# Patient Record
Sex: FEMALE | Race: BLACK OR AFRICAN AMERICAN | NOT HISPANIC OR LATINO | Employment: FULL TIME | URBAN - METROPOLITAN AREA
[De-identification: names, ages, dates, MRNs, and addresses within clinical notes are randomized per-mention and may not be internally consistent; named-entity substitution may affect disease eponyms.]

---

## 2017-01-05 ENCOUNTER — OFFICE VISIT (OUTPATIENT)
Dept: INTERNAL MEDICINE | Facility: CLINIC | Age: 62
End: 2017-01-05
Payer: COMMERCIAL

## 2017-01-05 VITALS
SYSTOLIC BLOOD PRESSURE: 130 MMHG | DIASTOLIC BLOOD PRESSURE: 72 MMHG | HEIGHT: 64 IN | WEIGHT: 190.5 LBS | OXYGEN SATURATION: 90 % | BODY MASS INDEX: 32.52 KG/M2 | HEART RATE: 134 BPM | TEMPERATURE: 99 F

## 2017-01-05 DIAGNOSIS — Z22.322 MRSA (METHICILLIN RESISTANT STAPH AUREUS) CULTURE POSITIVE: Primary | ICD-10-CM

## 2017-01-05 DIAGNOSIS — R23.8 BULLAE: ICD-10-CM

## 2017-01-05 PROCEDURE — 3078F DIAST BP <80 MM HG: CPT | Mod: S$GLB,,, | Performed by: NURSE PRACTITIONER

## 2017-01-05 PROCEDURE — 99213 OFFICE O/P EST LOW 20 MIN: CPT | Mod: 25,S$GLB,, | Performed by: NURSE PRACTITIONER

## 2017-01-05 PROCEDURE — 96372 THER/PROPH/DIAG INJ SC/IM: CPT | Mod: S$GLB,,, | Performed by: NURSE PRACTITIONER

## 2017-01-05 PROCEDURE — 1159F MED LIST DOCD IN RCRD: CPT | Mod: S$GLB,,, | Performed by: NURSE PRACTITIONER

## 2017-01-05 PROCEDURE — 3075F SYST BP GE 130 - 139MM HG: CPT | Mod: S$GLB,,, | Performed by: NURSE PRACTITIONER

## 2017-01-05 PROCEDURE — 99999 PR PBB SHADOW E&M-EST. PATIENT-LVL IV: CPT | Mod: PBBFAC,,, | Performed by: NURSE PRACTITIONER

## 2017-01-05 RX ORDER — HYDROCODONE BITARTRATE AND ACETAMINOPHEN 5; 325 MG/1; MG/1
1-2 TABLET ORAL
Qty: 31 TABLET | Refills: 0 | Status: SHIPPED | OUTPATIENT
Start: 2017-01-05 | End: 2017-06-22

## 2017-01-05 RX ORDER — SULFAMETHOXAZOLE AND TRIMETHOPRIM 800; 160 MG/1; MG/1
1 TABLET ORAL 2 TIMES DAILY
Qty: 14 TABLET | Refills: 0 | Status: SHIPPED | OUTPATIENT
Start: 2017-01-05 | End: 2017-06-22

## 2017-01-05 RX ORDER — CEFTRIAXONE 1 G/1
1 INJECTION, POWDER, FOR SOLUTION INTRAMUSCULAR; INTRAVENOUS
Status: COMPLETED | OUTPATIENT
Start: 2017-01-05 | End: 2017-01-05

## 2017-01-05 RX ORDER — MUPIROCIN 20 MG/G
OINTMENT TOPICAL
Qty: 30 G | Refills: 0 | Status: SHIPPED | OUTPATIENT
Start: 2017-01-05 | End: 2017-06-22

## 2017-01-05 RX ADMIN — CEFTRIAXONE 1 G: 1 INJECTION, POWDER, FOR SOLUTION INTRAMUSCULAR; INTRAVENOUS at 07:01

## 2017-01-05 NOTE — MR AVS SNAPSHOT
[facility]  [address] [facility] [address]  Phone: [phone]  Fax: [phone]                  Sunitha Pillai   2017 7:30 PM   Office Visit    Description:  Female : 1955   Provider:  Jasiel Jacobo DNP   Department:  [facility]           Reason for Visit     Blister           Diagnoses this Visit        Comments    MRSA (methicillin resistant staph aureus) culture positive    -  Primary     Bullae                To Do List           Future Appointments        Provider Department Dept Phone    2017 8:00 AM Patty Louis MD [facility] [address] [phone]      Goals (5 Years of Data)     None       These Medications        Disp Refills Start End    sulfamethoxazole-trimethoprim 800-160mg (BACTRIM DS) 800-160 mg Tab 14 tablet 0 2017     Take 1 tablet by mouth 2 (two) times daily. - Oral    Pharmacy: [address] Ph #: [phone]       hydrocodone-acetaminophen 5-325mg (NORCO) 5-325 mg per tablet 31 tablet 0 2017     Take 1-2 tablets by mouth every 4 to 6 hours as needed for Pain. - Oral    Pharmacy: [place] [id number] [facility] Ph #: [phone]       mupirocin (BACTROBAN) 2 % ointment 30 g 0 2017     Apply tid to any forming abscesses, also bid to bilateral nares x5d    Pharmacy: [address] Ph #: [phone]         [facility]     [facility] On [facility] Nurse Care Line -  Assistance  Registered nurses in the [facility] provide clinical advisement, health education, appointment booking, and other advisory services.  Call for this free service at [phone].             Medications           Message regarding Medications     Verify the changes and/or additions to your medication regime listed below are the same as discussed  "with your clinician today.  If any of these changes or additions are incorrect, please notify your healthcare provider.        START taking these NEW medications        Refills    sulfamethoxazole-trimethoprim 800-160mg (BACTRIM DS) 800-160 mg Tab 0    Sig: Take 1 tablet by mouth 2 (two) times daily.    Class: Normal    Route: Oral    hydrocodone-acetaminophen 5-325mg (NORCO) 5-325 mg per tablet 0    Sig: Take 1-2 tablets by mouth every 4 to 6 hours as needed for Pain.    Class: Normal    Route: Oral    mupirocin (BACTROBAN) 2 % ointment 0    Sig: Apply tid to any forming abscesses, also bid to bilateral nares x5d    Class: Normal      These medications were administered today        Dose Freq    cefTRIAXone injection 1 g 1 g Clinic/HOD 1 time    Sig: Inject 1 g into the muscle one time.    Class: Normal    Route: Intramuscular           Verify that the below list of medications is an accurate representation of the medications you are currently taking.  If none reported, the list may be blank. If incorrect, please contact your healthcare provider. Carry this list with you in case of emergency.           Current Medications     aspirin 81 mg Tab Take 1 tablet by mouth Daily.    blood sugar diagnostic Strp For TRUEresult- strips and lancets - pt is insulin dependent and checks glucose four times daily    blood sugar diagnostic, drum (ACCU-CHEK COMPACT TEST) Strp USE ONE STRIP TO CHECK GLUCOSE 4 TIMES DAILY BEFORE EACH MEAL AND AT BEDTIME    insulin aspart (NOVOLOG FLEXPEN) 100 unit/mL InPn pen 20 Units with each meal plus sliding scale    insulin aspart (NOVOLOG) 100 unit/mL injection Inject 20 Units before each meal plus sliding scale    insulin glargine (LANTUS SOLOSTAR) 100 unit/mL (3 mL) InPn pen 45 units at bedtime    insulin syringe-needle U-100 1 mL 31 x 5/16" Syrg Use three times daily with insulin    lancets (LANCETS,THIN) Misc Use with accucheck benji    lisinopril (PRINIVIL,ZESTRIL) 20 MG tablet Take 1 " "tablet (20 mg total) by mouth 2 (two) times daily.    meloxicam (MOBIC) 15 MG tablet Take 1 tablet (15 mg total) by mouth once daily.    metformin (GLUCOPHAGE) 1000 MG tablet One tablet twice daily    pen needle, diabetic (BD INSULIN PEN NEEDLE UF SHORT) 31 gauge x 5/16" Ndle USE WITH INSULIN 4 TIMES DAILY    gabapentin (NEURONTIN) 300 MG capsule Take one cap PO QHS for 7 days, then one cap PO BID for the next 7 days, and then take one cap PO TID and continue    hydrocodone-acetaminophen 5-325mg (NORCO) 5-325 mg per tablet Take 1-2 tablets by mouth every 4 to 6 hours as needed for Pain.    imiquimod (ALDARA) 5 % cream Apply to affected area three times weekly    mupirocin (BACTROBAN) 2 % ointment Apply tid to any forming abscesses, also bid to bilateral nares x5d    sulfamethoxazole-trimethoprim 800-160mg (BACTRIM DS) 800-160 mg Tab Take 1 tablet by mouth 2 (two) times daily.    sulfamethoxazole-trimethoprim 800-160mg (BACTRIM DS) 800-160 mg Tab Take 1 tablet by mouth 2 (two) times daily.           Clinical Reference Information           Vital Signs - Last Recorded  Most recent update: 1/5/2017  7:29 PM by Vandana Crawford LPN    BP Pulse Temp Ht Wt SpO2    130/72 (BP Location: Right arm, Patient Position: Sitting, BP Method: Manual) (!) 134 98.9 °F (37.2 °C) (Oral) 5' 4" (1.626 m) 86.4 kg (190 lb 7.6 oz) (!) 90%    BMI                32.7 kg/m2          Blood Pressure          Most Recent Value    BP  130/72      Allergies as of 1/5/2017     No Known Allergies      Immunizations Administered on Date of Encounter - 1/5/2017     None      [facility] Sign-Up     Activating your [facility] account is as easy as 1-2-3!     1) Visit [email or web address], select Sign Up Now, enter this activation code and your date of birth, then select Next.  [email or web address]  Expires: 2/19/2017  7:45 PM      2) Create a username and password to use when you visit [facility] in the future and select a security question in case you lose your " "password and select Next.    3) Enter your e-mail address and click Sign Up!    Additional Information  If you have questions, please e-mail [email or web address] or call [phone] to talk to our [facility] staff. Remember, [facility] is NOT to be used for urgent needs. For medical emergencies, dial 911.         Instructions      Staph Infection (MRSA)  "Staph" is the short name for the common bacteria called "staphylococcus aureus". Staph bacteria are often present on the skin without causing an infection. If it gets under the skin an infection occurs. This causes redness, tenderness, swelling and sometimes fluid drainage.  MRSA stands for "Methicillin-Resistant Staph Aureus". Unlike a common staph infection, MRSA bacteria are resistant to the usual antibiotics and harder to treat. Also, MRSA is more toxic than common staph bacteria. It can spread quickly throughout the body and cause a life-threatening illness.  MRSA is spread to others by direct physical contact with the bacteria. MRSA can also be transmitted from items contaminated by a person who has the bacteria, such as bandages, towels, bed sheets, or sports equipment. It is generally not spread through the air.  But you can acquire it if you come in direct contact with the fluid from someone's cough or sneeze.  Once you have a MRSA skin infection, you are at risk of having it again in the future.  If MRSA infection is suspected, the healthcare provider may take a wound culture to confirm the diagnosis. If an abscess is present, it may be drained. One or more antibiotics that work against MRSA will likely be prescribed.  Home care  · Take any antibiotics prescribed exactly as directed. Do not stop taking them until they are gone or your healthcare provider tells you to stop, even if you feel better.  · If antibiotic ointment was prescribed, use it as directed.  · Wash your whole body (scalp to toes) daily for 5 days with prescription soap. Scrub fingernails " with a brush for 1 minute twice a day with prescription soap.  · Keep wounds covered with clean, dry bandages. Change dressings as they become soiled. Wash your hands well each time you change the bandage or touch the wound.  · Remove any artificial nails and nail polish.  Treating household members  If you have been diagnosed with possible MRSA infection, those living with you are at higher risk of carrying the bacteria on their skin or in their nose, even if there is no sign of infection. Bacteria must be removed from the skin of all household members at the same time so it is not passed back and forth. Advise them to remove the bacteria as follows:  · Household member should wash with prescription soap as outlined above.  · If anyone in the household has a skin infection, it must be treated by a healthcare provider. Washing alone will not treat a MRSA infection.  · Clean counter tops and children's toys.  · Do not share personal items such as toothbrush and razors. It is okay to share glasses, plates, and utensils.  Preventing spread of infection  · Wash your hands frequently with plain soap and warm water. Be certain to clean under the fingernails, between the fingers, and the wrists. Dry hands with a single use towel (for example a paper towel). If soap and water are not available, you can use an alcohol-based hand . Rub the  over the entire surface of the hands, fingers, and wrists until dry.  · Avoid sharing personal items such as towels, washcloths, razors, clothing, or uniforms. Wash soiled sheets, towels or clothes in hot water with laundry detergent. Use an automatic clothes dryer set on high to kill any remaining bacteria.  · If you use a gym, wipe down equipment with an alcohol-based  before and after each use.  Wipe the handgrips as well.  · If you participate in sports, shower with plain soap after every activity. Use a clean towel for each shower.  Follow-up care  Follow-up  with your healthcare provider or as advised by our staff. If a wound culture was taken, call as directed for the results. You will be told about any changes to your treatment.  If you are diagnosed with MRSA, tell medical personnel in the future that you have been treated for this type of infection.  When to seek medical advice  Call your healthcare provider if any of the following occur:  · Increasing redness, swelling or pain  · Red streaks in the skin around the wound  · Weakness or dizziness  · New appearance of pus or drainage from the wound  · New fever over 100.4º F (38.0º C), or as directed by the healthcare provider  © 3574-1288 [employer]. [address] [place] [address], [place] [address]. All rights reserved. This information is not intended as a substitute for professional medical care. Always follow your healthcare professional's instructions.    Shower with hibiclens each day x7d then once a week thereafter to suppress staph infections.      You have been prescribed a medication that will make you drowsy or sleepy.  Take caution.  Do not drive or operate heavy machinery while using this medication.  Your risk for falls is greatly increased while using this medication.

## 2017-01-06 NOTE — PROGRESS NOTES
Subjective:       Patient ID: Sunitha Pillai is a 61 y.o. female.    Chief Complaint: Blister (on left knee)    Abscess   Chronicity:  NewProgression Since Onset: rapidly worsening  Location:  Leg  Associated Symptoms: no fever, no chills, no sweats  Characteristics: painful and blistering    Pain Scale:  10/10    Review of Systems   Constitutional: Negative for activity change, appetite change, chills, diaphoresis and fever.   HENT: Negative for congestion, ear discharge, ear pain, nosebleeds, postnasal drip, rhinorrhea, sinus pressure, sneezing, sore throat and trouble swallowing.    Eyes: Negative for photophobia, discharge, redness, itching and visual disturbance.   Respiratory: Negative for chest tightness and shortness of breath.    Cardiovascular: Negative for chest pain and leg swelling.   Gastrointestinal: Negative for abdominal distention, abdominal pain, blood in stool, constipation, diarrhea, nausea and vomiting.   Genitourinary: Negative for dysuria, frequency, hematuria, urgency and vaginal discharge.   Musculoskeletal: Positive for arthralgias. Negative for back pain and myalgias.   Skin: Negative for rash and wound.        See hpi   Neurological: Negative for dizziness, syncope and headaches.   Hematological: Negative for adenopathy.   Psychiatric/Behavioral: Negative for suicidal ideas.   All other systems reviewed and are negative.      Objective:      Physical Exam   Constitutional: She is oriented to person, place, and time. Vital signs are normal. She appears well-developed and well-nourished. She is cooperative.  Non-toxic appearance. She does not have a sickly appearance. She does not appear ill. No distress.   HENT:   Head: Normocephalic and atraumatic.   Right Ear: Hearing and external ear normal.   Left Ear: Hearing and external ear normal.   Nose: Nose normal.   Eyes: Conjunctivae and EOM are normal. Pupils are equal, round, and reactive to light. Right eye exhibits no discharge.  Left eye exhibits no discharge.   Neck: Normal range of motion. Neck supple.   Cardiovascular: Normal rate, regular rhythm and intact distal pulses.    Pulmonary/Chest: Effort normal. No respiratory distress. She exhibits no tenderness.   Abdominal: Normal appearance.   Musculoskeletal: Normal range of motion. She exhibits no edema or tenderness.   Neurological: She is alert and oriented to person, place, and time. She has normal reflexes.   Skin: Skin is warm and dry.   1cm round bullae filled with pus to the left lateral left knee with approx 0.5cm cellulitis surrounding   Psychiatric: She has a normal mood and affect. Her behavior is normal. Judgment and thought content normal.       Assessment:       1. MRSA (methicillin resistant staph aureus) culture positive    2. Bullae        Plan:       Sunitha was seen today for blister.    Diagnoses and all orders for this visit:    MRSA (methicillin resistant staph aureus) culture positive  -     sulfamethoxazole-trimethoprim 800-160mg (BACTRIM DS) 800-160 mg Tab; Take 1 tablet by mouth 2 (two) times daily.  -     hydrocodone-acetaminophen 5-325mg (NORCO) 5-325 mg per tablet; Take 1-2 tablets by mouth every 4 to 6 hours as needed for Pain.  -     mupirocin (BACTROBAN) 2 % ointment; Apply tid to any forming abscesses, also bid to bilateral nares x5d    Bullae  -     sulfamethoxazole-trimethoprim 800-160mg (BACTRIM DS) 800-160 mg Tab; Take 1 tablet by mouth 2 (two) times daily.  -     hydrocodone-acetaminophen 5-325mg (NORCO) 5-325 mg per tablet; Take 1-2 tablets by mouth every 4 to 6 hours as needed for Pain.  -     mupirocin (BACTROBAN) 2 % ointment; Apply tid to any forming abscesses, also bid to bilateral nares x5d  -     cefTRIAXone injection 1 g; Inject 1 g into the muscle one time.    Bullae opened with needle at bedside and drained, further debrided with scissors and covered with bandaid    Pt has been given instructions populated from [employer] database and has  "verbalized understanding of the after visit summary and information contained wherein.    Follow up with a primary care provider. May go to ER for acute shortness of breath, lightheadedness, fever, or any other emergent complaints or changes in condition.    "This note will be shared with the patient"    The [employer] medical Dictation software was used during the dictation of portions or the entirety of this medical record.  Phonetic or grammatic errors may exist due to the use of this software. For clarification, refer to the author of the document.             "

## 2017-01-06 NOTE — PATIENT INSTRUCTIONS
"  Staph Infection (MRSA)  "Staph" is the short name for the common bacteria called "staphylococcus aureus". Staph bacteria are often present on the skin without causing an infection. If it gets under the skin an infection occurs. This causes redness, tenderness, swelling and sometimes fluid drainage.  MRSA stands for "Methicillin-Resistant Staph Aureus". Unlike a common staph infection, MRSA bacteria are resistant to the usual antibiotics and harder to treat. Also, MRSA is more toxic than common staph bacteria. It can spread quickly throughout the body and cause a life-threatening illness.  MRSA is spread to others by direct physical contact with the bacteria. MRSA can also be transmitted from items contaminated by a person who has the bacteria, such as bandages, towels, bed sheets, or sports equipment. It is generally not spread through the air.  But you can acquire it if you come in direct contact with the fluid from someone's cough or sneeze.  Once you have a MRSA skin infection, you are at risk of having it again in the future.  If MRSA infection is suspected, the healthcare provider may take a wound culture to confirm the diagnosis. If an abscess is present, it may be drained. One or more antibiotics that work against MRSA will likely be prescribed.  Home care  · Take any antibiotics prescribed exactly as directed. Do not stop taking them until they are gone or your healthcare provider tells you to stop, even if you feel better.  · If antibiotic ointment was prescribed, use it as directed.  · Wash your whole body (scalp to toes) daily for 5 days with prescription soap. Scrub fingernails with a brush for 1 minute twice a day with prescription soap.  · Keep wounds covered with clean, dry bandages. Change dressings as they become soiled. Wash your hands well each time you change the bandage or touch the wound.  · Remove any artificial nails and nail polish.  Treating household members  If you have been diagnosed " with possible MRSA infection, those living with you are at higher risk of carrying the bacteria on their skin or in their nose, even if there is no sign of infection. Bacteria must be removed from the skin of all household members at the same time so it is not passed back and forth. Advise them to remove the bacteria as follows:  · Household member should wash with prescription soap as outlined above.  · If anyone in the household has a skin infection, it must be treated by a healthcare provider. Washing alone will not treat a MRSA infection.  · Clean counter tops and children's toys.  · Do not share personal items such as toothbrush and razors. It is okay to share glasses, plates, and utensils.  Preventing spread of infection  · Wash your hands frequently with plain soap and warm water. Be certain to clean under the fingernails, between the fingers, and the wrists. Dry hands with a single use towel (for example a paper towel). If soap and water are not available, you can use an alcohol-based hand . Rub the  over the entire surface of the hands, fingers, and wrists until dry.  · Avoid sharing personal items such as towels, washcloths, razors, clothing, or uniforms. Wash soiled sheets, towels or clothes in hot water with laundry detergent. Use an automatic clothes dryer set on high to kill any remaining bacteria.  · If you use a gym, wipe down equipment with an alcohol-based  before and after each use.  Wipe the handgrips as well.  · If you participate in sports, shower with plain soap after every activity. Use a clean towel for each shower.  Follow-up care  Follow-up with your healthcare provider or as advised by our staff. If a wound culture was taken, call as directed for the results. You will be told about any changes to your treatment.  If you are diagnosed with MRSA, tell medical personnel in the future that you have been treated for this type of infection.  When to seek medical  advice  Call your healthcare provider if any of the following occur:  · Increasing redness, swelling or pain  · Red streaks in the skin around the wound  · Weakness or dizziness  · New appearance of pus or drainage from the wound  · New fever over 100.4º F (38.0º C), or as directed by the healthcare provider  © 6215-3293 [employer]. [address] [place] [address]. All rights reserved. This information is not intended as a substitute for professional medical care. Always follow your healthcare professional's instructions.    Shower with hibiclens each day x7d then once a week thereafter to suppress staph infections.      You have been prescribed a medication that will make you drowsy or sleepy.  Take caution.  Do not drive or operate heavy machinery while using this medication.  Your risk for falls is greatly increased while using this medication.

## 2017-01-12 ENCOUNTER — HOSPITAL ENCOUNTER (EMERGENCY)
Facility: HOSPITAL | Age: 62
Discharge: HOME | End: 2017-01-12
Attending: EMERGENCY MEDICINE
Payer: COMMERCIAL

## 2017-01-12 VITALS
BODY MASS INDEX: 32.44 KG/M2 | TEMPERATURE: 99 F | OXYGEN SATURATION: 98 % | RESPIRATION RATE: 16 BRPM | DIASTOLIC BLOOD PRESSURE: 61 MMHG | HEIGHT: 64 IN | HEART RATE: 94 BPM | WEIGHT: 190 LBS | SYSTOLIC BLOOD PRESSURE: 125 MMHG

## 2017-01-12 DIAGNOSIS — R07.9 CHEST PAIN, UNSPECIFIED TYPE: Primary | ICD-10-CM

## 2017-01-12 DIAGNOSIS — K21.9 GASTROESOPHAGEAL REFLUX DISEASE, ESOPHAGITIS PRESENCE NOT SPECIFIED: ICD-10-CM

## 2017-01-12 LAB
ALBUMIN SERPL BCP-MCNC: 3.6 G/DL
ALP SERPL-CCNC: 115 U/L
ALT SERPL W/O P-5'-P-CCNC: 15 U/L
ANION GAP SERPL CALC-SCNC: 8 MMOL/L
AST SERPL-CCNC: 20 U/L
BASOPHILS # BLD AUTO: 0.01 K/UL
BASOPHILS NFR BLD: 0.1 %
BILIRUB SERPL-MCNC: 0.4 MG/DL
BNP SERPL-MCNC: <10 PG/ML
BUN SERPL-MCNC: 17 MG/DL
CALCIUM SERPL-MCNC: 9.3 MG/DL
CHLORIDE SERPL-SCNC: 100 MMOL/L
CO2 SERPL-SCNC: 25 MMOL/L
CREAT SERPL-MCNC: 1.2 MG/DL
DIFFERENTIAL METHOD: ABNORMAL
EOSINOPHIL # BLD AUTO: 0.1 K/UL
EOSINOPHIL NFR BLD: 1 %
ERYTHROCYTE [DISTWIDTH] IN BLOOD BY AUTOMATED COUNT: 12.7 %
EST. GFR  (AFRICAN AMERICAN): 56 ML/MIN/1.73 M^2
EST. GFR  (NON AFRICAN AMERICAN): 48.6 ML/MIN/1.73 M^2
GLUCOSE SERPL-MCNC: 306 MG/DL
HCT VFR BLD AUTO: 36.3 %
HGB BLD-MCNC: 13.2 G/DL
INR PPP: 1
LYMPHOCYTES # BLD AUTO: 4.3 K/UL
LYMPHOCYTES NFR BLD: 55.6 %
MCH RBC QN AUTO: 31.2 PG
MCHC RBC AUTO-ENTMCNC: 36.4 %
MCV RBC AUTO: 86 FL
MONOCYTES # BLD AUTO: 0.6 K/UL
MONOCYTES NFR BLD: 8.2 %
NEUTROPHILS # BLD AUTO: 2.7 K/UL
NEUTROPHILS NFR BLD: 34.7 %
PLATELET # BLD AUTO: 190 K/UL
PMV BLD AUTO: 10.5 FL
POTASSIUM SERPL-SCNC: 4.9 MMOL/L
PROT SERPL-MCNC: 8 G/DL
PROTHROMBIN TIME: 10.6 SEC
RBC # BLD AUTO: 4.23 M/UL
SODIUM SERPL-SCNC: 133 MMOL/L
TROPONIN I SERPL DL<=0.01 NG/ML-MCNC: <0.006 NG/ML
WBC # BLD AUTO: 7.77 K/UL

## 2017-01-12 PROCEDURE — 93010 ELECTROCARDIOGRAM REPORT: CPT | Mod: ,,, | Performed by: INTERNAL MEDICINE

## 2017-01-12 PROCEDURE — 85025 COMPLETE CBC W/AUTO DIFF WBC: CPT

## 2017-01-12 PROCEDURE — 99284 EMERGENCY DEPT VISIT MOD MDM: CPT | Mod: 25

## 2017-01-12 PROCEDURE — 99284 EMERGENCY DEPT VISIT MOD MDM: CPT | Mod: ,,, | Performed by: PHYSICIAN ASSISTANT

## 2017-01-12 PROCEDURE — 84484 ASSAY OF TROPONIN QUANT: CPT

## 2017-01-12 PROCEDURE — 93005 ELECTROCARDIOGRAM TRACING: CPT

## 2017-01-12 PROCEDURE — 25000003 PHARM REV CODE 250: Performed by: PHYSICIAN ASSISTANT

## 2017-01-12 PROCEDURE — 83880 ASSAY OF NATRIURETIC PEPTIDE: CPT

## 2017-01-12 PROCEDURE — 85610 PROTHROMBIN TIME: CPT

## 2017-01-12 PROCEDURE — 80053 COMPREHEN METABOLIC PANEL: CPT

## 2017-01-12 RX ORDER — OMEPRAZOLE 20 MG/1
20 CAPSULE, DELAYED RELEASE ORAL DAILY
Qty: 30 CAPSULE | Refills: 0 | Status: SHIPPED | OUTPATIENT
Start: 2017-01-12 | End: 2017-06-22

## 2017-01-12 RX ADMIN — ALUMINUM HYDROXIDE, MAGNESIUM HYDROXIDE, AND SIMETHICONE 50 ML: 200; 200; 20 SUSPENSION ORAL at 08:01

## 2017-01-12 NOTE — PROVIDER PROGRESS NOTES - EMERGENCY DEPT.
Encounter Date: 1/12/2017    ED Physician Progress Notes         EKG - STEMI Decision  Initial Reading: No STEMI present.

## 2017-01-12 NOTE — ED AVS SNAPSHOT
[facility]  [address] [facility] [address]               Sunitha Pillai   2017  7:56 PM   ED    Description:  Female : 1955   Department:  [facility]           Your Care was Coordinated By:     Provider Role From To    Arjun Rodriguez MD Attending Provider 17 --    GERTRUDIS StockC Physician Assistant 17 --      Reason for Visit     Chest Pain           Diagnoses this Visit        Comments    Chest pain, unspecified type    -  Primary     Gastroesophageal reflux disease, esophagitis presence not specified           ED Disposition     None           To Do List           Follow-up Information     Follow up with Patty Louis MD In 3 days.    Specialty:  Internal Medicine    Why:  As needed    Contact information:    [address]  [place] [address]  [phone]          Follow up with [facility].    Specialty:  Emergency Medicine    Why:  If symptoms worsen    Contact information:    [address] [facility] [id number] [phone]       These Medications        Disp Refills Start End    omeprazole (PRILOSEC) 20 MG capsule 30 capsule 0 2017    Take 1 capsule (20 mg total) by mouth once daily. - Oral    Pharmacy: [facility] [address] [place] [address] #: [phone]         [facility] On Call     [facility] On [facility] Nurse Care Line -  Assistance  Registered nurses in the [facility] On [facility] Center provide clinical advisement, health education, appointment booking, and other advisory services.  Call for this free service at [phone].             Medications           Message regarding Medications     Verify the changes and/or additions to your medication regime listed below are the same as discussed with your clinician today.  If any of these changes or additions are incorrect, please notify your  "healthcare provider.        START taking these NEW medications        Refills    omeprazole (PRILOSEC) 20 MG capsule 0    Sig: Take 1 capsule (20 mg total) by mouth once daily.    Class: Print    Route: Oral      These medications were administered today        Dose Freq    GI cocktail (mylanta 30 mL, lidocaine 2 % viscous 10 mL, dicyclomine 10 mL) 50 mL  ED 1 Time    Sig: Take by mouth ED 1 Time.    Class: Normal    Route: Oral    Cosign for Ordering: Accepted by Arjun Rodriguez MD on 1/12/2017  8:36 PM           Verify that the below list of medications is an accurate representation of the medications you are currently taking.  If none reported, the list may be blank. If incorrect, please contact your healthcare provider. Carry this list with you in case of emergency.           Current Medications     aspirin 81 mg Tab Take 1 tablet by mouth Daily.    blood sugar diagnostic Strp For TRUEresult- strips and lancets - pt is insulin dependent and checks glucose four times daily    blood sugar diagnostic, drum (ACCU-CHEK COMPACT TEST) Strp USE ONE STRIP TO CHECK GLUCOSE 4 TIMES DAILY BEFORE EACH MEAL AND AT BEDTIME    gabapentin (NEURONTIN) 300 MG capsule Take one cap PO QHS for 7 days, then one cap PO BID for the next 7 days, and then take one cap PO TID and continue    hydrocodone-acetaminophen 5-325mg (NORCO) 5-325 mg per tablet Take 1-2 tablets by mouth every 4 to 6 hours as needed for Pain.    imiquimod (ALDARA) 5 % cream Apply to affected area three times weekly    insulin aspart (NOVOLOG FLEXPEN) 100 unit/mL InPn pen 20 Units with each meal plus sliding scale    insulin aspart (NOVOLOG) 100 unit/mL injection Inject 20 Units before each meal plus sliding scale    insulin glargine (LANTUS SOLOSTAR) 100 unit/mL (3 mL) InPn pen 45 units at bedtime    insulin syringe-needle U-100 1 mL 31 x 5/16" Syrg Use three times daily with insulin    lancets (LANCETS,THIN) Misc Use with accucheck benji    lisinopril " "(PRINIVIL,ZESTRIL) 20 MG tablet Take 1 tablet (20 mg total) by mouth 2 (two) times daily.    metformin (GLUCOPHAGE) 1000 MG tablet One tablet twice daily    mupirocin (BACTROBAN) 2 % ointment Apply tid to any forming abscesses, also bid to bilateral nares x5d    pen needle, diabetic (BD INSULIN PEN NEEDLE UF SHORT) 31 gauge x 5/16" Ndle USE WITH INSULIN 4 TIMES DAILY    sulfamethoxazole-trimethoprim 800-160mg (BACTRIM DS) 800-160 mg Tab Take 1 tablet by mouth 2 (two) times daily.    sulfamethoxazole-trimethoprim 800-160mg (BACTRIM DS) 800-160 mg Tab Take 1 tablet by mouth 2 (two) times daily.    meloxicam (MOBIC) 15 MG tablet Take 1 tablet (15 mg total) by mouth once daily.    omeprazole (PRILOSEC) 20 MG capsule Take 1 capsule (20 mg total) by mouth once daily.           Clinical Reference Information           Your Vitals Were     BP Pulse Temp Resp Height Weight    125/61 (BP Location: Right arm, Patient Position: Sitting) 94 98.5 °F (36.9 °C) (Oral) 16 5' 4" (1.626 m) 86.2 kg (190 lb)    SpO2 BMI             98% 32.61 kg/m2         Allergies as of 1/12/2017     No Known Allergies      Immunizations Administered on Date of Encounter - 1/12/2017     None      ED Micro, Lab, POCT     Start Ordered       Status Ordering Provider    01/12/17 2027 01/12/17 2026  Troponin I  STAT      Final result     01/12/17 2027 01/12/17 2026  Brain natriuretic peptide  STAT      Final result     01/12/17 2026 01/12/17 2026  CBC auto differential  STAT      Final result     01/12/17 2026 01/12/17 2026  Comprehensive metabolic panel  STAT      Final result     01/12/17 2026 01/12/17 2026  Protime-INR  STAT      Final result       ED Imaging Orders     Start Ordered       Status Ordering Provider    01/12/17 2026 01/12/17 2026  X-Ray Chest PA And Lateral  1 time imaging      Final result       Discharge References/Attachments     GERD (ADULT) (ENGLISH)      Your Scheduled Appointments     Feb 07, 2017  8:00 AM CST   Established Patient " Visit with Patty Louis MD   [facility] [address] [place]    [address]  [address]  [facility] [id number] [phone]              [facility] Sign-Up     Activating your [facility] account is as easy as 1-2-3!     1) Visit [email or web address], select Sign Up Now, enter this activation code and your date of birth, then select Next.  [email or web address]  Expires: 2/19/2017  7:45 PM      2) Create a username and password to use when you visit [facility] in the future and select a security question in case you lose your password and select Next.    3) Enter your e-mail address and click Sign Up!    Additional Information  If you have questions, please e-mail [email or web address] or call [phone] to talk to our [facility] staff. Remember, [facility] is NOT to be used for urgent needs. For medical emergencies, dial 911.          [facility] complies with applicable Federal civil rights laws and does not discriminate on the basis of race, color, national origin, age, disability, or sex.        Language Assistance Services     ATTENTION: Language assistance services are available, free of charge. Please call [phone].      ATENCIÓN: Si habla español, tiene a fregoso disposición servicios gratuitos de asistencia lingüística. Llame al [phone].     CHÚ Ý: N?u b?n nói Ti?ng Vi?t, có các d?ch v? h? tr? ngôn ng? mi?n phí dành cho b?n. G?i s? [phone].

## 2017-01-13 NOTE — ED PROVIDER NOTES
Encounter Date: 2017    SCRIBE #1 NOTE: I, Shyanne Castrejon, am scribing for, and in the presence of,  Arjun Rodriguez MD. I have scribed the following portions of the note - the EKG reading and the APC attestation.       History     Chief Complaint   Patient presents with    Chest Pain     Review of patient's allergies indicates:  No Known Allergies  HPI Comments: This is a 61-year-old -American female with significant past medical history of hypertension, diabetes mellitus on insulin, GERD, vertigo, and arthritis the presents the ED with complaint of 4 days of intermittent midsternal and left-sided chest pain .  She states that the pain is intermittent in nature but has worsened today which is the reason for her visit.  She also admits to 6 episodes of nonbloody nonbilious emesis over the last 24 hours.  He rates her chest pain currently 4 or 5 out of 10 and describes it as sharp.  The pain radiates into her left breast, left shoulder, and down her left arm to the level of the elbow.  She is taking no medication for her symptoms.  Nothing is helped with her pain.  There are no exacerbating factors.  She denies any fever, shortness of breath, jaw pain, cough, calf pain/swelling/redness, abdominal pain, or diarrhea.    The history is provided by the patient.     Past Medical History   Diagnosis Date    Arthritis     Diabetes mellitus     GERD (gastroesophageal reflux disease)     Hypertension     Vertigo      Past Medical History Pertinent Negatives   Diagnosis Date Noted    Abnormal Pap smear of vagina 2015     Past Surgical History   Procedure Laterality Date     section       Family History   Problem Relation Age of Onset    Hypertension Mother     Hyperlipidemia Mother     Diabetes Mother     Heart disease Mother     Aneurysm Father     Diabetes Sister     Hypertension Sister     Heart disease Brother     Diabetes Brother     Hypertension Brother     Breast cancer Neg  Hx     Colon cancer Neg Hx     Ovarian cancer Neg Hx      Social History   Substance Use Topics    Smoking status: Never Smoker    Smokeless tobacco: Never Used    Alcohol use No     Review of Systems   Constitutional: Negative for chills, diaphoresis and fever.   HENT: Negative for congestion, ear pain, rhinorrhea, sinus pressure and sore throat.    Eyes: Negative for redness and itching.   Respiratory: Negative for cough and shortness of breath.    Cardiovascular: Positive for chest pain. Negative for palpitations.   Gastrointestinal: Positive for nausea and vomiting. Negative for abdominal distention, abdominal pain and diarrhea.   Musculoskeletal: Negative for arthralgias and myalgias.   Skin: Negative for color change and pallor.   Neurological: Negative for dizziness, light-headedness and headaches.   Psychiatric/Behavioral: Negative for agitation. The patient is not nervous/anxious.        Physical Exam   Initial Vitals   BP Pulse Resp Temp SpO2   01/12/17 1722 01/12/17 1722 01/12/17 1722 01/12/17 1722 01/12/17 1722   125/61 94 16 98.5 °F (36.9 °C) 98 %     Physical Exam    Constitutional: She appears well-developed and well-nourished.   HENT:   Head: Normocephalic and atraumatic.   Eyes: EOM are normal. Pupils are equal, round, and reactive to light.   Neck: Normal range of motion. Neck supple.   Cardiovascular: Normal rate, regular rhythm and normal heart sounds.   Pulmonary/Chest: Breath sounds normal. She has no wheezes.   Abdominal: Soft. Bowel sounds are normal. She exhibits no distension. There is no tenderness.   Musculoskeletal: Normal range of motion.   Neurological: She is alert and oriented to person, place, and time.   Skin: Skin is warm and dry.   Psychiatric: She has a normal mood and affect. Thought content normal.         ED Course   Procedures  Labs Reviewed   CBC W/ AUTO DIFFERENTIAL - Abnormal; Notable for the following:        Result Value    Hematocrit 36.3 (*)     MCH 31.2 (*)      MCHC 36.4 (*)     Gran% 34.7 (*)     Lymph% 55.6 (*)     All other components within normal limits   PROTIME-INR   COMPREHENSIVE METABOLIC PANEL   TROPONIN I   B-TYPE NATRIURETIC PEPTIDE     EKG Readings: (Independently Interpreted)   Sinus rhythm at 90 bpm. Normal axis. Normal ST segments.           Medical Decision Making:   History:   Old Medical Records: I decided to obtain old medical records.  Independently Interpreted Test(s):   I have ordered and independently interpreted EKG Reading(s) - see prior notes  Clinical Tests:   Lab Tests: Ordered and Reviewed  Radiological Study: Ordered and Reviewed  Medical Tests: Ordered and Reviewed  ED Management:  Is a 61-year-old -American female with significant past medical history of hypertension, diabetes mellitus on insulin, GERD, vertigo, arthritis of presents the ED with complaint of 4 days of intermittent midsternal left-sided chest pain.  On arrival the patient is afebrile and nontoxic-appearing with stable vital signs.  Pertinent physical exam findings include heart is regular rate and rhythm lungs are clear to auscultation bilaterally.  Abdomen is soft and nontender to palpation with bowel sounds present in all quadrants.  The rest of physical exam is unremarkable.  CBC, PT-INR, troponin, BNP all unremarkable.  Chest x-ray shows no detrimental changer radiographic evidence of acute intrathoracic process.  The patient was given a GI cocktail and her pain resolved.  I believe her pain is likely related to gastroesophageal reflux disease which she has a history of but has been untreated for many years.  I do not suspect any acute emergent or life-threatening cardiopulmonary disease process such as MI, ACS, pneumonia, or PE.  Patient was written a prescription for omeprazole 20 mg to be taken once daily.  She is instructed to follow-up with her PCP in the next 2-3 days or return to the ED for worsening symptoms.  She verbalized understanding and was agreeable  to treatment plan.  I discussed this case with my attending physician and the patient's record was reviewed.            Scribe Attestation:   Scribe #1: I performed the above scribed service and the documentation accurately describes the services I performed. I attest to the accuracy of the note.    Attending Attestation:     Physician Attestation Statement for NP/PA:   I discussed this assessment and plan of this patient with the NP/PA, but I did not personally examine the patient. The face to face encounter was performed by the NP/PA.    Other NP/PA Attestation Additions:    History of Present Illness: 61 year old female with Hx of GERD, DM and HTN presents for evaluation of intermittent CP with associated N/V for 4 days duration.          Physician Attestation for Scribe:  Physician Attestation Statement for Scribe #1: I, Arjun Rodriguez MD, reviewed documentation, as scribed by Shyanne Castrejon in my presence, and it is both accurate and complete.                 ED Course     Clinical Impression:   The primary encounter diagnosis was Chest pain, unspecified type. A diagnosis of Gastroesophageal reflux disease, esophagitis presence not specified was also pertinent to this visit.          Juliocesar Jensen PA-C  01/12/17 [id number]

## 2017-01-13 NOTE — ED NOTES
Appearance: Pt awake, alert & oriented to person, place & time. Pt in no acute distress at present time.   Skin: Skin warm, dry & intact. Mucous membranes moist. Skin turgor normal.   Respiratory: Respirations even, non-labored.   Neurologic: Pt moving all extremities without difficulty. Sensation intact.   Peripheral Vascular: All peripheral pulses present.

## 2017-01-13 NOTE — ED NOTES
Pt discharge home to self care. Pt is ambulatory, vital signs are stable. Pt was given written instructions.

## 2017-01-13 NOTE — ED TRIAGE NOTES
Pt reports intermittent midsternal chest pain for 4 days. Also reports some vomiting and radiation to her left shoulder. No other symptoms reported.

## 2017-02-17 ENCOUNTER — LAB VISIT (OUTPATIENT)
Dept: LAB | Facility: HOSPITAL | Age: 62
End: 2017-02-17
Attending: INTERNAL MEDICINE
Payer: COMMERCIAL

## 2017-02-17 ENCOUNTER — OFFICE VISIT (OUTPATIENT)
Dept: INTERNAL MEDICINE | Facility: CLINIC | Age: 62
End: 2017-02-17
Payer: COMMERCIAL

## 2017-02-17 VITALS
WEIGHT: 187 LBS | BODY MASS INDEX: 31.92 KG/M2 | HEIGHT: 64 IN | HEART RATE: 78 BPM | DIASTOLIC BLOOD PRESSURE: 86 MMHG | SYSTOLIC BLOOD PRESSURE: 136 MMHG

## 2017-02-17 DIAGNOSIS — Z20.2 STD EXPOSURE: ICD-10-CM

## 2017-02-17 DIAGNOSIS — E55.9 MILD VITAMIN D DEFICIENCY: ICD-10-CM

## 2017-02-17 DIAGNOSIS — K21.9 GASTROESOPHAGEAL REFLUX DISEASE, ESOPHAGITIS PRESENCE NOT SPECIFIED: ICD-10-CM

## 2017-02-17 DIAGNOSIS — I10 ESSENTIAL HYPERTENSION: Primary | ICD-10-CM

## 2017-02-17 DIAGNOSIS — Z12.31 VISIT FOR SCREENING MAMMOGRAM: ICD-10-CM

## 2017-02-17 DIAGNOSIS — M54.13 RADICULOPATHY OF CERVICOTHORACIC REGION: ICD-10-CM

## 2017-02-17 LAB — 25(OH)D3+25(OH)D2 SERPL-MCNC: 29 NG/ML

## 2017-02-17 PROCEDURE — 86592 SYPHILIS TEST NON-TREP QUAL: CPT

## 2017-02-17 PROCEDURE — 2022F DILAT RTA XM EVC RTNOPTHY: CPT | Mod: S$GLB,,, | Performed by: INTERNAL MEDICINE

## 2017-02-17 PROCEDURE — 82306 VITAMIN D 25 HYDROXY: CPT

## 2017-02-17 PROCEDURE — 3046F HEMOGLOBIN A1C LEVEL >9.0%: CPT | Mod: S$GLB,,, | Performed by: INTERNAL MEDICINE

## 2017-02-17 PROCEDURE — 83036 HEMOGLOBIN GLYCOSYLATED A1C: CPT

## 2017-02-17 PROCEDURE — 3079F DIAST BP 80-89 MM HG: CPT | Mod: S$GLB,,, | Performed by: INTERNAL MEDICINE

## 2017-02-17 PROCEDURE — 80074 ACUTE HEPATITIS PANEL: CPT

## 2017-02-17 PROCEDURE — 86703 HIV-1/HIV-2 1 RESULT ANTBDY: CPT

## 2017-02-17 PROCEDURE — 99214 OFFICE O/P EST MOD 30 MIN: CPT | Mod: S$GLB,,, | Performed by: INTERNAL MEDICINE

## 2017-02-17 PROCEDURE — 1160F RVW MEDS BY RX/DR IN RCRD: CPT | Mod: S$GLB,,, | Performed by: INTERNAL MEDICINE

## 2017-02-17 PROCEDURE — 3075F SYST BP GE 130 - 139MM HG: CPT | Mod: S$GLB,,, | Performed by: INTERNAL MEDICINE

## 2017-02-17 PROCEDURE — 4010F ACE/ARB THERAPY RXD/TAKEN: CPT | Mod: S$GLB,,, | Performed by: INTERNAL MEDICINE

## 2017-02-17 PROCEDURE — 99999 PR PBB SHADOW E&M-EST. PATIENT-LVL IV: CPT | Mod: PBBFAC,,, | Performed by: INTERNAL MEDICINE

## 2017-02-17 PROCEDURE — 36415 COLL VENOUS BLD VENIPUNCTURE: CPT | Mod: PO

## 2017-02-17 RX ORDER — LISINOPRIL 20 MG/1
20 TABLET ORAL 2 TIMES DAILY
Qty: 60 TABLET | Refills: 6 | Status: SHIPPED | OUTPATIENT
Start: 2017-02-17 | End: 2018-03-02 | Stop reason: SDUPTHER

## 2017-02-17 RX ORDER — INSULIN GLARGINE 100 [IU]/ML
INJECTION, SOLUTION SUBCUTANEOUS
Qty: 1 BOX | Refills: 6 | Status: SHIPPED | OUTPATIENT
Start: 2017-02-17 | End: 2017-10-27

## 2017-02-17 RX ORDER — INSULIN ASPART 100 [IU]/ML
INJECTION, SOLUTION INTRAVENOUS; SUBCUTANEOUS
Qty: 1800 ML | Refills: 6 | Status: SHIPPED | OUTPATIENT
Start: 2017-02-17 | End: 2018-03-02 | Stop reason: SDUPTHER

## 2017-02-17 NOTE — MR AVS SNAPSHOT
[facility] [address]  [address]  [address]  [facility] [address]  Phone: [phone]                  Sunitha Pillai   2017 11:30 AM   Office Visit    Description:  Female : 1955   Provider:  Patty Louis MD   Department:  [facility] [address]           Reason for Visit     Follow-up           Diagnoses this Visit        Comments    Essential hypertension    -  Primary     Uncontrolled type 2 diabetes mellitus without complication, with long-term current use of insulin         Gastroesophageal reflux disease, esophagitis presence not specified         Mild vitamin D deficiency         STD exposure         Visit for screening mammogram         Radiculopathy of cervicothoracic region                To Do List           Future Appointments        Provider Department Dept Phone    2017 2:15 PM LAB, [place] [facility] [phone]    2017 9:00 AM [facility] MAMMO2 SCREEN [facility] [phone]    2017 1:30 PM [facility] MRI2 [facility] [phone]    3/15/2017 8:45 AM Goldie Shaikh MD [facility] [address] [phone]      Goals (5 Years of Data)     None      Follow-Up and Disposition     Return in about 3 months (around 2017).       These Medications        Disp Refills Start End    insulin aspart (NOVOLOG FLEXPEN) 100 unit/mL InPn pen 1800 mL 6 2017     20 Units with each meal plus sliding scale    Pharmacy: [facility] [address] [place] [address] [address] [facility] Ph #: [phone]       insulin glargine (LANTUS SOLOSTAR) 100 unit/mL (3 mL) InPn pen 1 Box 6 2017     45 units at bedtime    Pharmacy: [facility] [address] [place] [address] [facility] Ph #: [phone]       lisinopril (PRINIVIL,ZESTRIL) 20 MG tablet 60 tablet 6 2017     Take 1 tablet (20 mg total) by mouth 2 (two) times daily. - Oral    Pharmacy:  [facility] [address] [place] [address] [facility] #: [phone]         [facility] On [facility]     [facility] On Call Nurse Care Line - 24/7 Assistance  Registered nurses in the [facility] On Call Center provide clinical advisement, health education, appointment booking, and other advisory services.  Call for this free service at [phone].             Medications           Message regarding Medications     Verify the changes and/or additions to your medication regime listed below are the same as discussed with your clinician today.  If any of these changes or additions are incorrect, please notify your healthcare provider.             Verify that the below list of medications is an accurate representation of the medications you are currently taking.  If none reported, the list may be blank. If incorrect, please contact your healthcare provider. Carry this list with you in case of emergency.           Current Medications     aspirin 81 mg Tab Take 1 tablet by mouth Daily.    blood sugar diagnostic Strp For TRUEresult- strips and lancets - pt is insulin dependent and checks glucose four times daily    blood sugar diagnostic, drum (ACCU-CHEK COMPACT TEST) Strp USE ONE STRIP TO CHECK GLUCOSE 4 TIMES DAILY BEFORE EACH MEAL AND AT BEDTIME    gabapentin (NEURONTIN) 300 MG capsule Take one cap PO QHS for 7 days, then one cap PO BID for the next 7 days, and then take one cap PO TID and continue    hydrocodone-acetaminophen 5-325mg (NORCO) 5-325 mg per tablet Take 1-2 tablets by mouth every 4 to 6 hours as needed for Pain.    imiquimod (ALDARA) 5 % cream Apply to affected area three times weekly    insulin aspart (NOVOLOG FLEXPEN) 100 unit/mL InPn pen 20 Units with each meal plus sliding scale    insulin aspart (NOVOLOG) 100 unit/mL injection Inject 20 Units before each meal plus sliding scale    insulin glargine (LANTUS SOLOSTAR) 100 unit/mL (3 mL) InPn pen 45 units at bedtime    insulin syringe-needle U-100  "1 mL 31 x 5/16" Syrg Use three times daily with insulin    lancets (LANCETS,THIN) Misc Use with accucheck benji    lisinopril (PRINIVIL,ZESTRIL) 20 MG tablet Take 1 tablet (20 mg total) by mouth 2 (two) times daily.    meloxicam (MOBIC) 15 MG tablet Take 1 tablet (15 mg total) by mouth once daily.    metformin (GLUCOPHAGE) 1000 MG tablet One tablet twice daily    mupirocin (BACTROBAN) 2 % ointment Apply tid to any forming abscesses, also bid to bilateral nares x5d    omeprazole (PRILOSEC) 20 MG capsule Take 1 capsule (20 mg total) by mouth once daily.    pen needle, diabetic (BD INSULIN PEN NEEDLE UF SHORT) 31 gauge x 5/16" Ndle USE WITH INSULIN 4 TIMES DAILY    sulfamethoxazole-trimethoprim 800-160mg (BACTRIM DS) 800-160 mg Tab Take 1 tablet by mouth 2 (two) times daily.    sulfamethoxazole-trimethoprim 800-160mg (BACTRIM DS) 800-160 mg Tab Take 1 tablet by mouth 2 (two) times daily.           Clinical Reference Information           Your Vitals Were     BP Pulse Height Weight BMI    136/86 78 5' 4" (1.626 m) 84.8 kg (187 lb) 32.1 kg/m2      Blood Pressure          Most Recent Value    BP  136/86      Allergies as of 2/17/2017     No Known Allergies      Immunizations Administered on Date of Encounter - 2/17/2017     None      Orders Placed During Today's Visit      Normal Orders This Visit    Ambulatory consult to Gynecology     Case request GI: ESOPHAGOGASTRODUODENOSCOPY (EGD)     Future Labs/Procedures Expected by Expires    Hemoglobin A1c  2/17/2017 2/17/2018    Hepatitis panel, acute  2/17/2017 5/18/2017    HIV-1 and HIV-2 antibodies  2/17/2017 2/17/2018    Mammo Digital Screening Bilat with CAD  2/17/2017 4/20/2018    MRI Cervical Spine W WO Cont  2/17/2017 5/18/2017    RPR  2/17/2017 4/18/2018    Vitamin D  2/17/2017 2/17/2018      [facility] Sign-Up     Activating your [facility] account is as easy as 1-2-3!     1) Visit [email or web address], select Sign Up Now, enter this activation code and your date of birth, " then select Next.  [email or web address]  Expires: 2/19/2017  7:45 PM      2) Create a username and password to use when you visit [facility] in the future and select a security question in case you lose your password and select Next.    3) Enter your e-mail address and click Sign Up!    Additional Information  If you have questions, please e-mail [email or web address] or call [phone] to talk to our [facility] staff. Remember, [facility] is NOT to be used for urgent needs. For medical emergencies, dial 911.         Language Assistance Services     ATTENTION: Language assistance services are available, free of charge. Please call [phone].      ATENCIÓN: Si daniella soniakim, tiene a fregoso disposición servicios gratuitos de asistencia lingüística. Llame al [phone].     CHÚ Ý: N?u b?n nói Ti?ng Vi?t, có các d?ch v? h? tr? ngôn ng? mi?n phí dành cho b?n. G?i s? [phone].         [facility] [address] complies with applicable Federal civil rights laws and does not discriminate on the basis of race, color, national origin, age, disability, or sex.

## 2017-02-18 LAB
ESTIMATED AVG GLUCOSE: 272 MG/DL
HBA1C MFR BLD HPLC: 11.1 %
RPR SER QL: NORMAL

## 2017-02-20 LAB
HAV IGM SERPL QL IA: NEGATIVE
HBV CORE IGM SERPL QL IA: NEGATIVE
HBV SURFACE AG SERPL QL IA: NEGATIVE
HCV AB SERPL QL IA: NEGATIVE
HIV 1+2 AB+HIV1 P24 AG SERPL QL IA: NEGATIVE

## 2017-02-21 ENCOUNTER — TELEPHONE (OUTPATIENT)
Dept: INTERNAL MEDICINE | Facility: CLINIC | Age: 62
End: 2017-02-21

## 2017-02-21 NOTE — TELEPHONE ENCOUNTER
Prior Authorization was required for  Jaimee Matias, paperwork filled out and faxed to [facility].

## 2017-02-22 ENCOUNTER — TELEPHONE (OUTPATIENT)
Dept: INTERNAL MEDICINE | Facility: CLINIC | Age: 62
End: 2017-02-22

## 2017-02-22 DIAGNOSIS — M54.10 RADICULOPATHY AFFECTING UPPER EXTREMITY: ICD-10-CM

## 2017-02-22 DIAGNOSIS — M54.2 NECK PAIN: Primary | ICD-10-CM

## 2017-02-22 NOTE — TELEPHONE ENCOUNTER
Patient states she would her test results  Also she needs a refill on her insulin syringe needles and would also like something for muscle spasms

## 2017-02-22 NOTE — TELEPHONE ENCOUNTER
----- Message from Marcella Alexander sent at 2/21/2017  4:04 PM CST -----  Contact: self  Patient states she would her test results  Also she needs a refill on her insulin syringe needles and would also like something for muscle spasms   Please call to discuss [phone]   Or  [phone]                   Thanks

## 2017-02-27 ENCOUNTER — HOSPITAL ENCOUNTER (OUTPATIENT)
Dept: RADIOLOGY | Facility: HOSPITAL | Age: 62
Discharge: HOME | End: 2017-02-27
Attending: INTERNAL MEDICINE
Payer: COMMERCIAL

## 2017-02-27 DIAGNOSIS — Z12.31 VISIT FOR SCREENING MAMMOGRAM: ICD-10-CM

## 2017-02-27 PROCEDURE — 77067 SCR MAMMO BI INCL CAD: CPT | Mod: TC

## 2017-02-27 PROCEDURE — 77067 SCR MAMMO BI INCL CAD: CPT | Mod: 26,,, | Performed by: RADIOLOGY

## 2017-03-01 NOTE — PROGRESS NOTES
Subjective:       Patient ID: Sunitha Pillai is a 61 y.o. female.    Chief Complaint: Follow-up    HPIPt not using insulin regularly and not checking her sugars.  Pt with chronic R neck and arm pain - no numbness or weakness.  Was supposed to get an MRI with neurosurgery about 6 months ago but symptoms are still persistent.  Concern that her  is cheating.  Review of Systems   Respiratory: Negative for shortness of breath (PND or orthopnea).    Cardiovascular: Negative for chest pain (arm pain or jaw pain).   Gastrointestinal: Negative for abdominal pain, diarrhea, nausea and vomiting.   Genitourinary: Negative for dysuria.   Neurological: Negative for seizures, syncope and headaches.       Objective:      Physical Exam   Constitutional: She is oriented to person, place, and time. She appears well-developed and well-nourished. No distress.   HENT:   Head: Normocephalic.   Mouth/Throat: Oropharynx is clear and moist.   Neck: Neck supple. No JVD present. No thyromegaly present.   Cardiovascular: Normal rate, regular rhythm, normal heart sounds and intact distal pulses.  Exam reveals no gallop and no friction rub.    No murmur heard.  Pulmonary/Chest: Effort normal and breath sounds normal. She has no wheezes. She has no rales.   Abdominal: Soft. Bowel sounds are normal. She exhibits no distension and no mass. There is no tenderness. There is no rebound and no guarding.   Musculoskeletal: She exhibits no edema.   Lymphadenopathy:     She has no cervical adenopathy.   Neurological: She is alert and oriented to person, place, and time. She has normal reflexes.   Skin: Skin is warm and dry.   Psychiatric: She has a normal mood and affect. Her behavior is normal. Judgment and thought content normal.       Assessment:       1. Essential hypertension    2. Uncontrolled type 2 diabetes mellitus without complication, with long-term current use of insulin    3. Gastroesophageal reflux disease, esophagitis presence  not specified    4. Mild vitamin D deficiency    5. STD exposure    6. Visit for screening mammogram    7. Radiculopathy of cervicothoracic region        Plan:   Essential hypertension  Controlled - continue current meds    Uncontrolled type 2 diabetes mellitus without complication, with long-term current use of insulin  -     Hemoglobin A1c; Future; Expected date: 2/17/17    Gastroesophageal reflux disease, esophagitis presence not specified  -     Case request GI: ESOPHAGOGASTRODUODENOSCOPY (EGD)    Mild vitamin D deficiency  -     Vitamin D; Future; Expected date: 2/17/17    STD exposure  -     Ambulatory consult to Gynecology  -     HIV-1 and HIV-2 antibodies; Future; Expected date: 2/17/17  -     RPR; Future; Expected date: 2/17/17  -     Hepatitis panel, acute; Future; Expected date: 2/17/17    Visit for screening mammogram  -     Mammo Digital Screening Bilat with CAD; Future; Expected date: 2/17/17    Radiculopathy of cervicothoracic region  -     MRI Cervical Spine W WO Cont; Future; Expected date: 2/17/17    Other orders  -     insulin aspart (NOVOLOG FLEXPEN) 100 unit/mL InPn pen; 20 Units with each meal plus sliding scale  Dispense: 1800 mL; Refill: 6  -     insulin glargine (LANTUS SOLOSTAR) 100 unit/mL (3 mL) InPn pen; 45 units at bedtime  Dispense: 1 Box; Refill: 6  -     lisinopril (PRINIVIL,ZESTRIL) 20 MG tablet; Take 1 tablet (20 mg total) by mouth 2 (two) times daily.  Dispense: 60 tablet; Refill: 6

## 2017-03-07 DIAGNOSIS — K21.9 GASTROESOPHAGEAL REFLUX DISEASE, ESOPHAGITIS PRESENCE NOT SPECIFIED: Primary | ICD-10-CM

## 2017-03-10 ENCOUNTER — TELEPHONE (OUTPATIENT)
Dept: INTERNAL MEDICINE | Facility: CLINIC | Age: 62
End: 2017-03-10

## 2017-03-10 NOTE — TELEPHONE ENCOUNTER
----- Message from Trina Fan sent at 3/10/2017  1:49 PM CST -----  Contact: [phone]  Patient would like a call back from the MD , stated message is personal . Please call and advise , Thanks !

## 2017-03-15 ENCOUNTER — OFFICE VISIT (OUTPATIENT)
Dept: OBSTETRICS AND GYNECOLOGY | Facility: CLINIC | Age: 62
End: 2017-03-15
Attending: OBSTETRICS & GYNECOLOGY
Payer: COMMERCIAL

## 2017-03-15 VITALS
WEIGHT: 183.88 LBS | BODY MASS INDEX: 31.39 KG/M2 | SYSTOLIC BLOOD PRESSURE: 120 MMHG | HEIGHT: 64 IN | DIASTOLIC BLOOD PRESSURE: 74 MMHG

## 2017-03-15 DIAGNOSIS — Z20.2 EXPOSURE TO SEXUALLY TRANSMITTED DISEASE (STD): Primary | ICD-10-CM

## 2017-03-15 LAB
C TRACH DNA SPEC QL NAA+PROBE: NOT DETECTED
N GONORRHOEA DNA SPEC QL NAA+PROBE: NOT DETECTED

## 2017-03-15 PROCEDURE — 3078F DIAST BP <80 MM HG: CPT | Mod: S$GLB,,, | Performed by: OBSTETRICS & GYNECOLOGY

## 2017-03-15 PROCEDURE — 3074F SYST BP LT 130 MM HG: CPT | Mod: S$GLB,,, | Performed by: OBSTETRICS & GYNECOLOGY

## 2017-03-15 PROCEDURE — 1160F RVW MEDS BY RX/DR IN RCRD: CPT | Mod: S$GLB,,, | Performed by: OBSTETRICS & GYNECOLOGY

## 2017-03-15 PROCEDURE — 99213 OFFICE O/P EST LOW 20 MIN: CPT | Mod: S$GLB,,, | Performed by: OBSTETRICS & GYNECOLOGY

## 2017-03-15 PROCEDURE — 87591 N.GONORRHOEAE DNA AMP PROB: CPT

## 2017-03-15 PROCEDURE — 99999 PR PBB SHADOW E&M-EST. PATIENT-LVL II: CPT | Mod: PBBFAC,,, | Performed by: OBSTETRICS & GYNECOLOGY

## 2017-03-15 NOTE — LETTER
March 15, 2017      Patty Louis MD  [address]  [facility] [address]           Spiritism - OB/GYN [address]  [address]  [address]  [facility] [address]  Phone: [phone]  Fax: [phone]          Patient: Sunitha Pillai   MR Number: [id number]   YOB: 1955   Date of Visit: 3/15/2017       Dear Dr. Patty Louis:    Thank you for referring Sunitha Pillai to me for evaluation. Attached you will find relevant portions of my assessment and plan of care.    If you have questions, please do not hesitate to call me. I look forward to following Sunitha Pillai along with you.    Sincerely,    Goldie Shaikh MD    Enclosure  CC:  No Recipients    If you would like to receive this communication electronically, please contact [email or web address] or [phone] to request more information on [facility] access.    For providers and/or their staff who would like to refer a patient to [facility], please contact us through our one-stop-shop provider referral line, [facility], at [phone].    If you feel you have received this communication in error or would no longer like to receive these types of communications, please e-mail [email or web address]

## 2017-03-15 NOTE — PROGRESS NOTES
SUBJECTIVE:   61 y.o. female  would like gc/chl testing.  She is concerned that her  is cheating.  She denies complaints.    ROS:  GENERAL: No fever, chills, fatigability or weight loss.  VULVAR: No pain, no lesions and no itching.  VAGINAL: No relaxation, no itching, no discharge, no abnormal bleeding and no lesions.  ABDOMEN: No abdominal pain. Denies nausea. Denies vomiting. No diarrhea. No constipation  BREAST: Denies pain. No lumps. No discharge.  URINARY: No incontinence, no nocturia, no frequency and no dysuria.  CARDIOVASCULAR: No chest pain. No shortness of breath. No leg cramps.  NEUROLOGICAL: No headaches. No vision changes.        Vitals:    03/15/17 0846   BP: 120/74         OBJECTIVE:   She appears well, afebrile.  Abdomen: benign, soft, nontender, no masses.  VULVA: Normal external female genitalia, normal urethra, normal urethral meatus  VAGINA:no lesions  CERVIXNormal  UTERUSnormal size, contour, position, consistency, mobility, non-tender  ADNEXAnormal adnexa and no mass, fullness, tenderness    Urine dipstick: not done.    ASSESSMENT:   Sunitha was seen today for std check.    Diagnoses and all orders for this visit:    Exposure to sexually transmitted disease (STD)  -     C. trachomatis/N. gonorrhoeae by AMP DNA Cervix         PLAN:   Follow up all cultures

## 2017-05-17 ENCOUNTER — HOSPITAL ENCOUNTER (EMERGENCY)
Facility: HOSPITAL | Age: 62
Discharge: HOME | End: 2017-05-17
Attending: FAMILY MEDICINE | Admitting: FAMILY MEDICINE
Payer: COMMERCIAL

## 2017-05-17 VITALS
BODY MASS INDEX: 29.02 KG/M2 | OXYGEN SATURATION: 99 % | RESPIRATION RATE: 18 BRPM | DIASTOLIC BLOOD PRESSURE: 86 MMHG | HEIGHT: 64 IN | WEIGHT: 170 LBS | SYSTOLIC BLOOD PRESSURE: 147 MMHG | HEART RATE: 65 BPM | TEMPERATURE: 98 F

## 2017-05-17 DIAGNOSIS — R07.9 CHEST PAIN: Primary | ICD-10-CM

## 2017-05-17 DIAGNOSIS — R73.9 HYPERGLYCEMIA: ICD-10-CM

## 2017-05-17 DIAGNOSIS — R07.89 ACUTE CHEST WALL PAIN: ICD-10-CM

## 2017-05-17 LAB
ALBUMIN SERPL BCP-MCNC: 3.5 G/DL
ALP SERPL-CCNC: 108 U/L
ALT SERPL W/O P-5'-P-CCNC: 12 U/L
ANION GAP SERPL CALC-SCNC: 10 MMOL/L
AST SERPL-CCNC: 22 U/L
BASOPHILS # BLD AUTO: 0.01 K/UL
BASOPHILS NFR BLD: 0.2 %
BILIRUB SERPL-MCNC: 0.6 MG/DL
BUN SERPL-MCNC: 15 MG/DL
CALCIUM SERPL-MCNC: 9.2 MG/DL
CHLORIDE SERPL-SCNC: 103 MMOL/L
CO2 SERPL-SCNC: 24 MMOL/L
CREAT SERPL-MCNC: 1.3 MG/DL
DIFFERENTIAL METHOD: ABNORMAL
EOSINOPHIL # BLD AUTO: 0.1 K/UL
EOSINOPHIL NFR BLD: 1.1 %
ERYTHROCYTE [DISTWIDTH] IN BLOOD BY AUTOMATED COUNT: 12.5 %
EST. GFR  (AFRICAN AMERICAN): 50.8 ML/MIN/1.73 M^2
EST. GFR  (NON AFRICAN AMERICAN): 44.1 ML/MIN/1.73 M^2
GLUCOSE SERPL-MCNC: 400 MG/DL
HCT VFR BLD AUTO: 37.5 %
HGB BLD-MCNC: 13.8 G/DL
INR PPP: 1
LYMPHOCYTES # BLD AUTO: 3.1 K/UL
LYMPHOCYTES NFR BLD: 51.1 %
MCH RBC QN AUTO: 31.7 PG
MCHC RBC AUTO-ENTMCNC: 36.8 %
MCV RBC AUTO: 86 FL
MONOCYTES # BLD AUTO: 0.5 K/UL
MONOCYTES NFR BLD: 8.3 %
NEUTROPHILS # BLD AUTO: 2.4 K/UL
NEUTROPHILS NFR BLD: 39 %
PLATELET # BLD AUTO: 163 K/UL
PMV BLD AUTO: 10 FL
POTASSIUM SERPL-SCNC: 5.7 MMOL/L
PROT SERPL-MCNC: 8 G/DL
PROTHROMBIN TIME: 11.1 SEC
RBC # BLD AUTO: 4.35 M/UL
SODIUM SERPL-SCNC: 137 MMOL/L
TROPONIN I SERPL DL<=0.01 NG/ML-MCNC: <0.006 NG/ML
WBC # BLD AUTO: 6.14 K/UL

## 2017-05-17 PROCEDURE — 84484 ASSAY OF TROPONIN QUANT: CPT

## 2017-05-17 PROCEDURE — 80053 COMPREHEN METABOLIC PANEL: CPT

## 2017-05-17 PROCEDURE — 93010 ELECTROCARDIOGRAM REPORT: CPT | Mod: ,,, | Performed by: INTERNAL MEDICINE

## 2017-05-17 PROCEDURE — 99284 EMERGENCY DEPT VISIT MOD MDM: CPT | Mod: 25

## 2017-05-17 PROCEDURE — 93005 ELECTROCARDIOGRAM TRACING: CPT

## 2017-05-17 PROCEDURE — 85025 COMPLETE CBC W/AUTO DIFF WBC: CPT

## 2017-05-17 PROCEDURE — 25000003 PHARM REV CODE 250: Performed by: FAMILY MEDICINE

## 2017-05-17 PROCEDURE — 99284 EMERGENCY DEPT VISIT MOD MDM: CPT | Mod: ,,, | Performed by: FAMILY MEDICINE

## 2017-05-17 PROCEDURE — 85610 PROTHROMBIN TIME: CPT

## 2017-05-17 RX ORDER — NAPROXEN SODIUM 220 MG/1
325 TABLET, FILM COATED ORAL
Status: COMPLETED | OUTPATIENT
Start: 2017-05-17 | End: 2017-05-17

## 2017-05-17 RX ADMIN — ASPIRIN 81 MG CHEWABLE TABLET 324 MG: 81 TABLET CHEWABLE at 08:05

## 2017-05-17 NOTE — ED AVS SNAPSHOT
[facility]  [address]  [facility] [address]               Sunitha Pillai   2017  7:52 PM   ED    Description:  Female : 1955   Department:  [facility]           Your Care was Coordinated By:     Provider Role From To    Doroteo Jones MD Attending Provider 17 --      Reason for Visit     Chest Pain           Diagnoses this Visit        Comments    Chest pain    -  Primary     Acute chest wall pain         Hyperglycemia           ED Disposition     None           To Do List           Follow-up Information     Follow up with Patty Louis MD In 1 week.    Specialty:  Internal Medicine    Why:  to review your current blood sugar regimen and recheck your chest pain.     Contact information:    [address]  [facility] [address]  [phone]        [facility]     [facility] -  Assistance  Unless otherwise directed by your provider, please contact [facility], our nurse care line that is available for  assistance.     Registered nurses in the [facility] provide: appointment scheduling, clinical advisement, health education, and other advisory services.  Call: [phone] (toll free)               Medications           Message regarding Medications     Verify the changes and/or additions to your medication regime listed below are the same as discussed with your clinician today.  If any of these changes or additions are incorrect, please notify your healthcare provider.        These medications were administered today        Dose Freq    aspirin chewable tablet 324 mg 324 mg ED 1 Time    Sig: Take 4 tablets (324 mg total) by mouth ED 1 Time.    Class: Normal    Route: Oral           Verify that the below list of medications is an accurate representation of the medications you are currently taking.  If none reported, the list may be blank. If incorrect, please  "contact your healthcare provider. Carry this list with you in case of emergency.           Current Medications     aspirin 81 mg Tab Take 1 tablet by mouth Daily.    lisinopril (PRINIVIL,ZESTRIL) 20 MG tablet Take 1 tablet (20 mg total) by mouth 2 (two) times daily.    metformin (GLUCOPHAGE) 1000 MG tablet One tablet twice daily    blood sugar diagnostic Strp For TRUEresult- strips and lancets - pt is insulin dependent and checks glucose four times daily    blood sugar diagnostic, drum (ACCU-CHEK COMPACT TEST) Strp USE ONE STRIP TO CHECK GLUCOSE 4 TIMES DAILY BEFORE EACH MEAL AND AT BEDTIME    gabapentin (NEURONTIN) 300 MG capsule Take one cap PO QHS for 7 days, then one cap PO BID for the next 7 days, and then take one cap PO TID and continue    hydrocodone-acetaminophen 5-325mg (NORCO) 5-325 mg per tablet Take 1-2 tablets by mouth every 4 to 6 hours as needed for Pain.    imiquimod (ALDARA) 5 % cream Apply to affected area three times weekly    insulin aspart (NOVOLOG FLEXPEN) 100 unit/mL InPn pen 20 Units with each meal plus sliding scale    insulin aspart (NOVOLOG) 100 unit/mL injection Inject 20 Units before each meal plus sliding scale    insulin detemir (LEVEMIR FLEXTOUCH) 100 unit/mL (3 mL) SubQ InPn pen Inject 45 Units into the skin every evening.    insulin glargine (LANTUS SOLOSTAR) 100 unit/mL (3 mL) InPn pen 45 units at bedtime    insulin syringe-needle U-100 1 mL 31 x 5/16" Syrg Use three times daily with insulin    lancets (LANCETS,THIN) Misc Use with accucheck benji    meloxicam (MOBIC) 15 MG tablet Take 1 tablet (15 mg total) by mouth once daily.    mupirocin (BACTROBAN) 2 % ointment Apply tid to any forming abscesses, also bid to bilateral nares x5d    omeprazole (PRILOSEC) 20 MG capsule Take 1 capsule (20 mg total) by mouth once daily.    pen needle, diabetic (BD INSULIN PEN NEEDLE UF SHORT) 31 gauge x 5/16" Ndle USE WITH INSULIN 4 TIMES DAILY    sulfamethoxazole-trimethoprim 800-160mg (BACTRIM DS) " "800-160 mg Tab Take 1 tablet by mouth 2 (two) times daily.    sulfamethoxazole-trimethoprim 800-160mg (BACTRIM DS) 800-160 mg Tab Take 1 tablet by mouth 2 (two) times daily.           Clinical Reference Information           Your Vitals Were     BP Pulse Temp Resp Height Weight    147/86 (BP Location: Right arm, Patient Position: Sitting, BP Method: Automatic) 65 97.9 °F (36.6 °C) (Oral) 18 5' 4" (1.626 m) 77.1 kg (170 lb)    SpO2 BMI             99% 29.18 kg/m2         Allergies as of 5/17/2017     No Known Allergies      Immunizations Administered on Date of Encounter - 5/17/2017     None      ED Micro, Lab, POCT     Start Ordered       Status Ordering Provider    05/17/17 2015 05/17/17 2018  Comprehensive metabolic panel  STAT      Final result     05/17/17 2015 05/17/17 2018  Troponin I  STAT      Final result     05/17/17 2015 05/17/17 2018  Protime-INR  STAT      Final result     05/17/17 2014 05/17/17 2018  CBC auto differential  STAT      Final result       ED Imaging Orders     Start Ordered       Status Ordering Provider    05/17/17 2016 05/17/17 2018  X-Ray Chest AP Portable  1 time imaging      Final result         Discharge Instructions         Your cardiac enzymes do not indicate any recent damage to your heart.  Your chest x-ray shows no acute problems.  Your blood tests also do not show any evidence of significant electrolyte imbalances, dangerous anemia, or problems w/ your liver or kidney function.   While the tests do not identify a definite cause for your chest discomfort---it does not appear at this time that the symptoms are immediate dangerous to you.   Nevertheless, you should follow up with your primary Dr. Bravo for recheck and to review any future symptoms.   Your blood sugar is elevated, but there is no evidence of acute diabetic crisis.  He should discuss your blood sugar control regimen and your chest pain symptoms with Dr. Louis.      Uncertain Causes of Chest Pain    Chest pain " can happen for a number of reasons. Sometimes the cause can't be determined. If your condition does not seem serious, and your pain does not appear to be coming from your heart, your healthcare provider may recommend watching it closely. Sometimes the signs of a serious problem take more time to appear. Many problems not related to your heart can cause chest pain.These include:  · Musculoskeletal. Costochondritis, an inflammation of the tissues around the ribs that can occur from trauma or overuse injuries  · Respiratory. Pneumonia, pneumothorax, or pneumonitis (inflammation of the lining of the chest and lungs)  · Gastrointestinal. Esophageal reflux, heartburn, or gallbladder disease  · Anxiety and panic disorders  · Nerve compression and neuritis  · Miscellaneous problems such as aortic aneurysm or pulmonary embolism (a blood clot in the lungs)  Home care  After your visit, follow these recommendations:  · Rest today and avoid strenuous activity.  · Take any prescribed medicine as directed.  · Be aware of any recurrent chest pain and notice any changes  Follow-up care  Follow up with your healthcare provider if you do not start to feel better within 24 hours, or as advised.  Call 911  Call 911 if any of these occur:  · A change in the type of pain: if it feels different, becomes more severe, lasts longer, or begins to spread into your shoulder, arm, neck, jaw or back  · Shortness of breath or increased pain with breathing  · Weakness, dizziness, or fainting  · Rapid heart beat  · Crushing sensation in your chest  When to seek medical advice  Call your healthcare provider right away if any of the following occur:  · Cough with dark colored sputum (phlegm) or blood  · Fever of 100.4ºF (38ºC) or higher, or as directed by your healthcare provider  · Swelling, pain or redness in one leg  · Shortness of breath  Date Last Reviewed: 12/30/2015  © 2473-0705 [employer]. [address] [place] [address], [facility] [place]  [address]. All rights reserved. This information is not intended as a substitute for professional medical care. Always follow your healthcare professional's instructions.          [facility] Sign-Up     Activating your [facility] account is as easy as 1-2-3!     1) Visit [email or web address], select Sign Up Now, enter this activation code and your date of birth, then select Next.  [email or web address]  Expires: 7/1/2017  9:29 PM      2) Create a username and password to use when you visit [facility] in the future and select a security question in case you lose your password and select Next.    3) Enter your e-mail address and click Sign Up!    Additional Information  If you have questions, please e-mail [email or web address] or call [phone] to talk to our [facility] staff. Remember, [facility] is NOT to be used for urgent needs. For medical emergencies, dial 911.          [facility] complies with applicable Federal civil rights laws and does not discriminate on the basis of race, color, national origin, age, disability, or sex.        Language Assistance Services     ATTENTION: Language assistance services are available, free of charge. Please call [phone].      ATENCIÓN: Si habla prasanna, tiene a fregoso disposición servicios gratuitos de asistencia lingüística. Llame al [phone].     [facility] Ý: N?u b?n nói Ti?ng Vi?t, có các d?ch v? h? tr? ngôn ng? mi?n phí dành cho b?n. G?i s? [phone].

## 2017-05-17 NOTE — PROVIDER PROGRESS NOTES - EMERGENCY DEPT.
Encounter Date: 5/17/2017    ED Physician Progress Notes       SCRIBE NOTE: I, Phillip Hdz, am scribing for, and in the presence of,  Alex Park.  Physician Statement: I, Alex Park, personally performed the services described in this documentation as scribed by Phillip Hdz in my presence, and it is both accurate and complete.      EKG - STEMI Decision  Initial Reading: No STEMI present.

## 2017-05-18 NOTE — ED NOTES
Sunitha Pillai, a 61 y.o. female presents to the ED intake 3      Chief Complaint   Patient presents with    Chest Pain     for 2 weeks, denies cardiac hx , pain increases with moving L shoulder , and neck       Pt states intermittent 5/10 left sided and central chest pain that radiates to left upper arm described as tightness. Currently rates pain 2/10.  Pt denies any other symptoms. Denies injury.       Review of patient's allergies indicates:  No Known Allergies  Past Medical History:   Diagnosis Date    Arthritis     Diabetes mellitus     GERD (gastroesophageal reflux disease)     Hypertension     Vertigo

## 2017-05-18 NOTE — PROVIDER PROGRESS NOTES - EMERGENCY DEPT.
Encounter Date: 5/17/2017    ED Physician Progress Notes        Physician Note:   Normal sinus rhythm with voltage criteria for LVH, no change from prior EKGs    EKG - STEMI Decision  Initial Reading: No STEMI present.

## 2017-05-18 NOTE — ED PROVIDER NOTES
Encounter Date: 2017       History     Chief Complaint   Patient presents with    Chest Pain     for 2 weeks, denies cardiac hx , pain increases with moving L shoulder , and neck     Review of patient's allergies indicates:  No Known Allergies  HPI Comments: Time patient was seen by the provider: 8:22 PM      The patient is a 61 y.o. female with hx of: HTN, DM, GERD that presents to the ED with a complaint of acute on chronic CP x 2 weeks. Pt reports worsening CP radiating to breast and shoulder. Her onset of pain today is non specific. Pt says that her CP is worsened when she sleeps, which keeps her tossing and turning throughout the night. She denies redness, swelling or discharge of breasts. Additionally, she denies trouble swallowing, cough, BLE edema. Pt with hx of similar sx many years ago and was told it was not cardiac etiology. PFMHx of heart problems. No personal hx of heart or respiratory problems.     Past Medical History:   Diagnosis Date    Arthritis     Diabetes mellitus     GERD (gastroesophageal reflux disease)     Hypertension     Vertigo      Past Surgical History:   Procedure Laterality Date     SECTION       Family History   Problem Relation Age of Onset    Hypertension Mother     Hyperlipidemia Mother     Diabetes Mother     Heart disease Mother     Aneurysm Father     Diabetes Sister     Hypertension Sister     Heart disease Brother     Diabetes Brother     Hypertension Brother     Breast cancer Neg Hx     Colon cancer Neg Hx     Ovarian cancer Neg Hx      Social History   Substance Use Topics    Smoking status: Never Smoker    Smokeless tobacco: Never Used    Alcohol use No     Review of Systems   Constitutional: Negative for fever.   HENT: Negative for trouble swallowing.    Eyes: Negative for visual disturbance.   Respiratory: Negative for cough.    Cardiovascular: Positive for chest pain (radiating to breast and shoulder). Negative for leg swelling.    Gastrointestinal: Negative for nausea and vomiting.   Genitourinary: Negative for dysuria.   Musculoskeletal: Negative for back pain.   Skin: Negative for rash.   Neurological: Negative for headaches.       Physical Exam   Initial Vitals   BP Pulse Resp Temp SpO2   05/17/17 1603 05/17/17 1603 05/17/17 1603 05/17/17 1603 05/17/17 1603   133/68 86 18 98.7 °F (37.1 °C) 97 %     Physical Exam    Nursing note and vitals reviewed.  Constitutional: She appears well-developed and well-nourished. She is not diaphoretic. No distress.   HENT:   Head: Normocephalic and atraumatic.   Mouth/Throat: Oropharynx is clear and moist.   Neck: Normal range of motion. Neck supple. No thyromegaly present.   Negative bruit, negative ttp   Cardiovascular: Normal rate, regular rhythm and normal heart sounds. Exam reveals no gallop.    Pulmonary/Chest: Breath sounds normal. No respiratory distress.   Abdominal: Soft. There is no tenderness.   Musculoskeletal: Normal range of motion. She exhibits no edema.   Neurological: She is alert and oriented to person, place, and time. She has normal strength. No cranial nerve deficit or sensory deficit.   Skin: Skin is warm and dry. No rash noted.         ED Course   Procedures  Labs Reviewed   CBC W/ AUTO DIFFERENTIAL   COMPREHENSIVE METABOLIC PANEL   TROPONIN I   PROTIME-INR             Medical Decision Making:   History:   Old Medical Records: I decided to obtain old medical records.  Independently Interpreted Test(s):   I have ordered and independently interpreted X-rays - see summary below.       <> Summary of X-Ray Reading(s): Chest x-ray shows no acute findings  I have ordered and independently interpreted EKG Reading(s) - see prior notes  Clinical Tests:   Lab Tests: Reviewed and Ordered       <> Summary of Lab: Cardiac enzymes unremarkable.  Electrolytes show an elevated potassium of 5.7 with hemolysis close elevated.  Patient noted to have elevated hemoglobin A1c and chronically elevated  blood sugar.  No evidence of diabetic ketoacidosis or hyperosmolar state.  Patient stable for discharge  Radiological Study: Ordered and Reviewed  Medical Tests: Ordered and Reviewed            Scribe Attestation:   Scribe #1: I performed the above scribed service and the documentation accurately describes the services I performed. I attest to the accuracy of the note.    Attending Attestation:           Physician Attestation for Scribe:  Physician Attestation Statement for Scribe #1: I, Dr. Jones, reviewed documentation, as scribed by Compa Strong in my presence, and it is both accurate and complete.                 ED Course     Clinical Impression:   The primary encounter diagnosis was Chest pain. Diagnoses of Acute chest wall pain and Hyperglycemia were also pertinent to this visit.          Doroteo Jones MD  05/18/17 0041

## 2017-05-18 NOTE — DISCHARGE INSTRUCTIONS
Your cardiac enzymes do not indicate any recent damage to your heart.  Your chest x-ray shows no acute problems.  Your blood tests also do not show any evidence of significant electrolyte imbalances, dangerous anemia, or problems w/ your liver or kidney function.   While the tests do not identify a definite cause for your chest discomfort---it does not appear at this time that the symptoms are immediate dangerous to you.   Nevertheless, you should follow up with your primary Dr. Bravo for recheck and to review any future symptoms.   Your blood sugar is elevated, but there is no evidence of acute diabetic crisis.  He should discuss your blood sugar control regimen and your chest pain symptoms with Dr. Louis.      Uncertain Causes of Chest Pain    Chest pain can happen for a number of reasons. Sometimes the cause can't be determined. If your condition does not seem serious, and your pain does not appear to be coming from your heart, your healthcare provider may recommend watching it closely. Sometimes the signs of a serious problem take more time to appear. Many problems not related to your heart can cause chest pain.These include:  · Musculoskeletal. Costochondritis, an inflammation of the tissues around the ribs that can occur from trauma or overuse injuries  · Respiratory. Pneumonia, pneumothorax, or pneumonitis (inflammation of the lining of the chest and lungs)  · Gastrointestinal. Esophageal reflux, heartburn, or gallbladder disease  · Anxiety and panic disorders  · Nerve compression and neuritis  · Miscellaneous problems such as aortic aneurysm or pulmonary embolism (a blood clot in the lungs)  Home care  After your visit, follow these recommendations:  · Rest today and avoid strenuous activity.  · Take any prescribed medicine as directed.  · Be aware of any recurrent chest pain and notice any changes  Follow-up care  Follow up with your healthcare provider if you do not start to feel better within 24  hours, or as advised.  Call 911  Call 911 if any of these occur:  · A change in the type of pain: if it feels different, becomes more severe, lasts longer, or begins to spread into your shoulder, arm, neck, jaw or back  · Shortness of breath or increased pain with breathing  · Weakness, dizziness, or fainting  · Rapid heart beat  · Crushing sensation in your chest  When to seek medical advice  Call your healthcare provider right away if any of the following occur:  · Cough with dark colored sputum (phlegm) or blood  · Fever of 100.4ºF (38ºC) or higher, or as directed by your healthcare provider  · Swelling, pain or redness in one leg  · Shortness of breath  Date Last Reviewed: 12/30/2015  © 5980-9442 [employer]. [address] [place] [address], [place] [address]. All rights reserved. This information is not intended as a substitute for professional medical care. Always follow your healthcare professional's instructions.

## 2017-05-18 NOTE — ED NOTES
Pt identifiers Sunitha Pillai checked and correct  LOC: The patient is awake, alert, aware of environment with an appropriate affect. Oriented x3, speaking appropriately  APPEARANCE: Pt resting comfortably, in no acute distress, pt is clean and well groomed, clothing properly fastened  SKIN: Skin warm, dry and intact, normal skin turgor, moist mucus membranes  RESPIRATORY: Airway is open and patent, respirations are spontaneous, even and unlabored, normal effort and rate  CARDIAC: Normal rate and rhythm, no peripheral edema noted, capillary refill < 3 seconds, bilateral radial pulses 2+  ABDOMEN: Soft, nontender, nondistended. Bowel sounds present   NEUROLOGIC: PERRL, facial expression is symmetrical, patient moving all extremities spontaneously, normal sensation in all extremities when touched with a finger.  Follows all commands appropriately  MUSCULOSKELETAL: No obvious deformities.

## 2017-06-22 ENCOUNTER — OFFICE VISIT (OUTPATIENT)
Dept: OBSTETRICS AND GYNECOLOGY | Facility: CLINIC | Age: 62
End: 2017-06-22
Attending: OBSTETRICS & GYNECOLOGY
Payer: COMMERCIAL

## 2017-06-22 ENCOUNTER — LAB VISIT (OUTPATIENT)
Dept: LAB | Facility: OTHER | Age: 62
End: 2017-06-22
Attending: OBSTETRICS & GYNECOLOGY
Payer: COMMERCIAL

## 2017-06-22 VITALS
DIASTOLIC BLOOD PRESSURE: 76 MMHG | WEIGHT: 179.88 LBS | SYSTOLIC BLOOD PRESSURE: 108 MMHG | BODY MASS INDEX: 30.71 KG/M2 | HEIGHT: 64 IN

## 2017-06-22 DIAGNOSIS — N76.6 VULVAR ULCER: Primary | ICD-10-CM

## 2017-06-22 DIAGNOSIS — N76.6 VULVAR ULCER: ICD-10-CM

## 2017-06-22 PROCEDURE — 86694 HERPES SIMPLEX NES ANTBDY: CPT

## 2017-06-22 PROCEDURE — 86695 HERPES SIMPLEX TYPE 1 TEST: CPT

## 2017-06-22 PROCEDURE — 99213 OFFICE O/P EST LOW 20 MIN: CPT | Mod: S$GLB,,, | Performed by: OBSTETRICS & GYNECOLOGY

## 2017-06-22 PROCEDURE — 99999 PR PBB SHADOW E&M-EST. PATIENT-LVL II: CPT | Mod: PBBFAC,,, | Performed by: OBSTETRICS & GYNECOLOGY

## 2017-06-22 RX ORDER — TIMOLOL MALEATE 5 MG/ML
SOLUTION/ DROPS OPHTHALMIC
COMMUNITY
Start: 2017-05-09 | End: 2019-05-21

## 2017-06-22 RX ORDER — ACYCLOVIR 400 MG/1
800 TABLET ORAL 3 TIMES DAILY
Qty: 50 TABLET | Refills: 0 | Status: SHIPPED | OUTPATIENT
Start: 2017-06-22 | End: 2017-10-27

## 2017-06-22 NOTE — LETTER
June 29, 2017      Odessa Carrillo PA-C  [address]  [facility] [address]           [facility] [address]  [address]  [facility] [address]  Phone: [phone]  Fax: [phone]          Patient: Sunitha Pillai   MR Number: [id number]   YOB: 1955   Date of Visit: 6/22/2017       Dear Odessa Carrillo:    Thank you for referring Sunitha Pillai to me for evaluation. Attached you will find relevant portions of my assessment and plan of care.    If you have questions, please do not hesitate to call me. I look forward to following Sunitha Pillai along with you.    Sincerely,    Hank Doran MD    Enclosure  CC:  No Recipients    If you would like to receive this communication electronically, please contact [email or web address] or [phone] to request more information on [facility] Link access.    For providers and/or their staff who would like to refer a patient to [facility], please contact us through our one-stop-shop provider referral line, [facility], at [phone].    If you feel you have received this communication in error or would no longer like to receive these types of communications, please e-mail [email or web address]

## 2017-06-23 LAB
HSV AB, IGM BY EIA: NEGATIVE
HSV1 IGG SERPL QL IA: POSITIVE
HSV2 IGG SERPL QL IA: POSITIVE

## 2017-06-24 LAB
HSV PCR SPECIMEN SOURCE: NORMAL
HSV1 PCR RESULT: NOT DETECTED
HSV2 PCR RESULT: NOT DETECTED

## 2017-06-29 ENCOUNTER — TELEPHONE (OUTPATIENT)
Dept: OBSTETRICS AND GYNECOLOGY | Facility: CLINIC | Age: 62
End: 2017-06-29

## 2017-06-29 NOTE — PROGRESS NOTES
Subjective:       Patient ID: Sunitha Pillai is a 61 y.o. female.    Chief Complaint:  Mass (on vaginal area and one on the inside of butt) and Burn (just a little )      History of Present Illness  HPI  Pt is 61 y.o. sent for evaluation of bump on vulvar area.  Stared approx 1 week ago.  Burns some.  Note partner has hx of cheating on her.  No shaving trauma.  No drainage.  Never had lesion like this before.    GYN & OB History  No LMP recorded. Patient is postmenopausal.   Date of Last Pap: 2015    OB History    Para Term  AB Living   3 3 3     3   SAB TAB Ectopic Multiple Live Births           3      # Outcome Date GA Lbr Jay/2nd Weight Sex Delivery Anes PTL Lv   3 Term      CS-LTranv  N BRITTANY   2 Term      CS-LTranv  N BRITTANY   1 Term      CS-LTranv  N BRITTANY          Review of Systems  Review of Systems   Constitutional: Negative for activity change, appetite change and fatigue.   HENT: Negative.  Negative for tinnitus.    Eyes: Negative.    Respiratory: Negative for cough and shortness of breath.    Cardiovascular: Negative for chest pain and palpitations.   Gastrointestinal: Negative.  Negative for abdominal pain, blood in stool, constipation, diarrhea and nausea.   Endocrine: Negative.  Negative for hot flashes.   Genitourinary: Positive for genital sores. Negative for dyspareunia, dysuria, frequency, menstrual problem, pelvic pain, vaginal discharge, dysmenorrhea, urinary incontinence and postcoital bleeding.   Musculoskeletal: Negative for back pain and joint swelling.   Skin:  Negative for rash.   Neurological: Negative.  Negative for headaches.   Hematological: Negative.  Does not bruise/bleed easily.   Psychiatric/Behavioral: The patient is not nervous/anxious.    Breast: negative.  Negative for breast mass, nipple discharge and skin changes          Objective:    Physical Exam:   Constitutional: She is oriented to person, place, and time. She appears well-developed and  well-nourished. No distress.    HENT:   Head: Normocephalic and atraumatic.    Eyes: Conjunctivae and lids are normal. Pupils are equal, round, and reactive to light.    Neck: Normal range of motion and full passive range of motion without pain. Neck supple.    Cardiovascular: Normal rate and regular rhythm.  Exam reveals no edema.     Pulmonary/Chest: Effort normal and breath sounds normal. She has no wheezes.        Abdominal: Soft. Normal appearance and bowel sounds are normal. She exhibits no distension. There is no tenderness. There is no rebound and no guarding.     Genitourinary: Vagina normal and uterus normal.       Pelvic exam was performed with patient supine. There is no tenderness or lesion on the right labia. There is no tenderness or lesion on the left labia. Uterus is not deviated, not fixed and not hosting fibroids. Cervix is normal. Right adnexum displays no mass and no tenderness. Left adnexum displays no mass and no tenderness. No rectocele, cystocele or unspecified prolapse of vaginal walls in the vagina. No vaginal discharge found. Labial bartholins normal.Cervix exhibits no motion tenderness and no discharge.   Genitourinary Comments: 2 small lesions that are suspicious for old HSV lesion.  No induration.  No drainage.           Musculoskeletal: Normal range of motion and moves all extremeties.       Neurological: She is alert and oriented to person, place, and time. She has normal strength.    Skin: Skin is warm and dry.    Psychiatric: She has a normal mood and affect. Her speech is normal and behavior is normal. Thought content normal. Her mood appears not anxious. She does not exhibit a depressed mood. She expresses no suicidal plans and no homicidal plans.          Assessment:        1. Vulvar ulcer                Plan:      Sunitha was seen today for mass and burn.    Diagnoses and all orders for this visit:    Vulvar ulcer  -     Cancel: Herpes Simplex Virus 1&2 PCR Non-Blood Genital;  Future  -     Herpes simplex type 1 & 2 IgM,Herpes IgM; Future  -     Herpes simplex type 1&2 IgG,Herpes titer; Future  -     acyclovir (ZOVIRAX) 400 MG tablet; Take 2 tablets (800 mg total) by mouth 3 (three) times daily.  -     Herpes Simplex Virus 1&2 PCR Non-Blood Genital  -     Herpes Simplex Virus 1&2 PCR Non-Blood Genital; Future  -     Herpes Simplex Virus 1&2 PCR Non-Blood Genital    We had a long discussion lasting approx 30 min with pt about what our suspicion was and the implications.  Will tx empirically with acyclovir.

## 2017-07-03 ENCOUNTER — TELEPHONE (OUTPATIENT)
Dept: OBSTETRICS AND GYNECOLOGY | Facility: CLINIC | Age: 62
End: 2017-07-03

## 2017-07-03 NOTE — TELEPHONE ENCOUNTER
----- Message from Diandra Gonzalez sent at 7/3/2017 11:44 AM CDT -----  Contact: self  Patient is returning a missed call and can be reached at [phone]

## 2017-07-09 RX ORDER — PEN NEEDLE, DIABETIC 31 GX5/16"
NEEDLE, DISPOSABLE MISCELLANEOUS
Qty: 400 EACH | Refills: 0 | Status: SHIPPED | OUTPATIENT
Start: 2017-07-09 | End: 2018-02-05 | Stop reason: SDUPTHER

## 2017-10-08 RX ORDER — METFORMIN HYDROCHLORIDE 1000 MG/1
TABLET ORAL
Qty: 60 TABLET | Refills: 6 | Status: ON HOLD | OUTPATIENT
Start: 2017-10-08 | End: 2018-09-24 | Stop reason: HOSPADM

## 2017-10-27 ENCOUNTER — HOSPITAL ENCOUNTER (OUTPATIENT)
Dept: RADIOLOGY | Facility: HOSPITAL | Age: 62
Discharge: HOME | End: 2017-10-27
Attending: INTERNAL MEDICINE
Payer: COMMERCIAL

## 2017-10-27 ENCOUNTER — IMMUNIZATION (OUTPATIENT)
Dept: INTERNAL MEDICINE | Facility: CLINIC | Age: 62
End: 2017-10-27
Payer: COMMERCIAL

## 2017-10-27 ENCOUNTER — OFFICE VISIT (OUTPATIENT)
Dept: INTERNAL MEDICINE | Facility: CLINIC | Age: 62
End: 2017-10-27
Payer: COMMERCIAL

## 2017-10-27 VITALS
HEIGHT: 64 IN | HEART RATE: 71 BPM | DIASTOLIC BLOOD PRESSURE: 74 MMHG | WEIGHT: 178 LBS | SYSTOLIC BLOOD PRESSURE: 114 MMHG | BODY MASS INDEX: 30.39 KG/M2

## 2017-10-27 DIAGNOSIS — E78.5 HYPERLIPIDEMIA, UNSPECIFIED HYPERLIPIDEMIA TYPE: ICD-10-CM

## 2017-10-27 DIAGNOSIS — N39.0 URINARY TRACT INFECTION WITHOUT HEMATURIA, SITE UNSPECIFIED: ICD-10-CM

## 2017-10-27 DIAGNOSIS — M25.532 LEFT WRIST PAIN: ICD-10-CM

## 2017-10-27 DIAGNOSIS — K21.9 GASTROESOPHAGEAL REFLUX DISEASE, ESOPHAGITIS PRESENCE NOT SPECIFIED: ICD-10-CM

## 2017-10-27 DIAGNOSIS — I73.9 PVD (PERIPHERAL VASCULAR DISEASE): ICD-10-CM

## 2017-10-27 DIAGNOSIS — E55.9 MILD VITAMIN D DEFICIENCY: ICD-10-CM

## 2017-10-27 DIAGNOSIS — I10 ESSENTIAL HYPERTENSION: Primary | ICD-10-CM

## 2017-10-27 LAB
BACTERIA #/AREA URNS AUTO: NORMAL /HPF
BILIRUB UR QL STRIP: NEGATIVE
CLARITY UR REFRACT.AUTO: CLEAR
COLOR UR AUTO: ABNORMAL
CREAT UR-MCNC: 58 MG/DL
GLUCOSE UR QL STRIP: ABNORMAL
HGB UR QL STRIP: NEGATIVE
KETONES UR QL STRIP: NEGATIVE
LEUKOCYTE ESTERASE UR QL STRIP: NEGATIVE
MICROALBUMIN UR DL<=1MG/L-MCNC: 6 UG/ML
MICROALBUMIN/CREATININE RATIO: 10.3 UG/MG
MICROSCOPIC COMMENT: NORMAL
NITRITE UR QL STRIP: NEGATIVE
PH UR STRIP: 6 [PH] (ref 5–8)
PROT UR QL STRIP: NEGATIVE
RBC #/AREA URNS AUTO: 0 /HPF (ref 0–4)
SP GR UR STRIP: 1.01 (ref 1–1.03)
SQUAMOUS #/AREA URNS AUTO: 1 /HPF
URN SPEC COLLECT METH UR: ABNORMAL
UROBILINOGEN UR STRIP-ACNC: NEGATIVE EU/DL
WBC #/AREA URNS AUTO: 1 /HPF (ref 0–5)
YEAST UR QL AUTO: NORMAL

## 2017-10-27 PROCEDURE — 99999 PR PBB SHADOW E&M-EST. PATIENT-LVL V: CPT | Mod: PBBFAC,,, | Performed by: INTERNAL MEDICINE

## 2017-10-27 PROCEDURE — 99214 OFFICE O/P EST MOD 30 MIN: CPT | Mod: 25,S$GLB,, | Performed by: INTERNAL MEDICINE

## 2017-10-27 PROCEDURE — 73110 X-RAY EXAM OF WRIST: CPT | Mod: TC,PO,LT

## 2017-10-27 PROCEDURE — 73090 X-RAY EXAM OF FOREARM: CPT | Mod: 26,LT,, | Performed by: RADIOLOGY

## 2017-10-27 PROCEDURE — 73090 X-RAY EXAM OF FOREARM: CPT | Mod: TC,PO,LT

## 2017-10-27 PROCEDURE — 81001 URINALYSIS AUTO W/SCOPE: CPT

## 2017-10-27 PROCEDURE — 82570 ASSAY OF URINE CREATININE: CPT

## 2017-10-27 PROCEDURE — 73110 X-RAY EXAM OF WRIST: CPT | Mod: 26,LT,, | Performed by: RADIOLOGY

## 2017-10-27 PROCEDURE — 90686 IIV4 VACC NO PRSV 0.5 ML IM: CPT | Mod: S$GLB,,, | Performed by: INTERNAL MEDICINE

## 2017-10-27 PROCEDURE — 90471 IMMUNIZATION ADMIN: CPT | Mod: S$GLB,,, | Performed by: INTERNAL MEDICINE

## 2017-10-27 PROCEDURE — 87086 URINE CULTURE/COLONY COUNT: CPT

## 2017-10-28 LAB
BACTERIA UR CULT: NORMAL
BACTERIA UR CULT: NORMAL

## 2017-11-01 NOTE — PROGRESS NOTES
Subjective:       Patient ID: Sunitha Pillai is a 62 y.o. female.    Chief Complaint: Follow-up    Pt has been walking a few miles daily for her diabetes.  Is trying to get back on her insulin regimen and checking regularly.  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      Review of Systems   Respiratory: Negative for shortness of breath (PND or orthopnea).    Cardiovascular: Negative for chest pain (arm pain or jaw pain).   Gastrointestinal: Negative for abdominal pain, diarrhea, nausea and vomiting.   Genitourinary: Negative for dysuria.   Neurological: Negative for seizures, syncope and headaches.       Objective:      Physical Exam   Constitutional: She is oriented to person, place, and time. She appears well-developed and well-nourished. No distress.   HENT:   Head: Normocephalic.   Mouth/Throat: Oropharynx is clear and moist.   Neck: Neck supple. No JVD present. No thyromegaly present.   Cardiovascular: Normal rate, regular rhythm and normal heart sounds.  Exam reveals no gallop and no friction rub.    No murmur heard.  Decreased peripheral pulses   Pulmonary/Chest: Effort normal and breath sounds normal. She has no wheezes. She has no rales.   Abdominal: Soft. Bowel sounds are normal. She exhibits no distension and no mass. There is no tenderness. There is no rebound and no guarding.   Musculoskeletal: She exhibits no edema.   No synovitis of wrist point tenderness over the radius.   Lymphadenopathy:     She has no cervical adenopathy.   Neurological: She is alert and oriented to person, place, and time. She has normal reflexes.   Skin: Skin is warm and dry.   Psychiatric: She has a normal mood and affect. Her behavior is normal. Judgment and thought content normal.       Assessment:       1. Essential hypertension    2. Uncontrolled type 2 diabetes mellitus without complication, with long-term current  use of insulin    3. Gastroesophageal reflux disease, esophagitis presence not specified    4. Hyperlipidemia, unspecified hyperlipidemia type    5. Mild vitamin D deficiency    6. Urinary tract infection without hematuria, site unspecified    7. Left wrist pain    8. PVD (peripheral vascular disease)        Plan:   Essential hypertension  -     Comprehensive metabolic panel; Future; Expected date: 10/27/2017  -     CBC auto differential; Future; Expected date: 10/27/2017  -     TSH; Future; Expected date: 10/27/2017  Controlled - continue current meds'  Uncontrolled type 2 diabetes mellitus without complication, with long-term current use of insulin  -     Hemoglobin A1c; Future; Expected date: 10/27/2017  -     Urinalysis  -     Microalbumin/creatinine urine ratio    Gastroesophageal reflux disease, esophagitis presence not specified  Controlled - continue current meds    Hyperlipidemia, unspecified hyperlipidemia type  -     Lipid panel; Future; Expected date: 10/27/2017    Mild vitamin D deficiency  -     Vitamin D; Future; Expected date: 10/27/2017    Urinary tract infection without hematuria, site unspecified  -     Urine culture    Left wrist pain  -     Cancel: X-Ray Wrist 2 View Left; Future; Expected date: 10/27/2017  -     X-Ray Forearm Left; Future; Expected date: 10/27/2017  -     Ambulatory consult to Orthopedics  -     X-Ray Wrist Complete 3 views Left; Future; Expected date: 10/27/2017    PVD (peripheral vascular disease)  -     VAS Ankle Brachial Indices Resting; Future    Other orders  -     Urinalysis Microscopic

## 2017-11-07 ENCOUNTER — TELEPHONE (OUTPATIENT)
Dept: INTERNAL MEDICINE | Facility: CLINIC | Age: 62
End: 2017-11-07

## 2017-11-07 DIAGNOSIS — M89.9 LYTIC LESION OF BONE ON X-RAY: Primary | ICD-10-CM

## 2017-11-08 NOTE — TELEPHONE ENCOUNTER
Pt informed - Pt with possible lytic area in L radius - please schedule lab, bone scan and xrays as soon as she can do them - thanks.

## 2017-11-09 ENCOUNTER — TELEPHONE (OUTPATIENT)
Dept: INTERNAL MEDICINE | Facility: CLINIC | Age: 62
End: 2017-11-09

## 2017-11-09 NOTE — TELEPHONE ENCOUNTER
----- Message from Radha Limon sent at 11/9/2017  1:37 PM CST -----  Contact: pt [phone]  Pt can come in on 11/21 as early as possible for bone density

## 2017-11-10 ENCOUNTER — TELEPHONE (OUTPATIENT)
Dept: INTERNAL MEDICINE | Facility: CLINIC | Age: 62
End: 2017-11-10

## 2017-11-20 ENCOUNTER — TELEPHONE (OUTPATIENT)
Dept: RADIOLOGY | Facility: HOSPITAL | Age: 62
End: 2017-11-20

## 2017-11-21 ENCOUNTER — HOSPITAL ENCOUNTER (OUTPATIENT)
Dept: RADIOLOGY | Facility: HOSPITAL | Age: 62
Discharge: HOME | End: 2017-11-21
Attending: INTERNAL MEDICINE
Payer: COMMERCIAL

## 2017-11-21 DIAGNOSIS — M89.9 LYTIC LESION OF BONE ON X-RAY: ICD-10-CM

## 2017-11-21 PROCEDURE — 77075 RADEX OSSEOUS SURVEY COMPL: CPT | Mod: 26,,, | Performed by: RADIOLOGY

## 2017-11-21 PROCEDURE — 78306 BONE IMAGING WHOLE BODY: CPT | Mod: 26,,, | Performed by: NUCLEAR MEDICINE

## 2017-11-21 PROCEDURE — 77075 RADEX OSSEOUS SURVEY COMPL: CPT | Mod: TC

## 2017-11-21 PROCEDURE — A9503 TC99M MEDRONATE: HCPCS

## 2018-01-29 ENCOUNTER — TELEPHONE (OUTPATIENT)
Dept: INTERNAL MEDICINE | Facility: CLINIC | Age: 63
End: 2018-01-29

## 2018-02-05 ENCOUNTER — OFFICE VISIT (OUTPATIENT)
Dept: INTERNAL MEDICINE | Facility: CLINIC | Age: 63
End: 2018-02-05
Payer: COMMERCIAL

## 2018-02-05 ENCOUNTER — LAB VISIT (OUTPATIENT)
Dept: LAB | Facility: HOSPITAL | Age: 63
End: 2018-02-05
Attending: INTERNAL MEDICINE
Payer: COMMERCIAL

## 2018-02-05 VITALS
DIASTOLIC BLOOD PRESSURE: 80 MMHG | SYSTOLIC BLOOD PRESSURE: 140 MMHG | HEART RATE: 84 BPM | WEIGHT: 179 LBS | BODY MASS INDEX: 30.73 KG/M2

## 2018-02-05 DIAGNOSIS — I10 ESSENTIAL HYPERTENSION: ICD-10-CM

## 2018-02-05 DIAGNOSIS — M89.9 BONE LESION: ICD-10-CM

## 2018-02-05 DIAGNOSIS — I10 ESSENTIAL HYPERTENSION: Primary | ICD-10-CM

## 2018-02-05 DIAGNOSIS — Z12.31 SCREENING MAMMOGRAM, ENCOUNTER FOR: ICD-10-CM

## 2018-02-05 LAB
ALBUMIN SERPL BCP-MCNC: 3.2 G/DL
ALP SERPL-CCNC: 113 U/L
ALT SERPL W/O P-5'-P-CCNC: 14 U/L
ANION GAP SERPL CALC-SCNC: 5 MMOL/L
AST SERPL-CCNC: 14 U/L
BASOPHILS # BLD AUTO: 0.01 K/UL
BASOPHILS NFR BLD: 0.2 %
BILIRUB SERPL-MCNC: 0.5 MG/DL
BUN SERPL-MCNC: 16 MG/DL
CALCIUM SERPL-MCNC: 9.2 MG/DL
CHLORIDE SERPL-SCNC: 103 MMOL/L
CO2 SERPL-SCNC: 30 MMOL/L
CREAT SERPL-MCNC: 1.2 MG/DL
CRP SERPL-MCNC: 6.9 MG/L
DIFFERENTIAL METHOD: ABNORMAL
EOSINOPHIL # BLD AUTO: 0.1 K/UL
EOSINOPHIL NFR BLD: 1.5 %
ERYTHROCYTE [DISTWIDTH] IN BLOOD BY AUTOMATED COUNT: 12.7 %
ERYTHROCYTE [SEDIMENTATION RATE] IN BLOOD BY WESTERGREN METHOD: 7 MM/HR
EST. GFR  (AFRICAN AMERICAN): 55.6 ML/MIN/1.73 M^2
EST. GFR  (NON AFRICAN AMERICAN): 48.2 ML/MIN/1.73 M^2
ESTIMATED AVG GLUCOSE: 289 MG/DL
GLUCOSE SERPL-MCNC: 340 MG/DL
HBA1C MFR BLD HPLC: 11.7 %
HCT VFR BLD AUTO: 31.9 %
HGB BLD-MCNC: 11.1 G/DL
LYMPHOCYTES # BLD AUTO: 2.3 K/UL
LYMPHOCYTES NFR BLD: 42.8 %
MCH RBC QN AUTO: 31.5 PG
MCHC RBC AUTO-ENTMCNC: 34.8 G/DL
MCV RBC AUTO: 91 FL
MONOCYTES # BLD AUTO: 0.6 K/UL
MONOCYTES NFR BLD: 10.6 %
NEUTROPHILS # BLD AUTO: 2.4 K/UL
NEUTROPHILS NFR BLD: 44.9 %
PLATELET # BLD AUTO: 189 K/UL
PMV BLD AUTO: 10.3 FL
POTASSIUM SERPL-SCNC: 4.2 MMOL/L
PROT SERPL-MCNC: 7.2 G/DL
RBC # BLD AUTO: 3.52 M/UL
SODIUM SERPL-SCNC: 138 MMOL/L
WBC # BLD AUTO: 5.28 K/UL

## 2018-02-05 PROCEDURE — 86140 C-REACTIVE PROTEIN: CPT

## 2018-02-05 PROCEDURE — 85025 COMPLETE CBC W/AUTO DIFF WBC: CPT | Mod: PO

## 2018-02-05 PROCEDURE — 99214 OFFICE O/P EST MOD 30 MIN: CPT | Mod: S$GLB,,, | Performed by: INTERNAL MEDICINE

## 2018-02-05 PROCEDURE — 36415 COLL VENOUS BLD VENIPUNCTURE: CPT | Mod: PO

## 2018-02-05 PROCEDURE — 84165 PROTEIN E-PHORESIS SERUM: CPT

## 2018-02-05 PROCEDURE — 84165 PROTEIN E-PHORESIS SERUM: CPT | Mod: 26,,, | Performed by: PATHOLOGY

## 2018-02-05 PROCEDURE — 83036 HEMOGLOBIN GLYCOSYLATED A1C: CPT

## 2018-02-05 PROCEDURE — 3008F BODY MASS INDEX DOCD: CPT | Mod: S$GLB,,, | Performed by: INTERNAL MEDICINE

## 2018-02-05 PROCEDURE — 99999 PR PBB SHADOW E&M-EST. PATIENT-LVL III: CPT | Mod: PBBFAC,,, | Performed by: INTERNAL MEDICINE

## 2018-02-05 PROCEDURE — 85651 RBC SED RATE NONAUTOMATED: CPT

## 2018-02-05 PROCEDURE — 80053 COMPREHEN METABOLIC PANEL: CPT

## 2018-02-05 RX ORDER — PEN NEEDLE, DIABETIC 30 GX3/16"
NEEDLE, DISPOSABLE MISCELLANEOUS
Qty: 400 EACH | Refills: 0 | Status: SHIPPED | OUTPATIENT
Start: 2018-02-05 | End: 2018-10-08 | Stop reason: SDUPTHER

## 2018-02-05 RX ORDER — HYDROCODONE BITARTRATE AND ACETAMINOPHEN 5; 325 MG/1; MG/1
1 TABLET ORAL EVERY 8 HOURS PRN
Qty: 10 TABLET | Refills: 0 | Status: SHIPPED | OUTPATIENT
Start: 2018-02-05 | End: 2018-08-19

## 2018-02-05 RX ORDER — MELOXICAM 15 MG/1
15 TABLET ORAL DAILY
Qty: 15 TABLET | Refills: 0 | Status: SHIPPED | OUTPATIENT
Start: 2018-02-05 | End: 2018-03-20 | Stop reason: SDUPTHER

## 2018-02-05 RX ORDER — PEN NEEDLE, DIABETIC 30 GX3/16"
NEEDLE, DISPOSABLE MISCELLANEOUS
Qty: 400 EACH | Refills: 6 | Status: ON HOLD | OUTPATIENT
Start: 2018-02-05 | End: 2020-01-04 | Stop reason: HOSPADM

## 2018-02-05 RX ORDER — TIZANIDINE 2 MG/1
TABLET ORAL
Qty: 30 TABLET | Refills: 0 | Status: SHIPPED | OUTPATIENT
Start: 2018-02-05 | End: 2018-03-20 | Stop reason: SDUPTHER

## 2018-02-05 RX ORDER — OMEPRAZOLE 20 MG/1
20 CAPSULE, DELAYED RELEASE ORAL DAILY
Qty: 30 CAPSULE | Refills: 1 | Status: SHIPPED | OUTPATIENT
Start: 2018-02-05 | End: 2019-05-21

## 2018-02-06 LAB
ALBUMIN SERPL ELPH-MCNC: 3.7 G/DL
ALPHA1 GLOB SERPL ELPH-MCNC: 0.3 G/DL
ALPHA2 GLOB SERPL ELPH-MCNC: 0.56 G/DL
B-GLOBULIN SERPL ELPH-MCNC: 0.95 G/DL
GAMMA GLOB SERPL ELPH-MCNC: 1.39 G/DL
PATHOLOGIST INTERPRETATION SPE: NORMAL
PROT SERPL-MCNC: 6.9 G/DL

## 2018-02-17 NOTE — PROGRESS NOTES
Subjective:       Patient ID: Sunitha Pillai is a 62 y.o. female.    Chief Complaint: No chief complaint on file.    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  L arm is feeling better - pt did not want to do an MRI in the past due to cost.  We discussed that it is a new year and her insurance may cover it now - but pain is minimal.  Now back pain is the worst.  Denies numbness or tingling.  Review of Systems   Respiratory: Negative for shortness of breath (PND or orthopnea).    Cardiovascular: Negative for chest pain (arm pain or jaw pain).   Gastrointestinal: Negative for abdominal pain, diarrhea, nausea and vomiting.   Genitourinary: Negative for dysuria.   Neurological: Negative for seizures, syncope and headaches.       Objective:      Physical Exam   Constitutional: She is oriented to person, place, and time. She appears well-developed and well-nourished. No distress.   HENT:   Head: Normocephalic.   Mouth/Throat: Oropharynx is clear and moist.   Neck: Neck supple. No JVD present. No thyromegaly present.   Cardiovascular: Normal rate, regular rhythm, normal heart sounds and intact distal pulses.  Exam reveals no gallop and no friction rub.    No murmur heard.  Pulmonary/Chest: Effort normal and breath sounds normal. She has no wheezes. She has no rales.   Abdominal: Soft. Bowel sounds are normal. She exhibits no distension and no mass. There is no tenderness. There is no rebound and no guarding.   Musculoskeletal: She exhibits no edema.   5/5 strength in BLE, 2+reflexes at knees and ankles bilaterally, neg SLR bilaterally, easily walks on heels and toes     Lymphadenopathy:     She has no cervical adenopathy.   Neurological: She is alert and oriented to person, place, and time. She has normal reflexes.   Skin: Skin is warm and dry.   Psychiatric: She  "has a normal mood and affect. Her behavior is normal. Judgment and thought content normal.       Assessment:       1. Essential hypertension    2. Uncontrolled type 2 diabetes mellitus without complication, with long-term current use of insulin    3. Bone lesion    4. Screening mammogram, encounter for        Plan:   Essential hypertension  -     CBC auto differential; Future; Expected date: 02/05/2018  -     Comprehensive metabolic panel; Future; Expected date: 02/05/2018  Elevated today due to acute paijn  Uncontrolled type 2 diabetes mellitus without complication, with long-term current use of insulin  -     Hemoglobin A1c; Future; Expected date: 02/05/2018    Bone lesion  -     Sedimentation rate, manual; Future; Expected date: 02/05/2018  -     C-reactive protein; Future; Expected date: 02/05/2018  -     Protein electrophoresis, serum; Future; Expected date: 02/05/2018  -     X-Ray Radius Ulna Bilateral AP And Lateral; Future; Expected date: 02/05/2018  -     MRI Forearm W WO Contrast Left; Future; Expected date: 02/05/2018    Screening mammogram, encounter for  -     Mammo Digital Screening Bilat with Tomosynthesis CAD; Future; Expected date: 02/05/2018    Other orders  -     pen needle, diabetic (BD INSULIN PEN NEEDLE UF SHORT) 31 gauge x 5/16" Ndle; USE WITH INSULIN 4 TIMES DAILY  Dispense: 400 each; Refill: 0  -     pen needle, diabetic 32 gauge x 5/32" Ndle; Use with insulin 4 times daily  Dispense: 400 each; Refill: 6    Acute musculoskeletal back pain  -     meloxicam (MOBIC) 15 MG tablet; Take 1 tablet   (15 mg total) by mouth once daily.  Dispense: 15 tablet; Refill: 0  -     omeprazole (PRILOSEC) 20 MG capsule; Take 1 capsule (20 mg total) by mouth once daily.  Dispense: 30 capsule; Refill: 1  -     tiZANidine (ZANAFLEX) 2 MG tablet; One-two every eight hours as needed for pain  Dispense: 30 tablet; Refill: 0  -     hydrocodone-acetaminophen 5-325mg (NORCO) 5-325 mg per tablet; Take 1 tablet by mouth " every 8 (eight) hours as needed for Pain.  Dispense: 10 tablet; Refill: 0

## 2018-02-19 ENCOUNTER — OFFICE VISIT (OUTPATIENT)
Dept: URGENT CARE | Facility: CLINIC | Age: 63
End: 2018-02-19
Payer: COMMERCIAL

## 2018-02-19 VITALS
BODY MASS INDEX: 30.56 KG/M2 | HEART RATE: 100 BPM | HEIGHT: 64 IN | RESPIRATION RATE: 16 BRPM | WEIGHT: 179 LBS | TEMPERATURE: 101 F | SYSTOLIC BLOOD PRESSURE: 141 MMHG | OXYGEN SATURATION: 99 % | DIASTOLIC BLOOD PRESSURE: 77 MMHG

## 2018-02-19 DIAGNOSIS — R50.9 FEVER, UNSPECIFIED FEVER CAUSE: ICD-10-CM

## 2018-02-19 DIAGNOSIS — J10.1 INFLUENZA A: Primary | ICD-10-CM

## 2018-02-19 DIAGNOSIS — R05.9 COUGH: ICD-10-CM

## 2018-02-19 LAB
CTP QC/QA: YES
FLUAV AG NPH QL: POSITIVE
FLUBV AG NPH QL: NEGATIVE

## 2018-02-19 PROCEDURE — 87804 INFLUENZA ASSAY W/OPTIC: CPT | Mod: 59,QW,S$GLB, | Performed by: EMERGENCY MEDICINE

## 2018-02-19 PROCEDURE — 3008F BODY MASS INDEX DOCD: CPT | Mod: S$GLB,,, | Performed by: EMERGENCY MEDICINE

## 2018-02-19 PROCEDURE — 99214 OFFICE O/P EST MOD 30 MIN: CPT | Mod: S$GLB,,, | Performed by: EMERGENCY MEDICINE

## 2018-02-19 RX ORDER — HYDROCODONE POLISTIREX AND CHLORPHENIRAMINE POLISTIREX 10; 8 MG/5ML; MG/5ML
5 SUSPENSION, EXTENDED RELEASE ORAL EVERY 12 HOURS PRN
Qty: 150 ML | Refills: 0 | Status: SHIPPED | OUTPATIENT
Start: 2018-02-19 | End: 2018-08-29

## 2018-02-19 RX ORDER — ONDANSETRON 4 MG/1
4 TABLET, ORALLY DISINTEGRATING ORAL EVERY 6 HOURS PRN
Qty: 25 TABLET | Refills: 0 | Status: SHIPPED | OUTPATIENT
Start: 2018-02-19 | End: 2018-08-29

## 2018-02-19 RX ORDER — ACETAMINOPHEN 325 MG/1
975 TABLET ORAL
Status: COMPLETED | OUTPATIENT
Start: 2018-02-19 | End: 2018-02-19

## 2018-02-19 RX ORDER — OSELTAMIVIR PHOSPHATE 75 MG/1
75 CAPSULE ORAL 2 TIMES DAILY
Qty: 10 CAPSULE | Refills: 0 | Status: SHIPPED | OUTPATIENT
Start: 2018-02-19 | End: 2018-02-24

## 2018-02-19 RX ADMIN — ACETAMINOPHEN 975 MG: 325 TABLET ORAL at 12:02

## 2018-02-19 NOTE — PROGRESS NOTES
Subjective:       Patient ID: Sunitha Pillai is a 62 y.o. female.    Vitals:    02/19/18 1218   BP: (!) 141/77   Pulse: 100   Resp: 16   Temp: (!) 100.9 °F (38.3 °C)       Chief Complaint: Cough    Pt states cough, sore throat, chills, headache, and body aches that began yesterday.      Cough   This is a new problem. The current episode started yesterday. The problem has been gradually worsening. The problem occurs every few minutes. The cough is productive of sputum. Associated symptoms include chills, headaches, myalgias, nasal congestion and a sore throat. Pertinent negatives include no chest pain, ear pain, eye redness, fever, shortness of breath or wheezing. Nothing aggravates the symptoms. She has tried nothing for the symptoms.     Review of Systems   Constitution: Positive for chills and malaise/fatigue. Negative for fever.   HENT: Positive for congestion and sore throat. Negative for ear pain and hoarse voice.    Eyes: Negative for discharge and redness.   Cardiovascular: Negative for chest pain, dyspnea on exertion and leg swelling.   Respiratory: Positive for cough and sputum production. Negative for shortness of breath and wheezing.    Musculoskeletal: Positive for myalgias.   Gastrointestinal: Negative for abdominal pain and nausea.   Neurological: Positive for headaches.       Objective:      Physical Exam   Constitutional: She is oriented to person, place, and time. She appears well-developed and well-nourished. She is cooperative.  Non-toxic appearance. She does not appear ill. No distress.   HENT:   Head: Normocephalic and atraumatic.   Right Ear: Hearing, tympanic membrane, external ear and ear canal normal.   Left Ear: Hearing, tympanic membrane, external ear and ear canal normal.   Nose: Mucosal edema and rhinorrhea present. No nasal deformity. No epistaxis. Right sinus exhibits no maxillary sinus tenderness and no frontal sinus tenderness. Left sinus exhibits no maxillary sinus tenderness  and no frontal sinus tenderness.   Mouth/Throat: Uvula is midline, oropharynx is clear and moist and mucous membranes are normal. No trismus in the jaw. Normal dentition. No uvula swelling. No posterior oropharyngeal erythema.   Eyes: Conjunctivae and lids are normal. No scleral icterus.   Sclera clear bilat   Neck: Trachea normal, full passive range of motion without pain and phonation normal. Neck supple.   Cardiovascular: Normal rate, regular rhythm, normal heart sounds, intact distal pulses and normal pulses.    Pulmonary/Chest: Effort normal and breath sounds normal. No respiratory distress.   Occasional cough on exam     Abdominal: Soft. Normal appearance and bowel sounds are normal. She exhibits no distension. There is no tenderness.   Musculoskeletal: Normal range of motion. She exhibits no edema or deformity.   Neurological: She is alert and oriented to person, place, and time. She exhibits normal muscle tone. Coordination normal.   Skin: Skin is warm, dry and intact. She is not diaphoretic. No pallor.   Psychiatric: She has a normal mood and affect. Her speech is normal and behavior is normal. Judgment and thought content normal. Cognition and memory are normal.   Nursing note and vitals reviewed.      Assessment:       1. Influenza A    2. Cough        Plan:       Sunitha was seen today for cough.    Diagnoses and all orders for this visit:    Influenza A    Cough  -     POCT Influenza A/B    Other orders  -     hydrocodone-chlorpheniramine (TUSSIONEX) 10-8 mg/5 mL suspension; Take 5 mLs by mouth every 12 (twelve) hours as needed for Cough.  -     oseltamivir (TAMIFLU) 75 MG capsule; Take 1 capsule (75 mg total) by mouth 2 (two) times daily.  -     ondansetron (ZOFRAN-ODT) 4 MG TbDL; Take 1 tablet (4 mg total) by mouth every 6 (six) hours as needed.  -     acetaminophen tablet 975 mg; Take 3 tablets (975 mg total) by mouth one time.      Patient Instructions     Go to the Emergency Room if symptoms or  condition worsens in any way    Influenza (Adult)    Influenza is also called the flu. It is a viral illness that affects the air passages of your lungs. It is different from the common cold. The flu can easily be passed from one to person to another. It may be spread through the air by coughing and sneezing. Or it can be spread by touching the sick person and then touching your own eyes, nose, or mouth.  The flu starts 1 to 3 days after you are exposed to the flu virus. It may last for 1 to 2 weeks but many people feel tired or fatigued for many weeks afterward. You usually dont need to take antibiotics unless you have a complication. This might be an ear or sinus infection or pneumonia.  Symptoms of the flu may be mild or severe. They can include extreme tiredness (wanting to stay in bed all day), chills, fevers, muscle aches, soreness with eye movement, headache, and a dry, hacking cough.  Home care  Follow these guidelines when caring for yourself at home:  · Avoid being around cigarette smoke, whether yours or other peoples.  · Acetaminophen or ibuprofen will help ease your fever, muscle aches, and headache. Dont give aspirin to anyone younger than 18 who has the flu. Aspirin can harm the liver.  · Nausea and loss of appetite are common with the flu. Eat light meals. Drink 6 to 8 glasses of liquids every day. Good choices are water, sport drinks, soft drinks without caffeine, juices, tea, and soup. Extra fluids will also help loosen secretions in your nose and lungs.  · Over-the-counter cold medicines will not make the flu go away faster. But the medicines may help with coughing, sore throat, and congestion in your nose and sinuses. Dont use a decongestant if you have high blood pressure.  · Stay home until your fever has been gone for at least 24 hours without using medicine to reduce fever.  Follow-up care  Follow up with your healthcare provider, or as advised, if you are not getting better over the next  week.  If you are age 65 or older, talk with your provider about getting a pneumococcal vaccine every 5 years. You should also get this vaccine if you have chronic asthma or COPD. All adults should get a flu vaccine every fall. Ask your provider about this.  When to seek medical advice  Call your healthcare provider right away if any of these occur:  · Cough with lots of colored mucus (sputum) or blood in your mucus  · Chest pain, shortness of breath, wheezing, or trouble breathing  · Severe headache, or face, neck, or ear pain  · New rash with fever  · Fever of 100.4°F (38°C) or higher, or as directed by your healthcare provider  · Confusion, behavior change, or seizure  · Severe weakness or dizziness  · You get a new fever or cough after getting better for a few days  Date Last Reviewed: 1/1/2017  © 6702-9535 [employer]. [address] [place] [address]. All rights reserved. This information is not intended as a substitute for professional medical care. Always follow your healthcare professional's instructions.        Step-by-Step  Checking Your Blood Sugar    Date Last Reviewed: 5/1/2016  © 3003-3366 [employer]. [address] [place] [address]. All rights reserved. This information is not intended as a substitute for professional medical care. Always follow your healthcare professional's instructions.

## 2018-02-19 NOTE — LETTER
February 19, 2018      [facility] [address] [place] [facility] [facility] [address]  Phone: [phone]  Fax: [phone]       Patient: Sunitha Pillai   YOB: 1955  Date of Visit: 02/19/2018    To Whom It May Concern:    Bj Pillai  was at [facility] on 02/19/2018. She may return to work/school on 2/25/18 with no restrictions. If you have any questions or concerns, or if I can be of further assistance, please do not hesitate to contact me.    Sincerely,    Daljit Gomez III, MD

## 2018-02-19 NOTE — PATIENT INSTRUCTIONS
Go to the Emergency Room if symptoms or condition worsens in any way    Influenza (Adult)    Influenza is also called the flu. It is a viral illness that affects the air passages of your lungs. It is different from the common cold. The flu can easily be passed from one to person to another. It may be spread through the air by coughing and sneezing. Or it can be spread by touching the sick person and then touching your own eyes, nose, or mouth.  The flu starts 1 to 3 days after you are exposed to the flu virus. It may last for 1 to 2 weeks but many people feel tired or fatigued for many weeks afterward. You usually dont need to take antibiotics unless you have a complication. This might be an ear or sinus infection or pneumonia.  Symptoms of the flu may be mild or severe. They can include extreme tiredness (wanting to stay in bed all day), chills, fevers, muscle aches, soreness with eye movement, headache, and a dry, hacking cough.  Home care  Follow these guidelines when caring for yourself at home:  · Avoid being around cigarette smoke, whether yours or other peoples.  · Acetaminophen or ibuprofen will help ease your fever, muscle aches, and headache. Dont give aspirin to anyone younger than 18 who has the flu. Aspirin can harm the liver.  · Nausea and loss of appetite are common with the flu. Eat light meals. Drink 6 to 8 glasses of liquids every day. Good choices are water, sport drinks, soft drinks without caffeine, juices, tea, and soup. Extra fluids will also help loosen secretions in your nose and lungs.  · Over-the-counter cold medicines will not make the flu go away faster. But the medicines may help with coughing, sore throat, and congestion in your nose and sinuses. Dont use a decongestant if you have high blood pressure.  · Stay home until your fever has been gone for at least 24 hours without using medicine to reduce fever.  Follow-up care  Follow up with your healthcare provider, or as advised,  if you are not getting better over the next week.  If you are age 65 or older, talk with your provider about getting a pneumococcal vaccine every 5 years. You should also get this vaccine if you have chronic asthma or COPD. All adults should get a flu vaccine every fall. Ask your provider about this.  When to seek medical advice  Call your healthcare provider right away if any of these occur:  · Cough with lots of colored mucus (sputum) or blood in your mucus  · Chest pain, shortness of breath, wheezing, or trouble breathing  · Severe headache, or face, neck, or ear pain  · New rash with fever  · Fever of 100.4°F (38°C) or higher, or as directed by your healthcare provider  · Confusion, behavior change, or seizure  · Severe weakness or dizziness  · You get a new fever or cough after getting better for a few days  Date Last Reviewed: 1/1/2017  © 9920-0635 [employer]. [address] [place] [address]. All rights reserved. This information is not intended as a substitute for professional medical care. Always follow your healthcare professional's instructions.        Step-by-Step  Checking Your Blood Sugar    Date Last Reviewed: 5/1/2016  © 6376-9656 [employer]. [address] [place] [address]. All rights reserved. This information is not intended as a substitute for professional medical care. Always follow your healthcare professional's instructions.

## 2018-03-03 RX ORDER — LISINOPRIL 20 MG/1
TABLET ORAL
Qty: 60 TABLET | Refills: 6 | Status: SHIPPED | OUTPATIENT
Start: 2018-03-03 | End: 2019-05-13

## 2018-03-03 RX ORDER — INSULIN ASPART 100 [IU]/ML
INJECTION, SOLUTION INTRAVENOUS; SUBCUTANEOUS
Qty: 1 BOX | Refills: 6 | Status: SHIPPED | OUTPATIENT
Start: 2018-03-03 | End: 2019-03-15 | Stop reason: SDUPTHER

## 2018-03-19 ENCOUNTER — HOSPITAL ENCOUNTER (OUTPATIENT)
Dept: RADIOLOGY | Facility: HOSPITAL | Age: 63
Discharge: HOME | End: 2018-03-19
Attending: INTERNAL MEDICINE
Payer: COMMERCIAL

## 2018-03-19 DIAGNOSIS — Z12.31 SCREENING MAMMOGRAM, ENCOUNTER FOR: ICD-10-CM

## 2018-03-19 DIAGNOSIS — M89.9 BONE LESION: ICD-10-CM

## 2018-03-19 PROCEDURE — 77067 SCR MAMMO BI INCL CAD: CPT | Mod: TC

## 2018-03-19 PROCEDURE — 77067 SCR MAMMO BI INCL CAD: CPT | Mod: 26,,, | Performed by: RADIOLOGY

## 2018-03-19 PROCEDURE — 77063 BREAST TOMOSYNTHESIS BI: CPT | Mod: 26,,, | Performed by: RADIOLOGY

## 2018-03-19 PROCEDURE — 73090 X-RAY EXAM OF FOREARM: CPT | Mod: 26,50,, | Performed by: RADIOLOGY

## 2018-03-19 PROCEDURE — 73090 X-RAY EXAM OF FOREARM: CPT | Mod: 50,TC

## 2018-03-20 ENCOUNTER — OFFICE VISIT (OUTPATIENT)
Dept: INTERNAL MEDICINE | Facility: CLINIC | Age: 63
End: 2018-03-20
Payer: COMMERCIAL

## 2018-03-20 VITALS
DIASTOLIC BLOOD PRESSURE: 72 MMHG | BODY MASS INDEX: 30.71 KG/M2 | TEMPERATURE: 98 F | HEART RATE: 100 BPM | SYSTOLIC BLOOD PRESSURE: 121 MMHG | HEIGHT: 64 IN | WEIGHT: 179.88 LBS

## 2018-03-20 DIAGNOSIS — M54.9 BACK PAIN, UNSPECIFIED BACK LOCATION, UNSPECIFIED BACK PAIN LATERALITY, UNSPECIFIED CHRONICITY: ICD-10-CM

## 2018-03-20 DIAGNOSIS — I10 ESSENTIAL HYPERTENSION: Primary | ICD-10-CM

## 2018-03-20 DIAGNOSIS — M89.9 BONE LESION: ICD-10-CM

## 2018-03-20 PROCEDURE — 99999 PR PBB SHADOW E&M-EST. PATIENT-LVL V: CPT | Mod: PBBFAC,,, | Performed by: INTERNAL MEDICINE

## 2018-03-20 PROCEDURE — 3074F SYST BP LT 130 MM HG: CPT | Mod: CPTII,S$GLB,, | Performed by: INTERNAL MEDICINE

## 2018-03-20 PROCEDURE — 99214 OFFICE O/P EST MOD 30 MIN: CPT | Mod: S$GLB,,, | Performed by: INTERNAL MEDICINE

## 2018-03-20 PROCEDURE — 3046F HEMOGLOBIN A1C LEVEL >9.0%: CPT | Mod: CPTII,S$GLB,, | Performed by: INTERNAL MEDICINE

## 2018-03-20 PROCEDURE — 3078F DIAST BP <80 MM HG: CPT | Mod: CPTII,S$GLB,, | Performed by: INTERNAL MEDICINE

## 2018-03-20 RX ORDER — MELOXICAM 15 MG/1
15 TABLET ORAL DAILY
Qty: 15 TABLET | Refills: 0 | Status: SHIPPED | OUTPATIENT
Start: 2018-03-20 | End: 2018-08-19

## 2018-03-20 RX ORDER — TIZANIDINE 2 MG/1
TABLET ORAL
Qty: 30 TABLET | Refills: 0 | Status: ON HOLD | OUTPATIENT
Start: 2018-03-20 | End: 2019-04-27 | Stop reason: HOSPADM

## 2018-03-22 ENCOUNTER — OFFICE VISIT (OUTPATIENT)
Dept: URGENT CARE | Facility: CLINIC | Age: 63
End: 2018-03-22
Payer: COMMERCIAL

## 2018-03-22 VITALS
WEIGHT: 179 LBS | BODY MASS INDEX: 30.56 KG/M2 | DIASTOLIC BLOOD PRESSURE: 83 MMHG | SYSTOLIC BLOOD PRESSURE: 129 MMHG | OXYGEN SATURATION: 99 % | HEART RATE: 88 BPM | TEMPERATURE: 98 F | HEIGHT: 64 IN

## 2018-03-22 DIAGNOSIS — A08.4 VIRAL GASTROENTERITIS: Primary | ICD-10-CM

## 2018-03-22 PROCEDURE — 99214 OFFICE O/P EST MOD 30 MIN: CPT | Mod: S$GLB,,, | Performed by: PHYSICIAN ASSISTANT

## 2018-03-22 PROCEDURE — 3079F DIAST BP 80-89 MM HG: CPT | Mod: CPTII,S$GLB,, | Performed by: PHYSICIAN ASSISTANT

## 2018-03-22 PROCEDURE — 3074F SYST BP LT 130 MM HG: CPT | Mod: CPTII,S$GLB,, | Performed by: PHYSICIAN ASSISTANT

## 2018-03-22 RX ORDER — ONDANSETRON 4 MG/1
4 TABLET, FILM COATED ORAL EVERY 8 HOURS PRN
Qty: 15 TABLET | Refills: 0 | Status: SHIPPED | OUTPATIENT
Start: 2018-03-22 | End: 2018-03-25

## 2018-03-22 NOTE — LETTER
March 22, 2018      [facility] [address] [place] [facility] [address]  Phone: [phone]  Fax: [phone]       Patient: Sunitha Pillai   YOB: 1955  Date of Visit: 03/22/2018    To Whom It May Concern:    Bj Pillai  was at [facility] on 03/22/2018. She may return to work/school on 3/24/2018 with no restrictions. If you have any questions or concerns, or if I can be of further assistance, please do not hesitate to contact me.    Sincerely,    Nahed Hidalgo PA-C

## 2018-03-22 NOTE — PATIENT INSTRUCTIONS
Take zofran as needed for nausea. Make sure to stay hydrated.  Return to clinic or report to the emergency room for any new or worsening symptoms as discussed.    Please follow up with your primary care provider within 2-5 days if your signs and symptoms have not resolved or worsen.     If your condition worsens or fails to improve we recommend that you receive another evaluation at the emergency room immediately or contact your primary medical clinic to discuss your concerns.   You must understand that you have received an Urgent Care treatment only and that you may be released before all of your medical problems are known or treated. You, the patient, will arrange for follow up care as instructed.         Viral Gastroenteritis (Adult)    Gastroenteritis is commonly called the stomach flu. It is most often caused by a virus that affects the stomach and intestinal tract and usually lasts from 2 to 7 days. Common viruses causing gastroenteritis include norovirus, rotavirus, and hepatitis A. Non-viral causes of gastroenteritis include bacteria, parasites, and toxins.  The danger from repeated vomiting or diarrhea is dehydration. This is the loss of too much fluid from the body. When this occurs, body fluids must be replaced. Antibiotics do not help with this illness because it is usually viral.Simple home treatment will be helpful.  Symptoms of viral gastroenteritis may include:  · Watery, loose stools  · Stomach pain or abdominal cramps  · Fever and chills  · Nausea and vomiting  · Loss of bowel control  · Headache  Home care  Gastroenteritis is transmitted by contact with the stool or vomit of an infected person. This can occur from person to person or from contact with a contaminated surface.  Follow these guidelines when caring for yourself at home:  · If symptoms are severe, rest at home for the next 24 hours or until you are feeling better.  · Wash your hands with soap and water or use alcohol-based  to  prevent the spread of infection. Wash your hands after touching anyone who is sick.  · Wash your hands or use alcohol-based  after using the toilet and before meals. Clean the toilet after each use.  Remember these tips when preparing food:  · People with diarrhea should not prepare or serve food to others. When preparing foods, wash your hands before and after.  · Wash your hands after using cutting boards, countertops, knives, or utensils that have been in contact with raw food.  · Keep uncooked meats away from cooked and ready-to-eat foods.  Medicine  You may use acetaminophen or NSAID medicines like ibuprofen or naproxen to control fever unless another medicine was given. If you have chronic liver or kidney disease, talk with your healthcare provider before using these medicines. Also talk with your provider if you've had a stomach ulcer or gastrointestinal bleeding. Don't give aspirin to anyone under 18 years of age who is ill with a fever. It may cause severe liver damage. Don't use NSAIDS is you are already taking one for another condition (like arthritis) or are on aspirin (such as for heart disease or after a stroke).  If medicine for vomiting or diarrhea are prescribed, take these only as directed. Do not take over-the-counter medicines for vomiting or diarrhea unless instructed by your healthcare provider.  Diet  Follow these guidelines for food:  · Water and liquids are important so you don't get dehydrated. Drink a small amount at a time or suck on ice chips if you are vomiting.  · If you eat, avoid fatty, greasy, spicy, or fried foods.  · Don't eat dairy if you have diarrhea. This can make diarrhea worse.  · Avoid tobacco, alcohol, and caffeine which may worsen symptoms.  During the first 24 hours (the first full day), follow the diet below:  · Beverages. Sports drinks, soft drinks without caffeine, ginger ale, mineral water (plain or flavored), decaffeinated tea and coffee. If you are very  dehydrated, sports drinks aren't a good choice. They have too much sugar and not enough electrolytes. In this case, commercially available products called oral rehydration solutions, are best.  · Soups. Eat clear broth, consommé, and bouillon.  · Desserts. Eat gelatin, popsicles, and fruit juice bars.  During the next 24 hours (the second day), you may add the following to the above:  · Hot cereal, plain toast, bread, rolls, and crackers  · Plain noodles, rice, mashed potatoes, chicken noodle or rice soup  · Unsweetened canned fruit (avoid pineapple), bananas  · Limit fat intake to less than 15 grams per day. Do this by avoiding margarine, butter, oils, mayonnaise, sauces, gravies, fried foods, peanut butter, meat, poultry, and fish.  · Limit fiber and avoid raw or cooked vegetables, fresh fruits (except bananas), and bran cereals.  · Limit caffeine and chocolate. Don't use spices or seasonings other than salt.  · Limit dairy products.  · Avoid alcohol.  During the next 24 hours:  · Gradually resume a normal diet as you feel better and your symptoms improve.  · If at any time it starts getting worse again, go back to clear liquids until you feel better.  Follow-up care  Follow up with your healthcare provider, or as advised. Call your provider if you don't get better within 24 hours or if diarrhea lasts more than a week. Also follow up if you are unable to keep down liquids and get dehydrated. If a stool (diarrhea) sample was taken, call as directed for the results.  Call 911  Call 911 if any of these occur:  · Trouble breathing  · Chest pain  · Confused  · Severe drowsiness or trouble awakening  · Fainting or loss of consciousness  · Rapid heart rate  · Seizure  · Stiff neck  When to seek medical advice  Call your healthcare provider right away if any of these occur:  · Abdominal pain that gets worse  · Continued vomiting (unable to keep liquids down)  · Frequent diarrhea (more than 5 times a day)  · Blood in vomit  or stool (black or red color)  · Dark urine, reduced urine output, or extreme thirst  · Weakness or dizziness  · Drowsiness  · Fever of 100.4°F (38°C) oral or higher that does not get better with fever medicine  · New rash  Date Last Reviewed: 1/3/2016  © 4893-4269 [employer]. [address] [place] [address], [place] [address]. All rights reserved. This information is not intended as a substitute for professional medical care. Always follow your healthcare professional's instructions.

## 2018-03-27 NOTE — PROGRESS NOTES
Subjective:       Patient ID: Sunitha Pillai is a 62 y.o. female.    Chief Complaint: Follow-up    HPIPt still with left sided back pain and limping despite NSAIDS, muscle relaxants she has been active but not as acitve as she would like to be as she is limited by pain - she refuses an MRI - symptoms present since 2/5/18 (approx 7 weeks) - denies numbness or weakness no bowel or bladder changes.  No new arm pain - did not do MRI of arm - no new lesions in radius.  Review of Systems   Respiratory: Negative for shortness of breath (PND or orthopnea).    Cardiovascular: Negative for chest pain (arm pain or jaw pain).   Gastrointestinal: Negative for abdominal pain, diarrhea, nausea and vomiting.   Genitourinary: Negative for dysuria.   Neurological: Negative for seizures, syncope and headaches.       Objective:      Physical Exam   Constitutional: She is oriented to person, place, and time. She appears well-developed and well-nourished. No distress.   HENT:   Head: Normocephalic.   Mouth/Throat: Oropharynx is clear and moist.   Eyes: EOM are normal. Pupils are equal, round, and reactive to light.   Neck: Neck supple. No JVD present. No thyromegaly present.   Cardiovascular: Normal rate, regular rhythm, normal heart sounds and intact distal pulses.  Exam reveals no gallop and no friction rub.    No murmur heard.  Pulmonary/Chest: Effort normal and breath sounds normal. She has no wheezes. She has no rales.   Abdominal: Soft. Bowel sounds are normal. She exhibits no distension and no mass. There is no tenderness. There is no rebound and no guarding.   Musculoskeletal: She exhibits no edema.   5/5 strength in BLE, 1+reflexes at knees and ankles bilaterally, pos SLR on the left, easily walks on heels and toes     Lymphadenopathy:     She has no cervical adenopathy.   Neurological: She is alert and oriented to person, place, and time. She has normal reflexes.   Skin: Skin is warm and dry.   Psychiatric: She has a  normal mood and affect. Her behavior is normal. Judgment and thought content normal.     Anival pain with external rotation of left hip  Assessment:       1. Essential hypertension    2. Uncontrolled type 2 diabetes mellitus without complication, with long-term current use of insulin    3. Back pain, unspecified back location, unspecified back pain laterality, unspecified chronicity    4. Bone lesion        Plan:   Essential hypertension  Controlled - continue current meds    Uncontrolled type 2 diabetes mellitus without complication, with long-term current use of insulin  Improved control - pt taking insulin  Back pain, unspecified back location, unspecified back pain laterality, unspecified chronicity  -     CT Lumbar Spine Without Contrast; Future; Expected date: 03/20/2018  -     CT Hip Without Contrast Left; Future; Expected date: 03/20/2018    Bone lesion  -     Ambulatory consult to Orthopedics    Other orders  -     meloxicam (MOBIC) 15 MG tablet; Take 1 tablet (15 mg total) by mouth once daily.  Dispense: 15 tablet; Refill: 0  -     tiZANidine (ZANAFLEX) 2 MG tablet; One-two every eight hours as needed for pain  Dispense: 30 tablet; Refill: 0

## 2018-05-17 ENCOUNTER — HOSPITAL ENCOUNTER (OUTPATIENT)
Dept: RADIOLOGY | Facility: HOSPITAL | Age: 63
Discharge: HOME | End: 2018-05-17
Attending: INTERNAL MEDICINE
Payer: COMMERCIAL

## 2018-05-17 ENCOUNTER — OFFICE VISIT (OUTPATIENT)
Dept: INTERNAL MEDICINE | Facility: CLINIC | Age: 63
End: 2018-05-17
Payer: COMMERCIAL

## 2018-05-17 VITALS
HEIGHT: 64 IN | DIASTOLIC BLOOD PRESSURE: 82 MMHG | SYSTOLIC BLOOD PRESSURE: 124 MMHG | OXYGEN SATURATION: 99 % | WEIGHT: 179.25 LBS | BODY MASS INDEX: 30.6 KG/M2 | TEMPERATURE: 99 F | HEART RATE: 82 BPM

## 2018-05-17 DIAGNOSIS — S83.142D: ICD-10-CM

## 2018-05-17 DIAGNOSIS — S83.142D: Primary | ICD-10-CM

## 2018-05-17 DIAGNOSIS — I10 HYPERTENSION, UNSPECIFIED TYPE: ICD-10-CM

## 2018-05-17 PROCEDURE — 73562 X-RAY EXAM OF KNEE 3: CPT | Mod: 26,LT,, | Performed by: RADIOLOGY

## 2018-05-17 PROCEDURE — 73560 X-RAY EXAM OF KNEE 1 OR 2: CPT | Mod: TC,FY,PO,RT

## 2018-05-17 PROCEDURE — 3079F DIAST BP 80-89 MM HG: CPT | Mod: CPTII,S$GLB,, | Performed by: INTERNAL MEDICINE

## 2018-05-17 PROCEDURE — 3074F SYST BP LT 130 MM HG: CPT | Mod: CPTII,S$GLB,, | Performed by: INTERNAL MEDICINE

## 2018-05-17 PROCEDURE — 99999 PR PBB SHADOW E&M-EST. PATIENT-LVL V: CPT | Mod: PBBFAC,,, | Performed by: INTERNAL MEDICINE

## 2018-05-17 PROCEDURE — 3046F HEMOGLOBIN A1C LEVEL >9.0%: CPT | Mod: CPTII,S$GLB,, | Performed by: INTERNAL MEDICINE

## 2018-05-17 PROCEDURE — 3008F BODY MASS INDEX DOCD: CPT | Mod: CPTII,S$GLB,, | Performed by: INTERNAL MEDICINE

## 2018-05-17 PROCEDURE — 73560 X-RAY EXAM OF KNEE 1 OR 2: CPT | Mod: 26,XS,RT, | Performed by: RADIOLOGY

## 2018-05-17 PROCEDURE — 73562 X-RAY EXAM OF KNEE 3: CPT | Mod: TC,FY,PO,LT

## 2018-05-17 PROCEDURE — 99214 OFFICE O/P EST MOD 30 MIN: CPT | Mod: S$GLB,,, | Performed by: INTERNAL MEDICINE

## 2018-05-17 NOTE — LETTER
May 17, 2018      Sunitha Pillai  [address] [facility] [address]             [facility]  [address] [facility] [address]  Phone: [phone] Patient: Sunitha Pillai  MRN: [id number]  YOB: 1955  Date of Visit: 05/17/2018    To Whom It May Concern:    Sunitha Greenberg was seen in the Internal Medicine Clinic on 05/17/2018. She may return to work/school on May 23, 2018 with no restrictions. If you have any questions or concerns, or if I can be of further assistance, please do not hesitate to contact my office.    Sincerely,          Patty Louis MD  Section of Internal Medicine  [facility]

## 2018-05-18 ENCOUNTER — OFFICE VISIT (OUTPATIENT)
Dept: SPORTS MEDICINE | Facility: CLINIC | Age: 63
End: 2018-05-18
Payer: OTHER MISCELLANEOUS

## 2018-05-18 VITALS
SYSTOLIC BLOOD PRESSURE: 125 MMHG | WEIGHT: 179.25 LBS | HEART RATE: 75 BPM | DIASTOLIC BLOOD PRESSURE: 78 MMHG | BODY MASS INDEX: 30.6 KG/M2 | HEIGHT: 64 IN
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
## 2018-05-18 DIAGNOSIS — M25.562 ACUTE PAIN OF LEFT KNEE: Primary | ICD-10-CM

## 2018-05-18 PROCEDURE — 99999 PR PBB SHADOW E&M-EST. PATIENT-LVL IV: CPT | Mod: PBBFAC,,, | Performed by: PHYSICIAN ASSISTANT

## 2018-05-18 PROCEDURE — 3074F SYST BP LT 130 MM HG: CPT | Mod: CPTII,S$GLB,, | Performed by: PHYSICIAN ASSISTANT

## 2018-05-18 PROCEDURE — 3008F BODY MASS INDEX DOCD: CPT | Mod: CPTII,S$GLB,, | Performed by: PHYSICIAN ASSISTANT

## 2018-05-18 PROCEDURE — 99203 OFFICE O/P NEW LOW 30 MIN: CPT | Mod: S$GLB,,, | Performed by: PHYSICIAN ASSISTANT

## 2018-05-18 PROCEDURE — 3078F DIAST BP <80 MM HG: CPT | Mod: CPTII,S$GLB,, | Performed by: PHYSICIAN ASSISTANT

## 2018-05-18 NOTE — PROGRESS NOTES
Subjective:          Chief Complaint: Sunitha Pillai is a 62 y.o. female who had concerns including Pain of the Left Knee.    HPI   Patient who works at the [employer] in [employer] was leaving work 2 days ago on 5/16/2018 when she tripped over a pipe by the garage door and fell onto the left knee.  She was unable to ambulate following the fall and noticed swelling in her left knee. She was taken by ambulance to [facility] ED. She had radiographs taken and was given medication for pain in the ED. She was seen by Dr. Louis yesterday and was prescribed Ibuprofen and told to follow up with us. Pain is located throughout the entire knee. Ambulating with crutches. Pain today is 5-6/10. Pain at its worst is 8/10 with walking. She has been elevating and using compression. Denies hx of knee surgeries, instability, or mechanical symptoms.     Review of Systems   Constitution: Negative. Negative for chills, fever, weight gain and weight loss.   HENT: Negative for congestion and sore throat.    Eyes: Negative for blurred vision and double vision.   Cardiovascular: Negative for chest pain, leg swelling and palpitations.   Respiratory: Negative for cough and shortness of breath.    Hematologic/Lymphatic: Does not bruise/bleed easily.   Skin: Negative for itching, poor wound healing and rash.   Musculoskeletal: Positive for joint pain and stiffness. Negative for back pain, joint swelling, muscle weakness and myalgias.   Gastrointestinal: Negative for abdominal pain, constipation, diarrhea, nausea and vomiting.   Genitourinary: Negative.  Negative for frequency and hematuria.   Neurological: Negative for dizziness, headaches, numbness, paresthesias and sensory change.   Psychiatric/Behavioral: Negative for altered mental status and depression. The patient is not nervous/anxious.    Allergic/Immunologic: Negative for hives.       Pain Related Questions  Over the past 3 days, what was your average pain during activity? (I.e.  running, jogging, walking, climbing stairs, getting dressed, ect.): 9  Over the past 3 days, what was your highest pain level?: 9  Over the past 3 days, what was your lowest pain level? : 5    Other  How many nights a week are you awakened by your affected body part?: 0  Was the patient's HEIGHT measured or patient reported?: Patient Reported  Was the patient's WEIGHT measured or patient reported?: Measured      Objective:        General: Sunitha is well-developed, well-nourished, appears stated age, in no acute distress, alert and oriented to time, place and person.     General    Vitals reviewed.  Constitutional: She is oriented to person, place, and time. She appears well-developed and well-nourished. No distress.   HENT:   Head: Normocephalic and atraumatic.   Eyes: EOM are normal.   Neck: Normal range of motion.   Cardiovascular: Normal rate and regular rhythm.    Pulmonary/Chest: Effort normal. No respiratory distress.   Neurological: She is alert and oriented to person, place, and time. She has normal reflexes. No cranial nerve deficit. Coordination normal.   Psychiatric: She has a normal mood and affect. Her behavior is normal. Judgment and thought content normal.     General Musculoskeletal Exam   Gait: abnormal and antalgic       Right Knee Exam     Inspection   Erythema: absent  Scars: absent  Swelling: absent  Effusion: effusion  Deformity: deformity  Bruising: absent    Tenderness   The patient is experiencing no tenderness.         Range of Motion   Extension:  0 normal   Flexion: normal Right knee flexion: 135.     Tests   Meniscus   Tg:  Medial - negative Lateral - negative  Ligament Examination Lachman: normal (-1 to 2mm) PCL-Posterior Drawer: normal (0 to 2mm)     MCL - Valgus: normal (0 to 2mm)  LCL - Varus: normal  Posterolateral Corner: unstable (>15 degrees difference)  Patella   Passive Patellar Tilt: neutral  Patellar Glide (quadrants): Lateral - 1   Medial - 2  Patellar Grind:  negative    Other   Sensation: normal    Left Knee Exam     Inspection   Erythema: absent  Scars: absent  Swelling: present  Effusion: present  Deformity: deformity  Bruising: absent    Tenderness   The patient tender to palpation of the medial joint line.    Range of Motion   Extension:  5 normal   Flexion:  90 abnormal     Tests   Meniscus   Tg:  Medial - positive Lateral - negative  Stability Lachman: normal (-1 to 2mm) PCL-Posterior Drawer: normal (0 to 2mm)  MCL - Valgus: normal (0 to 2mm)  LCL - Varus: normal (0 to 2mm)  Posterolateral Corner: unstable (>15 degrees difference)  Patella   Passive Patellar Tilt: neutral  Patellar Glide (Quadrants): Lateral - 1 Medial - 2  Patellar Grind: negative    Other   Sensation: normal    Comments:  Exam limited due to severe pain.     Muscle Strength   Right Lower Extremity   Hip Abduction: 5/5   Quadriceps:  5/5   Hamstrin/5   Left Lower Extremity   Hip Abduction: 4/5   Quadriceps:  3/5   Hamstring:  3/5     Reflexes     Left Side  Quadriceps:  2+  Achilles:  2+    Right Side   Quadriceps:  2+  Achilles:  2+    Vascular Exam     Right Pulses  Dorsalis Pedis:      2+  Posterior Tibial:      2+        Left Pulses  Dorsalis Pedis:      2+  Posterior Tibial:      2+          RADIOGRAPHS:  FINDINGS:  Joint spaces are maintained.  Bony structures are normal with minimal degenerative change.  No joint effusion on the left.          Assessment:       Encounter Diagnoses   Name Primary?    Acute pain of left knee Yes    Fall, initial encounter           Plan:       1. MRI of left knee to rule out medial meniscus tear    2. Start Ibuprofen as needed     3. [id number] - Bandar, performed a custom orthotic / brace adjustment, fitting and training with the patient. The patient demonstrated understanding and proper care. This was performed for 15 minutes. Visco-skin    4. I made the decision to obtain old records of the patient including previous notes and imaging. New imaging  was ordered today of the extremity or extremities evaluated. I independently reviewed and interpreted the radiographs today as well as prior imaging.    5. RTC in 2 weeks with Natasha Stephens PA-C for MRI results review.                      Patient questionnaires may have been collected.

## 2018-05-18 NOTE — LETTER
May 18, 2018      Patty Louis MD  [address]  [facility] [address]           [facility]  [address]  [facility] [address]  Phone: [phone]          Patient: Sunitha Pillai   MR Number: [id number]   YOB: 1955   Date of Visit: 5/18/2018       Dear Dr. Patty Louis:    Thank you for referring Sunitha Pillai to me for evaluation. Attached you will find relevant portions of my assessment and plan of care.    If you have questions, please do not hesitate to call me. I look forward to following Sunitha Pillai along with you.    Sincerely,    Natasha Stephens PA-C    Enclosure  CC:  No Recipients    If you would like to receive this communication electronically, please contact [email or web address] or [phone] to request more information on [employer] access.    For providers and/or their staff who would like to refer a patient to [facility], please contact us through our one-stop-shop provider referral line, [facility] , at [phone].    If you feel you have received this communication in error or would no longer like to receive these types of communications, please e-mail [email or web address]

## 2018-05-21 ENCOUNTER — TELEPHONE (OUTPATIENT)
Dept: INTERNAL MEDICINE | Facility: CLINIC | Age: 63
End: 2018-05-21

## 2018-05-21 NOTE — TELEPHONE ENCOUNTER
----- Message from Grady Jain sent at 5/21/2018  1:13 PM CDT -----  Contact: pt   Pt would like a call back regarding will discuss when the nurse calls back pt insisted on message being sent    Pt can be reached at [phone]

## 2018-05-21 NOTE — TELEPHONE ENCOUNTER
Spoke with patient she is requesting an excuse letter back to work for a longer duration. If at all possible for you to excuse her till June or longer, patient states as long as possible. She does not feel the knee is getting better yet.     Please Advise  Thank You

## 2018-05-22 NOTE — TELEPHONE ENCOUNTER
----- Message from Maggie Woodard sent at 5/22/2018 11:26 AM CDT -----  Contact: self/[phone]  Pt is calling to speak with someone in the office to get more days off from work. She states that she spoke with someone on yesterday and they were supposed to give a call back but didn't. She states that this is very urgent and she needs to speak with someone today.  Please advise.      Thanks

## 2018-05-23 NOTE — TELEPHONE ENCOUNTER
I gave her a note saying she could do light duty and could not stand longer than 15 minutes.  She was unhappy with it - She has another Ortho appt with an outside company today.  She told me there was no light duty - I told her I needed to speak to someone at her work and if there is not light duty she would just need to be off but I needed to discuss that with her manager and that if Orthopedics wanted to give her more time that was fine.

## 2018-05-24 NOTE — PROGRESS NOTES
Subjective:       Patient ID: Sunitha Pillai is a 62 y.o. female.    Chief Complaint: Follow-up    HPIPt was going to a meeting at work on her day off (works at the laundry at a hotel) when she tripped over a wire/hose outside of her work place 5/16/18.  She hit her left knee and had pain and was taken to [facility] ED - Xrays were normal and she was given a script for ibuprofen which she has not taken yet.  SHe is walking but with difficulty and crutches.  She is in pain - no numbness or weakness.    Review of Systems   Respiratory: Negative for shortness of breath (PND or orthopnea).    Cardiovascular: Negative for chest pain (arm pain or jaw pain).   Gastrointestinal: Negative for abdominal pain, diarrhea, nausea and vomiting.   Genitourinary: Negative for dysuria.   Musculoskeletal: Positive for arthralgias.        L knee pain   Neurological: Negative for seizures, syncope and headaches.       Objective:      Physical Exam   Constitutional: She is oriented to person, place, and time. She appears well-developed and well-nourished. No distress.   HENT:   Head: Normocephalic.   Mouth/Throat: Oropharynx is clear and moist.   Neck: Neck supple. No JVD present. No thyromegaly present.   Cardiovascular: Normal rate, regular rhythm, normal heart sounds and intact distal pulses.  Exam reveals no gallop and no friction rub.    No murmur heard.  Pulmonary/Chest: Effort normal and breath sounds normal. She has no wheezes. She has no rales.   Abdominal: Soft. Bowel sounds are normal. She exhibits no distension and no mass. There is no tenderness. There is no rebound and no guarding.   Musculoskeletal: She exhibits no edema.   L knee shows no bruising, skin intact.  Some slight swelling seen - some pain on ROM.  No calf tenderness.   Lymphadenopathy:     She has no cervical adenopathy.   Neurological: She is alert and oriented to person, place, and time. She has normal reflexes.   Skin: Skin is warm and dry.   Psychiatric:  She has a normal mood and affect. Her behavior is normal. Judgment and thought content normal.       Assessment:       1. Closed traumatic lateral subluxation of left knee joint, subsequent encounter    2. Hypertension, unspecified type    3. DM (diabetes mellitus), type 2, uncontrolled, periph vascular complic        Plan:   Closed traumatic lateral subluxation of left knee joint, subsequent encounter  -     X-ray Knee Ortho Left; Future; Expected date: 05/17/2018  -     Ambulatory consult to Orthopedics  -     Ambulatory consult to Sports Medicine    Hypertension, unspecified type  -     CBC auto differential; Future; Expected date: 05/17/2018  -     Comprehensive metabolic panel; Future; Expected date: 05/17/2018  -     TSH; Future; Expected date: 05/17/2018  Controlled - continue current meds    DM (diabetes mellitus), type 2, uncontrolled, periph vascular complic  -     Hemoglobin A1c; Future; Expected date: 05/17/2018  Note given for one week out of work until knee is re-evaluated and sx improved - okay to fill ibuprofen short course

## 2018-08-19 ENCOUNTER — OFFICE VISIT (OUTPATIENT)
Dept: URGENT CARE | Facility: CLINIC | Age: 63
End: 2018-08-19
Payer: COMMERCIAL

## 2018-08-19 VITALS
HEART RATE: 80 BPM | WEIGHT: 179 LBS | HEIGHT: 64 IN | DIASTOLIC BLOOD PRESSURE: 89 MMHG | TEMPERATURE: 98 F | RESPIRATION RATE: 14 BRPM | BODY MASS INDEX: 30.56 KG/M2 | OXYGEN SATURATION: 100 % | SYSTOLIC BLOOD PRESSURE: 131 MMHG

## 2018-08-19 DIAGNOSIS — M25.532 ACUTE PAIN OF LEFT WRIST: Primary | ICD-10-CM

## 2018-08-19 PROCEDURE — 3008F BODY MASS INDEX DOCD: CPT | Mod: CPTII,S$GLB,, | Performed by: NURSE PRACTITIONER

## 2018-08-19 PROCEDURE — 3079F DIAST BP 80-89 MM HG: CPT | Mod: CPTII,S$GLB,, | Performed by: NURSE PRACTITIONER

## 2018-08-19 PROCEDURE — 3075F SYST BP GE 130 - 139MM HG: CPT | Mod: CPTII,S$GLB,, | Performed by: NURSE PRACTITIONER

## 2018-08-19 PROCEDURE — 99214 OFFICE O/P EST MOD 30 MIN: CPT | Mod: S$GLB,,, | Performed by: NURSE PRACTITIONER

## 2018-08-19 RX ORDER — NAPROXEN 500 MG/1
500 TABLET ORAL 2 TIMES DAILY WITH MEALS
Qty: 20 TABLET | Refills: 0 | Status: SHIPPED | OUTPATIENT
Start: 2018-08-19 | End: 2018-08-29

## 2018-08-19 RX ORDER — TRAMADOL HYDROCHLORIDE 50 MG/1
50 TABLET ORAL
Qty: 14 TABLET | Refills: 0 | Status: SHIPPED | OUTPATIENT
Start: 2018-08-19 | End: 2019-05-21

## 2018-08-19 NOTE — PROGRESS NOTES
"Subjective:       Patient ID: Sunitha Pillai is a 63 y.o. female.    Vitals:  height is 5' 4" (1.626 m) and weight is 81.2 kg (179 lb). Her oral temperature is 98 °F (36.7 °C). Her blood pressure is 131/89 and her pulse is 80. Her respiration is 14 and oxygen saturation is 100%.     Chief Complaint: Arm Pain    Patient presents with left wrist pain x1 week.  She states that the pain keeps her awake at night.  She has noted some swelling and decreased range of motion as well.  She does not remember injuring her wrist.  She is currently being followed by ortho for a fall on May 16th 2018 in which she injured her left knee.  She is taking Aleve with no relief.  Pain radiates into mid forearm.  She is left hand dominant.  States that pain is causing her to have to eat with right hand.      Wrist Pain    The pain is present in the left wrist. This is a new problem. The current episode started in the past 7 days. History of extremity trauma: she is unsure. The problem occurs constantly. The problem has been gradually worsening. The quality of the pain is described as aching and pounding. The pain is at a severity of 8/10. The pain is moderate. Associated symptoms include joint swelling, a limited range of motion and stiffness. Pertinent negatives include no fever, inability to bear weight, itching, joint locking, numbness or tingling. The symptoms are aggravated by lying down. She has tried NSAIDS for the symptoms. The treatment provided no relief.     Review of Systems   Constitution: Negative for chills and fever.   HENT: Negative for sore throat.    Eyes: Negative for blurred vision.   Cardiovascular: Negative for chest pain.   Respiratory: Negative for shortness of breath.    Skin: Negative for color change, itching and rash.   Musculoskeletal: Positive for joint pain, joint swelling, myalgias and stiffness. Negative for arthritis and back pain.   Gastrointestinal: Negative for abdominal pain, diarrhea, nausea " and vomiting.   Neurological: Negative for headaches, numbness and tingling.   Psychiatric/Behavioral: The patient is not nervous/anxious.        Objective:      Physical Exam   Constitutional: She is oriented to person, place, and time. She appears well-developed and well-nourished.   HENT:   Head: Normocephalic and atraumatic. Head is without abrasion, without contusion and without laceration.   Right Ear: External ear normal.   Left Ear: External ear normal.   Nose: Nose normal.   Mouth/Throat: Oropharynx is clear and moist.   Eyes: Conjunctivae, EOM and lids are normal. Pupils are equal, round, and reactive to light.   Neck: Trachea normal, full passive range of motion without pain and phonation normal. Neck supple.   Cardiovascular: Normal rate, regular rhythm, normal heart sounds and intact distal pulses.   Pulmonary/Chest: Effort normal and breath sounds normal. No stridor. No respiratory distress.   Musculoskeletal:        Left wrist: She exhibits decreased range of motion, tenderness, bony tenderness and swelling. She exhibits no effusion, no crepitus, no deformity and no laceration.   Mild swelling and tenderness to wrist.  +snuffbox tenderness.     Neurological: She is alert and oriented to person, place, and time.   Skin: Skin is warm, dry and intact. Capillary refill takes less than 2 seconds. No abrasion, no bruising, no burn, no ecchymosis, no laceration, no lesion and no rash noted. No erythema.   Psychiatric: She has a normal mood and affect. Her speech is normal and behavior is normal. Judgment and thought content normal. Cognition and memory are normal.   Nursing note and vitals reviewed.      EXAMINATION:  XR WRIST COMPLETE 3 VIEWS LEFT    CLINICAL HISTORY:  Pain in unspecified wrist    TECHNIQUE:  PA, lateral, and oblique views of the left wrist were performed.    COMPARISON:  Plain film from 10/27/2017    FINDINGS:  Irregularity of the scaphoid in the distal 1/3, unchanged when compared to  prior exam suggestive a nondisplaced fracture.  Remaining osseous structures are intact.  No soft tissue abnormality.      Impression       Irregularity of the scaphoid in the distal 1/3, unchanged when compared to prior examination, suggestive of a nondisplaced fracture.  Correlation with point tenderness is recommended.      Electronically signed by: Doroteo Pillai MD  Date: 08/19/2018  Time: 14:18     Thumb Spica to left wrist by MA.  Patient tolerated well.  NV intact pre and post assessment.    Assessment:       1. Acute pain of left wrist        Plan:         Acute pain of left wrist  -     X-Ray Wrist Complete Left; Future; Expected date: 08/19/2018  -     SPLINT FOR HOME USE  -     Ambulatory referral to Orthopedics  -     naproxen (NAPROSYN) 500 MG tablet; Take 1 tablet (500 mg total) by mouth 2 (two) times daily with meals. for 10 days  Dispense: 20 tablet; Refill: 0  -     traMADol (ULTRAM) 50 mg tablet; Take 1 tablet (50 mg total) by mouth every 24 hours as needed for Pain (use especially at bedtime as needed.).  Dispense: 14 tablet; Refill: 0      Patient Instructions                                                              Ortho   If your condition worsens or fails to improve we recommend that you receive another evaluation at the ER immediately or contact your PCP to discuss your concerns or return here. You must understand that you've received an urgent care treatment only and that you may be released before all your medical problems are known or treated. You the patient will arrange for follouwp care as instructed.   If you were prescribed a narcotic or muscle relaxant do not drive or operate heavy machinery while taking these medications   Tylenol or ibuprofen can also be used as directed for pain unless you have an allergy to them or medical condition such as stomach ulcers, kidney or liver disease or blood thinners etc for which you should not be taking these type of medications.   If you  were given a prescription NSAID here do not also take any over the counter NSAID like ibuprofen, aleve, advil, motrin etc   RICE which means rest, ice compression and elevation are helpful.   If you were given a splint wear it at all times.         Possible Wrist Fracture  Follow up with your healthcare provider in one week, or as advised. This is to be sure the bone is healing properly.  If X-rays were taken, you will be told of any new findings that may affect your care.  You are very sore over a bone in your wrist called the navicular, or scaphoid, bone. This could be a sign of a hairline fracture, or break, even though no fracture was seen on the X-ray. Therefore, a splint or cast will be applied until repeat X-rays are taken in about 1 to 2 weeks. If you have a hairline fracture, it will show up on the second X-ray and you will have to keep wearing a cast for about 6 to 20 weeks, depending on the location of the fracture. If no fracture is seen on the second X-ray, this means you only have a wrist sprain. The splint or cast can be removed.     Home care  · Keep your arm raised to reduce pain and swelling. When sitting or lying down, raise your arm above the level of your heart. You can do this by placing your arm on a pillow that rests on your chest or on a pillow at your side. This is most important during the first 48 hours after injury.  · Apply an ice pack over the injured area for no more than 15 to 20 minutes. Do this every 1 to 2 hours for the first 24 to 48 hours. To make an ice pack, put ice cubes in a plastic bag that seals at the top. Wrap the bag in a clean, thin towel or cloth. Never put ice or an ice pack directly on the skin. As the ice melts, be careful that the cast or splint doesnt get wet. You can place the ice pack inside the sling and directly over the splint or cast. Keep using ice packs as needed to ease pain and swelling.  · Keep the cast or splint completely dry at all times. Bathe  with your cast or splint out of the water. Protect it with 2 large plastic bags. Place 1 bag around the other. Tape each bag with duct tape at the top end. If a fiberglass cast or splint gets wet, you can dry it with a hair dryer on a cool setting.  · You may use over-the-counter pain medicine to control pain, unless another pain medicine was prescribed. If you have chronic liver or kidney disease or ever had a stomach ulcer or GI (gastrointestinal) bleeding, talk with your provider before using these medicines.  · If you smoke, try to quit. Tobacco use can interfere with the healing of this fracture. It can also increase the risk of a complication needing surgery.  Follow-up care  Follow up with your healthcare provider in 1 week, or as advised. This is to be sure the bone is healing properly.  If X-rays were taken, you will be told of any new findings that may affect your care.  When to seek medical advice  Call your healthcare provider right away if any of the following occur:  · The plaster cast or splint becomes wet or soft  · The plaster cast or splint becomes loose  · The fiberglass cast or splint remains wet for more than 24 hours  · Increased tightness or pain occurs under the cast or splint  · Fingers become swollen, cold, blue, numb, or tingly  Date Last Reviewed: 12/3/2015  © 9576-3005 [employer]. [address] [place] [address], [place] [address]. All rights reserved. This information is not intended as a substitute for professional medical care. Always follow your healthcare professional's instructions.

## 2018-08-19 NOTE — PATIENT INSTRUCTIONS
Ortho   If your condition worsens or fails to improve we recommend that you receive another evaluation at the ER immediately or contact your PCP to discuss your concerns or return here. You must understand that you've received an urgent care treatment only and that you may be released before all your medical problems are known or treated. You the patient will arrange for follouwp care as instructed.   If you were prescribed a narcotic or muscle relaxant do not drive or operate heavy machinery while taking these medications   Tylenol or ibuprofen can also be used as directed for pain unless you have an allergy to them or medical condition such as stomach ulcers, kidney or liver disease or blood thinners etc for which you should not be taking these type of medications.   If you were given a prescription NSAID here do not also take any over the counter NSAID like ibuprofen, aleve, advil, motrin etc   RICE which means rest, ice compression and elevation are helpful.   If you were given a splint wear it at all times.         Possible Wrist Fracture  Follow up with your healthcare provider in one week, or as advised. This is to be sure the bone is healing properly.  If X-rays were taken, you will be told of any new findings that may affect your care.  You are very sore over a bone in your wrist called the navicular, or scaphoid, bone. This could be a sign of a hairline fracture, or break, even though no fracture was seen on the X-ray. Therefore, a splint or cast will be applied until repeat X-rays are taken in about 1 to 2 weeks. If you have a hairline fracture, it will show up on the second X-ray and you will have to keep wearing a cast for about 6 to 20 weeks, depending on the location of the fracture. If no fracture is seen on the second X-ray, this means you only have a wrist sprain. The splint or cast can be removed.     Home care  · Keep your arm raised to  reduce pain and swelling. When sitting or lying down, raise your arm above the level of your heart. You can do this by placing your arm on a pillow that rests on your chest or on a pillow at your side. This is most important during the first 48 hours after injury.  · Apply an ice pack over the injured area for no more than 15 to 20 minutes. Do this every 1 to 2 hours for the first 24 to 48 hours. To make an ice pack, put ice cubes in a plastic bag that seals at the top. Wrap the bag in a clean, thin towel or cloth. Never put ice or an ice pack directly on the skin. As the ice melts, be careful that the cast or splint doesnt get wet. You can place the ice pack inside the sling and directly over the splint or cast. Keep using ice packs as needed to ease pain and swelling.  · Keep the cast or splint completely dry at all times. Bathe with your cast or splint out of the water. Protect it with 2 large plastic bags. Place 1 bag around the other. Tape each bag with duct tape at the top end. If a fiberglass cast or splint gets wet, you can dry it with a hair dryer on a cool setting.  · You may use over-the-counter pain medicine to control pain, unless another pain medicine was prescribed. If you have chronic liver or kidney disease or ever had a stomach ulcer or GI (gastrointestinal) bleeding, talk with your provider before using these medicines.  · If you smoke, try to quit. Tobacco use can interfere with the healing of this fracture. It can also increase the risk of a complication needing surgery.  Follow-up care  Follow up with your healthcare provider in 1 week, or as advised. This is to be sure the bone is healing properly.  If X-rays were taken, you will be told of any new findings that may affect your care.  When to seek medical advice  Call your healthcare provider right away if any of the following occur:  · The plaster cast or splint becomes wet or soft  · The plaster cast or splint becomes loose  · The  fiberglass cast or splint remains wet for more than 24 hours  · Increased tightness or pain occurs under the cast or splint  · Fingers become swollen, cold, blue, numb, or tingly  Date Last Reviewed: 12/3/2015  © 6664-7352 [employer]. [address] [place] [address]. All rights reserved. This information is not intended as a substitute for professional medical care. Always follow your healthcare professional's instructions.

## 2018-08-29 ENCOUNTER — HOSPITAL ENCOUNTER (EMERGENCY)
Facility: HOSPITAL | Age: 63
Discharge: HOME | End: 2018-08-30
Attending: EMERGENCY MEDICINE
Payer: COMMERCIAL

## 2018-08-29 DIAGNOSIS — M25.532 ACUTE WRIST PAIN, LEFT: Primary | ICD-10-CM

## 2018-08-29 LAB
ANION GAP SERPL CALC-SCNC: 8 MMOL/L
BASOPHILS # BLD AUTO: 0.02 K/UL
BASOPHILS NFR BLD: 0.3 %
BUN SERPL-MCNC: 14 MG/DL
CALCIUM SERPL-MCNC: 8.9 MG/DL
CHLORIDE SERPL-SCNC: 106 MMOL/L
CO2 SERPL-SCNC: 22 MMOL/L
CREAT SERPL-MCNC: 1.1 MG/DL
CRP SERPL-MCNC: 1.7 MG/L
DIFFERENTIAL METHOD: ABNORMAL
EOSINOPHIL # BLD AUTO: 0.1 K/UL
EOSINOPHIL NFR BLD: 1.8 %
ERYTHROCYTE [DISTWIDTH] IN BLOOD BY AUTOMATED COUNT: 12.6 %
ERYTHROCYTE [SEDIMENTATION RATE] IN BLOOD BY WESTERGREN METHOD: 19 MM/HR
EST. GFR  (AFRICAN AMERICAN): >60 ML/MIN/1.73 M^2
EST. GFR  (NON AFRICAN AMERICAN): 53.2 ML/MIN/1.73 M^2
GLUCOSE SERPL-MCNC: 288 MG/DL
HCT VFR BLD AUTO: 34.7 %
HGB BLD-MCNC: 11.9 G/DL
IMM GRANULOCYTES # BLD AUTO: 0.01 K/UL
IMM GRANULOCYTES NFR BLD AUTO: 0.1 %
LYMPHOCYTES # BLD AUTO: 3.3 K/UL
LYMPHOCYTES NFR BLD: 48.7 %
MCH RBC QN AUTO: 30.9 PG
MCHC RBC AUTO-ENTMCNC: 34.3 G/DL
MCV RBC AUTO: 90 FL
MONOCYTES # BLD AUTO: 0.5 K/UL
MONOCYTES NFR BLD: 7.5 %
NEUTROPHILS # BLD AUTO: 2.8 K/UL
NEUTROPHILS NFR BLD: 41.6 %
NRBC BLD-RTO: 0 /100 WBC
PLATELET # BLD AUTO: 148 K/UL
PMV BLD AUTO: 10.1 FL
POTASSIUM SERPL-SCNC: 3.9 MMOL/L
RBC # BLD AUTO: 3.85 M/UL
SODIUM SERPL-SCNC: 136 MMOL/L
WBC # BLD AUTO: 6.82 K/UL

## 2018-08-29 PROCEDURE — 85652 RBC SED RATE AUTOMATED: CPT

## 2018-08-29 PROCEDURE — 80048 BASIC METABOLIC PNL TOTAL CA: CPT

## 2018-08-29 PROCEDURE — 25000003 PHARM REV CODE 250: Performed by: PHYSICIAN ASSISTANT

## 2018-08-29 PROCEDURE — 99284 EMERGENCY DEPT VISIT MOD MDM: CPT | Mod: ,,, | Performed by: PHYSICIAN ASSISTANT

## 2018-08-29 PROCEDURE — 86140 C-REACTIVE PROTEIN: CPT

## 2018-08-29 PROCEDURE — 85025 COMPLETE CBC W/AUTO DIFF WBC: CPT

## 2018-08-29 PROCEDURE — 99284 EMERGENCY DEPT VISIT MOD MDM: CPT

## 2018-08-29 RX ORDER — HYDROCODONE BITARTRATE AND ACETAMINOPHEN 5; 325 MG/1; MG/1
1 TABLET ORAL
Status: COMPLETED | OUTPATIENT
Start: 2018-08-29 | End: 2018-08-29

## 2018-08-29 RX ORDER — MIDAZOLAM HYDROCHLORIDE 1 MG/ML
1 INJECTION INTRAMUSCULAR; INTRAVENOUS
Status: DISCONTINUED | OUTPATIENT
Start: 2018-08-29 | End: 2018-08-29

## 2018-08-29 RX ADMIN — HYDROCODONE BITARTRATE AND ACETAMINOPHEN 1 TABLET: 5; 325 TABLET ORAL at 08:08

## 2018-08-30 VITALS
WEIGHT: 179 LBS | RESPIRATION RATE: 18 BRPM | HEART RATE: 58 BPM | BODY MASS INDEX: 30.56 KG/M2 | TEMPERATURE: 98 F | DIASTOLIC BLOOD PRESSURE: 73 MMHG | SYSTOLIC BLOOD PRESSURE: 166 MMHG | OXYGEN SATURATION: 97 % | HEIGHT: 64 IN

## 2018-08-30 RX ORDER — HYDROCODONE BITARTRATE AND ACETAMINOPHEN 5; 325 MG/1; MG/1
1 TABLET ORAL EVERY 6 HOURS PRN
Qty: 10 TABLET | Refills: 0 | Status: ON HOLD | OUTPATIENT
Start: 2018-08-30 | End: 2018-09-24 | Stop reason: HOSPADM

## 2018-08-30 NOTE — ED TRIAGE NOTES
Pt reports left hand/arm pain and swelling. Fell in May. States pain not any better. No relief with meds.  Limited ROM.  Pulses strong and intact.

## 2018-08-30 NOTE — ED NOTES
Pt discharge information reviewed with MD.  Pt states no questions at this time and IV removed per protocol.  VSS

## 2018-08-30 NOTE — ED NOTES
MRI called and stated that the pt is c/o being claustrophobic.  MD put in for PRN should the pt require it.

## 2018-09-23 ENCOUNTER — HOSPITAL ENCOUNTER (OUTPATIENT)
Facility: HOSPITAL | Age: 63
Discharge: HOME | End: 2018-09-24
Attending: EMERGENCY MEDICINE | Admitting: EMERGENCY MEDICINE
Payer: COMMERCIAL

## 2018-09-23 DIAGNOSIS — E66.01 CLASS 2 SEVERE OBESITY DUE TO EXCESS CALORIES WITH SERIOUS COMORBIDITY AND BODY MASS INDEX (BMI) OF 35.0 TO 35.9 IN ADULT: ICD-10-CM

## 2018-09-23 DIAGNOSIS — I21.4 NON-ST ELEVATION MYOCARDIAL INFARCTION (NSTEMI): ICD-10-CM

## 2018-09-23 DIAGNOSIS — I20.0 UNSTABLE ANGINA: ICD-10-CM

## 2018-09-23 DIAGNOSIS — I24.9 ACUTE CORONARY SYNDROME: ICD-10-CM

## 2018-09-23 DIAGNOSIS — E11.65 TYPE 2 DIABETES MELLITUS WITH HYPERGLYCEMIA: ICD-10-CM

## 2018-09-23 DIAGNOSIS — K21.9 GASTROESOPHAGEAL REFLUX DISEASE WITHOUT ESOPHAGITIS: ICD-10-CM

## 2018-09-23 DIAGNOSIS — I10 ESSENTIAL HYPERTENSION: ICD-10-CM

## 2018-09-23 DIAGNOSIS — R07.9 CHEST PAIN: Primary | ICD-10-CM

## 2018-09-23 DIAGNOSIS — I21.4 NSTEMI (NON-ST ELEVATION MYOCARDIAL INFARCTION): ICD-10-CM

## 2018-09-23 PROBLEM — E11.8 TYPE 2 DIABETES MELLITUS WITH COMPLICATION, WITH LONG-TERM CURRENT USE OF INSULIN: Status: ACTIVE | Noted: 2018-09-23

## 2018-09-23 PROBLEM — Z79.4 TYPE 2 DIABETES MELLITUS WITH COMPLICATION, WITH LONG-TERM CURRENT USE OF INSULIN: Status: ACTIVE | Noted: 2018-09-23

## 2018-09-23 LAB
ABO + RH BLD: NORMAL
ALBUMIN SERPL BCP-MCNC: 3.4 G/DL
ALP SERPL-CCNC: 123 U/L
ALT SERPL W/O P-5'-P-CCNC: 14 U/L
ANION GAP SERPL CALC-SCNC: 8 MMOL/L
APTT BLDCRRT: 21.7 SEC
APTT BLDCRRT: 42.2 SEC
APTT BLDCRRT: 48 SEC
AST SERPL-CCNC: 19 U/L
BASOPHILS # BLD AUTO: 0.01 K/UL
BASOPHILS NFR BLD: 0.2 %
BILIRUB SERPL-MCNC: 0.5 MG/DL
BLD GP AB SCN CELLS X3 SERPL QL: NORMAL
BNP SERPL-MCNC: 11 PG/ML
BUN SERPL-MCNC: 12 MG/DL
CALCIUM SERPL-MCNC: 9.8 MG/DL
CHLORIDE SERPL-SCNC: 104 MMOL/L
CHOLEST SERPL-MCNC: 152 MG/DL
CHOLEST/HDLC SERPL: 3.6 {RATIO}
CO2 SERPL-SCNC: 25 MMOL/L
CREAT SERPL-MCNC: 1.2 MG/DL
DIFFERENTIAL METHOD: NORMAL
EOSINOPHIL # BLD AUTO: 0.1 K/UL
EOSINOPHIL NFR BLD: 1.5 %
ERYTHROCYTE [DISTWIDTH] IN BLOOD BY AUTOMATED COUNT: 12.3 %
EST. GFR  (AFRICAN AMERICAN): 55.2 ML/MIN/1.73 M^2
EST. GFR  (NON AFRICAN AMERICAN): 47.9 ML/MIN/1.73 M^2
ESTIMATED AVG GLUCOSE: 269 MG/DL
ESTIMATED AVG GLUCOSE: 272 MG/DL
GLUCOSE SERPL-MCNC: 367 MG/DL
HBA1C MFR BLD HPLC: 11 %
HBA1C MFR BLD HPLC: 11.1 %
HCT VFR BLD AUTO: 38.7 %
HDLC SERPL-MCNC: 42 MG/DL
HDLC SERPL: 27.6 %
HGB BLD-MCNC: 13.4 G/DL
IMM GRANULOCYTES # BLD AUTO: 0.01 K/UL
IMM GRANULOCYTES NFR BLD AUTO: 0.2 %
INR PPP: 1
LDLC SERPL CALC-MCNC: 72.4 MG/DL
LIPASE SERPL-CCNC: 54 U/L
LYMPHOCYTES # BLD AUTO: 2.8 K/UL
LYMPHOCYTES NFR BLD: 45.8 %
MCH RBC QN AUTO: 30.6 PG
MCHC RBC AUTO-ENTMCNC: 34.6 G/DL
MCV RBC AUTO: 88 FL
MONOCYTES # BLD AUTO: 0.6 K/UL
MONOCYTES NFR BLD: 9.2 %
NEUTROPHILS # BLD AUTO: 2.6 K/UL
NEUTROPHILS NFR BLD: 43.1 %
NONHDLC SERPL-MCNC: 110 MG/DL
NRBC BLD-RTO: 0 /100 WBC
PLATELET # BLD AUTO: 190 K/UL
PMV BLD AUTO: 10.1 FL
POCT GLUCOSE: 120 MG/DL (ref 70–110)
POCT GLUCOSE: 245 MG/DL (ref 70–110)
POCT GLUCOSE: 246 MG/DL (ref 70–110)
POCT GLUCOSE: 293 MG/DL (ref 70–110)
POTASSIUM SERPL-SCNC: 4.9 MMOL/L
PROT SERPL-MCNC: 7.7 G/DL
PROTHROMBIN TIME: 10.9 SEC
RBC # BLD AUTO: 4.38 M/UL
SODIUM SERPL-SCNC: 137 MMOL/L
TRIGL SERPL-MCNC: 188 MG/DL
TROPONIN I SERPL DL<=0.01 NG/ML-MCNC: 0.01 NG/ML
TROPONIN I SERPL DL<=0.01 NG/ML-MCNC: <0.006 NG/ML
WBC # BLD AUTO: 6 K/UL

## 2018-09-23 PROCEDURE — 63600175 PHARM REV CODE 636 W HCPCS: Performed by: HOSPITALIST

## 2018-09-23 PROCEDURE — 93306 TTE W/DOPPLER COMPLETE: CPT

## 2018-09-23 PROCEDURE — 96375 TX/PRO/DX INJ NEW DRUG ADDON: CPT

## 2018-09-23 PROCEDURE — 99285 EMERGENCY DEPT VISIT HI MDM: CPT | Mod: 25

## 2018-09-23 PROCEDURE — 96372 THER/PROPH/DIAG INJ SC/IM: CPT | Mod: 59

## 2018-09-23 PROCEDURE — G0378 HOSPITAL OBSERVATION PER HR: HCPCS

## 2018-09-23 PROCEDURE — 25000003 PHARM REV CODE 250: Performed by: HOSPITALIST

## 2018-09-23 PROCEDURE — 96361 HYDRATE IV INFUSION ADD-ON: CPT

## 2018-09-23 PROCEDURE — 85730 THROMBOPLASTIN TIME PARTIAL: CPT | Mod: 91

## 2018-09-23 PROCEDURE — 83880 ASSAY OF NATRIURETIC PEPTIDE: CPT

## 2018-09-23 PROCEDURE — 93306 TTE W/DOPPLER COMPLETE: CPT | Mod: 26,,, | Performed by: INTERNAL MEDICINE

## 2018-09-23 PROCEDURE — 85730 THROMBOPLASTIN TIME PARTIAL: CPT

## 2018-09-23 PROCEDURE — 85610 PROTHROMBIN TIME: CPT

## 2018-09-23 PROCEDURE — 63600175 PHARM REV CODE 636 W HCPCS: Performed by: STUDENT IN AN ORGANIZED HEALTH CARE EDUCATION/TRAINING PROGRAM

## 2018-09-23 PROCEDURE — 93010 ELECTROCARDIOGRAM REPORT: CPT | Mod: ,,, | Performed by: INTERNAL MEDICINE

## 2018-09-23 PROCEDURE — 84484 ASSAY OF TROPONIN QUANT: CPT

## 2018-09-23 PROCEDURE — 85025 COMPLETE CBC W/AUTO DIFF WBC: CPT

## 2018-09-23 PROCEDURE — 36415 COLL VENOUS BLD VENIPUNCTURE: CPT

## 2018-09-23 PROCEDURE — 99223 1ST HOSP IP/OBS HIGH 75: CPT | Mod: ,,, | Performed by: HOSPITALIST

## 2018-09-23 PROCEDURE — 80053 COMPREHEN METABOLIC PANEL: CPT

## 2018-09-23 PROCEDURE — 84484 ASSAY OF TROPONIN QUANT: CPT | Mod: 91

## 2018-09-23 PROCEDURE — 96374 THER/PROPH/DIAG INJ IV PUSH: CPT

## 2018-09-23 PROCEDURE — 86850 RBC ANTIBODY SCREEN: CPT

## 2018-09-23 PROCEDURE — 83036 HEMOGLOBIN GLYCOSYLATED A1C: CPT

## 2018-09-23 PROCEDURE — 83690 ASSAY OF LIPASE: CPT

## 2018-09-23 PROCEDURE — 82962 GLUCOSE BLOOD TEST: CPT

## 2018-09-23 PROCEDURE — 25000003 PHARM REV CODE 250: Performed by: STUDENT IN AN ORGANIZED HEALTH CARE EDUCATION/TRAINING PROGRAM

## 2018-09-23 PROCEDURE — 83036 HEMOGLOBIN GLYCOSYLATED A1C: CPT | Mod: 91

## 2018-09-23 PROCEDURE — 99284 EMERGENCY DEPT VISIT MOD MDM: CPT | Mod: ,,, | Performed by: EMERGENCY MEDICINE

## 2018-09-23 PROCEDURE — 80061 LIPID PANEL: CPT

## 2018-09-23 RX ORDER — TIZANIDINE 2 MG/1
4 TABLET ORAL EVERY 8 HOURS PRN
Status: DISCONTINUED | OUTPATIENT
Start: 2018-09-23 | End: 2018-09-24 | Stop reason: HOSPADM

## 2018-09-23 RX ORDER — TIMOLOL MALEATE 5 MG/ML
1 SOLUTION/ DROPS OPHTHALMIC DAILY
Status: DISCONTINUED | OUTPATIENT
Start: 2018-09-23 | End: 2018-09-24 | Stop reason: HOSPADM

## 2018-09-23 RX ORDER — NITROGLYCERIN 0.4 MG/1
0.4 TABLET SUBLINGUAL
Status: COMPLETED | OUTPATIENT
Start: 2018-09-23 | End: 2018-09-23

## 2018-09-23 RX ORDER — LISINOPRIL 20 MG/1
20 TABLET ORAL 2 TIMES DAILY
Status: DISCONTINUED | OUTPATIENT
Start: 2018-09-23 | End: 2018-09-24 | Stop reason: HOSPADM

## 2018-09-23 RX ORDER — GLUCAGON 1 MG
1 KIT INJECTION
Status: DISCONTINUED | OUTPATIENT
Start: 2018-09-23 | End: 2018-09-24 | Stop reason: HOSPADM

## 2018-09-23 RX ORDER — METOPROLOL TARTRATE 25 MG/1
25 TABLET, FILM COATED ORAL 2 TIMES DAILY
Status: DISCONTINUED | OUTPATIENT
Start: 2018-09-23 | End: 2018-09-24 | Stop reason: HOSPADM

## 2018-09-23 RX ORDER — PANTOPRAZOLE SODIUM 40 MG/1
40 TABLET, DELAYED RELEASE ORAL DAILY
Status: DISCONTINUED | OUTPATIENT
Start: 2018-09-23 | End: 2018-09-24 | Stop reason: HOSPADM

## 2018-09-23 RX ORDER — INSULIN ASPART 100 [IU]/ML
1-10 INJECTION, SOLUTION INTRAVENOUS; SUBCUTANEOUS EVERY 6 HOURS PRN
Status: DISCONTINUED | OUTPATIENT
Start: 2018-09-23 | End: 2018-09-24 | Stop reason: HOSPADM

## 2018-09-23 RX ORDER — HEPARIN SODIUM,PORCINE/D5W 25000/250
12 INTRAVENOUS SOLUTION INTRAVENOUS CONTINUOUS
Status: DISCONTINUED | OUTPATIENT
Start: 2018-09-23 | End: 2018-09-24

## 2018-09-23 RX ORDER — ATORVASTATIN CALCIUM 20 MG/1
80 TABLET, FILM COATED ORAL DAILY
Status: DISCONTINUED | OUTPATIENT
Start: 2018-09-23 | End: 2018-09-24 | Stop reason: HOSPADM

## 2018-09-23 RX ORDER — SODIUM CHLORIDE 9 MG/ML
INJECTION, SOLUTION INTRAVENOUS CONTINUOUS
Status: ACTIVE | OUTPATIENT
Start: 2018-09-23 | End: 2018-09-23

## 2018-09-23 RX ORDER — ASPIRIN 325 MG
325 TABLET ORAL
Status: COMPLETED | OUTPATIENT
Start: 2018-09-23 | End: 2018-09-23

## 2018-09-23 RX ORDER — ONDANSETRON 8 MG/1
8 TABLET, ORALLY DISINTEGRATING ORAL EVERY 6 HOURS PRN
Status: DISCONTINUED | OUTPATIENT
Start: 2018-09-23 | End: 2018-09-24 | Stop reason: HOSPADM

## 2018-09-23 RX ORDER — NITROGLYCERIN 0.4 MG/1
0.4 TABLET SUBLINGUAL EVERY 5 MIN PRN
Status: DISCONTINUED | OUTPATIENT
Start: 2018-09-23 | End: 2018-09-24 | Stop reason: HOSPADM

## 2018-09-23 RX ORDER — NAPROXEN SODIUM 220 MG/1
81 TABLET, FILM COATED ORAL DAILY
Status: DISCONTINUED | OUTPATIENT
Start: 2018-09-24 | End: 2018-09-24 | Stop reason: HOSPADM

## 2018-09-23 RX ORDER — ONDANSETRON 2 MG/ML
4 INJECTION INTRAMUSCULAR; INTRAVENOUS
Status: COMPLETED | OUTPATIENT
Start: 2018-09-23 | End: 2018-09-23

## 2018-09-23 RX ADMIN — HEPARIN SODIUM AND DEXTROSE 12 UNITS/KG/HR: 10000; 5 INJECTION INTRAVENOUS at 06:09

## 2018-09-23 RX ADMIN — METOPROLOL TARTRATE 25 MG: 25 TABLET ORAL at 09:09

## 2018-09-23 RX ADMIN — NITROGLYCERIN 0.4 MG: 0.4 TABLET SUBLINGUAL at 03:09

## 2018-09-23 RX ADMIN — LISINOPRIL 20 MG: 20 TABLET ORAL at 09:09

## 2018-09-23 RX ADMIN — ATORVASTATIN CALCIUM 80 MG: 20 TABLET, FILM COATED ORAL at 12:09

## 2018-09-23 RX ADMIN — INSULIN ASPART 4 UNITS: 100 INJECTION, SOLUTION INTRAVENOUS; SUBCUTANEOUS at 05:09

## 2018-09-23 RX ADMIN — ONDANSETRON 4 MG: 2 INJECTION INTRAMUSCULAR; INTRAVENOUS at 03:09

## 2018-09-23 RX ADMIN — SODIUM CHLORIDE: 0.9 INJECTION, SOLUTION INTRAVENOUS at 08:09

## 2018-09-23 RX ADMIN — TICAGRELOR 180 MG: 90 TABLET ORAL at 06:09

## 2018-09-23 RX ADMIN — INSULIN HUMAN 10 UNITS: 100 INJECTION, SOLUTION PARENTERAL at 06:09

## 2018-09-23 RX ADMIN — PANTOPRAZOLE SODIUM 40 MG: 40 TABLET, DELAYED RELEASE ORAL at 09:09

## 2018-09-23 RX ADMIN — TIMOLOL MALEATE 1 DROP: 5 SOLUTION OPHTHALMIC at 09:09

## 2018-09-23 RX ADMIN — ASPIRIN 325 MG ORAL TABLET 325 MG: 325 PILL ORAL at 03:09

## 2018-09-23 RX ADMIN — SODIUM CHLORIDE 1000 ML: 0.9 INJECTION, SOLUTION INTRAVENOUS at 06:09

## 2018-09-23 RX ADMIN — INSULIN DETEMIR 20 UNITS: 100 INJECTION, SOLUTION SUBCUTANEOUS at 09:09

## 2018-09-23 RX ADMIN — TICAGRELOR 90 MG: 90 TABLET ORAL at 05:09

## 2018-09-23 NOTE — ED NOTES
LOC: The patient is awake, alert, and oriented to place, time, situation. Affect is appropriate.  Speech is appropriate and clear.     APPEARANCE: Patient resting comfortably in no acute distress.  Patient is clean and well groomed.    RESPIRATORY: Airway is open and patent. Respirations spontaneous, even, easy, and non-labored.  Patient has a normal effort and rate.  No accessory muscle use noted. Denies cough.     CARDIAC:  Normal rhythm and rate noted.  No peripheral edema noted. Complains of midsternum chest tightness.      ABDOMEN: Soft and non tender to palpation.  No distention noted.     NEUROLOGIC: Eyes open spontaneously.  Behavior appropriate to situation.  Follows commands; facial expression symmetrical.  Purposeful motor response noted; normal sensation in all extremities.

## 2018-09-23 NOTE — PLAN OF CARE
Problem: Patient Care Overview  Goal: Plan of Care Review  Outcome: Ongoing (interventions implemented as appropriate)  Pt remains free from falls and injury during shift. Pt on heparin gtt 12units/78.4kg@ 9.4ml/hr. NS gtt @ 100ml/hr. APTT 48- therapeutic. Troponin negative <0.006. Blood glucose monitored. Pt diet advanced to cardiac/diabetic diet. No complaints of CP. VSS, POC updated with pt, will continue to monitor.

## 2018-09-23 NOTE — H&P
"[facility] Hospital Medicine  History & Physical    Patient Name: Sunitha Pillai  MRN: [id number]  Admission Date: 9/23/2018  Attending Physician: Jemal Pruett MD   Primary Care Provider: Patty Louis MD    [facility] Medicine Team: [facility] MED C Jose Galeas DO     Patient information was obtained from patient and ER records.     Subjective:     Principal Problem:Unstable angina    Chief Complaint:  " I woke up from sleep with chest pain"  Chief Complaint   Patient presents with    Chest Pain     Pt presents to ED c/o midsternal chest pain that radiates to L breast and c/o vomiting that started around 0000        HPI:     Patient is a 63 year old female with PMH of uncontrolled IDDM2 ( A1C ~ 10 ), noncompliant to insulin, HTN who presents to the ED with chest pain. States she sharp chest pain/pressure woke her up from sleep around 11:30 pm last night. Reports pain level is 5/10, radiated to her left breast and left shoulder area. Notes associated nausea and 4 episodes of NBNB emesis afterward, but denies sob, palpitation, dizziness, diaphoresis. States she had similar chest pain back in 2013 during which she had negative PET stress test. Chest pain resolved in ED after NTG SL x 1 and remained chest pain free. Reports she is physically active, works at a [employer] audience[employer] and went to yesterday without any problem. She ambulates couple of blocks w/o angina or dyspnea. Denies fever, chills, recent URI, tobacco, alcohol or other illicit drug use. States she takes baby aspirin daily and denies dark stool or bleeding from any other sites.     Her initial troponin is negative and EKG showed NSR w/o acute ischemic pattern.     States she takes metformin, levemir 45 U qhs and novolog 20 units TIDWM for her DM. Reports she is non compliant with insulin and does not remember when is the last time she took insulin. Glucose elevated to ~ 360 in ED without DKA.                Past Medical History: " "  Diagnosis Date    Arthritis     Diabetes mellitus     Diabetes mellitus type I     GERD (gastroesophageal reflux disease)     Hypertension     Vertigo        Past Surgical History:   Procedure Laterality Date     SECTION      ESOPHAGOGASTRODUODENOSCOPY (EGD) N/A 2017    Performed by Anne Turner [facility] at [facility] (4TH FLR)    ESOPHAGOGASTRODUODENOSCOPY (EGD) N/A 3/7/2017    Performed by [facility] at [facility] (4TH FLR)       Review of patient's allergies indicates:  No Known Allergies    No current facility-administered medications on file prior to encounter.      Current Outpatient Medications on File Prior to Encounter   Medication Sig    aspirin 81 mg Tab Take 1 tablet by mouth Daily.    blood sugar diagnostic Strp For TRUEresult- strips and lancets - pt is insulin dependent and checks glucose four times daily    blood sugar diagnostic, drum (ACCU-CHEK COMPACT TEST) Strp USE ONE STRIP TO CHECK GLUCOSE 4 TIMES DAILY BEFORE EACH MEAL AND AT BEDTIME    HYDROcodone-acetaminophen (NORCO) 5-325 mg per tablet Take 1 tablet by mouth every 6 (six) hours as needed for Pain. No alcohol, no driving, no operating machinery, no working, no swimming, no extra Tylenol (acetaminophen) while taking this medication.    insulin aspart (NOVOLOG) 100 unit/mL injection Inject 20 Units before each meal plus sliding scale    insulin syringe-needle U-100 1 mL 31 x 5/16" Syrg Use three times daily with insulin    lancets (LANCETS,THIN) Misc Use with accucheck benji    lisinopril (PRINIVIL,ZESTRIL) 20 MG tablet TAKE ONE TABLET BY MOUTH TWICE DAILY    metformin (GLUCOPHAGE) 1000 MG tablet TAKE ONE TABLET BY MOUTH TWICE DAILY    NOVOLOG FLEXPEN U-100 INSULIN 100 unit/mL InPn pen INJECT 20 UNITS SUBCUTANEOUSLY WITH MEALS PLUS  SLIDING  SCALE    pen needle, diabetic (BD INSULIN PEN NEEDLE UF SHORT) 31 gauge x 5/16" Ndle USE WITH INSULIN 4 TIMES DAILY    pen needle, diabetic 32 gauge x 5/32" Ndle " Use with insulin 4 times daily    timolol maleate 0.5% (TIMOPTIC) 0.5 % Drop     tiZANidine (ZANAFLEX) 2 MG tablet One-two every eight hours as needed for pain    traMADol (ULTRAM) 50 mg tablet Take 1 tablet (50 mg total) by mouth every 24 hours as needed for Pain (use especially at bedtime as needed.).    insulin detemir (LEVEMIR FLEXTOUCH) 100 unit/mL (3 mL) SubQ InPn pen Inject 45 Units into the skin every evening.    omeprazole (PRILOSEC) 20 MG capsule Take 1 capsule (20 mg total) by mouth once daily.     Family History     Problem Relation (Age of Onset)    Aneurysm Father    Diabetes Mother, Sister, Brother    Heart disease Mother, Brother    Hyperlipidemia Mother    Hypertension Mother, Sister, Brother        Tobacco Use    Smoking status: Never Smoker    Smokeless tobacco: Never Used   Substance and Sexual Activity    Alcohol use: No    Drug use: No    Sexual activity: Yes     Partners: Male     Birth control/protection: Post-menopausal     Review of Systems   Constitutional: Negative for activity change, appetite change, chills, diaphoresis, fatigue and fever.   HENT: Negative for congestion, dental problem, drooling, ear discharge, ear pain, facial swelling, hearing loss, mouth sores, nosebleeds, postnasal drip, rhinorrhea, sinus pressure, sneezing, sore throat, tinnitus, trouble swallowing and voice change.    Eyes: Negative for photophobia, pain, discharge, redness, itching and visual disturbance.   Respiratory: Negative for apnea, cough, choking, chest tightness, shortness of breath, wheezing and stridor.    Cardiovascular: Positive for chest pain. Negative for palpitations and leg swelling.   Gastrointestinal: Positive for nausea and vomiting. Negative for abdominal distention, abdominal pain, anal bleeding, blood in stool, constipation, diarrhea and rectal pain.   Endocrine: Negative for cold intolerance, heat intolerance, polydipsia, polyphagia and polyuria.   Genitourinary: Negative for  decreased urine volume, difficulty urinating, dyspareunia, dysuria, enuresis, flank pain, frequency, genital sores, hematuria, menstrual problem, pelvic pain, urgency, vaginal bleeding, vaginal discharge and vaginal pain.   Musculoskeletal: Negative for arthralgias, back pain, gait problem, joint swelling, myalgias, neck pain and neck stiffness.   Skin: Negative for color change, pallor, rash and wound.   Allergic/Immunologic: Negative for environmental allergies, food allergies and immunocompromised state.   Neurological: Negative for dizziness, tremors, seizures, syncope, facial asymmetry, speech difficulty, weakness, light-headedness, numbness and headaches.   Hematological: Negative for adenopathy. Does not bruise/bleed easily.   Psychiatric/Behavioral: Negative for agitation, behavioral problems, confusion, decreased concentration, dysphoric mood, hallucinations, self-injury, sleep disturbance and suicidal ideas. The patient is not nervous/anxious and is not hyperactive.      Objective:     Vital Signs (Most Recent):  Temp: 98.4 °F (36.9 °C) (09/23/18 0156)  Pulse: 64 (09/23/18 0600)  Resp: 15 (09/23/18 0502)  BP: (!) 150/75 (09/23/18 0532)  SpO2: 99 % (09/23/18 0532) Vital Signs (24h Range):  Temp:  [98.4 °F (36.9 °C)] 98.4 °F (36.9 °C)  Pulse:  [64-91] 64  Resp:  [13-19] 15  SpO2:  [95 %-99 %] 99 %  BP: (122-162)/(60-90) 150/75     Weight: 78.4 kg (172 lb 13.5 oz)  Body mass index is 29.67 kg/m².    Physical Exam   Constitutional: She is oriented to person, place, and time. She appears well-developed and well-nourished. No distress.   HENT:   Head: Normocephalic and atraumatic.   Right Ear: External ear normal.   Left Ear: External ear normal.   Nose: Nose normal.   Mouth/Throat: No oropharyngeal exudate.   Dry oral mucosa   Eyes: Conjunctivae and EOM are normal. Pupils are equal, round, and reactive to light. No scleral icterus.   Neck: Neck supple. No JVD present. No tracheal deviation present. No  thyromegaly present.   Cardiovascular: Normal rate, regular rhythm and normal heart sounds. Exam reveals no gallop.   No murmur heard.  Pulmonary/Chest: Effort normal and breath sounds normal. No respiratory distress. She has no wheezes. She has no rales. She exhibits no tenderness.   Abdominal: Soft. Bowel sounds are normal. She exhibits no distension and no mass. There is no tenderness. There is no rebound and no guarding.   Genitourinary: No vaginal discharge found.   Musculoskeletal: She exhibits no edema or tenderness.   Lymphadenopathy:     She has no cervical adenopathy.   Neurological: She is alert and oriented to person, place, and time. She has normal reflexes. She displays normal reflexes. No cranial nerve deficit. She exhibits normal muscle tone. Coordination normal.   Skin: Skin is warm and dry. No rash noted. She is not diaphoretic. No erythema. No pallor.   Psychiatric: She has a normal mood and affect. Her behavior is normal. Judgment and thought content normal.         CRANIAL NERVES     CN III, IV, VI   Pupils are equal, round, and reactive to light.  Extraocular motions are normal.       Significant Labs:   Recent Results (from the past 24 hour(s))   CBC auto differential    Collection Time: 09/23/18  2:31 AM   Result Value Ref Range    WBC 6.00 3.90 - 12.70 K/uL    RBC 4.38 4.00 - 5.40 M/uL    Hemoglobin 13.4 12.0 - 16.0 g/dL    Hematocrit 38.7 37.0 - 48.5 %    MCV 88 82 - 98 fL    MCH 30.6 27.0 - 31.0 pg    MCHC 34.6 32.0 - 36.0 g/dL    RDW 12.3 11.5 - 14.5 %    Platelets 190 150 - 350 K/uL    MPV 10.1 9.2 - 12.9 fL    Immature Granulocytes 0.2 0.0 - 0.5 %    Gran # (ANC) 2.6 1.8 - 7.7 K/uL    Immature Grans (Abs) 0.01 0.00 - 0.04 K/uL    Lymph # 2.8 1.0 - 4.8 K/uL    Mono # 0.6 0.3 - 1.0 K/uL    Eos # 0.1 0.0 - 0.5 K/uL    Baso # 0.01 0.00 - 0.20 K/uL    nRBC 0 0 /100 WBC    Gran% 43.1 38.0 - 73.0 %    Lymph% 45.8 18.0 - 48.0 %    Mono% 9.2 4.0 - 15.0 %    Eosinophil% 1.5 0.0 - 8.0 %     Basophil% 0.2 0.0 - 1.9 %    Differential Method Automated    Comprehensive metabolic panel    Collection Time: 09/23/18  2:31 AM   Result Value Ref Range    Sodium 137 136 - 145 mmol/L    Potassium 4.9 3.5 - 5.1 mmol/L    Chloride 104 95 - 110 mmol/L    CO2 25 23 - 29 mmol/L    Glucose 367 (H) 70 - 110 mg/dL    BUN, Bld 12 8 - 23 mg/dL    Creatinine 1.2 0.5 - 1.4 mg/dL    Calcium 9.8 8.7 - 10.5 mg/dL    Total Protein 7.7 6.0 - 8.4 g/dL    Albumin 3.4 (L) 3.5 - 5.2 g/dL    Total Bilirubin 0.5 0.1 - 1.0 mg/dL    Alkaline Phosphatase 123 55 - 135 U/L    AST 19 10 - 40 U/L    ALT 14 10 - 44 U/L    Anion Gap 8 8 - 16 mmol/L    eGFR if African American 55.2 (A) >60 mL/min/1.73 m^2    eGFR if non  47.9 (A) >60 mL/min/1.73 m^2   Troponin I #1    Collection Time: 09/23/18  2:31 AM   Result Value Ref Range    Troponin I <0.006 0.000 - 0.026 ng/mL   B-Type natriuretic peptide (BNP)    Collection Time: 09/23/18  2:31 AM   Result Value Ref Range    BNP 11 0 - 99 pg/mL   Lipase    Collection Time: 09/23/18  2:31 AM   Result Value Ref Range    Lipase 54 4 - 60 U/L   Lipid panel    Collection Time: 09/23/18  2:31 AM   Result Value Ref Range    Cholesterol 152 120 - 199 mg/dL    Triglycerides 188 (H) 30 - 150 mg/dL    HDL 42 40 - 75 mg/dL    LDL Cholesterol 72.4 63.0 - 159.0 mg/dL    HDL/Chol Ratio 27.6 20.0 - 50.0 %    Total Cholesterol/HDL Ratio 3.6 2.0 - 5.0    Non-HDL Cholesterol 110 mg/dL   Hemoglobin A1c    Collection Time: 09/23/18  2:31 AM   Result Value Ref Range    Hemoglobin A1C 11.1 (H) 4.0 - 5.6 %    Estimated Avg Glucose 272 (H) 68 - 131 mg/dL   Protime-INR    Collection Time: 09/23/18  5:56 AM   Result Value Ref Range    Prothrombin Time 10.9 9.0 - 12.5 sec    INR 1.0 0.8 - 1.2   APTT    Collection Time: 09/23/18  5:56 AM   Result Value Ref Range    aPTT 21.7 21.0 - 32.0 sec   POCT glucose    Collection Time: 09/23/18  6:20 AM   Result Value Ref Range    POCT Glucose 293 (H) 70 - 110 mg/dL          Significant Imaging: I have reviewed and interpreted all pertinent imaging results/findings within the past 24 hours.     EKG : NSR @ 80 bpm, no acute ischemic changes     CXR : NAPD     Cardiac PET stress test ( 12/09/13 ) :    CONCLUSIONS: NORMAL MYOCARDIAL PERFUSION PET STRESS TEST  1. The perfusion scan is free of evidence for myocardial ischemia or injury.   2. Resting wall motion is physiologic. Stress wall motion is physiologic.   3. Visually estimated LV function is normal at rest and normal at stress.   4. The ventricular volumes are normal at rest and stress.   5. The extracardiac distribution of radioactivity is normal.   6. There was no previous study available to compare.    Assessment/Plan:     Active Diagnoses:    Diagnosis Date Noted POA    PRINCIPAL PROBLEM:  Unstable angina [I20.0] 09/23/2018 Yes    Acute coronary syndrome [I24.9] 09/23/2018 Yes    Type 2 diabetes mellitus with complication, with long-term current use of insulin [E11.8, Z79.4] 09/23/2018 Not Applicable    Obesity [E66.9] 11/05/2015 Yes    Chest pain [R07.9] 12/08/2013 Yes    GERD (gastroesophageal reflux disease) [K21.9] 07/23/2012 Yes    HTN (hypertension) [I10] 07/23/2012 Yes      Problems Resolved During this Admission:     # Acute coronary syndrome with unstable angina    - typical angina woke patient up from sleep with subsequent N/V   - pain resolved with SL NTG   - remained chest pain free and hemodynamically stable   - will start on DAPT and heparin infusion per ACS pathway   - metoprolol 25 mg bid and high intensity statin with lipitor 80 mg daily   - trend troponin   - telemetry   - 2D echo with doppler   - interventional cardiology consult for further evaluation for LHC   - NPO for now pending IC evaluation     # uncontrolled IDDM2 due to noncompliance   -  w/o DKA  - IVF and sq insulin   - check A1C  - adjust insulin dose based on CBG and PO intake   - counseled about the importance of being  compliance     # HTN   -  Continue home dose lisinopril     # GERD   - protonix daily     VTE Risk Mitigation (From admission, onward)        Ordered     heparin 25,000 units in dextrose 5% 250 mL (100 units/mL) infusion LOW INTENSITY nomogram - [facility]  Continuous      09/23/18 0532     heparin 25,000 units in dextrose 5% (100 units/ml) IV bolus from bag - ADDITIONAL PRN BOLUS - 60 units/kg (max bolus 4000 units)  As needed (PRN)      09/23/18 0532     heparin 25,000 units in dextrose 5% (100 units/ml) IV bolus from bag - ADDITIONAL PRN BOLUS - 30 units/kg (max bolus 4000 units)  As needed (PRN)      09/23/18 0532     IP VTE HIGH RISK PATIENT  Once      09/23/18 0532     Place sequential compression device  Until discontinued      09/23/18 [id number]            Jose Galeas DO  Department of Hospital Medicine   [facility]

## 2018-09-23 NOTE — ED NOTES
"Sunitha Pillai, a 63 y.o. female presents to the ED w/ complaint of chest pain. Pt states she was tossing and turning all night with chest pain. Pt states the pain is mid sternal that radiates toward the left breast. States to also have a couple episodes of emesis with the chest pain as well. Pt states to be SOB. Denies any diaphoresis, jaw or arm pain. Pt now only complains of "tight" feeling in her chest.     Triage note:  Chief Complaint   Patient presents with    Chest Pain     Pt presents to ED c/o midsternal chest pain that radiates to L breast and c/o vomiting that started around 0000     Review of patient's allergies indicates:  No Known Allergies  Past Medical History:   Diagnosis Date    Arthritis     Diabetes mellitus     Diabetes mellitus type I     GERD (gastroesophageal reflux disease)     Hypertension     Vertigo      "

## 2018-09-23 NOTE — NURSING TRANSFER
Nursing Transfer Note      9/23/2018     Transfer From: ED    Transfer via stretcher    Transfer with cardiac monitoring    Transported by transport    Medicines sent: none    Chart send with patient: Yes    Notified: spouse at bedside     Upon arrival to floor: cardiac monitor applied, patient oriented to room, call bell in reach and bed in lowest position, VSS.

## 2018-09-23 NOTE — ED PROVIDER NOTES
Encounter Date: 2018       History     Chief Complaint   Patient presents with    Chest Pain     Pt presents to ED c/o midsternal chest pain that radiates to L breast and c/o vomiting that started around 0000     64 y/o female with PMHx significant for IDDM, HTN who presents to the ED with chest pain. States that this started approximately 2 hours prior to arrival, located sub sternally, radiates towards the left arm and breast, worse when she takes a deep breath and worse on exertion, better when sitting forward, worse when lying flat. Has had stress test before that was negative back in 2013, never had catheterization or stents, takes daily ASA, never had MI before. States that she has also had several episodes of NBNB emesis since this started, including right before she entered the ED. States that she has not taken any medications for this since this started.          Review of patient's allergies indicates:  No Known Allergies  Past Medical History:   Diagnosis Date    Arthritis     Diabetes mellitus     Diabetes mellitus type I     GERD (gastroesophageal reflux disease)     Hypertension     Vertigo      Past Surgical History:   Procedure Laterality Date     SECTION      ESOPHAGOGASTRODUODENOSCOPY (EGD) N/A 2017    Performed by Anne Turner MD at [facility] ENDO (4TH FLR)    ESOPHAGOGASTRODUODENOSCOPY (EGD) N/A 3/7/2017    Performed by Anne Turner MD at [facility] ENDO (4TH FLR)     Family History   Problem Relation Age of Onset    Hypertension Mother     Hyperlipidemia Mother     Diabetes Mother     Heart disease Mother     Aneurysm Father     Diabetes Sister     Hypertension Sister     Heart disease Brother     Diabetes Brother     Hypertension Brother     Breast cancer Neg Hx     Colon cancer Neg Hx     Ovarian cancer Neg Hx      Social History     Tobacco Use    Smoking status: Never Smoker    Smokeless tobacco: Never Used   Substance Use Topics    Alcohol  use: No    Drug use: No     Review of Systems   Constitutional: Negative for chills and fever.   HENT: Negative for drooling and tinnitus.    Eyes: Negative for photophobia and visual disturbance.   Respiratory: Negative for chest tightness and shortness of breath.    Cardiovascular: Positive for chest pain. Negative for palpitations.   Gastrointestinal: Positive for nausea and vomiting. Negative for abdominal pain and diarrhea.   Genitourinary: Negative for flank pain and hematuria.   Musculoskeletal: Negative for neck pain and neck stiffness.   Skin: Negative for color change and pallor.   Neurological: Negative for light-headedness and headaches.   Psychiatric/Behavioral: Negative for agitation and confusion.       Physical Exam     Initial Vitals [09/23/18 0156]   BP Pulse Resp Temp SpO2   (!) 158/72 91 18 98.4 °F (36.9 °C) 98 %      MAP       --         Physical Exam    Constitutional: She appears well-developed and well-nourished.   Sitting forward in bed, non toxic appearing but does appear uncomfortable   HENT:   Head: Normocephalic and atraumatic.   Mouth/Throat: No oropharyngeal exudate.   Eyes: EOM are normal. Pupils are equal, round, and reactive to light. No scleral icterus.   Neck: Normal range of motion. No tracheal deviation present. No JVD present.   Cardiovascular: Normal rate and regular rhythm. Exam reveals no gallop and no friction rub.    No murmur heard.  Pulses 2+ in DP bilaterally   Pulmonary/Chest: Breath sounds normal. No respiratory distress. She has no wheezes. She has no rhonchi. She has no rales.   Abdominal: Soft. There is no tenderness. There is no rebound and no guarding.   Diffusely TTP over the abdomen, most prominent in the epigastrium   Musculoskeletal: Normal range of motion.   Neurological: She is alert and oriented to person, place, and time. No cranial nerve deficit.   Moving all extremities, no focal deficits   Skin: Skin is warm and dry.   Psychiatric: She has a normal  mood and affect. Thought content normal.         ED Course   Procedures  Labs Reviewed   COMPREHENSIVE METABOLIC PANEL - Abnormal; Notable for the following components:       Result Value    Glucose 367 (*)     Albumin 3.4 (*)     eGFR if  55.2 (*)     eGFR if non  47.9 (*)     All other components within normal limits   HEMOGLOBIN A1C - Abnormal; Notable for the following components:    Hemoglobin A1C 11.0 (*)     Estimated Avg Glucose 269 (*)     All other components within normal limits   LIPID PANEL - Abnormal; Notable for the following components:    Triglycerides 188 (*)     All other components within normal limits    Narrative:     lipid profile (order# [id number]) and hemoglobin a1c add on per Jemal Pruett MD @ 06:27 on 09/23/2018.   HEMOGLOBIN A1C - Abnormal; Notable for the following components:    Hemoglobin A1C 11.1 (*)     Estimated Avg Glucose 272 (*)     All other components within normal limits    Narrative:     lipid profile (order# [id number]) and hemoglobin a1c add on per Jemal Pruett MD @ 06:27 on 09/23/2018.   POCT GLUCOSE - Abnormal; Notable for the following components:    POCT Glucose 293 (*)     All other components within normal limits   POCT GLUCOSE - Abnormal; Notable for the following components:    POCT Glucose 245 (*)     All other components within normal limits   CBC W/ AUTO DIFFERENTIAL   TROPONIN I   B-TYPE NATRIURETIC PEPTIDE   LIPASE   PROTIME-INR   APTT   TROPONIN I   HEMOGLOBIN A1C   LIPID PANEL   TYPE & SCREEN     EKG Readings: (Independently Interpreted)   EKG: SR at 82, no st elevation or depression, possible LVH       Imaging Results          X-Ray Chest PA And Lateral (Final result)  Result time 09/23/18 03:22:41    Final result by Jamal Isbell MD (09/23/18 03:22:41)                 Impression:      No acute cardiopulmonary finding.      Electronically signed by: Jamal Isbell MD  Date:    09/23/2018  Time:    03:22     "         Narrative:    EXAMINATION:  XR CHEST PA AND LATERAL    CLINICAL HISTORY:  Provided history is "Chest Pain;  ".    TECHNIQUE:  Frontal and lateral views of the chest were performed.    COMPARISON:  05/17/2017.    FINDINGS:  Cardiac wires overlie the chest.  Cardiac silhouette is not enlarged.  No focal consolidation.  No sizable pleural effusion.  No pneumothorax.  No detrimental change.                                    Additional MDM:   Heart Score:    History:          Highly suspicious.  ECG:             Normal  Age:               45-65 years  Risk factors: >= 3 risk factors or history of atherosclerotic disease  Troponin:       Less than or equal to normal limit  Final Score: 5        APC / Resident Notes:   Assessment: 64 y/o female with chest pain    Ddx includes but is not limited to: ACS/MI, PTX, PNA, PE, pericarditis, costochondritis, pancreatitis, symptomatic anemia    Plan: This is an emergent evaluation of a 64 y/o female for chest pain. Patient is currently HD stable, afebrile. Patient does have a story that is fairly concerning for cardiac disease, giving patient 325 ASA and SL ntg, also giving zofran for nausea. Obtaining labs including trop x2, EKG and CXR given patient's story. Patient's heart score will likely place her in the category requiring at least admission for stress test as well as trending of troponins, but patient's disposition pending results of labs and imaging.      DERICK Meadows  9/23/2018 2:13 AM     [place] Update:  Patient has an EKG that does not show any obvious ST elevations, has normal QRS morphology, normal QTc. Patient noted to have improvement with ntg, has a heart score of 5, will have patient admitted for ACS r/o. Patient in agreement with plan for observation.    DERICK Meadows  9/23/2018 3:12 AM           Attending Attestation:   Physician Attestation Statement for Resident:  As the supervising MD   Physician Attestation Statement: I have personally seen " and examined this patient.   I agree with the above history. -:   As the supervising MD I agree with the above PE.    As the supervising MD I agree with the above treatment, course, plan, and disposition.   -: 62 yo female with hx of DM and HTN presenting with substernal CP with radiation to L chest associated with N/V. Pain relief with second nitro. Labs including initial troponin are negative. EKG shows sinus 82, lvh, no st elevation or depression. Will place in observation.   I have reviewed and agree with the residents interpretation of the following: lab data, x-rays and EKG.                       Clinical Impression:   Chest pain      Disposition:   Disposition: Placed in Observation                        Slava Kauffman MD  Resident  09/23/18 [id number]       Arabella Fajardo MD  09/25/18 [id number]

## 2018-09-23 NOTE — PLAN OF CARE
Patient seen and examined. Plan of care reviewed. Cardiology is consulted for assistance in care of this patient.

## 2018-09-23 NOTE — ED NOTES
Assumed patient care. Pt resting on stretcher in NAD, respirations even and unlabored. Stretcher locked and in lowest position, side rails x 2, call bell within reach. Cardiac monitor applied to patient. Pt family member at the bedside, pt states no needs at this time. Pt denies chest pain at this time. Pt updated on wait for report to be called to [address], will continue to monitor and update patient on plan of care.

## 2018-09-24 ENCOUNTER — RESEARCH ENCOUNTER (OUTPATIENT)
Dept: RESEARCH | Facility: HOSPITAL | Age: 63
End: 2018-09-24

## 2018-09-24 VITALS
WEIGHT: 177.69 LBS | HEART RATE: 62 BPM | TEMPERATURE: 98 F | DIASTOLIC BLOOD PRESSURE: 97 MMHG | RESPIRATION RATE: 17 BRPM | OXYGEN SATURATION: 99 % | SYSTOLIC BLOOD PRESSURE: 152 MMHG | HEIGHT: 64 IN | BODY MASS INDEX: 30.34 KG/M2

## 2018-09-24 LAB
ANION GAP SERPL CALC-SCNC: 7 MMOL/L
APTT BLDCRRT: 43.1 SEC
APTT BLDCRRT: 43.1 SEC
BASOPHILS # BLD AUTO: 0.01 K/UL
BASOPHILS NFR BLD: 0.2 %
BUN SERPL-MCNC: 16 MG/DL
CALCIUM SERPL-MCNC: 9.2 MG/DL
CHLORIDE SERPL-SCNC: 108 MMOL/L
CO2 SERPL-SCNC: 22 MMOL/L
CREAT SERPL-MCNC: 1 MG/DL
DIFFERENTIAL METHOD: ABNORMAL
EOSINOPHIL # BLD AUTO: 0.1 K/UL
EOSINOPHIL NFR BLD: 1.7 %
ERYTHROCYTE [DISTWIDTH] IN BLOOD BY AUTOMATED COUNT: 12.3 %
EST. GFR  (AFRICAN AMERICAN): >60 ML/MIN/1.73 M^2
EST. GFR  (NON AFRICAN AMERICAN): 59.7 ML/MIN/1.73 M^2
ESTIMATED PA SYSTOLIC PRESSURE: 37.34
GLUCOSE SERPL-MCNC: 280 MG/DL
HCT VFR BLD AUTO: 35.1 %
HGB BLD-MCNC: 12.1 G/DL
IMM GRANULOCYTES # BLD AUTO: 0.01 K/UL
IMM GRANULOCYTES NFR BLD AUTO: 0.2 %
LYMPHOCYTES # BLD AUTO: 2.5 K/UL
LYMPHOCYTES NFR BLD: 43.4 %
MAGNESIUM SERPL-MCNC: 1.5 MG/DL
MCH RBC QN AUTO: 30.4 PG
MCHC RBC AUTO-ENTMCNC: 34.5 G/DL
MCV RBC AUTO: 88 FL
MITRAL VALVE REGURGITATION: NORMAL
MONOCYTES # BLD AUTO: 0.5 K/UL
MONOCYTES NFR BLD: 8.5 %
NEUTROPHILS # BLD AUTO: 2.7 K/UL
NEUTROPHILS NFR BLD: 46 %
NRBC BLD-RTO: 0 /100 WBC
PLATELET # BLD AUTO: 153 K/UL
PMV BLD AUTO: 10.2 FL
POCT GLUCOSE: 199 MG/DL (ref 70–110)
POCT GLUCOSE: 285 MG/DL (ref 70–110)
POTASSIUM SERPL-SCNC: 4.2 MMOL/L
RBC # BLD AUTO: 3.98 M/UL
RETIRED EF AND QEF - SEE NOTES: 60 (ref 55–65)
SODIUM SERPL-SCNC: 137 MMOL/L
TRICUSPID VALVE REGURGITATION: NORMAL
WBC # BLD AUTO: 5.76 K/UL

## 2018-09-24 PROCEDURE — 25000003 PHARM REV CODE 250: Performed by: HOSPITALIST

## 2018-09-24 PROCEDURE — 99152 MOD SED SAME PHYS/QHP 5/>YRS: CPT

## 2018-09-24 PROCEDURE — 83735 ASSAY OF MAGNESIUM: CPT

## 2018-09-24 PROCEDURE — 99152 MOD SED SAME PHYS/QHP 5/>YRS: CPT | Mod: ,,, | Performed by: INTERNAL MEDICINE

## 2018-09-24 PROCEDURE — 80048 BASIC METABOLIC PNL TOTAL CA: CPT

## 2018-09-24 PROCEDURE — 63600175 PHARM REV CODE 636 W HCPCS: Performed by: HOSPITALIST

## 2018-09-24 PROCEDURE — 99217 PR OBSERVATION CARE DISCHARGE: CPT | Mod: ,,, | Performed by: HOSPITALIST

## 2018-09-24 PROCEDURE — 36415 COLL VENOUS BLD VENIPUNCTURE: CPT

## 2018-09-24 PROCEDURE — 25000003 PHARM REV CODE 250

## 2018-09-24 PROCEDURE — 25000003 PHARM REV CODE 250: Performed by: STUDENT IN AN ORGANIZED HEALTH CARE EDUCATION/TRAINING PROGRAM

## 2018-09-24 PROCEDURE — 63600175 PHARM REV CODE 636 W HCPCS

## 2018-09-24 PROCEDURE — 85730 THROMBOPLASTIN TIME PARTIAL: CPT

## 2018-09-24 PROCEDURE — 93458 L HRT ARTERY/VENTRICLE ANGIO: CPT | Mod: 26,,, | Performed by: INTERNAL MEDICINE

## 2018-09-24 PROCEDURE — G0378 HOSPITAL OBSERVATION PER HR: HCPCS

## 2018-09-24 PROCEDURE — 93005 ELECTROCARDIOGRAM TRACING: CPT | Mod: 59

## 2018-09-24 PROCEDURE — 93010 ELECTROCARDIOGRAM REPORT: CPT | Mod: 59,,, | Performed by: INTERNAL MEDICINE

## 2018-09-24 PROCEDURE — 85025 COMPLETE CBC W/AUTO DIFF WBC: CPT

## 2018-09-24 PROCEDURE — 93458 L HRT ARTERY/VENTRICLE ANGIO: CPT

## 2018-09-24 RX ORDER — SODIUM CHLORIDE 9 MG/ML
3 INJECTION, SOLUTION INTRAVENOUS CONTINUOUS
Status: ACTIVE | OUTPATIENT
Start: 2018-09-24 | End: 2018-09-24

## 2018-09-24 RX ORDER — LANOLIN ALCOHOL/MO/W.PET/CERES
800 CREAM (GRAM) TOPICAL 2 TIMES DAILY
Status: DISCONTINUED | OUTPATIENT
Start: 2018-09-24 | End: 2018-09-24 | Stop reason: HOSPADM

## 2018-09-24 RX ORDER — DIPHENHYDRAMINE HCL 50 MG
50 CAPSULE ORAL ONCE
Status: COMPLETED | OUTPATIENT
Start: 2018-09-24 | End: 2018-09-24

## 2018-09-24 RX ORDER — ATORVASTATIN CALCIUM 80 MG/1
80 TABLET, FILM COATED ORAL DAILY
Qty: 90 TABLET | Refills: 3 | Status: ON HOLD | OUTPATIENT
Start: 2018-09-25 | End: 2019-04-27 | Stop reason: HOSPADM

## 2018-09-24 RX ADMIN — PANTOPRAZOLE SODIUM 40 MG: 40 TABLET, DELAYED RELEASE ORAL at 09:09

## 2018-09-24 RX ADMIN — SODIUM CHLORIDE 3 ML/KG/HR: 0.9 INJECTION, SOLUTION INTRAVENOUS at 10:09

## 2018-09-24 RX ADMIN — TIMOLOL MALEATE 1 DROP: 5 SOLUTION OPHTHALMIC at 02:09

## 2018-09-24 RX ADMIN — HEPARIN SODIUM AND DEXTROSE 12 UNITS/KG/HR: 10000; 5 INJECTION INTRAVENOUS at 05:09

## 2018-09-24 RX ADMIN — LISINOPRIL 20 MG: 20 TABLET ORAL at 09:09

## 2018-09-24 RX ADMIN — METOPROLOL TARTRATE 25 MG: 25 TABLET ORAL at 09:09

## 2018-09-24 RX ADMIN — INSULIN ASPART 6 UNITS: 100 INJECTION, SOLUTION INTRAVENOUS; SUBCUTANEOUS at 09:09

## 2018-09-24 RX ADMIN — TICAGRELOR 90 MG: 90 TABLET ORAL at 09:09

## 2018-09-24 RX ADMIN — ATORVASTATIN CALCIUM 80 MG: 20 TABLET, FILM COATED ORAL at 09:09

## 2018-09-24 RX ADMIN — MAGNESIUM OXIDE TAB 400 MG (241.3 MG ELEMENTAL MG) 800 MG: 400 (241.3 MG) TAB at 02:09

## 2018-09-24 RX ADMIN — DIPHENHYDRAMINE HYDROCHLORIDE 50 MG: 50 CAPSULE ORAL at 09:09

## 2018-09-24 RX ADMIN — ASPIRIN 81 MG CHEWABLE TABLET 81 MG: 81 TABLET CHEWABLE at 09:09

## 2018-09-24 NOTE — ASSESSMENT & PLAN NOTE
Ms. Sunitha Pillai is a 63 year old female with PMH of T2DM on insulin (uncontrolled due to non-compliance), HTN, obesity who presented to the ED with c/o substernal and left sided chest pain that woke her up from sleep around 11:30 pm 9/22/18. EKG showed NSR with no ischemic ST/T wave changes. Tn negative X5. BNP WNL. CP resolved with SL NTG. Remains chest pain free.    Plan:  - BAKARI 1, LOTTIE 77  - Cardiac Catheterization with probable PCI  - Anti-platelet Therapy:  mg load x1, Ticagrelor 180 mg load x1 given - now on ASA 81 mg and Ticagrelor 90 mg BID  - agree with lopressor 25 mg BID, lisinopril 20 mg QD  - c/w heparin gtt - hold 1 hour prior to procedure  - Access: Right radial access  - Catheters: Wse/ Pigtail  - Serum Creatinine/CrCl: 1.2 (9/23/18)  - Allergies: No shellfish/Iodine allergy  - Pre-Hydration: 3 cc/kg/hr IV, continuous, for 1 hour, Pre-Procedure  - Pre-Op Med: Diphenhydramine (Benadryl) 50 mg, Oral, Once, Pre-Procedure   - Pt is a JOSESITO candidate and understands the importance of taking Ticagrelor 90 mg BID or Plavix 75 mg daily or Prasugrel 10 mg daily for at least one year, understands that in case of receiving a drug coated stent the failure to comply with dual anti-platelet therapy is likely to result in stent clotting, heart attack and death.   - The patient understands the risks and benefits and wishes to go ahead with the procedure.  - All patient's questions were answered.  - The risks, benefits & alternatives of the procedure were explained to the patient.   - The risks of coronary angiography include but are not limited to: Bleeding, infection, heart rhythm abnormalities, allergic reactions, kidney injury requiring dilaysis, limb loss, stroke and death.   - Should stenting be indicated, the patient has agreed to dual anti-platelet therapy for 1-consecutive year with a drug-eluting stent and a minimum of 1-month with the use of a bare metal stent.   - Additionally, pt is aware  that non-compliance is likely to result in stent clotting with heart attack, heart failure, and/or death.  - The risks of moderate sedation include hypotension, respiratory depression, arrhythmias, bronchospasm, & death.   - Informed consent was obtained & the patient is agreeable to proceed with the procedure.  - This patient was discussed with the attending interventional cardiologist who agrees with the above assessment & plan.

## 2018-09-24 NOTE — PLAN OF CARE
09/24/2018      Sunitha Pillai  [address] [id number]          Hospital Medicine Dept.  [facility] [address] [place] [address] [phone] after hours  [phone] fax Sunitha Pillai has been hospitalized at the [facility] since 9/23/2018.  Please excuse the patient from duties.  Patient may return on 9/26.  No restrictions.     Please contact me if you have any questions.                  __________________________  Jemal Pruett MD  09/24/2018

## 2018-09-24 NOTE — NURSING
Martinez ESTRADA notified that patient has orders for q6hr blood glucose checks. Informed MD that patient is not NPO and would benefit from changing accucheks to [facility]CHS. MD modified orders to [facility] blood glucose checks. Will cont to monitor patient.

## 2018-09-24 NOTE — NURSING
Martinez ESTRADA notified that patient has no morning labs ordered. MD placed orders for Mg, BMP and CBC to be drawn in the morning. Will cont to monitor patient.

## 2018-09-24 NOTE — NURSING
Patient dc per md orders. IV and tele removed. Spouse at beside, patient verbalized complete understanding of home care instructions and follow up care. Pamphlet on diabetes home care instructions given to patient. VSS, pt voiced no complaints. Transport not needed, pt escorted by spouse.

## 2018-09-24 NOTE — PLAN OF CARE
Problem: Patient Care Overview  Goal: Plan of Care Review  Outcome: Ongoing (interventions implemented as appropriate)  Patient remains free from falls and injuries through out shift. Patient AAO and VSS. Patient denies chest pain and SOB. Patient's  at bedside. Heparin gtt 12units infusing at 9.4mL/hr. Therapeutic APTT this morning of 43.1 Patient is scheduled for [facility] this afternoon. Plan of care reviewed with patient. Patient verbalizes understanding of plan.  Will continue to monitor.

## 2018-09-24 NOTE — DISCHARGE SUMMARY
Discharge Summary  Hospital Medicine    Attending Provider on Discharge: Jemal Pruett     Discharging Team: [facility]     Date of Admission:  9/23/2018         Date of Discharge:  9/24/2018      Diagnoses:     Principal Problem(s):   Unstable angina     Secondary Problems:  Active Hospital Problems    Diagnosis    *Unstable angina    Acute coronary syndrome    Type 2 diabetes mellitus with complication, with long-term current use of insulin    Obesity    Chest pain    GERD (gastroesophageal reflux disease)    HTN (hypertension)        Hospital Course:      Ms. Sunitha Pillai is a 63 year old female with PMH of T2DM on insulin (uncontrolled due to non-compliance), essential HTN, obesity who presented to the ED with c/o substernal and left sided chest pain that woke her up from sleep around 11:30 pm 9/22/18. The pain radiated her left shoulder. It was associated nausea and 4 episodes of vomiting. Deniedd any association with SOB, palpitation, dizziness, diaphoresis. Denied experiencing similar chest pain in the past with this severity. The pain was continuous but ultimately resolved in ED after receiving 1 SL NTG. She has remained chest pain free since then. Her VS in the ED were /82, P 82, 98% RA.      Work-up so far revealed:  EKG - NSR, rate 82, LVH, no ischemic ST/T wave changes  CXR - negative  Tn negative X5, HbA1C - 11%, BNP 11  ECHO 9/23/2018:  CONCLUSIONS     1 - Normal left ventricular systolic function (EF 60-65%).     2 - No wall motion abnormalities.     3 - Indeterminate LV diastolic function.     4 - Normal right ventricular systolic function .     5 - Mild tricuspid regurgitation.     6 - The estimated PA systolic pressure is 37 mmHg.      Prior ischemic evaluation:  CPET (12/9/13): Normal     At baseline she is physically active, works at a [employer] without any limitations. She ambulates multiple blocks w/o angina or dyspnea. Denies fever, chills, orthopnea, PND, recent URI,  tobacco, alcohol or other illicit drug use. Denies any iodine allergy, upcoming surgeries, bleeding issues.     Interval history:  Patient went for angio that revealed normal coronaries. Patient was deemed stable for discharge post-angio and instructed adherence to diabetic regimen and preventive medications. Please see below for further details:       Chest pain, typical  - LHC shows normal coronaries  - C/w preventive therapy with ASA/statin     Uncontrolled IDDM2 due to insulin noncompliance with hyperglycemia  - IVF and sq insulin   - check A1C  - adjust insulin dose based on CBG and PO intake   - counseled about the importance of being compliance      HTN, essential  -  Continue home dose lisinopril      GERD, without mention esophagitis  - protonix daily    Significant Diagnostic Studies:   Labs:   Recent Labs      09/24/18   0554   WBC  5.76   RBC  3.98*   HGB  12.1   HCT  35.1*   PLT  153   MCV  88   MCH  30.4   MCHC  34.5   GRAN  46.0  2.7   LYMPH  43.4  2.5   MONO  8.5  0.5   EOS  0.1      Recent Labs      09/24/18   0554   GLU  280*   NA  137   K  4.2   CL  108   CO2  22*   BUN  16   CREATININE  1.0   CALCIUM  9.2   ANIONGAP  7*   MG  1.5*     Radiology:   Results for orders placed during the hospital encounter of 09/28/14   X-Ray Chest 1 View    Narrative COMPARISON: Chest radiograph 12/8/13    FINDINGS: AP portable view of the chest. EKG stickers overlie the chest.  Resolution is somewhat limited by body habitus.  No detrimental change. The bilateral lungs are well expanded and clear.  No large pleural effusion or pneumothorax.  The heart and   mediastinal contours are within normal limits for age.  No acute osseous process seen.    Impression No detrimental change or radiographic acute intrathoracic process seen on this single view.      Electronically signed by: BANDAR FINN MD, MD  Date:     09/28/14  Time:    22:53       Results for orders placed during the hospital encounter of 09/23/18   X-Ray  "Chest PA And Lateral    Narrative EXAMINATION:  XR CHEST PA AND LATERAL    CLINICAL HISTORY:  Provided history is "Chest Pain;  ".    TECHNIQUE:  Frontal and lateral views of the chest were performed.    COMPARISON:  05/17/2017.    FINDINGS:  Cardiac wires overlie the chest.  Cardiac silhouette is not enlarged.  No focal consolidation.  No sizable pleural effusion.  No pneumothorax.  No detrimental change.      Impression No acute cardiopulmonary finding.      Electronically signed by: Jamal Isbell MD  Date:    09/23/2018  Time:    03:22      No results found for this or any previous visit.   Results for orders placed during the hospital encounter of 11/05/15   CT Head Without Contrast    Narrative CT brain without contrast.    Comparison: None     Technique: Multiple 5 mm axial images of the head were obtained without intravenous contrast.    Findings: No evidence for acute intracranial hemorrhage or sulcal effacement. The ventricles are normal in size and configuration without evidence for hydrocephalus.  There is no midline shift or mass effect. The visualized paranasal sinuses and mastoid air cells are clear.  Preliminary report given by virtual radiologic..    Impression  Unremarkable noncontrast CT head as detailed above specifically without evidence for acute intracranial hemorrhage. Further evaluation as warrented clinically.      Electronically signed by: ANOOP VAZQUEZ DO  Date:     11/05/15  Time:    07:48       Cardiac Studies: 2D ECHO, [facility]    Significant Treatments/Procedures:   [facility] 9/24    Consults while in Hospital:   Cardiology    Discharge Medications:      Current Discharge Medication List      START taking these medications    Details   atorvastatin (LIPITOR) 80 MG tablet Take 1 tablet (80 mg total) by mouth once daily.  Qty: 90 tablet, Refills: 3         CONTINUE these medications which have CHANGED    Details   insulin detemir U-100 (LEVEMIR FLEXTOUCH) 100 unit/mL (3 mL) SubQ InPn pen Inject 45 " "Units into the skin every evening.  Qty: 13.5 mL, Refills: 2         CONTINUE these medications which have NOT CHANGED    Details   aspirin 81 mg Tab Take 1 tablet by mouth Daily.      blood sugar diagnostic Strp For TRUEresult- strips and lancets - pt is insulin dependent and checks glucose four times daily  Qty: 200 each, Refills: 6      blood sugar diagnostic, drum (ACCU-CHEK COMPACT TEST) Strp USE ONE STRIP TO CHECK GLUCOSE 4 TIMES DAILY BEFORE EACH MEAL AND AT BEDTIME  Qty: 100 each, Refills: 6      insulin syringe-needle U-100 1 mL 31 x 5/16" Syrg Use three times daily with insulin  Qty: 100 each, Refills: 6      lancets (LANCETS,THIN) Misc Use with accucheck benji  Qty: 100 each, Refills: 11    Associated Diagnoses: Type II or unspecified type diabetes mellitus with unspecified complication, not stated as uncontrolled      lisinopril (PRINIVIL,ZESTRIL) 20 MG tablet TAKE ONE TABLET BY MOUTH TWICE DAILY  Qty: 60 tablet, Refills: 6    Comments: Please consider 90 day supplies to promote better adherence      NOVOLOG FLEXPEN U-100 INSULIN 100 unit/mL InPn pen INJECT 20 UNITS SUBCUTANEOUSLY WITH MEALS PLUS  SLIDING  SCALE  Qty: 1 Box, Refills: 6    Comments: Please consider 90 day supplies to promote better adherence      pen needle, diabetic (BD INSULIN PEN NEEDLE UF SHORT) 31 gauge x 5/16" Ndle USE WITH INSULIN 4 TIMES DAILY  Qty: 400 each, Refills: 0      pen needle, diabetic 32 gauge x 5/32" Ndle Use with insulin 4 times daily  Qty: 400 each, Refills: 6      timolol maleate 0.5% (TIMOPTIC) 0.5 % Drop       tiZANidine (ZANAFLEX) 2 MG tablet One-two every eight hours as needed for pain  Qty: 30 tablet, Refills: 0      traMADol (ULTRAM) 50 mg tablet Take 1 tablet (50 mg total) by mouth every 24 hours as needed for Pain (use especially at bedtime as needed.).  Qty: 14 tablet, Refills: 0    Associated Diagnoses: Acute pain of left wrist      omeprazole (PRILOSEC) 20 MG capsule Take 1 capsule (20 mg total) by mouth " once daily.  Qty: 30 capsule, Refills: 1         STOP taking these medications       HYDROcodone-acetaminophen (NORCO) 5-325 mg per tablet Comments:   Reason for Stopping:         insulin aspart (NOVOLOG) 100 unit/mL injection Comments:   Reason for Stopping:         metformin (GLUCOPHAGE) 1000 MG tablet Comments:   Reason for Stopping:             Discharge Diet:diabetic diet: 2000 calorie with Normal Fluid intake of 1500 - 2000 mL per day    Activity: activity as tolerated    Discharged Condition: Stable    Discharge Disposition: Home or Self Care    Follow-up:   Future Appointments   Date Time Provider Department Center   11/26/2018  9:00 AM Patty Louis MD [facility] NATALIE Levi       Studies/Results pending on discharge:   None    Jemal Pruett MD  [facility]

## 2018-09-24 NOTE — SUBJECTIVE & OBJECTIVE
"Past Medical History:   Diagnosis Date    Arthritis     Diabetes mellitus     Diabetes mellitus type I     GERD (gastroesophageal reflux disease)     Hypertension     Vertigo        Past Surgical History:   Procedure Laterality Date     SECTION      ESOPHAGOGASTRODUODENOSCOPY (EGD) N/A 2017    Performed by Anne Turner MD at [facility] (4TH FLR)    ESOPHAGOGASTRODUODENOSCOPY (EGD) N/A 3/7/2017    Performed by Anne Turner MD at [facility] (4TH FLR)       Review of patient's allergies indicates:  No Known Allergies    PTA Medications   Medication Sig    aspirin 81 mg Tab Take 1 tablet by mouth Daily.    blood sugar diagnostic Strp For TRUEresult- strips and lancets - pt is insulin dependent and checks glucose four times daily    blood sugar diagnostic, drum (ACCU-CHEK COMPACT TEST) Strp USE ONE STRIP TO CHECK GLUCOSE 4 TIMES DAILY BEFORE EACH MEAL AND AT BEDTIME    HYDROcodone-acetaminophen (NORCO) 5-325 mg per tablet Take 1 tablet by mouth every 6 (six) hours as needed for Pain. No alcohol, no driving, no operating machinery, no working, no swimming, no extra Tylenol (acetaminophen) while taking this medication.    insulin aspart (NOVOLOG) 100 unit/mL injection Inject 20 Units before each meal plus sliding scale    insulin syringe-needle U-100 1 mL 31 x 5/16" Syrg Use three times daily with insulin    lancets (LANCETS,THIN) Misc Use with accucheck benji    lisinopril (PRINIVIL,ZESTRIL) 20 MG tablet TAKE ONE TABLET BY MOUTH TWICE DAILY    metformin (GLUCOPHAGE) 1000 MG tablet TAKE ONE TABLET BY MOUTH TWICE DAILY    NOVOLOG FLEXPEN U-100 INSULIN 100 unit/mL InPn pen INJECT 20 UNITS SUBCUTANEOUSLY WITH MEALS PLUS  SLIDING  SCALE    pen needle, diabetic (BD INSULIN PEN NEEDLE UF SHORT) 31 gauge x 5/16" Ndle USE WITH INSULIN 4 TIMES DAILY    pen needle, diabetic 32 gauge x 5/32" Ndle Use with insulin 4 times daily    timolol maleate 0.5% (TIMOPTIC) 0.5 % Drop     tiZANidine " (ZANAFLEX) 2 MG tablet One-two every eight hours as needed for pain    traMADol (ULTRAM) 50 mg tablet Take 1 tablet (50 mg total) by mouth every 24 hours as needed for Pain (use especially at bedtime as needed.).    insulin detemir (LEVEMIR FLEXTOUCH) 100 unit/mL (3 mL) SubQ InPn pen Inject 45 Units into the skin every evening.    omeprazole (PRILOSEC) 20 MG capsule Take 1 capsule (20 mg total) by mouth once daily.     Family History     Problem Relation (Age of Onset)    Aneurysm Father    Diabetes Mother, Sister, Brother    Heart disease Mother, Brother    Hyperlipidemia Mother    Hypertension Mother, Sister, Brother        Tobacco Use    Smoking status: Never Smoker    Smokeless tobacco: Never Used   Substance and Sexual Activity    Alcohol use: No    Drug use: No    Sexual activity: Yes     Partners: Male     Birth control/protection: Post-menopausal     ROS   Constitution: Negative for fever, chills, weight loss or gain.   HENT: Negative for sore throat, rhinorrhea, or headache.  Eyes: Negative for blurred or double vision.   Cardiovascular: See above  Pulmonary: Negative for SOB   Gastrointestinal: Negative for abdominal pain, nausea, vomiting, or diarrhea.   : Negative for dysuria.   Neurological: Negative for focal weakness or sensory changes.    Objective:     Vital Signs (Most Recent):  Temp: 98 °F (36.7 °C) (09/24/18 0434)  Pulse: 77 (09/24/18 0701)  Resp: 18 (09/23/18 2138)  BP: (!) 117/59 (09/24/18 0434)  SpO2: 96 % (09/24/18 0434) Vital Signs (24h Range):  Temp:  [97.9 °F (36.6 °C)-98.3 °F (36.8 °C)] 98 °F (36.7 °C)  Pulse:  [57-80] 77  Resp:  [15-19] 18  SpO2:  [94 %-99 %] 96 %  BP: (117-168)/(59-85) 117/59     Weight: 80.6 kg (177 lb 11.1 oz)  Body mass index is 30.5 kg/m².    SpO2: 96 %  O2 Device (Oxygen Therapy): room air      Intake/Output Summary (Last 24 hours) at 9/24/2018 0717  Last data filed at 9/24/2018 0530  Gross per 24 hour   Intake 2931.43 ml   Output 1900 ml   Net 1031.43 ml        Lines/Drains/Airways     Peripheral Intravenous Line                 Peripheral IV - Single Lumen 09/23/18 0254 Right Antecubital 1 day         Peripheral IV - Single Lumen 09/23/18 0652 Right Hand 1 day                Physical Exam  Constitutional: NAD, conversant  HEENT: Sclera anicteric, PERRLA, EOMI  Neck: No JVD, no carotid bruits  CV: RRR, no murmur, normal S1/S2, No Pericardial rub  Pulm: CTAB with no wheezes, rales, or rhonchi  GI: Abdomen soft, NTND, +BS  Extremities: No LE edema, warm and well perfused, No cyanosis, No clubbing. Left wrist swelling.  Skin: No ecchymosis, erythema, or ulcers  Psych: AOx3, appropriate affect  Neuro: CNII-XII intact, no focal deficits   LEFT RIGHT   RADIAL 2+ 2+   BRACHIAL     FEMORAL 2+ 2+   DP 2+ 2+   TP 2+ 2+   Mushtaq's Test Normal Normal       Significant Labs:   CMP   Recent Labs   Lab  09/23/18   0231  09/24/18   0554   NA  137  137   K  4.9  4.2   CL  104  108   CO2  25  22*   GLU  367*  280*   BUN  12  16   CREATININE  1.2  1.0   CALCIUM  9.8  9.2   PROT  7.7   --    ALBUMIN  3.4*   --    BILITOT  0.5   --    ALKPHOS  123   --    AST  19   --    ALT  14   --    ANIONGAP  8  7*   ESTGFRAFRICA  55.2*  >60.0   EGFRNONAA  47.9*  59.7*   , CBC   Recent Labs   Lab  09/23/18   0231  09/24/18   0554   WBC  6.00  5.76   HGB  13.4  12.1   HCT  38.7  35.1*   PLT  190  153   , Lipid Panel   Recent Labs   Lab  09/23/18   0231   CHOL  152   HDL  42   LDLCALC  72.4   TRIG  188*   CHOLHDL  27.6   , Troponin   Recent Labs   Lab  09/23/18   0637  09/23/18   0914  09/23/18   1109   TROPONINI  0.007  <0.006  <0.006  <0.006    and All pertinent lab results from the last 24 hours have been reviewed.    Significant Imaging: Echocardiogram: 2D echo with color flow doppler: No results found for this or any previous visit.

## 2018-09-24 NOTE — HPI
Ms. Sunitha Pillai is a 63 year old female with PMH of T2DM on insulin (uncontrolled due to non-compliance), HTN, obesity who presented to the ED with c/o substernal and left sided chest pain that woke her up from sleep around 11:30 pm 9/22/18. The pain radiated her left shoulder. It was associated nausea and 4 episodes of vomiting. Deniedd any association with SOB, palpitation, dizziness, diaphoresis. Denied experiencing similar chest pain in the past with this severity. The pain was continuous but ultimately resolved in ED after receiving 1 SL NTG. She has remained chest pain free since then. Her VS in the ED were /82, P 82, 98% RA.     Work-up so far revealed:  EKG - NSR, rate 82, LVH, no ischemic ST/T wave changes  CXR - negative  Tn negative X5, HbA1C - 11%, BNP 11  TTE pending read; study complete    Prior ischemic evaluation:  CPET (12/9/13): Normal    At baseline she is physically active, works at a [employer] without any limitations. She ambulates multiple blocks w/o angina or dyspnea. Denies fever, chills, orthopnea, PND, recent URI, tobacco, alcohol or other illicit drug use. Denies any iodine allergy, upcoming surgeries, bleeding issues.

## 2018-09-24 NOTE — PLAN OF CARE
09/24/18 1109   Discharge Assessment   Assessment Type Discharge Planning Assessment   Confirmed/corrected address and phone number on facesheet? Yes   Assessment information obtained from? Patient;Medical Record   Expected Length of Stay (days) 3   Communicated expected length of stay with patient/caregiver yes   Prior to hospitilization cognitive status: Alert/Oriented   Prior to hospitalization functional status: Independent   Current cognitive status: Alert/Oriented   Current Functional Status: Independent   Lives With spouse   Able to Return to Prior Arrangements yes   Is patient able to care for self after discharge? Yes   Patient's perception of discharge disposition home or selfcare   Readmission Within The Last 30 Days no previous admission in last 30 days   Patient currently being followed by outpatient case management? No   Patient currently receives any other outside agency services? No   Equipment Currently Used at Home none   Do you have any problems affording any of your prescribed medications? TBD   Is the patient taking medications as prescribed? yes   Does the patient have transportation home? Yes   Transportation Available car   Does the patient receive services at the [facility]? No   Discharge Plan A Home with family   Patient/Family In Agreement With Plan yes   Placed in [facility] for [facility]. Lives with  and is independent in her ADLs. Plan is to DC home. No DC needs identified.

## 2018-09-24 NOTE — PROGRESS NOTES
Progress Note  Hospital Medicine    Provider team: [facility]  Admit Date: 9/23/2018  Encounter Date: 09/24/2018     SUBJECTIVE:     Follow-up Visit for: Unstable angina    HPI (See H&P for complete P,F,SHx):   Ms. Sunitha Pillai is a 63 year old female with PMH of T2DM on insulin (uncontrolled due to non-compliance), essential HTN, obesity who presented to the ED with c/o substernal and left sided chest pain that woke her up from sleep around 11:30 pm 9/22/18. The pain radiated her left shoulder. It was associated nausea and 4 episodes of vomiting. Deniedd any association with SOB, palpitation, dizziness, diaphoresis. Denied experiencing similar chest pain in the past with this severity. The pain was continuous but ultimately resolved in ED after receiving 1 SL NTG. She has remained chest pain free since then. Her VS in the ED were /82, P 82, 98% RA.      Work-up so far revealed:  EKG - NSR, rate 82, LVH, no ischemic ST/T wave changes  CXR - negative  Tn negative X5, HbA1C - 11%, BNP 11  ECHO 9/23/2018:  CONCLUSIONS     1 - Normal left ventricular systolic function (EF 60-65%).     2 - No wall motion abnormalities.     3 - Indeterminate LV diastolic function.     4 - Normal right ventricular systolic function .     5 - Mild tricuspid regurgitation.     6 - The estimated PA systolic pressure is 37 mmHg.      Prior ischemic evaluation:  CPET (12/9/13): Normal     At baseline she is physically active, works at a [employer] without any limitations. She ambulates multiple blocks w/o angina or dyspnea. Denies fever, chills, orthopnea, PND, recent URI, tobacco, alcohol or other illicit drug use. Denies any iodine allergy, upcoming surgeries, bleeding issues.    Interval history:  Patient planned for angiogram today. She offers no complaints.    Review of Systems:  Review of Systems   Constitutional: Negative for chills and fever.   HENT: Negative for congestion and sore throat.    Eyes: Negative for  "photophobia, pain and discharge.   Respiratory: Negative for cough, hemoptysis, sputum production and shortness of breath.    Cardiovascular: Negative for chest pain, palpitations and leg swelling.   Gastrointestinal: Negative for abdominal pain, diarrhea, nausea and vomiting.   Genitourinary: Negative for dysuria and urgency.   Musculoskeletal: Negative for myalgias and neck pain.   Skin: Negative for itching and rash.   Neurological: Negative for sensory change, focal weakness and headaches.   Endo/Heme/Allergies: Negative for polydipsia. Does not bruise/bleed easily.   Psychiatric/Behavioral: Negative for depression and suicidal ideas.     OBJECTIVE:       Intake/Output Summary (Last 24 hours) at 9/24/2018 0840  Last data filed at 9/24/2018 0530  Gross per 24 hour   Intake 1931.43 ml   Output 1900 ml   Net 31.43 ml     Vital Signs Range (Last 24H):  Temp:  [97.1 °F (36.2 °C)-98.3 °F (36.8 °C)]   Pulse:  [57-80]   Resp:  [16-18]   BP: (117-168)/(59-85)   SpO2:  [94 %-99 %]   Body mass index is 30.5 kg/m².    Objective:  General Appearance:  Comfortable, well-appearing, in no acute distress and not in pain.    Vital signs: (most recent): Blood pressure (!) 140/77, pulse 72, temperature 97.1 °F (36.2 °C), temperature source Oral, resp. rate 18, height 5' 4" (1.626 m), weight 80.6 kg (177 lb 11.1 oz), SpO2 97 %, not currently breastfeeding.  No fever.    Output: Producing urine and producing stool.    HEENT: Normal HEENT exam.    Lungs:  Normal effort.  Breath sounds clear to auscultation.  She is not in respiratory distress.  No rales or wheezes.    Heart: Normal rate.  Regular rhythm.  S1 normal and S2 normal.  No murmur.   Chest: Symmetric chest wall expansion.   Abdomen: Abdomen is soft.  Bowel sounds are normal.   There is no abdominal tenderness.     Extremities: Normal range of motion.  There is no deformity, effusion, dependent edema or local swelling.    Pulses: Distal pulses are intact.    Neurological: " Patient is alert and oriented to person, place and time.  Normal strength.    Pupils:  Pupils are equal, round, and reactive to light.    Skin:  Warm and dry.      Medications:  Medication list was reviewed in EPIC and changes noted under Assessment/Plan and MAR.    Laboratory:  Recent Labs      09/24/18   0554   WBC  5.76   RBC  3.98*   HGB  12.1   HCT  35.1*   PLT  153   MCV  88   MCH  30.4   MCHC  34.5   GRAN  46.0  2.7   LYMPH  43.4  2.5   MONO  8.5  0.5   EOS  0.1      Recent Labs      09/24/18   0554   GLU  280*   NA  137   K  4.2   CL  108   CO2  22*   BUN  16   CREATININE  1.0   CALCIUM  9.2   ANIONGAP  7*   MG  1.5*       ASSESSMENT/PLAN:     Active Hospital Problems    Diagnosis  POA    *Unstable angina [I20.0]  Yes    Acute coronary syndrome [I24.9]  Yes    Type 2 diabetes mellitus with complication, with long-term current use of insulin [E11.8, Z79.4]  Not Applicable    Obesity [E66.9]  Yes    Chest pain [R07.9]  Yes    GERD (gastroesophageal reflux disease) [K21.9]  Yes    HTN (hypertension) [I10]  Yes      Resolved Hospital Problems   No resolved problems to display.      Acute coronary syndrome with unstable angina   - typical angina woke patient up from sleep with subsequent N/V   - pain resolved with SL NTG   - remained chest pain free and hemodynamically stable   - c/w DAPT and heparin infusion per ACS pathway   - metoprolol 25 mg bid and high intensity statin with lipitor 80 mg daily   - trend troponin   - telemetry   - 2D echo with doppler complete  - [facility] today     Uncontrolled IDDM2 due to insulin noncompliance with hyperglycemia  - IVF and sq insulin   - check A1C  - adjust insulin dose based on CBG and PO intake   - counseled about the importance of being compliance      HTN, essential  -  Continue home dose lisinopril      GERD, without mention esophagitis  - protonix daily     Anticipated discharge date and disposition:   Pending further cardiology evaluation.  Jemal Pruett,  [facility]

## 2018-09-24 NOTE — PROCEDURES
POST CATH NOTE    Procedure:  [facility]  Referring MD:  Dr Pruett  Indication:  UA  Access: R Radial Access    Findings:  Normal coronaries  LVEDP:  18 mmHg    Intervention:  No Intevention    Patient tolerated the procedure well, no complications    Post Cath Exam:  Vitals:    09/24/18 0747   BP: (!) 140/77   Pulse: 72   Resp: 18   Temp: 97.1 °F (36.2 °C)     No unusual pain, hematoma or thrill at vascular access site.  Distal pulse present without signs of ischemia.    Post-procedure orders as per EPIC    Recommendation:  -Primary prevention of CAD.   -Post cath care.     Pierre Gramajo MD  Cardiology Fellow

## 2018-09-24 NOTE — PLAN OF CARE
Problem: Patient Care Overview  Goal: Plan of Care Review  Outcome: Ongoing (interventions implemented as appropriate)  Patient remains free from falls and injuries through out shift. Patient AAO and VSS. Patient denies chest pain and SOB. Patient's  is at bedside. Plan of care reviewed with patient. Patient verbalizes understanding of plan.  Will continue to monitor.

## 2018-09-24 NOTE — PLAN OF CARE
Problem: Patient Care Overview  Goal: Plan of Care Review  Outcome: Ongoing (interventions implemented as appropriate)  Pt remains free from falls and injury during shift. Heparin gtt d/c for LHC. R radial with armband. No bleeding or hematoma.  NS gtt @ 242ml/hr. Blood glucose monitored. Pt NPO before procedure, diet advanced after procedure. .Pt pending d/c today. No complaints of CP. VSS, POC updated with pt, will continue to monitor.

## 2018-09-24 NOTE — CONSULTS
[facility]  Interventional Cardiology  Consult Note    Patient Name: Sunitha Pillai  MRN: [id number]  Admission Date: 9/23/2018  Hospital Length of Stay: 0 days  Code Status: Full Code   Attending Provider: Jemal Pruett MD  Consulting Provider: Duke Abernathy MD  Primary Care Physician: Patty Louis MD  Principal Problem:Unstable angina    Patient information was obtained from patient and past medical records.     Inpatient consult to Interventional Cardiology  Consult performed by: Duke Abernathy MD  Consult ordered by: Jose Galeas DO  Reason for consult: Unstable angina        Subjective:     Chief Complaint:  Chest pain     HPI:  Ms. Sunitha Pillai is a 63 year old female with PMH of T2DM on insulin (uncontrolled due to non-compliance), HTN, obesity who presented to the ED with c/o substernal and left sided chest pain that woke her up from sleep around 11:30 pm 9/22/18. The pain radiated her left shoulder. It was associated nausea and 4 episodes of vomiting. Deniedd any association with SOB, palpitation, dizziness, diaphoresis. Denied experiencing similar chest pain in the past with this severity. The pain was continuous but ultimately resolved in ED after receiving 1 SL NTG. She has remained chest pain free since then. Her VS in the ED were /82, P 82, 98% RA.     Work-up so far revealed:  EKG - NSR, rate 82, LVH, no ischemic ST/T wave changes  CXR - negative  Tn negative X5, HbA1C - 11%, BNP 11  TTE pending read; study complete    Prior ischemic evaluation:  CPET (12/9/13): Normal    At baseline she is physically active, works at a [employer] without any limitations. She ambulates multiple blocks w/o angina or dyspnea. Denies fever, chills, orthopnea, PND, recent URI, tobacco, alcohol or other illicit drug use. Denies any iodine allergy, upcoming surgeries, bleeding issues.        Past Medical History:   Diagnosis Date    Arthritis     Diabetes mellitus      "Diabetes mellitus type I     GERD (gastroesophageal reflux disease)     Hypertension     Vertigo        Past Surgical History:   Procedure Laterality Date     SECTION      ESOPHAGOGASTRODUODENOSCOPY (EGD) N/A 2017    [facility] (4[facility])    ESOPHAGOGASTRODUODENOSCOPY (EGD) N/A 3/7/2017    [facility] (4TH FLR)       Review of patient's allergies indicates:  No Known Allergies    PTA Medications   Medication Sig    aspirin 81 mg Tab Take 1 tablet by mouth Daily.    blood sugar diagnostic Strp For TRUEresult- strips and lancets - pt is insulin dependent and checks glucose four times daily    blood sugar diagnostic, drum (ACCU-CHEK COMPACT TEST) Strp USE ONE STRIP TO CHECK GLUCOSE 4 TIMES DAILY BEFORE EACH MEAL AND AT BEDTIME    HYDROcodone-acetaminophen (NORCO) 5-325 mg per tablet Take 1 tablet by mouth every 6 (six) hours as needed for Pain. No alcohol, no driving, no operating machinery, no working, no swimming, no extra Tylenol (acetaminophen) while taking this medication.    insulin aspart (NOVOLOG) 100 unit/mL injection Inject 20 Units before each meal plus sliding scale    insulin syringe-needle U-100 1 mL 31 x 5/16" Syrg Use three times daily with insulin    lancets (LANCETS,THIN) Misc Use with accucheck benji    lisinopril (PRINIVIL,ZESTRIL) 20 MG tablet TAKE ONE TABLET BY MOUTH TWICE DAILY    metformin (GLUCOPHAGE) 1000 MG tablet TAKE ONE TABLET BY MOUTH TWICE DAILY    NOVOLOG FLEXPEN U-100 INSULIN 100 unit/mL InPn pen INJECT 20 UNITS SUBCUTANEOUSLY WITH MEALS PLUS  SLIDING  SCALE    pen needle, diabetic (BD INSULIN PEN NEEDLE UF SHORT) 31 gauge x 5/16" Ndle USE WITH INSULIN 4 TIMES DAILY    pen needle, diabetic 32 gauge x 5/32" Ndle Use with insulin 4 times daily    timolol maleate 0.5% (TIMOPTIC) 0.5 % Drop     tiZANidine (ZANAFLEX) 2 MG tablet One-two every eight hours as needed for pain    traMADol " (ULTRAM) 50 mg tablet Take 1 tablet (50 mg total) by mouth every 24 hours as needed for Pain (use especially at bedtime as needed.).    insulin detemir (LEVEMIR FLEXTOUCH) 100 unit/mL (3 mL) SubQ InPn pen Inject 45 Units into the skin every evening.    omeprazole (PRILOSEC) 20 MG capsule Take 1 capsule (20 mg total) by mouth once daily.     Family History     Problem Relation (Age of Onset)    Aneurysm Father    Diabetes Mother, Sister, Brother    Heart disease Mother, Brother    Hyperlipidemia Mother    Hypertension Mother, Sister, Brother        Tobacco Use    Smoking status: Never Smoker    Smokeless tobacco: Never Used   Substance and Sexual Activity    Alcohol use: No    Drug use: No    Sexual activity: Yes     Partners: Male     Birth control/protection: Post-menopausal     ROS   Constitution: Negative for fever, chills, weight loss or gain.   HENT: Negative for sore throat, rhinorrhea, or headache.  Eyes: Negative for blurred or double vision.   Cardiovascular: See above  Pulmonary: Negative for SOB   Gastrointestinal: Negative for abdominal pain, nausea, vomiting, or diarrhea.   : Negative for dysuria.   Neurological: Negative for focal weakness or sensory changes.    Objective:     Vital Signs (Most Recent):  Temp: 98 °F (36.7 °C) (09/24/18 0434)  Pulse: 77 (09/24/18 0701)  Resp: 18 (09/23/18 2138)  BP: (!) 117/59 (09/24/18 0434)  SpO2: 96 % (09/24/18 0434) Vital Signs (24h Range):  Temp:  [97.9 °F (36.6 °C)-98.3 °F (36.8 °C)] 98 °F (36.7 °C)  Pulse:  [57-80] 77  Resp:  [15-19] 18  SpO2:  [94 %-99 %] 96 %  BP: (117-168)/(59-85) 117/59     Weight: 80.6 kg (177 lb 11.1 oz)  Body mass index is 30.5 kg/m².    SpO2: 96 %  O2 Device (Oxygen Therapy): room air      Intake/Output Summary (Last 24 hours) at 9/24/2018 0717  Last data filed at 9/24/2018 0530  Gross per 24 hour   Intake 2931.43 ml   Output 1900 ml   Net 1031.43 ml       Lines/Drains/Airways     Peripheral Intravenous Line                  Peripheral IV - Single Lumen 09/23/18 0254 Right Antecubital 1 day         Peripheral IV - Single Lumen 09/23/18 0652 Right Hand 1 day                Physical Exam  Constitutional: NAD, conversant  HEENT: Sclera anicteric, PERRLA, EOMI  Neck: No JVD, no carotid bruits  CV: RRR, no murmur, normal S1/S2, No Pericardial rub  Pulm: CTAB with no wheezes, rales, or rhonchi  GI: Abdomen soft, NTND, +BS  Extremities: No LE edema, warm and well perfused, No cyanosis, No clubbing. Left wrist swelling.  Skin: No ecchymosis, erythema, or ulcers  Psych: AOx3, appropriate affect  Neuro: CNII-XII intact, no focal deficits   LEFT RIGHT   RADIAL 2+ 2+   BRACHIAL     FEMORAL 2+ 2+   DP 2+ 2+   TP 2+ 2+   Mushtaq's Test Normal Normal       Significant Labs:   CMP   Recent Labs   Lab  09/23/18   0231  09/24/18   0554   NA  137  137   K  4.9  4.2   CL  104  108   CO2  25  22*   GLU  367*  280*   BUN  12  16   CREATININE  1.2  1.0   CALCIUM  9.8  9.2   PROT  7.7   --    ALBUMIN  3.4*   --    BILITOT  0.5   --    ALKPHOS  123   --    AST  19   --    ALT  14   --    ANIONGAP  8  7*   ESTGFRAFRICA  55.2*  >60.0   EGFRNONAA  47.9*  59.7*   , CBC   Recent Labs   Lab  09/23/18   0231  09/24/18   0554   WBC  6.00  5.76   HGB  13.4  12.1   HCT  38.7  35.1*   PLT  190  153   , Lipid Panel   Recent Labs   Lab  09/23/18   0231   CHOL  152   HDL  42   LDLCALC  72.4   TRIG  188*   CHOLHDL  27.6   , Troponin   Recent Labs   Lab  09/23/18   0637  09/23/18   0914  09/23/18   1109   TROPONINI  0.007  <0.006  <0.006  <0.006    and All pertinent lab results from the last 24 hours have been reviewed.    Significant Imaging: Echocardiogram: 2D echo with color flow doppler: No results found for this or any previous visit.    Assessment and Plan:     * Unstable angina    Ms. Sunitha Pillai is a 63 year old female with PMH of T2DM on insulin (uncontrolled due to non-compliance), HTN, obesity who presented to the ED with c/o substernal and left sided chest pain  that woke her up from sleep around 11:30 pm 9/22/18. EKG showed NSR with no ischemic ST/T wave changes. Tn negative X5. BNP WNL. CP resolved with SL NTG. Remains chest pain free.    Plan:  - BAKARI 1, LOTTIE 77  - Cardiac Catheterization with probable PCI  - Anti-platelet Therapy:  mg load x1, Ticagrelor 180 mg load x1 given - now on ASA 81 mg and Ticagrelor 90 mg BID  - agree with lopressor 25 mg BID, lisinopril 20 mg QD  - c/w heparin gtt - hold 1 hour prior to procedure  - Access: Right radial access  - Catheters: Wes/ Pigtail  - Serum Creatinine/CrCl: 1.2 (9/23/18)  - Allergies: No shellfish/Iodine allergy  - Pre-Hydration: 3 cc/kg/hr IV, continuous, for 1 hour, Pre-Procedure  - Pre-Op Med: Diphenhydramine (Benadryl) 50 mg, Oral, Once, Pre-Procedure   - Pt is a JOSESITO candidate and understands the importance of taking Ticagrelor 90 mg BID or Plavix 75 mg daily or Prasugrel 10 mg daily for at least one year, understands that in case of receiving a drug coated stent the failure to comply with dual anti-platelet therapy is likely to result in stent clotting, heart attack and death.   - The patient understands the risks and benefits and wishes to go ahead with the procedure.  - All patient's questions were answered.  - The risks, benefits & alternatives of the procedure were explained to the patient.   - The risks of coronary angiography include but are not limited to: Bleeding, infection, heart rhythm abnormalities, allergic reactions, kidney injury requiring dilaysis, limb loss, stroke and death.   - Should stenting be indicated, the patient has agreed to dual anti-platelet therapy for 1-consecutive year with a drug-eluting stent and a minimum of 1-month with the use of a bare metal stent.   - Additionally, pt is aware that non-compliance is likely to result in stent clotting with heart attack, heart failure, and/or death.  - The risks of moderate sedation include hypotension, respiratory depression,  arrhythmias, bronchospasm, & death.   - Informed consent was obtained & the patient is agreeable to proceed with the procedure.  - This patient was discussed with the attending interventional cardiologist who agrees with the above assessment & plan.              VTE Risk Mitigation (From admission, onward)        Ordered     heparin 25,000 units in dextrose 5% 250 mL (100 units/mL) infusion LOW INTENSITY nomogram - [facility]  Continuous      09/23/18 0532     heparin 25,000 units in dextrose 5% (100 units/ml) IV bolus from bag - ADDITIONAL PRN BOLUS - 60 units/kg (max bolus 4000 units)  As needed (PRN)      09/23/18 0532     heparin 25,000 units in dextrose 5% (100 units/ml) IV bolus from bag - ADDITIONAL PRN BOLUS - 30 units/kg (max bolus 4000 units)  As needed (PRN)      09/23/18 0532     IP VTE HIGH RISK PATIENT  Once      09/23/18 0532     Place sequential compression device  Until discontinued      09/23/18 0409          Thank you for your consult. I will follow-up with patient. Please contact us if you have any additional questions.    Duke Abernathy MD  Interventional Cardiology   [facility]

## 2018-09-25 NOTE — PLAN OF CARE
09/25/18 0801   Final Note   Assessment Type Final Discharge Note   Discharge Disposition Home   Hospital Follow Up  Appt(s) scheduled? Yes

## 2018-10-08 RX ORDER — PEN NEEDLE, DIABETIC 30 GX3/16"
NEEDLE, DISPOSABLE MISCELLANEOUS
Qty: 400 EACH | Refills: 0 | Status: ON HOLD | OUTPATIENT
Start: 2018-10-08 | End: 2020-01-04 | Stop reason: HOSPADM

## 2018-10-08 RX ORDER — PEN NEEDLE, DIABETIC 31 GX5/16"
NEEDLE, DISPOSABLE MISCELLANEOUS
Qty: 100 EACH | Refills: 3 | Status: SHIPPED | OUTPATIENT
Start: 2018-10-08 | End: 2019-11-14 | Stop reason: SDUPTHER

## 2018-11-20 ENCOUNTER — TELEPHONE (OUTPATIENT)
Dept: INTERNAL MEDICINE | Facility: CLINIC | Age: 63
End: 2018-11-20

## 2018-11-20 NOTE — TELEPHONE ENCOUNTER
----- Message from Trina Lopez sent at 11/20/2018  3:47 PM CST -----  Contact: [phone]  Patient is requesting a call from the nurse in [facility] to paper work she dropped off to the office yesterday.      Patient stated it is very important that she get those forms complete and returned back to her [place].  Patient will not be able to come for appt 11- if the forms are not completed before  Monday.    Please call and advise, thank you

## 2018-11-20 NOTE — TELEPHONE ENCOUNTER
Patient needs her paper work filled out by the end of the week. Or her insurance will not take in affect      Please Advise  Thank You

## 2018-11-21 ENCOUNTER — TELEPHONE (OUTPATIENT)
Dept: INTERNAL MEDICINE | Facility: CLINIC | Age: 63
End: 2018-11-21

## 2018-11-21 NOTE — TELEPHONE ENCOUNTER
Left a message for patient to come into office to sign paper work\      Please Advise  Thank You

## 2018-11-21 NOTE — TELEPHONE ENCOUNTER
----- Message from Cami Ardon sent at 11/21/2018 12:18 PM CST -----  Contact: [phone] or [phone]  Patient is returning a call from the office. Patient stated if she can come in Friday to sign the form . She gets off at 2:30 and she'll come straight over.     Please advise, thanks

## 2018-11-28 ENCOUNTER — HOSPITAL ENCOUNTER (EMERGENCY)
Facility: HOSPITAL | Age: 63
Discharge: HOME | End: 2018-11-29
Attending: EMERGENCY MEDICINE

## 2018-11-28 VITALS
OXYGEN SATURATION: 98 % | TEMPERATURE: 98 F | HEIGHT: 64 IN | BODY MASS INDEX: 30.5 KG/M2 | RESPIRATION RATE: 14 BRPM | HEART RATE: 91 BPM | DIASTOLIC BLOOD PRESSURE: 65 MMHG | SYSTOLIC BLOOD PRESSURE: 130 MMHG

## 2018-11-28 DIAGNOSIS — M25.562 PAIN IN BOTH KNEES, UNSPECIFIED CHRONICITY: Primary | ICD-10-CM

## 2018-11-28 DIAGNOSIS — M25.532 LEFT WRIST PAIN: ICD-10-CM

## 2018-11-28 DIAGNOSIS — M25.561 PAIN IN BOTH KNEES, UNSPECIFIED CHRONICITY: Primary | ICD-10-CM

## 2018-11-28 PROCEDURE — 63600175 PHARM REV CODE 636 W HCPCS: Performed by: EMERGENCY MEDICINE

## 2018-11-28 PROCEDURE — 99284 EMERGENCY DEPT VISIT MOD MDM: CPT | Mod: 25

## 2018-11-28 PROCEDURE — 99284 EMERGENCY DEPT VISIT MOD MDM: CPT | Mod: ,,, | Performed by: EMERGENCY MEDICINE

## 2018-11-28 PROCEDURE — 96372 THER/PROPH/DIAG INJ SC/IM: CPT

## 2018-11-28 PROCEDURE — 25000003 PHARM REV CODE 250: Performed by: EMERGENCY MEDICINE

## 2018-11-28 RX ORDER — KETOROLAC TROMETHAMINE 30 MG/ML
10 INJECTION, SOLUTION INTRAMUSCULAR; INTRAVENOUS
Status: COMPLETED | OUTPATIENT
Start: 2018-11-28 | End: 2018-11-28

## 2018-11-28 RX ORDER — ACETAMINOPHEN 500 MG
1000 TABLET ORAL
Status: COMPLETED | OUTPATIENT
Start: 2018-11-28 | End: 2018-11-28

## 2018-11-28 RX ADMIN — ACETAMINOPHEN 1000 MG: 500 TABLET, FILM COATED ORAL at 10:11

## 2018-11-28 RX ADMIN — KETOROLAC TROMETHAMINE 10 MG: 30 INJECTION, SOLUTION INTRAMUSCULAR at 10:11

## 2018-11-29 RX ORDER — ACETAMINOPHEN 325 MG/1
650 TABLET ORAL EVERY 6 HOURS PRN
Qty: 30 TABLET | Refills: 0 | Status: ON HOLD | OUTPATIENT
Start: 2018-11-29 | End: 2019-04-27 | Stop reason: HOSPADM

## 2018-11-29 NOTE — ED PROVIDER NOTES
Encounter Date: 2018    SCRIBE #1 NOTE: I, Julia Keller, am scribing for, and in the presence of,  Dr. Mccollum. I have scribed the following portions of the note - Other sections scribed: HPI, ROS, PE.       History     Chief Complaint   Patient presents with    Joint Pain     Pt reports bilateral knee pain and left wrist pain x 2 weeks.     Time seen by provider: 10:04 PM    This is a 63 y.o. female with medical conditions including HTN, DM, GERD, vertigo, and DM, who presents with complaint of joint pain. Patient states she started to experience joint pain starting from the knees and radiating up and down bilateral lower extremities. Patient states pain began after a mechanical fall in April onto the left side of her body. Patient required therapy for the left leg which improved sx. Patient states pain began worsening approximately 2 weeks ago, now felt in both legs. Patient states sx worsen at night. Patient took Tylenol around 8:00AM with mild improvements. Patient endorses left wrist pain and left arm pain and swelling, which has been ongoing for years. Patient denies neck pain, back pain, extremity numbness, extremity weakness, light-headedness, dysuria, hematuria, cp, or SOB, fevers.      The history is provided by the patient.     Review of patient's allergies indicates:  No Known Allergies  Past Medical History:   Diagnosis Date    Arthritis     Diabetes mellitus     Diabetes mellitus type I     GERD (gastroesophageal reflux disease)     Hypertension     Vertigo      Past Surgical History:   Procedure Laterality Date     SECTION      ESOPHAGOGASTRODUODENOSCOPY (EGD) N/A 2017    [facility]formed [facility] (4TH FLR)    ESOPHAGOGASTRODUODENOSCOPY (EGD) N/A 3/7/2017    [facility]formed [facility] (4TH FLR)     Family History   Problem Relation Age of Onset    Hypertension Mother     Hyperlipidemia Mother     Diabetes Mother     Heart  disease Mother     Aneurysm Father     Diabetes Sister     Hypertension Sister     Heart disease Brother     Diabetes Brother     Hypertension Brother     Breast cancer Neg Hx     Colon cancer Neg Hx     Ovarian cancer Neg Hx      Social History     Tobacco Use    Smoking status: Never Smoker    Smokeless tobacco: Never Used   Substance Use Topics    Alcohol use: No    Drug use: No     Review of Systems  Constitutional:  No Fever, No Chills,   Eyes: No Vision Changes  ENT/Mouth: No sore throat, No rhinorrhea  Cardiovascular:  No Chest Pain, No Palpitations  Respiratory:  No Cough, No SOB  Gastrointestinal:  No Nausea, No Vomiting, No Diarrhea, No abdo pain.  Genitourinary:  No  pain, No dysuria   Musculoskeletal:  Joint pain. No Back Pain, No Neck Pain, No recent trauma.  Skin:  No skin Lesions  Neuro:  No Weakness, No Numbness, No Paresthesias, No Dizziness, No Headache  Physical Exam     Initial Vitals [11/28/18 2039]   BP Pulse Resp Temp SpO2   130/65 91 14 98 °F (36.7 °C) 98 %      MAP       --         Physical Exam  GENERAL APPEARANCE: Well developed, well nourished, in no acute distress.  HENT: Normocephalic, atraumatic    EYES: Sclerae anicteric   NECK: Supple  LUNGS: Breathing comfortably. Speaking in full sentences   NEUROLOGIC: Ambulatory. Alert, interacting normally. No facial droop.   MSK: Bilateral knees with normal ROM. No effusions or focal point tenderness.    Left wrist mildly warm with chronic appearing deformity. No focal tenderness.Neuro/vasc intact distally. Moving all four extremities.  Skin: Warm and dry. No visible rash on exposed areas of skin.    Psych: Mood and affect normal.   ED Course   Procedures  Labs Reviewed - No data to display       Imaging Results          X-Ray Wrist Complete Left (Final result)  Result time 11/28/18 22:48:54    Final result by Jamal Isbell MD (11/28/18 22:48:54)                 Impression:      Mild soft tissue edema and degenerative  change.  No acute bony abnormality.      Electronically signed by: Jamal Isbell MD  Date:    11/28/2018  Time:    22:48             Narrative:    EXAMINATION:  XR WRIST COMPLETE 3 VIEWS LEFT    CLINICAL HISTORY:  Pain in left wrist    TECHNIQUE:  PA, lateral, and oblique views of the left wrist were performed.    COMPARISON:  10/27/2017.    FINDINGS:  No evidence of acute fracture or dislocation.  Mild-to-moderate degenerative changes noted involving the 1st carpometacarpal joint.  Mild soft tissue edema about the wrist.                               X-Ray Knee 1 or 2 View Bilateral (Final result)  Result time 11/28/18 22:45:42    Final result by Jamal Isbell MD (11/28/18 22:45:42)                 Impression:      No acute bony abnormality.      Electronically signed by: Jamal Isbell MD  Date:    11/28/2018  Time:    22:45             Narrative:    EXAMINATION:  XR KNEE 1 OR 2 VIEW BILATERAL    CLINICAL HISTORY:  B/le knee pain.;    TECHNIQUE:  Two views of the bilateral knees.    COMPARISON:  05/17/2018.    FINDINGS:  No displaced fracture identified.  No dislocation.  There are mild degenerative changes, similar to the prior exam.  Soft tissues are symmetric.  No radiopaque foreign body.                                 Medical Decision Making:   History:   Old Medical Records: I decided to obtain old medical records.  Initial Assessment:   Subacute on chronic joint pain, with largely benign exam.   Differential Diagnosis:   Arthritis, fracture, ligamentous strain.   Not consistent with infected join, as is polyarticular and ROM is presvered.   Clinical Tests:   Radiological Study: Ordered and Reviewed  ED Management:  Wrist: There is some chronic appearing swelling, per hx, wrist pain has been long standing.  Knee: Exam is consistent Musculo-skeletal joint pain.   XR of knees no fx.  XR writs some edema, with no fx.   Not consistent with dangerous pathology.   Vitals benign.  Given toradol, and tylenol  in ED with some improvement in symptoms.  Tylenol prn at home.  Stable gait.   Outpatient follow up for continued eval of subacute b/l knee pain.               Scribe Attestation:   Scribe #1: I performed the above scribed service and the documentation accurately describes the services I performed. I attest to the accuracy of the note.               Clinical Impression:   The encounter diagnosis was Left wrist pain.                             Tremaine Mccollum MD  11/30/18 3267

## 2018-12-20 DIAGNOSIS — E11.8 TYPE 2 DIABETES MELLITUS WITH COMPLICATION, WITH LONG-TERM CURRENT USE OF INSULIN: Primary | ICD-10-CM

## 2018-12-20 DIAGNOSIS — Z79.4 TYPE 2 DIABETES MELLITUS WITH COMPLICATION, WITH LONG-TERM CURRENT USE OF INSULIN: Primary | ICD-10-CM

## 2018-12-21 ENCOUNTER — OFFICE VISIT (OUTPATIENT)
Dept: ORTHOPEDICS | Facility: CLINIC | Age: 63
End: 2018-12-21
Payer: COMMERCIAL

## 2018-12-21 VITALS
SYSTOLIC BLOOD PRESSURE: 113 MMHG | HEIGHT: 64 IN | BODY MASS INDEX: 30.22 KG/M2 | WEIGHT: 177 LBS | DIASTOLIC BLOOD PRESSURE: 78 MMHG | HEART RATE: 108 BPM

## 2018-12-21 DIAGNOSIS — M25.532 LEFT WRIST PAIN: Primary | ICD-10-CM

## 2018-12-21 PROCEDURE — 3008F BODY MASS INDEX DOCD: CPT | Mod: CPTII,S$GLB,, | Performed by: PHYSICIAN ASSISTANT

## 2018-12-21 PROCEDURE — 3078F DIAST BP <80 MM HG: CPT | Mod: CPTII,S$GLB,, | Performed by: PHYSICIAN ASSISTANT

## 2018-12-21 PROCEDURE — 99203 OFFICE O/P NEW LOW 30 MIN: CPT | Mod: S$GLB,,, | Performed by: PHYSICIAN ASSISTANT

## 2018-12-21 PROCEDURE — 99999 PR PBB SHADOW E&M-EST. PATIENT-LVL IV: CPT | Mod: PBBFAC,,, | Performed by: PHYSICIAN ASSISTANT

## 2018-12-21 PROCEDURE — 97760 ORTHOTIC MGMT&TRAING 1ST ENC: CPT | Mod: GP,S$GLB,, | Performed by: PHYSICIAN ASSISTANT

## 2018-12-21 PROCEDURE — 3074F SYST BP LT 130 MM HG: CPT | Mod: CPTII,S$GLB,, | Performed by: PHYSICIAN ASSISTANT

## 2018-12-21 NOTE — PROGRESS NOTES
"Subjective:      Patient ID: Sunitha Pillai is a 63 y.o. female.    Chief Complaint: Pain of the Left Wrist      HPI  Sunitha Pillai is a left hand dominant 63 y.o. female presenting today for left wrist pain.  There was a history of trauma, she fell in April 2018.  She reports that the fall occurred at work, she was treated after the fall for knee pain. She states that she is not trying to use workman's Comp for the wrist.  Onset of wrist symptoms began June or July 2018.  She says that the pain interferes with sleep and work. She has pain using the left wrist to eat. Increased pain with lifting.  She denies any finger numbness or tingling.  She has not tried bracing or therapy.  Discussed possibly getting a MRI to evaluate her now chronic wrist pain, but she reports that she did previously have a MRI of the left wrist.  She works in the laundry.      Review of patient's allergies indicates:  No Known Allergies      Current Outpatient Medications   Medication Sig Dispense Refill    acetaminophen (TYLENOL) 325 MG tablet Take 2 tablets (650 mg total) by mouth every 6 (six) hours as needed for Pain. 30 tablet 0    aspirin 81 mg Tab Take 1 tablet by mouth Daily.      atorvastatin (LIPITOR) 80 MG tablet Take 1 tablet (80 mg total) by mouth once daily. 90 tablet 3    BD ULTRA-FINE SHORT PEN NEEDLE 31 gauge x 5/16" Ndle USE ONE  4 TIMES DAILY WITH  INSULIN 100 each 3    blood sugar diagnostic Strp For TRUEresult- strips and lancets - pt is insulin dependent and checks glucose four times daily 200 each 6    blood sugar diagnostic, drum (ACCU-CHEK COMPACT TEST) Strp USE ONE STRIP TO CHECK GLUCOSE 4 TIMES DAILY BEFORE EACH MEAL AND AT BEDTIME 100 each 6    insulin detemir U-100 (LEVEMIR FLEXTOUCH) 100 unit/mL (3 mL) SubQ InPn pen Inject 45 Units into the skin every evening. 13.5 mL 2    insulin syringe-needle U-100 1 mL 31 x 5/16" Syrg Use three times daily with insulin 100 each 6    lancets " "(LANCETS,THIN) Misc Use with accucheck benji 100 each 11    lisinopril (PRINIVIL,ZESTRIL) 20 MG tablet TAKE ONE TABLET BY MOUTH TWICE DAILY 60 tablet 6    NOVOLOG FLEXPEN U-100 INSULIN 100 unit/mL InPn pen INJECT 20 UNITS SUBCUTANEOUSLY WITH MEALS PLUS  SLIDING  SCALE 1 Box 6    pen needle, diabetic (BD ULTRA-FINE SHORT PEN NEEDLE) 31 gauge x 5/16" Ndle USE WITH INSULIN 4 TIMES DAILY 400 each 0    pen needle, diabetic 32 gauge x 5/32" Ndle Use with insulin 4 times daily 400 each 6    timolol maleate 0.5% (TIMOPTIC) 0.5 % Drop       tiZANidine (ZANAFLEX) 2 MG tablet One-two every eight hours as needed for pain 30 tablet 0    omeprazole (PRILOSEC) 20 MG capsule Take 1 capsule (20 mg total) by mouth once daily. 30 capsule 1    traMADol (ULTRAM) 50 mg tablet Take 1 tablet (50 mg total) by mouth every 24 hours as needed for Pain (use especially at bedtime as needed.). 14 tablet 0     No current facility-administered medications for this visit.        Past Medical History:   Diagnosis Date    Arthritis     Diabetes mellitus     Diabetes mellitus type I     GERD (gastroesophageal reflux disease)     Hypertension     Vertigo        Past Surgical History:   Procedure Laterality Date     SECTION      ESOPHAGOGASTRODUODENOSCOPY (EGD) N/A 2017    Performed by Anne [facility] at [facility] (4[facility] FLR)    ESOPHAGOGASTRODUODENOSCOPY (EGD) N/A 3/7/2017    Performed by Anne Turner MD at [facility] (4TH FLR)         Review of Systems:  Review of Systems   Constitution: Negative for chills and fever.   Skin: Negative for rash and suspicious lesions.   Musculoskeletal:        See HPI   Neurological: Negative for dizziness, headaches, light-headedness, numbness and paresthesias.   Psychiatric/Behavioral: Negative for depression. The patient is not nervous/anxious.          OBJECTIVE:     PHYSICAL EXAM:  Height: 5' 4" (162.6 cm) Weight: 80.3 kg (177 lb)  Vitals:    18 1038   BP: 113/78   Pulse: 108 " "  Weight: 80.3 kg (177 lb)   Height: 5' 4" (1.626 m)   PainSc:   5     General    Vitals reviewed.  Constitutional: She is oriented to person, place, and time. She appears well-developed and well-nourished.   HENT:   Head: Normocephalic and atraumatic.   Neck: Normal range of motion.   Cardiovascular: Normal rate.    Pulmonary/Chest: Effort normal. No respiratory distress.   Neurological: She is alert and oriented to person, place, and time.   Psychiatric: She has a normal mood and affect. Her behavior is normal. Judgment and thought content normal.             Musculoskeletal:  Mild edema noted over the dorsal ulnar aspect of the left wrist. No scars or abrasions.  Tender to palpation at the DRUJ and ulnar aspect of the left wrist. Nontender at the anatomical snuffbox and radial styloid, also nontender at the radiocarpal joint. Pain with Shuck test.  Decreased left wrist range of motion due to pain. Reports pain with pronation and supination of the left upper extremity. Neurovascularly intact-good sensation and motor function, good capillary refill, 2+ radial pulses.    RADIOGRAPHS:  Left Wrist X-Ray, 11/28/18  FINDINGS:  No evidence of acute fracture or dislocation.  Mild-to-moderate degenerative changes noted involving the 1st carpometacarpal joint.  Mild soft tissue edema about the wrist.      Impression     Mild soft tissue edema and degenerative change.  No acute bony abnormality.     Left Wrist MRI, 8/29/18  Impression       Moderate-sized joint effusion of the distal radial ulnar joint.    Intramuscular edema of the pronator quadratus, potentially representing strain.    Partial subluxation of ECU with possible disruption of the sub sheath.    Mild extensor digitorum tenosynovitis.    Mild thumb osteoarthritis.       Comments: I have personally reviewed the imaging and I agree with the above radiologist's report.    ASSESSMENT/PLAN:   Sunitha was seen today for pain.    Diagnoses and all orders for this " visit:    Left wrist pain  -     Ambulatory Referral to Physical/Occupational Therapy           - We talked at length about the anatomy and pathophysiology of   Encounter Diagnosis   Name Primary?    Left wrist pain Yes       - discussed patient's symptoms and physical exam findings.  Wrist pain is now chronic, however patient has not tried anything to improve her pain.  - wrist brace provided (15 minutes spent preparing, fitting, and educating on brace).  - started OT 1-2 times a week for 4-6 weeks.  - follow-up in 6 weeks with Dr. Yan-Mg if not improved  - call with questions or concerns, call if  Pain increases  - discussed use of heat/ice and NSAIDs or Tylenol    Disclaimer: This note has been generated using voice-recognition software. There may be typographical errors that have been missed during proof-reading.

## 2018-12-21 NOTE — LETTER
December 21, 2018      Irasema Benavides, DONOVAN  [address] [facility] [address]           [facility]  [address], [address]  [facility] [address]  Phone: [phone]          Patient: Sunitha Pillai   MR Number: [id number]   YOB: 1955   Date of Visit: 12/21/2018       Dear Irasema Benavides:    Thank you for referring Sunitha Pillai to me for evaluation. Attached you will find relevant portions of my assessment and plan of care.    If you have questions, please do not hesitate to call me. I look forward to following Sunitha Pillai along with you.    Sincerely,    VIDYA Faith    Enclosure  CC:  No Recipients    If you would like to receive this communication electronically, please contact [email or web address] or [phone] to request more information on [employer] access.    For providers and/or their staff who would like to refer a patient to [facility], please contact us through our one-stop-shop provider referral line, [facility], at [phone].    If you feel you have received this communication in error or would no longer like to receive these types of communications, please e-mail [email or web address]

## 2019-01-21 ENCOUNTER — TELEPHONE (OUTPATIENT)
Dept: INTERNAL MEDICINE | Facility: CLINIC | Age: 64
End: 2019-01-21

## 2019-01-23 ENCOUNTER — CLINICAL SUPPORT (OUTPATIENT)
Dept: REHABILITATION | Facility: HOSPITAL | Age: 64
End: 2019-01-23
Attending: PHYSICIAN ASSISTANT
Payer: COMMERCIAL

## 2019-01-23 DIAGNOSIS — M25.532 LEFT WRIST PAIN: ICD-10-CM

## 2019-01-23 PROCEDURE — 97165 OT EVAL LOW COMPLEX 30 MIN: CPT | Mod: PO

## 2019-01-23 PROCEDURE — 97018 PARAFFIN BATH THERAPY: CPT | Mod: PO

## 2019-01-23 NOTE — PLAN OF CARE
"  [facility] Occupational Therapy  Initial Evaluation     Date: 1/23/2019  Patient: Sunitha Pillai  Chart Number: [id number]  Referring Physician: MD Kristina  Therapy Diagnosis:   1. Left wrist pain         Medical Diagnosis: M25.532 (ICD-10-CM) - Left wrist pain  Physician Orders:    Wrist injury 6+ months ago  MRI 8/2018 - possible ECU subluxation  Eval and Treat, modalities as needed  1-2 times/week for 6+ weeks      Evaluation Date: 1/23/2019  Plan of Care Certification Date: 3/22/19  Authorization Period: 12/31/19  Surgery Date and Procedure: N/A  Date of Return to MD: 2/5/19    Visit #: 1 of 90  Time In: 2pm  Time Out: 3pm  Total Billable Time: 60 min    Precautions: Standard ,     Subjective     Involved Side: Left  Dominant Side: Left  Date of Onset: April 2018   Mechanism of Injury: Fall  History of Current Condition: Pt reports she fell on the job in April and hurt her knee. Her wrist started swelling up shortly after that but her knee pain was more prominent and therefore she did not notice her wrist as much. In about July she really started feeling badly in her wrist. 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 She followed up with hand clinic and was given a wrist cock up pre martin brace that she reports does not help the pain, it causes increased pain on ulnar side of wrist.   Surgical Procedure: N/A  Imaging: "No evidence of acute fracture or dislocation.  Mild-to-moderate degenerative changes noted involving the 1st carpometacarpal joint.  Mild soft tissue edema about the wrist." ( xray on 11/28/18)  "Moderate-sized joint effusion of the distal radial ulnar joint.I ntramuscular edema of the pronator quadratus, potentially representing strain. Partial subluxation of ECU with possible disruption of the sub sheath. Mild extensor " "digitorum tenosynovitis. Mild thumb osteoarthritis." (MRI on 18)    Previous Therapy: None    Patient's Goals for Therapy: I want to be able to use my wrist again     Pain:  Functional Pain Scale Rating 0-10:   6/10 on average  5/10 at best  8/10 at worst  Location: L wrist , ulnar side   Description: Aching  Aggravating Factors: Extension, Flexing and Lifting  Easing Factors: rest    Functional Limitations/Social History:    Previous functional status includes: Independent with all ADLs.     Current FunctionalStatus   Home/Living environment : lives with their spouse      Limitation of Functional Status as follows:   ADLs/IADLs:     - Feeding: difficulty cutting    - Bathing: difficulty holding soap    - Dressing/Grooming: I    - Driving: pain with driving      Leisure: playing with grandchildren     Occupation:  Housekeeping - laundry   Working presently: employed  Duties: laudnering, folding sheets, pillow cases, linens     Past Medical History/Physical Systems Review:   Sunitha Pillai  has a past medical history of Arthritis, Diabetes mellitus, Diabetes mellitus type I, GERD (gastroesophageal reflux disease), Hypertension, and Vertigo.    Sunitha Pillai  has a past surgical history that includes  section.    Sunitha has a current medication list which includes the following prescription(s): acetaminophen, aspirin, atorvastatin, bd ultra-fine short pen needle, blood sugar diagnostic, blood sugar diagnostic, drum, insulin detemir u-100, insulin syringe-needle u-100, lancets, lisinopril, novolog flexpen u-100 insulin, omeprazole, pen needle, diabetic, pen needle, diabetic, timolol maleate 0.5%, tizanidine, and tramadol.    Review of patient's allergies indicates:  No Known Allergies       Objective     Edema: Circumferential measurements: In centimters     Right Left   PWC (Proximal Wrist Crease) 15.5 cm 16.5 cm       Range of Motion:     Wrist ROM: Left  Active   2019   Flexion 45 "   Extension 40   Radial Deviation 10   Ulnar Deviation 15   Supination 70   Pronation 80     Special Tests:   Left   1/23/2019   ECU Synergy   +   TFCC Load  +   GRIT Test (sup/pro)  unable to determine    *pain with both supination and pronation*      Strength: (JANEL Dynamometer in lbs.) Average 3 trials, Position II:    Not tested due to pain, will test beginning of next session       CMS Impairment/Limitation/Restriction for FOTO Hand/Wrist/Finger Survey    Therapist reviewed FOTO scores for Sunitha Pillai on 1/23/2019.   FOTO documents entered into [employer] - see Media section.    Limitation Score: 52%            Treatment     Treatment Time In: 245  Treatment Time Out: 3pm  Total Treatment time separate from Evaluation time:15 min    Sunitha received the following supervised modalities after being cleared for contradictions for 5 minutes:   -Patient received paraffin bath to L hand(s) for 5 minutes to increase blood flow, circulation, pain management and for tissue elasticity prior to therex.     Sunitha received therapeutic exercises for 10 minutes including:  -HEP    Home Exercise Program/Education:  Issued HEP (see patient instructions in EMR) and educated on modality use for pain management . Exercises were reviewed and Sunitha was able to demonstrate them prior to the end of the session.   Pt received a written copy of exercises to perform at home. Sunitha demonstrated good  understanding of the education provided.  Pt was advised to perform these exercises free of pain, and to stop performing them if pain occurs.    Patient/Family Education: role of OT, goals for OT, scheduling/cancellations - pt verbalized understanding. Discussed insurance limitations with patient.    Additional Education provided: Potential causes of ulnar sided wrist pain, avoiding grasping in pronation, avoiding wrist extension/supination combination       Assessment     Sunitha Pillai is a 63 y.o. female referred  to outpatient occupational/hand therapy and presents with a medical diagnosis of left wrist pain (potential ECU and TFCC pathology) and demonstrates limitations as described in the chart below. Following medical record review it is determined that pt will benefit from occupational therapy services in order to maximize pain free and/or functional use of left wrist. The following goals were discussed with the patient and patient is in agreement with them as to be addressed in the treatment plan. The patient's rehab potential is Fair.     Anticipated barriers to occupational therapy: Chronic nature of wrist pain (+8 months) and financial obligations.   Pt has no cultural, educational or language barriers to learning provided.    Profile and History Assessment of Occupational Performance Level of Clinical Decision Making Complexity Score   Occupational Profile:   Sunitha Pillai is a 63 y.o. female who lives with their spouse and is currently employed. Sunitha Pillai has difficulty with  feeding, bathing, grooming and dressing  driving/transportation management  affecting his/her daily functional abilities. His/her main goal for therapy is pain free use of wrist.     Comorbidities:   diabetes    Medical and Therapy History Review:   Brief               Performance Deficits    Physical:  Joint Mobility  Joint Stability  Muscle Power/Strength  Edema  Pain    Cognitive:  No Deficits    Psychosocial:    No Deficits     Clinical Decision Making:  low    Assessment Process:  Comprehensive Assessments    Modification/Need for Assistance:  Not Necessary    Intervention Selection:  Multiple Treatment Options       low  Based on PMHX, co morbidities , data from assessments and functional level of assistance required with task and clinical presentation directly impacting function.         Goals:    (6 weeks):  1)   Patient to be IND with HEP and modalities for pain management  2)   Increase ROM 10 degrees in wrist  flexion and extension each to increase functional hand use for work tasks including folding clothes.  3)   Assess  strength next visit.   4)   Decrease edema .2-.3 cm to increase joint mobility /flexibility for improved overall functional hand use.   5)   Pt will report 2 out of 10 pain with working tasks for at least 3 consecutive sessions indicative of improved function participation in ADLs and IADLs.  6)   Patient to score at 35% or less on FOTO to demonstrate improved perception of functional LUE use.  7)   Pt will return to near to prior level of function for ADLs and household management reporting I or Mod I with ADLs (dressing, feeding, grooming, toileting).       Plan     Pt to be treated by Occupational Therapy 1 times per week for 6 weeks during the certification period from 1/23/2019 to 3/22/19 to achieve the established goals.      Treatment to include: Paraffin, Fluidotherapy, Manual therapy/joint mobilizations, Modalities for pain management, US 3 mhz, Therapeutic exercises/activities., Iontophoresis with 2.0 cc Dexamethasone, Strengthening, Orthotic Fabrication/Fit/Training, Edema Control, Joint Protection and Energy Conservation, as well as any other treatments deemed necessary based on the patient's needs or progress.

## 2019-01-23 NOTE — TELEPHONE ENCOUNTER
----- Message from Cami Ardon sent at 1/23/2019  8:41 AM CST -----  Contact: [phone] or [phone]    Patient's daughter stated in order for patient to get on plane with insulin and syringe, patient needs a letter from MD stating why she need medication. Please call and advise, Thanks

## 2019-01-31 ENCOUNTER — CLINICAL SUPPORT (OUTPATIENT)
Dept: REHABILITATION | Facility: HOSPITAL | Age: 64
End: 2019-01-31
Attending: PHYSICIAN ASSISTANT
Payer: COMMERCIAL

## 2019-01-31 DIAGNOSIS — M25.532 LEFT WRIST PAIN: ICD-10-CM

## 2019-01-31 PROCEDURE — 97140 MANUAL THERAPY 1/> REGIONS: CPT | Mod: PO

## 2019-01-31 PROCEDURE — 97110 THERAPEUTIC EXERCISES: CPT | Mod: PO

## 2019-01-31 PROCEDURE — 97022 WHIRLPOOL THERAPY: CPT | Mod: PO

## 2019-01-31 NOTE — PROGRESS NOTES
Occupational Therapy Daily Treatment Note     Date: 1/31/2019  Name: Sunitha Pillai  Clinic Number: [id number]    Therapy Diagnosis:   Encounter Diagnosis   Name Primary?    Left wrist pain      Physician: Irasema Kahn PA    Therapy Diagnosis:   1. Left wrist pain            Medical Diagnosis: M25.532 (ICD-10-CM) - Left wrist pain  Physician Orders:     Wrist injury 6+ months ago  MRI 8/2018 - possible ECU subluxation  Eval and Treat, modalities as needed  1-2 times/week for 6+ weeks      Evaluation Date: 1/23/2019  Plan of Care Certification Date: 3/22/19  Authorization Period: 12/31/19  Surgery Date and Procedure: N/A  Date of Return to MD: 2/5/19     Visit #: 2 of 90  Time In: 10am  Time Out: 11am  Total Billable Time: 60 min     Precautions: Standard ,       Subjective     Pt reports: continued pain  she was not compliant with home exercise program given last session.   Response to previous treatment:no change  Functional change: none    Pain: 8/10  Location: left arm    Objective     Sunitha received the following supervised modalities after being cleared for contradictions for 15 minutes:   -Patient received fluidotherapy to Left hand(s) for 15 minutes to increase blood flow, circulation, desensitization, sensory re-education and for pain management.       Sunitha received the following manual therapy techniques for 15 minutes:   -Retrograde massage  -IASTM to flexor and extensor compartments of forearm    Sunitha received therapeutic exercises for 30 minutes including:  -Passive flexor and extensor compartment stretches  -AROM wrist//forearm/fingers/thumb        Home Exercises and Education Provided     Education provided:   - Progress towards goals     Written Home Exercises Provided: Patient instructed to cont prior HEP.  Exercises were reviewed and Sunitha was able to demonstrate them prior to the end of the session.  Sunitha demonstrated good understanding of the HEP provided.   .   See  EMR under Patient Instructions for exercises provided prior visit.        Assessment     Pt would continue to benefit from skilled OT. She reported slight pain relief following gentle soft tissue mobilization with instruments.     Sunitha is progressing well towards her goals and there are no updates to goals at this time. Pt prognosis is Fair.     Pt will continue to benefit from skilled outpatient occupational therapy to address the deficits listed in the problem list on initial evaluation provide pt/family education and to maximize pt's level of independence in the home and community environment.     Anticipated barriers to occupational therapy: amount of time in chronic pain, length of time since initial injury    Pt's spiritual, cultural and educational needs considered and pt agreeable to plan of care and goals.    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  3)   Assess  strength next visit.   4)   Decrease edema .2-.3 cm to increase joint mobility /flexibility for improved overall functional hand use.   5)   Pt will report 2 out of 10 pain with working tasks for at least 3 consecutive sessions indicative of improved function participation in ADLs and IADLs.  6)   Patient to score at 35% or less on FOTO to demonstrate improved perception of functional LUE use.  7)   Pt will return to near to prior level of function for ADLs and household management reporting I or Mod I with ADLs (dressing, feeding, grooming, toileting)    Plan   Pt to be treated by Occupational Therapy 1 times per week for 6 weeks during the certification period from 1/23/2019 to 3/22/19 to achieve the established goals.         Updates/Grading for next session: n/a      Sonja Ocampo OT

## 2019-02-05 ENCOUNTER — DOCUMENTATION ONLY (OUTPATIENT)
Dept: REHABILITATION | Facility: HOSPITAL | Age: 64
End: 2019-02-05

## 2019-03-16 RX ORDER — INSULIN ASPART 100 [IU]/ML
INJECTION, SOLUTION INTRAVENOUS; SUBCUTANEOUS
Qty: 1 BOX | Refills: 6 | Status: ON HOLD | OUTPATIENT
Start: 2019-03-16 | End: 2019-04-27 | Stop reason: SDUPTHER

## 2019-04-05 DIAGNOSIS — E11.9 TYPE 2 DIABETES MELLITUS WITHOUT COMPLICATION: ICD-10-CM

## 2019-04-06 RX ORDER — METFORMIN HYDROCHLORIDE 1000 MG/1
TABLET ORAL
Qty: 60 TABLET | Refills: 1 | Status: SHIPPED | OUTPATIENT
Start: 2019-04-06 | End: 2019-10-29 | Stop reason: SDUPTHER

## 2019-04-11 ENCOUNTER — TELEPHONE (OUTPATIENT)
Dept: INTERNAL MEDICINE | Facility: CLINIC | Age: 64
End: 2019-04-11

## 2019-04-11 NOTE — TELEPHONE ENCOUNTER
----- Message from Vedaeric Benjamin sent at 4/11/2019  3:14 PM CDT -----  Contact: Patient [phone]  Pt called to reschedule the appt that was scheduled for her on 05/17/19. She states that she was not off on that day and it was rescheduled for 06/24/19. Pt wants to know if she can get a call back in regards to get the appt on a sooner date in May when she is off. Please advise.      Thanks

## 2019-04-26 ENCOUNTER — HOSPITAL ENCOUNTER (OUTPATIENT)
Facility: OTHER | Age: 64
Discharge: HOME | End: 2019-04-27
Attending: EMERGENCY MEDICINE | Admitting: HOSPITALIST
Payer: COMMERCIAL

## 2019-04-26 DIAGNOSIS — R07.89 ATYPICAL CHEST PAIN: Primary | ICD-10-CM

## 2019-04-26 DIAGNOSIS — K21.9 GASTROESOPHAGEAL REFLUX DISEASE WITHOUT ESOPHAGITIS: ICD-10-CM

## 2019-04-26 DIAGNOSIS — R07.9 ACUTE CHEST PAIN: ICD-10-CM

## 2019-04-26 DIAGNOSIS — R55 SYNCOPE: ICD-10-CM

## 2019-04-26 DIAGNOSIS — Z79.4 TYPE 2 DIABETES MELLITUS WITH COMPLICATION, WITH LONG-TERM CURRENT USE OF INSULIN: ICD-10-CM

## 2019-04-26 DIAGNOSIS — R55 SYNCOPE, UNSPECIFIED SYNCOPE TYPE: ICD-10-CM

## 2019-04-26 DIAGNOSIS — R07.9 CHEST PAIN: ICD-10-CM

## 2019-04-26 DIAGNOSIS — R74.8 ELEVATED LIVER ENZYMES: ICD-10-CM

## 2019-04-26 DIAGNOSIS — R07.9 CHEST PAIN, UNSPECIFIED TYPE: ICD-10-CM

## 2019-04-26 DIAGNOSIS — R55 VASOVAGAL SYNCOPE: ICD-10-CM

## 2019-04-26 DIAGNOSIS — R42 VERTIGO: ICD-10-CM

## 2019-04-26 DIAGNOSIS — R55 NEAR SYNCOPE: ICD-10-CM

## 2019-04-26 DIAGNOSIS — E11.8 TYPE 2 DIABETES MELLITUS WITH COMPLICATION, WITH LONG-TERM CURRENT USE OF INSULIN: ICD-10-CM

## 2019-04-26 LAB
ALBUMIN SERPL BCP-MCNC: 3.7 G/DL (ref 3.5–5.2)
ALP SERPL-CCNC: 237 U/L (ref 55–135)
ALT SERPL W/O P-5'-P-CCNC: 184 U/L (ref 10–44)
ANION GAP SERPL CALC-SCNC: 10 MMOL/L (ref 8–16)
AST SERPL-CCNC: 213 U/L (ref 10–40)
BASOPHILS # BLD AUTO: 0.01 K/UL (ref 0–0.2)
BASOPHILS NFR BLD: 0.2 % (ref 0–1.9)
BILIRUB SERPL-MCNC: 1.4 MG/DL (ref 0.1–1)
BNP SERPL-MCNC: <10 PG/ML (ref 0–99)
BUN SERPL-MCNC: 14 MG/DL (ref 8–23)
CALCIUM SERPL-MCNC: 10.1 MG/DL (ref 8.7–10.5)
CHLORIDE SERPL-SCNC: 103 MMOL/L (ref 95–110)
CO2 SERPL-SCNC: 27 MMOL/L (ref 23–29)
CREAT SERPL-MCNC: 1.2 MG/DL (ref 0.5–1.4)
DIFFERENTIAL METHOD: ABNORMAL
EOSINOPHIL # BLD AUTO: 0 K/UL (ref 0–0.5)
EOSINOPHIL NFR BLD: 0.5 % (ref 0–8)
ERYTHROCYTE [DISTWIDTH] IN BLOOD BY AUTOMATED COUNT: 13.1 % (ref 11.5–14.5)
EST. GFR  (AFRICAN AMERICAN): 55 ML/MIN/1.73 M^2
EST. GFR  (NON AFRICAN AMERICAN): 48 ML/MIN/1.73 M^2
GLUCOSE SERPL-MCNC: 272 MG/DL (ref 70–110)
HCT VFR BLD AUTO: 40.1 % (ref 37–48.5)
HGB BLD-MCNC: 14.2 G/DL (ref 12–16)
LYMPHOCYTES # BLD AUTO: 1.6 K/UL (ref 1–4.8)
LYMPHOCYTES NFR BLD: 28.4 % (ref 18–48)
MCH RBC QN AUTO: 31.6 PG (ref 27–31)
MCHC RBC AUTO-ENTMCNC: 35.4 G/DL (ref 32–36)
MCV RBC AUTO: 89 FL (ref 82–98)
MONOCYTES # BLD AUTO: 0.3 K/UL (ref 0.3–1)
MONOCYTES NFR BLD: 6 % (ref 4–15)
NEUTROPHILS # BLD AUTO: 3.6 K/UL (ref 1.8–7.7)
NEUTROPHILS NFR BLD: 64.7 % (ref 38–73)
PLATELET # BLD AUTO: 169 K/UL (ref 150–350)
PMV BLD AUTO: 10.1 FL (ref 9.2–12.9)
POCT GLUCOSE: 227 MG/DL (ref 70–110)
POTASSIUM SERPL-SCNC: 4.3 MMOL/L (ref 3.5–5.1)
PROT SERPL-MCNC: 7.8 G/DL (ref 6–8.4)
RBC # BLD AUTO: 4.49 M/UL (ref 4–5.4)
SODIUM SERPL-SCNC: 140 MMOL/L (ref 136–145)
TROPONIN I SERPL DL<=0.01 NG/ML-MCNC: <0.006 NG/ML (ref 0–0.03)
TROPONIN I SERPL DL<=0.01 NG/ML-MCNC: <0.006 NG/ML (ref 0–0.03)
WBC # BLD AUTO: 5.53 K/UL (ref 3.9–12.7)

## 2019-04-26 PROCEDURE — 63600175 PHARM REV CODE 636 W HCPCS: Performed by: EMERGENCY MEDICINE

## 2019-04-26 PROCEDURE — 83880 ASSAY OF NATRIURETIC PEPTIDE: CPT

## 2019-04-26 PROCEDURE — G0378 HOSPITAL OBSERVATION PER HR: HCPCS

## 2019-04-26 PROCEDURE — 85025 COMPLETE CBC W/AUTO DIFF WBC: CPT

## 2019-04-26 PROCEDURE — S5571 INSULIN DISPOS PEN 3 ML: HCPCS | Performed by: NURSE PRACTITIONER

## 2019-04-26 PROCEDURE — 93010 EKG 12-LEAD: ICD-10-PCS | Mod: ,,, | Performed by: INTERNAL MEDICINE

## 2019-04-26 PROCEDURE — 93005 ELECTROCARDIOGRAM TRACING: CPT

## 2019-04-26 PROCEDURE — 36415 COLL VENOUS BLD VENIPUNCTURE: CPT

## 2019-04-26 PROCEDURE — 99220 PR INITIAL OBSERVATION CARE,LEVL III: CPT | Mod: ,,, | Performed by: NURSE PRACTITIONER

## 2019-04-26 PROCEDURE — 93010 ELECTROCARDIOGRAM REPORT: CPT | Mod: ,,, | Performed by: INTERNAL MEDICINE

## 2019-04-26 PROCEDURE — 80053 COMPREHEN METABOLIC PANEL: CPT

## 2019-04-26 PROCEDURE — 25000003 PHARM REV CODE 250: Performed by: EMERGENCY MEDICINE

## 2019-04-26 PROCEDURE — 25000003 PHARM REV CODE 250: Performed by: NURSE PRACTITIONER

## 2019-04-26 PROCEDURE — 84484 ASSAY OF TROPONIN QUANT: CPT | Mod: 91

## 2019-04-26 PROCEDURE — 99285 EMERGENCY DEPT VISIT HI MDM: CPT

## 2019-04-26 PROCEDURE — 94761 N-INVAS EAR/PLS OXIMETRY MLT: CPT

## 2019-04-26 PROCEDURE — 99220 PR INITIAL OBSERVATION CARE,LEVL III: ICD-10-PCS | Mod: ,,, | Performed by: NURSE PRACTITIONER

## 2019-04-26 PROCEDURE — 63600175 PHARM REV CODE 636 W HCPCS: Performed by: NURSE PRACTITIONER

## 2019-04-26 RX ORDER — NITROGLYCERIN 0.4 MG/1
0.4 TABLET SUBLINGUAL EVERY 5 MIN PRN
Status: DISCONTINUED | OUTPATIENT
Start: 2019-04-26 | End: 2019-04-26

## 2019-04-26 RX ORDER — NITROGLYCERIN 0.4 MG/1
TABLET SUBLINGUAL
Status: DISPENSED
Start: 2019-04-26 | End: 2019-04-27

## 2019-04-26 RX ORDER — IBUPROFEN 200 MG
16 TABLET ORAL
Status: DISCONTINUED | OUTPATIENT
Start: 2019-04-27 | End: 2019-04-27 | Stop reason: HOSPADM

## 2019-04-26 RX ORDER — ASPIRIN 325 MG
325 TABLET ORAL
Status: COMPLETED | OUTPATIENT
Start: 2019-04-26 | End: 2019-04-26

## 2019-04-26 RX ORDER — INSULIN ASPART 100 [IU]/ML
1-10 INJECTION, SOLUTION INTRAVENOUS; SUBCUTANEOUS
Status: DISCONTINUED | OUTPATIENT
Start: 2019-04-26 | End: 2019-04-26

## 2019-04-26 RX ORDER — SODIUM CHLORIDE 0.9 % (FLUSH) 0.9 %
10 SYRINGE (ML) INJECTION
Status: DISCONTINUED | OUTPATIENT
Start: 2019-04-26 | End: 2019-04-26

## 2019-04-26 RX ORDER — INSULIN ASPART 100 [IU]/ML
1-10 INJECTION, SOLUTION INTRAVENOUS; SUBCUTANEOUS
Status: DISCONTINUED | OUTPATIENT
Start: 2019-04-27 | End: 2019-04-27 | Stop reason: HOSPADM

## 2019-04-26 RX ORDER — ASPIRIN 325 MG
325 TABLET, DELAYED RELEASE (ENTERIC COATED) ORAL DAILY
Status: DISCONTINUED | OUTPATIENT
Start: 2019-04-27 | End: 2019-04-27 | Stop reason: HOSPADM

## 2019-04-26 RX ORDER — GLUCAGON 1 MG
1 KIT INJECTION
Status: DISCONTINUED | OUTPATIENT
Start: 2019-04-27 | End: 2019-04-27 | Stop reason: HOSPADM

## 2019-04-26 RX ORDER — GLUCAGON 1 MG
1 KIT INJECTION
Status: DISCONTINUED | OUTPATIENT
Start: 2019-04-26 | End: 2019-04-26

## 2019-04-26 RX ORDER — SODIUM CHLORIDE 0.9 % (FLUSH) 0.9 %
10 SYRINGE (ML) INJECTION
Status: DISCONTINUED | OUTPATIENT
Start: 2019-04-26 | End: 2019-04-27 | Stop reason: HOSPADM

## 2019-04-26 RX ORDER — ONDANSETRON 2 MG/ML
4 INJECTION INTRAMUSCULAR; INTRAVENOUS EVERY 8 HOURS PRN
Status: DISCONTINUED | OUTPATIENT
Start: 2019-04-26 | End: 2019-04-27 | Stop reason: HOSPADM

## 2019-04-26 RX ORDER — LISINOPRIL 20 MG/1
20 TABLET ORAL 2 TIMES DAILY
Status: DISCONTINUED | OUTPATIENT
Start: 2019-04-26 | End: 2019-04-27 | Stop reason: HOSPADM

## 2019-04-26 RX ORDER — ATORVASTATIN CALCIUM 20 MG/1
80 TABLET, FILM COATED ORAL DAILY
Status: DISCONTINUED | OUTPATIENT
Start: 2019-04-27 | End: 2019-04-27 | Stop reason: HOSPADM

## 2019-04-26 RX ORDER — IBUPROFEN 200 MG
24 TABLET ORAL
Status: DISCONTINUED | OUTPATIENT
Start: 2019-04-26 | End: 2019-04-26

## 2019-04-26 RX ORDER — IBUPROFEN 200 MG
16 TABLET ORAL
Status: DISCONTINUED | OUTPATIENT
Start: 2019-04-26 | End: 2019-04-26

## 2019-04-26 RX ORDER — ACETAMINOPHEN 325 MG/1
650 TABLET ORAL EVERY 4 HOURS PRN
Status: DISCONTINUED | OUTPATIENT
Start: 2019-04-26 | End: 2019-04-27 | Stop reason: HOSPADM

## 2019-04-26 RX ORDER — IBUPROFEN 200 MG
24 TABLET ORAL
Status: DISCONTINUED | OUTPATIENT
Start: 2019-04-27 | End: 2019-04-27 | Stop reason: HOSPADM

## 2019-04-26 RX ORDER — NITROGLYCERIN 0.4 MG/1
0.4 TABLET SUBLINGUAL EVERY 5 MIN PRN
Status: DISCONTINUED | OUTPATIENT
Start: 2019-04-27 | End: 2019-04-27 | Stop reason: HOSPADM

## 2019-04-26 RX ORDER — PANTOPRAZOLE SODIUM 40 MG/1
40 TABLET, DELAYED RELEASE ORAL DAILY
Status: DISCONTINUED | OUTPATIENT
Start: 2019-04-27 | End: 2019-04-27 | Stop reason: HOSPADM

## 2019-04-26 RX ORDER — ONDANSETRON 8 MG/1
8 TABLET, ORALLY DISINTEGRATING ORAL EVERY 8 HOURS PRN
Status: DISCONTINUED | OUTPATIENT
Start: 2019-04-26 | End: 2019-04-26

## 2019-04-26 RX ORDER — INSULIN ASPART 100 [IU]/ML
10 INJECTION, SOLUTION INTRAVENOUS; SUBCUTANEOUS
Status: DISCONTINUED | OUTPATIENT
Start: 2019-04-27 | End: 2019-04-26

## 2019-04-26 RX ADMIN — ACETAMINOPHEN 650 MG: 325 TABLET ORAL at 11:04

## 2019-04-26 RX ADMIN — INSULIN DETEMIR 20 UNITS: 100 INJECTION, SOLUTION SUBCUTANEOUS at 08:04

## 2019-04-26 RX ADMIN — NITROGLYCERIN 0.4 MG: 0.4 TABLET SUBLINGUAL at 01:04

## 2019-04-26 RX ADMIN — ASPIRIN 325 MG ORAL TABLET 325 MG: 325 PILL ORAL at 01:04

## 2019-04-26 NOTE — ED PROVIDER NOTES
Encounter Date: 2019    SCRIBE #1 NOTE: I, Ines Zaman, am scribing for, and in the presence of, Dr. Herrera.       History     Chief Complaint   Patient presents with    Dizziness     Pt reports dizziness since last night after 2100, room was spinning and pt felt better lying on ground, denies LOC, did report nausea w/ vertigo, also reports history of vertigo, also left sided chest pain since last night     Time seen by provider: 1:20 PM    This is a 63 y.o. female with hx of HTN and DM who presents with multiple complaints. Pt reports onset of left sided chest pain, described as a tightness, two days ago. It has been intermittent since, lasting a few minutes at a time. She states that there is no exertional component, and had no associated SOB, N/V, or dizziness with pain. Pt states that she is currently experiencing pain, stating that it rates a 3/10 in severity. Pt has had similar chest pain in the past, with admission and left heart cath 2018. Pt have onset of lightheadedness and HA (frontal and bilateral temples) last night. She states that she almost fell, but was caught by her  who laid her onto the ground. Pt states that she had onset of N/V this morning. She takes 81mg ASA QD. She denies any fever, chills, diaphoresis, diarrhea, abdominal pain, leg swelling, palpitations, back pain, neck pain, neck stiffness, vision changes, dizziness, weakness, numbness, LOC, confusion, and rash. Pt has no hx of smoking or drinking. She does not have a cardiologist.    The history is provided by the patient.     Review of patient's allergies indicates:  No Known Allergies  Past Medical History:   Diagnosis Date    Arthritis     Diabetes mellitus     Diabetes mellitus type I     GERD (gastroesophageal reflux disease)     Hypertension     Vertigo      Past Surgical History:   Procedure Laterality Date     SECTION      ESOPHAGOGASTRODUODENOSCOPY (EGD) N/A 2017    Performed by Anne  CHRISTELLE Turner MD at [facility] (4TH FLR)    ESOPHAGOGASTRODUODENOSCOPY (EGD) N/A 3/7/2017    Performed by Anne Turner MD at [facility] (4TH FLR)     Family History   Problem Relation Age of Onset    Hypertension Mother     Hyperlipidemia Mother     Diabetes Mother     Heart disease Mother     Aneurysm Father     Diabetes Sister     Hypertension Sister     Heart disease Brother     Diabetes Brother     Hypertension Brother     Breast cancer Neg Hx     Colon cancer Neg Hx     Ovarian cancer Neg Hx      Social History     Tobacco Use    Smoking status: Never Smoker    Smokeless tobacco: Never Used   Substance Use Topics    Alcohol use: No    Drug use: No     Review of Systems   Constitutional: Negative for chills, diaphoresis and fever.   HENT: Negative for congestion, rhinorrhea and sore throat.    Eyes: Negative for photophobia and visual disturbance.   Respiratory: Positive for chest tightness. Negative for cough and shortness of breath.    Cardiovascular: Positive for chest pain. Negative for palpitations and leg swelling.   Gastrointestinal: Positive for nausea and vomiting. Negative for abdominal pain and diarrhea.   Endocrine: Negative for polyuria.   Genitourinary: Negative for decreased urine volume, difficulty urinating and dysuria.   Musculoskeletal: Negative for back pain, neck pain and neck stiffness.   Skin: Negative for rash and wound.   Allergic/Immunologic: Negative for immunocompromised state.   Neurological: Positive for light-headedness and headaches. Negative for dizziness, syncope, weakness and numbness.   Hematological: Does not bruise/bleed easily.   Psychiatric/Behavioral: Negative for confusion.       Physical Exam     Initial Vitals [04/26/19 1314]   BP Pulse Resp Temp SpO2   126/66 94 20 97.8 °F (36.6 °C) 98 %      MAP       --         Physical Exam    Nursing note and vitals reviewed.  Constitutional: She appears well-developed and well-nourished. She is not diaphoretic. No  distress.   HENT:   Head: Normocephalic and atraumatic.   Right Ear: External ear normal.   Left Ear: External ear normal.   Nose: Nose normal.   Eyes: Conjunctivae and EOM are normal. Pupils are equal, round, and reactive to light. Right eye exhibits no discharge. Left eye exhibits no discharge.   Neck: Normal range of motion. Neck supple. No JVD present.   Cardiovascular: Normal rate, regular rhythm, normal heart sounds and intact distal pulses. Exam reveals no gallop and no friction rub.    No murmur heard.  Pulmonary/Chest: Breath sounds normal. No respiratory distress. She has no wheezes. She has no rhonchi. She has no rales. She exhibits no tenderness.   Abdominal: Soft. Bowel sounds are normal. She exhibits no distension and no mass. There is no tenderness. There is no rebound and no guarding.   Musculoskeletal: Normal range of motion. She exhibits no edema or tenderness.   Lymphadenopathy:     She has no cervical adenopathy.   Neurological: She is alert and oriented to person, place, and time. She has normal strength. She displays normal reflexes. No cranial nerve deficit or sensory deficit.   Skin: Skin is warm and dry. No rash noted. No erythema.   Psychiatric: She has a normal mood and affect. Her behavior is normal.         ED Course   Procedures  Labs Reviewed   CBC W/ AUTO DIFFERENTIAL - Abnormal; Notable for the following components:       Result Value    MCH 31.6 (*)     All other components within normal limits   COMPREHENSIVE METABOLIC PANEL - Abnormal; Notable for the following components:    Glucose 272 (*)     Total Bilirubin 1.4 (*)     Alkaline Phosphatase 237 (*)      (*)      (*)     eGFR if  55 (*)     eGFR if non  48 (*)     All other components within normal limits   TROPONIN I   B-TYPE NATRIURETIC PEPTIDE   TROPONIN I     EKG Readings: (Independently Interpreted)   NSR at a rate of 95. No STEMI. Compared to EKG on September 2018, lead III  inversion has resolved, lead V2 T wave inversion has resolved.      Imaging Results          US Abdomen Limited (Final result)  Result time 04/26/19 16:05:27    Final result by Uche De Guzman MD (04/26/19 16:05:27)                 Impression:      1. Hepatomegaly with hepatic steatosis.  2. Non-obstructing right renal calculi.      Electronically signed by: Uche De Guzman MD  Date:    04/26/2019  Time:    16:05             Narrative:    EXAMINATION:  US ABDOMEN LIMITED    CLINICAL HISTORY:  elevated bilirubin;    TECHNIQUE:  Limited ultrasound of the right upper quadrant of the abdomen (including pancreas, liver, gallbladder, common bile duct, and spleen) was performed.    COMPARISON:  None.    FINDINGS:  There is a normal pancreas without pancreatic masses or pancreatic ductal dilatation.  IVC is patent.    The liver measures approximately 18 cm in its craniocaudal dimension.  Increased echogenicity to the liver likely representing hepatic steatosis.  No intrahepatic biliary ductal dilatation or hepatic masses.    There is a normal gallbladder without gallstones or gallbladder wall thickening or signs for acute cholecystitis.  The extrahepatic common bile duct measures approximately 4 mm in its diameter and is within normal limits.  Patient has a negative Lynn's sign.    The spleen measures approximately 9.3 cm and is within normal limits..    Incidentally in the right kidney there are multiple echogenic shadowing calculi likely representing multiple nonobstructing right renal calculi.                               X-Ray Chest AP Portable (Final result)  Result time 04/26/19 14:51:30    Final result by Uche De Guzman MD (04/26/19 14:51:30)                 Impression:      No acute intrathoracic processes.      Electronically signed by: Uche De Guzman MD  Date:    04/26/2019  Time:    14:51             Narrative:    EXAMINATION:  XR CHEST AP PORTABLE    CLINICAL HISTORY:  Chest pain, unspecified    TECHNIQUE:  Single  frontal view of the chest was performed.    COMPARISON:  Chest 09/23/2018    FINDINGS:  The heart, lungs, mediastinum and pulmonary vasculature still remain within normal limits.  There are cardiac monitoring leads over the chest.  There is DJD of the shoulder joints bilaterally.                              X-Rays:   Independently Interpreted Readings:   Chest X-Ray: No infiltrate. Normal cardiac shadow size. No effusion.     Medical Decision Making:   Differential Diagnosis:   Acute coronary syndrome, aortic dissection, pulmonary embolism, tension pneumothorax, Pericardial tamponade, Mediastinitis,  esophageal rupture, valvular heart disease, pericarditis, pneumonia, esophageal spasm    Independently Interpreted Test(s):   I have ordered and independently interpreted X-rays - see prior notes.  I have ordered and independently interpreted EKG Reading(s) - see prior notes  Clinical Tests:   Lab Tests: Ordered and Reviewed  Radiological Study: Ordered and Reviewed  Medical Tests: Ordered and Reviewed  ED Management:  Patient's chest pain resolved after the nitroglycerin.  Patient did have a normal left heart catheterization in September of 2018:  However, the concern being that the patient might have had malignant arrhythmia leading her to have near syncope after the chest pain. Than the patient having persistent chest pain relieved with nitroglycerin raises concern of possible vasospasm as cause of patient's symptoms.            Scribe Attestation:   Scribe #1: I performed the above scribed service and the documentation accurately describes the services I performed. I attest to the accuracy of the note.    Attending Attestation:           Physician Attestation for Scribe:  Physician Attestation Statement for Scribe #1: I, Dr. Herrera, reviewed documentation, as scribed by Ines Zaman in my presence, and it is both accurate and complete.                 ED Course as of Apr 26 1633   Fri Apr 26, 2019   1615  Spoke with hospitalist, Dr. Sosa. Will admit pt to Dr. Aguila.    [CA]      ED Course User Index  [CA] Ines Zaman     Clinical Impression:     1. Chest pain    2. Vertigo    3. Near syncope                                 Juan Herrera MD  05/02/19 2006

## 2019-04-26 NOTE — ED NOTES
Appearance: Pt awake, alert & oriented to person, place & time. Pt in no acute distress at present time. Pt is clean and well groomed with clothes appropriately fastened.   Skin: Skin warm, dry & intact. Color consistent with ethnicity. Mucous membranes moist. No breakdown or brusing noted.   Musculoskeletal: Patient moving all extremities well, no obvious swelling or deformities noted.   Respiratory: Respirations spontaneous, even, and non-labored. Visible chest rise noted. Airway is open and patent. No accessory muscle use noted. SOB reported, described as intermittent. BS are clear.   Neurologic: Sensation is intact. Speech is clear and appropriate. Eyes open spontaneously, behavior appropriate to situation, follows commands, facial expression symmetrical, bilateral hand grasp equal and even, purposeful motor response noted.  Cardiac: All peripheral pulses present. No Bilateral lower extremity edema. Cap refill is <3 seconds.  Pt denies blurred vision, weakness or fatigue at this time. Apical pulse regular upon auscultation.   Abdomen: Abdomen soft, non-tender to palpation. Pt denies active abd pains, cramping or discomfort, no active vomiting.

## 2019-04-26 NOTE — ED NOTES
"Pt to ED reporting L sided CP x 1 week, reporting CP is intermittent, states "it comes on whenever", reporting also intermittent dizziness, SOB, nausea, "I vomited earlier". Unsure if CP occurs with dizziness, SOB, or nausea. Pt is poor historian. Pt AAOx4 and appropriate at this time. Respirations even and unlabored. No acute distress noted.  Bed is locked and in lowest position with side rails up x2. Call bell within reach and pt oriented to use of call bell. Pt placed on continuous cardiac monitoring, continuous pulse ox, and continuous BP cuff. Will continue to monitor.     "

## 2019-04-26 NOTE — ED NOTES
When attempting to draw blood from IV there was no blood return. IV would not flush. Unsuccessful attempt to restart IV in another location. Charge nurse made aware.

## 2019-04-26 NOTE — LETTER
April 27, 2019         [address]  [place] [address]  Phone: [phone]  Fax: [phone]       Patient: Sunitha Pillai   YOB: 1955  Date of Visit: 04/27/2019    To Whom It May Concern:    jB Pillai  was at [facility] on 04/26/2019. May return to work/school on 04/30/2019 with no restrictions. If you have any questions or concerns, or if I can be of further assistance, please do not hesitate to contact me.    Sincerely,    Padmaja Armenta RN

## 2019-04-27 ENCOUNTER — HOSPITAL ENCOUNTER (OUTPATIENT)
Dept: CARDIOLOGY | Facility: OTHER | Age: 64
Discharge: HOME | End: 2019-04-27
Attending: EMERGENCY MEDICINE
Payer: COMMERCIAL

## 2019-04-27 VITALS
TEMPERATURE: 98 F | HEART RATE: 104 BPM | OXYGEN SATURATION: 98 % | HEIGHT: 64 IN | SYSTOLIC BLOOD PRESSURE: 148 MMHG | BODY MASS INDEX: 29.88 KG/M2 | WEIGHT: 175 LBS | DIASTOLIC BLOOD PRESSURE: 70 MMHG | RESPIRATION RATE: 18 BRPM

## 2019-04-27 PROBLEM — R74.8 ELEVATED LIVER ENZYMES: Status: ACTIVE | Noted: 2019-04-27

## 2019-04-27 LAB
ALBUMIN SERPL BCP-MCNC: 2.9 G/DL (ref 3.5–5.2)
ALP SERPL-CCNC: 236 U/L (ref 55–135)
ALT SERPL W/O P-5'-P-CCNC: 262 U/L (ref 10–44)
ANION GAP SERPL CALC-SCNC: 8 MMOL/L (ref 8–16)
AORTIC ROOT ANNULUS: 2.63 CM
AORTIC VALVE CUSP SEPERATION: 1.44 CM
ASCENDING AORTA: 2.67 CM
AST SERPL-CCNC: 355 U/L (ref 10–40)
AV INDEX (PROSTH): 0.64
AV MEAN GRADIENT: 4.23 MMHG
AV PEAK GRADIENT: 6.15 MMHG
AV VALVE AREA: 2.06 CM2
AV VELOCITY RATIO: 0.68
BASOPHILS # BLD AUTO: 0.01 K/UL (ref 0–0.2)
BASOPHILS NFR BLD: 0.2 % (ref 0–1.9)
BILIRUB SERPL-MCNC: 0.9 MG/DL (ref 0.1–1)
BSA FOR ECHO PROCEDURE: 1.89 M2
BUN SERPL-MCNC: 14 MG/DL (ref 8–23)
CALCIUM SERPL-MCNC: 9.2 MG/DL (ref 8.7–10.5)
CHLORIDE SERPL-SCNC: 106 MMOL/L (ref 95–110)
CO2 SERPL-SCNC: 21 MMOL/L (ref 23–29)
CREAT SERPL-MCNC: 1 MG/DL (ref 0.5–1.4)
CV ECHO LV RWT: 0.37 CM
DIFFERENTIAL METHOD: ABNORMAL
DOP CALC AO PEAK VEL: 1.24 M/S
DOP CALC AO VTI: 29.32 CM
DOP CALC LVOT AREA: 3.2 CM2
DOP CALC LVOT DIAMETER: 2.02 CM
DOP CALC LVOT PEAK VEL: 0.85 M/S
DOP CALC LVOT STROKE VOLUME: 60.41 CM3
DOP CALCLVOT PEAK VEL VTI: 18.86 CM
E WAVE DECELERATION TIME: 239.45 MSEC
E/A RATIO: 0.92
E/E' RATIO: 17.6
ECHO LV POSTERIOR WALL: 0.8 CM (ref 0.6–1.1)
EOSINOPHIL # BLD AUTO: 0.1 K/UL (ref 0–0.5)
EOSINOPHIL NFR BLD: 2 % (ref 0–8)
ERYTHROCYTE [DISTWIDTH] IN BLOOD BY AUTOMATED COUNT: 13 % (ref 11.5–14.5)
EST. GFR  (AFRICAN AMERICAN): >60 ML/MIN/1.73 M^2
EST. GFR  (NON AFRICAN AMERICAN): 60 ML/MIN/1.73 M^2
ESTIMATED AVG GLUCOSE: 303 MG/DL (ref 68–131)
FRACTIONAL SHORTENING: 37 % (ref 28–44)
GLUCOSE SERPL-MCNC: 297 MG/DL (ref 70–110)
HBA1C MFR BLD HPLC: 12.2 % (ref 4–5.6)
HCT VFR BLD AUTO: 35.6 % (ref 37–48.5)
HGB BLD-MCNC: 12.3 G/DL (ref 12–16)
INTERVENTRICULAR SEPTUM: 0.81 CM (ref 0.6–1.1)
IVRT: 0.09 MSEC
LA MAJOR: 4.55 CM
LA MINOR: 4.3 CM
LA WIDTH: 3.66 CM
LEFT ATRIUM SIZE: 2.98 CM
LEFT ATRIUM VOLUME INDEX: 22.2 ML/M2
LEFT ATRIUM VOLUME: 40.99 CM3
LEFT INTERNAL DIMENSION IN SYSTOLE: 2.76 CM (ref 2.1–4)
LEFT VENTRICLE DIASTOLIC VOLUME INDEX: 46.91 ML/M2
LEFT VENTRICLE DIASTOLIC VOLUME: 86.69 ML
LEFT VENTRICLE MASS INDEX: 59.3 G/M2
LEFT VENTRICLE SYSTOLIC VOLUME INDEX: 15.4 ML/M2
LEFT VENTRICLE SYSTOLIC VOLUME: 28.54 ML
LEFT VENTRICULAR INTERNAL DIMENSION IN DIASTOLE: 4.38 CM (ref 3.5–6)
LEFT VENTRICULAR MASS: 109.5 G
LV LATERAL E/E' RATIO: 14.67
LV SEPTAL E/E' RATIO: 22
LYMPHOCYTES # BLD AUTO: 1.6 K/UL (ref 1–4.8)
LYMPHOCYTES NFR BLD: 28.5 % (ref 18–48)
MCH RBC QN AUTO: 30.8 PG (ref 27–31)
MCHC RBC AUTO-ENTMCNC: 34.6 G/DL (ref 32–36)
MCV RBC AUTO: 89 FL (ref 82–98)
MONOCYTES # BLD AUTO: 0.5 K/UL (ref 0.3–1)
MONOCYTES NFR BLD: 9.6 % (ref 4–15)
MV PEAK A VEL: 0.96 M/S
MV PEAK E VEL: 0.88 M/S
NEUTROPHILS # BLD AUTO: 3.3 K/UL (ref 1.8–7.7)
NEUTROPHILS NFR BLD: 59.5 % (ref 38–73)
PISA TR MAX VEL: 2.42 M/S
PLATELET # BLD AUTO: 159 K/UL (ref 150–350)
PMV BLD AUTO: 10 FL (ref 9.2–12.9)
POCT GLUCOSE: 239 MG/DL (ref 70–110)
POCT GLUCOSE: 256 MG/DL (ref 70–110)
POCT GLUCOSE: 276 MG/DL (ref 70–110)
POCT GLUCOSE: 310 MG/DL (ref 70–110)
POTASSIUM SERPL-SCNC: 3.9 MMOL/L (ref 3.5–5.1)
PROT SERPL-MCNC: 6.6 G/DL (ref 6–8.4)
PULM VEIN S/D RATIO: 1.35
PV PEAK D VEL: 0.4 M/S
PV PEAK S VEL: 0.54 M/S
PV PEAK VELOCITY: 0.59 CM/S
RA MAJOR: 4.3 CM
RA PRESSURE: 3 MMHG
RA WIDTH: 2.84 CM
RBC # BLD AUTO: 3.99 M/UL (ref 4–5.4)
RIGHT VENTRICULAR END-DIASTOLIC DIMENSION: 3.06 CM
RV TISSUE DOPPLER FREE WALL SYSTOLIC VELOCITY 1 (APICAL 4 CHAMBER VIEW): 11.19 M/S
SINUS: 2.75 CM
SODIUM SERPL-SCNC: 135 MMOL/L (ref 136–145)
STJ: 2.47 CM
TDI LATERAL: 0.06
TDI SEPTAL: 0.04
TDI: 0.05
TR MAX PG: 23.43 MMHG
TRICUSPID ANNULAR PLANE SYSTOLIC EXCURSION: 1.86 CM
TROPONIN I SERPL DL<=0.01 NG/ML-MCNC: <0.006 NG/ML (ref 0–0.03)
TV REST PULMONARY ARTERY PRESSURE: 26 MMHG
WBC # BLD AUTO: 5.51 K/UL (ref 3.9–12.7)

## 2019-04-27 PROCEDURE — 25000003 PHARM REV CODE 250: Performed by: NURSE PRACTITIONER

## 2019-04-27 PROCEDURE — 93010 ELECTROCARDIOGRAM REPORT: CPT | Mod: ,,, | Performed by: INTERNAL MEDICINE

## 2019-04-27 PROCEDURE — 63600175 PHARM REV CODE 636 W HCPCS: Performed by: NURSE PRACTITIONER

## 2019-04-27 PROCEDURE — 36415 COLL VENOUS BLD VENIPUNCTURE: CPT

## 2019-04-27 PROCEDURE — 99217 PR OBSERVATION CARE DISCHARGE: ICD-10-PCS | Mod: ,,, | Performed by: NURSE PRACTITIONER

## 2019-04-27 PROCEDURE — 80053 COMPREHEN METABOLIC PANEL: CPT

## 2019-04-27 PROCEDURE — 93306 TTE W/DOPPLER COMPLETE: CPT | Mod: 26,,, | Performed by: INTERNAL MEDICINE

## 2019-04-27 PROCEDURE — 93010 EKG 12-LEAD: ICD-10-PCS | Mod: ,,, | Performed by: INTERNAL MEDICINE

## 2019-04-27 PROCEDURE — 85025 COMPLETE CBC W/AUTO DIFF WBC: CPT

## 2019-04-27 PROCEDURE — S5571 INSULIN DISPOS PEN 3 ML: HCPCS | Performed by: NURSE PRACTITIONER

## 2019-04-27 PROCEDURE — 99217 PR OBSERVATION CARE DISCHARGE: CPT | Mod: ,,, | Performed by: NURSE PRACTITIONER

## 2019-04-27 PROCEDURE — 25000003 PHARM REV CODE 250: Performed by: EMERGENCY MEDICINE

## 2019-04-27 PROCEDURE — 93306 TRANSTHORACIC ECHO (TTE) COMPLETE (CUPID ONLY): ICD-10-PCS | Mod: 26,,, | Performed by: INTERNAL MEDICINE

## 2019-04-27 PROCEDURE — 83036 HEMOGLOBIN GLYCOSYLATED A1C: CPT

## 2019-04-27 PROCEDURE — 93005 ELECTROCARDIOGRAM TRACING: CPT

## 2019-04-27 PROCEDURE — 84484 ASSAY OF TROPONIN QUANT: CPT

## 2019-04-27 PROCEDURE — G0378 HOSPITAL OBSERVATION PER HR: HCPCS

## 2019-04-27 PROCEDURE — 63600175 PHARM REV CODE 636 W HCPCS: Performed by: HOSPITALIST

## 2019-04-27 PROCEDURE — 94761 N-INVAS EAR/PLS OXIMETRY MLT: CPT

## 2019-04-27 PROCEDURE — 93306 TTE W/DOPPLER COMPLETE: CPT

## 2019-04-27 RX ORDER — INSULIN ASPART 100 [IU]/ML
10 INJECTION, SOLUTION INTRAVENOUS; SUBCUTANEOUS
Status: DISCONTINUED | OUTPATIENT
Start: 2019-04-27 | End: 2019-04-27 | Stop reason: HOSPADM

## 2019-04-27 RX ORDER — INSULIN ASPART 100 [IU]/ML
20 INJECTION, SOLUTION INTRAVENOUS; SUBCUTANEOUS
Qty: 36 ML | Refills: 0 | Status: SHIPPED | OUTPATIENT
Start: 2019-04-27 | End: 2019-06-26

## 2019-04-27 RX ADMIN — INSULIN DETEMIR 20 UNITS: 100 INJECTION, SOLUTION SUBCUTANEOUS at 09:04

## 2019-04-27 RX ADMIN — LISINOPRIL 20 MG: 20 TABLET ORAL at 09:04

## 2019-04-27 RX ADMIN — SODIUM CHLORIDE 500 ML: 0.9 INJECTION, SOLUTION INTRAVENOUS at 01:04

## 2019-04-27 RX ADMIN — INSULIN ASPART 10 UNITS: 100 INJECTION, SOLUTION INTRAVENOUS; SUBCUTANEOUS at 11:04

## 2019-04-27 RX ADMIN — INSULIN ASPART 6 UNITS: 100 INJECTION, SOLUTION INTRAVENOUS; SUBCUTANEOUS at 12:04

## 2019-04-27 RX ADMIN — INSULIN ASPART 10 UNITS: 100 INJECTION, SOLUTION INTRAVENOUS; SUBCUTANEOUS at 07:04

## 2019-04-27 RX ADMIN — INSULIN ASPART 10 UNITS: 100 INJECTION, SOLUTION INTRAVENOUS; SUBCUTANEOUS at 05:04

## 2019-04-27 RX ADMIN — INSULIN ASPART 4 UNITS: 100 INJECTION, SOLUTION INTRAVENOUS; SUBCUTANEOUS at 12:04

## 2019-04-27 RX ADMIN — INSULIN ASPART 6 UNITS: 100 INJECTION, SOLUTION INTRAVENOUS; SUBCUTANEOUS at 05:04

## 2019-04-27 RX ADMIN — LISINOPRIL 20 MG: 20 TABLET ORAL at 12:04

## 2019-04-27 RX ADMIN — ATORVASTATIN CALCIUM 80 MG: 20 TABLET, FILM COATED ORAL at 09:04

## 2019-04-27 RX ADMIN — PANTOPRAZOLE SODIUM 40 MG: 40 TABLET, DELAYED RELEASE ORAL at 09:04

## 2019-04-27 RX ADMIN — ASPIRIN 325 MG: 325 TABLET, DELAYED RELEASE ORAL at 09:04

## 2019-04-27 NOTE — ASSESSMENT & PLAN NOTE
- history of vertigo, but will evaluate for vasovagal syncope  - orthostatics q four hours for now  - fall precautions  - TTE pending

## 2019-04-27 NOTE — PLAN OF CARE
Initial Discharge Planning Assesment:  Patient admitted on 4/26/2019    Chart reviewed, Care plan discussed. Discussed care plan with treatment team,  attending Dr. Aguila    PCP updated in Epic: Dr. Louis  Pharmacy, updated in Epic: [facility] on     DME at home: None    Current dispo Home with family  Transportation: Granddaughter to drive home.    Case management  to follow.  No anticipated DC needs from CM perspective.       04/27/19 1505   Discharge Assessment   Assessment Type Discharge Planning Assessment   Confirmed/corrected address and phone number on facesheet? Yes   Assessment information obtained from? Patient   Communicated expected length of stay with patient/caregiver yes   Prior to hospitilization cognitive status: Alert/Oriented   Prior to hospitalization functional status: Independent   Current cognitive status: Alert/Oriented   Current Functional Status: Independent   Lives With spouse   Able to Return to Prior Arrangements yes   Is patient able to care for self after discharge? Yes   Who are your caregiver(s) and their phone number(s)? Spouse: Burak [phone]   Patient's perception of discharge disposition home or selfcare   Readmission Within the Last 30 Days no previous admission in last 30 days   Patient currently being followed by outpatient case management? No   Patient currently receives any other outside agency services? No   Equipment Currently Used at Home none   Do you have any problems affording any of your prescribed medications? No   Is the patient taking medications as prescribed? yes   Does the patient have transportation home? Yes   Transportation Anticipated family or friend will provide   Does the patient receive services at the [facility]? No   Discharge Plan A Home with family   DME Needed Upon Discharge  none   Patient/Family in Agreement with Plan yes

## 2019-04-27 NOTE — SUBJECTIVE & OBJECTIVE
"Past Medical History:   Diagnosis Date    Arthritis     Diabetes mellitus     Diabetes mellitus type I     GERD (gastroesophageal reflux disease)     Hypertension     Vertigo        Past Surgical History:   Procedure Laterality Date     SECTION      ESOPHAGOGASTRODUODENOSCOPY (EGD) N/A 2017    [facility]formed by [facility] (4[facility] F[facility])    ESOPHAGOGASTRODUODENOSCOPY (EGD) N/A 3/7/2017    Performed by [facility] at [facility] (4TH FLR)       Review of patient's allergies indicates:  No Known Allergies    No current facility-administered medications on file prior to encounter.      Current Outpatient Medications on File Prior to Encounter   Medication Sig    aspirin 81 mg Tab Take 1 tablet by mouth Daily.    atorvastatin (LIPITOR) 80 MG tablet Take 1 tablet (80 mg total) by mouth once daily.    insulin detemir U-100 (LEVEMIR FLEXTOUCH) 100 unit/mL (3 mL) SubQ InPn pen Inject 45 Units into the skin every evening.    lisinopril (PRINIVIL,ZESTRIL) 20 MG tablet TAKE ONE TABLET BY MOUTH TWICE DAILY    metFORMIN (GLUCOPHAGE) 1000 MG tablet TAKE ONE TABLET BY MOUTH TWICE DAILY    NOVOLOG FLEXPEN U-100 INSULIN 100 unit/mL InPn pen INJECT 20 UNITS SUBCUTANEOUSLY WITH MEALS PLUS  SLIDING  SCALE    omeprazole (PRILOSEC) 20 MG capsule Take 1 capsule (20 mg total) by mouth once daily.    traMADol (ULTRAM) 50 mg tablet Take 1 tablet (50 mg total) by mouth every 24 hours as needed for Pain (use especially at bedtime as needed.).    acetaminophen (TYLENOL) 325 MG tablet Take 2 tablets (650 mg total) by mouth every 6 (six) hours as needed for Pain.    BD ULTRA-FINE SHORT PEN NEEDLE 31 gauge x 5/16" Ndle USE ONE  4 TIMES DAILY WITH  INSULIN    blood sugar diagnostic Strp For TRUEresult- strips and lancets - pt is insulin dependent and checks glucose four times daily    blood sugar diagnostic, drum (ACCU-CHEK COMPACT TEST) Strp USE ONE STRIP TO CHECK GLUCOSE 4 TIMES DAILY BEFORE EACH " "MEAL AND AT BEDTIME    insulin syringe-needle U-100 1 mL 31 x 5/16" Syrg Use three times daily with insulin    lancets (LANCETS,THIN) Misc Use with accucheck benji    pen needle, diabetic (BD ULTRA-FINE SHORT PEN NEEDLE) 31 gauge x 5/16" Ndle USE WITH INSULIN 4 TIMES DAILY    pen needle, diabetic 32 gauge x 5/32" Ndle Use with insulin 4 times daily    timolol maleate 0.5% (TIMOPTIC) 0.5 % Drop     tiZANidine (ZANAFLEX) 2 MG tablet One-two every eight hours as needed for pain     Family History     Problem Relation (Age of Onset)    Aneurysm Father    Diabetes Mother, Sister, Brother    Heart disease Mother, Brother    Hyperlipidemia Mother    Hypertension Mother, Sister, Brother        Tobacco Use    Smoking status: Never Smoker    Smokeless tobacco: Never Used   Substance and Sexual Activity    Alcohol use: No    Drug use: No    Sexual activity: Yes     Partners: Male     Birth control/protection: Post-menopausal     Review of Systems   Constitutional: Negative for chills and fever.   HENT: Negative for trouble swallowing.    Eyes: Negative for visual disturbance.   Respiratory: Negative for shortness of breath.    Cardiovascular: Negative for chest pain.   Gastrointestinal: Negative for abdominal pain, diarrhea, nausea and vomiting.   Genitourinary: Negative for dysuria and hematuria.   Neurological: Negative for seizures and weakness.     Objective:     Vital Signs (Most Recent):  Temp: 99 °F (37.2 °C) (04/26/19 2244)  Pulse: (!) 123 (04/26/19 2249)  Resp: 20 (04/26/19 2249)  BP: 134/69 (04/26/19 2249)  SpO2: 98 % (04/26/19 2249) Vital Signs (24h Range):  Temp:  [97.8 °F (36.6 °C)-99 °F (37.2 °C)] 99 °F (37.2 °C)  Pulse:  [] 123  Resp:  [18-20] 20  SpO2:  [97 %-99 %] 98 %  BP: (121-152)/(60-94) 134/69     Weight: 79.4 kg (175 lb)  Body mass index is 30.04 kg/m².    Physical Exam   Constitutional: She is oriented to person, place, and time. She appears well-developed and well-nourished.   HENT: "   Head: Normocephalic and atraumatic.   Eyes: Pupils are equal, round, and reactive to light. Conjunctivae and lids are normal.   Neck: No JVD present.   Cardiovascular: Normal rate, regular rhythm and normal heart sounds.   Pulmonary/Chest: Effort normal and breath sounds normal.   Abdominal: Soft. Normal appearance and bowel sounds are normal. There is no tenderness.   Neurological: She is alert and oriented to person, place, and time. She has normal strength.   Skin: Skin is warm, dry and intact.   Psychiatric: She has a normal mood and affect. Her behavior is normal. Cognition and memory are normal.   Nursing note and vitals reviewed.        CRANIAL NERVES     CN III, IV, VI   Pupils are equal, round, and reactive to light.       Significant Labs:   CBC:   Recent Labs   Lab 04/26/19  1333   WBC 5.53   HGB 14.2   HCT 40.1        CMP:   Recent Labs   Lab 04/26/19  1333      K 4.3      CO2 27   *   BUN 14   CREATININE 1.2   CALCIUM 10.1   PROT 7.8   ALBUMIN 3.7   BILITOT 1.4*   ALKPHOS 237*   *   *   ANIONGAP 10   EGFRNONAA 48*     Cardiac Markers:   Recent Labs   Lab 04/26/19  1333   BNP <10     All pertinent labs within the past 24 hours have been reviewed.    Significant Imaging: I have reviewed all pertinent imaging results/findings within the past 24 hours.

## 2019-04-27 NOTE — NURSING
Pt AAOx4. No c/o chest pain, dizziness, nausea or SOB at this time.   Bed locked/lowest position, side rails up x 2, nonskid socks when out of bed, call light within reach. Patient encouraged to call for assistance when needed. Will continue to monitor.

## 2019-04-27 NOTE — PLAN OF CARE
Problem: Adult Inpatient Plan of Care  Goal: Plan of Care Review  Outcome: Ongoing (interventions implemented as appropriate)  Plan of care reviewed with the patient and spouse, any questions/concerns addressed. Patient and spouse verbalized understanding. V/S stable. Medicated per MAR. Denied pain throughout shift. Pt free of fall/injury, Bed in lowest position with wheels locked, side rails up x2, call light and belongings within reach. Will continue to monitor.

## 2019-04-27 NOTE — H&P
[facility] Medicine  History & Physical    Patient Name: Sunitha Pillai  MRN: [id number]  Admission Date: 4/26/2019  Attending Physician: Giovany Aguila MD   Primary Care Provider: Patty Louis MD         Patient information was obtained from patient, past medical records and ER records.     Subjective:     Principal Problem:Syncope    Chief Complaint:   Chief Complaint   Patient presents with    Dizziness     Pt reports dizziness since last night after 2100, room was spinning and pt felt better lying on ground, denies LOC, did report nausea w/ vertigo, also reports history of vertigo, also left sided chest pain since last night        HPI: Sunitha Pillai is a 63 y.o. female with hx of hypertension, uncontrolled type 2 diabetes mellitus and hyperlipidemia who presented to the [facility] with complaint of witnessed near syncope.  The patient reports she experienced an acute onset of lightheadedness and associated frontal headache last evening.   She states that she recalls feeling like she was falling and was subsequently by her  who laid her onto the ground. She states she decided to not to come to the ED last night as symptoms resolved.  However, when she awoke this morning she reports one episode of nausea with vomiting. She also reports intermittent right sided chest pain.   Pt has had similar chest pain in the past, with admission and normal left heart cath 9/2018.   She denies any fever, chills, shortness of breath, abdominal pain. Pt has no hx of smoking or drinking.  Initial work up in ED revealed negative troponin and electrocardiogram with sinus rhythm and no acute ischemia.  Her chest pain was treated with sublingual nitro and had resolved prior to arrival to observation.  Given her risk factors, she will be admitted under Observation to rule out myocardial infarction with serial troponin and electrocardiogram.            Past  "Medical History:   Diagnosis Date    Arthritis     Diabetes mellitus     Diabetes mellitus type I     GERD (gastroesophageal reflux disease)     Hypertension     Vertigo        Past Surgical History:   Procedure Laterality Date     SECTION      ESOPHAGOGASTRODUODENOSCOPY (EGD) N/A 2017    Performed by Anne Turner MD at [facility] (4TH FLR)    ESOPHAGOGASTRODUODENOSCOPY (EGD) N/A 3/7/2017    Performed by Anne Turner MD at [facility] (4TH FLR)       Review of patient's allergies indicates:  No Known Allergies    No current facility-administered medications on file prior to encounter.      Current Outpatient Medications on File Prior to Encounter   Medication Sig    aspirin 81 mg Tab Take 1 tablet by mouth Daily.    atorvastatin (LIPITOR) 80 MG tablet Take 1 tablet (80 mg total) by mouth once daily.    insulin detemir U-100 (LEVEMIR FLEXTOUCH) 100 unit/mL (3 mL) SubQ InPn pen Inject 45 Units into the skin every evening.    lisinopril (PRINIVIL,ZESTRIL) 20 MG tablet TAKE ONE TABLET BY MOUTH TWICE DAILY    metFORMIN (GLUCOPHAGE) 1000 MG tablet TAKE ONE TABLET BY MOUTH TWICE DAILY    NOVOLOG FLEXPEN U-100 INSULIN 100 unit/mL InPn pen INJECT 20 UNITS SUBCUTANEOUSLY WITH MEALS PLUS  SLIDING  SCALE    omeprazole (PRILOSEC) 20 MG capsule Take 1 capsule (20 mg total) by mouth once daily.    traMADol (ULTRAM) 50 mg tablet Take 1 tablet (50 mg total) by mouth every 24 hours as needed for Pain (use especially at bedtime as needed.).    acetaminophen (TYLENOL) 325 MG tablet Take 2 tablets (650 mg total) by mouth every 6 (six) hours as needed for Pain.    BD ULTRA-FINE SHORT PEN NEEDLE 31 gauge x 5/16" Ndle USE ONE  4 TIMES DAILY WITH  INSULIN    blood sugar diagnostic Strp For TRUEresult- strips and lancets - pt is insulin dependent and checks glucose four times daily    blood sugar diagnostic, drum (ACCU-CHEK COMPACT TEST) Strp USE ONE STRIP TO CHECK GLUCOSE 4 TIMES DAILY BEFORE EACH MEAL " "AND AT BEDTIME    insulin syringe-needle U-100 1 mL 31 x 5/16" Syrg Use three times daily with insulin    lancets (LANCETS,THIN) Misc Use with accucheck benji    pen needle, diabetic (BD ULTRA-FINE SHORT PEN NEEDLE) 31 gauge x 5/16" Ndle USE WITH INSULIN 4 TIMES DAILY    pen needle, diabetic 32 gauge x 5/32" Ndle Use with insulin 4 times daily    timolol maleate 0.5% (TIMOPTIC) 0.5 % Drop     tiZANidine (ZANAFLEX) 2 MG tablet One-two every eight hours as needed for pain     Family History     Problem Relation (Age of Onset)    Aneurysm Father    Diabetes Mother, Sister, Brother    Heart disease Mother, Brother    Hyperlipidemia Mother    Hypertension Mother, Sister, Brother        Tobacco Use    Smoking status: Never Smoker    Smokeless tobacco: Never Used   Substance and Sexual Activity    Alcohol use: No    Drug use: No    Sexual activity: Yes     Partners: Male     Birth control/protection: Post-menopausal     Review of Systems   Constitutional: Negative for chills and fever.   HENT: Negative for trouble swallowing.    Eyes: Negative for visual disturbance.   Respiratory: Negative for shortness of breath.    Cardiovascular: Negative for chest pain.   Gastrointestinal: Negative for abdominal pain, diarrhea, nausea and vomiting.   Genitourinary: Negative for dysuria and hematuria.   Neurological: Negative for seizures and weakness.     Objective:     Vital Signs (Most Recent):  Temp: 99 °F (37.2 °C) (04/26/19 2244)  Pulse: (!) 123 (04/26/19 2249)  Resp: 20 (04/26/19 2249)  BP: 134/69 (04/26/19 2249)  SpO2: 98 % (04/26/19 2249) Vital Signs (24h Range):  Temp:  [97.8 °F (36.6 °C)-99 °F (37.2 °C)] 99 °F (37.2 °C)  Pulse:  [] 123  Resp:  [18-20] 20  SpO2:  [97 %-99 %] 98 %  BP: (121-152)/(60-94) 134/69     Weight: 79.4 kg (175 lb)  Body mass index is 30.04 kg/m².    Physical Exam   Constitutional: She is oriented to person, place, and time. She appears well-developed and well-nourished.   HENT:   Head: " Normocephalic and atraumatic.   Eyes: Pupils are equal, round, and reactive to light. Conjunctivae and lids are normal.   Neck: No JVD present.   Cardiovascular: Normal rate, regular rhythm and normal heart sounds.   Pulmonary/Chest: Effort normal and breath sounds normal.   Abdominal: Soft. Normal appearance and bowel sounds are normal. There is no tenderness.   Neurological: She is alert and oriented to person, place, and time. She has normal strength.   Skin: Skin is warm, dry and intact.   Psychiatric: She has a normal mood and affect. Her behavior is normal. Cognition and memory are normal.   Nursing note and vitals reviewed.        CRANIAL NERVES     CN III, IV, VI   Pupils are equal, round, and reactive to light.       Significant Labs:   CBC:   Recent Labs   Lab 04/26/19  1333   WBC 5.53   HGB 14.2   HCT 40.1        CMP:   Recent Labs   Lab 04/26/19  1333      K 4.3      CO2 27   *   BUN 14   CREATININE 1.2   CALCIUM 10.1   PROT 7.8   ALBUMIN 3.7   BILITOT 1.4*   ALKPHOS 237*   *   *   ANIONGAP 10   EGFRNONAA 48*     Cardiac Markers:   Recent Labs   Lab 04/26/19  1333   BNP <10     All pertinent labs within the past 24 hours have been reviewed.    Significant Imaging: I have reviewed all pertinent imaging results/findings within the past 24 hours.    Assessment/Plan:     * Syncope  - history of vertigo, but will evaluate for vasovagal syncope  - orthostatics q four hours for now  - fall precautions  - TTE pending       Type 2 diabetes mellitus with complication, with long-term current use of insulin  - reports non-compliance with home medication regimen: levemir 45 units qHS, novolog 20 units with meals and sliding scale  - last noted A1C 11 in 9/2018  - will start detemir 20 units bid and aspart 10 units with sliding scale  - A1C pending with am labs        Vertigo  - per patient; states this episode not similar to prior episodes of vertigo  - will monitor      Chest  pain  - resolved prior to arrival to Observation; appears atypical for cardiac chest pain  - will continue to trend troponins and will repeat EKG in morning and PRN with acute chest pain  - TTE pending  - negative left heart cath 2018 per medical record      GERD (gastroesophageal reflux disease)  - protonix 40 mg daily  - monitor      VTE Risk Mitigation (From admission, onward)        Ordered     IP VTE HIGH RISK PATIENT  Once      04/26/19 2323     Place NIYA hose  Until discontinued      04/26/19 2323     Place sequential compression device  Until discontinued      04/26/19 2323             Jocelynn Mcneill NP  Department of Hospital Medicine   [facility]

## 2019-04-27 NOTE — ASSESSMENT & PLAN NOTE
- resolved prior to arrival to Observation; appears atypical for cardiac chest pain  - will continue to trend troponins and will repeat EKG in morning and PRN with acute chest pain  - TTE pending  - negative left heart cath 2018 per medical record

## 2019-04-27 NOTE — NURSING
The patient was discharged to home. Discharge instructions reviewed with the patient and her spouse. The patient  acknowledge/verbalized understanding. IV line was removed, cath intact. Telemetry box removed. Patient will travel home via personal vehicle with family.

## 2019-04-27 NOTE — HPI
Sunitha Pillai is a 63 y.o. female with hx of hypertension, uncontrolled type 2 diabetes mellitus and hyperlipidemia who presented to the [facility] with complaint of witnessed near syncope.  The patient reports she experienced an acute onset of lightheadedness and associated frontal headache last evening.   She states that she recalls feeling like she was falling and was subsequently by her  who laid her onto the ground. She states she decided to not to come to the ED last night as symptoms resolved.  However, when she awoke this morning she reports one episode of nausea with vomiting. She also reports intermittent right sided chest pain.   Pt has had similar chest pain in the past, with admission and normal left heart cath 9/2018.   She denies any fever, chills, shortness of breath, abdominal pain. Pt has no hx of smoking or drinking.  Initial work up in ED revealed negative troponin and electrocardiogram with sinus rhythm and no acute ischemia.  Her chest pain was treated with sublingual nitro and had resolved prior to arrival to observation.  Given her risk factors, she will be admitted under Observation to rule out myocardial infarction with serial troponin and electrocardiogram.

## 2019-04-27 NOTE — ASSESSMENT & PLAN NOTE
- reports non-compliance with home medication regimen: levemir 45 units qHS, novolog 20 units with meals and sliding scale  - last noted A1C 11 in 9/2018  - will start detemir 20 units bid and aspart 10 units with sliding scale  - A1C pending with am labs

## 2019-04-28 NOTE — DISCHARGE SUMMARY
[facility]  Discharge Summary      Patient Name: Sunitha Pillai  MRN: [id number]  Admission Date: 4/26/2019  Hospital Length of Stay: 0 days  Discharge Date and Time: 4/27/2019  5:50 PM  Attending Physician: Giovany Aguila MD   Discharging Provider: Jocelynn Mcneill NP  Primary Care Provider: Patty Louis MD      HPI:   Sunitha Pillai is a 63 y.o. female with hx of hypertension, uncontrolled type 2 diabetes mellitus and hyperlipidemia who presented to the [facility] with complaint of witnessed near syncope.  The patient reports she experienced an acute onset of lightheadedness and associated frontal headache last evening.   She states that she recalls feeling like she was falling and was subsequently by her  who laid her onto the ground. She states she decided to not to come to the ED last night as symptoms resolved.  However, when she awoke this morning she reports one episode of nausea with vomiting. She also reports intermittent right sided chest pain.   Pt has had similar chest pain in the past, with admission and normal left heart cath 9/2018.   She denies any fever, chills, shortness of breath, abdominal pain. Pt has no hx of smoking or drinking.  Initial work up in ED revealed negative troponin and electrocardiogram with sinus rhythm and no acute ischemia.  Her chest pain was treated with sublingual nitro and had resolved prior to arrival to observation.  Given her risk factors, she will be admitted under Observation to rule out myocardial infarction with serial troponin and electrocardiogram.            Hospital Course:   In light of her risk factors, Sunitha Pillai was admitted to the hospital under observation.  The patient reports that her chest pain resolved had resolved completely.  Based on serial troponins and electrocardiograms she ruled out for an acute myocardial infraction. In terms of her near syncopal episode, I  am uncertain of th etiology.  She was not found to be orthostatic, but was found with mild tachycardia on standing and was without symptoms.  TTE was found with normal EF 60% with mild diastolic dysfunction.  Of note, she also had negative left heart catheterization in September 2018.  During her Observation stay she was noted to have elevated liver enzymes AST//262 and A1C 12.2 (she readily admits to not taking her medications as prescribed.)  I am uncertain as to the etiology of her elevated liver enzymes, but I have discontinued her atorvastatin for now.  I have urged her to follow closely with her PCP for management of her uncontrolled diabetes as well as for further evaluation of her elevated liver enzymes.  I have scheduled CMP to be collected prior to her PCP visit.            Final Active Diagnoses:    Diagnosis Date Noted POA    PRINCIPAL PROBLEM:  Vasovagal syncope [R55] 04/26/2019 Yes    Elevated liver enzymes [R74.8] 04/27/2019 Yes    Type 2 diabetes mellitus with complication, with long-term current use of insulin [E11.8, Z79.4] 09/23/2018 Not Applicable    Vertigo [R42] 11/05/2015 Yes    Atypical chest pain [R07.89] 12/08/2013 Yes    GERD (gastroesophageal reflux disease) [K21.9] 07/23/2012 Yes      Problems Resolved During this Admission:    Diagnosis Date Noted Date Resolved POA    Near syncope [R55]  04/27/2019 Yes       Discharged Condition: good    Disposition: Home or Self Care    Follow Up:  Follow-up Information     Patty Louis MD. Schedule an appointment as soon as possible for a visit in 1 week.    Specialty:  Internal Medicine  Why:  follow up after hospital discharge and for possible oupatient cardiac testing and for evaluation of elevated liver enzymes  Contact information:  [address]  [facility] [id number] [phone]                 Patient Instructions:      Comprehensive metabolic panel   Standing Status: Future Standing Exp. Date: 06/25/20     Diet  diabetic     Notify your health care provider if you experience any of the following:  severe uncontrolled pain     Notify your health care provider if you experience any of the following:  redness, tenderness, or signs of infection (pain, swelling, redness, odor or green/yellow discharge around incision site)     Activity as tolerated       Significant Diagnostic Studies: Labs:     CMP  Sodium   Date Value Ref Range Status   04/27/2019 135 (L) 136 - 145 mmol/L Final     Potassium   Date Value Ref Range Status   04/27/2019 3.9 3.5 - 5.1 mmol/L Final     Chloride   Date Value Ref Range Status   04/27/2019 106 95 - 110 mmol/L Final     CO2   Date Value Ref Range Status   04/27/2019 21 (L) 23 - 29 mmol/L Final     Glucose   Date Value Ref Range Status   04/27/2019 297 (H) 70 - 110 mg/dL Final     BUN, Bld   Date Value Ref Range Status   04/27/2019 14 8 - 23 mg/dL Final     Creatinine   Date Value Ref Range Status   04/27/2019 1.0 0.5 - 1.4 mg/dL Final     Calcium   Date Value Ref Range Status   04/27/2019 9.2 8.7 - 10.5 mg/dL Final     Total Protein   Date Value Ref Range Status   04/27/2019 6.6 6.0 - 8.4 g/dL Final     Albumin   Date Value Ref Range Status   04/27/2019 2.9 (L) 3.5 - 5.2 g/dL Final     Total Bilirubin   Date Value Ref Range Status   04/27/2019 0.9 0.1 - 1.0 mg/dL Final     Comment:     For infants and newborns, interpretation of results should be based  on gestational age, weight and in agreement with clinical  observations.  Premature Infant recommended reference ranges:  Up to 24 hours.............<8.0 mg/dL  Up to 48 hours............<12.0 mg/dL  3-5 days..................<15.0 mg/dL  6-29 days.................<15.0 mg/dL       Alkaline Phosphatase   Date Value Ref Range Status   04/27/2019 236 (H) 55 - 135 U/L Final     AST   Date Value Ref Range Status   04/27/2019 355 (H) 10 - 40 U/L Final     ALT   Date Value Ref Range Status   04/27/2019 262 (H) 10 - 44 U/L Final     Anion Gap   Date Value Ref  "Range Status   04/27/2019 8 8 - 16 mmol/L Final     eGFR if    Date Value Ref Range Status   04/27/2019 >60 >60 mL/min/1.73 m^2 Final     eGFR if non    Date Value Ref Range Status   04/27/2019 60 >60 mL/min/1.73 m^2 Final     Comment:     Calculation used to obtain the estimated glomerular filtration  rate (eGFR) is the CKD-EPI equation.              and All labs within the past 24 hours have been reviewed      Medications:  Reconciled Home Medications:      Medication List      CHANGE how you take these medications    insulin aspart U-100 100 unit/mL Inpn pen  Commonly known as:  NovoLOG Flexpen U-100 Insulin  Inject 20 Units into the skin 3 (three) times daily with meals.  What changed:  See the new instructions.        CONTINUE taking these medications    aspirin 81 mg Tab  Take 1 tablet by mouth Daily.     blood sugar diagnostic Strp  For TRUEresult- strips and lancets - pt is insulin dependent and checks glucose four times daily     blood sugar diagnostic, drum Strp  Commonly known as:  ACCU-CHEK COMPACT TEST  USE ONE STRIP TO CHECK GLUCOSE 4 TIMES DAILY BEFORE EACH MEAL AND AT BEDTIME     insulin detemir U-100 100 unit/mL (3 mL) Inpn pen  Commonly known as:  LEVEMIR FLEXTOUCH  Inject 45 Units into the skin every evening.     insulin syringe-needle U-100 1 mL 31 gauge x 5/16 Syrg  Use three times daily with insulin     lancets Misc  Commonly known as:  LANCETS,THIN  Use with accucheck benji     lisinopril 20 MG tablet  Commonly known as:  PRINIVIL,ZESTRIL  TAKE ONE TABLET BY MOUTH TWICE DAILY     metFORMIN 1000 MG tablet  Commonly known as:  GLUCOPHAGE  TAKE ONE TABLET BY MOUTH TWICE DAILY     omeprazole 20 MG capsule  Commonly known as:  PRILOSEC  Take 1 capsule (20 mg total) by mouth once daily.     * pen needle, diabetic 32 gauge x 5/32" Ndle  Use with insulin 4 times daily     * BD ULTRA-FINE SHORT PEN NEEDLE 31 gauge x 5/16" Ndle  Generic drug:  pen needle, diabetic  USE ONE " " 4 TIMES DAILY WITH  INSULIN     * pen needle, diabetic 31 gauge x 5/16" Ndle  Commonly known as:  BD ULTRA-FINE SHORT PEN NEEDLE  USE WITH INSULIN 4 TIMES DAILY     timolol maleate 0.5% 0.5 % Drop  Commonly known as:  TIMOPTIC     traMADol 50 mg tablet  Commonly known as:  ULTRAM  Take 1 tablet (50 mg total) by mouth every 24 hours as needed for Pain (use especially at bedtime as needed.).         * This list has 3 medication(s) that are the same as other medications prescribed for you. Read the directions carefully, and ask your doctor or other care provider to review them with you.            STOP taking these medications    acetaminophen 325 MG tablet  Commonly known as:  TYLENOL     atorvastatin 80 MG tablet  Commonly known as:  LIPITOR     tiZANidine 2 MG tablet  Commonly known as:  ZANAFLEX              Time spent on the discharge of patient: >30 minutes  Patient was seen and examined on the date of discharge and determined to be suitable for discharge.         Jocelynn Mcneill NP  Department of Hospital Medicine  [facility]  "

## 2019-04-29 NOTE — HOSPITAL COURSE
In light of her risk factors, Sunitha Pillai was admitted to the hospital under observation.  The patient reports that her chest pain resolved had resolved completely.  Based on serial troponins and electrocardiograms she ruled out for an acute myocardial infraction. In terms of her near syncopal episode, I am uncertain of th etiology.  She was not found to be orthostatic, but was found with mild tachycardia on standing and was without symptoms.  TTE was found with normal EF 60% with mild diastolic dysfunction.  Of note, she also had negative left heart catheterization in September 2018.  During her Observation stay she was noted to have elevated liver enzymes AST//262 and A1C 12.2 (she readily admits to not taking her medications as prescribed.)  I am uncertain as to the etiology of her elevated liver enzymes, but I have discontinued her atorvastatin for now.  I have urged her to follow closely with her PCP for management of her uncontrolled diabetes as well as for further evaluation of her elevated liver enzymes.  I have scheduled CMP to be collected prior to her PCP visit.

## 2019-05-08 ENCOUNTER — PATIENT OUTREACH (OUTPATIENT)
Dept: ADMINISTRATIVE | Facility: HOSPITAL | Age: 64
End: 2019-05-08

## 2019-05-08 DIAGNOSIS — Z12.31 ENCOUNTER FOR SCREENING MAMMOGRAM FOR BREAST CANCER: Primary | ICD-10-CM

## 2019-05-08 NOTE — LETTER
May 8, 2019    [facility]               [facility]  [address]  [facility] [address]  Phone: [phone] May 8, 2019     Patient: Sunitha Pillai    YOB: 1955   Date of Visit: 5/8/2019     To Whom It May Concern:    The patient listed above was seen recently by her primary care provider, Dr. Patty Louis, at [facility]. Please release the following information specified below for the patient:    Most recent diabetic eye exam results    Please fax the requested record to our secure fax number [phone], Attention: PRINCESS [employer].    Thank you for your help! If I can be of any assistance please contact me at the number listed below.      Regards,    Keagan Warren LPN  Clinical Care Coordinator  [facility]  [facility]  [phone] phone  [phone] fax  [email or web address]

## 2019-05-08 NOTE — PROGRESS NOTES
Outreach to pt completed. Pt sees outside eye care provider- [facility]- says she is up to date with exams. Record request faxed.     Pt due for mammogram. Order placed & scheduled for 5/14/19, per pt request.   Chart review also completed. Immunizations reconciled, HM modifiers updated, HM duplicate entries deleted, care team updated, old orders deleted. Schedule note placed for PCP to complete foot exam & address statin.     Also, Pt most recent A1c 12.2 & last OV B/P uncontrolled. Unable to reach pt when called back to schedule diabetes education. Copy to RN Disease Care Manager for further support.

## 2019-05-09 DIAGNOSIS — E11.65 UNCONTROLLED TYPE 2 DIABETES MELLITUS WITH HYPERGLYCEMIA: Primary | ICD-10-CM

## 2019-05-09 NOTE — PROGRESS NOTES
Record rcvd from [facility]. Pt last exam 3/25/19. Record scanned to chart, HM updated.

## 2019-05-13 ENCOUNTER — LAB VISIT (OUTPATIENT)
Dept: LAB | Facility: HOSPITAL | Age: 64
End: 2019-05-13
Attending: INTERNAL MEDICINE
Payer: COMMERCIAL

## 2019-05-13 ENCOUNTER — OFFICE VISIT (OUTPATIENT)
Dept: INTERNAL MEDICINE | Facility: CLINIC | Age: 64
End: 2019-05-13
Payer: COMMERCIAL

## 2019-05-13 VITALS
OXYGEN SATURATION: 99 % | DIASTOLIC BLOOD PRESSURE: 70 MMHG | TEMPERATURE: 98 F | SYSTOLIC BLOOD PRESSURE: 118 MMHG | HEART RATE: 93 BPM | BODY MASS INDEX: 30.86 KG/M2 | WEIGHT: 174.19 LBS | HEIGHT: 63 IN

## 2019-05-13 DIAGNOSIS — R74.8 ELEVATED LIVER ENZYMES: ICD-10-CM

## 2019-05-13 DIAGNOSIS — E55.9 MILD VITAMIN D DEFICIENCY: ICD-10-CM

## 2019-05-13 DIAGNOSIS — R42 VERTIGO: Primary | ICD-10-CM

## 2019-05-13 DIAGNOSIS — M25.532 LEFT WRIST PAIN: ICD-10-CM

## 2019-05-13 DIAGNOSIS — R09.89 BRUIT: ICD-10-CM

## 2019-05-13 DIAGNOSIS — I10 ESSENTIAL HYPERTENSION: ICD-10-CM

## 2019-05-13 DIAGNOSIS — Z01.419 NORMAL GYNECOLOGIC EXAMINATION: ICD-10-CM

## 2019-05-13 DIAGNOSIS — E11.65 UNCONTROLLED TYPE 2 DIABETES MELLITUS WITH HYPERGLYCEMIA: ICD-10-CM

## 2019-05-13 DIAGNOSIS — R55 SYNCOPE, UNSPECIFIED SYNCOPE TYPE: ICD-10-CM

## 2019-05-13 LAB
25(OH)D3+25(OH)D2 SERPL-MCNC: 20 NG/ML (ref 30–96)
ALBUMIN SERPL BCP-MCNC: 3.6 G/DL (ref 3.5–5.2)
ALBUMIN/CREAT UR: 33.3 UG/MG (ref 0–30)
ALP SERPL-CCNC: 190 U/L (ref 55–135)
ALT SERPL W/O P-5'-P-CCNC: 136 U/L (ref 10–44)
ANION GAP SERPL CALC-SCNC: 8 MMOL/L (ref 8–16)
AST SERPL-CCNC: 77 U/L (ref 10–40)
BACTERIA #/AREA URNS AUTO: ABNORMAL /HPF
BILIRUB SERPL-MCNC: 0.7 MG/DL (ref 0.1–1)
BILIRUB UR QL STRIP: NEGATIVE
BUN SERPL-MCNC: 11 MG/DL (ref 8–23)
CALCIUM SERPL-MCNC: 10.5 MG/DL (ref 8.7–10.5)
CHLORIDE SERPL-SCNC: 102 MMOL/L (ref 95–110)
CLARITY UR REFRACT.AUTO: CLEAR
CO2 SERPL-SCNC: 29 MMOL/L (ref 23–29)
COLOR UR AUTO: YELLOW
CREAT SERPL-MCNC: 1 MG/DL (ref 0.5–1.4)
CREAT UR-MCNC: 75 MG/DL (ref 15–325)
EST. GFR  (AFRICAN AMERICAN): >60 ML/MIN/1.73 M^2
EST. GFR  (NON AFRICAN AMERICAN): 59.7 ML/MIN/1.73 M^2
GLUCOSE SERPL-MCNC: 257 MG/DL (ref 70–110)
GLUCOSE UR QL STRIP: ABNORMAL
HAV IGM SERPL QL IA: NEGATIVE
HBV CORE IGM SERPL QL IA: NEGATIVE
HBV SURFACE AG SERPL QL IA: NEGATIVE
HCV AB SERPL QL IA: NEGATIVE
HGB UR QL STRIP: NEGATIVE
KETONES UR QL STRIP: NEGATIVE
LEUKOCYTE ESTERASE UR QL STRIP: ABNORMAL
MICROALBUMIN UR DL<=1MG/L-MCNC: 25 UG/ML
MICROSCOPIC COMMENT: ABNORMAL
NITRITE UR QL STRIP: NEGATIVE
PH UR STRIP: 6 [PH] (ref 5–8)
POTASSIUM SERPL-SCNC: 4.9 MMOL/L (ref 3.5–5.1)
PROT SERPL-MCNC: 7.8 G/DL (ref 6–8.4)
PROT UR QL STRIP: NEGATIVE
RBC #/AREA URNS AUTO: 0 /HPF (ref 0–4)
SODIUM SERPL-SCNC: 139 MMOL/L (ref 136–145)
SP GR UR STRIP: 1.01 (ref 1–1.03)
SQUAMOUS #/AREA URNS AUTO: 1 /HPF
T4 FREE SERPL-MCNC: 0.92 NG/DL (ref 0.71–1.51)
TSH SERPL DL<=0.005 MIU/L-ACNC: 0.24 UIU/ML (ref 0.4–4)
URN SPEC COLLECT METH UR: ABNORMAL
WBC #/AREA URNS AUTO: 10 /HPF (ref 0–5)

## 2019-05-13 PROCEDURE — 3008F PR BODY MASS INDEX (BMI) DOCUMENTED: ICD-10-PCS | Mod: CPTII,S$GLB,, | Performed by: INTERNAL MEDICINE

## 2019-05-13 PROCEDURE — 3008F BODY MASS INDEX DOCD: CPT | Mod: CPTII,S$GLB,, | Performed by: INTERNAL MEDICINE

## 2019-05-13 PROCEDURE — 3074F PR MOST RECENT SYSTOLIC BLOOD PRESSURE < 130 MM HG: ICD-10-PCS | Mod: CPTII,S$GLB,, | Performed by: INTERNAL MEDICINE

## 2019-05-13 PROCEDURE — 99214 OFFICE O/P EST MOD 30 MIN: CPT | Mod: S$GLB,,, | Performed by: INTERNAL MEDICINE

## 2019-05-13 PROCEDURE — 99999 PR PBB SHADOW E&M-EST. PATIENT-LVL V: CPT | Mod: PBBFAC,,, | Performed by: INTERNAL MEDICINE

## 2019-05-13 PROCEDURE — 80074 ACUTE HEPATITIS PANEL: CPT

## 2019-05-13 PROCEDURE — 3046F HEMOGLOBIN A1C LEVEL >9.0%: CPT | Mod: CPTII,S$GLB,, | Performed by: INTERNAL MEDICINE

## 2019-05-13 PROCEDURE — 80053 COMPREHEN METABOLIC PANEL: CPT

## 2019-05-13 PROCEDURE — 82043 UR ALBUMIN QUANTITATIVE: CPT

## 2019-05-13 PROCEDURE — 3078F DIAST BP <80 MM HG: CPT | Mod: CPTII,S$GLB,, | Performed by: INTERNAL MEDICINE

## 2019-05-13 PROCEDURE — 99999 PR PBB SHADOW E&M-EST. PATIENT-LVL V: ICD-10-PCS | Mod: PBBFAC,,, | Performed by: INTERNAL MEDICINE

## 2019-05-13 PROCEDURE — 3074F SYST BP LT 130 MM HG: CPT | Mod: CPTII,S$GLB,, | Performed by: INTERNAL MEDICINE

## 2019-05-13 PROCEDURE — 3078F PR MOST RECENT DIASTOLIC BLOOD PRESSURE < 80 MM HG: ICD-10-PCS | Mod: CPTII,S$GLB,, | Performed by: INTERNAL MEDICINE

## 2019-05-13 PROCEDURE — 99214 PR OFFICE/OUTPT VISIT, EST, LEVL IV, 30-39 MIN: ICD-10-PCS | Mod: S$GLB,,, | Performed by: INTERNAL MEDICINE

## 2019-05-13 PROCEDURE — 36415 COLL VENOUS BLD VENIPUNCTURE: CPT | Mod: PO

## 2019-05-13 PROCEDURE — 82306 VITAMIN D 25 HYDROXY: CPT

## 2019-05-13 PROCEDURE — 84443 ASSAY THYROID STIM HORMONE: CPT

## 2019-05-13 PROCEDURE — 3046F PR MOST RECENT HEMOGLOBIN A1C LEVEL > 9.0%: ICD-10-PCS | Mod: CPTII,S$GLB,, | Performed by: INTERNAL MEDICINE

## 2019-05-13 PROCEDURE — 84439 ASSAY OF FREE THYROXINE: CPT

## 2019-05-13 PROCEDURE — 81001 URINALYSIS AUTO W/SCOPE: CPT

## 2019-05-13 RX ORDER — CANDESARTAN 8 MG/1
8 TABLET ORAL DAILY
Qty: 90 TABLET | Refills: 3 | Status: SHIPPED | OUTPATIENT
Start: 2019-05-13 | End: 2020-07-03

## 2019-05-14 ENCOUNTER — HOSPITAL ENCOUNTER (OUTPATIENT)
Dept: RADIOLOGY | Facility: HOSPITAL | Age: 64
Discharge: HOME | End: 2019-05-14
Attending: INTERNAL MEDICINE
Payer: COMMERCIAL

## 2019-05-14 DIAGNOSIS — Z12.31 ENCOUNTER FOR SCREENING MAMMOGRAM FOR BREAST CANCER: ICD-10-CM

## 2019-05-14 PROCEDURE — 77067 SCR MAMMO BI INCL CAD: CPT | Mod: 26,,, | Performed by: RADIOLOGY

## 2019-05-14 PROCEDURE — 77067 SCR MAMMO BI INCL CAD: CPT | Mod: TC

## 2019-05-14 PROCEDURE — 77067 MAMMO DIGITAL SCREENING BILAT WITH TOMOSYNTHESIS_CAD: ICD-10-PCS | Mod: 26,,, | Performed by: RADIOLOGY

## 2019-05-14 PROCEDURE — 77063 MAMMO DIGITAL SCREENING BILAT WITH TOMOSYNTHESIS_CAD: ICD-10-PCS | Mod: 26,,, | Performed by: RADIOLOGY

## 2019-05-14 PROCEDURE — 77063 BREAST TOMOSYNTHESIS BI: CPT | Mod: 26,,, | Performed by: RADIOLOGY

## 2019-05-15 ENCOUNTER — TELEPHONE (OUTPATIENT)
Dept: INTERNAL MEDICINE | Facility: CLINIC | Age: 64
End: 2019-05-15

## 2019-05-15 NOTE — TELEPHONE ENCOUNTER
----- Message from Deana Pyle MA sent at 5/15/2019  2:01 PM CDT -----  Linda SERVIN Staff  Cc: Joanne Blanco MA; Deana Pyle MA          staff this Linda with pre-service regarding patient's MRI/MRA   we need Patty [facility] clinical notes to support reason for the MRI/MRA.   Please fax Patty Tipton clinical notes to attn:Linda [phone] or   Call me @ ext [phone] or IM me once [facility] clinical notes becomes   Available for me to process and submit case request to [facility] for auth approval   [facility] around [facility] for [facility] approval   Thanks

## 2019-05-21 ENCOUNTER — OFFICE VISIT (OUTPATIENT)
Dept: ORTHOPEDICS | Facility: CLINIC | Age: 64
End: 2019-05-21
Payer: COMMERCIAL

## 2019-05-21 ENCOUNTER — HOSPITAL ENCOUNTER (OUTPATIENT)
Dept: RADIOLOGY | Facility: HOSPITAL | Age: 64
Discharge: HOME | End: 2019-05-21
Attending: INTERNAL MEDICINE
Payer: COMMERCIAL

## 2019-05-21 VITALS — WEIGHT: 174.19 LBS | HEIGHT: 63 IN | BODY MASS INDEX: 30.86 KG/M2

## 2019-05-21 DIAGNOSIS — R42 VERTIGO: ICD-10-CM

## 2019-05-21 DIAGNOSIS — M25.532 LEFT WRIST PAIN: Primary | ICD-10-CM

## 2019-05-21 PROCEDURE — 70551 MRI BRAIN STEM W/O DYE: CPT | Mod: TC

## 2019-05-21 PROCEDURE — 99999 PR PBB SHADOW E&M-EST. PATIENT-LVL III: CPT | Mod: PBBFAC,,, | Performed by: ORTHOPAEDIC SURGERY

## 2019-05-21 PROCEDURE — 99214 PR OFFICE/OUTPT VISIT, EST, LEVL IV, 30-39 MIN: ICD-10-PCS | Mod: S$GLB,,, | Performed by: ORTHOPAEDIC SURGERY

## 2019-05-21 PROCEDURE — 3008F PR BODY MASS INDEX (BMI) DOCUMENTED: ICD-10-PCS | Mod: CPTII,S$GLB,, | Performed by: ORTHOPAEDIC SURGERY

## 2019-05-21 PROCEDURE — 70551 MRI BRAIN WITHOUT CONTRAST: ICD-10-PCS | Mod: 26,,, | Performed by: RADIOLOGY

## 2019-05-21 PROCEDURE — 70551 MRI BRAIN STEM W/O DYE: CPT | Mod: 26,,, | Performed by: RADIOLOGY

## 2019-05-21 PROCEDURE — 99214 OFFICE O/P EST MOD 30 MIN: CPT | Mod: S$GLB,,, | Performed by: ORTHOPAEDIC SURGERY

## 2019-05-21 PROCEDURE — 3008F BODY MASS INDEX DOCD: CPT | Mod: CPTII,S$GLB,, | Performed by: ORTHOPAEDIC SURGERY

## 2019-05-21 PROCEDURE — 99999 PR PBB SHADOW E&M-EST. PATIENT-LVL III: ICD-10-PCS | Mod: PBBFAC,,, | Performed by: ORTHOPAEDIC SURGERY

## 2019-05-21 NOTE — PROGRESS NOTES
I have personally taken the history and examined this patient. I agree with the resident's note as stated above. Pt has pain in her left wrist. Pt was unable to go to OT due to the fact it was too expensive.  Pt has had pain since injury almost 1 year ago.  Exam: Endpoint with DRUJ shuck test but apinful, pain over ECU. + swelling over wrist.    MRI for 8/18 reviewed.  Plan OT at outside OT. Discussed surgical treatment but pt will need OT after surgery and if she cannot afford OT this may make her wrist worse. Also discussed injection with pt. Pt wants OT first

## 2019-05-21 NOTE — LETTER
May 24, 2019      Patty Louis MD  [address]  [facility] [address]           [facility] [place] [address] [facility]  [facility], [address]  [facility] [address]  Phone: [phone]          Patient: Sunitha Pillai   MR Number: [id number]   YOB: 1955   Date of Visit: 5/21/2019       Dear Dr. Patty Loius:    Thank you for referring Sunitha Pillai to me for evaluation. Attached you will find relevant portions of my assessment and plan of care.    If you have questions, please do not hesitate to call me. I look forward to following Sunitha Pillai along with you.    Sincerely,    Eva Acevedo MD    Enclosure  CC:  No Recipients    If you would like to receive this communication electronically, please contact [email or web address] or [phone] to request more information on [facility] Link access.    For providers and/or their staff who would like to refer a patient to [facility], please contact us through our one-stop-shop provider referral line, [facility], at [phone].    If you feel you have received this communication in error or would no longer like to receive these types of communications, please e-mail [email or web address]

## 2019-05-21 NOTE — PROGRESS NOTES
"Subjective:      Patient ID: Sunitha Pillai is a 63 y.o. female.    Chief Complaint: Pain of the Left Wrist and Pain of the Left Forearm      HPI  Sunitha Pillai is a left hand dominant 63 y.o. female presenting today for left wrist pain.  There was a history of trauma, she fell in April 2018.  She reports that the fall occurred at work, she was treated after the fall for knee pain. She states that she is not trying to use workman's Comp for the wrist.  Onset of wrist symptoms began June or July 2018.  She says that the pain interferes with sleep and work. She has pain using the left wrist to eat. Increased pain with lifting.  She denies any finger numbness or tingling.  She has not tried bracing or therapy.  Discussed possibly getting a MRI to evaluate her now chronic wrist pain, but she reports that she did previously have a MRI of the left wrist.  She works in the laundry.      Interval History 5/21/19:   Patient returns to clinic today stating symptoms have not improved and is worse than the last time she was seen in hand clinic. Patient reports not going to therapy since she was unable to afford the copay. She reports not wearing the brace ordered last clinic visit due to it being uncomfortable. She reports that she fell at work again in January but does not think she landed on her wrist at that time. Denies any numbness or tingling. States symptoms of pain with lifiting or movement of the wrist continue.    Review of patient's allergies indicates:  No Known Allergies      Current Outpatient Medications   Medication Sig Dispense Refill    aspirin 81 mg Tab Take 1 tablet by mouth Daily.      candesartan (ATACAND) 8 MG tablet Take 1 tablet (8 mg total) by mouth once daily. 90 tablet 3    metFORMIN (GLUCOPHAGE) 1000 MG tablet TAKE ONE TABLET BY MOUTH TWICE DAILY 60 tablet 1    BD ULTRA-FINE SHORT PEN NEEDLE 31 gauge x 5/16" Ndle USE ONE  4 TIMES DAILY WITH  INSULIN 100 each 3    blood sugar " "diagnostic Strp For TRUEresult- strips and lancets - pt is insulin dependent and checks glucose four times daily 200 each 6    blood sugar diagnostic, drum (ACCU-CHEK COMPACT TEST) Strp USE ONE STRIP TO CHECK GLUCOSE 4 TIMES DAILY BEFORE EACH MEAL AND AT BEDTIME 100 each 6    flash glucose scanning reader (FREESTYLE ELIZABETH 14 DAY READER) Mis Pt to check glucose before meals and at bedtime, E11.9 1 each 6    flash glucose sensor (FREESTYLE ELIZABETH 14 DAY SENSOR) Kit Pt to check glucose before meals and at bedtime, E11.9 1 kit 6    insulin aspart U-100 (NOVOLOG FLEXPEN U-100 INSULIN) 100 unit/mL InPn pen Inject 20 Units into the skin 3 (three) times daily with meals. 36 mL 0    insulin detemir U-100 (LEVEMIR FLEXTOUCH) 100 unit/mL (3 mL) SubQ InPn pen Inject 45 Units into the skin every evening. 13.5 mL 2    insulin syringe-needle U-100 1 mL 31 x 5/16" Syrg Use three times daily with insulin 100 each 6    lancets (LANCETS,THIN) Misc Use with accucheck benji 100 each 11    pen needle, diabetic (BD ULTRA-FINE SHORT PEN NEEDLE) 31 gauge x 5/16" Ndle USE WITH INSULIN 4 TIMES DAILY 400 each 0    pen needle, diabetic 32 gauge x 5/32" Ndle Use with insulin 4 times daily 400 each 6     No current facility-administered medications for this visit.        Past Medical History:   Diagnosis Date    Arthritis     Diabetes mellitus     Diabetes mellitus type I     GERD (gastroesophageal reflux disease)     Hypertension     Vertigo        Past Surgical History:   Procedure Laterality Date     SECTION      ESOPHAGOGASTRODUODENOSCOPY (EGD) N/A 2017    [facility] ([facility] F[facility])    ESOPHAGOGASTRODUODENOSCOPY (EGD) N/A 3/7/2017    Performed by [facility] at [facility] (4TH FLR)         Review of Systems:  Review of Systems   Constitution: Negative for chills and fever.   Skin: Negative for rash and suspicious lesions.   Musculoskeletal:        See HPI   Neurological: " "Negative for dizziness, headaches, light-headedness, numbness and paresthesias.   Psychiatric/Behavioral: Negative for depression. The patient is not nervous/anxious.          OBJECTIVE:     PHYSICAL EXAM:  Height: 5' 3" (160 cm) Weight: 79 kg (174 lb 2.6 oz)  Vitals:    05/21/19 1319   Weight: 79 kg (174 lb 2.6 oz)   Height: 5' 3" (1.6 m)   PainSc:   5     General    Vitals reviewed.  Constitutional: She is oriented to person, place, and time. She appears well-developed and well-nourished.   HENT:   Head: Normocephalic and atraumatic.   Neck: Normal range of motion.   Cardiovascular: Normal rate.    Pulmonary/Chest: Effort normal. No respiratory distress.   Neurological: She is alert and oriented to person, place, and time.   Psychiatric: She has a normal mood and affect. Her behavior is normal. Judgment and thought content normal.             Musculoskeletal:  Mild edema noted over the dorsal ulnar aspect of the left wrist. No scars or abrasions.  Tender to palpation at the DRUJ and ulnar aspect of the left wrist. Nontender at the anatomical snuffbox and radial styloid, also nontender at the radiocarpal joint. Pain with Shuck test.  Decreased left wrist range of motion due to pain. Reports pain with pronation and supination of the left upper extremity. Decreased supination. Neurovascularly intact-good sensation and motor function, good capillary refill, 2+ radial pulses.    RADIOGRAPHS:  Left Wrist X-Ray, 11/28/18  FINDINGS:  No evidence of acute fracture or dislocation.  Mild-to-moderate degenerative changes noted involving the 1st carpometacarpal joint.  Mild soft tissue edema about the wrist.      Impression     Mild soft tissue edema and degenerative change.  No acute bony abnormality.     Left Wrist MRI, 8/29/18  Impression       Moderate-sized joint effusion of the distal radial ulnar joint.    Intramuscular edema of the pronator quadratus, potentially representing strain.    Partial subluxation of ECU with " possible disruption of the sub sheath.    Mild extensor digitorum tenosynovitis.    Mild thumb osteoarthritis.       Comments: I have personally reviewed the imaging and I agree with the above radiologist's report.    ASSESSMENT/PLAN:   Pt is a 64 yo F with chronic wrist pain, ECU subsheath disruption seen on MRI    -Discussed with patient that she may require diagnostic wrist arthroscopy with repair of ECU subsheath. However patient has yet to try any therapy. Will work to find more affordable therapy for patient since this is current barrier for her.

## 2019-05-22 ENCOUNTER — CLINICAL SUPPORT (OUTPATIENT)
Dept: CARDIOLOGY | Facility: HOSPITAL | Age: 64
End: 2019-05-22
Attending: INTERNAL MEDICINE
Payer: COMMERCIAL

## 2019-05-22 DIAGNOSIS — R55 SYNCOPE, UNSPECIFIED SYNCOPE TYPE: ICD-10-CM

## 2019-05-22 PROCEDURE — 93227 XTRNL ECG REC<48 HR R&I: CPT | Mod: ,,, | Performed by: INTERNAL MEDICINE

## 2019-05-22 PROCEDURE — 93226 XTRNL ECG REC<48 HR SCAN A/R: CPT

## 2019-05-22 PROCEDURE — 93227 HOLTER MONITOR - 24 HOUR (CUPID ONLY): ICD-10-PCS | Mod: ,,, | Performed by: INTERNAL MEDICINE

## 2019-05-24 LAB
OHS CV EVENT MONITOR DAY: 0
OHS CV HOLTER LENGTH DECIMAL HOURS: 24
OHS CV HOLTER LENGTH HOURS: 24
OHS CV HOLTER LENGTH MINUTES: 0

## 2019-05-28 ENCOUNTER — TELEPHONE (OUTPATIENT)
Dept: INTERNAL MEDICINE | Facility: CLINIC | Age: 64
End: 2019-05-28

## 2019-05-28 NOTE — PROGRESS NOTES
Subjective:       Patient ID: Sunitha Pillai is a 63 y.o. female.    Chief Complaint: Annual Exam    HPIPt with a history of near syncope /syncope - pt with intermittent dizziness - no diplopia,some nausea, no vomiting.  L arm/wrist pain.  Review of Systems   Respiratory: Negative for shortness of breath (PND or orthopnea).    Cardiovascular: Negative for chest pain (arm pain or jaw pain).   Gastrointestinal: Negative for abdominal pain, diarrhea, nausea and vomiting.   Genitourinary: Negative for dysuria.   Neurological: Negative for seizures, syncope and headaches.       Objective:      Physical Exam   Constitutional: She is oriented to person, place, and time. She appears well-developed and well-nourished. No distress.   HENT:   Head: Normocephalic.   Mouth/Throat: Oropharynx is clear and moist.   Neck: Neck supple. No JVD present. No thyromegaly present.   Cardiovascular: Normal rate, regular rhythm, normal heart sounds and intact distal pulses. Exam reveals no gallop and no friction rub.   No murmur heard.  Pulmonary/Chest: Effort normal and breath sounds normal. She has no wheezes. She has no rales.   Abdominal: Soft. Bowel sounds are normal. She exhibits no distension and no mass. There is no tenderness. There is no rebound and no guarding.   Musculoskeletal: She exhibits no edema.   Lymphadenopathy:     She has no cervical adenopathy.   Neurological: She is alert and oriented to person, place, and time. She has normal reflexes.   Skin: Skin is warm and dry.   Psychiatric: She has a normal mood and affect. Her behavior is normal. Judgment and thought content normal.       Assessment:       1. Vertigo    2. Essential hypertension    3. Uncontrolled type 2 diabetes mellitus with hyperglycemia    4. Elevated liver enzymes    5. Bruit    6. Syncope, unspecified syncope type    7. Mild vitamin D deficiency    8. Normal gynecologic examination    9. Left wrist pain        Plan:   Vertigo  -     MRI Brain  Without Contrast; Future; Expected date: 05/13/2019  -     MRA Brain without contrast; Future; Expected date: 05/13/2019    Essential hypertension  -     Comprehensive metabolic panel; Future; Expected date: 05/13/2019  -     TSH; Future; Expected date: 05/13/2019  Controlled - continue current meds    Uncontrolled type 2 diabetes mellitus with hyperglycemia  -     Urinalysis  -     Microalbumin/creatinine urine ratio  -     Ambulatory consult to Endocrinology    Elevated liver enzymes  -     Hepatitis panel, acute; Future; Expected date: 05/13/2019    Bruit  -     MRA Neck without contrast; Future; Expected date: 05/13/2019    Syncope, unspecified syncope type  -     Holter monitor - 24 hour; Future    Mild vitamin D deficiency  -     Vitamin D; Future; Expected date: 05/13/2019    Normal gynecologic examination  -     Ambulatory consult to Gynecology    Left wrist pain  -     Ambulatory consult to Orthopedics    Other orders  -     flash glucose scanning reader (FREESTYLE ELIZABETH 14 DAY READER) Misc; Pt to check glucose before meals and at bedtime, E11.9  Dispense: 1 each; Refill: 6  -     flash glucose sensor (FREESTYLE ELIZABETH 14 DAY SENSOR) Kit; Pt to check glucose before meals and at bedtime, E11.9  Dispense: 1 kit; Refill: 6  -     candesartan (ATACAND) 8 MG tablet; Take 1 tablet (8 mg total) by mouth once daily.  Dispense: 90 tablet; Refill: 3  -     Urinalysis Microscopic

## 2019-05-28 NOTE — TELEPHONE ENCOUNTER
Patient had to rescheduled MRI and MRA due to referrals needing to be attached and needing a Peer to peer for the MRA.       Please Advise  Thank You

## 2019-05-30 ENCOUNTER — OFFICE VISIT (OUTPATIENT)
Dept: OBSTETRICS AND GYNECOLOGY | Facility: CLINIC | Age: 64
End: 2019-05-30
Payer: COMMERCIAL

## 2019-05-30 VITALS
WEIGHT: 177.25 LBS | HEIGHT: 63 IN | BODY MASS INDEX: 31.41 KG/M2 | SYSTOLIC BLOOD PRESSURE: 116 MMHG | DIASTOLIC BLOOD PRESSURE: 76 MMHG

## 2019-05-30 DIAGNOSIS — E11.8 TYPE 2 DIABETES MELLITUS WITH COMPLICATION, WITH LONG-TERM CURRENT USE OF INSULIN: ICD-10-CM

## 2019-05-30 DIAGNOSIS — Z79.4 TYPE 2 DIABETES MELLITUS WITH COMPLICATION, WITH LONG-TERM CURRENT USE OF INSULIN: ICD-10-CM

## 2019-05-30 DIAGNOSIS — Z01.419 ENCOUNTER FOR CERVICAL PAP SMEAR WITH PELVIC EXAM: Primary | ICD-10-CM

## 2019-05-30 PROCEDURE — 99999 PR PBB SHADOW E&M-EST. PATIENT-LVL III: CPT | Mod: PBBFAC,,, | Performed by: OBSTETRICS & GYNECOLOGY

## 2019-05-30 PROCEDURE — 3078F PR MOST RECENT DIASTOLIC BLOOD PRESSURE < 80 MM HG: ICD-10-PCS | Mod: CPTII,S$GLB,, | Performed by: OBSTETRICS & GYNECOLOGY

## 2019-05-30 PROCEDURE — 87624 HPV HI-RISK TYP POOLED RSLT: CPT

## 2019-05-30 PROCEDURE — 3074F SYST BP LT 130 MM HG: CPT | Mod: CPTII,S$GLB,, | Performed by: OBSTETRICS & GYNECOLOGY

## 2019-05-30 PROCEDURE — 99396 PR PREVENTIVE VISIT,EST,40-64: ICD-10-PCS | Mod: S$GLB,,, | Performed by: OBSTETRICS & GYNECOLOGY

## 2019-05-30 PROCEDURE — 3074F PR MOST RECENT SYSTOLIC BLOOD PRESSURE < 130 MM HG: ICD-10-PCS | Mod: CPTII,S$GLB,, | Performed by: OBSTETRICS & GYNECOLOGY

## 2019-05-30 PROCEDURE — 3078F DIAST BP <80 MM HG: CPT | Mod: CPTII,S$GLB,, | Performed by: OBSTETRICS & GYNECOLOGY

## 2019-05-30 PROCEDURE — 3046F PR MOST RECENT HEMOGLOBIN A1C LEVEL > 9.0%: ICD-10-PCS | Mod: CPTII,S$GLB,, | Performed by: OBSTETRICS & GYNECOLOGY

## 2019-05-30 PROCEDURE — 3046F HEMOGLOBIN A1C LEVEL >9.0%: CPT | Mod: CPTII,S$GLB,, | Performed by: OBSTETRICS & GYNECOLOGY

## 2019-05-30 PROCEDURE — 99396 PREV VISIT EST AGE 40-64: CPT | Mod: S$GLB,,, | Performed by: OBSTETRICS & GYNECOLOGY

## 2019-05-30 PROCEDURE — 88175 CYTOPATH C/V AUTO FLUID REDO: CPT

## 2019-05-30 PROCEDURE — 99999 PR PBB SHADOW E&M-EST. PATIENT-LVL III: ICD-10-PCS | Mod: PBBFAC,,, | Performed by: OBSTETRICS & GYNECOLOGY

## 2019-05-30 RX ORDER — LISINOPRIL 40 MG/1
TABLET ORAL
Refills: 4 | COMMUNITY
Start: 2019-05-10 | End: 2019-08-13

## 2019-05-30 NOTE — LETTER
May 30, 2019      Patty Louis MD  [address]  [facility] [address]           [facility] [id number] [address]  [address] [facility] [address]  Phone: [phone]          Patient: Sunitha Pillai   MR Number: [id number]   YOB: 1955   Date of Visit: 5/30/2019       Dear Dr. Patty Louis:    Thank you for referring Sunitha Pillai to me for evaluation. Attached you will find relevant portions of my assessment and plan of care.    If you have questions, please do not hesitate to call me. I look forward to following Sunitha Pillai along with you.    Sincerely,    Hank Doran MD    Enclosure  CC:  No Recipients    If you would like to receive this communication electronically, please contact [email or web address] or [phone] to request more information on [facility] Link access.    For providers and/or their staff who would like to refer a patient to [facility], please contact us through our one-stop-shop provider referral line, [facility], at [phone].    If you feel you have received this communication in error or would no longer like to receive these types of communications, please e-mail [email or web address]

## 2019-05-30 NOTE — PROGRESS NOTES
SUBJECTIVE:   63 y.o. female   for annual routine Pap and checkup. No LMP recorded (lmp unknown). Patient is postmenopausal..  She has no unusual complaints.        Past Medical History:   Diagnosis Date    Arthritis     Diabetes mellitus     Diabetes mellitus type I     GERD (gastroesophageal reflux disease)     Hypertension     Vertigo      Past Surgical History:   Procedure Laterality Date     SECTION      ESOPHAGOGASTRODUODENOSCOPY (EGD) N/A 2017    [facility]formed [facility] (4TH FLR)    ESOPHAGOGASTRODUODENOSCOPY (EGD) N/A 3/7/2017    Performed by [facility] at [facility] (4TH FLR)     Social History     Socioeconomic History    Marital status:      Spouse name: Not on file    Number of children: Not on file    Years of education: Not on file    Highest education level: Not on file   Occupational History    Not on file   Social Needs    Financial resource strain: Not on file    Food insecurity:     Worry: Not on file     Inability: Not on file    Transportation needs:     Medical: Not on file     Non-medical: Not on file   Tobacco Use    Smoking status: Never Smoker    Smokeless tobacco: Never Used   Substance and Sexual Activity    Alcohol use: No    Drug use: No    Sexual activity: Yes     Partners: Male     Birth control/protection: Post-menopausal   Lifestyle    Physical activity:     Days per week: Not on file     Minutes per session: Not on file    Stress: Not on file   Relationships    Social connections:     Talks on phone: Not on file     Gets together: Not on file     Attends Gnosticist service: Not on file     Active member of club or organization: Not on file     Attends meetings of clubs or organizations: Not on file     Relationship status: Not on file   Other Topics Concern    Not on file   Social History Narrative    Not on file     Family History   Problem Relation Age of Onset    Hypertension Mother      "Hyperlipidemia Mother     Diabetes Mother     Heart disease Mother     Aneurysm Father     Diabetes Sister     Hypertension Sister     Heart disease Brother     Diabetes Brother     Hypertension Brother     Breast cancer Neg Hx     Colon cancer Neg Hx     Ovarian cancer Neg Hx      OB History    Para Term  AB Living   3 3 3     3   SAB TAB Ectopic Multiple Live Births           3      # Outcome Date GA Lbr Jay/2nd Weight Sex Delivery Anes PTL Lv   3 Term      CS-LTranv  N BRITTANY   2 Term      CS-LTranv  N BRITTANY   1 Term      CS-LTranv  N BRITTANY         Current Outpatient Medications   Medication Sig Dispense Refill    aspirin 81 mg Tab Take 1 tablet by mouth Daily.      BD ULTRA-FINE SHORT PEN NEEDLE 31 gauge x 5/16" Ndle USE ONE  4 TIMES DAILY WITH  INSULIN 100 each 3    blood sugar diagnostic Strp For TRUEresult- strips and lancets - pt is insulin dependent and checks glucose four times daily 200 each 6    blood sugar diagnostic, drum (ACCU-CHEK COMPACT TEST) Strp USE ONE STRIP TO CHECK GLUCOSE 4 TIMES DAILY BEFORE EACH MEAL AND AT BEDTIME 100 each 6    candesartan (ATACAND) 8 MG tablet Take 1 tablet (8 mg total) by mouth once daily. 90 tablet 3    flash glucose scanning reader (FREESTYLE ELIZABETH 14 DAY READER) [facility] to check glucose before meals and at bedtime, E11.9 1 each 6    flash glucose sensor (FREESTYLE ELIZABETH 14 DAY SENSOR) Kit Pt to check glucose before meals and at bedtime, E11.9 1 kit 6    insulin aspart U-100 (NOVOLOG FLEXPEN U-100 INSULIN) 100 unit/mL InPn pen Inject 20 Units into the skin 3 (three) times daily with meals. 36 mL 0    insulin syringe-needle U-100 1 mL 31 x 5/16" Syrg Use three times daily with insulin 100 each 6    lancets (LANCETS,THIN) Misc Use with accucheck benji 100 each 11    lisinopril (PRINIVIL,ZESTRIL) 40 MG tablet   4    metFORMIN (GLUCOPHAGE) 1000 MG tablet TAKE ONE TABLET BY MOUTH TWICE DAILY 60 tablet 1    pen needle, diabetic (BD ULTRA-FINE " "SHORT PEN NEEDLE) 31 gauge x 5/16" Ndle USE WITH INSULIN 4 TIMES DAILY 400 each 0    pen needle, diabetic 32 gauge x 5/32" Ndle Use with insulin 4 times daily 400 each 6    insulin detemir U-100 (LEVEMIR FLEXTOUCH) 100 unit/mL (3 mL) SubQ InPn pen Inject 45 Units into the skin every evening. 13.5 mL 2     No current facility-administered medications for this visit.      Allergies: Patient has no known allergies.     ROS:  Constitutional: no weight loss, weight gain, fever, fatigue  Eyes:  No vision changes, glasses/contacts  ENT/Mouth: No ulcers, sinus problems, ears ringing, headache  Cardiovascular: No inability to lie flat, chest pain, exercise intolerance, swelling, heart palpitations  Respiratory: No wheezing, coughing blood, shortness of breath, or cough  Gastrointestinal: No diarrhea, bloody stool, nausea/vomiting, constipation, gas, hemorrhoids  Genitourinary: No blood in urine, painful urination, urgency of urination, frequency of urination, incomplete emptying, incontinence, abnormal bleeding, painful periods, heavy periods, vaginal discharge, vaginal odor, painful intercourse, sexual problems, bleeding after intercourse.  Musculoskeletal: No muscle weakness  Skin/Breast: No painful breasts, nipple discharge, masses, rash, ulcers  Neurological: No passing out, seizures, numbness, headache  Endocrine: No diabetes, hypothyroid, hyperthyroid, hot flashes, hair loss, abnormal hair growth, ance  Psychiatric: No depression, crying  Hematologic: No bruises, bleeding, swollen lymph nodes, anemia.      OBJECTIVE:   The patient appears well, alert, oriented x 3, in no distress.  /76 (BP Location: Right arm, Patient Position: Sitting, BP Method: Small (Manual))   Ht 5' 3" (1.6 m)   Wt 80.4 kg (177 lb 4 oz)   LMP  (LMP Unknown)   BMI 31.40 kg/m²   NECK: no thyromegaly, trachea midline  SKIN: no acne, striae, hirsutism  BREAST EXAM: breasts appear normal, no suspicious masses, no skin or nipple changes or " axillary nodes  ABDOMEN: soft, non-tender; bowel sounds normal; no masses,  no organomegaly and no hernias, masses, or hepatosplenomegaly  GENITALIA: normal external genitalia, no erythema, no discharge  URETHRA: normal appearing urethra with no masses, tenderness or lesions and normal urethra, normal urethral meatus  VAGINA: Normal  CERVIX: no lesions or cervical motion tenderness  UTERUS: normal size, contour, position, consistency, mobility, non-tender  ADNEXA: no mass, fullness, tenderness    \  ASSESSMENT:   .Sunitha was seen today for well woman.    Diagnoses and all orders for this visit:    Encounter for cervical Pap smear with pelvic exam  -     Liquid-based pap smear, screening  -     HPV High Risk Genotypes, PCR    Type 2 diabetes mellitus with complication, with long-term current use of insulin

## 2019-06-06 LAB
HPV HR 12 DNA CVX QL NAA+PROBE: NEGATIVE
HPV16 AG SPEC QL: NEGATIVE
HPV18 DNA SPEC QL NAA+PROBE: NEGATIVE

## 2019-06-07 ENCOUNTER — HOSPITAL ENCOUNTER (OUTPATIENT)
Dept: RADIOLOGY | Facility: HOSPITAL | Age: 64
Discharge: HOME | End: 2019-06-07
Attending: INTERNAL MEDICINE
Payer: COMMERCIAL

## 2019-06-07 DIAGNOSIS — R09.89 BRUIT: ICD-10-CM

## 2019-06-07 PROCEDURE — 70547 MRA NECK WITHOUT CONTRAST: ICD-10-PCS | Mod: 26,,, | Performed by: RADIOLOGY

## 2019-06-07 PROCEDURE — 70544 MR ANGIOGRAPHY HEAD W/O DYE: CPT | Mod: TC

## 2019-06-07 PROCEDURE — 70547 MR ANGIOGRAPHY NECK W/O DYE: CPT | Mod: 26,,, | Performed by: RADIOLOGY

## 2019-06-07 PROCEDURE — 70544 MR ANGIOGRAPHY HEAD W/O DYE: CPT | Mod: 26,,, | Performed by: RADIOLOGY

## 2019-06-07 PROCEDURE — 70547 MR ANGIOGRAPHY NECK W/O DYE: CPT | Mod: TC

## 2019-06-07 PROCEDURE — 70544 MRA BRAIN WITHOUT CONTRAST: ICD-10-PCS | Mod: 26,,, | Performed by: RADIOLOGY

## 2019-06-26 ENCOUNTER — OFFICE VISIT (OUTPATIENT)
Dept: ENDOCRINOLOGY | Facility: CLINIC | Age: 64
End: 2019-06-26
Payer: COMMERCIAL

## 2019-06-26 VITALS
DIASTOLIC BLOOD PRESSURE: 74 MMHG | SYSTOLIC BLOOD PRESSURE: 123 MMHG | HEART RATE: 81 BPM | WEIGHT: 181.44 LBS | HEIGHT: 63 IN | BODY MASS INDEX: 32.15 KG/M2

## 2019-06-26 DIAGNOSIS — E66.01 CLASS 2 SEVERE OBESITY DUE TO EXCESS CALORIES WITH SERIOUS COMORBIDITY AND BODY MASS INDEX (BMI) OF 35.0 TO 35.9 IN ADULT: ICD-10-CM

## 2019-06-26 DIAGNOSIS — R74.8 ELEVATED LIVER ENZYMES: ICD-10-CM

## 2019-06-26 DIAGNOSIS — I10 ESSENTIAL HYPERTENSION: ICD-10-CM

## 2019-06-26 DIAGNOSIS — Z79.4 TYPE 2 DIABETES MELLITUS WITH COMPLICATION, WITH LONG-TERM CURRENT USE OF INSULIN: Primary | ICD-10-CM

## 2019-06-26 DIAGNOSIS — Z71.89 COUNSELING AND COORDINATION OF CARE: ICD-10-CM

## 2019-06-26 DIAGNOSIS — E11.8 TYPE 2 DIABETES MELLITUS WITH COMPLICATION, WITH LONG-TERM CURRENT USE OF INSULIN: Primary | ICD-10-CM

## 2019-06-26 PROCEDURE — 3078F DIAST BP <80 MM HG: CPT | Mod: CPTII,S$GLB,, | Performed by: NURSE PRACTITIONER

## 2019-06-26 PROCEDURE — 3046F PR MOST RECENT HEMOGLOBIN A1C LEVEL > 9.0%: ICD-10-PCS | Mod: CPTII,S$GLB,, | Performed by: NURSE PRACTITIONER

## 2019-06-26 PROCEDURE — 3008F PR BODY MASS INDEX (BMI) DOCUMENTED: ICD-10-PCS | Mod: CPTII,S$GLB,, | Performed by: NURSE PRACTITIONER

## 2019-06-26 PROCEDURE — 99999 PR PBB SHADOW E&M-EST. PATIENT-LVL IV: CPT | Mod: PBBFAC,,, | Performed by: NURSE PRACTITIONER

## 2019-06-26 PROCEDURE — 99204 PR OFFICE/OUTPT VISIT, NEW, LEVL IV, 45-59 MIN: ICD-10-PCS | Mod: S$GLB,,, | Performed by: NURSE PRACTITIONER

## 2019-06-26 PROCEDURE — 3008F BODY MASS INDEX DOCD: CPT | Mod: CPTII,S$GLB,, | Performed by: NURSE PRACTITIONER

## 2019-06-26 PROCEDURE — 99204 OFFICE O/P NEW MOD 45 MIN: CPT | Mod: S$GLB,,, | Performed by: NURSE PRACTITIONER

## 2019-06-26 PROCEDURE — 3078F PR MOST RECENT DIASTOLIC BLOOD PRESSURE < 80 MM HG: ICD-10-PCS | Mod: CPTII,S$GLB,, | Performed by: NURSE PRACTITIONER

## 2019-06-26 PROCEDURE — 99999 PR PBB SHADOW E&M-EST. PATIENT-LVL IV: ICD-10-PCS | Mod: PBBFAC,,, | Performed by: NURSE PRACTITIONER

## 2019-06-26 PROCEDURE — 3046F HEMOGLOBIN A1C LEVEL >9.0%: CPT | Mod: CPTII,S$GLB,, | Performed by: NURSE PRACTITIONER

## 2019-06-26 PROCEDURE — 3074F PR MOST RECENT SYSTOLIC BLOOD PRESSURE < 130 MM HG: ICD-10-PCS | Mod: CPTII,S$GLB,, | Performed by: NURSE PRACTITIONER

## 2019-06-26 PROCEDURE — 3074F SYST BP LT 130 MM HG: CPT | Mod: CPTII,S$GLB,, | Performed by: NURSE PRACTITIONER

## 2019-06-26 RX ORDER — INSULIN ASPART 100 [IU]/ML
INJECTION, SOLUTION INTRAVENOUS; SUBCUTANEOUS
Qty: 30 ML | Refills: 6 | Status: SHIPPED | OUTPATIENT
Start: 2019-06-26 | End: 2019-08-29 | Stop reason: SDUPTHER

## 2019-06-26 NOTE — PATIENT INSTRUCTIONS
Snacks can be an important part of a balanced, healthy meal plan. They allow you to eat more frequently, feeling full and satisfied throughout the day. Also, they allow you to spread carbohydrates evenly, which may stabilize blood sugars.  Plus, snacks are enjoyable!     The amount of carbohydrate needed at snacks varies. Generally, about 15-30 grams of carbohydrate per snack is recommended.  Below you will find some tasty treats.       0-5 gm carb   Crystal Light   Vitamin Water Zero   Herbal tea, unsweetened   2 tsp peanut butter on celery   1./2 cup sugar-free jell-o   1 sugar-free popsicle   ¼ cup blueberries   8oz Blue Margaret unsweetened almond milk   5 baby carrots & celery sticks, cucumbers, bell peppers dipped in ¼ cup salsa, 2Tbsp light ranch dressing or 2Tbsp plain Greek yogurt   10 Goldfish crackers   ½ oz low-fat cheese or string cheese   1 closed handful of nuts, unsalted   1 Tbsp of sunflower seeds, unsalted   1 cup Smart Pop popcorn   1 whole grain brown rice cake        15 gm carb   1 small piece of fruit or ½ banana or 1/2 cup lite canned fruit   3 brittanie cracker squares   3 cups Smart Pop popcorn, top spray butter, Gutierrez lite salt or cinnamon and Truvia   5 Vanilla Wafers   ½ cup low fat, no added sugar ice cream or frozen yogurt (Blue bell, Blue Bunny, Weight Watchers, Skinny Cow)   ½ turkey, ham, or chicken sandwich   ½ c fruit with ½ c Cottage cheese   4-6 unsalted wheat crackers with 1 oz low fat cheese or 1 tbsp peanut butter    30-45 goldfish crackers (depending on flavor)    7-8 Yazidism mini brown rice cakes (caramel, apple cinnamon, chocolate)    12 Yazidism mini brown rice cakes (cheddar, bbq, ranch)    1/3 cup hummus dip with raw veg   1/2 whole wheat dick, 1Tbsp hummus   Mini Pizza (1/2 whole wheat English muffin, low-fat  cheese, tomato sauce)   100 calorie snack pack (Oreo, Chips Ahoy, Ritz Mix, Baked Cheetos)   4-6 oz. light or Greek Style yogurt  ([place], Yoplaimarcelino, [place], [facility])   ½ cup sugar-free pudding     6 in. wheat tortilla or dick oven toasted chips (topped with spray butter flavoring, cinnamon, Truvia OR spray butter, garlic powder, chili powder)    18 BBQ Popchips (available at [employer], Whole Foods, Fresh Market)                   Diabetes Support Group Meetings         Date: Topic:   February 14 Eat Fit MONY/Health Promotion   March 14 Taking Care of Your Smile   April 11 Spring into Healthy Eating/Cooking Demo   May 9 Ease Your Mind with Diabetes   Quynh 13 Summer Treats/Cooking Demo   July 11 Eat Fit MONY/Super Market Sweep   August 8 Taking Care of Your Eyes and Feet   Sept 12 Technology/ADA updates   October 10 Recipes & Treats/Cooking Demo   November 14 Heart Health/Pump it up!   December 12 Year-End Close Out        Meetings are held in the [facility] (A) of the [facility] located at [address] [place] [address]. Please call [phone] for additional information.    Free service, offered every 2nd Thursday of every month! Family members and/or friends are welcome as well!  Support group is for patients with type 1 or type 2 diabetes.    From 3:30p to 4:30p        Dulaglutide injection  What is this medicine?  DULAGLUTIDE (DOO la GLOO tide) is used to improve blood sugar control in adults with type 2 diabetes. This medicine may be used with other oral diabetes medicines.  How should I use this medicine?  This medicine is for injection under the skin of your upper leg (thigh), stomach area, or upper arm. It is usually given once every week (every 7 days). You will be taught how to prepare and give this medicine. Use exactly as directed. Take your medicine at regular intervals. Do not take it more often than directed.  If you use this medicine with insulin, you should inject this medicine and the insulin separately. Do not mix them together. Do not give the injections right next to  each other. Change (rotate) injection sites with each injection.  It is important that you put your used needles and syringes in a special sharps container. Do not put them in a trash can. If you do not have a sharps container, call your pharmacist or healthcare provider to get one.  A special MedGuide will be given to you by the pharmacist with each prescription and refill. Be sure to read this information carefully each time.  Talk to your pediatrician regarding the use of this medicine in children. Special care may be needed.  What side effects may I notice from receiving this medicine?  Side effects that you should report to your doctor or health care professional as soon as possible:  · allergic reactions like skin rash, itching or hives, swelling of the face, lips, or tongue  · breathing problems  · signs and symptoms of low blood sugar such as feeling anxious, confusion, dizziness, increased hunger, unusually weak or tired, sweating, shakiness, cold, irritable, headache, blurred vision, fast heartbeat, loss of consciousness  · unusual stomach upset or pain  · vomiting  Side effects that usually do not require medical attention (Report these to your doctor or health care professional if they continue or are bothersome.):diarrhea  · heartburn  · loss of appetite  · nausea  · pain, redness, or irritation at site where injected  What may interact with this medicine?  Do not take this medicine with any of the following medications:  · gatifloxacin  Many medications may cause changes in blood sugar, these include:  · alcohol containing beverages  · aspirin and aspirin-like drugs  · chloramphenicol  · chromium  · diuretics  · female hormones, such as estrogens or progestins, birth control pills  · heart medicines  · isoniazid  · male hormones or anabolic steroids  · medications for weight loss  · medicines for allergies, asthma, cold, or cough  · medicines for mental problems  · medicines called MAO inhibitors -  Nardil, Parnate, Marplan, Eldepryl  · niacin  · NSAIDS, such as ibuprofen  · pentamidine  · phenytoin  · probenecid  · quinolone antibiotics such as ciprofloxacin, levofloxacin, ofloxacin  · some herbal dietary supplements  · steroid medicines such as prednisone or cortisone  · thyroid hormonesSome medications can hide the warning symptoms of low blood sugar (hypoglycemia). You may need to monitor your blood sugar more closely if you are taking one of these medications. These include:  · beta-blockers, often used for high blood pressure or heart problems (examples include atenolol, metoprolol, propranolol)  · clonidine  · guanethidine  · reserpine  What if I miss a dose?  If you miss a dose, take it as soon as you can within 3 days after the missed dose. Then take your next dose at your regular weekly time. If it has been longer than 3 days after the missed dose, do not take the missed dose. Take the next dose at your regular time. Do not take double or extra doses. If you have questions about a missed dose, contact your health care provider for advice.  Where should I keep my medicine?  Keep out of the reach of children.  Store this medicine in a refrigerator between 2 and 8 degrees C (36 and 46 degrees F). Do not freeze or use if the medicine has been frozen. Protect from light and excessive heat. Each single-dose pen or prefilled syringe can be kept at room temperature, not to exceed 30 degrees C (86 degrees F) for a total of 14 days, if needed. Store in the carton until use. Throw away any unused medicine after the expiration date.  What should I tell my health care provider before I take this medicine?  They need to know if you have any of these conditions:  · endocrine tumors (MEN 2) or if someone in your family had these tumors  · history of pancreatitis  · kidney disease  · liver disease  · stomach problems  · thyroid cancer or if someone in your family had thyroid cancer  · an unusual or allergic reaction  to dulaglutide, other medicines, foods, dyes, or preservatives  · pregnant or trying to get pregnant  · breast-feeding  What should I watch for while using this medicine?  Visit your doctor or health care professional for regular checks on your progress.  A test called the HbA1C (A1C) will be monitored. This is a simple blood test. It measures your blood sugar control over the last 2 to 3 months. You will receive this test every 3 to 6 months.  Learn how to check your blood sugar. Learn the symptoms of low and high blood sugar and how to manage them.  Always carry a quick-source of sugar with you in case you have symptoms of low blood sugar. Examples include hard sugar candy or glucose tablets. Make sure others know that you can choke if you eat or drink when you develop serious symptoms of low blood sugar, such as seizures or unconsciousness. They must get medical help at once.  Tell your doctor or health care professional if you have high blood sugar. You might need to change the dose of your medicine. If you are sick or exercising more than usual, you might need to change the dose of your medicine.  Do not skip meals. Ask your doctor or health care professional if you should avoid alcohol. Many nonprescription cough and cold products contain sugar or alcohol. These can affect blood sugar.  Wear a medical ID bracelet or chain, and carry a card that describes your disease and details of your medicine and dosage times.  NOTE:This sheet is a summary. It may not cover all possible information. If you have questions about this medicine, talk to your doctor, pharmacist, or health care provider. Copyright© 2017 Gold Standard        Hypoglycemia (Low Blood Sugar)     Fast-acting sugar includes a cup of nonfat milk.     Too little sugar (glucose) in your blood is called hypoglycemia or low blood sugar. Low blood sugar usually means anything lower than 70 mg/dL. Talk with your healthcare provider about your target range and  what level is too low for you. Diabetes itself doesnt cause low blood sugar. But some of the treatments for diabetes, such as pills or insulin, may raise your risk for it. Low blood sugar may cause you to pass out or have a seizure. So always treat low blood sugar right away, but don't overeat.  Special note: Always carry a source of fast-acting sugar and a snack in case of hypoglycemia.   What you may notice  If you have low blood sugar, you may have one or more of these symptoms:  · Shakiness or dizziness  · Cold, clammy skin or sweating  · Feelings of hunger  · Headache  · Nervousness  · A hard, fast heartbeat  · Weakness  · Confusion or irritability  · Blurred vision  · Having nightmares or waking up confused or sweating  · Numbness or tingling in the lips or tongue  What you should do  Here are tips to follow if you have hypoglycemia:   · First check your blood sugar. If it is too low (out of your target range), eat or drink 15 to 20 grams of fast-acting sugar. This may be 3 to 4 glucose tablets, 4 ounces (half a cup) of fruit juice or regular (nondiet) soda, 8 ounces (1 cup) of fat-free milk, or 1 tablespoon of honey. Dont take more than this, or your blood sugar may go too high.  · Wait 15 minutes. Then recheck your blood sugar if you can.  · If your blood sugar is still too low, repeat the steps above and check your blood sugar again. If your blood sugar still has not returned to your target range, contact your healthcare provider or seek emergency care.  · Once your blood sugar returns to target range, eat a snack or meal.  Preventing low blood sugar  Things you can do include the following:   · If your condition needs a strict treatment plan, eat your meals and snacks at the same times each day. Dont skip meals!  · If your treatment plan lets you change when you eat and what you eat, learn how to change the time and dose of your rapid-acting insulin to match this.   · Ask your healthcare provider if it  is safe for you to drink alcohol. Never drink on an empty stomach.  · Take your medicine at the prescribed times.  · Always carry a source of fast-acting sugar and a snack when youre away from home.  Other things to do  Additional tips include the following:  · Carry a medical ID card, a compact USB drive, or wear a medical alert bracelet or necklace. It should say that you have diabetes. It should also say what to do if you pass out or have a seizure.  · Make sure your family, friends, and coworkers know the signs of low blood sugar. Tell them what to do if your blood sugar falls very low and you cant treat yourself.  · Keep a glucagon emergency kit handy. Be sure your family, friends, and coworkers know how and when to use it. Check it regularly and replace the glucagon before it expires.  · Talk with your health care team about other things you can do to prevent low blood sugar.     If you have unexplained hypoglycemia or hypoglycemia several times, call your healthcare provider.   Date Last Reviewed: 5/1/2016 © 2000-2017 [employer]. [address] [place] [address], [place] [address]. All rights reserved. This information is not intended as a substitute for professional medical care. Always follow your healthcare professional's instructions.

## 2019-06-26 NOTE — LETTER
June 26, 2019      Patty Louis MD  [address]  [facility] [address]           [facility] [place]  [address]  [facility] [address]  Phone: [phone]          Patient: Sunitha Pillai   MR Number: [id number]   YOB: 1955   Date of Visit: 6/26/2019       Dear Dr. Patty Louis:    Thank you for referring Sunitha Pillai to me for evaluation. Attached you will find relevant portions of my assessment and plan of care.    If you have questions, please do not hesitate to call me. I look forward to following Sunitha Pillai along with you.    Sincerely,    MEERA Gamez, FNP    Enclosure  CC:  No Recipients    If you would like to receive this communication electronically, please contact [email or web address] or [phone] to request more information on myDocket Link access.    For providers and/or their staff who would like to refer a patient to [facility], please contact us through our one-stop-shop provider referral line, [facility] [place], at [phone].    If you feel you have received this communication in error or would no longer like to receive these types of communications, please e-mail [email or web address]

## 2019-06-26 NOTE — PROGRESS NOTES
CHIEF COMPLAINT: Type 2 Diabetes     HPI: Mrs. Sunitha Pillai is a 63 y.o. female who was diagnosed with Type 2 DM x 14 years ago.   +familial hx   Not checking BG as often.  Missing injections throughout day.  Needs lab work.  Interested in vgo.      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    No informal exercise    Lab Results   Component Value Date    HGBA1C 12.2 (H) 04/27/2019     PREVIOUS DIABETES MEDICATIONS TRIED  Levemir   novolog     CURRENT DIABETIC MEDS: levemir 45 units at night, novolog 20 units w/ meals, on metformin 1000 mg bid (misses at times)    Pt is monitoring blood glucose readings 4 times a day.  Needs >100 strips per month related to fluctuations with blood glucose reading, a1c trends, and activity level.    Diabetes Management Status    Statin: Not taking  ACE/ARB: Taking    Screening or Prevention Patient's value Goal Complete/Controlled?   HgA1C Testing and Control   Lab Results   Component Value Date    HGBA1C 12.2 (H) 04/27/2019      Annually/Less than 8% No   Lipid profile : 09/23/2018 Annually Yes   LDL control Lab Results   Component Value Date    LDLCALC 72.4 09/23/2018    Annually/Less than 100 mg/dl  Yes   Nephropathy screening Lab Results   Component Value Date    LABMICR 25.0 05/13/2019     Lab Results   Component Value Date    PROTEINUA Negative 05/13/2019    Annually Yes   Blood pressure BP Readings from Last 1 Encounters:   06/26/19 123/74    Less than 140/90 Yes   Dilated retinal exam : 03/25/2019 Annually Yes   Foot exam   : 10/06/2016 Annually No     REVIEW OF SYSTEMS  General: no weakness, fatigue, + weight changes 4# loss.   Eyes: no double or blurred vision, eye pain, or redness; Last Eye Exam= 2019   Cardiovascular: no chest pain, palpitations, edema, or murmurs.   Respiratory: no cough or dyspnea.   GI: no heartburn, nausea, or changes in bowel patterns; good appetite.   Skin: no rashes, dryness,  "itching, or reactions at insulin injection sites.  Neuro: no numbness, tingling, tremors, or vertigo.   Endocrine: + polyuria, polydipsia, polyphagia, heat or cold intolerance.     Vital Signs  /74 (BP Location: Left arm, Patient Position: Sitting, BP Method: Medium (Manual))   Pulse 81   Ht 5' 3" (1.6 m)   Wt 82.3 kg (181 lb 7 oz)   LMP  (LMP Unknown)   BMI 32.14 kg/m²     Hemoglobin A1C   Date Value Ref Range Status   04/27/2019 12.2 (H) 4.0 - 5.6 % Final     Comment:     ADA Screening Guidelines:  5.7-6.4%  Consistent with prediabetes  >or=6.5%  Consistent with diabetes  High levels of fetal hemoglobin interfere with the HbA1C  assay. Heterozygous hemoglobin variants (HbS, HgC, etc)do  not significantly interfere with this assay.   However, presence of multiple variants may affect accuracy.     09/23/2018 11.0 (H) 4.0 - 5.6 % Final     Comment:     ADA Screening Guidelines:  5.7-6.4%  Consistent with prediabetes  >or=6.5%  Consistent with diabetes  High levels of fetal hemoglobin interfere with the HbA1C  assay. Heterozygous hemoglobin variants (HbS, HgC, etc)do  not significantly interfere with this assay.   However, presence of multiple variants may affect accuracy.     09/23/2018 11.1 (H) 4.0 - 5.6 % Final     Comment:     ADA Screening Guidelines:  5.7-6.4%  Consistent with prediabetes  >or=6.5%  Consistent with diabetes  High levels of fetal hemoglobin interfere with the HbA1C  assay. Heterozygous hemoglobin variants (HbS, HgC, etc)do  not significantly interfere with this assay.   However, presence of multiple variants may affect accuracy.          Chemistry        Component Value Date/Time     05/13/2019 0931    K 4.9 05/13/2019 0931     05/13/2019 0931    CO2 29 05/13/2019 0931    BUN 11 05/13/2019 0931    CREATININE 1.0 05/13/2019 0931     (H) 05/13/2019 0931        Component Value Date/Time    CALCIUM 10.5 05/13/2019 0931    ALKPHOS 190 (H) 05/13/2019 0931    AST 77 (H) " 05/13/2019 0931     (H) 05/13/2019 0931    BILITOT 0.7 05/13/2019 0931    ESTGFRAFRICA >60.0 05/13/2019 0931    EGFRNONAA 59.7 (A) 05/13/2019 0931           Lab Results   Component Value Date    TSH 0.237 (L) 05/13/2019      Lab Results   Component Value Date    CHOL 152 09/23/2018    CHOL 149 10/27/2017    CHOL 158 10/06/2016     Lab Results   Component Value Date    HDL 42 09/23/2018    HDL 53 10/27/2017    HDL 48 10/06/2016     Lab Results   Component Value Date    LDLCALC 72.4 09/23/2018    LDLCALC 71.4 10/27/2017    LDLCALC 85.6 10/06/2016     Lab Results   Component Value Date    TRIG 188 (H) 09/23/2018    TRIG 123 10/27/2017    TRIG 122 10/06/2016     Lab Results   Component Value Date    CHOLHDL 27.6 09/23/2018    CHOLHDL 35.6 10/27/2017    CHOLHDL 30.4 10/06/2016         PHYSICAL EXAMINATION  Constitutional: Appears well, no distress  Neck: Supple, trachea midline.   Respiratory: No wheezes, even and unlabored.  Cardiovascular: RRR; no edema.   Lymph: no lymphadenopathy palpated  Skin: warm and dry; no injection site reactions, no acanthosis nigracans observed.  Neuro:patient alert and cooperative, normal affect; steady gait.    Diabetes Foot Exam:   Protective Sensation (w/ 10 gram monofilament):  Right: Intact 6/6  Left: Intact 6/6  Intact to vibratory sensation -BLE    Visual Inspection:  Normal -  Bilateral and Dry Skin -  Bilateral    Pedal Pulses:   Right: Present  Left: Present    Posterior tibialis:   Right:Present  Left: Present    Assessment/Plan    1. Type 2 diabetes mellitus with complication, with long-term current use of insulin  Hemoglobin A1c    Hemoglobin A1c    Ambulatory Referral to Diabetes Education    Ambulatory consult to Podiatry   2. Class 2 severe obesity due to excess calories with serious comorbidity and body mass index (BMI) of 35.0 to 35.9 in adult     3. Essential hypertension     4. Elevated liver enzymes     5. Counseling and coordination of care       1. F/u in 3  mos    a1c goal less than 7.5 %  Add trulicity 0.75 mg weekly  Add vgo 30, 4 clicks with meals add additional click if sugar >200   Continue metformin 1000 mg bid  D/c MDI flexpens (back up)  BG goal  mg/dl, no higher than 200.   BG monitoring 4 times a day  Discussed freestyle nasrin  DE nutrition/vgo start    2. Body mass index is 32.14 kg/m². may increase insulin resistance.   Encourage exercise  3 times a week   Add glp1a    3. Controlled, continue med(s)    4. Continue to monitor    5. See above    FOLLOW UP  Follow up in about 3 months (around 9/26/2019).

## 2019-06-27 ENCOUNTER — TELEPHONE (OUTPATIENT)
Dept: ENDOCRINOLOGY | Facility: CLINIC | Age: 64
End: 2019-06-27

## 2019-06-27 NOTE — TELEPHONE ENCOUNTER
----- Message from Nevin Wilson sent at 6/27/2019  4:48 PM CDT -----  Contact:   Patient   [phone]  Needs Advice    Reason for call:        Communication Preference:  Phone      Additional Information:  Pt received an new insulin that's attached to her stomach ,   Pt needs guidance on how to use it ...  Give the pt a call back to discuss

## 2019-07-01 ENCOUNTER — TELEPHONE (OUTPATIENT)
Dept: PHARMACY | Facility: CLINIC | Age: 64
End: 2019-07-01

## 2019-07-01 NOTE — TELEPHONE ENCOUNTER
I caaled the pt and left a message to contact PAP in reference to needing assistance with medication. I called the outpatient pharmacy and gave them a free trial card for trulicity.

## 2019-07-11 ENCOUNTER — TELEPHONE (OUTPATIENT)
Dept: ENDOCRINOLOGY | Facility: CLINIC | Age: 64
End: 2019-07-11

## 2019-07-11 DIAGNOSIS — E11.8 TYPE 2 DIABETES MELLITUS WITH COMPLICATION, WITH LONG-TERM CURRENT USE OF INSULIN: Primary | ICD-10-CM

## 2019-07-11 DIAGNOSIS — Z79.4 TYPE 2 DIABETES MELLITUS WITH COMPLICATION, WITH LONG-TERM CURRENT USE OF INSULIN: Primary | ICD-10-CM

## 2019-07-11 NOTE — TELEPHONE ENCOUNTER
----- Message from MEERA Gamez, MATTP sent at 7/11/2019  7:54 AM CDT -----  Pt needs vgo start    Dates that work best below---from pharmGARETH  She was wondering if someone could set up an appt with her for training on July 18 or 19th. Her number is [phone].

## 2019-07-11 NOTE — TELEPHONE ENCOUNTER
Called and spoke with patient about her education appointment for her V-go start .     Patient is schedule to see Eva on August 5th.

## 2019-07-15 ENCOUNTER — OFFICE VISIT (OUTPATIENT)
Dept: URGENT CARE | Facility: CLINIC | Age: 64
End: 2019-07-15
Payer: COMMERCIAL

## 2019-07-15 ENCOUNTER — HOSPITAL ENCOUNTER (EMERGENCY)
Facility: OTHER | Age: 64
Discharge: HOME | End: 2019-07-15
Attending: EMERGENCY MEDICINE
Payer: COMMERCIAL

## 2019-07-15 VITALS
HEART RATE: 79 BPM | WEIGHT: 174 LBS | RESPIRATION RATE: 18 BRPM | DIASTOLIC BLOOD PRESSURE: 72 MMHG | BODY MASS INDEX: 30.83 KG/M2 | HEIGHT: 63 IN | OXYGEN SATURATION: 99 % | TEMPERATURE: 99 F | SYSTOLIC BLOOD PRESSURE: 150 MMHG

## 2019-07-15 VITALS
SYSTOLIC BLOOD PRESSURE: 132 MMHG | HEIGHT: 63 IN | HEART RATE: 92 BPM | OXYGEN SATURATION: 96 % | BODY MASS INDEX: 32.07 KG/M2 | DIASTOLIC BLOOD PRESSURE: 75 MMHG | RESPIRATION RATE: 16 BRPM | TEMPERATURE: 99 F | WEIGHT: 181 LBS

## 2019-07-15 DIAGNOSIS — R06.02 SHORTNESS OF BREATH: Primary | ICD-10-CM

## 2019-07-15 DIAGNOSIS — T50.905A MEDICATION REACTION, INITIAL ENCOUNTER: ICD-10-CM

## 2019-07-15 DIAGNOSIS — R07.9 CHEST PAIN: ICD-10-CM

## 2019-07-15 DIAGNOSIS — Z86.39 HX OF DIABETES MELLITUS: ICD-10-CM

## 2019-07-15 DIAGNOSIS — R05.9 COUGH: ICD-10-CM

## 2019-07-15 DIAGNOSIS — J06.9 UPPER RESPIRATORY TRACT INFECTION, UNSPECIFIED TYPE: Primary | ICD-10-CM

## 2019-07-15 LAB
CTP QC/QA: YES
FLUAV AG NPH QL: NEGATIVE
FLUBV AG NPH QL: NEGATIVE
GLUCOSE SERPL-MCNC: 94 MG/DL (ref 70–110)

## 2019-07-15 PROCEDURE — 99214 OFFICE O/P EST MOD 30 MIN: CPT | Mod: S$GLB,,, | Performed by: NURSE PRACTITIONER

## 2019-07-15 PROCEDURE — 3008F PR BODY MASS INDEX (BMI) DOCUMENTED: ICD-10-PCS | Mod: CPTII,S$GLB,, | Performed by: NURSE PRACTITIONER

## 2019-07-15 PROCEDURE — 3075F SYST BP GE 130 - 139MM HG: CPT | Mod: CPTII,S$GLB,, | Performed by: NURSE PRACTITIONER

## 2019-07-15 PROCEDURE — 3078F DIAST BP <80 MM HG: CPT | Mod: CPTII,S$GLB,, | Performed by: NURSE PRACTITIONER

## 2019-07-15 PROCEDURE — 87804 POCT INFLUENZA A/B: ICD-10-PCS | Mod: 59,QW,S$GLB, | Performed by: NURSE PRACTITIONER

## 2019-07-15 PROCEDURE — 82962 POCT GLUCOSE, HAND-HELD DEVICE: ICD-10-PCS | Mod: S$GLB,,, | Performed by: NURSE PRACTITIONER

## 2019-07-15 PROCEDURE — 3008F BODY MASS INDEX DOCD: CPT | Mod: CPTII,S$GLB,, | Performed by: NURSE PRACTITIONER

## 2019-07-15 PROCEDURE — 82962 GLUCOSE BLOOD TEST: CPT | Mod: S$GLB,,, | Performed by: NURSE PRACTITIONER

## 2019-07-15 PROCEDURE — 99284 EMERGENCY DEPT VISIT MOD MDM: CPT | Mod: 25

## 2019-07-15 PROCEDURE — 93005 ELECTROCARDIOGRAM TRACING: CPT

## 2019-07-15 PROCEDURE — 87804 INFLUENZA ASSAY W/OPTIC: CPT | Mod: QW,S$GLB,, | Performed by: NURSE PRACTITIONER

## 2019-07-15 PROCEDURE — 3075F PR MOST RECENT SYSTOLIC BLOOD PRESS GE 130-139MM HG: ICD-10-PCS | Mod: CPTII,S$GLB,, | Performed by: NURSE PRACTITIONER

## 2019-07-15 PROCEDURE — 3078F PR MOST RECENT DIASTOLIC BLOOD PRESSURE < 80 MM HG: ICD-10-PCS | Mod: CPTII,S$GLB,, | Performed by: NURSE PRACTITIONER

## 2019-07-15 PROCEDURE — 99214 PR OFFICE/OUTPT VISIT, EST, LEVL IV, 30-39 MIN: ICD-10-PCS | Mod: S$GLB,,, | Performed by: NURSE PRACTITIONER

## 2019-07-15 PROCEDURE — 93010 ELECTROCARDIOGRAM REPORT: CPT | Mod: S$GLB,,, | Performed by: INTERNAL MEDICINE

## 2019-07-15 PROCEDURE — 93010 EKG 12-LEAD: ICD-10-PCS | Mod: S$GLB,,, | Performed by: INTERNAL MEDICINE

## 2019-07-15 RX ORDER — CODEINE PHOSPHATE AND GUAIFENESIN 10; 100 MG/5ML; MG/5ML
5 SOLUTION ORAL 3 TIMES DAILY PRN
Qty: 60 ML | Refills: 0 | Status: SHIPPED | OUTPATIENT
Start: 2019-07-15 | End: 2019-07-25

## 2019-07-16 NOTE — ED TRIAGE NOTES
Pt arrives to Ed with c/o cough and chest pain. PT reports chest pain only when coughing. Pt denies N/V/D, fevers. PT sitting in exam bed, respirations even, unlabored, eyes open spontaneously, NAD noted, AAOX4, answering questions appropriately.

## 2019-07-16 NOTE — ED PROVIDER NOTES
Encounter Date: 7/15/2019    SCRIBE #1 NOTE: I, Francisco J Luis, am scribing for, and in the presence of, Dr. Laird .       History     Chief Complaint   Patient presents with    Chest Pain     since last night, mid-sternal, non-radiating, intermittent, went to urgent care and was sent to ER    Shortness of Breath     with cough, since last night.     Time seen by provider: 10:20 PM    This is a 63 y.o. female with a history of HTN, DM, vertigo, and arthritis who presents with complaint of with a dry cough that began last night. She states that the cough is dry and is experiencing chest pain and shortness of breath when coughing. She was evaluated at an urgent care today and was sent to the ED to get a chest X-Ray. The patient reports that she started taking Lisinopril in May. She denies fever, chills, sore throat, shortness of breath, nausea, vomiting, and dysuria. She denies smoking, drinking, and illicit drug use.     The history is provided by the patient.     Review of patient's allergies indicates:  No Known Allergies  Past Medical History:   Diagnosis Date    Arthritis     Diabetes mellitus     Diabetes mellitus type I     GERD (gastroesophageal reflux disease)     Hypertension     Vertigo      Past Surgical History:   Procedure Laterality Date     SECTION      ESOPHAGOGASTRODUODENOSCOPY (EGD) N/A 2017    Performed by Anne Turner MD at [facility] (4TH FLR)    ESOPHAGOGASTRODUODENOSCOPY (EGD) N/A 3/7/2017    Performed by Anne Turner MD at [facility] (4TH FLR)     Family History   Problem Relation Age of Onset    Hypertension Mother     Hyperlipidemia Mother     Diabetes Mother     Heart disease Mother     Aneurysm Father     Diabetes Sister     Hypertension Sister     Heart disease Brother     Diabetes Brother     Hypertension Brother     Breast cancer Neg Hx     Colon cancer Neg Hx     Ovarian cancer Neg Hx      Social History     Tobacco Use    Smoking status:  Never Smoker    Smokeless tobacco: Never Used   Substance Use Topics    Alcohol use: No    Drug use: No     Review of Systems   Constitutional: Negative for chills and fever.   HENT: Negative for sore throat.    Respiratory: Positive for cough (dry) and shortness of breath (only when coughing).    Cardiovascular: Positive for chest pain (only when coughing).   Gastrointestinal: Negative for nausea.   Genitourinary: Negative for dysuria.   Musculoskeletal: Negative for back pain.   Skin: Negative for rash.   Neurological: Negative for weakness.   Hematological: Does not bruise/bleed easily.       Physical Exam     Initial Vitals [07/15/19 2049]   BP Pulse Resp Temp SpO2   (!) 159/72 89 18 98.5 °F (36.9 °C) 98 %      MAP       --         Physical Exam    Nursing note and vitals reviewed.  Constitutional: She appears well-developed and well-nourished. She is not diaphoretic. No distress.   HENT:   Head: Normocephalic and atraumatic.   Mouth/Throat: Oropharynx is clear and moist.   Oropharynx is clear and intact. Moist mucous membranes.    Eyes: Conjunctivae and EOM are normal. Pupils are equal, round, and reactive to light.   Conjunctivae are clear, pink, and intact.    Neck: Normal range of motion.   Cardiovascular: Normal rate, regular rhythm and normal heart sounds. Exam reveals no gallop and no friction rub.    No murmur heard.  Normal S1, S2. No murmurs, no rubs, no gallops.    Pulmonary/Chest: Breath sounds normal. No respiratory distress. She has no wheezes. She has no rhonchi. She has no rales.   Lungs are clear to auscultation bilaterally.    Abdominal: Soft. There is no tenderness. There is no rebound and no guarding.   Musculoskeletal: Normal range of motion. She exhibits no edema or tenderness.   Neurological: She is alert and oriented to person, place, and time.   Skin: Skin is warm and dry. No rash and no abscess noted. No erythema. No pallor.   Psychiatric: She has a normal mood and affect. Her  behavior is normal. Judgment and thought content normal.         ED Course   Procedures  Labs Reviewed - No data to display  EKG Readings: (Independently Interpreted)   Initial Reading: No STEMI.   Normal sinus rhythm at a rate of 93 bpm. Incomplete RBBB, which is old and not new when compared to prior EKGs. No T wave inversions.      ECG Results          EKG 12-lead (Final result)  Result time 07/16/19 08:21:30    Final result by Interface, Lab In [facility] (07/16/19 08:21:30)                 Narrative:    Test Reason : R07.9,    Vent. Rate : 093 BPM     Atrial Rate : 093 BPM     P-R Int : 126 ms          QRS Dur : 102 ms      QT Int : 358 ms       P-R-T Axes : 071 -28 061 degrees     QTc Int : 445 ms    Normal sinus rhythm  Incomplete right bundle branch block  Moderate voltage criteria for LVH, may be normal variant  Borderline Abnormal ECG  When compared with ECG of 27-APR-2019 06:20,  No significant change was found  Confirmed by LINDA SINGH MD ([id number]) on 7/16/2019 8:21:23 AM    Referred By: [email or web address]   SELF           Confirmed By:LINDA SINGH MD                             EKG 12-LEAD (Final result)  Result time 07/16/19 13:19:23    Final result by Unknown User (07/16/19 13:19:23)                                Imaging Results          X-Ray Chest PA And Lateral (Final result)  Result time 07/15/19 21:54:18    Final result by Cornelio France MD (07/15/19 21:54:18)                 Impression:      No acute process.      Electronically signed by: Cornelio France MD  Date:    07/15/2019  Time:    21:54             Narrative:    EXAMINATION:  XR CHEST PA AND LATERAL    CLINICAL HISTORY:  Chest pain, unspecified    TECHNIQUE:  PA and lateral views of the chest were performed.    COMPARISON:  04/26/2019.    FINDINGS:  The trachea is unremarkable.  The cardiomediastinal silhouette is within normal limits.  The hilar structures are unremarkable.  The hemidiaphragms are unremarkable.  There is no evidence of free air beneath  the hemidiaphragms.  There are no pleural effusions.  There is no evidence of a pneumothorax.  There is no evidence of pneumomediastinum.  No airspace opacity is present.  There are degenerative changes in the osseous structures.  There is no evidence of a fracture.                              X-Rays:   Independently Interpreted Readings:   Chest X-Ray: Normal heart size.  No infiltrates.  No acute abnormalities.     Medical Decision Making:   History:   Old Medical Records: I decided to obtain old medical records.  Old Records Summarized: records from clinic visits.       <> Summary of Records: September 24, 2018 the patient had a cardiac catheter with normal coronary arteries.   Independently Interpreted Test(s):   I have ordered and independently interpreted X-rays - see prior notes.  I have ordered and independently interpreted EKG Reading(s) - see prior notes  Clinical Tests:   Radiological Study: Reviewed  Medical Tests: Reviewed            Scribe Attestation:   Scribe #1: I performed the above scribed service and the documentation accurately describes the services I performed. I attest to the accuracy of the note.    Attending Attestation:           Physician Attestation for Scribe:  Physician Attestation Statement for Scribe #1: I, Dr. Gordon, reviewed documentation, as scribed by Francisco J Luis in my presence, and it is both accurate and complete.         Attending ED Notes:   Emergent evaluation 63-year-old female with complaint of dry cough with associated intermittent shortness of breath. Patient complains of associated chest discomfort with cough only.  Patient is afebrile, nontoxic-appearing stable vital signs except for elevation of blood pressure.  Patient in no acute respiratory distress. No acute findings on chest x-ray.  Given patient's history of present illness, clinical presentation and physical exam and after review of patient's medical records I do not suspect the patient has chest pain  with cough is of a cardiac in origin.  The patient is extensively counseled on her diagnosis and treatment including all diagnostic and physical exam findings.  The patient discharged good condition and directed to follow up with her primary care physician in the next 24-48 hours.             Clinical Impression:     1. Upper respiratory tract infection, unspecified type    2. Chest pain    3. Medication reaction, initial encounter                               Bret Gordon MD  07/23/19 [id number]

## 2019-07-16 NOTE — PATIENT INSTRUCTIONS

## 2019-07-16 NOTE — PROGRESS NOTES
"Subjective:       Patient ID: Sunitha Pillai is a 63 y.o. female.    Vitals:    07/15/19 1948   BP: 132/75   Pulse: 92   Resp: 16   Temp: 99.3 °F (37.4 °C)   TempSrc: Oral   SpO2: 96%   Weight: 82.1 kg (181 lb)   Height: 5' 3" (1.6 m)       Chief Complaint: Shortness of Breath; Cough; and Palpitations    Patient presents to clinic today with complaints of a dry persistent cough is started yesterday.  Reports she has also experienced some shortness of breath while laying down last night as well as intermittently throughout the day.  Denies any chest pain.  Patient reports that she was having intermittent palpitations today.  Patient also reports generalized fatigue and weakness.  Reports that she took her blood sugar approximately 30 min prior to arrival in her blood sugar was 70.  Claims she felt last night that she was drowning while laying down. Reports that her normal blood sugar is elevated in the 200s.  Patient denies any fever or chills.  Denies any urinary symptoms.  Patient has history of hypertension, ACS, Angina.     Shortness of Breath   This is a new problem. The current episode started yesterday. The problem occurs constantly. The problem has been gradually worsening. Pertinent negatives include no abdominal pain, chest pain, ear pain, fever, headaches, leg swelling, sore throat, sputum production or wheezing.   Cough   Associated symptoms include shortness of breath. Pertinent negatives include no chest pain, chills, ear pain, eye redness, fever, headaches, myalgias, sore throat or wheezing.   Palpitations    Associated symptoms include coughing (dry) and shortness of breath. Pertinent negatives include no chest pain, fever, malaise/fatigue or nausea.     Review of Systems   Constitution: Negative for chills, fever and malaise/fatigue.        Patient states she is shaking from low sugar( 70)   HENT: Negative for congestion, ear pain, hoarse voice and sore throat.    Eyes: Negative for " discharge and redness.   Cardiovascular: Positive for palpitations. Negative for chest pain, dyspnea on exertion and leg swelling.   Respiratory: Positive for cough (dry) and shortness of breath. Negative for sputum production and wheezing.    Musculoskeletal: Negative for myalgias.   Gastrointestinal: Negative for abdominal pain and nausea.   Neurological: Negative for headaches.       Objective:      Physical Exam   Constitutional: She is oriented to person, place, and time. She appears well-developed and well-nourished. She is cooperative.  Non-toxic appearance. She does not appear ill. No distress. She is not intubated.   HENT:   Head: Normocephalic and atraumatic.   Right Ear: Hearing, tympanic membrane, external ear and ear canal normal.   Left Ear: Hearing, tympanic membrane, external ear and ear canal normal.   Nose: Nose normal. No mucosal edema, rhinorrhea or nasal deformity. No epistaxis. Right sinus exhibits no maxillary sinus tenderness and no frontal sinus tenderness. Left sinus exhibits no maxillary sinus tenderness and no frontal sinus tenderness.   Mouth/Throat: Uvula is midline, oropharynx is clear and moist and mucous membranes are normal. No trismus in the jaw. Normal dentition. No uvula swelling. No posterior oropharyngeal erythema.   Eyes: Conjunctivae and lids are normal. No scleral icterus.   Sclera clear bilat   Neck: Trachea normal, full passive range of motion without pain and phonation normal. Neck supple.   Cardiovascular: Normal rate, regular rhythm, normal heart sounds, intact distal pulses and normal pulses.   No edema noted lower extremities.    Pulmonary/Chest: Effort normal. No accessory muscle usage. No apnea, no tachypnea and no bradypnea. She is not intubated. No respiratory distress. She has wheezes in the right lower field and the left lower field. She has no rhonchi.   Abdominal: Soft. Normal appearance and bowel sounds are normal. She exhibits no distension. There is no  tenderness.   Musculoskeletal: Normal range of motion. She exhibits no edema or deformity.   Neurological: She is alert and oriented to person, place, and time. She exhibits normal muscle tone. Coordination normal.   Skin: Skin is warm, dry and intact. She is not diaphoretic. No pallor.   Psychiatric: She has a normal mood and affect. Her behavior is normal. Judgment and thought content normal. Her speech is delayed. Cognition and memory are normal.   Nursing note and vitals reviewed.        Results for orders placed or performed in visit on 07/15/19   POCT Influenza A/B   Result Value Ref Range    Rapid Influenza A Ag Negative Negative    Rapid Influenza B Ag Negative Negative     Acceptable Yes    POCT Glucose, Hand-Held Device   Result Value Ref Range    POC Glucose 94 70 - 110 MG/DL       EKG: unchanged from previous tracings, normal sinus rhythm, incomplete right bundle branch block, moderate voltage criteria for LVH. 93 BPM.    Assessment:       1. Shortness of breath    2. Cough    3. Hx of diabetes mellitus        Plan:         Due to the patients clinical presentation.  Advised patient to ER for further evaluation.  The x-ray machine is down like you were unable to complete chest x-ray at this time.  Differential diagnosis include pneumonia or new onset CHF.  Patient verbalized understanding and is agreement.  Reports called the [facility].  Patient is brought in from the vehicle.    Sunitha was seen today for shortness of breath, cough and palpitations.    Diagnoses and all orders for this visit:    Shortness of breath  -     EKG 12-lead  -     POCT Influenza A/B  -     Refer to Emergency Dept.    Cough  -     EKG 12-lead  -     POCT Influenza A/B  -     Refer to Emergency Dept.    Hx of diabetes mellitus  -     POCT Glucose, Hand-Held Device      Patient Instructions     Shortness of Breath (Dyspnea)  Shortness of breath is the feeling that you can't catch your breath or get enough  air. It is also known as dyspnea.  Dyspnea can be caused by many different conditions. They include:  · Acute asthma attack.  · Worsening of chronic lung diseases such as chronic bronchitis and emphysema.  · Heart failure. This is when weak heart muscle allows extra fluid to collect in the lungs.  · Panic attacks or anxiety. Fear can cause rapid breathing (hyperventilation).  · Pneumonia, or an infection in the lung tissue.  · Exposure to toxic substances, fumes, smoke, or certain medicines.  · Blood clot in the lung (pulmonary embolism). This is often from a piece of blood clot in a deep vein of the leg (deep vein thrombosis) that breaks off and travels to the lungs.  · Heart attack or heart-related chest pain (angina).  · Anemia.  · Collapsed lung (pneumothorax).  · Dehydration.  · Pregnancy.  Based on your visit today, the exact cause of your shortness of breath is not certain. Your tests dont show any of the serious causes of dyspnea. You may need other tests to find out if you have a serious problem. Its important to watch for any new symptoms or symptoms that get worse. Follow up with your healthcare provider as directed.  Home care  Follow these tips to take care of yourself at home:  · When your symptoms are better, go back to your usual activities.  · If you smoke, you should stop. Join a quit-smoking program or ask your healthcare provider for help.  · Eat a healthy diet and get plenty of sleep.  · Get regular exercise. Talk with your healthcare provider before starting to exercise, especially if you have other medical problems.  · Cut down on the amount of caffeine and stimulants you consume.  Follow-up care  Follow up with your healthcare provider, or as advised.  If tests were done, you will be told if your treatment needs to be changed. You can call as directed for the results.  (Note: If an X-ray was taken, a specialist will review it. You will be notified of any new findings that may affect your  care.)  Call 911 or get immediate medical care  Shortness of breath may be a sign of a serious medical problem. For example, it may be a problem with your heart or lungs. Call 911 if you have worsening shortness of breath or trouble breathing, especially with any of the symptoms below:  · You are confused or its difficult to wake you.  · You faint or lose consciousness.  · You have a fast heartbeat, or your heartbeat is irregular.  · You are coughing up blood.  · You have pain in your chest, arm, shoulder, neck, or upper back.  · You break out in a sweat.  When to seek medical advice  Call your healthcare provider right away if any of these occur:  · Slight shortness of breath or wheezing  · Redness, pain or swelling in your leg, arm, or other body area  · Swelling in both legs or ankles  · Fast weight gain  · Dizziness or weakness  · Fever of 100.4ºF (38ºC) or higher, or as directed by your healthcare provider  Date Last Reviewed: 9/13/2015  © 1277-0881 [employer]. [address] [place] [address]. All rights reserved. This information is not intended as a substitute for professional medical care. Always follow your healthcare professional's instructions.      -GO TO THE ER FOR FURTHER EVALUATION OF YOUR CURRENT SYMPTOMS.  -REPORT IS CALLED TO [facility].

## 2019-08-13 ENCOUNTER — OFFICE VISIT (OUTPATIENT)
Dept: INTERNAL MEDICINE | Facility: CLINIC | Age: 64
End: 2019-08-13
Payer: MEDICARE

## 2019-08-13 VITALS
HEIGHT: 63 IN | TEMPERATURE: 98 F | DIASTOLIC BLOOD PRESSURE: 72 MMHG | OXYGEN SATURATION: 96 % | BODY MASS INDEX: 31.64 KG/M2 | HEART RATE: 82 BPM | SYSTOLIC BLOOD PRESSURE: 128 MMHG | WEIGHT: 178.56 LBS

## 2019-08-13 DIAGNOSIS — I10 ESSENTIAL HYPERTENSION: Primary | ICD-10-CM

## 2019-08-13 DIAGNOSIS — E11.8 TYPE 2 DIABETES MELLITUS WITH COMPLICATION, WITH LONG-TERM CURRENT USE OF INSULIN: ICD-10-CM

## 2019-08-13 DIAGNOSIS — Z79.4 TYPE 2 DIABETES MELLITUS WITH COMPLICATION, WITH LONG-TERM CURRENT USE OF INSULIN: ICD-10-CM

## 2019-08-13 PROCEDURE — 3046F HEMOGLOBIN A1C LEVEL >9.0%: CPT | Mod: HCNC,CPTII,S$GLB, | Performed by: INTERNAL MEDICINE

## 2019-08-13 PROCEDURE — 3046F PR MOST RECENT HEMOGLOBIN A1C LEVEL > 9.0%: ICD-10-PCS | Mod: HCNC,CPTII,S$GLB, | Performed by: INTERNAL MEDICINE

## 2019-08-13 PROCEDURE — 1101F PT FALLS ASSESS-DOCD LE1/YR: CPT | Mod: HCNC,CPTII,S$GLB, | Performed by: INTERNAL MEDICINE

## 2019-08-13 PROCEDURE — G0009 ADMIN PNEUMOCOCCAL VACCINE: HCPCS | Mod: HCNC,S$GLB,, | Performed by: INTERNAL MEDICINE

## 2019-08-13 PROCEDURE — 1101F PR PT FALLS ASSESS DOC 0-1 FALLS W/OUT INJ PAST YR: ICD-10-PCS | Mod: HCNC,CPTII,S$GLB, | Performed by: INTERNAL MEDICINE

## 2019-08-13 PROCEDURE — 99999 PR PBB SHADOW E&M-EST. PATIENT-LVL V: CPT | Mod: PBBFAC,HCNC,, | Performed by: INTERNAL MEDICINE

## 2019-08-13 PROCEDURE — 3074F SYST BP LT 130 MM HG: CPT | Mod: HCNC,CPTII,S$GLB, | Performed by: INTERNAL MEDICINE

## 2019-08-13 PROCEDURE — 99214 OFFICE O/P EST MOD 30 MIN: CPT | Mod: HCNC,25,S$GLB, | Performed by: INTERNAL MEDICINE

## 2019-08-13 PROCEDURE — 99214 PR OFFICE/OUTPT VISIT, EST, LEVL IV, 30-39 MIN: ICD-10-PCS | Mod: HCNC,25,S$GLB, | Performed by: INTERNAL MEDICINE

## 2019-08-13 PROCEDURE — 3008F PR BODY MASS INDEX (BMI) DOCUMENTED: ICD-10-PCS | Mod: HCNC,CPTII,S$GLB, | Performed by: INTERNAL MEDICINE

## 2019-08-13 PROCEDURE — G0009 PNEUMOCOCCAL CONJUGATE VACCINE 13-VALENT LESS THAN 5YO & GREATER THAN: ICD-10-PCS | Mod: HCNC,S$GLB,, | Performed by: INTERNAL MEDICINE

## 2019-08-13 PROCEDURE — 90670 PNEUMOCOCCAL CONJUGATE VACCINE 13-VALENT LESS THAN 5YO & GREATER THAN: ICD-10-PCS | Mod: HCNC,S$GLB,, | Performed by: INTERNAL MEDICINE

## 2019-08-13 PROCEDURE — 99999 PR PBB SHADOW E&M-EST. PATIENT-LVL V: ICD-10-PCS | Mod: PBBFAC,HCNC,, | Performed by: INTERNAL MEDICINE

## 2019-08-13 PROCEDURE — 3078F PR MOST RECENT DIASTOLIC BLOOD PRESSURE < 80 MM HG: ICD-10-PCS | Mod: HCNC,CPTII,S$GLB, | Performed by: INTERNAL MEDICINE

## 2019-08-13 PROCEDURE — 90670 PCV13 VACCINE IM: CPT | Mod: HCNC,S$GLB,, | Performed by: INTERNAL MEDICINE

## 2019-08-13 PROCEDURE — 3008F BODY MASS INDEX DOCD: CPT | Mod: HCNC,CPTII,S$GLB, | Performed by: INTERNAL MEDICINE

## 2019-08-13 PROCEDURE — 3078F DIAST BP <80 MM HG: CPT | Mod: HCNC,CPTII,S$GLB, | Performed by: INTERNAL MEDICINE

## 2019-08-13 PROCEDURE — 3074F PR MOST RECENT SYSTOLIC BLOOD PRESSURE < 130 MM HG: ICD-10-PCS | Mod: HCNC,CPTII,S$GLB, | Performed by: INTERNAL MEDICINE

## 2019-08-13 RX ORDER — DOXYCYCLINE HYCLATE 100 MG
TABLET ORAL
Qty: 20 TABLET | Refills: 0 | Status: SHIPPED | OUTPATIENT
Start: 2019-08-13 | End: 2020-01-04

## 2019-08-14 ENCOUNTER — OFFICE VISIT (OUTPATIENT)
Dept: PODIATRY | Facility: CLINIC | Age: 64
End: 2019-08-14
Payer: MEDICARE

## 2019-08-14 VITALS
RESPIRATION RATE: 18 BRPM | WEIGHT: 185.19 LBS | HEIGHT: 63 IN | BODY MASS INDEX: 32.81 KG/M2 | HEART RATE: 89 BPM | DIASTOLIC BLOOD PRESSURE: 76 MMHG | SYSTOLIC BLOOD PRESSURE: 123 MMHG

## 2019-08-14 DIAGNOSIS — E11.8 TYPE 2 DIABETES MELLITUS WITH COMPLICATION, WITH LONG-TERM CURRENT USE OF INSULIN: Primary | ICD-10-CM

## 2019-08-14 DIAGNOSIS — Z79.4 TYPE 2 DIABETES MELLITUS WITH COMPLICATION, WITH LONG-TERM CURRENT USE OF INSULIN: Primary | ICD-10-CM

## 2019-08-14 DIAGNOSIS — L85.3 DRY SKIN: ICD-10-CM

## 2019-08-14 PROCEDURE — 3078F DIAST BP <80 MM HG: CPT | Mod: HCNC,CPTII,S$GLB, | Performed by: PODIATRIST

## 2019-08-14 PROCEDURE — 3074F PR MOST RECENT SYSTOLIC BLOOD PRESSURE < 130 MM HG: ICD-10-PCS | Mod: HCNC,CPTII,S$GLB, | Performed by: PODIATRIST

## 2019-08-14 PROCEDURE — 99203 OFFICE O/P NEW LOW 30 MIN: CPT | Mod: HCNC,S$GLB,, | Performed by: PODIATRIST

## 2019-08-14 PROCEDURE — 99999 PR PBB SHADOW E&M-EST. PATIENT-LVL III: ICD-10-PCS | Mod: PBBFAC,HCNC,, | Performed by: PODIATRIST

## 2019-08-14 PROCEDURE — 3008F BODY MASS INDEX DOCD: CPT | Mod: HCNC,CPTII,S$GLB, | Performed by: PODIATRIST

## 2019-08-14 PROCEDURE — 3008F PR BODY MASS INDEX (BMI) DOCUMENTED: ICD-10-PCS | Mod: HCNC,CPTII,S$GLB, | Performed by: PODIATRIST

## 2019-08-14 PROCEDURE — 3046F HEMOGLOBIN A1C LEVEL >9.0%: CPT | Mod: HCNC,CPTII,S$GLB, | Performed by: PODIATRIST

## 2019-08-14 PROCEDURE — 3078F PR MOST RECENT DIASTOLIC BLOOD PRESSURE < 80 MM HG: ICD-10-PCS | Mod: HCNC,CPTII,S$GLB, | Performed by: PODIATRIST

## 2019-08-14 PROCEDURE — 3046F PR MOST RECENT HEMOGLOBIN A1C LEVEL > 9.0%: ICD-10-PCS | Mod: HCNC,CPTII,S$GLB, | Performed by: PODIATRIST

## 2019-08-14 PROCEDURE — 3074F SYST BP LT 130 MM HG: CPT | Mod: HCNC,CPTII,S$GLB, | Performed by: PODIATRIST

## 2019-08-14 PROCEDURE — 1101F PR PT FALLS ASSESS DOC 0-1 FALLS W/OUT INJ PAST YR: ICD-10-PCS | Mod: HCNC,CPTII,S$GLB, | Performed by: PODIATRIST

## 2019-08-14 PROCEDURE — 99999 PR PBB SHADOW E&M-EST. PATIENT-LVL III: CPT | Mod: PBBFAC,HCNC,, | Performed by: PODIATRIST

## 2019-08-14 PROCEDURE — 99203 PR OFFICE/OUTPT VISIT, NEW, LEVL III, 30-44 MIN: ICD-10-PCS | Mod: HCNC,S$GLB,, | Performed by: PODIATRIST

## 2019-08-14 PROCEDURE — 1101F PT FALLS ASSESS-DOCD LE1/YR: CPT | Mod: HCNC,CPTII,S$GLB, | Performed by: PODIATRIST

## 2019-08-14 RX ORDER — ERYTHROMYCIN 5 MG/G
OINTMENT OPHTHALMIC
Refills: 0 | Status: ON HOLD | COMMUNITY
Start: 2019-07-18 | End: 2020-01-04 | Stop reason: ALTCHOICE

## 2019-08-14 NOTE — PROGRESS NOTES
Subjective:      Patient ID: Sunitha Pillai is a 64 y.o. female.    Chief Complaint: PCP (Patty Louis MD 8/13/19); Diabetic Foot Exam; and Foot Pain (at night )    Sunitha is a 64 y.o. female who presents to the clinic upon referral from Dr. Villa  for evaluation and treatment of diabetic feet. Sunitha has a past medical history of Arthritis, Diabetes mellitus, Diabetes mellitus type I, GERD (gastroesophageal reflux disease), Hypertension, and Vertigo. Patient relates no major problem with feet. Only complaints today consist of yearly comprehensive diabetic  foot examination  .    PCP: Patty Louis MD    Date Last Seen by PCP:   Chief Complaint   Patient presents with    PCP     Patty Louis MD 8/13/19    Diabetic Foot Exam    Foot Pain     at night          Current shoe gear: Casual shoes    Hemoglobin A1C   Date Value Ref Range Status   06/26/2019 11.8 (H) 4.0 - 5.6 % Final     Comment:     ADA Screening Guidelines:  5.7-6.4%  Consistent with prediabetes  >or=6.5%  Consistent with diabetes  High levels of fetal hemoglobin interfere with the HbA1C  assay. Heterozygous hemoglobin variants (HbS, HgC, etc)do  not significantly interfere with this assay.   However, presence of multiple variants may affect accuracy.     04/27/2019 12.2 (H) 4.0 - 5.6 % Final     Comment:     ADA Screening Guidelines:  5.7-6.4%  Consistent with prediabetes  >or=6.5%  Consistent with diabetes  High levels of fetal hemoglobin interfere with the HbA1C  assay. Heterozygous hemoglobin variants (HbS, HgC, etc)do  not significantly interfere with this assay.   However, presence of multiple variants may affect accuracy.     09/23/2018 11.0 (H) 4.0 - 5.6 % Final     Comment:     ADA Screening Guidelines:  5.7-6.4%  Consistent with prediabetes  >or=6.5%  Consistent with diabetes  High levels of fetal hemoglobin interfere with the HbA1C  assay. Heterozygous hemoglobin variants (HbS, HgC, etc)do  not significantly  "interfere with this assay.   However, presence of multiple variants may affect accuracy.             Review of Systems   Constitution: Negative for chills, decreased appetite and fever.   Cardiovascular: Negative for leg swelling.   Skin: Positive for dry skin.   Musculoskeletal: Negative for arthritis, joint pain, joint swelling and myalgias.   Gastrointestinal: Negative for nausea and vomiting.   Neurological: Positive for numbness (at bedtime). Negative for loss of balance and paresthesias.           Objective:       Vitals:    08/14/19 1331   BP: 123/76   Pulse: 89   Resp: 18   Weight: 84 kg (185 lb 3 oz)   Height: 5' 3" (1.6 m)   PainSc: 0-No pain        Physical Exam   Constitutional: She appears well-developed and well-nourished.  Non-toxic appearance. She does not have a sickly appearance. No distress.   alert and oriented x 3.    Cardiovascular:   Pulses:       Dorsalis pedis pulses are 2+ on the right side, and 2+ on the left side.        Posterior tibial pulses are 2+ on the right side, and 2+ on the left side.    Capillary refill time is within normal limits. Digital hair present.    Pulmonary/Chest: No respiratory distress.   Musculoskeletal: She exhibits no deformity.        Right ankle: No tenderness. No lateral malleolus, no medial malleolus, no AITFL, no CF ligament and no posterior TFL tenderness found. Achilles tendon exhibits no pain, no defect and normal Galvez's test results.        Left ankle: No tenderness. No lateral malleolus, no medial malleolus, no AITFL, no CF ligament and no posterior TFL tenderness found. Achilles tendon exhibits no pain, no defect and normal Galvez's test results.        Right foot: There is no tenderness and no bony tenderness.        Left foot: There is no tenderness and no bony tenderness.   Adequate joint range of motion without pain, limitation, nor crepitation Bilateral feet and ankle joints. Muscle strength is 5/5 in all groups bilaterally.           Feet: "   Right Foot:   Protective Sensation: 5 sites tested. 5 sites sensed.   Left Foot:   Protective Sensation: 5 sites tested. 5 sites sensed.   Lymphadenopathy:   No lymphatic streaking     Neurological: She displays no atrophy. No sensory deficit.   Light touch present     Skin: Skin is warm, dry and intact. No rash noted. She is not diaphoretic. No cyanosis. No pallor. Nails show no clubbing.   Skin is of normal turgor.   Normal temperature gradient.  Examination of the skin reveals no evidence of significant rashes, open lesions, suspicious appearing nevi or other concerning lesions.    Toenails 1-5 bilaterally are neatly trimmed; of normal color and thickness   Xerosis b/l calcaneal rim      Psychiatric: Her mood appears not anxious. Her affect is not inappropriate. Her speech is not slurred. She is not combative. She is communicative. She is attentive.   Nursing note and vitals reviewed.            Assessment:       Encounter Diagnoses   Name Primary?    Type 2 diabetes mellitus with complication, with long-term current use of insulin Yes    Dry skin          Plan:       Sunitha was seen today for pcp, diabetic foot exam and foot pain.    Diagnoses and all orders for this visit:    Type 2 diabetes mellitus with complication, with long-term current use of insulin    Dry skin      I counseled the patient on her conditions, their implications and medical management.      - Shoe inspection. Diabetic Foot Education. Patient reminded of the importance of good nutrition and blood sugar control to help prevent podiatric complications of diabetes. Patient instructed on proper foot hygeine. We discussed wearing proper shoe gear, daily foot inspections, never walking without protective shoe gear, caution putting sharp instruments to feet     - Discussed DM foot care:  Wear comfortable, proper fitting shoes. Wash feet daily. Dry well. After drying, apply moisturizer to feet (no lotion to webspaces). Inspect feet daily for  skin breaks, blisters, swelling, or redness. Wear cotton socks (preferably white)  Change socks every day. Do NOT walk barefoot. Do NOT use heating pads or warm/hot water soaks     - Discussed importance of daily moisturizer to the feet such as Gold bonds diabetic foot cream    - Patient is low risk for developing lower extremity issues secondary to diabetes. I recommend continued yearly diabetic foot examinations.     - Patients PCP can perform yearly foot checks . Currently  patient has no pedal manifestations of DM    - Patients with out pedal manifestations of DM, do not qualify for nail/callus trimming     - RTC in  PRN     .

## 2019-08-14 NOTE — LETTER
August 14, 2019      Sun Villa, APRN, FNP  [address] [facility] [address]           [facility] - Podiatry  [address]  [place] [address]  Phone: [phone]          Patient: Sunitha Plilai   MR Number: [id number]   YOB: 1955   Date of Visit: 8/14/2019       Dear Sun Villa:    Thank you for referring Sunitha Pillai to me for evaluation. Attached you will find relevant portions of my assessment and plan of care.    If you have questions, please do not hesitate to call me. I look forward to following Sunitha Pillai along with you.    Sincerely,    Junie House, STEPHEN    Enclosure  CC:  No Recipients    If you would like to receive this communication electronically, please contact [email or web address] or [phone] to request more information on [employer] Link access.    For providers and/or their staff who would like to refer a patient to [facility], please contact us through our one-stop-shop provider referral line, [facility] [place], at [phone].    If you feel you have received this communication in error or would no longer like to receive these types of communications, please e-mail [email or web address]

## 2019-08-29 ENCOUNTER — CLINICAL SUPPORT (OUTPATIENT)
Dept: DIABETES | Facility: CLINIC | Age: 64
End: 2019-08-29
Payer: MEDICARE

## 2019-08-29 DIAGNOSIS — E11.65 UNCONTROLLED TYPE 2 DIABETES MELLITUS WITH HYPERGLYCEMIA: Primary | ICD-10-CM

## 2019-08-29 PROCEDURE — G0108 DIAB MANAGE TRN  PER INDIV: HCPCS | Mod: HCNC,S$GLB,, | Performed by: INTERNAL MEDICINE

## 2019-08-29 PROCEDURE — 99999 PR PBB SHADOW E&M-EST. PATIENT-LVL I: ICD-10-PCS | Mod: PBBFAC,HCNC,,

## 2019-08-29 PROCEDURE — G0108 PR DIAB MANAGE TRN  PER INDIV: ICD-10-PCS | Mod: HCNC,S$GLB,, | Performed by: INTERNAL MEDICINE

## 2019-08-29 PROCEDURE — 99999 PR PBB SHADOW E&M-EST. PATIENT-LVL I: CPT | Mod: PBBFAC,HCNC,,

## 2019-08-29 RX ORDER — INSULIN ASPART 100 [IU]/ML
INJECTION, SOLUTION INTRAVENOUS; SUBCUTANEOUS
Qty: 30 ML | Refills: 6 | Status: ON HOLD | OUTPATIENT
Start: 2019-08-29 | End: 2020-01-04

## 2019-08-29 NOTE — PROGRESS NOTES
Diabetes Education  Author: Eva Wilson RN, CDE  Date: 8/29/2019    Diabetes Care Management Summary  Diabetes Education Record Assessment/Progress: Initial  Current Diabetes Risk Level: High     Last A1c:   Lab Results   Component Value Date    HGBA1C 11.8 (H) 06/26/2019     Last Visit with Diabetes Educator: : 08/29/2019  Last OPCM Referral: : Not Found   Enrolled in OPCM: No    Diabetes Type  Diabetes Type : Type II    Diabetes History  Diabetes Diagnosis: >10 years  Current Treatment: Insulin(Levemir 45 units q hs; Novolog 20 units ac plus slide)  Reviewed Problem List with Patient: Yes    Health Maintenance was reviewed today with patient. Discussed with patient importance of routine eye exams, foot exams/foot care, blood work (i.e.: A1c, microalbumin, and lipid), dental visits, yearly flu vaccine, and pneumonia vaccine as indicated by PCP. Patient verbalized understanding.     Health Maintenance Topics with due status: Not Due       Topic Last Completion Date    Colonoscopy 06/19/2012    TETANUS VACCINE 12/22/2014    Influenza Vaccine 10/27/2017    Lipid Panel 09/23/2018    Eye Exam 03/25/2019    Mammogram 05/14/2019    Pap Smear with HPV Cotest 05/30/2019    Pap Smear 05/30/2019    Hemoglobin A1c 06/26/2019    Pneumococcal Vaccine (65+ Low/Medium Risk) 08/13/2019    Foot Exam 08/14/2019     Health Maintenance Due   Topic Date Due    High Dose Statin  08/04/1975    DEXA SCAN  09/26/2015     Monitoring   Blood Glucose Logs: No  Do you use a personal continuous glucose monitor?: No    Exercise   Exercise Type: none    Current Diabetes Treatment   Current Treatment: Insulin(Levemir 45 units q hs; Novolog 20 units ac plus slide)    Social History  Preferred Learning Method: Face to Face  Primary Support: Self, Spouse    Barriers to Change  Barriers to Change: Financial  Learning Challenges : None    Readiness to Learn   Readiness to Learn : Acceptance    Cultural Influences  Cultural Influences:  None    Diabetes Education Assessment/Progress  Patient in clinic today for V-Go start.  She presented to her appointment without the V-go kit or insulin. Called [facility] pharmacy and price obtained for patient.  She wants to start, but needs to speak to her spouse about the cost.  Rescheduled her V-Go for September.    Diabetes Disease Process (diabetes disease process and treatment options): Discussion, Needs Review, Written Materials Provided  Discussed patients most likely cause of recently elevated Hgb A1c.  Long- and short-term complications discussed including risk for cardiovascular and cerebellar vascular events.  Discussed blood glucose goals specific to patient. Reviewed current treatment plan and discussed all treatment options available, especially dietary and lifestyle modifications, as well as medication additions and/or changes.    Nutrition (Incorporating nutritional management into one's lifestyle): Not Covered/Deferred(time constraints)  Physical Activity (incorporating physical activity into one's lifestyle): Not Covered/Deferred(time constraints)    Medications (states correct name, dose, onset, peak, duration, side effects & timing of meds): Discussion, Needs Review, Written Materials Provided  Pt admits to not taking her insulin on schedule. Discussed timing and mechanism of action of long vs rapid acting insulin. Reviewed need for rotation of injection sites, appropriate insulin storage, safe disposal of used sharps and insulin pen preparation and use. Discussed possible side effects and how to avoid these.  Emphasized need to take Levemir at same time daily and need to take Novolog >4 hours apart. Emphasized need to take Novolog injections 5-10 min prior to eating meals.Reviewed that pt is at increased risk of hypoglycemia with current medication regimen. Discussed general vs severe hyperglycemia and risk of DKA. Reviewed proper pen of insulin pens including performing a prime shot of 2 units  prior to injection and keeping pen in skin for a count of 10 before removing. Discussed safe disposal of used sharps, storage of insulin, expiration date, rotation of injection sites, timing of insulin injection.  Advised not to give doses any closer than 4 hours. Mechanism of action of the long and rapid insulin explained.    Monitoring (monitoring blood glucose/other parameters & using results): Discussion, Written Materials Provided  Acute Complications (preventing, detecting, and treating acute complications): Discussion, Needs Review, Written Materials Provided  Chronic Complications (preventing, detecting, and treating chronic complications): Discussion, Needs Review, Written Materials Provided  Clinical (diabetes, other pertinent medical history, and relevant comorbidities reviewed during visit): Discussion, Needs Review, Written Materials Provided  Cognitive (knowledge of self-management skills, functional health literacy): Discussion, Needs Review, Written Materials Provided  Psychosocial (emotional response to diabetes): Discussion, Individual Session  Diabetes Distress and Support Systems: Discussion, Individual Session  Behavioral (readiness for change, lifestyle practices, self-care behaviors): Discussion, Individual Session    Goals  Patient has selected/evaluated goals during today's session: Yes, selected  Medications: (Patient will begin taking her insulins as directed)      Diabetes Care Plan/Intervention  Education Plan/Intervention: Individual Follow-Up DSMT    Today's Self-Management Care Plan was developed with the patient's input and is based on barriers identified during today's assessment.    The long and short-term goals in the care plan were written with the patient/caregiver's input. The patient has agreed to work toward these goals to improve her overall diabetes control.      The patient received a copy of today's self-management plan and verbalized understanding of the care plan,  goals, and all of today's instructions.      The patient was encouraged to communicate with her physician and care team regarding her condition(s) and treatment.  I provided the patient with my contact information today and encouraged her to contact me via phone or patient portal as needed.     Education Units of Time   Time Spent: 60 min

## 2019-08-30 ENCOUNTER — TELEPHONE (OUTPATIENT)
Dept: INTERNAL MEDICINE | Facility: CLINIC | Age: 64
End: 2019-08-30

## 2019-08-30 NOTE — TELEPHONE ENCOUNTER
----- Message from Giuliana Benjamin sent at 8/30/2019 10:30 AM CDT -----  Contact: Cookie/ [facility] [address] [place] [address] [address] [facility] [phone] (Phone)  Patient is calling for an RX refill or new RX.  Is this a refill or new RX:  new  RX name and strength: insulin aspart U-100 (NOVOLOG U-100 INSULIN ASPART) 100 unit/mL injection 30 mL   Directions (copy/paste from chart):    Is this a 30 day or 90 day RX:    Local pharmacy or mail order pharmacy:    Pharmacy name and phone # (copy/paste from chart):   [facility] [place], [place] - [address] [facility] [phone] (Phone)  [phone] (Fax)  Comments:  New Rx with Dx code for Medicare requirements

## 2019-09-01 NOTE — PROGRESS NOTES
Subjective:       Patient ID: Sunitha Pillai is a 64 y.o. female.    Chief Complaint: Follow-up    HPIPt feeling well - not taking her insulin as she should .  No CP or SOB.  Review of Systems   Respiratory: Negative for shortness of breath (PND or orthopnea).    Cardiovascular: Negative for chest pain (arm pain or jaw pain).   Gastrointestinal: Negative for abdominal pain, diarrhea, nausea and vomiting.   Genitourinary: Negative for dysuria.   Neurological: Negative for seizures, syncope and headaches.       Objective:      Physical Exam   Constitutional: She is oriented to person, place, and time. She appears well-developed and well-nourished. No distress.   HENT:   Head: Normocephalic.   Mouth/Throat: Oropharynx is clear and moist.   Neck: Neck supple. No JVD present. No thyromegaly present.   Cardiovascular: Normal rate, regular rhythm, normal heart sounds and intact distal pulses. Exam reveals no gallop and no friction rub.   No murmur heard.  Pulmonary/Chest: Effort normal and breath sounds normal. She has no wheezes. She has no rales.   Abdominal: Soft. Bowel sounds are normal. She exhibits no distension and no mass. There is no tenderness. There is no rebound and no guarding.   Musculoskeletal: She exhibits no edema.   Lymphadenopathy:     She has no cervical adenopathy.   Neurological: She is alert and oriented to person, place, and time. She has normal reflexes.   Skin: Skin is warm and dry.   Psychiatric: She has a normal mood and affect. Her behavior is normal. Judgment and thought content normal.       Assessment:       No diagnosis found.    Plan:   Essential hypertension  Controlled - continue current meds    Type 2 diabetes mellitus with complication, with long-term current use of insulin  Take meds regularly  Other orders  -     doxycycline (VIBRA-TABS) 100 MG tablet; One tablet twice daily with food and a full glass of water  Dispense: 20 tablet; Refill: 0 - bronchitis  -     (In Office  Administered) Pneumococcal Conjugate Vaccine (13 Valent) (IM)  -     varicella-zoster [id number], PF, (SHINGRIX, PF,) 50 mcg/0.5 mL injection; Inject 0.5 mLs into the muscle once. for 1 dose  Dispense: 0.5 mL; Refill: 0

## 2019-09-12 ENCOUNTER — TELEPHONE (OUTPATIENT)
Dept: DIABETES | Facility: CLINIC | Age: 64
End: 2019-09-12

## 2019-09-20 ENCOUNTER — PATIENT OUTREACH (OUTPATIENT)
Dept: ADMINISTRATIVE | Facility: OTHER | Age: 64
End: 2019-09-20

## 2019-09-25 RX ORDER — INSULIN DETEMIR 100 [IU]/ML
INJECTION, SOLUTION SUBCUTANEOUS
Refills: 2 | OUTPATIENT
Start: 2019-09-25

## 2019-10-16 ENCOUNTER — TELEPHONE (OUTPATIENT)
Dept: INTERNAL MEDICINE | Facility: CLINIC | Age: 64
End: 2019-10-16

## 2019-10-16 NOTE — TELEPHONE ENCOUNTER
----- Message from Mora Zuniga sent at 10/16/2019  2:41 PM CDT -----  Contact: self/[phone]  Pt called in regards to checking the status of her Rx refill for     lancets (LANCETS,THIN) Misc    She also needs a new Rx for a  machine to check her sugar.     [facility]  Please advise

## 2019-10-21 ENCOUNTER — TELEPHONE (OUTPATIENT)
Dept: INTERNAL MEDICINE | Facility: CLINIC | Age: 64
End: 2019-10-21

## 2019-10-21 NOTE — TELEPHONE ENCOUNTER
----- Message from Saima Nguyen sent at 10/21/2019  3:08 PM CDT -----  Contact: Pt self Mobile/Home [phone]   Patient would like a call back in regards to her saying that she called a week ago trying to get an answer about why she was denied for an order that she put in at [facility] for her insulin detemir U-100 (LEVEMIR) 100 unit/mL injection? She also would like to put in an order for a machine to test her sugar and the testing supplies that goes with it please.

## 2019-10-28 ENCOUNTER — APPOINTMENT (RX ONLY)
Dept: URBAN - METROPOLITAN AREA CLINIC 17 | Facility: CLINIC | Age: 64
Setting detail: DERMATOLOGY
End: 2019-10-28

## 2019-10-28 DIAGNOSIS — L30.1 DYSHIDROSIS [POMPHOLYX]: ICD-10-CM

## 2019-10-28 DIAGNOSIS — E11.9 TYPE 2 DIABETES MELLITUS WITHOUT COMPLICATION: ICD-10-CM

## 2019-10-28 DIAGNOSIS — L57.0 ACTINIC KERATOSIS: ICD-10-CM

## 2019-10-28 PROBLEM — M12.9 ARTHROPATHY, UNSPECIFIED: Status: ACTIVE | Noted: 2019-10-28

## 2019-10-28 PROBLEM — J45.909 UNSPECIFIED ASTHMA, UNCOMPLICATED: Status: ACTIVE | Noted: 2019-10-28

## 2019-10-28 PROBLEM — K21.9 GASTRO-ESOPHAGEAL REFLUX DISEASE WITHOUT ESOPHAGITIS: Status: ACTIVE | Noted: 2019-10-28

## 2019-10-28 PROBLEM — E13.9 OTHER SPECIFIED DIABETES MELLITUS WITHOUT COMPLICATIONS: Status: ACTIVE | Noted: 2019-10-28

## 2019-10-28 PROCEDURE — ? TREATMENT REGIMEN

## 2019-10-28 PROCEDURE — 17000 DESTRUCT PREMALG LESION: CPT

## 2019-10-28 PROCEDURE — 99202 OFFICE O/P NEW SF 15 MIN: CPT | Mod: 25

## 2019-10-28 PROCEDURE — ? LIQUID NITROGEN

## 2019-10-28 PROCEDURE — ? COUNSELING

## 2019-10-28 PROCEDURE — ? PRESCRIPTION

## 2019-10-28 RX ORDER — BETAMETHASONE DIPROPIONATE 0.5 MG/G
1 CREAM TOPICAL BID
Qty: 1 | Refills: 3 | Status: ERX | COMMUNITY
Start: 2019-10-28

## 2019-10-28 RX ADMIN — BETAMETHASONE DIPROPIONATE 1: 0.5 CREAM TOPICAL at 14:59

## 2019-10-28 ASSESSMENT — LOCATION ZONE DERM
LOCATION ZONE: FACE
LOCATION ZONE: HAND

## 2019-10-28 ASSESSMENT — LOCATION SIMPLE DESCRIPTION DERM
LOCATION SIMPLE: RIGHT HAND
LOCATION SIMPLE: LEFT HAND
LOCATION SIMPLE: LEFT CHEEK

## 2019-10-28 ASSESSMENT — LOCATION DETAILED DESCRIPTION DERM
LOCATION DETAILED: LEFT THENAR EMINENCE
LOCATION DETAILED: RIGHT ULNAR PALM
LOCATION DETAILED: LEFT CENTRAL BUCCAL CHEEK

## 2019-10-28 NOTE — PROCEDURE: LIQUID NITROGEN
Consent: The patient's consent was obtained including but not limited to risks of crusting, scabbing, blistering, scarring, darker or lighter pigmentary change, recurrence, incomplete removal and infection.
Render Post-Care Instructions In Note?: no
Duration Of Freeze Thaw-Cycle (Seconds): 5
Detail Level: Detailed
Post-Care Instructions: I reviewed with the patient in detail post-care instructions. Patient is to wear sunprotection, and avoid picking at any of the treated lesions. Pt may apply Vaseline to crusted or scabbing areas.
Number Of Freeze-Thaw Cycles: 1 freeze-thaw cycle

## 2019-10-30 RX ORDER — METFORMIN HYDROCHLORIDE 1000 MG/1
TABLET ORAL
Qty: 60 TABLET | Refills: 1 | Status: SHIPPED | OUTPATIENT
Start: 2019-10-30 | End: 2020-01-20

## 2019-11-05 NOTE — TELEPHONE ENCOUNTER
----- Message from Tim Wilson sent at 11/5/2019  1:34 PM CST -----  Contact: self   Patient called in regards to her diabetic supplies she states she has requested the glucose meter and test strips and she has had a response please advise

## 2019-11-09 ENCOUNTER — HOSPITAL ENCOUNTER (EMERGENCY)
Facility: HOSPITAL | Age: 64
Discharge: HOME | End: 2019-11-09
Attending: EMERGENCY MEDICINE
Payer: MEDICARE

## 2019-11-09 VITALS
SYSTOLIC BLOOD PRESSURE: 154 MMHG | TEMPERATURE: 98 F | DIASTOLIC BLOOD PRESSURE: 84 MMHG | RESPIRATION RATE: 20 BRPM | BODY MASS INDEX: 31.39 KG/M2 | HEART RATE: 75 BPM | HEIGHT: 64 IN | WEIGHT: 183.88 LBS | OXYGEN SATURATION: 99 %

## 2019-11-09 DIAGNOSIS — R07.9 CHEST PAIN: Primary | ICD-10-CM

## 2019-11-09 DIAGNOSIS — S46.812A TRAPEZIUS MUSCLE STRAIN, LEFT, INITIAL ENCOUNTER: ICD-10-CM

## 2019-11-09 LAB
ALBUMIN SERPL BCP-MCNC: 3.6 G/DL (ref 3.5–5.2)
ALP SERPL-CCNC: 115 U/L (ref 55–135)
ALT SERPL W/O P-5'-P-CCNC: 16 U/L (ref 10–44)
ANION GAP SERPL CALC-SCNC: 7 MMOL/L (ref 8–16)
AST SERPL-CCNC: 15 U/L (ref 10–40)
BASOPHILS # BLD AUTO: 0.02 K/UL (ref 0–0.2)
BASOPHILS NFR BLD: 0.3 % (ref 0–1.9)
BILIRUB SERPL-MCNC: 0.6 MG/DL (ref 0.1–1)
BUN SERPL-MCNC: 14 MG/DL (ref 8–23)
CALCIUM SERPL-MCNC: 9.7 MG/DL (ref 8.7–10.5)
CHLORIDE SERPL-SCNC: 101 MMOL/L (ref 95–110)
CO2 SERPL-SCNC: 28 MMOL/L (ref 23–29)
CREAT SERPL-MCNC: 1.2 MG/DL (ref 0.5–1.4)
DIFFERENTIAL METHOD: NORMAL
EOSINOPHIL # BLD AUTO: 0.1 K/UL (ref 0–0.5)
EOSINOPHIL NFR BLD: 2 % (ref 0–8)
ERYTHROCYTE [DISTWIDTH] IN BLOOD BY AUTOMATED COUNT: 12.4 % (ref 11.5–14.5)
EST. GFR  (AFRICAN AMERICAN): 54.8 ML/MIN/1.73 M^2
EST. GFR  (NON AFRICAN AMERICAN): 47.5 ML/MIN/1.73 M^2
GLUCOSE SERPL-MCNC: 275 MG/DL (ref 70–110)
HCT VFR BLD AUTO: 39.9 % (ref 37–48.5)
HGB BLD-MCNC: 13.3 G/DL (ref 12–16)
IMM GRANULOCYTES # BLD AUTO: 0.01 K/UL (ref 0–0.04)
IMM GRANULOCYTES NFR BLD AUTO: 0.2 % (ref 0–0.5)
LYMPHOCYTES # BLD AUTO: 2.6 K/UL (ref 1–4.8)
LYMPHOCYTES NFR BLD: 39.7 % (ref 18–48)
MCH RBC QN AUTO: 30.4 PG (ref 27–31)
MCHC RBC AUTO-ENTMCNC: 33.3 G/DL (ref 32–36)
MCV RBC AUTO: 91 FL (ref 82–98)
MONOCYTES # BLD AUTO: 0.7 K/UL (ref 0.3–1)
MONOCYTES NFR BLD: 10 % (ref 4–15)
NEUTROPHILS # BLD AUTO: 3.2 K/UL (ref 1.8–7.7)
NEUTROPHILS NFR BLD: 47.8 % (ref 38–73)
NRBC BLD-RTO: 0 /100 WBC
PLATELET # BLD AUTO: 164 K/UL (ref 150–350)
PMV BLD AUTO: 9.9 FL (ref 9.2–12.9)
POTASSIUM SERPL-SCNC: 3.8 MMOL/L (ref 3.5–5.1)
PROT SERPL-MCNC: 7.7 G/DL (ref 6–8.4)
RBC # BLD AUTO: 4.37 M/UL (ref 4–5.4)
SODIUM SERPL-SCNC: 136 MMOL/L (ref 136–145)
TROPONIN I SERPL DL<=0.01 NG/ML-MCNC: <0.006 NG/ML (ref 0–0.03)
WBC # BLD AUTO: 6.62 K/UL (ref 3.9–12.7)

## 2019-11-09 PROCEDURE — 93010 EKG 12-LEAD: ICD-10-PCS | Mod: HCNC,,, | Performed by: INTERNAL MEDICINE

## 2019-11-09 PROCEDURE — 80053 COMPREHEN METABOLIC PANEL: CPT | Mod: HCNC

## 2019-11-09 PROCEDURE — 99285 EMERGENCY DEPT VISIT HI MDM: CPT | Mod: 25,HCNC

## 2019-11-09 PROCEDURE — 99285 PR EMERGENCY DEPT VISIT,LEVEL V: ICD-10-PCS | Mod: ,,, | Performed by: EMERGENCY MEDICINE

## 2019-11-09 PROCEDURE — 85025 COMPLETE CBC W/AUTO DIFF WBC: CPT | Mod: HCNC

## 2019-11-09 PROCEDURE — 93005 ELECTROCARDIOGRAM TRACING: CPT | Mod: HCNC

## 2019-11-09 PROCEDURE — 93010 ELECTROCARDIOGRAM REPORT: CPT | Mod: HCNC,,, | Performed by: INTERNAL MEDICINE

## 2019-11-09 PROCEDURE — 84484 ASSAY OF TROPONIN QUANT: CPT | Mod: HCNC

## 2019-11-09 PROCEDURE — 36000 PLACE NEEDLE IN VEIN: CPT | Mod: HCNC

## 2019-11-09 PROCEDURE — 25000003 PHARM REV CODE 250: Mod: HCNC | Performed by: EMERGENCY MEDICINE

## 2019-11-09 PROCEDURE — 99285 EMERGENCY DEPT VISIT HI MDM: CPT | Mod: ,,, | Performed by: EMERGENCY MEDICINE

## 2019-11-09 RX ORDER — NAPROXEN SODIUM 220 MG/1
324 TABLET, FILM COATED ORAL
Status: COMPLETED | OUTPATIENT
Start: 2019-11-09 | End: 2019-11-09

## 2019-11-09 RX ORDER — NAPROXEN 500 MG/1
500 TABLET ORAL 2 TIMES DAILY PRN
Qty: 14 TABLET | Refills: 0 | Status: SHIPPED | OUTPATIENT
Start: 2019-11-09 | End: 2019-11-16

## 2019-11-09 RX ORDER — INSULIN ASPART 100 [IU]/ML
INJECTION, SOLUTION INTRAVENOUS; SUBCUTANEOUS
Refills: 0 | COMMUNITY
Start: 2019-09-25 | End: 2019-12-17 | Stop reason: SDUPTHER

## 2019-11-09 RX ADMIN — ASPIRIN 81 MG CHEWABLE TABLET 324 MG: 81 TABLET CHEWABLE at 09:11

## 2019-11-10 NOTE — ED TRIAGE NOTES
Pt reports 4/10 chest pain x1 week tightness radiating down left arm and shoulder 81 mg ASA taken around 5 AM, also reports occassional SOB, denies fevers N/V/D or recent illness     LOC: The patient is awake, alert, and oriented to place, time, situation. Affect is appropriate.  Speech is appropriate and clear.     APPEARANCE: Patient resting comfortably in no acute distress.  Patient is clean and well groomed.    SKIN: The skin is warm and dry; color consistent with ethnicity.  Patient has normal skin turgor and moist mucus membranes.  Skin intact; no breakdown or bruising noted.     MUSCULOSKELETAL: Patient moving upper and lower extremities without difficulty.  Denies weakness.     RESPIRATORY: Airway is open and patent. Respirations spontaneous, even, easy, and non-labored.  Patient has a normal effort and rate.  No accessory muscle use noted. Denies cough.     CARDIAC:  Normal rhythm and rate noted.  No peripheral edema noted. complaints of chest pain 4/10 radiating down left arm and shoulder.      ABDOMEN: Soft and non tender to palpation.  No distention noted.     NEUROLOGIC: Eyes open spontaneously.  Behavior appropriate to situation.  Follows commands; facial expression symmetrical.  Purposeful motor response noted; normal sensation in all extremities.

## 2019-11-10 NOTE — ED PROVIDER NOTES
Encounter Date: 2019    SCRIBE #1 NOTE: I, Cornelius Kaur, am scribing for, and in the presence of,  Dr. Martinez. I have scribed the following portions of the note - Other sections scribed: HPI, ROS, PE.       History     Chief Complaint   Patient presents with    Chest Pain     chest x 1 week. SOB stareted on today.     The history is provided by the patient.      Ms. Pillai is a 64 y.o. female with hypertension, diabetes, and GERD, here today with chest pain. Patient has been having chest pain for the past week. Describes this pain as a tightness that radiates to her left breast and shoulder. Was told to come to ED by family. She denies SOB, nausea, vomiting, diarrhea, fevers or diaphoresis. No prior history of smoking or chewable tobacco use. Notes her mother had heart disease, but uncertain about MI. Took baby ASA this morning.      Review of patient's allergies indicates:  No Known Allergies  Past Medical History:   Diagnosis Date    Arthritis     Diabetes mellitus     Diabetes mellitus type I     GERD (gastroesophageal reflux disease)     Hypertension     Vertigo      Past Surgical History:   Procedure Laterality Date     SECTION       Family History   Problem Relation Age of Onset    Hypertension Mother     Hyperlipidemia Mother     Diabetes Mother     Heart disease Mother     Aneurysm Father     Diabetes Sister     Hypertension Sister     Heart disease Brother     Diabetes Brother     Hypertension Brother     Breast cancer Neg Hx     Colon cancer Neg Hx     Ovarian cancer Neg Hx      Social History     Tobacco Use    Smoking status: Never Smoker    Smokeless tobacco: Never Used   Substance Use Topics    Alcohol use: No    Drug use: No     Review of Systems   Constitutional: Negative for diaphoresis and fever.   HENT: Negative for sore throat and trouble swallowing.    Eyes: Negative for pain and redness.   Respiratory: Positive for shortness of breath.     Cardiovascular: Positive for chest pain (tightness that radiates to the back and shoulder).   Gastrointestinal: Negative for diarrhea, nausea and vomiting.   Genitourinary: Negative for dysuria and frequency.   Musculoskeletal: Negative for arthralgias.   Neurological: Negative for light-headedness and headaches.   Psychiatric/Behavioral: Negative for confusion.       Physical Exam     Initial Vitals [11/09/19 2037]   BP Pulse Resp Temp SpO2   (!) 152/67 98 20 97.7 °F (36.5 °C) 99 %      MAP       --         Physical Exam    Nursing note and vitals reviewed.  Constitutional: She appears well-developed and well-nourished. She is not diaphoretic. No distress.   HENT:   Head: Normocephalic and atraumatic.   Right Ear: External ear normal.   Left Ear: External ear normal.   Neck: Neck supple.   Cardiovascular: Normal rate, regular rhythm, normal heart sounds and intact distal pulses.   Pulmonary/Chest: Breath sounds normal. No respiratory distress. She has no wheezes. She has no rhonchi. She has no rales.   Abdominal: Soft. She exhibits no distension. There is no tenderness. There is no rebound and no guarding.   Musculoskeletal:   Left chest tenderness to palpation. Left trapezius tenderness to palpation.    Neurological: She is alert and oriented to person, place, and time. GCS score is 15. GCS eye subscore is 4. GCS verbal subscore is 5. GCS motor subscore is 6.   Skin: Skin is warm. Capillary refill takes less than 2 seconds. No rash noted.   Psychiatric: She has a normal mood and affect.         ED Course   Procedures  Labs Reviewed   COMPREHENSIVE METABOLIC PANEL - Abnormal; Notable for the following components:       Result Value    Glucose 275 (*)     Anion Gap 7 (*)     eGFR if  54.8 (*)     eGFR if non  47.5 (*)     All other components within normal limits   CBC W/ AUTO DIFFERENTIAL   TROPONIN I     EKG Readings: (Independently Interpreted)   NSR rate 89. Normal intervals. No  ischemic changes. No STEMI.     ECG Results          EKG 12-lead (Final result)  Result time 11/10/19 14:25:34    Final result by Interface, [facility] (11/10/19 14:25:34)                 Narrative:    Test Reason : R07.9,    Vent. Rate : 089 BPM     Atrial Rate : 089 BPM     P-R Int : 118 ms          QRS Dur : 086 ms      QT Int : 352 ms       P-R-T Axes : 067 -23 060 degrees     QTc Int : 428 ms    Normal sinus rhythm  Minimal voltage criteria for LVH, may be normal variant  Borderline Abnormal ECG  When compared with ECG of 15-JUL-2019 21:05,  No significant change was found  Confirmed by BRAD ESCAMILLA MD ([id number]) on 11/10/2019 2:25:24 PM    Referred By: AARON   SELF           Confirmed By:BRAD ESCAMILLA MD                            Imaging Results          X-Ray Chest PA And Lateral (Final result)  Result time 11/09/19 21:48:36    Final result by Isiah Lafleur MD (11/09/19 21:48:36)                 Impression:      No acute cardiopulmonary process.      Electronically signed by: Isiah Lafleur MD  Date:    11/09/2019  Time:    21:48             Narrative:    EXAMINATION:  XR CHEST PA AND LATERAL    CLINICAL HISTORY:  Strain of other muscles, fascia and tendons at shoulder and upper arm level, left arm, initial encounter    TECHNIQUE:  PA and lateral views of the chest were performed.    COMPARISON:  July 15, 2019.    FINDINGS:  There is no consolidation, effusion, or pneumothorax.    Cardiomediastinal silhouette is unremarkable.    Regional osseous structures are unremarkable.                                 Medical Decision Making:   History:   Old Medical Records: I decided to obtain old medical records.  Independently Interpreted Test(s):   I have ordered and independently interpreted X-rays - see summary below.       <> Summary of X-Ray Reading(s): CXR viewed. No acute infiltrate, effusion or PTX on my read.   I have ordered and independently interpreted EKG Reading(s) - see prior  notes  Clinical Tests:   Lab Tests: Ordered and Reviewed  Radiological Study: Ordered and Reviewed  Medical Tests: Ordered and Reviewed  ED Management:  Vitals normal. Afebrile. Well appearing. Here w/ vague chest pain. Has focal TTP to chest wall and trapezius. I suspect muscle strain as overall cause of symptoms, but will need to f/o MI. givem duration of symptoms, one troponin should be diagnostic. EKG, CBC, BMP, troponin, CXR obtained. ASA given.    CXR and EKG normal.  Labs reviewed; grossly WNL w/o actionable values.    HEART score 3 which supports discharge home without need for provocative testing.   Feels better after aspirin.     Will d/c w/ PRN naproxen for muscle strain as this is very low risk for ACS.    Stable for discharge at this time. Return precautions discussed.         Additional MDM:   Heart Score:    History:          Slightly suspicious.  ECG:             Normal  Age:               45-65 years  Risk factors: >= 3 risk factors or history of atherosclerotic disease  Troponin:       Less than or equal to normal limit  Final Score: 3             Scribe Attestation:   Scribe #1: I performed the above scribed service and the documentation accurately describes the services I performed. I attest to the accuracy of the note.                          Clinical Impression:       ICD-10-CM ICD-9-CM   1. Chest pain R07.9 786.50   2. Trapezius muscle strain, left, initial encounter S46.812A 840.8         Disposition:   Disposition: Discharged  Condition: Stable                     Kevin Martinez MD  11/11/19 [id number]

## 2019-11-10 NOTE — PROVIDER PROGRESS NOTES - EMERGENCY DEPT.
Encounter Date: 11/9/2019    ED Physician Progress Notes         EKG - STEMI Decision  Initial Reading: No STEMI present.

## 2019-11-12 ENCOUNTER — TELEPHONE (OUTPATIENT)
Dept: INTERNAL MEDICINE | Facility: CLINIC | Age: 64
End: 2019-11-12

## 2019-11-12 NOTE — TELEPHONE ENCOUNTER
----- Message from Libertad Ruiz sent at 11/12/2019 12:23 PM CST -----  Contact: Gato with [employer]   Gato with [employer] [facility] is requesting orders and clinical notes for the patient's diabetic supplies. Information can be faxed to  [phone]

## 2019-11-13 RX ORDER — DEXTROSE 4 G
TABLET,CHEWABLE ORAL
Qty: 1 EACH | Refills: 0 | Status: ON HOLD | OUTPATIENT
Start: 2019-11-13 | End: 2020-01-04 | Stop reason: HOSPADM

## 2019-11-13 RX ORDER — LANCETS
EACH MISCELLANEOUS
Qty: 400 EACH | Refills: 3 | Status: SHIPPED | OUTPATIENT
Start: 2019-11-13

## 2019-11-13 RX ORDER — INSULIN PUMP SYRINGE, 3 ML
EACH MISCELLANEOUS
Qty: 1 EACH | Refills: 0 | Status: ON HOLD | OUTPATIENT
Start: 2019-11-13 | End: 2020-01-04 | Stop reason: HOSPADM

## 2019-11-13 NOTE — TELEPHONE ENCOUNTER
Spoke with patient, will try to get her in with Diabetic educator----- Message from Mora Galo sent at 11/13/2019  7:10 AM CST -----  Contact: patient [phone]  Patient is concerned because she needs a glucometer and can't check her blood sugar because her last one broke. Patient can't take insulin unless she is able to check her numbers.     Patient is off today and tomorrow and would like to know if you can arrange for her to see a diabetic  so she can get a glucometer from them. She is willing to come any time you arrange this. Pt feels she needs this done today.

## 2019-11-14 ENCOUNTER — TELEPHONE (OUTPATIENT)
Dept: INTERNAL MEDICINE | Facility: CLINIC | Age: 64
End: 2019-11-14

## 2019-11-14 RX ORDER — PEN NEEDLE, DIABETIC 31 GX5/16"
NEEDLE, DISPOSABLE MISCELLANEOUS
Qty: 100 EACH | Refills: 3 | Status: SHIPPED | OUTPATIENT
Start: 2019-11-14

## 2019-11-14 NOTE — TELEPHONE ENCOUNTER
----- Message from Magalis Aguilera sent at 11/14/2019  9:43 AM CST -----  Contact: Patient [phone]  Patient still waiting on a call about getting a diabetes management . Please call the patient asap.    Please call and advise.    Thank You!!!

## 2019-12-02 ENCOUNTER — PATIENT OUTREACH (OUTPATIENT)
Dept: ADMINISTRATIVE | Facility: HOSPITAL | Age: 64
End: 2019-12-02

## 2019-12-17 ENCOUNTER — IMMUNIZATION (OUTPATIENT)
Dept: INTERNAL MEDICINE | Facility: CLINIC | Age: 64
End: 2019-12-17
Payer: MEDICARE

## 2019-12-17 ENCOUNTER — LAB VISIT (OUTPATIENT)
Dept: LAB | Facility: HOSPITAL | Age: 64
End: 2019-12-17
Attending: INTERNAL MEDICINE
Payer: MEDICARE

## 2019-12-17 ENCOUNTER — OFFICE VISIT (OUTPATIENT)
Dept: INTERNAL MEDICINE | Facility: CLINIC | Age: 64
End: 2019-12-17
Payer: MEDICARE

## 2019-12-17 VITALS
SYSTOLIC BLOOD PRESSURE: 122 MMHG | TEMPERATURE: 98 F | DIASTOLIC BLOOD PRESSURE: 70 MMHG | BODY MASS INDEX: 31.99 KG/M2 | HEART RATE: 80 BPM | HEIGHT: 64 IN | OXYGEN SATURATION: 97 % | WEIGHT: 187.38 LBS

## 2019-12-17 DIAGNOSIS — E11.65 UNCONTROLLED TYPE 2 DIABETES MELLITUS WITH HYPERGLYCEMIA: Primary | ICD-10-CM

## 2019-12-17 DIAGNOSIS — E55.9 MILD VITAMIN D DEFICIENCY: ICD-10-CM

## 2019-12-17 DIAGNOSIS — M89.9 DISORDER OF BONE: ICD-10-CM

## 2019-12-17 DIAGNOSIS — E11.65 UNCONTROLLED TYPE 2 DIABETES MELLITUS WITH HYPERGLYCEMIA: ICD-10-CM

## 2019-12-17 LAB
25(OH)D3+25(OH)D2 SERPL-MCNC: 21 NG/ML (ref 30–96)
ALBUMIN SERPL BCP-MCNC: 3.5 G/DL (ref 3.5–5.2)
ALBUMIN/CREAT UR: 20.6 UG/MG (ref 0–30)
ALP SERPL-CCNC: 100 U/L (ref 55–135)
ALT SERPL W/O P-5'-P-CCNC: 14 U/L (ref 10–44)
ANION GAP SERPL CALC-SCNC: 6 MMOL/L (ref 8–16)
AST SERPL-CCNC: 17 U/L (ref 10–40)
BACTERIA #/AREA URNS AUTO: NORMAL /HPF
BILIRUB SERPL-MCNC: 0.5 MG/DL (ref 0.1–1)
BILIRUB UR QL STRIP: NEGATIVE
BUN SERPL-MCNC: 14 MG/DL (ref 8–23)
CALCIUM SERPL-MCNC: 9.4 MG/DL (ref 8.7–10.5)
CHLORIDE SERPL-SCNC: 104 MMOL/L (ref 95–110)
CHOLEST SERPL-MCNC: 150 MG/DL (ref 120–199)
CHOLEST/HDLC SERPL: 3 {RATIO} (ref 2–5)
CLARITY UR REFRACT.AUTO: CLEAR
CO2 SERPL-SCNC: 30 MMOL/L (ref 23–29)
COLOR UR AUTO: YELLOW
CREAT SERPL-MCNC: 1.1 MG/DL (ref 0.5–1.4)
CREAT UR-MCNC: 68 MG/DL (ref 15–325)
EST. GFR  (AFRICAN AMERICAN): >60 ML/MIN/1.73 M^2
EST. GFR  (NON AFRICAN AMERICAN): 52.8 ML/MIN/1.73 M^2
ESTIMATED AVG GLUCOSE: 272 MG/DL (ref 68–131)
GLUCOSE SERPL-MCNC: 280 MG/DL (ref 70–110)
GLUCOSE UR QL STRIP: ABNORMAL
HBA1C MFR BLD HPLC: 11.1 % (ref 4–5.6)
HDLC SERPL-MCNC: 50 MG/DL (ref 40–75)
HDLC SERPL: 33.3 % (ref 20–50)
HGB UR QL STRIP: NEGATIVE
KETONES UR QL STRIP: NEGATIVE
LDLC SERPL CALC-MCNC: 84.8 MG/DL (ref 63–159)
LEUKOCYTE ESTERASE UR QL STRIP: NEGATIVE
MICROALBUMIN UR DL<=1MG/L-MCNC: 14 UG/ML
MICROSCOPIC COMMENT: NORMAL
NITRITE UR QL STRIP: NEGATIVE
NONHDLC SERPL-MCNC: 100 MG/DL
PH UR STRIP: 6 [PH] (ref 5–8)
POTASSIUM SERPL-SCNC: 4.8 MMOL/L (ref 3.5–5.1)
PROT SERPL-MCNC: 7.6 G/DL (ref 6–8.4)
PROT UR QL STRIP: NEGATIVE
RBC #/AREA URNS AUTO: 0 /HPF (ref 0–4)
SODIUM SERPL-SCNC: 140 MMOL/L (ref 136–145)
SP GR UR STRIP: 1.01 (ref 1–1.03)
SQUAMOUS #/AREA URNS AUTO: 0 /HPF
TRIGL SERPL-MCNC: 76 MG/DL (ref 30–150)
TSH SERPL DL<=0.005 MIU/L-ACNC: 1.04 UIU/ML (ref 0.4–4)
URN SPEC COLLECT METH UR: ABNORMAL
WBC #/AREA URNS AUTO: 2 /HPF (ref 0–5)
YEAST UR QL AUTO: NORMAL

## 2019-12-17 PROCEDURE — 99214 PR OFFICE/OUTPT VISIT, EST, LEVL IV, 30-39 MIN: ICD-10-PCS | Mod: 25,HCNC,S$GLB, | Performed by: INTERNAL MEDICINE

## 2019-12-17 PROCEDURE — 99999 PR PBB SHADOW E&M-EST. PATIENT-LVL V: ICD-10-PCS | Mod: PBBFAC,HCNC,, | Performed by: INTERNAL MEDICINE

## 2019-12-17 PROCEDURE — G0008 ADMIN INFLUENZA VIRUS VAC: HCPCS | Mod: HCNC,S$GLB,, | Performed by: INTERNAL MEDICINE

## 2019-12-17 PROCEDURE — 82043 UR ALBUMIN QUANTITATIVE: CPT | Mod: HCNC

## 2019-12-17 PROCEDURE — 3078F DIAST BP <80 MM HG: CPT | Mod: HCNC,CPTII,S$GLB, | Performed by: INTERNAL MEDICINE

## 2019-12-17 PROCEDURE — 3008F BODY MASS INDEX DOCD: CPT | Mod: HCNC,CPTII,S$GLB, | Performed by: INTERNAL MEDICINE

## 2019-12-17 PROCEDURE — 1101F PR PT FALLS ASSESS DOC 0-1 FALLS W/OUT INJ PAST YR: ICD-10-PCS | Mod: HCNC,CPTII,S$GLB, | Performed by: INTERNAL MEDICINE

## 2019-12-17 PROCEDURE — 90662 FLU VACCINE - HIGH DOSE (65+) PRESERVATIVE FREE IM: ICD-10-PCS | Mod: HCNC,S$GLB,, | Performed by: INTERNAL MEDICINE

## 2019-12-17 PROCEDURE — 83036 HEMOGLOBIN GLYCOSYLATED A1C: CPT | Mod: HCNC

## 2019-12-17 PROCEDURE — 1101F PT FALLS ASSESS-DOCD LE1/YR: CPT | Mod: HCNC,CPTII,S$GLB, | Performed by: INTERNAL MEDICINE

## 2019-12-17 PROCEDURE — 3074F PR MOST RECENT SYSTOLIC BLOOD PRESSURE < 130 MM HG: ICD-10-PCS | Mod: HCNC,CPTII,S$GLB, | Performed by: INTERNAL MEDICINE

## 2019-12-17 PROCEDURE — 99214 OFFICE O/P EST MOD 30 MIN: CPT | Mod: 25,HCNC,S$GLB, | Performed by: INTERNAL MEDICINE

## 2019-12-17 PROCEDURE — 99499 UNLISTED E&M SERVICE: CPT | Mod: HCNC,S$GLB,, | Performed by: INTERNAL MEDICINE

## 2019-12-17 PROCEDURE — 99499 RISK ADDL DX/OHS AUDIT: ICD-10-PCS | Mod: HCNC,S$GLB,, | Performed by: INTERNAL MEDICINE

## 2019-12-17 PROCEDURE — G0008 FLU VACCINE - HIGH DOSE (65+) PRESERVATIVE FREE IM: ICD-10-PCS | Mod: HCNC,S$GLB,, | Performed by: INTERNAL MEDICINE

## 2019-12-17 PROCEDURE — 3046F HEMOGLOBIN A1C LEVEL >9.0%: CPT | Mod: HCNC,CPTII,S$GLB, | Performed by: INTERNAL MEDICINE

## 2019-12-17 PROCEDURE — 36415 COLL VENOUS BLD VENIPUNCTURE: CPT | Mod: HCNC

## 2019-12-17 PROCEDURE — 80053 COMPREHEN METABOLIC PANEL: CPT | Mod: HCNC

## 2019-12-17 PROCEDURE — 84443 ASSAY THYROID STIM HORMONE: CPT | Mod: HCNC

## 2019-12-17 PROCEDURE — 3074F SYST BP LT 130 MM HG: CPT | Mod: HCNC,CPTII,S$GLB, | Performed by: INTERNAL MEDICINE

## 2019-12-17 PROCEDURE — 3008F PR BODY MASS INDEX (BMI) DOCUMENTED: ICD-10-PCS | Mod: HCNC,CPTII,S$GLB, | Performed by: INTERNAL MEDICINE

## 2019-12-17 PROCEDURE — 81001 URINALYSIS AUTO W/SCOPE: CPT | Mod: HCNC

## 2019-12-17 PROCEDURE — 90662 IIV NO PRSV INCREASED AG IM: CPT | Mod: HCNC,S$GLB,, | Performed by: INTERNAL MEDICINE

## 2019-12-17 PROCEDURE — 82306 VITAMIN D 25 HYDROXY: CPT | Mod: HCNC

## 2019-12-17 PROCEDURE — 3046F PR MOST RECENT HEMOGLOBIN A1C LEVEL > 9.0%: ICD-10-PCS | Mod: HCNC,CPTII,S$GLB, | Performed by: INTERNAL MEDICINE

## 2019-12-17 PROCEDURE — 3078F PR MOST RECENT DIASTOLIC BLOOD PRESSURE < 80 MM HG: ICD-10-PCS | Mod: HCNC,CPTII,S$GLB, | Performed by: INTERNAL MEDICINE

## 2019-12-17 PROCEDURE — 80061 LIPID PANEL: CPT | Mod: HCNC

## 2019-12-17 PROCEDURE — 99999 PR PBB SHADOW E&M-EST. PATIENT-LVL V: CPT | Mod: PBBFAC,HCNC,, | Performed by: INTERNAL MEDICINE

## 2019-12-17 RX ORDER — AMOXICILLIN AND CLAVULANATE POTASSIUM 875; 125 MG/1; MG/1
1 TABLET, FILM COATED ORAL 2 TIMES DAILY
Qty: 14 TABLET | Refills: 0 | Status: SHIPPED | OUTPATIENT
Start: 2019-12-17 | End: 2019-12-24

## 2019-12-17 RX ORDER — INSULIN DEGLUDEC 100 U/ML
INJECTION, SOLUTION SUBCUTANEOUS
Qty: 5 SYRINGE | Refills: 6 | Status: SHIPPED | OUTPATIENT
Start: 2019-12-17 | End: 2020-07-28 | Stop reason: SDUPTHER

## 2019-12-17 RX ORDER — INSULIN ASPART 100 [IU]/ML
INJECTION, SOLUTION INTRAVENOUS; SUBCUTANEOUS
Qty: 1 BOX | Refills: 6 | Status: SHIPPED | OUTPATIENT
Start: 2019-12-17 | End: 2020-05-06 | Stop reason: SDUPTHER

## 2019-12-17 RX ORDER — INSULIN PUMP SYRINGE, 3 ML
EACH MISCELLANEOUS
Qty: 1 EACH | Refills: 0 | Status: SHIPPED | OUTPATIENT
Start: 2019-12-17 | End: 2021-06-10

## 2019-12-17 NOTE — PATIENT INSTRUCTIONS
Please inform -                                     Glucose        Intervention  (units to add in addition to meal time Novolog base dose of 20 Units)                        0-80    orange juice             0 units     151 - 200       +2 Units     201 - 250       +4 Units     251 - 300       +6 Units     301 - 350        +8 Units     351 - 400       +10 Units     > 400           Call MD Rojas - new shingles vaccine - check with insurance

## 2019-12-17 NOTE — PROGRESS NOTES
administered flu to the right deltoid, tolerated well, no c/o pain noted, no adverse reaction noted within wait period.

## 2019-12-18 NOTE — PROGRESS NOTES
Subjective:       Patient ID: Sunitha Pillai is a 64 y.o. female.    Chief Complaint: Follow-up    HPIPt is feeling well - no CP or SOB.    Review of Systems   Respiratory: Negative for shortness of breath (PND or orthopnea).    Cardiovascular: Negative for chest pain (arm pain or jaw pain).   Gastrointestinal: Negative for abdominal pain, diarrhea, nausea and vomiting.   Genitourinary: Negative for dysuria.   Neurological: Negative for seizures, syncope and headaches.       Objective:      Physical Exam   Constitutional: She is oriented to person, place, and time. She appears well-developed and well-nourished. No distress.   HENT:   Head: Normocephalic.   Mouth/Throat: Oropharynx is clear and moist.   Neck: Neck supple. No JVD present. No thyromegaly present.   Cardiovascular: Normal rate, regular rhythm, normal heart sounds and intact distal pulses. Exam reveals no gallop and no friction rub.   No murmur heard.  Pulmonary/Chest: Effort normal and breath sounds normal. She has no wheezes. She has no rales.   Abdominal: Soft. Bowel sounds are normal. She exhibits no distension and no mass. There is no tenderness. There is no rebound and no guarding.   Musculoskeletal: She exhibits no edema.   Lymphadenopathy:     She has no cervical adenopathy.   Neurological: She is alert and oriented to person, place, and time. She has normal reflexes.   Skin: Skin is warm and dry.   Psychiatric: She has a normal mood and affect. Her behavior is normal. Judgment and thought content normal.       Assessment:       1. Uncontrolled type 2 diabetes mellitus with hyperglycemia    2. Disorder of bone    3. Mild vitamin D deficiency        Plan:   Uncontrolled type 2 diabetes mellitus with hyperglycemia  -     Urinalysis  -     Microalbumin/creatinine urine ratio  -     Lipid panel; Future; Expected date: 12/17/2019  -     Hemoglobin A1c; Future; Expected date: 12/17/2019  -     TSH; Future; Expected date: 12/17/2019  -      Comprehensive metabolic panel; Future; Expected date: 12/17/2019    Disorder of bone    Mild vitamin D deficiency  -     Vitamin D; Future; Expected date: 12/17/2019    Other orders  -     dulaglutide (TRULICITY) 0.75 mg/0.5 mL PnIj; Inject 0.5 mLs (0.75 mg total) into the skin every 7 days.  Dispense: 4 Syringe; Refill: 4  -     insulin degludec (TRESIBA FLEXTOUCH U-100) 100 unit/mL (3 mL) InPn; 45 Units daily  Dispense: 5 Syringe; Refill: 6  -     NOVOLOG FLEXPEN U-100 INSULIN 100 unit/mL (3 mL) InPn pen; INJECT 20 UNITS SUBCUTANEOUSLY THREE TIMES DAILY WITH MEALS Plus sliding scale  Dispense: 1 Box; Refill: 6  -     blood-glucose meter kit; Check glucose three times daily E11.9 meter covered by insurance, insulin dependent  Dispense: 1 each; Refill: 0  -     blood sugar diagnostic Strp; Strips and lancets covered by insurance E11.9, pt checks glucose three times daily, insulin dependent  Dispense: 300 each; Refill: 3  -     amoxicillin-clavulanate 875-125mg (AUGMENTIN) 875-125 mg per tablet; Take 1 tablet by mouth 2 (two) times daily. for 7 days  Dispense: 14 tablet; Refill: 0  -     Urinalysis Microscopic    Use sliding scale

## 2020-01-03 PROCEDURE — 99285 EMERGENCY DEPT VISIT HI MDM: CPT | Mod: 25,HCNC

## 2020-01-03 PROCEDURE — 99285 PR EMERGENCY DEPT VISIT,LEVEL V: ICD-10-PCS | Mod: ,,, | Performed by: EMERGENCY MEDICINE

## 2020-01-03 PROCEDURE — 99285 EMERGENCY DEPT VISIT HI MDM: CPT | Mod: ,,, | Performed by: EMERGENCY MEDICINE

## 2020-01-04 ENCOUNTER — HOSPITAL ENCOUNTER (OUTPATIENT)
Facility: HOSPITAL | Age: 65
Discharge: HOME | End: 2020-01-04
Attending: EMERGENCY MEDICINE | Admitting: EMERGENCY MEDICINE
Payer: MEDICARE

## 2020-01-04 VITALS
OXYGEN SATURATION: 92 % | DIASTOLIC BLOOD PRESSURE: 67 MMHG | HEART RATE: 84 BPM | HEIGHT: 64 IN | RESPIRATION RATE: 18 BRPM | WEIGHT: 186 LBS | TEMPERATURE: 98 F | BODY MASS INDEX: 31.76 KG/M2 | SYSTOLIC BLOOD PRESSURE: 119 MMHG

## 2020-01-04 DIAGNOSIS — E11.65 UNCONTROLLED TYPE 2 DIABETES MELLITUS WITH HYPERGLYCEMIA: ICD-10-CM

## 2020-01-04 DIAGNOSIS — R07.9 CHEST PAIN: Primary | ICD-10-CM

## 2020-01-04 DIAGNOSIS — R07.89 OTHER CHEST PAIN: ICD-10-CM

## 2020-01-04 DIAGNOSIS — I10 ESSENTIAL HYPERTENSION: Chronic | ICD-10-CM

## 2020-01-04 DIAGNOSIS — R07.9 CHEST PAIN AT REST: ICD-10-CM

## 2020-01-04 LAB
ALBUMIN SERPL BCP-MCNC: 3.6 G/DL (ref 3.5–5.2)
ALP SERPL-CCNC: 93 U/L (ref 55–135)
ALT SERPL W/O P-5'-P-CCNC: 13 U/L (ref 10–44)
ANION GAP SERPL CALC-SCNC: 10 MMOL/L (ref 8–16)
ANION GAP SERPL CALC-SCNC: 9 MMOL/L (ref 8–16)
ASCENDING AORTA: 2.7 CM
AST SERPL-CCNC: 20 U/L (ref 10–40)
BASOPHILS # BLD AUTO: 0.02 K/UL (ref 0–0.2)
BASOPHILS NFR BLD: 0.2 % (ref 0–1.9)
BILIRUB SERPL-MCNC: 0.6 MG/DL (ref 0.1–1)
BNP SERPL-MCNC: <10 PG/ML (ref 0–99)
BSA FOR ECHO PROCEDURE: 1.95 M2
BUN SERPL-MCNC: 14 MG/DL (ref 6–30)
BUN SERPL-MCNC: 14 MG/DL (ref 8–23)
BUN SERPL-MCNC: 14 MG/DL (ref 8–23)
CALCIUM SERPL-MCNC: 9.3 MG/DL (ref 8.7–10.5)
CALCIUM SERPL-MCNC: 9.5 MG/DL (ref 8.7–10.5)
CHLORIDE SERPL-SCNC: 105 MMOL/L (ref 95–110)
CHLORIDE SERPL-SCNC: 108 MMOL/L (ref 95–110)
CHLORIDE SERPL-SCNC: 109 MMOL/L (ref 95–110)
CO2 SERPL-SCNC: 24 MMOL/L (ref 23–29)
CO2 SERPL-SCNC: 26 MMOL/L (ref 23–29)
CREAT SERPL-MCNC: 1 MG/DL (ref 0.5–1.4)
CREAT SERPL-MCNC: 1 MG/DL (ref 0.5–1.4)
CREAT SERPL-MCNC: 1.2 MG/DL (ref 0.5–1.4)
CV ECHO LV RWT: 0.41 CM
DIFFERENTIAL METHOD: NORMAL
DOP CALC LVOT AREA: 2.8 CM2
DOP CALC LVOT DIAMETER: 1.88 CM
DOP CALC LVOT PEAK VEL: 0.68 M/S
DOP CALC LVOT STROKE VOLUME: 35.93 CM3
DOP CALCLVOT PEAK VEL VTI: 12.95 CM
E WAVE DECELERATION TIME: 198.81 MSEC
E/A RATIO: 0.87
E/E' RATIO: 9.87 M/S
ECHO LV POSTERIOR WALL: 0.79 CM (ref 0.6–1.1)
EOSINOPHIL # BLD AUTO: 0.1 K/UL (ref 0–0.5)
EOSINOPHIL NFR BLD: 1 % (ref 0–8)
ERYTHROCYTE [DISTWIDTH] IN BLOOD BY AUTOMATED COUNT: 12.9 % (ref 11.5–14.5)
EST. GFR  (AFRICAN AMERICAN): 54.8 ML/MIN/1.73 M^2
EST. GFR  (AFRICAN AMERICAN): >60 ML/MIN/1.73 M^2
EST. GFR  (NON AFRICAN AMERICAN): 47.5 ML/MIN/1.73 M^2
EST. GFR  (NON AFRICAN AMERICAN): 59.3 ML/MIN/1.73 M^2
FRACTIONAL SHORTENING: 36 % (ref 28–44)
GLUCOSE SERPL-MCNC: 88 MG/DL (ref 70–110)
GLUCOSE SERPL-MCNC: 91 MG/DL (ref 70–110)
GLUCOSE SERPL-MCNC: 98 MG/DL (ref 70–110)
HCT VFR BLD AUTO: 41.8 % (ref 37–48.5)
HCT VFR BLD CALC: 42 %PCV (ref 36–54)
HGB BLD-MCNC: 13.5 G/DL (ref 12–16)
IMM GRANULOCYTES # BLD AUTO: 0.02 K/UL (ref 0–0.04)
IMM GRANULOCYTES NFR BLD AUTO: 0.2 % (ref 0–0.5)
INTERVENTRICULAR SEPTUM: 0.77 CM (ref 0.6–1.1)
IVRT: 0.11 MSEC
LA MAJOR: 4.92 CM
LA MINOR: 4.91 CM
LA WIDTH: 3.35 CM
LEFT ATRIUM SIZE: 2.83 CM
LEFT ATRIUM VOLUME INDEX: 20.9 ML/M2
LEFT ATRIUM VOLUME: 39.61 CM3
LEFT INTERNAL DIMENSION IN SYSTOLE: 2.44 CM (ref 2.1–4)
LEFT VENTRICLE DIASTOLIC VOLUME INDEX: 33.38 ML/M2
LEFT VENTRICLE DIASTOLIC VOLUME: 63.3 ML
LEFT VENTRICLE MASS INDEX: 44 G/M2
LEFT VENTRICLE SYSTOLIC VOLUME INDEX: 11.1 ML/M2
LEFT VENTRICLE SYSTOLIC VOLUME: 20.96 ML
LEFT VENTRICULAR INTERNAL DIMENSION IN DIASTOLE: 3.83 CM (ref 3.5–6)
LEFT VENTRICULAR MASS: 84.14 G
LV LATERAL E/E' RATIO: 8.22 M/S
LV SEPTAL E/E' RATIO: 12.33 M/S
LYMPHOCYTES # BLD AUTO: 3.2 K/UL (ref 1–4.8)
LYMPHOCYTES NFR BLD: 36.1 % (ref 18–48)
MAGNESIUM SERPL-MCNC: 1.7 MG/DL (ref 1.6–2.6)
MCH RBC QN AUTO: 30.6 PG (ref 27–31)
MCHC RBC AUTO-ENTMCNC: 32.3 G/DL (ref 32–36)
MCV RBC AUTO: 95 FL (ref 82–98)
MONOCYTES # BLD AUTO: 0.8 K/UL (ref 0.3–1)
MONOCYTES NFR BLD: 9.6 % (ref 4–15)
MV PEAK A VEL: 0.85 M/S
MV PEAK E VEL: 0.74 M/S
NEUTROPHILS # BLD AUTO: 4.6 K/UL (ref 1.8–7.7)
NEUTROPHILS NFR BLD: 52.9 % (ref 38–73)
NRBC BLD-RTO: 0 /100 WBC
OHS CV CPX 85 PERCENT MAX PREDICTED HEART RATE MALE: 126
OHS CV CPX MAX PREDICTED HEART RATE: 149
OHS CV CPX PATIENT IS FEMALE: 1
OHS CV CPX PATIENT IS MALE: 0
PHOSPHATE SERPL-MCNC: 3.3 MG/DL (ref 2.7–4.5)
PISA TR MAX VEL: 2.32 M/S
PLATELET # BLD AUTO: 178 K/UL (ref 150–350)
PMV BLD AUTO: 9.2 FL (ref 9.2–12.9)
POC IONIZED CALCIUM: 1.2 MMOL/L (ref 1.06–1.42)
POC TCO2 (MEASURED): 28 MMOL/L (ref 23–29)
POCT GLUCOSE: 101 MG/DL (ref 70–110)
POCT GLUCOSE: 82 MG/DL (ref 70–110)
POCT GLUCOSE: 89 MG/DL (ref 70–110)
POCT GLUCOSE: 90 MG/DL (ref 70–110)
POTASSIUM BLD-SCNC: 3.5 MMOL/L (ref 3.5–5.1)
POTASSIUM SERPL-SCNC: 3.7 MMOL/L (ref 3.5–5.1)
POTASSIUM SERPL-SCNC: 3.8 MMOL/L (ref 3.5–5.1)
PROT SERPL-MCNC: 7.9 G/DL (ref 6–8.4)
PULM VEIN S/D RATIO: 1.43
PV PEAK D VEL: 0.28 M/S
PV PEAK S VEL: 0.4 M/S
RA MAJOR: 4.5 CM
RA WIDTH: 3.56 CM
RBC # BLD AUTO: 4.41 M/UL (ref 4–5.4)
RIGHT VENTRICULAR END-DIASTOLIC DIMENSION: 2.88 CM
RV TISSUE DOPPLER FREE WALL SYSTOLIC VELOCITY 1 (APICAL 4 CHAMBER VIEW): 9.62 CM/S
SAMPLE: NORMAL
SINUS: 2.73 CM
SODIUM BLD-SCNC: 143 MMOL/L (ref 136–145)
SODIUM SERPL-SCNC: 142 MMOL/L (ref 136–145)
SODIUM SERPL-SCNC: 144 MMOL/L (ref 136–145)
STJ: 2.39 CM
TDI LATERAL: 0.09 M/S
TDI SEPTAL: 0.06 M/S
TDI: 0.08 M/S
TR MAX PG: 22 MMHG
TRICUSPID ANNULAR PLANE SYSTOLIC EXCURSION: 1.94 CM
TROPONIN I SERPL DL<=0.01 NG/ML-MCNC: <0.006 NG/ML (ref 0–0.03)
WBC # BLD AUTO: 8.76 K/UL (ref 3.9–12.7)

## 2020-01-04 PROCEDURE — 63600175 PHARM REV CODE 636 W HCPCS: Mod: HCNC | Performed by: PHYSICIAN ASSISTANT

## 2020-01-04 PROCEDURE — 99220 PR INITIAL OBSERVATION CARE,LEVL III: CPT | Mod: HCNC,,, | Performed by: INTERNAL MEDICINE

## 2020-01-04 PROCEDURE — 84484 ASSAY OF TROPONIN QUANT: CPT | Mod: HCNC

## 2020-01-04 PROCEDURE — 80048 BASIC METABOLIC PNL TOTAL CA: CPT | Mod: HCNC

## 2020-01-04 PROCEDURE — 83735 ASSAY OF MAGNESIUM: CPT | Mod: HCNC

## 2020-01-04 PROCEDURE — 93005 ELECTROCARDIOGRAM TRACING: CPT | Mod: HCNC

## 2020-01-04 PROCEDURE — 99220 PR INITIAL OBSERVATION CARE,LEVL III: ICD-10-PCS | Mod: HCNC,,, | Performed by: INTERNAL MEDICINE

## 2020-01-04 PROCEDURE — 25000003 PHARM REV CODE 250: Mod: HCNC | Performed by: INTERNAL MEDICINE

## 2020-01-04 PROCEDURE — 63600150 PHARM REV CODE 636: Mod: HCNC | Performed by: HOSPITALIST

## 2020-01-04 PROCEDURE — 36415 COLL VENOUS BLD VENIPUNCTURE: CPT | Mod: HCNC

## 2020-01-04 PROCEDURE — 93010 EKG 12-LEAD: ICD-10-PCS | Mod: HCNC,,, | Performed by: INTERNAL MEDICINE

## 2020-01-04 PROCEDURE — 93010 ELECTROCARDIOGRAM REPORT: CPT | Mod: HCNC,,, | Performed by: INTERNAL MEDICINE

## 2020-01-04 PROCEDURE — 94761 N-INVAS EAR/PLS OXIMETRY MLT: CPT | Mod: HCNC

## 2020-01-04 PROCEDURE — 83880 ASSAY OF NATRIURETIC PEPTIDE: CPT | Mod: HCNC

## 2020-01-04 PROCEDURE — 85025 COMPLETE CBC W/AUTO DIFF WBC: CPT | Mod: HCNC

## 2020-01-04 PROCEDURE — G0378 HOSPITAL OBSERVATION PER HR: HCPCS | Mod: HCNC

## 2020-01-04 PROCEDURE — 84484 ASSAY OF TROPONIN QUANT: CPT | Mod: 91,HCNC

## 2020-01-04 PROCEDURE — 63600175 PHARM REV CODE 636 W HCPCS: Mod: HCNC | Performed by: HOSPITALIST

## 2020-01-04 PROCEDURE — 84100 ASSAY OF PHOSPHORUS: CPT | Mod: HCNC

## 2020-01-04 PROCEDURE — 80053 COMPREHEN METABOLIC PANEL: CPT | Mod: HCNC

## 2020-01-04 PROCEDURE — 96374 THER/PROPH/DIAG INJ IV PUSH: CPT | Mod: HCNC

## 2020-01-04 PROCEDURE — 80047 BASIC METABLC PNL IONIZED CA: CPT | Mod: HCNC

## 2020-01-04 RX ORDER — ATROPINE SULFATE 0.1 MG/ML
1 INJECTION INTRAVENOUS
Status: COMPLETED | OUTPATIENT
Start: 2020-01-04 | End: 2020-01-04

## 2020-01-04 RX ORDER — DOBUTAMINE HYDROCHLORIDE 100 MG/100ML
10 INJECTION INTRAVENOUS
Status: COMPLETED | OUTPATIENT
Start: 2020-01-04 | End: 2020-01-04

## 2020-01-04 RX ORDER — TALC
6 POWDER (GRAM) TOPICAL NIGHTLY PRN
Status: DISCONTINUED | OUTPATIENT
Start: 2020-01-04 | End: 2020-01-04 | Stop reason: HOSPADM

## 2020-01-04 RX ORDER — POLYETHYLENE GLYCOL 3350 17 G/17G
17 POWDER, FOR SOLUTION ORAL 2 TIMES DAILY PRN
Status: DISCONTINUED | OUTPATIENT
Start: 2020-01-04 | End: 2020-01-04 | Stop reason: HOSPADM

## 2020-01-04 RX ORDER — ENOXAPARIN SODIUM 100 MG/ML
40 INJECTION SUBCUTANEOUS EVERY 24 HOURS
Status: DISCONTINUED | OUTPATIENT
Start: 2020-01-04 | End: 2020-01-04 | Stop reason: HOSPADM

## 2020-01-04 RX ORDER — DEXTROSE MONOHYDRATE 100 MG/ML
12.5 INJECTION, SOLUTION INTRAVENOUS
Status: DISCONTINUED | OUTPATIENT
Start: 2020-01-04 | End: 2020-01-04 | Stop reason: HOSPADM

## 2020-01-04 RX ORDER — NITROGLYCERIN 0.4 MG/1
0.4 TABLET SUBLINGUAL
Status: COMPLETED | OUTPATIENT
Start: 2020-01-04 | End: 2020-01-04

## 2020-01-04 RX ORDER — IPRATROPIUM BROMIDE AND ALBUTEROL SULFATE 2.5; .5 MG/3ML; MG/3ML
3 SOLUTION RESPIRATORY (INHALATION) EVERY 6 HOURS PRN
Status: DISCONTINUED | OUTPATIENT
Start: 2020-01-04 | End: 2020-01-04 | Stop reason: HOSPADM

## 2020-01-04 RX ORDER — SODIUM CHLORIDE 0.9 % (FLUSH) 0.9 %
10 SYRINGE (ML) INJECTION
Status: DISCONTINUED | OUTPATIENT
Start: 2020-01-04 | End: 2020-01-04 | Stop reason: HOSPADM

## 2020-01-04 RX ORDER — ONDANSETRON 2 MG/ML
4 INJECTION INTRAMUSCULAR; INTRAVENOUS
Status: COMPLETED | OUTPATIENT
Start: 2020-01-04 | End: 2020-01-04

## 2020-01-04 RX ORDER — NAPROXEN SODIUM 220 MG/1
324 TABLET, FILM COATED ORAL ONCE
Status: COMPLETED | OUTPATIENT
Start: 2020-01-04 | End: 2020-01-04

## 2020-01-04 RX ORDER — IBUPROFEN 200 MG
24 TABLET ORAL
Status: DISCONTINUED | OUTPATIENT
Start: 2020-01-04 | End: 2020-01-04 | Stop reason: HOSPADM

## 2020-01-04 RX ORDER — DEXTROSE MONOHYDRATE 100 MG/ML
25 INJECTION, SOLUTION INTRAVENOUS
Status: DISCONTINUED | OUTPATIENT
Start: 2020-01-04 | End: 2020-01-04 | Stop reason: HOSPADM

## 2020-01-04 RX ORDER — CANDESARTAN 8 MG/1
8 TABLET ORAL DAILY
Status: DISCONTINUED | OUTPATIENT
Start: 2020-01-04 | End: 2020-01-04 | Stop reason: HOSPADM

## 2020-01-04 RX ORDER — ACETAMINOPHEN 325 MG/1
650 TABLET ORAL EVERY 8 HOURS PRN
Status: DISCONTINUED | OUTPATIENT
Start: 2020-01-04 | End: 2020-01-04 | Stop reason: HOSPADM

## 2020-01-04 RX ORDER — GLUCAGON 1 MG
1 KIT INJECTION
Status: DISCONTINUED | OUTPATIENT
Start: 2020-01-04 | End: 2020-01-04

## 2020-01-04 RX ORDER — INSULIN ASPART 100 [IU]/ML
1-10 INJECTION, SOLUTION INTRAVENOUS; SUBCUTANEOUS EVERY 6 HOURS PRN
Status: DISCONTINUED | OUTPATIENT
Start: 2020-01-04 | End: 2020-01-04 | Stop reason: HOSPADM

## 2020-01-04 RX ORDER — IBUPROFEN 200 MG
16 TABLET ORAL
Status: DISCONTINUED | OUTPATIENT
Start: 2020-01-04 | End: 2020-01-04 | Stop reason: HOSPADM

## 2020-01-04 RX ORDER — PROMETHAZINE HYDROCHLORIDE 25 MG/1
25 TABLET ORAL EVERY 6 HOURS PRN
Status: DISCONTINUED | OUTPATIENT
Start: 2020-01-04 | End: 2020-01-04 | Stop reason: HOSPADM

## 2020-01-04 RX ORDER — ACETAMINOPHEN 325 MG/1
650 TABLET ORAL EVERY 8 HOURS PRN
Refills: 0 | COMMUNITY
Start: 2020-01-04 | End: 2023-01-16

## 2020-01-04 RX ORDER — GLUCAGON 1 MG
1 KIT INJECTION
Status: DISCONTINUED | OUTPATIENT
Start: 2020-01-04 | End: 2020-01-04 | Stop reason: HOSPADM

## 2020-01-04 RX ORDER — INSULIN ASPART 100 [IU]/ML
12 INJECTION, SOLUTION INTRAVENOUS; SUBCUTANEOUS
Status: DISCONTINUED | OUTPATIENT
Start: 2020-01-04 | End: 2020-01-04 | Stop reason: HOSPADM

## 2020-01-04 RX ORDER — NAPROXEN SODIUM 220 MG/1
81 TABLET, FILM COATED ORAL DAILY
Status: DISCONTINUED | OUTPATIENT
Start: 2020-01-04 | End: 2020-01-04 | Stop reason: HOSPADM

## 2020-01-04 RX ORDER — ONDANSETRON 4 MG/1
8 TABLET, ORALLY DISINTEGRATING ORAL EVERY 8 HOURS PRN
Status: DISCONTINUED | OUTPATIENT
Start: 2020-01-04 | End: 2020-01-04 | Stop reason: HOSPADM

## 2020-01-04 RX ADMIN — ASPIRIN 81 MG CHEWABLE TABLET 81 MG: 81 TABLET CHEWABLE at 08:01

## 2020-01-04 RX ADMIN — CANDESARTAN CILEXETIL 8 MG: 8 TABLET ORAL at 08:01

## 2020-01-04 RX ADMIN — ATROPINE SULFATE 0.1 MG: 0.1 INJECTION PARENTERAL at 11:01

## 2020-01-04 RX ADMIN — NITROGLYCERIN 0.4 MG: 0.4 TABLET SUBLINGUAL at 12:01

## 2020-01-04 RX ADMIN — ASPIRIN 81 MG CHEWABLE TABLET 324 MG: 81 TABLET CHEWABLE at 05:01

## 2020-01-04 RX ADMIN — ONDANSETRON 4 MG: 2 INJECTION INTRAMUSCULAR; INTRAVENOUS at 12:01

## 2020-01-04 RX ADMIN — DOBUTAMINE HYDROCHLORIDE 10 MCG/KG/MIN: 100 INJECTION INTRAVENOUS at 11:01

## 2020-01-04 NOTE — NURSING TRANSFER
Nursing Transfer Note        1/4/2020      Transfer From: Stress/echo     Transfer via wheelchair     Transfer with cardiac monitoring     Transported by staff RN     Medicines sent: none     Chart send with patient: No

## 2020-01-04 NOTE — NURSING
Patient is ready for discharge. Patient stable alert and oriented. IVs removed. No complaints of pain. Discussed discharge plan. Reviewed medications and side effects, appointments, and answered questions with patient and family. AVS reviewed and given to pt.

## 2020-01-04 NOTE — H&P
Hospital Medicine  History and Physical Exam       [place] [facility] [place] [facility] [place] Alexander Cochran MD  Admit Date: 2020  Principal Problem:  Chest pain   Patient information was obtained from patient, past medical records and ER records.   Primary care Physician: Patty Louis MD  Code status: Full Code    HPI:   Sunitha Pillai is a/an 64 y.o. female with history of DMII on insulin, GERD, Hypertension presenting with chest pain, states it was left sided that started yesterday but began increasing last night, described as tight. On further questioning she says that it may have been for at least a week but that it didn't really bother her until yesterday. 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 She denies GERD symptoms, but states her doctor said she had it. She denies any personal history of MI, but does have a mother and brother with heart disease.     In ED, patient evaluated, troponin negative, EKG unremarkable, medicine called for admission.    Of note she had a normal cath back in 2018.       Past Medical History: Patient has a past medical history of Arthritis, Diabetes mellitus, Diabetes mellitus type I, GERD (gastroesophageal reflux disease), Hypertension, and Vertigo.    Past Surgical History: Patient has a past surgical history that includes  section.    Social History: Patient reports that she has never smoked. She has never used smokeless tobacco. She reports that she does not drink alcohol or use drugs.    Family History: family history includes Aneurysm in her father; Diabetes in her brother, mother, and sister; Heart disease in her brother and mother; Hyperlipidemia in her mother; Hypertension in her brother, mother, and sister.    Medications: reviewed     Allergies: Patient has No Known Allergies.    ROS  Constitutional: no fever or chills  Respiratory: no cough or shortness of breath  Cardiovascular: positive for chest pain, negative for  palpitations  Gastrointestinal: no nausea or vomiting, no abdominal pain or change in bowel habits  Genitourinary: no hematuria or dysuria  Integument/Breast: no rash or pruritis  Hematologic/Lymphatic: no easy bruising or lymphadenopathy  Musculoskeletal: no arthralgias or myalgias  Neurological: no seizures or tremors  Behavioral/Psych: no depression or anxiety    PEx  Temp:  [98.1 °F (36.7 °C)]   Pulse:  [82-98]   Resp:  [14-20]   BP: (123-175)/(60-90)   SpO2:  [98 %-100 %]   Body mass index is 30.55 kg/m².   No intake or output data in the 24 hours ending 01/04/20 0340    General appearance: no distress, lying in bed  Mental status: Alert and oriented x 3  HEENT:  conjunctivae/corneas clear, PERRL  Neck: supple, thyroid not enlarged  Pulm:   normal respiratory effort, CTA B, no c/w/r  Card: RRR, S1, S2 normal, no murmur, click, rub or gallop  Abd: soft, NT, ND, BS present; no masses, no organomegaly  Ext: no c/c/e  Pulses: 2+, symmetric  Skin: color, texture, turgor normal. No rashes or lesions  Neuro: CN II-XII grossly intact, no focal numbness or weakness, normal strength and tone     Recent Results (from the past 24 hour(s))   CBC auto differential    Collection Time: 01/04/20 12:30 AM   Result Value Ref Range    WBC 8.76 3.90 - 12.70 K/uL    RBC 4.41 4.00 - 5.40 M/uL    Hemoglobin 13.5 12.0 - 16.0 g/dL    Hematocrit 41.8 37.0 - 48.5 %    Mean Corpuscular Volume 95 82 - 98 fL    Mean Corpuscular Hemoglobin 30.6 27.0 - 31.0 pg    Mean Corpuscular Hemoglobin Conc 32.3 32.0 - 36.0 g/dL    RDW 12.9 11.5 - 14.5 %    Platelets 178 150 - 350 K/uL    MPV 9.2 9.2 - 12.9 fL    Immature Granulocytes 0.2 0.0 - 0.5 %    Gran # (ANC) 4.6 1.8 - 7.7 K/uL    Immature Grans (Abs) 0.02 0.00 - 0.04 K/uL    Lymph # 3.2 1.0 - 4.8 K/uL    Mono # 0.8 0.3 - 1.0 K/uL    Eos # 0.1 0.0 - 0.5 K/uL    Baso # 0.02 0.00 - 0.20 K/uL    nRBC 0 0 /100 WBC    Gran% 52.9 38.0 - 73.0 %    Lymph% 36.1 18.0 - 48.0 %    Mono% 9.6 4.0 - 15.0 %     Eosinophil% 1.0 0.0 - 8.0 %    Basophil% 0.2 0.0 - 1.9 %    Differential Method Automated    Comprehensive metabolic panel    Collection Time: 01/04/20 12:30 AM   Result Value Ref Range    Sodium 144 136 - 145 mmol/L    Potassium 3.7 3.5 - 5.1 mmol/L    Chloride 108 95 - 110 mmol/L    CO2 26 23 - 29 mmol/L    Glucose 88 70 - 110 mg/dL    BUN, Bld 14 8 - 23 mg/dL    Creatinine 1.2 0.5 - 1.4 mg/dL    Calcium 9.5 8.7 - 10.5 mg/dL    Total Protein 7.9 6.0 - 8.4 g/dL    Albumin 3.6 3.5 - 5.2 g/dL    Total Bilirubin 0.6 0.1 - 1.0 mg/dL    Alkaline Phosphatase 93 55 - 135 U/L    AST 20 10 - 40 U/L    ALT 13 10 - 44 U/L    Anion Gap 10 8 - 16 mmol/L    eGFR if African American 54.8 (A) >60 mL/min/1.73 m^2    eGFR if non  47.5 (A) >60 mL/min/1.73 m^2   Troponin I #1    Collection Time: 01/04/20 12:30 AM   Result Value Ref Range    Troponin I <0.006 0.000 - 0.026 ng/mL   B-Type natriuretic peptide (BNP)    Collection Time: 01/04/20 12:30 AM   Result Value Ref Range    BNP <10 0 - 99 pg/mL   ISTAT PROCEDURE    Collection Time: 01/04/20 12:36 AM   Result Value Ref Range    POC Glucose 91 70 - 110 mg/dL    POC BUN 14 6 - 30 mg/dL    POC Creatinine 1.0 0.5 - 1.4 mg/dL    POC Sodium 143 136 - 145 mmol/L    POC Potassium 3.5 3.5 - 5.1 mmol/L    POC Chloride 105 95 - 110 mmol/L    POC TCO2 (MEASURED) 28 23 - 29 mmol/L    POC Ionized Calcium 1.20 1.06 - 1.42 mmol/L    POC Hematocrit 42 36 - 54 %PCV    Sample BIPIN        Active Hospital Problems    Diagnosis  POA    *Chest pain [R07.9]  Yes    Obesity [E66.9]  Yes    HTN (hypertension) [I10]  Yes    GERD (gastroesophageal reflux disease) [K21.9]  Yes    Type 2 diabetes mellitus, uncontrolled [E11.65]  Yes      Resolved Hospital Problems   No resolved problems to display.     Assessment and Plan:  Chest pain  - multiple risk factors with obestiy htn, DM, left sided chest pain with some radiation but no personal history of MI and a clean cath in 2018  - troponin  negative, EKG unimpressive  - will monitor on telemetry  - continue to trend troponin  - ECHO in the AM, unless something reveals itself  - continue to monitor    Obesity- continue to monitor  Hypertension- continue home BP medications  DM2- will place on 30 of levemir while NPO and SSI  GERD- monitor for signs, appears controlled.    DVT PPx: ginger Cochran MD  Hospital Medicine Staff  01/04/2020

## 2020-01-04 NOTE — ED TRIAGE NOTES
PT presents to the ED via POV c/o left sided chest pain radiating to the LUE for the past 2x days with associated nausea and vomiting and mild dyspnea. Pt states chest pain to tightness in quality rates chest pain 8/10.Pt states chest pain has worsen today. Denies pmhx of MI and stroke. Pt states to be on daily 81mg ASA. Pt is AAOx4. Name and  checked and verified.

## 2020-01-04 NOTE — ED PROVIDER NOTES
Encounter Date: 1/3/2020       History     Chief Complaint   Patient presents with    Chest Pain     PT with chest pain x 2 days with n/v. PT stated that chest pain is mid-sternal and radiates down left arm .      Patient is a 64 year old female with PMHx of d-CHF with 60%EF, HTN, DM2, and GERD. She presents to the ED for chest pain. She reports having left sided chest pain onset yesterday, but reports pain increasing today at rest at 6:00PM. Describes pain as constant and tightness. Reports pain radiation to left arm. Rates pain 5/10. Reports taking ASA 81 mg today. Denies NTG use. Denies hx of MI, cardiac stents, or smoking hx. Reports FHx of brother and mother with cardiac disease. Reports associated nausea and SOB. Denies hx of anticoagulation. She denies fever,chills, vomiting, abd pain, dysuria, diarrhea, or constipation. She is a non smoker and denies alcohol use.    The history is provided by the patient and medical records. No  was used.     Review of patient's allergies indicates:  No Known Allergies  Past Medical History:   Diagnosis Date    Arthritis     Diabetes mellitus     Diabetes mellitus type I     GERD (gastroesophageal reflux disease)     Hypertension     Vertigo      Past Surgical History:   Procedure Laterality Date     SECTION       Family History   Problem Relation Age of Onset    Hypertension Mother     Hyperlipidemia Mother     Diabetes Mother     Heart disease Mother     Aneurysm Father     Diabetes Sister     Hypertension Sister     Heart disease Brother     Diabetes Brother     Hypertension Brother     Breast cancer Neg Hx     Colon cancer Neg Hx     Ovarian cancer Neg Hx      Social History     Tobacco Use    Smoking status: Never Smoker    Smokeless tobacco: Never Used   Substance Use Topics    Alcohol use: No    Drug use: No     Review of Systems   Constitutional: Negative for fever.   HENT: Negative for sore throat.    Respiratory:  Positive for shortness of breath.    Cardiovascular: Positive for chest pain.   Gastrointestinal: Positive for nausea. Negative for abdominal pain and vomiting.   Genitourinary: Negative for dysuria.   Musculoskeletal: Negative for back pain.   Skin: Negative for rash.   Neurological: Negative for weakness.   Hematological: Does not bruise/bleed easily.       Physical Exam     Initial Vitals [01/03/20 2355]   BP Pulse Resp Temp SpO2   138/81 95 18 98.1 °F (36.7 °C) 98 %      MAP       --         Physical Exam    Vitals reviewed.  Constitutional: She appears well-developed and well-nourished. No distress.   HENT:   Head: Normocephalic.   Eyes: Conjunctivae are normal.   Neck: Normal range of motion.   Cardiovascular: Normal rate and regular rhythm.   No murmur heard.  Pulmonary/Chest: Breath sounds normal. No respiratory distress. She has no wheezes. She has no rales.   Abdominal: Soft. Bowel sounds are normal. She exhibits no distension. There is no tenderness.   Musculoskeletal: Normal range of motion. She exhibits no edema.   Neurological: She is alert and oriented to person, place, and time.   Skin: Skin is warm and dry. No erythema.         ED Course   Procedures  Labs Reviewed   COMPREHENSIVE METABOLIC PANEL - Abnormal; Notable for the following components:       Result Value    eGFR if  54.8 (*)     eGFR if non  47.5 (*)     All other components within normal limits   BASIC METABOLIC PANEL - Abnormal; Notable for the following components:    eGFR if non  59.3 (*)     All other components within normal limits    Narrative:     ADD ON BMP, MAGNESIUM AND PHOS PER DR KOREY ZAMORA/ORDER#   [id number], [id number] AND [id number] @ 3:45AM    CBC W/ AUTO DIFFERENTIAL   TROPONIN I   B-TYPE NATRIURETIC PEPTIDE   TROPONIN I    Narrative:     ADD ON BMP, MAGNESIUM AND PHOS PER DR KOREY ZAMORA/ORDER#   [id number], [id number] AND [id number] @ 3:45AM    BASIC METABOLIC PANEL    MAGNESIUM   PHOSPHORUS   PHOSPHORUS    Narrative:     ADD ON BMP, MAGNESIUM AND PHOS PER DR KOREY ZAMORA/ORDER#   [id number], [id number] AND [id number] @ 3:45AM    MAGNESIUM    Narrative:     ADD ON BMP, MAGNESIUM AND PHOS PER DR KOREY ZAMORA/ORDER#   [id number], [id number] AND [id number] @ 3:45AM    CBC W/ AUTO DIFFERENTIAL   ISTAT PROCEDURE   POCT GLUCOSE   ISTAT CHEM8   POCT GLUCOSE MONITORING CONTINUOUS     EKG Readings: (Independently Interpreted)   Normal sinus rhythm at rate of 95 bpm.        Imaging Results          X-Ray Chest AP Portable (Final result)  Result time 01/04/20 01:19:19    Final result by Isiah Lafleur MD (01/04/20 01:19:19)                 Impression:      No acute findings.    No significant change from prior study.      Electronically signed by: Isiah Lafleur MD  Date:    01/04/2020  Time:    01:19             Narrative:    EXAMINATION:  XR CHEST AP PORTABLE    CLINICAL HISTORY:  Chest Pain;    TECHNIQUE:  Single frontal view of the chest was performed.    COMPARISON:  November 9, 2019.    FINDINGS:  Heart and lungs  appear unchanged when allowing for differences in technique and positioning.                                 Medical Decision Making:   History:   Old Medical Records: I decided to obtain old medical records.  Old Records Summarized: records from previous admission(s).       <> Summary of Records: Patient underwent angiogram on 09/24/18 found to have Normal coronary arteries. Diastolic dysfunction.  Independently Interpreted Test(s):   I have ordered and independently interpreted X-rays - see summary below.  Clinical Tests:   Lab Tests: Ordered and Reviewed  Radiological Study: Ordered and Reviewed  Medical Tests: Ordered and Reviewed  Other:   I have discussed this case with another health care provider.       <> Summary of the Discussion: Case discussed with hospital medicine for admission.     Additional MDM:   Heart Score:    History:          Moderately  suspicious.  ECG:             Normal  Age:               >65 years  Risk factors: >= 3 risk factors or history of atherosclerotic disease  Troponin:       Less than or equal to normal limit  Final Score: 5        APC / Resident Notes:   Patient is a 64 year old female presents to the ED for emergent evaluation of chest pain.     Will order labs and imaging. Will continue to monitor.     Differential diagnoses include, but are not limited to: ACS, CHF exacerbation, cardiac arrhthymias, or electrolyte imbalance.     No leukocytosis. Hemodynamically stable. Initial troponin WNL. CXR found to have No acute findings.    Will admit to medicine for observation. Serial troponin pending.     I have discussed and reviewed with my supervising physician.        Clinical Impression:       ICD-10-CM ICD-9-CM   1. Chest pain R07.9 786.50   2. Chest pain at rest R07.9 786.50         Disposition:   Disposition: Placed in Observation  Condition: Harshal Sears PA-C  01/04/20 0852

## 2020-01-04 NOTE — NURSING TRANSFER
Nursing Transfer Note      1/4/2020     Transfer To: Stress/echo    Transfer via wheelchair    Transfer with cardiac monitoring    Transported by staff RN    Medicines sent: none    Chart send with patient: No

## 2020-01-04 NOTE — PLAN OF CARE
01/04/2020      Sunitha Pillai  [address] [facility] [address]          Hospital Medicine Dept.  [facility] [address] [place] [address] [phone] after hours  [phone] fax Sunitha Pillai has been hospitalized at the [facility] since 1/4/2020.  Please excuse the patient from duties.  Patient may return on 01/07/2020.  No restrictions.     Please contact me if you have any questions.                    __________________________  Molina Mcleod MD  01/04/2020

## 2020-01-04 NOTE — DISCHARGE INSTRUCTIONS
1. You were admitted for workup of chest pain symptoms.   You had 3 sets of labs (blood draws) that did not show evidence of a heart attack.       After this was completed you underwent a stress test (Dobutamine Stress Echocardiogram) to rule out signs of an impending heart attack or to see if your heart was beating/pumping abnormally.       The results of your stress test does not show signs of an impending heart attack.     This means your chest pain is likely due to a non-cardiac (not related to the heart) reason (see attached form)

## 2020-01-04 NOTE — NURSING
Pt arrived from ED via wheelchair, accompanied by transport personnel and family.  Pt is AAOx4,  Pt denies N/V and states pain is a teeny tiny bit, no number.Pt given call bell, oriented to the unit. See admission assess.

## 2020-01-04 NOTE — NURSING
Spoke with Dr. Mcleod concerning pts K of 3.8 and Mag of 1.7. He stated ok to hold off on replacements for now and just monitor labs for now. Will continue to monitor.

## 2020-01-04 NOTE — PROGRESS NOTES
[facility] Hospital Medicine  Progress Note    Patient Name: Sunitha Pillai  MRN: [id number]  Patient Class: OP- Observation   Admission Date: 1/4/2020  Length of Stay: 0 days  Attending Physician: Molina Mcleod MD  Primary Care Provider: Patty Louis MD    [facility] Medicine Team: [facility] MED A Molina Mcleod MD    Subjective:     Principal Problem:Chest pain    Interval History: admit for chest pain rule out - 3 bouts in last day   Has had 2 negative cardiac enzymes    Reports 1/10 tightness, tender on palpation of left chest     3rd set enzymes to be drawn this AM    A1c is 11, has cardiac risk factors - contacted ECHO dept for Dobutamine Stress ECHO this AM, pt is NPO      Review of Systems   Constitutional: Negative for chills, diaphoresis and fatigue.   HENT: Negative for sore throat.    Eyes: Negative for visual disturbance.   Respiratory: Negative for cough and shortness of breath.    Cardiovascular: Negative for palpitations and leg swelling.   Gastrointestinal: Negative for abdominal pain, nausea and vomiting.   Genitourinary: Negative for dysuria.   Musculoskeletal: Negative for arthralgias.   Psychiatric/Behavioral: Negative for confusion.     Objective:     Vital Signs (Most Recent):  Temp: 97.8 °F (36.6 °C) (01/04/20 0713)  Pulse: 76 (01/04/20 0758)  Resp: 18 (01/04/20 0713)  BP: (!) 116/59 (01/04/20 0713)  SpO2: (!) 93 % (01/04/20 0713) Vital Signs (24h Range):  Temp:  [97.8 °F (36.6 °C)-98.2 °F (36.8 °C)] 97.8 °F (36.6 °C)  Pulse:  [76-98] 76  Resp:  [14-21] 18  SpO2:  [93 %-100 %] 93 %  BP: (116-175)/(59-90) 116/59   Intake/Outake:  This Shift:  No intake/output data recorded.    Net I/O past 24h:   No intake or output data in the 24 hours ending 01/04/20 0909      Weight: 84.5 kg (186 lb 2.9 oz)  Body mass index is 31.96 kg/m².  Physical Exam   Constitutional: She is oriented to person, place, and time. She appears well-nourished. No distress.   obese   HENT:    Mouth/Throat: No oropharyngeal exudate.   Eyes: No scleral icterus.   Cardiovascular: Normal rate, regular rhythm and normal heart sounds.   No murmur heard.  Chest wall tenderness left sided   Pulmonary/Chest: Effort normal and breath sounds normal. No stridor. No respiratory distress. She has no wheezes.   Abdominal: Soft. Bowel sounds are normal. She exhibits no distension. There is no tenderness.   Lymphadenopathy:     She has no cervical adenopathy.   Neurological: She is alert and oriented to person, place, and time.   Skin: Skin is warm and dry.         Significant Labs:   CBC:   Recent Labs   Lab 01/04/20 0030 01/04/20  0036   WBC 8.76  --    HGB 13.5  --    HCT 41.8 42     --      CMP:   Recent Labs   Lab 01/04/20 0030 01/04/20  0325    142   K 3.7 3.8    109   CO2 26 24   GLU 88 98   BUN 14 14   CREATININE 1.2 1.0   CALCIUM 9.5 9.3   PROT 7.9  --    ALBUMIN 3.6  --    BILITOT 0.6  --    ALKPHOS 93  --    AST 20  --    ALT 13  --    ANIONGAP 10 9   EGFRNONAA 47.5* 59.3*     Cardiac Markers:   Recent Labs   Lab 01/04/20 0030   BNP <10     Lactic Acid: No results for input(s): LACTATE in the last 48 hours.    Significant Imaging: I have reviewed all pertinent imaging results/findings within the past 24 hours.    MEDICATIONS  Scheduled Meds:   aspirin  81 mg Oral Daily    candesartan  8 mg Oral Daily    enoxaparin  40 mg Subcutaneous Daily    insulin detemir U-100  40 Units Subcutaneous QHS                             Continuous Infusions:  PRN Meds:.acetaminophen, albuterol-ipratropium, dextrose 10 % in water (D10W), dextrose 10 % in water (D10W), dextrose 10 % in water (D10W), glucagon (human recombinant), glucose, glucose, insulin aspart U-100, melatonin, ondansetron, polyethylene glycol, promethazine, sodium chloride 0.9%    Assessment/Plan:     Overview/Hospital Course:     Chest pain  - multiple risk factors with obestiy htn, DM, left sided chest pain with some radiation but  no personal history of MI and a clean cath in 2018  - troponin negative x2 , EKG ?q in aVL, no T-wave inversions or ST changes.   - will monitor on telemetry  -f/u on 3rd troponin - anticipate negative   -PRN nitroglycerin  -Stress ECHO today - pending result will determine dispostion       DM2  - on 40U of levemir while NPO and SSI  -meds reconciled     Obesity- continue to monitor    Hypertension-  -controlled  -continue candesartan continue home BP medications          Code Status: Full Code    Active Hospital Problems    Diagnosis  POA    *Chest pain [R07.9]  Yes     Priority: 1 - High    Type 2 diabetes mellitus, uncontrolled [E11.65]  Yes     Priority: 2     Essential hypertension [I10]  Yes     Priority: 3      Chronic    GERD (gastroesophageal reflux disease) [K21.9]  Yes     Priority: 4     Obesity [E66.9]  Yes     Priority: 5       Resolved Hospital Problems   No resolved problems to display.     VTE Risk Mitigation (From admission, onward)         Ordered     enoxaparin injection 40 mg  Daily      01/04/20 0339     IP VTE HIGH RISK PATIENT  Once      01/04/20 0339     Place sequential compression device  Until discontinued      01/04/20 0339                        Molina Mcleod M.D.  Attending Physician  [facility] Medicine Dept.  Pager: [phone]  [facility]  [id number]

## 2020-01-06 NOTE — DISCHARGE SUMMARY
[facility] Hospital Medicine  Discharge Summary      Patient Name: Sunitha Pillai  MRN: [id number]  Admission Date: 1/4/2020  Hospital Length of Stay: 0 days  Discharge Date and Time: 1/4/2020  5:37 PM  Attending Physician: Molina Mcleod MD  Discharging Provider: Molina Mcleod MD  Primary Care Provider: Patty Louis MD    Hospital Medicine Team: [facility]OSP MED A Molina Mcleod MD    HPI:   Sunitha Pillai is a/an 64 y.o. female with history of DMII on insulin, GERD, Hypertension presenting with chest pain, states it was left sided that started yesterday but began increasing last night, described as tight. On further questioning she says that it may have been for at least a week but that it didn't really bother her until yesterday. 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 She denies GERD symptoms, but states her doctor said she had it. She denies any personal history of MI, but does have a mother and brother with heart disease.      In ED, patient evaluated, troponin negative, EKG unremarkable, medicine called for admission.     Of note she had a normal cath back in September of 2018.     * No surgery found *      Hospital Course:   Admitted for ACS rule out.  3 sets of negative cardiac enzymes, however due to patient's risk factors, uncontrolled DM, family hx, age, she had a stress echocardiogram completed.  This test resulted without any signs of reversible ischemia.  Patient remained hemodynamically stable and chest pain free and will be discharged home.     Consults:     Final Active Diagnoses:    Diagnosis Date Noted POA    PRINCIPAL PROBLEM:  Other chest pain [R07.89] 01/04/2020 Yes    Type 2 diabetes mellitus, uncontrolled [E11.65] 11/06/2011 Yes    Essential hypertension [I10] 07/23/2012 Yes     Chronic    GERD (gastroesophageal reflux disease) [K21.9] 07/23/2012 Yes    Obesity [E66.9] 11/05/2015 Yes      Problems Resolved During  "this Admission:      Discharged Condition: good    Disposition: Home or Self Care    Follow Up:  Follow-up Information     Call Humberto Louis MD.    Specialty:  Internal Medicine  Why:  For hospital follow-up.  I have placed a computer referral for follow up appointment also  Contact information:  Verito SIMMONS  [facility] [address]  [phone]                 Patient Instructions:      Ambulatory Referral to Internal Medicine   Referral Priority: Routine Referral Type: Consultation   Referral Reason: Specialty Services Required   Referred to Provider: HUMBERTO LOUIS. Requested Specialty: Internal Medicine   Number of Visits Requested: 1     Diet diabetic     Notify your health care provider if you experience any of the following:  temperature >100.4     Notify your health care provider if you experience any of the following:  persistent nausea and vomiting or diarrhea     Notify your health care provider if you experience any of the following:  severe uncontrolled pain     Notify your health care provider if you experience any of the following:  difficulty breathing or increased cough     Notify your health care provider if you experience any of the following:  severe persistent headache     Notify your health care provider if you experience any of the following:  persistent dizziness, light-headedness, or visual disturbances     Notify your health care provider if you experience any of the following:  increased confusion or weakness     Activity as tolerated     Medications:  Reconciled Home Medications:      Medication List      START taking these medications    acetaminophen 325 MG tablet  Commonly known as:  TYLENOL  Take 2 tablets (650 mg total) by mouth every 8 (eight) hours as needed for Pain.        CHANGE how you take these medications    BD Ultra-Fine Short Pen Needle 31 gauge x 5/16" Ndle  Generic drug:  pen needle, diabetic  USE 1  4 TIMES DAILY WITH  INSULIN  What changed:  Another " medication with the same name was removed. Continue taking this medication, and follow the directions you see here.     blood sugar diagnostic Strp  Strips and lancets covered by insurance E11.9, pt checks glucose three times daily, insulin dependent  What changed:  Another medication with the same name was removed. Continue taking this medication, and follow the directions you see here.     blood-glucose meter kit  Check glucose three times daily E11.9 meter covered by insurance, insulin dependent  What changed:  Another medication with the same name was removed. Continue taking this medication, and follow the directions you see here.     lancets Misc  To check BG 4 times daily, to use with insurance preferred meter, insulin dependent  What changed:  Another medication with the same name was removed. Continue taking this medication, and follow the directions you see here.        CONTINUE taking these medications    aspirin 81 mg Tab  Take 1 tablet by mouth Daily.     blood sugar diagnostic, drum Strp  Commonly known as:  Accu-Chek Compact Test  USE ONE STRIP TO CHECK GLUCOSE 4 TIMES DAILY BEFORE EACH MEAL AND AT BEDTIME     candesartan 8 MG tablet  Commonly known as:  ATACAND  Take 1 tablet (8 mg total) by mouth once daily.     dulaglutide 0.75 mg/0.5 mL Pnij  Commonly known as:  Trulicity  Inject 0.5 mLs (0.75 mg total) into the skin every 7 days.     insulin degludec 100 unit/mL (3 mL) Inpn  Commonly known as:  TRESIBA FLEXTOUCH U-100  45 Units daily     metFORMIN 1000 MG tablet  Commonly known as:  GLUCOPHAGE  TAKE 1 TABLET BY MOUTH TWICE DAILY     NovoLOG Flexpen U-100 Insulin 100 unit/mL (3 mL) Inpn pen  Generic drug:  insulin aspart U-100  INJECT 20 UNITS SUBCUTANEOUSLY THREE TIMES DAILY WITH MEALS Plus sliding scale        STOP taking these medications    insulin syringe-needle U-100 1 mL 31 gauge x 5/16 Syrg        ASK your doctor about these medications    sub-q insulin device, 30 unit Kanchan  Commonly known  as:  V-GO 30  Change everyday            Significant Diagnostic Studies: Labs:   CMP   Recent Labs   Lab 01/04/20  0030 01/04/20  0325    142   K 3.7 3.8    109   CO2 26 24   GLU 88 98   BUN 14 14   CREATININE 1.2 1.0   CALCIUM 9.5 9.3   PROT 7.9  --    ALBUMIN 3.6  --    BILITOT 0.6  --    ALKPHOS 93  --    AST 20  --    ALT 13  --    ANIONGAP 10 9   ESTGFRAFRICA 54.8* >60.0   EGFRNONAA 47.5* 59.3*   , CBC   Recent Labs   Lab 01/04/20  0030 01/04/20  0036   WBC 8.76  --    HGB 13.5  --    HCT 41.8 42     --    , INR   Lab Results   Component Value Date    INR 1.0 09/23/2018    INR 1.0 05/17/2017    INR 1.0 01/12/2017   , Lipid Panel   Lab Results   Component Value Date    CHOL 150 12/17/2019    HDL 50 12/17/2019    LDLCALC 84.8 12/17/2019    TRIG 76 12/17/2019    CHOLHDL 33.3 12/17/2019   , Troponin   Recent Labs   Lab 01/04/20  0840   TROPONINI <0.006    and A1C:   Recent Labs   Lab 12/17/19  0933   HGBA1C 11.1*     Cardiac Graphics: Echocardiogram:   Transthoracic echo (TTE) complete (Cupid Only):   Results for orders placed or performed during the hospital encounter of 04/26/19   Transthoracic echo (TTE) complete   Result Value Ref Range    BSA 1.89 m2    TDI SEPTAL 0.04     LV LATERAL E/E' RATIO 14.67     LV SEPTAL E/E' RATIO 22.00     LA WIDTH 3.66 cm    AORTIC VALVE CUSP SEPERATION 1.44 cm    TDI LATERAL 0.06     PV PEAK VELOCITY 0.59 cm/s    LVIDD 4.38 3.5 - 6.0 cm    IVS 0.81 0.6 - 1.1 cm    PW 0.80 0.6 - 1.1 cm    Ao root annulus 2.63 cm    LVIDS 2.76 2.1 - 4.0 cm    FS 37 28 - 44 %    LA volume 40.99 cm3    Sinus 2.75 cm    STJ 2.47 cm    Ascending aorta 2.67 cm    LV mass 109.50 g    LA size 2.98 cm    RVDD 3.06 cm    TAPSE 1.86 cm    RV S' 11.19 m/s    Left Ventricle Relative Wall Thickness 0.37 cm    AV mean gradient 4.23 mmHg    AV valve area 2.06 cm2    AV Velocity Ratio 0.68     AV index (prosthetic) 0.64     E/A ratio 0.92     Mean e' 0.05     E wave decelartion time 239.45 msec     IVRT 0.09 msec    Pulm vein S/D ratio 1.35     LVOT diameter 2.02 cm    LVOT area 3.20 cm2    LVOT peak rajat [id number] m/s    LVOT peak VTI 18.86 cm    Ao peak rajat 1.24 m/s    Ao VTI 29.32 cm    LVOT stroke volume 60.41 cm3    AV peak gradient 6.15 mmHg    E/E' ratio 17.60     MV Peak E Rajat 0.88 m/s    TR Max Rajat 2.42 m/s    MV Peak A Rajat 0.96 m/s    PV Peak S Rajat 0.54 m/s    PV Peak D Rajat 0.40 m/s    LV Systolic Volume 28.54 mL    LV Systolic Volume Index 15.4 mL/m2    LV Diastolic Volume 86.69 mL    LV Diastolic Volume Index 46.91 mL/m2    LA Volume Index 22.2 mL/m2    LV Mass Index 59.3 g/m2    RA Major Axis 4.30 cm    Left Atrium Minor Axis 4.30 cm    Left Atrium Major Axis 4.55 cm    Triscuspid Valve Regurgitation Peak Gradient 23.43 mmHg    RA Width 2.84 cm    Right Atrial Pressure (from IVC) 3 mmHg    TV rest pulmonary artery pressure 26 mmHg    Narrative    · Normal left ventricular systolic function. The estimated ejection   fraction is 60%  · Grade I (mild) left ventricular diastolic dysfunction consistent with   impaired relaxation.  · No wall motion abnormalities.  · Elevated left atrial pressure.  · Normal right ventricular systolic function.  · Mild tricuspid regurgitation.  · Normal central venous pressure (3 mm Hg).  · The estimated PA systolic pressure is 26 mm Hg        Conclusion     · Normal left ventricular systolic function. The estimated ejection fraction is 60%.  · Normal LV diastolic function.  · Mild tricuspid regurgitation.  · Normal right ventricular systolic function.  · The ECG portion of this study is negative for myocardial ischemia.  · The stress echo portion of this study is negative for myocardial ischemia.  · During stress, the following significant arrhythmias were observed: rare PACs, rare PVCs.  · The patient reached the end of the protocol.  · Peak  (85% of PMTHR). Max BP of 154/51 with resting BP of 131/76mmHg       Pending Diagnostic Studies:     Procedure  Component Value Units Date/Time    CBC with Automated Differential [[id number]] Collected:  01/04/20 0325    Order Status:  Sent Lab Status:  In process Updated:  01/04/20 0459    Specimen:  Blood         Indwelling Lines/Drains at time of discharge:   Lines/Drains/Airways     None                 Time spent on the discharge of patient: 25 minutes  Patient was seen and examined on the date of discharge and determined to be suitable for discharge.         Molina Mcleod MD  Department of Hospital Medicine  [facility]

## 2020-01-20 RX ORDER — METFORMIN HYDROCHLORIDE 1000 MG/1
TABLET ORAL
Qty: 60 TABLET | Refills: 6 | Status: SHIPPED | OUTPATIENT
Start: 2020-01-20 | End: 2020-07-28 | Stop reason: SDUPTHER

## 2020-01-22 ENCOUNTER — TELEPHONE (OUTPATIENT)
Dept: INTERNAL MEDICINE | Facility: CLINIC | Age: 65
End: 2020-01-22

## 2020-01-22 NOTE — TELEPHONE ENCOUNTER
----- Message from Radhaangella Najera sent at 1/22/2020  9:09 AM CST -----  Contact: Pt [phone]  She dropped of paperwork at the  to be filled out on yesterday, 1/20/20. She wants a callback on the status of completion. She said you have to send it to [facility] after is completed but, wants a call to assure her that it was received by your office also.    Thank you

## 2020-03-05 ENCOUNTER — HOSPITAL ENCOUNTER (EMERGENCY)
Facility: HOSPITAL | Age: 65
Discharge: HOME | End: 2020-03-05
Attending: EMERGENCY MEDICINE
Payer: MEDICARE

## 2020-03-05 VITALS
BODY MASS INDEX: 29.02 KG/M2 | HEART RATE: 89 BPM | WEIGHT: 170 LBS | HEIGHT: 64 IN | DIASTOLIC BLOOD PRESSURE: 63 MMHG | SYSTOLIC BLOOD PRESSURE: 132 MMHG | OXYGEN SATURATION: 97 % | TEMPERATURE: 98 F | RESPIRATION RATE: 16 BRPM

## 2020-03-05 DIAGNOSIS — R07.9 CHEST PAIN: ICD-10-CM

## 2020-03-05 DIAGNOSIS — E16.2 HYPOGLYCEMIA: Primary | ICD-10-CM

## 2020-03-05 DIAGNOSIS — R11.11 NON-INTRACTABLE VOMITING WITHOUT NAUSEA, UNSPECIFIED VOMITING TYPE: ICD-10-CM

## 2020-03-05 LAB
ALBUMIN SERPL BCP-MCNC: 3.7 G/DL (ref 3.5–5.2)
ALP SERPL-CCNC: 78 U/L (ref 55–135)
ALT SERPL W/O P-5'-P-CCNC: 11 U/L (ref 10–44)
ANION GAP SERPL CALC-SCNC: 7 MMOL/L (ref 8–16)
AST SERPL-CCNC: 17 U/L (ref 10–40)
BASOPHILS # BLD AUTO: 0.02 K/UL (ref 0–0.2)
BASOPHILS NFR BLD: 0.3 % (ref 0–1.9)
BILIRUB SERPL-MCNC: 0.4 MG/DL (ref 0.1–1)
BUN SERPL-MCNC: 13 MG/DL (ref 8–23)
CALCIUM SERPL-MCNC: 9.8 MG/DL (ref 8.7–10.5)
CHLORIDE SERPL-SCNC: 108 MMOL/L (ref 95–110)
CO2 SERPL-SCNC: 30 MMOL/L (ref 23–29)
CREAT SERPL-MCNC: 1 MG/DL (ref 0.5–1.4)
DIFFERENTIAL METHOD: NORMAL
EOSINOPHIL # BLD AUTO: 0.1 K/UL (ref 0–0.5)
EOSINOPHIL NFR BLD: 0.8 % (ref 0–8)
ERYTHROCYTE [DISTWIDTH] IN BLOOD BY AUTOMATED COUNT: 12.9 % (ref 11.5–14.5)
EST. GFR  (AFRICAN AMERICAN): >60 ML/MIN/1.73 M^2
EST. GFR  (NON AFRICAN AMERICAN): 59.3 ML/MIN/1.73 M^2
GLUCOSE SERPL-MCNC: 102 MG/DL (ref 70–110)
HCT VFR BLD AUTO: 38.4 % (ref 37–48.5)
HGB BLD-MCNC: 12.3 G/DL (ref 12–16)
IMM GRANULOCYTES # BLD AUTO: 0.01 K/UL (ref 0–0.04)
IMM GRANULOCYTES NFR BLD AUTO: 0.1 % (ref 0–0.5)
LYMPHOCYTES # BLD AUTO: 3 K/UL (ref 1–4.8)
LYMPHOCYTES NFR BLD: 40.6 % (ref 18–48)
MCH RBC QN AUTO: 30.4 PG (ref 27–31)
MCHC RBC AUTO-ENTMCNC: 32 G/DL (ref 32–36)
MCV RBC AUTO: 95 FL (ref 82–98)
MONOCYTES # BLD AUTO: 0.5 K/UL (ref 0.3–1)
MONOCYTES NFR BLD: 6.8 % (ref 4–15)
NEUTROPHILS # BLD AUTO: 3.8 K/UL (ref 1.8–7.7)
NEUTROPHILS NFR BLD: 51.4 % (ref 38–73)
NRBC BLD-RTO: 0 /100 WBC
PLATELET # BLD AUTO: 169 K/UL (ref 150–350)
PLATELET BLD QL SMEAR: NORMAL
PMV BLD AUTO: 9.4 FL (ref 9.2–12.9)
POCT GLUCOSE: 55 MG/DL (ref 70–110)
POTASSIUM SERPL-SCNC: 4.5 MMOL/L (ref 3.5–5.1)
PROT SERPL-MCNC: 7.8 G/DL (ref 6–8.4)
RBC # BLD AUTO: 4.04 M/UL (ref 4–5.4)
SODIUM SERPL-SCNC: 145 MMOL/L (ref 136–145)
TROPONIN I SERPL DL<=0.01 NG/ML-MCNC: <0.006 NG/ML (ref 0–0.03)
WBC # BLD AUTO: 7.37 K/UL (ref 3.9–12.7)

## 2020-03-05 PROCEDURE — 93010 ELECTROCARDIOGRAM REPORT: CPT | Mod: HCNC,,, | Performed by: INTERNAL MEDICINE

## 2020-03-05 PROCEDURE — 85025 COMPLETE CBC W/AUTO DIFF WBC: CPT | Mod: HCNC

## 2020-03-05 PROCEDURE — 63600175 PHARM REV CODE 636 W HCPCS: Mod: HCNC | Performed by: EMERGENCY MEDICINE

## 2020-03-05 PROCEDURE — 93005 ELECTROCARDIOGRAM TRACING: CPT | Mod: HCNC

## 2020-03-05 PROCEDURE — 84484 ASSAY OF TROPONIN QUANT: CPT | Mod: HCNC

## 2020-03-05 PROCEDURE — 99285 EMERGENCY DEPT VISIT HI MDM: CPT | Mod: 25,HCNC

## 2020-03-05 PROCEDURE — 93010 EKG 12-LEAD: ICD-10-PCS | Mod: HCNC,,, | Performed by: INTERNAL MEDICINE

## 2020-03-05 PROCEDURE — 80053 COMPREHEN METABOLIC PANEL: CPT | Mod: HCNC

## 2020-03-05 PROCEDURE — 99285 PR EMERGENCY DEPT VISIT,LEVEL V: ICD-10-PCS | Mod: ,,, | Performed by: EMERGENCY MEDICINE

## 2020-03-05 PROCEDURE — 99285 EMERGENCY DEPT VISIT HI MDM: CPT | Mod: ,,, | Performed by: EMERGENCY MEDICINE

## 2020-03-05 PROCEDURE — 96374 THER/PROPH/DIAG INJ IV PUSH: CPT | Mod: HCNC

## 2020-03-05 PROCEDURE — 82962 GLUCOSE BLOOD TEST: CPT | Mod: HCNC

## 2020-03-05 PROCEDURE — 25000003 PHARM REV CODE 250: Mod: HCNC | Performed by: EMERGENCY MEDICINE

## 2020-03-05 RX ORDER — ASPIRIN 325 MG
325 TABLET ORAL
Status: COMPLETED | OUTPATIENT
Start: 2020-03-05 | End: 2020-03-05

## 2020-03-05 RX ORDER — ONDANSETRON 2 MG/ML
4 INJECTION INTRAMUSCULAR; INTRAVENOUS
Status: COMPLETED | OUTPATIENT
Start: 2020-03-05 | End: 2020-03-05

## 2020-03-05 RX ADMIN — ASPIRIN 325 MG ORAL TABLET 325 MG: 325 PILL ORAL at 06:03

## 2020-03-05 RX ADMIN — ONDANSETRON 4 MG: 2 INJECTION INTRAMUSCULAR; INTRAVENOUS at 06:03

## 2020-03-05 NOTE — ED NOTES
Patient identifiers verified and correct for MS Pillai  C/C: CP, vomiting SEE NN  APPEARANCE: awake and alert in NAD.  SKIN: warm, dry and intact. No breakdown or bruising.  MUSCULOSKELETAL: Patient moving all extremities spontaneously, no obvious swelling or deformities noted. Ambulates independently.  RESPIRATORY: Denies shortness of breath.Respirations unlabored. Denies cough  CARDIAC: Positive midsternal intermittent  CP, 2+ distal pulses; no peripheral edema  ABDOMEN: S/ND/NT, Denies nausea  : voids spontaneously, denies difficulty  Neurologic: AAO x 4; follows commands equal strength in all extremities; denies numbness/tingling. Denies dizziness Denies weakness

## 2020-03-05 NOTE — ED TRIAGE NOTES
Patient states intermittent mid sternal CP onset last night, states she began vomited multiple times since 1600, denies SOB, denies cough.

## 2020-03-09 LAB — POCT GLUCOSE: 109 MG/DL (ref 70–110)

## 2020-04-20 ENCOUNTER — OFFICE VISIT (OUTPATIENT)
Dept: INTERNAL MEDICINE | Facility: CLINIC | Age: 65
End: 2020-04-20
Payer: MEDICARE

## 2020-04-20 ENCOUNTER — TELEPHONE (OUTPATIENT)
Dept: INTERNAL MEDICINE | Facility: CLINIC | Age: 65
End: 2020-04-20

## 2020-04-20 ENCOUNTER — HOSPITAL ENCOUNTER (OUTPATIENT)
Dept: RADIOLOGY | Facility: HOSPITAL | Age: 65
Discharge: HOME | End: 2020-04-20
Attending: INTERNAL MEDICINE
Payer: MEDICARE

## 2020-04-20 VITALS
BODY MASS INDEX: 30.98 KG/M2 | HEIGHT: 64 IN | HEART RATE: 83 BPM | DIASTOLIC BLOOD PRESSURE: 84 MMHG | WEIGHT: 181.44 LBS | OXYGEN SATURATION: 98 % | SYSTOLIC BLOOD PRESSURE: 132 MMHG

## 2020-04-20 DIAGNOSIS — M25.532 LEFT WRIST PAIN: ICD-10-CM

## 2020-04-20 DIAGNOSIS — Z79.4 TYPE 2 DIABETES MELLITUS WITH COMPLICATION, WITH LONG-TERM CURRENT USE OF INSULIN: ICD-10-CM

## 2020-04-20 DIAGNOSIS — M25.532 LEFT WRIST PAIN: Primary | ICD-10-CM

## 2020-04-20 DIAGNOSIS — E11.8 TYPE 2 DIABETES MELLITUS WITH COMPLICATION, WITH LONG-TERM CURRENT USE OF INSULIN: ICD-10-CM

## 2020-04-20 PROCEDURE — 73100 X-RAY EXAM OF WRIST: CPT | Mod: 26,HCNC,LT, | Performed by: RADIOLOGY

## 2020-04-20 PROCEDURE — 99213 OFFICE O/P EST LOW 20 MIN: CPT | Mod: HCNC,25,S$GLB, | Performed by: INTERNAL MEDICINE

## 2020-04-20 PROCEDURE — 1101F PT FALLS ASSESS-DOCD LE1/YR: CPT | Mod: HCNC,CPTII,S$GLB, | Performed by: INTERNAL MEDICINE

## 2020-04-20 PROCEDURE — 3075F PR MOST RECENT SYSTOLIC BLOOD PRESS GE 130-139MM HG: ICD-10-PCS | Mod: HCNC,CPTII,S$GLB, | Performed by: INTERNAL MEDICINE

## 2020-04-20 PROCEDURE — 99499 UNLISTED E&M SERVICE: CPT | Mod: HCNC,S$GLB,, | Performed by: INTERNAL MEDICINE

## 2020-04-20 PROCEDURE — 96372 THER/PROPH/DIAG INJ SC/IM: CPT | Mod: HCNC,S$GLB,, | Performed by: INTERNAL MEDICINE

## 2020-04-20 PROCEDURE — 96372 PR INJECTION,THERAP/PROPH/DIAG2ST, IM OR SUBCUT: ICD-10-PCS | Mod: HCNC,S$GLB,, | Performed by: INTERNAL MEDICINE

## 2020-04-20 PROCEDURE — 3046F HEMOGLOBIN A1C LEVEL >9.0%: CPT | Mod: HCNC,CPTII,S$GLB, | Performed by: INTERNAL MEDICINE

## 2020-04-20 PROCEDURE — 3079F PR MOST RECENT DIASTOLIC BLOOD PRESSURE 80-89 MM HG: ICD-10-PCS | Mod: HCNC,CPTII,S$GLB, | Performed by: INTERNAL MEDICINE

## 2020-04-20 PROCEDURE — 73100 XR WRIST 2 VIEW LEFT: ICD-10-PCS | Mod: 26,HCNC,LT, | Performed by: RADIOLOGY

## 2020-04-20 PROCEDURE — 3008F PR BODY MASS INDEX (BMI) DOCUMENTED: ICD-10-PCS | Mod: HCNC,CPTII,S$GLB, | Performed by: INTERNAL MEDICINE

## 2020-04-20 PROCEDURE — 99499 RISK ADDL DX/OHS AUDIT: ICD-10-PCS | Mod: HCNC,S$GLB,, | Performed by: INTERNAL MEDICINE

## 2020-04-20 PROCEDURE — 73130 X-RAY EXAM OF HAND: CPT | Mod: TC,HCNC,LT

## 2020-04-20 PROCEDURE — 73100 X-RAY EXAM OF WRIST: CPT | Mod: TC,HCNC,LT

## 2020-04-20 PROCEDURE — 3046F PR MOST RECENT HEMOGLOBIN A1C LEVEL > 9.0%: ICD-10-PCS | Mod: HCNC,CPTII,S$GLB, | Performed by: INTERNAL MEDICINE

## 2020-04-20 PROCEDURE — 73130 X-RAY EXAM OF HAND: CPT | Mod: 26,HCNC,LT, | Performed by: RADIOLOGY

## 2020-04-20 PROCEDURE — 1101F PR PT FALLS ASSESS DOC 0-1 FALLS W/OUT INJ PAST YR: ICD-10-PCS | Mod: HCNC,CPTII,S$GLB, | Performed by: INTERNAL MEDICINE

## 2020-04-20 PROCEDURE — 3079F DIAST BP 80-89 MM HG: CPT | Mod: HCNC,CPTII,S$GLB, | Performed by: INTERNAL MEDICINE

## 2020-04-20 PROCEDURE — 3008F BODY MASS INDEX DOCD: CPT | Mod: HCNC,CPTII,S$GLB, | Performed by: INTERNAL MEDICINE

## 2020-04-20 PROCEDURE — 3075F SYST BP GE 130 - 139MM HG: CPT | Mod: HCNC,CPTII,S$GLB, | Performed by: INTERNAL MEDICINE

## 2020-04-20 PROCEDURE — 73130 XR HAND COMPLETE 3 VIEW LEFT: ICD-10-PCS | Mod: 26,HCNC,LT, | Performed by: RADIOLOGY

## 2020-04-20 PROCEDURE — 99213 PR OFFICE/OUTPT VISIT, EST, LEVL III, 20-29 MIN: ICD-10-PCS | Mod: HCNC,25,S$GLB, | Performed by: INTERNAL MEDICINE

## 2020-04-20 PROCEDURE — 99999 PR PBB SHADOW E&M-EST. PATIENT-LVL V: CPT | Mod: PBBFAC,HCNC,, | Performed by: INTERNAL MEDICINE

## 2020-04-20 PROCEDURE — 99999 PR PBB SHADOW E&M-EST. PATIENT-LVL V: ICD-10-PCS | Mod: PBBFAC,HCNC,, | Performed by: INTERNAL MEDICINE

## 2020-04-20 RX ORDER — KETOROLAC TROMETHAMINE 30 MG/ML
30 INJECTION, SOLUTION INTRAMUSCULAR; INTRAVENOUS ONCE
Status: COMPLETED | OUTPATIENT
Start: 2020-04-20 | End: 2020-04-20

## 2020-04-20 RX ADMIN — KETOROLAC TROMETHAMINE 30 MG: 30 INJECTION, SOLUTION INTRAMUSCULAR; INTRAVENOUS at 11:04

## 2020-04-20 NOTE — PROGRESS NOTES
Subjective:       Patient ID: Sunitha Pillai is a 64 y.o. female.    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  No numbness or weakness.   Review of Systems   Respiratory: Negative for shortness of breath (PND or orthopnea).    Cardiovascular: Negative for chest pain (arm pain or jaw pain).   Gastrointestinal: Negative for abdominal pain, diarrhea, nausea and vomiting.   Genitourinary: Negative for dysuria.   Neurological: Negative for seizures, syncope and headaches.       Objective:      Physical Exam   Constitutional: She is oriented to person, place, and time. She appears well-developed and well-nourished. No distress.   HENT:   Head: Normocephalic.   Mouth/Throat: Oropharynx is clear and moist.   Neck: Neck supple. No JVD present. No thyromegaly present.   Cardiovascular: Normal rate, regular rhythm, normal heart sounds and intact distal pulses. Exam reveals no gallop and no friction rub.   No murmur heard.  Pulmonary/Chest: Effort normal and breath sounds normal. She has no wheezes. She has no rales.   Abdominal: Soft. Bowel sounds are normal. She exhibits no distension and no mass. There is no tenderness. There is no rebound and no guarding.   Musculoskeletal: She exhibits no edema.   L wrist slightly swollen with decreased ROM - no erythema or warmth.  No swelling in the hands or fingers - normal pulse - neurovasc intact   Lymphadenopathy:     She has no cervical adenopathy.   Neurological: She is alert and oriented to person, place, and time. She has normal reflexes.   Skin: Skin is warm and dry.   Psychiatric: She has a normal mood and affect. Her behavior is normal. Judgment and thought content normal.       Assessment:       1. Left wrist pain    2. Type 2 diabetes mellitus with complication, with long-term  current use of insulin        Plan:   Left wrist pain  -     CBC auto differential; Future; Expected date: 04/20/2020  -     Comprehensive metabolic panel; Future; Expected date: 04/20/2020  -     Sedimentation rate; Future; Expected date: 04/20/2020  -     C-Reactive Protein; Future; Expected date: 04/20/2020  -     Uric acid; Future; Expected date: 04/20/2020  -     X-Ray Wrist 2 View Left; Future; Expected date: 04/20/2020  -     X-Ray Hand 3 view Left; Future; Expected date: 04/20/2020  Gout? - toradol await lab and films  Type 2 diabetes mellitus with complication, with long-term current use of insulin  -     Hemoglobin A1c; Future; Expected date: 04/20/2020    Other orders  -     ketorolac injection 30 mg

## 2020-04-20 NOTE — TELEPHONE ENCOUNTER
Pt verbalized understanding re:xray. She would like to know if there's anything that can be prescribed she have really bad pains in her hand and wrist.

## 2020-04-21 ENCOUNTER — TELEPHONE (OUTPATIENT)
Dept: INTERNAL MEDICINE | Facility: CLINIC | Age: 65
End: 2020-04-21

## 2020-04-21 DIAGNOSIS — M10.9 GOUT, UNSPECIFIED CAUSE, UNSPECIFIED CHRONICITY, UNSPECIFIED SITE: Primary | ICD-10-CM

## 2020-04-21 RX ORDER — COLCHICINE 0.6 MG/1
0.6 TABLET ORAL 2 TIMES DAILY
Qty: 20 TABLET | Refills: 1 | Status: SHIPPED | OUTPATIENT
Start: 2020-04-21 | End: 2021-11-10

## 2020-04-21 NOTE — TELEPHONE ENCOUNTER
----- Message from Shima Manley sent at 4/21/2020  8:45 AM CDT -----  Contact: Patient  [phone]  Patient is requesting something for arthritis and pain.    [facility] [address] [place] [address] [facility] [phone] (Phone) [phone] (Fax)

## 2020-04-21 NOTE — TELEPHONE ENCOUNTER
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  Patient would like a Rx to have for future flare-ups.    Patient Pharmacy is [facility] on file.

## 2020-05-06 NOTE — TELEPHONE ENCOUNTER
----- Message from Francheska Moon sent at 5/6/2020 11:16 AM CDT -----  Contact: self/ [phone]  Requesting an RX refill or new RX.  Is this a refill or new RX:  refill  RX name and strength: NOVOLOG FLEXPEN U-100 INSULIN 100 unit/mL (3 mL) InPn pen  Directions (copy/paste from chart):  Sig: INJECT 20 UNITS SUBCUTANEOUSLY THREE TIMES DAILY WITH MEALS Plus sliding scale  Is this a 30 day or 90 day RX:    Local pharmacy or mail order pharmacy:  local  Pharmacy name and phone # (copy/paste from chart): [facility] [address] [place] [address] - [address] [place] [address] [phone] (Phone)  [phone] (Fax)    Comments:

## 2020-05-08 RX ORDER — INSULIN ASPART 100 [IU]/ML
INJECTION, SOLUTION INTRAVENOUS; SUBCUTANEOUS
Qty: 1 BOX | Refills: 6 | Status: SHIPPED | OUTPATIENT
Start: 2020-05-08 | End: 2020-07-28 | Stop reason: SDUPTHER

## 2020-05-13 ENCOUNTER — LAB VISIT (OUTPATIENT)
Dept: LAB | Facility: HOSPITAL | Age: 65
End: 2020-05-13
Attending: FAMILY MEDICINE
Payer: MEDICARE

## 2020-05-13 ENCOUNTER — OFFICE VISIT (OUTPATIENT)
Dept: INTERNAL MEDICINE | Facility: CLINIC | Age: 65
End: 2020-05-13
Payer: MEDICARE

## 2020-05-13 VITALS
HEIGHT: 64 IN | WEIGHT: 183.19 LBS | OXYGEN SATURATION: 99 % | HEART RATE: 88 BPM | TEMPERATURE: 98 F | DIASTOLIC BLOOD PRESSURE: 82 MMHG | BODY MASS INDEX: 31.27 KG/M2 | SYSTOLIC BLOOD PRESSURE: 142 MMHG

## 2020-05-13 DIAGNOSIS — B35.1 ONYCHOMYCOSIS: ICD-10-CM

## 2020-05-13 DIAGNOSIS — S46.812A STRAIN OF LEFT TRAPEZIUS MUSCLE, INITIAL ENCOUNTER: Primary | ICD-10-CM

## 2020-05-13 LAB
ALBUMIN SERPL BCP-MCNC: 3.6 G/DL (ref 3.5–5.2)
ALP SERPL-CCNC: 80 U/L (ref 55–135)
ALT SERPL W/O P-5'-P-CCNC: 12 U/L (ref 10–44)
AST SERPL-CCNC: 20 U/L (ref 10–40)
BILIRUB DIRECT SERPL-MCNC: 0.3 MG/DL (ref 0.1–0.3)
BILIRUB SERPL-MCNC: 0.5 MG/DL (ref 0.1–1)
PROT SERPL-MCNC: 7.7 G/DL (ref 6–8.4)

## 2020-05-13 PROCEDURE — 99999 PR PBB SHADOW E&M-EST. PATIENT-LVL IV: ICD-10-PCS | Mod: PBBFAC,HCNC,, | Performed by: FAMILY MEDICINE

## 2020-05-13 PROCEDURE — 1101F PT FALLS ASSESS-DOCD LE1/YR: CPT | Mod: HCNC,CPTII,S$GLB, | Performed by: FAMILY MEDICINE

## 2020-05-13 PROCEDURE — 3079F PR MOST RECENT DIASTOLIC BLOOD PRESSURE 80-89 MM HG: ICD-10-PCS | Mod: HCNC,CPTII,S$GLB, | Performed by: FAMILY MEDICINE

## 2020-05-13 PROCEDURE — 3008F PR BODY MASS INDEX (BMI) DOCUMENTED: ICD-10-PCS | Mod: HCNC,CPTII,S$GLB, | Performed by: FAMILY MEDICINE

## 2020-05-13 PROCEDURE — 36415 COLL VENOUS BLD VENIPUNCTURE: CPT | Mod: HCNC

## 2020-05-13 PROCEDURE — 99214 PR OFFICE/OUTPT VISIT, EST, LEVL IV, 30-39 MIN: ICD-10-PCS | Mod: HCNC,S$GLB,, | Performed by: FAMILY MEDICINE

## 2020-05-13 PROCEDURE — 80076 HEPATIC FUNCTION PANEL: CPT | Mod: HCNC

## 2020-05-13 PROCEDURE — 3008F BODY MASS INDEX DOCD: CPT | Mod: HCNC,CPTII,S$GLB, | Performed by: FAMILY MEDICINE

## 2020-05-13 PROCEDURE — 99214 OFFICE O/P EST MOD 30 MIN: CPT | Mod: HCNC,S$GLB,, | Performed by: FAMILY MEDICINE

## 2020-05-13 PROCEDURE — 99999 PR PBB SHADOW E&M-EST. PATIENT-LVL IV: CPT | Mod: PBBFAC,HCNC,, | Performed by: FAMILY MEDICINE

## 2020-05-13 PROCEDURE — 3077F SYST BP >= 140 MM HG: CPT | Mod: HCNC,CPTII,S$GLB, | Performed by: FAMILY MEDICINE

## 2020-05-13 PROCEDURE — 1101F PR PT FALLS ASSESS DOC 0-1 FALLS W/OUT INJ PAST YR: ICD-10-PCS | Mod: HCNC,CPTII,S$GLB, | Performed by: FAMILY MEDICINE

## 2020-05-13 PROCEDURE — 3079F DIAST BP 80-89 MM HG: CPT | Mod: HCNC,CPTII,S$GLB, | Performed by: FAMILY MEDICINE

## 2020-05-13 PROCEDURE — 3077F PR MOST RECENT SYSTOLIC BLOOD PRESSURE >= 140 MM HG: ICD-10-PCS | Mod: HCNC,CPTII,S$GLB, | Performed by: FAMILY MEDICINE

## 2020-05-13 RX ORDER — TIZANIDINE HYDROCHLORIDE 4 MG/1
1 CAPSULE, GELATIN COATED ORAL EVERY 6 HOURS PRN
Qty: 30 CAPSULE | Refills: 1 | Status: SHIPPED | OUTPATIENT
Start: 2020-05-13 | End: 2020-05-14 | Stop reason: ALTCHOICE

## 2020-05-13 RX ORDER — TERBINAFINE HYDROCHLORIDE 250 MG/1
250 TABLET ORAL DAILY
Qty: 30 TABLET | Refills: 0 | Status: SHIPPED | OUTPATIENT
Start: 2020-05-13 | End: 2020-06-12

## 2020-05-13 RX ORDER — MELOXICAM 15 MG/1
15 TABLET ORAL DAILY
Qty: 30 TABLET | Refills: 1 | Status: SHIPPED | OUTPATIENT
Start: 2020-05-13 | End: 2020-09-11

## 2020-05-13 NOTE — PROGRESS NOTES
"Subjective:       Patient ID: Sunitha Pillai is a 64 y.o. female.    Chief Complaint:   Neck Pain (and stiffness/ left side ); Headache; Nail Problem (left thumb); and Shoulder Pain (left shoulder)    Neck Pain    This is a new problem. Episode onset: 5/4/2020. The problem occurs constantly. The problem has been unchanged. The pain is associated with nothing. The pain is present in the left side (L upper back, L side of head, L shoulder). The quality of the pain is described as aching. The pain is moderate. The symptoms are aggravated by position. Pertinent negatives include no chest pain.   Nail Problem   This is a new problem. Episode onset: several months ago. The problem occurs constantly. The problem has been gradually worsening. Pertinent negatives include no chest pain. Nothing aggravates the symptoms. She has tried nothing for the symptoms.   Both thumbs are discolored.  L>R.  L is starting to hurt.  Review of Systems   Constitutional: Negative for activity change and appetite change.   Respiratory: Negative for shortness of breath and wheezing.    Cardiovascular: Negative for chest pain and palpitations.     Current Outpatient Medications   Medication Sig    acetaminophen (TYLENOL) 325 MG tablet Take 2 tablets (650 mg total) by mouth every 8 (eight) hours as needed for Pain.    aspirin 81 mg Tab Take 1 tablet by mouth Daily.    BD ULTRA-FINE SHORT PEN NEEDLE 31 gauge x 5/16" Ndle USE 1  4 TIMES DAILY WITH  INSULIN    blood sugar diagnostic Strp Strips and lancets covered by insurance E11.9, pt checks glucose three times daily, insulin dependent    colchicine (COLCRYS) 0.6 mg tablet Take 1 tablet (0.6 mg total) by mouth 2 (two) times daily.    dulaglutide (TRULICITY) 0.75 mg/0.5 mL PnIj Inject 0.5 mLs (0.75 mg total) into the skin every 7 days.    insulin degludec (TRESIBA FLEXTOUCH U-100) 100 unit/mL (3 mL) InPn 45 Units daily    lancets Misc To check BG 4 times daily, to use with insurance " "preferred meter, insulin dependent    metFORMIN (GLUCOPHAGE) 1000 MG tablet TAKE 1 TABLET BY MOUTH TWICE DAILY    NOVOLOG FLEXPEN U-100 INSULIN 100 unit/mL (3 mL) InPn pen INJECT 20 UNITS SUBCUTANEOUSLY THREE TIMES DAILY WITH MEALS Plus sliding scale    sub-q insulin device, 30 unit (V-GO 30) Kanchan Change everyday    blood sugar diagnostic, drum (ACCU-CHEK COMPACT TEST) Strp USE ONE STRIP TO CHECK GLUCOSE 4 TIMES DAILY BEFORE EACH MEAL AND AT BEDTIME    blood-glucose meter kit Check glucose three times daily E11.9 meter covered by insurance, insulin dependent    candesartan (ATACAND) 8 MG tablet Take 1 tablet (8 mg total) by mouth once daily.    meloxicam (MOBIC) 15 MG tablet Take 1 tablet (15 mg total) by mouth once daily.    terbinafine HCL (LAMISIL) 250 mg tablet Take 1 tablet (250 mg total) by mouth once daily.    tiZANidine 4 mg Cap Take 1 capsule by mouth every 6 (six) hours as needed.     No current facility-administered medications for this visit.      Past Medical History:   Diagnosis Date    Arthritis     Diabetes mellitus     Diabetes mellitus type I     GERD (gastroesophageal reflux disease)     Hypertension     Vertigo      Family History   Problem Relation Age of Onset    Hypertension Mother     Hyperlipidemia Mother     Diabetes Mother     Heart disease Mother     Aneurysm Father     Diabetes Sister     Hypertension Sister     Heart disease Brother     Diabetes Brother     Hypertension Brother     Breast cancer Neg Hx     Colon cancer Neg Hx     Ovarian cancer Neg Hx      Social History     Tobacco Use    Smoking status: Never Smoker    Smokeless tobacco: Never Used   Substance Use Topics    Alcohol use: No    Drug use: No       Objective:      Vitals:    05/13/20 0942   BP: (!) 142/82   BP Location: Left arm   Patient Position: Sitting   BP Method: Medium (Manual)   Pulse: 88   Temp: 98.3 °F (36.8 °C)   SpO2: 99%   Weight: 83.1 kg (183 lb 3.2 oz)   Height: 5' 4" (1.626 " m)     Physical Exam   Constitutional: She is oriented to person, place, and time. She appears well-developed and well-nourished. No distress.   HENT:   Head: Normocephalic and atraumatic.   Mouth/Throat: Oropharynx is clear and moist.   Eyes: Conjunctivae and EOM are normal.   Neck: Normal range of motion. Neck supple.   Cardiovascular: Normal rate, regular rhythm and normal heart sounds. Exam reveals no gallop and no friction rub.   No murmur heard.  Pulmonary/Chest: Effort normal and breath sounds normal. She has no wheezes. She has no rales.   Musculoskeletal: She exhibits no deformity.   L trap:  TTP.  Spasm.  Hurts to turn head to L and R.   Neurological: She is alert and oriented to person, place, and time.   Skin: Skin is warm and dry. She is not diaphoretic.   Nails:  Hand:  Both thumbnails sl thickened and discolored.  L>R.  Feet:  All nails both feet thickened and discolored.   Psychiatric: She has a normal mood and affect. Her behavior is normal.   Nursing note and vitals reviewed.           Assessment and Plan:     Strain of left trapezius muscle, initial encounter  -     tiZANidine 4 mg Cap; Take 1 capsule by mouth every 6 (six) hours as needed.  Dispense: 30 capsule; Refill: 1  -     meloxicam (MOBIC) 15 MG tablet; Take 1 tablet (15 mg total) by mouth once daily.  Dispense: 30 tablet; Refill: 1    Onychomycosis  -     Hepatic function panel; Future; Expected date: 05/27/2020  -     terbinafine HCL (LAMISIL) 250 mg tablet; Take 1 tablet (250 mg total) by mouth once daily.  Dispense: 30 tablet; Refill: 0  -     Hepatic function panel; Future; Expected date: 05/13/2020          Follow up in about 2 weeks (around 5/27/2020).  She's due to f/u w/ Dr. Louis in June and will schedule that f/u.  Needs to have hepatic function panel checked in 2 weeks.  Has h/o elevated liver enzymes 1 year ago which has resolved.    Deon Sharif MD

## 2020-05-13 NOTE — PATIENT INSTRUCTIONS
Nail Fungal Infection  A nail fungal infection changes the way fingernails and toenails look. They may thicken, discolor, change shape, or split. This condition is hard to treat because nails grow slowly and have limited blood supply. The infection often comes back after treatment.  There are 2 types of medicines used to treat this condition:  · Topical anti-fungal medicines. These are applied to the surface of the skin and nail area. These medicines are not very effective because they cant get deep into the nail.  · Oral antifungal medicines. These medicines work better because they go into the nail from the inside out. But the infection may still come back. It may take 9 to 12 months for your nail to look normal again. This means you are cured. You can repeat treatment if needed. Most people take these medicines without any problems. It is rare to stop therapy because of side effects. But your healthcare provider may give you some monitoring tests. Talk about possible side effects with your provider before starting treatment.  If medicines fail, the nail can be removed surgically or chemically. These methods physically remove the fungus from the body. This helps medical treatment be more effective.  Home care  · Use medicines exactly as directed for as long as directed. Treating a fungal infection can take longer than other kinds of infections.  · Smoking is a risk factor for fungal infection. This is one more reason to quit.  · Wear absorbent socks, and shoes that let your feet breathe. Sweaty feet increase your risk of fungal infection. They also make an existing infection harder to treat.  · Use footwear when in damp public places like swimming pools, gyms, and shower rooms. This will help you avoid the fungus that grows there.  · Don't share nail clippers or scissors with others.  Follow-up care  Follow up with your healthcare provider, or as advised.  When to seek medical advice  Call your healthcare  provider right away if any of these occur:  · Skin by the nail becomes red, swollen, painful, or drains pus (a creamy yellow or white liquid)  · Side effects from oral anti-fungal medicines  Date Last Reviewed: 8/1/2016  © 1104-0345 [employer]. [address] [place] [address]. All rights reserved. This information is not intended as a substitute for professional medical care. Always follow your healthcare professional's instructions.

## 2020-05-14 ENCOUNTER — TELEPHONE (OUTPATIENT)
Dept: INTERNAL MEDICINE | Facility: CLINIC | Age: 65
End: 2020-05-14

## 2020-05-14 RX ORDER — TIZANIDINE 4 MG/1
4 TABLET ORAL EVERY 6 HOURS PRN
Qty: 30 TABLET | Refills: 1 | Status: SHIPPED | OUTPATIENT
Start: 2020-05-14 | End: 2020-06-08

## 2020-05-14 NOTE — TELEPHONE ENCOUNTER
[facility] is requesting a new Rx be sent in for Tizanidine 4 mg tablet.

## 2020-05-14 NOTE — TELEPHONE ENCOUNTER
----- Message from Deon Sharif MD sent at 5/14/2020 10:05 AM CDT -----  Her liver tests were normal.

## 2020-05-27 ENCOUNTER — TELEPHONE (OUTPATIENT)
Dept: INTERNAL MEDICINE | Facility: CLINIC | Age: 65
End: 2020-05-27

## 2020-05-27 ENCOUNTER — LAB VISIT (OUTPATIENT)
Dept: LAB | Facility: HOSPITAL | Age: 65
End: 2020-05-27
Attending: FAMILY MEDICINE
Payer: MEDICARE

## 2020-05-27 DIAGNOSIS — B35.1 ONYCHOMYCOSIS: ICD-10-CM

## 2020-05-27 LAB
ALBUMIN SERPL BCP-MCNC: 3.8 G/DL (ref 3.5–5.2)
ALP SERPL-CCNC: 76 U/L (ref 55–135)
ALT SERPL W/O P-5'-P-CCNC: 15 U/L (ref 10–44)
AST SERPL-CCNC: 20 U/L (ref 10–40)
BILIRUB DIRECT SERPL-MCNC: 0.3 MG/DL (ref 0.1–0.3)
BILIRUB SERPL-MCNC: 0.6 MG/DL (ref 0.1–1)
PROT SERPL-MCNC: 7.8 G/DL (ref 6–8.4)

## 2020-05-27 PROCEDURE — 80076 HEPATIC FUNCTION PANEL: CPT | Mod: HCNC

## 2020-05-27 PROCEDURE — 36415 COLL VENOUS BLD VENIPUNCTURE: CPT | Mod: HCNC

## 2020-06-06 ENCOUNTER — HOSPITAL ENCOUNTER (EMERGENCY)
Facility: HOSPITAL | Age: 65
Discharge: HOME | End: 2020-06-07
Attending: EMERGENCY MEDICINE
Payer: MEDICARE

## 2020-06-06 DIAGNOSIS — R51.9 ACUTE NONINTRACTABLE HEADACHE, UNSPECIFIED HEADACHE TYPE: Primary | ICD-10-CM

## 2020-06-06 LAB
ALBUMIN SERPL BCP-MCNC: 3.5 G/DL (ref 3.5–5.2)
ALP SERPL-CCNC: 91 U/L (ref 55–135)
ALT SERPL W/O P-5'-P-CCNC: 13 U/L (ref 10–44)
ANION GAP SERPL CALC-SCNC: 6 MMOL/L (ref 8–16)
AST SERPL-CCNC: 18 U/L (ref 10–40)
BASOPHILS # BLD AUTO: 0.01 K/UL (ref 0–0.2)
BASOPHILS NFR BLD: 0.1 % (ref 0–1.9)
BILIRUB SERPL-MCNC: 0.3 MG/DL (ref 0.1–1)
BUN SERPL-MCNC: 16 MG/DL (ref 8–23)
CALCIUM SERPL-MCNC: 9.5 MG/DL (ref 8.7–10.5)
CHLORIDE SERPL-SCNC: 106 MMOL/L (ref 95–110)
CO2 SERPL-SCNC: 27 MMOL/L (ref 23–29)
CREAT SERPL-MCNC: 1.1 MG/DL (ref 0.5–1.4)
CRP SERPL-MCNC: 0.8 MG/L (ref 0–8.2)
DIFFERENTIAL METHOD: ABNORMAL
EOSINOPHIL # BLD AUTO: 0.1 K/UL (ref 0–0.5)
EOSINOPHIL NFR BLD: 1.9 % (ref 0–8)
ERYTHROCYTE [DISTWIDTH] IN BLOOD BY AUTOMATED COUNT: 12.9 % (ref 11.5–14.5)
ERYTHROCYTE [SEDIMENTATION RATE] IN BLOOD BY WESTERGREN METHOD: 18 MM/HR (ref 0–36)
EST. GFR  (AFRICAN AMERICAN): >60 ML/MIN/1.73 M^2
EST. GFR  (NON AFRICAN AMERICAN): 52.8 ML/MIN/1.73 M^2
GLUCOSE SERPL-MCNC: 152 MG/DL (ref 70–110)
GLUCOSE SERPL-MCNC: 162 MG/DL (ref 70–110)
HCT VFR BLD AUTO: 35.7 % (ref 37–48.5)
HGB BLD-MCNC: 11.8 G/DL (ref 12–16)
IMM GRANULOCYTES # BLD AUTO: 0.01 K/UL (ref 0–0.04)
IMM GRANULOCYTES NFR BLD AUTO: 0.1 % (ref 0–0.5)
LYMPHOCYTES # BLD AUTO: 3.5 K/UL (ref 1–4.8)
LYMPHOCYTES NFR BLD: 50.3 % (ref 18–48)
MAGNESIUM SERPL-MCNC: 1.7 MG/DL (ref 1.6–2.6)
MCH RBC QN AUTO: 30.9 PG (ref 27–31)
MCHC RBC AUTO-ENTMCNC: 33.1 G/DL (ref 32–36)
MCV RBC AUTO: 94 FL (ref 82–98)
MONOCYTES # BLD AUTO: 0.7 K/UL (ref 0.3–1)
MONOCYTES NFR BLD: 10 % (ref 4–15)
NEUTROPHILS # BLD AUTO: 2.6 K/UL (ref 1.8–7.7)
NEUTROPHILS NFR BLD: 37.6 % (ref 38–73)
NRBC BLD-RTO: 0 /100 WBC
PLATELET # BLD AUTO: 155 K/UL (ref 150–350)
PMV BLD AUTO: 9.8 FL (ref 9.2–12.9)
POCT GLUCOSE: 162 MG/DL (ref 70–110)
POTASSIUM SERPL-SCNC: 4.5 MMOL/L (ref 3.5–5.1)
PROT SERPL-MCNC: 7.3 G/DL (ref 6–8.4)
RBC # BLD AUTO: 3.82 M/UL (ref 4–5.4)
SODIUM SERPL-SCNC: 139 MMOL/L (ref 136–145)
WBC # BLD AUTO: 6.92 K/UL (ref 3.9–12.7)

## 2020-06-06 PROCEDURE — 99284 EMERGENCY DEPT VISIT MOD MDM: CPT | Mod: HCNC,,, | Performed by: EMERGENCY MEDICINE

## 2020-06-06 PROCEDURE — 99284 EMERGENCY DEPT VISIT MOD MDM: CPT | Mod: 25,HCNC

## 2020-06-06 PROCEDURE — 96374 THER/PROPH/DIAG INJ IV PUSH: CPT | Mod: HCNC

## 2020-06-06 PROCEDURE — 82962 GLUCOSE BLOOD TEST: CPT | Mod: 59,HCNC

## 2020-06-06 PROCEDURE — 96361 HYDRATE IV INFUSION ADD-ON: CPT | Mod: HCNC

## 2020-06-06 PROCEDURE — 25000003 PHARM REV CODE 250: Mod: HCNC | Performed by: EMERGENCY MEDICINE

## 2020-06-06 PROCEDURE — 96375 TX/PRO/DX INJ NEW DRUG ADDON: CPT | Mod: HCNC

## 2020-06-06 PROCEDURE — 86140 C-REACTIVE PROTEIN: CPT | Mod: HCNC

## 2020-06-06 PROCEDURE — 85652 RBC SED RATE AUTOMATED: CPT | Mod: HCNC

## 2020-06-06 PROCEDURE — 83735 ASSAY OF MAGNESIUM: CPT | Mod: HCNC

## 2020-06-06 PROCEDURE — 80053 COMPREHEN METABOLIC PANEL: CPT | Mod: HCNC

## 2020-06-06 PROCEDURE — 63600175 PHARM REV CODE 636 W HCPCS: Mod: HCNC | Performed by: EMERGENCY MEDICINE

## 2020-06-06 PROCEDURE — 85025 COMPLETE CBC W/AUTO DIFF WBC: CPT | Mod: HCNC

## 2020-06-06 PROCEDURE — 99284 PR EMERGENCY DEPT VISIT,LEVEL IV: ICD-10-PCS | Mod: HCNC,,, | Performed by: EMERGENCY MEDICINE

## 2020-06-06 RX ORDER — PROCHLORPERAZINE EDISYLATE 5 MG/ML
5 INJECTION INTRAMUSCULAR; INTRAVENOUS
Status: COMPLETED | OUTPATIENT
Start: 2020-06-06 | End: 2020-06-06

## 2020-06-06 RX ORDER — KETOROLAC TROMETHAMINE 30 MG/ML
15 INJECTION, SOLUTION INTRAMUSCULAR; INTRAVENOUS
Status: COMPLETED | OUTPATIENT
Start: 2020-06-07 | End: 2020-06-06

## 2020-06-06 RX ADMIN — SODIUM CHLORIDE 1000 ML: 0.9 INJECTION, SOLUTION INTRAVENOUS at 11:06

## 2020-06-06 RX ADMIN — PROCHLORPERAZINE EDISYLATE 5 MG: 5 INJECTION INTRAMUSCULAR; INTRAVENOUS at 11:06

## 2020-06-06 RX ADMIN — KETOROLAC TROMETHAMINE 15 MG: 30 INJECTION, SOLUTION INTRAMUSCULAR at 11:06

## 2020-06-07 VITALS
TEMPERATURE: 98 F | SYSTOLIC BLOOD PRESSURE: 138 MMHG | HEART RATE: 85 BPM | OXYGEN SATURATION: 100 % | DIASTOLIC BLOOD PRESSURE: 68 MMHG | RESPIRATION RATE: 16 BRPM

## 2020-06-07 PROCEDURE — 63600175 PHARM REV CODE 636 W HCPCS: Mod: HCNC | Performed by: EMERGENCY MEDICINE

## 2020-06-07 NOTE — ED TRIAGE NOTES
"Pt states that something with the back of her head "feels different, it's not right...". Started yesterday, hurts to put head on pillow.   "

## 2020-06-07 NOTE — ED PROVIDER NOTES
Encounter Date: 2020       History     Chief Complaint   Patient presents with    Headache     L-sided 5/10 headache x2 days. Denies vision changes, n/v, dizziness.      64-year-old female past medical history of T2 IDDM, hypertension, vertigo, presenting with 2 days of constant left temporal/parietal headache, radiating to occiput.  Reports it is painful to lay against the pillow, but denies any skin pain.  Denies vision changes, jaw pain, dizziness, nausea, numbness/tingling or weakness.  Worse when laying against pillow, no relief from Tylenol which she took this morning.        Review of patient's allergies indicates:  No Known Allergies  Past Medical History:   Diagnosis Date    Acute coronary syndrome 2018    Arthritis     Diabetes mellitus     Diabetes mellitus type I     GERD (gastroesophageal reflux disease)     Hypertension     Unstable angina 2018    Vertigo      Past Surgical History:   Procedure Laterality Date     SECTION       Family History   Problem Relation Age of Onset    Hypertension Mother     Hyperlipidemia Mother     Diabetes Mother     Heart disease Mother     Aneurysm Father     Diabetes Sister     Hypertension Sister     Heart disease Brother     Diabetes Brother     Hypertension Brother     Breast cancer Neg Hx     Colon cancer Neg Hx     Ovarian cancer Neg Hx      Social History     Tobacco Use    Smoking status: Never Smoker    Smokeless tobacco: Never Used   Substance Use Topics    Alcohol use: Never     Frequency: Never    Drug use: No     Review of Systems   Constitutional: Negative for chills, fatigue and fever.   HENT: Negative for congestion, ear pain, facial swelling, hearing loss, sinus pressure, sinus pain and tinnitus.    Eyes: Negative for pain and visual disturbance.        Neg vision changes   Respiratory: Negative for cough and shortness of breath.    Cardiovascular: Negative for chest pain and leg swelling.    Gastrointestinal: Negative for abdominal pain, diarrhea, nausea and vomiting.        Neg changes in stool   Genitourinary:        Neg changes in urination   Musculoskeletal: Negative for arthralgias, back pain, joint swelling, neck pain and neck stiffness.   Skin: Negative for rash.   Neurological: Positive for headaches. Negative for dizziness, syncope, facial asymmetry, speech difficulty, weakness, light-headedness and numbness.   Hematological: Does not bruise/bleed easily.   Psychiatric/Behavioral: Negative for confusion.       Physical Exam     Initial Vitals [06/06/20 2052]   BP Pulse Resp Temp SpO2   (!) 166/78 97 16 98.6 °F (37 °C) 97 %      MAP       --         Physical Exam    Nursing note and vitals reviewed.  Constitutional: She appears well-developed and well-nourished. She is not diaphoretic. No distress.   HENT:   Head: Normocephalic and atraumatic.   Nose: Nose normal.   B/l TM normal, normal dentition, no maxface TTP   Eyes: Conjunctivae and EOM are normal. Pupils are equal, round, and reactive to light. No scleral icterus.   Neck: Normal range of motion. Neck supple. No JVD present.   Cardiovascular: Normal rate, regular rhythm and intact distal pulses.   Pulmonary/Chest: Breath sounds normal. No respiratory distress. She has no wheezes. She has no rhonchi. She has no rales. She exhibits no tenderness.   Abdominal: Soft. Bowel sounds are normal. She exhibits no distension. There is no tenderness.   Musculoskeletal: Normal range of motion. She exhibits no edema or tenderness.   Lymphadenopathy:     She has no cervical adenopathy.   Neurological: She is alert and oriented to person, place, and time. She has normal strength. No cranial nerve deficit or sensory deficit. GCS score is 15. GCS eye subscore is 4. GCS verbal subscore is 5. GCS motor subscore is 6.   Finger-nose intact b/l, steady gait   Skin: Skin is warm and dry. No rash noted.   Psychiatric: She has a normal mood and affect. Her  behavior is normal. Thought content normal.         ED Course   Procedures  Labs Reviewed   CBC W/ AUTO DIFFERENTIAL - Abnormal; Notable for the following components:       Result Value    RBC 3.82 (*)     Hemoglobin 11.8 (*)     Hematocrit 35.7 (*)     Gran% 37.6 (*)     Lymph% 50.3 (*)     All other components within normal limits   COMPREHENSIVE METABOLIC PANEL - Abnormal; Notable for the following components:    Glucose 152 (*)     Anion Gap 6 (*)     eGFR if non  52.8 (*)     All other components within normal limits   POCT GLUCOSE - Abnormal; Notable for the following components:    POCT Glucose 162 (*)     All other components within normal limits   MAGNESIUM   SEDIMENTATION RATE   C-REACTIVE PROTEIN          Imaging Results          CT Head Without Contrast (Final result)  Result time 06/06/20 23:40:26    Final result by Joaquin Amaro MD (06/06/20 23:40:26)                 Impression:      There is no evidence for acute intracranial process.      Electronically signed by: Joaquin Amaro  Date:    06/06/2020  Time:    23:40             Narrative:    EXAMINATION:  CT HEAD WITHOUT CONTRAST    CLINICAL HISTORY:  Headache, acute, norm neuro exam;    TECHNIQUE:  Low dose axial images were obtained through the head.  Coronal and sagittal reformations were also performed. Contrast was not administered.    COMPARISON:  November 5, 2015    FINDINGS:  The ventricular system, sulcal pattern and parenchymal attenuation characteristics are consistent with chronic change, appearing appropriate for chronological age.  There is no evidence for intracranial mass, mass effect or midline shift and there is no evidence for acute intracranial hemorrhage.  Appropriate CSF spaces are seen at the skull base.    The visualized orbits appear intact.  The osseous structures appear intact.  The mastoid air cells appear appropriate when accounting for averaging, as do the paranasal sinuses.                                  Medical Decision Making:   History:   Old Medical Records: I decided to obtain old medical records.  Initial Assessment:   NAD, A&Ox4, CN II-XII intact, clear speech, finger-nose intact and steady gait, TM nml b/l, no scalp TTP or lesions concerning for shingles, no numbness concerning for CVA, normal neck ROM w/o TTP and no carotid bruits  Differential Diagnosis:   Migraine, cervicalgia, less likely: ICH, GCA, CVA, shingles  Independently Interpreted Test(s):   I have ordered and independently interpreted X-rays - see summary below.       <> Summary of X-Ray Reading(s): No head bleed  Clinical Tests:   Lab Tests: Ordered and Reviewed  Radiological Study: Ordered and Reviewed  ED Management:  Will give Toradol if head CT neg for bleed, IVF, compazine.    CT head neg, normal labs, will give Toradol and pt stable for d/c home with f/u PCP and return precautions.   Other:   I have discussed this case with another health care provider.       <> Summary of the Discussion: Pt s/o Dr. Cuenca pending completion of IVF                                 Clinical Impression:       ICD-10-CM ICD-9-CM   1. Acute nonintractable headache, unspecified headache type R51 784.0             ED Disposition Condition    Discharge Stable        ED Prescriptions     None        Follow-up Information     Follow up With Specialties Details Why Contact Info    Patty Louis MD Internal Medicine Call in 2 days  [address]  [place] [address]  [phone]                                       Cande Karimi MD  06/08/20 2330

## 2020-06-07 NOTE — DISCHARGE INSTRUCTIONS
Please follow up with your PCP on Monday if you continue to have symptoms. Return to the ED if you have worsening pain, vision changes, numbness/tingling or weakness in your body, speech changes, or other concerning symptoms.

## 2020-06-08 ENCOUNTER — PES CALL (OUTPATIENT)
Dept: ADMINISTRATIVE | Facility: CLINIC | Age: 65
End: 2020-06-08

## 2020-06-08 ENCOUNTER — OFFICE VISIT (OUTPATIENT)
Dept: INTERNAL MEDICINE | Facility: CLINIC | Age: 65
End: 2020-06-08
Payer: MEDICARE

## 2020-06-08 VITALS
SYSTOLIC BLOOD PRESSURE: 138 MMHG | HEART RATE: 97 BPM | HEIGHT: 64 IN | BODY MASS INDEX: 32.82 KG/M2 | OXYGEN SATURATION: 100 % | DIASTOLIC BLOOD PRESSURE: 84 MMHG | WEIGHT: 192.25 LBS

## 2020-06-08 DIAGNOSIS — M54.2 NECK PAIN: ICD-10-CM

## 2020-06-08 DIAGNOSIS — M79.605 LEFT LEG PAIN: ICD-10-CM

## 2020-06-08 DIAGNOSIS — R51.9 ACUTE NONINTRACTABLE HEADACHE, UNSPECIFIED HEADACHE TYPE: Primary | ICD-10-CM

## 2020-06-08 PROBLEM — I24.9 ACUTE CORONARY SYNDROME: Status: RESOLVED | Noted: 2018-09-23 | Resolved: 2020-06-08

## 2020-06-08 PROBLEM — I20.0 UNSTABLE ANGINA: Status: RESOLVED | Noted: 2018-09-23 | Resolved: 2020-06-08

## 2020-06-08 PROCEDURE — 3079F DIAST BP 80-89 MM HG: CPT | Mod: HCNC,CPTII,S$GLB, | Performed by: PHYSICIAN ASSISTANT

## 2020-06-08 PROCEDURE — 99214 PR OFFICE/OUTPT VISIT, EST, LEVL IV, 30-39 MIN: ICD-10-PCS | Mod: HCNC,S$GLB,, | Performed by: PHYSICIAN ASSISTANT

## 2020-06-08 PROCEDURE — 1101F PT FALLS ASSESS-DOCD LE1/YR: CPT | Mod: HCNC,CPTII,S$GLB, | Performed by: PHYSICIAN ASSISTANT

## 2020-06-08 PROCEDURE — 3079F PR MOST RECENT DIASTOLIC BLOOD PRESSURE 80-89 MM HG: ICD-10-PCS | Mod: HCNC,CPTII,S$GLB, | Performed by: PHYSICIAN ASSISTANT

## 2020-06-08 PROCEDURE — 99999 PR PBB SHADOW E&M-EST. PATIENT-LVL IV: CPT | Mod: PBBFAC,HCNC,, | Performed by: PHYSICIAN ASSISTANT

## 2020-06-08 PROCEDURE — 3075F SYST BP GE 130 - 139MM HG: CPT | Mod: HCNC,CPTII,S$GLB, | Performed by: PHYSICIAN ASSISTANT

## 2020-06-08 PROCEDURE — 3008F PR BODY MASS INDEX (BMI) DOCUMENTED: ICD-10-PCS | Mod: HCNC,CPTII,S$GLB, | Performed by: PHYSICIAN ASSISTANT

## 2020-06-08 PROCEDURE — 99999 PR PBB SHADOW E&M-EST. PATIENT-LVL IV: ICD-10-PCS | Mod: PBBFAC,HCNC,, | Performed by: PHYSICIAN ASSISTANT

## 2020-06-08 PROCEDURE — 3075F PR MOST RECENT SYSTOLIC BLOOD PRESS GE 130-139MM HG: ICD-10-PCS | Mod: HCNC,CPTII,S$GLB, | Performed by: PHYSICIAN ASSISTANT

## 2020-06-08 PROCEDURE — 99214 OFFICE O/P EST MOD 30 MIN: CPT | Mod: HCNC,S$GLB,, | Performed by: PHYSICIAN ASSISTANT

## 2020-06-08 PROCEDURE — 3008F BODY MASS INDEX DOCD: CPT | Mod: HCNC,CPTII,S$GLB, | Performed by: PHYSICIAN ASSISTANT

## 2020-06-08 PROCEDURE — 1101F PR PT FALLS ASSESS DOC 0-1 FALLS W/OUT INJ PAST YR: ICD-10-PCS | Mod: HCNC,CPTII,S$GLB, | Performed by: PHYSICIAN ASSISTANT

## 2020-06-08 RX ORDER — CYCLOBENZAPRINE HCL 10 MG
10 TABLET ORAL 3 TIMES DAILY PRN
Qty: 20 TABLET | Refills: 0 | Status: SHIPPED | OUTPATIENT
Start: 2020-06-08 | End: 2020-06-18

## 2020-06-08 NOTE — PROGRESS NOTES
"Subjective:       Patient ID: Sunitha Pillai is a 64 y.o. female.    Chief Complaint: Headache    HPI     Established pt of Patty Louis MD (new to me)    C/o headache (left parietal/occipital region) with associated left sided neck pain for the past 3 to 4 days. Seen in the ED for the same on 2 days ago CT head, lab obtained which were unremarkable. She was treated with Toradol, IV fluids and compazine. Today she states the pain persists. She decribes it as tightness/pulling of the left neck. Also states back of left head feels hard like a knot. Feels like "crick of her neck". She tried a medication her daughter gave her last night, (unable to recall name) without much relief. She states she has been stressed lately.     Today she also notes some left upper leg pain, soreness, she relates to her sleeping positions     No dizziness, cp, sob, vision changes, weakness, numbness/tingling, facial asymmetry or speech difficulty.     Past Medical History:   Diagnosis Date    Arthritis     Diabetes mellitus     Diabetes mellitus type I     GERD (gastroesophageal reflux disease)     Hypertension     Vertigo      Social History     Tobacco Use    Smoking status: Never Smoker    Smokeless tobacco: Never Used   Substance Use Topics    Alcohol use: Never     Frequency: Never    Drug use: No     Review of patient's allergies indicates:  No Known Allergies        Review of Systems   Constitutional: Negative for chills, fever and unexpected weight change.   Eyes: Negative for visual disturbance.   Respiratory: Negative for cough and shortness of breath.    Cardiovascular: Negative for chest pain and leg swelling.   Gastrointestinal: Negative for nausea and vomiting.   Musculoskeletal: Positive for myalgias and neck pain.   Skin: Negative for rash.   Neurological: Positive for headaches. Negative for weakness and light-headedness.       Objective: /84 (BP Location: Right arm, Patient Position: Sitting, " "BP Method: Medium (Manual))   Pulse 97   Ht 5' 4" (1.626 m)   Wt 87.2 kg (192 lb 3.9 oz)   LMP  (LMP Unknown)   SpO2 100%   BMI 33.00 kg/m²         Physical Exam   Constitutional: She is oriented to person, place, and time. She appears well-developed and well-nourished. No distress.   HENT:   Head: Normocephalic and atraumatic.   Mouth/Throat: Oropharynx is clear and moist.   Eyes: Pupils are equal, round, and reactive to light. EOM are normal.   Neck: Muscular tenderness (Left) present. Decreased range of motion: decreased rotation to the right.   Cardiovascular: Normal rate and regular rhythm. Exam reveals no friction rub.   No murmur heard.  Pulmonary/Chest: Effort normal and breath sounds normal. She has no wheezes. She has no rales.   Abdominal: Soft. Bowel sounds are normal. There is no tenderness.   Musculoskeletal: Normal range of motion. She exhibits no edema.        Left upper leg: She exhibits tenderness.        Legs:  Lymphadenopathy:     She has no cervical adenopathy.   Neurological: She is alert and oriented to person, place, and time.   Skin: Skin is warm and dry. No rash noted.   Psychiatric: She has a normal mood and affect.   Vitals reviewed.        CT Head Without Contrast  Narrative: EXAMINATION:  CT HEAD WITHOUT CONTRAST    CLINICAL HISTORY:  Headache, acute, norm neuro exam;    TECHNIQUE:  Low dose axial images were obtained through the head.  Coronal and sagittal reformations were also performed. Contrast was not administered.    COMPARISON:  November 5, 2015    FINDINGS:  The ventricular system, sulcal pattern and parenchymal attenuation characteristics are consistent with chronic change, appearing appropriate for chronological age.  There is no evidence for intracranial mass, mass effect or midline shift and there is no evidence for acute intracranial hemorrhage.  Appropriate CSF spaces are seen at the skull base.    The visualized orbits appear intact.  The osseous structures appear " intact.  The mastoid air cells appear appropriate when accounting for averaging, as do the paranasal sinuses.  Impression: There is no evidence for acute intracranial process.    Electronically signed by: Joaquin Amaro  Date:    06/06/2020  Time:    23:40      Lab Results   Component Value Date    WBC 6.92 06/06/2020    HGB 11.8 (L) 06/06/2020    HCT 35.7 (L) 06/06/2020    MCV 94 06/06/2020     06/06/2020         Chemistry        Component Value Date/Time     06/06/2020 2223    K 4.5 06/06/2020 2223     06/06/2020 2223    CO2 27 06/06/2020 2223    BUN 16 06/06/2020 2223    CREATININE 1.1 06/06/2020 2223     (H) 06/06/2020 2223        Component Value Date/Time    CALCIUM 9.5 06/06/2020 2223    ALKPHOS 91 06/06/2020 2223    AST 18 06/06/2020 2223    ALT 13 06/06/2020 2223    BILITOT 0.3 06/06/2020 2223    ESTGFRAFRICA >60.0 06/06/2020 2223    EGFRNONAA 52.8 (A) 06/06/2020 2223        Lab Results   Component Value Date    SEDRATE 18 06/06/2020     Lab Results   Component Value Date    CRP 0.8 06/06/2020         Assessment:       1. Acute nonintractable headache, unspecified headache type    2. Neck pain    3. Left leg pain        Plan:         Sunitha was seen today for headache.    Diagnoses and all orders for this visit:    Acute nonintractable headache, unspecified headache type  -     cyclobenzaprine (FLEXERIL) 10 MG tablet; Take 1 tablet (10 mg total) by mouth 3 (three) times daily as needed for Muscle spasms.    Neck pain  -     cyclobenzaprine (FLEXERIL) 10 MG tablet; Take 1 tablet (10 mg total) by mouth 3 (three) times daily as needed for Muscle spasms.    Left leg pain  -     cyclobenzaprine (FLEXERIL) 10 MG tablet; Take 1 tablet (10 mg total) by mouth 3 (three) times daily as needed for Muscle spasms.      Trial of muscle relaxer for Ha/neck pain and myalgias  Discussed side effect profile  Try first at night  Do not take while drving   Use Heat prn  Strict ED precautions discussed  for worsening symptoms      Kimberly White PA-C

## 2020-06-10 ENCOUNTER — TELEPHONE (OUTPATIENT)
Dept: INTERNAL MEDICINE | Facility: CLINIC | Age: 65
End: 2020-06-10

## 2020-07-16 RX ORDER — DULAGLUTIDE 0.75 MG/.5ML
INJECTION, SOLUTION SUBCUTANEOUS
Qty: 4 ML | Refills: 5 | Status: SHIPPED | OUTPATIENT
Start: 2020-07-16 | End: 2020-12-11

## 2020-07-23 ENCOUNTER — TELEPHONE (OUTPATIENT)
Dept: INTERNAL MEDICINE | Facility: CLINIC | Age: 65
End: 2020-07-23

## 2020-07-23 NOTE — TELEPHONE ENCOUNTER
----- Message from Xochitl Martin sent at 7/23/2020  9:06 AM CDT -----  Regarding: call back  Pt requesting a call back in regards kenna paper work for [employer]    Please call and advise    Phone [phone]

## 2020-07-24 ENCOUNTER — PATIENT OUTREACH (OUTPATIENT)
Dept: ADMINISTRATIVE | Facility: HOSPITAL | Age: 65
End: 2020-07-24

## 2020-07-24 DIAGNOSIS — M94.9 DISORDER OF BONE AND CARTILAGE: ICD-10-CM

## 2020-07-24 DIAGNOSIS — M89.9 DISORDER OF BONE AND CARTILAGE: ICD-10-CM

## 2020-07-24 DIAGNOSIS — Z12.31 ENCOUNTER FOR SCREENING MAMMOGRAM FOR BREAST CANCER: Primary | ICD-10-CM

## 2020-07-24 DIAGNOSIS — Z79.4 TYPE 2 DIABETES MELLITUS WITHOUT COMPLICATION, WITH LONG-TERM CURRENT USE OF INSULIN: ICD-10-CM

## 2020-07-24 DIAGNOSIS — E11.9 TYPE 2 DIABETES MELLITUS WITHOUT COMPLICATION, WITH LONG-TERM CURRENT USE OF INSULIN: ICD-10-CM

## 2020-07-28 ENCOUNTER — OFFICE VISIT (OUTPATIENT)
Dept: INTERNAL MEDICINE | Facility: CLINIC | Age: 65
End: 2020-07-28
Payer: MEDICARE

## 2020-07-28 ENCOUNTER — TELEPHONE (OUTPATIENT)
Dept: INTERNAL MEDICINE | Facility: CLINIC | Age: 65
End: 2020-07-28

## 2020-07-28 ENCOUNTER — LAB VISIT (OUTPATIENT)
Dept: LAB | Facility: HOSPITAL | Age: 65
End: 2020-07-28
Attending: INTERNAL MEDICINE
Payer: MEDICARE

## 2020-07-28 VITALS
BODY MASS INDEX: 32.11 KG/M2 | WEIGHT: 188.06 LBS | TEMPERATURE: 98 F | HEIGHT: 64 IN | SYSTOLIC BLOOD PRESSURE: 138 MMHG | OXYGEN SATURATION: 98 % | DIASTOLIC BLOOD PRESSURE: 82 MMHG

## 2020-07-28 DIAGNOSIS — R51.9 NONINTRACTABLE HEADACHE, UNSPECIFIED CHRONICITY PATTERN, UNSPECIFIED HEADACHE TYPE: ICD-10-CM

## 2020-07-28 DIAGNOSIS — E55.9 MILD VITAMIN D DEFICIENCY: ICD-10-CM

## 2020-07-28 DIAGNOSIS — Z12.31 SCREENING MAMMOGRAM, ENCOUNTER FOR: ICD-10-CM

## 2020-07-28 DIAGNOSIS — Z79.4 TYPE 2 DIABETES MELLITUS WITH COMPLICATION, WITH LONG-TERM CURRENT USE OF INSULIN: ICD-10-CM

## 2020-07-28 DIAGNOSIS — D64.9 ANEMIA, UNSPECIFIED TYPE: Primary | ICD-10-CM

## 2020-07-28 DIAGNOSIS — D64.9 ANEMIA, UNSPECIFIED TYPE: ICD-10-CM

## 2020-07-28 DIAGNOSIS — Z01.419 NORMAL GYNECOLOGIC EXAMINATION: ICD-10-CM

## 2020-07-28 DIAGNOSIS — E11.8 TYPE 2 DIABETES MELLITUS WITH COMPLICATION, WITH LONG-TERM CURRENT USE OF INSULIN: ICD-10-CM

## 2020-07-28 LAB
25(OH)D3+25(OH)D2 SERPL-MCNC: 28 NG/ML (ref 30–96)
ALBUMIN SERPL BCP-MCNC: 3.7 G/DL (ref 3.5–5.2)
ALP SERPL-CCNC: 87 U/L (ref 55–135)
ALT SERPL W/O P-5'-P-CCNC: 16 U/L (ref 10–44)
ANION GAP SERPL CALC-SCNC: 6 MMOL/L (ref 8–16)
AST SERPL-CCNC: 18 U/L (ref 10–40)
BASOPHILS # BLD AUTO: 0.03 K/UL (ref 0–0.2)
BASOPHILS NFR BLD: 0.5 % (ref 0–1.9)
BILIRUB SERPL-MCNC: 0.4 MG/DL (ref 0.1–1)
BUN SERPL-MCNC: 15 MG/DL (ref 8–23)
CALCIUM SERPL-MCNC: 9.6 MG/DL (ref 8.7–10.5)
CHLORIDE SERPL-SCNC: 109 MMOL/L (ref 95–110)
CO2 SERPL-SCNC: 27 MMOL/L (ref 23–29)
CREAT SERPL-MCNC: 1.1 MG/DL (ref 0.5–1.4)
CRP SERPL-MCNC: 1 MG/L (ref 0–8.2)
DIFFERENTIAL METHOD: ABNORMAL
EOSINOPHIL # BLD AUTO: 0.1 K/UL (ref 0–0.5)
EOSINOPHIL NFR BLD: 2.2 % (ref 0–8)
ERYTHROCYTE [DISTWIDTH] IN BLOOD BY AUTOMATED COUNT: 12.9 % (ref 11.5–14.5)
ERYTHROCYTE [SEDIMENTATION RATE] IN BLOOD BY WESTERGREN METHOD: 3 MM/HR (ref 0–36)
EST. GFR  (AFRICAN AMERICAN): >60 ML/MIN/1.73 M^2
EST. GFR  (NON AFRICAN AMERICAN): 52.8 ML/MIN/1.73 M^2
ESTIMATED AVG GLUCOSE: 131 MG/DL (ref 68–131)
FERRITIN SERPL-MCNC: 225 NG/ML (ref 20–300)
GLUCOSE SERPL-MCNC: 153 MG/DL (ref 70–110)
HBA1C MFR BLD HPLC: 6.2 % (ref 4–5.6)
HCT VFR BLD AUTO: 38.7 % (ref 37–48.5)
HGB BLD-MCNC: 12.3 G/DL (ref 12–16)
IMM GRANULOCYTES # BLD AUTO: 0.01 K/UL (ref 0–0.04)
IMM GRANULOCYTES NFR BLD AUTO: 0.2 % (ref 0–0.5)
IRON SERPL-MCNC: 78 UG/DL (ref 30–160)
LYMPHOCYTES # BLD AUTO: 2.7 K/UL (ref 1–4.8)
LYMPHOCYTES NFR BLD: 44.9 % (ref 18–48)
MCH RBC QN AUTO: 30.5 PG (ref 27–31)
MCHC RBC AUTO-ENTMCNC: 31.8 G/DL (ref 32–36)
MCV RBC AUTO: 96 FL (ref 82–98)
MONOCYTES # BLD AUTO: 0.5 K/UL (ref 0.3–1)
MONOCYTES NFR BLD: 8.8 % (ref 4–15)
NEUTROPHILS # BLD AUTO: 2.6 K/UL (ref 1.8–7.7)
NEUTROPHILS NFR BLD: 43.4 % (ref 38–73)
NRBC BLD-RTO: 0 /100 WBC
PLATELET # BLD AUTO: 181 K/UL (ref 150–350)
PMV BLD AUTO: 10.4 FL (ref 9.2–12.9)
POTASSIUM SERPL-SCNC: 5.4 MMOL/L (ref 3.5–5.1)
PROT SERPL-MCNC: 7.7 G/DL (ref 6–8.4)
RBC # BLD AUTO: 4.03 M/UL (ref 4–5.4)
SATURATED IRON: 20 % (ref 20–50)
SODIUM SERPL-SCNC: 142 MMOL/L (ref 136–145)
TOTAL IRON BINDING CAPACITY: 385 UG/DL (ref 250–450)
TRANSFERRIN SERPL-MCNC: 260 MG/DL (ref 200–375)
WBC # BLD AUTO: 6.01 K/UL (ref 3.9–12.7)

## 2020-07-28 PROCEDURE — 3008F PR BODY MASS INDEX (BMI) DOCUMENTED: ICD-10-PCS | Mod: HCNC,CPTII,S$GLB, | Performed by: INTERNAL MEDICINE

## 2020-07-28 PROCEDURE — 1101F PR PT FALLS ASSESS DOC 0-1 FALLS W/OUT INJ PAST YR: ICD-10-PCS | Mod: HCNC,CPTII,S$GLB, | Performed by: INTERNAL MEDICINE

## 2020-07-28 PROCEDURE — 99214 PR OFFICE/OUTPT VISIT, EST, LEVL IV, 30-39 MIN: ICD-10-PCS | Mod: HCNC,25,S$GLB, | Performed by: INTERNAL MEDICINE

## 2020-07-28 PROCEDURE — 90732 PPSV23 VACC 2 YRS+ SUBQ/IM: CPT | Mod: HCNC,S$GLB,, | Performed by: INTERNAL MEDICINE

## 2020-07-28 PROCEDURE — 90732 PNEUMOCOCCAL POLYSACCHARIDE VACCINE 23-VALENT =>2YO SQ IM: ICD-10-PCS | Mod: HCNC,S$GLB,, | Performed by: INTERNAL MEDICINE

## 2020-07-28 PROCEDURE — 3079F DIAST BP 80-89 MM HG: CPT | Mod: HCNC,CPTII,S$GLB, | Performed by: INTERNAL MEDICINE

## 2020-07-28 PROCEDURE — 3075F SYST BP GE 130 - 139MM HG: CPT | Mod: HCNC,CPTII,S$GLB, | Performed by: INTERNAL MEDICINE

## 2020-07-28 PROCEDURE — 3079F PR MOST RECENT DIASTOLIC BLOOD PRESSURE 80-89 MM HG: ICD-10-PCS | Mod: HCNC,CPTII,S$GLB, | Performed by: INTERNAL MEDICINE

## 2020-07-28 PROCEDURE — 82306 VITAMIN D 25 HYDROXY: CPT | Mod: HCNC

## 2020-07-28 PROCEDURE — 3044F HG A1C LEVEL LT 7.0%: CPT | Mod: HCNC,CPTII,S$GLB, | Performed by: INTERNAL MEDICINE

## 2020-07-28 PROCEDURE — 83036 HEMOGLOBIN GLYCOSYLATED A1C: CPT | Mod: HCNC

## 2020-07-28 PROCEDURE — 99214 OFFICE O/P EST MOD 30 MIN: CPT | Mod: HCNC,25,S$GLB, | Performed by: INTERNAL MEDICINE

## 2020-07-28 PROCEDURE — 83540 ASSAY OF IRON: CPT | Mod: HCNC

## 2020-07-28 PROCEDURE — 85025 COMPLETE CBC W/AUTO DIFF WBC: CPT | Mod: HCNC

## 2020-07-28 PROCEDURE — 86140 C-REACTIVE PROTEIN: CPT | Mod: HCNC

## 2020-07-28 PROCEDURE — 82728 ASSAY OF FERRITIN: CPT | Mod: HCNC

## 2020-07-28 PROCEDURE — 36415 COLL VENOUS BLD VENIPUNCTURE: CPT | Mod: HCNC

## 2020-07-28 PROCEDURE — G0009 ADMIN PNEUMOCOCCAL VACCINE: HCPCS | Mod: HCNC,S$GLB,, | Performed by: INTERNAL MEDICINE

## 2020-07-28 PROCEDURE — 3044F PR MOST RECENT HEMOGLOBIN A1C LEVEL <7.0%: ICD-10-PCS | Mod: HCNC,CPTII,S$GLB, | Performed by: INTERNAL MEDICINE

## 2020-07-28 PROCEDURE — 3008F BODY MASS INDEX DOCD: CPT | Mod: HCNC,CPTII,S$GLB, | Performed by: INTERNAL MEDICINE

## 2020-07-28 PROCEDURE — 99999 PR PBB SHADOW E&M-EST. PATIENT-LVL V: CPT | Mod: PBBFAC,HCNC,, | Performed by: INTERNAL MEDICINE

## 2020-07-28 PROCEDURE — 1101F PT FALLS ASSESS-DOCD LE1/YR: CPT | Mod: HCNC,CPTII,S$GLB, | Performed by: INTERNAL MEDICINE

## 2020-07-28 PROCEDURE — 99999 PR PBB SHADOW E&M-EST. PATIENT-LVL V: ICD-10-PCS | Mod: PBBFAC,HCNC,, | Performed by: INTERNAL MEDICINE

## 2020-07-28 PROCEDURE — G0009 PNEUMOCOCCAL POLYSACCHARIDE VACCINE 23-VALENT =>2YO SQ IM: ICD-10-PCS | Mod: HCNC,S$GLB,, | Performed by: INTERNAL MEDICINE

## 2020-07-28 PROCEDURE — 80053 COMPREHEN METABOLIC PANEL: CPT | Mod: HCNC

## 2020-07-28 PROCEDURE — 85652 RBC SED RATE AUTOMATED: CPT | Mod: HCNC

## 2020-07-28 PROCEDURE — 3075F PR MOST RECENT SYSTOLIC BLOOD PRESS GE 130-139MM HG: ICD-10-PCS | Mod: HCNC,CPTII,S$GLB, | Performed by: INTERNAL MEDICINE

## 2020-07-28 RX ORDER — METFORMIN HYDROCHLORIDE 1000 MG/1
1000 TABLET ORAL 2 TIMES DAILY
Qty: 60 TABLET | Refills: 6 | Status: SHIPPED | OUTPATIENT
Start: 2020-07-28 | End: 2021-04-08

## 2020-07-28 RX ORDER — TERBINAFINE HYDROCHLORIDE 250 MG/1
250 TABLET ORAL DAILY
Qty: 30 TABLET | Refills: 0 | Status: SHIPPED | OUTPATIENT
Start: 2020-07-28 | End: 2020-08-27

## 2020-07-28 RX ORDER — INSULIN ASPART 100 [IU]/ML
INJECTION, SOLUTION INTRAVENOUS; SUBCUTANEOUS
Qty: 1 BOX | Refills: 6 | Status: SHIPPED | OUTPATIENT
Start: 2020-07-28 | End: 2021-06-10 | Stop reason: SDUPTHER

## 2020-07-28 RX ORDER — INSULIN DEGLUDEC 100 U/ML
INJECTION, SOLUTION SUBCUTANEOUS
Qty: 5 SYRINGE | Refills: 6 | Status: SHIPPED | OUTPATIENT
Start: 2020-07-28 | End: 2021-11-10 | Stop reason: ALTCHOICE

## 2020-07-28 RX ORDER — CANDESARTAN 8 MG/1
8 TABLET ORAL DAILY
Qty: 30 TABLET | Refills: 6 | Status: ON HOLD | OUTPATIENT
Start: 2020-07-28 | End: 2021-02-09 | Stop reason: SDUPTHER

## 2020-07-28 NOTE — TELEPHONE ENCOUNTER
----- Message from Shima Manley sent at 7/28/2020  9:00 AM CDT -----  Regarding: recommendation  Contact: Ward Resendiz [phone]  Ward is recommending that the patient be given a statin because the patient is diabetic. Will fax letter of recommendation. Response needed in 3 days.

## 2020-07-28 NOTE — PROGRESS NOTES
Subjective:       Patient ID: Sunitha Pillai is a 64 y.o. female.    Chief Complaint: Follow-up    HPIPt is feeling well except for some numbness in her feet/legs.  No CP or SOB.  Some HA on the left side.    Review of Systems   Respiratory: Negative for shortness of breath (PND or orthopnea).    Cardiovascular: Negative for chest pain (arm pain or jaw pain).   Gastrointestinal: Negative for abdominal pain, diarrhea, nausea and vomiting.   Genitourinary: Negative for dysuria.   Neurological: Negative for seizures, syncope and headaches.       Objective:      Physical Exam  Constitutional:       General: She is not in acute distress.     Appearance: She is well-developed.   HENT:      Head: Normocephalic.   Eyes:      Pupils: Pupils are equal, round, and reactive to light.   Neck:      Musculoskeletal: Neck supple.      Thyroid: No thyromegaly.      Vascular: No JVD.   Cardiovascular:      Rate and Rhythm: Normal rate and regular rhythm.      Heart sounds: Normal heart sounds. No murmur. No friction rub. No gallop.    Pulmonary:      Effort: Pulmonary effort is normal.      Breath sounds: Normal breath sounds. No wheezing or rales.   Abdominal:      General: Bowel sounds are normal. There is no distension.      Palpations: Abdomen is soft. There is no mass.      Tenderness: There is no abdominal tenderness. There is no guarding or rebound.   Lymphadenopathy:      Cervical: No cervical adenopathy.   Skin:     General: Skin is warm and dry.   Neurological:      Mental Status: She is alert and oriented to person, place, and time.      Deep Tendon Reflexes: Reflexes are normal and symmetric.   Psychiatric:         Behavior: Behavior normal.         Thought Content: Thought content normal.         Judgment: Judgment normal.         Assessment:       1. Anemia, unspecified type    2. Type 2 diabetes mellitus with complication, with long-term current use of insulin    3. Nonintractable headache, unspecified  chronicity pattern, unspecified headache type    4. Mild vitamin D deficiency    5. Screening mammogram, encounter for    6. Normal gynecologic examination        Plan:   Anemia, unspecified type  -     CBC auto differential; Future; Expected date: 07/28/2020  -     Iron and TIBC; Future; Expected date: 07/28/2020  -     Ferritin; Future; Expected date: 07/28/2020    Type 2 diabetes mellitus with complication, with long-term current use of insulin  -     Comprehensive metabolic panel; Future; Expected date: 07/28/2020  -     Hemoglobin A1C; Future; Expected date: 07/28/2020    Nonintractable headache, unspecified chronicity pattern, unspecified headache type  -     Sedimentation rate; Future; Expected date: 07/28/2020  -     C-Reactive Protein; Future; Expected date: 07/28/2020    Mild vitamin D deficiency  -     Vitamin D; Future; Expected date: 07/28/2020    Screening mammogram, encounter for  -     Mammo Digital Screening Bilat w/ Ap; Future; Expected date: 01/28/2021    Normal gynecologic examination  -     Ambulatory referral/consult to Gynecology; Future; Expected date: 08/04/2020    Other orders  -     terbinafine HCL (LAMISIL) 250 mg tablet; Take 1 tablet (250 mg total) by mouth once daily.  Dispense: 30 tablet; Refill: 0  -     (In Office Administered) Pneumococcal Polysaccharide Vaccine (23 Valent) (SQ/IM)  -     NOVOLOG FLEXPEN U-100 INSULIN 100 unit/mL (3 mL) InPn pen; INJECT 20 UNITS SUBCUTANEOUSLY THREE TIMES DAILY WITH MEALS Plus sliding scale  Dispense: 1 Box; Refill: 6  -     metFORMIN (GLUCOPHAGE) 1000 MG tablet; Take 1 tablet (1,000 mg total) by mouth 2 (two) times daily.  Dispense: 60 tablet; Refill: 6  -     insulin degludec (TRESIBA FLEXTOUCH U-100) 100 unit/mL (3 mL) InPn; 45 Units daily  Dispense: 5 Syringe; Refill: 6  -     candesartan (ATACAND) 8 MG tablet; Take 1 tablet (8 mg total) by mouth once daily.  Dispense: 30 tablet; Refill: 6

## 2020-07-30 ENCOUNTER — HOSPITAL ENCOUNTER (OUTPATIENT)
Dept: RADIOLOGY | Facility: HOSPITAL | Age: 65
Discharge: HOME | End: 2020-07-30
Attending: INTERNAL MEDICINE
Payer: MEDICARE

## 2020-07-30 DIAGNOSIS — Z12.31 SCREENING MAMMOGRAM, ENCOUNTER FOR: ICD-10-CM

## 2020-07-30 PROCEDURE — 77067 SCR MAMMO BI INCL CAD: CPT | Mod: 26,HCNC,, | Performed by: RADIOLOGY

## 2020-07-30 PROCEDURE — 77067 SCR MAMMO BI INCL CAD: CPT | Mod: TC,HCNC

## 2020-07-30 PROCEDURE — 77063 MAMMO DIGITAL SCREENING BILAT WITH TOMOSYNTHESIS_CAD: ICD-10-PCS | Mod: 26,HCNC,, | Performed by: RADIOLOGY

## 2020-07-30 PROCEDURE — 77063 BREAST TOMOSYNTHESIS BI: CPT | Mod: 26,HCNC,, | Performed by: RADIOLOGY

## 2020-07-30 PROCEDURE — 77067 MAMMO DIGITAL SCREENING BILAT WITH TOMOSYNTHESIS_CAD: ICD-10-PCS | Mod: 26,HCNC,, | Performed by: RADIOLOGY

## 2020-08-03 ENCOUNTER — TELEPHONE (OUTPATIENT)
Dept: RADIOLOGY | Facility: HOSPITAL | Age: 65
End: 2020-08-03

## 2020-08-03 NOTE — TELEPHONE ENCOUNTER
Spoke with patient and explained mammogram findings.Patient expressed understanding of results. Patient scheduled abnormal mammogram follow up appointment at [facility] [place] on 8/13/2020.

## 2020-08-07 ENCOUNTER — TELEPHONE (OUTPATIENT)
Dept: INTERNAL MEDICINE | Facility: CLINIC | Age: 65
End: 2020-08-07

## 2020-08-07 RX ORDER — INSULIN ASPART 100 [IU]/ML
INJECTION, SOLUTION INTRAVENOUS; SUBCUTANEOUS
Qty: 3 VIAL | Refills: 6 | Status: ON HOLD | OUTPATIENT
Start: 2020-08-07 | End: 2021-03-03 | Stop reason: HOSPADM

## 2020-08-10 ENCOUNTER — PATIENT OUTREACH (OUTPATIENT)
Dept: DIABETES | Facility: CLINIC | Age: 65
End: 2020-08-10

## 2020-08-10 NOTE — PROGRESS NOTES
Attempted to contact patient for VGO training X 2 today. First attempt - left message with contact number. Second attempt - voicemail box was full.

## 2020-08-11 ENCOUNTER — PATIENT OUTREACH (OUTPATIENT)
Dept: ADMINISTRATIVE | Facility: OTHER | Age: 65
End: 2020-08-11

## 2020-08-11 ENCOUNTER — TELEPHONE (OUTPATIENT)
Dept: INTERNAL MEDICINE | Facility: CLINIC | Age: 65
End: 2020-08-11

## 2020-08-11 NOTE — PROGRESS NOTES
Care Everywhere: updated  Immunization: updated  Health Maintenance: updated  Media Review: reviewed for outside eye exam report  Legacy Review:   Order placed:   Upcoming appts:  efax sent to Dr. Wu's office to possibly obtain updated eye exam report

## 2020-08-11 NOTE — TELEPHONE ENCOUNTER
----- Message from Patty Louis MD sent at 8/6/2020  4:01 PM CDT -----  Please inform normal lab but Vit D is low - excellent diabetes control - please be sure to take Vit D 2,000 IU daily thanks.

## 2020-08-11 NOTE — LETTER
AUTHORIZATION FOR RELEASE OF   CONFIDENTIAL INFORMATION    Dear Frankie Wu MD,    We are seeing Sunitha Pillai, date of birth 1955, in the clinic at [facility]. Patty Louis MD is the patient's PCP. Sunitha Pillai has an outstanding lab/procedure at the time we reviewed her chart. In order to help keep her health information updated, she has authorized us to request the following medical record(s):        (  )  MAMMOGRAM                                      (  )  COLONOSCOPY      (  )  PAP SMEAR                                          (  )  OUTSIDE LAB RESULTS     (  )  DEXA SCAN                                          ( x )  EYE EXAM            (  )  FOOT EXAM                                          (  )  ENTIRE RECORD     (  )  OUTSIDE IMMUNIZATIONS                 (  )  _______________         Please fax records to [facility], Stacy D Siegendorf, MD, [phone]     If you have any questions, please contact Nara Ruiz at [phone].           Patient Name: Sunitha Pillai  : 1955  Patient Phone #: [phone]

## 2020-08-13 ENCOUNTER — TELEPHONE (OUTPATIENT)
Dept: RADIOLOGY | Facility: HOSPITAL | Age: 65
End: 2020-08-13

## 2020-08-13 ENCOUNTER — HOSPITAL ENCOUNTER (OUTPATIENT)
Dept: RADIOLOGY | Facility: HOSPITAL | Age: 65
Discharge: HOME | End: 2020-08-13
Attending: INTERNAL MEDICINE
Payer: MEDICARE

## 2020-08-13 DIAGNOSIS — R92.8 ABNORMAL MAMMOGRAM: ICD-10-CM

## 2020-08-13 PROCEDURE — 76642 ULTRASOUND BREAST LIMITED: CPT | Mod: TC,HCNC,LT

## 2020-08-13 PROCEDURE — 77065 DX MAMMO INCL CAD UNI: CPT | Mod: 26,HCNC,LT, | Performed by: RADIOLOGY

## 2020-08-13 PROCEDURE — 77065 MAMMO DIGITAL DIAGNOSTIC LEFT WITH TOMOSYNTHESIS_CAD: ICD-10-PCS | Mod: 26,HCNC,LT, | Performed by: RADIOLOGY

## 2020-08-13 PROCEDURE — 77061 MAMMO DIGITAL DIAGNOSTIC LEFT WITH TOMOSYNTHESIS_CAD: ICD-10-PCS | Mod: 26,HCNC,LT, | Performed by: RADIOLOGY

## 2020-08-13 PROCEDURE — 77061 BREAST TOMOSYNTHESIS UNI: CPT | Mod: 26,HCNC,LT, | Performed by: RADIOLOGY

## 2020-08-13 PROCEDURE — 77065 DX MAMMO INCL CAD UNI: CPT | Mod: TC,HCNC,LT

## 2020-08-13 PROCEDURE — 76642 US BREAST LEFT LIMITED: ICD-10-PCS | Mod: 26,HCNC,LT, | Performed by: RADIOLOGY

## 2020-08-13 PROCEDURE — 76642 ULTRASOUND BREAST LIMITED: CPT | Mod: 26,HCNC,LT, | Performed by: RADIOLOGY

## 2020-08-13 NOTE — TELEPHONE ENCOUNTER
Spoke with patient. Reviewed breast biopsy procedure and reviewed instructions for breast biopsy. Patient expressed understanding and all questions were answered. Provided patient with my phone number to call for any further concerns or questions.   Patient scheduled breast biopsy at the [facility] on 8/25/2020.

## 2020-08-24 ENCOUNTER — RESEARCH ENCOUNTER (OUTPATIENT)
Dept: RESEARCH | Facility: HOSPITAL | Age: 65
End: 2020-08-24

## 2020-08-24 NOTE — PROGRESS NOTES
Pt was contacted by phone regarding participation in IRB protocol #2018.314, [facility] Breast Biopsy study. Pt was agreeable.      The Informed Consent Form (ICF) was reviewed with pt. The discussion included:   - participation is voluntary  - pt can change her mind about participating  - pt was informed that participation in this study would not preclude her from participating in any other research if offered  - specimens may be used by [facility] researchers or community researchers  - specimens collected include only those discussed with the patient at the time of consent and are indicated on the ICF   - specimens may be used for DNA, RNA or protein studies investigating biomarkers for diagnostic, prognostic or treatment purposes  - breast biopsy will be collected from [facility] after their approval  - all specimens released to researchers will be stripped of identifiers  - no personal medical information will be released to any parties outside of this research study  - there will be no other physical risks outside of those involved in standard of care procedure      Permission was obtained by telephone and was provided adequate time to ask questions regarding the scope and purpose of the study.  The above statements were read by the person obtaining permission to the person granting permission and witnessed by Iris Phillips, who also confirmed the patient consent to the study. The witness information was documented on the verbal consent form as well.  This Verbal Informed Consent process was conducted prior to initiation of any study procedures

## 2020-08-25 ENCOUNTER — HOSPITAL ENCOUNTER (OUTPATIENT)
Dept: RADIOLOGY | Facility: HOSPITAL | Age: 65
Discharge: HOME | End: 2020-08-25
Attending: INTERNAL MEDICINE
Payer: MEDICARE

## 2020-08-25 ENCOUNTER — PATIENT OUTREACH (OUTPATIENT)
Dept: ADMINISTRATIVE | Facility: OTHER | Age: 65
End: 2020-08-25

## 2020-08-25 DIAGNOSIS — R92.8 ABNORMAL MAMMOGRAM: ICD-10-CM

## 2020-08-25 PROCEDURE — 25000003 PHARM REV CODE 250: Mod: HCNC | Performed by: INTERNAL MEDICINE

## 2020-08-25 PROCEDURE — 77065 MAMMO DIGITAL DIAGNOSTIC LEFT WITH CAD: ICD-10-PCS | Mod: 26,HCNC,LT, | Performed by: RADIOLOGY

## 2020-08-25 PROCEDURE — 88342 IMHCHEM/IMCYTCHM 1ST ANTB: CPT | Mod: 26,HCNC,ICN, | Performed by: PATHOLOGY

## 2020-08-25 PROCEDURE — 88305 TISSUE EXAM BY PATHOLOGIST: ICD-10-PCS | Mod: 26,HCNC,ICN, | Performed by: PATHOLOGY

## 2020-08-25 PROCEDURE — 88341 PR IHC OR ICC EACH ADD'L SINGLE ANTIBODY  STAINPR: ICD-10-PCS | Mod: 26,HCNC,ICN, | Performed by: PATHOLOGY

## 2020-08-25 PROCEDURE — 77065 DX MAMMO INCL CAD UNI: CPT | Mod: 26,HCNC,LT, | Performed by: RADIOLOGY

## 2020-08-25 PROCEDURE — 27201068 US BREAST BIOPSY WITH IMAGING 1ST SITE LEFT: Mod: HCNC

## 2020-08-25 PROCEDURE — 77065 DX MAMMO INCL CAD UNI: CPT | Mod: TC,HCNC,LT

## 2020-08-25 PROCEDURE — 88341 IMHCHEM/IMCYTCHM EA ADD ANTB: CPT | Mod: 26,HCNC,ICN, | Performed by: PATHOLOGY

## 2020-08-25 PROCEDURE — 88342 CHG IMMUNOCYTOCHEMISTRY: ICD-10-PCS | Mod: 26,HCNC,ICN, | Performed by: PATHOLOGY

## 2020-08-25 PROCEDURE — 88305 TISSUE EXAM BY PATHOLOGIST: CPT | Mod: 26,HCNC,ICN, | Performed by: PATHOLOGY

## 2020-08-25 PROCEDURE — 19083 US BREAST BIOPSY WITH IMAGING 1ST SITE LEFT: ICD-10-PCS | Mod: HCNC,LT,, | Performed by: RADIOLOGY

## 2020-08-25 PROCEDURE — 19083 BX BREAST 1ST LESION US IMAG: CPT | Mod: HCNC,LT,, | Performed by: RADIOLOGY

## 2020-08-25 PROCEDURE — 88377 M/PHMTRC ALYS ISHQUANT/SEMIQ: CPT | Mod: HCNC | Performed by: PATHOLOGY

## 2020-08-25 PROCEDURE — 88360 TUMOR IMMUNOHISTOCHEM/MANUAL: CPT | Mod: HCNC | Performed by: PATHOLOGY

## 2020-08-25 PROCEDURE — 88305 TISSUE EXAM BY PATHOLOGIST: CPT | Mod: HCNC | Performed by: PATHOLOGY

## 2020-08-25 RX ORDER — SODIUM BICARBONATE 42 MG/ML
5 INJECTION, SOLUTION INTRAVENOUS ONCE
Status: DISCONTINUED | OUTPATIENT
Start: 2020-08-25 | End: 2020-08-26 | Stop reason: HOSPADM

## 2020-08-25 RX ORDER — LIDOCAINE HYDROCHLORIDE 10 MG/ML
1 INJECTION INFILTRATION; PERINEURAL ONCE
Status: COMPLETED | OUTPATIENT
Start: 2020-08-25 | End: 2020-08-25

## 2020-08-25 RX ORDER — LIDOCAINE HYDROCHLORIDE AND EPINEPHRINE 20; 10 MG/ML; UG/ML
8 INJECTION, SOLUTION INFILTRATION; PERINEURAL ONCE
Status: COMPLETED | OUTPATIENT
Start: 2020-08-25 | End: 2020-08-25

## 2020-08-25 RX ADMIN — LIDOCAINE HYDROCHLORIDE 1 ML: 10 INJECTION, SOLUTION EPIDURAL; INFILTRATION; INTRACAUDAL; PERINEURAL at 09:08

## 2020-08-25 RX ADMIN — LIDOCAINE HYDROCHLORIDE,EPINEPHRINE BITARTRATE 8 ML: 20; .01 INJECTION, SOLUTION INFILTRATION; PERINEURAL at 09:08

## 2020-08-26 ENCOUNTER — DOCUMENTATION ONLY (OUTPATIENT)
Dept: SURGERY | Facility: CLINIC | Age: 65
End: 2020-08-26

## 2020-08-26 ENCOUNTER — TELEPHONE (OUTPATIENT)
Dept: SURGERY | Facility: CLINIC | Age: 65
End: 2020-08-26
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
## 2020-08-26 NOTE — NURSING
Called Patient with results of breast biopsy from 8/25/2020.  Explained that the biopsy showed invasive ductal carcinoma . Discussed what this means and that the next step is to meet with a breast surgeon. An appt was made for 9/2/2020 with Dr. Moulton.  Reviewed location of breast center. Patient verbalized understanding.  E mailed appt and educational information to daughter per patients request.   Oncology Navigation   Intake  Date of Diagnosis: 08/25/20  Cancer Type: Breast  Internal / External Referral: Internal  Initial Nurse Navigator Contact: 08/26/20  Diagnosis to Initial Contact Timeline (days): 1 days  Contact Method: Phone  Date Worked: 08/26/20  First Appointment Available: 09/02/20  Appointment Date: 09/02/20  Schedule to Appointment Timeline (days): 7  First Available Date vs. Scheduled Date (days): 0     Treatment  Current Status: Staging work-up       Procedures: Biopsy  Biopsy Schedule Date: 08/25/20           Acuity      Follow Up  No follow-ups on file.

## 2020-08-28 ENCOUNTER — OFFICE VISIT (OUTPATIENT)
Dept: INTERNAL MEDICINE | Facility: CLINIC | Age: 65
End: 2020-08-28
Payer: MEDICARE

## 2020-08-28 VITALS
DIASTOLIC BLOOD PRESSURE: 72 MMHG | HEIGHT: 64 IN | SYSTOLIC BLOOD PRESSURE: 126 MMHG | BODY MASS INDEX: 32.7 KG/M2 | HEART RATE: 84 BPM | WEIGHT: 191.56 LBS | OXYGEN SATURATION: 99 %

## 2020-08-28 DIAGNOSIS — R51.9 SCALP PAIN: Primary | ICD-10-CM

## 2020-08-28 PROCEDURE — 3074F SYST BP LT 130 MM HG: CPT | Mod: HCNC,CPTII,S$GLB, | Performed by: INTERNAL MEDICINE

## 2020-08-28 PROCEDURE — 1101F PT FALLS ASSESS-DOCD LE1/YR: CPT | Mod: HCNC,CPTII,S$GLB, | Performed by: INTERNAL MEDICINE

## 2020-08-28 PROCEDURE — 99999 PR PBB SHADOW E&M-EST. PATIENT-LVL IV: CPT | Mod: PBBFAC,HCNC,, | Performed by: INTERNAL MEDICINE

## 2020-08-28 PROCEDURE — 1101F PR PT FALLS ASSESS DOC 0-1 FALLS W/OUT INJ PAST YR: ICD-10-PCS | Mod: HCNC,CPTII,S$GLB, | Performed by: INTERNAL MEDICINE

## 2020-08-28 PROCEDURE — 3078F PR MOST RECENT DIASTOLIC BLOOD PRESSURE < 80 MM HG: ICD-10-PCS | Mod: HCNC,CPTII,S$GLB, | Performed by: INTERNAL MEDICINE

## 2020-08-28 PROCEDURE — 3008F PR BODY MASS INDEX (BMI) DOCUMENTED: ICD-10-PCS | Mod: HCNC,CPTII,S$GLB, | Performed by: INTERNAL MEDICINE

## 2020-08-28 PROCEDURE — 1159F PR MEDICATION LIST DOCUMENTED IN MEDICAL RECORD: ICD-10-PCS | Mod: HCNC,S$GLB,, | Performed by: INTERNAL MEDICINE

## 2020-08-28 PROCEDURE — 3078F DIAST BP <80 MM HG: CPT | Mod: HCNC,CPTII,S$GLB, | Performed by: INTERNAL MEDICINE

## 2020-08-28 PROCEDURE — 3008F BODY MASS INDEX DOCD: CPT | Mod: HCNC,CPTII,S$GLB, | Performed by: INTERNAL MEDICINE

## 2020-08-28 PROCEDURE — 1159F MED LIST DOCD IN RCRD: CPT | Mod: HCNC,S$GLB,, | Performed by: INTERNAL MEDICINE

## 2020-08-28 PROCEDURE — 3074F PR MOST RECENT SYSTOLIC BLOOD PRESSURE < 130 MM HG: ICD-10-PCS | Mod: HCNC,CPTII,S$GLB, | Performed by: INTERNAL MEDICINE

## 2020-08-28 PROCEDURE — 99999 PR PBB SHADOW E&M-EST. PATIENT-LVL IV: ICD-10-PCS | Mod: PBBFAC,HCNC,, | Performed by: INTERNAL MEDICINE

## 2020-08-28 PROCEDURE — 99213 OFFICE O/P EST LOW 20 MIN: CPT | Mod: HCNC,S$GLB,, | Performed by: INTERNAL MEDICINE

## 2020-08-28 PROCEDURE — 99213 PR OFFICE/OUTPT VISIT, EST, LEVL III, 20-29 MIN: ICD-10-PCS | Mod: HCNC,S$GLB,, | Performed by: INTERNAL MEDICINE

## 2020-08-28 RX ORDER — HYDROCODONE BITARTRATE AND ACETAMINOPHEN 5; 325 MG/1; MG/1
1 TABLET ORAL EVERY 6 HOURS PRN
Qty: 15 TABLET | Refills: 0 | Status: SHIPPED | OUTPATIENT
Start: 2020-08-28 | End: 2020-09-15
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
## 2020-08-30 ENCOUNTER — TELEPHONE (OUTPATIENT)
Dept: INTERNAL MEDICINE | Facility: CLINIC | Age: 65
End: 2020-08-30

## 2020-08-30 ENCOUNTER — HOSPITAL ENCOUNTER (EMERGENCY)
Facility: HOSPITAL | Age: 65
Discharge: HOME | End: 2020-08-30
Attending: EMERGENCY MEDICINE
Payer: MEDICARE

## 2020-08-30 VITALS
SYSTOLIC BLOOD PRESSURE: 140 MMHG | DIASTOLIC BLOOD PRESSURE: 71 MMHG | HEART RATE: 75 BPM | OXYGEN SATURATION: 97 % | RESPIRATION RATE: 18 BRPM | TEMPERATURE: 98 F
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
## 2020-08-30 DIAGNOSIS — R07.89 ACUTE CHEST WALL PAIN: ICD-10-CM

## 2020-08-30 PROCEDURE — 93005 ELECTROCARDIOGRAM TRACING: CPT | Mod: HCNC

## 2020-08-30 PROCEDURE — 93010 ELECTROCARDIOGRAM REPORT: CPT | Mod: HCNC,,, | Performed by: INTERNAL MEDICINE

## 2020-08-30 PROCEDURE — 93010 EKG 12-LEAD: ICD-10-PCS | Mod: HCNC,,, | Performed by: INTERNAL MEDICINE

## 2020-08-30 PROCEDURE — 99284 PR EMERGENCY DEPT VISIT,LEVEL IV: ICD-10-PCS | Mod: ,,, | Performed by: EMERGENCY MEDICINE

## 2020-08-30 PROCEDURE — 25000003 PHARM REV CODE 250: Mod: HCNC | Performed by: EMERGENCY MEDICINE

## 2020-08-30 PROCEDURE — 99284 EMERGENCY DEPT VISIT MOD MDM: CPT | Mod: 25,HCNC

## 2020-08-30 PROCEDURE — 99284 EMERGENCY DEPT VISIT MOD MDM: CPT | Mod: ,,, | Performed by: EMERGENCY MEDICINE

## 2020-08-30 RX ORDER — ACETAMINOPHEN 500 MG
1000 TABLET ORAL
Status: COMPLETED | OUTPATIENT
Start: 2020-08-30 | End: 2020-08-30

## 2020-08-30 RX ADMIN — ACETAMINOPHEN 1000 MG: 500 TABLET ORAL at 05:08

## 2020-08-30 NOTE — Clinical Note
Sunitha Pillai was seen and treated in our emergency department on 8/30/2020.  She may return to work on 09/03/2020.       If you have any questions or concerns, please don't hesitate to call.      Cornelius Richardson MD
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
## 2020-08-31 NOTE — DISCHARGE INSTRUCTIONS
- Over the counter anti-inflammatories such as Aleve, or Motrin as needed for pain  -Follow up PCP, return to ED if symptoms acutely worsen or patient develops any acute numbness, tingling or weakness

## 2020-09-01 NOTE — PROGRESS NOTES
Breast Surgery  [facility]  Department of Surgery      REFERRING PROVIDER: Patty Louis MD  [address] [place] [address]    Chief Complaint: Consult      Subjective:      Patient ID: Sunitha Pillai is a 65 y.o. female who presents with right breast invasive ductal carcinoma detected on abnormal screening mammogram.    Follow-up mammogram/ultrasound (2020) showed an 8 mm x 6 mm x 8 mm irregularly shaped, non-parallel, hypoechoic mass with angular margins seen in the left breast at 6 o'clock, 7 cm from the nipple. A ultrasound guided biopsy was performed on 2020 with pathology revealing infiltrating ductal carcinoma of the breast.     Patient does not routinely do self breast exams.  Patient has not noted a change on breast exam.  Patient denies nipple discharge. Patient denies to previous breast biopsy. Patient denies a personal history of breast cancer.    Findings at that time were the following:   Tumor size: 0.8 cm   Tumor thgthrthathdtheth:th th4th Estrogen Receptor: +   Progesterone Receptor: +   Her-2 john: -  Lymph node status:  Clinically negative  Lymphatic invasion: Not visualized     GYN History:  Age of menarche was 13. Age of menopause was 49. Patient denies hormonal therapy. Patient is . Age of first live birth was 20. Patient did not breast feed.    Family History:  1 maternal aunt with breast cancer diagnosed in her 50's  1 paternal aunt with breast cancer diagnosed in her 50's  Daughter with cervical cancer diagnosed at age 18  Brother with throat cancer diagnosed at age 58  Cousin with prostate cancer diagnosed at 62  Cousin with colon cancer diagnosed in his 50's  Endorses a family history of stomach cancer, lung cancer, and leukemia but is uncertain about specific family members and ages at diagnosis.     Past Medical History:   Diagnosis Date    Acute coronary syndrome 2018    Arthritis     Diabetes mellitus     Diabetes mellitus type I     GERD  "(gastroesophageal reflux disease)     Hypertension     Malignant neoplasm of lower-inner quadrant of left breast in female, estrogen receptor positive 9/3/2020    Unstable angina 2018    Vertigo      Past Surgical History:   Procedure Laterality Date     SECTION       Current Outpatient Medications on File Prior to Visit   Medication Sig Dispense Refill    BD ULTRA-FINE SHORT PEN NEEDLE 31 gauge x 5/16" Ndle USE 1  4 TIMES DAILY WITH  INSULIN 100 each 3    blood sugar diagnostic Strp Strips and lancets covered by insurance E11.9, pt checks glucose three times daily, insulin dependent 300 each 3    blood sugar diagnostic, drum (ACCU-CHEK COMPACT TEST) Strp USE ONE STRIP TO CHECK GLUCOSE 4 TIMES DAILY BEFORE EACH MEAL AND AT BEDTIME 100 each 0    blood-glucose meter kit Check glucose three times daily E11.9 meter covered by insurance, insulin dependent 1 each 0    candesartan (ATACAND) 8 MG tablet Take 1 tablet (8 mg total) by mouth once daily. 30 tablet 6    dulaglutide (TRULICITY) 0.75 mg/0.5 mL pen injector Inject 0.75 mg weekly 4 mL 5    insulin degludec (TRESIBA FLEXTOUCH U-100) 100 unit/mL (3 mL) InPn 45 Units daily 5 Syringe 6    lancets Misc To check BG 4 times daily, to use with insurance preferred meter, insulin dependent 400 each 3    metFORMIN (GLUCOPHAGE) 1000 MG tablet Take 1 tablet (1,000 mg total) by mouth 2 (two) times daily. 60 tablet 6    NOVOLOG FLEXPEN U-100 INSULIN 100 unit/mL (3 mL) InPn pen INJECT 20 UNITS SUBCUTANEOUSLY THREE TIMES DAILY WITH MEALS Plus sliding scale 1 Box 6    acetaminophen (TYLENOL) 325 MG tablet Take 2 tablets (650 mg total) by mouth every 8 (eight) hours as needed for Pain. (Patient not taking: Reported on 2020)  0    aspirin 81 mg Tab Take 1 tablet by mouth Daily.      colchicine (COLCRYS) 0.6 mg tablet Take 1 tablet (0.6 mg total) by mouth 2 (two) times daily. (Patient not taking: Reported on 2020) 20 tablet 1    " HYDROcodone-acetaminophen (NORCO) 5-325 mg per tablet Take 1 tablet by mouth every 6 (six) hours as needed for Pain. (Patient not taking: Reported on 9/2/2020) 15 tablet 0    insulin aspart U-100 (NOVOLOG U-100 INSULIN ASPART) 100 unit/mL injection Use w/ vgo 30, 3 clicks with meals, 1 click with snack, additional click if sugar is > 180. Max daily 80 units. May substitute humalog vials (Patient not taking: Reported on 9/2/2020) 3 vial 6    meloxicam (MOBIC) 15 MG tablet Take 1 tablet (15 mg total) by mouth once daily. (Patient not taking: Reported on 9/2/2020) 30 tablet 1    sub-q insulin device, 30 unit (V-GO 30) Kanchan Change everyday 30 Device 6    [DISCONTINUED] dulaglutide (TRULICITY) 0.75 mg/0.5 mL PnIj Inject 0.5 mLs (0.75 mg total) into the skin every 7 days. 4 Syringe 4     No current facility-administered medications on file prior to visit.      Social History     Socioeconomic History    Marital status:      Spouse name: Not on file    Number of children: Not on file    Years of education: Not on file    Highest education level: Not on file   Occupational History    Not on file   Social Needs    Financial resource strain: Not on file    Food insecurity     Worry: Not on file     Inability: Not on file    Transportation needs     Medical: Not on file     Non-medical: Not on file   Tobacco Use    Smoking status: Never Smoker    Smokeless tobacco: Never Used   Substance and Sexual Activity    Alcohol use: Never     Frequency: Never    Drug use: No    Sexual activity: Yes     Partners: Male     Birth control/protection: Post-menopausal   Lifestyle    Physical activity     Days per week: Not on file     Minutes per session: Not on file    Stress: Not on file   Relationships    Social connections     Talks on phone: Not on file     Gets together: Not on file     Attends Mosque service: Not on file     Active member of club or organization: Not on file     Attends meetings of clubs  "or organizations: Not on file     Relationship status: Not on file   Other Topics Concern    Not on file   Social History Narrative    Not on file     Family History   Problem Relation Age of Onset    Hypertension Mother     Hyperlipidemia Mother     Diabetes Mother     Heart disease Mother     Aneurysm Father     Diabetes Sister     Hypertension Sister     Heart disease Brother     Diabetes Brother     Hypertension Brother     Breast cancer Neg Hx     Colon cancer Neg Hx     Ovarian cancer Neg Hx         Review of Systems   Constitutional: Negative for appetite change, chills, fever and unexpected weight change.   HENT: Negative for facial swelling, postnasal drip and sore throat.    Eyes: Negative for redness and itching.   Respiratory: Negative for chest tightness and shortness of breath.    Cardiovascular: Negative for chest pain and palpitations.   Gastrointestinal: Negative for blood in stool, diarrhea, nausea and vomiting.   Genitourinary: Negative for difficulty urinating and dysuria.   Musculoskeletal: Negative for arthralgias and joint swelling.   Skin: Negative for rash and wound.   Neurological: Negative for dizziness and syncope.   Hematological: Negative for adenopathy.   Psychiatric/Behavioral: Negative for agitation. The patient is not nervous/anxious.         Objective:   BP (!) 159/77 (BP Location: Left arm, Patient Position: Sitting, BP Method: Medium (Automatic))   Pulse 79   Ht 5' 4" (1.626 m)   Wt 85.2 kg (187 lb 15.1 oz)   LMP  (LMP Unknown)   BMI 32.26 kg/m²     Physical Exam   Constitutional: She appears well-developed and well-nourished.   HENT:   Head: Normocephalic.   Eyes: No scleral icterus.   Neck: Neck supple. No tracheal deviation present.   Cardiovascular: Normal rate and regular rhythm.    Pulmonary/Chest: Breath sounds normal. No respiratory distress. Right breast exhibits no inverted nipple, no mass, no nipple discharge and no skin change. Left breast exhibits " no inverted nipple, no mass, no nipple discharge and no skin change.       Abdominal: Soft. She exhibits no mass. There is no abdominal tenderness.   Musculoskeletal: No edema.   Lymphadenopathy:     She has no cervical adenopathy.   Neurological: She is alert.   Skin: No rash noted. No erythema.     Psychiatric: She has a normal mood and affect.       Radiology and Pathology review: Images personally reviewed by me in the clinic.   US guided Biopsy (8/25/2020):  Left breast, mass, core biopsy: Invasive ductal carcinoma, Grade 3 (T= 3, N=   3, M= 3).          Tumor measures 14 mm (1.4 cm) in greatest linear dimension within the   core biopsy specimen.  Estrogen receptor:  Positive; strong nuclear staining over 95% of tumor cells.   Progesterone receptor:  Positive; strong nuclear staining over 95% of tumor   cells.   Her2:  Equivocal  (Stain score = 2+).  This block will be sent for HER2   analysis by FISH and results will follow in supplemental report.   Ki-67:  Positive for nuclear staining over 95% of tumor cells.   All immunohistochemical stains have satisfactory positive and negative   controls.     Mammogram and US (8/13/2020):  Findings:  This procedure was performed using tomosynthesis. Computer-aided detection was utilized in the interpretation of this examination.  Mammo Digital Diagnostic Left w/ Ap  The left breast has scattered areas of fibroglandular density.     There is a mass seen in the lower central region of the left breast in the posterior depth. The mass correlates with the prior screening mammogram finding.      US Breast Left Limited  There is an 8 mm x 6 mm x 8 mm irregularly shaped, non-parallel, hypoechoic mass with angular margins seen in the left breast at 6 o'clock, 7 cm from the nipple. The mass correlates with the mass seen on the mammogram.       Left axillary lymph nodes appeared normal.      Impression:  Left  Mass: Left breast mass at the lower central posterior position.  Assessment: 4 - Suspicious finding. Biopsy is recommended.      BI-RADS Category:   Overall: 4 - Suspicious     Recommendation:  Biopsy is recommended.    Assessment:       1. Malignant neoplasm of lower-inner quadrant of left breast in female, estrogen receptor positive    2. Preop testing        Plan:     Options for management were discussed with the patient and her family. We reviewed the existing data noting the equivalency of breast conserving surgery with radiation therapy and mastectomy. We also reviewed the guidelines of the National Comprehensive Cancer Network for Stage 1 breast carcinoma. We discussed the need for lumpectomy margins to be negative for carcinoma, the necessity for postoperative radiation therapy after breast conservation in most cases, the possibility of a failed or false negative sentinel lymph node biopsy and the potential need for complete lymphadenectomy for a failed or positive sentinel lymph node biopsy were fully discussed. In the setting of mastectomy, delayed or immediate reconstruction options are available and were discussed.     In the setting of lumpectomy, radiation therapy would be recommended majority of the time.  The duration and treatment side effects were discussed with the patient.  This will coordinated with the radiation oncologist pending final pathology.    We also discussed the role of systemic therapy in the treatment of early stage breast cancer.  We discussed that this is based on tumor biology and estevan status and will be determined based on final pathology.  We discussed that if the cancer is hormone positive, endocrine therapy would be recommended in most cases and its use can reduce the risk of recurrence as well as improve survival. Side effects of treatment were briefly discussed. We also discussed the potential role for chemotherapy based on a number of factors such as tumor phenotype (ER+ vs. triple negative vs. Clb6rgd+) and this would be determined  in coordination with the medical oncologist.    The patient, in consultation with her family, has elected to proceed with left partial mastectomy and sentinel lymph node biopsy with reflector. The operative risks of bleeding, infection, recurrence, scarring, and anesthetic complications and the possibility of requiring further surgery were all noted and informed consent obtained.    Genetics obtained today due to significant family history.  She understands that if this results with a genetic abnormality we have options of prophylactic surgery or enhanced imaging surveillance.  PCP for clearance - will send a message.  She is a poor historian and does not seem to be compliant with her medications so I would just like to make sure she is optimized as best we can prior to surgery.    Surgery scheduled. Follow-up in clinic roughly 14 days after surgery.     Patient was educated on breast cancer, receptors, wire localization lumpectomy, mastectomy, sentinel lymph node mapping and biopsy, axillary lymph node dissection, reconstruction, breast prosthesis with post-mastectomy bra and radiation therapy. Patient was given patient information binder including [facility] breast cancer treatment brochure.  All her questions were answered.    Total time spent with the patient: 60 minutes.  45 minutes of face to face consultation and 15 minutes of chart review and coordination of care.

## 2020-09-01 NOTE — H&P (VIEW-ONLY)
Breast Surgery  [facility]  Department of Surgery      REFERRING PROVIDER: Patty Louis MD  [address] [place] [address]    Chief Complaint: Consult      Subjective:      Patient ID: Sunitha Pillai is a 65 y.o. female who presents with right breast invasive ductal carcinoma detected on abnormal screening mammogram.    Follow-up mammogram/ultrasound (2020) showed an 8 mm x 6 mm x 8 mm irregularly shaped, non-parallel, hypoechoic mass with angular margins seen in the left breast at 6 o'clock, 7 cm from the nipple. A ultrasound guided biopsy was performed on 2020 with pathology revealing infiltrating ductal carcinoma of the breast.     Patient does not routinely do self breast exams.  Patient has not noted a change on breast exam.  Patient denies nipple discharge. Patient denies to previous breast biopsy. Patient denies a personal history of breast cancer.    Findings at that time were the following:   Tumor size: 0.8 cm   Tumor thgthrthathdtheth:th th4th Estrogen Receptor: +   Progesterone Receptor: +   Her-2 john: -  Lymph node status:  Clinically negative  Lymphatic invasion: Not visualized     GYN History:  Age of menarche was 13. Age of menopause was 49. Patient denies hormonal therapy. Patient is . Age of first live birth was 20. Patient did not breast feed.    Family History:  1 maternal aunt with breast cancer diagnosed in her 50's  1 paternal aunt with breast cancer diagnosed in her 50's  Daughter with cervical cancer diagnosed at age 18  Brother with throat cancer diagnosed at age 58  Cousin with prostate cancer diagnosed at 62  Cousin with colon cancer diagnosed in his 50's  Endorses a family history of stomach cancer, lung cancer, and leukemia but is uncertain about specific family members and ages at diagnosis.     Past Medical History:   Diagnosis Date    Acute coronary syndrome 2018    Arthritis     Diabetes mellitus     Diabetes mellitus type I     GERD  "(gastroesophageal reflux disease)     Hypertension     Malignant neoplasm of lower-inner quadrant of left breast in female, estrogen receptor positive 9/3/2020    Unstable angina 2018    Vertigo      Past Surgical History:   Procedure Laterality Date     SECTION       Current Outpatient Medications on File Prior to Visit   Medication Sig Dispense Refill    BD ULTRA-FINE SHORT PEN NEEDLE 31 gauge x 5/16" Ndle USE 1  4 TIMES DAILY WITH  INSULIN 100 each 3    blood sugar diagnostic Strp Strips and lancets covered by insurance E11.9, pt checks glucose three times daily, insulin dependent 300 each 3    blood sugar diagnostic, drum (ACCU-CHEK COMPACT TEST) Strp USE ONE STRIP TO CHECK GLUCOSE 4 TIMES DAILY BEFORE EACH MEAL AND AT BEDTIME 100 each 0    blood-glucose meter kit Check glucose three times daily E11.9 meter covered by insurance, insulin dependent 1 each 0    candesartan (ATACAND) 8 MG tablet Take 1 tablet (8 mg total) by mouth once daily. 30 tablet 6    dulaglutide (TRULICITY) 0.75 mg/0.5 mL pen injector Inject 0.75 mg weekly 4 mL 5    insulin degludec (TRESIBA FLEXTOUCH U-100) 100 unit/mL (3 mL) InPn 45 Units daily 5 Syringe 6    lancets Misc To check BG 4 times daily, to use with insurance preferred meter, insulin dependent 400 each 3    metFORMIN (GLUCOPHAGE) 1000 MG tablet Take 1 tablet (1,000 mg total) by mouth 2 (two) times daily. 60 tablet 6    NOVOLOG FLEXPEN U-100 INSULIN 100 unit/mL (3 mL) InPn pen INJECT 20 UNITS SUBCUTANEOUSLY THREE TIMES DAILY WITH MEALS Plus sliding scale 1 Box 6    acetaminophen (TYLENOL) 325 MG tablet Take 2 tablets (650 mg total) by mouth every 8 (eight) hours as needed for Pain. (Patient not taking: Reported on 2020)  0    aspirin 81 mg Tab Take 1 tablet by mouth Daily.      colchicine (COLCRYS) 0.6 mg tablet Take 1 tablet (0.6 mg total) by mouth 2 (two) times daily. (Patient not taking: Reported on 2020) 20 tablet 1    " HYDROcodone-acetaminophen (NORCO) 5-325 mg per tablet Take 1 tablet by mouth every 6 (six) hours as needed for Pain. (Patient not taking: Reported on 9/2/2020) 15 tablet 0    insulin aspart U-100 (NOVOLOG U-100 INSULIN ASPART) 100 unit/mL injection Use w/ vgo 30, 3 clicks with meals, 1 click with snack, additional click if sugar is > 180. Max daily 80 units. May substitute humalog vials (Patient not taking: Reported on 9/2/2020) 3 vial 6    meloxicam (MOBIC) 15 MG tablet Take 1 tablet (15 mg total) by mouth once daily. (Patient not taking: Reported on 9/2/2020) 30 tablet 1    sub-q insulin device, 30 unit (V-GO 30) Kanchan Change everyday 30 Device 6    [DISCONTINUED] dulaglutide (TRULICITY) 0.75 mg/0.5 mL PnIj Inject 0.5 mLs (0.75 mg total) into the skin every 7 days. 4 Syringe 4     No current facility-administered medications on file prior to visit.      Social History     Socioeconomic History    Marital status:      Spouse name: Not on file    Number of children: Not on file    Years of education: Not on file    Highest education level: Not on file   Occupational History    Not on file   Social Needs    Financial resource strain: Not on file    Food insecurity     Worry: Not on file     Inability: Not on file    Transportation needs     Medical: Not on file     Non-medical: Not on file   Tobacco Use    Smoking status: Never Smoker    Smokeless tobacco: Never Used   Substance and Sexual Activity    Alcohol use: Never     Frequency: Never    Drug use: No    Sexual activity: Yes     Partners: Male     Birth control/protection: Post-menopausal   Lifestyle    Physical activity     Days per week: Not on file     Minutes per session: Not on file    Stress: Not on file   Relationships    Social connections     Talks on phone: Not on file     Gets together: Not on file     Attends Baptism service: Not on file     Active member of club or organization: Not on file     Attends meetings of clubs  "or organizations: Not on file     Relationship status: Not on file   Other Topics Concern    Not on file   Social History Narrative    Not on file     Family History   Problem Relation Age of Onset    Hypertension Mother     Hyperlipidemia Mother     Diabetes Mother     Heart disease Mother     Aneurysm Father     Diabetes Sister     Hypertension Sister     Heart disease Brother     Diabetes Brother     Hypertension Brother     Breast cancer Neg Hx     Colon cancer Neg Hx     Ovarian cancer Neg Hx         Review of Systems   Constitutional: Negative for appetite change, chills, fever and unexpected weight change.   HENT: Negative for facial swelling, postnasal drip and sore throat.    Eyes: Negative for redness and itching.   Respiratory: Negative for chest tightness and shortness of breath.    Cardiovascular: Negative for chest pain and palpitations.   Gastrointestinal: Negative for blood in stool, diarrhea, nausea and vomiting.   Genitourinary: Negative for difficulty urinating and dysuria.   Musculoskeletal: Negative for arthralgias and joint swelling.   Skin: Negative for rash and wound.   Neurological: Negative for dizziness and syncope.   Hematological: Negative for adenopathy.   Psychiatric/Behavioral: Negative for agitation. The patient is not nervous/anxious.         Objective:   BP (!) 159/77 (BP Location: Left arm, Patient Position: Sitting, BP Method: Medium (Automatic))   Pulse 79   Ht 5' 4" (1.626 m)   Wt 85.2 kg (187 lb 15.1 oz)   LMP  (LMP Unknown)   BMI 32.26 kg/m²     Physical Exam   Constitutional: She appears well-developed and well-nourished.   HENT:   Head: Normocephalic.   Eyes: No scleral icterus.   Neck: Neck supple. No tracheal deviation present.   Cardiovascular: Normal rate and regular rhythm.    Pulmonary/Chest: Breath sounds normal. No respiratory distress. Right breast exhibits no inverted nipple, no mass, no nipple discharge and no skin change. Left breast exhibits " no inverted nipple, no mass, no nipple discharge and no skin change.       Abdominal: Soft. She exhibits no mass. There is no abdominal tenderness.   Musculoskeletal: No edema.   Lymphadenopathy:     She has no cervical adenopathy.   Neurological: She is alert.   Skin: No rash noted. No erythema.     Psychiatric: She has a normal mood and affect.       Radiology and Pathology review: Images personally reviewed by me in the clinic.   US guided Biopsy (8/25/2020):  Left breast, mass, core biopsy: Invasive ductal carcinoma, Grade 3 (T= 3, N=   3, M= 3).          Tumor measures 14 mm (1.4 cm) in greatest linear dimension within the   core biopsy specimen.  Estrogen receptor:  Positive; strong nuclear staining over 95% of tumor cells.   Progesterone receptor:  Positive; strong nuclear staining over 95% of tumor   cells.   Her2:  Equivocal  (Stain score = 2+).  This block will be sent for HER2   analysis by FISH and results will follow in supplemental report.   Ki-67:  Positive for nuclear staining over 95% of tumor cells.   All immunohistochemical stains have satisfactory positive and negative   controls.     Mammogram and US (8/13/2020):  Findings:  This procedure was performed using tomosynthesis. Computer-aided detection was utilized in the interpretation of this examination.  Mammo Digital Diagnostic Left w/ Ap  The left breast has scattered areas of fibroglandular density.     There is a mass seen in the lower central region of the left breast in the posterior depth. The mass correlates with the prior screening mammogram finding.      US Breast Left Limited  There is an 8 mm x 6 mm x 8 mm irregularly shaped, non-parallel, hypoechoic mass with angular margins seen in the left breast at 6 o'clock, 7 cm from the nipple. The mass correlates with the mass seen on the mammogram.       Left axillary lymph nodes appeared normal.      Impression:  Left  Mass: Left breast mass at the lower central posterior position.  Assessment: 4 - Suspicious finding. Biopsy is recommended.      BI-RADS Category:   Overall: 4 - Suspicious     Recommendation:  Biopsy is recommended.    Assessment:       1. Malignant neoplasm of lower-inner quadrant of left breast in female, estrogen receptor positive    2. Preop testing        Plan:     Options for management were discussed with the patient and her family. We reviewed the existing data noting the equivalency of breast conserving surgery with radiation therapy and mastectomy. We also reviewed the guidelines of the National Comprehensive Cancer Network for Stage 1 breast carcinoma. We discussed the need for lumpectomy margins to be negative for carcinoma, the necessity for postoperative radiation therapy after breast conservation in most cases, the possibility of a failed or false negative sentinel lymph node biopsy and the potential need for complete lymphadenectomy for a failed or positive sentinel lymph node biopsy were fully discussed. In the setting of mastectomy, delayed or immediate reconstruction options are available and were discussed.     In the setting of lumpectomy, radiation therapy would be recommended majority of the time.  The duration and treatment side effects were discussed with the patient.  This will coordinated with the radiation oncologist pending final pathology.    We also discussed the role of systemic therapy in the treatment of early stage breast cancer.  We discussed that this is based on tumor biology and estevan status and will be determined based on final pathology.  We discussed that if the cancer is hormone positive, endocrine therapy would be recommended in most cases and its use can reduce the risk of recurrence as well as improve survival. Side effects of treatment were briefly discussed. We also discussed the potential role for chemotherapy based on a number of factors such as tumor phenotype (ER+ vs. triple negative vs. Lic7gsb+) and this would be determined  in coordination with the medical oncologist.    The patient, in consultation with her family, has elected to proceed with left partial mastectomy and sentinel lymph node biopsy with reflector. The operative risks of bleeding, infection, recurrence, scarring, and anesthetic complications and the possibility of requiring further surgery were all noted and informed consent obtained.    Genetics obtained today due to significant family history.  She understands that if this results with a genetic abnormality we have options of prophylactic surgery or enhanced imaging surveillance.  PCP for clearance - will send a message.  She is a poor historian and does not seem to be compliant with her medications so I would just like to make sure she is optimized as best we can prior to surgery.    Surgery scheduled. Follow-up in clinic roughly 14 days after surgery.     Patient was educated on breast cancer, receptors, wire localization lumpectomy, mastectomy, sentinel lymph node mapping and biopsy, axillary lymph node dissection, reconstruction, breast prosthesis with post-mastectomy bra and radiation therapy. Patient was given patient information binder including [facility] breast cancer treatment brochure.  All her questions were answered.    Total time spent with the patient: 60 minutes.  45 minutes of face to face consultation and 15 minutes of chart review and coordination of care.

## 2020-09-02 ENCOUNTER — LAB VISIT (OUTPATIENT)
Dept: LAB | Facility: HOSPITAL | Age: 65
End: 2020-09-02
Attending: SURGERY
Payer: MEDICARE

## 2020-09-02 ENCOUNTER — OFFICE VISIT (OUTPATIENT)
Dept: SURGERY | Facility: CLINIC | Age: 65
End: 2020-09-02
Payer: MEDICARE

## 2020-09-02 ENCOUNTER — DOCUMENTATION ONLY (OUTPATIENT)
Dept: SURGERY | Facility: CLINIC | Age: 65
End: 2020-09-02

## 2020-09-02 VITALS
HEART RATE: 79 BPM | DIASTOLIC BLOOD PRESSURE: 77 MMHG | HEIGHT: 64 IN | SYSTOLIC BLOOD PRESSURE: 159 MMHG | WEIGHT: 187.94 LBS | BODY MASS INDEX: 32.09 KG/M2

## 2020-09-02 DIAGNOSIS — C50.919 BREAST CANCER: Primary | ICD-10-CM

## 2020-09-02 DIAGNOSIS — Z01.818 PREOP TESTING: ICD-10-CM

## 2020-09-02 DIAGNOSIS — C50.919 BREAST CANCER: ICD-10-CM

## 2020-09-02 DIAGNOSIS — C50.312 MALIGNANT NEOPLASM OF LOWER-INNER QUADRANT OF LEFT BREAST IN FEMALE, ESTROGEN RECEPTOR POSITIVE: Primary | ICD-10-CM

## 2020-09-02 DIAGNOSIS — Z17.0 MALIGNANT NEOPLASM OF LOWER-INNER QUADRANT OF LEFT BREAST IN FEMALE, ESTROGEN RECEPTOR POSITIVE: Primary | ICD-10-CM

## 2020-09-02 LAB
FINAL PATHOLOGIC DIAGNOSIS: NORMAL
GROSS: NORMAL
Lab: NORMAL
SUPPLEMENTAL DIAGNOSIS: NORMAL

## 2020-09-02 PROCEDURE — 1101F PT FALLS ASSESS-DOCD LE1/YR: CPT | Mod: HCNC,CPTII,ICN,S$GLB | Performed by: SURGERY

## 2020-09-02 PROCEDURE — 99205 PR OFFICE/OUTPT VISIT, NEW, LEVL V, 60-74 MIN: ICD-10-PCS | Mod: HCNC,ICN,S$GLB, | Performed by: SURGERY

## 2020-09-02 PROCEDURE — 3077F PR MOST RECENT SYSTOLIC BLOOD PRESSURE >= 140 MM HG: ICD-10-PCS | Mod: HCNC,CPTII,ICN,S$GLB | Performed by: SURGERY

## 2020-09-02 PROCEDURE — 99499 UNLISTED E&M SERVICE: CPT | Mod: HCNC,S$GLB,, | Performed by: SURGERY

## 2020-09-02 PROCEDURE — 3078F DIAST BP <80 MM HG: CPT | Mod: HCNC,CPTII,ICN,S$GLB | Performed by: SURGERY

## 2020-09-02 PROCEDURE — 3008F BODY MASS INDEX DOCD: CPT | Mod: HCNC,CPTII,ICN,S$GLB | Performed by: SURGERY

## 2020-09-02 PROCEDURE — 3077F SYST BP >= 140 MM HG: CPT | Mod: HCNC,CPTII,ICN,S$GLB | Performed by: SURGERY

## 2020-09-02 PROCEDURE — 3078F PR MOST RECENT DIASTOLIC BLOOD PRESSURE < 80 MM HG: ICD-10-PCS | Mod: HCNC,CPTII,ICN,S$GLB | Performed by: SURGERY

## 2020-09-02 PROCEDURE — 1126F AMNT PAIN NOTED NONE PRSNT: CPT | Mod: HCNC,ICN,S$GLB, | Performed by: SURGERY

## 2020-09-02 PROCEDURE — 99999 PR PBB SHADOW E&M-EST. PATIENT-LVL III: CPT | Mod: PBBFAC,HCNC,, | Performed by: SURGERY

## 2020-09-02 PROCEDURE — 1159F MED LIST DOCD IN RCRD: CPT | Mod: HCNC,ICN,S$GLB, | Performed by: SURGERY

## 2020-09-02 PROCEDURE — 1159F PR MEDICATION LIST DOCUMENTED IN MEDICAL RECORD: ICD-10-PCS | Mod: HCNC,ICN,S$GLB, | Performed by: SURGERY

## 2020-09-02 PROCEDURE — 1101F PR PT FALLS ASSESS DOC 0-1 FALLS W/OUT INJ PAST YR: ICD-10-PCS | Mod: HCNC,CPTII,ICN,S$GLB | Performed by: SURGERY

## 2020-09-02 PROCEDURE — 99205 OFFICE O/P NEW HI 60 MIN: CPT | Mod: HCNC,ICN,S$GLB, | Performed by: SURGERY

## 2020-09-02 PROCEDURE — 36415 COLL VENOUS BLD VENIPUNCTURE: CPT | Mod: HCNC

## 2020-09-02 PROCEDURE — 3008F PR BODY MASS INDEX (BMI) DOCUMENTED: ICD-10-PCS | Mod: HCNC,CPTII,ICN,S$GLB | Performed by: SURGERY

## 2020-09-02 PROCEDURE — 1126F PR PAIN SEVERITY QUANTIFIED, NO PAIN PRESENT: ICD-10-PCS | Mod: HCNC,ICN,S$GLB, | Performed by: SURGERY

## 2020-09-02 PROCEDURE — 99499 RISK ADDL DX/OHS AUDIT: ICD-10-PCS | Mod: HCNC,S$GLB,, | Performed by: SURGERY

## 2020-09-02 PROCEDURE — 99999 PR PBB SHADOW E&M-EST. PATIENT-LVL III: ICD-10-PCS | Mod: PBBFAC,HCNC,, | Performed by: SURGERY

## 2020-09-02 NOTE — Clinical Note
Hi Dr. Louis,    Ms. Pillai appears to have a small early stage breast cancer.  She would like to plan for a lumpectomy and sentinel lymph node biopsy.  She seems to be very unsure of her medications as well as some of her medical history, so I was hoping you could review her and make sure there is nothing further we need to do to optimize her prior to surgery.    Thanks so much for your help and for sending her!  Isabel Moulton MD  Breast Surgical Oncology

## 2020-09-02 NOTE — NURSING
Nurse Navigator Note:     Met with patient during her consult with Dr. Moulton.  Patient and I reviewed the information she discussed with Dr. Moulton, including treatment options, diagnosis, and future plans for workup. Patient and I went through the new patient binder, explained some of the information and why it is provided.     Also offered patient consults with our other specialty clinics: Dr. Walker for gynecological health during treatment, our breast physical therapy department for pre-op and post-operative assessments, Dr. Hernández for psychological support, and Teresa Lew for nutritional counseling. Explained to patient that all of these support services are completely optional. Discussed that physical therapy may call patient to offer pre-op appt, and what that appt would entail.     Patient was given a copy of her appointments, Dr. Moulton's card, and my card. Encouraged her to call me if she has any questions or concerns or would like to schedule any additional appointments. Verbalized understanding of all information.   Oncology Navigation   Intake  Date of Diagnosis: 08/25/20  Cancer Type: Breast  Internal / External Referral: Internal  Initial Nurse Navigator Contact: 08/26/20  Diagnosis to Initial Contact Timeline (days): 1 days  Contact Method: Phone  Date Worked: 08/26/20  First Appointment Available: 09/02/20  Appointment Date: 09/02/20  Schedule to Appointment Timeline (days): 7  First Available Date vs. Scheduled Date (days): 0     Treatment  Current Status: Active       Procedures: Genetic test  Biopsy Schedule Date: 08/25/20  Genetic Test Schedule Date: 09/02/20           Acuity      Follow Up  Follow up in about 12 days (around 9/14/2020) for f/u on surgery date and genetics.

## 2020-09-02 NOTE — LETTER
September 3, 2020      Patty Louis MD  [address]  [facility] [address]           [facility]  [address]  [facility] [address]  Phone: [phone]  Fax: [phone]          Patient: Sunitha Pillai   MR Number: [id number]   YOB: 1955   Date of Visit: 9/2/2020       Dear Dr. Patty Louis:    Thank you for referring Sunitha Pillai to me for evaluation. Attached you will find relevant portions of my assessment and plan of care.    If you have questions, please do not hesitate to call me. I look forward to following Sunitha Pillai along with you.    Sincerely,    Isabel Moulton MD    Enclosure  CC:  No Recipients    If you would like to receive this communication electronically, please contact [email or web address] or [phone] to request more information on [employer] access.    For providers and/or their staff who would like to refer a patient to [facility], please contact us through our one-stop-shop provider referral line, [facility], at [phone].    If you feel you have received this communication in error or would no longer like to receive these types of communications, please e-mail [email or web address]

## 2020-09-03 DIAGNOSIS — C50.812 MALIGNANT NEOPLASM OF OVERLAPPING SITES OF LEFT BREAST IN FEMALE, ESTROGEN RECEPTOR POSITIVE: Primary | ICD-10-CM

## 2020-09-03 DIAGNOSIS — Z17.0 MALIGNANT NEOPLASM OF OVERLAPPING SITES OF LEFT BREAST IN FEMALE, ESTROGEN RECEPTOR POSITIVE: Primary | ICD-10-CM

## 2020-09-03 PROBLEM — C50.312 MALIGNANT NEOPLASM OF LOWER-INNER QUADRANT OF LEFT BREAST IN FEMALE, ESTROGEN RECEPTOR POSITIVE: Status: ACTIVE | Noted: 2020-09-03

## 2020-09-08 ENCOUNTER — TELEPHONE (OUTPATIENT)
Dept: INTERNAL MEDICINE | Facility: CLINIC | Age: 65
End: 2020-09-08

## 2020-09-08 DIAGNOSIS — E11.8 TYPE 2 DIABETES MELLITUS WITH COMPLICATION, WITH LONG-TERM CURRENT USE OF INSULIN: Primary | ICD-10-CM

## 2020-09-08 DIAGNOSIS — Z79.4 TYPE 2 DIABETES MELLITUS WITH COMPLICATION, WITH LONG-TERM CURRENT USE OF INSULIN: Primary | ICD-10-CM

## 2020-09-09 ENCOUNTER — PATIENT OUTREACH (OUTPATIENT)
Dept: ADMINISTRATIVE | Facility: HOSPITAL | Age: 65
End: 2020-09-09

## 2020-09-09 DIAGNOSIS — M89.9 DISORDER OF BONE AND CARTILAGE: Primary | ICD-10-CM

## 2020-09-09 DIAGNOSIS — M94.9 DISORDER OF BONE AND CARTILAGE: Primary | ICD-10-CM

## 2020-09-10 ENCOUNTER — LAB VISIT (OUTPATIENT)
Dept: LAB | Facility: HOSPITAL | Age: 65
End: 2020-09-10
Attending: INTERNAL MEDICINE
Payer: MEDICARE

## 2020-09-10 DIAGNOSIS — Z79.4 TYPE 2 DIABETES MELLITUS WITH COMPLICATION, WITH LONG-TERM CURRENT USE OF INSULIN: ICD-10-CM

## 2020-09-10 DIAGNOSIS — E11.8 TYPE 2 DIABETES MELLITUS WITH COMPLICATION, WITH LONG-TERM CURRENT USE OF INSULIN: ICD-10-CM

## 2020-09-10 LAB
ALBUMIN SERPL BCP-MCNC: 3.5 G/DL (ref 3.5–5.2)
ALP SERPL-CCNC: 86 U/L (ref 55–135)
ALT SERPL W/O P-5'-P-CCNC: 12 U/L (ref 10–44)
ANION GAP SERPL CALC-SCNC: 7 MMOL/L (ref 8–16)
ANISOCYTOSIS BLD QL SMEAR: SLIGHT
AST SERPL-CCNC: 20 U/L (ref 10–40)
BASOPHILS # BLD AUTO: 0.02 K/UL (ref 0–0.2)
BASOPHILS NFR BLD: 0.3 % (ref 0–1.9)
BILIRUB SERPL-MCNC: 0.4 MG/DL (ref 0.1–1)
BUN SERPL-MCNC: 18 MG/DL (ref 8–23)
CALCIUM SERPL-MCNC: 9.5 MG/DL (ref 8.7–10.5)
CHLORIDE SERPL-SCNC: 108 MMOL/L (ref 95–110)
CO2 SERPL-SCNC: 25 MMOL/L (ref 23–29)
CREAT SERPL-MCNC: 1.2 MG/DL (ref 0.5–1.4)
DACRYOCYTES BLD QL SMEAR: NORMAL
DIFFERENTIAL METHOD: NORMAL
EOSINOPHIL # BLD AUTO: 0.1 K/UL (ref 0–0.5)
EOSINOPHIL NFR BLD: 1.7 % (ref 0–8)
ERYTHROCYTE [DISTWIDTH] IN BLOOD BY AUTOMATED COUNT: 12.7 % (ref 11.5–14.5)
EST. GFR  (AFRICAN AMERICAN): 54.4 ML/MIN/1.73 M^2
EST. GFR  (NON AFRICAN AMERICAN): 47.2 ML/MIN/1.73 M^2
GLUCOSE SERPL-MCNC: 119 MG/DL (ref 70–110)
HCT VFR BLD AUTO: 38.4 % (ref 37–48.5)
HGB BLD-MCNC: 12.4 G/DL (ref 12–16)
IMM GRANULOCYTES # BLD AUTO: 0.01 K/UL (ref 0–0.04)
IMM GRANULOCYTES NFR BLD AUTO: 0.2 % (ref 0–0.5)
LYMPHOCYTES # BLD AUTO: 2.8 K/UL (ref 1–4.8)
LYMPHOCYTES NFR BLD: 41.5 % (ref 18–48)
MCH RBC QN AUTO: 30.6 PG (ref 27–31)
MCHC RBC AUTO-ENTMCNC: 32.3 G/DL (ref 32–36)
MCV RBC AUTO: 95 FL (ref 82–98)
MONOCYTES # BLD AUTO: 0.6 K/UL (ref 0.3–1)
MONOCYTES NFR BLD: 8.3 % (ref 4–15)
NEUTROPHILS # BLD AUTO: 3.2 K/UL (ref 1.8–7.7)
NEUTROPHILS NFR BLD: 48 % (ref 38–73)
NRBC BLD-RTO: 0 /100 WBC
PLATELET # BLD AUTO: 178 K/UL (ref 150–350)
PLATELET BLD QL SMEAR: NORMAL
PMV BLD AUTO: 9.9 FL (ref 9.2–12.9)
POIKILOCYTOSIS BLD QL SMEAR: SLIGHT
POTASSIUM SERPL-SCNC: 5.2 MMOL/L (ref 3.5–5.1)
PROT SERPL-MCNC: 7.6 G/DL (ref 6–8.4)
RBC # BLD AUTO: 4.05 M/UL (ref 4–5.4)
SODIUM SERPL-SCNC: 140 MMOL/L (ref 136–145)
WBC # BLD AUTO: 6.63 K/UL (ref 3.9–12.7)

## 2020-09-10 PROCEDURE — 80053 COMPREHEN METABOLIC PANEL: CPT | Mod: HCNC

## 2020-09-10 PROCEDURE — 85025 COMPLETE CBC W/AUTO DIFF WBC: CPT | Mod: HCNC

## 2020-09-10 PROCEDURE — 36415 COLL VENOUS BLD VENIPUNCTURE: CPT | Mod: HCNC

## 2020-09-11 ENCOUNTER — OFFICE VISIT (OUTPATIENT)
Dept: INTERNAL MEDICINE | Facility: CLINIC | Age: 65
End: 2020-09-11
Payer: MEDICARE

## 2020-09-11 VITALS
WEIGHT: 186.94 LBS | OXYGEN SATURATION: 99 % | SYSTOLIC BLOOD PRESSURE: 130 MMHG | HEIGHT: 64 IN | DIASTOLIC BLOOD PRESSURE: 80 MMHG | HEART RATE: 85 BPM | BODY MASS INDEX: 31.91 KG/M2

## 2020-09-11 DIAGNOSIS — E11.8 TYPE 2 DIABETES MELLITUS WITH COMPLICATION, WITH LONG-TERM CURRENT USE OF INSULIN: Primary | ICD-10-CM

## 2020-09-11 DIAGNOSIS — N39.0 URINARY TRACT INFECTION WITHOUT HEMATURIA, SITE UNSPECIFIED: ICD-10-CM

## 2020-09-11 DIAGNOSIS — E01.0 THYROMEGALY: ICD-10-CM

## 2020-09-11 DIAGNOSIS — Z79.4 TYPE 2 DIABETES MELLITUS WITH COMPLICATION, WITH LONG-TERM CURRENT USE OF INSULIN: Primary | ICD-10-CM

## 2020-09-11 LAB
BILIRUB UR QL STRIP: NEGATIVE
CLARITY UR REFRACT.AUTO: CLEAR
COLOR UR AUTO: YELLOW
GLUCOSE UR QL STRIP: NEGATIVE
HGB UR QL STRIP: NEGATIVE
KETONES UR QL STRIP: NEGATIVE
LEUKOCYTE ESTERASE UR QL STRIP: NEGATIVE
NITRITE UR QL STRIP: NEGATIVE
PH UR STRIP: 6 [PH] (ref 5–8)
PROT UR QL STRIP: NEGATIVE
SP GR UR STRIP: 1.01 (ref 1–1.03)
URN SPEC COLLECT METH UR: NORMAL

## 2020-09-11 PROCEDURE — 99499 RISK ADDL DX/OHS AUDIT: ICD-10-PCS | Mod: HCNC,S$GLB,, | Performed by: INTERNAL MEDICINE

## 2020-09-11 PROCEDURE — 1126F PR PAIN SEVERITY QUANTIFIED, NO PAIN PRESENT: ICD-10-PCS | Mod: HCNC,S$GLB,, | Performed by: INTERNAL MEDICINE

## 2020-09-11 PROCEDURE — 3075F SYST BP GE 130 - 139MM HG: CPT | Mod: HCNC,CPTII,S$GLB, | Performed by: INTERNAL MEDICINE

## 2020-09-11 PROCEDURE — 99999 PR PBB SHADOW E&M-EST. PATIENT-LVL III: ICD-10-PCS | Mod: PBBFAC,HCNC,, | Performed by: INTERNAL MEDICINE

## 2020-09-11 PROCEDURE — 1159F MED LIST DOCD IN RCRD: CPT | Mod: HCNC,S$GLB,, | Performed by: INTERNAL MEDICINE

## 2020-09-11 PROCEDURE — 99999 PR PBB SHADOW E&M-EST. PATIENT-LVL III: CPT | Mod: PBBFAC,HCNC,, | Performed by: INTERNAL MEDICINE

## 2020-09-11 PROCEDURE — 1126F AMNT PAIN NOTED NONE PRSNT: CPT | Mod: HCNC,S$GLB,, | Performed by: INTERNAL MEDICINE

## 2020-09-11 PROCEDURE — 82043 UR ALBUMIN QUANTITATIVE: CPT | Mod: HCNC

## 2020-09-11 PROCEDURE — 3075F PR MOST RECENT SYSTOLIC BLOOD PRESS GE 130-139MM HG: ICD-10-PCS | Mod: HCNC,CPTII,S$GLB, | Performed by: INTERNAL MEDICINE

## 2020-09-11 PROCEDURE — 3044F HG A1C LEVEL LT 7.0%: CPT | Mod: HCNC,CPTII,S$GLB, | Performed by: INTERNAL MEDICINE

## 2020-09-11 PROCEDURE — 3008F PR BODY MASS INDEX (BMI) DOCUMENTED: ICD-10-PCS | Mod: HCNC,CPTII,S$GLB, | Performed by: INTERNAL MEDICINE

## 2020-09-11 PROCEDURE — 99214 OFFICE O/P EST MOD 30 MIN: CPT | Mod: HCNC,S$GLB,, | Performed by: INTERNAL MEDICINE

## 2020-09-11 PROCEDURE — 3079F DIAST BP 80-89 MM HG: CPT | Mod: HCNC,CPTII,S$GLB, | Performed by: INTERNAL MEDICINE

## 2020-09-11 PROCEDURE — 99499 UNLISTED E&M SERVICE: CPT | Mod: HCNC,S$GLB,, | Performed by: INTERNAL MEDICINE

## 2020-09-11 PROCEDURE — 3008F BODY MASS INDEX DOCD: CPT | Mod: HCNC,CPTII,S$GLB, | Performed by: INTERNAL MEDICINE

## 2020-09-11 PROCEDURE — 3079F PR MOST RECENT DIASTOLIC BLOOD PRESSURE 80-89 MM HG: ICD-10-PCS | Mod: HCNC,CPTII,S$GLB, | Performed by: INTERNAL MEDICINE

## 2020-09-11 PROCEDURE — 3044F PR MOST RECENT HEMOGLOBIN A1C LEVEL <7.0%: ICD-10-PCS | Mod: HCNC,CPTII,S$GLB, | Performed by: INTERNAL MEDICINE

## 2020-09-11 PROCEDURE — 1101F PT FALLS ASSESS-DOCD LE1/YR: CPT | Mod: HCNC,CPTII,S$GLB, | Performed by: INTERNAL MEDICINE

## 2020-09-11 PROCEDURE — 99214 PR OFFICE/OUTPT VISIT, EST, LEVL IV, 30-39 MIN: ICD-10-PCS | Mod: HCNC,S$GLB,, | Performed by: INTERNAL MEDICINE

## 2020-09-11 PROCEDURE — 1101F PR PT FALLS ASSESS DOC 0-1 FALLS W/OUT INJ PAST YR: ICD-10-PCS | Mod: HCNC,CPTII,S$GLB, | Performed by: INTERNAL MEDICINE

## 2020-09-11 PROCEDURE — 81003 URINALYSIS AUTO W/O SCOPE: CPT | Mod: HCNC

## 2020-09-11 PROCEDURE — 1159F PR MEDICATION LIST DOCUMENTED IN MEDICAL RECORD: ICD-10-PCS | Mod: HCNC,S$GLB,, | Performed by: INTERNAL MEDICINE

## 2020-09-11 PROCEDURE — 87086 URINE CULTURE/COLONY COUNT: CPT | Mod: HCNC
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
## 2020-09-12 LAB
ALBUMIN/CREAT UR: 8.8 UG/MG (ref 0–30)
BACTERIA UR CULT: NO GROWTH
CREAT UR-MCNC: 91 MG/DL (ref 15–325)
MICROALBUMIN UR DL<=1MG/L-MCNC: 8 UG/ML

## 2020-09-14 ENCOUNTER — LAB VISIT (OUTPATIENT)
Dept: SURGERY | Facility: CLINIC | Age: 65
End: 2020-09-14
Payer: MEDICARE

## 2020-09-14 DIAGNOSIS — Z01.818 PREOP TESTING: ICD-10-CM

## 2020-09-14 LAB — SARS-COV-2 RNA RESP QL NAA+PROBE: NOT DETECTED

## 2020-09-14 PROCEDURE — U0003 INFECTIOUS AGENT DETECTION BY NUCLEIC ACID (DNA OR RNA); SEVERE ACUTE RESPIRATORY SYNDROME CORONAVIRUS 2 (SARS-COV-2) (CORONAVIRUS DISEASE [COVID-19]), AMPLIFIED PROBE TECHNIQUE, MAKING USE OF HIGH THROUGHPUT TECHNOLOGIES AS DESCRIBED BY CMS-2020-01-R: HCPCS | Mod: HCNC

## 2020-09-15 RX ORDER — HYDROCODONE BITARTRATE AND ACETAMINOPHEN 5; 325 MG/1; MG/1
1 TABLET ORAL EVERY 6 HOURS PRN
Qty: 10 TABLET | Refills: 0 | Status: SHIPPED | OUTPATIENT
Start: 2020-09-15 | End: 2021-06-10 | Stop reason: SDUPTHER

## 2020-09-16 ENCOUNTER — TELEPHONE (OUTPATIENT)
Dept: SURGERY | Facility: CLINIC | Age: 65
End: 2020-09-16

## 2020-09-16 ENCOUNTER — ANESTHESIA EVENT (OUTPATIENT)
Dept: SURGERY | Facility: HOSPITAL | Age: 65
End: 2020-09-16
Payer: MEDICARE

## 2020-09-16 ENCOUNTER — HOSPITAL ENCOUNTER (OUTPATIENT)
Dept: RADIOLOGY | Facility: HOSPITAL | Age: 65
Discharge: HOME | End: 2020-09-16
Attending: SURGERY
Payer: MEDICARE

## 2020-09-16 DIAGNOSIS — C50.912 INVASIVE DUCTAL CARCINOMA OF BREAST, FEMALE, LEFT: ICD-10-CM

## 2020-09-16 DIAGNOSIS — R92.8 ABNORMAL MAMMOGRAM: ICD-10-CM

## 2020-09-16 PROCEDURE — 25000003 PHARM REV CODE 250: Mod: HCNC | Performed by: SURGERY

## 2020-09-16 PROCEDURE — 19285 US BREAST RADAR REFLECTOR LOCALIZATION W/GUIDANCE, 1ST LESION, LEFT: ICD-10-PCS | Mod: HCNC,LT,, | Performed by: RADIOLOGY

## 2020-09-16 PROCEDURE — 19285 PERQ DEV BREAST 1ST US IMAG: CPT | Mod: HCNC,LT,, | Performed by: RADIOLOGY

## 2020-09-16 PROCEDURE — 77065 DX MAMMO INCL CAD UNI: CPT | Mod: TC,HCNC,LT

## 2020-09-16 PROCEDURE — 77065 DX MAMMO INCL CAD UNI: CPT | Mod: 26,HCNC,LT, | Performed by: RADIOLOGY

## 2020-09-16 PROCEDURE — 77065 MAMMO DIGITAL DIAGNOSTIC LEFT: ICD-10-PCS | Mod: 26,HCNC,LT, | Performed by: RADIOLOGY

## 2020-09-16 PROCEDURE — A4648 IMPLANTABLE TISSUE MARKER: HCPCS | Mod: HCNC

## 2020-09-16 RX ORDER — LIDOCAINE HYDROCHLORIDE 20 MG/ML
10 INJECTION, SOLUTION INFILTRATION; PERINEURAL ONCE
Status: COMPLETED | OUTPATIENT
Start: 2020-09-16 | End: 2020-09-16

## 2020-09-16 RX ADMIN — LIDOCAINE HYDROCHLORIDE 10 ML: 20 INJECTION, SOLUTION INFILTRATION; PERINEURAL at 10:09

## 2020-09-16 NOTE — TELEPHONE ENCOUNTER
----- Message from Kimberly Ramos sent at 9/16/2020  8:57 AM CDT -----  Regarding: Pt Daughter valerio Carlos  Reason: Calling to speak with nurse regarding to her FMLA form. Pt daughter stated she needs something else on the formin order to take off to help mom. Please call     Communication: [phone] Or Desk # [phone]

## 2020-09-16 NOTE — TELEPHONE ENCOUNTER
Call to pt regarding surgery arrival time. Spoke with daughterJanet per pt's request. Pt to report for surgery tomorrow, 9/17 at [facility] at 5:00 am.

## 2020-09-16 NOTE — TELEPHONE ENCOUNTER
Return call to pt's daughter, Theo. Had questions regarding FMLA forms and radiation treatments. Informed that if she needed to be off work to assist her mother for radiation treatments, pt would need forms filled out by the radiation doctor. Pt will see Dr Moulton 9/30 for post op appt and it will be decided when pt to see radiation for consult appt. Pt voiced understanding.

## 2020-09-17 ENCOUNTER — HOSPITAL ENCOUNTER (OUTPATIENT)
Facility: HOSPITAL | Age: 65
Discharge: HOME | End: 2020-09-17
Attending: SURGERY | Admitting: SURGERY
Payer: MEDICARE

## 2020-09-17 ENCOUNTER — HOSPITAL ENCOUNTER (OUTPATIENT)
Dept: RADIOLOGY | Facility: HOSPITAL | Age: 65
Discharge: HOME | End: 2020-09-17
Attending: SURGERY | Admitting: SURGERY
Payer: MEDICARE

## 2020-09-17 ENCOUNTER — ANESTHESIA (OUTPATIENT)
Dept: SURGERY | Facility: HOSPITAL | Age: 65
End: 2020-09-17
Payer: MEDICARE

## 2020-09-17 DIAGNOSIS — C50.312 MALIGNANT NEOPLASM OF LOWER-INNER QUADRANT OF LEFT BREAST IN FEMALE, ESTROGEN RECEPTOR POSITIVE: Primary | ICD-10-CM

## 2020-09-17 DIAGNOSIS — Z17.0 MALIGNANT NEOPLASM OF LOWER-INNER QUADRANT OF LEFT BREAST IN FEMALE, ESTROGEN RECEPTOR POSITIVE: Primary | ICD-10-CM

## 2020-09-17 DIAGNOSIS — C50.912 INVASIVE DUCTAL CARCINOMA OF BREAST, FEMALE, LEFT: ICD-10-CM

## 2020-09-17 DIAGNOSIS — C50.812 MALIGNANT NEOPLASM OF OVERLAPPING SITES OF LEFT BREAST IN FEMALE, ESTROGEN RECEPTOR POSITIVE: ICD-10-CM

## 2020-09-17 DIAGNOSIS — Z17.0 MALIGNANT NEOPLASM OF OVERLAPPING SITES OF LEFT BREAST IN FEMALE, ESTROGEN RECEPTOR POSITIVE: ICD-10-CM

## 2020-09-17 LAB — POCT GLUCOSE: 126 MG/DL (ref 70–110)

## 2020-09-17 PROCEDURE — 88342 CHG IMMUNOCYTOCHEMISTRY: ICD-10-PCS | Mod: 26,HCNC,, | Performed by: PATHOLOGY

## 2020-09-17 PROCEDURE — 94761 N-INVAS EAR/PLS OXIMETRY MLT: CPT

## 2020-09-17 PROCEDURE — 38792 PR IDENTIFY SENTINEL 2DE: ICD-10-PCS | Mod: LT,,, | Performed by: SURGERY

## 2020-09-17 PROCEDURE — 88307 PR  SURG PATH,LEVEL V: ICD-10-PCS | Mod: 26,HCNC,, | Performed by: PATHOLOGY

## 2020-09-17 PROCEDURE — 94770 HC EXHALED C02 TEST: CPT

## 2020-09-17 PROCEDURE — 88342 IMHCHEM/IMCYTCHM 1ST ANTB: CPT | Mod: 26,HCNC,, | Performed by: PATHOLOGY

## 2020-09-17 PROCEDURE — 37000008 HC ANESTHESIA 1ST 15 MINUTES: Performed by: SURGERY

## 2020-09-17 PROCEDURE — 71000033 HC RECOVERY, INTIAL HOUR: Performed by: SURGERY

## 2020-09-17 PROCEDURE — 99900035 HC TECH TIME PER 15 MIN (STAT)

## 2020-09-17 PROCEDURE — 19301 PR MASTECTOMY, PARTIAL: ICD-10-PCS | Mod: LT,,, | Performed by: SURGERY

## 2020-09-17 PROCEDURE — 25000003 PHARM REV CODE 250: Performed by: STUDENT IN AN ORGANIZED HEALTH CARE EDUCATION/TRAINING PROGRAM

## 2020-09-17 PROCEDURE — 88305 TISSUE EXAM BY PATHOLOGIST: ICD-10-PCS | Mod: 26,HCNC,, | Performed by: PATHOLOGY

## 2020-09-17 PROCEDURE — 88342 IMHCHEM/IMCYTCHM 1ST ANTB: CPT | Mod: HCNC | Performed by: PATHOLOGY

## 2020-09-17 PROCEDURE — S0028 INJECTION, FAMOTIDINE, 20 MG: HCPCS | Performed by: NURSE ANESTHETIST, CERTIFIED REGISTERED

## 2020-09-17 PROCEDURE — D9220A PRA ANESTHESIA: ICD-10-PCS | Mod: CRNA,,, | Performed by: NURSE ANESTHETIST, CERTIFIED REGISTERED

## 2020-09-17 PROCEDURE — 64461 PARAVERTEBRAL SINGLE INJECTION BLOCK(S): ICD-10-PCS | Mod: 59,LT,, | Performed by: ANESTHESIOLOGY

## 2020-09-17 PROCEDURE — 64461 PVB THORACIC SINGLE INJ SITE: CPT | Mod: 59,LT,, | Performed by: ANESTHESIOLOGY

## 2020-09-17 PROCEDURE — D9220A PRA ANESTHESIA: Mod: ANES,,, | Performed by: ANESTHESIOLOGY

## 2020-09-17 PROCEDURE — 88341 IMHCHEM/IMCYTCHM EA ADD ANTB: CPT | Mod: HCNC | Performed by: PATHOLOGY

## 2020-09-17 PROCEDURE — 88307 TISSUE EXAM BY PATHOLOGIST: CPT | Mod: HCNC | Performed by: PATHOLOGY

## 2020-09-17 PROCEDURE — A9520 TC99 TILMANOCEPT DIAG 0.5MCI: HCPCS | Mod: HCNC

## 2020-09-17 PROCEDURE — 64462 PARAVERTEBRAL SINGLE INJECTION BLOCK(S): ICD-10-PCS | Mod: 59,LT,, | Performed by: ANESTHESIOLOGY

## 2020-09-17 PROCEDURE — 76098 X-RAY EXAM SURGICAL SPECIMEN: CPT | Mod: TC,HCNC

## 2020-09-17 PROCEDURE — 37000009 HC ANESTHESIA EA ADD 15 MINS: Performed by: SURGERY

## 2020-09-17 PROCEDURE — 36000707: Performed by: SURGERY

## 2020-09-17 PROCEDURE — 19301 PARTIAL MASTECTOMY: CPT | Mod: LT,,, | Performed by: SURGERY

## 2020-09-17 PROCEDURE — 71000015 HC POSTOP RECOV 1ST HR: Performed by: SURGERY

## 2020-09-17 PROCEDURE — 88341 PR IHC OR ICC EACH ADD'L SINGLE ANTIBODY  STAINPR: ICD-10-PCS | Mod: 26,HCNC,, | Performed by: PATHOLOGY

## 2020-09-17 PROCEDURE — 38792 RA TRACER ID OF SENTINL NODE: CPT | Mod: LT,,, | Performed by: SURGERY

## 2020-09-17 PROCEDURE — 25000003 PHARM REV CODE 250: Performed by: NURSE ANESTHETIST, CERTIFIED REGISTERED

## 2020-09-17 PROCEDURE — 38525 BIOPSY/REMOVAL LYMPH NODES: CPT | Mod: 51,LT,, | Performed by: SURGERY

## 2020-09-17 PROCEDURE — 82962 GLUCOSE BLOOD TEST: CPT | Performed by: SURGERY

## 2020-09-17 PROCEDURE — 36000706: Performed by: SURGERY

## 2020-09-17 PROCEDURE — D9220A PRA ANESTHESIA: ICD-10-PCS | Mod: ANES,,, | Performed by: ANESTHESIOLOGY

## 2020-09-17 PROCEDURE — 76098 MAMMO BREAST SPECIMEN: ICD-10-PCS | Mod: 26,HCNC,, | Performed by: RADIOLOGY

## 2020-09-17 PROCEDURE — 88305 TISSUE EXAM BY PATHOLOGIST: CPT | Mod: 59,HCNC | Performed by: PATHOLOGY

## 2020-09-17 PROCEDURE — 38525 PR BIOPSY/REM LYMPH NODES, AXILLARY: ICD-10-PCS | Mod: 51,LT,, | Performed by: SURGERY

## 2020-09-17 PROCEDURE — 63600175 PHARM REV CODE 636 W HCPCS: Performed by: STUDENT IN AN ORGANIZED HEALTH CARE EDUCATION/TRAINING PROGRAM

## 2020-09-17 PROCEDURE — 25000003 PHARM REV CODE 250: Performed by: SURGERY

## 2020-09-17 PROCEDURE — 64462 PVB THORACIC 2ND+ INJ SITE: CPT | Mod: 59,LT,, | Performed by: ANESTHESIOLOGY

## 2020-09-17 PROCEDURE — 88341 IMHCHEM/IMCYTCHM EA ADD ANTB: CPT | Mod: 26,HCNC,, | Performed by: PATHOLOGY

## 2020-09-17 PROCEDURE — 27200651 HC AIRWAY, LMA: Performed by: ANESTHESIOLOGY

## 2020-09-17 PROCEDURE — 76098 X-RAY EXAM SURGICAL SPECIMEN: CPT | Mod: 26,HCNC,, | Performed by: RADIOLOGY

## 2020-09-17 PROCEDURE — 64461 PVB THORACIC SINGLE INJ SITE: CPT | Performed by: STUDENT IN AN ORGANIZED HEALTH CARE EDUCATION/TRAINING PROGRAM

## 2020-09-17 PROCEDURE — 63600175 PHARM REV CODE 636 W HCPCS: Performed by: NURSE ANESTHETIST, CERTIFIED REGISTERED

## 2020-09-17 PROCEDURE — 88307 TISSUE EXAM BY PATHOLOGIST: CPT | Mod: 26,HCNC,, | Performed by: PATHOLOGY

## 2020-09-17 PROCEDURE — 88305 TISSUE EXAM BY PATHOLOGIST: CPT | Mod: 26,HCNC,, | Performed by: PATHOLOGY

## 2020-09-17 PROCEDURE — D9220A PRA ANESTHESIA: Mod: CRNA,,, | Performed by: NURSE ANESTHETIST, CERTIFIED REGISTERED

## 2020-09-17 RX ORDER — DEXAMETHASONE SODIUM PHOSPHATE 4 MG/ML
INJECTION, SOLUTION INTRA-ARTICULAR; INTRALESIONAL; INTRAMUSCULAR; INTRAVENOUS; SOFT TISSUE
Status: DISCONTINUED | OUTPATIENT
Start: 2020-09-17 | End: 2020-09-17

## 2020-09-17 RX ORDER — FAMOTIDINE 10 MG/ML
INJECTION INTRAVENOUS
Status: DISCONTINUED | OUTPATIENT
Start: 2020-09-17 | End: 2020-09-17

## 2020-09-17 RX ORDER — FENTANYL CITRATE 50 UG/ML
25 INJECTION, SOLUTION INTRAMUSCULAR; INTRAVENOUS EVERY 5 MIN PRN
Status: DISPENSED | OUTPATIENT
Start: 2020-09-17

## 2020-09-17 RX ORDER — SODIUM CHLORIDE 0.9 % (FLUSH) 0.9 %
3 SYRINGE (ML) INJECTION
Status: DISCONTINUED | OUTPATIENT
Start: 2020-09-17 | End: 2020-09-17 | Stop reason: HOSPADM

## 2020-09-17 RX ORDER — LIDOCAINE HCL/PF 100 MG/5ML
SYRINGE (ML) INTRAVENOUS
Status: DISCONTINUED | OUTPATIENT
Start: 2020-09-17 | End: 2020-09-17

## 2020-09-17 RX ORDER — PROPOFOL 10 MG/ML
VIAL (ML) INTRAVENOUS
Status: DISCONTINUED | OUTPATIENT
Start: 2020-09-17 | End: 2020-09-17

## 2020-09-17 RX ORDER — PHENYLEPHRINE HYDROCHLORIDE 10 MG/ML
INJECTION INTRAVENOUS
Status: DISCONTINUED | OUTPATIENT
Start: 2020-09-17 | End: 2020-09-17

## 2020-09-17 RX ORDER — FENTANYL CITRATE 50 UG/ML
25 INJECTION, SOLUTION INTRAMUSCULAR; INTRAVENOUS EVERY 5 MIN PRN
Status: DISCONTINUED | OUTPATIENT
Start: 2020-09-17 | End: 2020-09-17 | Stop reason: HOSPADM

## 2020-09-17 RX ORDER — HYDROCODONE BITARTRATE AND ACETAMINOPHEN 5; 325 MG/1; MG/1
1 TABLET ORAL ONCE
Status: COMPLETED | OUTPATIENT
Start: 2020-09-17 | End: 2020-09-17

## 2020-09-17 RX ORDER — ACETAMINOPHEN 500 MG
1000 TABLET ORAL
Status: COMPLETED | OUTPATIENT
Start: 2020-09-17 | End: 2020-09-17

## 2020-09-17 RX ORDER — MIDAZOLAM HYDROCHLORIDE 1 MG/ML
0.5 INJECTION INTRAMUSCULAR; INTRAVENOUS
Status: DISPENSED | OUTPATIENT
Start: 2020-09-17

## 2020-09-17 RX ORDER — SODIUM CHLORIDE 9 MG/ML
INJECTION, SOLUTION INTRAVENOUS CONTINUOUS
Status: DISCONTINUED | OUTPATIENT
Start: 2020-09-17 | End: 2020-09-17 | Stop reason: HOSPADM

## 2020-09-17 RX ORDER — ONDANSETRON 2 MG/ML
INJECTION INTRAMUSCULAR; INTRAVENOUS
Status: DISCONTINUED | OUTPATIENT
Start: 2020-09-17 | End: 2020-09-17

## 2020-09-17 RX ORDER — CEFAZOLIN SODIUM 1 G/3ML
2 INJECTION, POWDER, FOR SOLUTION INTRAMUSCULAR; INTRAVENOUS
Status: COMPLETED | OUTPATIENT
Start: 2020-09-17 | End: 2020-09-17

## 2020-09-17 RX ORDER — KETAMINE HCL IN 0.9 % NACL 50 MG/5 ML
SYRINGE (ML) INTRAVENOUS
Status: DISCONTINUED | OUTPATIENT
Start: 2020-09-17 | End: 2020-09-17

## 2020-09-17 RX ADMIN — MIDAZOLAM HYDROCHLORIDE 2 MG: 1 INJECTION, SOLUTION INTRAMUSCULAR; INTRAVENOUS at 06:09

## 2020-09-17 RX ADMIN — HYDROCODONE BITARTRATE AND ACETAMINOPHEN 1 TABLET: 5; 325 TABLET ORAL at 09:09

## 2020-09-17 RX ADMIN — LIDOCAINE HYDROCHLORIDE 100 MG: 20 INJECTION, SOLUTION INTRAVENOUS at 07:09

## 2020-09-17 RX ADMIN — ONDANSETRON 4 MG: 2 INJECTION, SOLUTION INTRAMUSCULAR; INTRAVENOUS at 07:09

## 2020-09-17 RX ADMIN — CEFAZOLIN 2 G: 330 INJECTION, POWDER, FOR SOLUTION INTRAMUSCULAR; INTRAVENOUS at 07:09

## 2020-09-17 RX ADMIN — PHENYLEPHRINE HYDROCHLORIDE 200 MCG: 10 INJECTION INTRAVENOUS at 08:09

## 2020-09-17 RX ADMIN — Medication 10 MG: at 07:09

## 2020-09-17 RX ADMIN — PHENYLEPHRINE HYDROCHLORIDE 200 MCG: 10 INJECTION INTRAVENOUS at 07:09

## 2020-09-17 RX ADMIN — DEXAMETHASONE SODIUM PHOSPHATE 8 MG: 4 INJECTION, SOLUTION INTRAMUSCULAR; INTRAVENOUS at 07:09

## 2020-09-17 RX ADMIN — PROPOFOL 50 MG: 10 INJECTION, EMULSION INTRAVENOUS at 07:09

## 2020-09-17 RX ADMIN — ACETAMINOPHEN 1000 MG: 500 TABLET ORAL at 06:09

## 2020-09-17 RX ADMIN — PHENYLEPHRINE HYDROCHLORIDE 50 MCG: 10 INJECTION INTRAVENOUS at 08:09

## 2020-09-17 RX ADMIN — PROPOFOL 150 MG: 10 INJECTION, EMULSION INTRAVENOUS at 07:09

## 2020-09-17 RX ADMIN — FAMOTIDINE 20 MG: 10 INJECTION, SOLUTION INTRAVENOUS at 07:09

## 2020-09-17 RX ADMIN — SODIUM CHLORIDE: 0.9 INJECTION, SOLUTION INTRAVENOUS at 06:09

## 2020-09-17 NOTE — PLAN OF CARE
Discharged to home with family. Pain controlled. Denies nausea. AVS reviewed and copy given. Consents in chart.

## 2020-09-17 NOTE — OP NOTE
DATE OF PROCEDURE: 9/17/2020    SURGEON: Surgeon(s) and Role:     * Isabel Moulton MD - Primary  Resident:  Dr. Betancourt    PREOPERATIVE DIAGNOSIS: Invasive breast carcinoma of the left breast lower outer quadrant    POSTOPERATIVE DIAGNOSIS: same    ANESTHESIA: regional and general    PROCEDURES PERFORMED:   1. left breast ultrasound with reflector localization partial mastectomy (lumpectomy) with excision for clear margins   2. left axillary deep sentinel lymph node biopsy   3. injection of left breast with technetium-labeled radiocolloid for sentinel lymph node identification  4. Identification of sentinel lymph node         PROCEDURE IN DETAIL:   The patient underwent informed consent.  The films were reviewed.    She was then brought to the Operating Room and placed in the supine position. regional and general anesthesia was administered.    The left breast was injected in the subareolar region with the technetium-labeled radiocolloid.   The left breast, anterior chest, arm and axilla were then prepped and draped in a sterile fashion.     Using the gamma probe, activity was noted and localized in the left axilla. Local anesthetic with 1% lidocaine was injected into the skin where the incision will be placed. We made a small transverse inferior axillary incision over the area of activity. We then dissected down through the clavipectoral fascia using electrocautery, identifying a hot afferent lymphatic channel coming from the upper outer quadrant axillary tail of Luciano breast tissue to a level 1 sentinel node, which was excised. It was dissected circumferentially with blunt dissection and the afferent and efferent lymphatics were tied together. The lymph node was labeled as specimen #1 for permanent sectioning, hot and not blue with an ex vivo count of 81. A total of 1 axillary sentinel lymph nodes were removed.  The probe was placed in the cavity and there was no significant residual background radioactivity or blue  dye noted in the axilla indicating adequate and appropriate sentinel lymph node biopsy. The cavity was then palpated and 0 further palpable nodes were noted. Any additional palpable nodes were sent to pathology for permanent section.       We then achieved hemostasis and irrigation was performed.  The incision was then closed with an interuppted 3-0 vicryl deep dermal followed by a running 4-0 vicryl subcuticular.    Next, we turned our attention to the left breast itself. Ultrasound was used to identify the breast mass at 6 o'clock.  The area if interest was identified with a clip within and then was marked for localization using ultrasound guidance.  An incision was made in the lower outer quadrant of the left breast over the anticipated tract of the lesion in a kathleen-tumoral location.  The specimen was dissected circumferentially around the cancer.  We did not dissect all the way down to the underlying pectoralis fascia.  The ultrasound localization lumpectomy specimen was inked on the back table using green ink inferiorly, blue ink superiorly, orange ink laterally, yellow ink anteriorly, black ink posteriorly, and the red ink medially.  It was then fixed with acetic acid and submitted for specimen radiograph with the [facility], which confirmed the clip and area of interest within the specimen, and was interpreted in the operating room. Given the appearance and location of the lesion, additional margins were taken including medial and inferior.       Within the lumpectomy cavity, hemostasis was achieved with cautery. The wound was irrigated until clear. There was no evidence of bleeding. It was closed in multiple layers with deep dermal and subcutaneous interrupted Vicryl sutures and a running 4-0 vicryl subcuticular skin closure.    Dermabond was applied. Sterile fluff gauze was placed and a post-procedure bra was placed. She tolerated the procedure well without complication and was turned over to Anesthesia for  transport to the recovery area in a satisfactory condition. All specimens were sent to Pathology for permanent sectioning.    ESTIMATED BLOOD LOSS: 10ml    COMPLICATIONS: none    DISPOSITION:  PACU--hemodynamically stable    ATTESTATION:  I was present and scrubbed for the entire procedure.

## 2020-09-17 NOTE — TRANSFER OF CARE
"Anesthesia Transfer of Care Note    Patient: Sunitha Pillai    Procedure(s) Performed: Procedure(s) (LRB):  MASTECTOMY, PARTIAL-w/reflector (Left)  INJECTION, FOR SENTINEL NODE IDENTIFICATION (Left)  BIOPSY, LYMPH NODE, SENTINEL (Left)    Patient location: PACU    Anesthesia Type: general    Transport from OR: Transported from OR on 6-10 L/min O2 by face mask with adequate spontaneous ventilation    Post pain: adequate analgesia    Post assessment: no apparent anesthetic complications    Post vital signs: stable    Level of consciousness: awake    Nausea/Vomiting: no nausea/vomiting    Complications: none    Transfer of care protocol was followed      Last vitals:   Visit Vitals  /63 (BP Location: Right arm, Patient Position: Lying)   Pulse 95   Temp 36.7 °C (98 °F) (Oral)   Resp 15   Ht 5' 4" (1.626 m)   Wt 79.4 kg (175 lb)   LMP  (LMP Unknown)   SpO2 100%   Breastfeeding No   BMI 30.04 kg/m²     "

## 2020-09-17 NOTE — ANESTHESIA PROCEDURE NOTES
Intubation  Performed by: Katerina Pena CRNA  Authorized by: Tasha Steinberg MD     Intubation:     Induction:  Intravenous    Intubated:  Postinduction    Mask Ventilation:  Easy mask    Attempts:  1    Attempted By:  CRNA    Difficult Airway Encountered?: No      Complications:  None    Airway Device:  Supraglottic airway/LMA    Airway Device Size:  3.5    Style/Cuff Inflation:  Cuffed    Placement Verified By:  Capnometry    Complicating Factors:  None    Findings Post-Intubation:  BS equal bilateral

## 2020-09-17 NOTE — PROGRESS NOTES
Patient complaining of 1/10  chest pain that has been present off and on for at least 1 month. Anesthesia notified, no new orders given.Pre op completed. All concerns addressed. Patient belongings given to daughter. Bed in lowest position. Call light within reach. Family at beside.

## 2020-09-17 NOTE — INTERVAL H&P NOTE
The patient has been examined and the H&P has been reviewed:    I concur with the findings and no changes have occurred since H&P was written.    Anesthesia/Surgery risks, benefits and alternative options discussed and understood by patient/family.          Active Hospital Problems    Diagnosis  POA    Malignant neoplasm of lower-inner quadrant of left breast in female, estrogen receptor positive [C50.312, Z17.0]  Not Applicable      Resolved Hospital Problems   No resolved problems to display.

## 2020-09-17 NOTE — ANESTHESIA PROCEDURE NOTES
Paravertebral Single Injection Block(s)    Patient location during procedure: pre-op   Block not for primary anesthetic.  Reason for block: at surgeon's request and post-op pain management   Post-op Pain Location: left chest pain  Start time: 9/17/2020 6:40 AM  Timeout: 9/17/2020 6:40 AM   End time: 9/17/2020 6:50 AM    Staffing  Authorizing Provider: Tasha Steinberg MD  Performing Provider: Kenneth Shannon MD    Preanesthetic Checklist  Completed: patient identified, site marked, surgical consent, pre-op evaluation, timeout performed, IV checked, risks and benefits discussed and monitors and equipment checked  Peripheral Block  Patient position: sitting  Prep: ChloraPrep  Patient monitoring: heart rate, cardiac monitor, continuous pulse ox, continuous capnometry and frequent blood pressure checks  Block type: paravertebral - thoracic  Laterality: left  Injection technique: single shot  Location: T4-5 and T2-3  Needle  Needle type: Tuohy   Needle gauge: 17 G  Needle length: 3.5 in  Needle localization: anatomical landmarks     Assessment  Injection assessment: negative aspiration and negative parasthesia  Paresthesia pain: none  Heart rate change: no  Slow fractionated injection: yes  Additional Notes  T2 os at 5 cm  T4 os at 4 cm  VSS.  [facility] RN monitoring vitals throughout procedure.  Patient tolerated procedure well.    20 cc's of 0.5% ropi w/ epi administered at T4. 10 cc's of 0.5% ropi w/ epi administered at T2.

## 2020-09-17 NOTE — ANESTHESIA PREPROCEDURE EVALUATION
09/17/2020  Sunitha Pillai is a 65 y.o., female.    Patient Active Problem List   Diagnosis    Type 2 diabetes mellitus, uncontrolled    Essential hypertension    GERD (gastroesophageal reflux disease)    Atypical chest pain    Vertigo    Dizziness    Obesity    Type 2 diabetes mellitus with complication, with long-term current use of insulin    Left wrist pain    Vasovagal syncope    Elevated liver enzymes    Counseling and coordination of care    Other chest pain    Malignant neoplasm of lower-inner quadrant of left breast in female, estrogen receptor positive       Anesthesia Evaluation    I have reviewed the Patient Summary Reports.    I have reviewed the Nursing Notes. I have reviewed the NPO Status.   I have reviewed the Medications.     Review of Systems  Social:  Non-Smoker, No Alcohol Use    Hematology/Oncology:        Current/Recent Cancer. Breast   Cardiovascular:   Hypertension Angina    Hepatic/GI:   GERD, well controlled    Endocrine:   Diabetes        Physical Exam  General:  Well nourished    Airway/Jaw/Neck:  Airway Findings: Mouth Opening: Normal Tongue: Normal  General Airway Assessment: Adult  Mallampati: III  Improves to II with phonation.  TM Distance: Normal, at least 6 cm      Dental:  Dental Findings: In tact    Chest/Lungs:  Chest/Lungs Findings: Clear to auscultation, Normal Respiratory Rate     Heart/Vascular:  Heart Findings: Rate: Tachycardia  Rhythm: Regular Rhythm        Mental Status:  Mental Status Findings:  Cooperative, Alert and Oriented         Anesthesia Plan  Type of Anesthesia, risks & benefits discussed:  Anesthesia Type:  general, regional  Patient's Preference:   Intra-op Monitoring Plan: standard ASA monitors  Intra-op Monitoring Plan Comments:   Post Op Pain Control Plan: multimodal analgesia, IV/PO Opioids PRN and peripheral nerve block  Post  Op Pain Control Plan Comments:   Induction:   IV  Beta Blocker:  Patient is not currently on a Beta-Blocker (No further documentation required).       Informed Consent: Patient understands risks and agrees with Anesthesia plan.  Questions answered. Anesthesia consent signed with patient.  ASA Score: 2     Day of Surgery Review of History & Physical:    H&P update referred to the surgeon.         Ready For Surgery From Anesthesia Perspective.

## 2020-09-17 NOTE — DISCHARGE INSTRUCTIONS
Recovery After Procedural Sedation (Adult)  You have been given medicine by vein to make you sleep during your surgery. This may have included both a pain medicine and sleeping medicine. Most of the effects have worn off. But you may still have some drowsiness for the next 6 to 8 hours.    Home care  Follow these guidelines when you get home:  · For the next 8 hours, you should be watched by a responsible adult. This person should make sure your condition is not getting worse.  · Don't drink any alcohol for the next 24 hours.  · Don't drive, operate dangerous machinery, or make important business or personal decisions during the next 24 hours.  Note: Your healthcare provider may tell you not to take any medicine by mouth for pain or sleep in the next 4 hours. These medicines may react with the medicines you were given in the hospital. This could cause a much stronger response than usual.    Follow-up care  Follow up with your healthcare provider if you are not alert and back to your usual level of activity within 12 hours.    When to seek medical advice  Call your healthcare provider right away if any of these occur:  · Drowsiness gets worse  · Weakness or dizziness gets worse  · Repeated vomiting  · You can't be awakened     Date Last Reviewed: 10/18/2016  © 5558-5102 [employer]. [address] [place] [address], [place] [address]. All rights reserved. This information is not intended as a substitute for professional medical care. Always follow your healthcare professional's instructions.    Discharge Instructions: After Your Surgery  You've just had surgery. During surgery, you were given medicine called anesthesia to keep you relaxed and free of pain. After surgery, you may have some pain or nausea. This is common. Here are some tips for feeling better and getting well after surgery.    Stay on schedule with your medicine.   Going home  Your healthcare provider will show you how to take care of yourself when  you go home. He or she will also answer your questions. Have an adult family member or friend drive you home. For the first 24 hours after your surgery:  · Have someone stay with you, if needed. He or she can watch for problems and help keep you safe.  Be sure to go to all follow-up visits with your healthcare provider. And rest after your surgery for as long as your healthcare provider tells you to.    Coping with pain  If you have pain after surgery, pain medicine will help you feel better. Take it as told, before pain becomes severe. Also, ask your healthcare provider or pharmacist about other ways to control pain. This might be with heat, ice, or relaxation. And follow any other instructions your surgeon or nurse gives you.    Tips for taking pain medicine  To get the best relief possible, remember these points:  · Pain medicines can upset your stomach. Taking them with a little food may help.  · Most pain relievers taken by mouth need at least 20 to 30 minutes to start to work.  · Taking medicine on a schedule can help you remember to take it. Try to time your medicine so that you can take it before starting an activity. This might be before you get dressed, go for a walk, or sit down for dinner.  · Constipation is a common side effect of pain medicines. Call your healthcare provider before taking any medicines such as laxatives or stool softeners to help ease constipation. Also ask if you should skip any foods. Drinking lots of fluids and eating foods such as fruits and vegetables that are high in fiber can also help. Remember, do not take laxatives unless your surgeon has prescribed them.    Your healthcare provider may tell you to take acetaminophen to help ease your pain. Ask him or her how much you are supposed to take each day. Acetaminophen or other pain relievers may interact with your prescription medicines or other over-the-counter (OTC) medicines. Some prescription medicines have acetaminophen and  other ingredients. Using both prescription and OTC acetaminophen for pain can cause you to overdose. Read the labels on your OTC medicines with care. This will help you to clearly know the list of ingredients, how much to take, and any warnings. It may also help you not take too much acetaminophen. If you have questions or do not understand the information, ask your pharmacist or healthcare provider to explain it to you before you take the OTC medicine.    Managing nausea  Some people have an upset stomach after surgery. This is often because of anesthesia, pain, or pain medicine, or the stress of surgery. These tips will help you handle nausea and eat healthy foods as you get better. If you were on a special food plan before surgery, ask your healthcare provider if you should follow it while you get better. These tips may help:  · Do not push yourself to eat. Your body will tell you when to eat and how much.  · Start off with clear liquids and soup. They are easier to digest.  · Next try semi-solid foods, such as mashed potatoes, applesauce, and gelatin, as you feel ready.  · Slowly move to solid foods. Don't eat fatty, rich, or spicy foods at first.  · Do not force yourself to have 3 large meals a day. Instead eat smaller amounts more often.  · Take pain medicines with a small amount of solid food, such as crackers or toast, to avoid nausea.     Call your surgeon if  · You still have pain an hour after taking medicine. The medicine may not be strong enough.  · You feel too sleepy, dizzy, or groggy. The medicine may be too strong.  · You have side effects like nausea, vomiting, or skin changes, such as rash, itching, or hives.       If you have obstructive sleep apnea  You were given anesthesia medicine during surgery to keep you comfortable and free of pain. After surgery, you may have more apnea spells because of this medicine and other medicines you were given. The spells may last longer than usual.   At  home:  · Keep using the continuous positive airway pressure (CPAP) device when you sleep. Unless your healthcare provider tells you not to, use it when you sleep, day or night. CPAP is a common device used to treat obstructive sleep apnea.  · Talk with your provider before taking any pain medicine, muscle relaxants, or sedatives. Your provider will tell you about the possible dangers of taking these medicines.  Date Last Reviewed: 12/1/2016  © 4515-2464 [employer]. [address] [place] [address]. All rights reserved. This information is not intended as a substitute for professional medical care. Always follow your healthcare professional's instructions.          PATIENT INSTRUCTIONS  POST-ANESTHESIA    IMMEDIATELY FOLLOWING SURGERY:  Do not drive or operate machinery for the first twenty four hours after surgery.  Do not make any important decisions for twenty four hours after surgery or while taking narcotic pain medications or sedatives.  If you develop intractable nausea and vomiting or a severe headache please notify your doctor immediately.    FOLLOW-UP:  Please make an appointment with your surgeon as instructed. You do not need to follow up with anesthesia unless specifically instructed to do so.    WOUND CARE INSTRUCTIONS (if applicable):  Keep a dry clean dressing on the anesthesia/puncture wound site if there is drainage.  Once the wound has quit draining you may leave it open to air.  Generally you should leave the bandage intact for twenty four hours unless there is drainage.  If the epidural site drains for more than 36-48 hours please call the anesthesia department.    QUESTIONS?:  Please feel free to call your physician or the hospital  if you have any questions, and they will be happy to assist you.       [facility] Anesthesia Department  [address], [place]  [phone]      Wound Check After Surgery: Bleeding  Surgery involves cutting through  layers of skin, fatty tissue, muscle, and sometimes bone and cartilage. Sutures (stitches) or staples are used to close all layers of the wound. The sutures on the inside will dissolve in about 2 to 3 weeks. Any sutures or staples used on the outside need to be removed in about 7 to 14 days, depending on the location.  It is normal to have some clear or bloody discharge on the wound covering or bandage (dressing) for the first few days after surgery. If your wound was sutured (sewn) closed, you should not have to change the dressing more than three times a day in the first few days. Bleeding or discharge requiring more frequent dressing changes can be a sign of a problem.    Home care  Different types of surgery require different types of care and dressing changes. It is important to follow all instructions and advice from your surgeon, as well as other members of your healthcare team.    Wound care  · Keep the wound clean, as directed by your healthcare provider.  · Change the dressing as directed. Change the dressing sooner if it becomes wet or stained with blood or fluid from the wound.  · Bathe with a sponge (no shower or tub baths) for the first few days after surgery, or until there is no more drainage from the wound. Unless you received different instructions from your surgeon, you can then shower. Do not soak the area in water (no baths or swimming) until the tape, sutures, or staples are removed and any wound opening has dried out and healed.    Changing the dressing  · Wash your hands before changing the dressings.  · Carefully remove the dressing and tape; don't just yank it off. If it sticks to the wound, you may need to wet it a little to remove it, unless your healthcare provider told you not to wet it.  · Wash your hands again before putting on a new, clean dressing.  · Gently clean the wound with clean water (or saline) using gauze or a clean washcloth. Do not rub it or pick at it.  · Do not use  soap, alcohol, hydrogen peroxide, or any other cleanser.  · If you were told to dry the wound before putting on a new dressing, gently pat it dry. Do not rub.  · Throw out the old dressing. Do not reuse it!  · Wash your hands again when you are done.    Types of dressings  Your healthcare team will tell you what type of dressing to put on your wound. Follow your healthcare team's instructions carefully, and contact them if you have any questions. Two common types of dressings are described below. You may have one of these or another type.  · Dry dressing. Use dry gauze. If the wound is still draining, use a nonadherent dressing, which shouldn't stick to the wound.  · Wet-to-dry dressing. Wet the gauze, and squeeze out the excess water (or saline), before putting it on. Then, cover this with a dry pad.    Medicines  · If you were given antibiotics, take them until they are used up or your healthcare provider tells you to stop. It is important to finish the antibiotics even though you feel better, to make sure the infection has cleared.  · You can take acetaminophen or ibuprofen for pain, unless you were given a different pain medicine to use. (Note: If you have chronic liver or kidney disease, or have ever had a stomach ulcer or gastrointestinal bleeding, or are taking blood thinner medicines, talk with your healthcare provider before using these medicines.)  · Aspirin should never be used in anyone under 18 years of age who is ill with a fever. It may cause severe liver damage.    Follow-up care  Follow up with your healthcare provider, or as advised, for your next wound check or removal of sutures, staples, or tape.  · If a culture was done, you will be notified if the results will affect your treatment. You can call as directed for the results.  · If imaging tests, such as X-rays, an ultrasound, or CT scan were done, they will be reviewed by a specialist. You will be notified of the results, especially if they  affect treatment.    Call 911  Call emergency services right away if any of these occur:  · Trouble breathing or swallowing, wheezing  · Hoarse voice or trouble speaking  · Extreme confusion  · Extreme drowsiness or trouble awakening  · Fainting or loss of consciousness  · Rapid heart rate or very slow heart rate  · Vomiting blood, or large amounts of blood in stool  · Discomfort in the center of the chest that feels like pressure, squeezing, a sense of fullness, or pain  · Discomfort or pain in other upper body areas, such as the back, one or both arms, neck, jaw, or stomach  · Stroke symptoms (spot a stroke FAST)  ¨ F: Face drooping. One side of the face is numb or droops.  ¨ A: Arm weakness. One arm feels weak or numb.  ¨ S: Speech difficulty: Speech is slurred, or the person is unable to speak.  ¨ T: Time to call 911. Even if symptoms go away, call 911.    When to seek medical advice  Call your healthcare provider right away if any of the following occur:  · Fluid or blood soaking 5 or more bandages a day during the first 3 days after surgery  · Fluid or blood still draining from the wound more than 3 days after surgery  · Increasing pain at the site of surgery  · Fever over 100.4º F (38.0º C)  · Redness around the wound  · Pus coming from the wound  · Vomiting, constipation, or diarrhea    Date Last Reviewed: 9/27/2015  © 7692-3398 [employer]. [address] [place] [address]. All rights reserved. This information is not intended as a substitute for professional medical care. Always follow your healthcare professional's instructions.      Wound Check After Surgery, No Complication    It is normal to feel pain at the incision site. The pain decreases as the wound heals. Most of the pain and soreness from the skin incision should go away by the time the sutures or staples are removed. Soreness and pain from deeper tissues may last another week or two.  Pain that continues more than a few  weeks after surgery or pain that worsens anytime after surgery can be a sign of a problem, such as:  · Infection  · Separation of wound edges  · Collection of blood or other below the skin        Date Last Reviewed: 9/27/2015 © 2000-2017 [employer]. [address] [place] [address]. All rights reserved. This information is not intended as a substitute for professional medical care. Always follow your healthcare professional's instructions.POSTOPERATIVE INSTRUCTIONS FOLLOWING BIOPSY OR LUMPECTOMY    The following are post-operative instructions that will help you to recover from your surgery.  Please read over these instructions carefully and contact us if we can answer any of your questions or concerns.    Dressing/breast binder (surgi-bra)  A surgical bra may be placed around your chest after your surgery.  If you are given the bra, please wear it as close to 24 hours a day as possible until your post-operative clinic appointment.  If the elastic around the bra irritates your skin, you may wear a soft t-shirt underneath the bra.  You may go without wearing the bra long enough to shower, to launder and dry the bra.  If the bra is extremely uncomfortable, you may wear a supportive sports bra instead after 2 days.  You may shower the day after surgery.  Do not take a tub bath and do not soak the surgical site.    Activity   You should be able to return to your regular activities 2 days after your surgery.  However, do not engage in strenuous activities in which you use your upper body such as:  golf, tennis, aerobics, washing windows, raking the yard, mopping, vacuuming, heavy lifting (e.g children) until you are seen for your follow-up appointment in clinic.    Medication for pain  You may find that over the counter pain medications may be sufficient for your pain.  You will be given a prescription for pain medication for more severe pain.  You should not drive or operate machinery while  taking these.  Please take narcotics with food.  Narcotics can cause, or worse, constipation.  You will need to increase your fluid intake, eat high fiber foods (such as fruits and bran) and make sure that you are up and walking. You may need to take an over the counter stool softener for constipation.    Please report the following:   Temperature greater than 101 degrees   Discharge or bad odor from the wound   Excessive bleeding, such as bloody dressing or extreme bruising   Redness at incision and/or drain sites   Swelling or buildup of fluid around incision     Additional information  I will see you approximately 2 weeks following your surgery.  If this follow-up appointment has not been made, please call the office.    If you have any questions or problems, please call my office or my nurse.    MD Orlando Rodriguez, RN  [phone]    After hours and on weekends, you may call the main [facility] line at [phone] and ask to have the general surgery resident paged or have me paged.

## 2020-09-17 NOTE — ANESTHESIA POSTPROCEDURE EVALUATION
Anesthesia Post Evaluation    Patient: Sunitha Pillai    Procedure(s) Performed: Procedure(s) (LRB):  MASTECTOMY, PARTIAL-w/reflector (Left)  INJECTION, FOR SENTINEL NODE IDENTIFICATION (Left)  BIOPSY, LYMPH NODE, SENTINEL (Left)    Final Anesthesia Type: general    Patient location during evaluation: PACU  Patient participation: Yes- Able to Participate  Level of consciousness: awake and alert  Post-procedure vital signs: reviewed and stable  Pain management: adequate  Airway patency: patent    PONV status at discharge: No PONV  Anesthetic complications: no      Cardiovascular status: blood pressure returned to baseline  Respiratory status: unassisted  Hydration status: euvolemic  Follow-up not needed.          Vitals Value Taken Time   /69 09/17/20 0846   Temp 36.5 °C (97.7 °F) 09/17/20 0845   Pulse 87 09/17/20 0932   Resp 42 09/17/20 0932   SpO2 100 % 09/17/20 0932   Vitals shown include unvalidated device data.      Event Time   Out of Recovery 09:30:00         Pain/Troy Score: Pain Rating Prior to Med Admin: 4 (9/17/2020  9:28 AM)  Pain Rating Post Med Admin: 2 (9/17/2020  9:40 AM)  Troy Score: 9 (9/17/2020  9:00 AM)

## 2020-09-18 VITALS
OXYGEN SATURATION: 100 % | WEIGHT: 175 LBS | RESPIRATION RATE: 19 BRPM | DIASTOLIC BLOOD PRESSURE: 72 MMHG | HEIGHT: 64 IN | BODY MASS INDEX: 29.88 KG/M2 | HEART RATE: 83 BPM | TEMPERATURE: 97 F | SYSTOLIC BLOOD PRESSURE: 137 MMHG

## 2020-09-18 LAB — POCT GLUCOSE: 125 MG/DL (ref 70–110)

## 2020-09-22 ENCOUNTER — TELEPHONE (OUTPATIENT)
Dept: SURGERY | Facility: CLINIC | Age: 65
End: 2020-09-22

## 2020-09-22 NOTE — TELEPHONE ENCOUNTER
Return call to pt. States that her surgery bra is soiled and her bras have under wire in them. Informed pt that she can wear an under wire bra, if that is comfortable for her and gives support. Per pt, under wire bra hurts her all the time. Instructed pt to get a bra without under wire, such as a support bra.

## 2020-09-22 NOTE — TELEPHONE ENCOUNTER
----- Message from Orlando Naik RN sent at 9/22/2020  7:49 AM CDT -----  Regarding: FW: Pt    ----- Message -----  From: Kimberly Ramos  Sent: 9/21/2020   1:20 PM CDT  To: [facility] Staff  Subject: Pt                                               Reason: Pt calling with a few questions regard to can she take a bath and also do she have to wear the bra that was given or can she go and buy one that snaps in the front because the one she she's wearing is dirty.. please call     Communication: [phone]

## 2020-09-25 NOTE — ADDENDUM NOTE
Addendum  created 09/25/20 1022 by Kenneth Shannon MD    Clinical Note Signed, Intraprocedure Blocks edited

## 2020-09-30 ENCOUNTER — DOCUMENTATION ONLY (OUTPATIENT)
Dept: SURGERY | Facility: CLINIC | Age: 65
End: 2020-09-30

## 2020-09-30 ENCOUNTER — OFFICE VISIT (OUTPATIENT)
Dept: SURGERY | Facility: CLINIC | Age: 65
End: 2020-09-30
Payer: MEDICARE

## 2020-09-30 VITALS
BODY MASS INDEX: 32.27 KG/M2 | SYSTOLIC BLOOD PRESSURE: 141 MMHG | DIASTOLIC BLOOD PRESSURE: 65 MMHG | HEART RATE: 97 BPM | TEMPERATURE: 98 F | WEIGHT: 189 LBS | HEIGHT: 64 IN

## 2020-09-30 DIAGNOSIS — C50.312 MALIGNANT NEOPLASM OF LOWER-INNER QUADRANT OF LEFT BREAST IN FEMALE, ESTROGEN RECEPTOR POSITIVE: Primary | ICD-10-CM

## 2020-09-30 DIAGNOSIS — Z17.0 MALIGNANT NEOPLASM OF LOWER-INNER QUADRANT OF LEFT BREAST IN FEMALE, ESTROGEN RECEPTOR POSITIVE: Primary | ICD-10-CM

## 2020-09-30 PROCEDURE — 99024 PR POST-OP FOLLOW-UP VISIT: ICD-10-PCS | Mod: HCNC,S$GLB,, | Performed by: SURGERY

## 2020-09-30 PROCEDURE — 99999 PR PBB SHADOW E&M-EST. PATIENT-LVL IV: ICD-10-PCS | Mod: PBBFAC,HCNC,, | Performed by: SURGERY

## 2020-09-30 PROCEDURE — 99024 POSTOP FOLLOW-UP VISIT: CPT | Mod: HCNC,S$GLB,, | Performed by: SURGERY

## 2020-09-30 PROCEDURE — 99999 PR PBB SHADOW E&M-EST. PATIENT-LVL IV: CPT | Mod: PBBFAC,HCNC,, | Performed by: SURGERY

## 2020-09-30 NOTE — Clinical Note
Ms. Pillai is recovering well from her lumpectomy and sentinel node procedure.  She was found have a stage I breast cancer.  We are awaiting final testing to see if chemotherapy will be necessary.  She has been set up with Medical Oncology to act upon those results.  I will continue to follow her with 6 month surveillance exams and yearly mammograms.    Thanks again for sending her!  Isabel Moulton MD  Breast Surgical Oncology

## 2020-09-30 NOTE — PROGRESS NOTES
"[facility]       Post-Op        REFERRING PHYSICIAN:  Patty Louis MD      DIAGNOSIS:    This is a 65 y.o. female with a stage pT1c N0 M0 grade 3 ER + IN + HER2 - Invasive Ductal Carcinoma of the left breast.    TREATMENT SUMMARY:  The patient is status post left partial mastectomy and sentinel node biopsy on 9/17/2020.  Final pathology showed   1. LEFT BREAST, LUMPECTOMY:  - Invasive ductal carcinoma, grade 3, 1.5 cm.  - See CAP synoptic report below.  2. LEFT AXILLARY SENTINEL LYMPH NODE, HOT 81, DISSECTION:  - One lymph node, negative for carcinoma (0/1).  - See CAP synoptic report below.  3. NEW MEDIAL MARGIN, INK MARKS NEW MARGIN, EXCISION:  - Negative for atypia or malignancy.  - See CAP synoptic report below.  4. NEW INFERIOR MARGIN, INK CRAIN NEW MARGIN, EXCISION:  - Negative for atypia or malignancy.  - See CAP synoptic report below.  SURGICAL PATHOLOGY CANCER CASE SUMMARY- Breast  Procedure: Excision (less than mastectomy).  Specimen laterality: Left.  Tumor size: 1.5 cm (greatest dimension).  Histologic type: Invasive ductal carcinoma.  Histologic grade: Grade 3 (glandular/tubular differentiation- 3, nuclear pleomorphism- 3, mitotic rate- 3)    FINAL PATHOLOGIC DIAGNOSIS  Malignant neoplasm of overlapping sites of left breast in female, estrogen receptor positive.      INTERVAL HISTORY:   Sunitha Pillai comes in for a post-op check.  She denies fever, chills, chest pain or shortness of breath.  Her pain is well controlled.      MEDICATIONS:  Current Outpatient Medications   Medication Sig Dispense Refill    acetaminophen (TYLENOL) 325 MG tablet Take 2 tablets (650 mg total) by mouth every 8 (eight) hours as needed for Pain. (Patient not taking: Reported on 9/2/2020)  0    aspirin 81 mg Tab Take 1 tablet by mouth Daily.      BD ULTRA-FINE SHORT PEN NEEDLE 31 gauge x 5/16" Ndle USE 1  4 TIMES DAILY WITH  INSULIN 100 each 3    blood sugar diagnostic Strp Strips and lancets " covered by insurance E11.9, pt checks glucose three times daily, insulin dependent 300 each 3    blood sugar diagnostic, drum (ACCU-CHEK COMPACT TEST) Strp USE ONE STRIP TO CHECK GLUCOSE 4 TIMES DAILY BEFORE EACH MEAL AND AT BEDTIME 100 each 0    blood-glucose meter kit Check glucose three times daily E11.9 meter covered by insurance, insulin dependent 1 each 0    candesartan (ATACAND) 8 MG tablet Take 1 tablet (8 mg total) by mouth once daily. 30 tablet 6    colchicine (COLCRYS) 0.6 mg tablet Take 1 tablet (0.6 mg total) by mouth 2 (two) times daily. (Patient not taking: Reported on 9/2/2020) 20 tablet 1    dulaglutide (TRULICITY) 0.75 mg/0.5 mL pen injector Inject 0.75 mg weekly 4 mL 5    HYDROcodone-acetaminophen (NORCO) 5-325 mg per tablet Take 1 tablet by mouth every 6 (six) hours as needed for Pain. 10 tablet 0    insulin aspart U-100 (NOVOLOG U-100 INSULIN ASPART) 100 unit/mL injection Use w/ vgo 30, 3 clicks with meals, 1 click with snack, additional click if sugar is > 180. Max daily 80 units. May substitute humalog vials 3 vial 6    insulin degludec (TRESIBA FLEXTOUCH U-100) 100 unit/mL (3 mL) InPn 45 Units daily 5 Syringe 6    lancets Misc To check BG 4 times daily, to use with insurance preferred meter, insulin dependent 400 each 3    metFORMIN (GLUCOPHAGE) 1000 MG tablet Take 1 tablet (1,000 mg total) by mouth 2 (two) times daily. 60 tablet 6    NOVOLOG FLEXPEN U-100 INSULIN 100 unit/mL (3 mL) InPn pen INJECT 20 UNITS SUBCUTANEOUSLY THREE TIMES DAILY WITH MEALS Plus sliding scale 1 Box 6    sub-q insulin device, 30 unit (V-GO 30) Kanchan Change everyday 30 Device 6     No current facility-administered medications for this visit.      Facility-Administered Medications Ordered in Other Visits   Medication Dose Route Frequency Provider Last Rate Last Dose    fentaNYL injection 25 mcg  25 mcg Intravenous Q5 Min PRN Kenneth Shannon MD        midazolam (VERSED) 1 mg/mL injection 0.5 mg   0.5 mg Intravenous PRN Kenneth Shannon MD   2 mg at 09/17/20 0637       ALLERGIES:   Review of patient's allergies indicates:  No Known Allergies    PHYSICAL EXAMINATION:   General:  This is a well appearing female with appropriate speech, affect and gait.     Breast:  Incision clean, dry, and intact    Pathology:  1. LEFT BREAST, LUMPECTOMY:  - Invasive ductal carcinoma, grade 3, 1.5 cm.  - See CAP synoptic report below.  2. LEFT AXILLARY SENTINEL LYMPH NODE, HOT 81, DISSECTION:  - One lymph node, negative for carcinoma (0/1).  - See CAP synoptic report below.  3. NEW MEDIAL MARGIN, INK MARKS NEW MARGIN, EXCISION:  - Negative for atypia or malignancy.  - See CAP synoptic report below.  4. NEW INFERIOR MARGIN, INK CRAIN NEW MARGIN, EXCISION:  - Negative for atypia or malignancy.  - See CAP synoptic report below.  SURGICAL PATHOLOGY CANCER CASE SUMMARY- Breast  Procedure: Excision (less than mastectomy).  Specimen laterality: Left.  Tumor size: 1.5 cm (greatest dimension).  Histologic type: Invasive ductal carcinoma.  Histologic grade: Grade 3 (glandular/tubular differentiation- 3, nuclear pleomorphism- 3, mitotic rate- 3)  Tumor focality: Unifocal.  Ductal carcinoma in situ: Not identified.  Margins (including new inferior and medial margins)-  Uninvolved by invasive carcinoma, invasive carcinoma is >1 cm from margins.  Treatment effect: No known presurgical therapy.  Lymphovascular invasion: Present.  Lymph nodes:  Total number of sentinel lymph nodes examined: 1.  Total number of lymph nodes examined (sentinel and non-sentinel): 1.  Number of lymph nodes with Macrometastases (>2 mm): 0.  Number of lymph nodes with Micrometastases (> 0.2 mm to 2 mm and/or > 200 cells): 0.  Number of lymph nodes with isolated tumor cells (less than or equal to 0.2 mm and less than or equal to  200 cells): 0.  Pathologic staging (pTNM, AJCC 8th edition): pT1c (sn)N0.  Ancillary studies- (Performed on biopsy specimen)  ER:  Positive (strong, >95%).  VA: Positive (strong, >95%).  [id number]: Negative.  Ki-67: >95%.      IMPRESSION:   The patient has had an uneventful postoperative course.    PLAN:   1. Return in 6 months for a follow up office visit and breast exam  2. Radiation oncology referral  3. Medical oncology referral  4. Oncotype testing of specimen  5. 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 Schedule to meet w/ genetic counselor for further education.  5. bilateral mammogram in July 2021  6. The patient is advised in continued exam of the breast chest wall and to report to this office sooner should she note any areas of abnormality or concern.   7. She has been instructed to meet with med onc and rad onc for discussion of adjuvant treatment recommendations

## 2020-09-30 NOTE — PROGRESS NOTES
Copy of genetic test results from 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

## 2020-10-01 ENCOUNTER — DOCUMENTATION ONLY (OUTPATIENT)
Dept: SURGERY | Facility: CLINIC | Age: 65
End: 2020-10-01

## 2020-10-01 LAB — INTEGRATED BRAC ANALYSIS: NORMAL

## 2020-10-01 NOTE — NURSING
Met with patient at post op visit. Scheduled appts with Dr. Nelson and Dr. Anguiano on 10/8/2020. Appt scheduled for genetic counseling on 10/15/2020. 6 month follow up scheduled with Dr. Moulton. Oncotype ordered, order number is [id number].  Oncology Navigation   Intake  Date of Diagnosis: 08/25/20  Cancer Type: Breast  Internal / External Referral: Internal  Initial Nurse Navigator Contact: 08/26/20  Diagnosis to Initial Contact Timeline (days): 1 days  Contact Method: Phone  Date Worked: 08/26/20  First Appointment Available: 09/02/20  Appointment Date: 09/02/20  Schedule to Appointment Timeline (days): 7  First Available Date vs. Scheduled Date (days): 0     Treatment  Current Status: Active  Treatment Type(s): Surgery  Surgery: Left partial mastectomy with SLNB  Surgeon Name: Dr. Moulton  Surgery Schedule Date: 09/17/20    Radiation Oncologist: Dr. Anguiano    Procedures: Genetic test  Biopsy Schedule Date: 08/25/20  Genetic Test Schedule Date: 09/02/20    General Referrals: Radiation oncology;Other  Genetic Counseling Referral Date: 09/30/20 (Appt scheduled 10/15/2020)  Other Referral: Dr. Nelson and Dr. Quiñones appt on 10/8/2020    Genetic Screening Assessment: Genetic Counseling  Support Systems: Children     Acuity      Follow Up  Follow up in about 9 days (around 10/10/2020) for f/u after Dr. Nelson and Silvio Marcial.

## 2020-10-06 ENCOUNTER — TELEPHONE (OUTPATIENT)
Dept: INTERNAL MEDICINE | Facility: CLINIC | Age: 65
End: 2020-10-06

## 2020-10-06 NOTE — TELEPHONE ENCOUNTER
----- Message from Patty Louis MD sent at 9/13/2020 12:50 PM CDT -----  Please inform normal urine.

## 2020-10-07 ENCOUNTER — TELEPHONE (OUTPATIENT)
Dept: RADIATION ONCOLOGY | Facility: CLINIC | Age: 65
End: 2020-10-07

## 2020-10-07 NOTE — PROGRESS NOTES
Subjective:      Patient ID: Sunitha Pillai is a 65 y.o. female.    Chief Complaint: No chief complaint on file.      HPI:  65-year-old female, patient of Dr. Moulton seen in Multidisciplinary Breast Oncology Clinic with recent diagnosis of left breast cancer.    Current history: Screening mammogram on July 30th showed a focal asymmetry in the lower outer portion left breast.  Biopsy on August 25, 2020 showed high-grade infiltrating ductal carcinoma (histologic grade 3, nuclear grade 3, mitotic index 3).  Tumor was 95% ER positive in 95% MI positive, HER2 was 2+ but negative by fish.  Ki-67 was 95%.    On September 17, 2020 lumpectomy and sentinel lymph node biopsy were performed.  That pathology showed a 1.5 cm infiltrating carcinoma with a single negative sentinel lymph node.  Margins were negative.  Final pathological stage T1c N0 stage I A.          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    Social History :    Smoking - never      ETOH - no   Works at the [employer]      PMH: Diabetes for 15 yeas - on insulin  Review of Systems   Constitutional: Negative.    HENT: Negative.    Respiratory: Negative.    Cardiovascular: Positive for chest pain (occasional with activity).   Gastrointestinal: Negative.    Genitourinary: Negative.    Musculoskeletal: Positive for arthralgias (left knee).   Psychiatric/Behavioral: Negative.      Objective:   Physical Exam   Vitals reviewed.  Constitutional: She is oriented to person, place, and time. She appears well-developed and well-nourished. No distress.   HENT:   Head: Normocephalic.   Eyes: Pupils are  equal, round, and reactive to light.   Cardiovascular: Normal rate and regular rhythm.    Pulmonary/Chest: Effort normal and breath sounds normal. No respiratory distress. She has no wheezes. She has no rales. Right breast exhibits no mass and no skin change. Left breast exhibits tenderness. Left breast exhibits no mass.       Abdominal: Soft. She exhibits no mass. There is no abdominal tenderness.   Musculoskeletal: No edema.   Lymphadenopathy:     She has no cervical adenopathy.        Right axillary: No pectoral and no lateral adenopathy present.        Left axillary: No pectoral and no lateral adenopathy present.  Neurological: She is alert and oriented to person, place, and time.   Skin: No rash noted.     Psychiatric: She has a normal mood and affect. Her behavior is normal. Thought content normal.     Assessment:     Stage 1 left breast cancer - ER+,HER 2 negative  No diagnosis found.    Plan:       I reviewed risk factors for recurrence.      She has a stage I breast cancer which is strongly ER positive and LA positive; however, the tumor is high-grade and Ki-67 is markedly elevated. Oncotype is pending.   It is likely that her genomic analysis will indicate that she will need chemotherapy.  If that is the case then would recommend 4 cycles of Taxotere plus Cytoxan.    I discussed side effects of chemotherapy and provided her with written information.  If chemotherapy is to be given, she will need a port.    Will then need radiation therapy.  Following radiation she will begin endocrine therapy with an aromatase inhibitor.

## 2020-10-07 NOTE — PROGRESS NOTES
Multidisciplinary Breast Oncology Clinic    HISTORY OF PRESENT ILLNESS:   This patient presents for discussion of adjuvant radiotherapy.      Ms. Pillai was referred for further evaluation after diagnostic MMG on 20 confirmed an 8 mm irregularly shaped mass in the lower central region of the Lt. breast.  Biopsies on 20 revealed infiltrating ductal carcinoma, histologic grade 3, nuclear grade 3 and mitotic index 3.  > 95% of the tumor was strongly ER and OK positive, Her- 2 was 2+ but negative by FISH.  Ki-67 was > 95%.  The patient underwent partial mastectomy with sentinel node biopsy on 20.  Pathology revealed a 1.5 cm focus of invasive ductal carcinoma, histologic grade 3, nuclear grade 3 and mitotic index 3.  There was no associated DCIS.  There was lymphovascular invasion.  One sentinel node was negative for tumor involvement.  Oncotype DX score is pending.  Today the patient states she feels well.  No breast complaints.      REVIEW OF SYSTEMS:   Review of Systems   Constitutional: Negative for chills, fever and malaise/fatigue.   Respiratory: Negative for cough, sputum production and shortness of breath.    Cardiovascular: Negative for chest pain and palpitations.       GYN HISTORY:  Age of menarche was 13. Age of menopause was 49. Patient denies hormonal therapy. Patient is . Age of first live birth was 20. Patient did not breast feed.       PAST MEDICAL HISTORY:  Past Medical History:   Diagnosis Date    Acute coronary syndrome 2018    Arthritis     Diabetes mellitus     Diabetes mellitus type I     GERD (gastroesophageal reflux disease)     Hypertension     Malignant neoplasm of lower-inner quadrant of left breast in female, estrogen receptor positive 9/3/2020    Unstable angina 2018    Vertigo        PAST SURGICAL HISTORY:  Past Surgical History:   Procedure Laterality Date     SECTION      INJECTION FOR SENTINEL NODE IDENTIFICATION Left 2020     "Procedure: INJECTION, FOR SENTINEL NODE IDENTIFICATION;  Surgeon: Isabel Moulton MD;  Location: [facility] OR;  Service: General;  Laterality: Left;    MASTECTOMY, PARTIAL Left 9/17/2020    Procedure: MASTECTOMY, PARTIAL-w/reflector;  Surgeon: Isabel Moulton MD;  Location: [facility] OR;  Service: General;  Laterality: Left;    SENTINEL LYMPH NODE BIOPSY Left 9/17/2020    Procedure: BIOPSY, LYMPH NODE, SENTINEL;  Surgeon: Isabel Moulton MD;  Location: [facility] OR;  Service: General;  Laterality: Left;       ALLERGIES:   Review of patient's allergies indicates:  No Known Allergies    MEDICATIONS:  Current Outpatient Medications   Medication Sig    acetaminophen (TYLENOL) 325 MG tablet Take 2 tablets (650 mg total) by mouth every 8 (eight) hours as needed for Pain. (Patient not taking: Reported on 9/30/2020)    aspirin 81 mg Tab Take 1 tablet by mouth Daily.    BD ULTRA-FINE SHORT PEN NEEDLE 31 gauge x 5/16" Ndle USE 1  4 TIMES DAILY WITH  INSULIN    blood sugar diagnostic Strp Strips and lancets covered by insurance E11.9, pt checks glucose three times daily, insulin dependent    blood sugar diagnostic, drum (ACCU-CHEK COMPACT TEST) Strp USE ONE STRIP TO CHECK GLUCOSE 4 TIMES DAILY BEFORE EACH MEAL AND AT BEDTIME    blood-glucose meter kit Check glucose three times daily E11.9 meter covered by insurance, insulin dependent    candesartan (ATACAND) 8 MG tablet Take 1 tablet (8 mg total) by mouth once daily.    colchicine (COLCRYS) 0.6 mg tablet Take 1 tablet (0.6 mg total) by mouth 2 (two) times daily.    dulaglutide (TRULICITY) 0.75 mg/0.5 mL pen injector Inject 0.75 mg weekly    HYDROcodone-acetaminophen (NORCO) 5-325 mg per tablet Take 1 tablet by mouth every 6 (six) hours as needed for Pain. (Patient not taking: Reported on 9/30/2020)    insulin aspart U-100 (NOVOLOG U-100 INSULIN ASPART) 100 unit/mL injection Use w/ vgo 30, 3 clicks with meals, 1 click with snack, additional click if sugar is > 180. Max daily 80 " units. May substitute humalog vials    insulin degludec (TRESIBA FLEXTOUCH U-100) 100 unit/mL (3 mL) InPn 45 Units daily    lancets Misc To check BG 4 times daily, to use with insurance preferred meter, insulin dependent    metFORMIN (GLUCOPHAGE) 1000 MG tablet Take 1 tablet (1,000 mg total) by mouth 2 (two) times daily.    NOVOLOG FLEXPEN U-100 INSULIN 100 unit/mL (3 mL) InPn pen INJECT 20 UNITS SUBCUTANEOUSLY THREE TIMES DAILY WITH MEALS Plus sliding scale    sub-q insulin device, 30 unit (V-GO 30) Kanchan Change everyday     No current facility-administered medications for this visit.      Facility-Administered Medications Ordered in Other Visits   Medication    fentaNYL injection 25 mcg    midazolam (VERSED) 1 mg/mL injection 0.5 mg       SOCIAL HISTORY:  Social History     Socioeconomic History    Marital status:      Spouse name: Not on file    Number of children: Not on file    Years of education: Not on file    Highest education level: Not on file   Occupational History    Not on file   Social Needs    Financial resource strain: Not on file    Food insecurity     Worry: Not on file     Inability: Not on file    Transportation needs     Medical: Not on file     Non-medical: Not on file   Tobacco Use    Smoking status: Never Smoker    Smokeless tobacco: Never Used   Substance and Sexual Activity    Alcohol use: Never     Frequency: Never    Drug use: No    Sexual activity: Yes     Partners: Male     Birth control/protection: Post-menopausal   Lifestyle    Physical activity     Days per week: Not on file     Minutes per session: Not on file    Stress: Not on file   Relationships    Social connections     Talks on phone: Not on file     Gets together: Not on file     Attends Baptism service: Not on file     Active member of club or organization: Not on file     Attends meetings of clubs or organizations: Not on file     Relationship status: Not on file   Other Topics Concern    Not  on file   Social History Narrative    Not on file       FAMILY HISTORY:  Family History   Problem Relation Age of Onset    Hypertension Mother     Hyperlipidemia Mother     Diabetes Mother     Heart disease Mother     Aneurysm Father     Diabetes Sister     Hypertension Sister     Heart disease Brother     Diabetes Brother     Hypertension Brother     Breast cancer Neg Hx     Colon cancer Neg Hx     Ovarian cancer Neg Hx          PHYSICAL EXAMINATION:  There were no vitals filed for this visit.  Physical Exam   Constitutional: She is oriented to person, place, and time and well-developed, well-nourished, and in no distress.   Pulmonary/Chest: No respiratory distress.   Neurological: She is alert and oriented to person, place, and time.   Psychiatric: Affect and judgment normal.       ASSESSMENT/PLAN:  Stage IA (pT1c, pN0(sn), cM0, G3, ER+, MI+, HER2-) infiltrating ductal carcinoma of the Lower inner quadrant of the Lt. breast      ECO    I had a long discussion with the patient and her daughter.  We discussed the concepts of adjuvant chemotherapy, hormonal therapy and radiotherapy.  Discussed the rational for each treatment.  Currently the patient has an Oncotype Dx score pending. Explained the Oncotype will determine is adjuvant chemotherapy is recommended.  We discussed the role of radiotherapy in this setting.  Discussed the procedures, risks and benefits of radiotherapy.  Discussed the acute and long term risk of radiotherapy.  Will plan re-evaluation once her score returns.  She understands that if chemotherapy is recommended, radiotherapy would be delayed until completion of her chemotherapy.   Discussed the case with Dr. Mcduffie who is in agreement with the current plan.      I spent approximately 40 minutes reviewing the available records and evaluating the patient, out of which over 50% of the time was spent face to face with the patient in counseling and coordinating this patient's  care.

## 2020-10-08 ENCOUNTER — OFFICE VISIT (OUTPATIENT)
Dept: HEMATOLOGY/ONCOLOGY | Facility: CLINIC | Age: 65
End: 2020-10-08
Payer: MEDICARE

## 2020-10-08 ENCOUNTER — OFFICE VISIT (OUTPATIENT)
Dept: SURGERY | Facility: CLINIC | Age: 65
End: 2020-10-08
Payer: MEDICARE

## 2020-10-08 VITALS
HEIGHT: 64 IN | BODY MASS INDEX: 32.33 KG/M2 | DIASTOLIC BLOOD PRESSURE: 82 MMHG | RESPIRATION RATE: 18 BRPM | TEMPERATURE: 98 F | SYSTOLIC BLOOD PRESSURE: 145 MMHG | WEIGHT: 189.38 LBS | HEART RATE: 80 BPM

## 2020-10-08 VITALS
SYSTOLIC BLOOD PRESSURE: 145 MMHG | TEMPERATURE: 98 F | HEIGHT: 64 IN | RESPIRATION RATE: 18 BRPM | DIASTOLIC BLOOD PRESSURE: 82 MMHG | HEART RATE: 80 BPM | WEIGHT: 189.38 LBS | BODY MASS INDEX: 32.33 KG/M2

## 2020-10-08 DIAGNOSIS — C50.312 MALIGNANT NEOPLASM OF LOWER-INNER QUADRANT OF LEFT BREAST IN FEMALE, ESTROGEN RECEPTOR POSITIVE: Primary | ICD-10-CM

## 2020-10-08 DIAGNOSIS — Z17.0 MALIGNANT NEOPLASM OF LOWER-INNER QUADRANT OF LEFT BREAST IN FEMALE, ESTROGEN RECEPTOR POSITIVE: Primary | ICD-10-CM

## 2020-10-08 PROCEDURE — 1125F AMNT PAIN NOTED PAIN PRSNT: CPT | Mod: HCNC,S$GLB,, | Performed by: INTERNAL MEDICINE

## 2020-10-08 PROCEDURE — 1159F PR MEDICATION LIST DOCUMENTED IN MEDICAL RECORD: ICD-10-PCS | Mod: HCNC,S$GLB,, | Performed by: INTERNAL MEDICINE

## 2020-10-08 PROCEDURE — 3079F DIAST BP 80-89 MM HG: CPT | Mod: HCNC,CPTII,S$GLB, | Performed by: INTERNAL MEDICINE

## 2020-10-08 PROCEDURE — 3077F SYST BP >= 140 MM HG: CPT | Mod: HCNC,CPTII,S$GLB, | Performed by: RADIOLOGY

## 2020-10-08 PROCEDURE — 3077F PR MOST RECENT SYSTOLIC BLOOD PRESSURE >= 140 MM HG: ICD-10-PCS | Mod: HCNC,CPTII,S$GLB, | Performed by: INTERNAL MEDICINE

## 2020-10-08 PROCEDURE — 3077F PR MOST RECENT SYSTOLIC BLOOD PRESSURE >= 140 MM HG: ICD-10-PCS | Mod: HCNC,CPTII,S$GLB, | Performed by: RADIOLOGY

## 2020-10-08 PROCEDURE — 1125F PR PAIN SEVERITY QUANTIFIED, PAIN PRESENT: ICD-10-PCS | Mod: HCNC,S$GLB,, | Performed by: INTERNAL MEDICINE

## 2020-10-08 PROCEDURE — 3008F PR BODY MASS INDEX (BMI) DOCUMENTED: ICD-10-PCS | Mod: HCNC,CPTII,S$GLB, | Performed by: RADIOLOGY

## 2020-10-08 PROCEDURE — 3008F BODY MASS INDEX DOCD: CPT | Mod: HCNC,CPTII,S$GLB, | Performed by: RADIOLOGY

## 2020-10-08 PROCEDURE — 1125F PR PAIN SEVERITY QUANTIFIED, PAIN PRESENT: ICD-10-PCS | Mod: HCNC,S$GLB,, | Performed by: RADIOLOGY

## 2020-10-08 PROCEDURE — 3077F SYST BP >= 140 MM HG: CPT | Mod: HCNC,CPTII,S$GLB, | Performed by: INTERNAL MEDICINE

## 2020-10-08 PROCEDURE — 1159F MED LIST DOCD IN RCRD: CPT | Mod: HCNC,S$GLB,, | Performed by: INTERNAL MEDICINE

## 2020-10-08 PROCEDURE — 99204 PR OFFICE/OUTPT VISIT, NEW, LEVL IV, 45-59 MIN: ICD-10-PCS | Mod: HCNC,S$GLB,, | Performed by: INTERNAL MEDICINE

## 2020-10-08 PROCEDURE — 1101F PR PT FALLS ASSESS DOC 0-1 FALLS W/OUT INJ PAST YR: ICD-10-PCS | Mod: HCNC,CPTII,S$GLB, | Performed by: INTERNAL MEDICINE

## 2020-10-08 PROCEDURE — 3008F BODY MASS INDEX DOCD: CPT | Mod: HCNC,CPTII,S$GLB, | Performed by: INTERNAL MEDICINE

## 2020-10-08 PROCEDURE — 3079F PR MOST RECENT DIASTOLIC BLOOD PRESSURE 80-89 MM HG: ICD-10-PCS | Mod: HCNC,CPTII,S$GLB, | Performed by: RADIOLOGY

## 2020-10-08 PROCEDURE — 1101F PR PT FALLS ASSESS DOC 0-1 FALLS W/OUT INJ PAST YR: ICD-10-PCS | Mod: HCNC,CPTII,S$GLB, | Performed by: RADIOLOGY

## 2020-10-08 PROCEDURE — 99204 OFFICE O/P NEW MOD 45 MIN: CPT | Mod: HCNC,S$GLB,, | Performed by: INTERNAL MEDICINE

## 2020-10-08 PROCEDURE — 3008F PR BODY MASS INDEX (BMI) DOCUMENTED: ICD-10-PCS | Mod: HCNC,CPTII,S$GLB, | Performed by: INTERNAL MEDICINE

## 2020-10-08 PROCEDURE — 99204 PR OFFICE/OUTPT VISIT, NEW, LEVL IV, 45-59 MIN: ICD-10-PCS | Mod: HCNC,S$GLB,, | Performed by: RADIOLOGY

## 2020-10-08 PROCEDURE — 1159F PR MEDICATION LIST DOCUMENTED IN MEDICAL RECORD: ICD-10-PCS | Mod: HCNC,S$GLB,, | Performed by: RADIOLOGY

## 2020-10-08 PROCEDURE — 99204 OFFICE O/P NEW MOD 45 MIN: CPT | Mod: HCNC,S$GLB,, | Performed by: RADIOLOGY

## 2020-10-08 PROCEDURE — 1125F AMNT PAIN NOTED PAIN PRSNT: CPT | Mod: HCNC,S$GLB,, | Performed by: RADIOLOGY

## 2020-10-08 PROCEDURE — 1101F PT FALLS ASSESS-DOCD LE1/YR: CPT | Mod: HCNC,CPTII,S$GLB, | Performed by: INTERNAL MEDICINE

## 2020-10-08 PROCEDURE — 3079F DIAST BP 80-89 MM HG: CPT | Mod: HCNC,CPTII,S$GLB, | Performed by: RADIOLOGY

## 2020-10-08 PROCEDURE — 99999 PR PBB SHADOW E&M-EST. PATIENT-LVL III: ICD-10-PCS | Mod: PBBFAC,HCNC,, | Performed by: RADIOLOGY

## 2020-10-08 PROCEDURE — 1159F MED LIST DOCD IN RCRD: CPT | Mod: HCNC,S$GLB,, | Performed by: RADIOLOGY

## 2020-10-08 PROCEDURE — 3079F PR MOST RECENT DIASTOLIC BLOOD PRESSURE 80-89 MM HG: ICD-10-PCS | Mod: HCNC,CPTII,S$GLB, | Performed by: INTERNAL MEDICINE

## 2020-10-08 PROCEDURE — 99999 PR PBB SHADOW E&M-EST. PATIENT-LVL III: CPT | Mod: PBBFAC,HCNC,, | Performed by: RADIOLOGY

## 2020-10-08 PROCEDURE — 99999 PR PBB SHADOW E&M-EST. PATIENT-LVL IV: ICD-10-PCS | Mod: PBBFAC,HCNC,, | Performed by: INTERNAL MEDICINE

## 2020-10-08 PROCEDURE — 99999 PR PBB SHADOW E&M-EST. PATIENT-LVL IV: CPT | Mod: PBBFAC,HCNC,, | Performed by: INTERNAL MEDICINE

## 2020-10-08 PROCEDURE — 1101F PT FALLS ASSESS-DOCD LE1/YR: CPT | Mod: HCNC,CPTII,S$GLB, | Performed by: RADIOLOGY

## 2020-10-08 NOTE — LETTER
October 8, 2020      Isabel Moulton MD  [address]  [facility] [address]           [facility]  [address]  [facility] [address]  Phone: [phone]  Fax: [phone]          Patient: Sunitha Pillai   MR Number: [id number]   YOB: 1955   Date of Visit: 10/8/2020       Dear Dr. Isabel Moulton:    Thank you for referring Sunitha Pillai to me for evaluation. Attached you will find relevant portions of my assessment and plan of care.    If you have questions, please do not hesitate to call me. I look forward to following Sunitha Pillai along with you.    Sincerely,    Giovany Mcduffie MD    Enclosure  CC:  No Recipients    If you would like to receive this communication electronically, please contact [email or web address] or [phone] to request more information on [employer] access.    For providers and/or their staff who would like to refer a patient to [facility], please contact us through our one-stop-shop provider referral line, [facility], at [phone].    If you feel you have received this communication in error or would no longer like to receive these types of communications, please e-mail [email or web address]

## 2020-10-08 NOTE — PLAN OF CARE
START ON PATHWAY REGIMEN - Breast    [id number]        Docetaxel (Taxotere)       Cyclophosphamide (Cytoxan)           Additional Orders: Premedicate with dexamethasone 8 mg PO bid for three   days beginning 1 day prior to therapy    **Always confirm dose/schedule in your pharmacy ordering system**    Patient Characteristics:  Postoperative without Neoadjuvant Therapy (Pathologic Staging), Invasive   Disease, Adjuvant Therapy, HER2 Negative/Unknown/Equivocal, ER Positive, Node   Negative, pT1a-c, pN0/N1mi or pT2 or Higher, pN0, Oncotype High Risk (? 26)  Therapeutic Status: Postoperative without Neoadjuvant Therapy (Pathologic   Staging)  AJCC Grade: G3  AJCC N Category: pN0  AJCC M Category: cM0  ER Status: Positive (+)  AJCC 8 Stage Grouping: IA  HER2 Status: Negative (-)  Oncotype Dx Recurrence Score: 29  AJCC T Category: pT1c  OR Status: Positive (+)  Has this patient completed genomic testing<= Yes - Oncotype DX(R)  Intent of Therapy:  Curative Intent, Discussed with Patient

## 2020-10-21 NOTE — PROGRESS NOTES
Hereditary and High-Risk Clinic  Department of Hematology and Oncology  [facility]  [facility] [place], [place]    10/22/2020  Provider:  Waqas Rodríguez DNP    Patient ID: Sunitha Pillai is a 65 y.o. female.    Chief Complaint: Genetic Evaluation      Referring Provider:  Isabel Moulton MD  [address] [facility] [place] [address]    SUBJECTIVE:      History of Present Illness (HPI):  Established patient presents today for post-test genetic counseling.    Pedigree      Review of Systems   - See HPI.    - Patient's Distress Score today was 2/10 (with 10 being the worst).    - Patient has been experiencing some left shoulder muscle tightening and left breast pain to the nipple region.  - Patient denies experiencing chest pain or dyspnea (no dyspnea with the exception of when wearing her mask).    OBJECTIVE:     Physical Exam   Constitutional: She appears well developed and well nourished. No distress.   Pulmonary/Chest: Effort normal. Bilateral anterior and posterior lung sounds normal to auscultation.  Cardiac: S1,S2 auscultated.  Neurological: She is alert.   Psychiatric: She has a normal mood and affect. Her speech is normal and behavior is normal. Her thought content is normal.     Germline Cancer-Genetic Testing  · Test(s) performed:  [facility]  Number of genes analyzed:  ~35  Laboratory:  [facility]  Ordered by Isabel Moulton MD  Sample collection date:  09/02/2020  Results report date:  09/24/2020              Full report to be scanned into Epic under the Labs and/or Media tab(s).   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  These genes are known as "tumor suppressor genes."  A key role of tumor suppressor genes is to prevent cancer.  When a tumor suppressor gene has a clinically significant mutation, it affects the functioning of the gene, and the carrier may be more likely to develop cancers in certain organs.      Only some cancers are hereditary.  When a gene mutation is not identified, it does not completely rule out the possibility of hereditary cancers but does make them less likely.  Additionally, it is possible to see familial clustering of related cancer amongst family members, and these are sometimes caused by environmental factors and/or lifestyle factors that may be shared amongst family members.      Sunitha Pillai recently underwent germline genetic testing for hereditary cancer syndromes with findings of uncertain significance as detailed above.  A variant of uncertain significance (VUS) indicates the presence of a variation in the gene's sequencing, without enough data, presently, to classify it as being benign versus pathogenic.  Patients can call [employer] directly at any time to inquire as to the status of their VUS(s) and should notify a healthcare provider right away of any positive findings.  We can also check on the status of her VUSs at her follow-up visits moving forward.    These results do not completely rule out the possibility of a hereditary cancer, nor do they necessarily indicate that the patient is not at increased risk for certain cancers, as she may share other risk factors, such as environmental and lifestyle factors, with her family members affected by cancer.       Sunitha is to ensure that her healthcare providers are aware of her personal and family histories, especially of cancers, so that her medical " management including cancer screenings can be based in part off of these histories.      Sunitha is established with primary care provider (PCP) Dr. Patty Louis and Gynecology providers Dr. Hank Doran and Dr. Goldie Shaikh.     Additionally, I recommend that the patient undergo total-body skin examinations annually with a Dermatology provider.  Sunitha is not established with Dermatology provider.  I have placed a referral to Dermatology for the patient.      Sunitha's relatives may be at increased risk for certain cancers for which they share other risk factors, such as environmental and lifestyle factors, with affected relatives.  Further regarding the patient's relatives, it is possible for them to have cancer-related genetic mutations that were not identified in Sunitha.  The patient's relatives should consider speaking with a healthcare provider regarding undergoing genetic counseling/testing; I am available for this purpose, or they can visit [email or web address] to locate a local genetic counselor, and I have shared the contact information for both with the patient and encouraged her to share that information with her relatives.     Sunitha should update me with any changes to her personal or family history and with any relative's genetic testing results and check in with me annually to determine if updated genetic testing is indicated for her.     Questions were encouraged and answered to the patient's satisfaction, and she verbalized understanding of information and agreement with the plan.     ASSESSMENT/PLAN:       Sunitha was seen today for genetic evaluation.    Diagnoses and all orders for this visit:    Encounter for nonprocreative genetic counseling  Snuitha Pillai recently underwent germline genetic testing for hereditary cancer syndromes with findings of uncertain significance as detailed above.  A variant of uncertain significance (VUS) indicates the presence of a variation in the gene's  sequencing, without enough data, presently, to classify it as being benign versus pathogenic.  Patients can call [employer] directly at any time to inquire as to the status of their VUS(s) and should notify a healthcare provider right away of any positive findings.          - We can also check on the status of her VUSs at her follow-up visits moving forward.        - Sunitha should update me with any changes to her personal or family history and with any relative's genetic testing results and check in with me annually to determine if updated genetic testing is indicated for her.       Needs to establish dermatologic care and undergo TBSE  Referral to Dermatology has already been placed by other provider; I CC'd myself and reached out to Dermatology for scheduling.    Follow up in about 1 year (around 10/22/2021) for determination of need for updated genetic testing.      During this encounter, I spent approximately 25 minutes face-to-face with this patient, more than half of which was spent counseling the patient and/or coordinating her care.    Waqas Coppola, DNP, APRN, FNP-BC, AOCNP  Nurse Practitioner, Hereditary and High-Risk Clinic  Department of Hematology and Oncology  [facility], 3rd floor  [facility] [address], [place]  [address]  office phone: [phone]    office fax: [phone]     10/22/2020

## 2020-10-22 ENCOUNTER — TELEPHONE (OUTPATIENT)
Dept: OBSTETRICS AND GYNECOLOGY | Facility: CLINIC | Age: 65
End: 2020-10-22

## 2020-10-22 ENCOUNTER — DOCUMENTATION ONLY (OUTPATIENT)
Dept: HEMATOLOGY/ONCOLOGY | Facility: CLINIC | Age: 65
End: 2020-10-22

## 2020-10-22 ENCOUNTER — OFFICE VISIT (OUTPATIENT)
Dept: HEMATOLOGY/ONCOLOGY | Facility: CLINIC | Age: 65
End: 2020-10-22
Payer: MEDICARE

## 2020-10-22 VITALS
SYSTOLIC BLOOD PRESSURE: 136 MMHG | WEIGHT: 189.13 LBS | RESPIRATION RATE: 19 BRPM | DIASTOLIC BLOOD PRESSURE: 67 MMHG | HEIGHT: 64 IN | HEART RATE: 93 BPM | TEMPERATURE: 98 F | OXYGEN SATURATION: 100 % | BODY MASS INDEX: 32.29 KG/M2

## 2020-10-22 DIAGNOSIS — Z71.83 ENCOUNTER FOR NONPROCREATIVE GENETIC COUNSELING: Primary | ICD-10-CM

## 2020-10-22 PROCEDURE — 1159F MED LIST DOCD IN RCRD: CPT | Mod: HCNC,S$GLB,, | Performed by: NURSE PRACTITIONER

## 2020-10-22 PROCEDURE — 99214 OFFICE O/P EST MOD 30 MIN: CPT | Mod: HCNC,S$GLB,, | Performed by: NURSE PRACTITIONER

## 2020-10-22 PROCEDURE — 99999 PR PBB SHADOW E&M-EST. PATIENT-LVL V: CPT | Mod: PBBFAC,HCNC,, | Performed by: NURSE PRACTITIONER

## 2020-10-22 PROCEDURE — 3078F DIAST BP <80 MM HG: CPT | Mod: HCNC,CPTII,S$GLB, | Performed by: NURSE PRACTITIONER

## 2020-10-22 PROCEDURE — 3008F BODY MASS INDEX DOCD: CPT | Mod: HCNC,CPTII,S$GLB, | Performed by: NURSE PRACTITIONER

## 2020-10-22 PROCEDURE — 1101F PT FALLS ASSESS-DOCD LE1/YR: CPT | Mod: HCNC,CPTII,S$GLB, | Performed by: NURSE PRACTITIONER

## 2020-10-22 PROCEDURE — 99214 PR OFFICE/OUTPT VISIT, EST, LEVL IV, 30-39 MIN: ICD-10-PCS | Mod: HCNC,S$GLB,, | Performed by: NURSE PRACTITIONER

## 2020-10-22 PROCEDURE — 99999 PR PBB SHADOW E&M-EST. PATIENT-LVL V: ICD-10-PCS | Mod: PBBFAC,HCNC,, | Performed by: NURSE PRACTITIONER

## 2020-10-22 PROCEDURE — 1101F PR PT FALLS ASSESS DOC 0-1 FALLS W/OUT INJ PAST YR: ICD-10-PCS | Mod: HCNC,CPTII,S$GLB, | Performed by: NURSE PRACTITIONER

## 2020-10-22 PROCEDURE — 1125F PR PAIN SEVERITY QUANTIFIED, PAIN PRESENT: ICD-10-PCS | Mod: HCNC,S$GLB,, | Performed by: NURSE PRACTITIONER

## 2020-10-22 PROCEDURE — 1125F AMNT PAIN NOTED PAIN PRSNT: CPT | Mod: HCNC,S$GLB,, | Performed by: NURSE PRACTITIONER

## 2020-10-22 PROCEDURE — 3078F PR MOST RECENT DIASTOLIC BLOOD PRESSURE < 80 MM HG: ICD-10-PCS | Mod: HCNC,CPTII,S$GLB, | Performed by: NURSE PRACTITIONER

## 2020-10-22 PROCEDURE — 3008F PR BODY MASS INDEX (BMI) DOCUMENTED: ICD-10-PCS | Mod: HCNC,CPTII,S$GLB, | Performed by: NURSE PRACTITIONER

## 2020-10-22 PROCEDURE — 3075F SYST BP GE 130 - 139MM HG: CPT | Mod: HCNC,CPTII,S$GLB, | Performed by: NURSE PRACTITIONER

## 2020-10-22 PROCEDURE — 3075F PR MOST RECENT SYSTOLIC BLOOD PRESS GE 130-139MM HG: ICD-10-PCS | Mod: HCNC,CPTII,S$GLB, | Performed by: NURSE PRACTITIONER

## 2020-10-22 PROCEDURE — 1159F PR MEDICATION LIST DOCUMENTED IN MEDICAL RECORD: ICD-10-PCS | Mod: HCNC,S$GLB,, | Performed by: NURSE PRACTITIONER

## 2020-10-22 NOTE — Clinical Note
Dr. Mcduffie-  Ms. Pillai asked during her visit with me today that someone from your office reach out to her regarding Oncotype status, follow-up plan, etc. I've copied Ruby on this message.  Also, during her Genetics visit today, pt reported 2/10 left breast pain to the nipple region as well as a feeling of left shoulder muscle tightening. She denied chest pain and largely denied dyspnea (with the exception of when wearing her mask), and her lungs and heart were without concerning findings upon auscultation today. Just wanted to update you.    Ruby- Pt requesting call for update please

## 2020-10-22 NOTE — LETTER
October 22, 2020      Isabel Moulton MD  [address]  [facility] [address]           [facility] [address]  [address]  [facility] [address]  Phone: [phone]  Fax: [phone]          Patient: Sunitha Pillai   MR Number: [id number]   YOB: 1955   Date of Visit: 10/22/2020       Dear Dr. Isabel Moulton:    Thank you for referring Sunitha Pillai to me for evaluation. Attached you will find relevant portions of my assessment and plan of care.    If you have questions, please do not hesitate to call me. I look forward to following Sunitha Pillai along with you.    Sincerely,    Waqas Rodríguez, DNP    Enclosure  CC:  No Recipients    If you would like to receive this communication electronically, please contact [email or web address] or [phone] to request more information on [employer] access.    For providers and/or their staff who would like to refer a patient to [facility], please contact us through our one-stop-shop provider referral line, [facility], at [phone].    If you feel you have received this communication in error or would no longer like to receive these types of communications, please e-mail [email or web address]

## 2020-10-22 NOTE — TELEPHONE ENCOUNTER
----- Message from Waqas Rodríguez DNP sent at 10/22/2020 12:45 PM CDT -----  Hi, can you please call the pt to schedule a follow-up visit with Dr. Mckinney or Dr. Shaikh?

## 2020-10-22 NOTE — TELEPHONE ENCOUNTER
Spoke with pt's daughter Theo Carlos and scheduled pt for [employer] per GLENDY Rodríguez's request.    PT verbalized understanding.

## 2020-10-29 ENCOUNTER — HOSPITAL ENCOUNTER (EMERGENCY)
Facility: OTHER | Age: 65
Discharge: HOME | End: 2020-10-29
Attending: EMERGENCY MEDICINE
Payer: MEDICARE

## 2020-10-29 VITALS
TEMPERATURE: 98 F | DIASTOLIC BLOOD PRESSURE: 79 MMHG | RESPIRATION RATE: 20 BRPM | OXYGEN SATURATION: 98 % | BODY MASS INDEX: 30.39 KG/M2 | HEIGHT: 64 IN | SYSTOLIC BLOOD PRESSURE: 142 MMHG | WEIGHT: 178 LBS | HEART RATE: 78 BPM

## 2020-10-29 DIAGNOSIS — R05.9 COUGH: ICD-10-CM

## 2020-10-29 DIAGNOSIS — R60.0 EDEMA OF RIGHT UPPER EXTREMITY: Primary | ICD-10-CM

## 2020-10-29 DIAGNOSIS — R06.00 DYSPNEA: ICD-10-CM

## 2020-10-29 LAB
ALBUMIN SERPL BCP-MCNC: 3.5 G/DL (ref 3.5–5.2)
ALP SERPL-CCNC: 92 U/L (ref 55–135)
ALT SERPL W/O P-5'-P-CCNC: 20 U/L (ref 10–44)
ANION GAP SERPL CALC-SCNC: 11 MMOL/L (ref 8–16)
APTT BLDCRRT: 26.8 SEC (ref 21–32)
AST SERPL-CCNC: 21 U/L (ref 10–40)
BACTERIA #/AREA URNS HPF: ABNORMAL /HPF
BASOPHILS # BLD AUTO: 0.02 K/UL (ref 0–0.2)
BASOPHILS NFR BLD: 0.3 % (ref 0–1.9)
BILIRUB SERPL-MCNC: 0.3 MG/DL (ref 0.1–1)
BILIRUB UR QL STRIP: NEGATIVE
BUN SERPL-MCNC: 13 MG/DL (ref 8–23)
CALCIUM SERPL-MCNC: 8.8 MG/DL (ref 8.7–10.5)
CHLORIDE SERPL-SCNC: 109 MMOL/L (ref 95–110)
CLARITY UR: CLEAR
CO2 SERPL-SCNC: 19 MMOL/L (ref 23–29)
COLOR UR: YELLOW
CREAT SERPL-MCNC: 1 MG/DL (ref 0.5–1.4)
CREAT SERPL-MCNC: 1.1 MG/DL (ref 0.5–1.4)
DIFFERENTIAL METHOD: ABNORMAL
EOSINOPHIL # BLD AUTO: 0.1 K/UL (ref 0–0.5)
EOSINOPHIL NFR BLD: 1.5 % (ref 0–8)
ERYTHROCYTE [DISTWIDTH] IN BLOOD BY AUTOMATED COUNT: 12.5 % (ref 11.5–14.5)
EST. GFR  (AFRICAN AMERICAN): >60 ML/MIN/1.73 M^2
EST. GFR  (NON AFRICAN AMERICAN): 52 ML/MIN/1.73 M^2
GLUCOSE SERPL-MCNC: 182 MG/DL (ref 70–110)
GLUCOSE UR QL STRIP: NEGATIVE
HCT VFR BLD AUTO: 34.7 % (ref 37–48.5)
HCV AB SERPL QL IA: NEGATIVE
HGB BLD-MCNC: 11.8 G/DL (ref 12–16)
HGB UR QL STRIP: NEGATIVE
IMM GRANULOCYTES # BLD AUTO: 0.02 K/UL (ref 0–0.04)
IMM GRANULOCYTES NFR BLD AUTO: 0.3 % (ref 0–0.5)
INR PPP: 1 (ref 0.8–1.2)
KETONES UR QL STRIP: NEGATIVE
LEUKOCYTE ESTERASE UR QL STRIP: ABNORMAL
LYMPHOCYTES # BLD AUTO: 2.4 K/UL (ref 1–4.8)
LYMPHOCYTES NFR BLD: 32.7 % (ref 18–48)
MCH RBC QN AUTO: 30.3 PG (ref 27–31)
MCHC RBC AUTO-ENTMCNC: 34 G/DL (ref 32–36)
MCV RBC AUTO: 89 FL (ref 82–98)
MICROSCOPIC COMMENT: ABNORMAL
MONOCYTES # BLD AUTO: 0.7 K/UL (ref 0.3–1)
MONOCYTES NFR BLD: 10.2 % (ref 4–15)
NEUTROPHILS # BLD AUTO: 4 K/UL (ref 1.8–7.7)
NEUTROPHILS NFR BLD: 55 % (ref 38–73)
NITRITE UR QL STRIP: NEGATIVE
NRBC BLD-RTO: 0 /100 WBC
PH UR STRIP: 6 [PH] (ref 5–8)
PLATELET # BLD AUTO: 153 K/UL (ref 150–350)
PMV BLD AUTO: 9.4 FL (ref 9.2–12.9)
POCT GLUCOSE: 179 MG/DL (ref 70–110)
POTASSIUM SERPL-SCNC: 4.4 MMOL/L (ref 3.5–5.1)
PROT SERPL-MCNC: 7.1 G/DL (ref 6–8.4)
PROT UR QL STRIP: NEGATIVE
PROTHROMBIN TIME: 11 SEC (ref 9–12.5)
RBC # BLD AUTO: 3.89 M/UL (ref 4–5.4)
RBC #/AREA URNS HPF: 0 /HPF (ref 0–4)
SAMPLE: NORMAL
SODIUM SERPL-SCNC: 139 MMOL/L (ref 136–145)
SP GR UR STRIP: 1.01 (ref 1–1.03)
SQUAMOUS #/AREA URNS HPF: 7 /HPF
TROPONIN I SERPL DL<=0.01 NG/ML-MCNC: <0.006 NG/ML (ref 0–0.03)
URN SPEC COLLECT METH UR: ABNORMAL
UROBILINOGEN UR STRIP-ACNC: NEGATIVE EU/DL
WBC # BLD AUTO: 7.24 K/UL (ref 3.9–12.7)
WBC #/AREA URNS HPF: 6 /HPF (ref 0–5)
WBC CLUMPS URNS QL MICRO: ABNORMAL

## 2020-10-29 PROCEDURE — 93010 ELECTROCARDIOGRAM REPORT: CPT | Mod: HCNC,,, | Performed by: INTERNAL MEDICINE

## 2020-10-29 PROCEDURE — 25000003 PHARM REV CODE 250: Mod: HCNC | Performed by: EMERGENCY MEDICINE

## 2020-10-29 PROCEDURE — 85730 THROMBOPLASTIN TIME PARTIAL: CPT | Mod: HCNC

## 2020-10-29 PROCEDURE — 86803 HEPATITIS C AB TEST: CPT | Mod: HCNC

## 2020-10-29 PROCEDURE — 81000 URINALYSIS NONAUTO W/SCOPE: CPT | Mod: HCNC

## 2020-10-29 PROCEDURE — 99284 EMERGENCY DEPT VISIT MOD MDM: CPT | Mod: 25,HCNC

## 2020-10-29 PROCEDURE — 84484 ASSAY OF TROPONIN QUANT: CPT | Mod: HCNC

## 2020-10-29 PROCEDURE — 85025 COMPLETE CBC W/AUTO DIFF WBC: CPT | Mod: HCNC

## 2020-10-29 PROCEDURE — 82962 GLUCOSE BLOOD TEST: CPT | Mod: HCNC

## 2020-10-29 PROCEDURE — 80053 COMPREHEN METABOLIC PANEL: CPT | Mod: HCNC

## 2020-10-29 PROCEDURE — 82565 ASSAY OF CREATININE: CPT | Mod: HCNC

## 2020-10-29 PROCEDURE — 93010 EKG 12-LEAD: ICD-10-PCS | Mod: HCNC,,, | Performed by: INTERNAL MEDICINE

## 2020-10-29 PROCEDURE — 99900035 HC TECH TIME PER 15 MIN (STAT): Mod: HCNC

## 2020-10-29 PROCEDURE — 93005 ELECTROCARDIOGRAM TRACING: CPT | Mod: HCNC

## 2020-10-29 PROCEDURE — 25500020 PHARM REV CODE 255: Mod: HCNC | Performed by: EMERGENCY MEDICINE

## 2020-10-29 PROCEDURE — 85610 PROTHROMBIN TIME: CPT | Mod: HCNC

## 2020-10-29 RX ORDER — BENZONATATE 100 MG/1
100 CAPSULE ORAL 3 TIMES DAILY PRN
Qty: 20 CAPSULE | Refills: 0 | Status: SHIPPED | OUTPATIENT
Start: 2020-10-29 | End: 2020-11-05

## 2020-10-29 RX ORDER — METHOCARBAMOL 750 MG/1
1500 TABLET, FILM COATED ORAL 3 TIMES DAILY PRN
Qty: 30 TABLET | Refills: 0 | Status: SHIPPED | OUTPATIENT
Start: 2020-10-29 | End: 2020-11-03

## 2020-10-29 RX ORDER — NAPROXEN 500 MG/1
500 TABLET ORAL 2 TIMES DAILY PRN
Qty: 60 TABLET | Refills: 0 | Status: SHIPPED | OUTPATIENT
Start: 2020-10-29 | End: 2022-04-28

## 2020-10-29 RX ORDER — HYDROCODONE BITARTRATE AND ACETAMINOPHEN 5; 325 MG/1; MG/1
1 TABLET ORAL
Status: COMPLETED | OUTPATIENT
Start: 2020-10-29 | End: 2020-10-29

## 2020-10-29 RX ADMIN — IOHEXOL 100 ML: 350 INJECTION, SOLUTION INTRAVENOUS at 09:10

## 2020-10-29 RX ADMIN — HYDROCODONE BITARTRATE AND ACETAMINOPHEN 1 TABLET: 5; 325 TABLET ORAL at 08:10

## 2020-10-29 NOTE — ED NOTES
Pt rounding complete. Pain 5/10, pt reports pain medication unhelpful. Pt not requesting further follow-up at this time. Respirations even and unlabored, in no apparent distress. No needs at this time. Pt updated on plan of care. Call light within reach, side rails up x2. Pt attached to cardiac monitor, continuous pulse ox and automatic BP cuff q30, will continue to monitor.

## 2020-10-29 NOTE — ED NOTES
Pt rounding complete. Pain 5/10, pain medication ordered, to be given. Pt lying in stretcher, respirations even and unlabored, in no apparent distress. Restroom and comfort needs addressed. Pt updated on plan of care. Call light within reach, side rails up x2. Pt attached to cardiac monitor, continuous pulse ox and automatic BP cuff q30, will continue to monitor. Family at bedside.

## 2020-10-29 NOTE — DISCHARGE INSTRUCTIONS
Call your primary care doctor to make the first available appointment.     Keep all your medical appointments.     Take your regular medication as prescribed. Contact your primary care provider before running out of medication, or for any problems obtaining them.    Do not drive or operate heavy machinery while taking opioid or muscle relaxing medications. Examples include norco, percocet, xanax, valium, flexeril.     Overuse or long term use of pain and sedating medication may lead to addiction, dependence, organ failure, family and work problems, legal problems, accidental overdose and death.    If you do not have health insurance, you probably can afford it:  Call [phone] ([facility] hotline) or go to www.[email or web address].[place].gov    Your evaluation in the ED does not suggest any emergent or life threatening medical condition requiring admission or immediate intervention beyond that provided in the ED.   However, the signs of a serious problem sometimes take more time to appear.   RETURN TO THE ER if any of the following occur:    Weakness, dizziness, fainting, or loss of consciousness   Fever of 100.4ºF (38ºC) or higher  Any worse symptoms  Any new or concerning symptoms    To protect yourself and others from COVID19:  Minimize trips outside of home.  Wear a mask whenever outside your home.   Wear a mask whenever with people you do not live with.  Stay at least 6 feet from others you do not live with (inside or outside).  Avoid crowds or crowded places.  Wash hands frequently for at least 20 seconds at a time.  Quarantine for 14 days if you are exposed to someone with cold, flu, or COVID19 symptoms.   Even if you or they had a negative COVID19 test   Even if you have no symptoms   Otherwise you could give the virus to someone who dies from it  Some symptoms of COVID19 are fever, cough, sore throat, breathing troubles, loss of taste/smell, stomach upset, diarrhea.   Disinfect surfaces that are touched a lot (includes your  "phone, handles, switches, etc).       Unemployment:  Those who have lost all or some of their work are eligible for state unemployment ($247 weekly before tax). This includes independent contractors, gig workers, and those unemployed pre-COVID.   [place] Unemployment Hotline: Mon-Fri 830am-430pm. 554.695.8460, 355.124.7183, and 575-238-2470. Due to long phone hold times, we recommend applying online at www.[employer]. If you need help with internet access for the application, call [phone].     Voting:  You can register to vote if you are a [place] citizen and are over 18 years old.   Text "vote health" to [phone] to register or request an absentee/mail-in ballot.     "

## 2020-10-29 NOTE — ED PROVIDER NOTES
Encounter Date: 10/29/2020    SCRIBE #1 NOTE: I, Mare Asher, am scribing for, and in the presence of, Dr. Bloom.       History     Chief Complaint   Patient presents with    Arm Pain     Right arm/hand pain since last night.  Patient states unable to move hand.  Denies injury     Time seen by provider: 8:00 AM    This is a 65 y.o. female with a history of HTN, DM, breast cancer, and recent partial mastectomy of left breast on  who presents with complaint of right arm pain and swelling, mostly around the wrist, that woke her from sleep yesterday evening. She states that pain feels like cramping and rates pain at a 5/10 severity. She denies any associated numbness or tingling. She denies any swelling elsewhere. She has been feeling mildly short of breath on and off for the past several days. She denies any associated fever, chills, cough, or chest pain. She denies any previous DVT. She is not taking medications for cancer. She denies additional complaints at this time.    The history is provided by the patient and medical records.     Review of patient's allergies indicates:  No Known Allergies  Past Medical History:   Diagnosis Date    Acute coronary syndrome 2018    Arthritis     Diabetes mellitus     Diabetes mellitus type I     Genetic testing of female 2020    [facility] via [employer] with AXIN2 and POLE variants of uncertain significance    GERD (gastroesophageal reflux disease)     Hypertension     Malignant neoplasm of lower-inner quadrant of left breast in female, estrogen receptor positive 9/3/2020    Unstable angina 2018    Vertigo      Past Surgical History:   Procedure Laterality Date     SECTION      INJECTION FOR SENTINEL NODE IDENTIFICATION Left 2020    Procedure: INJECTION, FOR SENTINEL NODE IDENTIFICATION;  Surgeon: Isabel Moulton MD;  Location: [facility];  Service: General;  Laterality: Left;    MASTECTOMY, PARTIAL Left 2020    Procedure: MASTECTOMY,  PARTIAL-w/reflector;  Surgeon: Isabel Moulton MD;  Location: [facility] OR;  Service: General;  Laterality: Left;    SENTINEL LYMPH NODE BIOPSY Left 9/17/2020    Procedure: BIOPSY, LYMPH NODE, SENTINEL;  Surgeon: Isabel Moulton MD;  Location: [facility] OR;  Service: General;  Laterality: Left;     Family History   Problem Relation Age of Onset    Hypertension Mother     Hyperlipidemia Mother     Diabetes Mother     Heart disease Mother     Colon polyps Mother     Aneurysm Father     Diabetes Sister     Hypertension Sister     Heart disease Brother     Diabetes Brother     Hypertension Brother     Cancer Brother         brother German with prostate cancer; brother Cooper with throat cancer    Cervical cancer Daughter     Breast cancer Other     Cancer Paternal Aunt         unknown origin    Breast cancer Maternal Aunt     Cancer Maternal Aunt         unknown origin    Prostate cancer Maternal Cousin     Leukemia Maternal Cousin     Prostate cancer Maternal Cousin     Colon cancer Neg Hx     Ovarian cancer Neg Hx      Social History     Tobacco Use    Smoking status: Never Smoker    Smokeless tobacco: Never Used   Substance Use Topics    Alcohol use: Never     Frequency: Never    Drug use: No     ROS: As per HPI and below:   General: No fever. No chills.  HENT: No facial pain.   Eyes: Negative for eye pain.   Cardiovascular: No chest pain.   Respiratory:  Notes dyspnea. No cough.  GI: No abdominal pain. No nausea. No vomiting. No diarrhea.   Skin: No rashes.   Neuro:  No extremity numbness or tingling. No syncope.  No focal deficits.   Musculoskeletal: Positive for right wrist pain and swelling. No other extremity pain or swelling.  All other systems reviewed and are negative.    Physical Exam     Initial Vitals [10/29/20 0733]   BP Pulse Resp Temp SpO2   (!) 160/88 91 20 98.1 °F (36.7 °C) 100 %      MAP       --         Nursing note and vitals reviewed.  BP (!) 142/79   Pulse 78   Temp 98.1 °F  "(36.7 °C) (Oral)   Resp 20   Ht 5' 4" (1.626 m)   Wt 80.7 kg (178 lb)   SpO2 98%   BMI 30.55 kg/m²   Constitutional: AAOx3. No distress.  Eyes: EOMI. No discharge. Anicteric.  HENT:   Mouth/Throat: Oropharynx is clear. Uvula midline. Mucus membranes moist.  Neck: Normal range of motion. Neck supple.  Cardiovascular: Normal rate. No murmur, no gallop and no friction rub heard.   Pulmonary/Chest: No respiratory distress. Effort normal. No wheezes, no rales, no rhonchi.   Abdominal: Bowel sounds normal. Soft. No distension and no mass. There is no tenderness. There is no rebound, no guarding, no tenderness at McBurney's point.  Musculoskeletal: Paraspinal and trapezius tenderness and spasm to the point that patient is unable to participate for Spurling's test. Otherwise, normal range of motion.  Right upper extremity:   Skin intact. 2+ radial pulses. cap refill <2sec. Full active and passive range of motion at shoulder/elbow/wrist. 5/5 strength at biceps, elbows, wrists, thumb add/abduction, finger flexion/extension. Light touch intact in axillary / median / radial / ulnar distributions. Compartments soft.   Tenderness: None.  Mild wrist and hand edema with decreased prominence of veins compared to left hand which has poor turgor.  Neurological: GCS 15. Alert and oriented to person, place, and time. No gross cranial nerve, light touch or strength deficit. Coordination normal.   Skin: Skin is warm and dry.   EXT: 2+ radial pulses.   Psychiatric: Behavior is normal. Judgment normal.    ED Course   Procedures  Labs Reviewed   CBC W/ AUTO DIFFERENTIAL - Abnormal; Notable for the following components:       Result Value    RBC 3.89 (*)     Hemoglobin 11.8 (*)     Hematocrit 34.7 (*)     All other components within normal limits   COMPREHENSIVE METABOLIC PANEL - Abnormal; Notable for the following components:    CO2 19 (*)     Glucose 182 (*)     eGFR if non  52 (*)     All other components within normal " limits   URINALYSIS, REFLEX TO URINE CULTURE - Abnormal; Notable for the following components:    Leukocytes, UA Trace (*)     All other components within normal limits    Narrative:     Specimen Source->Urine   URINALYSIS MICROSCOPIC - Abnormal; Notable for the following components:    WBC, UA 6 (*)     All other components within normal limits    Narrative:     Specimen Source->Urine   POCT GLUCOSE - Abnormal; Notable for the following components:    POCT Glucose 179 (*)     All other components within normal limits   HEPATITIS C ANTIBODY   TROPONIN I   APTT   PROTIME-INR   POCT GLUCOSE, HAND-HELD DEVICE   ISTAT CREATININE          Imaging Results          US Upper Extremity Veins Right (Final result)  Result time 10/29/20 10:40:44   Procedure changed from US Upper Extremity Arteries Right     Final result by Shanna Potter MD (10/29/20 10:40:44)                 Impression:      Negative for acute deep vein thrombosis in the right upper extremity.      Electronically signed by: Shanna Potter  Date:    10/29/2020  Time:    10:40             Narrative:    EXAMINATION:  US UPPER EXTREMITY VEINS RIGHT    CLINICAL HISTORY:  swelling;    TECHNIQUE:  Duplex and color flow Doppler evaluation and dynamic compression was performed of the right upper extremity veins.    COMPARISON:  None    FINDINGS:  Central veins: The internal jugular, subclavian, and axillary veins are patent and free of thrombus.    Arm veins: The brachial, basilic, and cephalic veins are patent and compressible.    Contralateral subclavian/internal jugular veins: The left subclavian and internal jugular veins are patent and free of thrombus.    Other findings: None.                               CTA Chest Non-Coronary (PE Study) (Final result)  Result time 10/29/20 09:33:23    Final result by Hardeep Martin MD (10/29/20 09:33:23)                 Impression:      No evidence of PE or other acute chest process.    Mild dependent  atelectasis.    Partial evaluation of some hyperdense material within the superior pole of the right kidney.  Findings could indicate renal calculus.      Electronically signed by: Hardeep Martin MD  Date:    10/29/2020  Time:    09:33             Narrative:    EXAMINATION:  CTA CHEST NON CORONARY    CLINICAL HISTORY:  PE suspected, high pretest prob;    TECHNIQUE:  Low dose axial images, sagittal and coronal reformations were obtained from the thoracic inlet to the lung bases following the IV administration of 100 mL of Omnipaque 350.  Contrast timing was optimized to evaluate the pulmonary arteries.  MIP images were performed.    COMPARISON:  Same-day radiograph    FINDINGS:  Limited evaluation of the structures at the base of the neck show no concerning masses or lymph nodes.    The heart is normal in size.  No pericardial effusion.  No mediastinal or hilar adenopathy.    Pulmonary arteries distribute evenly.  No evidence of a filling defect to suggest pulmonary embolus.  Three vessel aortic arch which is normal in course and caliber.    No large consolidation in the lung.  There is atelectatic change.  Is also some mild mosaic attenuation which may be due to atelectatic change or small airways disease.  No effusion or pneumothorax.  Airways are patent without endoluminal lesion.    Visualized abdominal contents show some hyperdense material in the superior pole of the right kidney.  Surgical changes to the left breast.                               X-Ray Chest 1 View (Final result)  Result time 10/29/20 08:59:42    Final result by Shanna Potter MD (10/29/20 08:59:42)                 Impression:      Stable chest with no acute cardiopulmonary process seen.      Electronically signed by: Shanna Potter  Date:    10/29/2020  Time:    08:59             Narrative:    EXAMINATION:  XR CHEST 1 VIEW    CLINICAL HISTORY:  Cough    TECHNIQUE:  Single frontal view of the chest was  performed.    COMPARISON:  08/03/2020    FINDINGS:  The lungs are well inflated.  No acute consolidation, pleural effusion, or pneumothorax.  Cardiac silhouette is normal in size.                                            Scribe Attestation:   Scribe #1: I performed the above scribed service and the documentation accurately describes the services I performed. I attest to the accuracy of the note.    Attending Attestation:           Physician Attestation for Scribe:  Physician Attestation Statement for Scribe #1: I, Dr. Bloom, reviewed documentation, as scribed by Mare Asher in my presence, and it is both accurate and complete.                 ED Course as of Oct 29 1114   Thu Oct 29, 2020   0909 I independently reviewed and interpreted EKG which shows normal sinus rhythm at 80 beats per minute, no STEMI, no ischemic changes, normal intervals.  No acute change compared to prior tracing.    [RC]   0918 I independently reviewed and interpreted CXR which shows no pneumothorax, no focal consolidation, no cardiomegaly, no acute process.    [SF]   0936 Patient is a 65-year-old female with hypertension, diabetes, infiltrating ductal carcinoma breast cancer status post partial mastectomy who presents with atraumatic right upper extremity swelling, pain, with some associated shortness of breath for the last several days.  On exam patient has right wrist and hand edema with increased prominence of vasculature, clear breath sounds.  The initial differential included acute DVT, PE, gross metabolic abnormality.  I independently interpreted and reviewed the patient's CT PE study, notable for no evidence of acute pulmonary embolus or other acute chest process.          [RC]   1058 I independently interpreted and reviewed the patient's ultrasound, notable for no evidence of acute DVT.    I discussed with patient and/or guardian/caretaker that this evaluation in the ED does not suggest any emergent or life threatening medical  condition requiring admission or immediate intervention beyond what was provided in the ED. Regardless, an unremarkable evaluation in the ED does not preclude the development or presence of a serious or life threatening condition. As such, patient was instructed to seek medical assistance for any worsening or change in current symptoms.     I had a detailed discussion with patient  and/or guardian/caretaker regarding findings, plan, return precautions, importance of medication adherence, need to follow-up with a PCP and specialist. All questions answered.     Management decisions for this encounter made amidst early acute phase of COVID19 public health emergency; emergency medicine workup standards and admission vs. discharge standards have necessarily shifted.     Note was created using voice recognition software. It may have occasional typographical errors not identified and edited despite initial review prior to signing.              [RC]      ED Course User Index  [RC] Gildardo Bloom MD  [SF] Mare Asher            Clinical Impression:     1. Edema of right upper extremity    2. Cough    3. Dyspnea            ED Disposition Condition    Discharge Good        ED Prescriptions     Medication Sig Dispense Start Date End Date Auth. Provider    benzonatate (TESSALON) 100 MG capsule Take 1 capsule (100 mg total) by mouth 3 (three) times daily as needed for Cough. 20 capsule 10/29/2020 11/8/2020 Gildardo Bloom MD    methocarbamoL (ROBAXIN) 750 MG Tab Take 2 tablets (1,500 mg total) by mouth 3 (three) times daily as needed (Muscle spasm pain). 30 tablet 10/29/2020 11/3/2020 Gildardo Bloom MD    naproxen (NAPROSYN) 500 MG tablet Take 1 tablet (500 mg total) by mouth 2 (two) times daily as needed (pain). 60 tablet 10/29/2020  Gildardo Bloom MD        Follow-up Information     Follow up With Specialties Details Why Contact Info    Patty Louis MD Internal Medicine  For recheck with your primary care doctor  [address]  [facility] [address]  [phone]                                         Gildardo Bloom MD  11/01/20 0591

## 2020-10-29 NOTE — ED TRIAGE NOTES
Pt to ED c/o R hand/forearm pain that began last night. Pt reports pain is 5/10 and is a constant aching/cramping that starts in the fingers and radiates up to forearm. Pt denies any known trauma or injury to RUE. R radial pulse 2+, capillary refill < 3 sec, pt able to move fingers but c/o exacerbation of pain with movement. Pt denies numbness or tingling to RUE. Mild swelling at R wrist noted, pt reports it is not normally swollen. Pt reports taking Aleve and a muscle relaxer without relief.

## 2020-10-29 NOTE — ED NOTES
Unable to obtain ECG with monitor in room. [employer] contacted for support. Eva at bedside with portable ECG machine

## 2020-11-02 ENCOUNTER — TELEPHONE (OUTPATIENT)
Dept: INTERNAL MEDICINE | Facility: CLINIC | Age: 65
End: 2020-11-02

## 2020-11-04 ENCOUNTER — PATIENT OUTREACH (OUTPATIENT)
Dept: ADMINISTRATIVE | Facility: OTHER | Age: 65
End: 2020-11-04

## 2020-11-04 DIAGNOSIS — Z79.4 TYPE 2 DIABETES MELLITUS WITHOUT COMPLICATION, WITH LONG-TERM CURRENT USE OF INSULIN: Primary | ICD-10-CM

## 2020-11-04 DIAGNOSIS — E11.9 TYPE 2 DIABETES MELLITUS WITHOUT COMPLICATION, WITH LONG-TERM CURRENT USE OF INSULIN: Primary | ICD-10-CM

## 2020-11-04 LAB
COMMENT: NORMAL
FINAL PATHOLOGIC DIAGNOSIS: NORMAL
GROSS: NORMAL
SUPPLEMENTAL DIAGNOSIS: NORMAL

## 2020-11-04 NOTE — LETTER
AUTHORIZATION FOR RELEASE OF   CONFIDENTIAL INFORMATION    Dear Frankie Wu MD,    We are seeing Sunitha Pillai, date of birth 1955, in the clinic at [facility]. Patty Louis MD is the patient's PCP. Sunitha Pillai has an outstanding lab/procedure at the time we reviewed her chart. In order to help keep her health information updated, she has authorized us to request the following medical record(s):        (  )  MAMMOGRAM                                      (  )  COLONOSCOPY      (  )  PAP SMEAR                                          (  )  OUTSIDE LAB RESULTS     (  )  DEXA SCAN                                          ( x )  EYE EXAM for 2020            (  )  FOOT EXAM                                          (  )  ENTIRE RECORD     (  )  OUTSIDE IMMUNIZATIONS                 (  )  _______________         Please fax records to [facility], Stacy D Siegendorf, MD, [phone]     If you have any questions, please contact Nara Sara at [phone].           Patient Name: Sunitha Pillai  : 1955  Patient Phone #: [phone]

## 2020-11-05 ENCOUNTER — IMMUNIZATION (OUTPATIENT)
Dept: PHARMACY | Facility: CLINIC | Age: 65
End: 2020-11-05

## 2020-11-05 ENCOUNTER — IMMUNIZATION (OUTPATIENT)
Dept: PHARMACY | Facility: CLINIC | Age: 65
End: 2020-11-05
Payer: MEDICARE

## 2020-11-05 ENCOUNTER — TELEPHONE (OUTPATIENT)
Dept: HEMATOLOGY/ONCOLOGY | Facility: CLINIC | Age: 65
End: 2020-11-05

## 2020-11-05 ENCOUNTER — OFFICE VISIT (OUTPATIENT)
Dept: OBSTETRICS AND GYNECOLOGY | Facility: CLINIC | Age: 65
End: 2020-11-05
Payer: MEDICARE

## 2020-11-05 VITALS
DIASTOLIC BLOOD PRESSURE: 76 MMHG | HEIGHT: 64 IN | SYSTOLIC BLOOD PRESSURE: 124 MMHG | BODY MASS INDEX: 32.33 KG/M2 | WEIGHT: 189.38 LBS

## 2020-11-05 DIAGNOSIS — Z17.0 MALIGNANT NEOPLASM OF LOWER-INNER QUADRANT OF LEFT BREAST IN FEMALE, ESTROGEN RECEPTOR POSITIVE: ICD-10-CM

## 2020-11-05 DIAGNOSIS — Z01.419 WELL WOMAN EXAM WITH ROUTINE GYNECOLOGICAL EXAM: Primary | ICD-10-CM

## 2020-11-05 DIAGNOSIS — C50.312 MALIGNANT NEOPLASM OF LOWER-INNER QUADRANT OF LEFT BREAST IN FEMALE, ESTROGEN RECEPTOR POSITIVE: ICD-10-CM

## 2020-11-05 PROCEDURE — G0101 CA SCREEN;PELVIC/BREAST EXAM: HCPCS | Mod: HCNC,S$GLB,, | Performed by: OBSTETRICS & GYNECOLOGY

## 2020-11-05 PROCEDURE — 3074F PR MOST RECENT SYSTOLIC BLOOD PRESSURE < 130 MM HG: ICD-10-PCS | Mod: HCNC,CPTII,S$GLB, | Performed by: OBSTETRICS & GYNECOLOGY

## 2020-11-05 PROCEDURE — 3078F PR MOST RECENT DIASTOLIC BLOOD PRESSURE < 80 MM HG: ICD-10-PCS | Mod: HCNC,CPTII,S$GLB, | Performed by: OBSTETRICS & GYNECOLOGY

## 2020-11-05 PROCEDURE — 99999 PR PBB SHADOW E&M-EST. PATIENT-LVL III: CPT | Mod: PBBFAC,HCNC,, | Performed by: OBSTETRICS & GYNECOLOGY

## 2020-11-05 PROCEDURE — G0101 PR CA SCREEN;PELVIC/BREAST EXAM: ICD-10-PCS | Mod: HCNC,S$GLB,, | Performed by: OBSTETRICS & GYNECOLOGY

## 2020-11-05 PROCEDURE — 99999 PR PBB SHADOW E&M-EST. PATIENT-LVL III: ICD-10-PCS | Mod: PBBFAC,HCNC,, | Performed by: OBSTETRICS & GYNECOLOGY

## 2020-11-05 PROCEDURE — 3078F DIAST BP <80 MM HG: CPT | Mod: HCNC,CPTII,S$GLB, | Performed by: OBSTETRICS & GYNECOLOGY

## 2020-11-05 PROCEDURE — 3074F SYST BP LT 130 MM HG: CPT | Mod: HCNC,CPTII,S$GLB, | Performed by: OBSTETRICS & GYNECOLOGY

## 2020-11-05 RX ORDER — LANCETS 33 GAUGE
EACH MISCELLANEOUS
COMMUNITY
Start: 2020-08-02

## 2020-11-05 RX ORDER — DICLOFENAC SODIUM 10 MG/G
GEL TOPICAL
COMMUNITY
Start: 2020-10-14 | End: 2023-01-16

## 2020-11-05 RX ORDER — CYCLOBENZAPRINE HCL 5 MG
TABLET ORAL
COMMUNITY
Start: 2020-10-14 | End: 2022-04-28

## 2020-11-05 NOTE — PROGRESS NOTES
Subjective:       Patient ID: Sunitha Pillai is a 65 y.o. female.    Chief Complaint:  Well Woman      History of Present Illness  HPI  Annual Exam-Postmenopausal  Patient presents for annual exam. The patient has no complaints today. The patient is not currently sexually active. GYN screening history: last pap: was normal. The patient is not taking hormone replacement therapy. Patient denies post-menopausal vaginal bleeding. The patient wears seatbelts: yes. The patient participates in regular exercise: no. Has the patient ever been transfused or tattooed?: no. The patient reports that there is not domestic violence in her life.      Note recent hx of breast cancer.  Under care of our Oncology team and awaiting next steps (chemo vs radiation).     GYN & OB History  No LMP recorded. Patient is postmenopausal.   Date of Last Pap: No result found    OB History    Para Term  AB Living   3 3 3     3   SAB TAB Ectopic Multiple Live Births           3      # Outcome Date GA Lbr Jay/2nd Weight Sex Delivery Anes PTL Lv   3 Term      CS-LTranv  N BRITTANY   2 Term      CS-LTranv  N BRITTANY   1 Term      CS-LTranv  N BRITTANY       Review of Systems  Review of Systems   Constitutional: Negative for activity change, appetite change and fatigue.   HENT: Negative.  Negative for tinnitus.    Eyes: Negative.    Respiratory: Negative for cough and shortness of breath.    Cardiovascular: Negative for chest pain and palpitations.   Gastrointestinal: Negative.  Negative for abdominal pain, blood in stool, constipation, diarrhea and nausea.   Endocrine: Negative.  Negative for hot flashes.   Genitourinary: Negative for dysmenorrhea, dyspareunia, dysuria, frequency, menstrual problem, pelvic pain, vaginal discharge, urinary incontinence and postcoital bleeding.   Musculoskeletal: Negative for back pain and joint swelling.   Integumentary:  Negative for rash, breast mass, nipple discharge and breast skin changes.   Neurological:  Negative.  Negative for headaches.   Hematological: Negative.  Does not bruise/bleed easily.   Psychiatric/Behavioral: The patient is not nervous/anxious.    Breast: negative.  Negative for mass, nipple discharge and skin changes          Objective:    Physical Exam:   Constitutional: She is oriented to person, place, and time. She appears well-developed and well-nourished. No distress.    HENT:   Head: Normocephalic and atraumatic.    Eyes: Pupils are equal, round, and reactive to light. Conjunctivae and lids are normal.    Neck: Normal range of motion and full passive range of motion without pain. Neck supple.    Cardiovascular: Normal rate and regular rhythm.  Exam reveals no edema.     Pulmonary/Chest: Effort normal and breath sounds normal. She has no wheezes.        Abdominal: Soft. Normal appearance and bowel sounds are normal. She exhibits no distension. There is no abdominal tenderness. There is no rebound and no guarding.     Genitourinary:    Vagina and uterus normal.      Pelvic exam was performed with patient supine.   There is no tenderness or lesion on the right labia. There is no tenderness or lesion on the left labia. Uterus is not deviated, not fixed and not hosting fibroids. Cervix is normal. Right adnexum displays no mass and no tenderness. Left adnexum displays no mass and no tenderness. No rectocele, cystocele or unspecified prolapse of vaginal walls in the vagina. Labial bartholins normal.Cervix exhibits no motion tenderness and no discharge. negative for vaginal discharge          Musculoskeletal: Normal range of motion and moves all extremeties.       Neurological: She is alert and oriented to person, place, and time. She has normal strength.    Skin: Skin is warm and dry.    Psychiatric: She has a normal mood and affect. Her speech is normal and behavior is normal. Thought content normal. Her mood appears not anxious. She does not exhibit a depressed mood. She expresses no suicidal plans  and no homicidal plans.          Assessment:        1. Well woman exam with routine gynecological exam    2. Malignant neoplasm of lower-inner quadrant of left breast in female, estrogen receptor positive                Plan:      Sunitha was seen today for well woman.    Diagnoses and all orders for this visit:    Well woman exam with routine gynecological exam  Last pap due 2022  Discussed importance of monitoring for  bleeding (sonia if she starts tx for breast ca, including tamoxifen in future).    Malignant neoplasm of lower-inner quadrant of left breast in female, estrogen receptor positive  Appreciate Onc help with pt.    Other orders  -     Cancel: Mammo Digital Screening Bilat w/ Ap; Future

## 2020-11-05 NOTE — PROGRESS NOTES
Care Everywhere: updated  Immunization: updated  Health Maintenance: updated  Media Review: review for outside eye exam report   Legacy Review:   Order placed: hemoglobin   Upcoming appts:  efax sent to Dr. Wu office to obtain eye exam report

## 2020-11-09 NOTE — PROGRESS NOTES
Subjective:      Patient ID: Sunitha Pillai is a 65 y.o. female.    Chief Complaint: No chief complaint on file.      HPI:  65-year-old female, patient of Dr. Moulton, who returns for follow-up of a diagnosis of left breast cancer.    Current history: Screening mammogram on July 30th showed a focal asymmetry in the lower outer portion left breast.  Biopsy on August 25, 2020 showed high-grade infiltrating ductal carcinoma (histologic grade 3, nuclear grade 3, mitotic index 3).  Tumor was 95% ER positive in 95% ND positive, HER2 was 2+ but negative by fish.  Ki-67 was 95%.    On September 17, 2020 lumpectomy and sentinel lymph node biopsy were performed.  That pathology showed a 1.5 cm infiltrating carcinoma with a single negative sentinel lymph node.  Margins were negative.  Final pathological stage T1c N0 stage I A.      Oncotype risk score came back at 29-high risk.    Review of Systems   Constitutional: Negative.    HENT: Negative.    Respiratory: Negative.    Cardiovascular: Positive for chest pain (occasional with activity).   Gastrointestinal: Negative.    Genitourinary: Negative.    Musculoskeletal: Positive for arthralgias (left knee).   Psychiatric/Behavioral: Negative.      Objective:   Physical Exam   Vitals reviewed.  Constitutional: She is oriented to person, place, and time. She appears well-developed and well-nourished. No distress.   Neurological: She is alert and oriented to person, place, and time.   Psychiatric: She has a normal mood and affect. Her behavior is normal. Thought content normal.     Assessment:     Stage 1 left breast cancer - ER+,HER 2 negative  1. Malignant neoplasm of lower-inner quadrant of left breast in female, estrogen receptor positive        Plan:     I reviewed her genomic analysis  , which indicates that she will need chemotherapy.    She will need a port placed.    I recommended 4 cycles of Taxotere plus Cytoxan.  I had previously discussed that with her and provided  her with written information.  Written information for those medications were provided to her.  I discussed side effects including alopecia, bone marrow suppression, peripheral neuropathy, nausea vomiting and other GI effects.      Following chemotherapy she will need radiation therapy.  Following radiation she will begin endocrine therapy with an aromatase inhibitor.

## 2020-11-11 ENCOUNTER — OFFICE VISIT (OUTPATIENT)
Dept: HEMATOLOGY/ONCOLOGY | Facility: CLINIC | Age: 65
End: 2020-11-11
Payer: MEDICARE

## 2020-11-11 VITALS
RESPIRATION RATE: 16 BRPM | DIASTOLIC BLOOD PRESSURE: 79 MMHG | TEMPERATURE: 98 F | BODY MASS INDEX: 32.29 KG/M2 | HEART RATE: 97 BPM | OXYGEN SATURATION: 96 % | SYSTOLIC BLOOD PRESSURE: 152 MMHG | WEIGHT: 189.13 LBS | HEIGHT: 64 IN

## 2020-11-11 DIAGNOSIS — Z17.0 MALIGNANT NEOPLASM OF LOWER-INNER QUADRANT OF LEFT BREAST IN FEMALE, ESTROGEN RECEPTOR POSITIVE: Primary | ICD-10-CM

## 2020-11-11 DIAGNOSIS — C50.312 MALIGNANT NEOPLASM OF LOWER-INNER QUADRANT OF LEFT BREAST IN FEMALE, ESTROGEN RECEPTOR POSITIVE: Primary | ICD-10-CM

## 2020-11-11 PROCEDURE — 99999 PR PBB SHADOW E&M-EST. PATIENT-LVL IV: CPT | Mod: PBBFAC,HCNC,, | Performed by: INTERNAL MEDICINE

## 2020-11-11 PROCEDURE — 99213 OFFICE O/P EST LOW 20 MIN: CPT | Mod: HCNC,S$GLB,, | Performed by: INTERNAL MEDICINE

## 2020-11-11 PROCEDURE — 3078F PR MOST RECENT DIASTOLIC BLOOD PRESSURE < 80 MM HG: ICD-10-PCS | Mod: HCNC,CPTII,S$GLB, | Performed by: INTERNAL MEDICINE

## 2020-11-11 PROCEDURE — 3008F BODY MASS INDEX DOCD: CPT | Mod: HCNC,CPTII,S$GLB, | Performed by: INTERNAL MEDICINE

## 2020-11-11 PROCEDURE — 3078F DIAST BP <80 MM HG: CPT | Mod: HCNC,CPTII,S$GLB, | Performed by: INTERNAL MEDICINE

## 2020-11-11 PROCEDURE — 99999 PR PBB SHADOW E&M-EST. PATIENT-LVL IV: ICD-10-PCS | Mod: PBBFAC,HCNC,, | Performed by: INTERNAL MEDICINE

## 2020-11-11 PROCEDURE — 3077F PR MOST RECENT SYSTOLIC BLOOD PRESSURE >= 140 MM HG: ICD-10-PCS | Mod: HCNC,CPTII,S$GLB, | Performed by: INTERNAL MEDICINE

## 2020-11-11 PROCEDURE — 3008F PR BODY MASS INDEX (BMI) DOCUMENTED: ICD-10-PCS | Mod: HCNC,CPTII,S$GLB, | Performed by: INTERNAL MEDICINE

## 2020-11-11 PROCEDURE — 1101F PR PT FALLS ASSESS DOC 0-1 FALLS W/OUT INJ PAST YR: ICD-10-PCS | Mod: HCNC,CPTII,S$GLB, | Performed by: INTERNAL MEDICINE

## 2020-11-11 PROCEDURE — 1125F AMNT PAIN NOTED PAIN PRSNT: CPT | Mod: HCNC,S$GLB,, | Performed by: INTERNAL MEDICINE

## 2020-11-11 PROCEDURE — 3077F SYST BP >= 140 MM HG: CPT | Mod: HCNC,CPTII,S$GLB, | Performed by: INTERNAL MEDICINE

## 2020-11-11 PROCEDURE — 99213 PR OFFICE/OUTPT VISIT, EST, LEVL III, 20-29 MIN: ICD-10-PCS | Mod: HCNC,S$GLB,, | Performed by: INTERNAL MEDICINE

## 2020-11-11 PROCEDURE — 1159F MED LIST DOCD IN RCRD: CPT | Mod: HCNC,S$GLB,, | Performed by: INTERNAL MEDICINE

## 2020-11-11 PROCEDURE — 1125F PR PAIN SEVERITY QUANTIFIED, PAIN PRESENT: ICD-10-PCS | Mod: HCNC,S$GLB,, | Performed by: INTERNAL MEDICINE

## 2020-11-11 PROCEDURE — 1159F PR MEDICATION LIST DOCUMENTED IN MEDICAL RECORD: ICD-10-PCS | Mod: HCNC,S$GLB,, | Performed by: INTERNAL MEDICINE

## 2020-11-11 PROCEDURE — 1101F PT FALLS ASSESS-DOCD LE1/YR: CPT | Mod: HCNC,CPTII,S$GLB, | Performed by: INTERNAL MEDICINE

## 2020-11-11 NOTE — Clinical Note
Start Taxotere and Cytoxan after port placed, will need CBC and CMP at that time and an appointment to sign consent

## 2020-11-12 ENCOUNTER — PATIENT OUTREACH (OUTPATIENT)
Dept: ADMINISTRATIVE | Facility: HOSPITAL | Age: 65
End: 2020-11-12

## 2020-11-13 ENCOUNTER — TELEPHONE (OUTPATIENT)
Dept: INTERVENTIONAL RADIOLOGY/VASCULAR | Facility: CLINIC | Age: 65
End: 2020-11-13

## 2020-11-13 DIAGNOSIS — Z17.0 MALIGNANT NEOPLASM OF LOWER-INNER QUADRANT OF LEFT BREAST IN FEMALE, ESTROGEN RECEPTOR POSITIVE: Primary | ICD-10-CM

## 2020-11-13 DIAGNOSIS — C50.312 MALIGNANT NEOPLASM OF LOWER-INNER QUADRANT OF LEFT BREAST IN FEMALE, ESTROGEN RECEPTOR POSITIVE: Primary | ICD-10-CM

## 2020-11-13 NOTE — TELEPHONE ENCOUNTER
Left message for pt to return my call. Need to schedule IR procedure. Please forward call to [phone]. Thanks

## 2020-11-16 ENCOUNTER — TELEPHONE (OUTPATIENT)
Dept: HEMATOLOGY/ONCOLOGY | Facility: CLINIC | Age: 65
End: 2020-11-16

## 2020-11-18 ENCOUNTER — TELEPHONE (OUTPATIENT)
Dept: HEMATOLOGY/ONCOLOGY | Facility: CLINIC | Age: 65
End: 2020-11-18

## 2020-11-18 ENCOUNTER — HOSPITAL ENCOUNTER (OUTPATIENT)
Facility: OTHER | Age: 65
Discharge: HOME | End: 2020-11-18
Attending: RADIOLOGY | Admitting: RADIOLOGY
Payer: MEDICARE

## 2020-11-18 VITALS
RESPIRATION RATE: 18 BRPM | DIASTOLIC BLOOD PRESSURE: 71 MMHG | HEIGHT: 64 IN | BODY MASS INDEX: 29.71 KG/M2 | WEIGHT: 174 LBS | OXYGEN SATURATION: 98 % | TEMPERATURE: 98 F | SYSTOLIC BLOOD PRESSURE: 129 MMHG | HEART RATE: 84 BPM

## 2020-11-18 DIAGNOSIS — Z17.1 MALIGNANT NEOPLASM OF LOWER-INNER QUADRANT OF LEFT BREAST IN FEMALE, ESTROGEN RECEPTOR NEGATIVE: ICD-10-CM

## 2020-11-18 DIAGNOSIS — C50.312 MALIGNANT NEOPLASM OF LOWER-INNER QUADRANT OF LEFT BREAST IN FEMALE, ESTROGEN RECEPTOR NEGATIVE: ICD-10-CM

## 2020-11-18 DIAGNOSIS — Z17.0 MALIGNANT NEOPLASM OF LOWER-INNER QUADRANT OF LEFT BREAST IN FEMALE, ESTROGEN RECEPTOR POSITIVE: ICD-10-CM

## 2020-11-18 DIAGNOSIS — C50.312 MALIGNANT NEOPLASM OF LOWER-INNER QUADRANT OF LEFT BREAST IN FEMALE, ESTROGEN RECEPTOR POSITIVE: ICD-10-CM

## 2020-11-18 LAB — POCT GLUCOSE: 195 MG/DL (ref 70–110)

## 2020-11-18 PROCEDURE — 63600175 PHARM REV CODE 636 W HCPCS: Mod: HCNC | Performed by: RADIOLOGY

## 2020-11-18 PROCEDURE — 82962 GLUCOSE BLOOD TEST: CPT | Mod: HCNC | Performed by: RADIOLOGY

## 2020-11-18 PROCEDURE — C1894 INTRO/SHEATH, NON-LASER: HCPCS | Mod: HCNC | Performed by: RADIOLOGY

## 2020-11-18 PROCEDURE — 99152 MOD SED SAME PHYS/QHP 5/>YRS: CPT | Mod: HCNC | Performed by: RADIOLOGY

## 2020-11-18 PROCEDURE — C1788 PORT, INDWELLING, IMP: HCPCS | Mod: HCNC | Performed by: RADIOLOGY

## 2020-11-18 PROCEDURE — 25000003 PHARM REV CODE 250: Mod: HCNC | Performed by: RADIOLOGY

## 2020-11-18 DEVICE — POWERPORT CLEARVUE IMPLANTABLE PORT WITH ATTACHABLE 8F POLYURETHANE OPEN-ENDED SINGLE-LUMEN VENOUS CATHETER INTERMEDIATE KIT
Type: IMPLANTABLE DEVICE | Site: CHEST | Status: FUNCTIONAL
Brand: POWERPORT CLEARVUE

## 2020-11-18 RX ORDER — FENTANYL CITRATE 50 UG/ML
INJECTION, SOLUTION INTRAMUSCULAR; INTRAVENOUS
Status: DISCONTINUED | OUTPATIENT
Start: 2020-11-18 | End: 2020-11-18 | Stop reason: HOSPADM

## 2020-11-18 RX ORDER — MIDAZOLAM HYDROCHLORIDE 1 MG/ML
INJECTION, SOLUTION INTRAMUSCULAR; INTRAVENOUS
Status: DISCONTINUED | OUTPATIENT
Start: 2020-11-18 | End: 2020-11-18 | Stop reason: HOSPADM

## 2020-11-18 RX ORDER — HEPARIN SODIUM 1000 [USP'U]/ML
INJECTION, SOLUTION INTRAVENOUS; SUBCUTANEOUS
Status: DISCONTINUED | OUTPATIENT
Start: 2020-11-18 | End: 2020-11-18 | Stop reason: HOSPADM

## 2020-11-18 RX ORDER — CEFAZOLIN SODIUM 1 G/50ML
SOLUTION INTRAVENOUS
Status: DISCONTINUED | OUTPATIENT
Start: 2020-11-18 | End: 2020-11-18 | Stop reason: HOSPADM

## 2020-11-18 RX ORDER — LIDOCAINE HYDROCHLORIDE 10 MG/ML
INJECTION INFILTRATION; PERINEURAL
Status: DISCONTINUED | OUTPATIENT
Start: 2020-11-18 | End: 2020-11-18 | Stop reason: HOSPADM

## 2020-11-18 NOTE — PROCEDURES
Radiology Post-Procedure Note    Pre Op Diagnosis: L breast cancer  Post Op Diagnosis: Same    Procedure: R chest port placement    Procedure performed by: Hardeep Martin MD    Written Informed Consent Obtained: Yes  Specimen Removed: NO  Estimated Blood Loss: Minimal    Findings:   R chest port placement.  Ready for use.    Patient tolerated procedure well.    @SIG@  
oral

## 2020-11-18 NOTE — DISCHARGE SUMMARY
Radiology Discharge Summary      Hospital Course: No complications    Admit Date: 11/18/2020  Discharge Date: 11/18/2020     Instructions Given to Patient: Yes  Diet: Resume prior diet  Activity: activity as tolerated and no driving for today    Description of Condition on Discharge: Stable  Vital Signs (Most Recent): Temp: 97.8 °F (36.6 °C) (11/18/20 0642)  Pulse: 88 (11/18/20 0642)  Resp: 18 (11/18/20 0642)  BP: (!) 152/75 (11/18/20 0642)  SpO2: 95 % (11/18/20 0642)    Discharge Disposition: Home    Discharge Diagnosis: L breast cancer     Follow-up: per oncology, port ready for use    @SIG@

## 2020-11-18 NOTE — H&P
Consult/H&P Note  Interventional Radiology    Consult Requested By: oncology    Reason for Consult: breast cancer    SUBJECTIVE:     Chief Complaint: L breast cancer    History of Present Illness: 64 yo F with L breast cancer, needs port for access.    Past Medical History:   Diagnosis Date    Acute coronary syndrome 2018    Arthritis     Diabetes mellitus     Diabetes mellitus type I     Genetic testing of female 2020    [employer] via [employer] with AXIN2 and POLE variants of uncertain significance    GERD (gastroesophageal reflux disease)     Hypertension     Malignant neoplasm of lower-inner quadrant of left breast in female, estrogen receptor positive 9/3/2020    Unstable angina 2018    Vertigo      Past Surgical History:   Procedure Laterality Date     SECTION      INJECTION FOR SENTINEL NODE IDENTIFICATION Left 2020    Procedure: INJECTION, FOR SENTINEL NODE IDENTIFICATION;  Surgeon: Isabel Moulton MD;  Location: [facility] OR;  Service: General;  Laterality: Left;    MASTECTOMY, PARTIAL Left 2020    Procedure: MASTECTOMY, PARTIAL-w/reflector;  Surgeon: Isabel Moulton MD;  Location: [facility] OR;  Service: General;  Laterality: Left;    SENTINEL LYMPH NODE BIOPSY Left 2020    Procedure: BIOPSY, LYMPH NODE, SENTINEL;  Surgeon: Isabel Moulton MD;  Location: [facility] OR;  Service: General;  Laterality: Left;     Family History   Problem Relation Age of Onset    Hypertension Mother     Hyperlipidemia Mother     Diabetes Mother     Heart disease Mother     Colon polyps Mother     Aneurysm Father     Diabetes Sister     Hypertension Sister     Heart disease Brother     Diabetes Brother     Hypertension Brother     Cancer Brother         brother German with prostate cancer; brother Cooper with throat cancer    Cervical cancer Daughter     Breast cancer Other     Cancer Paternal Aunt         unknown origin    Breast cancer Maternal Aunt     Cancer Maternal Aunt          unknown origin    Prostate cancer Maternal Cousin     Leukemia Maternal Cousin     Prostate cancer Maternal Cousin     Colon cancer Neg Hx     Ovarian cancer Neg Hx      Social History     Tobacco Use    Smoking status: Never Smoker    Smokeless tobacco: Never Used   Substance Use Topics    Alcohol use: Never     Frequency: Never    Drug use: No       Review of Systems:  Constitutional/General:No fever, chills, change in appetite or weight loss.  Hematological/Immuno: no known coagulopathies  Respiratory: no shortness of breath  Cardiovascular: no chest pain  Gastrointestinal: no abdominal pain  Genito-Urinary: no dysuria  Musculoskeletal: negative  Skin: Negative for rash, itching, pigmentation changes, nail or hair changes.  Neurological: no TIA or stroke symptoms  Psychiatric: normal mood/affect, good insight/judgement      OBJECTIVE:     Vital Signs Range (Last 24H):  Temp:  [97.8 °F (36.6 °C)]   Pulse:  [88]   Resp:  [18]   BP: (152)/(75)   SpO2:  [95 %]     Physical Exam:  General- Patient alert and oriented x3 in NAD  ENT- PERRLA,  Neck- No masses  CV- Regular rate and rhythm  Resp-  No increased WOB  GI- Non tender/non-distended  Extrem- No cyanosis, clubbing, edema.   Derm- No rashes, masses, or lesions noted  Neuro-  No focal deficits noted.     Physical Exam  Body mass index is 29.87 kg/m².    Scheduled Meds:   Continuous Infusions:   PRN Meds:    Allergies: Review of patient's allergies indicates:  No Known Allergies    Labs:  No results for input(s): INR in the last 168 hours.    Invalid input(s):  PT,  PTT  No results for input(s): WBC, HGB, HCT, MCV, PLT in the last 168 hours. No results for input(s): GLU, NA, K, CL, CO2, BUN, CREATININE, CALCIUM, MG, ALT, AST, ALBUMIN, BILITOT, BILIDIR in the last 168 hours.    Vitals (Most Recent):  Temp: 97.8 °F (36.6 °C) (11/18/20 0642)  Pulse: 88 (11/18/20 0642)  Resp: 18 (11/18/20 0642)  BP: (!) 152/75 (11/18/20 0642)  SpO2: 95 % (11/18/20  0642)    ASA: 2  Mallampati: 2    Consent obtained    ASSESSMENT/PLAN:     R chest port.  Moderate sedation.    Active Hospital Problems    Diagnosis  POA    Malignant neoplasm of lower-inner quadrant of left breast in female, estrogen receptor negative [C50.312, Z17.1]  Not Applicable     Priority: High      Resolved Hospital Problems   No resolved problems to display.           Hardeep Martin MD

## 2020-11-18 NOTE — PLAN OF CARE
Sunitha Pillai has met all discharge criteria from Phase II. Vital Signs are stable, ambulating  without difficulty. Discharge instructions given, patient verbalized understanding. Discharged from facility via wheelchair in stable condition.

## 2020-11-19 ENCOUNTER — TELEPHONE (OUTPATIENT)
Dept: SURGERY | Facility: CLINIC | Age: 65
End: 2020-11-19

## 2020-11-19 ENCOUNTER — OFFICE VISIT (OUTPATIENT)
Dept: INTERNAL MEDICINE | Facility: CLINIC | Age: 65
End: 2020-11-19
Payer: MEDICARE

## 2020-11-19 VITALS
HEIGHT: 64 IN | SYSTOLIC BLOOD PRESSURE: 120 MMHG | BODY MASS INDEX: 31.73 KG/M2 | OXYGEN SATURATION: 99 % | DIASTOLIC BLOOD PRESSURE: 84 MMHG | HEART RATE: 96 BPM | WEIGHT: 185.88 LBS

## 2020-11-19 DIAGNOSIS — E11.65 UNCONTROLLED TYPE 2 DIABETES MELLITUS WITH HYPERGLYCEMIA: ICD-10-CM

## 2020-11-19 DIAGNOSIS — E04.1 THYROID NODULE: Primary | ICD-10-CM

## 2020-11-19 DIAGNOSIS — E78.5 HYPERLIPIDEMIA, UNSPECIFIED HYPERLIPIDEMIA TYPE: ICD-10-CM

## 2020-11-19 DIAGNOSIS — M89.9 DISORDER OF BONE: ICD-10-CM

## 2020-11-19 LAB — GLUCOSE SERPL-MCNC: 154 MG/DL (ref 70–110)

## 2020-11-19 PROCEDURE — 3008F BODY MASS INDEX DOCD: CPT | Mod: HCNC,CPTII,S$GLB, | Performed by: INTERNAL MEDICINE

## 2020-11-19 PROCEDURE — 82962 GLUCOSE BLOOD TEST: CPT | Mod: HCNC,S$GLB,, | Performed by: INTERNAL MEDICINE

## 2020-11-19 PROCEDURE — 3044F PR MOST RECENT HEMOGLOBIN A1C LEVEL <7.0%: ICD-10-PCS | Mod: HCNC,CPTII,S$GLB, | Performed by: INTERNAL MEDICINE

## 2020-11-19 PROCEDURE — 99214 PR OFFICE/OUTPT VISIT, EST, LEVL IV, 30-39 MIN: ICD-10-PCS | Mod: HCNC,S$GLB,, | Performed by: INTERNAL MEDICINE

## 2020-11-19 PROCEDURE — 3288F FALL RISK ASSESSMENT DOCD: CPT | Mod: HCNC,CPTII,S$GLB, | Performed by: INTERNAL MEDICINE

## 2020-11-19 PROCEDURE — 3079F DIAST BP 80-89 MM HG: CPT | Mod: HCNC,CPTII,S$GLB, | Performed by: INTERNAL MEDICINE

## 2020-11-19 PROCEDURE — 3288F PR FALLS RISK ASSESSMENT DOCUMENTED: ICD-10-PCS | Mod: HCNC,CPTII,S$GLB, | Performed by: INTERNAL MEDICINE

## 2020-11-19 PROCEDURE — 1101F PR PT FALLS ASSESS DOC 0-1 FALLS W/OUT INJ PAST YR: ICD-10-PCS | Mod: HCNC,CPTII,S$GLB, | Performed by: INTERNAL MEDICINE

## 2020-11-19 PROCEDURE — 99499 RISK ADDL DX/OHS AUDIT: ICD-10-PCS | Mod: S$GLB,,, | Performed by: INTERNAL MEDICINE

## 2020-11-19 PROCEDURE — 3044F HG A1C LEVEL LT 7.0%: CPT | Mod: HCNC,CPTII,S$GLB, | Performed by: INTERNAL MEDICINE

## 2020-11-19 PROCEDURE — 99499 UNLISTED E&M SERVICE: CPT | Mod: S$GLB,,, | Performed by: INTERNAL MEDICINE

## 2020-11-19 PROCEDURE — 99999 PR PBB SHADOW E&M-EST. PATIENT-LVL V: ICD-10-PCS | Mod: PBBFAC,HCNC,, | Performed by: INTERNAL MEDICINE

## 2020-11-19 PROCEDURE — 1159F MED LIST DOCD IN RCRD: CPT | Mod: HCNC,S$GLB,, | Performed by: INTERNAL MEDICINE

## 2020-11-19 PROCEDURE — 1101F PT FALLS ASSESS-DOCD LE1/YR: CPT | Mod: HCNC,CPTII,S$GLB, | Performed by: INTERNAL MEDICINE

## 2020-11-19 PROCEDURE — 3074F SYST BP LT 130 MM HG: CPT | Mod: HCNC,CPTII,S$GLB, | Performed by: INTERNAL MEDICINE

## 2020-11-19 PROCEDURE — 3074F PR MOST RECENT SYSTOLIC BLOOD PRESSURE < 130 MM HG: ICD-10-PCS | Mod: HCNC,CPTII,S$GLB, | Performed by: INTERNAL MEDICINE

## 2020-11-19 PROCEDURE — 3008F PR BODY MASS INDEX (BMI) DOCUMENTED: ICD-10-PCS | Mod: HCNC,CPTII,S$GLB, | Performed by: INTERNAL MEDICINE

## 2020-11-19 PROCEDURE — 99999 PR PBB SHADOW E&M-EST. PATIENT-LVL V: CPT | Mod: PBBFAC,HCNC,, | Performed by: INTERNAL MEDICINE

## 2020-11-19 PROCEDURE — 3079F PR MOST RECENT DIASTOLIC BLOOD PRESSURE 80-89 MM HG: ICD-10-PCS | Mod: HCNC,CPTII,S$GLB, | Performed by: INTERNAL MEDICINE

## 2020-11-19 PROCEDURE — 1159F PR MEDICATION LIST DOCUMENTED IN MEDICAL RECORD: ICD-10-PCS | Mod: HCNC,S$GLB,, | Performed by: INTERNAL MEDICINE

## 2020-11-19 PROCEDURE — 99214 OFFICE O/P EST MOD 30 MIN: CPT | Mod: HCNC,S$GLB,, | Performed by: INTERNAL MEDICINE

## 2020-11-19 PROCEDURE — 82962 POCT GLUCOSE, HAND-HELD DEVICE: ICD-10-PCS | Mod: HCNC,S$GLB,, | Performed by: INTERNAL MEDICINE

## 2020-11-19 NOTE — PROGRESS NOTES
Subjective:       Patient ID: Sunitha Pillai is a 65 y.o. female.    Chief Complaint: Follow-up    HPIPt is about to start chemo - just had port placed.  Feels well otherwise.  Needs thyroid nodule follow up.  No CP or SOB.  Review of Systems   Respiratory: Negative for shortness of breath (PND or orthopnea).    Cardiovascular: Negative for chest pain (arm pain or jaw pain).   Gastrointestinal: Negative for abdominal pain, diarrhea, nausea and vomiting.   Genitourinary: Negative for dysuria.   Neurological: Negative for seizures, syncope and headaches.       Objective:      Physical Exam  Constitutional:       General: She is not in acute distress.     Appearance: She is well-developed.   HENT:      Head: Normocephalic.   Neck:      Musculoskeletal: Neck supple.      Thyroid: No thyromegaly.      Vascular: No JVD.   Cardiovascular:      Rate and Rhythm: Normal rate and regular rhythm.      Heart sounds: Normal heart sounds. No murmur. No friction rub. No gallop.    Pulmonary:      Effort: Pulmonary effort is normal.      Breath sounds: Normal breath sounds. No wheezing or rales.   Abdominal:      General: Bowel sounds are normal. There is no distension.      Palpations: Abdomen is soft. There is no mass.      Tenderness: There is no abdominal tenderness. There is no guarding or rebound.   Lymphadenopathy:      Cervical: No cervical adenopathy.   Skin:     General: Skin is warm and dry.   Neurological:      Mental Status: She is alert and oriented to person, place, and time.      Deep Tendon Reflexes: Reflexes are normal and symmetric.   Psychiatric:         Behavior: Behavior normal.         Thought Content: Thought content normal.         Judgment: Judgment normal.         Assessment:       1. Thyroid nodule    2. Hyperlipidemia, unspecified hyperlipidemia type    3. Uncontrolled type 2 diabetes mellitus with hyperglycemia    4. Disorder of bone        Plan:   Thyroid nodule  -     US Soft Tissue Head Neck  Thyroid; Future; Expected date: 11/19/2020  -     TSH; Future; Expected date: 11/19/2020    Hyperlipidemia, unspecified hyperlipidemia type  -     Lipid Panel; Future; Expected date: 11/19/2020    Uncontrolled type 2 diabetes mellitus with hyperglycemia  -     Hemoglobin A1C; Future; Expected date: 11/19/2020  -     POCT Glucose, Hand-Held Device    Disorder of bone  -     Vitamin D; Future; Expected date: 11/19/2020

## 2020-11-19 NOTE — TELEPHONE ENCOUNTER
Returned patients daughter call regarding the message below.  Stated to the patients daughter will need  to sign note.  Voiced understanding message forwarded to Orlando Naik RN.      ----- Message from Jamilah Ramsey sent at 11/19/2020  3:52 PM CST -----  Pt daughter states her mom need a note return to work from surgery back on 9/17 asking for a call back.    Contact info [phone]

## 2020-11-23 ENCOUNTER — TELEPHONE (OUTPATIENT)
Dept: HEMATOLOGY/ONCOLOGY | Facility: CLINIC | Age: 65
End: 2020-11-23

## 2020-11-23 NOTE — TELEPHONE ENCOUNTER
Call made to patient. No answer at this time. L asking Ms Pillai to please reach back out regarding her neck and imaging concerning it.     ----- Message from Isabel Kauffman sent at 11/23/2020  2:41 PM CST -----  Contact: Janet Carlos  Pt calling to speak with nurse regarding mri ,neck       Call back :[phone]

## 2020-11-24 ENCOUNTER — LAB VISIT (OUTPATIENT)
Dept: LAB | Facility: HOSPITAL | Age: 65
End: 2020-11-24
Attending: INTERNAL MEDICINE
Payer: MEDICARE

## 2020-11-24 ENCOUNTER — OFFICE VISIT (OUTPATIENT)
Dept: HEMATOLOGY/ONCOLOGY | Facility: CLINIC | Age: 65
End: 2020-11-24
Payer: MEDICARE

## 2020-11-24 ENCOUNTER — INFUSION (OUTPATIENT)
Dept: INFUSION THERAPY | Facility: HOSPITAL | Age: 65
End: 2020-11-24
Payer: MEDICARE

## 2020-11-24 VITALS
TEMPERATURE: 99 F | HEART RATE: 91 BPM | HEIGHT: 64 IN | RESPIRATION RATE: 18 BRPM | WEIGHT: 185 LBS | DIASTOLIC BLOOD PRESSURE: 65 MMHG | SYSTOLIC BLOOD PRESSURE: 144 MMHG | BODY MASS INDEX: 31.58 KG/M2

## 2020-11-24 VITALS
HEART RATE: 90 BPM | WEIGHT: 184.94 LBS | TEMPERATURE: 98 F | HEIGHT: 64 IN | SYSTOLIC BLOOD PRESSURE: 137 MMHG | OXYGEN SATURATION: 98 % | DIASTOLIC BLOOD PRESSURE: 76 MMHG | RESPIRATION RATE: 16 BRPM | BODY MASS INDEX: 31.57 KG/M2

## 2020-11-24 DIAGNOSIS — Z17.0 MALIGNANT NEOPLASM OF LOWER-INNER QUADRANT OF LEFT BREAST IN FEMALE, ESTROGEN RECEPTOR POSITIVE: Primary | ICD-10-CM

## 2020-11-24 DIAGNOSIS — R45.89 ANXIETY ABOUT HEALTH: ICD-10-CM

## 2020-11-24 DIAGNOSIS — E78.5 HYPERLIPIDEMIA, UNSPECIFIED HYPERLIPIDEMIA TYPE: ICD-10-CM

## 2020-11-24 DIAGNOSIS — C50.312 MALIGNANT NEOPLASM OF LOWER-INNER QUADRANT OF LEFT BREAST IN FEMALE, ESTROGEN RECEPTOR POSITIVE: ICD-10-CM

## 2020-11-24 DIAGNOSIS — E11.65 UNCONTROLLED TYPE 2 DIABETES MELLITUS WITH HYPERGLYCEMIA: ICD-10-CM

## 2020-11-24 DIAGNOSIS — M89.9 DISORDER OF BONE: ICD-10-CM

## 2020-11-24 DIAGNOSIS — C50.312 MALIGNANT NEOPLASM OF LOWER-INNER QUADRANT OF LEFT BREAST IN FEMALE, ESTROGEN RECEPTOR POSITIVE: Primary | ICD-10-CM

## 2020-11-24 DIAGNOSIS — Z13.9 SCREENING FOR CONDITION: ICD-10-CM

## 2020-11-24 DIAGNOSIS — R11.2 CHEMOTHERAPY INDUCED NAUSEA AND VOMITING: ICD-10-CM

## 2020-11-24 DIAGNOSIS — Z17.0 MALIGNANT NEOPLASM OF LOWER-INNER QUADRANT OF LEFT BREAST IN FEMALE, ESTROGEN RECEPTOR POSITIVE: ICD-10-CM

## 2020-11-24 DIAGNOSIS — T45.1X5A CHEMOTHERAPY INDUCED NAUSEA AND VOMITING: ICD-10-CM

## 2020-11-24 DIAGNOSIS — E04.1 THYROID NODULE: ICD-10-CM

## 2020-11-24 PROBLEM — F41.8 ANXIETY ABOUT HEALTH: Status: ACTIVE | Noted: 2020-11-24

## 2020-11-24 LAB
25(OH)D3+25(OH)D2 SERPL-MCNC: 29 NG/ML (ref 30–96)
ALBUMIN SERPL BCP-MCNC: 3.6 G/DL (ref 3.5–5.2)
ALP SERPL-CCNC: 99 U/L (ref 55–135)
ALT SERPL W/O P-5'-P-CCNC: 17 U/L (ref 10–44)
ANION GAP SERPL CALC-SCNC: 6 MMOL/L (ref 8–16)
AST SERPL-CCNC: 19 U/L (ref 10–40)
BASOPHILS # BLD AUTO: 0.02 K/UL (ref 0–0.2)
BASOPHILS NFR BLD: 0.3 % (ref 0–1.9)
BILIRUB SERPL-MCNC: 0.4 MG/DL (ref 0.1–1)
BUN SERPL-MCNC: 17 MG/DL (ref 8–23)
CALCIUM SERPL-MCNC: 9.3 MG/DL (ref 8.7–10.5)
CHLORIDE SERPL-SCNC: 106 MMOL/L (ref 95–110)
CHOLEST SERPL-MCNC: 149 MG/DL (ref 120–199)
CHOLEST/HDLC SERPL: 3.5 {RATIO} (ref 2–5)
CO2 SERPL-SCNC: 29 MMOL/L (ref 23–29)
CREAT SERPL-MCNC: 1.2 MG/DL (ref 0.5–1.4)
DIFFERENTIAL METHOD: ABNORMAL
EOSINOPHIL # BLD AUTO: 0.1 K/UL (ref 0–0.5)
EOSINOPHIL NFR BLD: 2.1 % (ref 0–8)
ERYTHROCYTE [DISTWIDTH] IN BLOOD BY AUTOMATED COUNT: 13 % (ref 11.5–14.5)
EST. GFR  (AFRICAN AMERICAN): 54.4 ML/MIN/1.73 M^2
EST. GFR  (NON AFRICAN AMERICAN): 47.2 ML/MIN/1.73 M^2
ESTIMATED AVG GLUCOSE: 166 MG/DL (ref 68–131)
GLUCOSE SERPL-MCNC: 154 MG/DL (ref 70–110)
HBA1C MFR BLD HPLC: 7.4 % (ref 4–5.6)
HCT VFR BLD AUTO: 37.2 % (ref 37–48.5)
HDLC SERPL-MCNC: 42 MG/DL (ref 40–75)
HDLC SERPL: 28.2 % (ref 20–50)
HGB BLD-MCNC: 12 G/DL (ref 12–16)
IMM GRANULOCYTES # BLD AUTO: 0.01 K/UL (ref 0–0.04)
IMM GRANULOCYTES NFR BLD AUTO: 0.2 % (ref 0–0.5)
LDLC SERPL CALC-MCNC: 86.6 MG/DL (ref 63–159)
LYMPHOCYTES # BLD AUTO: 2.4 K/UL (ref 1–4.8)
LYMPHOCYTES NFR BLD: 38.8 % (ref 18–48)
MCH RBC QN AUTO: 30.3 PG (ref 27–31)
MCHC RBC AUTO-ENTMCNC: 32.3 G/DL (ref 32–36)
MCV RBC AUTO: 94 FL (ref 82–98)
MONOCYTES # BLD AUTO: 0.6 K/UL (ref 0.3–1)
MONOCYTES NFR BLD: 10.1 % (ref 4–15)
NEUTROPHILS # BLD AUTO: 3 K/UL (ref 1.8–7.7)
NEUTROPHILS NFR BLD: 48.5 % (ref 38–73)
NONHDLC SERPL-MCNC: 107 MG/DL
NRBC BLD-RTO: 0 /100 WBC
PLATELET # BLD AUTO: 166 K/UL (ref 150–350)
PLATELET BLD QL SMEAR: ABNORMAL
PMV BLD AUTO: 9.6 FL (ref 9.2–12.9)
POTASSIUM SERPL-SCNC: 4.6 MMOL/L (ref 3.5–5.1)
PROT SERPL-MCNC: 7.8 G/DL (ref 6–8.4)
RBC # BLD AUTO: 3.96 M/UL (ref 4–5.4)
SARS-COV-2 RDRP RESP QL NAA+PROBE: NEGATIVE
SODIUM SERPL-SCNC: 141 MMOL/L (ref 136–145)
TRIGL SERPL-MCNC: 102 MG/DL (ref 30–150)
TSH SERPL DL<=0.005 MIU/L-ACNC: 2.47 UIU/ML (ref 0.4–4)
WBC # BLD AUTO: 6.26 K/UL (ref 3.9–12.7)

## 2020-11-24 PROCEDURE — 3008F BODY MASS INDEX DOCD: CPT | Mod: HCNC,CPTII,S$GLB, | Performed by: NURSE PRACTITIONER

## 2020-11-24 PROCEDURE — 83036 HEMOGLOBIN GLYCOSYLATED A1C: CPT | Mod: HCNC

## 2020-11-24 PROCEDURE — 1159F MED LIST DOCD IN RCRD: CPT | Mod: HCNC,S$GLB,, | Performed by: NURSE PRACTITIONER

## 2020-11-24 PROCEDURE — 3075F SYST BP GE 130 - 139MM HG: CPT | Mod: HCNC,CPTII,S$GLB, | Performed by: NURSE PRACTITIONER

## 2020-11-24 PROCEDURE — 80053 COMPREHEN METABOLIC PANEL: CPT | Mod: HCNC

## 2020-11-24 PROCEDURE — 1159F PR MEDICATION LIST DOCUMENTED IN MEDICAL RECORD: ICD-10-PCS | Mod: HCNC,S$GLB,, | Performed by: NURSE PRACTITIONER

## 2020-11-24 PROCEDURE — 1126F AMNT PAIN NOTED NONE PRSNT: CPT | Mod: HCNC,S$GLB,, | Performed by: NURSE PRACTITIONER

## 2020-11-24 PROCEDURE — 96375 TX/PRO/DX INJ NEW DRUG ADDON: CPT | Mod: HCNC

## 2020-11-24 PROCEDURE — 99999 PR PBB SHADOW E&M-EST. PATIENT-LVL V: CPT | Mod: PBBFAC,HCNC,, | Performed by: NURSE PRACTITIONER

## 2020-11-24 PROCEDURE — 99215 OFFICE O/P EST HI 40 MIN: CPT | Mod: HCNC,S$GLB,, | Performed by: NURSE PRACTITIONER

## 2020-11-24 PROCEDURE — 80061 LIPID PANEL: CPT | Mod: HCNC

## 2020-11-24 PROCEDURE — 82306 VITAMIN D 25 HYDROXY: CPT | Mod: HCNC

## 2020-11-24 PROCEDURE — 96413 CHEMO IV INFUSION 1 HR: CPT | Mod: HCNC

## 2020-11-24 PROCEDURE — 3075F PR MOST RECENT SYSTOLIC BLOOD PRESS GE 130-139MM HG: ICD-10-PCS | Mod: HCNC,CPTII,S$GLB, | Performed by: NURSE PRACTITIONER

## 2020-11-24 PROCEDURE — 1126F PR PAIN SEVERITY QUANTIFIED, NO PAIN PRESENT: ICD-10-PCS | Mod: HCNC,S$GLB,, | Performed by: NURSE PRACTITIONER

## 2020-11-24 PROCEDURE — 63600175 PHARM REV CODE 636 W HCPCS: Mod: HCNC | Performed by: INTERNAL MEDICINE

## 2020-11-24 PROCEDURE — 99215 PR OFFICE/OUTPT VISIT, EST, LEVL V, 40-54 MIN: ICD-10-PCS | Mod: HCNC,S$GLB,, | Performed by: NURSE PRACTITIONER

## 2020-11-24 PROCEDURE — 1101F PT FALLS ASSESS-DOCD LE1/YR: CPT | Mod: HCNC,CPTII,S$GLB, | Performed by: NURSE PRACTITIONER

## 2020-11-24 PROCEDURE — 84443 ASSAY THYROID STIM HORMONE: CPT | Mod: HCNC

## 2020-11-24 PROCEDURE — 85025 COMPLETE CBC W/AUTO DIFF WBC: CPT | Mod: HCNC

## 2020-11-24 PROCEDURE — 36415 COLL VENOUS BLD VENIPUNCTURE: CPT | Mod: HCNC

## 2020-11-24 PROCEDURE — 3078F PR MOST RECENT DIASTOLIC BLOOD PRESSURE < 80 MM HG: ICD-10-PCS | Mod: HCNC,CPTII,S$GLB, | Performed by: NURSE PRACTITIONER

## 2020-11-24 PROCEDURE — 96367 TX/PROPH/DG ADDL SEQ IV INF: CPT | Mod: HCNC

## 2020-11-24 PROCEDURE — 3078F DIAST BP <80 MM HG: CPT | Mod: HCNC,CPTII,S$GLB, | Performed by: NURSE PRACTITIONER

## 2020-11-24 PROCEDURE — U0002 COVID-19 LAB TEST NON-CDC: HCPCS | Mod: HCNC

## 2020-11-24 PROCEDURE — 3051F PR MOST RECENT HEMOGLOBIN A1C LEVEL 7.0 - < 8.0%: ICD-10-PCS | Mod: HCNC,CPTII,S$GLB, | Performed by: NURSE PRACTITIONER

## 2020-11-24 PROCEDURE — 99999 PR PBB SHADOW E&M-EST. PATIENT-LVL V: ICD-10-PCS | Mod: PBBFAC,HCNC,, | Performed by: NURSE PRACTITIONER

## 2020-11-24 PROCEDURE — 3288F FALL RISK ASSESSMENT DOCD: CPT | Mod: HCNC,CPTII,S$GLB, | Performed by: NURSE PRACTITIONER

## 2020-11-24 PROCEDURE — 96417 CHEMO IV INFUS EACH ADDL SEQ: CPT | Mod: HCNC

## 2020-11-24 PROCEDURE — 3051F HG A1C>EQUAL 7.0%<8.0%: CPT | Mod: HCNC,CPTII,S$GLB, | Performed by: NURSE PRACTITIONER

## 2020-11-24 PROCEDURE — A4216 STERILE WATER/SALINE, 10 ML: HCPCS | Mod: HCNC | Performed by: INTERNAL MEDICINE

## 2020-11-24 PROCEDURE — 1101F PR PT FALLS ASSESS DOC 0-1 FALLS W/OUT INJ PAST YR: ICD-10-PCS | Mod: HCNC,CPTII,S$GLB, | Performed by: NURSE PRACTITIONER

## 2020-11-24 PROCEDURE — 3288F PR FALLS RISK ASSESSMENT DOCUMENTED: ICD-10-PCS | Mod: HCNC,CPTII,S$GLB, | Performed by: NURSE PRACTITIONER

## 2020-11-24 PROCEDURE — 25000003 PHARM REV CODE 250: Mod: HCNC | Performed by: INTERNAL MEDICINE

## 2020-11-24 PROCEDURE — 3008F PR BODY MASS INDEX (BMI) DOCUMENTED: ICD-10-PCS | Mod: HCNC,CPTII,S$GLB, | Performed by: NURSE PRACTITIONER

## 2020-11-24 RX ORDER — PROMETHAZINE HYDROCHLORIDE 25 MG/1
25 TABLET ORAL EVERY 6 HOURS PRN
Qty: 30 TABLET | Refills: 2 | Status: SHIPPED | OUTPATIENT
Start: 2020-11-24 | End: 2020-12-01

## 2020-11-24 RX ORDER — HEPARIN 100 UNIT/ML
500 SYRINGE INTRAVENOUS
Status: DISCONTINUED | OUTPATIENT
Start: 2020-11-24 | End: 2020-11-24 | Stop reason: HOSPADM

## 2020-11-24 RX ORDER — HEPARIN 100 UNIT/ML
500 SYRINGE INTRAVENOUS
Status: CANCELLED | OUTPATIENT
Start: 2020-11-24

## 2020-11-24 RX ORDER — ONDANSETRON 8 MG/1
8 TABLET, ORALLY DISINTEGRATING ORAL EVERY 8 HOURS PRN
Qty: 30 TABLET | Refills: 2 | Status: SHIPPED | OUTPATIENT
Start: 2020-11-24 | End: 2021-11-09

## 2020-11-24 RX ORDER — SODIUM CHLORIDE 0.9 % (FLUSH) 0.9 %
10 SYRINGE (ML) INJECTION
Status: CANCELLED | OUTPATIENT
Start: 2020-11-24

## 2020-11-24 RX ORDER — DEXAMETHASONE 4 MG/1
TABLET ORAL
Qty: 16 TABLET | Refills: 0 | Status: SHIPPED | OUTPATIENT
Start: 2020-11-24 | End: 2021-10-06

## 2020-11-24 RX ORDER — SODIUM CHLORIDE 0.9 % (FLUSH) 0.9 %
10 SYRINGE (ML) INJECTION
Status: DISCONTINUED | OUTPATIENT
Start: 2020-11-24 | End: 2020-11-24 | Stop reason: HOSPADM

## 2020-11-24 RX ADMIN — HEPARIN 500 UNITS: 100 SYRINGE at 01:11

## 2020-11-24 RX ADMIN — CYCLOPHOSPHAMIDE 1180 MG: 1 INJECTION, POWDER, FOR SOLUTION INTRAVENOUS; ORAL at 12:11

## 2020-11-24 RX ADMIN — Medication 10 ML: at 01:11

## 2020-11-24 RX ADMIN — APREPITANT 130 MG: 130 INJECTION, EMULSION INTRAVENOUS at 11:11

## 2020-11-24 RX ADMIN — DOCETAXEL ANHYDROUS 150 MG: 10 INJECTION, SOLUTION INTRAVENOUS at 11:11

## 2020-11-24 RX ADMIN — DEXAMETHASONE SODIUM PHOSPHATE: 4 INJECTION, SOLUTION INTRA-ARTICULAR; INTRALESIONAL; INTRAMUSCULAR; INTRAVENOUS; SOFT TISSUE at 10:11

## 2020-11-24 RX ADMIN — SODIUM CHLORIDE: 9 INJECTION, SOLUTION INTRAVENOUS at 10:11

## 2020-11-24 NOTE — Clinical Note
RTC in 3 weeks for EP with cbc, cmp, and for C2 Cytoxan and Taxotere. Udenyca day 2. Ok to see me again if needed.   Appt with Psych Dr. Llanos for anxiety

## 2020-11-25 ENCOUNTER — INFUSION (OUTPATIENT)
Dept: INFUSION THERAPY | Facility: HOSPITAL | Age: 65
End: 2020-11-25
Payer: MEDICARE

## 2020-11-25 ENCOUNTER — TELEPHONE (OUTPATIENT)
Dept: HEMATOLOGY/ONCOLOGY | Facility: CLINIC | Age: 65
End: 2020-11-25

## 2020-11-25 DIAGNOSIS — Z17.0 MALIGNANT NEOPLASM OF LOWER-INNER QUADRANT OF LEFT BREAST IN FEMALE, ESTROGEN RECEPTOR POSITIVE: Primary | ICD-10-CM

## 2020-11-25 DIAGNOSIS — C50.312 MALIGNANT NEOPLASM OF LOWER-INNER QUADRANT OF LEFT BREAST IN FEMALE, ESTROGEN RECEPTOR POSITIVE: Primary | ICD-10-CM

## 2020-11-25 PROCEDURE — 96372 THER/PROPH/DIAG INJ SC/IM: CPT | Mod: HCNC

## 2020-11-25 PROCEDURE — 63600175 PHARM REV CODE 636 W HCPCS: Mod: TB,HCNC | Performed by: INTERNAL MEDICINE

## 2020-11-25 RX ADMIN — PEGFILGRASTIM-CBQV 6 MG: 6 INJECTION, SOLUTION SUBCUTANEOUS at 10:11

## 2020-11-25 NOTE — TELEPHONE ENCOUNTER
Called patient to schedule consult with Dr Llanos.  Mailed appt slip.      ----- Message from Lulu Pedraza sent at 11/25/2020  8:14 AM CST -----    Appt with Psych Dr. Llanos for anxiety       Please assist in scheduling

## 2020-11-27 ENCOUNTER — HOSPITAL ENCOUNTER (EMERGENCY)
Facility: HOSPITAL | Age: 65
Discharge: HOME | End: 2020-11-28
Attending: EMERGENCY MEDICINE
Payer: MEDICARE

## 2020-11-27 DIAGNOSIS — Z79.4 TYPE 2 DIABETES MELLITUS WITH COMPLICATION, WITH LONG-TERM CURRENT USE OF INSULIN: ICD-10-CM

## 2020-11-27 DIAGNOSIS — R42 VERTIGO: ICD-10-CM

## 2020-11-27 DIAGNOSIS — I10 ESSENTIAL HYPERTENSION: Chronic | ICD-10-CM

## 2020-11-27 DIAGNOSIS — E11.8 TYPE 2 DIABETES MELLITUS WITH COMPLICATION, WITH LONG-TERM CURRENT USE OF INSULIN: ICD-10-CM

## 2020-11-27 DIAGNOSIS — R42 DIZZINESS: ICD-10-CM

## 2020-11-27 DIAGNOSIS — M79.673 FOOT PAIN: Primary | ICD-10-CM

## 2020-11-27 DIAGNOSIS — R50.9 FEVER, UNSPECIFIED FEVER CAUSE: ICD-10-CM

## 2020-11-27 DIAGNOSIS — R50.9 FEVER: ICD-10-CM

## 2020-11-27 PROBLEM — R07.89 OTHER CHEST PAIN: Status: RESOLVED | Noted: 2020-01-04 | Resolved: 2020-11-27

## 2020-11-27 PROBLEM — R55 VASOVAGAL SYNCOPE: Status: RESOLVED | Noted: 2019-04-26 | Resolved: 2020-11-27

## 2020-11-27 PROBLEM — R74.8 ELEVATED LIVER ENZYMES: Status: RESOLVED | Noted: 2019-04-27 | Resolved: 2020-11-27

## 2020-11-27 PROBLEM — Z71.89 COUNSELING AND COORDINATION OF CARE: Status: RESOLVED | Noted: 2019-06-26 | Resolved: 2020-11-27

## 2020-11-27 PROBLEM — M25.532 LEFT WRIST PAIN: Status: RESOLVED | Noted: 2019-01-23 | Resolved: 2020-11-27

## 2020-11-27 LAB
ALBUMIN SERPL BCP-MCNC: 3.6 G/DL (ref 3.5–5.2)
ALP SERPL-CCNC: 105 U/L (ref 55–135)
ALT SERPL W/O P-5'-P-CCNC: 12 U/L (ref 10–44)
ANION GAP SERPL CALC-SCNC: 9 MMOL/L (ref 8–16)
ANISOCYTOSIS BLD QL SMEAR: SLIGHT
AST SERPL-CCNC: 14 U/L (ref 10–40)
BASOPHILS # BLD AUTO: ABNORMAL K/UL (ref 0–0.2)
BASOPHILS NFR BLD: 1 % (ref 0–1.9)
BILIRUB SERPL-MCNC: 0.9 MG/DL (ref 0.1–1)
BUN SERPL-MCNC: 24 MG/DL (ref 8–23)
CALCIUM SERPL-MCNC: 9.4 MG/DL (ref 8.7–10.5)
CHLORIDE SERPL-SCNC: 106 MMOL/L (ref 95–110)
CO2 SERPL-SCNC: 28 MMOL/L (ref 23–29)
CREAT SERPL-MCNC: 1.1 MG/DL (ref 0.5–1.4)
CTP QC/QA: YES
CTP QC/QA: YES
D DIMER PPP IA.FEU-MCNC: 2.36 MG/L FEU
DIFFERENTIAL METHOD: ABNORMAL
EOSINOPHIL # BLD AUTO: ABNORMAL K/UL (ref 0–0.5)
EOSINOPHIL NFR BLD: 0 % (ref 0–8)
ERYTHROCYTE [DISTWIDTH] IN BLOOD BY AUTOMATED COUNT: 13.1 % (ref 11.5–14.5)
EST. GFR  (AFRICAN AMERICAN): >60 ML/MIN/1.73 M^2
EST. GFR  (NON AFRICAN AMERICAN): 52.4 ML/MIN/1.73 M^2
GLUCOSE SERPL-MCNC: 196 MG/DL (ref 70–110)
HCT VFR BLD AUTO: 37.9 % (ref 37–48.5)
HGB BLD-MCNC: 12.8 G/DL (ref 12–16)
IMM GRANULOCYTES # BLD AUTO: ABNORMAL K/UL (ref 0–0.04)
IMM GRANULOCYTES NFR BLD AUTO: ABNORMAL % (ref 0–0.5)
LACTATE SERPL-SCNC: 2 MMOL/L (ref 0.5–2.2)
LIPASE SERPL-CCNC: 18 U/L (ref 4–60)
LYMPHOCYTES # BLD AUTO: ABNORMAL K/UL (ref 1–4.8)
LYMPHOCYTES NFR BLD: 20 % (ref 18–48)
MCH RBC QN AUTO: 30.8 PG (ref 27–31)
MCHC RBC AUTO-ENTMCNC: 33.8 G/DL (ref 32–36)
MCV RBC AUTO: 91 FL (ref 82–98)
MONOCYTES # BLD AUTO: ABNORMAL K/UL (ref 0.3–1)
MONOCYTES NFR BLD: 1 % (ref 4–15)
NEUTROPHILS NFR BLD: 77 % (ref 38–73)
NEUTS BAND NFR BLD MANUAL: 1 %
NRBC BLD-RTO: 0 /100 WBC
PLATELET # BLD AUTO: 81 K/UL (ref 150–350)
PLATELET BLD QL SMEAR: ABNORMAL
PMV BLD AUTO: 10.9 FL (ref 9.2–12.9)
POC MOLECULAR INFLUENZA A AGN: NEGATIVE
POC MOLECULAR INFLUENZA B AGN: NEGATIVE
POTASSIUM SERPL-SCNC: 4.2 MMOL/L (ref 3.5–5.1)
PROT SERPL-MCNC: 7.6 G/DL (ref 6–8.4)
RBC # BLD AUTO: 4.15 M/UL (ref 4–5.4)
SARS-COV-2 RDRP RESP QL NAA+PROBE: NEGATIVE
SODIUM SERPL-SCNC: 143 MMOL/L (ref 136–145)
TROPONIN I SERPL DL<=0.01 NG/ML-MCNC: 0.01 NG/ML (ref 0–0.03)
WBC # BLD AUTO: 8.18 K/UL (ref 3.9–12.7)

## 2020-11-27 PROCEDURE — 93010 EKG 12-LEAD: ICD-10-PCS | Mod: HCNC,,, | Performed by: INTERNAL MEDICINE

## 2020-11-27 PROCEDURE — 83690 ASSAY OF LIPASE: CPT | Mod: HCNC

## 2020-11-27 PROCEDURE — 83605 ASSAY OF LACTIC ACID: CPT | Mod: HCNC

## 2020-11-27 PROCEDURE — 96361 HYDRATE IV INFUSION ADD-ON: CPT | Mod: HCNC

## 2020-11-27 PROCEDURE — U0002 COVID-19 LAB TEST NON-CDC: HCPCS | Mod: HCNC | Performed by: EMERGENCY MEDICINE

## 2020-11-27 PROCEDURE — 99285 PR EMERGENCY DEPT VISIT,LEVEL V: ICD-10-PCS | Mod: HCNC,,, | Performed by: NURSE PRACTITIONER

## 2020-11-27 PROCEDURE — 85007 BL SMEAR W/DIFF WBC COUNT: CPT | Mod: HCNC

## 2020-11-27 PROCEDURE — 93010 ELECTROCARDIOGRAM REPORT: CPT | Mod: HCNC,,, | Performed by: INTERNAL MEDICINE

## 2020-11-27 PROCEDURE — 84484 ASSAY OF TROPONIN QUANT: CPT | Mod: HCNC

## 2020-11-27 PROCEDURE — 85027 COMPLETE CBC AUTOMATED: CPT | Mod: HCNC

## 2020-11-27 PROCEDURE — 25000003 PHARM REV CODE 250: Mod: HCNC | Performed by: EMERGENCY MEDICINE

## 2020-11-27 PROCEDURE — 96360 HYDRATION IV INFUSION INIT: CPT | Mod: HCNC

## 2020-11-27 PROCEDURE — 99285 EMERGENCY DEPT VISIT HI MDM: CPT | Mod: HCNC,,, | Performed by: NURSE PRACTITIONER

## 2020-11-27 PROCEDURE — 87040 BLOOD CULTURE FOR BACTERIA: CPT | Mod: HCNC

## 2020-11-27 PROCEDURE — 99284 EMERGENCY DEPT VISIT MOD MDM: CPT | Mod: ,,, | Performed by: EMERGENCY MEDICINE

## 2020-11-27 PROCEDURE — 85379 FIBRIN DEGRADATION QUANT: CPT | Mod: HCNC

## 2020-11-27 PROCEDURE — 99285 EMERGENCY DEPT VISIT HI MDM: CPT | Mod: 25,HCNC

## 2020-11-27 PROCEDURE — 80053 COMPREHEN METABOLIC PANEL: CPT | Mod: HCNC

## 2020-11-27 PROCEDURE — 99284 PR EMERGENCY DEPT VISIT,LEVEL IV: ICD-10-PCS | Mod: ,,, | Performed by: EMERGENCY MEDICINE

## 2020-11-27 PROCEDURE — 87502 INFLUENZA DNA AMP PROBE: CPT | Mod: HCNC

## 2020-11-27 PROCEDURE — 93005 ELECTROCARDIOGRAM TRACING: CPT | Mod: HCNC

## 2020-11-27 RX ORDER — HYDROCODONE BITARTRATE AND ACETAMINOPHEN 5; 325 MG/1; MG/1
1 TABLET ORAL
Status: COMPLETED | OUTPATIENT
Start: 2020-11-27 | End: 2020-11-27

## 2020-11-27 RX ORDER — MECLIZINE HCL 12.5 MG 12.5 MG/1
50 TABLET ORAL
Status: COMPLETED | OUTPATIENT
Start: 2020-11-27 | End: 2020-11-27

## 2020-11-27 RX ADMIN — HYDROCODONE BITARTRATE AND ACETAMINOPHEN 1 TABLET: 5; 325 TABLET ORAL at 11:11

## 2020-11-27 RX ADMIN — SODIUM CHLORIDE 1000 ML: 0.9 INJECTION, SOLUTION INTRAVENOUS at 10:11

## 2020-11-27 RX ADMIN — MECLIZINE 50 MG: 12.5 TABLET ORAL at 10:11

## 2020-11-28 VITALS
WEIGHT: 184.94 LBS | TEMPERATURE: 99 F | RESPIRATION RATE: 26 BRPM | OXYGEN SATURATION: 100 % | BODY MASS INDEX: 31.57 KG/M2 | HEIGHT: 64 IN | SYSTOLIC BLOOD PRESSURE: 149 MMHG | HEART RATE: 103 BPM | DIASTOLIC BLOOD PRESSURE: 78 MMHG

## 2020-11-28 LAB
BILIRUB UR QL STRIP: NEGATIVE
CLARITY UR REFRACT.AUTO: CLEAR
COLOR UR AUTO: NORMAL
GLUCOSE UR QL STRIP: NEGATIVE
HGB UR QL STRIP: NEGATIVE
KETONES UR QL STRIP: NEGATIVE
LACTATE SERPL-SCNC: 1.5 MMOL/L (ref 0.5–2.2)
LEUKOCYTE ESTERASE UR QL STRIP: NEGATIVE
NITRITE UR QL STRIP: NEGATIVE
PH UR STRIP: 6 [PH] (ref 5–8)
PROT UR QL STRIP: NEGATIVE
SP GR UR STRIP: 1.03 (ref 1–1.03)
URN SPEC COLLECT METH UR: NORMAL

## 2020-11-28 PROCEDURE — 25000003 PHARM REV CODE 250: Mod: HCNC | Performed by: EMERGENCY MEDICINE

## 2020-11-28 PROCEDURE — 25500020 PHARM REV CODE 255: Mod: HCNC | Performed by: EMERGENCY MEDICINE

## 2020-11-28 PROCEDURE — 81003 URINALYSIS AUTO W/O SCOPE: CPT | Mod: HCNC

## 2020-11-28 PROCEDURE — 83605 ASSAY OF LACTIC ACID: CPT | Mod: HCNC

## 2020-11-28 RX ADMIN — IOHEXOL 100 ML: 350 INJECTION, SOLUTION INTRAVENOUS at 01:11

## 2020-11-28 RX ADMIN — SODIUM CHLORIDE 500 ML: 0.9 INJECTION, SOLUTION INTRAVENOUS at 02:11

## 2020-11-28 NOTE — CONSULTS
[facility]  Vascular Neurology  Comprehensive Stroke Center  Consult Note    Inpatient consult to Vascular (Stroke) Neurology  Consult performed by: Quiana Latham NP  Consult ordered by: Keeley Akers MD        Assessment/Plan:     Patient is a 65 y.o. year old female with:    Malignant neoplasm of lower-inner quadrant of left breast in female, estrogen receptor positive  Per hem/Onc    Type 2 diabetes mellitus with complication, with long-term current use of insulin  Stroke risk factor  Tight control of blood sugars    Obesity   on diet and exercise    Dizziness  See above    Vertigo  66 y/o female with dizziness and nausea and vomiting who is going thru chemo for breast cancer    Recommend maximize medical management for stroke prevention with controlling risk factors like HTN, DM, weight    Can do MRI brain  Trial Meclazine    Essential hypertension  Stroke risk factor  normotensive        STROKE DOCUMENTATION          NIH Scale:  1a. Level of Consciousness: 0-->Alert, keenly responsive  1b. LOC Questions: 0-->Answers both questions correctly  1c. LOC Commands: 0-->Performs both tasks correctly  2. Best Gaze: 0-->Normal  3. Visual: 0-->No visual loss  4. Facial Palsy: 0-->Normal symmetrical movements  5a. Motor Arm, Left: 0-->No drift, limb holds 90 (or 45) degrees for full 10 secs  5b. Motor Arm, Right: 0-->No drift, limb holds 90 (or 45) degrees for full 10 secs  6a. Motor Leg, Left: 0-->No drift, leg holds 30 degree position for full 5 secs  6b. Motor Leg, Right: 0-->No drift, leg holds 30 degree position for full 5 secs  7. Limb Ataxia: 0-->Absent  8. Sensory: 0-->Normal, no sensory loss  9. Best Language: 0-->No aphasia, normal  10. Dysarthria: 0-->Normal  11. Extinction and Inattention (formerly Neglect): 0-->No abnormality  Total (NIH Stroke Scale): 0    Modified Linville Score: 0  Gokul Coma Scale:15   ABCD2 Score:    [id number] Score:   HAS -BLED Score:   ICH  Score:   Nuenz & Abad Classification:       Thrombolysis Candidate? No, Out of window     Delays to Thrombolysis?  No    Interventional Revascularization Candidate?   Is the patient eligible for mechanical endovascular reperfusion (ALMITA)?  No; No significant neurological deficit      Hemorrhagic change of an Ischemic Stroke: Does this patient have an ischemic stroke with hemorrhagic changes? No     Subjective:     History of Present Illness:  66 y/o female who states that around 0830 she was sitting on toilet and when she got up she was dizzy and nauseated. It has been intermittent all day. She presented to the ED at [facility] with c/o fever, generalized weakness, dizziness and nausea and vomiting plus right foot pain  Patient is undergoing chemo for breast cancer and last treatment was Tuesday and Wednesday.   Patient has similar episode of dizziness on 29  and was vertigo.   CT head with no acute process seen.  Patient with no focal neuro deficits    Discussed with  and if probable vertigo but if patient and family want MRI to r/o that is ok if no that is ok also.  Discussed with patient and family and they decline MRI brain at this time      Risk factors breast cancer, HTN, DM        Past Medical History:   Diagnosis Date    Acute coronary syndrome 2018    Arthritis     Diabetes mellitus type I     Genetic testing of female 2020    [employer] via [employer] with AXIN2 and POLE variants of uncertain significance    GERD (gastroesophageal reflux disease)     Hypertension     Malignant neoplasm of lower-inner quadrant of left breast in female, estrogen receptor positive 9/3/2020    Unstable angina 2018    Vertigo 2019     Past Surgical History:   Procedure Laterality Date     SECTION      INJECTION FOR SENTINEL NODE IDENTIFICATION Left 2020    Procedure: INJECTION, FOR SENTINEL NODE IDENTIFICATION;  Surgeon: Isabel Moulton MD;  Location: [facility] OR;  Service:  General;  Laterality: Left;    INSERTION OF TUNNELED CENTRAL VENOUS CATHETER (CVC) WITH SUBCUTANEOUS PORT N/A 11/18/2020    Procedure: INSERTION, PORT-A-CATH;  Surgeon: Hardeep Martin MD;  Location: [facility] CATH LAB;  Service: Radiology;  Laterality: N/A;    MASTECTOMY, PARTIAL Left 9/17/2020    Procedure: MASTECTOMY, PARTIAL-w/reflector;  Surgeon: Isabel Moulton MD;  Location: [facility] OR;  Service: General;  Laterality: Left;    SENTINEL LYMPH NODE BIOPSY Left 9/17/2020    Procedure: BIOPSY, LYMPH NODE, SENTINEL;  Surgeon: Isabel Moulton MD;  Location: [facility] OR;  Service: General;  Laterality: Left;     Family History   Problem Relation Age of Onset    Hypertension Mother     Hyperlipidemia Mother     Diabetes Mother     Heart disease Mother     Colon polyps Mother     Aneurysm Father     Diabetes Sister     Hypertension Sister     Heart disease Brother     Diabetes Brother     Hypertension Brother     Cancer Brother         brother German with prostate cancer; brother Cooper with throat cancer    Cervical cancer Daughter     Breast cancer Other     Cancer Paternal Aunt         unknown origin    Breast cancer Maternal Aunt     Cancer Maternal Aunt         unknown origin    Prostate cancer Maternal Cousin     Leukemia Maternal Cousin     Prostate cancer Maternal Cousin     Colon cancer Neg Hx     Ovarian cancer Neg Hx      Social History     Tobacco Use    Smoking status: Never Smoker    Smokeless tobacco: Never Used   Substance Use Topics    Alcohol use: Never     Frequency: Never    Drug use: No     Review of patient's allergies indicates:  No Known Allergies    Medications: I have reviewed the current medication administration record.    (Not in a hospital admission)      Review of Systems   Constitutional: Positive for fatigue and fever. Negative for chills.   HENT: Negative for ear discharge and ear pain.    Eyes: Negative for pain and redness.   Respiratory: Negative for cough and  shortness of breath.    Cardiovascular: Negative for chest pain and leg swelling.   Gastrointestinal: Positive for nausea and vomiting.   Genitourinary: Negative for dyspareunia and dysuria.   Musculoskeletal: Negative for arthralgias and back pain.   Skin: Negative for rash and wound.   Neurological: Positive for dizziness and weakness.   Psychiatric/Behavioral: Negative for agitation and behavioral problems.     Objective:     Vital Signs (Most Recent):  Temp: 99 °F (37.2 °C) (11/27/20 2116)  Pulse: 97 (11/27/20 2232)  Resp: 20 (11/27/20 2232)  BP: 136/62 (11/27/20 2232)  SpO2: 98 % (11/27/20 2232)    Vital Signs Range (Last 24H):  Temp:  [99 °F (37.2 °C)]   Pulse:  []   Resp:  [20-25]   BP: (136-172)/(62-71)   SpO2:  [98 %]     Physical Exam  Vitals signs and nursing note reviewed.   Constitutional:       Appearance: Normal appearance. She is obese.   HENT:      Head: Normocephalic and atraumatic.   Eyes:      Extraocular Movements: Extraocular movements intact.      Pupils: Pupils are equal, round, and reactive to light.   Neck:      Musculoskeletal: Normal range of motion.   Cardiovascular:      Rate and Rhythm: Normal rate and regular rhythm.   Pulmonary:      Effort: Pulmonary effort is normal.      Breath sounds: Normal breath sounds.   Abdominal:      General: Abdomen is flat.      Palpations: Abdomen is soft.   Musculoskeletal: Normal range of motion.   Skin:     General: Skin is warm and dry.   Neurological:      General: No focal deficit present.      Mental Status: She is alert and oriented to person, place, and time. Mental status is at baseline.         Neurological Exam:   LOC: alert  Attention Span: Good   Language: No aphasia  Articulation: No dysarthria  Orientation: Person, Place, Time   Visual Fields: Full  EOM (CN III, IV, VI): Full/intact  Pupils (CN II, III): PERRL  Facial Sensation (CN V): Normal  Facial Movement (CN VII): Symmetric facial expression    Gag Reflex: present  Reflexes:  flexor plantar responses bilaterally  Motor: Arm left  Normal 5/5  Leg left  Normal 5/5  Arm right  Normal 5/5  Leg right Normal 5/5  Cebellar: No evidence of appendicular or axial ataxia  Sensation: Intact to light touch, temperature and vibration  Tone: Normal tone throughout      Laboratory:  CMP:   Recent Labs   Lab 11/27/20  2219   CALCIUM 9.4   ALBUMIN 3.6   PROT 7.6      K 4.2   CO2 28      BUN 24*   CREATININE 1.1   ALKPHOS 105   ALT 12   AST 14   BILITOT 0.9     CBC:   Recent Labs   Lab 11/27/20  2219   WBC 8.18   RBC 4.15   HGB 12.8   HCT 37.9   PLT 81*   MCV 91   MCH 30.8   MCHC 33.8     Lipid Panel:   Recent Labs   Lab 11/24/20  0802   CHOL 149   LDLCALC 86.6   HDL 42   TRIG 102     Coagulation: No results for input(s): PT, INR, APTT in the last 168 hours.  Hgb A1C:   Recent Labs   Lab 11/24/20  0802   HGBA1C 7.4*     TSH:   Recent Labs   Lab 11/24/20  0802   TSH 2.471       Diagnostic Results:      Brain imaging:  CT Head w/o contrast 11-27-20 results:  No evidence of acute intracranial pathology.  Additional evaluation, as clinically warranted.     Stable senescent and chronic microvascular ischemic change.    Vessel Imaging:      Cardiac Evaluation:   EKG 11-27-20 results:  Sinus tachycardia  Left axis deviation  Minimal voltage criteria for LVH, may be normal variant  Abnormal ECG  When compared with ECG of 29-OCT-2020 08:58,  No significant change was found      Quiana Latham NP  [facility] Stroke Center  Department of Vascular Neurology   [facility]

## 2020-11-28 NOTE — HPI
66 y/o female who states that around 0830 she was sitting on toilet and when she got up she was dizzy and nauseated. It has been intermittent all day. She presented to the ED at [facility] with c/o fever, generalized weakness, dizziness and nausea and vomiting plus right foot pain  Patient is undergoing chemo for breast cancer and last treatment was Tuesday and Wednesday.   Patient has similar episode of dizziness on 5-13-29  and was vertigo.   CT head with no acute process seen.  Patient with no focal neuro deficits    Discussed with  and if probable vertigo but if patient and family want MRI to r/o that is ok if no that is ok also.  Discussed with patient and family and they decline MRI brain at this time      Risk factors breast cancer, HTN, DM

## 2020-11-28 NOTE — ED NOTES
Patient turned over to me by Dr. Yates at 10:17 PM .    Briefly:  65-year-old female with breast cancer who had her 1st treatment a chemo recently presents with reported fever at home and right foot/leg pain.  Pending labs and imaging.    Evaluated bedside and with physical exam.  No lower extremity swelling.  Normal pulses and bilateral feet.  Treated pain with Vicodin x1.  D-dimer is elevated, but nonspecific.  Doubt DVT or PE.  Chest x-ray normal.  Flu and COVID negative.  Awaiting urine.    Independently interpreted by MD:  Rate 110, NSR, no stemi, no ectopy, no hypertrophy, tachycardia         Cornelius Richardson MD  11/28/20 0021

## 2020-11-28 NOTE — ED TRIAGE NOTES
Sunitha Pillai, a 65 y.o. female presents to the ED via personal transportation from home w/ complaint of post-chemo fever; generalized weakness, dizziness, and n/v    Triage note:  Chief Complaint   Patient presents with    Fever Post Chemo     Per patient's daughter patient was running a temp of 100.9 at home today. Reports that she has had increased pain today. Patient's last chemo treatment was on Wednesday.      Review of patient's allergies indicates:  No Known Allergies  Past Medical History:   Diagnosis Date    Acute coronary syndrome 9/23/2018    Arthritis     Diabetes mellitus     Diabetes mellitus type I     Genetic testing of female 09/2020    [employer] via [employer] with AXIN2 and POLE variants of uncertain significance    GERD (gastroesophageal reflux disease)     Hypertension     Malignant neoplasm of lower-inner quadrant of left breast in female, estrogen receptor positive 9/3/2020    Unstable angina 9/23/2018    Vertigo

## 2020-11-28 NOTE — ASSESSMENT & PLAN NOTE
64 y/o female with dizziness and nausea and vomiting who is going thru chemo for breast cancer    Recommend maximize medical management for stroke prevention with controlling risk factors like HTN, DM, weight    Can do MRI brain  Trial Meclazine

## 2020-11-28 NOTE — ED PROVIDER NOTES
CC: Fever Post Chemo (Per patient's daughter patient was running a temp of 100.9 at home today. Reports that she has had increased pain today. Patient's last chemo treatment was on Wednesday. )      History provided by:   Patient       HPI: Sunitha Pillai is a 65 y.o. year old female  Past medical history of ACS, diabetes, hypertension,  Left breast cancer who presents to the ED complaining of   1. Temp of 100.9 at home, no antipyretics  2.  Around 8:30 while walking from the washroom to the bedroom she had sudden onset of vertigo and nausea/vomiting unable to stand up and walk anymore.  No blood in the vomit.  Reports severe right foot pain and numbness   last chemotherapy was on Tuesday,  It was also her 1st treatment   she reports also mild epigastric pain and mild chest tightness  She reports hx of vertigo in the past a few years back              Past Medical History:   Diagnosis Date    Acute coronary syndrome 2018    Arthritis     Diabetes mellitus     Diabetes mellitus type I     Genetic testing of female 2020    [facility] via [employer] with AXIN2 and POLE variants of uncertain significance    GERD (gastroesophageal reflux disease)     Hypertension     Malignant neoplasm of lower-inner quadrant of left breast in female, estrogen receptor positive 9/3/2020    Unstable angina 2018    Vertigo      Past Surgical History:   Procedure Laterality Date     SECTION      INJECTION FOR SENTINEL NODE IDENTIFICATION Left 2020    Procedure: INJECTION, FOR SENTINEL NODE IDENTIFICATION;  Surgeon: Isabel Moulton MD;  Location: [facility] OR;  Service: General;  Laterality: Left;    INSERTION OF TUNNELED CENTRAL VENOUS CATHETER (CVC) WITH SUBCUTANEOUS PORT N/A 2020    Procedure: INSERTION, PORT-A-CATH;  Surgeon: Hardeep Martin MD;  Location: [facility] CATH LAB;  Service: Radiology;  Laterality: N/A;    MASTECTOMY, PARTIAL Left 2020    Procedure: MASTECTOMY,  "PARTIAL-w/reflector;  Surgeon: Isabel Moulton MD;  Location: [facility] OR;  Service: General;  Laterality: Left;    SENTINEL LYMPH NODE BIOPSY Left 9/17/2020    Procedure: BIOPSY, LYMPH NODE, SENTINEL;  Surgeon: Isabel Moultno MD;  Location: [facility] OR;  Service: General;  Laterality: Left;     Family History   Problem Relation Age of Onset    Hypertension Mother     Hyperlipidemia Mother     Diabetes Mother     Heart disease Mother     Colon polyps Mother     Aneurysm Father     Diabetes Sister     Hypertension Sister     Heart disease Brother     Diabetes Brother     Hypertension Brother     Cancer Brother         brother German with prostate cancer; brother Cooper with throat cancer    Cervical cancer Daughter     Breast cancer Other     Cancer Paternal Aunt         unknown origin    Breast cancer Maternal Aunt     Cancer Maternal Aunt         unknown origin    Prostate cancer Maternal Cousin     Leukemia Maternal Cousin     Prostate cancer Maternal Cousin     Colon cancer Neg Hx     Ovarian cancer Neg Hx      Current Facility-Administered Medications on File Prior to Encounter   Medication Dose Route Frequency Provider Last Rate Last Dose    fentaNYL injection 25 mcg  25 mcg Intravenous Q5 Min PRN Kenneth Shannon MD        midazolam (VERSED) 1 mg/mL injection 0.5 mg  0.5 mg Intravenous PRN Kenneth Shannon MD   2 mg at 09/17/20 0637     Current Outpatient Medications on File Prior to Encounter   Medication Sig Dispense Refill    acetaminophen (TYLENOL) 325 MG tablet Take 2 tablets (650 mg total) by mouth every 8 (eight) hours as needed for Pain.  0    aspirin 81 mg Tab Take 1 tablet by mouth Daily.      BD ULTRA-FINE SHORT PEN NEEDLE 31 gauge x 5/16" Ndle USE 1  4 TIMES DAILY WITH  INSULIN 100 each 3    blood sugar diagnostic Strp Strips and lancets covered by insurance E11.9, pt checks glucose three times daily, insulin dependent 300 each 3    blood sugar " diagnostic, drum (ACCU-CHEK COMPACT TEST) Strp USE ONE STRIP TO CHECK GLUCOSE 4 TIMES DAILY BEFORE EACH MEAL AND AT BEDTIME 100 each 0    blood-glucose meter kit Check glucose three times daily E11.9 meter covered by insurance, insulin dependent 1 each 0    candesartan (ATACAND) 8 MG tablet Take 1 tablet (8 mg total) by mouth once daily. 30 tablet 6    colchicine (COLCRYS) 0.6 mg tablet Take 1 tablet (0.6 mg total) by mouth 2 (two) times daily. 20 tablet 1    cyclobenzaprine (FLEXERIL) 5 MG tablet TAKE 1 TABLET BY MOUTH ONCE DAILY AT BEDTIME AS NEEDED FOR 30 DAYS      dexAMETHasone (DECADRON) 4 MG Tab Take 1 tablet by mouth twice a day the day before and the day after each chemo. 16 tablet 0    diclofenac sodium (VOLTAREN) 1 % Gel USE AS DIRECTED EVERY 8 HOURS      dulaglutide (TRULICITY) 0.75 mg/0.5 mL pen injector Inject 0.75 mg weekly 4 mL 5    HYDROcodone-acetaminophen (NORCO) 5-325 mg per tablet Take 1 tablet by mouth every 6 (six) hours as needed for Pain. 10 tablet 0    insulin aspart U-100 (NOVOLOG U-100 INSULIN ASPART) 100 unit/mL injection Use w/ vgo 30, 3 clicks with meals, 1 click with snack, additional click if sugar is > 180. Max daily 80 units. May substitute humalog vials 3 vial 6    insulin degludec (TRESIBA FLEXTOUCH U-100) 100 unit/mL (3 mL) InPn 45 Units daily 5 Syringe 6    lancets Misc To check BG 4 times daily, to use with insurance preferred meter, insulin dependent 400 each 3    metFORMIN (GLUCOPHAGE) 1000 MG tablet Take 1 tablet (1,000 mg total) by mouth 2 (two) times daily. 60 tablet 6    naproxen (NAPROSYN) 500 MG tablet Take 1 tablet (500 mg total) by mouth 2 (two) times daily as needed (pain). 60 tablet 0    NOVOLOG FLEXPEN U-100 INSULIN 100 unit/mL (3 mL) InPn pen INJECT 20 UNITS SUBCUTANEOUSLY THREE TIMES DAILY WITH MEALS Plus sliding scale 1 Box 6    ondansetron (ZOFRAN-ODT) 8 MG TbDL Take 1 tablet (8 mg total) by mouth every 8 (eight) hours as needed (nausea). 30  tablet 2    promethazine (PHENERGAN) 25 MG tablet Take 1 tablet (25 mg total) by mouth every 6 (six) hours as needed for Nausea. 30 tablet 2    sub-q insulin device, 30 unit (V-GO 30) Kanchan Change everyday 30 Device 6    TRUEPLUS LANCETS 33 gauge Misc USE 1 TO CHECK GLUCOSE 4 TIMES DAILY      [DISCONTINUED] dulaglutide (TRULICITY) 0.75 mg/0.5 mL PnIj Inject 0.5 mLs (0.75 mg total) into the skin every 7 days. 4 Syringe 4     Patient has no known allergies.  Social History     Socioeconomic History    Marital status:      Spouse name: Not on file    Number of children: Not on file    Years of education: Not on file    Highest education level: Not on file   Occupational History    Not on file   Social Needs    Financial resource strain: Not on file    Food insecurity     Worry: Not on file     Inability: Not on file    Transportation needs     Medical: Not on file     Non-medical: Not on file   Tobacco Use    Smoking status: Never Smoker    Smokeless tobacco: Never Used   Substance and Sexual Activity    Alcohol use: Never     Frequency: Never    Drug use: No    Sexual activity: Yes     Partners: Male     Birth control/protection: Post-menopausal   Lifestyle    Physical activity     Days per week: Not on file     Minutes per session: Not on file    Stress: Not on file   Relationships    Social connections     Talks on phone: Not on file     Gets together: Not on file     Attends [employer] service: Not on file     Active member of club or organization: Not on file     Attends meetings of clubs or organizations: Not on file     Relationship status: Not on file   Other Topics Concern    Not on file   Social History Narrative    Not on file       ROS:     Constitutional : pos for fever, neg for weakness  HENT neg for head injury, neg for sore throat  Eyes: neg for visual changes, neg for eye pain  Resp neg for SOB, neg for cough  Cardiac  pos for chest pain, neg for palpitations  GI pos for  epigastric pain, pos for nausea and vomiting   neg for urinary changes  Neuro neg for focal weakness, pos for right foot numbness, pos for vertigo  Skin neg for skin rash  MSK: pos for right foot pain  ALL: Patient has no known allergies.    PHYSICAL EXAM:  Vitals:    11/27/20 2116   BP: (!) 172/70   Pulse: (!) 115   Resp: (!) 22   Temp: 99 °F (37.2 °C)         PHYSICAL EXAM:     general: comfortable, in no acute distress, pleasant, well nourished  VS: triage VS reviewed  HENT: NC/AT  Eyes: PERRL, EOMI  CV: RRR, no  murmurs, no rubs, no gallops, no LE edema  Resp: comfortable breathing, speaks in full sentences, CTAB, no wheezing, no crackles, no ronchi  ABD:  soft, ND, + normal BS,  Mild epig ttp  Renal: No CVAT  Neuro: AAO x 3, 5/5 strength x 4 extremities, right foot numbness but normal sensation of the lower leg and thigh  , face symmetric, speech normal, no nystagmus, normal finger to nose, very unsteady on her feet  MSK: no deformity, no edema. +2 dp/pt b/l. ttp over the dorsum of the foot w/out edema/erythema/skin lesions            DATA & INTERVENTIONS:    LABS reviewed:  Labs Reviewed - No data to display    RADIOLOGY reviewed:  Imaging Results    None         MEDICATIONS/FLUIDS:  Medications - No data to display      MDM:  Sunitha Pillai is a 65 y.o. year old female who presents to the ED complaining of  1.  Vertigo no nystagmus no weakness on the physical exam but very unsteady with standing up and with right foot numbness.    chart review with MRI MRA 2019 for vertigo negative.   discussed with vascular Neurology with the plan to activate code stroke at 10:00 p.m.  2.  Right foot pain and numbness,  No swelling no redness no injury.   will obtain D-dimer and foot x-ray. Normal pulses and cap refill  3.  Mild epigastric pain,  EKG lipase LFTs trop,  could be just secondary to vomiting  4.  Fever of 100.9 at home,  Afebrile in the emergency department,  Did not receive any antipyretics prior to  arrival. Septic work-up (hx of breast cancer on chemo)    DDX includes but not limited to: as above    Labs ordered and reviewed:   Cbc  cmp  Lactate  Lipase  Trop  D-dimer  Blood cultures  covid 19  Influenza  UA  Medication given in the ED: meclizine, IVF    CXR, CT head, right foot XR (ordered and pending      EKG pending    Old records obtained and reviewed: yes    Case discussed with the consultant: [facility] neurology    Patient was signed-out to Dr. Richardson      at the change of shift with plan for: labs, CT, [facility] neuro recs pending      IMPRESSION:  1.) vertigo  2.) right foot pain and numbness  3.) fever  4. epig pain    Dispo: pending    Critical Care Time: N/A       Keeley Akers MD  11/27/20 5726

## 2020-11-28 NOTE — SUBJECTIVE & OBJECTIVE
Past Medical History:   Diagnosis Date    Acute coronary syndrome 2018    Arthritis     Diabetes mellitus type I     Genetic testing of female 2020    [employer] via [employer] with AXIN2 and POLE variants of uncertain significance    GERD (gastroesophageal reflux disease)     Hypertension     Malignant neoplasm of lower-inner quadrant of left breast in female, estrogen receptor positive 9/3/2020    Unstable angina 2018    Vertigo 2019     Past Surgical History:   Procedure Laterality Date     SECTION      INJECTION FOR SENTINEL NODE IDENTIFICATION Left 2020    Procedure: INJECTION, FOR SENTINEL NODE IDENTIFICATION;  Surgeon: Isabel Moulton MD;  Location: [facility] OR;  Service: General;  Laterality: Left;    INSERTION OF TUNNELED CENTRAL VENOUS CATHETER (CVC) WITH SUBCUTANEOUS PORT N/A 2020    Procedure: INSERTION, PORT-A-CATH;  Surgeon: Hardeep Martin MD;  Location: [facility] CATH LAB;  Service: Radiology;  Laterality: N/A;    MASTECTOMY, PARTIAL Left 2020    Procedure: MASTECTOMY, PARTIAL-w/reflector;  Surgeon: Isabel Moulton MD;  Location: [facility] OR;  Service: General;  Laterality: Left;    SENTINEL LYMPH NODE BIOPSY Left 2020    Procedure: BIOPSY, LYMPH NODE, SENTINEL;  Surgeon: Isabel Moulton MD;  Location: [facility] OR;  Service: General;  Laterality: Left;     Family History   Problem Relation Age of Onset    Hypertension Mother     Hyperlipidemia Mother     Diabetes Mother     Heart disease Mother     Colon polyps Mother     Aneurysm Father     Diabetes Sister     Hypertension Sister     Heart disease Brother     Diabetes Brother     Hypertension Brother     Cancer Brother         brother German with prostate cancer; brother Cooper with throat cancer    Cervical cancer Daughter     Breast cancer Other     Cancer Paternal Aunt         unknown origin    Breast cancer Maternal Aunt     Cancer Maternal Aunt         unknown origin     Prostate cancer Maternal Cousin     Leukemia Maternal Cousin     Prostate cancer Maternal Cousin     Colon cancer Neg Hx     Ovarian cancer Neg Hx      Social History     Tobacco Use    Smoking status: Never Smoker    Smokeless tobacco: Never Used   Substance Use Topics    Alcohol use: Never     Frequency: Never    Drug use: No     Review of patient's allergies indicates:  No Known Allergies    Medications: I have reviewed the current medication administration record.    (Not in a hospital admission)      Review of Systems   Constitutional: Positive for fatigue and fever. Negative for chills.   HENT: Negative for ear discharge and ear pain.    Eyes: Negative for pain and redness.   Respiratory: Negative for cough and shortness of breath.    Cardiovascular: Negative for chest pain and leg swelling.   Gastrointestinal: Positive for nausea and vomiting.   Genitourinary: Negative for dyspareunia and dysuria.   Musculoskeletal: Negative for arthralgias and back pain.   Skin: Negative for rash and wound.   Neurological: Positive for dizziness and weakness.   Psychiatric/Behavioral: Negative for agitation and behavioral problems.     Objective:     Vital Signs (Most Recent):  Temp: 99 °F (37.2 °C) (11/27/20 2116)  Pulse: 97 (11/27/20 2232)  Resp: 20 (11/27/20 2232)  BP: 136/62 (11/27/20 2232)  SpO2: 98 % (11/27/20 2232)    Vital Signs Range (Last 24H):  Temp:  [99 °F (37.2 °C)]   Pulse:  []   Resp:  [20-25]   BP: (136-172)/(62-71)   SpO2:  [98 %]     Physical Exam  Vitals signs and nursing note reviewed.   Constitutional:       Appearance: Normal appearance. She is obese.   HENT:      Head: Normocephalic and atraumatic.   Eyes:      Extraocular Movements: Extraocular movements intact.      Pupils: Pupils are equal, round, and reactive to light.   Neck:      Musculoskeletal: Normal range of motion.   Cardiovascular:      Rate and Rhythm: Normal rate and regular rhythm.   Pulmonary:      Effort: Pulmonary  effort is normal.      Breath sounds: Normal breath sounds.   Abdominal:      General: Abdomen is flat.      Palpations: Abdomen is soft.   Musculoskeletal: Normal range of motion.   Skin:     General: Skin is warm and dry.   Neurological:      General: No focal deficit present.      Mental Status: She is alert and oriented to person, place, and time. Mental status is at baseline.         Neurological Exam:   LOC: alert  Attention Span: Good   Language: No aphasia  Articulation: No dysarthria  Orientation: Person, Place, Time   Visual Fields: Full  EOM (CN III, IV, VI): Full/intact  Pupils (CN II, III): PERRL  Facial Sensation (CN V): Normal  Facial Movement (CN VII): Symmetric facial expression    Gag Reflex: present  Reflexes: flexor plantar responses bilaterally  Motor: Arm left  Normal 5/5  Leg left  Normal 5/5  Arm right  Normal 5/5  Leg right Normal 5/5  Cebellar: No evidence of appendicular or axial ataxia  Sensation: Intact to light touch, temperature and vibration  Tone: Normal tone throughout      Laboratory:  CMP:   Recent Labs   Lab 11/27/20  2219   CALCIUM 9.4   ALBUMIN 3.6   PROT 7.6      K 4.2   CO2 28      BUN 24*   CREATININE 1.1   ALKPHOS 105   ALT 12   AST 14   BILITOT 0.9     CBC:   Recent Labs   Lab 11/27/20  2219   WBC 8.18   RBC 4.15   HGB 12.8   HCT 37.9   PLT 81*   MCV 91   MCH 30.8   MCHC 33.8     Lipid Panel:   Recent Labs   Lab 11/24/20  0802   CHOL 149   LDLCALC 86.6   HDL 42   TRIG 102     Coagulation: No results for input(s): PT, INR, APTT in the last 168 hours.  Hgb A1C:   Recent Labs   Lab 11/24/20  0802   HGBA1C 7.4*     TSH:   Recent Labs   Lab 11/24/20  0802   TSH 2.471       Diagnostic Results:      Brain imaging:  CT Head w/o contrast 11-27-20 results:  No evidence of acute intracranial pathology.  Additional evaluation, as clinically warranted.     Stable senescent and chronic microvascular ischemic change.    Vessel Imaging:      Cardiac Evaluation:   EKG 11-27-20  results:  Sinus tachycardia  Left axis deviation  Minimal voltage criteria for LVH, may be normal variant  Abnormal ECG  When compared with ECG of 29-OCT-2020 08:58,  No significant change was found

## 2020-11-28 NOTE — DISCHARGE INSTRUCTIONS
Take motrin and tylenol over the counter as directed on packaging as needed for pain or fever.     Our goal in the emergency department is to always give you outstanding care and exceptional service. You may receive a survey by mail or e-mail in the next week regarding your experience in our ED. We would greatly appreciate your completing and returning the survey. Your feedback provides us with a way to recognize our staff who give very good care and it helps us learn how to improve when your experience was below our aspiration of excellence.

## 2020-11-30 ENCOUNTER — HOSPITAL ENCOUNTER (OUTPATIENT)
Dept: RADIOLOGY | Facility: HOSPITAL | Age: 65
Discharge: HOME | End: 2020-11-30
Attending: INTERNAL MEDICINE
Payer: MEDICARE

## 2020-11-30 DIAGNOSIS — E04.1 THYROID NODULE: ICD-10-CM

## 2020-11-30 LAB — POCT GLUCOSE: 200 MG/DL (ref 70–110)

## 2020-11-30 PROCEDURE — 76536 US EXAM OF HEAD AND NECK: CPT | Mod: TC,HCNC

## 2020-11-30 PROCEDURE — 76536 US SOFT TISSUE HEAD NECK THYROID: ICD-10-PCS | Mod: 26,HCNC,, | Performed by: RADIOLOGY

## 2020-11-30 PROCEDURE — 76536 US EXAM OF HEAD AND NECK: CPT | Mod: 26,HCNC,, | Performed by: RADIOLOGY

## 2020-12-03 LAB
BACTERIA BLD CULT: NORMAL
BACTERIA BLD CULT: NORMAL

## 2020-12-11 ENCOUNTER — DOCUMENTATION ONLY (OUTPATIENT)
Dept: INTERNAL MEDICINE | Facility: CLINIC | Age: 65
End: 2020-12-11

## 2020-12-11 ENCOUNTER — PATIENT MESSAGE (OUTPATIENT)
Dept: OTHER | Facility: OTHER | Age: 65
End: 2020-12-11

## 2020-12-11 RX ORDER — DULAGLUTIDE 0.75 MG/.5ML
INJECTION, SOLUTION SUBCUTANEOUS
Qty: 6 ML | Refills: 3 | Status: ON HOLD | OUTPATIENT
Start: 2020-12-11 | End: 2021-03-03 | Stop reason: HOSPADM

## 2020-12-11 RX ORDER — DULAGLUTIDE 0.75 MG/.5ML
INJECTION, SOLUTION SUBCUTANEOUS
Qty: 6 ML | Refills: 3 | Status: SHIPPED | OUTPATIENT
Start: 2020-12-11 | End: 2020-12-11 | Stop reason: SDUPTHER

## 2020-12-11 RX ORDER — DULAGLUTIDE 0.75 MG/.5ML
INJECTION, SOLUTION SUBCUTANEOUS
Qty: 6 ML | Refills: 3 | Status: SHIPPED | OUTPATIENT
Start: 2020-12-11 | End: 2021-06-10 | Stop reason: SDUPTHER

## 2020-12-14 NOTE — PROGRESS NOTES
Subjective:      Patient ID: Sunitha Pillai is a 65 y.o. female.    Chief Complaint: Follow-up      HPI:  65-year-old female, patient of Dr. Moulton, who returns for follow-up of a diagnosis of left breast cancer. Stage 1 left breast cancer - ER+,HER 2 negative  Port placed 11/18/2020. Plan for 4 cycles Taxotere and Cytoxan followed by radiation followed by endocrine therapy with an AI.   C1 Taxotere/Cytoxan 11/24/2020.     Here for C2 Taxotere and Cytoxan.     In the interval,   Went to ED 11/27/2020 with dizziness, nausea and right foot pain. CT head without evidence of acute intracranial pathology.   CTA chest negative for PE  Now enrolled in hypertension digital medicine clinic.     Patient states she did not fall prior to hospital. States she jumped out of bed to quickly and got dizzy. She also developed nausea and vomiting about day 2 or 3 after chemo.  Nails brittle and hair thinning.   Stays active.   No fever/cough/sob/CP/swelling.   No bleeding or easy bruising.   Appetite good.   No n/v.   Baseline neuropathy- nighttime tingling and numbness to toes.   Altered taste since starting chemo      Per Dr. Mcduffie's note:   Current history: Screening mammogram on July 30th showed a focal asymmetry in the lower outer portion left breast.  Biopsy on August 25, 2020 showed high-grade infiltrating ductal carcinoma (histologic grade 3, nuclear grade 3, mitotic index 3).  Tumor was 95% ER positive in 95% [place] positive, HER2 was 2+ but negative by fish.  Ki-67 was 95%.    On September 17, 2020 lumpectomy and sentinel lymph node biopsy were performed.  That pathology showed a 1.5 cm infiltrating carcinoma with a single negative sentinel lymph node.  Margins were negative.  Final pathological stage T1c N0 stage I A.      Oncotype risk score came back at 29-high risk.    Plan for  4 cycles of Taxotere plus Cytoxan.  Following chemotherapy she will need radiation therapy.  Following radiation she will begin endocrine  therapy with an aromatase inhibitor.              Review of Systems   Constitutional: Negative.    HENT: Negative.    Respiratory: Negative.    Cardiovascular: Negative.    Gastrointestinal: Negative.    Genitourinary: Negative.    Musculoskeletal: Positive for arthralgias (left knee).   Neurological: Positive for numbness (in toes- mild at at night).   Hematological: Does not bruise/bleed easily.   Psychiatric/Behavioral: Positive for dysphoric mood (at times; no suicidal or homicidal ideation). The patient is nervous/anxious.      Objective:   Physical Exam   Vitals reviewed.  Constitutional: She is oriented to person, place, and time. She appears well-developed and well-nourished. No distress.   Eyes: Pupils are equal, round, and reactive to light.   Neck: Normal range of motion. No JVD present. No thyromegaly present.   Cardiovascular: Normal rate and regular rhythm.    Pulmonary/Chest: Effort normal and breath sounds normal.   Right chest mediport site intact- steristrips.   No masses in eitehr breast. Mild tenderness to left axilla. No LAD   Abdominal: Soft. Bowel sounds are normal.   Musculoskeletal: Normal range of motion. No edema.      Comments: No spinal or paraspinal tenderness.    Neurological: She is alert and oriented to person, place, and time.   Psychiatric: She has a normal mood and affect. Her behavior is normal. Thought content normal.     WBC   Date Value Ref Range Status   12/15/2020 9.45 3.90 - 12.70 K/uL Final     Hemoglobin   Date Value Ref Range Status   12/15/2020 10.9 (L) 12.0 - 16.0 g/dL Final     POC Hematocrit   Date Value Ref Range Status   01/04/2020 42 36 - 54 %PCV Final     Hematocrit   Date Value Ref Range Status   12/15/2020 34.5 (L) 37.0 - 48.5 % Final     Platelets   Date Value Ref Range Status   12/15/2020 264 150 - 350 K/uL Final     Gran # (ANC)   Date Value Ref Range Status   12/15/2020 7.4 1.8 - 7.7 K/uL Final     Comment:     The ANC is based on a white cell differential  from an   automated cell counter. It has not been microscopically   reviewed for the presence of abnormal cells. Clinical   correlation is required.         Chemistry        Component Value Date/Time     12/15/2020 0819    K 4.8 12/15/2020 0819     12/15/2020 0819    CO2 25 12/15/2020 0819    BUN 16 12/15/2020 0819    CREATININE 1.0 12/15/2020 0819     (H) 12/15/2020 0819        Component Value Date/Time    CALCIUM 9.4 12/15/2020 0819    ALKPHOS 95 12/15/2020 0819    AST 17 12/15/2020 0819    ALT 17 12/15/2020 0819    BILITOT 0.3 12/15/2020 0819    ESTGFRAFRICA >60.0 12/15/2020 0819    EGFRNONAA 58.8 (A) 12/15/2020 0819              Assessment/Plan:       1. Malignant neoplasm of lower-inner quadrant of left breast in female, estrogen receptor positive    2. Chemotherapy induced nausea and vomiting    3. Anemia in neoplastic disease    4. Anxiety about health    5. Uncontrolled type 2 diabetes mellitus with hyperglycemia          -Doing well today  -Proceed with C2 Taxotere and Cytoxan today.   -Discussed prophylactic antiemetic regimen post chemo- will take zofran TID for 3 days post and phenergan prn.   -Continue decadron as prescribed.   -Encouraged hydration. Will call if she develops postural dizziness as may need IV hydration here.   -Labs reviewed. Anemia parameters with slight dip - will monitor.   -Continue to monitor adverse effects- see below  -RTC in 3 weeks for EP with cbc, cmp, and for C3 Cytoxan and Taxotere. Udenyca day 2.   -See Dr. Llanos as scheduled for anxiety and depression related to health.   -Continue follow up with Dr. Louis for DM  - No evidence of cardiac decompensation- will monitor for cardiac events.             Patient is in agreement with the proposed treatment plan. All questions were answered to the patient's satisfaction. Pt knows to call clinic for any new or worsening symptoms and if anything is needed before the next clinic visit.      Betty Benjamin  TIA Juares[facility]STEPHANIE  Hematology & Oncology  [address] [place] [address]  ph. [phone]  Fax. [phone]     I spent 30 minutes (face to face) with the patient, more than 50% was in counseling and coordination of care as detailed above.

## 2020-12-15 ENCOUNTER — OFFICE VISIT (OUTPATIENT)
Dept: HEMATOLOGY/ONCOLOGY | Facility: CLINIC | Age: 65
End: 2020-12-15
Payer: MEDICARE

## 2020-12-15 ENCOUNTER — INFUSION (OUTPATIENT)
Dept: INFUSION THERAPY | Facility: HOSPITAL | Age: 65
End: 2020-12-15
Payer: MEDICARE

## 2020-12-15 VITALS
BODY MASS INDEX: 32.93 KG/M2 | RESPIRATION RATE: 16 BRPM | WEIGHT: 192.88 LBS | HEART RATE: 106 BPM | SYSTOLIC BLOOD PRESSURE: 154 MMHG | TEMPERATURE: 98 F | HEIGHT: 64 IN | DIASTOLIC BLOOD PRESSURE: 74 MMHG | OXYGEN SATURATION: 99 %

## 2020-12-15 VITALS
HEART RATE: 107 BPM | WEIGHT: 192.88 LBS | SYSTOLIC BLOOD PRESSURE: 145 MMHG | RESPIRATION RATE: 18 BRPM | HEIGHT: 64 IN | OXYGEN SATURATION: 98 % | DIASTOLIC BLOOD PRESSURE: 80 MMHG | TEMPERATURE: 98 F | BODY MASS INDEX: 32.93 KG/M2

## 2020-12-15 DIAGNOSIS — T45.1X5A CHEMOTHERAPY INDUCED NAUSEA AND VOMITING: ICD-10-CM

## 2020-12-15 DIAGNOSIS — C50.312 MALIGNANT NEOPLASM OF LOWER-INNER QUADRANT OF LEFT BREAST IN FEMALE, ESTROGEN RECEPTOR POSITIVE: Primary | ICD-10-CM

## 2020-12-15 DIAGNOSIS — R11.2 CHEMOTHERAPY INDUCED NAUSEA AND VOMITING: ICD-10-CM

## 2020-12-15 DIAGNOSIS — Z17.0 MALIGNANT NEOPLASM OF LOWER-INNER QUADRANT OF LEFT BREAST IN FEMALE, ESTROGEN RECEPTOR POSITIVE: Primary | ICD-10-CM

## 2020-12-15 DIAGNOSIS — D63.0 ANEMIA IN NEOPLASTIC DISEASE: ICD-10-CM

## 2020-12-15 DIAGNOSIS — E11.65 UNCONTROLLED TYPE 2 DIABETES MELLITUS WITH HYPERGLYCEMIA: ICD-10-CM

## 2020-12-15 DIAGNOSIS — R45.89 ANXIETY ABOUT HEALTH: ICD-10-CM

## 2020-12-15 PROCEDURE — 3077F SYST BP >= 140 MM HG: CPT | Mod: HCNC,CPTII,S$GLB, | Performed by: NURSE PRACTITIONER

## 2020-12-15 PROCEDURE — 25000003 PHARM REV CODE 250: Mod: HCNC | Performed by: INTERNAL MEDICINE

## 2020-12-15 PROCEDURE — 99215 OFFICE O/P EST HI 40 MIN: CPT | Mod: HCNC,S$GLB,, | Performed by: NURSE PRACTITIONER

## 2020-12-15 PROCEDURE — 99215 PR OFFICE/OUTPT VISIT, EST, LEVL V, 40-54 MIN: ICD-10-PCS | Mod: HCNC,S$GLB,, | Performed by: NURSE PRACTITIONER

## 2020-12-15 PROCEDURE — 1125F AMNT PAIN NOTED PAIN PRSNT: CPT | Mod: HCNC,S$GLB,, | Performed by: NURSE PRACTITIONER

## 2020-12-15 PROCEDURE — 3288F PR FALLS RISK ASSESSMENT DOCUMENTED: ICD-10-PCS | Mod: HCNC,CPTII,S$GLB, | Performed by: NURSE PRACTITIONER

## 2020-12-15 PROCEDURE — 96367 TX/PROPH/DG ADDL SEQ IV INF: CPT | Mod: HCNC

## 2020-12-15 PROCEDURE — 1101F PT FALLS ASSESS-DOCD LE1/YR: CPT | Mod: HCNC,CPTII,S$GLB, | Performed by: NURSE PRACTITIONER

## 2020-12-15 PROCEDURE — 3051F PR MOST RECENT HEMOGLOBIN A1C LEVEL 7.0 - < 8.0%: ICD-10-PCS | Mod: HCNC,CPTII,S$GLB, | Performed by: NURSE PRACTITIONER

## 2020-12-15 PROCEDURE — 3078F DIAST BP <80 MM HG: CPT | Mod: HCNC,CPTII,S$GLB, | Performed by: NURSE PRACTITIONER

## 2020-12-15 PROCEDURE — 1159F MED LIST DOCD IN RCRD: CPT | Mod: HCNC,S$GLB,, | Performed by: NURSE PRACTITIONER

## 2020-12-15 PROCEDURE — 3008F PR BODY MASS INDEX (BMI) DOCUMENTED: ICD-10-PCS | Mod: HCNC,CPTII,S$GLB, | Performed by: NURSE PRACTITIONER

## 2020-12-15 PROCEDURE — A4216 STERILE WATER/SALINE, 10 ML: HCPCS | Mod: HCNC | Performed by: INTERNAL MEDICINE

## 2020-12-15 PROCEDURE — 99999 PR PBB SHADOW E&M-EST. PATIENT-LVL IV: ICD-10-PCS | Mod: PBBFAC,HCNC,, | Performed by: NURSE PRACTITIONER

## 2020-12-15 PROCEDURE — 3051F HG A1C>EQUAL 7.0%<8.0%: CPT | Mod: HCNC,CPTII,S$GLB, | Performed by: NURSE PRACTITIONER

## 2020-12-15 PROCEDURE — 1101F PR PT FALLS ASSESS DOC 0-1 FALLS W/OUT INJ PAST YR: ICD-10-PCS | Mod: HCNC,CPTII,S$GLB, | Performed by: NURSE PRACTITIONER

## 2020-12-15 PROCEDURE — 96417 CHEMO IV INFUS EACH ADDL SEQ: CPT | Mod: HCNC

## 2020-12-15 PROCEDURE — 1159F PR MEDICATION LIST DOCUMENTED IN MEDICAL RECORD: ICD-10-PCS | Mod: HCNC,S$GLB,, | Performed by: NURSE PRACTITIONER

## 2020-12-15 PROCEDURE — 3078F PR MOST RECENT DIASTOLIC BLOOD PRESSURE < 80 MM HG: ICD-10-PCS | Mod: HCNC,CPTII,S$GLB, | Performed by: NURSE PRACTITIONER

## 2020-12-15 PROCEDURE — 96375 TX/PRO/DX INJ NEW DRUG ADDON: CPT | Mod: HCNC

## 2020-12-15 PROCEDURE — 99999 PR PBB SHADOW E&M-EST. PATIENT-LVL IV: CPT | Mod: PBBFAC,HCNC,, | Performed by: NURSE PRACTITIONER

## 2020-12-15 PROCEDURE — 96413 CHEMO IV INFUSION 1 HR: CPT | Mod: HCNC

## 2020-12-15 PROCEDURE — 3008F BODY MASS INDEX DOCD: CPT | Mod: HCNC,CPTII,S$GLB, | Performed by: NURSE PRACTITIONER

## 2020-12-15 PROCEDURE — 3077F PR MOST RECENT SYSTOLIC BLOOD PRESSURE >= 140 MM HG: ICD-10-PCS | Mod: HCNC,CPTII,S$GLB, | Performed by: NURSE PRACTITIONER

## 2020-12-15 PROCEDURE — 1125F PR PAIN SEVERITY QUANTIFIED, PAIN PRESENT: ICD-10-PCS | Mod: HCNC,S$GLB,, | Performed by: NURSE PRACTITIONER

## 2020-12-15 PROCEDURE — 63600175 PHARM REV CODE 636 W HCPCS: Mod: JG,HCNC | Performed by: INTERNAL MEDICINE

## 2020-12-15 PROCEDURE — 3288F FALL RISK ASSESSMENT DOCD: CPT | Mod: HCNC,CPTII,S$GLB, | Performed by: NURSE PRACTITIONER

## 2020-12-15 RX ORDER — SODIUM CHLORIDE 0.9 % (FLUSH) 0.9 %
10 SYRINGE (ML) INJECTION
Status: DISCONTINUED | OUTPATIENT
Start: 2020-12-15 | End: 2020-12-15 | Stop reason: HOSPADM

## 2020-12-15 RX ORDER — HEPARIN 100 UNIT/ML
500 SYRINGE INTRAVENOUS
Status: DISCONTINUED | OUTPATIENT
Start: 2020-12-15 | End: 2020-12-15 | Stop reason: HOSPADM

## 2020-12-15 RX ORDER — HEPARIN 100 UNIT/ML
500 SYRINGE INTRAVENOUS
Status: CANCELLED | OUTPATIENT
Start: 2020-12-15

## 2020-12-15 RX ORDER — SODIUM CHLORIDE 0.9 % (FLUSH) 0.9 %
10 SYRINGE (ML) INJECTION
Status: CANCELLED | OUTPATIENT
Start: 2020-12-15

## 2020-12-15 RX ADMIN — Medication 10 ML: at 01:12

## 2020-12-15 RX ADMIN — DOCETAXEL ANHYDROUS 150 MG: 10 INJECTION, SOLUTION INTRAVENOUS at 11:12

## 2020-12-15 RX ADMIN — CYCLOPHOSPHAMIDE 1180 MG: 1 INJECTION, POWDER, FOR SOLUTION INTRAVENOUS; ORAL at 12:12

## 2020-12-15 RX ADMIN — SODIUM CHLORIDE: 0.9 INJECTION, SOLUTION INTRAVENOUS at 10:12

## 2020-12-15 RX ADMIN — DEXAMETHASONE SODIUM PHOSPHATE 0.25 MG: 4 INJECTION, SOLUTION INTRA-ARTICULAR; INTRALESIONAL; INTRAMUSCULAR; INTRAVENOUS; SOFT TISSUE at 10:12

## 2020-12-15 RX ADMIN — HEPARIN 500 UNITS: 100 SYRINGE at 01:12

## 2020-12-15 RX ADMIN — APREPITANT 130 MG: 130 INJECTION, EMULSION INTRAVENOUS at 11:12

## 2020-12-15 NOTE — PLAN OF CARE
Pt got Taxotere and Cytoxan cycle 2 day 1 today and tolerated well, with no complications of s/s adverse reaction. VS stable throughout treatment. Right chest port + blood return, flushed with NS and heparin per protocol and de-accessed prior to discharge. Band-aid applied to site. RTC tomorrow 12/16/20 for appointment with Dr. Llanos and Udenyca injection, pt and daughter verbalized understanding. Pt instructed to notify Dr. Mcduffie's office if concerns arise. Pt discharged with NAD, ambulating independently, accompanied by daughter. AVS printed and given to pt.

## 2020-12-15 NOTE — Clinical Note
-RTC in 3 weeks for EP with Dr. Mcduffie with cbc, cmp, and for C3 Cytoxan and Taxotere. Udenyca day 2.

## 2020-12-16 ENCOUNTER — INFUSION (OUTPATIENT)
Dept: INFUSION THERAPY | Facility: HOSPITAL | Age: 65
End: 2020-12-16
Payer: MEDICARE

## 2020-12-16 ENCOUNTER — INITIAL CONSULT (OUTPATIENT)
Dept: PSYCHIATRY | Facility: CLINIC | Age: 65
End: 2020-12-16
Payer: MEDICARE

## 2020-12-16 DIAGNOSIS — F43.20 ADJUSTMENT DISORDER, UNSPECIFIED TYPE: Primary | ICD-10-CM

## 2020-12-16 DIAGNOSIS — C50.312 MALIGNANT NEOPLASM OF LOWER-INNER QUADRANT OF LEFT BREAST IN FEMALE, ESTROGEN RECEPTOR POSITIVE: ICD-10-CM

## 2020-12-16 DIAGNOSIS — Z17.0 MALIGNANT NEOPLASM OF LOWER-INNER QUADRANT OF LEFT BREAST IN FEMALE, ESTROGEN RECEPTOR POSITIVE: Primary | ICD-10-CM

## 2020-12-16 DIAGNOSIS — Z17.0 MALIGNANT NEOPLASM OF LOWER-INNER QUADRANT OF LEFT BREAST IN FEMALE, ESTROGEN RECEPTOR POSITIVE: ICD-10-CM

## 2020-12-16 DIAGNOSIS — C50.312 MALIGNANT NEOPLASM OF LOWER-INNER QUADRANT OF LEFT BREAST IN FEMALE, ESTROGEN RECEPTOR POSITIVE: Primary | ICD-10-CM

## 2020-12-16 PROCEDURE — 90791 PR PSYCHIATRIC DIAGNOSTIC EVALUATION: ICD-10-PCS | Mod: HCNC,S$GLB,, | Performed by: PSYCHOLOGIST

## 2020-12-16 PROCEDURE — 96372 THER/PROPH/DIAG INJ SC/IM: CPT | Mod: HCNC

## 2020-12-16 PROCEDURE — 63600175 PHARM REV CODE 636 W HCPCS: Mod: TB,HCNC | Performed by: INTERNAL MEDICINE

## 2020-12-16 PROCEDURE — 90791 PSYCH DIAGNOSTIC EVALUATION: CPT | Mod: HCNC,S$GLB,, | Performed by: PSYCHOLOGIST

## 2020-12-16 PROCEDURE — 99999 PR PBB SHADOW E&M-EST. PATIENT-LVL II: ICD-10-PCS | Mod: PBBFAC,HCNC,, | Performed by: PSYCHOLOGIST

## 2020-12-16 PROCEDURE — 99999 PR PBB SHADOW E&M-EST. PATIENT-LVL II: CPT | Mod: PBBFAC,HCNC,, | Performed by: PSYCHOLOGIST

## 2020-12-16 RX ADMIN — PEGFILGRASTIM-CBQV 6 MG: 6 INJECTION, SOLUTION SUBCUTANEOUS at 09:12

## 2020-12-16 NOTE — PROGRESS NOTES
INFORMED CONSENT/ LIMITS of CONFIDENTIALITY: Prior to beginning the interview, the patient's identification was confirmed via name and date of birth. Sunitha Pillai  was informed of the possible risks and benefits of psychological interventions (e.g., counseling, psychotherapy, testing) and provided information regarding the handling of protected health records and   the limits of confidentiality, including the importance of reporting any suicidal or homicidal ideation to ensure safety of all parties. This provider explained the purpose of today's appointment and the patient was provided with time to ask questions regarding this information.  Acceptance and understanding of these conditions was expressed, and Sunitha Pillai freely consented to this evaluation.     PSYCHO-ONCOLOGY INTAKE    Diagnostic Interview - CPT 02613    Date: 12/16/2020  Site: [facility]     Evaluation Length (direct face-to-face time):  1 hour     Referral Source: Betty Juares NP   Oncologist:   PCP: Patty Louis MD    Clinical status of patient: Outpatient    Sunitha Pillai, a 65 y.o. female, seen for initial evaluation visit.  Met with patient and daughter.    Chief complaint/reason for encounter: adjustment to illness, Psychological Evaluation and treatment recommendations    Medical/Surgical History:    Patient Active Problem List   Diagnosis    Type 2 diabetes mellitus, uncontrolled    Essential hypertension    GERD (gastroesophageal reflux disease)    Vertigo    Dizziness    Obesity    Type 2 diabetes mellitus with complication, with long-term current use of insulin    Malignant neoplasm of lower-inner quadrant of left breast in female, estrogen receptor positive    Malignant neoplasm of lower-inner quadrant of left breast in female, estrogen receptor negative    Anxiety about health    Fever       Health Behaviors:       ETOH Use: No (rare)       Tobacco Use: No   Illicit Drug Use:   "No     Prescription Misuse:No   Caffeine: minimal- occasional    Exercise:The patient engaged in regular activity prior to illness   Firearms:  No   Advanced directives:No     Family History:   Psychiatric illness: Yes  2 daughter with anxiety and depression   Alcohol/Drug Abuse: No     Suicide: No      Past Psychiatric History:   Inpatient treatment: No     Outpatient treatment: No     Prior substance abuse treatment: No     Suicide Attempts: No     Psychotropic Medications:  Current: none       Past: none    Current medications as per below, allergies reviewed in chart.    Current Outpatient Medications   Medication    acetaminophen (TYLENOL) 325 MG tablet    aspirin 81 mg Tab    BD ULTRA-FINE SHORT PEN NEEDLE 31 gauge x 5/16" Ndle    blood sugar diagnostic Strp    blood sugar diagnostic, drum (ACCU-CHEK COMPACT TEST) Strp    blood-glucose meter kit    candesartan (ATACAND) 8 MG tablet    colchicine (COLCRYS) 0.6 mg tablet    cyclobenzaprine (FLEXERIL) 5 MG tablet    dexAMETHasone (DECADRON) 4 MG Tab    diclofenac sodium (VOLTAREN) 1 % Gel    dulaglutide (TRULICITY) 0.75 mg/0.5 mL pen injector    dulaglutide (TRULICITY) 0.75 mg/0.5 mL pen injector    HYDROcodone-acetaminophen (NORCO) 5-325 mg per tablet    insulin aspart U-100 (NOVOLOG U-100 INSULIN ASPART) 100 unit/mL injection    insulin degludec (TRESIBA FLEXTOUCH U-100) 100 unit/mL (3 mL) InPn    lancets Misc    metFORMIN (GLUCOPHAGE) 1000 MG tablet    naproxen (NAPROSYN) 500 MG tablet    NOVOLOG FLEXPEN U-100 INSULIN 100 unit/mL (3 mL) InPn pen    ondansetron (ZOFRAN-ODT) 8 MG TbDL    sub-q insulin device, 30 unit (V-GO 30) Kanchan    TRUEPLUS LANCETS 33 gauge Misc     No current facility-administered medications for this visit.      Facility-Administered Medications Ordered in Other Visits   Medication Frequency    fentaNYL injection 25 mcg Q5 Min PRN    midazolam (VERSED) 1 mg/mL injection 0.5 mg PRN       CAM Therapies: None "     Screening: Depression: Positive  Kassandra: Denies Psychosis: Denies    Generalized anxiety: Positive Panic Disorder: Denies    Social/specific phobia: Denies OCD: Denies   Sexual Dysfunction:  Denies Trauma: Denies      Social situation/Stressors: Sunitha Pillai lives with  in [facility].  She is a full-time  at the [employer], currently on STD. She has been in her job for 20 years.    Sunitha Pillai has been  since 2005 and has 3 adult children. She was  once before ending in divorce.  Her spouse is not working.   The patient reports good social support. Sunitha Pillai is an active member of the [employer].  Sunitha Pillai's hobbies include time with family, shopping, Sabianist, eating out.     Additional stressors:    Strengths:Housing stability, Able to vocalize needs, Values and traditions, Resources - social, interpersonal, monetary, Vocational interests, hobbies and/or talents, Interpersonal relationships and supports available - family, relatives, friends and Cultural/spiritual/Restoration and community involvement  Liabilities: Complicated medical illness    Current Evaluation:     Mental Status Exam: Sunitha Pillai arrived promptly for the assessment session. Her youngest daughter was also present during the interview, with the consent of Sunitha Pillai.  The patient was fully cooperative throughout the interview and was an adequate historian   Appearance: age appropriate, appropriately  dressed, adequately  groomed  Behavior/Cooperation: friendly and cooperative  Speech: normal in rate, volume, and tone and appropriate quality, quantity and organization of sentences  Mood: anxious, sad  Affect: mood congruent  Thought Process: goal-directed, logical  Thought Content: normal, no suicidality, no homicidality, delusions, or paranoia;did not appear to be responding to internal stimuli during the interview.   Orientation: grossly  intact  Memory: Grossly intact  Attention Span/Concentration: Attends to interview without distraction; reports no difficulty  Fund of Knowledge: average  Estimate of Intelligence: average from verbal skills and history  Cognition: grossly intact  Insight: patient has awareness of illness; good insight into own behavior and behavior of others  Judgment: the patient's behavior is adequate to circumstances    Distress Score    Distress Score: 5        Practical Problems Physical Problems   : No Appearance: Yes   Housing: No Bathing / Dressing: No   Insurance / Financial: No Breathing: Yes    Transportation: No  Changes in Urination: No    Work / School: No  Constipation: No   Treatment Decisions: No  Diarrhea: No     Eating: No    Family Problems Fatigue: Yes    Dealing with Children: No Feeling Swollen: No    Dealing with Partner: No Fevers: No    Ability to Have Children: No  Getting Around: No    Family Health Issues: No  Indigestion: No     Memory / Concentration: No   Emotional Problems Mouth Sores: No    Depression: Yes  Nausea: No    Fears: No  Nose Dry / Congested: No    Nervousness: Yes  Pain: Yes    Sadness: Yes Sexual: No    Worry: Yes Skin Dry / Itchy: No    Loss of Interest in Usual Activities: No Sleep: No     Substance Abuse: No    Spiritual/Religions Concerns Tingling in Hands / Feet: Yes   Spritual / Bahai Concerns: No         Other Problems              Patient Health Questionnaire-9 (PHQ-9)     1.  Little interest or pleasure in doing things: Not at all                       = 0   2.  Feeling down, depressed or hopeless: Several days                = 1   3.  Trouble falling or staying asleep, or sleeping too much: Not at all                       = 0   4.  Feeling tired or having little energy: Several days                = 1   5.  Poor appetite or overeating: Not at all                       = 0   6.  Feeling bad about yourself - or that you are a failure or have let yourself or your  family down: Several days                = 1   7.  Trouble concentrating on things, such as reading the newspaper or watching television: Not at all                       = 0   8.  Moving or speaking so slowly that other people could have noticed.  Or the opposite - being fidgety or restless that you have been moving around a lot more than usual: Not at all                       = 0   9.  Thoughts that you would be better off dead, or of hurting yourself: Not at all                       = 0    PHQ-9 Total:       Generalized Anxiety Disorder Questionnaire-7 (KEILA-7)  The patient completed the General Anxiety Disorder, 7 Items (GAD7)and received a score of 3, indicating mild anxiety symptoms presently impacting current functioning.         History of present illness:    Oncology History   Malignant neoplasm of lower-inner quadrant of left breast in female, estrogen receptor positive   9/3/2020 Initial Diagnosis    Malignant neoplasm of lower-inner quadrant of left breast in female, estrogen receptor positive     11/24/2020 -  Chemotherapy    Treatment Summary   Plan Name: OP BREAST TC - DOCETAXEL CYCLOPHOSPHAMIDE Q3W  Treatment Goal: Curative  Status: Active  Start Date: 11/24/2020  End Date: 1/27/2021 (Planned)  Provider: Giovany Mcduffie MD  Chemotherapy: cyclophosphamide (CYTOXAN) 600 mg/m2 = 1,180 mg in sodium chloride 0.9% 250 mL chemo infusion, 600 mg/m2 = 1,180 mg, Intravenous, Clinic/HOD 1 time, 2 of 4 cycles  Administration: 1,180 mg (11/24/2020)  DOCEtaxeL (TAXOTERE) 75 mg/m2 = 150 mg in sodium chloride 0.9% 250 mL chemo infusion, 75 mg/m2 = 150 mg, Intravenous, Clinic/HOD 1 time, 2 of 4 cycles  Administration: 150 mg (11/24/2020), 150 mg (12/15/2020)       Patient with Stage I Breast Cancer [place] Pos Her 2 Neg. Referred by CARMEN Juares. S/P Lumpectomy. Began chem, plans for RT and endocrine therapy. Referred for anxiety/depression related to health.Patient reported difficulty adjusting to having cancer and  "associated treatment plan. Patient feels that she needs to stay strong and not show emotions. Patient daughter reported that her family is concern for patient because she "keeps everything inside."     Sunitha Pillai has adjusted to illness with moderate difficulty primarily through passive coping strategies and avoidance. She has engaged in appropriate information gathering.  The patient has good family/friend support.  Her support system is coping marginally with the diagnosis/treatment/prognosis. Her  is struggling most.  Illness-related psychosocial stressors include absence from work, difficulty meeting family responsibilities and changes in ability to engage in leisure activities.  The patient has a good partnership with her [facility] oncology treatment team. The patient reports the following barriers to cancer care:none.   Symptoms:   · Mood: depressed mood, fatigue and worthlessness/guilt;  no prior and (+) SI- History of Occasional passive thoughts about not being here. No Intent, plans, or active SI. No SA  · Anxiety: Feeling nervous, anxious, or on edge, Uncontrollable worry (about health, family) and Irritability; no prior  · Substance abuse: denied  · Cognitive functioning: denied  · Health behaviors: noncontributory  · Sleep: No concerns.  · Pain: Ms. Pillai reports very mild chemo induced neuropathic pain.  Symptoms do not interfere with daily activity, sleeping and work.       Assessment - Diagnosis - Goals:       ICD-10-CM ICD-9-CM   1. Adjustment disorder, unspecified type  F43.20 309.9   2. Malignant neoplasm of lower-inner quadrant of left breast in female, estrogen receptor positive  C50.312 174.3    Z17.0 V86.0     Plan:individual psychotherapy    Summary and Recommendations  Sunitha Pillai is a 65 y.o. female referred by Betty Juares NP for psychological evaluation and treatment.  Ms. Pillai appears to be coping marginally with her diagnosis and proposed treatment " course.  She is interested in CBT to address depression/anxiety/insomnia and will follow up with me for that purpose. Mood protective strategies during cancer treatment were discussed.   She was encouraged to continue with pleasant events scheduling and to increase exercise.     GOALS:   Increase exercise  Pleasant events scheduling      Lety Llanos, PhD  Clinical Psychologist  [place] License #[id number]

## 2020-12-17 ENCOUNTER — DOCUMENTATION ONLY (OUTPATIENT)
Dept: HEMATOLOGY/ONCOLOGY | Facility: CLINIC | Age: 65
End: 2020-12-17

## 2020-12-17 PROBLEM — F43.20 ADJUSTMENT DISORDER: Status: ACTIVE | Noted: 2020-12-17

## 2020-12-17 NOTE — PROGRESS NOTES
"In response to in basket msg from Dr. Llanos informing pt receives short term disability sporadically and may have financial needs, LAWANDA called pt.  She said She has only received one disability check so far but she is due for her next one tomorrow.  She anticipates receiving them regularly.  She said she is "making it so far."  LAWANDA informed pt that JESSICA has $50 [employer] cards and asked pt if that would help her for Juliet. She accepted offer and will p/u tomorrow afternoon.  LAWANDA provided name and contact information and encouraged pt to call with any future needs.      "

## 2020-12-21 ENCOUNTER — TELEPHONE (OUTPATIENT)
Dept: NEUROLOGY | Facility: CLINIC | Age: 65
End: 2020-12-21

## 2020-12-21 NOTE — TELEPHONE ENCOUNTER
----- Message from Jamilah Ramsey sent at 12/21/2020  8:44 AM CST -----  Pt is complaining of headaches asking for a call back to schedule an appt.    Contact info [phone]

## 2020-12-22 ENCOUNTER — LAB VISIT (OUTPATIENT)
Dept: INTERNAL MEDICINE | Facility: CLINIC | Age: 65
End: 2020-12-22
Payer: MEDICARE

## 2020-12-22 ENCOUNTER — TELEPHONE (OUTPATIENT)
Dept: HEMATOLOGY/ONCOLOGY | Facility: CLINIC | Age: 65
End: 2020-12-22

## 2020-12-22 DIAGNOSIS — Z13.9 SCREENING FOR CONDITION: Primary | ICD-10-CM

## 2020-12-22 DIAGNOSIS — R05.9 COUGH: ICD-10-CM

## 2020-12-22 PROCEDURE — U0003 INFECTIOUS AGENT DETECTION BY NUCLEIC ACID (DNA OR RNA); SEVERE ACUTE RESPIRATORY SYNDROME CORONAVIRUS 2 (SARS-COV-2) (CORONAVIRUS DISEASE [COVID-19]), AMPLIFIED PROBE TECHNIQUE, MAKING USE OF HIGH THROUGHPUT TECHNOLOGIES AS DESCRIBED BY CMS-2020-01-R: HCPCS | Mod: HCNC

## 2020-12-22 NOTE — TELEPHONE ENCOUNTER
----- Message from Estefani Owens sent at 12/22/2020  8:07 AM CST -----  Ms. Gutierrez wants to know if she can get a covid test. She has been having a sore throat and wants to make sure she is covid free before leaving [facility].

## 2020-12-22 NOTE — TELEPHONE ENCOUNTER
Spoke with patient.  Received SE of avle. She thanked nurse.      She notes having sore throat since yesterday. Denies all other symptoms. No fever, no cough, no sob, no diarrhea. She is wondering if dr ferreira feels she needs covid testing at this time.    Nurse informed her she would contact her back after speaking with dr ferreira.  She thanked nurse.

## 2020-12-23 LAB — SARS-COV-2 RNA RESP QL NAA+PROBE: NOT DETECTED

## 2020-12-29 ENCOUNTER — PATIENT OUTREACH (OUTPATIENT)
Dept: ADMINISTRATIVE | Facility: OTHER | Age: 65
End: 2020-12-29

## 2021-01-04 ENCOUNTER — LAB VISIT (OUTPATIENT)
Dept: LAB | Facility: HOSPITAL | Age: 66
End: 2021-01-04
Payer: MEDICARE

## 2021-01-04 ENCOUNTER — PATIENT MESSAGE (OUTPATIENT)
Dept: ADMINISTRATIVE | Facility: HOSPITAL | Age: 66
End: 2021-01-04

## 2021-01-04 DIAGNOSIS — C50.312 MALIGNANT NEOPLASM OF LOWER-INNER QUADRANT OF LEFT BREAST IN FEMALE, ESTROGEN RECEPTOR POSITIVE: ICD-10-CM

## 2021-01-04 DIAGNOSIS — Z17.0 MALIGNANT NEOPLASM OF LOWER-INNER QUADRANT OF LEFT BREAST IN FEMALE, ESTROGEN RECEPTOR POSITIVE: ICD-10-CM

## 2021-01-04 LAB
ALBUMIN SERPL BCP-MCNC: 3.3 G/DL (ref 3.5–5.2)
ALP SERPL-CCNC: 92 U/L (ref 55–135)
ALT SERPL W/O P-5'-P-CCNC: 22 U/L (ref 10–44)
ANION GAP SERPL CALC-SCNC: 7 MMOL/L (ref 8–16)
AST SERPL-CCNC: 24 U/L (ref 10–40)
BILIRUB SERPL-MCNC: 0.4 MG/DL (ref 0.1–1)
BUN SERPL-MCNC: 13 MG/DL (ref 8–23)
CALCIUM SERPL-MCNC: 9.6 MG/DL (ref 8.7–10.5)
CHLORIDE SERPL-SCNC: 106 MMOL/L (ref 95–110)
CO2 SERPL-SCNC: 24 MMOL/L (ref 23–29)
CREAT SERPL-MCNC: 1 MG/DL (ref 0.5–1.4)
ERYTHROCYTE [DISTWIDTH] IN BLOOD BY AUTOMATED COUNT: 16 % (ref 11.5–14.5)
EST. GFR  (AFRICAN AMERICAN): >60 ML/MIN/1.73 M^2
EST. GFR  (NON AFRICAN AMERICAN): 58.8 ML/MIN/1.73 M^2
GLUCOSE SERPL-MCNC: 149 MG/DL (ref 70–110)
HCT VFR BLD AUTO: 35.1 % (ref 37–48.5)
HGB BLD-MCNC: 11.4 G/DL (ref 12–16)
IMM GRANULOCYTES # BLD AUTO: 0.02 K/UL (ref 0–0.04)
MCH RBC QN AUTO: 30.6 PG (ref 27–31)
MCHC RBC AUTO-ENTMCNC: 32.5 G/DL (ref 32–36)
MCV RBC AUTO: 94 FL (ref 82–98)
NEUTROPHILS # BLD AUTO: 4.9 K/UL (ref 1.8–7.7)
PLATELET # BLD AUTO: 266 K/UL (ref 150–350)
PMV BLD AUTO: 9.7 FL (ref 9.2–12.9)
POTASSIUM SERPL-SCNC: 4.7 MMOL/L (ref 3.5–5.1)
PROT SERPL-MCNC: 7 G/DL (ref 6–8.4)
RBC # BLD AUTO: 3.72 M/UL (ref 4–5.4)
SODIUM SERPL-SCNC: 137 MMOL/L (ref 136–145)
WBC # BLD AUTO: 7.79 K/UL (ref 3.9–12.7)

## 2021-01-04 PROCEDURE — 80053 COMPREHEN METABOLIC PANEL: CPT | Mod: HCNC

## 2021-01-04 PROCEDURE — 36415 COLL VENOUS BLD VENIPUNCTURE: CPT | Mod: HCNC

## 2021-01-04 PROCEDURE — 85027 COMPLETE CBC AUTOMATED: CPT | Mod: HCNC

## 2021-01-05 ENCOUNTER — OFFICE VISIT (OUTPATIENT)
Dept: HEMATOLOGY/ONCOLOGY | Facility: CLINIC | Age: 66
End: 2021-01-05
Payer: MEDICARE

## 2021-01-05 ENCOUNTER — INFUSION (OUTPATIENT)
Dept: INFUSION THERAPY | Facility: HOSPITAL | Age: 66
End: 2021-01-05
Payer: MEDICARE

## 2021-01-05 VITALS
TEMPERATURE: 98 F | HEIGHT: 64 IN | HEART RATE: 107 BPM | DIASTOLIC BLOOD PRESSURE: 65 MMHG | RESPIRATION RATE: 16 BRPM | BODY MASS INDEX: 31.99 KG/M2 | OXYGEN SATURATION: 98 % | WEIGHT: 187.38 LBS | SYSTOLIC BLOOD PRESSURE: 128 MMHG

## 2021-01-05 VITALS
HEART RATE: 100 BPM | TEMPERATURE: 98 F | SYSTOLIC BLOOD PRESSURE: 133 MMHG | DIASTOLIC BLOOD PRESSURE: 70 MMHG | RESPIRATION RATE: 18 BRPM

## 2021-01-05 DIAGNOSIS — R11.0 CHEMOTHERAPY-INDUCED NAUSEA: ICD-10-CM

## 2021-01-05 DIAGNOSIS — Z17.0 MALIGNANT NEOPLASM OF LOWER-INNER QUADRANT OF LEFT BREAST IN FEMALE, ESTROGEN RECEPTOR POSITIVE: Primary | ICD-10-CM

## 2021-01-05 DIAGNOSIS — C50.312 MALIGNANT NEOPLASM OF LOWER-INNER QUADRANT OF LEFT BREAST IN FEMALE, ESTROGEN RECEPTOR POSITIVE: Primary | ICD-10-CM

## 2021-01-05 DIAGNOSIS — T45.1X5A CHEMOTHERAPY-INDUCED NAUSEA: ICD-10-CM

## 2021-01-05 PROCEDURE — 3074F PR MOST RECENT SYSTOLIC BLOOD PRESSURE < 130 MM HG: ICD-10-PCS | Mod: HCNC,CPTII,S$GLB, | Performed by: INTERNAL MEDICINE

## 2021-01-05 PROCEDURE — 96417 CHEMO IV INFUS EACH ADDL SEQ: CPT | Mod: HCNC

## 2021-01-05 PROCEDURE — 1159F MED LIST DOCD IN RCRD: CPT | Mod: HCNC,S$GLB,, | Performed by: INTERNAL MEDICINE

## 2021-01-05 PROCEDURE — 3008F PR BODY MASS INDEX (BMI) DOCUMENTED: ICD-10-PCS | Mod: HCNC,CPTII,S$GLB, | Performed by: INTERNAL MEDICINE

## 2021-01-05 PROCEDURE — 96375 TX/PRO/DX INJ NEW DRUG ADDON: CPT | Mod: HCNC

## 2021-01-05 PROCEDURE — 63600175 PHARM REV CODE 636 W HCPCS: Mod: TB,HCNC | Performed by: INTERNAL MEDICINE

## 2021-01-05 PROCEDURE — 1126F AMNT PAIN NOTED NONE PRSNT: CPT | Mod: HCNC,S$GLB,, | Performed by: INTERNAL MEDICINE

## 2021-01-05 PROCEDURE — 1159F PR MEDICATION LIST DOCUMENTED IN MEDICAL RECORD: ICD-10-PCS | Mod: HCNC,S$GLB,, | Performed by: INTERNAL MEDICINE

## 2021-01-05 PROCEDURE — 1101F PR PT FALLS ASSESS DOC 0-1 FALLS W/OUT INJ PAST YR: ICD-10-PCS | Mod: HCNC,CPTII,S$GLB, | Performed by: INTERNAL MEDICINE

## 2021-01-05 PROCEDURE — 1126F PR PAIN SEVERITY QUANTIFIED, NO PAIN PRESENT: ICD-10-PCS | Mod: HCNC,S$GLB,, | Performed by: INTERNAL MEDICINE

## 2021-01-05 PROCEDURE — 99499 UNLISTED E&M SERVICE: CPT | Mod: S$GLB,,, | Performed by: INTERNAL MEDICINE

## 2021-01-05 PROCEDURE — 3288F PR FALLS RISK ASSESSMENT DOCUMENTED: ICD-10-PCS | Mod: HCNC,CPTII,S$GLB, | Performed by: INTERNAL MEDICINE

## 2021-01-05 PROCEDURE — 99999 PR PBB SHADOW E&M-EST. PATIENT-LVL IV: ICD-10-PCS | Mod: PBBFAC,HCNC,, | Performed by: INTERNAL MEDICINE

## 2021-01-05 PROCEDURE — 96367 TX/PROPH/DG ADDL SEQ IV INF: CPT | Mod: HCNC

## 2021-01-05 PROCEDURE — 99214 PR OFFICE/OUTPT VISIT, EST, LEVL IV, 30-39 MIN: ICD-10-PCS | Mod: HCNC,S$GLB,, | Performed by: INTERNAL MEDICINE

## 2021-01-05 PROCEDURE — 3078F DIAST BP <80 MM HG: CPT | Mod: HCNC,CPTII,S$GLB, | Performed by: INTERNAL MEDICINE

## 2021-01-05 PROCEDURE — 99214 OFFICE O/P EST MOD 30 MIN: CPT | Mod: HCNC,S$GLB,, | Performed by: INTERNAL MEDICINE

## 2021-01-05 PROCEDURE — 3078F PR MOST RECENT DIASTOLIC BLOOD PRESSURE < 80 MM HG: ICD-10-PCS | Mod: HCNC,CPTII,S$GLB, | Performed by: INTERNAL MEDICINE

## 2021-01-05 PROCEDURE — 3288F FALL RISK ASSESSMENT DOCD: CPT | Mod: HCNC,CPTII,S$GLB, | Performed by: INTERNAL MEDICINE

## 2021-01-05 PROCEDURE — 96413 CHEMO IV INFUSION 1 HR: CPT | Mod: HCNC

## 2021-01-05 PROCEDURE — 99499 RISK ADDL DX/OHS AUDIT: ICD-10-PCS | Mod: S$GLB,,, | Performed by: INTERNAL MEDICINE

## 2021-01-05 PROCEDURE — 3008F BODY MASS INDEX DOCD: CPT | Mod: HCNC,CPTII,S$GLB, | Performed by: INTERNAL MEDICINE

## 2021-01-05 PROCEDURE — 1101F PT FALLS ASSESS-DOCD LE1/YR: CPT | Mod: HCNC,CPTII,S$GLB, | Performed by: INTERNAL MEDICINE

## 2021-01-05 PROCEDURE — 3074F SYST BP LT 130 MM HG: CPT | Mod: HCNC,CPTII,S$GLB, | Performed by: INTERNAL MEDICINE

## 2021-01-05 PROCEDURE — 99999 PR PBB SHADOW E&M-EST. PATIENT-LVL IV: CPT | Mod: PBBFAC,HCNC,, | Performed by: INTERNAL MEDICINE

## 2021-01-05 PROCEDURE — 25000003 PHARM REV CODE 250: Mod: HCNC | Performed by: INTERNAL MEDICINE

## 2021-01-05 RX ORDER — HEPARIN 100 UNIT/ML
500 SYRINGE INTRAVENOUS
Status: CANCELLED | OUTPATIENT
Start: 2021-01-05

## 2021-01-05 RX ORDER — SODIUM CHLORIDE 0.9 % (FLUSH) 0.9 %
10 SYRINGE (ML) INJECTION
Status: CANCELLED | OUTPATIENT
Start: 2021-01-05

## 2021-01-05 RX ORDER — OLANZAPINE 5 MG/1
5 TABLET ORAL 2 TIMES DAILY
Qty: 30 TABLET | Refills: 2 | Status: ON HOLD | OUTPATIENT
Start: 2021-01-05 | End: 2021-03-03

## 2021-01-05 RX ORDER — HEPARIN 100 UNIT/ML
500 SYRINGE INTRAVENOUS
Status: DISCONTINUED | OUTPATIENT
Start: 2021-01-05 | End: 2021-01-05 | Stop reason: HOSPADM

## 2021-01-05 RX ORDER — SODIUM CHLORIDE 0.9 % (FLUSH) 0.9 %
10 SYRINGE (ML) INJECTION
Status: DISCONTINUED | OUTPATIENT
Start: 2021-01-05 | End: 2021-01-05 | Stop reason: HOSPADM

## 2021-01-05 RX ADMIN — SODIUM CHLORIDE: 9 INJECTION, SOLUTION INTRAVENOUS at 08:01

## 2021-01-05 RX ADMIN — CYCLOPHOSPHAMIDE 1180 MG: 200 INJECTION, SOLUTION INTRAVENOUS at 10:01

## 2021-01-05 RX ADMIN — HEPARIN 500 UNITS: 100 SYRINGE at 11:01

## 2021-01-05 RX ADMIN — DOCETAXEL ANHYDROUS 150 MG: 10 INJECTION, SOLUTION INTRAVENOUS at 09:01

## 2021-01-05 RX ADMIN — APREPITANT 130 MG: 130 INJECTION, EMULSION INTRAVENOUS at 08:01

## 2021-01-05 RX ADMIN — DEXAMETHASONE SODIUM PHOSPHATE 0.25 MG: 4 INJECTION, SOLUTION INTRA-ARTICULAR; INTRALESIONAL; INTRAMUSCULAR; INTRAVENOUS; SOFT TISSUE at 08:01

## 2021-01-06 ENCOUNTER — INFUSION (OUTPATIENT)
Dept: INFUSION THERAPY | Facility: HOSPITAL | Age: 66
End: 2021-01-06
Payer: MEDICARE

## 2021-01-06 DIAGNOSIS — C50.312 MALIGNANT NEOPLASM OF LOWER-INNER QUADRANT OF LEFT BREAST IN FEMALE, ESTROGEN RECEPTOR POSITIVE: Primary | ICD-10-CM

## 2021-01-06 DIAGNOSIS — Z17.0 MALIGNANT NEOPLASM OF LOWER-INNER QUADRANT OF LEFT BREAST IN FEMALE, ESTROGEN RECEPTOR POSITIVE: Primary | ICD-10-CM

## 2021-01-06 PROCEDURE — 96372 THER/PROPH/DIAG INJ SC/IM: CPT | Mod: HCNC

## 2021-01-06 PROCEDURE — 63600175 PHARM REV CODE 636 W HCPCS: Mod: TB,HCNC | Performed by: INTERNAL MEDICINE

## 2021-01-06 RX ADMIN — PEGFILGRASTIM-CBQV 6 MG: 6 INJECTION, SOLUTION SUBCUTANEOUS at 08:01

## 2021-01-10 ENCOUNTER — HOSPITAL ENCOUNTER (INPATIENT)
Facility: HOSPITAL | Age: 66
LOS: 2 days | Discharge: HOME | DRG: 810 | End: 2021-01-12
Attending: EMERGENCY MEDICINE | Admitting: INTERNAL MEDICINE
Payer: MEDICARE

## 2021-01-10 DIAGNOSIS — Z17.0 MALIGNANT NEOPLASM OF LOWER-INNER QUADRANT OF LEFT BREAST IN FEMALE, ESTROGEN RECEPTOR POSITIVE: Primary | ICD-10-CM

## 2021-01-10 DIAGNOSIS — C50.312 MALIGNANT NEOPLASM OF LOWER-INNER QUADRANT OF LEFT BREAST IN FEMALE, ESTROGEN RECEPTOR POSITIVE: Primary | ICD-10-CM

## 2021-01-10 DIAGNOSIS — R06.00 DYSPNEA: ICD-10-CM

## 2021-01-10 DIAGNOSIS — R00.0 TACHYCARDIA: ICD-10-CM

## 2021-01-10 DIAGNOSIS — R06.09 DYSPNEA ON EXERTION: ICD-10-CM

## 2021-01-10 PROCEDURE — 25000003 PHARM REV CODE 250: Mod: HCNC | Performed by: STUDENT IN AN ORGANIZED HEALTH CARE EDUCATION/TRAINING PROGRAM

## 2021-01-10 PROCEDURE — 94761 N-INVAS EAR/PLS OXIMETRY MLT: CPT | Mod: HCNC

## 2021-01-10 PROCEDURE — 83605 ASSAY OF LACTIC ACID: CPT | Mod: HCNC

## 2021-01-10 PROCEDURE — 83735 ASSAY OF MAGNESIUM: CPT | Mod: HCNC

## 2021-01-10 PROCEDURE — 85025 COMPLETE CBC W/AUTO DIFF WBC: CPT | Mod: HCNC

## 2021-01-10 PROCEDURE — 93005 ELECTROCARDIOGRAM TRACING: CPT | Mod: HCNC

## 2021-01-10 PROCEDURE — U0002 COVID-19 LAB TEST NON-CDC: HCPCS | Mod: HCNC | Performed by: STUDENT IN AN ORGANIZED HEALTH CARE EDUCATION/TRAINING PROGRAM

## 2021-01-10 PROCEDURE — 99285 PR EMERGENCY DEPT VISIT,LEVEL V: ICD-10-PCS | Mod: HCNC,CS,, | Performed by: EMERGENCY MEDICINE

## 2021-01-10 PROCEDURE — 87040 BLOOD CULTURE FOR BACTERIA: CPT | Mod: HCNC

## 2021-01-10 PROCEDURE — 80053 COMPREHEN METABOLIC PANEL: CPT | Mod: HCNC

## 2021-01-10 PROCEDURE — 93010 EKG 12-LEAD: ICD-10-PCS | Mod: HCNC,,, | Performed by: INTERNAL MEDICINE

## 2021-01-10 PROCEDURE — 93010 ELECTROCARDIOGRAM REPORT: CPT | Mod: HCNC,,, | Performed by: INTERNAL MEDICINE

## 2021-01-10 PROCEDURE — 99285 EMERGENCY DEPT VISIT HI MDM: CPT | Mod: HCNC,CS,, | Performed by: EMERGENCY MEDICINE

## 2021-01-10 PROCEDURE — 99285 EMERGENCY DEPT VISIT HI MDM: CPT | Mod: 25,HCNC

## 2021-01-10 PROCEDURE — 96360 HYDRATION IV INFUSION INIT: CPT | Mod: HCNC

## 2021-01-10 PROCEDURE — 25000003 PHARM REV CODE 250: Performed by: STUDENT IN AN ORGANIZED HEALTH CARE EDUCATION/TRAINING PROGRAM

## 2021-01-10 PROCEDURE — 83880 ASSAY OF NATRIURETIC PEPTIDE: CPT | Mod: HCNC

## 2021-01-10 PROCEDURE — 84484 ASSAY OF TROPONIN QUANT: CPT | Mod: HCNC

## 2021-01-10 PROCEDURE — 96361 HYDRATE IV INFUSION ADD-ON: CPT | Mod: HCNC

## 2021-01-10 PROCEDURE — 12000002 HC ACUTE/MED SURGE SEMI-PRIVATE ROOM

## 2021-01-10 RX ADMIN — SODIUM CHLORIDE 1000 ML: 0.9 INJECTION, SOLUTION INTRAVENOUS at 11:01

## 2021-01-11 PROBLEM — D70.9 NEUTROPENIA: Status: ACTIVE | Noted: 2021-01-11

## 2021-01-11 PROBLEM — R06.02 SHORTNESS OF BREATH: Status: ACTIVE | Noted: 2021-01-11

## 2021-01-11 PROBLEM — R00.0 TACHYCARDIA: Status: ACTIVE | Noted: 2021-01-11

## 2021-01-11 PROBLEM — K59.00 CONSTIPATION: Status: ACTIVE | Noted: 2021-01-11

## 2021-01-11 LAB
ALBUMIN SERPL BCP-MCNC: 2.9 G/DL (ref 3.5–5.2)
ALBUMIN SERPL BCP-MCNC: 3.1 G/DL (ref 3.5–5.2)
ALP SERPL-CCNC: 101 U/L (ref 55–135)
ALP SERPL-CCNC: 107 U/L (ref 55–135)
ALT SERPL W/O P-5'-P-CCNC: 13 U/L (ref 10–44)
ALT SERPL W/O P-5'-P-CCNC: 16 U/L (ref 10–44)
ANION GAP SERPL CALC-SCNC: 12 MMOL/L (ref 8–16)
ANION GAP SERPL CALC-SCNC: 6 MMOL/L (ref 8–16)
ANISOCYTOSIS BLD QL SMEAR: SLIGHT
ANISOCYTOSIS BLD QL SMEAR: SLIGHT
APTT BLDCRRT: 23.7 SEC (ref 21–32)
ASCENDING AORTA: 2.71 CM
AST SERPL-CCNC: 12 U/L (ref 10–40)
AST SERPL-CCNC: 16 U/L (ref 10–40)
AV INDEX (PROSTH): 0.67
AV MEAN GRADIENT: 7 MMHG
AV PEAK GRADIENT: 13 MMHG
AV VALVE AREA: 2.24 CM2
AV VELOCITY RATIO: 0.59
BACTERIA #/AREA URNS AUTO: NORMAL /HPF
BASOPHILS # BLD AUTO: 0.05 K/UL (ref 0–0.2)
BASOPHILS # BLD AUTO: ABNORMAL K/UL (ref 0–0.2)
BASOPHILS NFR BLD: 0 % (ref 0–1.9)
BASOPHILS NFR BLD: 2.5 % (ref 0–1.9)
BILIRUB SERPL-MCNC: 0.2 MG/DL (ref 0.1–1)
BILIRUB SERPL-MCNC: 0.3 MG/DL (ref 0.1–1)
BILIRUB UR QL STRIP: NEGATIVE
BNP SERPL-MCNC: <10 PG/ML (ref 0–99)
BSA FOR ECHO PROCEDURE: 1.88 M2
BUN SERPL-MCNC: 17 MG/DL (ref 8–23)
BUN SERPL-MCNC: 19 MG/DL (ref 8–23)
CALCIUM SERPL-MCNC: 8.2 MG/DL (ref 8.7–10.5)
CALCIUM SERPL-MCNC: 9 MG/DL (ref 8.7–10.5)
CHLORIDE SERPL-SCNC: 103 MMOL/L (ref 95–110)
CHLORIDE SERPL-SCNC: 104 MMOL/L (ref 95–110)
CLARITY UR REFRACT.AUTO: CLEAR
CO2 SERPL-SCNC: 23 MMOL/L (ref 23–29)
CO2 SERPL-SCNC: 26 MMOL/L (ref 23–29)
COLOR UR AUTO: ABNORMAL
CREAT SERPL-MCNC: 0.9 MG/DL (ref 0.5–1.4)
CREAT SERPL-MCNC: 1.1 MG/DL (ref 0.5–1.4)
CTP QC/QA: YES
CTP QC/QA: YES
CV ECHO LV RWT: 0.54 CM
D DIMER PPP IA.FEU-MCNC: 3.42 MG/L FEU
DACRYOCYTES BLD QL SMEAR: ABNORMAL
DIFFERENTIAL METHOD: ABNORMAL
DIFFERENTIAL METHOD: ABNORMAL
DOP CALC AO PEAK VEL: 1.82 M/S
DOP CALC AO VTI: 30.57 CM
DOP CALC LVOT AREA: 3.3 CM2
DOP CALC LVOT DIAMETER: 2.06 CM
DOP CALC LVOT PEAK VEL: 1.07 M/S
DOP CALC LVOT STROKE VOLUME: 68.36 CM3
DOP CALCLVOT PEAK VEL VTI: 20.52 CM
E WAVE DECELERATION TIME: 215.38 MSEC
E/A RATIO: 0.86
E/E' RATIO: 13.83 M/S
ECHO LV POSTERIOR WALL: 0.99 CM (ref 0.6–1.1)
EOSINOPHIL # BLD AUTO: 0.1 K/UL (ref 0–0.5)
EOSINOPHIL # BLD AUTO: ABNORMAL K/UL (ref 0–0.5)
EOSINOPHIL NFR BLD: 0 % (ref 0–8)
EOSINOPHIL NFR BLD: 2.5 % (ref 0–8)
ERYTHROCYTE [DISTWIDTH] IN BLOOD BY AUTOMATED COUNT: 14.8 % (ref 11.5–14.5)
ERYTHROCYTE [DISTWIDTH] IN BLOOD BY AUTOMATED COUNT: 15 % (ref 11.5–14.5)
EST. GFR  (AFRICAN AMERICAN): >60 ML/MIN/1.73 M^2
EST. GFR  (AFRICAN AMERICAN): >60 ML/MIN/1.73 M^2
EST. GFR  (NON AFRICAN AMERICAN): 52.4 ML/MIN/1.73 M^2
EST. GFR  (NON AFRICAN AMERICAN): >60 ML/MIN/1.73 M^2
ESTIMATED AVG GLUCOSE: 174 MG/DL (ref 68–131)
FRACTIONAL SHORTENING: 35 % (ref 28–44)
GLUCOSE SERPL-MCNC: 238 MG/DL (ref 70–110)
GLUCOSE SERPL-MCNC: 313 MG/DL (ref 70–110)
GLUCOSE SERPL-MCNC: 327 MG/DL (ref 70–110)
GLUCOSE UR QL STRIP: ABNORMAL
HBA1C MFR BLD HPLC: 7.7 % (ref 4–5.6)
HCT VFR BLD AUTO: 27.1 % (ref 37–48.5)
HCT VFR BLD AUTO: 31.9 % (ref 37–48.5)
HGB BLD-MCNC: 10.3 G/DL (ref 12–16)
HGB BLD-MCNC: 8.9 G/DL (ref 12–16)
HGB UR QL STRIP: NEGATIVE
HYPOCHROMIA BLD QL SMEAR: ABNORMAL
IMM GRANULOCYTES # BLD AUTO: 0.01 K/UL (ref 0–0.04)
IMM GRANULOCYTES # BLD AUTO: ABNORMAL K/UL (ref 0–0.04)
IMM GRANULOCYTES NFR BLD AUTO: 0.5 % (ref 0–0.5)
IMM GRANULOCYTES NFR BLD AUTO: ABNORMAL % (ref 0–0.5)
INR PPP: 1 (ref 0.8–1.2)
INTERVENTRICULAR SEPTUM: 0.91 CM (ref 0.6–1.1)
KETONES UR QL STRIP: NEGATIVE
LA MAJOR: 4.55 CM
LA MINOR: 4.53 CM
LA WIDTH: 3.38 CM
LACTATE SERPL-SCNC: 1.8 MMOL/L (ref 0.5–2.2)
LACTATE SERPL-SCNC: 2.8 MMOL/L (ref 0.5–2.2)
LACTATE SERPL-SCNC: 3.8 MMOL/L (ref 0.5–2.2)
LEFT ATRIUM SIZE: 2.96 CM
LEFT ATRIUM VOLUME INDEX MOD: 20.3 ML/M2
LEFT ATRIUM VOLUME INDEX: 21 ML/M2
LEFT ATRIUM VOLUME MOD: 37.33 CM3
LEFT ATRIUM VOLUME: 38.61 CM3
LEFT INTERNAL DIMENSION IN SYSTOLE: 2.36 CM (ref 2.1–4)
LEFT VENTRICLE DIASTOLIC VOLUME INDEX: 30.63 ML/M2
LEFT VENTRICLE DIASTOLIC VOLUME: 56.33 ML
LEFT VENTRICLE MASS INDEX: 56 G/M2
LEFT VENTRICLE SYSTOLIC VOLUME INDEX: 10.6 ML/M2
LEFT VENTRICLE SYSTOLIC VOLUME: 19.42 ML
LEFT VENTRICULAR INTERNAL DIMENSION IN DIASTOLE: 3.65 CM (ref 3.5–6)
LEFT VENTRICULAR MASS: 102.38 G
LEUKOCYTE ESTERASE UR QL STRIP: NEGATIVE
LV LATERAL E/E' RATIO: 11.86 M/S
LV SEPTAL E/E' RATIO: 16.6 M/S
LYMPHOCYTES # BLD AUTO: 0.9 K/UL (ref 1–4.8)
LYMPHOCYTES # BLD AUTO: ABNORMAL K/UL (ref 1–4.8)
LYMPHOCYTES NFR BLD: 43 % (ref 18–48)
LYMPHOCYTES NFR BLD: 5 % (ref 18–48)
MAGNESIUM SERPL-MCNC: 1.7 MG/DL (ref 1.6–2.6)
MAGNESIUM SERPL-MCNC: 1.7 MG/DL (ref 1.6–2.6)
MCH RBC QN AUTO: 30.7 PG (ref 27–31)
MCH RBC QN AUTO: 30.9 PG (ref 27–31)
MCHC RBC AUTO-ENTMCNC: 32.3 G/DL (ref 32–36)
MCHC RBC AUTO-ENTMCNC: 32.8 G/DL (ref 32–36)
MCV RBC AUTO: 93 FL (ref 82–98)
MCV RBC AUTO: 96 FL (ref 82–98)
METAMYELOCYTES NFR BLD MANUAL: 3 %
MICROSCOPIC COMMENT: NORMAL
MONOCYTES # BLD AUTO: 0.1 K/UL (ref 0.3–1)
MONOCYTES # BLD AUTO: ABNORMAL K/UL (ref 0.3–1)
MONOCYTES NFR BLD: 3.5 % (ref 4–15)
MONOCYTES NFR BLD: 6 % (ref 4–15)
MV A" WAVE DURATION": 7.71 MSEC
MV PEAK A VEL: 0.97 M/S
MV PEAK E VEL: 0.83 M/S
MYELOCYTES NFR BLD MANUAL: 2 %
NEUTROPHILS # BLD AUTO: 1 K/UL (ref 1.8–7.7)
NEUTROPHILS NFR BLD: 48 % (ref 38–73)
NEUTROPHILS NFR BLD: 77 % (ref 38–73)
NEUTS BAND NFR BLD MANUAL: 7 %
NITRITE UR QL STRIP: NEGATIVE
NRBC BLD-RTO: 0 /100 WBC
NRBC BLD-RTO: 0 /100 WBC
OVALOCYTES BLD QL SMEAR: ABNORMAL
PH UR STRIP: 6 [PH] (ref 5–8)
PHOSPHATE SERPL-MCNC: 2.6 MG/DL (ref 2.7–4.5)
PISA TR MAX VEL: 2.88 M/S
PLATELET # BLD AUTO: 129 K/UL (ref 150–350)
PLATELET # BLD AUTO: 148 K/UL (ref 150–350)
PLATELET BLD QL SMEAR: ABNORMAL
PMV BLD AUTO: 10.4 FL (ref 9.2–12.9)
PMV BLD AUTO: 10.6 FL (ref 9.2–12.9)
POC MOLECULAR INFLUENZA A AGN: NEGATIVE
POC MOLECULAR INFLUENZA B AGN: NEGATIVE
POCT GLUCOSE: 193 MG/DL (ref 70–110)
POCT GLUCOSE: 198 MG/DL (ref 70–110)
POCT GLUCOSE: 217 MG/DL (ref 70–110)
POCT GLUCOSE: 238 MG/DL (ref 70–110)
POCT GLUCOSE: 268 MG/DL (ref 70–110)
POCT GLUCOSE: 308 MG/DL (ref 70–110)
POIKILOCYTOSIS BLD QL SMEAR: SLIGHT
POIKILOCYTOSIS BLD QL SMEAR: SLIGHT
POLYCHROMASIA BLD QL SMEAR: ABNORMAL
POTASSIUM SERPL-SCNC: 4.1 MMOL/L (ref 3.5–5.1)
POTASSIUM SERPL-SCNC: 4.2 MMOL/L (ref 3.5–5.1)
PROT SERPL-MCNC: 5.9 G/DL (ref 6–8.4)
PROT SERPL-MCNC: 6.6 G/DL (ref 6–8.4)
PROT UR QL STRIP: NEGATIVE
PROTHROMBIN TIME: 11 SEC (ref 9–12.5)
PULM VEIN S/D RATIO: 2.41
PV PEAK D VEL: 0.27 M/S
PV PEAK S VEL: 0.65 M/S
RA MAJOR: 4.25 CM
RA PRESSURE: 3 MMHG
RA WIDTH: 3.36 CM
RBC # BLD AUTO: 2.9 M/UL (ref 4–5.4)
RBC # BLD AUTO: 3.33 M/UL (ref 4–5.4)
RBC #/AREA URNS AUTO: 1 /HPF (ref 0–4)
RIGHT VENTRICULAR END-DIASTOLIC DIMENSION: 3.48 CM
RV TISSUE DOPPLER FREE WALL SYSTOLIC VELOCITY 1 (APICAL 4 CHAMBER VIEW): 17.94 CM/S
SARS-COV-2 RDRP RESP QL NAA+PROBE: NEGATIVE
SCHISTOCYTES BLD QL SMEAR: ABNORMAL
SCHISTOCYTES BLD QL SMEAR: PRESENT
SINUS: 3.08 CM
SODIUM SERPL-SCNC: 136 MMOL/L (ref 136–145)
SODIUM SERPL-SCNC: 138 MMOL/L (ref 136–145)
SP GR UR STRIP: 1.01 (ref 1–1.03)
SPHEROCYTES BLD QL SMEAR: ABNORMAL
SQUAMOUS #/AREA URNS AUTO: 1 /HPF
STJ: 2.5 CM
TDI LATERAL: 0.07 M/S
TDI SEPTAL: 0.05 M/S
TDI: 0.06 M/S
TR MAX PG: 33 MMHG
TRICUSPID ANNULAR PLANE SYSTOLIC EXCURSION: 1.81 CM
TROPONIN I SERPL DL<=0.01 NG/ML-MCNC: 0.01 NG/ML (ref 0–0.03)
TV REST PULMONARY ARTERY PRESSURE: 36 MMHG
URN SPEC COLLECT METH UR: ABNORMAL
WBC # BLD AUTO: 1.66 K/UL (ref 3.9–12.7)
WBC # BLD AUTO: 2 K/UL (ref 3.9–12.7)
WBC #/AREA URNS AUTO: 1 /HPF (ref 0–5)
YEAST UR QL AUTO: NORMAL

## 2021-01-11 PROCEDURE — 85027 COMPLETE CBC AUTOMATED: CPT | Mod: HCNC

## 2021-01-11 PROCEDURE — 63600175 PHARM REV CODE 636 W HCPCS: Mod: HCNC | Performed by: STUDENT IN AN ORGANIZED HEALTH CARE EDUCATION/TRAINING PROGRAM

## 2021-01-11 PROCEDURE — 81001 URINALYSIS AUTO W/SCOPE: CPT | Mod: HCNC

## 2021-01-11 PROCEDURE — 85007 BL SMEAR W/DIFF WBC COUNT: CPT | Mod: HCNC

## 2021-01-11 PROCEDURE — 25000003 PHARM REV CODE 250: Mod: HCNC | Performed by: STUDENT IN AN ORGANIZED HEALTH CARE EDUCATION/TRAINING PROGRAM

## 2021-01-11 PROCEDURE — C9399 UNCLASSIFIED DRUGS OR BIOLOG: HCPCS | Mod: HCNC | Performed by: STUDENT IN AN ORGANIZED HEALTH CARE EDUCATION/TRAINING PROGRAM

## 2021-01-11 PROCEDURE — 83605 ASSAY OF LACTIC ACID: CPT | Mod: HCNC

## 2021-01-11 PROCEDURE — 83036 HEMOGLOBIN GLYCOSYLATED A1C: CPT | Mod: HCNC

## 2021-01-11 PROCEDURE — 84100 ASSAY OF PHOSPHORUS: CPT | Mod: HCNC

## 2021-01-11 PROCEDURE — 85379 FIBRIN DEGRADATION QUANT: CPT | Mod: HCNC

## 2021-01-11 PROCEDURE — 83735 ASSAY OF MAGNESIUM: CPT | Mod: HCNC

## 2021-01-11 PROCEDURE — 80053 COMPREHEN METABOLIC PANEL: CPT | Mod: HCNC

## 2021-01-11 PROCEDURE — 25000003 PHARM REV CODE 250: Mod: HCNC | Performed by: INTERNAL MEDICINE

## 2021-01-11 PROCEDURE — 83605 ASSAY OF LACTIC ACID: CPT | Mod: 91,HCNC

## 2021-01-11 PROCEDURE — 85610 PROTHROMBIN TIME: CPT | Mod: HCNC

## 2021-01-11 PROCEDURE — 63600175 PHARM REV CODE 636 W HCPCS: Mod: HCNC | Performed by: INTERNAL MEDICINE

## 2021-01-11 PROCEDURE — 99223 1ST HOSP IP/OBS HIGH 75: CPT | Mod: AI,HCNC,GC, | Performed by: INTERNAL MEDICINE

## 2021-01-11 PROCEDURE — 20600001 HC STEP DOWN PRIVATE ROOM: Mod: HCNC

## 2021-01-11 PROCEDURE — 99223 PR INITIAL HOSPITAL CARE,LEVL III: ICD-10-PCS | Mod: AI,HCNC,GC, | Performed by: INTERNAL MEDICINE

## 2021-01-11 PROCEDURE — 85730 THROMBOPLASTIN TIME PARTIAL: CPT | Mod: HCNC

## 2021-01-11 RX ORDER — GLUCAGON 1 MG
1 KIT INJECTION
Status: DISCONTINUED | OUTPATIENT
Start: 2021-01-11 | End: 2021-01-12 | Stop reason: HOSPADM

## 2021-01-11 RX ORDER — COLCHICINE 0.6 MG/1
0.6 TABLET, FILM COATED ORAL 2 TIMES DAILY
Status: DISCONTINUED | OUTPATIENT
Start: 2021-01-11 | End: 2021-01-11

## 2021-01-11 RX ORDER — SODIUM,POTASSIUM PHOSPHATES 280-250MG
2 POWDER IN PACKET (EA) ORAL ONCE
Status: COMPLETED | OUTPATIENT
Start: 2021-01-11 | End: 2021-01-11

## 2021-01-11 RX ORDER — ONDANSETRON 8 MG/1
8 TABLET, ORALLY DISINTEGRATING ORAL EVERY 6 HOURS PRN
Status: DISCONTINUED | OUTPATIENT
Start: 2021-01-11 | End: 2021-01-12 | Stop reason: HOSPADM

## 2021-01-11 RX ORDER — SODIUM CHLORIDE 0.9 % (FLUSH) 0.9 %
10 SYRINGE (ML) INJECTION
Status: DISCONTINUED | OUTPATIENT
Start: 2021-01-11 | End: 2021-01-12 | Stop reason: HOSPADM

## 2021-01-11 RX ORDER — POLYETHYLENE GLYCOL 3350 17 G/17G
17 POWDER, FOR SOLUTION ORAL
Status: COMPLETED | OUTPATIENT
Start: 2021-01-11 | End: 2021-01-11

## 2021-01-11 RX ORDER — ACETAMINOPHEN 325 MG/1
650 TABLET ORAL EVERY 4 HOURS PRN
Status: DISCONTINUED | OUTPATIENT
Start: 2021-01-11 | End: 2021-01-12

## 2021-01-11 RX ORDER — HEPARIN 100 UNIT/ML
300 SYRINGE INTRAVENOUS
Status: DISCONTINUED | OUTPATIENT
Start: 2021-01-11 | End: 2021-01-12 | Stop reason: HOSPADM

## 2021-01-11 RX ORDER — IBUPROFEN 200 MG
24 TABLET ORAL
Status: DISCONTINUED | OUTPATIENT
Start: 2021-01-11 | End: 2021-01-12 | Stop reason: HOSPADM

## 2021-01-11 RX ORDER — POLYETHYLENE GLYCOL 3350 17 G/17G
17 POWDER, FOR SOLUTION ORAL DAILY
Status: DISCONTINUED | OUTPATIENT
Start: 2021-01-11 | End: 2021-01-12 | Stop reason: HOSPADM

## 2021-01-11 RX ORDER — INSULIN ASPART 100 [IU]/ML
3 INJECTION, SOLUTION INTRAVENOUS; SUBCUTANEOUS
Status: COMPLETED | OUTPATIENT
Start: 2021-01-11 | End: 2021-01-11

## 2021-01-11 RX ORDER — ENOXAPARIN SODIUM 100 MG/ML
40 INJECTION SUBCUTANEOUS EVERY 24 HOURS
Status: DISCONTINUED | OUTPATIENT
Start: 2021-01-11 | End: 2021-01-12 | Stop reason: HOSPADM

## 2021-01-11 RX ORDER — SENNOSIDES 8.6 MG/1
8.6 TABLET ORAL
Status: COMPLETED | OUTPATIENT
Start: 2021-01-11 | End: 2021-01-11

## 2021-01-11 RX ORDER — INSULIN ASPART 100 [IU]/ML
5 INJECTION, SOLUTION INTRAVENOUS; SUBCUTANEOUS
Status: DISCONTINUED | OUTPATIENT
Start: 2021-01-11 | End: 2021-01-12 | Stop reason: HOSPADM

## 2021-01-11 RX ORDER — CANDESARTAN 8 MG/1
8 TABLET ORAL DAILY
Status: DISCONTINUED | OUTPATIENT
Start: 2021-01-11 | End: 2021-01-12 | Stop reason: HOSPADM

## 2021-01-11 RX ORDER — IBUPROFEN 200 MG
16 TABLET ORAL
Status: DISCONTINUED | OUTPATIENT
Start: 2021-01-11 | End: 2021-01-12 | Stop reason: HOSPADM

## 2021-01-11 RX ORDER — ONDANSETRON 2 MG/ML
4 INJECTION INTRAMUSCULAR; INTRAVENOUS
Status: COMPLETED | OUTPATIENT
Start: 2021-01-11 | End: 2021-01-11

## 2021-01-11 RX ORDER — INSULIN ASPART 100 [IU]/ML
1-10 INJECTION, SOLUTION INTRAVENOUS; SUBCUTANEOUS
Status: DISCONTINUED | OUTPATIENT
Start: 2021-01-11 | End: 2021-01-12 | Stop reason: HOSPADM

## 2021-01-11 RX ORDER — CEFEPIME HYDROCHLORIDE 2 G/1
2 INJECTION, POWDER, FOR SOLUTION INTRAVENOUS
Status: DISCONTINUED | OUTPATIENT
Start: 2021-01-11 | End: 2021-01-11

## 2021-01-11 RX ORDER — CEFEPIME HYDROCHLORIDE 2 G/1
2 INJECTION, POWDER, FOR SOLUTION INTRAVENOUS
Status: DISCONTINUED | OUTPATIENT
Start: 2021-01-11 | End: 2021-01-12 | Stop reason: HOSPADM

## 2021-01-11 RX ORDER — OLANZAPINE 5 MG/1
5 TABLET ORAL 2 TIMES DAILY
Status: DISCONTINUED | OUTPATIENT
Start: 2021-01-11 | End: 2021-01-11

## 2021-01-11 RX ORDER — OLANZAPINE 2.5 MG/1
5 TABLET ORAL 2 TIMES DAILY PRN
Status: DISCONTINUED | OUTPATIENT
Start: 2021-01-11 | End: 2021-01-12 | Stop reason: HOSPADM

## 2021-01-11 RX ORDER — GLYCERIN 1 G/1
1 SUPPOSITORY RECTAL ONCE
Status: DISCONTINUED | OUTPATIENT
Start: 2021-01-11 | End: 2021-01-11

## 2021-01-11 RX ADMIN — INSULIN DETEMIR 20 UNITS: 100 INJECTION, SOLUTION SUBCUTANEOUS at 03:01

## 2021-01-11 RX ADMIN — INSULIN ASPART 2 UNITS: 100 INJECTION, SOLUTION INTRAVENOUS; SUBCUTANEOUS at 05:01

## 2021-01-11 RX ADMIN — CEFEPIME 2 G: 2 INJECTION, POWDER, FOR SOLUTION INTRAVENOUS at 06:01

## 2021-01-11 RX ADMIN — INSULIN ASPART 6 UNITS: 100 INJECTION, SOLUTION INTRAVENOUS; SUBCUTANEOUS at 09:01

## 2021-01-11 RX ADMIN — ONDANSETRON 4 MG: 2 INJECTION INTRAMUSCULAR; INTRAVENOUS at 02:01

## 2021-01-11 RX ADMIN — INSULIN ASPART 2 UNITS: 100 INJECTION, SOLUTION INTRAVENOUS; SUBCUTANEOUS at 08:01

## 2021-01-11 RX ADMIN — CEFEPIME 2 G: 2 INJECTION, POWDER, FOR SOLUTION INTRAVENOUS at 03:01

## 2021-01-11 RX ADMIN — CANDESARTAN CILEXETIL 8 MG: 8 TABLET ORAL at 11:01

## 2021-01-11 RX ADMIN — POLYETHYLENE GLYCOL 3350 17 G: 17 POWDER, FOR SOLUTION ORAL at 01:01

## 2021-01-11 RX ADMIN — CEFEPIME 2 G: 2 INJECTION, POWDER, FOR SOLUTION INTRAVENOUS at 11:01

## 2021-01-11 RX ADMIN — SENNOSIDES 8.6 MG: 8.6 TABLET, FILM COATED ORAL at 01:01

## 2021-01-11 RX ADMIN — INSULIN ASPART 5 UNITS: 100 INJECTION, SOLUTION INTRAVENOUS; SUBCUTANEOUS at 12:01

## 2021-01-11 RX ADMIN — ENOXAPARIN SODIUM 40 MG: 40 INJECTION SUBCUTANEOUS at 04:01

## 2021-01-11 RX ADMIN — ACETAMINOPHEN 650 MG: 325 TABLET ORAL at 08:01

## 2021-01-11 RX ADMIN — INSULIN DETEMIR 20 UNITS: 100 INJECTION, SOLUTION SUBCUTANEOUS at 08:01

## 2021-01-11 RX ADMIN — SODIUM CHLORIDE, SODIUM LACTATE, POTASSIUM CHLORIDE, AND CALCIUM CHLORIDE 1000 ML: .6; .31; .03; .02 INJECTION, SOLUTION INTRAVENOUS at 02:01

## 2021-01-11 RX ADMIN — INSULIN ASPART 5 UNITS: 100 INJECTION, SOLUTION INTRAVENOUS; SUBCUTANEOUS at 05:01

## 2021-01-11 RX ADMIN — INSULIN ASPART 2 UNITS: 100 INJECTION, SOLUTION INTRAVENOUS; SUBCUTANEOUS at 12:01

## 2021-01-11 RX ADMIN — INSULIN ASPART 3 UNITS: 100 INJECTION, SOLUTION INTRAVENOUS; SUBCUTANEOUS at 01:01

## 2021-01-11 RX ADMIN — POTASSIUM & SODIUM PHOSPHATES POWDER PACK 280-160-250 MG 2 PACKET: 280-160-250 PACK at 04:01

## 2021-01-11 RX ADMIN — VANCOMYCIN HYDROCHLORIDE 2000 MG: 100 INJECTION, POWDER, LYOPHILIZED, FOR SOLUTION INTRAVENOUS at 09:01

## 2021-01-12 VITALS
DIASTOLIC BLOOD PRESSURE: 55 MMHG | SYSTOLIC BLOOD PRESSURE: 113 MMHG | RESPIRATION RATE: 20 BRPM | WEIGHT: 173 LBS | OXYGEN SATURATION: 100 % | TEMPERATURE: 98 F | HEART RATE: 116 BPM | HEIGHT: 64 IN | BODY MASS INDEX: 29.53 KG/M2

## 2021-01-12 LAB
ALBUMIN SERPL BCP-MCNC: 2.7 G/DL (ref 3.5–5.2)
ALP SERPL-CCNC: 86 U/L (ref 55–135)
ALT SERPL W/O P-5'-P-CCNC: 13 U/L (ref 10–44)
ANION GAP SERPL CALC-SCNC: 5 MMOL/L (ref 8–16)
AST SERPL-CCNC: 14 U/L (ref 10–40)
BASOPHILS # BLD AUTO: 0.02 K/UL (ref 0–0.2)
BASOPHILS NFR BLD: 1.3 % (ref 0–1.9)
BILIRUB SERPL-MCNC: 0.3 MG/DL (ref 0.1–1)
BUN SERPL-MCNC: 14 MG/DL (ref 8–23)
CALCIUM SERPL-MCNC: 8.1 MG/DL (ref 8.7–10.5)
CHLORIDE SERPL-SCNC: 107 MMOL/L (ref 95–110)
CO2 SERPL-SCNC: 26 MMOL/L (ref 23–29)
CREAT SERPL-MCNC: 0.9 MG/DL (ref 0.5–1.4)
DIFFERENTIAL METHOD: ABNORMAL
EOSINOPHIL # BLD AUTO: 0 K/UL (ref 0–0.5)
EOSINOPHIL NFR BLD: 1.3 % (ref 0–8)
ERYTHROCYTE [DISTWIDTH] IN BLOOD BY AUTOMATED COUNT: 14.7 % (ref 11.5–14.5)
EST. GFR  (AFRICAN AMERICAN): >60 ML/MIN/1.73 M^2
EST. GFR  (NON AFRICAN AMERICAN): >60 ML/MIN/1.73 M^2
GLUCOSE SERPL-MCNC: 177 MG/DL (ref 70–110)
HCT VFR BLD AUTO: 25.7 % (ref 37–48.5)
HGB BLD-MCNC: 8.3 G/DL (ref 12–16)
IMM GRANULOCYTES # BLD AUTO: 0.01 K/UL (ref 0–0.04)
IMM GRANULOCYTES NFR BLD AUTO: 0.6 % (ref 0–0.5)
LYMPHOCYTES # BLD AUTO: 1 K/UL (ref 1–4.8)
LYMPHOCYTES NFR BLD: 59.4 % (ref 18–48)
MAGNESIUM SERPL-MCNC: 1.5 MG/DL (ref 1.6–2.6)
MCH RBC QN AUTO: 30.3 PG (ref 27–31)
MCHC RBC AUTO-ENTMCNC: 32.3 G/DL (ref 32–36)
MCV RBC AUTO: 94 FL (ref 82–98)
MONOCYTES # BLD AUTO: 0.2 K/UL (ref 0.3–1)
MONOCYTES NFR BLD: 14.4 % (ref 4–15)
NEUTROPHILS # BLD AUTO: 0.4 K/UL (ref 1.8–7.7)
NEUTROPHILS NFR BLD: 23 % (ref 38–73)
NRBC BLD-RTO: 0 /100 WBC
PHOSPHATE SERPL-MCNC: 3.3 MG/DL (ref 2.7–4.5)
PLATELET # BLD AUTO: 117 K/UL (ref 150–350)
PMV BLD AUTO: 10.6 FL (ref 9.2–12.9)
POCT GLUCOSE: 166 MG/DL (ref 70–110)
POCT GLUCOSE: 174 MG/DL (ref 70–110)
POTASSIUM SERPL-SCNC: 4.2 MMOL/L (ref 3.5–5.1)
PROT SERPL-MCNC: 5.7 G/DL (ref 6–8.4)
RBC # BLD AUTO: 2.74 M/UL (ref 4–5.4)
SODIUM SERPL-SCNC: 138 MMOL/L (ref 136–145)
WBC # BLD AUTO: 1.6 K/UL (ref 3.9–12.7)

## 2021-01-12 PROCEDURE — 63600175 PHARM REV CODE 636 W HCPCS: Mod: HCNC | Performed by: STUDENT IN AN ORGANIZED HEALTH CARE EDUCATION/TRAINING PROGRAM

## 2021-01-12 PROCEDURE — 99239 PR HOSPITAL DISCHARGE DAY,>30 MIN: ICD-10-PCS | Mod: HCNC,GC,, | Performed by: INTERNAL MEDICINE

## 2021-01-12 PROCEDURE — 63600175 PHARM REV CODE 636 W HCPCS: Mod: HCNC | Performed by: INTERNAL MEDICINE

## 2021-01-12 PROCEDURE — 25000003 PHARM REV CODE 250: Mod: HCNC | Performed by: STUDENT IN AN ORGANIZED HEALTH CARE EDUCATION/TRAINING PROGRAM

## 2021-01-12 PROCEDURE — 97161 PT EVAL LOW COMPLEX 20 MIN: CPT | Mod: HCNC

## 2021-01-12 PROCEDURE — 97165 OT EVAL LOW COMPLEX 30 MIN: CPT | Mod: HCNC

## 2021-01-12 PROCEDURE — 85025 COMPLETE CBC W/AUTO DIFF WBC: CPT | Mod: HCNC

## 2021-01-12 PROCEDURE — 80053 COMPREHEN METABOLIC PANEL: CPT | Mod: HCNC

## 2021-01-12 PROCEDURE — 97530 THERAPEUTIC ACTIVITIES: CPT | Mod: HCNC

## 2021-01-12 PROCEDURE — 99239 HOSP IP/OBS DSCHRG MGMT >30: CPT | Mod: HCNC,GC,, | Performed by: INTERNAL MEDICINE

## 2021-01-12 PROCEDURE — 97535 SELF CARE MNGMENT TRAINING: CPT | Mod: HCNC

## 2021-01-12 PROCEDURE — 83735 ASSAY OF MAGNESIUM: CPT | Mod: HCNC

## 2021-01-12 PROCEDURE — 84100 ASSAY OF PHOSPHORUS: CPT | Mod: HCNC

## 2021-01-12 RX ORDER — ACETAMINOPHEN 325 MG/1
650 TABLET ORAL EVERY 4 HOURS PRN
Status: DISCONTINUED | OUTPATIENT
Start: 2021-01-12 | End: 2021-01-12 | Stop reason: HOSPADM

## 2021-01-12 RX ORDER — SULFAMETHOXAZOLE AND TRIMETHOPRIM 800; 160 MG/1; MG/1
1 TABLET ORAL 2 TIMES DAILY
Qty: 14 TABLET | Refills: 0 | Status: SHIPPED | OUTPATIENT
Start: 2021-01-12 | End: 2021-01-12 | Stop reason: SDUPTHER

## 2021-01-12 RX ORDER — MAGNESIUM SULFATE HEPTAHYDRATE 40 MG/ML
2 INJECTION, SOLUTION INTRAVENOUS ONCE
Status: COMPLETED | OUTPATIENT
Start: 2021-01-12 | End: 2021-01-12

## 2021-01-12 RX ORDER — SULFAMETHOXAZOLE AND TRIMETHOPRIM 800; 160 MG/1; MG/1
1 TABLET ORAL 2 TIMES DAILY
Qty: 14 TABLET | Refills: 0 | Status: SHIPPED | OUTPATIENT
Start: 2021-01-12 | End: 2021-01-19

## 2021-01-12 RX ADMIN — INSULIN ASPART 2 UNITS: 100 INJECTION, SOLUTION INTRAVENOUS; SUBCUTANEOUS at 08:01

## 2021-01-12 RX ADMIN — INSULIN ASPART 2 UNITS: 100 INJECTION, SOLUTION INTRAVENOUS; SUBCUTANEOUS at 12:01

## 2021-01-12 RX ADMIN — VANCOMYCIN HYDROCHLORIDE 1250 MG: 1.25 INJECTION, POWDER, LYOPHILIZED, FOR SOLUTION INTRAVENOUS at 10:01

## 2021-01-12 RX ADMIN — INSULIN ASPART 5 UNITS: 100 INJECTION, SOLUTION INTRAVENOUS; SUBCUTANEOUS at 08:01

## 2021-01-12 RX ADMIN — MAGNESIUM SULFATE IN WATER 2 G: 40 INJECTION, SOLUTION INTRAVENOUS at 07:01

## 2021-01-12 RX ADMIN — CEFEPIME 2 G: 2 INJECTION, POWDER, FOR SOLUTION INTRAVENOUS at 03:01

## 2021-01-12 RX ADMIN — CANDESARTAN CILEXETIL 8 MG: 8 TABLET ORAL at 08:01

## 2021-01-12 RX ADMIN — CEFEPIME 2 G: 2 INJECTION, POWDER, FOR SOLUTION INTRAVENOUS at 10:01

## 2021-01-12 RX ADMIN — INSULIN ASPART 5 UNITS: 100 INJECTION, SOLUTION INTRAVENOUS; SUBCUTANEOUS at 12:01

## 2021-01-12 RX ADMIN — HEPARIN 300 UNITS: 100 SYRINGE at 04:01

## 2021-01-13 ENCOUNTER — OFFICE VISIT (OUTPATIENT)
Dept: PSYCHIATRY | Facility: CLINIC | Age: 66
End: 2021-01-13
Payer: MEDICARE

## 2021-01-13 DIAGNOSIS — C50.312 MALIGNANT NEOPLASM OF LOWER-INNER QUADRANT OF LEFT BREAST IN FEMALE, ESTROGEN RECEPTOR POSITIVE: ICD-10-CM

## 2021-01-13 DIAGNOSIS — Z17.0 MALIGNANT NEOPLASM OF LOWER-INNER QUADRANT OF LEFT BREAST IN FEMALE, ESTROGEN RECEPTOR POSITIVE: ICD-10-CM

## 2021-01-13 DIAGNOSIS — F43.20 ADJUSTMENT DISORDER, UNSPECIFIED TYPE: Primary | ICD-10-CM

## 2021-01-13 PROBLEM — R06.02 SHORTNESS OF BREATH: Status: RESOLVED | Noted: 2021-01-11 | Resolved: 2021-01-13

## 2021-01-13 PROBLEM — Z17.1 MALIGNANT NEOPLASM OF LOWER-INNER QUADRANT OF LEFT BREAST IN FEMALE, ESTROGEN RECEPTOR NEGATIVE: Status: RESOLVED | Noted: 2020-11-18 | Resolved: 2021-01-13

## 2021-01-13 PROCEDURE — 90834 PR PSYCHOTHERAPY W/PATIENT, 45 MIN: ICD-10-PCS | Mod: S$GLB,,, | Performed by: PSYCHOLOGIST

## 2021-01-13 PROCEDURE — 90834 PSYTX W PT 45 MINUTES: CPT | Mod: S$GLB,,, | Performed by: PSYCHOLOGIST

## 2021-01-13 PROCEDURE — 99999 PR PBB SHADOW E&M-EST. PATIENT-LVL I: ICD-10-PCS | Mod: PBBFAC,,, | Performed by: PSYCHOLOGIST

## 2021-01-13 PROCEDURE — 99999 PR PBB SHADOW E&M-EST. PATIENT-LVL I: CPT | Mod: PBBFAC,,, | Performed by: PSYCHOLOGIST

## 2021-01-14 ENCOUNTER — DOCUMENTATION ONLY (OUTPATIENT)
Dept: HEMATOLOGY/ONCOLOGY | Facility: CLINIC | Age: 66
End: 2021-01-14

## 2021-01-15 ENCOUNTER — OFFICE VISIT (OUTPATIENT)
Dept: HEMATOLOGY/ONCOLOGY | Facility: CLINIC | Age: 66
End: 2021-01-15
Payer: MEDICARE

## 2021-01-15 ENCOUNTER — LAB VISIT (OUTPATIENT)
Dept: LAB | Facility: HOSPITAL | Age: 66
End: 2021-01-15
Attending: INTERNAL MEDICINE
Payer: MEDICARE

## 2021-01-15 VITALS
RESPIRATION RATE: 16 BRPM | TEMPERATURE: 98 F | HEART RATE: 106 BPM | DIASTOLIC BLOOD PRESSURE: 64 MMHG | BODY MASS INDEX: 32.78 KG/M2 | HEIGHT: 64 IN | OXYGEN SATURATION: 95 % | SYSTOLIC BLOOD PRESSURE: 123 MMHG | WEIGHT: 192 LBS

## 2021-01-15 DIAGNOSIS — Z17.0 MALIGNANT NEOPLASM OF LOWER-INNER QUADRANT OF LEFT BREAST IN FEMALE, ESTROGEN RECEPTOR POSITIVE: ICD-10-CM

## 2021-01-15 DIAGNOSIS — C50.312 MALIGNANT NEOPLASM OF LOWER-INNER QUADRANT OF LEFT BREAST IN FEMALE, ESTROGEN RECEPTOR POSITIVE: ICD-10-CM

## 2021-01-15 DIAGNOSIS — C50.312 MALIGNANT NEOPLASM OF LOWER-INNER QUADRANT OF LEFT BREAST IN FEMALE, ESTROGEN RECEPTOR POSITIVE: Primary | ICD-10-CM

## 2021-01-15 DIAGNOSIS — F41.9 ANXIETY: ICD-10-CM

## 2021-01-15 DIAGNOSIS — Z17.0 MALIGNANT NEOPLASM OF LOWER-INNER QUADRANT OF LEFT BREAST IN FEMALE, ESTROGEN RECEPTOR POSITIVE: Primary | ICD-10-CM

## 2021-01-15 LAB
ALBUMIN SERPL BCP-MCNC: 3.2 G/DL (ref 3.5–5.2)
ALP SERPL-CCNC: 95 U/L (ref 55–135)
ALT SERPL W/O P-5'-P-CCNC: 16 U/L (ref 10–44)
ANION GAP SERPL CALC-SCNC: 8 MMOL/L (ref 8–16)
ANISOCYTOSIS BLD QL SMEAR: SLIGHT
AST SERPL-CCNC: 21 U/L (ref 10–40)
BASOPHILS # BLD AUTO: ABNORMAL K/UL (ref 0–0.2)
BASOPHILS NFR BLD: 0 % (ref 0–1.9)
BILIRUB SERPL-MCNC: 0.2 MG/DL (ref 0.1–1)
BUN SERPL-MCNC: 12 MG/DL (ref 8–23)
CALCIUM SERPL-MCNC: 9.4 MG/DL (ref 8.7–10.5)
CHLORIDE SERPL-SCNC: 107 MMOL/L (ref 95–110)
CO2 SERPL-SCNC: 26 MMOL/L (ref 23–29)
CREAT SERPL-MCNC: 1.1 MG/DL (ref 0.5–1.4)
DACRYOCYTES BLD QL SMEAR: ABNORMAL
DIFFERENTIAL METHOD: ABNORMAL
DOHLE BOD BLD QL SMEAR: PRESENT
EOSINOPHIL # BLD AUTO: ABNORMAL K/UL (ref 0–0.5)
EOSINOPHIL NFR BLD: 0 % (ref 0–8)
ERYTHROCYTE [DISTWIDTH] IN BLOOD BY AUTOMATED COUNT: 15.9 % (ref 11.5–14.5)
EST. GFR  (AFRICAN AMERICAN): >60 ML/MIN/1.73 M^2
EST. GFR  (NON AFRICAN AMERICAN): 52.4 ML/MIN/1.73 M^2
GIANT PLATELETS BLD QL SMEAR: PRESENT
GLUCOSE SERPL-MCNC: 83 MG/DL (ref 70–110)
HCT VFR BLD AUTO: 29.7 % (ref 37–48.5)
HGB BLD-MCNC: 9.4 G/DL (ref 12–16)
HYPOCHROMIA BLD QL SMEAR: ABNORMAL
IMM GRANULOCYTES # BLD AUTO: ABNORMAL K/UL (ref 0–0.04)
IMM GRANULOCYTES NFR BLD AUTO: ABNORMAL % (ref 0–0.5)
LYMPHOCYTES # BLD AUTO: ABNORMAL K/UL (ref 1–4.8)
LYMPHOCYTES NFR BLD: 28 % (ref 18–48)
MCH RBC QN AUTO: 30.2 PG (ref 27–31)
MCHC RBC AUTO-ENTMCNC: 31.6 G/DL (ref 32–36)
MCV RBC AUTO: 96 FL (ref 82–98)
MONOCYTES # BLD AUTO: ABNORMAL K/UL (ref 0.3–1)
MONOCYTES NFR BLD: 10 % (ref 4–15)
MYELOCYTES NFR BLD MANUAL: 2 %
NEUTROPHILS # BLD AUTO: ABNORMAL K/UL (ref 1.8–7.7)
NEUTROPHILS NFR BLD: 56 % (ref 38–73)
NEUTS BAND NFR BLD MANUAL: 4 %
NRBC BLD-RTO: 1 /100 WBC
OVALOCYTES BLD QL SMEAR: ABNORMAL
PLATELET # BLD AUTO: 170 K/UL (ref 150–350)
PLATELET BLD QL SMEAR: ABNORMAL
PMV BLD AUTO: 10.4 FL (ref 9.2–12.9)
POIKILOCYTOSIS BLD QL SMEAR: SLIGHT
POLYCHROMASIA BLD QL SMEAR: ABNORMAL
POTASSIUM SERPL-SCNC: 4.5 MMOL/L (ref 3.5–5.1)
PROT SERPL-MCNC: 6.4 G/DL (ref 6–8.4)
RBC # BLD AUTO: 3.11 M/UL (ref 4–5.4)
SCHISTOCYTES BLD QL SMEAR: ABNORMAL
SODIUM SERPL-SCNC: 141 MMOL/L (ref 136–145)
SPHEROCYTES BLD QL SMEAR: ABNORMAL
TOXIC GRANULES BLD QL SMEAR: PRESENT
WBC # BLD AUTO: 10.66 K/UL (ref 3.9–12.7)

## 2021-01-15 PROCEDURE — 85027 COMPLETE CBC AUTOMATED: CPT

## 2021-01-15 PROCEDURE — 85007 BL SMEAR W/DIFF WBC COUNT: CPT

## 2021-01-15 PROCEDURE — 3288F FALL RISK ASSESSMENT DOCD: CPT | Mod: CPTII,S$GLB,, | Performed by: NURSE PRACTITIONER

## 2021-01-15 PROCEDURE — 1159F PR MEDICATION LIST DOCUMENTED IN MEDICAL RECORD: ICD-10-PCS | Mod: S$GLB,,, | Performed by: NURSE PRACTITIONER

## 2021-01-15 PROCEDURE — 3078F DIAST BP <80 MM HG: CPT | Mod: CPTII,S$GLB,, | Performed by: NURSE PRACTITIONER

## 2021-01-15 PROCEDURE — 1126F PR PAIN SEVERITY QUANTIFIED, NO PAIN PRESENT: ICD-10-PCS | Mod: S$GLB,,, | Performed by: NURSE PRACTITIONER

## 2021-01-15 PROCEDURE — 99214 PR OFFICE/OUTPT VISIT, EST, LEVL IV, 30-39 MIN: ICD-10-PCS | Mod: S$GLB,,, | Performed by: NURSE PRACTITIONER

## 2021-01-15 PROCEDURE — 3288F PR FALLS RISK ASSESSMENT DOCUMENTED: ICD-10-PCS | Mod: CPTII,S$GLB,, | Performed by: NURSE PRACTITIONER

## 2021-01-15 PROCEDURE — 99214 OFFICE O/P EST MOD 30 MIN: CPT | Mod: S$GLB,,, | Performed by: NURSE PRACTITIONER

## 2021-01-15 PROCEDURE — 3078F PR MOST RECENT DIASTOLIC BLOOD PRESSURE < 80 MM HG: ICD-10-PCS | Mod: CPTII,S$GLB,, | Performed by: NURSE PRACTITIONER

## 2021-01-15 PROCEDURE — 99999 PR PBB SHADOW E&M-EST. PATIENT-LVL V: CPT | Mod: PBBFAC,,, | Performed by: NURSE PRACTITIONER

## 2021-01-15 PROCEDURE — 99999 PR PBB SHADOW E&M-EST. PATIENT-LVL V: ICD-10-PCS | Mod: PBBFAC,,, | Performed by: NURSE PRACTITIONER

## 2021-01-15 PROCEDURE — 36415 COLL VENOUS BLD VENIPUNCTURE: CPT

## 2021-01-15 PROCEDURE — 1101F PT FALLS ASSESS-DOCD LE1/YR: CPT | Mod: CPTII,S$GLB,, | Performed by: NURSE PRACTITIONER

## 2021-01-15 PROCEDURE — 1126F AMNT PAIN NOTED NONE PRSNT: CPT | Mod: S$GLB,,, | Performed by: NURSE PRACTITIONER

## 2021-01-15 PROCEDURE — 3074F PR MOST RECENT SYSTOLIC BLOOD PRESSURE < 130 MM HG: ICD-10-PCS | Mod: CPTII,S$GLB,, | Performed by: NURSE PRACTITIONER

## 2021-01-15 PROCEDURE — 80053 COMPREHEN METABOLIC PANEL: CPT

## 2021-01-15 PROCEDURE — 1101F PR PT FALLS ASSESS DOC 0-1 FALLS W/OUT INJ PAST YR: ICD-10-PCS | Mod: CPTII,S$GLB,, | Performed by: NURSE PRACTITIONER

## 2021-01-15 PROCEDURE — 1159F MED LIST DOCD IN RCRD: CPT | Mod: S$GLB,,, | Performed by: NURSE PRACTITIONER

## 2021-01-15 PROCEDURE — 3074F SYST BP LT 130 MM HG: CPT | Mod: CPTII,S$GLB,, | Performed by: NURSE PRACTITIONER

## 2021-01-15 PROCEDURE — 3008F PR BODY MASS INDEX (BMI) DOCUMENTED: ICD-10-PCS | Mod: CPTII,S$GLB,, | Performed by: NURSE PRACTITIONER

## 2021-01-15 PROCEDURE — 3008F BODY MASS INDEX DOCD: CPT | Mod: CPTII,S$GLB,, | Performed by: NURSE PRACTITIONER

## 2021-01-15 RX ORDER — ALPRAZOLAM 0.25 MG/1
0.25 TABLET ORAL 3 TIMES DAILY PRN
Qty: 30 TABLET | Refills: 0 | Status: SHIPPED | OUTPATIENT
Start: 2021-01-15 | End: 2022-04-28

## 2021-01-16 LAB
BACTERIA BLD CULT: NORMAL
BACTERIA BLD CULT: NORMAL

## 2021-01-22 ENCOUNTER — NURSE TRIAGE (OUTPATIENT)
Dept: ADMINISTRATIVE | Facility: CLINIC | Age: 66
End: 2021-01-22

## 2021-01-23 ENCOUNTER — HOSPITAL ENCOUNTER (EMERGENCY)
Facility: HOSPITAL | Age: 66
Discharge: HOME | End: 2021-01-23
Attending: EMERGENCY MEDICINE
Payer: MEDICARE

## 2021-01-23 VITALS
DIASTOLIC BLOOD PRESSURE: 63 MMHG | OXYGEN SATURATION: 99 % | BODY MASS INDEX: 32.78 KG/M2 | WEIGHT: 192 LBS | HEIGHT: 64 IN | HEART RATE: 93 BPM | SYSTOLIC BLOOD PRESSURE: 113 MMHG | RESPIRATION RATE: 16 BRPM | TEMPERATURE: 99 F

## 2021-01-23 DIAGNOSIS — Z20.822 CLOSE EXPOSURE TO COVID-19 VIRUS: Primary | ICD-10-CM

## 2021-01-23 DIAGNOSIS — Z20.822 LAB TEST NEGATIVE FOR COVID-19 VIRUS: ICD-10-CM

## 2021-01-23 LAB
CTP QC/QA: YES
SARS-COV-2 RDRP RESP QL NAA+PROBE: NEGATIVE

## 2021-01-23 PROCEDURE — 99284 EMERGENCY DEPT VISIT MOD MDM: CPT | Mod: CS,,, | Performed by: PHYSICIAN ASSISTANT

## 2021-01-23 PROCEDURE — 99282 EMERGENCY DEPT VISIT SF MDM: CPT

## 2021-01-23 PROCEDURE — U0002 COVID-19 LAB TEST NON-CDC: HCPCS | Performed by: EMERGENCY MEDICINE

## 2021-01-23 PROCEDURE — 99284 PR EMERGENCY DEPT VISIT,LEVEL IV: ICD-10-PCS | Mod: CS,,, | Performed by: PHYSICIAN ASSISTANT

## 2021-01-25 ENCOUNTER — TELEPHONE (OUTPATIENT)
Dept: HEMATOLOGY/ONCOLOGY | Facility: CLINIC | Age: 66
End: 2021-01-25

## 2021-01-26 ENCOUNTER — INFUSION (OUTPATIENT)
Dept: INFUSION THERAPY | Facility: HOSPITAL | Age: 66
End: 2021-01-26
Payer: MEDICARE

## 2021-01-26 ENCOUNTER — PATIENT OUTREACH (OUTPATIENT)
Dept: EMERGENCY MEDICINE | Facility: HOSPITAL | Age: 66
End: 2021-01-26

## 2021-01-26 ENCOUNTER — OFFICE VISIT (OUTPATIENT)
Dept: HEMATOLOGY/ONCOLOGY | Facility: CLINIC | Age: 66
End: 2021-01-26
Payer: MEDICARE

## 2021-01-26 VITALS
WEIGHT: 192.69 LBS | SYSTOLIC BLOOD PRESSURE: 167 MMHG | TEMPERATURE: 98 F | OXYGEN SATURATION: 96 % | RESPIRATION RATE: 16 BRPM | HEIGHT: 64 IN | DIASTOLIC BLOOD PRESSURE: 70 MMHG | HEART RATE: 105 BPM | BODY MASS INDEX: 32.9 KG/M2

## 2021-01-26 VITALS
RESPIRATION RATE: 18 BRPM | HEART RATE: 98 BPM | DIASTOLIC BLOOD PRESSURE: 81 MMHG | TEMPERATURE: 98 F | SYSTOLIC BLOOD PRESSURE: 130 MMHG

## 2021-01-26 DIAGNOSIS — Z17.0 MALIGNANT NEOPLASM OF LOWER-INNER QUADRANT OF LEFT BREAST IN FEMALE, ESTROGEN RECEPTOR POSITIVE: Primary | ICD-10-CM

## 2021-01-26 DIAGNOSIS — C50.312 MALIGNANT NEOPLASM OF LOWER-INNER QUADRANT OF LEFT BREAST IN FEMALE, ESTROGEN RECEPTOR POSITIVE: Primary | ICD-10-CM

## 2021-01-26 DIAGNOSIS — E66.01 CLASS 2 SEVERE OBESITY DUE TO EXCESS CALORIES WITH SERIOUS COMORBIDITY AND BODY MASS INDEX (BMI) OF 35.0 TO 35.9 IN ADULT: ICD-10-CM

## 2021-01-26 DIAGNOSIS — K21.9 GASTROESOPHAGEAL REFLUX DISEASE WITHOUT ESOPHAGITIS: ICD-10-CM

## 2021-01-26 DIAGNOSIS — I10 ESSENTIAL HYPERTENSION: Chronic | ICD-10-CM

## 2021-01-26 DIAGNOSIS — E11.8 TYPE 2 DIABETES MELLITUS WITH COMPLICATION, WITH LONG-TERM CURRENT USE OF INSULIN: ICD-10-CM

## 2021-01-26 DIAGNOSIS — Z79.4 TYPE 2 DIABETES MELLITUS WITH COMPLICATION, WITH LONG-TERM CURRENT USE OF INSULIN: ICD-10-CM

## 2021-01-26 PROCEDURE — 99499 UNLISTED E&M SERVICE: CPT | Mod: S$GLB,,, | Performed by: NURSE PRACTITIONER

## 2021-01-26 PROCEDURE — 99215 PR OFFICE/OUTPT VISIT, EST, LEVL V, 40-54 MIN: ICD-10-PCS | Mod: S$GLB,,, | Performed by: NURSE PRACTITIONER

## 2021-01-26 PROCEDURE — 99999 PR PBB SHADOW E&M-EST. PATIENT-LVL III: CPT | Mod: PBBFAC,,, | Performed by: NURSE PRACTITIONER

## 2021-01-26 PROCEDURE — 1101F PR PT FALLS ASSESS DOC 0-1 FALLS W/OUT INJ PAST YR: ICD-10-PCS | Mod: CPTII,S$GLB,, | Performed by: NURSE PRACTITIONER

## 2021-01-26 PROCEDURE — 99999 PR PBB SHADOW E&M-EST. PATIENT-LVL III: ICD-10-PCS | Mod: PBBFAC,,, | Performed by: NURSE PRACTITIONER

## 2021-01-26 PROCEDURE — 3288F PR FALLS RISK ASSESSMENT DOCUMENTED: ICD-10-PCS | Mod: CPTII,S$GLB,, | Performed by: NURSE PRACTITIONER

## 2021-01-26 PROCEDURE — 96413 CHEMO IV INFUSION 1 HR: CPT

## 2021-01-26 PROCEDURE — 3008F PR BODY MASS INDEX (BMI) DOCUMENTED: ICD-10-PCS | Mod: CPTII,S$GLB,, | Performed by: NURSE PRACTITIONER

## 2021-01-26 PROCEDURE — 3288F FALL RISK ASSESSMENT DOCD: CPT | Mod: CPTII,S$GLB,, | Performed by: NURSE PRACTITIONER

## 2021-01-26 PROCEDURE — 99499 RISK ADDL DX/OHS AUDIT: ICD-10-PCS | Mod: S$GLB,,, | Performed by: NURSE PRACTITIONER

## 2021-01-26 PROCEDURE — 25000003 PHARM REV CODE 250: Performed by: INTERNAL MEDICINE

## 2021-01-26 PROCEDURE — 3008F BODY MASS INDEX DOCD: CPT | Mod: CPTII,S$GLB,, | Performed by: NURSE PRACTITIONER

## 2021-01-26 PROCEDURE — 99215 OFFICE O/P EST HI 40 MIN: CPT | Mod: S$GLB,,, | Performed by: NURSE PRACTITIONER

## 2021-01-26 PROCEDURE — 96417 CHEMO IV INFUS EACH ADDL SEQ: CPT

## 2021-01-26 PROCEDURE — 96375 TX/PRO/DX INJ NEW DRUG ADDON: CPT

## 2021-01-26 PROCEDURE — 3051F PR MOST RECENT HEMOGLOBIN A1C LEVEL 7.0 - < 8.0%: ICD-10-PCS | Mod: CPTII,S$GLB,, | Performed by: NURSE PRACTITIONER

## 2021-01-26 PROCEDURE — 3051F HG A1C>EQUAL 7.0%<8.0%: CPT | Mod: CPTII,S$GLB,, | Performed by: NURSE PRACTITIONER

## 2021-01-26 PROCEDURE — 96367 TX/PROPH/DG ADDL SEQ IV INF: CPT

## 2021-01-26 PROCEDURE — 1126F AMNT PAIN NOTED NONE PRSNT: CPT | Mod: S$GLB,,, | Performed by: NURSE PRACTITIONER

## 2021-01-26 PROCEDURE — 63600175 PHARM REV CODE 636 W HCPCS: Mod: JG | Performed by: INTERNAL MEDICINE

## 2021-01-26 PROCEDURE — 1126F PR PAIN SEVERITY QUANTIFIED, NO PAIN PRESENT: ICD-10-PCS | Mod: S$GLB,,, | Performed by: NURSE PRACTITIONER

## 2021-01-26 PROCEDURE — 1101F PT FALLS ASSESS-DOCD LE1/YR: CPT | Mod: CPTII,S$GLB,, | Performed by: NURSE PRACTITIONER

## 2021-01-26 RX ORDER — HEPARIN 100 UNIT/ML
500 SYRINGE INTRAVENOUS
Status: CANCELLED | OUTPATIENT
Start: 2021-01-26

## 2021-01-26 RX ORDER — HEPARIN 100 UNIT/ML
500 SYRINGE INTRAVENOUS
Status: DISCONTINUED | OUTPATIENT
Start: 2021-01-26 | End: 2021-01-26 | Stop reason: HOSPADM

## 2021-01-26 RX ORDER — SODIUM CHLORIDE 0.9 % (FLUSH) 0.9 %
10 SYRINGE (ML) INJECTION
Status: CANCELLED | OUTPATIENT
Start: 2021-01-26

## 2021-01-26 RX ORDER — SODIUM CHLORIDE 0.9 % (FLUSH) 0.9 %
10 SYRINGE (ML) INJECTION
Status: DISCONTINUED | OUTPATIENT
Start: 2021-01-26 | End: 2021-01-26 | Stop reason: HOSPADM

## 2021-01-26 RX ADMIN — DEXAMETHASONE SODIUM PHOSPHATE 0.25 MG: 4 INJECTION, SOLUTION INTRA-ARTICULAR; INTRALESIONAL; INTRAMUSCULAR; INTRAVENOUS; SOFT TISSUE at 02:01

## 2021-01-26 RX ADMIN — HEPARIN 500 UNITS: 100 SYRINGE at 05:01

## 2021-01-26 RX ADMIN — CYCLOPHOSPHAMIDE 1180 MG: 200 INJECTION, SOLUTION INTRAVENOUS at 04:01

## 2021-01-26 RX ADMIN — SODIUM CHLORIDE: 0.9 INJECTION, SOLUTION INTRAVENOUS at 02:01

## 2021-01-26 RX ADMIN — DOCETAXEL ANHYDROUS 150 MG: 10 INJECTION, SOLUTION INTRAVENOUS at 03:01

## 2021-01-26 RX ADMIN — APREPITANT 130 MG: 130 INJECTION, EMULSION INTRAVENOUS at 02:01

## 2021-01-27 ENCOUNTER — INFUSION (OUTPATIENT)
Dept: INFUSION THERAPY | Facility: HOSPITAL | Age: 66
End: 2021-01-27
Payer: MEDICARE

## 2021-01-27 DIAGNOSIS — C50.312 MALIGNANT NEOPLASM OF LOWER-INNER QUADRANT OF LEFT BREAST IN FEMALE, ESTROGEN RECEPTOR POSITIVE: Primary | ICD-10-CM

## 2021-01-27 DIAGNOSIS — Z17.0 MALIGNANT NEOPLASM OF LOWER-INNER QUADRANT OF LEFT BREAST IN FEMALE, ESTROGEN RECEPTOR POSITIVE: Primary | ICD-10-CM

## 2021-01-27 PROCEDURE — 63600175 PHARM REV CODE 636 W HCPCS: Mod: TB | Performed by: INTERNAL MEDICINE

## 2021-01-27 PROCEDURE — 96372 THER/PROPH/DIAG INJ SC/IM: CPT

## 2021-01-27 RX ADMIN — PEGFILGRASTIM-CBQV 6 MG: 6 INJECTION, SOLUTION SUBCUTANEOUS at 03:01

## 2021-01-28 ENCOUNTER — NURSE TRIAGE (OUTPATIENT)
Dept: ADMINISTRATIVE | Facility: CLINIC | Age: 66
End: 2021-01-28

## 2021-01-28 DIAGNOSIS — K12.31 MUCOSITIS DUE TO ANTINEOPLASTIC THERAPY: Primary | ICD-10-CM

## 2021-01-28 RX ORDER — HYDROCODONE BITARTRATE AND ACETAMINOPHEN 5; 325 MG/1; MG/1
1 TABLET ORAL EVERY 6 HOURS PRN
Qty: 28 TABLET | Refills: 0 | Status: SHIPPED | OUTPATIENT
Start: 2021-01-28 | End: 2021-06-10 | Stop reason: SDUPTHER

## 2021-01-29 ENCOUNTER — HOSPITAL ENCOUNTER (EMERGENCY)
Facility: HOSPITAL | Age: 66
Discharge: HOME | End: 2021-01-29
Attending: EMERGENCY MEDICINE
Payer: MEDICARE

## 2021-01-29 VITALS
DIASTOLIC BLOOD PRESSURE: 93 MMHG | TEMPERATURE: 99 F | OXYGEN SATURATION: 100 % | SYSTOLIC BLOOD PRESSURE: 182 MMHG | WEIGHT: 173 LBS | BODY MASS INDEX: 29.53 KG/M2 | RESPIRATION RATE: 20 BRPM | HEIGHT: 64 IN | HEART RATE: 104 BPM

## 2021-01-29 DIAGNOSIS — M79.601 RIGHT ARM PAIN: Primary | ICD-10-CM

## 2021-01-29 PROCEDURE — 25000003 PHARM REV CODE 250: Performed by: PHYSICIAN ASSISTANT

## 2021-01-29 PROCEDURE — 99284 PR EMERGENCY DEPT VISIT,LEVEL IV: ICD-10-PCS | Mod: ,,, | Performed by: PHYSICIAN ASSISTANT

## 2021-01-29 PROCEDURE — 99284 EMERGENCY DEPT VISIT MOD MDM: CPT | Mod: ,,, | Performed by: PHYSICIAN ASSISTANT

## 2021-01-29 PROCEDURE — 99284 EMERGENCY DEPT VISIT MOD MDM: CPT | Mod: 25

## 2021-01-29 PROCEDURE — 93010 ELECTROCARDIOGRAM REPORT: CPT | Mod: ,,, | Performed by: INTERNAL MEDICINE

## 2021-01-29 PROCEDURE — 93010 EKG 12-LEAD: ICD-10-PCS | Mod: ,,, | Performed by: INTERNAL MEDICINE

## 2021-01-29 PROCEDURE — 93005 ELECTROCARDIOGRAM TRACING: CPT

## 2021-01-29 RX ORDER — OXYCODONE AND ACETAMINOPHEN 5; 325 MG/1; MG/1
1 TABLET ORAL EVERY 4 HOURS PRN
Qty: 12 TABLET | Refills: 0 | Status: SHIPPED | OUTPATIENT
Start: 2021-01-29 | End: 2021-06-10 | Stop reason: SDUPTHER

## 2021-01-29 RX ORDER — OXYCODONE AND ACETAMINOPHEN 5; 325 MG/1; MG/1
2 TABLET ORAL
Status: COMPLETED | OUTPATIENT
Start: 2021-01-29 | End: 2021-01-29

## 2021-01-29 RX ADMIN — OXYCODONE HYDROCHLORIDE AND ACETAMINOPHEN 2 TABLET: 5; 325 TABLET ORAL at 02:01

## 2021-02-01 ENCOUNTER — HOSPITAL ENCOUNTER (EMERGENCY)
Facility: HOSPITAL | Age: 66
Discharge: HOME | End: 2021-02-01
Attending: EMERGENCY MEDICINE
Payer: MEDICARE

## 2021-02-01 VITALS
RESPIRATION RATE: 18 BRPM | WEIGHT: 180 LBS | TEMPERATURE: 98 F | HEIGHT: 64 IN | DIASTOLIC BLOOD PRESSURE: 61 MMHG | SYSTOLIC BLOOD PRESSURE: 126 MMHG | BODY MASS INDEX: 30.73 KG/M2 | HEART RATE: 100 BPM

## 2021-02-01 DIAGNOSIS — Z20.822 ENCOUNTER FOR LABORATORY TESTING FOR COVID-19 VIRUS: Primary | ICD-10-CM

## 2021-02-01 LAB
CTP QC/QA: YES
SARS-COV-2 RDRP RESP QL NAA+PROBE: NEGATIVE

## 2021-02-01 PROCEDURE — U0002 COVID-19 LAB TEST NON-CDC: HCPCS | Performed by: EMERGENCY MEDICINE

## 2021-02-01 PROCEDURE — 99284 PR EMERGENCY DEPT VISIT,LEVEL IV: ICD-10-PCS | Mod: CS,,, | Performed by: EMERGENCY MEDICINE

## 2021-02-01 PROCEDURE — 99282 EMERGENCY DEPT VISIT SF MDM: CPT

## 2021-02-01 PROCEDURE — 99284 EMERGENCY DEPT VISIT MOD MDM: CPT | Mod: CS,,, | Performed by: EMERGENCY MEDICINE

## 2021-02-02 ENCOUNTER — OFFICE VISIT (OUTPATIENT)
Dept: PSYCHIATRY | Facility: CLINIC | Age: 66
End: 2021-02-02
Payer: MEDICARE

## 2021-02-02 DIAGNOSIS — C50.312 MALIGNANT NEOPLASM OF LOWER-INNER QUADRANT OF LEFT BREAST IN FEMALE, ESTROGEN RECEPTOR POSITIVE: ICD-10-CM

## 2021-02-02 DIAGNOSIS — Z17.0 MALIGNANT NEOPLASM OF LOWER-INNER QUADRANT OF LEFT BREAST IN FEMALE, ESTROGEN RECEPTOR POSITIVE: ICD-10-CM

## 2021-02-02 DIAGNOSIS — F43.20 ADJUSTMENT DISORDER, UNSPECIFIED TYPE: Primary | ICD-10-CM

## 2021-02-02 PROCEDURE — 99999 PR PBB SHADOW E&M-EST. PATIENT-LVL I: ICD-10-PCS | Mod: PBBFAC,,, | Performed by: PSYCHOLOGIST

## 2021-02-02 PROCEDURE — 90832 PR PSYCHOTHERAPY W/PATIENT, 30 MIN: ICD-10-PCS | Mod: S$GLB,,, | Performed by: PSYCHOLOGIST

## 2021-02-02 PROCEDURE — 90832 PSYTX W PT 30 MINUTES: CPT | Mod: S$GLB,,, | Performed by: PSYCHOLOGIST

## 2021-02-02 PROCEDURE — 99999 PR PBB SHADOW E&M-EST. PATIENT-LVL I: CPT | Mod: PBBFAC,,, | Performed by: PSYCHOLOGIST

## 2021-02-06 ENCOUNTER — HOSPITAL ENCOUNTER (INPATIENT)
Facility: HOSPITAL | Age: 66
LOS: 2 days | Discharge: HOME | DRG: 177 | End: 2021-02-09
Attending: EMERGENCY MEDICINE | Admitting: HOSPITALIST
Payer: MEDICARE

## 2021-02-06 DIAGNOSIS — R00.0 TACHYCARDIA: ICD-10-CM

## 2021-02-06 DIAGNOSIS — I95.9 HYPOTENSION, UNSPECIFIED HYPOTENSION TYPE: ICD-10-CM

## 2021-02-06 DIAGNOSIS — U07.1 COVID-19: Primary | ICD-10-CM

## 2021-02-06 DIAGNOSIS — C50.312 MALIGNANT NEOPLASM OF LOWER-INNER QUADRANT OF LEFT BREAST IN FEMALE, ESTROGEN RECEPTOR POSITIVE: ICD-10-CM

## 2021-02-06 DIAGNOSIS — Z17.0 MALIGNANT NEOPLASM OF LOWER-INNER QUADRANT OF LEFT BREAST IN FEMALE, ESTROGEN RECEPTOR POSITIVE: ICD-10-CM

## 2021-02-06 LAB
ALBUMIN SERPL BCP-MCNC: 2.9 G/DL (ref 3.5–5.2)
ALP SERPL-CCNC: 80 U/L (ref 55–135)
ALT SERPL W/O P-5'-P-CCNC: 14 U/L (ref 10–44)
ANION GAP SERPL CALC-SCNC: 11 MMOL/L (ref 8–16)
AST SERPL-CCNC: 22 U/L (ref 10–40)
BILIRUB SERPL-MCNC: 0.3 MG/DL (ref 0.1–1)
BNP SERPL-MCNC: <10 PG/ML (ref 0–99)
BUN SERPL-MCNC: 9 MG/DL (ref 8–23)
CALCIUM SERPL-MCNC: 8.3 MG/DL (ref 8.7–10.5)
CHLORIDE SERPL-SCNC: 104 MMOL/L (ref 95–110)
CO2 SERPL-SCNC: 24 MMOL/L (ref 23–29)
CREAT SERPL-MCNC: 1.1 MG/DL (ref 0.5–1.4)
CTP QC/QA: YES
EST. GFR  (AFRICAN AMERICAN): >60 ML/MIN/1.73 M^2
EST. GFR  (NON AFRICAN AMERICAN): 52.4 ML/MIN/1.73 M^2
GLUCOSE SERPL-MCNC: 168 MG/DL (ref 70–110)
INR PPP: 1.1 (ref 0.8–1.2)
LIPASE SERPL-CCNC: 9 U/L (ref 4–60)
MAGNESIUM SERPL-MCNC: 1.6 MG/DL (ref 1.6–2.6)
PHOSPHATE SERPL-MCNC: 3.5 MG/DL (ref 2.7–4.5)
POTASSIUM SERPL-SCNC: 3.9 MMOL/L (ref 3.5–5.1)
PROT SERPL-MCNC: 6.4 G/DL (ref 6–8.4)
PROTHROMBIN TIME: 11.9 SEC (ref 9–12.5)
SARS-COV-2 RDRP RESP QL NAA+PROBE: POSITIVE
SODIUM SERPL-SCNC: 139 MMOL/L (ref 136–145)
TROPONIN I SERPL DL<=0.01 NG/ML-MCNC: 0.01 NG/ML (ref 0–0.03)

## 2021-02-06 PROCEDURE — 82728 ASSAY OF FERRITIN: CPT

## 2021-02-06 PROCEDURE — 83735 ASSAY OF MAGNESIUM: CPT

## 2021-02-06 PROCEDURE — 84484 ASSAY OF TROPONIN QUANT: CPT

## 2021-02-06 PROCEDURE — 82550 ASSAY OF CK (CPK): CPT

## 2021-02-06 PROCEDURE — 80053 COMPREHEN METABOLIC PANEL: CPT

## 2021-02-06 PROCEDURE — 85610 PROTHROMBIN TIME: CPT

## 2021-02-06 PROCEDURE — 99291 PR CRITICAL CARE, E/M 30-74 MINUTES: ICD-10-PCS | Mod: ,,, | Performed by: EMERGENCY MEDICINE

## 2021-02-06 PROCEDURE — 83605 ASSAY OF LACTIC ACID: CPT

## 2021-02-06 PROCEDURE — 86140 C-REACTIVE PROTEIN: CPT

## 2021-02-06 PROCEDURE — 93010 ELECTROCARDIOGRAM REPORT: CPT | Mod: ,,, | Performed by: INTERNAL MEDICINE

## 2021-02-06 PROCEDURE — 99291 CRITICAL CARE FIRST HOUR: CPT | Mod: 25

## 2021-02-06 PROCEDURE — 83690 ASSAY OF LIPASE: CPT

## 2021-02-06 PROCEDURE — 83880 ASSAY OF NATRIURETIC PEPTIDE: CPT

## 2021-02-06 PROCEDURE — 99291 CRITICAL CARE FIRST HOUR: CPT | Mod: ,,, | Performed by: EMERGENCY MEDICINE

## 2021-02-06 PROCEDURE — 96374 THER/PROPH/DIAG INJ IV PUSH: CPT

## 2021-02-06 PROCEDURE — 85025 COMPLETE CBC W/AUTO DIFF WBC: CPT

## 2021-02-06 PROCEDURE — 84145 PROCALCITONIN (PCT): CPT

## 2021-02-06 PROCEDURE — 93010 EKG 12-LEAD: ICD-10-PCS | Mod: ,,, | Performed by: INTERNAL MEDICINE

## 2021-02-06 PROCEDURE — 87040 BLOOD CULTURE FOR BACTERIA: CPT

## 2021-02-06 PROCEDURE — 84100 ASSAY OF PHOSPHORUS: CPT

## 2021-02-06 PROCEDURE — U0002 COVID-19 LAB TEST NON-CDC: HCPCS | Performed by: EMERGENCY MEDICINE

## 2021-02-06 PROCEDURE — 63600175 PHARM REV CODE 636 W HCPCS: Performed by: EMERGENCY MEDICINE

## 2021-02-06 PROCEDURE — 96361 HYDRATE IV INFUSION ADD-ON: CPT

## 2021-02-06 PROCEDURE — 93005 ELECTROCARDIOGRAM TRACING: CPT

## 2021-02-06 RX ORDER — ACETAMINOPHEN 500 MG
1000 TABLET ORAL
Status: COMPLETED | OUTPATIENT
Start: 2021-02-06 | End: 2021-02-07

## 2021-02-06 RX ORDER — DEXAMETHASONE SODIUM PHOSPHATE 4 MG/ML
6 INJECTION, SOLUTION INTRA-ARTICULAR; INTRALESIONAL; INTRAMUSCULAR; INTRAVENOUS; SOFT TISSUE
Status: COMPLETED | OUTPATIENT
Start: 2021-02-06 | End: 2021-02-07

## 2021-02-06 RX ADMIN — SODIUM CHLORIDE, SODIUM LACTATE, POTASSIUM CHLORIDE, AND CALCIUM CHLORIDE 1000 ML: .6; .31; .03; .02 INJECTION, SOLUTION INTRAVENOUS at 11:02

## 2021-02-06 RX ADMIN — SODIUM CHLORIDE, SODIUM LACTATE, POTASSIUM CHLORIDE, AND CALCIUM CHLORIDE 500 ML: .6; .31; .03; .02 INJECTION, SOLUTION INTRAVENOUS at 11:02

## 2021-02-07 ENCOUNTER — NURSE TRIAGE (OUTPATIENT)
Dept: ADMINISTRATIVE | Facility: CLINIC | Age: 66
End: 2021-02-07

## 2021-02-07 PROBLEM — U07.1 COVID-19: Status: ACTIVE | Noted: 2021-02-07

## 2021-02-07 PROBLEM — J12.82 PNEUMONIA DUE TO COVID-19 VIRUS: Status: ACTIVE | Noted: 2021-02-07

## 2021-02-07 PROBLEM — E55.9 VITAMIN D DEFICIENCY: Status: ACTIVE | Noted: 2021-02-07

## 2021-02-07 PROBLEM — I95.9 HYPOTENSION: Status: ACTIVE | Noted: 2021-02-07

## 2021-02-07 PROBLEM — Z79.4 TYPE 2 DIABETES MELLITUS WITH COMPLICATION, WITH LONG-TERM CURRENT USE OF INSULIN: Status: RESOLVED | Noted: 2018-09-23 | Resolved: 2021-02-07

## 2021-02-07 PROBLEM — E11.8 TYPE 2 DIABETES MELLITUS WITH COMPLICATION, WITH LONG-TERM CURRENT USE OF INSULIN: Status: RESOLVED | Noted: 2018-09-23 | Resolved: 2021-02-07

## 2021-02-07 LAB
25(OH)D3+25(OH)D2 SERPL-MCNC: 27 NG/ML (ref 30–96)
ALBUMIN SERPL BCP-MCNC: 2.7 G/DL (ref 3.5–5.2)
ALP SERPL-CCNC: 74 U/L (ref 55–135)
ALT SERPL W/O P-5'-P-CCNC: 12 U/L (ref 10–44)
ANION GAP SERPL CALC-SCNC: 9 MMOL/L (ref 8–16)
APTT BLDCRRT: 25.6 SEC (ref 21–32)
AST SERPL-CCNC: 22 U/L (ref 10–40)
BASOPHILS # BLD AUTO: 0.02 K/UL (ref 0–0.2)
BASOPHILS # BLD AUTO: 0.03 K/UL (ref 0–0.2)
BASOPHILS NFR BLD: 0.3 % (ref 0–1.9)
BASOPHILS NFR BLD: 0.3 % (ref 0–1.9)
BILIRUB SERPL-MCNC: 0.2 MG/DL (ref 0.1–1)
BILIRUB UR QL STRIP: NEGATIVE
BUN SERPL-MCNC: 9 MG/DL (ref 8–23)
CALCIUM SERPL-MCNC: 8 MG/DL (ref 8.7–10.5)
CHLORIDE SERPL-SCNC: 103 MMOL/L (ref 95–110)
CK SERPL-CCNC: 95 U/L (ref 20–180)
CLARITY UR REFRACT.AUTO: CLEAR
CO2 SERPL-SCNC: 23 MMOL/L (ref 23–29)
COLOR UR AUTO: ABNORMAL
CREAT SERPL-MCNC: 0.9 MG/DL (ref 0.5–1.4)
CRP SERPL-MCNC: 16.9 MG/L (ref 0–8.2)
CRP SERPL-MCNC: 17.3 MG/L (ref 0–8.2)
D DIMER PPP IA.FEU-MCNC: 1.76 MG/L FEU
DIFFERENTIAL METHOD: ABNORMAL
DIFFERENTIAL METHOD: ABNORMAL
EOSINOPHIL # BLD AUTO: 0 K/UL (ref 0–0.5)
EOSINOPHIL # BLD AUTO: 0 K/UL (ref 0–0.5)
EOSINOPHIL NFR BLD: 0 % (ref 0–8)
EOSINOPHIL NFR BLD: 0 % (ref 0–8)
ERYTHROCYTE [DISTWIDTH] IN BLOOD BY AUTOMATED COUNT: 15.8 % (ref 11.5–14.5)
ERYTHROCYTE [DISTWIDTH] IN BLOOD BY AUTOMATED COUNT: 15.8 % (ref 11.5–14.5)
ERYTHROCYTE [SEDIMENTATION RATE] IN BLOOD BY WESTERGREN METHOD: 62 MM/HR (ref 0–36)
EST. GFR  (AFRICAN AMERICAN): >60 ML/MIN/1.73 M^2
EST. GFR  (NON AFRICAN AMERICAN): >60 ML/MIN/1.73 M^2
FERRITIN SERPL-MCNC: 995 NG/ML (ref 20–300)
GLUCOSE SERPL-MCNC: 197 MG/DL (ref 70–110)
GLUCOSE UR QL STRIP: ABNORMAL
HCT VFR BLD AUTO: 25.3 % (ref 37–48.5)
HCT VFR BLD AUTO: 29.5 % (ref 37–48.5)
HGB BLD-MCNC: 8.2 G/DL (ref 12–16)
HGB BLD-MCNC: 9.6 G/DL (ref 12–16)
HGB UR QL STRIP: NEGATIVE
IMM GRANULOCYTES # BLD AUTO: 0.18 K/UL (ref 0–0.04)
IMM GRANULOCYTES # BLD AUTO: 0.35 K/UL (ref 0–0.04)
IMM GRANULOCYTES NFR BLD AUTO: 2.7 % (ref 0–0.5)
IMM GRANULOCYTES NFR BLD AUTO: 3.6 % (ref 0–0.5)
INR PPP: 1.1 (ref 0.8–1.2)
KETONES UR QL STRIP: NEGATIVE
LACTATE SERPL-SCNC: 0.7 MMOL/L (ref 0.5–2.2)
LACTATE SERPL-SCNC: 1.8 MMOL/L (ref 0.5–2.2)
LDH SERPL L TO P-CCNC: 236 U/L (ref 110–260)
LEUKOCYTE ESTERASE UR QL STRIP: NEGATIVE
LYMPHOCYTES # BLD AUTO: 0.8 K/UL (ref 1–4.8)
LYMPHOCYTES # BLD AUTO: 1.2 K/UL (ref 1–4.8)
LYMPHOCYTES NFR BLD: 11.8 % (ref 18–48)
LYMPHOCYTES NFR BLD: 12 % (ref 18–48)
MAGNESIUM SERPL-MCNC: 1.6 MG/DL (ref 1.6–2.6)
MCH RBC QN AUTO: 29.9 PG (ref 27–31)
MCH RBC QN AUTO: 30.2 PG (ref 27–31)
MCHC RBC AUTO-ENTMCNC: 32.4 G/DL (ref 32–36)
MCHC RBC AUTO-ENTMCNC: 32.5 G/DL (ref 32–36)
MCV RBC AUTO: 92 FL (ref 82–98)
MCV RBC AUTO: 93 FL (ref 82–98)
MONOCYTES # BLD AUTO: 0.4 K/UL (ref 0.3–1)
MONOCYTES # BLD AUTO: 1.2 K/UL (ref 0.3–1)
MONOCYTES NFR BLD: 12.3 % (ref 4–15)
MONOCYTES NFR BLD: 5.8 % (ref 4–15)
NEUTROPHILS # BLD AUTO: 5.4 K/UL (ref 1.8–7.7)
NEUTROPHILS # BLD AUTO: 6.9 K/UL (ref 1.8–7.7)
NEUTROPHILS NFR BLD: 71.8 % (ref 38–73)
NEUTROPHILS NFR BLD: 79.4 % (ref 38–73)
NITRITE UR QL STRIP: NEGATIVE
NRBC BLD-RTO: 0 /100 WBC
NRBC BLD-RTO: 0 /100 WBC
PH UR STRIP: 6 [PH] (ref 5–8)
PHOSPHATE SERPL-MCNC: 3.7 MG/DL (ref 2.7–4.5)
PLATELET # BLD AUTO: 152 K/UL (ref 150–350)
PLATELET # BLD AUTO: 154 K/UL (ref 150–350)
PLATELET BLD QL SMEAR: ABNORMAL
PLATELET BLD QL SMEAR: ABNORMAL
PMV BLD AUTO: 10.1 FL (ref 9.2–12.9)
PMV BLD AUTO: 10.3 FL (ref 9.2–12.9)
POCT GLUCOSE: 148 MG/DL (ref 70–110)
POCT GLUCOSE: 229 MG/DL (ref 70–110)
POCT GLUCOSE: 253 MG/DL (ref 70–110)
POCT GLUCOSE: 268 MG/DL (ref 70–110)
POCT GLUCOSE: 283 MG/DL (ref 70–110)
POTASSIUM SERPL-SCNC: 4.3 MMOL/L (ref 3.5–5.1)
PROCALCITONIN SERPL IA-MCNC: 0.06 NG/ML
PROCALCITONIN SERPL IA-MCNC: 0.07 NG/ML
PROT SERPL-MCNC: 6 G/DL (ref 6–8.4)
PROT UR QL STRIP: NEGATIVE
PROTHROMBIN TIME: 11.9 SEC (ref 9–12.5)
RBC # BLD AUTO: 2.74 M/UL (ref 4–5.4)
RBC # BLD AUTO: 3.18 M/UL (ref 4–5.4)
SODIUM SERPL-SCNC: 135 MMOL/L (ref 136–145)
SP GR UR STRIP: 1.02 (ref 1–1.03)
TOXIC GRANULES BLD QL SMEAR: PRESENT
TOXIC GRANULES BLD QL SMEAR: PRESENT
URN SPEC COLLECT METH UR: ABNORMAL
WBC # BLD AUTO: 6.78 K/UL (ref 3.9–12.7)
WBC # BLD AUTO: 9.66 K/UL (ref 3.9–12.7)

## 2021-02-07 PROCEDURE — 25000003 PHARM REV CODE 250: Performed by: STUDENT IN AN ORGANIZED HEALTH CARE EDUCATION/TRAINING PROGRAM

## 2021-02-07 PROCEDURE — 25000003 PHARM REV CODE 250: Performed by: EMERGENCY MEDICINE

## 2021-02-07 PROCEDURE — 83735 ASSAY OF MAGNESIUM: CPT

## 2021-02-07 PROCEDURE — 20600001 HC STEP DOWN PRIVATE ROOM

## 2021-02-07 PROCEDURE — 99223 PR INITIAL HOSPITAL CARE,LEVL III: ICD-10-PCS | Mod: AI,GC,, | Performed by: HOSPITALIST

## 2021-02-07 PROCEDURE — 83615 LACTATE (LD) (LDH) ENZYME: CPT

## 2021-02-07 PROCEDURE — A4216 STERILE WATER/SALINE, 10 ML: HCPCS | Performed by: STUDENT IN AN ORGANIZED HEALTH CARE EDUCATION/TRAINING PROGRAM

## 2021-02-07 PROCEDURE — 82306 VITAMIN D 25 HYDROXY: CPT

## 2021-02-07 PROCEDURE — 63600175 PHARM REV CODE 636 W HCPCS: Performed by: STUDENT IN AN ORGANIZED HEALTH CARE EDUCATION/TRAINING PROGRAM

## 2021-02-07 PROCEDURE — 81003 URINALYSIS AUTO W/O SCOPE: CPT

## 2021-02-07 PROCEDURE — 83605 ASSAY OF LACTIC ACID: CPT

## 2021-02-07 PROCEDURE — 80053 COMPREHEN METABOLIC PANEL: CPT

## 2021-02-07 PROCEDURE — 85379 FIBRIN DEGRADATION QUANT: CPT

## 2021-02-07 PROCEDURE — 84100 ASSAY OF PHOSPHORUS: CPT

## 2021-02-07 PROCEDURE — 85652 RBC SED RATE AUTOMATED: CPT

## 2021-02-07 PROCEDURE — 99223 1ST HOSP IP/OBS HIGH 75: CPT | Mod: AI,GC,, | Performed by: HOSPITALIST

## 2021-02-07 PROCEDURE — 85610 PROTHROMBIN TIME: CPT

## 2021-02-07 PROCEDURE — 85730 THROMBOPLASTIN TIME PARTIAL: CPT

## 2021-02-07 PROCEDURE — 63600175 PHARM REV CODE 636 W HCPCS: Performed by: EMERGENCY MEDICINE

## 2021-02-07 PROCEDURE — 86140 C-REACTIVE PROTEIN: CPT

## 2021-02-07 PROCEDURE — C9399 UNCLASSIFIED DRUGS OR BIOLOG: HCPCS | Performed by: STUDENT IN AN ORGANIZED HEALTH CARE EDUCATION/TRAINING PROGRAM

## 2021-02-07 PROCEDURE — 85025 COMPLETE CBC W/AUTO DIFF WBC: CPT

## 2021-02-07 PROCEDURE — 25500020 PHARM REV CODE 255: Performed by: EMERGENCY MEDICINE

## 2021-02-07 PROCEDURE — 84145 PROCALCITONIN (PCT): CPT

## 2021-02-07 RX ORDER — OLANZAPINE 5 MG/1
5 TABLET ORAL 2 TIMES DAILY
Status: DISCONTINUED | OUTPATIENT
Start: 2021-02-07 | End: 2021-02-09 | Stop reason: HOSPADM

## 2021-02-07 RX ORDER — GLUCAGON 1 MG
1 KIT INJECTION
Status: DISCONTINUED | OUTPATIENT
Start: 2021-02-07 | End: 2021-02-09 | Stop reason: HOSPADM

## 2021-02-07 RX ORDER — ALPRAZOLAM 0.25 MG/1
0.25 TABLET ORAL 3 TIMES DAILY PRN
Status: DISCONTINUED | OUTPATIENT
Start: 2021-02-07 | End: 2021-02-09 | Stop reason: HOSPADM

## 2021-02-07 RX ORDER — SODIUM CHLORIDE 0.9 % (FLUSH) 0.9 %
10 SYRINGE (ML) INJECTION
Status: DISCONTINUED | OUTPATIENT
Start: 2021-02-07 | End: 2021-02-09 | Stop reason: HOSPADM

## 2021-02-07 RX ORDER — COLCHICINE 0.6 MG/1
0.6 TABLET, FILM COATED ORAL 2 TIMES DAILY
Status: DISCONTINUED | OUTPATIENT
Start: 2021-02-07 | End: 2021-02-09 | Stop reason: HOSPADM

## 2021-02-07 RX ORDER — ASCORBIC ACID 500 MG
500 TABLET ORAL 2 TIMES DAILY
Status: DISCONTINUED | OUTPATIENT
Start: 2021-02-07 | End: 2021-02-09 | Stop reason: HOSPADM

## 2021-02-07 RX ORDER — CYCLOBENZAPRINE HCL 5 MG
5 TABLET ORAL NIGHTLY
Status: DISCONTINUED | OUTPATIENT
Start: 2021-02-07 | End: 2021-02-09 | Stop reason: HOSPADM

## 2021-02-07 RX ORDER — NAPROXEN SODIUM 220 MG/1
81 TABLET, FILM COATED ORAL DAILY
Status: DISCONTINUED | OUTPATIENT
Start: 2021-02-07 | End: 2021-02-09 | Stop reason: HOSPADM

## 2021-02-07 RX ORDER — AZITHROMYCIN 250 MG/1
250 TABLET, FILM COATED ORAL DAILY
Status: DISCONTINUED | OUTPATIENT
Start: 2021-02-08 | End: 2021-02-09 | Stop reason: HOSPADM

## 2021-02-07 RX ORDER — OXYCODONE AND ACETAMINOPHEN 5; 325 MG/1; MG/1
1 TABLET ORAL EVERY 4 HOURS PRN
Status: DISCONTINUED | OUTPATIENT
Start: 2021-02-07 | End: 2021-02-09 | Stop reason: HOSPADM

## 2021-02-07 RX ORDER — ACETAMINOPHEN 325 MG/1
650 TABLET ORAL EVERY 8 HOURS PRN
Status: DISCONTINUED | OUTPATIENT
Start: 2021-02-07 | End: 2021-02-09 | Stop reason: HOSPADM

## 2021-02-07 RX ORDER — IBUPROFEN 200 MG
24 TABLET ORAL
Status: DISCONTINUED | OUTPATIENT
Start: 2021-02-07 | End: 2021-02-09 | Stop reason: HOSPADM

## 2021-02-07 RX ORDER — INSULIN ASPART 100 [IU]/ML
1-10 INJECTION, SOLUTION INTRAVENOUS; SUBCUTANEOUS
Status: DISCONTINUED | OUTPATIENT
Start: 2021-02-07 | End: 2021-02-09 | Stop reason: HOSPADM

## 2021-02-07 RX ORDER — CANDESARTAN 8 MG/1
8 TABLET ORAL DAILY
Status: DISCONTINUED | OUTPATIENT
Start: 2021-02-08 | End: 2021-02-08

## 2021-02-07 RX ORDER — ENOXAPARIN SODIUM 100 MG/ML
40 INJECTION SUBCUTANEOUS EVERY 24 HOURS
Status: DISCONTINUED | OUTPATIENT
Start: 2021-02-07 | End: 2021-02-09 | Stop reason: HOSPADM

## 2021-02-07 RX ORDER — MAGNESIUM SULFATE HEPTAHYDRATE 40 MG/ML
2 INJECTION, SOLUTION INTRAVENOUS
Status: COMPLETED | OUTPATIENT
Start: 2021-02-07 | End: 2021-02-07

## 2021-02-07 RX ORDER — IBUPROFEN 200 MG
16 TABLET ORAL
Status: DISCONTINUED | OUTPATIENT
Start: 2021-02-07 | End: 2021-02-09 | Stop reason: HOSPADM

## 2021-02-07 RX ORDER — CANDESARTAN 8 MG/1
8 TABLET ORAL DAILY
Status: DISCONTINUED | OUTPATIENT
Start: 2021-02-07 | End: 2021-02-07

## 2021-02-07 RX ORDER — CEFEPIME HYDROCHLORIDE 2 G/1
2 INJECTION, POWDER, FOR SOLUTION INTRAVENOUS
Status: DISCONTINUED | OUTPATIENT
Start: 2021-02-07 | End: 2021-02-08

## 2021-02-07 RX ORDER — CHOLECALCIFEROL (VITAMIN D3) 25 MCG
1000 TABLET ORAL DAILY
Status: DISCONTINUED | OUTPATIENT
Start: 2021-02-07 | End: 2021-02-09 | Stop reason: HOSPADM

## 2021-02-07 RX ORDER — BENZONATATE 100 MG/1
100 CAPSULE ORAL 3 TIMES DAILY PRN
Status: DISCONTINUED | OUTPATIENT
Start: 2021-02-07 | End: 2021-02-09 | Stop reason: HOSPADM

## 2021-02-07 RX ADMIN — ACETAMINOPHEN 1000 MG: 500 TABLET ORAL at 12:02

## 2021-02-07 RX ADMIN — INSULIN ASPART 3 UNITS: 100 INJECTION, SOLUTION INTRAVENOUS; SUBCUTANEOUS at 08:02

## 2021-02-07 RX ADMIN — OXYCODONE HYDROCHLORIDE AND ACETAMINOPHEN 500 MG: 500 TABLET ORAL at 08:02

## 2021-02-07 RX ADMIN — CYCLOBENZAPRINE HYDROCHLORIDE 5 MG: 5 TABLET, FILM COATED ORAL at 08:02

## 2021-02-07 RX ADMIN — MAGNESIUM SULFATE IN WATER 2 G: 40 INJECTION, SOLUTION INTRAVENOUS at 11:02

## 2021-02-07 RX ADMIN — DEXAMETHASONE SODIUM PHOSPHATE 6 MG: 4 INJECTION INTRA-ARTICULAR; INTRALESIONAL; INTRAMUSCULAR; INTRAVENOUS; SOFT TISSUE at 12:02

## 2021-02-07 RX ADMIN — Medication 10 ML: at 03:02

## 2021-02-07 RX ADMIN — ASPIRIN 81 MG: 81 TABLET, CHEWABLE ORAL at 09:02

## 2021-02-07 RX ADMIN — OLANZAPINE 5 MG: 5 TABLET, FILM COATED ORAL at 08:02

## 2021-02-07 RX ADMIN — OXYCODONE HYDROCHLORIDE AND ACETAMINOPHEN 500 MG: 500 TABLET ORAL at 09:02

## 2021-02-07 RX ADMIN — AZITHROMYCIN MONOHYDRATE 500 MG: 500 INJECTION, POWDER, LYOPHILIZED, FOR SOLUTION INTRAVENOUS at 05:02

## 2021-02-07 RX ADMIN — Medication 1000 UNITS: at 09:02

## 2021-02-07 RX ADMIN — CEFEPIME 2 G: 2 INJECTION, POWDER, FOR SOLUTION INTRAVENOUS at 11:02

## 2021-02-07 RX ADMIN — COLCHICINE 0.6 MG: 0.6 TABLET, FILM COATED ORAL at 08:02

## 2021-02-07 RX ADMIN — COLCHICINE 0.6 MG: 0.6 TABLET, FILM COATED ORAL at 09:02

## 2021-02-07 RX ADMIN — CEFEPIME 2 G: 2 INJECTION, POWDER, FOR SOLUTION INTRAVENOUS at 08:02

## 2021-02-07 RX ADMIN — REMDESIVIR 200 MG: 100 INJECTION, POWDER, LYOPHILIZED, FOR SOLUTION INTRAVENOUS at 03:02

## 2021-02-07 RX ADMIN — ENOXAPARIN SODIUM 40 MG: 40 INJECTION SUBCUTANEOUS at 08:02

## 2021-02-07 RX ADMIN — INSULIN DETEMIR 10 UNITS: 100 INJECTION, SOLUTION SUBCUTANEOUS at 08:02

## 2021-02-07 RX ADMIN — INSULIN ASPART 6 UNITS: 100 INJECTION, SOLUTION INTRAVENOUS; SUBCUTANEOUS at 09:02

## 2021-02-07 RX ADMIN — INSULIN ASPART 6 UNITS: 100 INJECTION, SOLUTION INTRAVENOUS; SUBCUTANEOUS at 04:02

## 2021-02-07 RX ADMIN — IOHEXOL 100 ML: 350 INJECTION, SOLUTION INTRAVENOUS at 01:02

## 2021-02-07 RX ADMIN — DEXAMETHASONE 6 MG: 4 TABLET ORAL at 09:02

## 2021-02-07 RX ADMIN — MAGNESIUM SULFATE IN WATER 2 G: 40 INJECTION, SOLUTION INTRAVENOUS at 09:02

## 2021-02-07 RX ADMIN — Medication 10 ML: at 11:02

## 2021-02-07 RX ADMIN — ENOXAPARIN SODIUM 40 MG: 40 INJECTION SUBCUTANEOUS at 05:02

## 2021-02-07 RX ADMIN — INSULIN DETEMIR 10 UNITS: 100 INJECTION, SOLUTION SUBCUTANEOUS at 05:02

## 2021-02-07 RX ADMIN — OXYCODONE HYDROCHLORIDE AND ACETAMINOPHEN 1 TABLET: 5; 325 TABLET ORAL at 08:02

## 2021-02-07 RX ADMIN — Medication 10 ML: at 09:02

## 2021-02-08 LAB
ALBUMIN SERPL BCP-MCNC: 2.7 G/DL (ref 3.5–5.2)
ALP SERPL-CCNC: 79 U/L (ref 55–135)
ALT SERPL W/O P-5'-P-CCNC: 13 U/L (ref 10–44)
ANION GAP SERPL CALC-SCNC: 9 MMOL/L (ref 8–16)
AST SERPL-CCNC: 21 U/L (ref 10–40)
BASOPHILS # BLD AUTO: 0 K/UL (ref 0–0.2)
BASOPHILS NFR BLD: 0 % (ref 0–1.9)
BILIRUB SERPL-MCNC: 0.2 MG/DL (ref 0.1–1)
BUN SERPL-MCNC: 13 MG/DL (ref 8–23)
CALCIUM SERPL-MCNC: 8.2 MG/DL (ref 8.7–10.5)
CHLORIDE SERPL-SCNC: 106 MMOL/L (ref 95–110)
CO2 SERPL-SCNC: 22 MMOL/L (ref 23–29)
CREAT SERPL-MCNC: 0.9 MG/DL (ref 0.5–1.4)
DIFFERENTIAL METHOD: ABNORMAL
EOSINOPHIL # BLD AUTO: 0 K/UL (ref 0–0.5)
EOSINOPHIL NFR BLD: 0 % (ref 0–8)
ERYTHROCYTE [DISTWIDTH] IN BLOOD BY AUTOMATED COUNT: 15.3 % (ref 11.5–14.5)
EST. GFR  (AFRICAN AMERICAN): >60 ML/MIN/1.73 M^2
EST. GFR  (NON AFRICAN AMERICAN): >60 ML/MIN/1.73 M^2
GLUCOSE SERPL-MCNC: 199 MG/DL (ref 70–110)
HCT VFR BLD AUTO: 26.4 % (ref 37–48.5)
HGB BLD-MCNC: 8.7 G/DL (ref 12–16)
IMM GRANULOCYTES # BLD AUTO: 0.04 K/UL (ref 0–0.04)
IMM GRANULOCYTES NFR BLD AUTO: 0.8 % (ref 0–0.5)
IRON SERPL-MCNC: 84 UG/DL (ref 30–160)
LYMPHOCYTES # BLD AUTO: 1.3 K/UL (ref 1–4.8)
LYMPHOCYTES NFR BLD: 26 % (ref 18–48)
MAGNESIUM SERPL-MCNC: 2 MG/DL (ref 1.6–2.6)
MCH RBC QN AUTO: 30.1 PG (ref 27–31)
MCHC RBC AUTO-ENTMCNC: 33 G/DL (ref 32–36)
MCV RBC AUTO: 91 FL (ref 82–98)
MONOCYTES # BLD AUTO: 0.7 K/UL (ref 0.3–1)
MONOCYTES NFR BLD: 14.2 % (ref 4–15)
NEUTROPHILS # BLD AUTO: 2.9 K/UL (ref 1.8–7.7)
NEUTROPHILS NFR BLD: 59 % (ref 38–73)
NRBC BLD-RTO: 0 /100 WBC
PHOSPHATE SERPL-MCNC: 3.5 MG/DL (ref 2.7–4.5)
PLATELET # BLD AUTO: 160 K/UL (ref 150–350)
PMV BLD AUTO: 10.4 FL (ref 9.2–12.9)
POCT GLUCOSE: 160 MG/DL (ref 70–110)
POCT GLUCOSE: 203 MG/DL (ref 70–110)
POCT GLUCOSE: 214 MG/DL (ref 70–110)
POCT GLUCOSE: 244 MG/DL (ref 70–110)
POTASSIUM SERPL-SCNC: 4.2 MMOL/L (ref 3.5–5.1)
PROT SERPL-MCNC: 6.2 G/DL (ref 6–8.4)
RBC # BLD AUTO: 2.89 M/UL (ref 4–5.4)
SATURATED IRON: 35 % (ref 20–50)
SODIUM SERPL-SCNC: 137 MMOL/L (ref 136–145)
TOTAL IRON BINDING CAPACITY: 240 UG/DL (ref 250–450)
TRANSFERRIN SERPL-MCNC: 162 MG/DL (ref 200–375)
WBC # BLD AUTO: 4.92 K/UL (ref 3.9–12.7)

## 2021-02-08 PROCEDURE — 20600001 HC STEP DOWN PRIVATE ROOM

## 2021-02-08 PROCEDURE — 25000003 PHARM REV CODE 250: Performed by: STUDENT IN AN ORGANIZED HEALTH CARE EDUCATION/TRAINING PROGRAM

## 2021-02-08 PROCEDURE — 85025 COMPLETE CBC W/AUTO DIFF WBC: CPT

## 2021-02-08 PROCEDURE — 83540 ASSAY OF IRON: CPT

## 2021-02-08 PROCEDURE — 97165 OT EVAL LOW COMPLEX 30 MIN: CPT

## 2021-02-08 PROCEDURE — 99232 PR SUBSEQUENT HOSPITAL CARE,LEVL II: ICD-10-PCS | Mod: GC,,, | Performed by: HOSPITALIST

## 2021-02-08 PROCEDURE — 97161 PT EVAL LOW COMPLEX 20 MIN: CPT

## 2021-02-08 PROCEDURE — 63600175 PHARM REV CODE 636 W HCPCS: Performed by: STUDENT IN AN ORGANIZED HEALTH CARE EDUCATION/TRAINING PROGRAM

## 2021-02-08 PROCEDURE — 25000003 PHARM REV CODE 250: Performed by: HOSPITALIST

## 2021-02-08 PROCEDURE — 83735 ASSAY OF MAGNESIUM: CPT

## 2021-02-08 PROCEDURE — 80053 COMPREHEN METABOLIC PANEL: CPT

## 2021-02-08 PROCEDURE — 63700000 PHARM REV CODE 250 ALT 637 W/O HCPCS: Performed by: STUDENT IN AN ORGANIZED HEALTH CARE EDUCATION/TRAINING PROGRAM

## 2021-02-08 PROCEDURE — 84100 ASSAY OF PHOSPHORUS: CPT

## 2021-02-08 PROCEDURE — 99232 SBSQ HOSP IP/OBS MODERATE 35: CPT | Mod: GC,,, | Performed by: HOSPITALIST

## 2021-02-08 RX ORDER — INSULIN ASPART 100 [IU]/ML
2 INJECTION, SOLUTION INTRAVENOUS; SUBCUTANEOUS
Status: DISCONTINUED | OUTPATIENT
Start: 2021-02-08 | End: 2021-02-08

## 2021-02-08 RX ORDER — INSULIN ASPART 100 [IU]/ML
5 INJECTION, SOLUTION INTRAVENOUS; SUBCUTANEOUS
Status: DISCONTINUED | OUTPATIENT
Start: 2021-02-08 | End: 2021-02-09 | Stop reason: HOSPADM

## 2021-02-08 RX ORDER — CEFTRIAXONE 1 G/1
1 INJECTION, POWDER, FOR SOLUTION INTRAMUSCULAR; INTRAVENOUS
Status: DISCONTINUED | OUTPATIENT
Start: 2021-02-08 | End: 2021-02-09

## 2021-02-08 RX ORDER — POLYETHYLENE GLYCOL 3350 17 G/17G
17 POWDER, FOR SOLUTION ORAL DAILY
Status: DISCONTINUED | OUTPATIENT
Start: 2021-02-08 | End: 2021-02-09 | Stop reason: HOSPADM

## 2021-02-08 RX ADMIN — INSULIN ASPART 4 UNITS: 100 INJECTION, SOLUTION INTRAVENOUS; SUBCUTANEOUS at 12:02

## 2021-02-08 RX ADMIN — DEXAMETHASONE 6 MG: 4 TABLET ORAL at 08:02

## 2021-02-08 RX ADMIN — INSULIN ASPART 5 UNITS: 100 INJECTION, SOLUTION INTRAVENOUS; SUBCUTANEOUS at 12:02

## 2021-02-08 RX ADMIN — AZITHROMYCIN MONOHYDRATE 250 MG: 250 TABLET ORAL at 08:02

## 2021-02-08 RX ADMIN — Medication 1000 UNITS: at 08:02

## 2021-02-08 RX ADMIN — COLCHICINE 0.6 MG: 0.6 TABLET, FILM COATED ORAL at 09:02

## 2021-02-08 RX ADMIN — OXYCODONE HYDROCHLORIDE AND ACETAMINOPHEN 1 TABLET: 5; 325 TABLET ORAL at 10:02

## 2021-02-08 RX ADMIN — INSULIN ASPART 2 UNITS: 100 INJECTION, SOLUTION INTRAVENOUS; SUBCUTANEOUS at 08:02

## 2021-02-08 RX ADMIN — INSULIN ASPART 4 UNITS: 100 INJECTION, SOLUTION INTRAVENOUS; SUBCUTANEOUS at 04:02

## 2021-02-08 RX ADMIN — OLANZAPINE 5 MG: 5 TABLET, FILM COATED ORAL at 08:02

## 2021-02-08 RX ADMIN — OXYCODONE HYDROCHLORIDE AND ACETAMINOPHEN 500 MG: 500 TABLET ORAL at 09:02

## 2021-02-08 RX ADMIN — ENOXAPARIN SODIUM 40 MG: 40 INJECTION SUBCUTANEOUS at 09:02

## 2021-02-08 RX ADMIN — ASPIRIN 81 MG: 81 TABLET, CHEWABLE ORAL at 08:02

## 2021-02-08 RX ADMIN — CYCLOBENZAPRINE HYDROCHLORIDE 5 MG: 5 TABLET, FILM COATED ORAL at 09:02

## 2021-02-08 RX ADMIN — OLANZAPINE 5 MG: 5 TABLET, FILM COATED ORAL at 09:02

## 2021-02-08 RX ADMIN — OXYCODONE HYDROCHLORIDE AND ACETAMINOPHEN 500 MG: 500 TABLET ORAL at 08:02

## 2021-02-08 RX ADMIN — REMDESIVIR 100 MG: 100 INJECTION, POWDER, LYOPHILIZED, FOR SOLUTION INTRAVENOUS at 03:02

## 2021-02-08 RX ADMIN — COLCHICINE 0.6 MG: 0.6 TABLET, FILM COATED ORAL at 08:02

## 2021-02-08 RX ADMIN — CEFTRIAXONE 1 G: 1 INJECTION, POWDER, FOR SOLUTION INTRAMUSCULAR; INTRAVENOUS at 09:02

## 2021-02-08 RX ADMIN — INSULIN ASPART 5 UNITS: 100 INJECTION, SOLUTION INTRAVENOUS; SUBCUTANEOUS at 04:02

## 2021-02-08 RX ADMIN — CEFEPIME 2 G: 2 INJECTION, POWDER, FOR SOLUTION INTRAVENOUS at 03:02

## 2021-02-08 RX ADMIN — POLYETHYLENE GLYCOL 3350 17 G: 17 POWDER, FOR SOLUTION ORAL at 10:02

## 2021-02-08 RX ADMIN — INSULIN ASPART 2 UNITS: 100 INJECTION, SOLUTION INTRAVENOUS; SUBCUTANEOUS at 09:02

## 2021-02-08 RX ADMIN — CEFEPIME 2 G: 2 INJECTION, POWDER, FOR SOLUTION INTRAVENOUS at 12:02

## 2021-02-08 RX ADMIN — CANDESARTAN CILEXETIL 8 MG: 8 TABLET ORAL at 08:02

## 2021-02-09 VITALS
SYSTOLIC BLOOD PRESSURE: 138 MMHG | DIASTOLIC BLOOD PRESSURE: 79 MMHG | WEIGHT: 185.44 LBS | HEIGHT: 64 IN | TEMPERATURE: 98 F | OXYGEN SATURATION: 95 % | HEART RATE: 82 BPM | BODY MASS INDEX: 31.66 KG/M2 | RESPIRATION RATE: 16 BRPM

## 2021-02-09 DIAGNOSIS — U07.1 COVID-19 VIRUS DETECTED: ICD-10-CM

## 2021-02-09 LAB
ALBUMIN SERPL BCP-MCNC: 2.6 G/DL (ref 3.5–5.2)
ALP SERPL-CCNC: 82 U/L (ref 55–135)
ALT SERPL W/O P-5'-P-CCNC: 11 U/L (ref 10–44)
ANION GAP SERPL CALC-SCNC: 9 MMOL/L (ref 8–16)
AST SERPL-CCNC: 20 U/L (ref 10–40)
BASOPHILS # BLD AUTO: 0.01 K/UL (ref 0–0.2)
BASOPHILS NFR BLD: 0.1 % (ref 0–1.9)
BILIRUB SERPL-MCNC: 0.2 MG/DL (ref 0.1–1)
BUN SERPL-MCNC: 18 MG/DL (ref 8–23)
CALCIUM SERPL-MCNC: 8.4 MG/DL (ref 8.7–10.5)
CHLORIDE SERPL-SCNC: 109 MMOL/L (ref 95–110)
CO2 SERPL-SCNC: 22 MMOL/L (ref 23–29)
CREAT SERPL-MCNC: 0.8 MG/DL (ref 0.5–1.4)
CRP SERPL-MCNC: 7.6 MG/L (ref 0–8.2)
D DIMER PPP IA.FEU-MCNC: 1.37 MG/L FEU
DIFFERENTIAL METHOD: ABNORMAL
EOSINOPHIL # BLD AUTO: 0 K/UL (ref 0–0.5)
EOSINOPHIL NFR BLD: 0 % (ref 0–8)
ERYTHROCYTE [DISTWIDTH] IN BLOOD BY AUTOMATED COUNT: 15.5 % (ref 11.5–14.5)
EST. GFR  (AFRICAN AMERICAN): >60 ML/MIN/1.73 M^2
EST. GFR  (NON AFRICAN AMERICAN): >60 ML/MIN/1.73 M^2
GLUCOSE SERPL-MCNC: 146 MG/DL (ref 70–110)
HCT VFR BLD AUTO: 27 % (ref 37–48.5)
HGB BLD-MCNC: 8.9 G/DL (ref 12–16)
IMM GRANULOCYTES # BLD AUTO: 0.04 K/UL (ref 0–0.04)
IMM GRANULOCYTES NFR BLD AUTO: 0.6 % (ref 0–0.5)
LYMPHOCYTES # BLD AUTO: 1.7 K/UL (ref 1–4.8)
LYMPHOCYTES NFR BLD: 25.6 % (ref 18–48)
MAGNESIUM SERPL-MCNC: 1.8 MG/DL (ref 1.6–2.6)
MCH RBC QN AUTO: 30.5 PG (ref 27–31)
MCHC RBC AUTO-ENTMCNC: 33 G/DL (ref 32–36)
MCV RBC AUTO: 93 FL (ref 82–98)
MONOCYTES # BLD AUTO: 0.8 K/UL (ref 0.3–1)
MONOCYTES NFR BLD: 11.5 % (ref 4–15)
NEUTROPHILS # BLD AUTO: 4.2 K/UL (ref 1.8–7.7)
NEUTROPHILS NFR BLD: 62.2 % (ref 38–73)
NRBC BLD-RTO: 0 /100 WBC
PHOSPHATE SERPL-MCNC: 3.1 MG/DL (ref 2.7–4.5)
PLATELET # BLD AUTO: 164 K/UL (ref 150–350)
PMV BLD AUTO: 9.8 FL (ref 9.2–12.9)
POCT GLUCOSE: 126 MG/DL (ref 70–110)
POCT GLUCOSE: 99 MG/DL (ref 70–110)
POTASSIUM SERPL-SCNC: 4 MMOL/L (ref 3.5–5.1)
PROT SERPL-MCNC: 5.8 G/DL (ref 6–8.4)
RBC # BLD AUTO: 2.92 M/UL (ref 4–5.4)
SODIUM SERPL-SCNC: 140 MMOL/L (ref 136–145)
WBC # BLD AUTO: 6.69 K/UL (ref 3.9–12.7)

## 2021-02-09 PROCEDURE — 25000003 PHARM REV CODE 250: Performed by: STUDENT IN AN ORGANIZED HEALTH CARE EDUCATION/TRAINING PROGRAM

## 2021-02-09 PROCEDURE — 99238 PR HOSPITAL DISCHARGE DAY,<30 MIN: ICD-10-PCS | Mod: ,,, | Performed by: HOSPITALIST

## 2021-02-09 PROCEDURE — 86140 C-REACTIVE PROTEIN: CPT

## 2021-02-09 PROCEDURE — 85379 FIBRIN DEGRADATION QUANT: CPT

## 2021-02-09 PROCEDURE — 63600175 PHARM REV CODE 636 W HCPCS: Performed by: STUDENT IN AN ORGANIZED HEALTH CARE EDUCATION/TRAINING PROGRAM

## 2021-02-09 PROCEDURE — 36415 COLL VENOUS BLD VENIPUNCTURE: CPT

## 2021-02-09 PROCEDURE — 99238 HOSP IP/OBS DSCHRG MGMT 30/<: CPT | Mod: ,,, | Performed by: HOSPITALIST

## 2021-02-09 PROCEDURE — 85025 COMPLETE CBC W/AUTO DIFF WBC: CPT

## 2021-02-09 PROCEDURE — 83735 ASSAY OF MAGNESIUM: CPT

## 2021-02-09 PROCEDURE — 94761 N-INVAS EAR/PLS OXIMETRY MLT: CPT

## 2021-02-09 PROCEDURE — 63700000 PHARM REV CODE 250 ALT 637 W/O HCPCS: Performed by: STUDENT IN AN ORGANIZED HEALTH CARE EDUCATION/TRAINING PROGRAM

## 2021-02-09 PROCEDURE — 84100 ASSAY OF PHOSPHORUS: CPT

## 2021-02-09 PROCEDURE — 80053 COMPREHEN METABOLIC PANEL: CPT

## 2021-02-09 RX ORDER — ASCORBIC ACID 500 MG
500 TABLET ORAL 2 TIMES DAILY
COMMUNITY
Start: 2021-02-09 | End: 2023-01-16

## 2021-02-09 RX ORDER — CANDESARTAN 8 MG/1
8 TABLET ORAL DAILY
Qty: 30 TABLET | Refills: 6 | Status: SHIPPED | OUTPATIENT
Start: 2021-02-09 | End: 2021-04-08

## 2021-02-09 RX ORDER — CYCLOBENZAPRINE HCL 5 MG
5 TABLET ORAL NIGHTLY
Qty: 10 TABLET | Refills: 0 | Status: CANCELLED | OUTPATIENT
Start: 2021-02-09 | End: 2021-02-19

## 2021-02-09 RX ORDER — CEFPODOXIME PROXETIL 200 MG/1
200 TABLET, FILM COATED ORAL EVERY 12 HOURS
Status: DISCONTINUED | OUTPATIENT
Start: 2021-02-09 | End: 2021-02-09 | Stop reason: HOSPADM

## 2021-02-09 RX ORDER — CHOLECALCIFEROL (VITAMIN D3) 25 MCG
1000 TABLET ORAL DAILY
Qty: 30 TABLET | Refills: 11 | Status: SHIPPED | OUTPATIENT
Start: 2021-02-09 | End: 2021-06-10 | Stop reason: SDUPTHER

## 2021-02-09 RX ORDER — CEFPODOXIME PROXETIL 200 MG/1
200 TABLET, FILM COATED ORAL 2 TIMES DAILY
Qty: 5 TABLET | Refills: 0 | Status: ON HOLD | OUTPATIENT
Start: 2021-02-09 | End: 2021-02-15 | Stop reason: HOSPADM

## 2021-02-09 RX ORDER — BENZONATATE 100 MG/1
100 CAPSULE ORAL 3 TIMES DAILY PRN
Qty: 30 CAPSULE | Refills: 0 | Status: SHIPPED | OUTPATIENT
Start: 2021-02-09 | End: 2021-02-19

## 2021-02-09 RX ORDER — NAPROXEN SODIUM 220 MG/1
81 TABLET, FILM COATED ORAL DAILY
Refills: 0 | Status: CANCELLED | OUTPATIENT
Start: 2021-02-09 | End: 2022-02-09

## 2021-02-09 RX ORDER — AZITHROMYCIN 250 MG/1
250 TABLET, FILM COATED ORAL DAILY
Qty: 2 TABLET | Refills: 0 | Status: ON HOLD | OUTPATIENT
Start: 2021-02-09 | End: 2021-02-15 | Stop reason: HOSPADM

## 2021-02-09 RX ORDER — ACETAMINOPHEN 325 MG/1
650 TABLET ORAL EVERY 8 HOURS PRN
Refills: 0 | Status: CANCELLED | OUTPATIENT
Start: 2021-02-09

## 2021-02-09 RX ADMIN — AZITHROMYCIN MONOHYDRATE 250 MG: 250 TABLET ORAL at 08:02

## 2021-02-09 RX ADMIN — POLYETHYLENE GLYCOL 3350 17 G: 17 POWDER, FOR SOLUTION ORAL at 08:02

## 2021-02-09 RX ADMIN — INSULIN ASPART 5 UNITS: 100 INJECTION, SOLUTION INTRAVENOUS; SUBCUTANEOUS at 08:02

## 2021-02-09 RX ADMIN — COLCHICINE 0.6 MG: 0.6 TABLET, FILM COATED ORAL at 08:02

## 2021-02-09 RX ADMIN — OXYCODONE HYDROCHLORIDE AND ACETAMINOPHEN 500 MG: 500 TABLET ORAL at 08:02

## 2021-02-09 RX ADMIN — CEFPODOXIME PROXETIL 200 MG: 200 TABLET, FILM COATED ORAL at 08:02

## 2021-02-09 RX ADMIN — INSULIN ASPART 5 UNITS: 100 INJECTION, SOLUTION INTRAVENOUS; SUBCUTANEOUS at 11:02

## 2021-02-09 RX ADMIN — OLANZAPINE 5 MG: 5 TABLET, FILM COATED ORAL at 08:02

## 2021-02-09 RX ADMIN — DEXAMETHASONE 6 MG: 4 TABLET ORAL at 08:02

## 2021-02-09 RX ADMIN — ASPIRIN 81 MG: 81 TABLET, CHEWABLE ORAL at 08:02

## 2021-02-09 RX ADMIN — Medication 1000 UNITS: at 08:02

## 2021-02-12 LAB
BACTERIA BLD CULT: NORMAL
BACTERIA BLD CULT: NORMAL

## 2021-02-13 ENCOUNTER — HOSPITAL ENCOUNTER (OUTPATIENT)
Facility: HOSPITAL | Age: 66
Discharge: HOME HEALTH | End: 2021-02-15
Attending: EMERGENCY MEDICINE | Admitting: EMERGENCY MEDICINE
Payer: MEDICARE

## 2021-02-13 DIAGNOSIS — Z17.0 MALIGNANT NEOPLASM OF LOWER-INNER QUADRANT OF LEFT BREAST IN FEMALE, ESTROGEN RECEPTOR POSITIVE: ICD-10-CM

## 2021-02-13 DIAGNOSIS — U07.1 COVID-19 VIRUS INFECTION: Primary | ICD-10-CM

## 2021-02-13 DIAGNOSIS — J18.9 MULTIFOCAL PNEUMONIA: ICD-10-CM

## 2021-02-13 DIAGNOSIS — C50.312 MALIGNANT NEOPLASM OF LOWER-INNER QUADRANT OF LEFT BREAST IN FEMALE, ESTROGEN RECEPTOR POSITIVE: ICD-10-CM

## 2021-02-13 LAB
ALBUMIN SERPL BCP-MCNC: 2.8 G/DL (ref 3.5–5.2)
ALP SERPL-CCNC: 77 U/L (ref 55–135)
ALT SERPL W/O P-5'-P-CCNC: 14 U/L (ref 10–44)
ANION GAP SERPL CALC-SCNC: 10 MMOL/L (ref 8–16)
APTT BLDCRRT: 26.9 SEC (ref 21–32)
AST SERPL-CCNC: 23 U/L (ref 10–40)
BASOPHILS # BLD AUTO: 0.02 K/UL (ref 0–0.2)
BASOPHILS NFR BLD: 0.2 % (ref 0–1.9)
BILIRUB SERPL-MCNC: 0.4 MG/DL (ref 0.1–1)
BUN SERPL-MCNC: 14 MG/DL (ref 8–23)
CALCIUM SERPL-MCNC: 8.4 MG/DL (ref 8.7–10.5)
CHLORIDE SERPL-SCNC: 105 MMOL/L (ref 95–110)
CK SERPL-CCNC: 110 U/L (ref 20–180)
CO2 SERPL-SCNC: 22 MMOL/L (ref 23–29)
CREAT SERPL-MCNC: 0.9 MG/DL (ref 0.5–1.4)
CRP SERPL-MCNC: 24.4 MG/L (ref 0–8.2)
D DIMER PPP IA.FEU-MCNC: 2.19 MG/L FEU
DIFFERENTIAL METHOD: ABNORMAL
EOSINOPHIL # BLD AUTO: 0 K/UL (ref 0–0.5)
EOSINOPHIL NFR BLD: 0 % (ref 0–8)
ERYTHROCYTE [DISTWIDTH] IN BLOOD BY AUTOMATED COUNT: 15.9 % (ref 11.5–14.5)
ERYTHROCYTE [SEDIMENTATION RATE] IN BLOOD BY WESTERGREN METHOD: 35 MM/HR (ref 0–36)
EST. GFR  (AFRICAN AMERICAN): >60 ML/MIN/1.73 M^2
EST. GFR  (NON AFRICAN AMERICAN): >60 ML/MIN/1.73 M^2
FERRITIN SERPL-MCNC: 1134 NG/ML (ref 20–300)
GLUCOSE SERPL-MCNC: 165 MG/DL (ref 70–110)
HCT VFR BLD AUTO: 32.1 % (ref 37–48.5)
HGB BLD-MCNC: 10.5 G/DL (ref 12–16)
IMM GRANULOCYTES # BLD AUTO: 0.04 K/UL (ref 0–0.04)
IMM GRANULOCYTES NFR BLD AUTO: 0.5 % (ref 0–0.5)
INR PPP: 1.1 (ref 0.8–1.2)
LACTATE SERPL-SCNC: 2.3 MMOL/L (ref 0.5–2.2)
LACTATE SERPL-SCNC: 2.4 MMOL/L (ref 0.5–2.2)
LDH SERPL L TO P-CCNC: 243 U/L (ref 110–260)
LYMPHOCYTES # BLD AUTO: 1.6 K/UL (ref 1–4.8)
LYMPHOCYTES NFR BLD: 18.5 % (ref 18–48)
MCH RBC QN AUTO: 30.8 PG (ref 27–31)
MCHC RBC AUTO-ENTMCNC: 32.7 G/DL (ref 32–36)
MCV RBC AUTO: 94 FL (ref 82–98)
MONOCYTES # BLD AUTO: 1.2 K/UL (ref 0.3–1)
MONOCYTES NFR BLD: 13.1 % (ref 4–15)
NEUTROPHILS # BLD AUTO: 6 K/UL (ref 1.8–7.7)
NEUTROPHILS NFR BLD: 67.7 % (ref 38–73)
NRBC BLD-RTO: 0 /100 WBC
PLATELET # BLD AUTO: 215 K/UL (ref 150–350)
PMV BLD AUTO: 10.2 FL (ref 9.2–12.9)
POCT GLUCOSE: 158 MG/DL (ref 70–110)
POCT GLUCOSE: 175 MG/DL (ref 70–110)
POTASSIUM SERPL-SCNC: 4.4 MMOL/L (ref 3.5–5.1)
PROCALCITONIN SERPL IA-MCNC: 0.05 NG/ML
PROT SERPL-MCNC: 6.4 G/DL (ref 6–8.4)
PROTHROMBIN TIME: 11.8 SEC (ref 9–12.5)
RBC # BLD AUTO: 3.41 M/UL (ref 4–5.4)
SODIUM SERPL-SCNC: 137 MMOL/L (ref 136–145)
TROPONIN I SERPL DL<=0.01 NG/ML-MCNC: 0.01 NG/ML (ref 0–0.03)
WBC # BLD AUTO: 8.87 K/UL (ref 3.9–12.7)

## 2021-02-13 PROCEDURE — 84145 PROCALCITONIN (PCT): CPT

## 2021-02-13 PROCEDURE — 85379 FIBRIN DEGRADATION QUANT: CPT

## 2021-02-13 PROCEDURE — 63600175 PHARM REV CODE 636 W HCPCS: Performed by: STUDENT IN AN ORGANIZED HEALTH CARE EDUCATION/TRAINING PROGRAM

## 2021-02-13 PROCEDURE — 63600175 PHARM REV CODE 636 W HCPCS: Performed by: PHYSICIAN ASSISTANT

## 2021-02-13 PROCEDURE — 84484 ASSAY OF TROPONIN QUANT: CPT

## 2021-02-13 PROCEDURE — 99220 PR INITIAL OBSERVATION CARE,LEVL III: CPT | Mod: GC,,, | Performed by: STUDENT IN AN ORGANIZED HEALTH CARE EDUCATION/TRAINING PROGRAM

## 2021-02-13 PROCEDURE — 93005 ELECTROCARDIOGRAM TRACING: CPT

## 2021-02-13 PROCEDURE — 82550 ASSAY OF CK (CPK): CPT

## 2021-02-13 PROCEDURE — 96375 TX/PRO/DX INJ NEW DRUG ADDON: CPT | Performed by: EMERGENCY MEDICINE

## 2021-02-13 PROCEDURE — 96376 TX/PRO/DX INJ SAME DRUG ADON: CPT | Performed by: EMERGENCY MEDICINE

## 2021-02-13 PROCEDURE — 80047 BASIC METABLC PNL IONIZED CA: CPT | Mod: 59

## 2021-02-13 PROCEDURE — 99285 EMERGENCY DEPT VISIT HI MDM: CPT | Mod: CR,,, | Performed by: EMERGENCY MEDICINE

## 2021-02-13 PROCEDURE — 99285 PR EMERGENCY DEPT VISIT,LEVEL V: ICD-10-PCS | Mod: CR,,, | Performed by: EMERGENCY MEDICINE

## 2021-02-13 PROCEDURE — G0378 HOSPITAL OBSERVATION PER HR: HCPCS

## 2021-02-13 PROCEDURE — C9399 UNCLASSIFIED DRUGS OR BIOLOG: HCPCS | Performed by: STUDENT IN AN ORGANIZED HEALTH CARE EDUCATION/TRAINING PROGRAM

## 2021-02-13 PROCEDURE — 87040 BLOOD CULTURE FOR BACTERIA: CPT | Mod: 59

## 2021-02-13 PROCEDURE — 83615 LACTATE (LD) (LDH) ENZYME: CPT

## 2021-02-13 PROCEDURE — 83605 ASSAY OF LACTIC ACID: CPT | Mod: 91

## 2021-02-13 PROCEDURE — 85610 PROTHROMBIN TIME: CPT

## 2021-02-13 PROCEDURE — 99220 PR INITIAL OBSERVATION CARE,LEVL III: ICD-10-PCS | Mod: GC,,, | Performed by: STUDENT IN AN ORGANIZED HEALTH CARE EDUCATION/TRAINING PROGRAM

## 2021-02-13 PROCEDURE — 80053 COMPREHEN METABOLIC PANEL: CPT

## 2021-02-13 PROCEDURE — 25000003 PHARM REV CODE 250: Performed by: PHYSICIAN ASSISTANT

## 2021-02-13 PROCEDURE — 83605 ASSAY OF LACTIC ACID: CPT

## 2021-02-13 PROCEDURE — 99285 EMERGENCY DEPT VISIT HI MDM: CPT | Mod: 25

## 2021-02-13 PROCEDURE — 85652 RBC SED RATE AUTOMATED: CPT

## 2021-02-13 PROCEDURE — 85730 THROMBOPLASTIN TIME PARTIAL: CPT

## 2021-02-13 PROCEDURE — 96372 THER/PROPH/DIAG INJ SC/IM: CPT | Mod: 59 | Performed by: EMERGENCY MEDICINE

## 2021-02-13 PROCEDURE — 25000003 PHARM REV CODE 250: Performed by: STUDENT IN AN ORGANIZED HEALTH CARE EDUCATION/TRAINING PROGRAM

## 2021-02-13 PROCEDURE — 96361 HYDRATE IV INFUSION ADD-ON: CPT | Performed by: EMERGENCY MEDICINE

## 2021-02-13 PROCEDURE — 86140 C-REACTIVE PROTEIN: CPT

## 2021-02-13 PROCEDURE — 82728 ASSAY OF FERRITIN: CPT

## 2021-02-13 PROCEDURE — 85025 COMPLETE CBC W/AUTO DIFF WBC: CPT

## 2021-02-13 PROCEDURE — 93010 EKG 12-LEAD: ICD-10-PCS | Mod: ,,, | Performed by: INTERNAL MEDICINE

## 2021-02-13 PROCEDURE — 93010 ELECTROCARDIOGRAM REPORT: CPT | Mod: ,,, | Performed by: INTERNAL MEDICINE

## 2021-02-13 PROCEDURE — 36415 COLL VENOUS BLD VENIPUNCTURE: CPT

## 2021-02-13 PROCEDURE — 25500020 PHARM REV CODE 255: Performed by: EMERGENCY MEDICINE

## 2021-02-13 RX ORDER — INSULIN ASPART 100 [IU]/ML
0-5 INJECTION, SOLUTION INTRAVENOUS; SUBCUTANEOUS
Status: DISCONTINUED | OUTPATIENT
Start: 2021-02-13 | End: 2021-02-15 | Stop reason: HOSPADM

## 2021-02-13 RX ORDER — NAPROXEN SODIUM 220 MG/1
81 TABLET, FILM COATED ORAL DAILY
Status: DISCONTINUED | OUTPATIENT
Start: 2021-02-14 | End: 2021-02-15 | Stop reason: HOSPADM

## 2021-02-13 RX ORDER — IBUPROFEN 200 MG
16 TABLET ORAL
Status: DISCONTINUED | OUTPATIENT
Start: 2021-02-13 | End: 2021-02-15 | Stop reason: HOSPADM

## 2021-02-13 RX ORDER — GLUCAGON 1 MG
1 KIT INJECTION
Status: DISCONTINUED | OUTPATIENT
Start: 2021-02-13 | End: 2021-02-15 | Stop reason: HOSPADM

## 2021-02-13 RX ORDER — ASCORBIC ACID 500 MG
500 TABLET ORAL 2 TIMES DAILY
Status: DISCONTINUED | OUTPATIENT
Start: 2021-02-13 | End: 2021-02-15 | Stop reason: HOSPADM

## 2021-02-13 RX ORDER — SODIUM CHLORIDE 0.9 % (FLUSH) 0.9 %
10 SYRINGE (ML) INJECTION
Status: DISCONTINUED | OUTPATIENT
Start: 2021-02-13 | End: 2021-02-15 | Stop reason: HOSPADM

## 2021-02-13 RX ORDER — CHOLECALCIFEROL (VITAMIN D3) 25 MCG
1000 TABLET ORAL DAILY
Status: DISCONTINUED | OUTPATIENT
Start: 2021-02-14 | End: 2021-02-15 | Stop reason: HOSPADM

## 2021-02-13 RX ORDER — IBUPROFEN 200 MG
24 TABLET ORAL
Status: DISCONTINUED | OUTPATIENT
Start: 2021-02-13 | End: 2021-02-15 | Stop reason: HOSPADM

## 2021-02-13 RX ORDER — ENOXAPARIN SODIUM 100 MG/ML
40 INJECTION SUBCUTANEOUS EVERY 24 HOURS
Status: DISCONTINUED | OUTPATIENT
Start: 2021-02-13 | End: 2021-02-15 | Stop reason: HOSPADM

## 2021-02-13 RX ORDER — BENZONATATE 100 MG/1
100 CAPSULE ORAL 3 TIMES DAILY PRN
Status: DISCONTINUED | OUTPATIENT
Start: 2021-02-13 | End: 2021-02-15 | Stop reason: HOSPADM

## 2021-02-13 RX ORDER — ACETAMINOPHEN 325 MG/1
650 TABLET ORAL EVERY 8 HOURS PRN
Status: DISCONTINUED | OUTPATIENT
Start: 2021-02-13 | End: 2021-02-15 | Stop reason: HOSPADM

## 2021-02-13 RX ORDER — INSULIN ASPART 100 [IU]/ML
4 INJECTION, SOLUTION INTRAVENOUS; SUBCUTANEOUS
Status: DISCONTINUED | OUTPATIENT
Start: 2021-02-13 | End: 2021-02-14

## 2021-02-13 RX ADMIN — DEXAMETHASONE 6 MG: 4 TABLET ORAL at 05:02

## 2021-02-13 RX ADMIN — INSULIN DETEMIR 8 UNITS: 100 INJECTION, SOLUTION SUBCUTANEOUS at 08:02

## 2021-02-13 RX ADMIN — IOHEXOL 100 ML: 350 INJECTION, SOLUTION INTRAVENOUS at 12:02

## 2021-02-13 RX ADMIN — SODIUM CHLORIDE, SODIUM LACTATE, POTASSIUM CHLORIDE, AND CALCIUM CHLORIDE 500 ML: .6; .31; .03; .02 INJECTION, SOLUTION INTRAVENOUS at 07:02

## 2021-02-13 RX ADMIN — OXYCODONE HYDROCHLORIDE AND ACETAMINOPHEN 500 MG: 500 TABLET ORAL at 08:02

## 2021-02-13 RX ADMIN — PIPERACILLIN AND TAZOBACTAM 4.5 G: 4; .5 INJECTION, POWDER, LYOPHILIZED, FOR SOLUTION INTRAVENOUS; PARENTERAL at 11:02

## 2021-02-13 RX ADMIN — PIPERACILLIN AND TAZOBACTAM 4.5 G: 4; .5 INJECTION, POWDER, LYOPHILIZED, FOR SOLUTION INTRAVENOUS; PARENTERAL at 03:02

## 2021-02-13 RX ADMIN — SODIUM CHLORIDE, SODIUM LACTATE, POTASSIUM CHLORIDE, AND CALCIUM CHLORIDE 500 ML: .6; .31; .03; .02 INJECTION, SOLUTION INTRAVENOUS at 05:02

## 2021-02-13 RX ADMIN — ENOXAPARIN SODIUM 40 MG: 40 INJECTION SUBCUTANEOUS at 05:02

## 2021-02-13 RX ADMIN — SODIUM CHLORIDE 500 ML: 0.9 INJECTION, SOLUTION INTRAVENOUS at 12:02

## 2021-02-13 RX ADMIN — INSULIN ASPART 4 UNITS: 100 INJECTION, SOLUTION INTRAVENOUS; SUBCUTANEOUS at 06:02

## 2021-02-13 RX ADMIN — VANCOMYCIN HYDROCHLORIDE 1500 MG: 1.5 INJECTION, POWDER, LYOPHILIZED, FOR SOLUTION INTRAVENOUS at 03:02

## 2021-02-14 LAB
ALBUMIN SERPL BCP-MCNC: 2.7 G/DL (ref 3.5–5.2)
ALP SERPL-CCNC: 71 U/L (ref 55–135)
ALT SERPL W/O P-5'-P-CCNC: 14 U/L (ref 10–44)
ANION GAP SERPL CALC-SCNC: 7 MMOL/L (ref 8–16)
ANISOCYTOSIS BLD QL SMEAR: SLIGHT
AST SERPL-CCNC: 22 U/L (ref 10–40)
BASOPHILS # BLD AUTO: 0.01 K/UL (ref 0–0.2)
BASOPHILS NFR BLD: 0.2 % (ref 0–1.9)
BILIRUB SERPL-MCNC: 0.3 MG/DL (ref 0.1–1)
BUN SERPL-MCNC: 16 MG/DL (ref 8–23)
CALCIUM SERPL-MCNC: 8.3 MG/DL (ref 8.7–10.5)
CHLORIDE SERPL-SCNC: 104 MMOL/L (ref 95–110)
CO2 SERPL-SCNC: 23 MMOL/L (ref 23–29)
CREAT SERPL-MCNC: 1 MG/DL (ref 0.5–1.4)
DACRYOCYTES BLD QL SMEAR: ABNORMAL
DIFFERENTIAL METHOD: ABNORMAL
EOSINOPHIL # BLD AUTO: 0 K/UL (ref 0–0.5)
EOSINOPHIL NFR BLD: 0 % (ref 0–8)
ERYTHROCYTE [DISTWIDTH] IN BLOOD BY AUTOMATED COUNT: 15.5 % (ref 11.5–14.5)
EST. GFR  (AFRICAN AMERICAN): >60 ML/MIN/1.73 M^2
EST. GFR  (NON AFRICAN AMERICAN): 58.8 ML/MIN/1.73 M^2
GLUCOSE SERPL-MCNC: 242 MG/DL (ref 70–110)
HCT VFR BLD AUTO: 28.6 % (ref 37–48.5)
HGB BLD-MCNC: 9.2 G/DL (ref 12–16)
HYPOCHROMIA BLD QL SMEAR: ABNORMAL
IMM GRANULOCYTES # BLD AUTO: 0.02 K/UL (ref 0–0.04)
IMM GRANULOCYTES NFR BLD AUTO: 0.4 % (ref 0–0.5)
LACTATE SERPL-SCNC: 0.8 MMOL/L (ref 0.5–2.2)
LYMPHOCYTES # BLD AUTO: 0.9 K/UL (ref 1–4.8)
LYMPHOCYTES NFR BLD: 18.2 % (ref 18–48)
MAGNESIUM SERPL-MCNC: 1.8 MG/DL (ref 1.6–2.6)
MCH RBC QN AUTO: 30.1 PG (ref 27–31)
MCHC RBC AUTO-ENTMCNC: 32.2 G/DL (ref 32–36)
MCV RBC AUTO: 94 FL (ref 82–98)
MONOCYTES # BLD AUTO: 0.4 K/UL (ref 0.3–1)
MONOCYTES NFR BLD: 7.9 % (ref 4–15)
NEUTROPHILS # BLD AUTO: 3.6 K/UL (ref 1.8–7.7)
NEUTROPHILS NFR BLD: 73.3 % (ref 38–73)
NRBC BLD-RTO: 0 /100 WBC
OVALOCYTES BLD QL SMEAR: ABNORMAL
PHOSPHATE SERPL-MCNC: 4.3 MG/DL (ref 2.7–4.5)
PLATELET # BLD AUTO: 174 K/UL (ref 150–350)
PLATELET BLD QL SMEAR: ABNORMAL
PMV BLD AUTO: 10.4 FL (ref 9.2–12.9)
POCT GLUCOSE: 199 MG/DL (ref 70–110)
POCT GLUCOSE: 210 MG/DL (ref 70–110)
POCT GLUCOSE: 226 MG/DL (ref 70–110)
POCT GLUCOSE: 303 MG/DL (ref 70–110)
POIKILOCYTOSIS BLD QL SMEAR: SLIGHT
POTASSIUM SERPL-SCNC: 4.8 MMOL/L (ref 3.5–5.1)
PROT SERPL-MCNC: 6.4 G/DL (ref 6–8.4)
RBC # BLD AUTO: 3.06 M/UL (ref 4–5.4)
SODIUM SERPL-SCNC: 134 MMOL/L (ref 136–145)
WBC # BLD AUTO: 4.94 K/UL (ref 3.9–12.7)

## 2021-02-14 PROCEDURE — 80053 COMPREHEN METABOLIC PANEL: CPT

## 2021-02-14 PROCEDURE — 85025 COMPLETE CBC W/AUTO DIFF WBC: CPT

## 2021-02-14 PROCEDURE — G0378 HOSPITAL OBSERVATION PER HR: HCPCS

## 2021-02-14 PROCEDURE — 84100 ASSAY OF PHOSPHORUS: CPT

## 2021-02-14 PROCEDURE — 63600175 PHARM REV CODE 636 W HCPCS: Performed by: STUDENT IN AN ORGANIZED HEALTH CARE EDUCATION/TRAINING PROGRAM

## 2021-02-14 PROCEDURE — 25000003 PHARM REV CODE 250: Performed by: STUDENT IN AN ORGANIZED HEALTH CARE EDUCATION/TRAINING PROGRAM

## 2021-02-14 PROCEDURE — 96365 THER/PROPH/DIAG IV INF INIT: CPT | Performed by: EMERGENCY MEDICINE

## 2021-02-14 PROCEDURE — 36415 COLL VENOUS BLD VENIPUNCTURE: CPT

## 2021-02-14 PROCEDURE — 83735 ASSAY OF MAGNESIUM: CPT

## 2021-02-14 PROCEDURE — 96366 THER/PROPH/DIAG IV INF ADDON: CPT | Mod: 59 | Performed by: EMERGENCY MEDICINE

## 2021-02-14 PROCEDURE — 83605 ASSAY OF LACTIC ACID: CPT

## 2021-02-14 PROCEDURE — C9399 UNCLASSIFIED DRUGS OR BIOLOG: HCPCS | Performed by: STUDENT IN AN ORGANIZED HEALTH CARE EDUCATION/TRAINING PROGRAM

## 2021-02-14 PROCEDURE — 96376 TX/PRO/DX INJ SAME DRUG ADON: CPT | Performed by: EMERGENCY MEDICINE

## 2021-02-14 PROCEDURE — 96372 THER/PROPH/DIAG INJ SC/IM: CPT | Mod: 59 | Performed by: EMERGENCY MEDICINE

## 2021-02-14 PROCEDURE — 99225 PR SUBSEQUENT OBSERVATION CARE,LEVEL II: CPT | Mod: GC,,, | Performed by: STUDENT IN AN ORGANIZED HEALTH CARE EDUCATION/TRAINING PROGRAM

## 2021-02-14 PROCEDURE — 96367 TX/PROPH/DG ADDL SEQ IV INF: CPT | Mod: 59 | Performed by: EMERGENCY MEDICINE

## 2021-02-14 PROCEDURE — 99225 PR SUBSEQUENT OBSERVATION CARE,LEVEL II: ICD-10-PCS | Mod: GC,,, | Performed by: STUDENT IN AN ORGANIZED HEALTH CARE EDUCATION/TRAINING PROGRAM

## 2021-02-14 RX ORDER — MAGNESIUM SULFATE HEPTAHYDRATE 40 MG/ML
2 INJECTION, SOLUTION INTRAVENOUS ONCE
Status: COMPLETED | OUTPATIENT
Start: 2021-02-14 | End: 2021-02-14

## 2021-02-14 RX ORDER — INSULIN ASPART 100 [IU]/ML
5 INJECTION, SOLUTION INTRAVENOUS; SUBCUTANEOUS
Status: DISCONTINUED | OUTPATIENT
Start: 2021-02-14 | End: 2021-02-15 | Stop reason: HOSPADM

## 2021-02-14 RX ADMIN — ACETAMINOPHEN 650 MG: 325 TABLET ORAL at 04:02

## 2021-02-14 RX ADMIN — Medication 1000 UNITS: at 08:02

## 2021-02-14 RX ADMIN — DEXAMETHASONE 6 MG: 4 TABLET ORAL at 08:02

## 2021-02-14 RX ADMIN — OXYCODONE HYDROCHLORIDE AND ACETAMINOPHEN 500 MG: 500 TABLET ORAL at 08:02

## 2021-02-14 RX ADMIN — INSULIN ASPART 1 UNITS: 100 INJECTION, SOLUTION INTRAVENOUS; SUBCUTANEOUS at 08:02

## 2021-02-14 RX ADMIN — INSULIN DETEMIR 5 UNITS: 100 INJECTION, SOLUTION SUBCUTANEOUS at 08:02

## 2021-02-14 RX ADMIN — MAGNESIUM SULFATE 2 G: 2 INJECTION INTRAVENOUS at 02:02

## 2021-02-14 RX ADMIN — PIPERACILLIN AND TAZOBACTAM 4.5 G: 4; .5 INJECTION, POWDER, LYOPHILIZED, FOR SOLUTION INTRAVENOUS; PARENTERAL at 11:02

## 2021-02-14 RX ADMIN — INSULIN ASPART 5 UNITS: 100 INJECTION, SOLUTION INTRAVENOUS; SUBCUTANEOUS at 12:02

## 2021-02-14 RX ADMIN — INSULIN ASPART 5 UNITS: 100 INJECTION, SOLUTION INTRAVENOUS; SUBCUTANEOUS at 08:02

## 2021-02-14 RX ADMIN — REMDESIVIR 200 MG: 100 INJECTION, POWDER, LYOPHILIZED, FOR SOLUTION INTRAVENOUS at 04:02

## 2021-02-14 RX ADMIN — VANCOMYCIN HYDROCHLORIDE 1500 MG: 1.5 INJECTION, POWDER, LYOPHILIZED, FOR SOLUTION INTRAVENOUS at 02:02

## 2021-02-14 RX ADMIN — INSULIN ASPART 5 UNITS: 100 INJECTION, SOLUTION INTRAVENOUS; SUBCUTANEOUS at 04:02

## 2021-02-14 RX ADMIN — BENZONATATE 100 MG: 100 CAPSULE ORAL at 11:02

## 2021-02-14 RX ADMIN — PIPERACILLIN AND TAZOBACTAM 4.5 G: 4; .5 INJECTION, POWDER, LYOPHILIZED, FOR SOLUTION INTRAVENOUS; PARENTERAL at 03:02

## 2021-02-14 RX ADMIN — ENOXAPARIN SODIUM 40 MG: 40 INJECTION SUBCUTANEOUS at 04:02

## 2021-02-14 RX ADMIN — THERA TABS 1 TABLET: TAB at 08:02

## 2021-02-14 RX ADMIN — ASPIRIN 81 MG: 81 TABLET, CHEWABLE ORAL at 08:02

## 2021-02-14 RX ADMIN — INSULIN ASPART 2 UNITS: 100 INJECTION, SOLUTION INTRAVENOUS; SUBCUTANEOUS at 12:02

## 2021-02-14 RX ADMIN — PIPERACILLIN AND TAZOBACTAM 4.5 G: 4; .5 INJECTION, POWDER, LYOPHILIZED, FOR SOLUTION INTRAVENOUS; PARENTERAL at 07:02

## 2021-02-14 RX ADMIN — INSULIN ASPART 4 UNITS: 100 INJECTION, SOLUTION INTRAVENOUS; SUBCUTANEOUS at 04:02

## 2021-02-15 ENCOUNTER — TELEPHONE (OUTPATIENT)
Dept: HEMATOLOGY/ONCOLOGY | Facility: CLINIC | Age: 66
End: 2021-02-15

## 2021-02-15 VITALS
HEART RATE: 80 BPM | SYSTOLIC BLOOD PRESSURE: 132 MMHG | TEMPERATURE: 98 F | RESPIRATION RATE: 22 BRPM | OXYGEN SATURATION: 96 % | BODY MASS INDEX: 29.88 KG/M2 | HEIGHT: 64 IN | WEIGHT: 175 LBS | DIASTOLIC BLOOD PRESSURE: 65 MMHG

## 2021-02-15 LAB
ALBUMIN SERPL BCP-MCNC: 2.4 G/DL (ref 3.5–5.2)
ALP SERPL-CCNC: 67 U/L (ref 55–135)
ALT SERPL W/O P-5'-P-CCNC: 13 U/L (ref 10–44)
ANION GAP SERPL CALC-SCNC: 11 MMOL/L (ref 8–16)
ANISOCYTOSIS BLD QL SMEAR: SLIGHT
AST SERPL-CCNC: 21 U/L (ref 10–40)
BASOPHILS # BLD AUTO: 0.01 K/UL (ref 0–0.2)
BASOPHILS NFR BLD: 0.2 % (ref 0–1.9)
BILIRUB SERPL-MCNC: 0.2 MG/DL (ref 0.1–1)
BUN SERPL-MCNC: 13 MG/DL (ref 6–30)
BUN SERPL-MCNC: 15 MG/DL (ref 8–23)
CALCIUM SERPL-MCNC: 8.3 MG/DL (ref 8.7–10.5)
CHLORIDE SERPL-SCNC: 103 MMOL/L (ref 95–110)
CHLORIDE SERPL-SCNC: 107 MMOL/L (ref 95–110)
CO2 SERPL-SCNC: 19 MMOL/L (ref 23–29)
CREAT SERPL-MCNC: 0.8 MG/DL (ref 0.5–1.4)
CREAT SERPL-MCNC: 1 MG/DL (ref 0.5–1.4)
CRP SERPL-MCNC: 14.2 MG/L (ref 0–8.2)
D DIMER PPP IA.FEU-MCNC: 0.84 MG/L FEU
DIFFERENTIAL METHOD: ABNORMAL
EOSINOPHIL # BLD AUTO: 0 K/UL (ref 0–0.5)
EOSINOPHIL NFR BLD: 0 % (ref 0–8)
ERYTHROCYTE [DISTWIDTH] IN BLOOD BY AUTOMATED COUNT: 15.2 % (ref 11.5–14.5)
EST. GFR  (AFRICAN AMERICAN): >60 ML/MIN/1.73 M^2
EST. GFR  (NON AFRICAN AMERICAN): 58.8 ML/MIN/1.73 M^2
FERRITIN SERPL-MCNC: 1143 NG/ML (ref 20–300)
GLUCOSE SERPL-MCNC: 158 MG/DL (ref 70–110)
GLUCOSE SERPL-MCNC: 186 MG/DL (ref 70–110)
HCT VFR BLD AUTO: 26.5 % (ref 37–48.5)
HCT VFR BLD CALC: 26 %PCV (ref 36–54)
HGB BLD-MCNC: 8.8 G/DL (ref 12–16)
IMM GRANULOCYTES # BLD AUTO: 0.01 K/UL (ref 0–0.04)
IMM GRANULOCYTES NFR BLD AUTO: 0.2 % (ref 0–0.5)
LYMPHOCYTES # BLD AUTO: 1.5 K/UL (ref 1–4.8)
LYMPHOCYTES NFR BLD: 27.8 % (ref 18–48)
MAGNESIUM SERPL-MCNC: 1.9 MG/DL (ref 1.6–2.6)
MCH RBC QN AUTO: 30.1 PG (ref 27–31)
MCHC RBC AUTO-ENTMCNC: 33.2 G/DL (ref 32–36)
MCV RBC AUTO: 91 FL (ref 82–98)
MONOCYTES # BLD AUTO: 0.8 K/UL (ref 0.3–1)
MONOCYTES NFR BLD: 13.5 % (ref 4–15)
NEUTROPHILS # BLD AUTO: 3.2 K/UL (ref 1.8–7.7)
NEUTROPHILS NFR BLD: 58.3 % (ref 38–73)
NRBC BLD-RTO: 0 /100 WBC
OVALOCYTES BLD QL SMEAR: ABNORMAL
PHOSPHATE SERPL-MCNC: 3 MG/DL (ref 2.7–4.5)
PLATELET # BLD AUTO: 188 K/UL (ref 150–350)
PMV BLD AUTO: 10.2 FL (ref 9.2–12.9)
POC IONIZED CALCIUM: 1.13 MMOL/L (ref 1.06–1.42)
POC TCO2 (MEASURED): 26 MMOL/L (ref 23–29)
POCT GLUCOSE: 137 MG/DL (ref 70–110)
POCT GLUCOSE: 172 MG/DL (ref 70–110)
POIKILOCYTOSIS BLD QL SMEAR: SLIGHT
POLYCHROMASIA BLD QL SMEAR: ABNORMAL
POTASSIUM BLD-SCNC: 4.3 MMOL/L (ref 3.5–5.1)
POTASSIUM SERPL-SCNC: 4 MMOL/L (ref 3.5–5.1)
PROT SERPL-MCNC: 6.1 G/DL (ref 6–8.4)
RBC # BLD AUTO: 2.92 M/UL (ref 4–5.4)
SAMPLE: ABNORMAL
SODIUM BLD-SCNC: 137 MMOL/L (ref 136–145)
SODIUM SERPL-SCNC: 137 MMOL/L (ref 136–145)
VANCOMYCIN TROUGH SERPL-MCNC: 8.2 UG/ML (ref 10–22)
WBC # BLD AUTO: 5.54 K/UL (ref 3.9–12.7)

## 2021-02-15 PROCEDURE — 80202 ASSAY OF VANCOMYCIN: CPT

## 2021-02-15 PROCEDURE — 63600175 PHARM REV CODE 636 W HCPCS: Performed by: STUDENT IN AN ORGANIZED HEALTH CARE EDUCATION/TRAINING PROGRAM

## 2021-02-15 PROCEDURE — 85025 COMPLETE CBC W/AUTO DIFF WBC: CPT

## 2021-02-15 PROCEDURE — C9399 UNCLASSIFIED DRUGS OR BIOLOG: HCPCS | Performed by: STUDENT IN AN ORGANIZED HEALTH CARE EDUCATION/TRAINING PROGRAM

## 2021-02-15 PROCEDURE — 84100 ASSAY OF PHOSPHORUS: CPT

## 2021-02-15 PROCEDURE — 80053 COMPREHEN METABOLIC PANEL: CPT

## 2021-02-15 PROCEDURE — 82728 ASSAY OF FERRITIN: CPT

## 2021-02-15 PROCEDURE — 25000003 PHARM REV CODE 250: Performed by: STUDENT IN AN ORGANIZED HEALTH CARE EDUCATION/TRAINING PROGRAM

## 2021-02-15 PROCEDURE — 97161 PT EVAL LOW COMPLEX 20 MIN: CPT

## 2021-02-15 PROCEDURE — 85379 FIBRIN DEGRADATION QUANT: CPT

## 2021-02-15 PROCEDURE — 36415 COLL VENOUS BLD VENIPUNCTURE: CPT

## 2021-02-15 PROCEDURE — 99217 PR OBSERVATION CARE DISCHARGE: CPT | Mod: GC,,, | Performed by: STUDENT IN AN ORGANIZED HEALTH CARE EDUCATION/TRAINING PROGRAM

## 2021-02-15 PROCEDURE — 99217 PR OBSERVATION CARE DISCHARGE: ICD-10-PCS | Mod: GC,,, | Performed by: STUDENT IN AN ORGANIZED HEALTH CARE EDUCATION/TRAINING PROGRAM

## 2021-02-15 PROCEDURE — 97535 SELF CARE MNGMENT TRAINING: CPT

## 2021-02-15 PROCEDURE — G0378 HOSPITAL OBSERVATION PER HR: HCPCS

## 2021-02-15 PROCEDURE — 97165 OT EVAL LOW COMPLEX 30 MIN: CPT

## 2021-02-15 PROCEDURE — 97116 GAIT TRAINING THERAPY: CPT

## 2021-02-15 PROCEDURE — 86140 C-REACTIVE PROTEIN: CPT

## 2021-02-15 PROCEDURE — 96372 THER/PROPH/DIAG INJ SC/IM: CPT | Mod: 59 | Performed by: EMERGENCY MEDICINE

## 2021-02-15 PROCEDURE — 83735 ASSAY OF MAGNESIUM: CPT

## 2021-02-15 PROCEDURE — 96376 TX/PRO/DX INJ SAME DRUG ADON: CPT | Performed by: EMERGENCY MEDICINE

## 2021-02-15 RX ORDER — HEPARIN 100 UNIT/ML
1 SYRINGE INTRAVENOUS ONCE
Status: COMPLETED | OUTPATIENT
Start: 2021-02-15 | End: 2021-02-15

## 2021-02-15 RX ORDER — LEVOFLOXACIN 750 MG/1
750 TABLET ORAL DAILY
Qty: 4 TABLET | Refills: 0 | Status: ON HOLD | OUTPATIENT
Start: 2021-02-15 | End: 2021-03-03 | Stop reason: HOSPADM

## 2021-02-15 RX ADMIN — Medication 1000 UNITS: at 08:02

## 2021-02-15 RX ADMIN — DEXAMETHASONE 6 MG: 4 TABLET ORAL at 08:02

## 2021-02-15 RX ADMIN — INSULIN ASPART 5 UNITS: 100 INJECTION, SOLUTION INTRAVENOUS; SUBCUTANEOUS at 08:02

## 2021-02-15 RX ADMIN — OXYCODONE HYDROCHLORIDE AND ACETAMINOPHEN 500 MG: 500 TABLET ORAL at 08:02

## 2021-02-15 RX ADMIN — INSULIN ASPART 5 UNITS: 100 INJECTION, SOLUTION INTRAVENOUS; SUBCUTANEOUS at 12:02

## 2021-02-15 RX ADMIN — ASPIRIN 81 MG: 81 TABLET, CHEWABLE ORAL at 08:02

## 2021-02-15 RX ADMIN — REMDESIVIR 100 MG: 100 INJECTION, POWDER, LYOPHILIZED, FOR SOLUTION INTRAVENOUS at 08:02

## 2021-02-15 RX ADMIN — INSULIN DETEMIR 5 UNITS: 100 INJECTION, SOLUTION SUBCUTANEOUS at 08:02

## 2021-02-15 RX ADMIN — PIPERACILLIN AND TAZOBACTAM 4.5 G: 4; .5 INJECTION, POWDER, LYOPHILIZED, FOR SOLUTION INTRAVENOUS; PARENTERAL at 08:02

## 2021-02-15 RX ADMIN — THERA TABS 1 TABLET: TAB at 08:02

## 2021-02-15 RX ADMIN — DEXAMETHASONE 6 MG: 4 TABLET ORAL at 02:02

## 2021-02-15 RX ADMIN — HEPARIN 100 UNITS: 100 SYRINGE at 02:02

## 2021-02-18 LAB
BACTERIA BLD CULT: NORMAL
BACTERIA BLD CULT: NORMAL

## 2021-02-25 ENCOUNTER — OFFICE VISIT (OUTPATIENT)
Dept: HEMATOLOGY/ONCOLOGY | Facility: CLINIC | Age: 66
End: 2021-02-25
Payer: MEDICARE

## 2021-02-25 ENCOUNTER — TELEPHONE (OUTPATIENT)
Dept: HEMATOLOGY/ONCOLOGY | Facility: CLINIC | Age: 66
End: 2021-02-25

## 2021-02-25 ENCOUNTER — OFFICE VISIT (OUTPATIENT)
Dept: RADIATION ONCOLOGY | Facility: CLINIC | Age: 66
End: 2021-02-25
Payer: MEDICARE

## 2021-02-25 VITALS
TEMPERATURE: 98 F | RESPIRATION RATE: 16 BRPM | DIASTOLIC BLOOD PRESSURE: 67 MMHG | OXYGEN SATURATION: 97 % | BODY MASS INDEX: 31.69 KG/M2 | WEIGHT: 185.63 LBS | HEIGHT: 64 IN | SYSTOLIC BLOOD PRESSURE: 124 MMHG | HEART RATE: 114 BPM

## 2021-02-25 VITALS
TEMPERATURE: 98 F | SYSTOLIC BLOOD PRESSURE: 124 MMHG | HEART RATE: 114 BPM | RESPIRATION RATE: 16 BRPM | WEIGHT: 185.63 LBS | DIASTOLIC BLOOD PRESSURE: 67 MMHG | OXYGEN SATURATION: 97 % | BODY MASS INDEX: 31.69 KG/M2 | HEIGHT: 64 IN

## 2021-02-25 DIAGNOSIS — Z17.0 MALIGNANT NEOPLASM OF LOWER-INNER QUADRANT OF LEFT BREAST IN FEMALE, ESTROGEN RECEPTOR POSITIVE: Primary | ICD-10-CM

## 2021-02-25 DIAGNOSIS — C50.312 MALIGNANT NEOPLASM OF LOWER-INNER QUADRANT OF LEFT BREAST IN FEMALE, ESTROGEN RECEPTOR POSITIVE: Primary | ICD-10-CM

## 2021-02-25 DIAGNOSIS — Z79.811 LONG TERM CURRENT USE OF AROMATASE INHIBITOR: ICD-10-CM

## 2021-02-25 PROCEDURE — 1159F MED LIST DOCD IN RCRD: CPT | Mod: S$GLB,,, | Performed by: INTERNAL MEDICINE

## 2021-02-25 PROCEDURE — 3078F PR MOST RECENT DIASTOLIC BLOOD PRESSURE < 80 MM HG: ICD-10-PCS | Mod: CPTII,S$GLB,, | Performed by: INTERNAL MEDICINE

## 2021-02-25 PROCEDURE — 1125F AMNT PAIN NOTED PAIN PRSNT: CPT | Mod: S$GLB,,, | Performed by: INTERNAL MEDICINE

## 2021-02-25 PROCEDURE — 99499 NO LOS: ICD-10-PCS | Mod: S$GLB,,, | Performed by: RADIOLOGY

## 2021-02-25 PROCEDURE — 99214 OFFICE O/P EST MOD 30 MIN: CPT | Mod: S$GLB,,, | Performed by: INTERNAL MEDICINE

## 2021-02-25 PROCEDURE — 1101F PR PT FALLS ASSESS DOC 0-1 FALLS W/OUT INJ PAST YR: ICD-10-PCS | Mod: CPTII,S$GLB,, | Performed by: INTERNAL MEDICINE

## 2021-02-25 PROCEDURE — 99214 PR OFFICE/OUTPT VISIT, EST, LEVL IV, 30-39 MIN: ICD-10-PCS | Mod: S$GLB,,, | Performed by: INTERNAL MEDICINE

## 2021-02-25 PROCEDURE — 3008F PR BODY MASS INDEX (BMI) DOCUMENTED: ICD-10-PCS | Mod: CPTII,S$GLB,, | Performed by: INTERNAL MEDICINE

## 2021-02-25 PROCEDURE — 3078F DIAST BP <80 MM HG: CPT | Mod: CPTII,S$GLB,, | Performed by: INTERNAL MEDICINE

## 2021-02-25 PROCEDURE — 1159F PR MEDICATION LIST DOCUMENTED IN MEDICAL RECORD: ICD-10-PCS | Mod: S$GLB,,, | Performed by: INTERNAL MEDICINE

## 2021-02-25 PROCEDURE — 99499 UNLISTED E&M SERVICE: CPT | Mod: S$GLB,,, | Performed by: RADIOLOGY

## 2021-02-25 PROCEDURE — 99999 PR PBB SHADOW E&M-EST. PATIENT-LVL V: CPT | Mod: PBBFAC,,, | Performed by: RADIOLOGY

## 2021-02-25 PROCEDURE — 99999 PR PBB SHADOW E&M-EST. PATIENT-LVL V: CPT | Mod: PBBFAC,,, | Performed by: INTERNAL MEDICINE

## 2021-02-25 PROCEDURE — 3008F BODY MASS INDEX DOCD: CPT | Mod: CPTII,S$GLB,, | Performed by: INTERNAL MEDICINE

## 2021-02-25 PROCEDURE — 3288F PR FALLS RISK ASSESSMENT DOCUMENTED: ICD-10-PCS | Mod: CPTII,S$GLB,, | Performed by: RADIOLOGY

## 2021-02-25 PROCEDURE — 99999 PR PBB SHADOW E&M-EST. PATIENT-LVL V: ICD-10-PCS | Mod: PBBFAC,,, | Performed by: RADIOLOGY

## 2021-02-25 PROCEDURE — 3288F FALL RISK ASSESSMENT DOCD: CPT | Mod: CPTII,S$GLB,, | Performed by: INTERNAL MEDICINE

## 2021-02-25 PROCEDURE — 3288F FALL RISK ASSESSMENT DOCD: CPT | Mod: CPTII,S$GLB,, | Performed by: RADIOLOGY

## 2021-02-25 PROCEDURE — 3074F PR MOST RECENT SYSTOLIC BLOOD PRESSURE < 130 MM HG: ICD-10-PCS | Mod: CPTII,S$GLB,, | Performed by: INTERNAL MEDICINE

## 2021-02-25 PROCEDURE — 1125F PR PAIN SEVERITY QUANTIFIED, PAIN PRESENT: ICD-10-PCS | Mod: S$GLB,,, | Performed by: INTERNAL MEDICINE

## 2021-02-25 PROCEDURE — 1101F PR PT FALLS ASSESS DOC 0-1 FALLS W/OUT INJ PAST YR: ICD-10-PCS | Mod: CPTII,S$GLB,, | Performed by: RADIOLOGY

## 2021-02-25 PROCEDURE — 3288F PR FALLS RISK ASSESSMENT DOCUMENTED: ICD-10-PCS | Mod: CPTII,S$GLB,, | Performed by: INTERNAL MEDICINE

## 2021-02-25 PROCEDURE — 1101F PT FALLS ASSESS-DOCD LE1/YR: CPT | Mod: CPTII,S$GLB,, | Performed by: RADIOLOGY

## 2021-02-25 PROCEDURE — 99999 PR PBB SHADOW E&M-EST. PATIENT-LVL V: ICD-10-PCS | Mod: PBBFAC,,, | Performed by: INTERNAL MEDICINE

## 2021-02-25 PROCEDURE — 3008F PR BODY MASS INDEX (BMI) DOCUMENTED: ICD-10-PCS | Mod: CPTII,S$GLB,, | Performed by: RADIOLOGY

## 2021-02-25 PROCEDURE — 3074F SYST BP LT 130 MM HG: CPT | Mod: CPTII,S$GLB,, | Performed by: INTERNAL MEDICINE

## 2021-02-25 PROCEDURE — 1101F PT FALLS ASSESS-DOCD LE1/YR: CPT | Mod: CPTII,S$GLB,, | Performed by: INTERNAL MEDICINE

## 2021-02-25 PROCEDURE — 3008F BODY MASS INDEX DOCD: CPT | Mod: CPTII,S$GLB,, | Performed by: RADIOLOGY

## 2021-02-25 RX ORDER — LETROZOLE 2.5 MG/1
2.5 TABLET, FILM COATED ORAL DAILY
Qty: 30 TABLET | Refills: 11 | Status: ON HOLD | OUTPATIENT
Start: 2021-02-25 | End: 2021-03-03 | Stop reason: SDUPTHER

## 2021-02-26 ENCOUNTER — TELEPHONE (OUTPATIENT)
Dept: INFUSION THERAPY | Facility: HOSPITAL | Age: 66
End: 2021-02-26

## 2021-02-26 ENCOUNTER — TELEPHONE (OUTPATIENT)
Dept: HEMATOLOGY/ONCOLOGY | Facility: CLINIC | Age: 66
End: 2021-02-26

## 2021-03-02 ENCOUNTER — HOSPITAL ENCOUNTER (INPATIENT)
Facility: HOSPITAL | Age: 66
LOS: 5 days | Discharge: HOME HEALTH | DRG: 417 | End: 2021-03-09
Attending: EMERGENCY MEDICINE | Admitting: INTERNAL MEDICINE
Payer: MEDICARE

## 2021-03-02 DIAGNOSIS — K81.0 GANGRENOUS CHOLECYSTITIS: ICD-10-CM

## 2021-03-02 DIAGNOSIS — M25.551 RIGHT HIP PAIN: ICD-10-CM

## 2021-03-02 DIAGNOSIS — J18.9 HCAP (HEALTHCARE-ASSOCIATED PNEUMONIA): Primary | ICD-10-CM

## 2021-03-02 DIAGNOSIS — R74.01 TRANSAMINITIS: ICD-10-CM

## 2021-03-02 DIAGNOSIS — Z17.0 MALIGNANT NEOPLASM OF LOWER-INNER QUADRANT OF LEFT BREAST IN FEMALE, ESTROGEN RECEPTOR POSITIVE: ICD-10-CM

## 2021-03-02 DIAGNOSIS — K80.00 CALCULUS OF GALLBLADDER WITH ACUTE CHOLECYSTITIS WITHOUT OBSTRUCTION: ICD-10-CM

## 2021-03-02 DIAGNOSIS — C50.312 MALIGNANT NEOPLASM OF LOWER-INNER QUADRANT OF LEFT BREAST IN FEMALE, ESTROGEN RECEPTOR POSITIVE: ICD-10-CM

## 2021-03-02 DIAGNOSIS — R07.9 CHEST PAIN: ICD-10-CM

## 2021-03-02 DIAGNOSIS — E66.09 OBESITY DUE TO EXCESS CALORIES, UNSPECIFIED CLASSIFICATION, UNSPECIFIED WHETHER SERIOUS COMORBIDITY PRESENT: ICD-10-CM

## 2021-03-02 DIAGNOSIS — R79.89 TROPONIN LEVEL ELEVATED: ICD-10-CM

## 2021-03-02 DIAGNOSIS — R60.9 SWELLING: ICD-10-CM

## 2021-03-02 DIAGNOSIS — R06.02 SOB (SHORTNESS OF BREATH): ICD-10-CM

## 2021-03-02 DIAGNOSIS — F43.23 ADJUSTMENT DISORDER WITH MIXED ANXIETY AND DEPRESSED MOOD: ICD-10-CM

## 2021-03-02 LAB
ALBUMIN SERPL BCP-MCNC: 2.6 G/DL (ref 3.5–5.2)
ALP SERPL-CCNC: 71 U/L (ref 55–135)
ALT SERPL W/O P-5'-P-CCNC: 7 U/L (ref 10–44)
ANION GAP SERPL CALC-SCNC: 7 MMOL/L (ref 8–16)
AST SERPL-CCNC: 13 U/L (ref 10–40)
BASOPHILS # BLD AUTO: 0.01 K/UL (ref 0–0.2)
BASOPHILS NFR BLD: 0.1 % (ref 0–1.9)
BILIRUB SERPL-MCNC: 0.5 MG/DL (ref 0.1–1)
BNP SERPL-MCNC: 63 PG/ML (ref 0–99)
BUN SERPL-MCNC: 9 MG/DL (ref 8–23)
CALCIUM SERPL-MCNC: 8.8 MG/DL (ref 8.7–10.5)
CHLORIDE SERPL-SCNC: 109 MMOL/L (ref 95–110)
CO2 SERPL-SCNC: 23 MMOL/L (ref 23–29)
CREAT SERPL-MCNC: 0.8 MG/DL (ref 0.5–1.4)
CREAT SERPL-MCNC: 0.9 MG/DL (ref 0.5–1.4)
CTP QC/QA: YES
DIFFERENTIAL METHOD: ABNORMAL
EOSINOPHIL # BLD AUTO: 0 K/UL (ref 0–0.5)
EOSINOPHIL NFR BLD: 0.3 % (ref 0–8)
ERYTHROCYTE [DISTWIDTH] IN BLOOD BY AUTOMATED COUNT: 17.7 % (ref 11.5–14.5)
EST. GFR  (AFRICAN AMERICAN): >60 ML/MIN/1.73 M^2
EST. GFR  (AFRICAN AMERICAN): >60 ML/MIN/1.73 M^2
EST. GFR  (NON AFRICAN AMERICAN): >60 ML/MIN/1.73 M^2
EST. GFR  (NON AFRICAN AMERICAN): >60 ML/MIN/1.73 M^2
GLUCOSE SERPL-MCNC: 206 MG/DL (ref 70–110)
HCT VFR BLD AUTO: 25.5 % (ref 37–48.5)
HGB BLD-MCNC: 8.3 G/DL (ref 12–16)
IMM GRANULOCYTES # BLD AUTO: 0.05 K/UL (ref 0–0.04)
IMM GRANULOCYTES NFR BLD AUTO: 0.5 % (ref 0–0.5)
LIPASE SERPL-CCNC: 8 U/L (ref 4–60)
LYMPHOCYTES # BLD AUTO: 1.5 K/UL (ref 1–4.8)
LYMPHOCYTES NFR BLD: 14.7 % (ref 18–48)
MCH RBC QN AUTO: 31.1 PG (ref 27–31)
MCHC RBC AUTO-ENTMCNC: 32.5 G/DL (ref 32–36)
MCV RBC AUTO: 96 FL (ref 82–98)
MONOCYTES # BLD AUTO: 1 K/UL (ref 0.3–1)
MONOCYTES NFR BLD: 9.8 % (ref 4–15)
NEUTROPHILS # BLD AUTO: 7.6 K/UL (ref 1.8–7.7)
NEUTROPHILS NFR BLD: 74.6 % (ref 38–73)
NRBC BLD-RTO: 0 /100 WBC
PLATELET # BLD AUTO: 160 K/UL (ref 150–350)
PMV BLD AUTO: 10.1 FL (ref 9.2–12.9)
POCT GLUCOSE: 112 MG/DL (ref 70–110)
POTASSIUM SERPL-SCNC: 4.1 MMOL/L (ref 3.5–5.1)
PROT SERPL-MCNC: 6.3 G/DL (ref 6–8.4)
RBC # BLD AUTO: 2.67 M/UL (ref 4–5.4)
SARS-COV-2 RDRP RESP QL NAA+PROBE: NEGATIVE
SODIUM SERPL-SCNC: 139 MMOL/L (ref 136–145)
TROPONIN I SERPL DL<=0.01 NG/ML-MCNC: 0.01 NG/ML (ref 0–0.03)
TROPONIN I SERPL DL<=0.01 NG/ML-MCNC: 0.04 NG/ML (ref 0–0.03)
TROPONIN I SERPL DL<=0.01 NG/ML-MCNC: 0.2 NG/ML (ref 0–0.03)
WBC # BLD AUTO: 10.17 K/UL (ref 3.9–12.7)

## 2021-03-02 PROCEDURE — 99284 EMERGENCY DEPT VISIT MOD MDM: CPT | Mod: CS,,, | Performed by: EMERGENCY MEDICINE

## 2021-03-02 PROCEDURE — 25500020 PHARM REV CODE 255: Performed by: EMERGENCY MEDICINE

## 2021-03-02 PROCEDURE — 85025 COMPLETE CBC W/AUTO DIFF WBC: CPT

## 2021-03-02 PROCEDURE — 87040 BLOOD CULTURE FOR BACTERIA: CPT

## 2021-03-02 PROCEDURE — 63600175 PHARM REV CODE 636 W HCPCS: Performed by: EMERGENCY MEDICINE

## 2021-03-02 PROCEDURE — 96366 THER/PROPH/DIAG IV INF ADDON: CPT | Performed by: EMERGENCY MEDICINE

## 2021-03-02 PROCEDURE — 25500020 PHARM REV CODE 255: Performed by: STUDENT IN AN ORGANIZED HEALTH CARE EDUCATION/TRAINING PROGRAM

## 2021-03-02 PROCEDURE — 83880 ASSAY OF NATRIURETIC PEPTIDE: CPT

## 2021-03-02 PROCEDURE — 25000003 PHARM REV CODE 250: Performed by: INTERNAL MEDICINE

## 2021-03-02 PROCEDURE — 99285 EMERGENCY DEPT VISIT HI MDM: CPT | Mod: 25

## 2021-03-02 PROCEDURE — 25000003 PHARM REV CODE 250: Performed by: STUDENT IN AN ORGANIZED HEALTH CARE EDUCATION/TRAINING PROGRAM

## 2021-03-02 PROCEDURE — 25000003 PHARM REV CODE 250: Performed by: EMERGENCY MEDICINE

## 2021-03-02 PROCEDURE — 96372 THER/PROPH/DIAG INJ SC/IM: CPT | Mod: 59 | Performed by: EMERGENCY MEDICINE

## 2021-03-02 PROCEDURE — A9585 GADOBUTROL INJECTION: HCPCS | Performed by: STUDENT IN AN ORGANIZED HEALTH CARE EDUCATION/TRAINING PROGRAM

## 2021-03-02 PROCEDURE — 63600175 PHARM REV CODE 636 W HCPCS: Performed by: STUDENT IN AN ORGANIZED HEALTH CARE EDUCATION/TRAINING PROGRAM

## 2021-03-02 PROCEDURE — 63600175 PHARM REV CODE 636 W HCPCS: Performed by: INTERNAL MEDICINE

## 2021-03-02 PROCEDURE — 96365 THER/PROPH/DIAG IV INF INIT: CPT

## 2021-03-02 PROCEDURE — 96375 TX/PRO/DX INJ NEW DRUG ADDON: CPT

## 2021-03-02 PROCEDURE — U0002 COVID-19 LAB TEST NON-CDC: HCPCS | Performed by: EMERGENCY MEDICINE

## 2021-03-02 PROCEDURE — 99220 PR INITIAL OBSERVATION CARE,LEVL III: ICD-10-PCS | Mod: ,,, | Performed by: INTERNAL MEDICINE

## 2021-03-02 PROCEDURE — 83690 ASSAY OF LIPASE: CPT

## 2021-03-02 PROCEDURE — 84484 ASSAY OF TROPONIN QUANT: CPT | Mod: 91

## 2021-03-02 PROCEDURE — G0378 HOSPITAL OBSERVATION PER HR: HCPCS

## 2021-03-02 PROCEDURE — 96367 TX/PROPH/DG ADDL SEQ IV INF: CPT | Performed by: EMERGENCY MEDICINE

## 2021-03-02 PROCEDURE — 99284 PR EMERGENCY DEPT VISIT,LEVEL IV: ICD-10-PCS | Mod: CS,,, | Performed by: EMERGENCY MEDICINE

## 2021-03-02 PROCEDURE — 99220 PR INITIAL OBSERVATION CARE,LEVL III: CPT | Mod: ,,, | Performed by: INTERNAL MEDICINE

## 2021-03-02 PROCEDURE — 84484 ASSAY OF TROPONIN QUANT: CPT

## 2021-03-02 PROCEDURE — 96375 TX/PRO/DX INJ NEW DRUG ADDON: CPT | Performed by: EMERGENCY MEDICINE

## 2021-03-02 PROCEDURE — C9399 UNCLASSIFIED DRUGS OR BIOLOG: HCPCS | Performed by: STUDENT IN AN ORGANIZED HEALTH CARE EDUCATION/TRAINING PROGRAM

## 2021-03-02 PROCEDURE — 80053 COMPREHEN METABOLIC PANEL: CPT

## 2021-03-02 PROCEDURE — 82565 ASSAY OF CREATININE: CPT

## 2021-03-02 RX ORDER — IBUPROFEN 200 MG
24 TABLET ORAL
Status: DISCONTINUED | OUTPATIENT
Start: 2021-03-02 | End: 2021-03-09 | Stop reason: HOSPADM

## 2021-03-02 RX ORDER — LIDOCAINE 50 MG/G
1 PATCH TOPICAL
Status: COMPLETED | OUTPATIENT
Start: 2021-03-02 | End: 2021-03-02

## 2021-03-02 RX ORDER — NAPROXEN SODIUM 220 MG/1
81 TABLET, FILM COATED ORAL DAILY
Status: DISCONTINUED | OUTPATIENT
Start: 2021-03-02 | End: 2021-03-09 | Stop reason: HOSPADM

## 2021-03-02 RX ORDER — GADOBUTROL 604.72 MG/ML
10 INJECTION INTRAVENOUS
Status: COMPLETED | OUTPATIENT
Start: 2021-03-02 | End: 2021-03-02

## 2021-03-02 RX ORDER — GLUCAGON 1 MG
1 KIT INJECTION
Status: DISCONTINUED | OUTPATIENT
Start: 2021-03-02 | End: 2021-03-02

## 2021-03-02 RX ORDER — ENOXAPARIN SODIUM 100 MG/ML
40 INJECTION SUBCUTANEOUS EVERY 24 HOURS
Status: DISCONTINUED | OUTPATIENT
Start: 2021-03-02 | End: 2021-03-09 | Stop reason: HOSPADM

## 2021-03-02 RX ORDER — ASCORBIC ACID 250 MG
500 TABLET ORAL 2 TIMES DAILY
Status: DISCONTINUED | OUTPATIENT
Start: 2021-03-02 | End: 2021-03-09 | Stop reason: HOSPADM

## 2021-03-02 RX ORDER — MORPHINE SULFATE 4 MG/ML
4 INJECTION, SOLUTION INTRAMUSCULAR; INTRAVENOUS
Status: COMPLETED | OUTPATIENT
Start: 2021-03-02 | End: 2021-03-02

## 2021-03-02 RX ORDER — OXYCODONE HYDROCHLORIDE 5 MG/1
5 TABLET ORAL ONCE
Status: COMPLETED | OUTPATIENT
Start: 2021-03-02 | End: 2021-03-02

## 2021-03-02 RX ORDER — SODIUM CHLORIDE 0.9 % (FLUSH) 0.9 %
10 SYRINGE (ML) INJECTION
Status: DISCONTINUED | OUTPATIENT
Start: 2021-03-02 | End: 2021-03-09 | Stop reason: HOSPADM

## 2021-03-02 RX ORDER — ALPRAZOLAM 0.25 MG/1
0.25 TABLET ORAL 3 TIMES DAILY PRN
Status: DISCONTINUED | OUTPATIENT
Start: 2021-03-02 | End: 2021-03-09 | Stop reason: HOSPADM

## 2021-03-02 RX ORDER — INSULIN ASPART 100 [IU]/ML
0-5 INJECTION, SOLUTION INTRAVENOUS; SUBCUTANEOUS
Status: DISCONTINUED | OUTPATIENT
Start: 2021-03-02 | End: 2021-03-09 | Stop reason: HOSPADM

## 2021-03-02 RX ORDER — INSULIN ASPART 100 [IU]/ML
14 INJECTION, SOLUTION INTRAVENOUS; SUBCUTANEOUS
Status: DISCONTINUED | OUTPATIENT
Start: 2021-03-02 | End: 2021-03-09 | Stop reason: HOSPADM

## 2021-03-02 RX ORDER — OXYCODONE HYDROCHLORIDE 5 MG/1
5 TABLET ORAL EVERY 6 HOURS PRN
Status: DISCONTINUED | OUTPATIENT
Start: 2021-03-02 | End: 2021-03-09 | Stop reason: HOSPADM

## 2021-03-02 RX ORDER — LORAZEPAM 2 MG/ML
2 INJECTION INTRAMUSCULAR
Status: COMPLETED | OUTPATIENT
Start: 2021-03-02 | End: 2021-03-02

## 2021-03-02 RX ORDER — CHOLECALCIFEROL (VITAMIN D3) 25 MCG
1000 TABLET ORAL DAILY
Status: DISCONTINUED | OUTPATIENT
Start: 2021-03-02 | End: 2021-03-09 | Stop reason: HOSPADM

## 2021-03-02 RX ORDER — IBUPROFEN 200 MG
24 TABLET ORAL
Status: DISCONTINUED | OUTPATIENT
Start: 2021-03-02 | End: 2021-03-02

## 2021-03-02 RX ORDER — CANDESARTAN 8 MG/1
8 TABLET ORAL DAILY
Status: DISCONTINUED | OUTPATIENT
Start: 2021-03-02 | End: 2021-03-03

## 2021-03-02 RX ORDER — OLANZAPINE 2.5 MG/1
5 TABLET ORAL 2 TIMES DAILY
Status: DISCONTINUED | OUTPATIENT
Start: 2021-03-02 | End: 2021-03-03

## 2021-03-02 RX ORDER — GLUCAGON 1 MG
1 KIT INJECTION
Status: DISCONTINUED | OUTPATIENT
Start: 2021-03-02 | End: 2021-03-09 | Stop reason: HOSPADM

## 2021-03-02 RX ORDER — CYCLOBENZAPRINE HCL 5 MG
5 TABLET ORAL NIGHTLY
Status: DISCONTINUED | OUTPATIENT
Start: 2021-03-02 | End: 2021-03-04

## 2021-03-02 RX ORDER — IBUPROFEN 200 MG
16 TABLET ORAL
Status: DISCONTINUED | OUTPATIENT
Start: 2021-03-02 | End: 2021-03-09 | Stop reason: HOSPADM

## 2021-03-02 RX ORDER — IBUPROFEN 200 MG
16 TABLET ORAL
Status: DISCONTINUED | OUTPATIENT
Start: 2021-03-02 | End: 2021-03-02

## 2021-03-02 RX ORDER — LETROZOLE 2.5 MG/1
2.5 TABLET, FILM COATED ORAL DAILY
Status: DISCONTINUED | OUTPATIENT
Start: 2021-03-02 | End: 2021-03-02

## 2021-03-02 RX ORDER — LORAZEPAM 2 MG/ML
0.5 INJECTION INTRAMUSCULAR ONCE
Status: DISCONTINUED | OUTPATIENT
Start: 2021-03-02 | End: 2021-03-03

## 2021-03-02 RX ADMIN — ENOXAPARIN SODIUM 40 MG: 40 INJECTION SUBCUTANEOUS at 05:03

## 2021-03-02 RX ADMIN — OXYCODONE 5 MG: 5 TABLET ORAL at 03:03

## 2021-03-02 RX ADMIN — INSULIN DETEMIR 30 UNITS: 100 INJECTION, SOLUTION SUBCUTANEOUS at 10:03

## 2021-03-02 RX ADMIN — IOHEXOL 75 ML: 350 INJECTION, SOLUTION INTRAVENOUS at 08:03

## 2021-03-02 RX ADMIN — CANDESARTAN CILEXETIL 8 MG: 8 TABLET ORAL at 03:03

## 2021-03-02 RX ADMIN — GADOBUTROL 10 ML: 604.72 INJECTION INTRAVENOUS at 07:03

## 2021-03-02 RX ADMIN — LORAZEPAM 2 MG: 2 INJECTION INTRAMUSCULAR; INTRAVENOUS at 07:03

## 2021-03-02 RX ADMIN — LIDOCAINE 1 PATCH: 50 PATCH TOPICAL at 10:03

## 2021-03-02 RX ADMIN — ASPIRIN 81 MG CHEWABLE TABLET 81 MG: 81 TABLET CHEWABLE at 03:03

## 2021-03-02 RX ADMIN — Medication 1000 UNITS: at 03:03

## 2021-03-02 RX ADMIN — MORPHINE SULFATE 4 MG: 4 INJECTION INTRAVENOUS at 06:03

## 2021-03-02 RX ADMIN — PIPERACILLIN AND TAZOBACTAM 4.5 G: 4; .5 INJECTION, POWDER, LYOPHILIZED, FOR SOLUTION INTRAVENOUS; PARENTERAL at 11:03

## 2021-03-02 RX ADMIN — VANCOMYCIN HYDROCHLORIDE 1250 MG: 1.25 INJECTION, POWDER, LYOPHILIZED, FOR SOLUTION INTRAVENOUS at 03:03

## 2021-03-03 LAB
ALBUMIN SERPL BCP-MCNC: 2.3 G/DL (ref 3.5–5.2)
ALP SERPL-CCNC: 69 U/L (ref 55–135)
ALT SERPL W/O P-5'-P-CCNC: 7 U/L (ref 10–44)
ANION GAP SERPL CALC-SCNC: 8 MMOL/L (ref 8–16)
ANION GAP SERPL CALC-SCNC: 9 MMOL/L (ref 8–16)
ANISOCYTOSIS BLD QL SMEAR: SLIGHT
AST SERPL-CCNC: 12 U/L (ref 10–40)
BASOPHILS # BLD AUTO: 0.02 K/UL (ref 0–0.2)
BASOPHILS NFR BLD: 0.3 % (ref 0–1.9)
BILIRUB SERPL-MCNC: 0.7 MG/DL (ref 0.1–1)
BNP SERPL-MCNC: 33 PG/ML (ref 0–99)
BUN SERPL-MCNC: 6 MG/DL (ref 8–23)
BUN SERPL-MCNC: 7 MG/DL (ref 8–23)
CALCIUM SERPL-MCNC: 8.2 MG/DL (ref 8.7–10.5)
CALCIUM SERPL-MCNC: 8.7 MG/DL (ref 8.7–10.5)
CHLORIDE SERPL-SCNC: 105 MMOL/L (ref 95–110)
CHLORIDE SERPL-SCNC: 108 MMOL/L (ref 95–110)
CO2 SERPL-SCNC: 23 MMOL/L (ref 23–29)
CO2 SERPL-SCNC: 24 MMOL/L (ref 23–29)
CREAT SERPL-MCNC: 0.8 MG/DL (ref 0.5–1.4)
CREAT SERPL-MCNC: 0.9 MG/DL (ref 0.5–1.4)
D DIMER PPP IA.FEU-MCNC: 2.2 MG/L FEU
DIFFERENTIAL METHOD: ABNORMAL
EOSINOPHIL # BLD AUTO: 0.1 K/UL (ref 0–0.5)
EOSINOPHIL NFR BLD: 0.7 % (ref 0–8)
ERYTHROCYTE [DISTWIDTH] IN BLOOD BY AUTOMATED COUNT: 17.7 % (ref 11.5–14.5)
EST. GFR  (AFRICAN AMERICAN): >60 ML/MIN/1.73 M^2
EST. GFR  (AFRICAN AMERICAN): >60 ML/MIN/1.73 M^2
EST. GFR  (NON AFRICAN AMERICAN): >60 ML/MIN/1.73 M^2
EST. GFR  (NON AFRICAN AMERICAN): >60 ML/MIN/1.73 M^2
GLUCOSE SERPL-MCNC: 120 MG/DL (ref 70–110)
GLUCOSE SERPL-MCNC: 183 MG/DL (ref 70–110)
HCT VFR BLD AUTO: 24.9 % (ref 37–48.5)
HGB BLD-MCNC: 8.1 G/DL (ref 12–16)
HYPOCHROMIA BLD QL SMEAR: ABNORMAL
IMM GRANULOCYTES # BLD AUTO: 0.01 K/UL (ref 0–0.04)
IMM GRANULOCYTES NFR BLD AUTO: 0.1 % (ref 0–0.5)
LYMPHOCYTES # BLD AUTO: 1.3 K/UL (ref 1–4.8)
LYMPHOCYTES NFR BLD: 18.1 % (ref 18–48)
MAGNESIUM SERPL-MCNC: 1.7 MG/DL (ref 1.6–2.6)
MCH RBC QN AUTO: 30.6 PG (ref 27–31)
MCHC RBC AUTO-ENTMCNC: 32.5 G/DL (ref 32–36)
MCV RBC AUTO: 94 FL (ref 82–98)
MONOCYTES # BLD AUTO: 0.9 K/UL (ref 0.3–1)
MONOCYTES NFR BLD: 12.4 % (ref 4–15)
NEUTROPHILS # BLD AUTO: 4.9 K/UL (ref 1.8–7.7)
NEUTROPHILS NFR BLD: 68.4 % (ref 38–73)
NRBC BLD-RTO: 0 /100 WBC
OVALOCYTES BLD QL SMEAR: ABNORMAL
PLATELET # BLD AUTO: 167 K/UL (ref 150–350)
PLATELET BLD QL SMEAR: ABNORMAL
PMV BLD AUTO: 10 FL (ref 9.2–12.9)
POCT GLUCOSE: 120 MG/DL (ref 70–110)
POCT GLUCOSE: 120 MG/DL (ref 70–110)
POCT GLUCOSE: 154 MG/DL (ref 70–110)
POCT GLUCOSE: 180 MG/DL (ref 70–110)
POIKILOCYTOSIS BLD QL SMEAR: SLIGHT
POLYCHROMASIA BLD QL SMEAR: ABNORMAL
POTASSIUM SERPL-SCNC: 3.6 MMOL/L (ref 3.5–5.1)
POTASSIUM SERPL-SCNC: 3.9 MMOL/L (ref 3.5–5.1)
PROT SERPL-MCNC: 6.2 G/DL (ref 6–8.4)
RBC # BLD AUTO: 2.65 M/UL (ref 4–5.4)
SODIUM SERPL-SCNC: 137 MMOL/L (ref 136–145)
SODIUM SERPL-SCNC: 140 MMOL/L (ref 136–145)
TROPONIN I SERPL DL<=0.01 NG/ML-MCNC: 0.01 NG/ML (ref 0–0.03)
TROPONIN I SERPL DL<=0.01 NG/ML-MCNC: 0.01 NG/ML (ref 0–0.03)
WBC # BLD AUTO: 7.17 K/UL (ref 3.9–12.7)

## 2021-03-03 PROCEDURE — 36415 COLL VENOUS BLD VENIPUNCTURE: CPT

## 2021-03-03 PROCEDURE — 84484 ASSAY OF TROPONIN QUANT: CPT | Mod: 91 | Performed by: STUDENT IN AN ORGANIZED HEALTH CARE EDUCATION/TRAINING PROGRAM

## 2021-03-03 PROCEDURE — 25000003 PHARM REV CODE 250: Performed by: STUDENT IN AN ORGANIZED HEALTH CARE EDUCATION/TRAINING PROGRAM

## 2021-03-03 PROCEDURE — 80053 COMPREHEN METABOLIC PANEL: CPT

## 2021-03-03 PROCEDURE — 97166 OT EVAL MOD COMPLEX 45 MIN: CPT

## 2021-03-03 PROCEDURE — 63600175 PHARM REV CODE 636 W HCPCS: Performed by: INTERNAL MEDICINE

## 2021-03-03 PROCEDURE — 80048 BASIC METABOLIC PNL TOTAL CA: CPT | Performed by: STUDENT IN AN ORGANIZED HEALTH CARE EDUCATION/TRAINING PROGRAM

## 2021-03-03 PROCEDURE — 85379 FIBRIN DEGRADATION QUANT: CPT | Performed by: STUDENT IN AN ORGANIZED HEALTH CARE EDUCATION/TRAINING PROGRAM

## 2021-03-03 PROCEDURE — G0378 HOSPITAL OBSERVATION PER HR: HCPCS

## 2021-03-03 PROCEDURE — 99900035 HC TECH TIME PER 15 MIN (STAT)

## 2021-03-03 PROCEDURE — 25000003 PHARM REV CODE 250: Performed by: INTERNAL MEDICINE

## 2021-03-03 PROCEDURE — 63600175 PHARM REV CODE 636 W HCPCS: Performed by: STUDENT IN AN ORGANIZED HEALTH CARE EDUCATION/TRAINING PROGRAM

## 2021-03-03 PROCEDURE — 99233 SBSQ HOSP IP/OBS HIGH 50: CPT | Mod: ,,, | Performed by: INTERNAL MEDICINE

## 2021-03-03 PROCEDURE — C9399 UNCLASSIFIED DRUGS OR BIOLOG: HCPCS | Performed by: STUDENT IN AN ORGANIZED HEALTH CARE EDUCATION/TRAINING PROGRAM

## 2021-03-03 PROCEDURE — 96372 THER/PROPH/DIAG INJ SC/IM: CPT | Mod: 59 | Performed by: EMERGENCY MEDICINE

## 2021-03-03 PROCEDURE — 93010 EKG 12-LEAD: ICD-10-PCS | Mod: ,,, | Performed by: INTERNAL MEDICINE

## 2021-03-03 PROCEDURE — 93010 ELECTROCARDIOGRAM REPORT: CPT | Mod: ,,, | Performed by: INTERNAL MEDICINE

## 2021-03-03 PROCEDURE — 97162 PT EVAL MOD COMPLEX 30 MIN: CPT

## 2021-03-03 PROCEDURE — 99233 PR SUBSEQUENT HOSPITAL CARE,LEVL III: ICD-10-PCS | Mod: ,,, | Performed by: INTERNAL MEDICINE

## 2021-03-03 PROCEDURE — 84484 ASSAY OF TROPONIN QUANT: CPT

## 2021-03-03 PROCEDURE — 87040 BLOOD CULTURE FOR BACTERIA: CPT | Mod: 59 | Performed by: STUDENT IN AN ORGANIZED HEALTH CARE EDUCATION/TRAINING PROGRAM

## 2021-03-03 PROCEDURE — 97535 SELF CARE MNGMENT TRAINING: CPT

## 2021-03-03 PROCEDURE — 94799 UNLISTED PULMONARY SVC/PX: CPT

## 2021-03-03 PROCEDURE — 83735 ASSAY OF MAGNESIUM: CPT | Performed by: STUDENT IN AN ORGANIZED HEALTH CARE EDUCATION/TRAINING PROGRAM

## 2021-03-03 PROCEDURE — 96375 TX/PRO/DX INJ NEW DRUG ADDON: CPT | Mod: 59 | Performed by: EMERGENCY MEDICINE

## 2021-03-03 PROCEDURE — 96374 THER/PROPH/DIAG INJ IV PUSH: CPT | Mod: 59 | Performed by: EMERGENCY MEDICINE

## 2021-03-03 PROCEDURE — 93005 ELECTROCARDIOGRAM TRACING: CPT

## 2021-03-03 PROCEDURE — 85025 COMPLETE CBC W/AUTO DIFF WBC: CPT | Performed by: STUDENT IN AN ORGANIZED HEALTH CARE EDUCATION/TRAINING PROGRAM

## 2021-03-03 PROCEDURE — 36415 COLL VENOUS BLD VENIPUNCTURE: CPT | Performed by: STUDENT IN AN ORGANIZED HEALTH CARE EDUCATION/TRAINING PROGRAM

## 2021-03-03 PROCEDURE — 83880 ASSAY OF NATRIURETIC PEPTIDE: CPT | Performed by: STUDENT IN AN ORGANIZED HEALTH CARE EDUCATION/TRAINING PROGRAM

## 2021-03-03 RX ORDER — ACETAMINOPHEN 325 MG/1
650 TABLET ORAL EVERY 6 HOURS PRN
Status: DISCONTINUED | OUTPATIENT
Start: 2021-03-03 | End: 2021-03-09 | Stop reason: HOSPADM

## 2021-03-03 RX ORDER — GUAIFENESIN 600 MG/1
1200 TABLET, EXTENDED RELEASE ORAL 2 TIMES DAILY
Status: DISCONTINUED | OUTPATIENT
Start: 2021-03-03 | End: 2021-03-09 | Stop reason: HOSPADM

## 2021-03-03 RX ORDER — IPRATROPIUM BROMIDE AND ALBUTEROL SULFATE 2.5; .5 MG/3ML; MG/3ML
3 SOLUTION RESPIRATORY (INHALATION) EVERY 6 HOURS PRN
Status: DISCONTINUED | OUTPATIENT
Start: 2021-03-03 | End: 2021-03-09 | Stop reason: HOSPADM

## 2021-03-03 RX ORDER — LETROZOLE 2.5 MG/1
2.5 TABLET, FILM COATED ORAL DAILY
Qty: 30 TABLET | Refills: 11 | Status: CANCELLED | OUTPATIENT
Start: 2021-03-03 | End: 2021-04-02

## 2021-03-03 RX ORDER — CEFEPIME HYDROCHLORIDE 2 G/1
2 INJECTION, POWDER, FOR SOLUTION INTRAVENOUS
Status: DISCONTINUED | OUTPATIENT
Start: 2021-03-03 | End: 2021-03-07

## 2021-03-03 RX ORDER — MAGNESIUM SULFATE HEPTAHYDRATE 40 MG/ML
2 INJECTION, SOLUTION INTRAVENOUS ONCE
Status: COMPLETED | OUTPATIENT
Start: 2021-03-03 | End: 2021-03-03

## 2021-03-03 RX ORDER — LETROZOLE 2.5 MG/1
2.5 TABLET, FILM COATED ORAL DAILY
Qty: 30 TABLET | Refills: 11 | Status: SHIPPED | OUTPATIENT
Start: 2021-03-03 | End: 2021-03-07 | Stop reason: SDUPTHER

## 2021-03-03 RX ORDER — POTASSIUM CHLORIDE 750 MG/1
30 CAPSULE, EXTENDED RELEASE ORAL ONCE
Status: COMPLETED | OUTPATIENT
Start: 2021-03-03 | End: 2021-03-03

## 2021-03-03 RX ADMIN — CYCLOBENZAPRINE HYDROCHLORIDE 5 MG: 5 TABLET, FILM COATED ORAL at 09:03

## 2021-03-03 RX ADMIN — ALPRAZOLAM 0.25 MG: 0.25 TABLET ORAL at 09:03

## 2021-03-03 RX ADMIN — GUAIFENESIN 1200 MG: 600 TABLET, EXTENDED RELEASE ORAL at 11:03

## 2021-03-03 RX ADMIN — VANCOMYCIN HYDROCHLORIDE 1250 MG: 1.25 INJECTION, POWDER, LYOPHILIZED, FOR SOLUTION INTRAVENOUS at 10:03

## 2021-03-03 RX ADMIN — INSULIN ASPART 14 UNITS: 100 INJECTION, SOLUTION INTRAVENOUS; SUBCUTANEOUS at 10:03

## 2021-03-03 RX ADMIN — INSULIN DETEMIR 30 UNITS: 100 INJECTION, SOLUTION SUBCUTANEOUS at 09:03

## 2021-03-03 RX ADMIN — Medication 1000 UNITS: at 08:03

## 2021-03-03 RX ADMIN — OLANZAPINE 5 MG: 2.5 TABLET, FILM COATED ORAL at 08:03

## 2021-03-03 RX ADMIN — GUAIFENESIN 1200 MG: 600 TABLET, EXTENDED RELEASE ORAL at 09:03

## 2021-03-03 RX ADMIN — MAGNESIUM SULFATE IN WATER 2 G: 40 INJECTION, SOLUTION INTRAVENOUS at 11:03

## 2021-03-03 RX ADMIN — INSULIN ASPART 14 UNITS: 100 INJECTION, SOLUTION INTRAVENOUS; SUBCUTANEOUS at 05:03

## 2021-03-03 RX ADMIN — CANDESARTAN CILEXETIL 8 MG: 8 TABLET ORAL at 08:03

## 2021-03-03 RX ADMIN — POTASSIUM CHLORIDE 30 MEQ: 10 CAPSULE, COATED, EXTENDED RELEASE ORAL at 11:03

## 2021-03-03 RX ADMIN — INSULIN ASPART 14 UNITS: 100 INJECTION, SOLUTION INTRAVENOUS; SUBCUTANEOUS at 12:03

## 2021-03-03 RX ADMIN — SODIUM CHLORIDE, SODIUM LACTATE, POTASSIUM CHLORIDE, AND CALCIUM CHLORIDE 500 ML: .6; .31; .03; .02 INJECTION, SOLUTION INTRAVENOUS at 10:03

## 2021-03-03 RX ADMIN — ACETAMINOPHEN 650 MG: 325 TABLET ORAL at 09:03

## 2021-03-03 RX ADMIN — CEFEPIME 2 G: 2 INJECTION, POWDER, FOR SOLUTION INTRAVENOUS at 10:03

## 2021-03-03 RX ADMIN — ENOXAPARIN SODIUM 40 MG: 40 INJECTION SUBCUTANEOUS at 04:03

## 2021-03-03 RX ADMIN — Medication 500 MG: at 08:03

## 2021-03-03 RX ADMIN — ASPIRIN 81 MG CHEWABLE TABLET 81 MG: 81 TABLET CHEWABLE at 08:03

## 2021-03-03 RX ADMIN — Medication 500 MG: at 09:03

## 2021-03-04 PROBLEM — R74.01 TRANSAMINITIS: Status: ACTIVE | Noted: 2021-03-04

## 2021-03-04 LAB
ALBUMIN SERPL BCP-MCNC: 2.2 G/DL (ref 3.5–5.2)
ALP SERPL-CCNC: 210 U/L (ref 55–135)
ALT SERPL W/O P-5'-P-CCNC: 144 U/L (ref 10–44)
ANION GAP SERPL CALC-SCNC: 8 MMOL/L (ref 8–16)
ANISOCYTOSIS BLD QL SMEAR: SLIGHT
AST SERPL-CCNC: 523 U/L (ref 10–40)
BASOPHILS # BLD AUTO: 0.01 K/UL (ref 0–0.2)
BASOPHILS NFR BLD: 0.2 % (ref 0–1.9)
BILIRUB SERPL-MCNC: 1.8 MG/DL (ref 0.1–1)
BILIRUB UR QL STRIP: NEGATIVE
BUN SERPL-MCNC: 7 MG/DL (ref 8–23)
CALCIUM SERPL-MCNC: 8.4 MG/DL (ref 8.7–10.5)
CHLORIDE SERPL-SCNC: 105 MMOL/L (ref 95–110)
CLARITY UR REFRACT.AUTO: CLEAR
CO2 SERPL-SCNC: 25 MMOL/L (ref 23–29)
COLOR UR AUTO: YELLOW
CREAT SERPL-MCNC: 0.8 MG/DL (ref 0.5–1.4)
DIFFERENTIAL METHOD: ABNORMAL
EOSINOPHIL # BLD AUTO: 0 K/UL (ref 0–0.5)
EOSINOPHIL NFR BLD: 0.7 % (ref 0–8)
ERYTHROCYTE [DISTWIDTH] IN BLOOD BY AUTOMATED COUNT: 17.2 % (ref 11.5–14.5)
EST. GFR  (AFRICAN AMERICAN): >60 ML/MIN/1.73 M^2
EST. GFR  (NON AFRICAN AMERICAN): >60 ML/MIN/1.73 M^2
GLUCOSE SERPL-MCNC: 165 MG/DL (ref 70–110)
GLUCOSE UR QL STRIP: NEGATIVE
HCT VFR BLD AUTO: 24.2 % (ref 37–48.5)
HGB BLD-MCNC: 8 G/DL (ref 12–16)
HGB UR QL STRIP: NEGATIVE
HYPOCHROMIA BLD QL SMEAR: ABNORMAL
IMM GRANULOCYTES # BLD AUTO: 0.01 K/UL (ref 0–0.04)
IMM GRANULOCYTES NFR BLD AUTO: 0.2 % (ref 0–0.5)
KETONES UR QL STRIP: NEGATIVE
LEUKOCYTE ESTERASE UR QL STRIP: NEGATIVE
LYMPHOCYTES # BLD AUTO: 1.1 K/UL (ref 1–4.8)
LYMPHOCYTES NFR BLD: 20.1 % (ref 18–48)
MCH RBC QN AUTO: 31.3 PG (ref 27–31)
MCHC RBC AUTO-ENTMCNC: 33.1 G/DL (ref 32–36)
MCV RBC AUTO: 95 FL (ref 82–98)
MONOCYTES # BLD AUTO: 0.6 K/UL (ref 0.3–1)
MONOCYTES NFR BLD: 10.6 % (ref 4–15)
NEUTROPHILS # BLD AUTO: 3.8 K/UL (ref 1.8–7.7)
NEUTROPHILS NFR BLD: 68.2 % (ref 38–73)
NITRITE UR QL STRIP: NEGATIVE
NRBC BLD-RTO: 0 /100 WBC
OVALOCYTES BLD QL SMEAR: ABNORMAL
PH UR STRIP: 6 [PH] (ref 5–8)
PLATELET # BLD AUTO: 162 K/UL (ref 150–350)
PLATELET BLD QL SMEAR: ABNORMAL
PMV BLD AUTO: 10 FL (ref 9.2–12.9)
POCT GLUCOSE: 127 MG/DL (ref 70–110)
POCT GLUCOSE: 138 MG/DL (ref 70–110)
POCT GLUCOSE: 146 MG/DL (ref 70–110)
POCT GLUCOSE: 192 MG/DL (ref 70–110)
POIKILOCYTOSIS BLD QL SMEAR: SLIGHT
POLYCHROMASIA BLD QL SMEAR: ABNORMAL
POTASSIUM SERPL-SCNC: 3.9 MMOL/L (ref 3.5–5.1)
PROT SERPL-MCNC: 6.2 G/DL (ref 6–8.4)
PROT UR QL STRIP: NEGATIVE
RBC # BLD AUTO: 2.56 M/UL (ref 4–5.4)
SODIUM SERPL-SCNC: 138 MMOL/L (ref 136–145)
SP GR UR STRIP: 1.01 (ref 1–1.03)
URN SPEC COLLECT METH UR: NORMAL
WBC # BLD AUTO: 5.58 K/UL (ref 3.9–12.7)

## 2021-03-04 PROCEDURE — 85025 COMPLETE CBC W/AUTO DIFF WBC: CPT | Performed by: STUDENT IN AN ORGANIZED HEALTH CARE EDUCATION/TRAINING PROGRAM

## 2021-03-04 PROCEDURE — 99233 SBSQ HOSP IP/OBS HIGH 50: CPT | Mod: ,,, | Performed by: INTERNAL MEDICINE

## 2021-03-04 PROCEDURE — 81003 URINALYSIS AUTO W/O SCOPE: CPT | Performed by: STUDENT IN AN ORGANIZED HEALTH CARE EDUCATION/TRAINING PROGRAM

## 2021-03-04 PROCEDURE — 63600175 PHARM REV CODE 636 W HCPCS: Performed by: INTERNAL MEDICINE

## 2021-03-04 PROCEDURE — 99233 PR SUBSEQUENT HOSPITAL CARE,LEVL III: ICD-10-PCS | Mod: ,,, | Performed by: INTERNAL MEDICINE

## 2021-03-04 PROCEDURE — 80053 COMPREHEN METABOLIC PANEL: CPT | Performed by: STUDENT IN AN ORGANIZED HEALTH CARE EDUCATION/TRAINING PROGRAM

## 2021-03-04 PROCEDURE — 25500020 PHARM REV CODE 255: Performed by: INTERNAL MEDICINE

## 2021-03-04 PROCEDURE — 63600175 PHARM REV CODE 636 W HCPCS: Performed by: STUDENT IN AN ORGANIZED HEALTH CARE EDUCATION/TRAINING PROGRAM

## 2021-03-04 PROCEDURE — 25000003 PHARM REV CODE 250: Performed by: INTERNAL MEDICINE

## 2021-03-04 PROCEDURE — 20600001 HC STEP DOWN PRIVATE ROOM

## 2021-03-04 PROCEDURE — 25000003 PHARM REV CODE 250: Performed by: STUDENT IN AN ORGANIZED HEALTH CARE EDUCATION/TRAINING PROGRAM

## 2021-03-04 PROCEDURE — 97116 GAIT TRAINING THERAPY: CPT | Mod: CQ

## 2021-03-04 PROCEDURE — 97530 THERAPEUTIC ACTIVITIES: CPT | Mod: CQ

## 2021-03-04 PROCEDURE — 96376 TX/PRO/DX INJ SAME DRUG ADON: CPT | Mod: 59 | Performed by: EMERGENCY MEDICINE

## 2021-03-04 PROCEDURE — 97110 THERAPEUTIC EXERCISES: CPT | Mod: CQ

## 2021-03-04 RX ORDER — CYCLOBENZAPRINE HCL 5 MG
5 TABLET ORAL NIGHTLY PRN
Status: DISCONTINUED | OUTPATIENT
Start: 2021-03-04 | End: 2021-03-09 | Stop reason: HOSPADM

## 2021-03-04 RX ORDER — METRONIDAZOLE 500 MG/100ML
500 INJECTION, SOLUTION INTRAVENOUS
Status: DISCONTINUED | OUTPATIENT
Start: 2021-03-04 | End: 2021-03-04

## 2021-03-04 RX ORDER — SODIUM CHLORIDE, SODIUM LACTATE, POTASSIUM CHLORIDE, CALCIUM CHLORIDE 600; 310; 30; 20 MG/100ML; MG/100ML; MG/100ML; MG/100ML
INJECTION, SOLUTION INTRAVENOUS CONTINUOUS
Status: DISCONTINUED | OUTPATIENT
Start: 2021-03-04 | End: 2021-03-05

## 2021-03-04 RX ORDER — METRONIDAZOLE 500 MG/1
500 TABLET ORAL EVERY 8 HOURS
Status: DISCONTINUED | OUTPATIENT
Start: 2021-03-04 | End: 2021-03-07

## 2021-03-04 RX ORDER — POLYETHYLENE GLYCOL 3350 17 G/17G
17 POWDER, FOR SOLUTION ORAL 2 TIMES DAILY
Status: DISCONTINUED | OUTPATIENT
Start: 2021-03-04 | End: 2021-03-09 | Stop reason: HOSPADM

## 2021-03-04 RX ORDER — AMOXICILLIN 250 MG
1 CAPSULE ORAL 2 TIMES DAILY PRN
Status: DISCONTINUED | OUTPATIENT
Start: 2021-03-04 | End: 2021-03-05

## 2021-03-04 RX ORDER — HEPARIN 100 UNIT/ML
300 SYRINGE INTRAVENOUS
Status: DISCONTINUED | OUTPATIENT
Start: 2021-03-04 | End: 2021-03-09 | Stop reason: HOSPADM

## 2021-03-04 RX ADMIN — VANCOMYCIN HYDROCHLORIDE 1250 MG: 1.25 INJECTION, POWDER, LYOPHILIZED, FOR SOLUTION INTRAVENOUS at 10:03

## 2021-03-04 RX ADMIN — CEFEPIME 2 G: 2 INJECTION, POWDER, FOR SOLUTION INTRAVENOUS at 07:03

## 2021-03-04 RX ADMIN — INSULIN ASPART 14 UNITS: 100 INJECTION, SOLUTION INTRAVENOUS; SUBCUTANEOUS at 07:03

## 2021-03-04 RX ADMIN — INSULIN DETEMIR 30 UNITS: 100 INJECTION, SOLUTION SUBCUTANEOUS at 08:03

## 2021-03-04 RX ADMIN — Medication 500 MG: at 08:03

## 2021-03-04 RX ADMIN — POLYETHYLENE GLYCOL 3350 17 G: 17 POWDER, FOR SOLUTION ORAL at 08:03

## 2021-03-04 RX ADMIN — ENOXAPARIN SODIUM 40 MG: 40 INJECTION SUBCUTANEOUS at 04:03

## 2021-03-04 RX ADMIN — GUAIFENESIN 1200 MG: 600 TABLET, EXTENDED RELEASE ORAL at 08:03

## 2021-03-04 RX ADMIN — CEFEPIME 2 G: 2 INJECTION, POWDER, FOR SOLUTION INTRAVENOUS at 02:03

## 2021-03-04 RX ADMIN — Medication 1000 UNITS: at 08:03

## 2021-03-04 RX ADMIN — METRONIDAZOLE 500 MG: 500 TABLET ORAL at 02:03

## 2021-03-04 RX ADMIN — METRONIDAZOLE 500 MG: 500 TABLET ORAL at 10:03

## 2021-03-04 RX ADMIN — Medication 500 MG: at 10:03

## 2021-03-04 RX ADMIN — CEFEPIME 2 G: 2 INJECTION, POWDER, FOR SOLUTION INTRAVENOUS at 09:03

## 2021-03-04 RX ADMIN — CEFEPIME 2 G: 2 INJECTION, POWDER, FOR SOLUTION INTRAVENOUS at 10:03

## 2021-03-04 RX ADMIN — ASPIRIN 81 MG CHEWABLE TABLET 81 MG: 81 TABLET CHEWABLE at 08:03

## 2021-03-04 RX ADMIN — OXYCODONE HYDROCHLORIDE 5 MG: 5 TABLET ORAL at 02:03

## 2021-03-04 RX ADMIN — SODIUM CHLORIDE, SODIUM LACTATE, POTASSIUM CHLORIDE, AND CALCIUM CHLORIDE: .6; .31; .03; .02 INJECTION, SOLUTION INTRAVENOUS at 11:03

## 2021-03-04 RX ADMIN — METRONIDAZOLE 500 MG: 500 TABLET ORAL at 09:03

## 2021-03-04 RX ADMIN — IOHEXOL 100 ML: 350 INJECTION, SOLUTION INTRAVENOUS at 05:03

## 2021-03-05 ENCOUNTER — ANESTHESIA EVENT (OUTPATIENT)
Dept: SURGERY | Facility: HOSPITAL | Age: 66
DRG: 417 | End: 2021-03-05
Payer: MEDICARE

## 2021-03-05 PROBLEM — F43.23 ADJUSTMENT DISORDER WITH MIXED ANXIETY AND DEPRESSED MOOD: Status: ACTIVE | Noted: 2020-12-17

## 2021-03-05 PROBLEM — K80.00 CHOLELITHIASIS WITH ACUTE CHOLECYSTITIS: Status: ACTIVE | Noted: 2021-03-05

## 2021-03-05 LAB
ALBUMIN SERPL BCP-MCNC: 2.1 G/DL (ref 3.5–5.2)
ALP SERPL-CCNC: 234 U/L (ref 55–135)
ALT SERPL W/O P-5'-P-CCNC: 170 U/L (ref 10–44)
ANION GAP SERPL CALC-SCNC: 7 MMOL/L (ref 8–16)
AST SERPL-CCNC: 219 U/L (ref 10–40)
BASOPHILS # BLD AUTO: 0.01 K/UL (ref 0–0.2)
BASOPHILS NFR BLD: 0.1 % (ref 0–1.9)
BILIRUB SERPL-MCNC: 2.5 MG/DL (ref 0.1–1)
BUN SERPL-MCNC: 6 MG/DL (ref 8–23)
CALCIUM SERPL-MCNC: 8.7 MG/DL (ref 8.7–10.5)
CHLORIDE SERPL-SCNC: 106 MMOL/L (ref 95–110)
CO2 SERPL-SCNC: 22 MMOL/L (ref 23–29)
CREAT SERPL-MCNC: 0.8 MG/DL (ref 0.5–1.4)
DIFFERENTIAL METHOD: ABNORMAL
EOSINOPHIL # BLD AUTO: 0.1 K/UL (ref 0–0.5)
EOSINOPHIL NFR BLD: 1 % (ref 0–8)
ERYTHROCYTE [DISTWIDTH] IN BLOOD BY AUTOMATED COUNT: 17.2 % (ref 11.5–14.5)
EST. GFR  (AFRICAN AMERICAN): >60 ML/MIN/1.73 M^2
EST. GFR  (NON AFRICAN AMERICAN): >60 ML/MIN/1.73 M^2
GLUCOSE SERPL-MCNC: 161 MG/DL (ref 70–110)
HCT VFR BLD AUTO: 24.6 % (ref 37–48.5)
HGB BLD-MCNC: 8.1 G/DL (ref 12–16)
IMM GRANULOCYTES # BLD AUTO: 0.02 K/UL (ref 0–0.04)
IMM GRANULOCYTES NFR BLD AUTO: 0.2 % (ref 0–0.5)
LYMPHOCYTES # BLD AUTO: 1.2 K/UL (ref 1–4.8)
LYMPHOCYTES NFR BLD: 15.2 % (ref 18–48)
MCH RBC QN AUTO: 31 PG (ref 27–31)
MCHC RBC AUTO-ENTMCNC: 32.9 G/DL (ref 32–36)
MCV RBC AUTO: 94 FL (ref 82–98)
MONOCYTES # BLD AUTO: 1 K/UL (ref 0.3–1)
MONOCYTES NFR BLD: 12.1 % (ref 4–15)
NEUTROPHILS # BLD AUTO: 5.8 K/UL (ref 1.8–7.7)
NEUTROPHILS NFR BLD: 71.4 % (ref 38–73)
NRBC BLD-RTO: 0 /100 WBC
PLATELET # BLD AUTO: 176 K/UL (ref 150–350)
PLATELET BLD QL SMEAR: ABNORMAL
PMV BLD AUTO: 9.7 FL (ref 9.2–12.9)
POCT GLUCOSE: 103 MG/DL (ref 70–110)
POCT GLUCOSE: 138 MG/DL (ref 70–110)
POCT GLUCOSE: 171 MG/DL (ref 70–110)
POCT GLUCOSE: 172 MG/DL (ref 70–110)
POCT GLUCOSE: 205 MG/DL (ref 70–110)
POTASSIUM SERPL-SCNC: 4.3 MMOL/L (ref 3.5–5.1)
PROT SERPL-MCNC: 6.3 G/DL (ref 6–8.4)
RBC # BLD AUTO: 2.61 M/UL (ref 4–5.4)
SODIUM SERPL-SCNC: 135 MMOL/L (ref 136–145)
WBC # BLD AUTO: 8.1 K/UL (ref 3.9–12.7)

## 2021-03-05 PROCEDURE — 85025 COMPLETE CBC W/AUTO DIFF WBC: CPT | Performed by: STUDENT IN AN ORGANIZED HEALTH CARE EDUCATION/TRAINING PROGRAM

## 2021-03-05 PROCEDURE — 87070 CULTURE OTHR SPECIMN AEROBIC: CPT | Performed by: STUDENT IN AN ORGANIZED HEALTH CARE EDUCATION/TRAINING PROGRAM

## 2021-03-05 PROCEDURE — 63600175 PHARM REV CODE 636 W HCPCS: Performed by: STUDENT IN AN ORGANIZED HEALTH CARE EDUCATION/TRAINING PROGRAM

## 2021-03-05 PROCEDURE — 90834 PSYTX W PT 45 MINUTES: CPT | Mod: ,,, | Performed by: PSYCHOLOGIST

## 2021-03-05 PROCEDURE — 25000003 PHARM REV CODE 250: Performed by: STUDENT IN AN ORGANIZED HEALTH CARE EDUCATION/TRAINING PROGRAM

## 2021-03-05 PROCEDURE — 25000003 PHARM REV CODE 250: Performed by: INTERNAL MEDICINE

## 2021-03-05 PROCEDURE — 99233 PR SUBSEQUENT HOSPITAL CARE,LEVL III: ICD-10-PCS | Mod: GC,,, | Performed by: INTERNAL MEDICINE

## 2021-03-05 PROCEDURE — 63600175 PHARM REV CODE 636 W HCPCS: Performed by: INTERNAL MEDICINE

## 2021-03-05 PROCEDURE — 90834 PR PSYCHOTHERAPY W/PATIENT, 45 MIN: ICD-10-PCS | Mod: ,,, | Performed by: PSYCHOLOGIST

## 2021-03-05 PROCEDURE — 87205 SMEAR GRAM STAIN: CPT | Performed by: STUDENT IN AN ORGANIZED HEALTH CARE EDUCATION/TRAINING PROGRAM

## 2021-03-05 PROCEDURE — 80053 COMPREHEN METABOLIC PANEL: CPT | Performed by: STUDENT IN AN ORGANIZED HEALTH CARE EDUCATION/TRAINING PROGRAM

## 2021-03-05 PROCEDURE — 80202 ASSAY OF VANCOMYCIN: CPT | Performed by: INTERNAL MEDICINE

## 2021-03-05 PROCEDURE — 20600001 HC STEP DOWN PRIVATE ROOM

## 2021-03-05 PROCEDURE — U0002 COVID-19 LAB TEST NON-CDC: HCPCS | Performed by: STUDENT IN AN ORGANIZED HEALTH CARE EDUCATION/TRAINING PROGRAM

## 2021-03-05 PROCEDURE — 87106 FUNGI IDENTIFICATION YEAST: CPT | Performed by: STUDENT IN AN ORGANIZED HEALTH CARE EDUCATION/TRAINING PROGRAM

## 2021-03-05 PROCEDURE — 99233 SBSQ HOSP IP/OBS HIGH 50: CPT | Mod: GC,,, | Performed by: INTERNAL MEDICINE

## 2021-03-05 RX ORDER — ONDANSETRON 2 MG/ML
4 INJECTION INTRAMUSCULAR; INTRAVENOUS EVERY 6 HOURS PRN
Status: DISCONTINUED | OUTPATIENT
Start: 2021-03-05 | End: 2021-03-09 | Stop reason: HOSPADM

## 2021-03-05 RX ORDER — MORPHINE SULFATE 2 MG/ML
4 INJECTION, SOLUTION INTRAMUSCULAR; INTRAVENOUS ONCE
Status: COMPLETED | OUTPATIENT
Start: 2021-03-05 | End: 2021-03-05

## 2021-03-05 RX ORDER — AMOXICILLIN 250 MG
1 CAPSULE ORAL 2 TIMES DAILY
Status: DISCONTINUED | OUTPATIENT
Start: 2021-03-05 | End: 2021-03-09 | Stop reason: HOSPADM

## 2021-03-05 RX ORDER — ONDANSETRON HYDROCHLORIDE 4 MG/5ML
4 SOLUTION ORAL EVERY 6 HOURS PRN
Status: DISCONTINUED | OUTPATIENT
Start: 2021-03-05 | End: 2021-03-05

## 2021-03-05 RX ADMIN — CEFEPIME 2 G: 2 INJECTION, POWDER, FOR SOLUTION INTRAVENOUS at 11:03

## 2021-03-05 RX ADMIN — METRONIDAZOLE 500 MG: 500 TABLET ORAL at 03:03

## 2021-03-05 RX ADMIN — DOCUSATE SODIUM AND SENNOSIDES 1 TABLET: 8.6; 5 TABLET, FILM COATED ORAL at 08:03

## 2021-03-05 RX ADMIN — POLYETHYLENE GLYCOL 3350 17 G: 17 POWDER, FOR SOLUTION ORAL at 08:03

## 2021-03-05 RX ADMIN — MORPHINE SULFATE 4 MG: 2 INJECTION, SOLUTION INTRAMUSCULAR; INTRAVENOUS at 10:03

## 2021-03-05 RX ADMIN — OXYCODONE HYDROCHLORIDE 5 MG: 5 TABLET ORAL at 03:03

## 2021-03-05 RX ADMIN — DOCUSATE SODIUM 50MG AND SENNOSIDES 8.6MG 1 TABLET: 8.6; 5 TABLET, FILM COATED ORAL at 12:03

## 2021-03-05 RX ADMIN — GUAIFENESIN 1200 MG: 600 TABLET, EXTENDED RELEASE ORAL at 08:03

## 2021-03-05 RX ADMIN — ONDANSETRON 4 MG: 2 INJECTION INTRAMUSCULAR; INTRAVENOUS at 01:03

## 2021-03-05 RX ADMIN — ASPIRIN 81 MG CHEWABLE TABLET 81 MG: 81 TABLET CHEWABLE at 12:03

## 2021-03-05 RX ADMIN — POLYETHYLENE GLYCOL 3350 17 G: 17 POWDER, FOR SOLUTION ORAL at 12:03

## 2021-03-05 RX ADMIN — SODIUM CHLORIDE, SODIUM LACTATE, POTASSIUM CHLORIDE, AND CALCIUM CHLORIDE: .6; .31; .03; .02 INJECTION, SOLUTION INTRAVENOUS at 05:03

## 2021-03-05 RX ADMIN — ALPRAZOLAM 0.25 MG: 0.25 TABLET ORAL at 08:03

## 2021-03-05 RX ADMIN — VANCOMYCIN HYDROCHLORIDE 1250 MG: 1.25 INJECTION, POWDER, LYOPHILIZED, FOR SOLUTION INTRAVENOUS at 01:03

## 2021-03-05 RX ADMIN — CEFEPIME 2 G: 2 INJECTION, POWDER, FOR SOLUTION INTRAVENOUS at 03:03

## 2021-03-05 RX ADMIN — ACETAMINOPHEN 650 MG: 325 TABLET ORAL at 04:03

## 2021-03-05 RX ADMIN — ACETAMINOPHEN 650 MG: 325 TABLET ORAL at 08:03

## 2021-03-05 RX ADMIN — CEFEPIME 2 G: 2 INJECTION, POWDER, FOR SOLUTION INTRAVENOUS at 05:03

## 2021-03-05 RX ADMIN — Medication 1000 UNITS: at 12:03

## 2021-03-05 RX ADMIN — INSULIN DETEMIR 30 UNITS: 100 INJECTION, SOLUTION SUBCUTANEOUS at 08:03

## 2021-03-05 RX ADMIN — Medication 500 MG: at 12:03

## 2021-03-05 RX ADMIN — METRONIDAZOLE 500 MG: 500 TABLET ORAL at 05:03

## 2021-03-05 RX ADMIN — ENOXAPARIN SODIUM 40 MG: 40 INJECTION SUBCUTANEOUS at 04:03

## 2021-03-05 RX ADMIN — INSULIN ASPART 14 UNITS: 100 INJECTION, SOLUTION INTRAVENOUS; SUBCUTANEOUS at 04:03

## 2021-03-05 RX ADMIN — METRONIDAZOLE 500 MG: 500 TABLET ORAL at 10:03

## 2021-03-05 RX ADMIN — Medication 500 MG: at 08:03

## 2021-03-05 RX ADMIN — GUAIFENESIN 1200 MG: 600 TABLET, EXTENDED RELEASE ORAL at 12:03

## 2021-03-06 ENCOUNTER — ANESTHESIA (OUTPATIENT)
Dept: SURGERY | Facility: HOSPITAL | Age: 66
DRG: 417 | End: 2021-03-06
Payer: MEDICARE

## 2021-03-06 LAB
ALBUMIN SERPL BCP-MCNC: 2 G/DL (ref 3.5–5.2)
ALP SERPL-CCNC: 205 U/L (ref 55–135)
ALT SERPL W/O P-5'-P-CCNC: 110 U/L (ref 10–44)
ANION GAP SERPL CALC-SCNC: 8 MMOL/L (ref 8–16)
ANISOCYTOSIS BLD QL SMEAR: SLIGHT
AST SERPL-CCNC: 86 U/L (ref 10–40)
BASOPHILS # BLD AUTO: 0.01 K/UL (ref 0–0.2)
BASOPHILS NFR BLD: 0.1 % (ref 0–1.9)
BILIRUB SERPL-MCNC: 1.8 MG/DL (ref 0.1–1)
BUN SERPL-MCNC: 6 MG/DL (ref 8–23)
CALCIUM SERPL-MCNC: 8 MG/DL (ref 8.7–10.5)
CHLORIDE SERPL-SCNC: 107 MMOL/L (ref 95–110)
CO2 SERPL-SCNC: 20 MMOL/L (ref 23–29)
CREAT SERPL-MCNC: 0.7 MG/DL (ref 0.5–1.4)
DIFFERENTIAL METHOD: ABNORMAL
EOSINOPHIL # BLD AUTO: 0.1 K/UL (ref 0–0.5)
EOSINOPHIL NFR BLD: 1.6 % (ref 0–8)
ERYTHROCYTE [DISTWIDTH] IN BLOOD BY AUTOMATED COUNT: 17.2 % (ref 11.5–14.5)
EST. GFR  (AFRICAN AMERICAN): >60 ML/MIN/1.73 M^2
EST. GFR  (NON AFRICAN AMERICAN): >60 ML/MIN/1.73 M^2
GLUCOSE SERPL-MCNC: 88 MG/DL (ref 70–110)
GRAM STN SPEC: NORMAL
GRAM STN SPEC: NORMAL
HCT VFR BLD AUTO: 24.2 % (ref 37–48.5)
HGB BLD-MCNC: 8 G/DL (ref 12–16)
HYPOCHROMIA BLD QL SMEAR: ABNORMAL
IMM GRANULOCYTES # BLD AUTO: 0.03 K/UL (ref 0–0.04)
IMM GRANULOCYTES NFR BLD AUTO: 0.3 % (ref 0–0.5)
LYMPHOCYTES # BLD AUTO: 1.2 K/UL (ref 1–4.8)
LYMPHOCYTES NFR BLD: 13.5 % (ref 18–48)
MCH RBC QN AUTO: 31.1 PG (ref 27–31)
MCHC RBC AUTO-ENTMCNC: 33.1 G/DL (ref 32–36)
MCV RBC AUTO: 94 FL (ref 82–98)
MONOCYTES # BLD AUTO: 1 K/UL (ref 0.3–1)
MONOCYTES NFR BLD: 11.6 % (ref 4–15)
NEUTROPHILS # BLD AUTO: 6.6 K/UL (ref 1.8–7.7)
NEUTROPHILS NFR BLD: 72.9 % (ref 38–73)
NRBC BLD-RTO: 0 /100 WBC
PLATELET # BLD AUTO: 171 K/UL (ref 150–350)
PLATELET BLD QL SMEAR: ABNORMAL
PMV BLD AUTO: 9.7 FL (ref 9.2–12.9)
POCT GLUCOSE: 119 MG/DL (ref 70–110)
POCT GLUCOSE: 121 MG/DL (ref 70–110)
POCT GLUCOSE: 128 MG/DL (ref 70–110)
POCT GLUCOSE: 239 MG/DL (ref 70–110)
POCT GLUCOSE: 91 MG/DL (ref 70–110)
POTASSIUM SERPL-SCNC: 3.8 MMOL/L (ref 3.5–5.1)
PROT SERPL-MCNC: 6 G/DL (ref 6–8.4)
RBC # BLD AUTO: 2.57 M/UL (ref 4–5.4)
SARS-COV-2 RDRP RESP QL NAA+PROBE: NEGATIVE
SODIUM SERPL-SCNC: 135 MMOL/L (ref 136–145)
VANCOMYCIN TROUGH SERPL-MCNC: 17.7 UG/ML (ref 10–22)
WBC # BLD AUTO: 8.98 K/UL (ref 3.9–12.7)

## 2021-03-06 PROCEDURE — 25000003 PHARM REV CODE 250: Performed by: STUDENT IN AN ORGANIZED HEALTH CARE EDUCATION/TRAINING PROGRAM

## 2021-03-06 PROCEDURE — 36000709 HC OR TIME LEV III EA ADD 15 MIN: Performed by: SURGERY

## 2021-03-06 PROCEDURE — 63600175 PHARM REV CODE 636 W HCPCS: Performed by: STUDENT IN AN ORGANIZED HEALTH CARE EDUCATION/TRAINING PROGRAM

## 2021-03-06 PROCEDURE — D9220A PRA ANESTHESIA: ICD-10-PCS | Mod: CRNA,,, | Performed by: NURSE ANESTHETIST, CERTIFIED REGISTERED

## 2021-03-06 PROCEDURE — 99233 SBSQ HOSP IP/OBS HIGH 50: CPT | Mod: GC,,, | Performed by: INTERNAL MEDICINE

## 2021-03-06 PROCEDURE — 88304 TISSUE EXAM BY PATHOLOGIST: CPT | Mod: 26,,, | Performed by: PATHOLOGY

## 2021-03-06 PROCEDURE — 63600175 PHARM REV CODE 636 W HCPCS: Performed by: INTERNAL MEDICINE

## 2021-03-06 PROCEDURE — 88304 PR  SURG PATH,LEVEL III: ICD-10-PCS | Mod: 26,,, | Performed by: PATHOLOGY

## 2021-03-06 PROCEDURE — D9220A PRA ANESTHESIA: Mod: CRNA,,, | Performed by: NURSE ANESTHETIST, CERTIFIED REGISTERED

## 2021-03-06 PROCEDURE — 25000003 PHARM REV CODE 250: Performed by: NURSE ANESTHETIST, CERTIFIED REGISTERED

## 2021-03-06 PROCEDURE — 36000708 HC OR TIME LEV III 1ST 15 MIN: Performed by: SURGERY

## 2021-03-06 PROCEDURE — 99233 PR SUBSEQUENT HOSPITAL CARE,LEVL III: ICD-10-PCS | Mod: GC,,, | Performed by: INTERNAL MEDICINE

## 2021-03-06 PROCEDURE — D9220A PRA ANESTHESIA: Mod: ANES,,, | Performed by: ANESTHESIOLOGY

## 2021-03-06 PROCEDURE — 25000003 PHARM REV CODE 250: Performed by: INTERNAL MEDICINE

## 2021-03-06 PROCEDURE — 87070 CULTURE OTHR SPECIMN AEROBIC: CPT | Performed by: SURGERY

## 2021-03-06 PROCEDURE — 99222 1ST HOSP IP/OBS MODERATE 55: CPT | Mod: 57,GC,, | Performed by: SURGERY

## 2021-03-06 PROCEDURE — 87116 MYCOBACTERIA CULTURE: CPT | Performed by: SURGERY

## 2021-03-06 PROCEDURE — 47562 PR LAP,CHOLECYSTECTOMY: ICD-10-PCS | Mod: ,,, | Performed by: SURGERY

## 2021-03-06 PROCEDURE — 37000008 HC ANESTHESIA 1ST 15 MINUTES: Performed by: SURGERY

## 2021-03-06 PROCEDURE — 87102 FUNGUS ISOLATION CULTURE: CPT | Performed by: SURGERY

## 2021-03-06 PROCEDURE — 85025 COMPLETE CBC W/AUTO DIFF WBC: CPT | Performed by: STUDENT IN AN ORGANIZED HEALTH CARE EDUCATION/TRAINING PROGRAM

## 2021-03-06 PROCEDURE — 71000039 HC RECOVERY, EACH ADD'L HOUR: Performed by: SURGERY

## 2021-03-06 PROCEDURE — 63600175 PHARM REV CODE 636 W HCPCS: Performed by: NURSE ANESTHETIST, CERTIFIED REGISTERED

## 2021-03-06 PROCEDURE — 47562 LAPAROSCOPIC CHOLECYSTECTOMY: CPT | Mod: ,,, | Performed by: SURGERY

## 2021-03-06 PROCEDURE — 71000015 HC POSTOP RECOV 1ST HR: Performed by: SURGERY

## 2021-03-06 PROCEDURE — 80053 COMPREHEN METABOLIC PANEL: CPT | Performed by: STUDENT IN AN ORGANIZED HEALTH CARE EDUCATION/TRAINING PROGRAM

## 2021-03-06 PROCEDURE — 99222 PR INITIAL HOSPITAL CARE,LEVL II: ICD-10-PCS | Mod: 57,GC,, | Performed by: SURGERY

## 2021-03-06 PROCEDURE — 87205 SMEAR GRAM STAIN: CPT | Performed by: SURGERY

## 2021-03-06 PROCEDURE — 71000033 HC RECOVERY, INTIAL HOUR: Performed by: SURGERY

## 2021-03-06 PROCEDURE — 82962 GLUCOSE BLOOD TEST: CPT | Performed by: SURGERY

## 2021-03-06 PROCEDURE — D9220A PRA ANESTHESIA: ICD-10-PCS | Mod: ANES,,, | Performed by: ANESTHESIOLOGY

## 2021-03-06 PROCEDURE — 37000009 HC ANESTHESIA EA ADD 15 MINS: Performed by: SURGERY

## 2021-03-06 PROCEDURE — 88304 TISSUE EXAM BY PATHOLOGIST: CPT | Performed by: PATHOLOGY

## 2021-03-06 PROCEDURE — 87206 SMEAR FLUORESCENT/ACID STAI: CPT | Performed by: SURGERY

## 2021-03-06 PROCEDURE — 87075 CULTR BACTERIA EXCEPT BLOOD: CPT | Performed by: SURGERY

## 2021-03-06 PROCEDURE — 20600001 HC STEP DOWN PRIVATE ROOM

## 2021-03-06 PROCEDURE — 27201423 OPTIME MED/SURG SUP & DEVICES STERILE SUPPLY: Performed by: SURGERY

## 2021-03-06 RX ORDER — DEXAMETHASONE SODIUM PHOSPHATE 4 MG/ML
INJECTION, SOLUTION INTRA-ARTICULAR; INTRALESIONAL; INTRAMUSCULAR; INTRAVENOUS; SOFT TISSUE
Status: DISCONTINUED | OUTPATIENT
Start: 2021-03-06 | End: 2021-03-06

## 2021-03-06 RX ORDER — ROCURONIUM BROMIDE 10 MG/ML
INJECTION, SOLUTION INTRAVENOUS
Status: DISCONTINUED | OUTPATIENT
Start: 2021-03-06 | End: 2021-03-06

## 2021-03-06 RX ORDER — PROPOFOL 10 MG/ML
VIAL (ML) INTRAVENOUS
Status: DISCONTINUED | OUTPATIENT
Start: 2021-03-06 | End: 2021-03-06

## 2021-03-06 RX ORDER — HYDROMORPHONE HYDROCHLORIDE 1 MG/ML
0.2 INJECTION, SOLUTION INTRAMUSCULAR; INTRAVENOUS; SUBCUTANEOUS EVERY 5 MIN PRN
Status: CANCELLED | OUTPATIENT
Start: 2021-03-06

## 2021-03-06 RX ORDER — MEPERIDINE HYDROCHLORIDE 50 MG/ML
12.5 INJECTION INTRAMUSCULAR; INTRAVENOUS; SUBCUTANEOUS ONCE AS NEEDED
Status: CANCELLED | OUTPATIENT
Start: 2021-03-06 | End: 2021-03-07

## 2021-03-06 RX ORDER — KETAMINE HCL IN 0.9 % NACL 50 MG/5 ML
SYRINGE (ML) INTRAVENOUS
Status: DISCONTINUED | OUTPATIENT
Start: 2021-03-06 | End: 2021-03-06

## 2021-03-06 RX ORDER — VASOPRESSIN 20 [USP'U]/ML
INJECTION, SOLUTION INTRAMUSCULAR; SUBCUTANEOUS
Status: DISCONTINUED | OUTPATIENT
Start: 2021-03-06 | End: 2021-03-06

## 2021-03-06 RX ORDER — LIDOCAINE HYDROCHLORIDE 20 MG/ML
INJECTION INTRAVENOUS
Status: DISCONTINUED | OUTPATIENT
Start: 2021-03-06 | End: 2021-03-06

## 2021-03-06 RX ORDER — FENTANYL CITRATE 50 UG/ML
INJECTION, SOLUTION INTRAMUSCULAR; INTRAVENOUS
Status: DISCONTINUED | OUTPATIENT
Start: 2021-03-06 | End: 2021-03-06

## 2021-03-06 RX ORDER — ONDANSETRON 2 MG/ML
4 INJECTION INTRAMUSCULAR; INTRAVENOUS DAILY PRN
Status: CANCELLED | OUTPATIENT
Start: 2021-03-06

## 2021-03-06 RX ORDER — MIDAZOLAM HYDROCHLORIDE 1 MG/ML
INJECTION, SOLUTION INTRAMUSCULAR; INTRAVENOUS
Status: DISCONTINUED | OUTPATIENT
Start: 2021-03-06 | End: 2021-03-06

## 2021-03-06 RX ORDER — SODIUM CHLORIDE 0.9 % (FLUSH) 0.9 %
3 SYRINGE (ML) INJECTION
Status: CANCELLED | OUTPATIENT
Start: 2021-03-06

## 2021-03-06 RX ORDER — SUCCINYLCHOLINE CHLORIDE 20 MG/ML
INJECTION INTRAMUSCULAR; INTRAVENOUS
Status: DISCONTINUED | OUTPATIENT
Start: 2021-03-06 | End: 2021-03-06

## 2021-03-06 RX ORDER — PHENYLEPHRINE HYDROCHLORIDE 10 MG/ML
INJECTION INTRAVENOUS
Status: DISCONTINUED | OUTPATIENT
Start: 2021-03-06 | End: 2021-03-06

## 2021-03-06 RX ORDER — ONDANSETRON 2 MG/ML
INJECTION INTRAMUSCULAR; INTRAVENOUS
Status: DISCONTINUED | OUTPATIENT
Start: 2021-03-06 | End: 2021-03-06

## 2021-03-06 RX ADMIN — ONDANSETRON 4 MG: 2 INJECTION, SOLUTION INTRAMUSCULAR; INTRAVENOUS at 08:03

## 2021-03-06 RX ADMIN — ROCURONIUM BROMIDE 5 MG: 10 INJECTION, SOLUTION INTRAVENOUS at 07:03

## 2021-03-06 RX ADMIN — OXYCODONE HYDROCHLORIDE 5 MG: 5 TABLET ORAL at 11:03

## 2021-03-06 RX ADMIN — ACETAMINOPHEN 650 MG: 325 TABLET ORAL at 05:03

## 2021-03-06 RX ADMIN — ENOXAPARIN SODIUM 40 MG: 40 INJECTION SUBCUTANEOUS at 04:03

## 2021-03-06 RX ADMIN — GLYCOPYRROLATE 0.1 MCG: 0.2 INJECTION, SOLUTION INTRAMUSCULAR; INTRAVITREAL at 08:03

## 2021-03-06 RX ADMIN — FENTANYL CITRATE 100 MCG: 50 INJECTION, SOLUTION INTRAMUSCULAR; INTRAVENOUS at 07:03

## 2021-03-06 RX ADMIN — Medication 1000 UNITS: at 11:03

## 2021-03-06 RX ADMIN — PHENYLEPHRINE HYDROCHLORIDE 100 MCG: 10 INJECTION INTRAVENOUS at 08:03

## 2021-03-06 RX ADMIN — SODIUM CHLORIDE: 9 INJECTION, SOLUTION INTRAVENOUS at 07:03

## 2021-03-06 RX ADMIN — METRONIDAZOLE 500 MG: 500 TABLET ORAL at 05:03

## 2021-03-06 RX ADMIN — GUAIFENESIN 1200 MG: 600 TABLET, EXTENDED RELEASE ORAL at 08:03

## 2021-03-06 RX ADMIN — OXYCODONE HYDROCHLORIDE 5 MG: 5 TABLET ORAL at 04:03

## 2021-03-06 RX ADMIN — DEXAMETHASONE SODIUM PHOSPHATE 4 MG: 4 INJECTION, SOLUTION INTRAMUSCULAR; INTRAVENOUS at 08:03

## 2021-03-06 RX ADMIN — SUGAMMADEX 200 MG: 100 INJECTION, SOLUTION INTRAVENOUS at 09:03

## 2021-03-06 RX ADMIN — VASOPRESSIN 1 UNITS: 20 INJECTION INTRAVENOUS at 08:03

## 2021-03-06 RX ADMIN — INSULIN DETEMIR 30 UNITS: 100 INJECTION, SOLUTION SUBCUTANEOUS at 08:03

## 2021-03-06 RX ADMIN — FENTANYL CITRATE 12.5 MCG: 50 INJECTION, SOLUTION INTRAMUSCULAR; INTRAVENOUS at 09:03

## 2021-03-06 RX ADMIN — INSULIN ASPART 14 UNITS: 100 INJECTION, SOLUTION INTRAVENOUS; SUBCUTANEOUS at 04:03

## 2021-03-06 RX ADMIN — DOCUSATE SODIUM AND SENNOSIDES 1 TABLET: 8.6; 5 TABLET, FILM COATED ORAL at 11:03

## 2021-03-06 RX ADMIN — INSULIN ASPART 14 UNITS: 100 INJECTION, SOLUTION INTRAVENOUS; SUBCUTANEOUS at 12:03

## 2021-03-06 RX ADMIN — ROCURONIUM BROMIDE 25 MG: 10 INJECTION, SOLUTION INTRAVENOUS at 08:03

## 2021-03-06 RX ADMIN — LIDOCAINE HYDROCHLORIDE 100 MG: 20 INJECTION, SOLUTION INTRAVENOUS at 07:03

## 2021-03-06 RX ADMIN — Medication 20 MG: at 08:03

## 2021-03-06 RX ADMIN — VANCOMYCIN HYDROCHLORIDE 1250 MG: 1.25 INJECTION, POWDER, LYOPHILIZED, FOR SOLUTION INTRAVENOUS at 01:03

## 2021-03-06 RX ADMIN — GUAIFENESIN 1200 MG: 600 TABLET, EXTENDED RELEASE ORAL at 11:03

## 2021-03-06 RX ADMIN — PHENYLEPHRINE HYDROCHLORIDE 150 MCG: 10 INJECTION INTRAVENOUS at 08:03

## 2021-03-06 RX ADMIN — CEFEPIME 2 G: 2 INJECTION, POWDER, FOR SOLUTION INTRAVENOUS at 10:03

## 2021-03-06 RX ADMIN — SUCCINYLCHOLINE CHLORIDE 140 MG: 20 INJECTION, SOLUTION INTRAMUSCULAR; INTRAVENOUS at 08:03

## 2021-03-06 RX ADMIN — Medication 500 MG: at 11:03

## 2021-03-06 RX ADMIN — CEFEPIME 2 G: 2 INJECTION, POWDER, FOR SOLUTION INTRAVENOUS at 02:03

## 2021-03-06 RX ADMIN — PHENYLEPHRINE HYDROCHLORIDE 200 MCG: 10 INJECTION INTRAVENOUS at 08:03

## 2021-03-06 RX ADMIN — CEFEPIME 2 G: 2 INJECTION, POWDER, FOR SOLUTION INTRAVENOUS at 06:03

## 2021-03-06 RX ADMIN — METRONIDAZOLE 500 MG: 500 TABLET ORAL at 10:03

## 2021-03-06 RX ADMIN — POLYETHYLENE GLYCOL 3350 17 G: 17 POWDER, FOR SOLUTION ORAL at 11:03

## 2021-03-06 RX ADMIN — Medication 500 MG: at 08:03

## 2021-03-06 RX ADMIN — ALPRAZOLAM 0.25 MG: 0.25 TABLET ORAL at 08:03

## 2021-03-06 RX ADMIN — METRONIDAZOLE 500 MG: 500 TABLET ORAL at 02:03

## 2021-03-06 RX ADMIN — PROPOFOL 180 MG: 10 INJECTION, EMULSION INTRAVENOUS at 07:03

## 2021-03-06 RX ADMIN — ROCURONIUM BROMIDE 10 MG: 10 INJECTION, SOLUTION INTRAVENOUS at 08:03

## 2021-03-06 RX ADMIN — ASPIRIN 81 MG CHEWABLE TABLET 81 MG: 81 TABLET CHEWABLE at 11:03

## 2021-03-06 RX ADMIN — DOCUSATE SODIUM AND SENNOSIDES 1 TABLET: 8.6; 5 TABLET, FILM COATED ORAL at 08:03

## 2021-03-06 RX ADMIN — INSULIN ASPART 2 UNITS: 100 INJECTION, SOLUTION INTRAVENOUS; SUBCUTANEOUS at 04:03

## 2021-03-06 RX ADMIN — MIDAZOLAM HYDROCHLORIDE 2 MG: 1 INJECTION, SOLUTION INTRAMUSCULAR; INTRAVENOUS at 07:03

## 2021-03-07 PROBLEM — Z02.9 DISCHARGE PLANNING ISSUES: Status: ACTIVE | Noted: 2021-03-07

## 2021-03-07 PROBLEM — Z75.8 DISCHARGE PLANNING ISSUES: Status: ACTIVE | Noted: 2021-03-07

## 2021-03-07 LAB
ALBUMIN SERPL BCP-MCNC: 2 G/DL (ref 3.5–5.2)
ALP SERPL-CCNC: 194 U/L (ref 55–135)
ALT SERPL W/O P-5'-P-CCNC: 100 U/L (ref 10–44)
ANION GAP SERPL CALC-SCNC: 7 MMOL/L (ref 8–16)
ANISOCYTOSIS BLD QL SMEAR: SLIGHT
AST SERPL-CCNC: 94 U/L (ref 10–40)
BACTERIA BLD CULT: NORMAL
BACTERIA BLD CULT: NORMAL
BASOPHILS # BLD AUTO: 0.01 K/UL (ref 0–0.2)
BASOPHILS NFR BLD: 0.1 % (ref 0–1.9)
BILIRUB SERPL-MCNC: 0.9 MG/DL (ref 0.1–1)
BUN SERPL-MCNC: 11 MG/DL (ref 8–23)
BURR CELLS BLD QL SMEAR: ABNORMAL
CALCIUM SERPL-MCNC: 8.4 MG/DL (ref 8.7–10.5)
CHLORIDE SERPL-SCNC: 108 MMOL/L (ref 95–110)
CO2 SERPL-SCNC: 22 MMOL/L (ref 23–29)
CREAT SERPL-MCNC: 0.8 MG/DL (ref 0.5–1.4)
DACRYOCYTES BLD QL SMEAR: ABNORMAL
DIFFERENTIAL METHOD: ABNORMAL
EOSINOPHIL # BLD AUTO: 0.1 K/UL (ref 0–0.5)
EOSINOPHIL NFR BLD: 0.5 % (ref 0–8)
ERYTHROCYTE [DISTWIDTH] IN BLOOD BY AUTOMATED COUNT: 17.1 % (ref 11.5–14.5)
EST. GFR  (AFRICAN AMERICAN): >60 ML/MIN/1.73 M^2
EST. GFR  (NON AFRICAN AMERICAN): >60 ML/MIN/1.73 M^2
GLUCOSE SERPL-MCNC: 82 MG/DL (ref 70–110)
HCT VFR BLD AUTO: 22.9 % (ref 37–48.5)
HGB BLD-MCNC: 7.4 G/DL (ref 12–16)
HYPOCHROMIA BLD QL SMEAR: ABNORMAL
IMM GRANULOCYTES # BLD AUTO: 0.05 K/UL (ref 0–0.04)
IMM GRANULOCYTES NFR BLD AUTO: 0.5 % (ref 0–0.5)
LYMPHOCYTES # BLD AUTO: 1.6 K/UL (ref 1–4.8)
LYMPHOCYTES NFR BLD: 17 % (ref 18–48)
MCH RBC QN AUTO: 30.7 PG (ref 27–31)
MCHC RBC AUTO-ENTMCNC: 32.3 G/DL (ref 32–36)
MCV RBC AUTO: 95 FL (ref 82–98)
MONOCYTES # BLD AUTO: 1 K/UL (ref 0.3–1)
MONOCYTES NFR BLD: 10.6 % (ref 4–15)
NEUTROPHILS # BLD AUTO: 6.8 K/UL (ref 1.8–7.7)
NEUTROPHILS NFR BLD: 71.3 % (ref 38–73)
NRBC BLD-RTO: 0 /100 WBC
OVALOCYTES BLD QL SMEAR: ABNORMAL
PLATELET # BLD AUTO: 208 K/UL (ref 150–350)
PLATELET BLD QL SMEAR: ABNORMAL
PMV BLD AUTO: 9.7 FL (ref 9.2–12.9)
POCT GLUCOSE: 109 MG/DL (ref 70–110)
POCT GLUCOSE: 146 MG/DL (ref 70–110)
POCT GLUCOSE: 147 MG/DL (ref 70–110)
POCT GLUCOSE: 298 MG/DL (ref 70–110)
POCT GLUCOSE: 72 MG/DL (ref 70–110)
POIKILOCYTOSIS BLD QL SMEAR: SLIGHT
POLYCHROMASIA BLD QL SMEAR: ABNORMAL
POTASSIUM SERPL-SCNC: 3.9 MMOL/L (ref 3.5–5.1)
PROT SERPL-MCNC: 6.3 G/DL (ref 6–8.4)
RBC # BLD AUTO: 2.41 M/UL (ref 4–5.4)
SODIUM SERPL-SCNC: 137 MMOL/L (ref 136–145)
WBC # BLD AUTO: 9.58 K/UL (ref 3.9–12.7)

## 2021-03-07 PROCEDURE — 80053 COMPREHEN METABOLIC PANEL: CPT | Performed by: STUDENT IN AN ORGANIZED HEALTH CARE EDUCATION/TRAINING PROGRAM

## 2021-03-07 PROCEDURE — 63600175 PHARM REV CODE 636 W HCPCS: Performed by: STUDENT IN AN ORGANIZED HEALTH CARE EDUCATION/TRAINING PROGRAM

## 2021-03-07 PROCEDURE — 25000003 PHARM REV CODE 250: Performed by: STUDENT IN AN ORGANIZED HEALTH CARE EDUCATION/TRAINING PROGRAM

## 2021-03-07 PROCEDURE — 20600001 HC STEP DOWN PRIVATE ROOM

## 2021-03-07 PROCEDURE — 99233 SBSQ HOSP IP/OBS HIGH 50: CPT | Mod: GC,,, | Performed by: INTERNAL MEDICINE

## 2021-03-07 PROCEDURE — 63600175 PHARM REV CODE 636 W HCPCS: Performed by: INTERNAL MEDICINE

## 2021-03-07 PROCEDURE — 85025 COMPLETE CBC W/AUTO DIFF WBC: CPT | Performed by: STUDENT IN AN ORGANIZED HEALTH CARE EDUCATION/TRAINING PROGRAM

## 2021-03-07 PROCEDURE — 99233 PR SUBSEQUENT HOSPITAL CARE,LEVL III: ICD-10-PCS | Mod: GC,,, | Performed by: INTERNAL MEDICINE

## 2021-03-07 PROCEDURE — 25000003 PHARM REV CODE 250: Performed by: INTERNAL MEDICINE

## 2021-03-07 RX ORDER — LETROZOLE 2.5 MG/1
2.5 TABLET, FILM COATED ORAL DAILY
Qty: 30 TABLET | Refills: 11
Start: 2021-03-07 | End: 2021-04-06

## 2021-03-07 RX ORDER — OXYCODONE HYDROCHLORIDE 5 MG/1
5 TABLET ORAL EVERY 6 HOURS PRN
Refills: 0
Start: 2021-03-07 | End: 2021-03-09

## 2021-03-07 RX ADMIN — ASPIRIN 81 MG CHEWABLE TABLET 81 MG: 81 TABLET CHEWABLE at 08:03

## 2021-03-07 RX ADMIN — ACETAMINOPHEN 650 MG: 325 TABLET ORAL at 05:03

## 2021-03-07 RX ADMIN — Medication 500 MG: at 09:03

## 2021-03-07 RX ADMIN — Medication 500 MG: at 08:03

## 2021-03-07 RX ADMIN — GUAIFENESIN 1200 MG: 600 TABLET, EXTENDED RELEASE ORAL at 09:03

## 2021-03-07 RX ADMIN — INSULIN ASPART 3 UNITS: 100 INJECTION, SOLUTION INTRAVENOUS; SUBCUTANEOUS at 05:03

## 2021-03-07 RX ADMIN — METRONIDAZOLE 500 MG: 500 TABLET ORAL at 05:03

## 2021-03-07 RX ADMIN — ENOXAPARIN SODIUM 40 MG: 40 INJECTION SUBCUTANEOUS at 05:03

## 2021-03-07 RX ADMIN — GUAIFENESIN 1200 MG: 600 TABLET, EXTENDED RELEASE ORAL at 08:03

## 2021-03-07 RX ADMIN — POLYETHYLENE GLYCOL 3350 17 G: 17 POWDER, FOR SOLUTION ORAL at 08:03

## 2021-03-07 RX ADMIN — INSULIN ASPART 14 UNITS: 100 INJECTION, SOLUTION INTRAVENOUS; SUBCUTANEOUS at 01:03

## 2021-03-07 RX ADMIN — Medication 1000 UNITS: at 08:03

## 2021-03-07 RX ADMIN — DOCUSATE SODIUM AND SENNOSIDES 1 TABLET: 8.6; 5 TABLET, FILM COATED ORAL at 09:03

## 2021-03-07 RX ADMIN — OXYCODONE HYDROCHLORIDE 5 MG: 5 TABLET ORAL at 10:03

## 2021-03-07 RX ADMIN — OXYCODONE HYDROCHLORIDE 5 MG: 5 TABLET ORAL at 01:03

## 2021-03-07 RX ADMIN — INSULIN ASPART 14 UNITS: 100 INJECTION, SOLUTION INTRAVENOUS; SUBCUTANEOUS at 05:03

## 2021-03-07 RX ADMIN — INSULIN DETEMIR 30 UNITS: 100 INJECTION, SOLUTION SUBCUTANEOUS at 09:03

## 2021-03-07 RX ADMIN — DOCUSATE SODIUM AND SENNOSIDES 1 TABLET: 8.6; 5 TABLET, FILM COATED ORAL at 08:03

## 2021-03-07 RX ADMIN — CEFEPIME 2 G: 2 INJECTION, POWDER, FOR SOLUTION INTRAVENOUS at 06:03

## 2021-03-08 LAB
ALBUMIN SERPL BCP-MCNC: 2.1 G/DL (ref 3.5–5.2)
ALP SERPL-CCNC: 189 U/L (ref 55–135)
ALT SERPL W/O P-5'-P-CCNC: 72 U/L (ref 10–44)
ANION GAP SERPL CALC-SCNC: 8 MMOL/L (ref 8–16)
AST SERPL-CCNC: 50 U/L (ref 10–40)
BACTERIA BLD CULT: NORMAL
BACTERIA BLD CULT: NORMAL
BACTERIA SPEC AEROBE CULT: ABNORMAL
BACTERIA SPEC AEROBE CULT: ABNORMAL
BASOPHILS # BLD AUTO: 0.01 K/UL (ref 0–0.2)
BASOPHILS NFR BLD: 0.2 % (ref 0–1.9)
BILIRUB SERPL-MCNC: 0.5 MG/DL (ref 0.1–1)
BUN SERPL-MCNC: 12 MG/DL (ref 8–23)
CALCIUM SERPL-MCNC: 8.5 MG/DL (ref 8.7–10.5)
CHLORIDE SERPL-SCNC: 107 MMOL/L (ref 95–110)
CO2 SERPL-SCNC: 21 MMOL/L (ref 23–29)
CREAT SERPL-MCNC: 0.8 MG/DL (ref 0.5–1.4)
DIFFERENTIAL METHOD: ABNORMAL
EOSINOPHIL # BLD AUTO: 0.2 K/UL (ref 0–0.5)
EOSINOPHIL NFR BLD: 2.8 % (ref 0–8)
ERYTHROCYTE [DISTWIDTH] IN BLOOD BY AUTOMATED COUNT: 17.2 % (ref 11.5–14.5)
EST. GFR  (AFRICAN AMERICAN): >60 ML/MIN/1.73 M^2
EST. GFR  (NON AFRICAN AMERICAN): >60 ML/MIN/1.73 M^2
GLUCOSE SERPL-MCNC: 88 MG/DL (ref 70–110)
GRAM STN SPEC: ABNORMAL
HCT VFR BLD AUTO: 25 % (ref 37–48.5)
HGB BLD-MCNC: 8 G/DL (ref 12–16)
IMM GRANULOCYTES # BLD AUTO: 0.03 K/UL (ref 0–0.04)
IMM GRANULOCYTES NFR BLD AUTO: 0.5 % (ref 0–0.5)
LYMPHOCYTES # BLD AUTO: 1.6 K/UL (ref 1–4.8)
LYMPHOCYTES NFR BLD: 26 % (ref 18–48)
MCH RBC QN AUTO: 31 PG (ref 27–31)
MCHC RBC AUTO-ENTMCNC: 32 G/DL (ref 32–36)
MCV RBC AUTO: 97 FL (ref 82–98)
MONOCYTES # BLD AUTO: 0.8 K/UL (ref 0.3–1)
MONOCYTES NFR BLD: 12.8 % (ref 4–15)
NEUTROPHILS # BLD AUTO: 3.6 K/UL (ref 1.8–7.7)
NEUTROPHILS NFR BLD: 57.7 % (ref 38–73)
NRBC BLD-RTO: 0 /100 WBC
PLATELET # BLD AUTO: 219 K/UL (ref 150–350)
PMV BLD AUTO: 9.9 FL (ref 9.2–12.9)
POCT GLUCOSE: 115 MG/DL (ref 70–110)
POCT GLUCOSE: 170 MG/DL (ref 70–110)
POCT GLUCOSE: 188 MG/DL (ref 70–110)
POCT GLUCOSE: 89 MG/DL (ref 70–110)
POCT GLUCOSE: 90 MG/DL (ref 70–110)
POTASSIUM SERPL-SCNC: 4 MMOL/L (ref 3.5–5.1)
PROT SERPL-MCNC: 6.4 G/DL (ref 6–8.4)
RBC # BLD AUTO: 2.58 M/UL (ref 4–5.4)
SODIUM SERPL-SCNC: 136 MMOL/L (ref 136–145)
WBC # BLD AUTO: 6.16 K/UL (ref 3.9–12.7)

## 2021-03-08 PROCEDURE — 20600001 HC STEP DOWN PRIVATE ROOM

## 2021-03-08 PROCEDURE — 63600175 PHARM REV CODE 636 W HCPCS: Performed by: INTERNAL MEDICINE

## 2021-03-08 PROCEDURE — 80053 COMPREHEN METABOLIC PANEL: CPT | Performed by: INTERNAL MEDICINE

## 2021-03-08 PROCEDURE — 36415 COLL VENOUS BLD VENIPUNCTURE: CPT | Performed by: INTERNAL MEDICINE

## 2021-03-08 PROCEDURE — 97116 GAIT TRAINING THERAPY: CPT | Mod: CQ

## 2021-03-08 PROCEDURE — 97530 THERAPEUTIC ACTIVITIES: CPT | Mod: CQ

## 2021-03-08 PROCEDURE — 97535 SELF CARE MNGMENT TRAINING: CPT

## 2021-03-08 PROCEDURE — 99233 SBSQ HOSP IP/OBS HIGH 50: CPT | Mod: GC,,, | Performed by: INTERNAL MEDICINE

## 2021-03-08 PROCEDURE — 99233 PR SUBSEQUENT HOSPITAL CARE,LEVL III: ICD-10-PCS | Mod: GC,,, | Performed by: INTERNAL MEDICINE

## 2021-03-08 PROCEDURE — 25000003 PHARM REV CODE 250: Performed by: INTERNAL MEDICINE

## 2021-03-08 PROCEDURE — 25000003 PHARM REV CODE 250: Performed by: STUDENT IN AN ORGANIZED HEALTH CARE EDUCATION/TRAINING PROGRAM

## 2021-03-08 PROCEDURE — 63600175 PHARM REV CODE 636 W HCPCS: Performed by: STUDENT IN AN ORGANIZED HEALTH CARE EDUCATION/TRAINING PROGRAM

## 2021-03-08 PROCEDURE — C9399 UNCLASSIFIED DRUGS OR BIOLOG: HCPCS | Performed by: STUDENT IN AN ORGANIZED HEALTH CARE EDUCATION/TRAINING PROGRAM

## 2021-03-08 PROCEDURE — 85025 COMPLETE CBC W/AUTO DIFF WBC: CPT | Performed by: INTERNAL MEDICINE

## 2021-03-08 PROCEDURE — 97110 THERAPEUTIC EXERCISES: CPT | Mod: CQ

## 2021-03-08 RX ORDER — POLYETHYLENE GLYCOL 3350 17 G/17G
17 POWDER, FOR SOLUTION ORAL DAILY PRN
Status: DISCONTINUED | OUTPATIENT
Start: 2021-03-08 | End: 2021-03-08

## 2021-03-08 RX ORDER — GLYCERIN 1 G/1
1 SUPPOSITORY RECTAL DAILY PRN
Status: DISCONTINUED | OUTPATIENT
Start: 2021-03-08 | End: 2021-03-09 | Stop reason: HOSPADM

## 2021-03-08 RX ADMIN — GUAIFENESIN 1200 MG: 600 TABLET, EXTENDED RELEASE ORAL at 11:03

## 2021-03-08 RX ADMIN — INSULIN DETEMIR 30 UNITS: 100 INJECTION, SOLUTION SUBCUTANEOUS at 08:03

## 2021-03-08 RX ADMIN — GUAIFENESIN 1200 MG: 600 TABLET, EXTENDED RELEASE ORAL at 08:03

## 2021-03-08 RX ADMIN — INSULIN ASPART 14 UNITS: 100 INJECTION, SOLUTION INTRAVENOUS; SUBCUTANEOUS at 09:03

## 2021-03-08 RX ADMIN — Medication 1000 UNITS: at 09:03

## 2021-03-08 RX ADMIN — Medication 500 MG: at 08:03

## 2021-03-08 RX ADMIN — INSULIN ASPART 14 UNITS: 100 INJECTION, SOLUTION INTRAVENOUS; SUBCUTANEOUS at 05:03

## 2021-03-08 RX ADMIN — DOCUSATE SODIUM AND SENNOSIDES 1 TABLET: 8.6; 5 TABLET, FILM COATED ORAL at 09:03

## 2021-03-08 RX ADMIN — OXYCODONE HYDROCHLORIDE 5 MG: 5 TABLET ORAL at 09:03

## 2021-03-08 RX ADMIN — ENOXAPARIN SODIUM 40 MG: 40 INJECTION SUBCUTANEOUS at 05:03

## 2021-03-08 RX ADMIN — ASPIRIN 81 MG CHEWABLE TABLET 81 MG: 81 TABLET CHEWABLE at 09:03

## 2021-03-08 RX ADMIN — DOCUSATE SODIUM AND SENNOSIDES 1 TABLET: 8.6; 5 TABLET, FILM COATED ORAL at 08:03

## 2021-03-08 RX ADMIN — Medication 500 MG: at 09:03

## 2021-03-09 VITALS
HEART RATE: 105 BPM | OXYGEN SATURATION: 100 % | RESPIRATION RATE: 16 BRPM | HEIGHT: 64 IN | BODY MASS INDEX: 31.92 KG/M2 | WEIGHT: 187 LBS | SYSTOLIC BLOOD PRESSURE: 125 MMHG | TEMPERATURE: 97 F | DIASTOLIC BLOOD PRESSURE: 64 MMHG

## 2021-03-09 PROBLEM — K80.00 CHOLELITHIASIS WITH ACUTE CHOLECYSTITIS: Status: RESOLVED | Noted: 2021-03-05 | Resolved: 2021-03-09

## 2021-03-09 LAB
ALBUMIN SERPL BCP-MCNC: 2.1 G/DL (ref 3.5–5.2)
ALP SERPL-CCNC: 178 U/L (ref 55–135)
ALT SERPL W/O P-5'-P-CCNC: 53 U/L (ref 10–44)
ANION GAP SERPL CALC-SCNC: 6 MMOL/L (ref 8–16)
ANISOCYTOSIS BLD QL SMEAR: SLIGHT
AST SERPL-CCNC: 30 U/L (ref 10–40)
BACTERIA SPEC AEROBE CULT: NO GROWTH
BASOPHILS # BLD AUTO: 0.02 K/UL (ref 0–0.2)
BASOPHILS NFR BLD: 0.3 % (ref 0–1.9)
BILIRUB SERPL-MCNC: 0.5 MG/DL (ref 0.1–1)
BUN SERPL-MCNC: 9 MG/DL (ref 8–23)
CALCIUM SERPL-MCNC: 8.7 MG/DL (ref 8.7–10.5)
CHLORIDE SERPL-SCNC: 107 MMOL/L (ref 95–110)
CO2 SERPL-SCNC: 25 MMOL/L (ref 23–29)
CREAT SERPL-MCNC: 0.7 MG/DL (ref 0.5–1.4)
DACRYOCYTES BLD QL SMEAR: ABNORMAL
DIFFERENTIAL METHOD: ABNORMAL
EOSINOPHIL # BLD AUTO: 0.2 K/UL (ref 0–0.5)
EOSINOPHIL NFR BLD: 2.6 % (ref 0–8)
ERYTHROCYTE [DISTWIDTH] IN BLOOD BY AUTOMATED COUNT: 17.3 % (ref 11.5–14.5)
EST. GFR  (AFRICAN AMERICAN): >60 ML/MIN/1.73 M^2
EST. GFR  (NON AFRICAN AMERICAN): >60 ML/MIN/1.73 M^2
FINAL PATHOLOGIC DIAGNOSIS: NORMAL
GLUCOSE SERPL-MCNC: 118 MG/DL (ref 70–110)
GROSS: NORMAL
HCT VFR BLD AUTO: 24.7 % (ref 37–48.5)
HGB BLD-MCNC: 7.9 G/DL (ref 12–16)
IMM GRANULOCYTES # BLD AUTO: 0.02 K/UL (ref 0–0.04)
IMM GRANULOCYTES NFR BLD AUTO: 0.3 % (ref 0–0.5)
LYMPHOCYTES # BLD AUTO: 1.8 K/UL (ref 1–4.8)
LYMPHOCYTES NFR BLD: 28.7 % (ref 18–48)
Lab: NORMAL
MCH RBC QN AUTO: 30.5 PG (ref 27–31)
MCHC RBC AUTO-ENTMCNC: 32 G/DL (ref 32–36)
MCV RBC AUTO: 95 FL (ref 82–98)
MONOCYTES # BLD AUTO: 0.7 K/UL (ref 0.3–1)
MONOCYTES NFR BLD: 11.9 % (ref 4–15)
NEUTROPHILS # BLD AUTO: 3.5 K/UL (ref 1.8–7.7)
NEUTROPHILS NFR BLD: 56.2 % (ref 38–73)
NRBC BLD-RTO: 0 /100 WBC
OVALOCYTES BLD QL SMEAR: ABNORMAL
PLATELET # BLD AUTO: 248 K/UL (ref 150–350)
PLATELET BLD QL SMEAR: ABNORMAL
PMV BLD AUTO: 9.8 FL (ref 9.2–12.9)
POCT GLUCOSE: 105 MG/DL (ref 70–110)
POCT GLUCOSE: 117 MG/DL (ref 70–110)
POIKILOCYTOSIS BLD QL SMEAR: SLIGHT
POLYCHROMASIA BLD QL SMEAR: ABNORMAL
POTASSIUM SERPL-SCNC: 4 MMOL/L (ref 3.5–5.1)
PROT SERPL-MCNC: 6.5 G/DL (ref 6–8.4)
RBC # BLD AUTO: 2.59 M/UL (ref 4–5.4)
SODIUM SERPL-SCNC: 138 MMOL/L (ref 136–145)
WBC # BLD AUTO: 6.23 K/UL (ref 3.9–12.7)

## 2021-03-09 PROCEDURE — 94799 UNLISTED PULMONARY SVC/PX: CPT

## 2021-03-09 PROCEDURE — 85025 COMPLETE CBC W/AUTO DIFF WBC: CPT | Performed by: STUDENT IN AN ORGANIZED HEALTH CARE EDUCATION/TRAINING PROGRAM

## 2021-03-09 PROCEDURE — 97116 GAIT TRAINING THERAPY: CPT | Mod: CQ

## 2021-03-09 PROCEDURE — 25000003 PHARM REV CODE 250: Performed by: STUDENT IN AN ORGANIZED HEALTH CARE EDUCATION/TRAINING PROGRAM

## 2021-03-09 PROCEDURE — 99239 HOSP IP/OBS DSCHRG MGMT >30: CPT | Mod: GC,,, | Performed by: STUDENT IN AN ORGANIZED HEALTH CARE EDUCATION/TRAINING PROGRAM

## 2021-03-09 PROCEDURE — 97535 SELF CARE MNGMENT TRAINING: CPT

## 2021-03-09 PROCEDURE — 99239 PR HOSPITAL DISCHARGE DAY,>30 MIN: ICD-10-PCS | Mod: GC,,, | Performed by: STUDENT IN AN ORGANIZED HEALTH CARE EDUCATION/TRAINING PROGRAM

## 2021-03-09 PROCEDURE — 80053 COMPREHEN METABOLIC PANEL: CPT | Performed by: STUDENT IN AN ORGANIZED HEALTH CARE EDUCATION/TRAINING PROGRAM

## 2021-03-09 PROCEDURE — 97110 THERAPEUTIC EXERCISES: CPT | Mod: CQ

## 2021-03-09 PROCEDURE — 63600175 PHARM REV CODE 636 W HCPCS: Performed by: STUDENT IN AN ORGANIZED HEALTH CARE EDUCATION/TRAINING PROGRAM

## 2021-03-09 PROCEDURE — 25000003 PHARM REV CODE 250: Performed by: INTERNAL MEDICINE

## 2021-03-09 RX ORDER — OXYCODONE HYDROCHLORIDE 5 MG/1
5 TABLET ORAL EVERY 6 HOURS PRN
Qty: 25 TABLET | Refills: 0 | Status: SHIPPED | OUTPATIENT
Start: 2021-03-09 | End: 2021-10-21

## 2021-03-09 RX ADMIN — Medication 500 MG: at 08:03

## 2021-03-09 RX ADMIN — HEPARIN 300 UNITS: 100 SYRINGE at 04:03

## 2021-03-09 RX ADMIN — ASPIRIN 81 MG CHEWABLE TABLET 81 MG: 81 TABLET CHEWABLE at 08:03

## 2021-03-09 RX ADMIN — DOCUSATE SODIUM AND SENNOSIDES 1 TABLET: 8.6; 5 TABLET, FILM COATED ORAL at 09:03

## 2021-03-09 RX ADMIN — INSULIN ASPART 14 UNITS: 100 INJECTION, SOLUTION INTRAVENOUS; SUBCUTANEOUS at 08:03

## 2021-03-09 RX ADMIN — Medication 1000 UNITS: at 08:03

## 2021-03-09 RX ADMIN — GUAIFENESIN 1200 MG: 600 TABLET, EXTENDED RELEASE ORAL at 08:03

## 2021-03-12 ENCOUNTER — TELEPHONE (OUTPATIENT)
Dept: HEMATOLOGY/ONCOLOGY | Facility: CLINIC | Age: 66
End: 2021-03-12

## 2021-03-13 PROCEDURE — G0180 MD CERTIFICATION HHA PATIENT: HCPCS | Mod: ,,, | Performed by: STUDENT IN AN ORGANIZED HEALTH CARE EDUCATION/TRAINING PROGRAM

## 2021-03-13 PROCEDURE — G0180 PR HOME HEALTH MD CERTIFICATION: ICD-10-PCS | Mod: ,,, | Performed by: STUDENT IN AN ORGANIZED HEALTH CARE EDUCATION/TRAINING PROGRAM

## 2021-03-15 ENCOUNTER — DOCUMENTATION ONLY (OUTPATIENT)
Dept: ONCOLOGY | Facility: HOSPITAL | Age: 66
End: 2021-03-15

## 2021-03-15 LAB — BACTERIA SPEC ANAEROBE CULT: NORMAL

## 2021-03-16 ENCOUNTER — TELEPHONE (OUTPATIENT)
Dept: INTERNAL MEDICINE | Facility: CLINIC | Age: 66
End: 2021-03-16

## 2021-03-16 ENCOUNTER — OFFICE VISIT (OUTPATIENT)
Dept: SURGERY | Facility: CLINIC | Age: 66
End: 2021-03-16
Payer: MEDICARE

## 2021-03-16 VITALS
HEIGHT: 64 IN | DIASTOLIC BLOOD PRESSURE: 67 MMHG | HEART RATE: 110 BPM | TEMPERATURE: 99 F | SYSTOLIC BLOOD PRESSURE: 140 MMHG | WEIGHT: 180.56 LBS | BODY MASS INDEX: 30.83 KG/M2

## 2021-03-16 DIAGNOSIS — Z48.89 POSTOPERATIVE VISIT: Primary | ICD-10-CM

## 2021-03-16 PROCEDURE — 99024 PR POST-OP FOLLOW-UP VISIT: ICD-10-PCS | Mod: S$GLB,,, | Performed by: SURGERY

## 2021-03-16 PROCEDURE — 99024 POSTOP FOLLOW-UP VISIT: CPT | Mod: S$GLB,,, | Performed by: SURGERY

## 2021-03-16 PROCEDURE — 3288F FALL RISK ASSESSMENT DOCD: CPT | Mod: CPTII,S$GLB,, | Performed by: SURGERY

## 2021-03-16 PROCEDURE — 1101F PT FALLS ASSESS-DOCD LE1/YR: CPT | Mod: CPTII,S$GLB,, | Performed by: SURGERY

## 2021-03-16 PROCEDURE — 1126F AMNT PAIN NOTED NONE PRSNT: CPT | Mod: S$GLB,,, | Performed by: SURGERY

## 2021-03-16 PROCEDURE — 99999 PR PBB SHADOW E&M-EST. PATIENT-LVL IV: ICD-10-PCS | Mod: PBBFAC,,, | Performed by: SURGERY

## 2021-03-16 PROCEDURE — 1126F PR PAIN SEVERITY QUANTIFIED, NO PAIN PRESENT: ICD-10-PCS | Mod: S$GLB,,, | Performed by: SURGERY

## 2021-03-16 PROCEDURE — 99999 PR PBB SHADOW E&M-EST. PATIENT-LVL IV: CPT | Mod: PBBFAC,,, | Performed by: SURGERY

## 2021-03-16 PROCEDURE — 3288F PR FALLS RISK ASSESSMENT DOCUMENTED: ICD-10-PCS | Mod: CPTII,S$GLB,, | Performed by: SURGERY

## 2021-03-16 PROCEDURE — 3008F PR BODY MASS INDEX (BMI) DOCUMENTED: ICD-10-PCS | Mod: CPTII,S$GLB,, | Performed by: SURGERY

## 2021-03-16 PROCEDURE — 3008F BODY MASS INDEX DOCD: CPT | Mod: CPTII,S$GLB,, | Performed by: SURGERY

## 2021-03-16 PROCEDURE — 1101F PR PT FALLS ASSESS DOC 0-1 FALLS W/OUT INJ PAST YR: ICD-10-PCS | Mod: CPTII,S$GLB,, | Performed by: SURGERY

## 2021-03-17 ENCOUNTER — TELEPHONE (OUTPATIENT)
Dept: INTERNAL MEDICINE | Facility: CLINIC | Age: 66
End: 2021-03-17

## 2021-03-18 ENCOUNTER — PATIENT OUTREACH (OUTPATIENT)
Dept: ADMINISTRATIVE | Facility: HOSPITAL | Age: 66
End: 2021-03-18

## 2021-03-25 ENCOUNTER — PATIENT OUTREACH (OUTPATIENT)
Dept: ADMINISTRATIVE | Facility: OTHER | Age: 66
End: 2021-03-25

## 2021-03-26 ENCOUNTER — EXTERNAL HOME HEALTH (OUTPATIENT)
Dept: HOME HEALTH SERVICES | Facility: HOSPITAL | Age: 66
End: 2021-03-26
Payer: MEDICARE

## 2021-03-29 ENCOUNTER — TELEPHONE (OUTPATIENT)
Dept: SURGERY | Facility: CLINIC | Age: 66
End: 2021-03-29

## 2021-03-30 ENCOUNTER — TELEPHONE (OUTPATIENT)
Dept: RADIATION ONCOLOGY | Facility: CLINIC | Age: 66
End: 2021-03-30

## 2021-03-30 ENCOUNTER — OFFICE VISIT (OUTPATIENT)
Dept: SURGERY | Facility: CLINIC | Age: 66
End: 2021-03-30
Payer: MEDICARE

## 2021-03-30 VITALS
HEART RATE: 116 BPM | SYSTOLIC BLOOD PRESSURE: 121 MMHG | BODY MASS INDEX: 30.9 KG/M2 | DIASTOLIC BLOOD PRESSURE: 70 MMHG | WEIGHT: 180 LBS

## 2021-03-30 DIAGNOSIS — C50.312 MALIGNANT NEOPLASM OF LOWER-INNER QUADRANT OF LEFT BREAST IN FEMALE, ESTROGEN RECEPTOR POSITIVE: ICD-10-CM

## 2021-03-30 DIAGNOSIS — Z17.0 MALIGNANT NEOPLASM OF LOWER-INNER QUADRANT OF LEFT BREAST IN FEMALE, ESTROGEN RECEPTOR POSITIVE: ICD-10-CM

## 2021-03-30 DIAGNOSIS — Z12.31 ENCOUNTER FOR SCREENING MAMMOGRAM FOR MALIGNANT NEOPLASM OF BREAST: Primary | ICD-10-CM

## 2021-03-30 PROCEDURE — 99213 OFFICE O/P EST LOW 20 MIN: CPT | Mod: 24,S$GLB,, | Performed by: SURGERY

## 2021-03-30 PROCEDURE — 1101F PR PT FALLS ASSESS DOC 0-1 FALLS W/OUT INJ PAST YR: ICD-10-PCS | Mod: CPTII,S$GLB,, | Performed by: SURGERY

## 2021-03-30 PROCEDURE — 1159F MED LIST DOCD IN RCRD: CPT | Mod: S$GLB,,, | Performed by: SURGERY

## 2021-03-30 PROCEDURE — 99999 PR PBB SHADOW E&M-EST. PATIENT-LVL IV: ICD-10-PCS | Mod: PBBFAC,,, | Performed by: SURGERY

## 2021-03-30 PROCEDURE — 3008F PR BODY MASS INDEX (BMI) DOCUMENTED: ICD-10-PCS | Mod: CPTII,S$GLB,, | Performed by: SURGERY

## 2021-03-30 PROCEDURE — 1126F PR PAIN SEVERITY QUANTIFIED, NO PAIN PRESENT: ICD-10-PCS | Mod: S$GLB,,, | Performed by: SURGERY

## 2021-03-30 PROCEDURE — 3288F FALL RISK ASSESSMENT DOCD: CPT | Mod: CPTII,S$GLB,, | Performed by: SURGERY

## 2021-03-30 PROCEDURE — 3008F BODY MASS INDEX DOCD: CPT | Mod: CPTII,S$GLB,, | Performed by: SURGERY

## 2021-03-30 PROCEDURE — 1101F PT FALLS ASSESS-DOCD LE1/YR: CPT | Mod: CPTII,S$GLB,, | Performed by: SURGERY

## 2021-03-30 PROCEDURE — 99213 PR OFFICE/OUTPT VISIT, EST, LEVL III, 20-29 MIN: ICD-10-PCS | Mod: 24,S$GLB,, | Performed by: SURGERY

## 2021-03-30 PROCEDURE — 1126F AMNT PAIN NOTED NONE PRSNT: CPT | Mod: S$GLB,,, | Performed by: SURGERY

## 2021-03-30 PROCEDURE — 3288F PR FALLS RISK ASSESSMENT DOCUMENTED: ICD-10-PCS | Mod: CPTII,S$GLB,, | Performed by: SURGERY

## 2021-03-30 PROCEDURE — 1159F PR MEDICATION LIST DOCUMENTED IN MEDICAL RECORD: ICD-10-PCS | Mod: S$GLB,,, | Performed by: SURGERY

## 2021-03-30 PROCEDURE — 99999 PR PBB SHADOW E&M-EST. PATIENT-LVL IV: CPT | Mod: PBBFAC,,, | Performed by: SURGERY

## 2021-04-05 ENCOUNTER — PATIENT MESSAGE (OUTPATIENT)
Dept: ADMINISTRATIVE | Facility: HOSPITAL | Age: 66
End: 2021-04-05

## 2021-04-06 LAB — FUNGUS SPEC CULT: NORMAL

## 2021-04-08 ENCOUNTER — HOSPITAL ENCOUNTER (OUTPATIENT)
Dept: RADIATION THERAPY | Facility: HOSPITAL | Age: 66
Discharge: HOME | End: 2021-04-08
Attending: RADIOLOGY
Payer: MEDICARE

## 2021-04-08 PROCEDURE — 77263 PR  RADIATION THERAPY PLAN COMPLEX: ICD-10-PCS | Mod: ,,, | Performed by: RADIOLOGY

## 2021-04-08 PROCEDURE — 77333 RADIATION TREATMENT AID(S): CPT | Mod: 26,,, | Performed by: RADIOLOGY

## 2021-04-08 PROCEDURE — 77290 THER RAD SIMULAJ FIELD CPLX: CPT | Mod: TC | Performed by: RADIOLOGY

## 2021-04-08 PROCEDURE — 77263 THER RADIOLOGY TX PLNG CPLX: CPT | Mod: ,,, | Performed by: RADIOLOGY

## 2021-04-08 PROCEDURE — 77014 HC CT GUIDANCE RADIATION THERAPY FLDS PLACEMENT: CPT | Mod: TC | Performed by: RADIOLOGY

## 2021-04-08 PROCEDURE — 77333 PR  RADN TREATMENT AID(S) INTERM: ICD-10-PCS | Mod: 26,,, | Performed by: RADIOLOGY

## 2021-04-08 PROCEDURE — 77332 RADIATION TREATMENT AID(S): CPT | Mod: TC | Performed by: RADIOLOGY

## 2021-04-08 PROCEDURE — 77290 THER RAD SIMULAJ FIELD CPLX: CPT | Mod: 26,,, | Performed by: RADIOLOGY

## 2021-04-08 PROCEDURE — 77290 PR  SET RADN THERAPY FIELD COMPLEX: ICD-10-PCS | Mod: 26,,, | Performed by: RADIOLOGY

## 2021-04-08 PROCEDURE — 77333 RADIATION TREATMENT AID(S): CPT | Mod: TC | Performed by: RADIOLOGY

## 2021-04-12 ENCOUNTER — PES CALL (OUTPATIENT)
Dept: ADMINISTRATIVE | Facility: CLINIC | Age: 66
End: 2021-04-12

## 2021-04-13 PROCEDURE — 77293 PR RESPIRATORY MOTION MGMT SIMULATION: ICD-10-PCS | Mod: 26,,, | Performed by: RADIOLOGY

## 2021-04-13 PROCEDURE — 77295 PR 3D RADIOTHERAPY PLAN: ICD-10-PCS | Mod: 26,,, | Performed by: RADIOLOGY

## 2021-04-13 PROCEDURE — 77295 3-D RADIOTHERAPY PLAN: CPT | Mod: 26,,, | Performed by: RADIOLOGY

## 2021-04-13 PROCEDURE — 77293 RESPIRATOR MOTION MGMT SIMUL: CPT | Mod: 26,,, | Performed by: RADIOLOGY

## 2021-04-13 PROCEDURE — 77300 RADIATION THERAPY DOSE PLAN: CPT | Mod: TC | Performed by: RADIOLOGY

## 2021-04-13 PROCEDURE — 77334 RADIATION TREATMENT AID(S): CPT | Mod: 26,,, | Performed by: RADIOLOGY

## 2021-04-13 PROCEDURE — 77293 RESPIRATOR MOTION MGMT SIMUL: CPT | Mod: TC | Performed by: RADIOLOGY

## 2021-04-13 PROCEDURE — 77334 RADIATION TREATMENT AID(S): CPT | Mod: TC | Performed by: RADIOLOGY

## 2021-04-13 PROCEDURE — 77295 3-D RADIOTHERAPY PLAN: CPT | Mod: TC | Performed by: RADIOLOGY

## 2021-04-13 PROCEDURE — 77300 RADIATION THERAPY DOSE PLAN: CPT | Mod: 26,,, | Performed by: RADIOLOGY

## 2021-04-13 PROCEDURE — 77300 PR RADIATION THERAPY,DOSIMETRY PLAN: ICD-10-PCS | Mod: 26,,, | Performed by: RADIOLOGY

## 2021-04-13 PROCEDURE — 77334 PR  RADN TREATMENT AID(S) COMPLX: ICD-10-PCS | Mod: 26,,, | Performed by: RADIOLOGY

## 2021-04-14 PROCEDURE — 77280 PR  SET RADN THERAPY FIELD SIMPLE: ICD-10-PCS | Mod: 26,,, | Performed by: RADIOLOGY

## 2021-04-14 PROCEDURE — 77280 THER RAD SIMULAJ FIELD SMPL: CPT | Mod: 26,,, | Performed by: RADIOLOGY

## 2021-04-14 PROCEDURE — 77280 THER RAD SIMULAJ FIELD SMPL: CPT | Mod: TC | Performed by: RADIOLOGY

## 2021-04-15 ENCOUNTER — DOCUMENTATION ONLY (OUTPATIENT)
Dept: RADIATION ONCOLOGY | Facility: CLINIC | Age: 66
End: 2021-04-15

## 2021-04-15 ENCOUNTER — TELEPHONE (OUTPATIENT)
Dept: INFUSION THERAPY | Facility: HOSPITAL | Age: 66
End: 2021-04-15

## 2021-04-15 PROCEDURE — 77387 GUIDANCE FOR RADJ TX DLVR: CPT | Mod: TC | Performed by: RADIOLOGY

## 2021-04-15 PROCEDURE — G6002 STEREOSCOPIC X-RAY GUIDANCE: HCPCS | Mod: 26,,, | Performed by: RADIOLOGY

## 2021-04-15 PROCEDURE — 77412 RADIATION TX DELIVERY LVL 3: CPT | Performed by: RADIOLOGY

## 2021-04-15 PROCEDURE — G6002 PR STEREOSCOPIC XRAY GUIDE FOR RADIATION TX DELIV: ICD-10-PCS | Mod: 26,,, | Performed by: RADIOLOGY

## 2021-04-16 PROCEDURE — 77387 GUIDANCE FOR RADJ TX DLVR: CPT | Mod: TC | Performed by: RADIOLOGY

## 2021-04-16 PROCEDURE — G6002 STEREOSCOPIC X-RAY GUIDANCE: HCPCS | Mod: 26,,, | Performed by: RADIOLOGY

## 2021-04-16 PROCEDURE — 77412 RADIATION TX DELIVERY LVL 3: CPT | Performed by: RADIOLOGY

## 2021-04-16 PROCEDURE — G6002 PR STEREOSCOPIC XRAY GUIDE FOR RADIATION TX DELIV: ICD-10-PCS | Mod: 26,,, | Performed by: RADIOLOGY

## 2021-04-19 ENCOUNTER — TELEPHONE (OUTPATIENT)
Dept: RADIATION ONCOLOGY | Facility: CLINIC | Age: 66
End: 2021-04-19

## 2021-04-19 PROCEDURE — 77387 GUIDANCE FOR RADJ TX DLVR: CPT | Mod: TC | Performed by: RADIOLOGY

## 2021-04-19 PROCEDURE — G6002 STEREOSCOPIC X-RAY GUIDANCE: HCPCS | Mod: 26,,, | Performed by: RADIOLOGY

## 2021-04-19 PROCEDURE — G6002 PR STEREOSCOPIC XRAY GUIDE FOR RADIATION TX DELIV: ICD-10-PCS | Mod: 26,,, | Performed by: RADIOLOGY

## 2021-04-19 PROCEDURE — 77412 RADIATION TX DELIVERY LVL 3: CPT | Performed by: RADIOLOGY

## 2021-04-20 PROCEDURE — G6002 STEREOSCOPIC X-RAY GUIDANCE: HCPCS | Mod: 26,,, | Performed by: RADIOLOGY

## 2021-04-20 PROCEDURE — G6002 PR STEREOSCOPIC XRAY GUIDE FOR RADIATION TX DELIV: ICD-10-PCS | Mod: 26,,, | Performed by: RADIOLOGY

## 2021-04-20 PROCEDURE — 77336 RADIATION PHYSICS CONSULT: CPT | Performed by: RADIOLOGY

## 2021-04-20 PROCEDURE — 77387 GUIDANCE FOR RADJ TX DLVR: CPT | Mod: TC | Performed by: RADIOLOGY

## 2021-04-20 PROCEDURE — 77412 RADIATION TX DELIVERY LVL 3: CPT | Performed by: RADIOLOGY

## 2021-04-21 PROCEDURE — G6002 STEREOSCOPIC X-RAY GUIDANCE: HCPCS | Mod: 26,,, | Performed by: RADIOLOGY

## 2021-04-21 PROCEDURE — 77387 GUIDANCE FOR RADJ TX DLVR: CPT | Mod: TC | Performed by: RADIOLOGY

## 2021-04-21 PROCEDURE — 77412 RADIATION TX DELIVERY LVL 3: CPT | Performed by: RADIOLOGY

## 2021-04-21 PROCEDURE — G6002 PR STEREOSCOPIC XRAY GUIDE FOR RADIATION TX DELIV: ICD-10-PCS | Mod: 26,,, | Performed by: RADIOLOGY

## 2021-04-22 ENCOUNTER — DOCUMENTATION ONLY (OUTPATIENT)
Dept: RADIATION ONCOLOGY | Facility: CLINIC | Age: 66
End: 2021-04-22

## 2021-04-22 PROCEDURE — G6002 PR STEREOSCOPIC XRAY GUIDE FOR RADIATION TX DELIV: ICD-10-PCS | Mod: 26,,, | Performed by: RADIOLOGY

## 2021-04-22 PROCEDURE — 77387 GUIDANCE FOR RADJ TX DLVR: CPT | Mod: TC | Performed by: RADIOLOGY

## 2021-04-22 PROCEDURE — G6002 STEREOSCOPIC X-RAY GUIDANCE: HCPCS | Mod: 26,,, | Performed by: RADIOLOGY

## 2021-04-22 PROCEDURE — 77417 THER RADIOLOGY PORT IMAGE(S): CPT | Performed by: RADIOLOGY

## 2021-04-22 PROCEDURE — 77412 RADIATION TX DELIVERY LVL 3: CPT | Performed by: RADIOLOGY

## 2021-04-23 ENCOUNTER — TELEPHONE (OUTPATIENT)
Dept: INFUSION THERAPY | Facility: HOSPITAL | Age: 66
End: 2021-04-23

## 2021-04-23 PROCEDURE — G6002 STEREOSCOPIC X-RAY GUIDANCE: HCPCS | Mod: 26,,, | Performed by: RADIOLOGY

## 2021-04-23 PROCEDURE — 77412 RADIATION TX DELIVERY LVL 3: CPT | Performed by: RADIOLOGY

## 2021-04-23 PROCEDURE — 77387 GUIDANCE FOR RADJ TX DLVR: CPT | Mod: TC | Performed by: RADIOLOGY

## 2021-04-23 PROCEDURE — G6002 PR STEREOSCOPIC XRAY GUIDE FOR RADIATION TX DELIV: ICD-10-PCS | Mod: 26,,, | Performed by: RADIOLOGY

## 2021-04-26 PROCEDURE — G6002 STEREOSCOPIC X-RAY GUIDANCE: HCPCS | Mod: 26,,, | Performed by: RADIOLOGY

## 2021-04-26 PROCEDURE — 77412 RADIATION TX DELIVERY LVL 3: CPT | Performed by: RADIOLOGY

## 2021-04-26 PROCEDURE — G6002 PR STEREOSCOPIC XRAY GUIDE FOR RADIATION TX DELIV: ICD-10-PCS | Mod: 26,,, | Performed by: RADIOLOGY

## 2021-04-26 PROCEDURE — 77387 GUIDANCE FOR RADJ TX DLVR: CPT | Mod: TC | Performed by: RADIOLOGY

## 2021-04-27 PROCEDURE — 77336 RADIATION PHYSICS CONSULT: CPT | Performed by: RADIOLOGY

## 2021-04-27 PROCEDURE — G6002 STEREOSCOPIC X-RAY GUIDANCE: HCPCS | Mod: 26,,, | Performed by: RADIOLOGY

## 2021-04-27 PROCEDURE — 77412 RADIATION TX DELIVERY LVL 3: CPT | Performed by: RADIOLOGY

## 2021-04-27 PROCEDURE — G6002 PR STEREOSCOPIC XRAY GUIDE FOR RADIATION TX DELIV: ICD-10-PCS | Mod: 26,,, | Performed by: RADIOLOGY

## 2021-04-27 PROCEDURE — 77387 GUIDANCE FOR RADJ TX DLVR: CPT | Mod: TC | Performed by: RADIOLOGY

## 2021-04-28 ENCOUNTER — TELEPHONE (OUTPATIENT)
Dept: HEMATOLOGY/ONCOLOGY | Facility: CLINIC | Age: 66
End: 2021-04-28

## 2021-04-28 PROCEDURE — G6002 PR STEREOSCOPIC XRAY GUIDE FOR RADIATION TX DELIV: ICD-10-PCS | Mod: 26,,, | Performed by: RADIOLOGY

## 2021-04-28 PROCEDURE — G6002 STEREOSCOPIC X-RAY GUIDANCE: HCPCS | Mod: 26,,, | Performed by: RADIOLOGY

## 2021-04-28 PROCEDURE — 77412 RADIATION TX DELIVERY LVL 3: CPT | Performed by: RADIOLOGY

## 2021-04-28 PROCEDURE — 77387 GUIDANCE FOR RADJ TX DLVR: CPT | Mod: TC | Performed by: RADIOLOGY

## 2021-04-29 ENCOUNTER — DOCUMENTATION ONLY (OUTPATIENT)
Dept: RADIATION ONCOLOGY | Facility: CLINIC | Age: 66
End: 2021-04-29

## 2021-04-29 DIAGNOSIS — C50.312 MALIGNANT NEOPLASM OF LOWER-INNER QUADRANT OF LEFT BREAST IN FEMALE, ESTROGEN RECEPTOR POSITIVE: Primary | ICD-10-CM

## 2021-04-29 DIAGNOSIS — Z17.0 MALIGNANT NEOPLASM OF LOWER-INNER QUADRANT OF LEFT BREAST IN FEMALE, ESTROGEN RECEPTOR POSITIVE: Primary | ICD-10-CM

## 2021-04-29 PROCEDURE — 77417 THER RADIOLOGY PORT IMAGE(S): CPT | Performed by: RADIOLOGY

## 2021-04-29 PROCEDURE — G6002 STEREOSCOPIC X-RAY GUIDANCE: HCPCS | Mod: 26,,, | Performed by: RADIOLOGY

## 2021-04-29 PROCEDURE — 77412 RADIATION TX DELIVERY LVL 3: CPT | Performed by: RADIOLOGY

## 2021-04-29 PROCEDURE — 77387 GUIDANCE FOR RADJ TX DLVR: CPT | Mod: TC | Performed by: RADIOLOGY

## 2021-04-29 PROCEDURE — G6002 PR STEREOSCOPIC XRAY GUIDE FOR RADIATION TX DELIV: ICD-10-PCS | Mod: 26,,, | Performed by: RADIOLOGY

## 2021-04-29 RX ORDER — PREDNISONE 10 MG/1
TABLET ORAL
Qty: 20 TABLET | Refills: 0 | Status: SHIPPED | OUTPATIENT
Start: 2021-04-29 | End: 2021-10-21

## 2021-04-30 PROCEDURE — G6002 STEREOSCOPIC X-RAY GUIDANCE: HCPCS | Mod: 26,,, | Performed by: RADIOLOGY

## 2021-04-30 PROCEDURE — 77387 GUIDANCE FOR RADJ TX DLVR: CPT | Mod: TC | Performed by: RADIOLOGY

## 2021-04-30 PROCEDURE — G6002 PR STEREOSCOPIC XRAY GUIDE FOR RADIATION TX DELIV: ICD-10-PCS | Mod: 26,,, | Performed by: RADIOLOGY

## 2021-04-30 PROCEDURE — 77412 RADIATION TX DELIVERY LVL 3: CPT | Performed by: RADIOLOGY

## 2021-05-03 ENCOUNTER — HOSPITAL ENCOUNTER (OUTPATIENT)
Dept: RADIATION THERAPY | Facility: HOSPITAL | Age: 66
Discharge: HOME | End: 2021-05-03
Attending: RADIOLOGY
Payer: MEDICARE

## 2021-05-03 PROCEDURE — G6002 STEREOSCOPIC X-RAY GUIDANCE: HCPCS | Mod: 26,,, | Performed by: RADIOLOGY

## 2021-05-03 PROCEDURE — 77412 RADIATION TX DELIVERY LVL 3: CPT | Performed by: RADIOLOGY

## 2021-05-03 PROCEDURE — G6002 PR STEREOSCOPIC XRAY GUIDE FOR RADIATION TX DELIV: ICD-10-PCS | Mod: 26,,, | Performed by: RADIOLOGY

## 2021-05-03 PROCEDURE — 77387 GUIDANCE FOR RADJ TX DLVR: CPT | Mod: TC | Performed by: RADIOLOGY

## 2021-05-04 PROCEDURE — G6002 PR STEREOSCOPIC XRAY GUIDE FOR RADIATION TX DELIV: ICD-10-PCS | Mod: 26,,, | Performed by: RADIOLOGY

## 2021-05-04 PROCEDURE — G6002 STEREOSCOPIC X-RAY GUIDANCE: HCPCS | Mod: 26,,, | Performed by: RADIOLOGY

## 2021-05-04 PROCEDURE — 77387 GUIDANCE FOR RADJ TX DLVR: CPT | Mod: TC | Performed by: RADIOLOGY

## 2021-05-04 PROCEDURE — 77412 RADIATION TX DELIVERY LVL 3: CPT | Performed by: RADIOLOGY

## 2021-05-05 PROCEDURE — 77336 RADIATION PHYSICS CONSULT: CPT | Performed by: RADIOLOGY

## 2021-05-05 PROCEDURE — G6002 STEREOSCOPIC X-RAY GUIDANCE: HCPCS | Mod: 26,,, | Performed by: RADIOLOGY

## 2021-05-05 PROCEDURE — G6002 PR STEREOSCOPIC XRAY GUIDE FOR RADIATION TX DELIV: ICD-10-PCS | Mod: 26,,, | Performed by: RADIOLOGY

## 2021-05-05 PROCEDURE — 77387 GUIDANCE FOR RADJ TX DLVR: CPT | Mod: TC | Performed by: RADIOLOGY

## 2021-05-05 PROCEDURE — 77412 RADIATION TX DELIVERY LVL 3: CPT | Performed by: RADIOLOGY

## 2021-05-06 ENCOUNTER — DOCUMENTATION ONLY (OUTPATIENT)
Dept: RADIATION ONCOLOGY | Facility: CLINIC | Age: 66
End: 2021-05-06

## 2021-05-06 DIAGNOSIS — Z17.0 MALIGNANT NEOPLASM OF LOWER-INNER QUADRANT OF LEFT BREAST IN FEMALE, ESTROGEN RECEPTOR POSITIVE: Primary | ICD-10-CM

## 2021-05-06 DIAGNOSIS — C50.312 MALIGNANT NEOPLASM OF LOWER-INNER QUADRANT OF LEFT BREAST IN FEMALE, ESTROGEN RECEPTOR POSITIVE: Primary | ICD-10-CM

## 2021-05-06 PROCEDURE — 77412 RADIATION TX DELIVERY LVL 3: CPT | Performed by: RADIOLOGY

## 2021-05-06 PROCEDURE — G6002 STEREOSCOPIC X-RAY GUIDANCE: HCPCS | Mod: 26,,, | Performed by: RADIOLOGY

## 2021-05-06 PROCEDURE — G6002 PR STEREOSCOPIC XRAY GUIDE FOR RADIATION TX DELIV: ICD-10-PCS | Mod: 26,,, | Performed by: RADIOLOGY

## 2021-05-06 PROCEDURE — 77387 GUIDANCE FOR RADJ TX DLVR: CPT | Mod: TC | Performed by: RADIOLOGY

## 2021-05-08 LAB
ACID FAST MOD KINY STN SPEC: NORMAL
MYCOBACTERIUM SPEC QL CULT: NORMAL

## 2021-05-10 ENCOUNTER — PATIENT MESSAGE (OUTPATIENT)
Dept: RESEARCH | Facility: HOSPITAL | Age: 66
End: 2021-05-10

## 2021-05-18 ENCOUNTER — PROCEDURE VISIT (OUTPATIENT)
Dept: SURGERY | Facility: CLINIC | Age: 66
End: 2021-05-18
Payer: MEDICARE

## 2021-05-18 VITALS
HEIGHT: 64 IN | BODY MASS INDEX: 29.17 KG/M2 | SYSTOLIC BLOOD PRESSURE: 178 MMHG | WEIGHT: 170.88 LBS | TEMPERATURE: 98 F | OXYGEN SATURATION: 98 % | DIASTOLIC BLOOD PRESSURE: 88 MMHG | HEART RATE: 94 BPM

## 2021-05-18 DIAGNOSIS — Z95.828 PORT-A-CATH IN PLACE: Primary | ICD-10-CM

## 2021-05-18 PROCEDURE — 36590 REMOVAL TUNNELED CV CATH: CPT | Mod: 79,S$GLB,, | Performed by: SURGERY

## 2021-05-18 PROCEDURE — 36590 PR REMOVAL TUNNELED CV CATH W SUBQ PORT OR PUMP: ICD-10-PCS | Mod: 79,S$GLB,, | Performed by: SURGERY

## 2021-05-19 PROBLEM — I95.9 HYPOTENSION: Status: RESOLVED | Noted: 2021-02-07 | Resolved: 2021-05-19

## 2021-05-21 ENCOUNTER — TELEPHONE (OUTPATIENT)
Dept: RADIATION ONCOLOGY | Facility: CLINIC | Age: 66
End: 2021-05-21

## 2021-05-24 ENCOUNTER — PATIENT OUTREACH (OUTPATIENT)
Dept: ADMINISTRATIVE | Facility: HOSPITAL | Age: 66
End: 2021-05-24

## 2021-05-24 ENCOUNTER — TELEPHONE (OUTPATIENT)
Dept: INTERNAL MEDICINE | Facility: CLINIC | Age: 66
End: 2021-05-24

## 2021-05-24 DIAGNOSIS — E13.65 OTHER SPECIFIED DIABETES MELLITUS WITH HYPERGLYCEMIA: ICD-10-CM

## 2021-05-24 DIAGNOSIS — E11.9 TYPE 2 DIABETES MELLITUS WITHOUT COMPLICATION, WITH LONG-TERM CURRENT USE OF INSULIN: Primary | ICD-10-CM

## 2021-05-24 DIAGNOSIS — E11.65 UNCONTROLLED TYPE 2 DIABETES MELLITUS WITH HYPERGLYCEMIA: ICD-10-CM

## 2021-05-24 DIAGNOSIS — D64.9 ANEMIA, UNSPECIFIED TYPE: Primary | ICD-10-CM

## 2021-05-24 DIAGNOSIS — Z79.4 TYPE 2 DIABETES MELLITUS WITHOUT COMPLICATION, WITH LONG-TERM CURRENT USE OF INSULIN: Primary | ICD-10-CM

## 2021-05-24 DIAGNOSIS — E55.9 MILD VITAMIN D DEFICIENCY: ICD-10-CM

## 2021-05-25 ENCOUNTER — LAB VISIT (OUTPATIENT)
Dept: LAB | Facility: HOSPITAL | Age: 66
End: 2021-05-25
Attending: INTERNAL MEDICINE
Payer: MEDICARE

## 2021-05-25 ENCOUNTER — OFFICE VISIT (OUTPATIENT)
Dept: PSYCHIATRY | Facility: CLINIC | Age: 66
End: 2021-05-25
Payer: MEDICARE

## 2021-05-25 DIAGNOSIS — F43.20 ADJUSTMENT DISORDER, UNSPECIFIED TYPE: Primary | ICD-10-CM

## 2021-05-25 DIAGNOSIS — C50.312 MALIGNANT NEOPLASM OF LOWER-INNER QUADRANT OF LEFT BREAST IN FEMALE, ESTROGEN RECEPTOR POSITIVE: ICD-10-CM

## 2021-05-25 DIAGNOSIS — E11.9 TYPE 2 DIABETES MELLITUS WITHOUT COMPLICATION, WITH LONG-TERM CURRENT USE OF INSULIN: ICD-10-CM

## 2021-05-25 DIAGNOSIS — F43.21 GRIEF: ICD-10-CM

## 2021-05-25 DIAGNOSIS — Z79.4 TYPE 2 DIABETES MELLITUS WITHOUT COMPLICATION, WITH LONG-TERM CURRENT USE OF INSULIN: ICD-10-CM

## 2021-05-25 DIAGNOSIS — D64.9 ANEMIA, UNSPECIFIED TYPE: ICD-10-CM

## 2021-05-25 DIAGNOSIS — E11.65 UNCONTROLLED TYPE 2 DIABETES MELLITUS WITH HYPERGLYCEMIA: ICD-10-CM

## 2021-05-25 DIAGNOSIS — Z17.0 MALIGNANT NEOPLASM OF LOWER-INNER QUADRANT OF LEFT BREAST IN FEMALE, ESTROGEN RECEPTOR POSITIVE: ICD-10-CM

## 2021-05-25 LAB
ALBUMIN SERPL BCP-MCNC: 3.2 G/DL (ref 3.5–5.2)
ALP SERPL-CCNC: 89 U/L (ref 55–135)
ALT SERPL W/O P-5'-P-CCNC: 13 U/L (ref 10–44)
ANION GAP SERPL CALC-SCNC: 8 MMOL/L (ref 8–16)
AST SERPL-CCNC: 16 U/L (ref 10–40)
BASOPHILS # BLD AUTO: 0.01 K/UL (ref 0–0.2)
BASOPHILS NFR BLD: 0.3 % (ref 0–1.9)
BILIRUB SERPL-MCNC: 0.4 MG/DL (ref 0.1–1)
BUN SERPL-MCNC: 14 MG/DL (ref 8–23)
CALCIUM SERPL-MCNC: 9.3 MG/DL (ref 8.7–10.5)
CHLORIDE SERPL-SCNC: 111 MMOL/L (ref 95–110)
CO2 SERPL-SCNC: 24 MMOL/L (ref 23–29)
CREAT SERPL-MCNC: 1 MG/DL (ref 0.5–1.4)
DIFFERENTIAL METHOD: ABNORMAL
EOSINOPHIL # BLD AUTO: 0.1 K/UL (ref 0–0.5)
EOSINOPHIL NFR BLD: 2.3 % (ref 0–8)
ERYTHROCYTE [DISTWIDTH] IN BLOOD BY AUTOMATED COUNT: 13.8 % (ref 11.5–14.5)
EST. GFR  (AFRICAN AMERICAN): >60 ML/MIN/1.73 M^2
EST. GFR  (NON AFRICAN AMERICAN): 58.8 ML/MIN/1.73 M^2
ESTIMATED AVG GLUCOSE: 166 MG/DL (ref 68–131)
GLUCOSE SERPL-MCNC: 117 MG/DL (ref 70–110)
HBA1C MFR BLD: 7.4 % (ref 4–5.6)
HCT VFR BLD AUTO: 32.9 % (ref 37–48.5)
HGB BLD-MCNC: 11.2 G/DL (ref 12–16)
IMM GRANULOCYTES # BLD AUTO: 0.01 K/UL (ref 0–0.04)
IMM GRANULOCYTES NFR BLD AUTO: 0.3 % (ref 0–0.5)
IRON SERPL-MCNC: 72 UG/DL (ref 30–160)
LYMPHOCYTES # BLD AUTO: 1.6 K/UL (ref 1–4.8)
LYMPHOCYTES NFR BLD: 40.4 % (ref 18–48)
MCH RBC QN AUTO: 30.5 PG (ref 27–31)
MCHC RBC AUTO-ENTMCNC: 34 G/DL (ref 32–36)
MCV RBC AUTO: 90 FL (ref 82–98)
MONOCYTES # BLD AUTO: 0.5 K/UL (ref 0.3–1)
MONOCYTES NFR BLD: 13.5 % (ref 4–15)
NEUTROPHILS # BLD AUTO: 1.7 K/UL (ref 1.8–7.7)
NEUTROPHILS NFR BLD: 43.2 % (ref 38–73)
NRBC BLD-RTO: 1 /100 WBC
PLATELET # BLD AUTO: 109 K/UL (ref 150–450)
PMV BLD AUTO: 9.6 FL (ref 9.2–12.9)
POTASSIUM SERPL-SCNC: 4.7 MMOL/L (ref 3.5–5.1)
PROT SERPL-MCNC: 6.6 G/DL (ref 6–8.4)
RBC # BLD AUTO: 3.67 M/UL (ref 4–5.4)
SATURATED IRON: 21 % (ref 20–50)
SODIUM SERPL-SCNC: 143 MMOL/L (ref 136–145)
TOTAL IRON BINDING CAPACITY: 351 UG/DL (ref 250–450)
TRANSFERRIN SERPL-MCNC: 237 MG/DL (ref 200–375)
WBC # BLD AUTO: 3.86 K/UL (ref 3.9–12.7)

## 2021-05-25 PROCEDURE — 36415 COLL VENOUS BLD VENIPUNCTURE: CPT | Performed by: INTERNAL MEDICINE

## 2021-05-25 PROCEDURE — 83540 ASSAY OF IRON: CPT | Performed by: INTERNAL MEDICINE

## 2021-05-25 PROCEDURE — 99999 PR PBB SHADOW E&M-EST. PATIENT-LVL II: CPT | Mod: PBBFAC,,, | Performed by: PSYCHOLOGIST

## 2021-05-25 PROCEDURE — 85025 COMPLETE CBC W/AUTO DIFF WBC: CPT | Performed by: INTERNAL MEDICINE

## 2021-05-25 PROCEDURE — 83036 HEMOGLOBIN GLYCOSYLATED A1C: CPT | Performed by: INTERNAL MEDICINE

## 2021-05-25 PROCEDURE — 90832 PR PSYCHOTHERAPY W/PATIENT, 30 MIN: ICD-10-PCS | Mod: S$GLB,,, | Performed by: PSYCHOLOGIST

## 2021-05-25 PROCEDURE — 99999 PR PBB SHADOW E&M-EST. PATIENT-LVL II: ICD-10-PCS | Mod: PBBFAC,,, | Performed by: PSYCHOLOGIST

## 2021-05-25 PROCEDURE — 90832 PSYTX W PT 30 MINUTES: CPT | Mod: S$GLB,,, | Performed by: PSYCHOLOGIST

## 2021-05-25 PROCEDURE — 80053 COMPREHEN METABOLIC PANEL: CPT | Performed by: INTERNAL MEDICINE

## 2021-06-02 ENCOUNTER — OFFICE VISIT (OUTPATIENT)
Dept: HEMATOLOGY/ONCOLOGY | Facility: CLINIC | Age: 66
End: 2021-06-02
Payer: MEDICARE

## 2021-06-02 VITALS
RESPIRATION RATE: 18 BRPM | HEIGHT: 64 IN | SYSTOLIC BLOOD PRESSURE: 162 MMHG | BODY MASS INDEX: 29.55 KG/M2 | DIASTOLIC BLOOD PRESSURE: 76 MMHG | OXYGEN SATURATION: 99 % | HEART RATE: 86 BPM | WEIGHT: 173.06 LBS

## 2021-06-02 DIAGNOSIS — I10 ESSENTIAL HYPERTENSION: Chronic | ICD-10-CM

## 2021-06-02 DIAGNOSIS — G62.0 PERIPHERAL NEUROPATHY DUE TO CHEMOTHERAPY: ICD-10-CM

## 2021-06-02 DIAGNOSIS — Z12.31 ENCOUNTER FOR SCREENING MAMMOGRAM FOR MALIGNANT NEOPLASM OF BREAST: ICD-10-CM

## 2021-06-02 DIAGNOSIS — Z17.0 MALIGNANT NEOPLASM OF LOWER-INNER QUADRANT OF LEFT BREAST IN FEMALE, ESTROGEN RECEPTOR POSITIVE: Primary | ICD-10-CM

## 2021-06-02 DIAGNOSIS — K21.9 GASTROESOPHAGEAL REFLUX DISEASE WITHOUT ESOPHAGITIS: ICD-10-CM

## 2021-06-02 DIAGNOSIS — T45.1X5A PERIPHERAL NEUROPATHY DUE TO CHEMOTHERAPY: ICD-10-CM

## 2021-06-02 DIAGNOSIS — C50.312 MALIGNANT NEOPLASM OF LOWER-INNER QUADRANT OF LEFT BREAST IN FEMALE, ESTROGEN RECEPTOR POSITIVE: Primary | ICD-10-CM

## 2021-06-02 PROCEDURE — 3288F FALL RISK ASSESSMENT DOCD: CPT | Mod: CPTII,S$GLB,, | Performed by: NURSE PRACTITIONER

## 2021-06-02 PROCEDURE — 99214 PR OFFICE/OUTPT VISIT, EST, LEVL IV, 30-39 MIN: ICD-10-PCS | Mod: S$GLB,,, | Performed by: NURSE PRACTITIONER

## 2021-06-02 PROCEDURE — 99499 RISK ADDL DX/OHS AUDIT: ICD-10-PCS | Mod: S$GLB,,, | Performed by: NURSE PRACTITIONER

## 2021-06-02 PROCEDURE — 99499 UNLISTED E&M SERVICE: CPT | Mod: S$GLB,,, | Performed by: NURSE PRACTITIONER

## 2021-06-02 PROCEDURE — 99999 PR PBB SHADOW E&M-EST. PATIENT-LVL V: CPT | Mod: PBBFAC,,, | Performed by: NURSE PRACTITIONER

## 2021-06-02 PROCEDURE — 3008F PR BODY MASS INDEX (BMI) DOCUMENTED: ICD-10-PCS | Mod: CPTII,S$GLB,, | Performed by: NURSE PRACTITIONER

## 2021-06-02 PROCEDURE — 3008F BODY MASS INDEX DOCD: CPT | Mod: CPTII,S$GLB,, | Performed by: NURSE PRACTITIONER

## 2021-06-02 PROCEDURE — 99214 OFFICE O/P EST MOD 30 MIN: CPT | Mod: S$GLB,,, | Performed by: NURSE PRACTITIONER

## 2021-06-02 PROCEDURE — 1126F AMNT PAIN NOTED NONE PRSNT: CPT | Mod: S$GLB,,, | Performed by: NURSE PRACTITIONER

## 2021-06-02 PROCEDURE — 3288F PR FALLS RISK ASSESSMENT DOCUMENTED: ICD-10-PCS | Mod: CPTII,S$GLB,, | Performed by: NURSE PRACTITIONER

## 2021-06-02 PROCEDURE — 1101F PR PT FALLS ASSESS DOC 0-1 FALLS W/OUT INJ PAST YR: ICD-10-PCS | Mod: CPTII,S$GLB,, | Performed by: NURSE PRACTITIONER

## 2021-06-02 PROCEDURE — 1126F PR PAIN SEVERITY QUANTIFIED, NO PAIN PRESENT: ICD-10-PCS | Mod: S$GLB,,, | Performed by: NURSE PRACTITIONER

## 2021-06-02 PROCEDURE — 99999 PR PBB SHADOW E&M-EST. PATIENT-LVL V: ICD-10-PCS | Mod: PBBFAC,,, | Performed by: NURSE PRACTITIONER

## 2021-06-02 PROCEDURE — 1101F PT FALLS ASSESS-DOCD LE1/YR: CPT | Mod: CPTII,S$GLB,, | Performed by: NURSE PRACTITIONER

## 2021-06-02 RX ORDER — LETROZOLE 2.5 MG/1
2.5 TABLET, FILM COATED ORAL DAILY
Qty: 90 TABLET | Refills: 3 | Status: SHIPPED | OUTPATIENT
Start: 2021-06-02 | End: 2022-05-30 | Stop reason: SDUPTHER

## 2021-06-02 RX ORDER — PREGABALIN 25 MG/1
25 CAPSULE ORAL 2 TIMES DAILY
Qty: 60 CAPSULE | Refills: 2 | Status: SHIPPED | OUTPATIENT
Start: 2021-06-02 | End: 2021-10-06 | Stop reason: SDUPTHER

## 2021-06-02 RX ORDER — LETROZOLE 2.5 MG/1
2.5 TABLET, FILM COATED ORAL DAILY
COMMUNITY
End: 2021-06-02 | Stop reason: SDUPTHER

## 2021-06-03 ENCOUNTER — PES CALL (OUTPATIENT)
Dept: ADMINISTRATIVE | Facility: CLINIC | Age: 66
End: 2021-06-03

## 2021-06-03 ENCOUNTER — TELEPHONE (OUTPATIENT)
Dept: HEMATOLOGY/ONCOLOGY | Facility: CLINIC | Age: 66
End: 2021-06-03

## 2021-06-04 ENCOUNTER — IMMUNIZATION (OUTPATIENT)
Dept: INTERNAL MEDICINE | Facility: CLINIC | Age: 66
End: 2021-06-04
Payer: MEDICARE

## 2021-06-04 DIAGNOSIS — Z23 NEED FOR VACCINATION: Primary | ICD-10-CM

## 2021-06-04 PROCEDURE — 91300 COVID-19, MRNA, LNP-S, PF, 30 MCG/0.3 ML DOSE VACCINE: CPT | Mod: PBBFAC | Performed by: INTERNAL MEDICINE

## 2021-06-10 ENCOUNTER — OFFICE VISIT (OUTPATIENT)
Dept: INTERNAL MEDICINE | Facility: CLINIC | Age: 66
End: 2021-06-10
Payer: MEDICARE

## 2021-06-10 ENCOUNTER — TELEPHONE (OUTPATIENT)
Dept: INTERNAL MEDICINE | Facility: CLINIC | Age: 66
End: 2021-06-10

## 2021-06-10 VITALS
DIASTOLIC BLOOD PRESSURE: 88 MMHG | HEIGHT: 64 IN | BODY MASS INDEX: 29.88 KG/M2 | HEART RATE: 78 BPM | SYSTOLIC BLOOD PRESSURE: 138 MMHG | OXYGEN SATURATION: 99 % | WEIGHT: 175 LBS

## 2021-06-10 DIAGNOSIS — E55.9 VITAMIN D DEFICIENCY: ICD-10-CM

## 2021-06-10 DIAGNOSIS — I10 ESSENTIAL HYPERTENSION: Primary | Chronic | ICD-10-CM

## 2021-06-10 DIAGNOSIS — E11.65 UNCONTROLLED TYPE 2 DIABETES MELLITUS WITH HYPERGLYCEMIA: ICD-10-CM

## 2021-06-10 DIAGNOSIS — Z87.01 HISTORY OF PNEUMONIA: ICD-10-CM

## 2021-06-10 DIAGNOSIS — I10 ESSENTIAL HYPERTENSION: Chronic | ICD-10-CM

## 2021-06-10 DIAGNOSIS — Z17.0 MALIGNANT NEOPLASM OF LOWER-INNER QUADRANT OF LEFT BREAST IN FEMALE, ESTROGEN RECEPTOR POSITIVE: ICD-10-CM

## 2021-06-10 DIAGNOSIS — Z01.00 ROUTINE EYE EXAM: ICD-10-CM

## 2021-06-10 DIAGNOSIS — C50.312 MALIGNANT NEOPLASM OF LOWER-INNER QUADRANT OF LEFT BREAST IN FEMALE, ESTROGEN RECEPTOR POSITIVE: ICD-10-CM

## 2021-06-10 DIAGNOSIS — K21.9 GASTROESOPHAGEAL REFLUX DISEASE WITHOUT ESOPHAGITIS: ICD-10-CM

## 2021-06-10 DIAGNOSIS — Z00.00 ENCOUNTER FOR PREVENTIVE HEALTH EXAMINATION: Primary | ICD-10-CM

## 2021-06-10 DIAGNOSIS — D70.9 NEUTROPENIA, UNSPECIFIED TYPE: ICD-10-CM

## 2021-06-10 DIAGNOSIS — E11.65 UNCONTROLLED TYPE 2 DIABETES MELLITUS WITH HYPERGLYCEMIA: Primary | ICD-10-CM

## 2021-06-10 DIAGNOSIS — F43.23 ADJUSTMENT DISORDER WITH MIXED ANXIETY AND DEPRESSED MOOD: ICD-10-CM

## 2021-06-10 PROBLEM — Z86.16 HISTORY OF COVID-19: Status: ACTIVE | Noted: 2021-02-13

## 2021-06-10 PROCEDURE — G0439 PR MEDICARE ANNUAL WELLNESS SUBSEQUENT VISIT: ICD-10-PCS | Mod: S$GLB,,, | Performed by: NURSE PRACTITIONER

## 2021-06-10 PROCEDURE — 99999 PR PBB SHADOW E&M-EST. PATIENT-LVL V: CPT | Mod: PBBFAC,,, | Performed by: NURSE PRACTITIONER

## 2021-06-10 PROCEDURE — 1126F PR PAIN SEVERITY QUANTIFIED, NO PAIN PRESENT: ICD-10-PCS | Mod: S$GLB,,, | Performed by: NURSE PRACTITIONER

## 2021-06-10 PROCEDURE — G0439 PPPS, SUBSEQ VISIT: HCPCS | Mod: S$GLB,,, | Performed by: NURSE PRACTITIONER

## 2021-06-10 PROCEDURE — 1124F PR ADV CARE PLAN DISCUSSED, UNABLE/UNWILL DOC PLAN OR SURROGATE: ICD-10-PCS | Mod: S$GLB,,, | Performed by: NURSE PRACTITIONER

## 2021-06-10 PROCEDURE — 99499 UNLISTED E&M SERVICE: CPT | Mod: S$GLB,,, | Performed by: NURSE PRACTITIONER

## 2021-06-10 PROCEDURE — 99499 RISK ADDL DX/OHS AUDIT: ICD-10-PCS | Mod: S$GLB,,, | Performed by: NURSE PRACTITIONER

## 2021-06-10 PROCEDURE — 99999 PR PBB SHADOW E&M-EST. PATIENT-LVL V: ICD-10-PCS | Mod: PBBFAC,,, | Performed by: NURSE PRACTITIONER

## 2021-06-10 PROCEDURE — 1124F ACP DISCUSS-NO DSCNMKR DOCD: CPT | Mod: S$GLB,,, | Performed by: NURSE PRACTITIONER

## 2021-06-10 PROCEDURE — 3051F HG A1C>EQUAL 7.0%<8.0%: CPT | Mod: CPTII,S$GLB,, | Performed by: NURSE PRACTITIONER

## 2021-06-10 PROCEDURE — 1126F AMNT PAIN NOTED NONE PRSNT: CPT | Mod: S$GLB,,, | Performed by: NURSE PRACTITIONER

## 2021-06-10 PROCEDURE — 3051F PR MOST RECENT HEMOGLOBIN A1C LEVEL 7.0 - < 8.0%: ICD-10-PCS | Mod: CPTII,S$GLB,, | Performed by: NURSE PRACTITIONER

## 2021-06-10 PROCEDURE — 3008F PR BODY MASS INDEX (BMI) DOCUMENTED: ICD-10-PCS | Mod: CPTII,S$GLB,, | Performed by: NURSE PRACTITIONER

## 2021-06-10 PROCEDURE — 3008F BODY MASS INDEX DOCD: CPT | Mod: CPTII,S$GLB,, | Performed by: NURSE PRACTITIONER

## 2021-06-10 RX ORDER — INSULIN ASPART 100 [IU]/ML
INJECTION, SOLUTION INTRAVENOUS; SUBCUTANEOUS
Qty: 2 BOX | Refills: 6 | Status: SHIPPED | OUTPATIENT
Start: 2021-06-10 | End: 2021-11-10

## 2021-06-10 RX ORDER — CHOLECALCIFEROL (VITAMIN D3) 25 MCG
1000 TABLET ORAL DAILY
Qty: 90 TABLET | Refills: 1 | Status: SHIPPED | OUTPATIENT
Start: 2021-06-10 | End: 2023-01-16

## 2021-06-10 RX ORDER — INSULIN PUMP SYRINGE, 3 ML
EACH MISCELLANEOUS
Qty: 1 EACH | Refills: 0 | Status: SHIPPED | OUTPATIENT
Start: 2021-06-10 | End: 2021-06-11

## 2021-06-10 RX ORDER — CANDESARTAN 8 MG/1
8 TABLET ORAL DAILY
Qty: 90 TABLET | Refills: 3 | Status: SHIPPED | OUTPATIENT
Start: 2021-06-10 | End: 2022-04-28 | Stop reason: SDUPTHER

## 2021-06-10 RX ORDER — DULAGLUTIDE 0.75 MG/.5ML
INJECTION, SOLUTION SUBCUTANEOUS
Qty: 12 PEN | Refills: 3 | Status: SHIPPED | OUTPATIENT
Start: 2021-06-10 | End: 2021-11-10

## 2021-06-24 ENCOUNTER — OFFICE VISIT (OUTPATIENT)
Dept: RADIATION ONCOLOGY | Facility: CLINIC | Age: 66
End: 2021-06-24
Payer: MEDICARE

## 2021-06-24 VITALS
DIASTOLIC BLOOD PRESSURE: 74 MMHG | OXYGEN SATURATION: 99 % | BODY MASS INDEX: 30.64 KG/M2 | SYSTOLIC BLOOD PRESSURE: 158 MMHG | HEART RATE: 87 BPM | RESPIRATION RATE: 18 BRPM | HEIGHT: 64 IN | WEIGHT: 179.5 LBS

## 2021-06-24 DIAGNOSIS — C50.312 MALIGNANT NEOPLASM OF LOWER-INNER QUADRANT OF LEFT BREAST IN FEMALE, ESTROGEN RECEPTOR POSITIVE: Primary | ICD-10-CM

## 2021-06-24 DIAGNOSIS — E55.9 VITAMIN D DEFICIENCY: ICD-10-CM

## 2021-06-24 DIAGNOSIS — Z17.0 MALIGNANT NEOPLASM OF LOWER-INNER QUADRANT OF LEFT BREAST IN FEMALE, ESTROGEN RECEPTOR POSITIVE: Primary | ICD-10-CM

## 2021-06-24 PROCEDURE — 1101F PR PT FALLS ASSESS DOC 0-1 FALLS W/OUT INJ PAST YR: ICD-10-PCS | Mod: CPTII,S$GLB,, | Performed by: RADIOLOGY

## 2021-06-24 PROCEDURE — 3008F PR BODY MASS INDEX (BMI) DOCUMENTED: ICD-10-PCS | Mod: CPTII,S$GLB,, | Performed by: RADIOLOGY

## 2021-06-24 PROCEDURE — 99499 NO LOS: ICD-10-PCS | Mod: S$GLB,,, | Performed by: RADIOLOGY

## 2021-06-24 PROCEDURE — 99499 UNLISTED E&M SERVICE: CPT | Mod: S$GLB,,, | Performed by: RADIOLOGY

## 2021-06-24 PROCEDURE — 3288F PR FALLS RISK ASSESSMENT DOCUMENTED: ICD-10-PCS | Mod: CPTII,S$GLB,, | Performed by: RADIOLOGY

## 2021-06-24 PROCEDURE — 99999 PR PBB SHADOW E&M-EST. PATIENT-LVL V: CPT | Mod: PBBFAC,,, | Performed by: RADIOLOGY

## 2021-06-24 PROCEDURE — 99999 PR PBB SHADOW E&M-EST. PATIENT-LVL V: ICD-10-PCS | Mod: PBBFAC,,, | Performed by: RADIOLOGY

## 2021-06-24 PROCEDURE — 3008F BODY MASS INDEX DOCD: CPT | Mod: CPTII,S$GLB,, | Performed by: RADIOLOGY

## 2021-06-24 PROCEDURE — 3288F FALL RISK ASSESSMENT DOCD: CPT | Mod: CPTII,S$GLB,, | Performed by: RADIOLOGY

## 2021-06-24 PROCEDURE — 1101F PT FALLS ASSESS-DOCD LE1/YR: CPT | Mod: CPTII,S$GLB,, | Performed by: RADIOLOGY

## 2021-06-24 RX ORDER — CHOLECALCIFEROL (VITAMIN D3) 25 MCG
1000 TABLET ORAL DAILY
Qty: 90 TABLET | Refills: 1 | Status: CANCELLED | OUTPATIENT
Start: 2021-06-24

## 2021-06-26 ENCOUNTER — IMMUNIZATION (OUTPATIENT)
Dept: INTERNAL MEDICINE | Facility: CLINIC | Age: 66
End: 2021-06-26
Payer: MEDICARE

## 2021-06-26 DIAGNOSIS — Z23 NEED FOR VACCINATION: Primary | ICD-10-CM

## 2021-06-26 PROCEDURE — 91300 COVID-19, MRNA, LNP-S, PF, 30 MCG/0.3 ML DOSE VACCINE: CPT | Mod: PBBFAC

## 2021-06-26 PROCEDURE — 0002A COVID-19, MRNA, LNP-S, PF, 30 MCG/0.3 ML DOSE VACCINE: CPT | Mod: PBBFAC

## 2021-07-06 ENCOUNTER — PATIENT MESSAGE (OUTPATIENT)
Dept: ADMINISTRATIVE | Facility: HOSPITAL | Age: 66
End: 2021-07-06

## 2021-08-03 ENCOUNTER — HOSPITAL ENCOUNTER (OUTPATIENT)
Dept: RADIOLOGY | Facility: HOSPITAL | Age: 66
Discharge: HOME | End: 2021-08-03
Attending: SURGERY
Payer: MEDICARE

## 2021-08-03 DIAGNOSIS — Z12.31 ENCOUNTER FOR SCREENING MAMMOGRAM FOR MALIGNANT NEOPLASM OF BREAST: ICD-10-CM

## 2021-08-03 PROCEDURE — 77067 SCR MAMMO BI INCL CAD: CPT | Mod: 26,,, | Performed by: RADIOLOGY

## 2021-08-03 PROCEDURE — 77063 BREAST TOMOSYNTHESIS BI: CPT | Mod: 26,,, | Performed by: RADIOLOGY

## 2021-08-03 PROCEDURE — 77067 SCR MAMMO BI INCL CAD: CPT | Mod: TC

## 2021-08-03 PROCEDURE — 77063 MAMMO DIGITAL SCREENING BILAT WITH TOMO: ICD-10-PCS | Mod: 26,,, | Performed by: RADIOLOGY

## 2021-08-03 PROCEDURE — 77067 MAMMO DIGITAL SCREENING BILAT WITH TOMO: ICD-10-PCS | Mod: 26,,, | Performed by: RADIOLOGY

## 2021-10-05 ENCOUNTER — PATIENT MESSAGE (OUTPATIENT)
Dept: ADMINISTRATIVE | Facility: HOSPITAL | Age: 66
End: 2021-10-05

## 2021-10-06 ENCOUNTER — TELEPHONE (OUTPATIENT)
Dept: HEMATOLOGY/ONCOLOGY | Facility: CLINIC | Age: 66
End: 2021-10-06

## 2021-10-06 ENCOUNTER — OFFICE VISIT (OUTPATIENT)
Dept: HEMATOLOGY/ONCOLOGY | Facility: CLINIC | Age: 66
End: 2021-10-06
Payer: MEDICARE

## 2021-10-06 VITALS
RESPIRATION RATE: 18 BRPM | BODY MASS INDEX: 30.17 KG/M2 | HEIGHT: 64 IN | SYSTOLIC BLOOD PRESSURE: 146 MMHG | HEART RATE: 82 BPM | OXYGEN SATURATION: 98 % | DIASTOLIC BLOOD PRESSURE: 69 MMHG | WEIGHT: 176.69 LBS

## 2021-10-06 DIAGNOSIS — I10 ESSENTIAL HYPERTENSION: Chronic | ICD-10-CM

## 2021-10-06 DIAGNOSIS — E11.65 UNCONTROLLED TYPE 2 DIABETES MELLITUS WITH HYPERGLYCEMIA: ICD-10-CM

## 2021-10-06 DIAGNOSIS — F43.23 ADJUSTMENT DISORDER WITH MIXED ANXIETY AND DEPRESSED MOOD: ICD-10-CM

## 2021-10-06 DIAGNOSIS — E55.9 VITAMIN D DEFICIENCY: ICD-10-CM

## 2021-10-06 DIAGNOSIS — E66.01 CLASS 2 SEVERE OBESITY DUE TO EXCESS CALORIES WITH SERIOUS COMORBIDITY AND BODY MASS INDEX (BMI) OF 35.0 TO 35.9 IN ADULT: ICD-10-CM

## 2021-10-06 DIAGNOSIS — K21.9 GASTROESOPHAGEAL REFLUX DISEASE WITHOUT ESOPHAGITIS: ICD-10-CM

## 2021-10-06 DIAGNOSIS — T45.1X5A PERIPHERAL NEUROPATHY DUE TO CHEMOTHERAPY: ICD-10-CM

## 2021-10-06 DIAGNOSIS — G62.0 PERIPHERAL NEUROPATHY DUE TO CHEMOTHERAPY: ICD-10-CM

## 2021-10-06 DIAGNOSIS — C50.312 MALIGNANT NEOPLASM OF LOWER-INNER QUADRANT OF LEFT BREAST IN FEMALE, ESTROGEN RECEPTOR POSITIVE: Primary | ICD-10-CM

## 2021-10-06 DIAGNOSIS — Z17.0 MALIGNANT NEOPLASM OF LOWER-INNER QUADRANT OF LEFT BREAST IN FEMALE, ESTROGEN RECEPTOR POSITIVE: Primary | ICD-10-CM

## 2021-10-06 PROCEDURE — 4010F ACE/ARB THERAPY RXD/TAKEN: CPT | Mod: CPTII,S$GLB,, | Performed by: NURSE PRACTITIONER

## 2021-10-06 PROCEDURE — 1125F PR PAIN SEVERITY QUANTIFIED, PAIN PRESENT: ICD-10-PCS | Mod: CPTII,S$GLB,, | Performed by: NURSE PRACTITIONER

## 2021-10-06 PROCEDURE — 99214 OFFICE O/P EST MOD 30 MIN: CPT | Mod: S$GLB,,, | Performed by: NURSE PRACTITIONER

## 2021-10-06 PROCEDURE — 3288F PR FALLS RISK ASSESSMENT DOCUMENTED: ICD-10-PCS | Mod: CPTII,S$GLB,, | Performed by: NURSE PRACTITIONER

## 2021-10-06 PROCEDURE — 3288F FALL RISK ASSESSMENT DOCD: CPT | Mod: CPTII,S$GLB,, | Performed by: NURSE PRACTITIONER

## 2021-10-06 PROCEDURE — 3008F BODY MASS INDEX DOCD: CPT | Mod: CPTII,S$GLB,, | Performed by: NURSE PRACTITIONER

## 2021-10-06 PROCEDURE — 1159F PR MEDICATION LIST DOCUMENTED IN MEDICAL RECORD: ICD-10-PCS | Mod: CPTII,S$GLB,, | Performed by: NURSE PRACTITIONER

## 2021-10-06 PROCEDURE — 1101F PR PT FALLS ASSESS DOC 0-1 FALLS W/OUT INJ PAST YR: ICD-10-PCS | Mod: CPTII,S$GLB,, | Performed by: NURSE PRACTITIONER

## 2021-10-06 PROCEDURE — 3078F DIAST BP <80 MM HG: CPT | Mod: CPTII,S$GLB,, | Performed by: NURSE PRACTITIONER

## 2021-10-06 PROCEDURE — 4010F PR ACE/ARB THEARPY RXD/TAKEN: ICD-10-PCS | Mod: CPTII,S$GLB,, | Performed by: NURSE PRACTITIONER

## 2021-10-06 PROCEDURE — 3077F SYST BP >= 140 MM HG: CPT | Mod: CPTII,S$GLB,, | Performed by: NURSE PRACTITIONER

## 2021-10-06 PROCEDURE — 3078F PR MOST RECENT DIASTOLIC BLOOD PRESSURE < 80 MM HG: ICD-10-PCS | Mod: CPTII,S$GLB,, | Performed by: NURSE PRACTITIONER

## 2021-10-06 PROCEDURE — 1160F RVW MEDS BY RX/DR IN RCRD: CPT | Mod: CPTII,S$GLB,, | Performed by: NURSE PRACTITIONER

## 2021-10-06 PROCEDURE — 1160F PR REVIEW ALL MEDS BY PRESCRIBER/CLIN PHARMACIST DOCUMENTED: ICD-10-PCS | Mod: CPTII,S$GLB,, | Performed by: NURSE PRACTITIONER

## 2021-10-06 PROCEDURE — 1101F PT FALLS ASSESS-DOCD LE1/YR: CPT | Mod: CPTII,S$GLB,, | Performed by: NURSE PRACTITIONER

## 2021-10-06 PROCEDURE — 3051F HG A1C>EQUAL 7.0%<8.0%: CPT | Mod: CPTII,S$GLB,, | Performed by: NURSE PRACTITIONER

## 2021-10-06 PROCEDURE — 3008F PR BODY MASS INDEX (BMI) DOCUMENTED: ICD-10-PCS | Mod: CPTII,S$GLB,, | Performed by: NURSE PRACTITIONER

## 2021-10-06 PROCEDURE — 3051F PR MOST RECENT HEMOGLOBIN A1C LEVEL 7.0 - < 8.0%: ICD-10-PCS | Mod: CPTII,S$GLB,, | Performed by: NURSE PRACTITIONER

## 2021-10-06 PROCEDURE — 1159F MED LIST DOCD IN RCRD: CPT | Mod: CPTII,S$GLB,, | Performed by: NURSE PRACTITIONER

## 2021-10-06 PROCEDURE — 99214 PR OFFICE/OUTPT VISIT, EST, LEVL IV, 30-39 MIN: ICD-10-PCS | Mod: S$GLB,,, | Performed by: NURSE PRACTITIONER

## 2021-10-06 PROCEDURE — 99999 PR PBB SHADOW E&M-EST. PATIENT-LVL V: ICD-10-PCS | Mod: PBBFAC,,, | Performed by: NURSE PRACTITIONER

## 2021-10-06 PROCEDURE — 1125F AMNT PAIN NOTED PAIN PRSNT: CPT | Mod: CPTII,S$GLB,, | Performed by: NURSE PRACTITIONER

## 2021-10-06 PROCEDURE — 3077F PR MOST RECENT SYSTOLIC BLOOD PRESSURE >= 140 MM HG: ICD-10-PCS | Mod: CPTII,S$GLB,, | Performed by: NURSE PRACTITIONER

## 2021-10-06 PROCEDURE — 99999 PR PBB SHADOW E&M-EST. PATIENT-LVL V: CPT | Mod: PBBFAC,,, | Performed by: NURSE PRACTITIONER

## 2021-10-06 RX ORDER — PREGABALIN 25 MG/1
25 CAPSULE ORAL 2 TIMES DAILY
Qty: 60 CAPSULE | Refills: 2 | Status: SHIPPED | OUTPATIENT
Start: 2021-10-06 | End: 2022-04-28 | Stop reason: SDUPTHER

## 2021-10-21 ENCOUNTER — TELEPHONE (OUTPATIENT)
Dept: SURGERY | Facility: CLINIC | Age: 66
End: 2021-10-21

## 2021-10-21 ENCOUNTER — HOSPITAL ENCOUNTER (OUTPATIENT)
Dept: RADIOLOGY | Facility: HOSPITAL | Age: 66
Discharge: HOME | End: 2021-10-21
Attending: INTERNAL MEDICINE
Payer: MEDICARE

## 2021-10-21 ENCOUNTER — OFFICE VISIT (OUTPATIENT)
Dept: INTERNAL MEDICINE | Facility: CLINIC | Age: 66
End: 2021-10-21
Payer: MEDICARE

## 2021-10-21 ENCOUNTER — HOSPITAL ENCOUNTER (OUTPATIENT)
Dept: RADIOLOGY | Facility: CLINIC | Age: 66
Discharge: HOME | End: 2021-10-21
Attending: INTERNAL MEDICINE
Payer: MEDICARE

## 2021-10-21 ENCOUNTER — IMMUNIZATION (OUTPATIENT)
Dept: INTERNAL MEDICINE | Facility: CLINIC | Age: 66
End: 2021-10-21
Payer: MEDICARE

## 2021-10-21 VITALS
WEIGHT: 175.06 LBS | HEIGHT: 64 IN | BODY MASS INDEX: 29.89 KG/M2 | HEART RATE: 82 BPM | SYSTOLIC BLOOD PRESSURE: 142 MMHG | TEMPERATURE: 98 F | OXYGEN SATURATION: 99 % | DIASTOLIC BLOOD PRESSURE: 78 MMHG

## 2021-10-21 DIAGNOSIS — N39.0 URINARY TRACT INFECTION WITHOUT HEMATURIA, SITE UNSPECIFIED: ICD-10-CM

## 2021-10-21 DIAGNOSIS — E78.5 HYPERLIPIDEMIA, UNSPECIFIED HYPERLIPIDEMIA TYPE: ICD-10-CM

## 2021-10-21 DIAGNOSIS — Z01.419 NORMAL GYNECOLOGIC EXAMINATION: ICD-10-CM

## 2021-10-21 DIAGNOSIS — C50.312 MALIGNANT NEOPLASM OF LOWER-INNER QUADRANT OF LEFT BREAST IN FEMALE, ESTROGEN RECEPTOR POSITIVE: ICD-10-CM

## 2021-10-21 DIAGNOSIS — E11.65 UNCONTROLLED TYPE 2 DIABETES MELLITUS WITH HYPERGLYCEMIA: Primary | ICD-10-CM

## 2021-10-21 DIAGNOSIS — M10.9 GOUT, UNSPECIFIED CAUSE, UNSPECIFIED CHRONICITY, UNSPECIFIED SITE: ICD-10-CM

## 2021-10-21 DIAGNOSIS — F43.21 GRIEF: ICD-10-CM

## 2021-10-21 DIAGNOSIS — C50.912 MALIGNANT NEOPLASM OF LEFT FEMALE BREAST, UNSPECIFIED ESTROGEN RECEPTOR STATUS, UNSPECIFIED SITE OF BREAST: ICD-10-CM

## 2021-10-21 DIAGNOSIS — I10 ESSENTIAL HYPERTENSION: ICD-10-CM

## 2021-10-21 DIAGNOSIS — Z79.811 LONG TERM CURRENT USE OF AROMATASE INHIBITOR: ICD-10-CM

## 2021-10-21 DIAGNOSIS — E55.9 MILD VITAMIN D DEFICIENCY: ICD-10-CM

## 2021-10-21 DIAGNOSIS — Z17.0 MALIGNANT NEOPLASM OF LOWER-INNER QUADRANT OF LEFT BREAST IN FEMALE, ESTROGEN RECEPTOR POSITIVE: ICD-10-CM

## 2021-10-21 LAB
ALBUMIN/CREAT UR: 15.7 UG/MG (ref 0–30)
BILIRUB UR QL STRIP: NEGATIVE
CLARITY UR REFRACT.AUTO: CLEAR
COLOR UR AUTO: YELLOW
CREAT UR-MCNC: 83 MG/DL (ref 15–325)
GLUCOSE UR QL STRIP: NEGATIVE
HGB UR QL STRIP: NEGATIVE
KETONES UR QL STRIP: NEGATIVE
LEUKOCYTE ESTERASE UR QL STRIP: NEGATIVE
MICROALBUMIN UR DL<=1MG/L-MCNC: 13 UG/ML
NITRITE UR QL STRIP: NEGATIVE
PH UR STRIP: 5 [PH] (ref 5–8)
PROT UR QL STRIP: NEGATIVE
SP GR UR STRIP: 1.01 (ref 1–1.03)
URN SPEC COLLECT METH UR: NORMAL

## 2021-10-21 PROCEDURE — 71046 X-RAY EXAM CHEST 2 VIEWS: CPT | Mod: 26,,, | Performed by: STUDENT IN AN ORGANIZED HEALTH CARE EDUCATION/TRAINING PROGRAM

## 2021-10-21 PROCEDURE — 3051F HG A1C>EQUAL 7.0%<8.0%: CPT | Mod: CPTII,S$GLB,, | Performed by: INTERNAL MEDICINE

## 2021-10-21 PROCEDURE — G0008 FLU VACCINE - QUADRIVALENT - ADJUVANTED: ICD-10-PCS | Mod: S$GLB,,, | Performed by: INTERNAL MEDICINE

## 2021-10-21 PROCEDURE — 1159F PR MEDICATION LIST DOCUMENTED IN MEDICAL RECORD: ICD-10-PCS | Mod: CPTII,S$GLB,, | Performed by: INTERNAL MEDICINE

## 2021-10-21 PROCEDURE — 99214 OFFICE O/P EST MOD 30 MIN: CPT | Mod: S$GLB,,, | Performed by: INTERNAL MEDICINE

## 2021-10-21 PROCEDURE — 99999 PR PBB SHADOW E&M-EST. PATIENT-LVL V: ICD-10-PCS | Mod: PBBFAC,,, | Performed by: INTERNAL MEDICINE

## 2021-10-21 PROCEDURE — 82570 ASSAY OF URINE CREATININE: CPT | Performed by: INTERNAL MEDICINE

## 2021-10-21 PROCEDURE — 3078F DIAST BP <80 MM HG: CPT | Mod: CPTII,S$GLB,, | Performed by: INTERNAL MEDICINE

## 2021-10-21 PROCEDURE — 90694 VACC AIIV4 NO PRSRV 0.5ML IM: CPT | Mod: S$GLB,,, | Performed by: INTERNAL MEDICINE

## 2021-10-21 PROCEDURE — 3051F PR MOST RECENT HEMOGLOBIN A1C LEVEL 7.0 - < 8.0%: ICD-10-PCS | Mod: CPTII,S$GLB,, | Performed by: INTERNAL MEDICINE

## 2021-10-21 PROCEDURE — 3288F PR FALLS RISK ASSESSMENT DOCUMENTED: ICD-10-PCS | Mod: CPTII,S$GLB,, | Performed by: INTERNAL MEDICINE

## 2021-10-21 PROCEDURE — 3061F PR NEG MICROALBUMINURIA RESULT DOCUMENTED/REVIEW: ICD-10-PCS | Mod: CPTII,S$GLB,, | Performed by: INTERNAL MEDICINE

## 2021-10-21 PROCEDURE — 77080 DEXA BONE DENSITY SPINE HIP: ICD-10-PCS | Mod: 26,,, | Performed by: INTERNAL MEDICINE

## 2021-10-21 PROCEDURE — 99214 PR OFFICE/OUTPT VISIT, EST, LEVL IV, 30-39 MIN: ICD-10-PCS | Mod: S$GLB,,, | Performed by: INTERNAL MEDICINE

## 2021-10-21 PROCEDURE — 4010F ACE/ARB THERAPY RXD/TAKEN: CPT | Mod: CPTII,S$GLB,, | Performed by: INTERNAL MEDICINE

## 2021-10-21 PROCEDURE — 3066F PR DOCUMENTATION OF TREATMENT FOR NEPHROPATHY: ICD-10-PCS | Mod: CPTII,S$GLB,, | Performed by: INTERNAL MEDICINE

## 2021-10-21 PROCEDURE — 71046 XR CHEST PA AND LATERAL: ICD-10-PCS | Mod: 26,,, | Performed by: STUDENT IN AN ORGANIZED HEALTH CARE EDUCATION/TRAINING PROGRAM

## 2021-10-21 PROCEDURE — 3061F NEG MICROALBUMINURIA REV: CPT | Mod: CPTII,S$GLB,, | Performed by: INTERNAL MEDICINE

## 2021-10-21 PROCEDURE — 4010F PR ACE/ARB THEARPY RXD/TAKEN: ICD-10-PCS | Mod: CPTII,S$GLB,, | Performed by: INTERNAL MEDICINE

## 2021-10-21 PROCEDURE — 3078F PR MOST RECENT DIASTOLIC BLOOD PRESSURE < 80 MM HG: ICD-10-PCS | Mod: CPTII,S$GLB,, | Performed by: INTERNAL MEDICINE

## 2021-10-21 PROCEDURE — G0008 ADMIN INFLUENZA VIRUS VAC: HCPCS | Mod: S$GLB,,, | Performed by: INTERNAL MEDICINE

## 2021-10-21 PROCEDURE — 3008F PR BODY MASS INDEX (BMI) DOCUMENTED: ICD-10-PCS | Mod: CPTII,S$GLB,, | Performed by: INTERNAL MEDICINE

## 2021-10-21 PROCEDURE — 3288F FALL RISK ASSESSMENT DOCD: CPT | Mod: CPTII,S$GLB,, | Performed by: INTERNAL MEDICINE

## 2021-10-21 PROCEDURE — 87086 URINE CULTURE/COLONY COUNT: CPT | Performed by: INTERNAL MEDICINE

## 2021-10-21 PROCEDURE — 77080 DXA BONE DENSITY AXIAL: CPT | Mod: TC

## 2021-10-21 PROCEDURE — 3066F NEPHROPATHY DOC TX: CPT | Mod: CPTII,S$GLB,, | Performed by: INTERNAL MEDICINE

## 2021-10-21 PROCEDURE — 1101F PT FALLS ASSESS-DOCD LE1/YR: CPT | Mod: CPTII,S$GLB,, | Performed by: INTERNAL MEDICINE

## 2021-10-21 PROCEDURE — 90694 FLU VACCINE - QUADRIVALENT - ADJUVANTED: ICD-10-PCS | Mod: S$GLB,,, | Performed by: INTERNAL MEDICINE

## 2021-10-21 PROCEDURE — 3008F BODY MASS INDEX DOCD: CPT | Mod: CPTII,S$GLB,, | Performed by: INTERNAL MEDICINE

## 2021-10-21 PROCEDURE — 81003 URINALYSIS AUTO W/O SCOPE: CPT | Performed by: INTERNAL MEDICINE

## 2021-10-21 PROCEDURE — 99999 PR PBB SHADOW E&M-EST. PATIENT-LVL V: CPT | Mod: PBBFAC,,, | Performed by: INTERNAL MEDICINE

## 2021-10-21 PROCEDURE — 3077F SYST BP >= 140 MM HG: CPT | Mod: CPTII,S$GLB,, | Performed by: INTERNAL MEDICINE

## 2021-10-21 PROCEDURE — 1159F MED LIST DOCD IN RCRD: CPT | Mod: CPTII,S$GLB,, | Performed by: INTERNAL MEDICINE

## 2021-10-21 PROCEDURE — 77080 DXA BONE DENSITY AXIAL: CPT | Mod: 26,,, | Performed by: INTERNAL MEDICINE

## 2021-10-21 PROCEDURE — 1101F PR PT FALLS ASSESS DOC 0-1 FALLS W/OUT INJ PAST YR: ICD-10-PCS | Mod: CPTII,S$GLB,, | Performed by: INTERNAL MEDICINE

## 2021-10-21 PROCEDURE — 71046 X-RAY EXAM CHEST 2 VIEWS: CPT | Mod: TC

## 2021-10-21 PROCEDURE — 3077F PR MOST RECENT SYSTOLIC BLOOD PRESSURE >= 140 MM HG: ICD-10-PCS | Mod: CPTII,S$GLB,, | Performed by: INTERNAL MEDICINE

## 2021-10-21 RX ORDER — PREGABALIN 50 MG/1
50 CAPSULE ORAL 2 TIMES DAILY
Qty: 60 CAPSULE | Refills: 2 | Status: SHIPPED | OUTPATIENT
Start: 2021-10-21 | End: 2022-04-21

## 2021-10-22 LAB — BACTERIA UR CULT: NORMAL

## 2021-11-03 ENCOUNTER — TELEPHONE (OUTPATIENT)
Dept: INTERNAL MEDICINE | Facility: CLINIC | Age: 66
End: 2021-11-03

## 2021-11-03 DIAGNOSIS — M10.9 GOUT, UNSPECIFIED CAUSE, UNSPECIFIED CHRONICITY, UNSPECIFIED SITE: Primary | ICD-10-CM

## 2021-11-03 DIAGNOSIS — R91.1 SOLITARY PULMONARY NODULE: ICD-10-CM

## 2021-11-03 DIAGNOSIS — E11.65 UNCONTROLLED TYPE 2 DIABETES MELLITUS WITH HYPERGLYCEMIA: ICD-10-CM

## 2021-11-03 RX ORDER — ALLOPURINOL 100 MG/1
100 TABLET ORAL DAILY
Qty: 90 TABLET | Refills: 1 | Status: SHIPPED | OUTPATIENT
Start: 2021-11-03 | End: 2022-07-29 | Stop reason: SDUPTHER

## 2021-11-08 ENCOUNTER — HOSPITAL ENCOUNTER (EMERGENCY)
Facility: HOSPITAL | Age: 66
Discharge: HOME | End: 2021-11-08
Attending: EMERGENCY MEDICINE
Payer: MEDICARE

## 2021-11-08 VITALS
WEIGHT: 175 LBS | HEART RATE: 96 BPM | TEMPERATURE: 99 F | SYSTOLIC BLOOD PRESSURE: 133 MMHG | BODY MASS INDEX: 29.88 KG/M2 | DIASTOLIC BLOOD PRESSURE: 72 MMHG | HEIGHT: 64 IN | RESPIRATION RATE: 20 BRPM | OXYGEN SATURATION: 97 %

## 2021-11-08 DIAGNOSIS — R07.9 CHEST PAIN: ICD-10-CM

## 2021-11-08 DIAGNOSIS — F41.9 ANXIETY: Primary | ICD-10-CM

## 2021-11-08 LAB
ALBUMIN SERPL BCP-MCNC: 3.7 G/DL (ref 3.5–5.2)
ALP SERPL-CCNC: 79 U/L (ref 55–135)
ALT SERPL W/O P-5'-P-CCNC: 10 U/L (ref 10–44)
ANION GAP SERPL CALC-SCNC: 7 MMOL/L (ref 8–16)
AST SERPL-CCNC: 16 U/L (ref 10–40)
BASOPHILS # BLD AUTO: 0.01 K/UL (ref 0–0.2)
BASOPHILS NFR BLD: 0.2 % (ref 0–1.9)
BILIRUB SERPL-MCNC: 0.6 MG/DL (ref 0.1–1)
BUN SERPL-MCNC: 22 MG/DL (ref 8–23)
BUN SERPL-MCNC: 24 MG/DL (ref 6–30)
CALCIUM SERPL-MCNC: 9.8 MG/DL (ref 8.7–10.5)
CHLORIDE SERPL-SCNC: 104 MMOL/L (ref 95–110)
CHLORIDE SERPL-SCNC: 106 MMOL/L (ref 95–110)
CO2 SERPL-SCNC: 26 MMOL/L (ref 23–29)
CREAT SERPL-MCNC: 0.9 MG/DL (ref 0.5–1.4)
CREAT SERPL-MCNC: 1 MG/DL (ref 0.5–1.4)
DIFFERENTIAL METHOD: ABNORMAL
EOSINOPHIL # BLD AUTO: 0.1 K/UL (ref 0–0.5)
EOSINOPHIL NFR BLD: 1.6 % (ref 0–8)
ERYTHROCYTE [DISTWIDTH] IN BLOOD BY AUTOMATED COUNT: 13.2 % (ref 11.5–14.5)
EST. GFR  (AFRICAN AMERICAN): >60 ML/MIN/1.73 M^2
EST. GFR  (NON AFRICAN AMERICAN): >60 ML/MIN/1.73 M^2
GLUCOSE SERPL-MCNC: 89 MG/DL (ref 70–110)
GLUCOSE SERPL-MCNC: 92 MG/DL (ref 70–110)
HCT VFR BLD AUTO: 35.1 % (ref 37–48.5)
HCT VFR BLD CALC: 38 %PCV (ref 36–54)
HGB BLD-MCNC: 11.8 G/DL (ref 12–16)
IMM GRANULOCYTES # BLD AUTO: 0.01 K/UL (ref 0–0.04)
IMM GRANULOCYTES NFR BLD AUTO: 0.2 % (ref 0–0.5)
INR PPP: 1 (ref 0.8–1.2)
LIPASE SERPL-CCNC: 18 U/L (ref 4–60)
LYMPHOCYTES # BLD AUTO: 1.7 K/UL (ref 1–4.8)
LYMPHOCYTES NFR BLD: 34.5 % (ref 18–48)
MCH RBC QN AUTO: 31.5 PG (ref 27–31)
MCHC RBC AUTO-ENTMCNC: 33.6 G/DL (ref 32–36)
MCV RBC AUTO: 94 FL (ref 82–98)
MONOCYTES # BLD AUTO: 0.6 K/UL (ref 0.3–1)
MONOCYTES NFR BLD: 10.9 % (ref 4–15)
NEUTROPHILS # BLD AUTO: 2.7 K/UL (ref 1.8–7.7)
NEUTROPHILS NFR BLD: 52.6 % (ref 38–73)
NRBC BLD-RTO: 0 /100 WBC
PLATELET # BLD AUTO: 146 K/UL (ref 150–450)
PMV BLD AUTO: 9.6 FL (ref 9.2–12.9)
POC IONIZED CALCIUM: 1.23 MMOL/L (ref 1.06–1.42)
POC TCO2 (MEASURED): 25 MMOL/L (ref 23–29)
POTASSIUM BLD-SCNC: 4.2 MMOL/L (ref 3.5–5.1)
POTASSIUM SERPL-SCNC: 4.2 MMOL/L (ref 3.5–5.1)
PROT SERPL-MCNC: 7.4 G/DL (ref 6–8.4)
PROTHROMBIN TIME: 10.9 SEC (ref 9–12.5)
RBC # BLD AUTO: 3.75 M/UL (ref 4–5.4)
SAMPLE: NORMAL
SODIUM BLD-SCNC: 143 MMOL/L (ref 136–145)
SODIUM SERPL-SCNC: 139 MMOL/L (ref 136–145)
TROPONIN I SERPL DL<=0.01 NG/ML-MCNC: <0.006 NG/ML (ref 0–0.03)
WBC # BLD AUTO: 5.04 K/UL (ref 3.9–12.7)

## 2021-11-08 PROCEDURE — 83690 ASSAY OF LIPASE: CPT

## 2021-11-08 PROCEDURE — 93010 ELECTROCARDIOGRAM REPORT: CPT | Mod: ,,, | Performed by: INTERNAL MEDICINE

## 2021-11-08 PROCEDURE — 99285 EMERGENCY DEPT VISIT HI MDM: CPT | Mod: 25

## 2021-11-08 PROCEDURE — 84484 ASSAY OF TROPONIN QUANT: CPT

## 2021-11-08 PROCEDURE — 93005 ELECTROCARDIOGRAM TRACING: CPT

## 2021-11-08 PROCEDURE — 99284 PR EMERGENCY DEPT VISIT,LEVEL IV: ICD-10-PCS | Mod: ,,, | Performed by: EMERGENCY MEDICINE

## 2021-11-08 PROCEDURE — 80053 COMPREHEN METABOLIC PANEL: CPT

## 2021-11-08 PROCEDURE — 99284 EMERGENCY DEPT VISIT MOD MDM: CPT | Mod: ,,, | Performed by: EMERGENCY MEDICINE

## 2021-11-08 PROCEDURE — 93010 EKG 12-LEAD: ICD-10-PCS | Mod: ,,, | Performed by: INTERNAL MEDICINE

## 2021-11-08 PROCEDURE — 85610 PROTHROMBIN TIME: CPT

## 2021-11-08 PROCEDURE — 85025 COMPLETE CBC W/AUTO DIFF WBC: CPT

## 2021-11-08 RX ORDER — SODIUM CHLORIDE 0.9 % (FLUSH) 0.9 %
10 SYRINGE (ML) INJECTION
Status: DISCONTINUED | OUTPATIENT
Start: 2021-11-08 | End: 2021-11-08 | Stop reason: HOSPADM

## 2021-11-10 ENCOUNTER — OFFICE VISIT (OUTPATIENT)
Dept: INTERNAL MEDICINE | Facility: CLINIC | Age: 66
End: 2021-11-10
Payer: MEDICARE

## 2021-11-10 ENCOUNTER — HOSPITAL ENCOUNTER (OUTPATIENT)
Dept: RADIOLOGY | Facility: HOSPITAL | Age: 66
Discharge: HOME | End: 2021-11-10
Attending: INTERNAL MEDICINE
Payer: MEDICARE

## 2021-11-10 VITALS
BODY MASS INDEX: 29.5 KG/M2 | OXYGEN SATURATION: 95 % | HEART RATE: 98 BPM | DIASTOLIC BLOOD PRESSURE: 72 MMHG | SYSTOLIC BLOOD PRESSURE: 131 MMHG | HEIGHT: 64 IN | WEIGHT: 172.81 LBS

## 2021-11-10 DIAGNOSIS — Z79.4 TYPE 2 DIABETES MELLITUS WITH HYPERGLYCEMIA, WITH LONG-TERM CURRENT USE OF INSULIN: Primary | ICD-10-CM

## 2021-11-10 DIAGNOSIS — E11.65 UNCONTROLLED TYPE 2 DIABETES MELLITUS WITH HYPERGLYCEMIA: ICD-10-CM

## 2021-11-10 DIAGNOSIS — F43.21 GRIEF: ICD-10-CM

## 2021-11-10 DIAGNOSIS — I10 ESSENTIAL HYPERTENSION: Chronic | ICD-10-CM

## 2021-11-10 DIAGNOSIS — R45.89 ANXIETY ABOUT HEALTH: ICD-10-CM

## 2021-11-10 DIAGNOSIS — R91.1 SOLITARY PULMONARY NODULE: ICD-10-CM

## 2021-11-10 DIAGNOSIS — F43.23 ADJUSTMENT DISORDER WITH MIXED ANXIETY AND DEPRESSED MOOD: ICD-10-CM

## 2021-11-10 DIAGNOSIS — C50.312 MALIGNANT NEOPLASM OF LOWER-INNER QUADRANT OF LEFT BREAST IN FEMALE, ESTROGEN RECEPTOR POSITIVE: ICD-10-CM

## 2021-11-10 DIAGNOSIS — Z17.0 MALIGNANT NEOPLASM OF LOWER-INNER QUADRANT OF LEFT BREAST IN FEMALE, ESTROGEN RECEPTOR POSITIVE: ICD-10-CM

## 2021-11-10 DIAGNOSIS — E11.65 TYPE 2 DIABETES MELLITUS WITH HYPERGLYCEMIA, WITH LONG-TERM CURRENT USE OF INSULIN: Primary | ICD-10-CM

## 2021-11-10 DIAGNOSIS — E66.3 OVERWEIGHT (BMI 25.0-29.9): ICD-10-CM

## 2021-11-10 PROCEDURE — 3008F BODY MASS INDEX DOCD: CPT | Mod: CPTII,S$GLB,, | Performed by: NURSE PRACTITIONER

## 2021-11-10 PROCEDURE — 3078F DIAST BP <80 MM HG: CPT | Mod: CPTII,S$GLB,, | Performed by: NURSE PRACTITIONER

## 2021-11-10 PROCEDURE — 99999 PR PBB SHADOW E&M-EST. PATIENT-LVL V: ICD-10-PCS | Mod: PBBFAC,,, | Performed by: NURSE PRACTITIONER

## 2021-11-10 PROCEDURE — 3288F PR FALLS RISK ASSESSMENT DOCUMENTED: ICD-10-PCS | Mod: CPTII,S$GLB,, | Performed by: NURSE PRACTITIONER

## 2021-11-10 PROCEDURE — 4010F PR ACE/ARB THEARPY RXD/TAKEN: ICD-10-PCS | Mod: CPTII,S$GLB,, | Performed by: NURSE PRACTITIONER

## 2021-11-10 PROCEDURE — 99215 OFFICE O/P EST HI 40 MIN: CPT | Mod: S$GLB,,, | Performed by: NURSE PRACTITIONER

## 2021-11-10 PROCEDURE — 1101F PT FALLS ASSESS-DOCD LE1/YR: CPT | Mod: CPTII,S$GLB,, | Performed by: NURSE PRACTITIONER

## 2021-11-10 PROCEDURE — 1160F PR REVIEW ALL MEDS BY PRESCRIBER/CLIN PHARMACIST DOCUMENTED: ICD-10-PCS | Mod: CPTII,S$GLB,, | Performed by: NURSE PRACTITIONER

## 2021-11-10 PROCEDURE — 4010F ACE/ARB THERAPY RXD/TAKEN: CPT | Mod: CPTII,S$GLB,, | Performed by: NURSE PRACTITIONER

## 2021-11-10 PROCEDURE — 3008F PR BODY MASS INDEX (BMI) DOCUMENTED: ICD-10-PCS | Mod: CPTII,S$GLB,, | Performed by: NURSE PRACTITIONER

## 2021-11-10 PROCEDURE — 1101F PR PT FALLS ASSESS DOC 0-1 FALLS W/OUT INJ PAST YR: ICD-10-PCS | Mod: CPTII,S$GLB,, | Performed by: NURSE PRACTITIONER

## 2021-11-10 PROCEDURE — 3288F FALL RISK ASSESSMENT DOCD: CPT | Mod: CPTII,S$GLB,, | Performed by: NURSE PRACTITIONER

## 2021-11-10 PROCEDURE — 3075F SYST BP GE 130 - 139MM HG: CPT | Mod: CPTII,S$GLB,, | Performed by: NURSE PRACTITIONER

## 2021-11-10 PROCEDURE — 71250 CT THORAX DX C-: CPT | Mod: TC

## 2021-11-10 PROCEDURE — 3051F HG A1C>EQUAL 7.0%<8.0%: CPT | Mod: CPTII,S$GLB,, | Performed by: NURSE PRACTITIONER

## 2021-11-10 PROCEDURE — 1160F RVW MEDS BY RX/DR IN RCRD: CPT | Mod: CPTII,S$GLB,, | Performed by: NURSE PRACTITIONER

## 2021-11-10 PROCEDURE — 1126F AMNT PAIN NOTED NONE PRSNT: CPT | Mod: CPTII,S$GLB,, | Performed by: NURSE PRACTITIONER

## 2021-11-10 PROCEDURE — 3061F NEG MICROALBUMINURIA REV: CPT | Mod: CPTII,S$GLB,, | Performed by: NURSE PRACTITIONER

## 2021-11-10 PROCEDURE — 3066F PR DOCUMENTATION OF TREATMENT FOR NEPHROPATHY: ICD-10-PCS | Mod: CPTII,S$GLB,, | Performed by: NURSE PRACTITIONER

## 2021-11-10 PROCEDURE — 3075F PR MOST RECENT SYSTOLIC BLOOD PRESS GE 130-139MM HG: ICD-10-PCS | Mod: CPTII,S$GLB,, | Performed by: NURSE PRACTITIONER

## 2021-11-10 PROCEDURE — 1126F PR PAIN SEVERITY QUANTIFIED, NO PAIN PRESENT: ICD-10-PCS | Mod: CPTII,S$GLB,, | Performed by: NURSE PRACTITIONER

## 2021-11-10 PROCEDURE — 3061F PR NEG MICROALBUMINURIA RESULT DOCUMENTED/REVIEW: ICD-10-PCS | Mod: CPTII,S$GLB,, | Performed by: NURSE PRACTITIONER

## 2021-11-10 PROCEDURE — 71250 CT THORAX DX C-: CPT | Mod: 26,,, | Performed by: RADIOLOGY

## 2021-11-10 PROCEDURE — 3078F PR MOST RECENT DIASTOLIC BLOOD PRESSURE < 80 MM HG: ICD-10-PCS | Mod: CPTII,S$GLB,, | Performed by: NURSE PRACTITIONER

## 2021-11-10 PROCEDURE — 71250 CT CHEST WITHOUT CONTRAST: ICD-10-PCS | Mod: 26,,, | Performed by: RADIOLOGY

## 2021-11-10 PROCEDURE — 1159F PR MEDICATION LIST DOCUMENTED IN MEDICAL RECORD: ICD-10-PCS | Mod: CPTII,S$GLB,, | Performed by: NURSE PRACTITIONER

## 2021-11-10 PROCEDURE — 99215 PR OFFICE/OUTPT VISIT, EST, LEVL V, 40-54 MIN: ICD-10-PCS | Mod: S$GLB,,, | Performed by: NURSE PRACTITIONER

## 2021-11-10 PROCEDURE — 3066F NEPHROPATHY DOC TX: CPT | Mod: CPTII,S$GLB,, | Performed by: NURSE PRACTITIONER

## 2021-11-10 PROCEDURE — 99999 PR PBB SHADOW E&M-EST. PATIENT-LVL V: CPT | Mod: PBBFAC,,, | Performed by: NURSE PRACTITIONER

## 2021-11-10 PROCEDURE — 1159F MED LIST DOCD IN RCRD: CPT | Mod: CPTII,S$GLB,, | Performed by: NURSE PRACTITIONER

## 2021-11-10 PROCEDURE — 3051F PR MOST RECENT HEMOGLOBIN A1C LEVEL 7.0 - < 8.0%: ICD-10-PCS | Mod: CPTII,S$GLB,, | Performed by: NURSE PRACTITIONER

## 2021-11-10 RX ORDER — DULAGLUTIDE 1.5 MG/.5ML
1.5 INJECTION, SOLUTION SUBCUTANEOUS
Qty: 12 PEN | Refills: 2 | Status: SHIPPED | OUTPATIENT
Start: 2021-11-10 | End: 2023-11-16 | Stop reason: SDUPTHER

## 2021-11-10 RX ORDER — INSULIN ASPART 100 [IU]/ML
INJECTION, SOLUTION INTRAVENOUS; SUBCUTANEOUS
Qty: 15 ML | Refills: 6
Start: 2021-11-10 | End: 2022-04-28

## 2021-11-17 ENCOUNTER — CLINICAL SUPPORT (OUTPATIENT)
Dept: DIABETES | Facility: CLINIC | Age: 66
End: 2021-11-17
Payer: MEDICARE

## 2021-11-17 DIAGNOSIS — E11.65 TYPE 2 DIABETES MELLITUS WITH HYPERGLYCEMIA, WITH LONG-TERM CURRENT USE OF INSULIN: ICD-10-CM

## 2021-11-17 DIAGNOSIS — Z79.4 TYPE 2 DIABETES MELLITUS WITH HYPERGLYCEMIA, WITH LONG-TERM CURRENT USE OF INSULIN: ICD-10-CM

## 2021-11-17 PROCEDURE — G0108 DIAB MANAGE TRN  PER INDIV: HCPCS | Mod: S$GLB,,, | Performed by: INTERNAL MEDICINE

## 2021-11-17 PROCEDURE — 99999 PR PBB SHADOW E&M-EST. PATIENT-LVL II: ICD-10-PCS | Mod: PBBFAC,,,

## 2021-11-17 PROCEDURE — G0108 PR DIAB MANAGE TRN  PER INDIV: ICD-10-PCS | Mod: S$GLB,,, | Performed by: INTERNAL MEDICINE

## 2021-11-17 PROCEDURE — 99999 PR PBB SHADOW E&M-EST. PATIENT-LVL II: CPT | Mod: PBBFAC,,,

## 2021-11-18 ENCOUNTER — PATIENT MESSAGE (OUTPATIENT)
Dept: PSYCHIATRY | Facility: CLINIC | Age: 66
End: 2021-11-18
Payer: MEDICARE

## 2021-11-29 ENCOUNTER — OFFICE VISIT (OUTPATIENT)
Dept: OBSTETRICS AND GYNECOLOGY | Facility: CLINIC | Age: 66
End: 2021-11-29
Payer: MEDICARE

## 2021-11-29 ENCOUNTER — PATIENT OUTREACH (OUTPATIENT)
Dept: ADMINISTRATIVE | Facility: OTHER | Age: 66
End: 2021-11-29
Payer: MEDICARE

## 2021-11-29 VITALS
DIASTOLIC BLOOD PRESSURE: 74 MMHG | WEIGHT: 173.06 LBS | SYSTOLIC BLOOD PRESSURE: 138 MMHG | HEIGHT: 64 IN | BODY MASS INDEX: 29.55 KG/M2

## 2021-11-29 DIAGNOSIS — N61.0 CELLULITIS OF LEFT BREAST: ICD-10-CM

## 2021-11-29 DIAGNOSIS — C50.312 MALIGNANT NEOPLASM OF LOWER-INNER QUADRANT OF LEFT BREAST IN FEMALE, ESTROGEN RECEPTOR POSITIVE: ICD-10-CM

## 2021-11-29 DIAGNOSIS — Z17.0 MALIGNANT NEOPLASM OF LOWER-INNER QUADRANT OF LEFT BREAST IN FEMALE, ESTROGEN RECEPTOR POSITIVE: ICD-10-CM

## 2021-11-29 DIAGNOSIS — Z12.31 ENCOUNTER FOR SCREENING MAMMOGRAM FOR MALIGNANT NEOPLASM OF BREAST: ICD-10-CM

## 2021-11-29 DIAGNOSIS — Z01.419 WELL WOMAN EXAM WITH ROUTINE GYNECOLOGICAL EXAM: Primary | ICD-10-CM

## 2021-11-29 DIAGNOSIS — Z01.419 NORMAL GYNECOLOGIC EXAMINATION: ICD-10-CM

## 2021-11-29 PROCEDURE — 99999 PR PBB SHADOW E&M-EST. PATIENT-LVL IV: ICD-10-PCS | Mod: PBBFAC,,, | Performed by: OBSTETRICS & GYNECOLOGY

## 2021-11-29 PROCEDURE — 3061F PR NEG MICROALBUMINURIA RESULT DOCUMENTED/REVIEW: ICD-10-PCS | Mod: CPTII,S$GLB,, | Performed by: OBSTETRICS & GYNECOLOGY

## 2021-11-29 PROCEDURE — G0101 PR CA SCREEN;PELVIC/BREAST EXAM: ICD-10-PCS | Mod: S$GLB,,, | Performed by: OBSTETRICS & GYNECOLOGY

## 2021-11-29 PROCEDURE — 4010F ACE/ARB THERAPY RXD/TAKEN: CPT | Mod: CPTII,S$GLB,, | Performed by: OBSTETRICS & GYNECOLOGY

## 2021-11-29 PROCEDURE — 3066F NEPHROPATHY DOC TX: CPT | Mod: CPTII,S$GLB,, | Performed by: OBSTETRICS & GYNECOLOGY

## 2021-11-29 PROCEDURE — G0101 CA SCREEN;PELVIC/BREAST EXAM: HCPCS | Mod: S$GLB,,, | Performed by: OBSTETRICS & GYNECOLOGY

## 2021-11-29 PROCEDURE — 3066F PR DOCUMENTATION OF TREATMENT FOR NEPHROPATHY: ICD-10-PCS | Mod: CPTII,S$GLB,, | Performed by: OBSTETRICS & GYNECOLOGY

## 2021-11-29 PROCEDURE — 4010F PR ACE/ARB THEARPY RXD/TAKEN: ICD-10-PCS | Mod: CPTII,S$GLB,, | Performed by: OBSTETRICS & GYNECOLOGY

## 2021-11-29 PROCEDURE — 3061F NEG MICROALBUMINURIA REV: CPT | Mod: CPTII,S$GLB,, | Performed by: OBSTETRICS & GYNECOLOGY

## 2021-11-29 PROCEDURE — 99999 PR PBB SHADOW E&M-EST. PATIENT-LVL IV: CPT | Mod: PBBFAC,,, | Performed by: OBSTETRICS & GYNECOLOGY

## 2021-11-30 ENCOUNTER — TELEPHONE (OUTPATIENT)
Dept: SURGERY | Facility: CLINIC | Age: 66
End: 2021-11-30
Payer: MEDICARE

## 2021-12-07 ENCOUNTER — TELEPHONE (OUTPATIENT)
Dept: INTERNAL MEDICINE | Facility: CLINIC | Age: 66
End: 2021-12-07
Payer: MEDICARE

## 2021-12-08 ENCOUNTER — TELEPHONE (OUTPATIENT)
Dept: INTERNAL MEDICINE | Facility: CLINIC | Age: 66
End: 2021-12-08
Payer: MEDICARE

## 2021-12-08 ENCOUNTER — OFFICE VISIT (OUTPATIENT)
Dept: SURGERY | Facility: CLINIC | Age: 66
End: 2021-12-08
Payer: MEDICARE

## 2021-12-08 ENCOUNTER — HOSPITAL ENCOUNTER (OUTPATIENT)
Dept: RADIOLOGY | Facility: HOSPITAL | Age: 66
Discharge: HOME | End: 2021-12-08
Attending: OBSTETRICS & GYNECOLOGY
Payer: MEDICARE

## 2021-12-08 VITALS
DIASTOLIC BLOOD PRESSURE: 62 MMHG | HEART RATE: 99 BPM | SYSTOLIC BLOOD PRESSURE: 121 MMHG | BODY MASS INDEX: 29.53 KG/M2 | HEIGHT: 64 IN | WEIGHT: 173 LBS

## 2021-12-08 VITALS — WEIGHT: 170 LBS | BODY MASS INDEX: 29.02 KG/M2 | HEIGHT: 64 IN

## 2021-12-08 DIAGNOSIS — N61.0 CELLULITIS OF LEFT BREAST: ICD-10-CM

## 2021-12-08 DIAGNOSIS — N64.89 BREAST EDEMA: Primary | ICD-10-CM

## 2021-12-08 DIAGNOSIS — Z85.3 PERSONAL HISTORY OF BREAST CANCER: ICD-10-CM

## 2021-12-08 DIAGNOSIS — C50.312 MALIGNANT NEOPLASM OF LOWER-INNER QUADRANT OF LEFT BREAST IN FEMALE, ESTROGEN RECEPTOR POSITIVE: ICD-10-CM

## 2021-12-08 DIAGNOSIS — Z12.39 ENCOUNTER FOR SCREENING BREAST EXAMINATION: ICD-10-CM

## 2021-12-08 DIAGNOSIS — Z17.0 MALIGNANT NEOPLASM OF LOWER-INNER QUADRANT OF LEFT BREAST IN FEMALE, ESTROGEN RECEPTOR POSITIVE: ICD-10-CM

## 2021-12-08 PROCEDURE — 4010F PR ACE/ARB THEARPY RXD/TAKEN: ICD-10-PCS | Mod: CPTII,S$GLB,, | Performed by: NURSE PRACTITIONER

## 2021-12-08 PROCEDURE — 76642 ULTRASOUND BREAST LIMITED: CPT | Mod: TC,LT

## 2021-12-08 PROCEDURE — 77065 MAMMO DIGITAL DIAGNOSTIC LEFT WITH TOMO: ICD-10-PCS | Mod: 26,LT,, | Performed by: RADIOLOGY

## 2021-12-08 PROCEDURE — 99999 PR PBB SHADOW E&M-EST. PATIENT-LVL IV: ICD-10-PCS | Mod: PBBFAC,,, | Performed by: NURSE PRACTITIONER

## 2021-12-08 PROCEDURE — 77065 DX MAMMO INCL CAD UNI: CPT | Mod: 26,LT,, | Performed by: RADIOLOGY

## 2021-12-08 PROCEDURE — 3061F NEG MICROALBUMINURIA REV: CPT | Mod: CPTII,S$GLB,, | Performed by: NURSE PRACTITIONER

## 2021-12-08 PROCEDURE — 99999 PR PBB SHADOW E&M-EST. PATIENT-LVL IV: CPT | Mod: PBBFAC,,, | Performed by: NURSE PRACTITIONER

## 2021-12-08 PROCEDURE — 99214 OFFICE O/P EST MOD 30 MIN: CPT | Mod: S$GLB,,, | Performed by: NURSE PRACTITIONER

## 2021-12-08 PROCEDURE — 3061F PR NEG MICROALBUMINURIA RESULT DOCUMENTED/REVIEW: ICD-10-PCS | Mod: CPTII,S$GLB,, | Performed by: NURSE PRACTITIONER

## 2021-12-08 PROCEDURE — 4010F ACE/ARB THERAPY RXD/TAKEN: CPT | Mod: CPTII,S$GLB,, | Performed by: NURSE PRACTITIONER

## 2021-12-08 PROCEDURE — 76642 US BREAST LEFT LIMITED: ICD-10-PCS | Mod: 26,LT,, | Performed by: RADIOLOGY

## 2021-12-08 PROCEDURE — 77061 MAMMO DIGITAL DIAGNOSTIC LEFT WITH TOMO: ICD-10-PCS | Mod: 26,LT,, | Performed by: RADIOLOGY

## 2021-12-08 PROCEDURE — 76642 ULTRASOUND BREAST LIMITED: CPT | Mod: 26,LT,, | Performed by: RADIOLOGY

## 2021-12-08 PROCEDURE — 3066F PR DOCUMENTATION OF TREATMENT FOR NEPHROPATHY: ICD-10-PCS | Mod: CPTII,S$GLB,, | Performed by: NURSE PRACTITIONER

## 2021-12-08 PROCEDURE — 99214 PR OFFICE/OUTPT VISIT, EST, LEVL IV, 30-39 MIN: ICD-10-PCS | Mod: S$GLB,,, | Performed by: NURSE PRACTITIONER

## 2021-12-08 PROCEDURE — 3066F NEPHROPATHY DOC TX: CPT | Mod: CPTII,S$GLB,, | Performed by: NURSE PRACTITIONER

## 2021-12-08 PROCEDURE — 77061 BREAST TOMOSYNTHESIS UNI: CPT | Mod: 26,LT,, | Performed by: RADIOLOGY

## 2021-12-08 PROCEDURE — 77061 BREAST TOMOSYNTHESIS UNI: CPT | Mod: TC,LT

## 2021-12-08 RX ORDER — CLINDAMYCIN HYDROCHLORIDE 300 MG/1
300 CAPSULE ORAL 3 TIMES DAILY
Qty: 30 CAPSULE | Refills: 0 | Status: SHIPPED | OUTPATIENT
Start: 2021-12-08 | End: 2021-12-18

## 2021-12-09 NOTE — DISCHARGE INSTRUCTIONS
No clear cause for knee pain were found on x-ray.  It is possible that her knee pain and wrist pain is from arthritis.  Does not seem that your pain today is caused by something dangerous at this time.  Please follow up with her primary care doctor for continued evaluation of your joint pain.  Take Tylenol as needed for pain control.  Return to the emergency department if you have any fevers, worsening joint pain, joint swelling, inability to walk, redness at you're joints, or any other new, worsening or worrisome symptoms.   
Detail Level: Detailed
Show Topical Anesthesia Variable?: Yes
Post-Care Instructions: I reviewed with the patient in detail post-care instructions. Patient is to wear sunprotection, and avoid picking at any of the treated lesions. Pt may apply Vaseline to crusted or scabbing areas.
Include Z78.9 (Other Specified Conditions Influencing Health Status) As An Associated Diagnosis?: No
Medical Necessity Clause: This procedure was medically necessary because the lesions that were treated were:
Medical Necessity Information: It is in your best interest to select a reason for this procedure from the list below. All of these items fulfill various CMS LCD requirements except the new and changing color options.
Consent: The patient's consent was obtained including but not limited to risks of crusting, scabbing, blistering, scarring, darker or lighter pigmentary change, recurrence, incomplete removal and infection.

## 2021-12-10 ENCOUNTER — TELEPHONE (OUTPATIENT)
Dept: INTERNAL MEDICINE | Facility: CLINIC | Age: 66
End: 2021-12-10
Payer: MEDICARE

## 2021-12-14 ENCOUNTER — OFFICE VISIT (OUTPATIENT)
Dept: PODIATRY | Facility: CLINIC | Age: 66
End: 2021-12-14
Payer: MEDICARE

## 2021-12-14 VITALS
BODY MASS INDEX: 29.97 KG/M2 | SYSTOLIC BLOOD PRESSURE: 141 MMHG | DIASTOLIC BLOOD PRESSURE: 81 MMHG | HEART RATE: 88 BPM | WEIGHT: 174.63 LBS

## 2021-12-14 DIAGNOSIS — Z79.4 TYPE 2 DIABETES MELLITUS WITH HYPERGLYCEMIA, WITH LONG-TERM CURRENT USE OF INSULIN: ICD-10-CM

## 2021-12-14 DIAGNOSIS — E11.65 TYPE 2 DIABETES MELLITUS WITH HYPERGLYCEMIA, WITH LONG-TERM CURRENT USE OF INSULIN: ICD-10-CM

## 2021-12-14 DIAGNOSIS — E11.9 ENCOUNTER FOR DIABETIC FOOT EXAM: Primary | ICD-10-CM

## 2021-12-14 DIAGNOSIS — L85.3 XEROSIS OF SKIN: ICD-10-CM

## 2021-12-14 PROCEDURE — 4010F PR ACE/ARB THEARPY RXD/TAKEN: ICD-10-PCS | Mod: CPTII,S$GLB,, | Performed by: PODIATRIST

## 2021-12-14 PROCEDURE — 3066F NEPHROPATHY DOC TX: CPT | Mod: CPTII,S$GLB,, | Performed by: PODIATRIST

## 2021-12-14 PROCEDURE — 99213 PR OFFICE/OUTPT VISIT, EST, LEVL III, 20-29 MIN: ICD-10-PCS | Mod: S$GLB,,, | Performed by: PODIATRIST

## 2021-12-14 PROCEDURE — 99213 OFFICE O/P EST LOW 20 MIN: CPT | Mod: S$GLB,,, | Performed by: PODIATRIST

## 2021-12-14 PROCEDURE — 3061F PR NEG MICROALBUMINURIA RESULT DOCUMENTED/REVIEW: ICD-10-PCS | Mod: CPTII,S$GLB,, | Performed by: PODIATRIST

## 2021-12-14 PROCEDURE — 99999 PR PBB SHADOW E&M-EST. PATIENT-LVL IV: ICD-10-PCS | Mod: PBBFAC,,, | Performed by: PODIATRIST

## 2021-12-14 PROCEDURE — 3061F NEG MICROALBUMINURIA REV: CPT | Mod: CPTII,S$GLB,, | Performed by: PODIATRIST

## 2021-12-14 PROCEDURE — 3066F PR DOCUMENTATION OF TREATMENT FOR NEPHROPATHY: ICD-10-PCS | Mod: CPTII,S$GLB,, | Performed by: PODIATRIST

## 2021-12-14 PROCEDURE — 99999 PR PBB SHADOW E&M-EST. PATIENT-LVL IV: CPT | Mod: PBBFAC,,, | Performed by: PODIATRIST

## 2021-12-14 PROCEDURE — 4010F ACE/ARB THERAPY RXD/TAKEN: CPT | Mod: CPTII,S$GLB,, | Performed by: PODIATRIST

## 2021-12-14 RX ORDER — AMMONIUM LACTATE 12 G/100G
1 CREAM TOPICAL DAILY
Qty: 140 G | Refills: 1 | Status: SHIPPED | OUTPATIENT
Start: 2021-12-14 | End: 2022-07-18

## 2021-12-15 ENCOUNTER — OFFICE VISIT (OUTPATIENT)
Dept: SURGERY | Facility: CLINIC | Age: 66
End: 2021-12-15
Payer: MEDICARE

## 2021-12-15 VITALS
HEART RATE: 89 BPM | DIASTOLIC BLOOD PRESSURE: 67 MMHG | HEIGHT: 64 IN | SYSTOLIC BLOOD PRESSURE: 141 MMHG | BODY MASS INDEX: 29.71 KG/M2 | WEIGHT: 174 LBS

## 2021-12-15 DIAGNOSIS — N64.89 BREAST EDEMA: Primary | ICD-10-CM

## 2021-12-15 PROCEDURE — 99999 PR PBB SHADOW E&M-EST. PATIENT-LVL IV: CPT | Mod: PBBFAC,,, | Performed by: NURSE PRACTITIONER

## 2021-12-15 PROCEDURE — 3066F NEPHROPATHY DOC TX: CPT | Mod: CPTII,S$GLB,, | Performed by: NURSE PRACTITIONER

## 2021-12-15 PROCEDURE — 3061F NEG MICROALBUMINURIA REV: CPT | Mod: CPTII,S$GLB,, | Performed by: NURSE PRACTITIONER

## 2021-12-15 PROCEDURE — 99213 OFFICE O/P EST LOW 20 MIN: CPT | Mod: S$GLB,,, | Performed by: NURSE PRACTITIONER

## 2021-12-15 PROCEDURE — 4010F PR ACE/ARB THEARPY RXD/TAKEN: ICD-10-PCS | Mod: CPTII,S$GLB,, | Performed by: NURSE PRACTITIONER

## 2021-12-15 PROCEDURE — 4010F ACE/ARB THERAPY RXD/TAKEN: CPT | Mod: CPTII,S$GLB,, | Performed by: NURSE PRACTITIONER

## 2021-12-15 PROCEDURE — 3066F PR DOCUMENTATION OF TREATMENT FOR NEPHROPATHY: ICD-10-PCS | Mod: CPTII,S$GLB,, | Performed by: NURSE PRACTITIONER

## 2021-12-15 PROCEDURE — 99999 PR PBB SHADOW E&M-EST. PATIENT-LVL IV: ICD-10-PCS | Mod: PBBFAC,,, | Performed by: NURSE PRACTITIONER

## 2021-12-15 PROCEDURE — 3061F PR NEG MICROALBUMINURIA RESULT DOCUMENTED/REVIEW: ICD-10-PCS | Mod: CPTII,S$GLB,, | Performed by: NURSE PRACTITIONER

## 2021-12-15 PROCEDURE — 99213 PR OFFICE/OUTPT VISIT, EST, LEVL III, 20-29 MIN: ICD-10-PCS | Mod: S$GLB,,, | Performed by: NURSE PRACTITIONER

## 2021-12-21 ENCOUNTER — CLINICAL SUPPORT (OUTPATIENT)
Dept: REHABILITATION | Facility: HOSPITAL | Age: 66
End: 2021-12-21
Payer: MEDICARE

## 2021-12-21 DIAGNOSIS — N64.89 BREAST EDEMA: ICD-10-CM

## 2021-12-21 DIAGNOSIS — Z91.89 AT RISK FOR LYMPHEDEMA: ICD-10-CM

## 2021-12-21 DIAGNOSIS — R29.3 POOR POSTURE: ICD-10-CM

## 2021-12-21 DIAGNOSIS — R53.1 DECREASED STRENGTH: ICD-10-CM

## 2021-12-21 DIAGNOSIS — M25.612 DECREASED ROM OF LEFT SHOULDER: Primary | ICD-10-CM

## 2021-12-21 PROCEDURE — 97162 PT EVAL MOD COMPLEX 30 MIN: CPT

## 2021-12-21 PROCEDURE — 97535 SELF CARE MNGMENT TRAINING: CPT

## 2021-12-28 ENCOUNTER — CLINICAL SUPPORT (OUTPATIENT)
Dept: REHABILITATION | Facility: HOSPITAL | Age: 66
End: 2021-12-28
Payer: MEDICARE

## 2021-12-28 DIAGNOSIS — Z91.89 AT RISK FOR LYMPHEDEMA: ICD-10-CM

## 2021-12-28 DIAGNOSIS — R53.1 DECREASED STRENGTH: ICD-10-CM

## 2021-12-28 DIAGNOSIS — R29.3 POOR POSTURE: ICD-10-CM

## 2021-12-28 DIAGNOSIS — M25.612 DECREASED ROM OF LEFT SHOULDER: ICD-10-CM

## 2021-12-28 PROCEDURE — 97110 THERAPEUTIC EXERCISES: CPT

## 2021-12-28 PROCEDURE — 97140 MANUAL THERAPY 1/> REGIONS: CPT

## 2022-01-06 ENCOUNTER — TELEPHONE (OUTPATIENT)
Dept: INTERNAL MEDICINE | Facility: CLINIC | Age: 67
End: 2022-01-06
Payer: MEDICARE

## 2022-01-06 NOTE — TELEPHONE ENCOUNTER
Spoke to patient and she stated she was going to call you to tell you she won't be getting it anymore due to cost and she will just continue to check her sugars the way she has been doing it

## 2022-01-06 NOTE — TELEPHONE ENCOUNTER
----- Message from MEERA Gamez, MATTP sent at 1/6/2022  1:35 PM CST -----  Regarding: dexcom status  Check with pt to see if she was able to get dexcom via [facility]  Thanks  Sun

## 2022-01-19 ENCOUNTER — DOCUMENTATION ONLY (OUTPATIENT)
Dept: REHABILITATION | Facility: HOSPITAL | Age: 67
End: 2022-01-19
Payer: MEDICARE

## 2022-01-26 ENCOUNTER — PATIENT MESSAGE (OUTPATIENT)
Dept: ADMINISTRATIVE | Facility: HOSPITAL | Age: 67
End: 2022-01-26
Payer: MEDICARE

## 2022-01-26 DIAGNOSIS — E11.9 TYPE 2 DIABETES MELLITUS WITHOUT COMPLICATION, UNSPECIFIED WHETHER LONG TERM INSULIN USE: ICD-10-CM

## 2022-02-08 ENCOUNTER — DOCUMENTATION ONLY (OUTPATIENT)
Dept: REHABILITATION | Facility: HOSPITAL | Age: 67
End: 2022-02-08
Payer: MEDICARE

## 2022-02-08 NOTE — PROGRESS NOTES
OUTPATIENT PHYSICAL THERAPY DISCHARGE SUMMARY     Name: Sunitha Pillai  Clinic Number: [id number]    Diagnosis: No diagnosis found.  Physician: No ref. provider found  Treatment Orders: PT Eval and Treat  Past Medical History:   Diagnosis Date    Acute coronary syndrome 9/23/2018    Adjustment disorder     Anxiety     Arthritis     Cancer of breast 09/03/2020    Malignant neoplasm of lower-inner quadrant of left breast in female, estrogen receptor positive    Cholelithiasis with acute cholecystitis 3/5/2021    Depression     Diabetes mellitus type I     Genetic testing of female 09/2020    [employer] via [employer] with AXIN2 and POLE variants of uncertain significance    GERD (gastroesophageal reflux disease)     Hypertension     Pneumonia due to COVID-19 virus 2/7/2021    Unstable angina 9/23/2018    Vertigo 05/13/2019    Vitamin D deficiency 2/7/2021       Initial visit: 12/21/21  Date of Last visit: 12/28/21  Date of Discharge Note:  2/8/22  Total Visits Received: 2  Missed Visits:  >/=2  ASSESSMENT   Status Towards Goals Met:  Unknown.    Goals Not achieved and why:  Pt has not attended therapy since 12/28/21. Pt has been contacted multiple times, but pt has not rescheduled therapy.        Discharge reason : Patient self discharge and Pt has not re-scheduled further follow-up sessions    PLAN   This patient is discharged from Outpatient Physical Therapy Services.     Jaqui Hernandez, PT  02/08/2022

## 2022-02-17 ENCOUNTER — CLINICAL SUPPORT (OUTPATIENT)
Dept: DIABETES | Facility: CLINIC | Age: 67
End: 2022-02-17
Payer: MEDICARE

## 2022-02-17 DIAGNOSIS — Z79.4 TYPE 2 DIABETES MELLITUS WITH HYPEROSMOLARITY WITHOUT COMA, WITH LONG-TERM CURRENT USE OF INSULIN: ICD-10-CM

## 2022-02-17 DIAGNOSIS — E11.00 TYPE 2 DIABETES MELLITUS WITH HYPEROSMOLARITY WITHOUT COMA, WITH LONG-TERM CURRENT USE OF INSULIN: ICD-10-CM

## 2022-02-17 PROCEDURE — G0108 PR DIAB MANAGE TRN  PER INDIV: ICD-10-PCS | Mod: S$GLB,,,

## 2022-02-17 PROCEDURE — G0108 DIAB MANAGE TRN  PER INDIV: HCPCS | Mod: S$GLB,,,

## 2022-02-17 NOTE — PROGRESS NOTES
Diabetes Care Specialist Follow-up Note  Author: Alecia Huerta RN  Date: 2/17/2022    Program Intake  Reason for Diabetes Program Visit:: Post Program Follow-Up  Type of Follow-Up: 3 month  Current diabetes risk level:: low  Permission to speak with others about care:: no    Lab Results   Component Value Date    HGBA1C 7.2 (H) 10/21/2021     A1c Pre Diabetes Care Specialist Intervention:  7.2%      -- Diabetes Education FOLLOW-UP NOTE:    -- Patient reports being receptive to Diabetic treatment options, especially dietary and lifestyle changes as well as medication additions and/or changes.     -- Reviewed current medical history including co-morbidities.    -- Patient reports being compliant with recommendation of cutting back on CARBOHYDRATES. Patient has chosen to follow the plate method to adhere with CARBOHYDRATE restrictions. Patient reports measuring food to stay within allocated serving per the Plate Method and reading food labels in attempt to adhere to carbohydrate restrictions.    -- Patient reports being adherent with prescribed medication regimen.     -- Patient reports taking strides in improving physical activity. Patient verbalizes understanding of recommendation to achieve increased heart rate with physical activity for sustained duration as tolerated. Reviewed Physical Activity goals of >150 minutes weekly.     -- Patient reports monitoring blood sugar three times per day. Patient does not have logs- reports pre-meal blood sugar running 140-180s. Patient aware to bring blood sugar logs and glucometer to appointment with Endo MD and PCP.     -- Patient reports knowing the signs and symptoms of hyperglycemia and hypoglycemia. Patient reports knowing the appropriate treatments for hyperglycemia and hypoglycemia.     -- Patient reports understanding of annual diabetes care schedule. Optometry and Podiatry referral placed.    -- Arrives with [employer] health management knowledge - [employer] specific  knowledge of diabetes management. Leaves with increased knowledge base -      -- Appears  motivated to continue to make recommended changes.            Diabetes Self-Support Plan    Diabetes Self-Management Support Plan  Diabetes learning:: DM websites,DM apps,DM support group  Exercise/nutrition:: apps,websites  Stress management:: family,friends,support group  Review status:: Patient has selected and agrees to support plan.,Patient was provided Diabetes Self-Management Support Plan document that includes support options.    During today's follow-up visit,  the following areas required further assessment and content was provided/reviewed.    Based on today's diabetes care assessment, the following areas of need were identified:      Social 11/17/2021   Support No   Access to Mass Media/Tech No   Cognitive/Behavioral Health No   Culture/Jehovah's witness No   Communication No   Health Literacy No        Clinical 11/17/2021   Medication Adherence Yes   Lab Compliance No   Nutritional Status No        Diabetes Self-Management Skills 11/17/2021   Diabetes Disease Process/Treatment Options Yes   Nutrition/Healthy Eating Yes   Physical Activity/Exercise Yes   Medication Yes   Home Blood Glucose Monitoring Yes   Acute Complications Yes   Chronic Complications Yes   Psychosocial/Coping Yes        Today's interventions were provided through individual discussion, instruction, and written materials were provided.    Patient verbalized understanding of instruction and written materials.  Pt was able to return back demonstration of instructions today. Patient understood key points, needs reinforcement and further instruction.     Diabetes Self-Management Care Plan Review:  Diabetes Self-Management Care Plan Review and Evaluation of Progress:    During today's follow-up Camacho Diabetes Self-Management Care Plan progress was reviewed and progress was evaluated including his/her input. Sunitha has agreed to continue his/her journey  to improve/maintain overall diabetes control by continuing to set health goals. See care plan progress below.      Care Plan: Diabetes Management   Updates made since 1/18/2022 12:00 AM      Problem: Healthy Eating       Goal: Will utilize plate method when arranging meal to stay within 30-45 gm of carbohydrates per meal. Will abstain from drinking Sugar Beverages and utilize Diet and/or artificial sweetener. Completed 2/17/2022   Start Date: 11/17/2021   Expected End Date: 2/17/2022   This Visit's Progress: Met   Priority: High   Note:    -- Emphasized importance of eliminating all SSB. Discussed SF drink options. Discussed carb vs non-carb foods and reviewed appropriate amounts of carbs to have at meals vs snacks. Recommended 30-45 gm carb at meals and 15 gm carb at snacks. Instructed on appropriate label reading and serving sizes of specific carb containing foods. Reviewed need to limit total/saturated fats. Discussed meal plans and snack ideas amenable to pt. Reviewed the plate method. Patient verbalizes understanding of received information/education.            Follow Up Plan     Follow up in about 8 months (around 10/17/2022).    Today's care plan and follow up schedule was discussed with patient.  Sunitha verbalized understanding of the care plan, goals, and agrees to follow up plan.        The patient was encouraged to communicate with his/her health care provider/physician and care team regarding his/her condition(s) and treatment.  I provided the patient with my contact information today and encouraged to contact me via phone or [facility] Patient Portal as needed.     Length of Visit   Total Time: 60 Minutes

## 2022-02-18 ENCOUNTER — PATIENT OUTREACH (OUTPATIENT)
Dept: ADMINISTRATIVE | Facility: OTHER | Age: 67
End: 2022-02-18
Payer: MEDICARE

## 2022-03-04 ENCOUNTER — LAB VISIT (OUTPATIENT)
Dept: LAB | Facility: HOSPITAL | Age: 67
End: 2022-03-04
Payer: MEDICARE

## 2022-03-04 DIAGNOSIS — E11.65 TYPE 2 DIABETES MELLITUS WITH HYPERGLYCEMIA, WITH LONG-TERM CURRENT USE OF INSULIN: ICD-10-CM

## 2022-03-04 DIAGNOSIS — Z79.4 TYPE 2 DIABETES MELLITUS WITH HYPERGLYCEMIA, WITH LONG-TERM CURRENT USE OF INSULIN: ICD-10-CM

## 2022-03-04 LAB
ESTIMATED AVG GLUCOSE: 131 MG/DL (ref 68–131)
HBA1C MFR BLD: 6.2 % (ref 4–5.6)

## 2022-03-04 PROCEDURE — 83036 HEMOGLOBIN GLYCOSYLATED A1C: CPT | Performed by: NURSE PRACTITIONER

## 2022-03-04 PROCEDURE — 36415 COLL VENOUS BLD VENIPUNCTURE: CPT | Performed by: NURSE PRACTITIONER

## 2022-03-07 ENCOUNTER — TELEPHONE (OUTPATIENT)
Dept: INTERNAL MEDICINE | Facility: CLINIC | Age: 67
End: 2022-03-07
Payer: MEDICARE

## 2022-03-07 ENCOUNTER — TELEPHONE (OUTPATIENT)
Dept: SURGERY | Facility: CLINIC | Age: 67
End: 2022-03-07
Payer: MEDICARE

## 2022-03-07 NOTE — TELEPHONE ENCOUNTER
Spoke to pt and she said her breast feels better now and stated she maybe experiencing stress since the loss of her  is nearing 1yr .  She is willing to schedule w/ David and was given the number so she can schedule at her convenience.    ----- Message from MEERA Gamez FNP sent at 2/24/2022  8:31 AM CST -----  Regarding: left breast pain    Pt needs f/u with pcp and/or breast center   See below    Caller is requesting a same day appointment.  Caller declined first available appointment listed below.     Name of Caller: self   When is the first available appointment?   Symptoms: Pain in her left breast. She recently had breast cancer   Best Call Back Number: [phone]   Additional Information:  leave a voicemail with a time and she will come in. She is at work.

## 2022-03-08 ENCOUNTER — TELEPHONE (OUTPATIENT)
Dept: INTERNAL MEDICINE | Facility: CLINIC | Age: 67
End: 2022-03-08
Payer: MEDICARE

## 2022-03-09 ENCOUNTER — PATIENT MESSAGE (OUTPATIENT)
Dept: INTERNAL MEDICINE | Facility: CLINIC | Age: 67
End: 2022-03-09
Payer: MEDICARE

## 2022-03-18 NOTE — BRIEF OP NOTE
----- Message from Alexis [employer] sent at 3/17/2022  4:03 PM EDT -----  Subject: Appointment Request    Reason for Call: Urgent Ear Problem    QUESTIONS  Type of Appointment? Established Patient  Reason for appointment request? No appointments available during search  Additional Information for Provider? URGENT. Patients mom called to state   that he had been up through out the night crying and pulling at his ears   and also has a runny nose and sneezing. She did speak to the office today   and they had advised her to give him Motrin and to see how he was and she   has been doing that but he is still off and on crying and pulling at his   ears and she would like to have him seen tomorrow by Dr. Neftali Melendez. Please   call mom to schedule.   ---------------------------------------------------------------------------  --------------  CALL BACK INFO  What is the best way for the office to contact you? OK to leave message on   voicemail  Preferred Call Back Phone Number? [phone]  ---------------------------------------------------------------------------  --------------  SCRIPT ANSWERS  Relationship to Patient? Parent  Representative Name? David Christopher   Additional information verified (besides Name and Date of Birth)? Phone   Number  Is the child crying uncontrollably? No  Has the child recently (1 week) been seen by a medical professional for   this problem? No  Have you been diagnosed with, awaiting test results for, or told that you   are suspected of having COVID-19 (Coronavirus)? (If patient has tested   negative or was tested as a requirement for work, school, or travel and   not based on symptoms, answer no)? No  Within the past 10 days have you developed any of the following symptoms   (answer no if symptoms have been present longer than 10 days or began   more than 10 days ago)?  Fever or Chills, Cough, Shortness of breath or   difficulty breathing, Loss of taste or smell, Sore throat, Nasal [facility]  Brief Operative Note    Surgery Date: 9/17/2020     Surgeon(s) and Role:     * Isabel Moulton MD - Primary    Assisting Surgeon: Herson Betancourt    Pre-op Diagnosis:  Malignant neoplasm of overlapping sites of left breast in female, estrogen receptor positive [C50.812, Z17.0]    Post-op Diagnosis:  Post-Op Diagnosis Codes:     * Malignant neoplasm of overlapping sites of left breast in female, estrogen receptor positive [C50.812, Z17.0]    Procedure(s) (LRB):  MASTECTOMY, PARTIAL-w/reflector (Left)  INJECTION, FOR SENTINEL NODE IDENTIFICATION (Left)  BIOPSY, LYMPH NODE, SENTINEL (Left)    Anesthesia: General    Description of the findings of the procedure(s): Left lumpectomy removed, inked, and sent to path. One Left axillary sentinel lymph node removed.    Estimated Blood Loss: 10 mL         Specimens:   Left breast lumpectomy  Left axillary sentinel lymph node, hot 81    Discharge Note    OUTCOME: Patient tolerated treatment/procedure well without complication and is now ready for discharge.    DISPOSITION: Home or Self Care    FINAL DIAGNOSIS:  Malignant neoplasm of overlapping sites of left breast in female, estrogen receptor positive [C50.812, Z17.0]      FOLLOWUP: In clinic    DISCHARGE INSTRUCTIONS:  No discharge procedures on file.   congestion, Sneezing or runny nose, Fatigue or generalized body aches   (answer no if pain is specific to a body part e.g. back pain), Diarrhea,   Headache?  Yes

## 2022-04-16 NOTE — PROGRESS NOTES
"Subjective:       Patient ID: Sunitha Pillai is a 62 y.o. female.    Vitals:    03/22/18 1247   BP: 129/83   Pulse: 88   Temp: 97.9 °F (36.6 °C)   SpO2: 99%   Weight: 81.2 kg (179 lb)   Height: 5' 4" (1.626 m)     Chief Complaint: Emesis (2 days )    Emesis    This is a new problem. The current episode started yesterday. The problem occurs 2 to 4 times per day. The problem has been unchanged. There has been no fever. Pertinent negatives include no abdominal pain, arthralgias, chest pain, chills, coughing, diarrhea, dizziness, fever, headaches, myalgias, sweats or URI. She has tried nothing for the symptoms.     Review of Systems   Constitution: Negative for chills, diaphoresis, fever and malaise/fatigue.   HENT: Negative for congestion, ear pain and sore throat.    Eyes: Negative for pain.   Cardiovascular: Negative for chest pain and palpitations.   Respiratory: Negative for cough, shortness of breath, sputum production and wheezing.    Skin: Negative for color change and rash.   Musculoskeletal: Negative for arthralgias, back pain and myalgias.   Gastrointestinal: Positive for nausea and vomiting. Negative for abdominal pain, constipation, diarrhea, hematemesis, hematochezia and melena.   Genitourinary: Negative for dysuria.   Neurological: Negative for dizziness and headaches.       Objective:      Physical Exam   Constitutional: She is oriented to person, place, and time. Vital signs are normal. She appears well-developed and well-nourished. She does not appear ill. No distress.   HENT:   Head: Normocephalic and atraumatic.   Right Ear: External ear normal.   Left Ear: External ear normal.   Nose: Nose normal.   Eyes: Conjunctivae, EOM and lids are normal. Right eye exhibits no discharge. Left eye exhibits no discharge.   Neck: Normal range of motion. Neck supple.   Cardiovascular: Normal rate, regular rhythm and normal heart sounds.  Exam reveals no gallop and no friction rub.    No murmur " heard.  Pulmonary/Chest: Effort normal and breath sounds normal. No respiratory distress. She has no decreased breath sounds. She has no wheezes. She has no rhonchi. She has no rales.   Abdominal: Normal appearance and bowel sounds are normal. There is tenderness (mild) in the epigastric area. There is no rigidity, no rebound, no guarding, no CVA tenderness, no tenderness at McBurney's point and negative Lynn's sign.   Musculoskeletal: Normal range of motion.   Neurological: She is alert and oriented to person, place, and time.   Skin: Skin is warm and dry. No rash noted. She is not diaphoretic. No erythema. No pallor.   Psychiatric: She has a normal mood and affect. Her behavior is normal.   Nursing note and vitals reviewed.      Assessment:       1. Viral gastroenteritis        Plan:     Discussed symptoms for which patient should report to the emergency room. Discussed treatment options with patient. Patient expressed verbal understanding and agreement with treatment plan.     Sunitha was seen today for emesis.    Diagnoses and all orders for this visit:    Viral gastroenteritis  -     ondansetron (ZOFRAN) 4 MG tablet; Take 1 tablet (4 mg total) by mouth every 8 (eight) hours as needed for Nausea.      Patient Instructions   Take zofran as needed for nausea. Make sure to stay hydrated.  Return to clinic or report to the emergency room for any new or worsening symptoms as discussed.    Please follow up with your primary care provider within 2-5 days if your signs and symptoms have not resolved or worsen.     If your condition worsens or fails to improve we recommend that you receive another evaluation at the emergency room immediately or contact your primary medical clinic to discuss your concerns.   You must understand that you have received an Urgent Care treatment only and that you may be released before all of your medical problems are known or treated. You, the patient, will arrange for follow up care as  instructed.         Viral Gastroenteritis (Adult)    Gastroenteritis is commonly called the stomach flu. It is most often caused by a virus that affects the stomach and intestinal tract and usually lasts from 2 to 7 days. Common viruses causing gastroenteritis include norovirus, rotavirus, and hepatitis A. Non-viral causes of gastroenteritis include bacteria, parasites, and toxins.  The danger from repeated vomiting or diarrhea is dehydration. This is the loss of too much fluid from the body. When this occurs, body fluids must be replaced. Antibiotics do not help with this illness because it is usually viral.Simple home treatment will be helpful.  Symptoms of viral gastroenteritis may include:  · Watery, loose stools  · Stomach pain or abdominal cramps  · Fever and chills  · Nausea and vomiting  · Loss of bowel control  · Headache  Home care  Gastroenteritis is transmitted by contact with the stool or vomit of an infected person. This can occur from person to person or from contact with a contaminated surface.  Follow these guidelines when caring for yourself at home:  · If symptoms are severe, rest at home for the next 24 hours or until you are feeling better.  · Wash your hands with soap and water or use alcohol-based  to prevent the spread of infection. Wash your hands after touching anyone who is sick.  · Wash your hands or use alcohol-based  after using the toilet and before meals. Clean the toilet after each use.  Remember these tips when preparing food:  · People with diarrhea should not prepare or serve food to others. When preparing foods, wash your hands before and after.  · Wash your hands after using cutting boards, countertops, knives, or utensils that have been in contact with raw food.  · Keep uncooked meats away from cooked and ready-to-eat foods.  Medicine  You may use acetaminophen or NSAID medicines like ibuprofen or naproxen to control fever unless another medicine was given. If  you have chronic liver or kidney disease, talk with your healthcare provider before using these medicines. Also talk with your provider if you've had a stomach ulcer or gastrointestinal bleeding. Don't give aspirin to anyone under 18 years of age who is ill with a fever. It may cause severe liver damage. Don't use NSAIDS is you are already taking one for another condition (like arthritis) or are on aspirin (such as for heart disease or after a stroke).  If medicine for vomiting or diarrhea are prescribed, take these only as directed. Do not take over-the-counter medicines for vomiting or diarrhea unless instructed by your healthcare provider.  Diet  Follow these guidelines for food:  · Water and liquids are important so you don't get dehydrated. Drink a small amount at a time or suck on ice chips if you are vomiting.  · If you eat, avoid fatty, greasy, spicy, or fried foods.  · Don't eat dairy if you have diarrhea. This can make diarrhea worse.  · Avoid tobacco, alcohol, and caffeine which may worsen symptoms.  During the first 24 hours (the first full day), follow the diet below:  · Beverages. Sports drinks, soft drinks without caffeine, ginger ale, mineral water (plain or flavored), decaffeinated tea and coffee. If you are very dehydrated, sports drinks aren't a good choice. They have too much sugar and not enough electrolytes. In this case, commercially available products called oral rehydration solutions, are best.  · Soups. Eat clear broth, consommé, and bouillon.  · Desserts. Eat gelatin, popsicles, and fruit juice bars.  During the next 24 hours (the second day), you may add the following to the above:  · Hot cereal, plain toast, bread, rolls, and crackers  · Plain noodles, rice, mashed potatoes, chicken noodle or rice soup  · Unsweetened canned fruit (avoid pineapple), bananas  · Limit fat intake to less than 15 grams per day. Do this by avoiding margarine, butter, oils, mayonnaise, sauces, gravies, fried  foods, peanut butter, meat, poultry, and fish.  · Limit fiber and avoid raw or cooked vegetables, fresh fruits (except bananas), and bran cereals.  · Limit caffeine and chocolate. Don't use spices or seasonings other than salt.  · Limit dairy products.  · Avoid alcohol.  During the next 24 hours:  · Gradually resume a normal diet as you feel better and your symptoms improve.  · If at any time it starts getting worse again, go back to clear liquids until you feel better.  Follow-up care  Follow up with your healthcare provider, or as advised. Call your provider if you don't get better within 24 hours or if diarrhea lasts more than a week. Also follow up if you are unable to keep down liquids and get dehydrated. If a stool (diarrhea) sample was taken, call as directed for the results.  Call 911  Call 911 if any of these occur:  · Trouble breathing  · Chest pain  · Confused  · Severe drowsiness or trouble awakening  · Fainting or loss of consciousness  · Rapid heart rate  · Seizure  · Stiff neck  When to seek medical advice  Call your healthcare provider right away if any of these occur:  · Abdominal pain that gets worse  · Continued vomiting (unable to keep liquids down)  · Frequent diarrhea (more than 5 times a day)  · Blood in vomit or stool (black or red color)  · Dark urine, reduced urine output, or extreme thirst  · Weakness or dizziness  · Drowsiness  · Fever of 100.4°F (38°C) oral or higher that does not get better with fever medicine  · New rash  Date Last Reviewed: 1/3/2016  © 9221-2933 [employer] [address] [place] [address], [place] [address]. All rights reserved. This information is not intended as a substitute for professional medical care. Always follow your healthcare professional's instructions.               not tested at this time due to safety concern

## 2022-04-18 ENCOUNTER — TELEPHONE (OUTPATIENT)
Dept: HEMATOLOGY/ONCOLOGY | Facility: CLINIC | Age: 67
End: 2022-04-18
Payer: MEDICARE

## 2022-04-18 NOTE — TELEPHONE ENCOUNTER
----- Message from Estefani Owens sent at 4/18/2022 10:03 AM CDT -----  Good morning,     Ms. Pillai needs to schedule her follow-up appointments. She was last seen in October with Edith Osei NP. Edith asked that she return in 3 months (3 months early AM with [facility]). Can you please contact patient at [phone] to schedule her in clinic? She is available this Thursday or Friday if you have an opening.     Thank you,   Estefani

## 2022-04-28 ENCOUNTER — OFFICE VISIT (OUTPATIENT)
Dept: INTERNAL MEDICINE | Facility: CLINIC | Age: 67
End: 2022-04-28
Payer: MEDICARE

## 2022-04-28 VITALS
WEIGHT: 173.06 LBS | HEART RATE: 60 BPM | SYSTOLIC BLOOD PRESSURE: 138 MMHG | BODY MASS INDEX: 29.55 KG/M2 | TEMPERATURE: 98 F | OXYGEN SATURATION: 99 % | DIASTOLIC BLOOD PRESSURE: 66 MMHG | HEIGHT: 64 IN

## 2022-04-28 DIAGNOSIS — D64.9 ANEMIA, UNSPECIFIED TYPE: ICD-10-CM

## 2022-04-28 DIAGNOSIS — Z79.4 TYPE 2 DIABETES MELLITUS WITH HYPERGLYCEMIA, WITH LONG-TERM CURRENT USE OF INSULIN: Primary | ICD-10-CM

## 2022-04-28 DIAGNOSIS — I10 ESSENTIAL HYPERTENSION: Chronic | ICD-10-CM

## 2022-04-28 DIAGNOSIS — C50.312 MALIGNANT NEOPLASM OF LOWER-INNER QUADRANT OF LEFT BREAST IN FEMALE, ESTROGEN RECEPTOR POSITIVE: ICD-10-CM

## 2022-04-28 DIAGNOSIS — N64.4 BREAST PAIN: ICD-10-CM

## 2022-04-28 DIAGNOSIS — Z12.11 SCREENING FOR COLON CANCER: ICD-10-CM

## 2022-04-28 DIAGNOSIS — T45.1X5A PERIPHERAL NEUROPATHY DUE TO CHEMOTHERAPY: ICD-10-CM

## 2022-04-28 DIAGNOSIS — G62.0 PERIPHERAL NEUROPATHY DUE TO CHEMOTHERAPY: ICD-10-CM

## 2022-04-28 DIAGNOSIS — R39.11 URINARY HESITANCY: ICD-10-CM

## 2022-04-28 DIAGNOSIS — Z17.0 MALIGNANT NEOPLASM OF LOWER-INNER QUADRANT OF LEFT BREAST IN FEMALE, ESTROGEN RECEPTOR POSITIVE: ICD-10-CM

## 2022-04-28 DIAGNOSIS — M19.049 HAND ARTHRITIS: ICD-10-CM

## 2022-04-28 DIAGNOSIS — E11.65 TYPE 2 DIABETES MELLITUS WITH HYPERGLYCEMIA, WITH LONG-TERM CURRENT USE OF INSULIN: Primary | ICD-10-CM

## 2022-04-28 PROCEDURE — 3075F PR MOST RECENT SYSTOLIC BLOOD PRESS GE 130-139MM HG: ICD-10-PCS | Mod: CPTII,S$GLB,, | Performed by: INTERNAL MEDICINE

## 2022-04-28 PROCEDURE — 1159F PR MEDICATION LIST DOCUMENTED IN MEDICAL RECORD: ICD-10-PCS | Mod: CPTII,S$GLB,, | Performed by: INTERNAL MEDICINE

## 2022-04-28 PROCEDURE — 3288F FALL RISK ASSESSMENT DOCD: CPT | Mod: CPTII,S$GLB,, | Performed by: INTERNAL MEDICINE

## 2022-04-28 PROCEDURE — 99499 RISK ADDL DX/OHS AUDIT: ICD-10-PCS | Mod: S$GLB,,, | Performed by: INTERNAL MEDICINE

## 2022-04-28 PROCEDURE — 3044F PR MOST RECENT HEMOGLOBIN A1C LEVEL <7.0%: ICD-10-PCS | Mod: CPTII,S$GLB,, | Performed by: INTERNAL MEDICINE

## 2022-04-28 PROCEDURE — 99999 PR PBB SHADOW E&M-EST. PATIENT-LVL V: ICD-10-PCS | Mod: PBBFAC,,, | Performed by: INTERNAL MEDICINE

## 2022-04-28 PROCEDURE — 3078F PR MOST RECENT DIASTOLIC BLOOD PRESSURE < 80 MM HG: ICD-10-PCS | Mod: CPTII,S$GLB,, | Performed by: INTERNAL MEDICINE

## 2022-04-28 PROCEDURE — 4010F PR ACE/ARB THEARPY RXD/TAKEN: ICD-10-PCS | Mod: CPTII,S$GLB,, | Performed by: INTERNAL MEDICINE

## 2022-04-28 PROCEDURE — 99999 PR PBB SHADOW E&M-EST. PATIENT-LVL V: CPT | Mod: PBBFAC,,, | Performed by: INTERNAL MEDICINE

## 2022-04-28 PROCEDURE — 3288F PR FALLS RISK ASSESSMENT DOCUMENTED: ICD-10-PCS | Mod: CPTII,S$GLB,, | Performed by: INTERNAL MEDICINE

## 2022-04-28 PROCEDURE — 4010F ACE/ARB THERAPY RXD/TAKEN: CPT | Mod: CPTII,S$GLB,, | Performed by: INTERNAL MEDICINE

## 2022-04-28 PROCEDURE — 1101F PR PT FALLS ASSESS DOC 0-1 FALLS W/OUT INJ PAST YR: ICD-10-PCS | Mod: CPTII,S$GLB,, | Performed by: INTERNAL MEDICINE

## 2022-04-28 PROCEDURE — 3008F BODY MASS INDEX DOCD: CPT | Mod: CPTII,S$GLB,, | Performed by: INTERNAL MEDICINE

## 2022-04-28 PROCEDURE — 3008F PR BODY MASS INDEX (BMI) DOCUMENTED: ICD-10-PCS | Mod: CPTII,S$GLB,, | Performed by: INTERNAL MEDICINE

## 2022-04-28 PROCEDURE — 99214 OFFICE O/P EST MOD 30 MIN: CPT | Mod: S$GLB,,, | Performed by: INTERNAL MEDICINE

## 2022-04-28 PROCEDURE — 3044F HG A1C LEVEL LT 7.0%: CPT | Mod: CPTII,S$GLB,, | Performed by: INTERNAL MEDICINE

## 2022-04-28 PROCEDURE — 99499 UNLISTED E&M SERVICE: CPT | Mod: S$GLB,,, | Performed by: INTERNAL MEDICINE

## 2022-04-28 PROCEDURE — 87086 URINE CULTURE/COLONY COUNT: CPT | Performed by: INTERNAL MEDICINE

## 2022-04-28 PROCEDURE — 1101F PT FALLS ASSESS-DOCD LE1/YR: CPT | Mod: CPTII,S$GLB,, | Performed by: INTERNAL MEDICINE

## 2022-04-28 PROCEDURE — 81003 URINALYSIS AUTO W/O SCOPE: CPT | Performed by: INTERNAL MEDICINE

## 2022-04-28 PROCEDURE — 3075F SYST BP GE 130 - 139MM HG: CPT | Mod: CPTII,S$GLB,, | Performed by: INTERNAL MEDICINE

## 2022-04-28 PROCEDURE — 3078F DIAST BP <80 MM HG: CPT | Mod: CPTII,S$GLB,, | Performed by: INTERNAL MEDICINE

## 2022-04-28 PROCEDURE — 99214 PR OFFICE/OUTPT VISIT, EST, LEVL IV, 30-39 MIN: ICD-10-PCS | Mod: S$GLB,,, | Performed by: INTERNAL MEDICINE

## 2022-04-28 PROCEDURE — 1159F MED LIST DOCD IN RCRD: CPT | Mod: CPTII,S$GLB,, | Performed by: INTERNAL MEDICINE

## 2022-04-28 RX ORDER — SULFAMETHOXAZOLE AND TRIMETHOPRIM 800; 160 MG/1; MG/1
1 TABLET ORAL 2 TIMES DAILY
Qty: 20 TABLET | Refills: 0 | Status: SHIPPED | OUTPATIENT
Start: 2022-04-28 | End: 2022-05-05

## 2022-04-28 RX ORDER — CANDESARTAN 8 MG/1
8 TABLET ORAL DAILY
Qty: 90 TABLET | Refills: 3 | Status: SHIPPED | OUTPATIENT
Start: 2022-04-28 | End: 2022-12-06

## 2022-04-28 RX ORDER — METFORMIN HYDROCHLORIDE 1000 MG/1
1000 TABLET ORAL 2 TIMES DAILY
Qty: 180 TABLET | Refills: 3 | Status: SHIPPED | OUTPATIENT
Start: 2022-04-28 | End: 2023-05-04 | Stop reason: SDUPTHER

## 2022-04-28 RX ORDER — PREGABALIN 25 MG/1
25 CAPSULE ORAL 2 TIMES DAILY
Qty: 60 CAPSULE | Refills: 2 | Status: SHIPPED | OUTPATIENT
Start: 2022-04-28 | End: 2022-05-30

## 2022-04-28 NOTE — PROGRESS NOTES
Subjective:       Patient ID: Sunitha Pillai is a 66 y.o. female.    Chief Complaint: Follow-up    HPIPt doing okay - mood is improved.  She is feeling cold all the time.  Still has neuropathy pain. Has some hand pain. Biggest problem is pain in L breast where she had the cancer.  She also has urinary hesitancy - takes her a long time to finish urinating - feels she has to push it out.   Review of Systems   Respiratory: Negative for shortness of breath (PND or orthopnea).    Cardiovascular: Negative for chest pain (arm pain or jaw pain).   Gastrointestinal: Negative for abdominal pain, diarrhea, nausea and vomiting.   Genitourinary: Negative for dysuria.   Neurological: Negative for seizures, syncope and headaches.       Objective:      Physical Exam  Constitutional:       General: She is not in acute distress.     Appearance: She is well-developed.   HENT:      Head: Normocephalic.   Eyes:      Pupils: Pupils are equal, round, and reactive to light.   Neck:      Thyroid: No thyromegaly.      Vascular: No JVD.   Cardiovascular:      Rate and Rhythm: Normal rate and regular rhythm.      Heart sounds: Normal heart sounds. No murmur heard.    No friction rub. No gallop.   Pulmonary:      Effort: Pulmonary effort is normal.      Breath sounds: Normal breath sounds. No wheezing or rales.      Comments: L breast - some swelling, peau d'orange appearance to the skin, mild erythema neat her nipple, tender, no adenopathy    R breast no mass discharge or adenopathy  Abdominal:      General: Bowel sounds are normal. There is no distension.      Palpations: Abdomen is soft. There is no mass.      Tenderness: There is no abdominal tenderness. There is no guarding or rebound.   Musculoskeletal:      Cervical back: Neck supple.   Lymphadenopathy:      Cervical: No cervical adenopathy.   Skin:     General: Skin is warm and dry.   Neurological:      Mental Status: She is alert and oriented to person, place, and time.       Deep Tendon Reflexes: Reflexes are normal and symmetric.   Psychiatric:         Behavior: Behavior normal.         Thought Content: Thought content normal.         Judgment: Judgment normal.         Assessment:       1. Type 2 diabetes mellitus with hyperglycemia, with long-term current use of insulin    2. Peripheral neuropathy due to chemotherapy    3. Essential hypertension    4. Anemia, unspecified type    5. Urinary hesitancy    6. Screening for colon cancer    7. Hand arthritis    8. Malignant neoplasm of lower-inner quadrant of left breast in female, estrogen receptor positive    9. Breast pain        Plan:   Type 2 diabetes mellitus with hyperglycemia, with long-term current use of insulin  -     TSH; Future; Expected date: 04/28/2022  Recent good control  Peripheral neuropathy due to chemotherapy  -     pregabalin (LYRICA) 25 MG capsule; Take 1 capsule (25 mg total) by mouth 2 (two) times daily.  Dispense: 60 capsule; Refill: 2    Essential hypertension  -     CBC Auto Differential; Future; Expected date: 04/28/2022  -     Comprehensive Metabolic Panel; Future; Expected date: 04/28/2022  -     candesartan (ATACAND) 8 MG tablet; Take 1 tablet (8 mg total) by mouth once daily.  Dispense: 90 tablet; Refill: 3    Anemia, unspecified type  -     Iron and TIBC; Future; Expected date: 04/28/2022  -     Ferritin; Future; Expected date: 04/28/2022    Urinary hesitancy  Check urine - if no infection will refer to urology  Screening for colon cancer    Hand arthritis  -     Sedimentation rate; Future; Expected date: 04/28/2022  -     C-Reactive Protein; Future; Expected date: 04/28/2022  -     Rheumatoid Factor; Future; Expected date: 04/28/2022  -     Cyclic Citrullinated Peptide Antibody, IgG; Future; Expected date: 04/28/2022  -     X-Ray Hand 2 View Bilat; Future; Expected date: 04/28/2022    Malignant neoplasm of lower-inner quadrant of left breast in female, estrogen receptor positive  -     Ambulatory  referral/consult to Breast Surgery; Future; Expected date: 05/05/2022    Breast pain  -     Ambulatory referral/consult to Breast Surgery; Future; Expected date: 05/05/2022    Other orders  -     sulfamethoxazole-trimethoprim 800-160mg (BACTRIM DS) 800-160 mg Tab; Take 1 tablet by mouth 2 (two) times daily. for 7 days  Dispense: 20 tablet; Refill: 0  -     metFORMIN (GLUCOPHAGE) 1000 MG tablet; Take 1 tablet (1,000 mg total) by mouth 2 (two) times daily.  Dispense: 180 tablet; Refill: 3    Treat for mastitis - see breast surgery [facility]SA[facility] and keep appt with oncology as well.

## 2022-04-28 NOTE — PROGRESS NOTES
Patient, Sunitha Pillai (MRN #[id number]), presented with a recent Platelet count less than 150 K/uL consistent with the definition of thrombocytopenia (ICD10 - D69.6).    Platelets   Date Value Ref Range Status   11/08/2021 146 (L) 150 - 450 K/uL Final     The patient's thrombocytopenia was monitored, evaluated, addressed and/or treated. This addendum to the medical record is made on 04/28/2022.

## 2022-04-29 ENCOUNTER — LAB VISIT (OUTPATIENT)
Dept: LAB | Facility: HOSPITAL | Age: 67
End: 2022-04-29
Attending: INTERNAL MEDICINE
Payer: MEDICARE

## 2022-04-29 ENCOUNTER — PATIENT OUTREACH (OUTPATIENT)
Dept: ADMINISTRATIVE | Facility: OTHER | Age: 67
End: 2022-04-29
Payer: MEDICARE

## 2022-04-29 ENCOUNTER — OFFICE VISIT (OUTPATIENT)
Dept: SURGERY | Facility: CLINIC | Age: 67
End: 2022-04-29
Payer: MEDICARE

## 2022-04-29 ENCOUNTER — CLINICAL SUPPORT (OUTPATIENT)
Dept: REHABILITATION | Facility: HOSPITAL | Age: 67
End: 2022-04-29
Payer: MEDICARE

## 2022-04-29 VITALS
WEIGHT: 172 LBS | DIASTOLIC BLOOD PRESSURE: 70 MMHG | BODY MASS INDEX: 29.37 KG/M2 | HEIGHT: 64 IN | HEART RATE: 82 BPM | SYSTOLIC BLOOD PRESSURE: 143 MMHG

## 2022-04-29 DIAGNOSIS — M25.612 DECREASED RANGE OF MOTION OF LEFT SHOULDER: ICD-10-CM

## 2022-04-29 DIAGNOSIS — Z85.3 PERSONAL HISTORY OF BREAST CANCER: ICD-10-CM

## 2022-04-29 DIAGNOSIS — R29.898 WEAKNESS OF LEFT UPPER EXTREMITY: ICD-10-CM

## 2022-04-29 DIAGNOSIS — D64.9 ANEMIA, UNSPECIFIED TYPE: ICD-10-CM

## 2022-04-29 DIAGNOSIS — R26.89 DECREASED FUNCTIONAL MOBILITY: ICD-10-CM

## 2022-04-29 DIAGNOSIS — Z17.0 MALIGNANT NEOPLASM OF LOWER-INNER QUADRANT OF LEFT BREAST IN FEMALE, ESTROGEN RECEPTOR POSITIVE: ICD-10-CM

## 2022-04-29 DIAGNOSIS — I10 ESSENTIAL HYPERTENSION: Chronic | ICD-10-CM

## 2022-04-29 DIAGNOSIS — M62.89 MUSCLE TIGHTNESS: ICD-10-CM

## 2022-04-29 DIAGNOSIS — E11.65 TYPE 2 DIABETES MELLITUS WITH HYPERGLYCEMIA, WITH LONG-TERM CURRENT USE OF INSULIN: ICD-10-CM

## 2022-04-29 DIAGNOSIS — N64.89 EDEMA OF BREAST: Primary | ICD-10-CM

## 2022-04-29 DIAGNOSIS — M19.049 HAND ARTHRITIS: ICD-10-CM

## 2022-04-29 DIAGNOSIS — R29.3 POOR POSTURE: ICD-10-CM

## 2022-04-29 DIAGNOSIS — F43.21 GRIEF: ICD-10-CM

## 2022-04-29 DIAGNOSIS — C50.312 MALIGNANT NEOPLASM OF LOWER-INNER QUADRANT OF LEFT BREAST IN FEMALE, ESTROGEN RECEPTOR POSITIVE: ICD-10-CM

## 2022-04-29 DIAGNOSIS — Z79.4 TYPE 2 DIABETES MELLITUS WITH HYPERGLYCEMIA, WITH LONG-TERM CURRENT USE OF INSULIN: ICD-10-CM

## 2022-04-29 DIAGNOSIS — N64.4 BREAST PAIN: ICD-10-CM

## 2022-04-29 PROBLEM — R53.1 DECREASED STRENGTH: Status: RESOLVED | Noted: 2021-12-21 | Resolved: 2022-04-29

## 2022-04-29 PROBLEM — Z91.89 AT RISK FOR LYMPHEDEMA: Status: RESOLVED | Noted: 2021-12-21 | Resolved: 2022-04-29

## 2022-04-29 LAB
ALBUMIN SERPL BCP-MCNC: 3.8 G/DL (ref 3.5–5.2)
ALP SERPL-CCNC: 81 U/L (ref 55–135)
ALT SERPL W/O P-5'-P-CCNC: 9 U/L (ref 10–44)
ANION GAP SERPL CALC-SCNC: 10 MMOL/L (ref 8–16)
AST SERPL-CCNC: 18 U/L (ref 10–40)
BASOPHILS # BLD AUTO: 0.01 K/UL (ref 0–0.2)
BASOPHILS NFR BLD: 0.2 % (ref 0–1.9)
BILIRUB SERPL-MCNC: 0.8 MG/DL (ref 0.1–1)
BUN SERPL-MCNC: 13 MG/DL (ref 8–23)
CALCIUM SERPL-MCNC: 9.9 MG/DL (ref 8.7–10.5)
CCP AB SER IA-ACNC: 1050 U/ML
CHLORIDE SERPL-SCNC: 105 MMOL/L (ref 95–110)
CO2 SERPL-SCNC: 28 MMOL/L (ref 23–29)
CREAT SERPL-MCNC: 1 MG/DL (ref 0.5–1.4)
CRP SERPL-MCNC: 0.5 MG/L (ref 0–8.2)
DIFFERENTIAL METHOD: ABNORMAL
EOSINOPHIL # BLD AUTO: 0.1 K/UL (ref 0–0.5)
EOSINOPHIL NFR BLD: 2.1 % (ref 0–8)
ERYTHROCYTE [DISTWIDTH] IN BLOOD BY AUTOMATED COUNT: 12.6 % (ref 11.5–14.5)
ERYTHROCYTE [SEDIMENTATION RATE] IN BLOOD BY WESTERGREN METHOD: 8 MM/HR (ref 0–36)
EST. GFR  (AFRICAN AMERICAN): >60 ML/MIN/1.73 M^2
EST. GFR  (NON AFRICAN AMERICAN): 58.4 ML/MIN/1.73 M^2
FERRITIN SERPL-MCNC: 235 NG/ML (ref 20–300)
GLUCOSE SERPL-MCNC: 88 MG/DL (ref 70–110)
HCT VFR BLD AUTO: 36.3 % (ref 37–48.5)
HGB BLD-MCNC: 11.9 G/DL (ref 12–16)
IMM GRANULOCYTES # BLD AUTO: 0.01 K/UL (ref 0–0.04)
IMM GRANULOCYTES NFR BLD AUTO: 0.2 % (ref 0–0.5)
IRON SERPL-MCNC: 85 UG/DL (ref 30–160)
LYMPHOCYTES # BLD AUTO: 1.8 K/UL (ref 1–4.8)
LYMPHOCYTES NFR BLD: 42.5 % (ref 18–48)
MCH RBC QN AUTO: 31.4 PG (ref 27–31)
MCHC RBC AUTO-ENTMCNC: 32.8 G/DL (ref 32–36)
MCV RBC AUTO: 96 FL (ref 82–98)
MONOCYTES # BLD AUTO: 0.4 K/UL (ref 0.3–1)
MONOCYTES NFR BLD: 9.5 % (ref 4–15)
NEUTROPHILS # BLD AUTO: 1.9 K/UL (ref 1.8–7.7)
NEUTROPHILS NFR BLD: 45.5 % (ref 38–73)
NRBC BLD-RTO: 0 /100 WBC
PLATELET # BLD AUTO: 143 K/UL (ref 150–450)
PMV BLD AUTO: 9.5 FL (ref 9.2–12.9)
POTASSIUM SERPL-SCNC: 4.9 MMOL/L (ref 3.5–5.1)
PROT SERPL-MCNC: 7.1 G/DL (ref 6–8.4)
RBC # BLD AUTO: 3.79 M/UL (ref 4–5.4)
RHEUMATOID FACT SERPL-ACNC: 21 IU/ML (ref 0–15)
SATURATED IRON: 22 % (ref 20–50)
SODIUM SERPL-SCNC: 143 MMOL/L (ref 136–145)
TOTAL IRON BINDING CAPACITY: 388 UG/DL (ref 250–450)
TRANSFERRIN SERPL-MCNC: 262 MG/DL (ref 200–375)
TSH SERPL DL<=0.005 MIU/L-ACNC: 2.1 UIU/ML (ref 0.4–4)
WBC # BLD AUTO: 4.21 K/UL (ref 3.9–12.7)

## 2022-04-29 PROCEDURE — 85652 RBC SED RATE AUTOMATED: CPT | Performed by: INTERNAL MEDICINE

## 2022-04-29 PROCEDURE — 99999 PR PBB SHADOW E&M-EST. PATIENT-LVL IV: ICD-10-PCS | Mod: PBBFAC,,, | Performed by: NURSE PRACTITIONER

## 2022-04-29 PROCEDURE — 86140 C-REACTIVE PROTEIN: CPT | Performed by: INTERNAL MEDICINE

## 2022-04-29 PROCEDURE — 36415 COLL VENOUS BLD VENIPUNCTURE: CPT | Performed by: INTERNAL MEDICINE

## 2022-04-29 PROCEDURE — 3077F SYST BP >= 140 MM HG: CPT | Mod: CPTII,S$GLB,, | Performed by: NURSE PRACTITIONER

## 2022-04-29 PROCEDURE — 84466 ASSAY OF TRANSFERRIN: CPT | Performed by: INTERNAL MEDICINE

## 2022-04-29 PROCEDURE — 99213 PR OFFICE/OUTPT VISIT, EST, LEVL III, 20-29 MIN: ICD-10-PCS | Mod: S$GLB,,, | Performed by: NURSE PRACTITIONER

## 2022-04-29 PROCEDURE — 3288F PR FALLS RISK ASSESSMENT DOCUMENTED: ICD-10-PCS | Mod: CPTII,S$GLB,, | Performed by: NURSE PRACTITIONER

## 2022-04-29 PROCEDURE — 1125F PR PAIN SEVERITY QUANTIFIED, PAIN PRESENT: ICD-10-PCS | Mod: CPTII,S$GLB,, | Performed by: NURSE PRACTITIONER

## 2022-04-29 PROCEDURE — 3008F PR BODY MASS INDEX (BMI) DOCUMENTED: ICD-10-PCS | Mod: CPTII,S$GLB,, | Performed by: NURSE PRACTITIONER

## 2022-04-29 PROCEDURE — 1125F AMNT PAIN NOTED PAIN PRSNT: CPT | Mod: CPTII,S$GLB,, | Performed by: NURSE PRACTITIONER

## 2022-04-29 PROCEDURE — 4010F PR ACE/ARB THEARPY RXD/TAKEN: ICD-10-PCS | Mod: CPTII,S$GLB,, | Performed by: NURSE PRACTITIONER

## 2022-04-29 PROCEDURE — 86431 RHEUMATOID FACTOR QUANT: CPT | Performed by: INTERNAL MEDICINE

## 2022-04-29 PROCEDURE — 1159F PR MEDICATION LIST DOCUMENTED IN MEDICAL RECORD: ICD-10-PCS | Mod: CPTII,S$GLB,, | Performed by: NURSE PRACTITIONER

## 2022-04-29 PROCEDURE — 3077F PR MOST RECENT SYSTOLIC BLOOD PRESSURE >= 140 MM HG: ICD-10-PCS | Mod: CPTII,S$GLB,, | Performed by: NURSE PRACTITIONER

## 2022-04-29 PROCEDURE — 85025 COMPLETE CBC W/AUTO DIFF WBC: CPT | Performed by: INTERNAL MEDICINE

## 2022-04-29 PROCEDURE — 1101F PR PT FALLS ASSESS DOC 0-1 FALLS W/OUT INJ PAST YR: ICD-10-PCS | Mod: CPTII,S$GLB,, | Performed by: NURSE PRACTITIONER

## 2022-04-29 PROCEDURE — 99999 PR PBB SHADOW E&M-EST. PATIENT-LVL IV: CPT | Mod: PBBFAC,,, | Performed by: NURSE PRACTITIONER

## 2022-04-29 PROCEDURE — 82728 ASSAY OF FERRITIN: CPT | Performed by: INTERNAL MEDICINE

## 2022-04-29 PROCEDURE — 84443 ASSAY THYROID STIM HORMONE: CPT | Performed by: INTERNAL MEDICINE

## 2022-04-29 PROCEDURE — 97162 PT EVAL MOD COMPLEX 30 MIN: CPT

## 2022-04-29 PROCEDURE — 4010F ACE/ARB THERAPY RXD/TAKEN: CPT | Mod: CPTII,S$GLB,, | Performed by: NURSE PRACTITIONER

## 2022-04-29 PROCEDURE — 3288F FALL RISK ASSESSMENT DOCD: CPT | Mod: CPTII,S$GLB,, | Performed by: NURSE PRACTITIONER

## 2022-04-29 PROCEDURE — 86200 CCP ANTIBODY: CPT | Performed by: INTERNAL MEDICINE

## 2022-04-29 PROCEDURE — 99213 OFFICE O/P EST LOW 20 MIN: CPT | Mod: S$GLB,,, | Performed by: NURSE PRACTITIONER

## 2022-04-29 PROCEDURE — 3044F HG A1C LEVEL LT 7.0%: CPT | Mod: CPTII,S$GLB,, | Performed by: NURSE PRACTITIONER

## 2022-04-29 PROCEDURE — 1101F PT FALLS ASSESS-DOCD LE1/YR: CPT | Mod: CPTII,S$GLB,, | Performed by: NURSE PRACTITIONER

## 2022-04-29 PROCEDURE — 80053 COMPREHEN METABOLIC PANEL: CPT | Performed by: INTERNAL MEDICINE

## 2022-04-29 PROCEDURE — 1159F MED LIST DOCD IN RCRD: CPT | Mod: CPTII,S$GLB,, | Performed by: NURSE PRACTITIONER

## 2022-04-29 PROCEDURE — 3008F BODY MASS INDEX DOCD: CPT | Mod: CPTII,S$GLB,, | Performed by: NURSE PRACTITIONER

## 2022-04-29 PROCEDURE — 3044F PR MOST RECENT HEMOGLOBIN A1C LEVEL <7.0%: ICD-10-PCS | Mod: CPTII,S$GLB,, | Performed by: NURSE PRACTITIONER

## 2022-04-29 PROCEDURE — 3078F DIAST BP <80 MM HG: CPT | Mod: CPTII,S$GLB,, | Performed by: NURSE PRACTITIONER

## 2022-04-29 PROCEDURE — 3078F PR MOST RECENT DIASTOLIC BLOOD PRESSURE < 80 MM HG: ICD-10-PCS | Mod: CPTII,S$GLB,, | Performed by: NURSE PRACTITIONER

## 2022-04-29 NOTE — PROGRESS NOTES
LINKS immunization registry updated  Care Everywhere updated  Health Maintenance updated  Chart reviewed for overdue Proactive [facility] ([facility]) health maintenance testing (CRS, Breast Ca, Diabetic Eye Exam)   Orders entered:N/A
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
## 2022-04-29 NOTE — PROGRESS NOTES
[facility]           Breast Surgery Surveillance    2022    DIAGNOSIS:   This is a 65 y.o. female with a stage pT1c N0 M0 grade 3 ER + MN + HER2 - Invasive Ductal Carcinoma of the left breast.     TREATMENT:   1. left partial mastectomy and sentinel node biopsy on 2020. Isabel Moulton M.D. Surgical Oncology  2. Chemotherapy, Taxotere/Cytoxan x4, from 2020  To 2021. Giovany Mcduffie M.D. Medical Oncology   3. Radiation therapy completed on 2021. Sai Conteh Jr, M.D. Radiation Oncology   4. Adjuvant endocrine therapy (Letrozole) started on 2021 with ARBEN Mcduffie MD Medical Oncology.     HISTORY OF PRESENT ILLNESS:   Sunitha Pillai is a 66 y.o. female presents today for 1 week follow up. She was seen by me in clinic last week for pain and new lump in the left breast. Dr. Doran found a new area in the left breast during exam and referred to us for evaluation. Imaging and US done today which showed skin thickening with no fluid collection. She denies nipple changes or nipple discharge. Patient does complain of chills but unsure if she has a fever. On exam there was moderate edema to the lower left breast with redness and warmth. Patient complained of TTP to then inner quadrant. She was started on Clindamycin x10 days and presents today for 1 week follow up. She states the redness and swelling has decreased. Pain to the inner quadrant has improved as well. She denies fever, chest pain, or shortness of breath.      INTERVAL HISTORY:   Patient presents for left breast pain and swelling. Patient reports she went to PT appointment since last visit with me but was not compliant with recommendations. Patient reports since then she has not had any improvement in symptoms. She was seen by her PCP yesterday and was started on Bactrim for redness and swelling of the left breast concerning for mastitis.     IMAGIN2021 Mammo Digital Diagnostic Left with Ap  US Breast Left  Limited     History:  Patient is 66 y.o. and is seen for diagnostic imaging. Patient underwent lumpectomy 9/17/20 for invasive ductal carcinoma followed by radiation. Now with palpable lump noted by the ordering provider and diffuse left breast pain.  The ordering provider also felt there were also concerning skin changes.     Films Compared:  Compared to: 08/03/2021 Mammo Digital Screening Bilat w/ Ap, 08/13/2020 US Breast Left Limited, 08/13/2020 Mammo Digital Diagnostic Left w/ Ap, 07/30/2020 Mammo Digital Screening Bilat w/ Ap, and 05/14/2019 Mammo Digital Screening Bilat w/ Ap     Findings:  This procedure was performed using tomosynthesis. Computer-aided detection was utilized in the interpretation of this examination.     Left     Mammo Digital Diagnostic Left with Ap     BBs were placed in the region of the palpable abnormality.  The left breast has scattered areas of fibroglandular density. There are post-surgical findings from a previous lumpectomy. There is diffuse skin thickening similar to prior and likely related to post-radiation change. There has been no interval development of suspicious masses or calcifications.     US Breast Left Limited     There is no evidence of suspicious masses in the left breast at 3:00 a.m. 8 cm from the nipple in the area of palpable abnormality. There is skin thickening noted.     Impression:  There is no mammographic or sonographic evidence of malignancy in the left breast.  No suspicious finding in the left breast in the region of the palpable abnormality.  Diffuse skin thickening is present, not substantially changed from prior imaging and likely related to prior radiation changes.  Clinical follow-up for the skin changes is recommended.     BI-RADS Category:   Overall: 2 - Benign    8/3/2021 Mammo Digital Screening Bilat w/ Ap     History:  Patient is 65 y.o. and is seen for a screening mammogram.      Films Compared:  Prior images (if available) were  "compared.     Findings:  This procedure was performed using tomosynthesis. Computer-aided detection was utilized in the interpretation of this examination.  The breasts have scattered areas of fibroglandular density.      Left  There are post-surgical findings from a previous lumpectomy with radiation seen in the left breast. There has been no interval development of a suspicious mass, microcalcification, or architectural distortion.      Right  There is no evidence of suspicious masses, calcifications, or other abnormal findings in the right breast.     Impression:  Bilateral  There is no mammographic evidence of malignancy.     BI-RADS Category:   Overall: 2 - Benign     MEDICATIONS/ALLERGIES:     Review of patient's allergies indicates:  No Known Allergies    PHYSICAL EXAM:   BP (!) 143/70 (BP Location: Right arm, Patient Position: Sitting, BP Method: Small (Automatic))   Pulse 82   Ht 5' 4" (1.626 m)   Wt 78 kg (172 lb)   BMI 29.52 kg/m²   General: The patient appears well and is in no acute distress.   Neuro: Alert & oriented x3. HEENT: PERRLA, EOMI, sclerae nonicteric. Mucous membranes moist.   Cardiovascular: RRR, no g/r/m.  Breasts: The exam was done with the patient seated and supine. Left breast with moderate edema to the lower breast. Mild erythema and warmth to the upper quadrant. TTP to the upper and outer quadrant.  No supraclavicular or axillary lymphadenopathy on the left side.   Right breast - within normal limits. No palpable masses and no abnormal skin or nipple findings. No supraclavicular or axillary lymphadenopathy on the right side.  Pulmonary: clear to auscultation bilaterally   Abdomen: soft, nontender, nondistended. No masses.    Extremities: No clubbing or cyanosis. Bilateral full arm range of motion  without  lymphedema.     ASSESSMENT:   This is a 66 y.o. female with new breast edema of the left breast.     PLAN:   1. The skin changes seem to be related to radiation and lymphatic " disruption versus infection. Can continue Bactrim and start PT. We discussed need to be consistent with PT and their recommendations for overall improvement. I again discussed that this will most likely be the breast treatment for lymphatic disruption. Also reiterated importance elevation of the breast and wearing a good supportive bra.   2. Follow up with me in 1 month - if no improvement or worsening symptoms with above recommendations can consider repeat imaging versus punch biopsy out of abundance of caution.   3. Continue to follow up with Dr. Mcduffie and Hem Onc team   4. Continue monthly self breast exams and call the clinic sooner with any changes or problems.  5. Bilateral Screening Mammogram due August 2022  6. Return to clinic for clinical breast exam and follow up following mammogram     The patient is in agreement with the plan. Questions were encouraged and answered to patient's satisfaction. Sunitha will call our office with any questions or concerns.

## 2022-04-30 LAB
BACTERIA UR CULT: NORMAL
BACTERIA UR CULT: NORMAL

## 2022-05-04 ENCOUNTER — APPOINTMENT (OUTPATIENT)
Dept: RADIOLOGY | Facility: OTHER | Age: 67
End: 2022-05-04
Attending: INTERNAL MEDICINE
Payer: MEDICARE

## 2022-05-04 DIAGNOSIS — M19.049 HAND ARTHRITIS: ICD-10-CM

## 2022-05-04 PROCEDURE — 73120 XR HAND 2 VIEW BILAT: ICD-10-PCS | Mod: 26,50,, | Performed by: RADIOLOGY

## 2022-05-04 PROCEDURE — 73120 X-RAY EXAM OF HAND: CPT | Mod: TC,50

## 2022-05-04 PROCEDURE — 73120 X-RAY EXAM OF HAND: CPT | Mod: 26,50,, | Performed by: RADIOLOGY

## 2022-05-30 ENCOUNTER — PATIENT MESSAGE (OUTPATIENT)
Dept: ADMINISTRATIVE | Facility: HOSPITAL | Age: 67
End: 2022-05-30
Payer: MEDICARE

## 2022-05-30 ENCOUNTER — OFFICE VISIT (OUTPATIENT)
Dept: HEMATOLOGY/ONCOLOGY | Facility: CLINIC | Age: 67
End: 2022-05-30
Payer: MEDICARE

## 2022-05-30 VITALS
TEMPERATURE: 98 F | DIASTOLIC BLOOD PRESSURE: 71 MMHG | SYSTOLIC BLOOD PRESSURE: 142 MMHG | RESPIRATION RATE: 18 BRPM | HEART RATE: 83 BPM | OXYGEN SATURATION: 100 % | HEIGHT: 64 IN | WEIGHT: 176.56 LBS | BODY MASS INDEX: 30.14 KG/M2

## 2022-05-30 DIAGNOSIS — C50.312 MALIGNANT NEOPLASM OF LOWER-INNER QUADRANT OF LEFT BREAST IN FEMALE, ESTROGEN RECEPTOR POSITIVE: Primary | ICD-10-CM

## 2022-05-30 DIAGNOSIS — G62.0 PERIPHERAL NEUROPATHY DUE TO CHEMOTHERAPY: ICD-10-CM

## 2022-05-30 DIAGNOSIS — G62.0 CHEMOTHERAPY-INDUCED PERIPHERAL NEUROPATHY: ICD-10-CM

## 2022-05-30 DIAGNOSIS — T45.1X5A CHEMOTHERAPY-INDUCED PERIPHERAL NEUROPATHY: ICD-10-CM

## 2022-05-30 DIAGNOSIS — Z17.0 MALIGNANT NEOPLASM OF LOWER-INNER QUADRANT OF LEFT BREAST IN FEMALE, ESTROGEN RECEPTOR POSITIVE: Primary | ICD-10-CM

## 2022-05-30 DIAGNOSIS — T45.1X5A PERIPHERAL NEUROPATHY DUE TO CHEMOTHERAPY: ICD-10-CM

## 2022-05-30 PROCEDURE — 1101F PR PT FALLS ASSESS DOC 0-1 FALLS W/OUT INJ PAST YR: ICD-10-PCS | Mod: CPTII,S$GLB,, | Performed by: INTERNAL MEDICINE

## 2022-05-30 PROCEDURE — 3288F PR FALLS RISK ASSESSMENT DOCUMENTED: ICD-10-PCS | Mod: CPTII,S$GLB,, | Performed by: INTERNAL MEDICINE

## 2022-05-30 PROCEDURE — 3077F SYST BP >= 140 MM HG: CPT | Mod: CPTII,S$GLB,, | Performed by: INTERNAL MEDICINE

## 2022-05-30 PROCEDURE — 3044F HG A1C LEVEL LT 7.0%: CPT | Mod: CPTII,S$GLB,, | Performed by: INTERNAL MEDICINE

## 2022-05-30 PROCEDURE — 1126F PR PAIN SEVERITY QUANTIFIED, NO PAIN PRESENT: ICD-10-PCS | Mod: CPTII,S$GLB,, | Performed by: INTERNAL MEDICINE

## 2022-05-30 PROCEDURE — 99999 PR PBB SHADOW E&M-EST. PATIENT-LVL IV: ICD-10-PCS | Mod: PBBFAC,,, | Performed by: INTERNAL MEDICINE

## 2022-05-30 PROCEDURE — 1126F AMNT PAIN NOTED NONE PRSNT: CPT | Mod: CPTII,S$GLB,, | Performed by: INTERNAL MEDICINE

## 2022-05-30 PROCEDURE — 1159F PR MEDICATION LIST DOCUMENTED IN MEDICAL RECORD: ICD-10-PCS | Mod: CPTII,S$GLB,, | Performed by: INTERNAL MEDICINE

## 2022-05-30 PROCEDURE — 1160F RVW MEDS BY RX/DR IN RCRD: CPT | Mod: CPTII,S$GLB,, | Performed by: INTERNAL MEDICINE

## 2022-05-30 PROCEDURE — 1160F PR REVIEW ALL MEDS BY PRESCRIBER/CLIN PHARMACIST DOCUMENTED: ICD-10-PCS | Mod: CPTII,S$GLB,, | Performed by: INTERNAL MEDICINE

## 2022-05-30 PROCEDURE — 3288F FALL RISK ASSESSMENT DOCD: CPT | Mod: CPTII,S$GLB,, | Performed by: INTERNAL MEDICINE

## 2022-05-30 PROCEDURE — 3078F DIAST BP <80 MM HG: CPT | Mod: CPTII,S$GLB,, | Performed by: INTERNAL MEDICINE

## 2022-05-30 PROCEDURE — 3077F PR MOST RECENT SYSTOLIC BLOOD PRESSURE >= 140 MM HG: ICD-10-PCS | Mod: CPTII,S$GLB,, | Performed by: INTERNAL MEDICINE

## 2022-05-30 PROCEDURE — 3044F PR MOST RECENT HEMOGLOBIN A1C LEVEL <7.0%: ICD-10-PCS | Mod: CPTII,S$GLB,, | Performed by: INTERNAL MEDICINE

## 2022-05-30 PROCEDURE — 99214 PR OFFICE/OUTPT VISIT, EST, LEVL IV, 30-39 MIN: ICD-10-PCS | Mod: S$GLB,,, | Performed by: INTERNAL MEDICINE

## 2022-05-30 PROCEDURE — 99214 OFFICE O/P EST MOD 30 MIN: CPT | Mod: S$GLB,,, | Performed by: INTERNAL MEDICINE

## 2022-05-30 PROCEDURE — 4010F PR ACE/ARB THEARPY RXD/TAKEN: ICD-10-PCS | Mod: CPTII,S$GLB,, | Performed by: INTERNAL MEDICINE

## 2022-05-30 PROCEDURE — 3078F PR MOST RECENT DIASTOLIC BLOOD PRESSURE < 80 MM HG: ICD-10-PCS | Mod: CPTII,S$GLB,, | Performed by: INTERNAL MEDICINE

## 2022-05-30 PROCEDURE — 1101F PT FALLS ASSESS-DOCD LE1/YR: CPT | Mod: CPTII,S$GLB,, | Performed by: INTERNAL MEDICINE

## 2022-05-30 PROCEDURE — 1159F MED LIST DOCD IN RCRD: CPT | Mod: CPTII,S$GLB,, | Performed by: INTERNAL MEDICINE

## 2022-05-30 PROCEDURE — 3008F PR BODY MASS INDEX (BMI) DOCUMENTED: ICD-10-PCS | Mod: CPTII,S$GLB,, | Performed by: INTERNAL MEDICINE

## 2022-05-30 PROCEDURE — 99999 PR PBB SHADOW E&M-EST. PATIENT-LVL IV: CPT | Mod: PBBFAC,,, | Performed by: INTERNAL MEDICINE

## 2022-05-30 PROCEDURE — 4010F ACE/ARB THERAPY RXD/TAKEN: CPT | Mod: CPTII,S$GLB,, | Performed by: INTERNAL MEDICINE

## 2022-05-30 PROCEDURE — 3008F BODY MASS INDEX DOCD: CPT | Mod: CPTII,S$GLB,, | Performed by: INTERNAL MEDICINE

## 2022-05-30 RX ORDER — LETROZOLE 2.5 MG/1
2.5 TABLET, FILM COATED ORAL DAILY
Qty: 90 TABLET | Refills: 3 | Status: SHIPPED | OUTPATIENT
Start: 2022-05-30 | End: 2022-07-12 | Stop reason: SDUPTHER

## 2022-05-30 RX ORDER — PREGABALIN 25 MG/1
50 CAPSULE ORAL 2 TIMES DAILY
Qty: 120 CAPSULE | Refills: 2 | Status: SHIPPED | OUTPATIENT
Start: 2022-05-30 | End: 2023-02-03

## 2022-05-30 NOTE — PROGRESS NOTES
Subjective:      Patient ID: Sunitha Pillai is a 66 y.o. female.    Chief Complaint: No chief complaint on file.      HPI:  66-year-old female, patient of Dr. Moulton, who returns for follow-up of a diagnosis of left breast cancer.  She completed adjuvant chemotherapy with Taxotere and Cytoxan on January 24,2021.She is on adjuvant letrozole therapy.      Her major complaint is some tingling and discomfort in her hands and feet.  This has been present since her chemotherapy.  She also reports some pain in her left breast and occasional left shoulder pain.  She takes Tylenol for that without benefit.    She has no shortness of breath.  She does have some chronic constipation.    Current history: Screening mammogram on July 30th showed a focal asymmetry in the lower outer portion left breast.  Biopsy on August 25, 2020 showed high-grade infiltrating ductal carcinoma (histologic grade 3, nuclear grade 3, mitotic index 3).  Tumor was 95% ER positive in 95% FL positive, HER2 was 2+ but negative by fish.  Ki-67 was 95%.    On September 17, 2020 lumpectomy and sentinel lymph node biopsy were performed.  That pathology showed a 1.5 cm infiltrating carcinoma with a single negative sentinel lymph node.  Margins were negative.  Final pathological stage T1c N0 stage I A.      Oncotype risk score came back at 29-high risk.    She received adjuvant Taxotere and Cytoxan from 11/24/20 to 1/24/21 and completed 4 cycles.    Completed radiation 5/6/21.     Letrozole started in May 2021.    Review of Systems   Constitutional: Negative for activity change, appetite change, fever and unexpected weight change.   HENT: Negative.    Cardiovascular: Negative for chest pain.   Gastrointestinal: Positive for constipation.   Genitourinary: Negative.    Musculoskeletal: Positive for arthralgias.   Neurological: Positive for numbness.   Psychiatric/Behavioral: Negative.      Objective:   Physical Exam   Vitals reviewed.  Constitutional: She  is oriented to person, place, and time. She appears well-developed and well-nourished. No distress.   Cardiovascular: Normal rate and regular rhythm.    Pulmonary/Chest: Effort normal and breath sounds normal. No respiratory distress. She has no wheezes. She has no rales. Right breast exhibits no mass, no nipple discharge and no skin change. Left breast exhibits skin change and tenderness. Left breast exhibits no mass and no nipple discharge.       Abdominal: Soft. She exhibits no mass. There is no abdominal tenderness.   Musculoskeletal: No edema.   Lymphadenopathy:     She has no cervical adenopathy.   Neurological: She is alert and oriented to person, place, and time.   Skin: No rash noted.     Psychiatric: She has a normal mood and affect. Her behavior is normal. Thought content normal.     Assessment:       1. Malignant neoplasm of lower-inner quadrant of left breast in female, estrogen receptor positive      2. Chemotherapy-induced peripheral neuropathy  Plan:        Increase Lyrica to 50 mg BID for her peripheral neuropathy.    MiraLax for constipation.    Route Chart for Scheduling    Med Onc Chart Routing      Follow up with physician 3 months.   Follow up with [facility]    Labs    Imaging    Pharmacy appointment    Other referrals

## 2022-06-03 ENCOUNTER — OFFICE VISIT (OUTPATIENT)
Dept: SURGERY | Facility: CLINIC | Age: 67
End: 2022-06-03
Payer: MEDICARE

## 2022-06-03 ENCOUNTER — CLINICAL SUPPORT (OUTPATIENT)
Dept: REHABILITATION | Facility: HOSPITAL | Age: 67
End: 2022-06-03
Payer: MEDICARE

## 2022-06-03 VITALS
HEIGHT: 64 IN | HEART RATE: 84 BPM | SYSTOLIC BLOOD PRESSURE: 132 MMHG | DIASTOLIC BLOOD PRESSURE: 68 MMHG | BODY MASS INDEX: 29.71 KG/M2 | WEIGHT: 174 LBS

## 2022-06-03 DIAGNOSIS — Z85.3 PERSONAL HISTORY OF BREAST CANCER: Primary | ICD-10-CM

## 2022-06-03 DIAGNOSIS — R29.898 WEAKNESS OF LEFT UPPER EXTREMITY: ICD-10-CM

## 2022-06-03 DIAGNOSIS — R26.89 DECREASED FUNCTIONAL MOBILITY: ICD-10-CM

## 2022-06-03 DIAGNOSIS — M62.89 MUSCLE TIGHTNESS: ICD-10-CM

## 2022-06-03 DIAGNOSIS — R29.3 POOR POSTURE: ICD-10-CM

## 2022-06-03 DIAGNOSIS — N64.89 EDEMA OF BREAST: ICD-10-CM

## 2022-06-03 DIAGNOSIS — M25.612 DECREASED RANGE OF MOTION OF LEFT SHOULDER: Primary | ICD-10-CM

## 2022-06-03 PROCEDURE — 1126F PR PAIN SEVERITY QUANTIFIED, NO PAIN PRESENT: ICD-10-PCS | Mod: CPTII,S$GLB,, | Performed by: NURSE PRACTITIONER

## 2022-06-03 PROCEDURE — 3008F BODY MASS INDEX DOCD: CPT | Mod: CPTII,S$GLB,, | Performed by: NURSE PRACTITIONER

## 2022-06-03 PROCEDURE — 99999 PR PBB SHADOW E&M-EST. PATIENT-LVL III: CPT | Mod: PBBFAC,,, | Performed by: NURSE PRACTITIONER

## 2022-06-03 PROCEDURE — 1159F MED LIST DOCD IN RCRD: CPT | Mod: CPTII,S$GLB,, | Performed by: NURSE PRACTITIONER

## 2022-06-03 PROCEDURE — 3288F FALL RISK ASSESSMENT DOCD: CPT | Mod: CPTII,S$GLB,, | Performed by: NURSE PRACTITIONER

## 2022-06-03 PROCEDURE — 3044F PR MOST RECENT HEMOGLOBIN A1C LEVEL <7.0%: ICD-10-PCS | Mod: CPTII,S$GLB,, | Performed by: NURSE PRACTITIONER

## 2022-06-03 PROCEDURE — 4010F ACE/ARB THERAPY RXD/TAKEN: CPT | Mod: CPTII,S$GLB,, | Performed by: NURSE PRACTITIONER

## 2022-06-03 PROCEDURE — 99999 PR PBB SHADOW E&M-EST. PATIENT-LVL III: ICD-10-PCS | Mod: PBBFAC,,, | Performed by: NURSE PRACTITIONER

## 2022-06-03 PROCEDURE — 3288F PR FALLS RISK ASSESSMENT DOCUMENTED: ICD-10-PCS | Mod: CPTII,S$GLB,, | Performed by: NURSE PRACTITIONER

## 2022-06-03 PROCEDURE — 3078F PR MOST RECENT DIASTOLIC BLOOD PRESSURE < 80 MM HG: ICD-10-PCS | Mod: CPTII,S$GLB,, | Performed by: NURSE PRACTITIONER

## 2022-06-03 PROCEDURE — 97110 THERAPEUTIC EXERCISES: CPT

## 2022-06-03 PROCEDURE — 1126F AMNT PAIN NOTED NONE PRSNT: CPT | Mod: CPTII,S$GLB,, | Performed by: NURSE PRACTITIONER

## 2022-06-03 PROCEDURE — 1101F PT FALLS ASSESS-DOCD LE1/YR: CPT | Mod: CPTII,S$GLB,, | Performed by: NURSE PRACTITIONER

## 2022-06-03 PROCEDURE — 99212 OFFICE O/P EST SF 10 MIN: CPT | Mod: S$GLB,,, | Performed by: NURSE PRACTITIONER

## 2022-06-03 PROCEDURE — 4010F PR ACE/ARB THEARPY RXD/TAKEN: ICD-10-PCS | Mod: CPTII,S$GLB,, | Performed by: NURSE PRACTITIONER

## 2022-06-03 PROCEDURE — 1159F PR MEDICATION LIST DOCUMENTED IN MEDICAL RECORD: ICD-10-PCS | Mod: CPTII,S$GLB,, | Performed by: NURSE PRACTITIONER

## 2022-06-03 PROCEDURE — 97140 MANUAL THERAPY 1/> REGIONS: CPT

## 2022-06-03 PROCEDURE — 3008F PR BODY MASS INDEX (BMI) DOCUMENTED: ICD-10-PCS | Mod: CPTII,S$GLB,, | Performed by: NURSE PRACTITIONER

## 2022-06-03 PROCEDURE — 99212 PR OFFICE/OUTPT VISIT, EST, LEVL II, 10-19 MIN: ICD-10-PCS | Mod: S$GLB,,, | Performed by: NURSE PRACTITIONER

## 2022-06-03 PROCEDURE — 1101F PR PT FALLS ASSESS DOC 0-1 FALLS W/OUT INJ PAST YR: ICD-10-PCS | Mod: CPTII,S$GLB,, | Performed by: NURSE PRACTITIONER

## 2022-06-03 PROCEDURE — 3075F SYST BP GE 130 - 139MM HG: CPT | Mod: CPTII,S$GLB,, | Performed by: NURSE PRACTITIONER

## 2022-06-03 PROCEDURE — 3044F HG A1C LEVEL LT 7.0%: CPT | Mod: CPTII,S$GLB,, | Performed by: NURSE PRACTITIONER

## 2022-06-03 PROCEDURE — 3078F DIAST BP <80 MM HG: CPT | Mod: CPTII,S$GLB,, | Performed by: NURSE PRACTITIONER

## 2022-06-03 PROCEDURE — 3075F PR MOST RECENT SYSTOLIC BLOOD PRESS GE 130-139MM HG: ICD-10-PCS | Mod: CPTII,S$GLB,, | Performed by: NURSE PRACTITIONER

## 2022-06-03 NOTE — PROGRESS NOTES
[facility]           Breast Surgery Surveillance    6/3/2022    DIAGNOSIS:   This is a 65 y.o. female with a stage pT1c N0 M0 grade 3 ER + [place] + HER2 - Invasive Ductal Carcinoma of the left breast.     TREATMENT:   1. left partial mastectomy and sentinel node biopsy on 2020. Isabel Moulton M.D. Surgical Oncology  2. Chemotherapy, Taxotere/Cytoxan x4, from 2020  To 2021. Giovany Mcduffie M.D. Medical Oncology   3. Radiation therapy completed on 2021. aSi Conteh Jr, M.D. Radiation Oncology   4. Adjuvant endocrine therapy (Letrozole) started on 2021 with ARBEN Mcduffie MD Medical Oncology.     HISTORY OF PRESENT ILLNESS:   Sunitha Pillai is a 66 y.o. female presents today for 1 week follow up. She was seen by me in clinic last week for pain and new lump in the left breast. Dr. Doran found a new area in the left breast during exam and referred to us for evaluation. Imaging and US done today which showed skin thickening with no fluid collection. She denies nipple changes or nipple discharge. Patient does complain of chills but unsure if she has a fever. On exam there was moderate edema to the lower left breast with redness and warmth. Patient complained of TTP to then inner quadrant. She was started on Clindamycin x10 days and presents today for 1 week follow up. She states the redness and swelling has decreased. Pain to the inner quadrant has improved as well. She denies fever, chest pain, or shortness of breath.      INTERVAL HISTORY:   Patient presents for left breast pain and swelling. Patient reports she went to 1 PT appointment since last visit with me but was not compliant with recommendations. Patient reports since then she has not had any improvement in symptoms.     IMAGIN2021 Mammo Digital Diagnostic Left with Ap  US Breast Left Limited     History:  Patient is 66 y.o. and is seen for diagnostic imaging. Patient underwent lumpectomy 20 for invasive  ductal carcinoma followed by radiation. Now with palpable lump noted by the ordering provider and diffuse left breast pain.  The ordering provider also felt there were also concerning skin changes.     Films Compared:  Compared to: 08/03/2021 Mammo Digital Screening Bilat w/ Ap, 08/13/2020 US Breast Left Limited, 08/13/2020 Mammo Digital Diagnostic Left w/ Ap, 07/30/2020 Mammo Digital Screening Bilat w/ Ap, and 05/14/2019 Mammo Digital Screening Bilat w/ Ap     Findings:  This procedure was performed using tomosynthesis. Computer-aided detection was utilized in the interpretation of this examination.     Left     Mammo Digital Diagnostic Left with Ap     BBs were placed in the region of the palpable abnormality.  The left breast has scattered areas of fibroglandular density. There are post-surgical findings from a previous lumpectomy. There is diffuse skin thickening similar to prior and likely related to post-radiation change. There has been no interval development of suspicious masses or calcifications.     US Breast Left Limited     There is no evidence of suspicious masses in the left breast at 3:00 a.m. 8 cm from the nipple in the area of palpable abnormality. There is skin thickening noted.     Impression:  There is no mammographic or sonographic evidence of malignancy in the left breast.  No suspicious finding in the left breast in the region of the palpable abnormality.  Diffuse skin thickening is present, not substantially changed from prior imaging and likely related to prior radiation changes.  Clinical follow-up for the skin changes is recommended.     BI-RADS Category:   Overall: 2 - Benign    8/3/2021 Mammo Digital Screening Bilat w/ Ap     History:  Patient is 65 y.o. and is seen for a screening mammogram.      Films Compared:  Prior images (if available) were compared.     Findings:  This procedure was performed using tomosynthesis. Computer-aided detection was utilized in the  "interpretation of this examination.  The breasts have scattered areas of fibroglandular density.      Left  There are post-surgical findings from a previous lumpectomy with radiation seen in the left breast. There has been no interval development of a suspicious mass, microcalcification, or architectural distortion.      Right  There is no evidence of suspicious masses, calcifications, or other abnormal findings in the right breast.     Impression:  Bilateral  There is no mammographic evidence of malignancy.     BI-RADS Category:   Overall: 2 - Benign     MEDICATIONS/ALLERGIES:     Review of patient's allergies indicates:  No Known Allergies    PHYSICAL EXAM:   /68 (BP Location: Right arm, Patient Position: Sitting, BP Method: Small (Automatic))   Pulse 84   Ht 5' 4" (1.626 m)   Wt 78.9 kg (174 lb)   BMI 29.87 kg/m²   General: The patient appears well and is in no acute distress.   Neuro: Alert & oriented x3. HEENT: PERRLA, EOMI, sclerae nonicteric. Mucous membranes moist.   Cardiovascular: RRR, no g/r/m.  Breasts: The exam was done with the patient seated and supine. Left breast with moderate edema to the lower breast. Mild erythema to the upper quadrant. TTP to the upper and outer quadrant.  No supraclavicular or axillary lymphadenopathy on the left side.   Right breast - within normal limits. No palpable masses and no abnormal skin or nipple findings. No supraclavicular or axillary lymphadenopathy on the right side.  Pulmonary: clear to auscultation bilaterally   Abdomen: soft, nontender, nondistended. No masses.    Extremities: No clubbing or cyanosis. Bilateral full arm range of motion  without  lymphedema.     ASSESSMENT:   This is a 66 y.o. female with new breast edema of the left breast.     PLAN:   1. The skin changes seem to be related to radiation and lymphatic disruption versus infection. We discussed need to be consistent with PT and their recommendations for overall improvement. I again " discussed that this will most likely be the breast treatment for lymphatic disruption. Also reiterated importance elevation of the breast and wearing a good supportive bra. However due to no overall improvement since December, will proceed with punch biopsy out of abundance of caution.   2. Continue to follow up with Dr. Mcduffie and Hem Onc team   3. Continue monthly self breast exams and call the clinic sooner with any changes or problems.  4. Bilateral Screening Mammogram due August 2022  5. Patient wishes to return next week for punch biopsy so daughter can be present.     The patient is in agreement with the plan. Questions were encouraged and answered to patient's satisfaction. Sunitha will call our office with any questions or concerns.

## 2022-06-03 NOTE — PATIENT INSTRUCTIONS
ELASTIC BAND EXTENSION BILATERAL SHOULDER    While holding an elastic band with both arms in front of you with your elbows straight, pull the band downwards and back towards your side.  Reps 10 per set. Do 3 sets per session. Do 1 session per day        Theraband shoulder external rotation    Hold the band out to the front with both hands, elbows at your sides and bent 90 degrees.     Stretch the band apart as far as you can without pain. Keep the elbows in contact with your ribs. Squeeze the shoulder blades together.   Then return to start position.  Do 10 reps per set. Do 3 sets per session. Do 2 sessions per day        Shoulder Internal Rotation Resistance Band Exercise     Stand so that body is in line with door with the affected side of body closest to door. Grab one side of resistance band with affected side hand and bend elbow to create a 90 degree angle with arm at your side. Slowly rotate forearm inward towards navel, remembering to keep elbow close to the side of your body as much as possible. Once forearm has gone through this range of motion, slowly bring forearm back to starting position with elbow bent at your side.  Do 10 reps per set. Do 3 sets per session. Do 2 sessions per day.        ELASTIC BAND ROWS     Holding elastic band with both hands, draw back the band as you bend your elbows. Keep your elbows near the side of your body. Do 10 reps per set. Do 3 sets per session. Do 2 session per day.          ELASTIC BAND BILATERAL HORIZONTAL ABDUCTION    Start by holding an elastic band in front of your chest with your elbows straight. Then, pull your arms apart and towards the side. Return to starting position and repeat.   Do 10 reps per set. Do 3 sets per session. Do 1 session per day.         Shoulder External Rotation 90 Degrees Bilateral     Lay on your back and grab the wand with both hand with palms facing forward. Raise both elbows to shoulder height and adjust hand to maintain a 90 degree  elbow bend. Next, without sliding the elbow, reach hand down and back towards table or surface.  Do 10 reps per set. Do 2 sets per session. Do 1 session per day.

## 2022-06-03 NOTE — PROGRESS NOTES
Physical Therapy Daily Treatment Note       Date: 6/3/2022   Name: Sunitha Pillai  Clinic Number: [id number]    Therapy Diagnosis:   Encounter Diagnoses   Name Primary?    Decreased range of motion of left shoulder Yes    Weakness of left upper extremity     Poor posture     Decreased functional mobility     Muscle tightness      Physician: Aishwarya Casillas, NP    Physician Orders: PT Eval and Treat   Medical Diagnosis: Z85.3 (ICD-10-CM) - Personal history of breast cancer  Evaluation Date: 4/29/2022  Authorization Period Expiration: 12/31/2022  Plan of Care Certification Period: 6/29/2022  Visit # / Visits authorized: 1/ 20(plus eval)  Insurance: [employer] MANAGED MEDICARE    Time In: 9:05 am  Time Out: 10:00 am  Total Billable Time: 55 minutes    Precautions: Standard and cancer      Subjective     Pt reports: no pain today, but she is very stressed she just received a call about something showing up in her breast and her grandson also just called saying he had a dream in which she told him her cancer is back.    She was compliant with home exercise program.  Response to previous treatment: no adverse events  Pain Scale: Sunitha rates pain on a scale of 0/10 on VAS.   Pain location: breast/shoulder left    Fatigue: none  Functional change: able to reach across her body with no difficulty  Treatment: Injection for sentinel node identification (Left, 9/17/2020); [place] lymph node biopsy (Left, 9/17/2020); Insertion of tunneled central venous catheter (CVC) with subcutaneous port (N/A, 11/18/2020); Laparoscopic cholecystectomy (N/A, 3/6/2021); and Mastectomy, partial (Left, 9/17/2020).     Chemotherapy: Chemotherapy, Taxotere/Cytoxan x4, from 11/24/2020  To 1/24/2021  Radiation: completed on 5/6/2021  Endocrine therapy: (Letrozole) started on 6/2/2021  Surgery date: 9/17/20      Objective     Objective Measures updated at progress report unless specified.    Shoulder Range of Motion:   Active /Passive ROM Right Left   Flexion 150 150   Abduction 150 155   Extension 75 60   IR/90deg 90 90   ER/90deg 80 60       Treatment     Sunitha received individual therapeutic exercises to improve postural correction and alignment, stretching and soft tissue mobility, and strengthening for 47 minutes including the following:   Pulleys flex/scap 2'/2'  Wand ER at 90 1 x 10  Open book L side 1 x 10  Wall walks flexion 1 x 10  Wall walks abd 1 x 10  LTR with hands behind head 1 x 10  Rows, green band, 2 x 10  Horizontal abduction, red band, 1 x 10  B shoulder ER, red band, 1 x 10  Shoulder ext, green band, 1 x 10  Punches, green band, 1 x 10 L UE  Shoulder IR, green band, 1 x 10    Sunitha received the following manual therapy techniques were performed to increased myofascial/soft tissue length, mobility and pliability, increase PROM, AROM and function as well as to decrease pain for 8 minutes  - pec bending  - STM to L pec, biceps, triceps  - Grade 1 & 2 mobs L shoulder    Home Exercise Program and Patient Education   Education provided re:  - role of PT in multi - disciplinary team, goals for PT  - progress towards goals   - compliance with HEP.    Written Home Exercises Provided: yes.  Exercises were reviewed and Sunitha was able to demonstrate them prior to the end of the session.  Sunitha demonstrated good  understanding of the education provided.     See EMR under Patient Instructions for exercises provided 6/3/2022.    Pt has no cultural, educational or language barriers to learning provided.    Assessment     Patient is responding well to physical therapy. She was able to perform all of today's new exercises with no increase in symptoms prior to leaving the clinic. She was able to demonstrate an increase in L shoulder flexion of 50 degrees, and abduction of 45 degrees. She required occasional cues for correct technique with her exercises. Will progress as tolerated.     Pt  prognosis is Good. Pt will continue to benefit from skilled outpatient physical therapy to address the deficits listed in the problem list chart on initial evaluation, provide pt/family education and to maximize pt's level of independence in the home and community environment.     Anticipated barriers to physical therapy: none anticipated    Goals as follows:  Short Term goals: 4 weeks  1. Patient will demonstrate 100% understanding of lymphedema risk reduction practices to include self monitoring for lymphedema. (progressing, not met)  2. Patient will demonstrate independence with Home Exercise program established. (progressing, not met)  3. Pt will increase AROM/PROM in shoulder abduction ROM to 130 degrees on left to improve functional reach, carry, push, pull pain free. (progressing, not met)  4. Pt will increase AROM/PROM in shoulder flexion to 130 degrees on left to improve functional reach, carry, push, pull pain free.(progressing, not met)        Long Term Goals: 8 weeks   1.  Pt will increase AROM/PROM in shoulder flexion to 160 degrees on left to improve functional reach, carry, push, pull pain free. (progressing, not met)  2. Pt will increase strength to 4+/5 in gross UE musculature to improve tolerance to all functional activities pain free. (progressing, not met)  3. Pt will demonstrate full/maximized tissue mobility to increase ROM and promote healthy tissue to be pain free at discharge. (progressing, not met)  4. Pt will report decrease in overall worst pain to 2/10 at discharge. (progressing, not met)  5. Pt will increase AROM/PROM in shoulder abduction ROM to 160 degrees on left to improve functional reach, carry, push, pull pain free. (progressing, not met)  6. Patient will report compliance with walking program 5x week for 10 min each day to improve overall cardiovascular function and decrease cancer related fatigue at discharge. (progressing, not met)     Plan     Outpatient physical therapy 2x  week for 8 weeks to include the following:   Manual Therapy, Neuromuscular Re-ed, Patient Education, Self Care, Therapeutic Activities, Therapeutic Exercise and IASTM.     Plan of care Certification Period: 4/29/2022 to 6/29/2022.    Therapist: Tierra Reyes, PT  6/3/2022

## 2022-06-08 ENCOUNTER — OFFICE VISIT (OUTPATIENT)
Dept: SURGERY | Facility: CLINIC | Age: 67
End: 2022-06-08
Payer: MEDICARE

## 2022-06-08 VITALS
DIASTOLIC BLOOD PRESSURE: 69 MMHG | HEIGHT: 64 IN | SYSTOLIC BLOOD PRESSURE: 149 MMHG | BODY MASS INDEX: 29.71 KG/M2 | WEIGHT: 174 LBS | HEART RATE: 82 BPM

## 2022-06-08 DIAGNOSIS — Z85.3 PERSONAL HISTORY OF BREAST CANCER: ICD-10-CM

## 2022-06-08 DIAGNOSIS — N64.89 EDEMA OF BREAST: Primary | ICD-10-CM

## 2022-06-08 PROCEDURE — 1101F PR PT FALLS ASSESS DOC 0-1 FALLS W/OUT INJ PAST YR: ICD-10-PCS | Mod: CPTII,S$GLB,, | Performed by: NURSE PRACTITIONER

## 2022-06-08 PROCEDURE — 3078F PR MOST RECENT DIASTOLIC BLOOD PRESSURE < 80 MM HG: ICD-10-PCS | Mod: CPTII,S$GLB,, | Performed by: NURSE PRACTITIONER

## 2022-06-08 PROCEDURE — 3077F PR MOST RECENT SYSTOLIC BLOOD PRESSURE >= 140 MM HG: ICD-10-PCS | Mod: CPTII,S$GLB,, | Performed by: NURSE PRACTITIONER

## 2022-06-08 PROCEDURE — 3044F HG A1C LEVEL LT 7.0%: CPT | Mod: CPTII,S$GLB,, | Performed by: NURSE PRACTITIONER

## 2022-06-08 PROCEDURE — 99213 OFFICE O/P EST LOW 20 MIN: CPT | Mod: S$GLB,,, | Performed by: NURSE PRACTITIONER

## 2022-06-08 PROCEDURE — 4010F ACE/ARB THERAPY RXD/TAKEN: CPT | Mod: CPTII,S$GLB,, | Performed by: NURSE PRACTITIONER

## 2022-06-08 PROCEDURE — 88342 CHG IMMUNOCYTOCHEMISTRY: ICD-10-PCS | Mod: 26,,, | Performed by: PATHOLOGY

## 2022-06-08 PROCEDURE — 1126F AMNT PAIN NOTED NONE PRSNT: CPT | Mod: CPTII,S$GLB,, | Performed by: NURSE PRACTITIONER

## 2022-06-08 PROCEDURE — 88342 IMHCHEM/IMCYTCHM 1ST ANTB: CPT | Performed by: PATHOLOGY

## 2022-06-08 PROCEDURE — 3288F FALL RISK ASSESSMENT DOCD: CPT | Mod: CPTII,S$GLB,, | Performed by: NURSE PRACTITIONER

## 2022-06-08 PROCEDURE — 3008F BODY MASS INDEX DOCD: CPT | Mod: CPTII,S$GLB,, | Performed by: NURSE PRACTITIONER

## 2022-06-08 PROCEDURE — 99999 PR PBB SHADOW E&M-EST. PATIENT-LVL III: ICD-10-PCS | Mod: PBBFAC,,, | Performed by: NURSE PRACTITIONER

## 2022-06-08 PROCEDURE — 3077F SYST BP >= 140 MM HG: CPT | Mod: CPTII,S$GLB,, | Performed by: NURSE PRACTITIONER

## 2022-06-08 PROCEDURE — 1101F PT FALLS ASSESS-DOCD LE1/YR: CPT | Mod: CPTII,S$GLB,, | Performed by: NURSE PRACTITIONER

## 2022-06-08 PROCEDURE — 1159F PR MEDICATION LIST DOCUMENTED IN MEDICAL RECORD: ICD-10-PCS | Mod: CPTII,S$GLB,, | Performed by: NURSE PRACTITIONER

## 2022-06-08 PROCEDURE — 3078F DIAST BP <80 MM HG: CPT | Mod: CPTII,S$GLB,, | Performed by: NURSE PRACTITIONER

## 2022-06-08 PROCEDURE — 1159F MED LIST DOCD IN RCRD: CPT | Mod: CPTII,S$GLB,, | Performed by: NURSE PRACTITIONER

## 2022-06-08 PROCEDURE — 1126F PR PAIN SEVERITY QUANTIFIED, NO PAIN PRESENT: ICD-10-PCS | Mod: CPTII,S$GLB,, | Performed by: NURSE PRACTITIONER

## 2022-06-08 PROCEDURE — 3008F PR BODY MASS INDEX (BMI) DOCUMENTED: ICD-10-PCS | Mod: CPTII,S$GLB,, | Performed by: NURSE PRACTITIONER

## 2022-06-08 PROCEDURE — 88305 TISSUE EXAM BY PATHOLOGIST: CPT | Performed by: PATHOLOGY

## 2022-06-08 PROCEDURE — 88342 IMHCHEM/IMCYTCHM 1ST ANTB: CPT | Mod: 26,,, | Performed by: PATHOLOGY

## 2022-06-08 PROCEDURE — 99999 PR PBB SHADOW E&M-EST. PATIENT-LVL III: CPT | Mod: PBBFAC,,, | Performed by: NURSE PRACTITIONER

## 2022-06-08 PROCEDURE — 3288F PR FALLS RISK ASSESSMENT DOCUMENTED: ICD-10-PCS | Mod: CPTII,S$GLB,, | Performed by: NURSE PRACTITIONER

## 2022-06-08 PROCEDURE — 88305 TISSUE EXAM BY PATHOLOGIST: ICD-10-PCS | Mod: 26,,, | Performed by: PATHOLOGY

## 2022-06-08 PROCEDURE — 88305 TISSUE EXAM BY PATHOLOGIST: CPT | Mod: 26,,, | Performed by: PATHOLOGY

## 2022-06-08 PROCEDURE — 99213 PR OFFICE/OUTPT VISIT, EST, LEVL III, 20-29 MIN: ICD-10-PCS | Mod: S$GLB,,, | Performed by: NURSE PRACTITIONER

## 2022-06-08 PROCEDURE — 4010F PR ACE/ARB THEARPY RXD/TAKEN: ICD-10-PCS | Mod: CPTII,S$GLB,, | Performed by: NURSE PRACTITIONER

## 2022-06-08 PROCEDURE — 3044F PR MOST RECENT HEMOGLOBIN A1C LEVEL <7.0%: ICD-10-PCS | Mod: CPTII,S$GLB,, | Performed by: NURSE PRACTITIONER

## 2022-06-08 NOTE — PROGRESS NOTES
[facility]           Breast Surgery Surveillance    2022    DIAGNOSIS:   This is a 65 y.o. female with a stage pT1c N0 M0 grade 3 ER + [place] + HER2 - Invasive Ductal Carcinoma of the left breast.     TREATMENT:   1. left partial mastectomy and sentinel node biopsy on 2020. Isabel Moulton M.D. Surgical Oncology  2. Chemotherapy, Taxotere/Cytoxan x4, from 2020  To 2021. Giovany Mcduffie M.D. Medical Oncology   3. Radiation therapy completed on 2021. Sai Conteh Jr, M.D. Radiation Oncology   4. Adjuvant endocrine therapy (Letrozole) started on 2021 with ARBEN Mcduffie MD Medical Oncology.     HISTORY OF PRESENT ILLNESS:   Sunitha Pillai is a 66 y.o. female presents today for 1 week follow up. She was seen by me in clinic last week for pain and new lump in the left breast. Dr. Doran found a new area in the left breast during exam and referred to us for evaluation. Imaging and US done today which showed skin thickening with no fluid collection. She denies nipple changes or nipple discharge. Patient does complain of chills but unsure if she has a fever. On exam there was moderate edema to the lower left breast with redness and warmth. Patient complained of TTP to then inner quadrant. She was started on Clindamycin x10 days and presents today for 1 week follow up. She states the redness and swelling has decreased. Pain to the inner quadrant has improved as well. She denies fever, chest pain, or shortness of breath.      INTERVAL HISTORY:   Patient presents for left breast pain and swelling. Patient reports she went to 1 PT appointment since last visit with me but was not compliant with recommendations. Patient reports since then she has not had any improvement in symptoms.     IMAGIN2021 Mammo Digital Diagnostic Left with Ap  US Breast Left Limited     History:  Patient is 66 y.o. and is seen for diagnostic imaging. Patient underwent lumpectomy 20 for invasive  ductal carcinoma followed by radiation. Now with palpable lump noted by the ordering provider and diffuse left breast pain.  The ordering provider also felt there were also concerning skin changes.     Films Compared:  Compared to: 08/03/2021 Mammo Digital Screening Bilat w/ Ap, 08/13/2020 US Breast Left Limited, 08/13/2020 Mammo Digital Diagnostic Left w/ Ap, 07/30/2020 Mammo Digital Screening Bilat w/ Ap, and 05/14/2019 Mammo Digital Screening Bilat w/ Ap     Findings:  This procedure was performed using tomosynthesis. Computer-aided detection was utilized in the interpretation of this examination.     Left     Mammo Digital Diagnostic Left with Ap     BBs were placed in the region of the palpable abnormality.  The left breast has scattered areas of fibroglandular density. There are post-surgical findings from a previous lumpectomy. There is diffuse skin thickening similar to prior and likely related to post-radiation change. There has been no interval development of suspicious masses or calcifications.     US Breast Left Limited     There is no evidence of suspicious masses in the left breast at 3:00 a.m. 8 cm from the nipple in the area of palpable abnormality. There is skin thickening noted.     Impression:  There is no mammographic or sonographic evidence of malignancy in the left breast.  No suspicious finding in the left breast in the region of the palpable abnormality.  Diffuse skin thickening is present, not substantially changed from prior imaging and likely related to prior radiation changes.  Clinical follow-up for the skin changes is recommended.     BI-RADS Category:   Overall: 2 - Benign    8/3/2021 Mammo Digital Screening Bilat w/ Ap     History:  Patient is 65 y.o. and is seen for a screening mammogram.      Films Compared:  Prior images (if available) were compared.     Findings:  This procedure was performed using tomosynthesis. Computer-aided detection was utilized in the  "interpretation of this examination.  The breasts have scattered areas of fibroglandular density.      Left  There are post-surgical findings from a previous lumpectomy with radiation seen in the left breast. There has been no interval development of a suspicious mass, microcalcification, or architectural distortion.      Right  There is no evidence of suspicious masses, calcifications, or other abnormal findings in the right breast.     Impression:  Bilateral  There is no mammographic evidence of malignancy.     BI-RADS Category:   Overall: 2 - Benign     MEDICATIONS/ALLERGIES:     Review of patient's allergies indicates:  No Known Allergies    PHYSICAL EXAM:   BP (!) 149/69 (BP Location: Right arm, Patient Position: Sitting, BP Method: Medium (Automatic))   Pulse 82   Ht 5' 4" (1.626 m)   Wt 78.9 kg (174 lb)   BMI 29.87 kg/m²   General: The patient appears well and is in no acute distress.   Neuro: Alert & oriented x3. HEENT: PERRLA, EOMI, sclerae nonicteric. Mucous membranes moist.   Cardiovascular: RRR, no g/r/m.  Breasts: The exam was done with the patient seated and supine. Left breast with moderate edema to the lower breast. Mild erythema to the upper quadrant. TTP to the upper and outer quadrant.  No supraclavicular or axillary lymphadenopathy on the left side.   Right breast - within normal limits. No palpable masses and no abnormal skin or nipple findings. No supraclavicular or axillary lymphadenopathy on the right side.  Pulmonary: clear to auscultation bilaterally   Abdomen: soft, nontender, nondistended. No masses.    Extremities: No clubbing or cyanosis. Bilateral full arm range of motion  without  lymphedema.       Punch Biopsy Procedure Note    Pre-operative Diagnosis: left breast skin thickening    Post-operative Diagnosis: same    Location: left breast    Anesthesia: 1% plain lidocaine    Procedure Details   The Procedure, risks and complications have been discussed in detail (including, but not " limited to pain, infection, bleeding) with the patient, and the patient has signed consent to have the surgery completed.    The skin was sterilely prepped and draped over the affected area in the usual fashion.  Local anesthetic was injected in the affected area.  Next, using a 4 mm punch, a small skin sample was obtained and sent to pathology for permanent sectioning.  A 3-0 nylon was used to approximate the skin edges.  Dressing was applied.  Patient tolerated well.     EBL: minimal    Condition:  Stable    Complications:  none.    ASSESSMENT:   This is a 66 y.o. female with new breast edema of the left breast.     PLAN:   1. The skin changes seem to be related to radiation and lymphatic disruption versus infection. We discussed need to be consistent with PT and their recommendations for overall improvement. I again discussed that this will most likely be the breast treatment for lymphatic disruption. Also reiterated importance elevation of the breast and wearing a good supportive bra. However due to no overall improvement since December, we proceeded with punch biopsy out of abundance of caution today. I will call patient once results become available for review.   2. Continue to follow up with Dr. Mcduffie and Hem Onc team   3. Continue monthly self breast exams and call the clinic sooner with any changes or problems.  4. Bilateral Screening Mammogram due August 2022      The patient is in agreement with the plan. Questions were encouraged and answered to patient's satisfaction. Sunitha will call our office with any questions or concerns.

## 2022-06-08 NOTE — LETTER
June 8, 2022      [facility]  [address]  [facility] [address]  Phone: [phone]  Fax: [phone]       Patient: Sunitha Pillai   YOB: 1955  Date of Visit: 06/08/2022    To Whom It May Concern:    Bj Pillai  was at [facility] on 06/08/2022. She may return to work/school on 6/10 with no restrictions. If you have any questions or concerns, or if I can be of further assistance, please do not hesitate to contact me.    Sincerely,    Aishwarya Casillas, NP

## 2022-06-12 NOTE — LETTER
April 20, 2020      Sunitha Pillai  [address] [facility] [address]             [facility]  [address]  [place] [address]  Phone: [phone]  Fax: [phone] Patient: Sunitha Pillai  MRN: [id number]  YOB: 1955  Date of Visit: 04/20/2020    To Whom It May Concern:    Sunitha Greenberg was seen in the Internal Medicine Clinic on 04/20/2020. She may return to work/school on April 23, 2020 with no restrictions. If you have any questions or concerns, or if I can be of further assistance, please do not hesitate to contact my office.    Sincerely,          Patty Louis MD  Section of Internal Medicine  [facility]     
None

## 2022-06-13 LAB
COMMENT: NORMAL
FINAL PATHOLOGIC DIAGNOSIS: NORMAL
GROSS: NORMAL
Lab: NORMAL

## 2022-06-17 ENCOUNTER — CLINICAL SUPPORT (OUTPATIENT)
Dept: REHABILITATION | Facility: HOSPITAL | Age: 67
End: 2022-06-17
Payer: MEDICARE

## 2022-06-17 DIAGNOSIS — R26.89 DECREASED FUNCTIONAL MOBILITY: ICD-10-CM

## 2022-06-17 DIAGNOSIS — R29.3 POOR POSTURE: ICD-10-CM

## 2022-06-17 DIAGNOSIS — M25.612 DECREASED RANGE OF MOTION OF LEFT SHOULDER: Primary | ICD-10-CM

## 2022-06-17 DIAGNOSIS — M62.89 MUSCLE TIGHTNESS: ICD-10-CM

## 2022-06-17 DIAGNOSIS — R29.898 WEAKNESS OF LEFT UPPER EXTREMITY: ICD-10-CM

## 2022-06-17 PROCEDURE — 97110 THERAPEUTIC EXERCISES: CPT

## 2022-06-17 PROCEDURE — 97140 MANUAL THERAPY 1/> REGIONS: CPT

## 2022-06-17 NOTE — PROGRESS NOTES
Physical Therapy Daily Treatment Note       Date: 6/17/2022   Name: Sunitha Pillai  Clinic Number: [id number]    Therapy Diagnosis:   Encounter Diagnoses   Name Primary?    Decreased range of motion of left shoulder Yes    Weakness of left upper extremity     Poor posture     Decreased functional mobility     Muscle tightness      Physician: Aishwarya Casillas, NP    Physician Orders: PT Eval and Treat   Medical Diagnosis: Z85.3 (ICD-10-CM) - Personal history of breast cancer  Evaluation Date: 4/29/2022  Authorization Period Expiration: 12/31/2022  Plan of Care Certification Period: 6/29/2022  Visit # / Visits authorized: 2/ 20(plus eval)  Insurance: [employer] MANAGED MEDICARE    Time In: 8:00 am  Time Out: 8:55 am  Total Billable Time: 55 minutes    Precautions: Standard and cancer      Subjective     Pt reports:  Doing ok, she had to have another test done and hasn't done her HEP because she had stitches in.   She was not compliant with home exercise program, due to recent procedure.  Response to previous treatment: felt good  Pain Scale: Sunitha rates pain on a scale of 0/10 on VAS.   Pain location: breast/shoulder left    Fatigue: none  Functional change: able to do more of her work duties with no difficulty  Treatment: Injection for sentinel node identification (Left, 9/17/2020); Star lymph node biopsy (Left, 9/17/2020); Insertion of tunneled central venous catheter (CVC) with subcutaneous port (N/A, 11/18/2020); Laparoscopic cholecystectomy (N/A, 3/6/2021); and Mastectomy, partial (Left, 9/17/2020).     Chemotherapy: Chemotherapy, Taxotere/Cytoxan x4, from 11/24/2020  To 1/24/2021  Radiation: completed on 5/6/2021  Endocrine therapy: (Letrozole) started on 6/2/2021  Surgery date: 9/17/20      Objective     Objective Measures updated at progress report unless specified.   Shoulder Range of Motion: 6/17/22  Active /Passive ROM Right Left   Flexion 155  "155   Abduction 160 160   Extension 75 65   IR/90deg 90 90   ER/90deg 80 60       Treatment     Sunitha received individual therapeutic exercises to improve postural correction and alignment, stretching and soft tissue mobility, and strengthening for 47 minutes including the following:   Pulleys flex/scap 2'/2'  Wand ER at 90 2 x 10   Shoulder extension with wand, 1 x 10  Open book L side 1 x 15  Wall walks flexion 1 x 15  Wall walks abd 1 x 15  LTR with hands behind head 1 x 15  Rows, green band, 2 x 10  Horizontal abduction, red band, 2 x 10  B shoulder ER, red band, 2 x 10  Shoulder ext, green band, 1 x 10  Punches, green band, 1 x 10 L UE  Shoulder IR, green band, 1 x 15  Shoulder ER stretch seated at table, 2 x 30"    Sunitha received the following manual therapy techniques were performed to increased myofascial/soft tissue length, mobility and pliability, increase PROM, AROM and function as well as to decrease pain for 8 minutes  - pec bending  - STM to L pec, biceps, triceps  - Grade 1 & 2 mobs L shoulder    Home Exercise Program and Patient Education   Education provided re:  - role of PT in multi - disciplinary team, goals for PT  - progress towards goals   - compliance with HEP.    Written Home Exercises Provided: yes.  Exercises were reviewed and Sunitha was able to demonstrate them prior to the end of the session.  Sunitha demonstrated good  understanding of the education provided.     See EMR under Patient Instructions for exercises provided 6/3/2022.    Pt has no cultural, educational or language barriers to learning provided.    Assessment     Patient is responding well to physical therapy. Patient was able to perform all of today's progressions and new exercises with no increase in symptoms prior to leaving the clinic. She required frequent rest breaks today during her session due to fatigue. She has met goals as noted below. Will progress as tolerated.     Pt prognosis is Good. Pt will continue " to benefit from skilled outpatient physical therapy to address the deficits listed in the problem list chart on initial evaluation, provide pt/family education and to maximize pt's level of independence in the home and community environment.     Anticipated barriers to physical therapy: none anticipated    Goals as follows:  Short Term goals: 4 weeks  1. Patient will demonstrate 100% understanding of lymphedema risk reduction practices to include self monitoring for lymphedema. (progressing, not met)  2. Patient will demonstrate independence with Home Exercise program established. (progressing, not met)  3. Pt will increase AROM/PROM in shoulder abduction ROM to 130 degrees on left to improve functional reach, carry, push, pull pain free. (exceeded, 6/17/22)  4. Pt will increase AROM/PROM in shoulder flexion to 130 degrees on left to improve functional reach, carry, push, pull pain free.(exceeded, 6/17/22)        Long Term Goals: 8 weeks   1.  Pt will increase AROM/PROM in shoulder flexion to 160 degrees on left to improve functional reach, carry, push, pull pain free. (progressing, not met)  2. Pt will increase strength to 4+/5 in gross UE musculature to improve tolerance to all functional activities pain free. (progressing, not met)  3. Pt will demonstrate full/maximized tissue mobility to increase ROM and promote healthy tissue to be pain free at discharge. (progressing, not met)  4. Pt will report decrease in overall worst pain to 2/10 at discharge. (progressing, not met)  5. Pt will increase AROM/PROM in shoulder abduction ROM to 160 degrees on left to improve functional reach, carry, push, pull pain free. (met, 6/17/22)  6. Patient will report compliance with walking program 5x week for 10 min each day to improve overall cardiovascular function and decrease cancer related fatigue at discharge. (progressing, not met)     Plan     Outpatient physical therapy 2x week for 8 weeks to include the following:   Manual  Therapy, Neuromuscular Re-ed, Patient Education, Self Care, Therapeutic Activities, Therapeutic Exercise and IASTM.     Plan of care Certification Period: 4/29/2022 to 6/29/2022.    Therapist: Tierra Reyes, PT  6/17/2022

## 2022-07-12 DIAGNOSIS — Z17.0 MALIGNANT NEOPLASM OF LOWER-INNER QUADRANT OF LEFT BREAST IN FEMALE, ESTROGEN RECEPTOR POSITIVE: ICD-10-CM

## 2022-07-12 DIAGNOSIS — C50.312 MALIGNANT NEOPLASM OF LOWER-INNER QUADRANT OF LEFT BREAST IN FEMALE, ESTROGEN RECEPTOR POSITIVE: ICD-10-CM

## 2022-07-12 RX ORDER — LETROZOLE 2.5 MG/1
2.5 TABLET, FILM COATED ORAL DAILY
Qty: 90 TABLET | Refills: 3 | Status: SHIPPED | OUTPATIENT
Start: 2022-07-12 | End: 2023-12-04

## 2022-07-12 NOTE — TELEPHONE ENCOUNTER
----- Message from Aishwarya Casillas NP sent at 6/13/2022  4:40 PM CDT -----  Regarding: letrozole refill  Hey,     I saw Ms. Helton today for suture removal. She wanted me to reach out to Dr. Mcduffie's office because she needs a refill for her Letrozole. Could y'all pass this along to Dr. Mcduffie please    Thanks,  Aishwarya

## 2022-07-15 ENCOUNTER — HOSPITAL ENCOUNTER (EMERGENCY)
Facility: HOSPITAL | Age: 67
Discharge: HOME | End: 2022-07-15
Attending: EMERGENCY MEDICINE
Payer: MEDICARE

## 2022-07-15 VITALS
OXYGEN SATURATION: 98 % | BODY MASS INDEX: 29.71 KG/M2 | SYSTOLIC BLOOD PRESSURE: 144 MMHG | WEIGHT: 174 LBS | HEART RATE: 91 BPM | DIASTOLIC BLOOD PRESSURE: 69 MMHG | RESPIRATION RATE: 18 BRPM | HEIGHT: 64 IN | TEMPERATURE: 99 F

## 2022-07-15 DIAGNOSIS — Z20.822 LAB TEST NEGATIVE FOR COVID-19 VIRUS: Primary | ICD-10-CM

## 2022-07-15 LAB
CTP QC/QA: YES
SARS-COV-2 RDRP RESP QL NAA+PROBE: NEGATIVE

## 2022-07-15 PROCEDURE — 99282 EMERGENCY DEPT VISIT SF MDM: CPT

## 2022-07-15 PROCEDURE — 99282 PR EMERGENCY DEPT VISIT,LEVEL II: ICD-10-PCS | Mod: CS,,, | Performed by: EMERGENCY MEDICINE

## 2022-07-15 PROCEDURE — 99282 EMERGENCY DEPT VISIT SF MDM: CPT | Mod: CS,,, | Performed by: EMERGENCY MEDICINE

## 2022-07-15 PROCEDURE — U0002 COVID-19 LAB TEST NON-CDC: HCPCS | Performed by: EMERGENCY MEDICINE

## 2022-07-15 NOTE — ED PROVIDER NOTES
Encounter Date: 7/15/2022    SCRIBE #1 NOTE: I, Sandra Gonzalez, am scribing for, and in the presence of,  Aarti Woods MD. I have scribed the following portions of the note - Other sections scribed: HPI, ROS, PE.       History     Chief Complaint   Patient presents with    COVID-19 Concerns     Runny nose, dry non-productive cough, and HA x2 days. Denies fevers, N/V/D, or myalgias.     Time patient was seen by the provider: 12:10 PM      The patient is a 66 y.o. female with PMHx including: HTN, GERD, DM type I, acute coronary syndrome, anxiety, cholelithiasis, breast cancer who presents to the ED with a complaint of COVID-19 symptoms with onset 3 days ago. Symptoms include rhinorrhea, dizziness, cough, congestion, myalgias, and constipation. She denies fever, dysuria, and diarrhea. Her COVID test was negative upon arrival to the ED.    The history is provided by the patient and medical records. No  was used.     Review of patient's allergies indicates:  No Known Allergies  Past Medical History:   Diagnosis Date    Acute coronary syndrome 2018    Adjustment disorder     Anxiety     Arthritis     Cancer of breast 2020    Malignant neoplasm of lower-inner quadrant of left breast in female, estrogen receptor positive    Cholelithiasis with acute cholecystitis 3/5/2021    Depression     Diabetes mellitus type I     Genetic testing of female 2020    [place] via [employer] with AXIN2 and POLE variants of uncertain significance    GERD (gastroesophageal reflux disease)     Hypertension     Pneumonia due to COVID-19 virus 2021    Unstable angina 2018    Vertigo 2019    Vitamin D deficiency 2021     Past Surgical History:   Procedure Laterality Date     SECTION      INJECTION FOR SENTINEL NODE IDENTIFICATION Left 2020    Procedure: INJECTION, FOR SENTINEL NODE IDENTIFICATION;  Surgeon: Isabel Moulton MD;  Location: [facility] OR;   Service: General;  Laterality: Left;    INSERTION OF TUNNELED CENTRAL VENOUS CATHETER (CVC) WITH SUBCUTANEOUS PORT N/A 11/18/2020    Procedure: INSERTION, PORT-A-CATH;  Surgeon: Hardeep Martin MD;  Location: [facility] CATH LAB;  Service: Radiology;  Laterality: N/A;    LAPAROSCOPIC CHOLECYSTECTOMY N/A 3/6/2021    Procedure: CHOLECYSTECTOMY, LAPAROSCOPIC;  Surgeon: Buster Son MD;  [facility];  Service: General;  Laterality: N/A;    MASTECTOMY, PARTIAL Left 9/17/2020    Procedure: MASTECTOMY, PARTIAL-w/reflector;  Surgeon: Isabel Moulton MD;  Location: [facility] OR;  Service: General;  Laterality: Left;    SENTINEL LYMPH NODE BIOPSY Left 9/17/2020    Procedure: BIOPSY, LYMPH NODE, SENTINEL;  Surgeon: Isabel Moulton MD;  Location: [facility] OR;  Service: General;  Laterality: Left;     Family History   Problem Relation Age of Onset    Hypertension Mother     Hyperlipidemia Mother     Diabetes Mother     Heart disease Mother     Colon polyps Mother     Aneurysm Father     Diabetes Sister     Hypertension Sister     Heart disease Brother     Diabetes Brother     Hypertension Brother     Cancer Brother         brother German with prostate cancer; brother Cooper with throat cancer    Cervical cancer Daughter     Anxiety disorder Daughter     Depression Daughter     Anxiety disorder Daughter     Depression Daughter     Breast cancer Other     Cancer Paternal Aunt         unknown origin    Breast cancer Paternal Aunt     Breast cancer Maternal Aunt     Cancer Maternal Aunt         unknown origin    Prostate cancer Maternal Cousin     Leukemia Maternal Cousin     Prostate cancer Maternal Cousin     Colon cancer Neg Hx     Ovarian cancer Neg Hx      Social History     Tobacco Use    Smoking status: Never Smoker    Smokeless tobacco: Never Used   Substance Use Topics    Alcohol use: Never    Drug use: No     Review of Systems   Constitutional: Negative for fever.   HENT:  Positive for congestion and rhinorrhea.    Respiratory: Positive for cough.    Cardiovascular: Negative for chest pain.   Gastrointestinal: Positive for constipation. Negative for diarrhea.   Genitourinary: Negative for dysuria.   Musculoskeletal: Positive for myalgias.   Skin: Negative for rash.   Neurological: Positive for dizziness.   Hematological: Does not bruise/bleed easily.       Physical Exam     Initial Vitals [07/15/22 1107]   BP Pulse Resp Temp SpO2   (!) 144/69 91 18 98.5 °F (36.9 °C) 98 %      MAP       --         Physical Exam    Nursing note and vitals reviewed.  Constitutional: She appears well-developed and well-nourished. She is not diaphoretic. No distress.   HENT:   Head: Normocephalic and atraumatic.   Eyes: EOM are normal.   Neck: Neck supple.   Cardiovascular: Normal rate and regular rhythm.   Pulmonary/Chest: No respiratory distress.   Musculoskeletal:         General: Normal range of motion.      Cervical back: Neck supple.     Neurological: She is alert and oriented to person, place, and time. GCS score is 15. GCS eye subscore is 4. GCS verbal subscore is 5. GCS motor subscore is 6.   Skin: Skin is warm and dry.   Psychiatric: She has a normal mood and affect. Her behavior is normal. Judgment and thought content normal.         ED Course   Procedures  Labs Reviewed   SARS-COV-2 RDRP GENE    Narrative:     This test utilizes isothermal nucleic acid amplification   technology to detect the SARS-CoV-2 RdRp nucleic acid segment.   The analytical sensitivity (limit of detection) is 125 genome   equivalents/mL.   A POSITIVE result implies infection with the SARS-CoV-2 virus;   the patient is presumed to be contagious.     A NEGATIVE result means that SARS-CoV-2 nucleic acids are not   present above the limit of detection. A NEGATIVE result should be   treated as presumptive. It does not rule out the possibility of   COVID-19 and should not be the sole basis for treatment decisions.   If COVID-19  is strongly suspected based on clinical and exposure   history, re-testing using an alternate molecular assay should be   considered.   This test is only for use under the Food and Drug   Administration s Emergency Use Authorization (EUA).   Commercial kits are provided by [employer].   Performance characteristics of the EUA have been independently   verified by [facility] Department of   Pathology and Laboratory Medicine.   _________________________________________________________________   The authorized Fact Sheet for Healthcare Providers and the authorized Fact   Sheet for Patients of the ID NOW COVID-19 are available on the FDA   website:     https://www.fda.gov/media/050522/download  https://www.fda.gov/media/905448/download                  Imaging Results    None          Medications - No data to display  Medical Decision Making:   History:   Old Medical Records: I decided to obtain old medical records.  Old Records Summarized: records from clinic visits and records from previous admission(s).  Differential Diagnosis:   Covid, flu, other viral syndrome, rhinovirus  Clinical Tests:   Lab Tests: Ordered and Reviewed  ED Management:  Pt presented w concern for covid and has a neg covid test  Likely rhinovirus or other respi virus  Patient reports she feels well enough to go home  She is well appearing, nontoxic no hypoxia  Discharged to home in stable condition, return to ED warnings given, follow up and patient care instructions given.            Scribe Attestation:   Scribe #1: I performed the above scribed service and the documentation accurately describes the services I performed. I attest to the accuracy of the note.                 Clinical Impression:   Final diagnoses:  [Z20.822] Lab test negative for COVID-19 virus (Primary)          ED Disposition Condition    Discharge Stable        ED Prescriptions     None        Follow-up Information     Follow up With Specialties Details Why  Contact Info    Patty Louis MD Internal Medicine   [address]  [place] [address]  [phone]             Aarti Woods MD  07/17/22 1140

## 2022-07-15 NOTE — ED NOTES
Patient identifiers verified and correct for Ms Pillai  C/C: COVID concerns SEE NN  APPEARANCE: awake and alert in NAD.  SKIN: warm, dry and intact. No breakdown or bruising.  MUSCULOSKELETAL: Patient moving all extremities spontaneously, no obvious swelling or deformities noted. Ambulates independently.  RESPIRATORY: Denies shortness of breath.Respirations unlabored. Reports runny nose and congestion   CARDIAC: Denies CP, 2+ distal pulses; no peripheral edema  ABDOMEN: S/ND/NT, Denies nausea  : voids spontaneously, denies difficulty  Neurologic: AAO x 4; follows commands equal strength in all extremities; denies numbness/tingling. Denies dizziness  Denies weakness

## 2022-07-15 NOTE — DISCHARGE INSTRUCTIONS
You were negative for COVID today but likely have another viral respiratory illness.   You can take over the counter medications for your symptoms like Coricidin HBP or Dayquil HBP.

## 2022-07-18 ENCOUNTER — OFFICE VISIT (OUTPATIENT)
Dept: PRIMARY CARE CLINIC | Facility: CLINIC | Age: 67
End: 2022-07-18
Payer: MEDICARE

## 2022-07-18 VITALS
HEIGHT: 64 IN | BODY MASS INDEX: 30.22 KG/M2 | WEIGHT: 177 LBS | TEMPERATURE: 98 F | RESPIRATION RATE: 18 BRPM | DIASTOLIC BLOOD PRESSURE: 76 MMHG | OXYGEN SATURATION: 99 % | HEART RATE: 100 BPM | SYSTOLIC BLOOD PRESSURE: 126 MMHG

## 2022-07-18 DIAGNOSIS — R05.9 COUGH: ICD-10-CM

## 2022-07-18 DIAGNOSIS — J06.9 UPPER RESPIRATORY TRACT INFECTION, UNSPECIFIED TYPE: Primary | ICD-10-CM

## 2022-07-18 LAB
CTP QC/QA: YES
SARS-COV-2 RDRP RESP QL NAA+PROBE: NEGATIVE

## 2022-07-18 PROCEDURE — 99999 PR PBB SHADOW E&M-EST. PATIENT-LVL V: ICD-10-PCS | Mod: PBBFAC,,, | Performed by: INTERNAL MEDICINE

## 2022-07-18 PROCEDURE — 3008F BODY MASS INDEX DOCD: CPT | Mod: CPTII,S$GLB,, | Performed by: INTERNAL MEDICINE

## 2022-07-18 PROCEDURE — 3078F DIAST BP <80 MM HG: CPT | Mod: CPTII,S$GLB,, | Performed by: INTERNAL MEDICINE

## 2022-07-18 PROCEDURE — 3288F PR FALLS RISK ASSESSMENT DOCUMENTED: ICD-10-PCS | Mod: CPTII,S$GLB,, | Performed by: INTERNAL MEDICINE

## 2022-07-18 PROCEDURE — 1125F AMNT PAIN NOTED PAIN PRSNT: CPT | Mod: CPTII,S$GLB,, | Performed by: INTERNAL MEDICINE

## 2022-07-18 PROCEDURE — 1125F PR PAIN SEVERITY QUANTIFIED, PAIN PRESENT: ICD-10-PCS | Mod: CPTII,S$GLB,, | Performed by: INTERNAL MEDICINE

## 2022-07-18 PROCEDURE — 1159F MED LIST DOCD IN RCRD: CPT | Mod: CPTII,S$GLB,, | Performed by: INTERNAL MEDICINE

## 2022-07-18 PROCEDURE — 3288F FALL RISK ASSESSMENT DOCD: CPT | Mod: CPTII,S$GLB,, | Performed by: INTERNAL MEDICINE

## 2022-07-18 PROCEDURE — 3044F HG A1C LEVEL LT 7.0%: CPT | Mod: CPTII,S$GLB,, | Performed by: INTERNAL MEDICINE

## 2022-07-18 PROCEDURE — U0002: ICD-10-PCS | Mod: QW,S$GLB,, | Performed by: INTERNAL MEDICINE

## 2022-07-18 PROCEDURE — 1101F PR PT FALLS ASSESS DOC 0-1 FALLS W/OUT INJ PAST YR: ICD-10-PCS | Mod: CPTII,S$GLB,, | Performed by: INTERNAL MEDICINE

## 2022-07-18 PROCEDURE — 99213 PR OFFICE/OUTPT VISIT, EST, LEVL III, 20-29 MIN: ICD-10-PCS | Mod: S$GLB,,, | Performed by: INTERNAL MEDICINE

## 2022-07-18 PROCEDURE — 3074F SYST BP LT 130 MM HG: CPT | Mod: CPTII,S$GLB,, | Performed by: INTERNAL MEDICINE

## 2022-07-18 PROCEDURE — 99999 PR PBB SHADOW E&M-EST. PATIENT-LVL V: CPT | Mod: PBBFAC,,, | Performed by: INTERNAL MEDICINE

## 2022-07-18 PROCEDURE — 4010F ACE/ARB THERAPY RXD/TAKEN: CPT | Mod: CPTII,S$GLB,, | Performed by: INTERNAL MEDICINE

## 2022-07-18 PROCEDURE — 4010F PR ACE/ARB THEARPY RXD/TAKEN: ICD-10-PCS | Mod: CPTII,S$GLB,, | Performed by: INTERNAL MEDICINE

## 2022-07-18 PROCEDURE — 99499 UNLISTED E&M SERVICE: CPT | Mod: S$GLB,,, | Performed by: INTERNAL MEDICINE

## 2022-07-18 PROCEDURE — 99499 RISK ADDL DX/OHS AUDIT: ICD-10-PCS | Mod: S$GLB,,, | Performed by: INTERNAL MEDICINE

## 2022-07-18 PROCEDURE — 1159F PR MEDICATION LIST DOCUMENTED IN MEDICAL RECORD: ICD-10-PCS | Mod: CPTII,S$GLB,, | Performed by: INTERNAL MEDICINE

## 2022-07-18 PROCEDURE — 3078F PR MOST RECENT DIASTOLIC BLOOD PRESSURE < 80 MM HG: ICD-10-PCS | Mod: CPTII,S$GLB,, | Performed by: INTERNAL MEDICINE

## 2022-07-18 PROCEDURE — 3044F PR MOST RECENT HEMOGLOBIN A1C LEVEL <7.0%: ICD-10-PCS | Mod: CPTII,S$GLB,, | Performed by: INTERNAL MEDICINE

## 2022-07-18 PROCEDURE — U0002 COVID-19 LAB TEST NON-CDC: HCPCS | Mod: QW,S$GLB,, | Performed by: INTERNAL MEDICINE

## 2022-07-18 PROCEDURE — 3008F PR BODY MASS INDEX (BMI) DOCUMENTED: ICD-10-PCS | Mod: CPTII,S$GLB,, | Performed by: INTERNAL MEDICINE

## 2022-07-18 PROCEDURE — 1160F RVW MEDS BY RX/DR IN RCRD: CPT | Mod: CPTII,S$GLB,, | Performed by: INTERNAL MEDICINE

## 2022-07-18 PROCEDURE — 99213 OFFICE O/P EST LOW 20 MIN: CPT | Mod: S$GLB,,, | Performed by: INTERNAL MEDICINE

## 2022-07-18 PROCEDURE — 3074F PR MOST RECENT SYSTOLIC BLOOD PRESSURE < 130 MM HG: ICD-10-PCS | Mod: CPTII,S$GLB,, | Performed by: INTERNAL MEDICINE

## 2022-07-18 PROCEDURE — 1160F PR REVIEW ALL MEDS BY PRESCRIBER/CLIN PHARMACIST DOCUMENTED: ICD-10-PCS | Mod: CPTII,S$GLB,, | Performed by: INTERNAL MEDICINE

## 2022-07-18 PROCEDURE — 1101F PT FALLS ASSESS-DOCD LE1/YR: CPT | Mod: CPTII,S$GLB,, | Performed by: INTERNAL MEDICINE

## 2022-07-18 RX ORDER — PROMETHAZINE HYDROCHLORIDE AND DEXTROMETHORPHAN HYDROBROMIDE 6.25; 15 MG/5ML; MG/5ML
5 SYRUP ORAL EVERY 8 HOURS PRN
Qty: 118 ML | Refills: 0 | Status: SHIPPED | OUTPATIENT
Start: 2022-07-18 | End: 2022-07-29

## 2022-07-18 RX ORDER — FLUTICASONE PROPIONATE 50 MCG
2 SPRAY, SUSPENSION (ML) NASAL DAILY
Qty: 18.2 ML | Refills: 0 | Status: SHIPPED | OUTPATIENT
Start: 2022-07-18 | End: 2023-01-16

## 2022-07-18 RX ORDER — MINERAL OIL
180 ENEMA (ML) RECTAL DAILY
Refills: 0
Start: 2022-07-18 | End: 2023-05-04

## 2022-07-18 NOTE — PROGRESS NOTES
fl[facility]  [facility]    Chief Complaint      Chief Complaint   Patient presents with    Sinus Problem    Nasal Congestion    Sore Throat    Cough    Fatigue       History of Present Illness      Sunitha Pillai is a 66 y.o. F with DM - II, HTN, Anemia, Breast CA, OA, Peripheral neuropathy due to ChemoTx presents for same day appt for URI Sx's.     Pt normally cared for by my colleague Dr. Louis and patient is new to me. I have reviewed patient's past medical, surgical, and social history in addition to MAR and allergies.     URI -  She was seen in ED 7/15/22 for c/o sore throat, rhinorrhea, dizziness, cough, congestion, myalgias, and Ha. She tested neg for COVID at the time. Her Sx's started 2 days prior to that.  Will recheck COVID 19 rapid test. She had just returned from [place] 7/11/22. She went to the zoo while there.  She was not around any known sick contacts. She had a h/o COVID 19 > 1 yr ago.  She took Aleve yest. for HA. Claritin no help. Rec supportive care.  Rec strict hand hygiene.autoinsufflation exrcises.  Flonase SEN/D and allegra daily prn. Rec APAP/Ibuprofen prn T > 100.3/pain. RTC or alert MD if Symptoms persist/worsen.    - HCM - Flu - 10/21/21;  COVID -19 Vaccine - # 1 - 6/4/21; # 2 6/26/21; # 3 1/25/22    Patient Care Team:  Patty Louis MD as PCP - General (Internal Medicine)  Sofie Perez MA (Inactive) as Care Coordinator  Frankie Wu MD as Consulting Physician (Ophthalmology)  Isabel Moulton MD as Consulting Physician (General Surgery)  Corinne E Coniglio, RN as Oncology Navigator  Flori Chappell LCSW as      Health Maintenance:  Immunization History   Administered Date(s) Administered    COVID-19, MRNA, LN-S, PF (Pfizer) (Gray Cap) 01/25/2022    COVID-19, MRNA, LN-S, PF (Pfizer) (Purple Cap) 06/04/2021, 06/26/2021    Influenza 10/16/2008, 09/30/2009, 09/21/2010, 12/08/2011, 10/30/2012, 09/16/2013    Influenza (FLUAD) -  Quadrivalent - Adjuvanted - PF *Preferred* (65+) 2020, 10/21/2021    Influenza - High Dose - PF (65 years and older) 2019    Influenza - Quadrivalent 10/12/2015, 10/06/2016    Influenza - Quadrivalent - PF *Preferred* (6 months and older) 10/27/2017    Influenza - Trivalent (ADULT) 10/16/2008, 2009, 2010, 2011, 10/30/2012, 2013    Influenza Split 10/30/2012, 2013    Pneumococcal Conjugate - 13 Valent 2019    Pneumococcal Polysaccharide - 23 Valent 10/23/2006, 2020    Tdap 2014    Zoster Recombinant 2020, 2020      Health Maintenance   Topic Date Due    Low Dose Statin  Never done    Eye Exam  2020    Hemoglobin A1c  2022    Lipid Panel  10/21/2022    Mammogram  2022    Foot Exam  2022    TETANUS VACCINE  2024    DEXA Scan  10/21/2025    Hepatitis C Screening  Completed        Past Medical History:  Past Medical History:   Diagnosis Date    Acute coronary syndrome 2018    Adjustment disorder     Anxiety     Arthritis     Cancer of breast 2020    Malignant neoplasm of lower-inner quadrant of left breast in female, estrogen receptor positive    Cholelithiasis with acute cholecystitis 3/5/2021    Depression     Diabetes mellitus type I     Genetic testing of female 2020    [place] via [employer] with AXIN2 and POLE variants of uncertain significance    GERD (gastroesophageal reflux disease)     Hypertension     Pneumonia due to COVID-19 virus 2021    Unstable angina 2018    Vertigo 2019    Vitamin D deficiency 2021       Past Surgical History:   has a past surgical history that includes  section; Injection for sentinel node identification (Left, 2020); Kittrell lymph node biopsy (Left, 2020); Insertion of tunneled central venous catheter (CVC) with subcutaneous port (N/A, 2020); Laparoscopic cholecystectomy (N/A, 3/6/2021); and  Mastectomy, partial (Left, 9/17/2020).    Family History:  family history includes Aneurysm in her father; Anxiety disorder in her daughter and daughter; Breast cancer in her maternal aunt, other, and paternal aunt; Cancer in her brother, maternal aunt, and paternal aunt; Cervical cancer in her daughter; Colon polyps in her mother; Depression in her daughter and daughter; Diabetes in her brother, mother, and sister; Heart disease in her brother and mother; Hyperlipidemia in her mother; Hypertension in her brother, mother, and sister; Leukemia in her maternal cousin; Prostate cancer in her maternal cousin and maternal cousin.     Social History:  Social History     Tobacco Use    Smoking status: Never Smoker    Smokeless tobacco: Never Used   Substance Use Topics    Alcohol use: Never    Drug use: No       Review of Systems   Constitutional: Negative for chills, diaphoresis and fever.   HENT: Positive for nasal congestion, sneezing and sore throat. Negative for ear pain, postnasal drip and rhinorrhea.    Eyes: Positive for discharge, redness and itching.   Respiratory: Positive for cough and shortness of breath. Negative for wheezing.         She reports Chronic MAC since completing ChemoTx. No change in the last wk.     Cardiovascular: Positive for chest pain.        Chest pain that comes and goes since completing ChemoTX and Radiation 1 yr ago.  Not changed from her baseline. Nothing makes it better ofr worse.    Gastrointestinal: Positive for constipation. Negative for abdominal pain, diarrhea, nausea and vomiting.        Chronic constipation.    Endocrine: Positive for cold intolerance.   Musculoskeletal: Negative for arthralgias and myalgias.   Neurological: Positive for headaches.        Frontal Ha        Medications:    Current Outpatient Medications:     ACCU-CHEK GUIDE GLUCOSE METER Mis, USE  THREE TIMES DAILY, Disp: 1 each, Rfl: 0    acetaminophen (TYLENOL) 325 MG tablet, Take 2 tablets (650 mg  "total) by mouth every 8 (eight) hours as needed for Pain., Disp: , Rfl: 0    allopurinoL (ZYLOPRIM) 100 MG tablet, Take 1 tablet (100 mg total) by mouth once daily., Disp: 90 tablet, Rfl: 1    ascorbic acid, vitamin C, (VITAMIN C) 500 MG tablet, Take 1 tablet (500 mg total) by mouth 2 (two) times daily., Disp: , Rfl:     aspirin 81 mg Tab, Take 1 tablet by mouth Daily., Disp: , Rfl:     BD ULTRA-FINE SHORT PEN NEEDLE 31 gauge x 5/16" Ndle, USE 1  4 TIMES DAILY WITH  INSULIN, Disp: 100 each, Rfl: 3    blood sugar diagnostic Strp, Strips and lancets covered by insurance E11.9, pt checks glucose three times daily, insulin dependent, Disp: 300 each, Rfl: 3    blood sugar diagnostic, drum (ACCU-CHEK COMPACT TEST) Strp, USE ONE STRIP TO CHECK GLUCOSE 4 TIMES DAILY BEFORE EACH MEAL AND AT BEDTIME, Disp: 100 each, Rfl: 0    candesartan (ATACAND) 8 MG tablet, Take 1 tablet (8 mg total) by mouth once daily., Disp: 90 tablet, Rfl: 3    diclofenac sodium (VOLTAREN) 1 % Gel, USE AS DIRECTED EVERY 8 HOURS, Disp: , Rfl:     dulaglutide (TRULICITY) 1.5 mg/0.5 mL pen injector, Inject 1.5 mg into the skin every 7 days., Disp: 12 pen, Rfl: 2    lancets Misc, To check BG 4 times daily, to use with insurance preferred meter, insulin dependent, Disp: 400 each, Rfl: 3    letrozole (FEMARA) 2.5 mg Tab, Take 1 tablet (2.5 mg total) by mouth once daily., Disp: 90 tablet, Rfl: 3    metFORMIN (GLUCOPHAGE) 1000 MG tablet, Take 1 tablet (1,000 mg total) by mouth 2 (two) times daily., Disp: 180 tablet, Rfl: 3    pregabalin (LYRICA) 25 MG capsule, Take 2 capsules (50 mg total) by mouth 2 (two) times daily., Disp: 120 capsule, Rfl: 2    TRUEPLUS LANCETS 33 gauge Misc, USE 1 TO CHECK GLUCOSE 4 TIMES DAILY, Disp: , Rfl:     vitamin D (VITAMIN D3) 1000 units Tab, Take 1 tablet (1,000 Units total) by mouth once daily., Disp: 90 tablet, Rfl: 1    fexofenadine (ALLEGRA) 180 MG tablet, Take 1 tablet (180 mg total) by mouth once daily., " "Disp: , Rfl: 0    fluticasone propionate (FLONASE) 50 mcg/actuation nasal spray, 2 sprays (100 mcg total) by Each Nostril route once daily., Disp: 18.2 mL, Rfl: 0    promethazine-dextromethorphan (PROMETHAZINE-DM) 6.25-15 mg/5 mL Syrp, Take 5 mLs by mouth every 8 (eight) hours as needed., Disp: 118 mL, Rfl: 0  No current facility-administered medications for this visit.    Facility-Administered Medications Ordered in Other Visits:     fentaNYL injection 25 mcg, 25 mcg, Intravenous, Q5 Min PRN, Kenneth Shannon MD    midazolam (VERSED) 1 mg/mL injection 0.5 mg, 0.5 mg, Intravenous, PRN, Kenneth Shannon MD, 2 mg at 09/17/20 0637     Allergies:  Review of patient's allergies indicates:  No Known Allergies    Physical Exam      Vital Signs  Temp: 98.1 °F (36.7 °C)  Pulse: 100  Resp: 18  SpO2: 99 %  BP: 126/76  BP Location: Right arm  Patient Position: Sitting  Pain Score:   8  Height and Weight  Height: 5' 4" (162.6 cm)  Weight: 80.3 kg (177 lb 0.5 oz)  BSA (Calculated - sq m): 1.9 sq meters  BMI (Calculated): 30.4  Weight in (lb) to have BMI = 25: 145.3      Patient Position: Sitting      Physical Exam  Vitals reviewed.   Constitutional:       General: She is not in acute distress.     Appearance: Normal appearance. She is ill-appearing. She is not toxic-appearing or diaphoretic.      Comments: +congested and hoarse   HENT:      Head: Normocephalic and atraumatic.      Ears:      Comments: vanessa serous otitis  Eyes:      Extraocular Movements: Extraocular movements intact.      Comments: vanessa scleral injection   Cardiovascular:      Rate and Rhythm: Normal rate and regular rhythm.      Pulses: Normal pulses.      Heart sounds: Normal heart sounds.   Pulmonary:      Effort: Pulmonary effort is normal. No respiratory distress.      Breath sounds: Normal breath sounds. No wheezing or rhonchi.   Abdominal:      General: There is no distension.      Palpations: Abdomen is soft.      Tenderness: There " is abdominal tenderness. There is no guarding or rebound.      Comments: TTP in BUQ   Skin:     General: Skin is warm and dry.   Neurological:      General: No focal deficit present.      Mental Status: She is alert.   Psychiatric:         Behavior: Behavior normal.          Laboratory:  CBC:  Recent Labs   Lab 10/21/21  1024 11/08/21  1046 11/08/21  1056 04/29/22  0713   WBC 4.94 5.04  --  4.21   RBC 3.85 L 3.75 L  --  3.79 L   Hemoglobin 12.1 11.8 L  --  11.9 L   POC Hematocrit  --   --    < >  --    Hematocrit 37.9 35.1 L  --  36.3 L   Platelets 153 146 L  --  143 L   MCV 98 94  --  96   MCH 31.4 H 31.5 H  --  31.4 H   MCHC 31.9 L 33.6  --  32.8    < > = values in this interval not displayed.       CMP:  Recent Labs   Lab 10/21/21  1024 11/08/21  1046 04/29/22  0713   Glucose 104 89 88   Calcium 9.8 9.8 9.9   Albumin 3.8 3.7 3.8   Total Protein 7.8 7.4 7.1   Sodium 144 139 143   Potassium 4.4 4.2 4.9   CO2 25 26 28   Chloride 108 106 105   BUN 16 22 13   Creatinine 1.0 0.9 1.0   Alkaline Phosphatase 84 79 81   ALT 10 10 9 L   AST 19 16 18   Total Bilirubin 0.5 0.6 0.8           URINALYSIS:  Recent Labs   Lab 10/29/20  0950 11/28/20  0224 01/11/21  0019 02/07/21  0740 04/28/22  1614   Color, UA Yellow   < > Straw   < > Yellow   Specific Gravity, UA 1.015   < > 1.015   < > 1.010   pH, UA 6.0   < > 6.0   < > 6.0   Protein, UA Negative   < > Negative   < > Negative   Bacteria Rare  --  Rare  --   --    Nitrite, UA Negative   < > Negative   < > Negative   Leukocytes, UA Trace A   < > Negative   < > Negative   Urobilinogen, UA Negative  --   --   --   --     < > = values in this interval not displayed.        LIPIDS:  Recent Labs   Lab 12/17/19  0933 11/24/20  0802 10/21/21  1024 04/29/22  0713   TSH 1.041 2.471 1.540 2.096   HDL 50 42 52  --    Cholesterol 150 149 141  --    Triglycerides 76 102 77  --    LDL Cholesterol 84.8 86.6 73.6  --    HDL/Cholesterol Ratio 33.3 28.2 36.9  --    Non-HDL Cholesterol 100 107 89   --    Total Cholesterol/HDL Ratio 3.0 3.5 2.7  --        TSH:  Recent Labs   Lab 11/24/20  0802 10/21/21  1024 04/29/22  0713   TSH 2.471 1.540 2.096       A1C:  Recent Labs   Lab 12/17/19  0933 04/20/20  1120 07/28/20  1042 11/24/20  0802 01/11/21  0332 05/25/21  0736 10/21/21  1024 03/04/22  0703   Hemoglobin A1C 11.1 H 5.8 H 6.2 H 7.4 H 7.7 H 7.4 H 7.2 H 6.2 H       Urine Microalbumin/Cr:  Recent Labs   Lab 12/17/19  0910 09/11/20  1620 10/21/21  1014   Microalb/Creat Ratio 20.6 8.8 15.7         Other:   Recent Labs   Lab 02/07/21  0325 02/08/21  0229 10/21/21  1024 04/29/22  0713   Vit D, 25-Hydroxy 27 L  --  36  --    Ferritin  --    < >  --  235   Iron  --    < >  --  85   Transferrin  --    < >  --  262   TIBC  --    < >  --  388   Saturated Iron  --    < >  --  22    < > = values in this interval not displayed.     Recent Labs   Lab 10/29/20  0827   Hepatitis C Ab Negative       Assessment/Plan     Sunitha Pillai is a 66 y.o.female with:    Upper respiratory tract infection, unspecified type  -     POCT COVID-19 Rapid Screening  -     fluticasone propionate (FLONASE) 50 mcg/actuation nasal spray; 2 sprays (100 mcg total) by Each Nostril route once daily.  Dispense: 18.2 mL; Refill: 0  - Lung exam wnl today.  Suspect viral in Etiol.  Rec supportive care.  Rec strict hand hygiene.  Gargle with salt water. Rec APAP/Ibuprofen prn T > 100.3/pain. RTC or alert MD if Symptoms persist/worsen. Rapid COVID - neg. Today on repeat testing. Will rx Promethazine DM cough syrup, Floanse 2 SEN/D, and rec Allegra daily prn.  Rec autoinsufflation exercises prn.     Cough  -     promethazine-dextromethorphan (PROMETHAZINE-DM) 6.25-15 mg/5 mL Syrp; Take 5 mLs by mouth every 8 (eight) hours as needed.  Dispense: 118 mL; Refill: 0    Other orders  -     fexofenadine (ALLEGRA) 180 MG tablet; Take 1 tablet (180 mg total) by mouth once daily.; Refill: 0    - Rec supportive care.  Rec strict hand hygiene.autoinsufflation  exrcises.  Flonase SEN/D and allegra daily prn. Rec APAP/Ibuprofen prn T > 100.3/pain. RTC or alert MD if Symptoms persist/worsen.      Chronic conditions status updated as per HPI.  Other than changes above, cont current medications and maintain follow up with specialists.         Rama Rdz MD  [facility]

## 2022-07-20 ENCOUNTER — HOSPITAL ENCOUNTER (EMERGENCY)
Facility: HOSPITAL | Age: 67
Discharge: HOME | End: 2022-07-20
Attending: EMERGENCY MEDICINE
Payer: MEDICARE

## 2022-07-20 VITALS
RESPIRATION RATE: 18 BRPM | TEMPERATURE: 99 F | BODY MASS INDEX: 30.22 KG/M2 | OXYGEN SATURATION: 100 % | WEIGHT: 177 LBS | SYSTOLIC BLOOD PRESSURE: 175 MMHG | HEIGHT: 64 IN | HEART RATE: 89 BPM | DIASTOLIC BLOOD PRESSURE: 77 MMHG

## 2022-07-20 DIAGNOSIS — J02.9 ACUTE PHARYNGITIS, UNSPECIFIED ETIOLOGY: ICD-10-CM

## 2022-07-20 DIAGNOSIS — J02.9 SORE THROAT: Primary | ICD-10-CM

## 2022-07-20 LAB
CTP QC/QA: YES
GROUP A STREP, MOLECULAR: NEGATIVE
POC MOLECULAR INFLUENZA A AGN: NEGATIVE
POC MOLECULAR INFLUENZA B AGN: NEGATIVE

## 2022-07-20 PROCEDURE — 99283 EMERGENCY DEPT VISIT LOW MDM: CPT

## 2022-07-20 PROCEDURE — 63600175 PHARM REV CODE 636 W HCPCS: Performed by: EMERGENCY MEDICINE

## 2022-07-20 PROCEDURE — 99284 PR EMERGENCY DEPT VISIT,LEVEL IV: ICD-10-PCS | Mod: ,,, | Performed by: EMERGENCY MEDICINE

## 2022-07-20 PROCEDURE — 99284 EMERGENCY DEPT VISIT MOD MDM: CPT | Mod: ,,, | Performed by: EMERGENCY MEDICINE

## 2022-07-20 PROCEDURE — 87651 STREP A DNA AMP PROBE: CPT | Performed by: EMERGENCY MEDICINE

## 2022-07-20 PROCEDURE — 87502 INFLUENZA DNA AMP PROBE: CPT

## 2022-07-20 PROCEDURE — 25000003 PHARM REV CODE 250: Performed by: EMERGENCY MEDICINE

## 2022-07-20 RX ORDER — NAPROXEN 375 MG/1
375 TABLET ORAL 2 TIMES DAILY WITH MEALS
Qty: 10 TABLET | Refills: 0 | Status: SHIPPED | OUTPATIENT
Start: 2022-07-20 | End: 2022-07-25

## 2022-07-20 RX ORDER — KETOROLAC TROMETHAMINE 10 MG/1
10 TABLET, FILM COATED ORAL
Status: COMPLETED | OUTPATIENT
Start: 2022-07-20 | End: 2022-07-20

## 2022-07-20 RX ORDER — DEXAMETHASONE 4 MG/1
8 TABLET ORAL
Status: COMPLETED | OUTPATIENT
Start: 2022-07-20 | End: 2022-07-20

## 2022-07-20 RX ADMIN — DEXAMETHASONE 8 MG: 4 TABLET ORAL at 10:07

## 2022-07-20 RX ADMIN — KETOROLAC TROMETHAMINE 10 MG: 10 TABLET, FILM COATED ORAL at 10:07

## 2022-07-21 NOTE — ED TRIAGE NOTES
Reports sore throat x 5 days. This significantly worsened yesterday and she was prescribed fluticasone nasal, promethazine, and fexofenadine. Hurts to swallow but she can handle secretions. She denies fever or chills.

## 2022-07-21 NOTE — ED PROVIDER NOTES
Encounter Date: 2022    SCRIBE #1 NOTE: I, Tereso Snyder, am scribing for, and in the presence of,  Stef Sultana DO. I have scribed the entire note.       History     Chief Complaint   Patient presents with    Sore Throat     Pt c/o sore throat, cough, & headache starting last week. She tested neg for COVID on Friday and yesterday.    Sunitha Pillai is a 66 y.o. female with a PMHx of HTN, GERD, DM type I, acute coronary syndrome, anxiety, cholelithiasis, breast cancer who presents with a chief complaint of a sore throat and headache onset one week ago. She additionally has trouble swallowing. She notes she tested negative for COVID-19 yesterday. Patient reports no other identifying, alleviating, or exacerbating factors and denies fever, chills,  or any other associated symptoms at this time.  The history is provided by medical records and the patient. No  was used.     Review of patient's allergies indicates:  No Known Allergies  Past Medical History:   Diagnosis Date    Acute coronary syndrome 2018    Adjustment disorder     Anxiety     Arthritis     Cancer of breast 2020    Malignant neoplasm of lower-inner quadrant of left breast in female, estrogen receptor positive    Cholelithiasis with acute cholecystitis 3/5/2021    Depression     Diabetes mellitus type I     Genetic testing of female 2020    [employer] via [employer] with AXIN2 and POLE variants of uncertain significance    GERD (gastroesophageal reflux disease)     Hypertension     Pneumonia due to COVID-19 virus 2021    Unstable angina 2018    Vertigo 2019    Vitamin D deficiency 2021     Past Surgical History:   Procedure Laterality Date     SECTION      INJECTION FOR SENTINEL NODE IDENTIFICATION Left 2020    Procedure: INJECTION, FOR SENTINEL NODE IDENTIFICATION;  Surgeon: Isabel Moulton MD;  Location: [facility];  Service: General;  Laterality: Left;     INSERTION OF TUNNELED CENTRAL VENOUS CATHETER (CVC) WITH SUBCUTANEOUS PORT N/A 11/18/2020    Procedure: INSERTION, PORT-A-CATH;  Surgeon: Hardeep Martin MD;  Location: [facility] CATH LAB;  Service: Radiology;  Laterality: N/A;    LAPAROSCOPIC CHOLECYSTECTOMY N/A 3/6/2021    Procedure: CHOLECYSTECTOMY, LAPAROSCOPIC;  Surgeon: Buster Son MD;  Location: [facility] OR 2ND FLR;  Service: General;  Laterality: N/A;    MASTECTOMY, PARTIAL Left 9/17/2020    Procedure: MASTECTOMY, PARTIAL-w/reflector;  Surgeon: Isabel Moulton MD;  Location: [facility] OR;  Service: General;  Laterality: Left;    SENTINEL LYMPH NODE BIOPSY Left 9/17/2020    Procedure: BIOPSY, LYMPH NODE, SENTINEL;  Surgeon: Isabel Moulton MD;  Location: [facility] OR;  Service: General;  Laterality: Left;     Family History   Problem Relation Age of Onset    Hypertension Mother     Hyperlipidemia Mother     Diabetes Mother     Heart disease Mother     Colon polyps Mother     Aneurysm Father     Diabetes Sister     Hypertension Sister     Heart disease Brother     Diabetes Brother     Hypertension Brother     Cancer Brother         brother German with prostate cancer; brother Cooper with throat cancer    Cervical cancer Daughter     Anxiety disorder Daughter     Depression Daughter     Anxiety disorder Daughter     Depression Daughter     Breast cancer Other     Cancer Paternal Aunt         unknown origin    Breast cancer Paternal Aunt     Breast cancer Maternal Aunt     Cancer Maternal Aunt         unknown origin    Prostate cancer Maternal Cousin     Leukemia Maternal Cousin     Prostate cancer Maternal Cousin     Colon cancer Neg Hx     Ovarian cancer Neg Hx      Social History     Tobacco Use    Smoking status: Never Smoker    Smokeless tobacco: Never Used   Substance Use Topics    Alcohol use: Never    Drug use: No     Review of Systems   Constitutional: Negative for fever.   HENT: Positive for sore throat and trouble  swallowing.    Respiratory: Negative for shortness of breath.    Cardiovascular: Negative for chest pain.   Gastrointestinal: Negative for abdominal pain.   Genitourinary: Negative for dysuria.   Musculoskeletal: Negative for back pain.   Skin: Negative for rash.   Neurological: Negative for weakness.   Hematological: Does not bruise/bleed easily.       Physical Exam     Initial Vitals [07/20/22 2142]   BP Pulse Resp Temp SpO2   (!) 176/75 96 18 98.5 °F (36.9 °C) 100 %      MAP       --         Physical Exam    Nursing note and vitals reviewed.      Constitutional: Well-developed. Well-nourished. Moderate emotional distress.  HENT: NCAT. OP moist. Uvula midline. No OP masses. Posterior pharynx with mild erythema. No tonsillar edema. No exudate. Floor of the mouth is soft, tongue is not elevated. No rhinorrhea. TMs clear bilaterally. Mastoid process nontender bilaterally.  EYES: No conjunctival injection or discharge.  NECK: Full ROM. Supple. No rigidity. No cervical LAD. No stridor. Brudzinskis test negative.  CARDIAC: RRR. No murmurs or rubs. Strong distal pulses.  PULM: Normal effort. Breath sounds clear bilaterally. No wheezes. No crackles.  ABD: Soft, non-tender, non-distended, normal BS. No guarding. No rebound.  : No CVA tenderness.  MS: Warm and well perfused. No edema..  NEURO: Alert and oriented x3.  No sensory or motor deficits.  Negative Romberg.  Normal gate.  SKIN: Dry. No rashes.  No cyanosis or jaundice.  PSYCH: Normal judgment. Normal affect.      ED Course   Procedures  Labs Reviewed   GROUP A STREP, MOLECULAR   POCT INFLUENZA A/B MOLECULAR          Imaging Results    None          Medications   dexAMETHasone tablet 8 mg (8 mg Oral Given 7/20/22 2226)   ketorolac tablet 10 mg (10 mg Oral Given 7/20/22 2225)     Medical Decision Making:   History:   Old Medical Records: I decided to obtain old medical records.  Initial Assessment:   Sunitha Pillai is a 66 y.o. female with a PMHx of HTN,  GERD, DM type I, acute coronary syndrome, anxiety, cholelithiasis, breast cancer who presents with a chief complaint of a sore throat and headache onset one week ago. She additionally has trouble swallowing. She notes she tested negative for COVID-19 yesterday. Patient reports no other identifying, alleviating, or exacerbating factors and denies fever, chills,  or any other associated symptoms at this time  Differential Diagnosis:   DDx includes but is not limited to: viral syndrome, influenza, COVID-19, strep throat, viral pharyngitis    Clinical Tests:   Lab Tests: Reviewed and Ordered    Patient is afebrile vital signs are stable.  Physical exam findings remarkable for slight erythematous oropharynx without recent cough.  Recently tested for COVID yesterday and the day before which has been negative.  Will obtain influenza and strep pharyngitis evaluation.  Strep throat negative.  Suspect likely viral pharyngitis.  Patient was treated with Toradol and dexamethasone with outpatient management with throat lozenges, conservative management, and naproxen as needed.  No high risk features to suggest trismus, difficulty swallowing, unilateral uvular swelling, evidence of PTA or RPA or Scott's angina. Patient agreeable to discharge plan. Strict ED precautions and return instructions discussed at length and patient verbalized understanding. All questions were answered and ample time was given for questions.      Complexity:  Moderate        Scribe Attestation:   Scribe #1: I performed the above scribed service and the documentation accurately describes the services I performed. I attest to the accuracy of the note.               I, Dr. Stef Sultana, personally performed the services described in this documentation. All medical record entries made by the scribe were at my direction and in my presence.  I have reviewed the chart and agree that the record reflects my personal performance and is accurate and complete.      Clinical Impression:   Final diagnoses:  [J02.9] Sore throat (Primary)  [J02.9] Acute pharyngitis, unspecified etiology          ED Disposition Condition    Discharge Stable        ED Prescriptions     Medication Sig Dispense Start Date End Date Auth. Provider    naproxen (NAPROSYN) 375 MG tablet Take 1 tablet (375 mg total) by mouth 2 (two) times daily with meals. for 5 days 10 tablet 7/20/2022 7/25/2022 Stef Sultana DO        Follow-up Information     Follow up With Specialties Details Why Contact Info    Patty Louis MD Internal Medicine Schedule an appointment as soon as possible for a visit in 1 week  [address]  [place] [address]  [phone]          Stef Sultana DO, FAAEM  Emergency Staff Physician   Dept of Emergency Medicine   [facility]  Spectralink: [id number]        Disclaimer: This note has been generated using voice-recognition software. There may be typographical errors that have been missed during proof-reading.       Stef Sultana DO  07/21/22 3716

## 2022-07-21 NOTE — ED NOTES
General: Awake and alert  ENT: Mild pharyngeal erythema. No tonsillar kissing or visible exudate.  Neck: Supple  Respiratory: Nonlabored respirations. No audible wheeze. Speaking in full sentences.  Cardiac: Well-perfused. No acrocyanosis.  Abdomen: Supple  Neurological: Moves all extremities symmetrically and equally. Answers questions appropriately.

## 2022-07-29 ENCOUNTER — LAB VISIT (OUTPATIENT)
Dept: LAB | Facility: HOSPITAL | Age: 67
End: 2022-07-29
Attending: INTERNAL MEDICINE
Payer: MEDICARE

## 2022-07-29 ENCOUNTER — OFFICE VISIT (OUTPATIENT)
Dept: INTERNAL MEDICINE | Facility: CLINIC | Age: 67
End: 2022-07-29
Payer: MEDICARE

## 2022-07-29 VITALS
WEIGHT: 173.75 LBS | SYSTOLIC BLOOD PRESSURE: 140 MMHG | DIASTOLIC BLOOD PRESSURE: 72 MMHG | BODY MASS INDEX: 29.66 KG/M2 | OXYGEN SATURATION: 98 % | HEART RATE: 84 BPM | HEIGHT: 64 IN

## 2022-07-29 DIAGNOSIS — Z79.4 TYPE 2 DIABETES MELLITUS WITH HYPEROSMOLARITY WITHOUT COMA, WITH LONG-TERM CURRENT USE OF INSULIN: Primary | ICD-10-CM

## 2022-07-29 DIAGNOSIS — R76.8 ELEVATED RHEUMATOID FACTOR: ICD-10-CM

## 2022-07-29 DIAGNOSIS — R39.11 URINARY HESITANCY: ICD-10-CM

## 2022-07-29 DIAGNOSIS — L84 PRE-ULCERATIVE CORN OR CALLOUS: ICD-10-CM

## 2022-07-29 DIAGNOSIS — Z79.4 TYPE 2 DIABETES MELLITUS WITH HYPEROSMOLARITY WITHOUT COMA, WITH LONG-TERM CURRENT USE OF INSULIN: ICD-10-CM

## 2022-07-29 DIAGNOSIS — E11.00 TYPE 2 DIABETES MELLITUS WITH HYPEROSMOLARITY WITHOUT COMA, WITH LONG-TERM CURRENT USE OF INSULIN: Primary | ICD-10-CM

## 2022-07-29 DIAGNOSIS — Z12.11 SCREENING FOR COLON CANCER: ICD-10-CM

## 2022-07-29 DIAGNOSIS — E04.1 THYROID NODULE: ICD-10-CM

## 2022-07-29 DIAGNOSIS — E11.00 TYPE 2 DIABETES MELLITUS WITH HYPEROSMOLARITY WITHOUT COMA, WITH LONG-TERM CURRENT USE OF INSULIN: ICD-10-CM

## 2022-07-29 LAB
ALBUMIN SERPL BCP-MCNC: 3.6 G/DL (ref 3.5–5.2)
ALP SERPL-CCNC: 82 U/L (ref 55–135)
ALT SERPL W/O P-5'-P-CCNC: 10 U/L (ref 10–44)
ANION GAP SERPL CALC-SCNC: 8 MMOL/L (ref 8–16)
AST SERPL-CCNC: 21 U/L (ref 10–40)
BILIRUB SERPL-MCNC: 0.5 MG/DL (ref 0.1–1)
BUN SERPL-MCNC: 10 MG/DL (ref 8–23)
CALCIUM SERPL-MCNC: 9.6 MG/DL (ref 8.7–10.5)
CHLORIDE SERPL-SCNC: 108 MMOL/L (ref 95–110)
CO2 SERPL-SCNC: 24 MMOL/L (ref 23–29)
CREAT SERPL-MCNC: 0.8 MG/DL (ref 0.5–1.4)
EST. GFR  (AFRICAN AMERICAN): >60 ML/MIN/1.73 M^2
EST. GFR  (NON AFRICAN AMERICAN): >60 ML/MIN/1.73 M^2
ESTIMATED AVG GLUCOSE: 117 MG/DL (ref 68–131)
GLUCOSE SERPL-MCNC: 76 MG/DL (ref 70–110)
HBA1C MFR BLD: 5.7 % (ref 4–5.6)
POTASSIUM SERPL-SCNC: 5.2 MMOL/L (ref 3.5–5.1)
PROT SERPL-MCNC: 7.5 G/DL (ref 6–8.4)
SODIUM SERPL-SCNC: 140 MMOL/L (ref 136–145)

## 2022-07-29 PROCEDURE — 3288F PR FALLS RISK ASSESSMENT DOCUMENTED: ICD-10-PCS | Mod: CPTII,S$GLB,, | Performed by: INTERNAL MEDICINE

## 2022-07-29 PROCEDURE — 1126F AMNT PAIN NOTED NONE PRSNT: CPT | Mod: CPTII,S$GLB,, | Performed by: INTERNAL MEDICINE

## 2022-07-29 PROCEDURE — 36415 COLL VENOUS BLD VENIPUNCTURE: CPT | Performed by: INTERNAL MEDICINE

## 2022-07-29 PROCEDURE — 3077F PR MOST RECENT SYSTOLIC BLOOD PRESSURE >= 140 MM HG: ICD-10-PCS | Mod: CPTII,S$GLB,, | Performed by: INTERNAL MEDICINE

## 2022-07-29 PROCEDURE — 3008F BODY MASS INDEX DOCD: CPT | Mod: CPTII,S$GLB,, | Performed by: INTERNAL MEDICINE

## 2022-07-29 PROCEDURE — 4010F ACE/ARB THERAPY RXD/TAKEN: CPT | Mod: CPTII,S$GLB,, | Performed by: INTERNAL MEDICINE

## 2022-07-29 PROCEDURE — 3044F PR MOST RECENT HEMOGLOBIN A1C LEVEL <7.0%: ICD-10-PCS | Mod: CPTII,S$GLB,, | Performed by: INTERNAL MEDICINE

## 2022-07-29 PROCEDURE — 1159F PR MEDICATION LIST DOCUMENTED IN MEDICAL RECORD: ICD-10-PCS | Mod: CPTII,S$GLB,, | Performed by: INTERNAL MEDICINE

## 2022-07-29 PROCEDURE — 1159F MED LIST DOCD IN RCRD: CPT | Mod: CPTII,S$GLB,, | Performed by: INTERNAL MEDICINE

## 2022-07-29 PROCEDURE — 1101F PT FALLS ASSESS-DOCD LE1/YR: CPT | Mod: CPTII,S$GLB,, | Performed by: INTERNAL MEDICINE

## 2022-07-29 PROCEDURE — 3008F PR BODY MASS INDEX (BMI) DOCUMENTED: ICD-10-PCS | Mod: CPTII,S$GLB,, | Performed by: INTERNAL MEDICINE

## 2022-07-29 PROCEDURE — 3077F SYST BP >= 140 MM HG: CPT | Mod: CPTII,S$GLB,, | Performed by: INTERNAL MEDICINE

## 2022-07-29 PROCEDURE — 3078F DIAST BP <80 MM HG: CPT | Mod: CPTII,S$GLB,, | Performed by: INTERNAL MEDICINE

## 2022-07-29 PROCEDURE — 83036 HEMOGLOBIN GLYCOSYLATED A1C: CPT | Performed by: INTERNAL MEDICINE

## 2022-07-29 PROCEDURE — 3078F PR MOST RECENT DIASTOLIC BLOOD PRESSURE < 80 MM HG: ICD-10-PCS | Mod: CPTII,S$GLB,, | Performed by: INTERNAL MEDICINE

## 2022-07-29 PROCEDURE — 99214 PR OFFICE/OUTPT VISIT, EST, LEVL IV, 30-39 MIN: ICD-10-PCS | Mod: S$GLB,,, | Performed by: INTERNAL MEDICINE

## 2022-07-29 PROCEDURE — 3044F HG A1C LEVEL LT 7.0%: CPT | Mod: CPTII,S$GLB,, | Performed by: INTERNAL MEDICINE

## 2022-07-29 PROCEDURE — 1101F PR PT FALLS ASSESS DOC 0-1 FALLS W/OUT INJ PAST YR: ICD-10-PCS | Mod: CPTII,S$GLB,, | Performed by: INTERNAL MEDICINE

## 2022-07-29 PROCEDURE — 99999 PR PBB SHADOW E&M-EST. PATIENT-LVL V: ICD-10-PCS | Mod: PBBFAC,,, | Performed by: INTERNAL MEDICINE

## 2022-07-29 PROCEDURE — 99999 PR PBB SHADOW E&M-EST. PATIENT-LVL V: CPT | Mod: PBBFAC,,, | Performed by: INTERNAL MEDICINE

## 2022-07-29 PROCEDURE — 4010F PR ACE/ARB THEARPY RXD/TAKEN: ICD-10-PCS | Mod: CPTII,S$GLB,, | Performed by: INTERNAL MEDICINE

## 2022-07-29 PROCEDURE — 3288F FALL RISK ASSESSMENT DOCD: CPT | Mod: CPTII,S$GLB,, | Performed by: INTERNAL MEDICINE

## 2022-07-29 PROCEDURE — 80053 COMPREHEN METABOLIC PANEL: CPT | Performed by: INTERNAL MEDICINE

## 2022-07-29 PROCEDURE — 1126F PR PAIN SEVERITY QUANTIFIED, NO PAIN PRESENT: ICD-10-PCS | Mod: CPTII,S$GLB,, | Performed by: INTERNAL MEDICINE

## 2022-07-29 PROCEDURE — 99214 OFFICE O/P EST MOD 30 MIN: CPT | Mod: S$GLB,,, | Performed by: INTERNAL MEDICINE

## 2022-07-29 RX ORDER — ALLOPURINOL 100 MG/1
100 TABLET ORAL DAILY
Qty: 90 TABLET | Refills: 1 | Status: ON HOLD | OUTPATIENT
Start: 2022-07-29 | End: 2023-01-31 | Stop reason: HOSPADM

## 2022-07-29 RX ORDER — CLOTRIMAZOLE AND BETAMETHASONE DIPROPIONATE 10; .64 MG/G; MG/G
CREAM TOPICAL 2 TIMES DAILY
Qty: 45 G | Refills: 0 | Status: SHIPPED | OUTPATIENT
Start: 2022-07-29 | End: 2023-01-16

## 2022-07-29 RX ORDER — ATORVASTATIN CALCIUM 10 MG/1
10 TABLET, FILM COATED ORAL DAILY
Qty: 90 TABLET | Refills: 3 | Status: SHIPPED | OUTPATIENT
Start: 2022-07-29 | End: 2023-09-28

## 2022-07-29 RX ORDER — SERTRALINE HYDROCHLORIDE 25 MG/1
25 TABLET, FILM COATED ORAL DAILY
Qty: 30 TABLET | Refills: 3 | Status: SHIPPED | OUTPATIENT
Start: 2022-07-29 | End: 2023-05-04 | Stop reason: SDUPTHER

## 2022-07-29 RX ORDER — TOPIRAMATE 25 MG/1
TABLET ORAL
Qty: 60 TABLET | Refills: 2 | Status: SHIPPED | OUTPATIENT
Start: 2022-07-29 | End: 2023-05-04 | Stop reason: SDUPTHER

## 2022-07-29 NOTE — PROGRESS NOTES
Subjective:       Patient ID: Sunitha Pillai is a 66 y.o. female.    Chief Complaint: Follow-up    HPIPt is doing better - sore throat resolved.  No CP or SOB. Grieving from the loss of her  last year.  Some urinary hesitancy.  Review of Systems   Respiratory: Negative for shortness of breath (PND or orthopnea).    Cardiovascular: Negative for chest pain (arm pain or jaw pain).   Gastrointestinal: Negative for abdominal pain, diarrhea, nausea and vomiting.   Genitourinary: Negative for dysuria.   Neurological: Negative for seizures, syncope and headaches.       Objective:      Physical Exam  Constitutional:       General: She is not in acute distress.     Appearance: She is well-developed.   HENT:      Head: Normocephalic.   Eyes:      Pupils: Pupils are equal, round, and reactive to light.   Neck:      Thyroid: No thyromegaly.      Vascular: No JVD.   Cardiovascular:      Rate and Rhythm: Normal rate and regular rhythm.      Heart sounds: Normal heart sounds. No murmur heard.    No friction rub. No gallop.   Pulmonary:      Effort: Pulmonary effort is normal.      Breath sounds: Normal breath sounds. No wheezing or rales.   Abdominal:      General: Bowel sounds are normal. There is no distension.      Palpations: Abdomen is soft. There is no mass.      Tenderness: There is no abdominal tenderness. There is no guarding or rebound.   Musculoskeletal:      Cervical back: Neck supple.   Lymphadenopathy:      Cervical: No cervical adenopathy.   Skin:     General: Skin is warm and dry.   Neurological:      Mental Status: She is alert and oriented to person, place, and time.      Deep Tendon Reflexes: Reflexes are normal and symmetric.   Psychiatric:         Behavior: Behavior normal.         Thought Content: Thought content normal.         Judgment: Judgment normal.         Assessment:       1. Type 2 diabetes mellitus with hyperosmolarity without coma, with long-term current use of insulin    2. Elevated  rheumatoid factor    3. Thyroid nodule    4. Screening for colon cancer    5. Pre-ulcerative corn or callous    6. Urinary hesitancy        Plan:   Type 2 diabetes mellitus with hyperosmolarity without coma, with long-term current use of insulin  -     Cancel: CBC Auto Differential; Future; Expected date: 07/29/2022  -     Comprehensive Metabolic Panel; Future; Expected date: 07/29/2022  -     Hemoglobin A1C; Future; Expected date: 07/29/2022  -     Ambulatory referral/consult to Pharmacy Assistance; Future; Expected date: 08/05/2022  -     Ambulatory referral/consult to Podiatry; Future; Expected date: 08/05/2022  In the doughnut hole needs trulicity  Elevated rheumatoid factor  -     Ambulatory referral/consult to Rheumatology; Future; Expected date: 08/05/2022    Thyroid nodule  -     US Soft Tissue Head Neck Thyroid; Future; Expected date: 07/29/2022    Screening for colon cancer  -     Case Request Endoscopy: COLONOSCOPY    Pre-ulcerative corn or callous  -     Ambulatory referral/consult to Podiatry; Future; Expected date: 08/05/2022    Urinary hesitancy  -     Ambulatory referral/consult to Urology; Future; Expected date: 08/05/2022    Other orders  -     allopurinoL (ZYLOPRIM) 100 MG tablet; Take 1 tablet (100 mg total) by mouth once daily.  Dispense: 90 tablet; Refill: 1  -     atorvastatin (LIPITOR) 10 MG tablet; Take 1 tablet (10 mg total) by mouth once daily.  Dispense: 90 tablet; Refill: 3  -     clotrimazole-betamethasone 1-0.05% (LOTRISONE) cream; Apply topically 2 (two) times daily.  Dispense: 45 g; Refill: 0  -     topiramate (TOPAMAX) 25 MG tablet; One at bedtime for 2 weeks then two at bedtime  Dispense: 60 tablet; Refill: 2 - for weight loss  -     sertraline (ZOLOFT) 25 MG tablet; Take 1 tablet (25 mg total) by mouth once daily.  Dispense: 30 tablet; Refill: 3  For depression

## 2022-08-01 ENCOUNTER — TELEPHONE (OUTPATIENT)
Dept: PODIATRY | Facility: CLINIC | Age: 67
End: 2022-08-01
Payer: MEDICARE

## 2022-08-01 ENCOUNTER — TELEPHONE (OUTPATIENT)
Dept: INTERNAL MEDICINE | Facility: CLINIC | Age: 67
End: 2022-08-01
Payer: MEDICARE

## 2022-08-01 DIAGNOSIS — D64.9 ANEMIA, UNSPECIFIED TYPE: Primary | ICD-10-CM

## 2022-08-03 ENCOUNTER — TELEPHONE (OUTPATIENT)
Dept: PHARMACY | Facility: CLINIC | Age: 67
End: 2022-08-03
Payer: MEDICARE

## 2022-08-03 NOTE — TELEPHONE ENCOUNTER
Spoke with patient in reference to needing assistance with Trulicity, she will bring on or mail her documents for the drug program.

## 2022-08-05 ENCOUNTER — LAB VISIT (OUTPATIENT)
Dept: LAB | Facility: HOSPITAL | Age: 67
End: 2022-08-05
Attending: INTERNAL MEDICINE
Payer: MEDICARE

## 2022-08-05 DIAGNOSIS — D64.9 ANEMIA, UNSPECIFIED TYPE: ICD-10-CM

## 2022-08-05 LAB
BASOPHILS # BLD AUTO: 0.01 K/UL (ref 0–0.2)
BASOPHILS NFR BLD: 0.2 % (ref 0–1.9)
DIFFERENTIAL METHOD: ABNORMAL
EOSINOPHIL # BLD AUTO: 0.1 K/UL (ref 0–0.5)
EOSINOPHIL NFR BLD: 2 % (ref 0–8)
ERYTHROCYTE [DISTWIDTH] IN BLOOD BY AUTOMATED COUNT: 13.5 % (ref 11.5–14.5)
HCT VFR BLD AUTO: 33.2 % (ref 37–48.5)
HGB BLD-MCNC: 11.4 G/DL (ref 12–16)
IMM GRANULOCYTES # BLD AUTO: 0.01 K/UL (ref 0–0.04)
IMM GRANULOCYTES NFR BLD AUTO: 0.2 % (ref 0–0.5)
LYMPHOCYTES # BLD AUTO: 1.3 K/UL (ref 1–4.8)
LYMPHOCYTES NFR BLD: 29 % (ref 18–48)
MCH RBC QN AUTO: 32.9 PG (ref 27–31)
MCHC RBC AUTO-ENTMCNC: 34.3 G/DL (ref 32–36)
MCV RBC AUTO: 96 FL (ref 82–98)
MONOCYTES # BLD AUTO: 0.4 K/UL (ref 0.3–1)
MONOCYTES NFR BLD: 9 % (ref 4–15)
NEUTROPHILS # BLD AUTO: 2.7 K/UL (ref 1.8–7.7)
NEUTROPHILS NFR BLD: 59.6 % (ref 38–73)
NRBC BLD-RTO: 0 /100 WBC
PLATELET # BLD AUTO: 146 K/UL (ref 150–450)
PMV BLD AUTO: 9.7 FL (ref 9.2–12.9)
RBC # BLD AUTO: 3.47 M/UL (ref 4–5.4)
WBC # BLD AUTO: 4.58 K/UL (ref 3.9–12.7)

## 2022-08-05 PROCEDURE — 36415 COLL VENOUS BLD VENIPUNCTURE: CPT | Performed by: INTERNAL MEDICINE

## 2022-08-05 PROCEDURE — 85025 COMPLETE CBC W/AUTO DIFF WBC: CPT | Performed by: INTERNAL MEDICINE

## 2022-08-18 ENCOUNTER — OFFICE VISIT (OUTPATIENT)
Dept: HEMATOLOGY/ONCOLOGY | Facility: CLINIC | Age: 67
End: 2022-08-18
Attending: INTERNAL MEDICINE
Payer: MEDICARE

## 2022-08-18 ENCOUNTER — HOSPITAL ENCOUNTER (OUTPATIENT)
Dept: RADIOLOGY | Facility: OTHER | Age: 67
Discharge: HOME | End: 2022-08-18
Attending: INTERNAL MEDICINE
Payer: MEDICARE

## 2022-08-18 VITALS
SYSTOLIC BLOOD PRESSURE: 137 MMHG | DIASTOLIC BLOOD PRESSURE: 62 MMHG | RESPIRATION RATE: 17 BRPM | WEIGHT: 169.56 LBS | HEART RATE: 80 BPM | HEIGHT: 64 IN | TEMPERATURE: 98 F | BODY MASS INDEX: 28.95 KG/M2 | OXYGEN SATURATION: 100 %

## 2022-08-18 DIAGNOSIS — Z17.0 MALIGNANT NEOPLASM OF LOWER-INNER QUADRANT OF LEFT BREAST IN FEMALE, ESTROGEN RECEPTOR POSITIVE: Primary | ICD-10-CM

## 2022-08-18 DIAGNOSIS — C50.312 MALIGNANT NEOPLASM OF LOWER-INNER QUADRANT OF LEFT BREAST IN FEMALE, ESTROGEN RECEPTOR POSITIVE: Primary | ICD-10-CM

## 2022-08-18 DIAGNOSIS — Z12.31 ENCOUNTER FOR SCREENING MAMMOGRAM FOR HIGH-RISK PATIENT: ICD-10-CM

## 2022-08-18 DIAGNOSIS — E04.1 THYROID NODULE: ICD-10-CM

## 2022-08-18 PROCEDURE — 3044F HG A1C LEVEL LT 7.0%: CPT | Mod: CPTII,S$GLB,, | Performed by: INTERNAL MEDICINE

## 2022-08-18 PROCEDURE — 3078F DIAST BP <80 MM HG: CPT | Mod: CPTII,S$GLB,, | Performed by: INTERNAL MEDICINE

## 2022-08-18 PROCEDURE — 76536 US EXAM OF HEAD AND NECK: CPT | Mod: 26,,, | Performed by: RADIOLOGY

## 2022-08-18 PROCEDURE — 76536 US SOFT TISSUE HEAD NECK THYROID: ICD-10-PCS | Mod: 26,,, | Performed by: RADIOLOGY

## 2022-08-18 PROCEDURE — 1101F PT FALLS ASSESS-DOCD LE1/YR: CPT | Mod: CPTII,S$GLB,, | Performed by: INTERNAL MEDICINE

## 2022-08-18 PROCEDURE — 4010F ACE/ARB THERAPY RXD/TAKEN: CPT | Mod: CPTII,S$GLB,, | Performed by: INTERNAL MEDICINE

## 2022-08-18 PROCEDURE — 3008F PR BODY MASS INDEX (BMI) DOCUMENTED: ICD-10-PCS | Mod: CPTII,S$GLB,, | Performed by: INTERNAL MEDICINE

## 2022-08-18 PROCEDURE — 3288F PR FALLS RISK ASSESSMENT DOCUMENTED: ICD-10-PCS | Mod: CPTII,S$GLB,, | Performed by: INTERNAL MEDICINE

## 2022-08-18 PROCEDURE — 3078F PR MOST RECENT DIASTOLIC BLOOD PRESSURE < 80 MM HG: ICD-10-PCS | Mod: CPTII,S$GLB,, | Performed by: INTERNAL MEDICINE

## 2022-08-18 PROCEDURE — 1101F PR PT FALLS ASSESS DOC 0-1 FALLS W/OUT INJ PAST YR: ICD-10-PCS | Mod: CPTII,S$GLB,, | Performed by: INTERNAL MEDICINE

## 2022-08-18 PROCEDURE — 99999 PR PBB SHADOW E&M-EST. PATIENT-LVL IV: ICD-10-PCS | Mod: PBBFAC,,, | Performed by: INTERNAL MEDICINE

## 2022-08-18 PROCEDURE — 3288F FALL RISK ASSESSMENT DOCD: CPT | Mod: CPTII,S$GLB,, | Performed by: INTERNAL MEDICINE

## 2022-08-18 PROCEDURE — 99999 PR PBB SHADOW E&M-EST. PATIENT-LVL IV: CPT | Mod: PBBFAC,,, | Performed by: INTERNAL MEDICINE

## 2022-08-18 PROCEDURE — 99213 OFFICE O/P EST LOW 20 MIN: CPT | Mod: S$GLB,,, | Performed by: INTERNAL MEDICINE

## 2022-08-18 PROCEDURE — 4010F PR ACE/ARB THEARPY RXD/TAKEN: ICD-10-PCS | Mod: CPTII,S$GLB,, | Performed by: INTERNAL MEDICINE

## 2022-08-18 PROCEDURE — 1159F MED LIST DOCD IN RCRD: CPT | Mod: CPTII,S$GLB,, | Performed by: INTERNAL MEDICINE

## 2022-08-18 PROCEDURE — 99213 PR OFFICE/OUTPT VISIT, EST, LEVL III, 20-29 MIN: ICD-10-PCS | Mod: S$GLB,,, | Performed by: INTERNAL MEDICINE

## 2022-08-18 PROCEDURE — 1126F AMNT PAIN NOTED NONE PRSNT: CPT | Mod: CPTII,S$GLB,, | Performed by: INTERNAL MEDICINE

## 2022-08-18 PROCEDURE — 76536 US EXAM OF HEAD AND NECK: CPT | Mod: TC

## 2022-08-18 PROCEDURE — 3075F SYST BP GE 130 - 139MM HG: CPT | Mod: CPTII,S$GLB,, | Performed by: INTERNAL MEDICINE

## 2022-08-18 PROCEDURE — 1126F PR PAIN SEVERITY QUANTIFIED, NO PAIN PRESENT: ICD-10-PCS | Mod: CPTII,S$GLB,, | Performed by: INTERNAL MEDICINE

## 2022-08-18 PROCEDURE — 3075F PR MOST RECENT SYSTOLIC BLOOD PRESS GE 130-139MM HG: ICD-10-PCS | Mod: CPTII,S$GLB,, | Performed by: INTERNAL MEDICINE

## 2022-08-18 PROCEDURE — 3044F PR MOST RECENT HEMOGLOBIN A1C LEVEL <7.0%: ICD-10-PCS | Mod: CPTII,S$GLB,, | Performed by: INTERNAL MEDICINE

## 2022-08-18 PROCEDURE — 1159F PR MEDICATION LIST DOCUMENTED IN MEDICAL RECORD: ICD-10-PCS | Mod: CPTII,S$GLB,, | Performed by: INTERNAL MEDICINE

## 2022-08-18 PROCEDURE — 3008F BODY MASS INDEX DOCD: CPT | Mod: CPTII,S$GLB,, | Performed by: INTERNAL MEDICINE

## 2022-08-18 NOTE — PROGRESS NOTES
Subjective:      Patient ID: Sunitha Pillai is a 67 y.o. female.    Chief Complaint: No chief complaint on file.      HPI:  66-year-old female, patient of Dr. Moulton, who returns for follow-up of a diagnosis of left breast cancer.  She completed adjuvant chemotherapy with Taxotere and Cytoxan on .She is on adjuvant letrozole therapy.      Her major complaint is some tingling and discomfort in her hands and legs.  This has been present since her chemotherapy.She has been on Lyrifca for that  Overall that is a bit better.    Appetite decreased since her  .  She has some dyspnea and decreased exercise capacity.    Current history: Screening mammogram on  showed a focal asymmetry in the lower outer portion left breast.  Biopsy on 2020 showed high-grade infiltrating ductal carcinoma (histologic grade 3, nuclear grade 3, mitotic index 3).  Tumor was 95% ER positive in 95% GA positive, HER2 was 2+ but negative by fish.  Ki-67 was 95%.    On 2020 lumpectomy and sentinel lymph node biopsy were performed.  That pathology showed a 1.5 cm infiltrating carcinoma with a single negative sentinel lymph node.  Margins were negative.  Final pathological stage T1c N0 stage I A.      Oncotype risk score came back at 29-high risk.    She received adjuvant Taxotere and Cytoxan from 20 to 21 and completed 4 cycles.    Completed radiation 21.     Letrozole started in May 2021.    Review of Systems   Constitutional: Negative for activity change, appetite change, fever and unexpected weight change.   HENT: Negative.    Cardiovascular: Negative for chest pain.   Gastrointestinal: Positive for constipation.   Genitourinary: Negative.    Musculoskeletal: Positive for arthralgias.   Neurological: Positive for numbness.   Psychiatric/Behavioral: Negative.      Objective:   Physical Exam   Vitals reviewed.  Constitutional: She is oriented to person, place, and time.  She appears well-developed and well-nourished. No distress.   Cardiovascular: Normal rate and regular rhythm.    Pulmonary/Chest: Effort normal and breath sounds normal. No respiratory distress. She has no wheezes. She has no rales. Right breast exhibits no mass, no nipple discharge and no skin change. Left breast exhibits skin change and tenderness. Left breast exhibits no mass and no nipple discharge.       Abdominal: Soft. She exhibits no mass. There is no abdominal tenderness.   Musculoskeletal: No edema.   Lymphadenopathy:     She has no cervical adenopathy.   Neurological: She is alert and oriented to person, place, and time.   Skin: No rash noted.     Psychiatric: She has a normal mood and affect. Her behavior is normal. Thought content normal.     Assessment:     Labs 8/5  -HGB 11.5, otherwise unremarkable.  1. Malignant neoplasm of lower-inner quadrant of left breast in female, estrogen receptor positive      2. Chemotherapy-induced peripheral neuropathy  Plan:        Mammogram    RTC 3 M.    Route Chart for Scheduling  Med Onc Route Chart for Scheduling

## 2022-08-19 ENCOUNTER — OFFICE VISIT (OUTPATIENT)
Dept: UROLOGY | Facility: CLINIC | Age: 67
End: 2022-08-19
Payer: MEDICARE

## 2022-08-19 VITALS
WEIGHT: 166 LBS | SYSTOLIC BLOOD PRESSURE: 171 MMHG | DIASTOLIC BLOOD PRESSURE: 88 MMHG | HEIGHT: 64 IN | BODY MASS INDEX: 28.34 KG/M2 | HEART RATE: 83 BPM

## 2022-08-19 DIAGNOSIS — R39.11 URINARY HESITANCY: ICD-10-CM

## 2022-08-19 LAB
BILIRUB SERPL-MCNC: NORMAL MG/DL
BILIRUB UR QL STRIP: NEGATIVE
BLOOD URINE, POC: NORMAL
CLARITY UR REFRACT.AUTO: CLEAR
CLARITY, POC UA: CLEAR
COLOR UR AUTO: YELLOW
COLOR, POC UA: NORMAL
GLUCOSE UR QL STRIP: NEGATIVE
GLUCOSE UR QL STRIP: NORMAL
HGB UR QL STRIP: NEGATIVE
KETONES UR QL STRIP: NEGATIVE
KETONES UR QL STRIP: NORMAL
LEUKOCYTE ESTERASE UR QL STRIP: NEGATIVE
LEUKOCYTE ESTERASE URINE, POC: NORMAL
MICROSCOPIC COMMENT: NORMAL
NITRITE UR QL STRIP: NEGATIVE
NITRITE, POC UA: NORMAL
PH UR STRIP: 6 [PH] (ref 5–8)
PH, POC UA: 5
POC RESIDUAL URINE VOLUME: 87 ML (ref 0–100)
PROT UR QL STRIP: NEGATIVE
PROTEIN, POC: NORMAL
SP GR UR STRIP: 1.01 (ref 1–1.03)
SPECIFIC GRAVITY, POC UA: 1.01
URN SPEC COLLECT METH UR: NORMAL
UROBILINOGEN, POC UA: NORMAL

## 2022-08-19 PROCEDURE — 3079F PR MOST RECENT DIASTOLIC BLOOD PRESSURE 80-89 MM HG: ICD-10-PCS | Mod: CPTII,S$GLB,, | Performed by: NURSE PRACTITIONER

## 2022-08-19 PROCEDURE — 81002 POCT URINE DIPSTICK WITHOUT MICROSCOPE: ICD-10-PCS | Mod: S$GLB,,, | Performed by: NURSE PRACTITIONER

## 2022-08-19 PROCEDURE — 3044F PR MOST RECENT HEMOGLOBIN A1C LEVEL <7.0%: ICD-10-PCS | Mod: CPTII,S$GLB,, | Performed by: NURSE PRACTITIONER

## 2022-08-19 PROCEDURE — 81002 URINALYSIS NONAUTO W/O SCOPE: CPT | Mod: S$GLB,,, | Performed by: NURSE PRACTITIONER

## 2022-08-19 PROCEDURE — 3288F FALL RISK ASSESSMENT DOCD: CPT | Mod: CPTII,S$GLB,, | Performed by: NURSE PRACTITIONER

## 2022-08-19 PROCEDURE — 51798 POCT BLADDER SCAN: ICD-10-PCS | Mod: S$GLB,,, | Performed by: NURSE PRACTITIONER

## 2022-08-19 PROCEDURE — 99204 OFFICE O/P NEW MOD 45 MIN: CPT | Mod: S$GLB,,, | Performed by: NURSE PRACTITIONER

## 2022-08-19 PROCEDURE — 3008F BODY MASS INDEX DOCD: CPT | Mod: CPTII,S$GLB,, | Performed by: NURSE PRACTITIONER

## 2022-08-19 PROCEDURE — 1101F PT FALLS ASSESS-DOCD LE1/YR: CPT | Mod: CPTII,S$GLB,, | Performed by: NURSE PRACTITIONER

## 2022-08-19 PROCEDURE — 3079F DIAST BP 80-89 MM HG: CPT | Mod: CPTII,S$GLB,, | Performed by: NURSE PRACTITIONER

## 2022-08-19 PROCEDURE — 3077F SYST BP >= 140 MM HG: CPT | Mod: CPTII,S$GLB,, | Performed by: NURSE PRACTITIONER

## 2022-08-19 PROCEDURE — 99999 PR PBB SHADOW E&M-EST. PATIENT-LVL V: ICD-10-PCS | Mod: PBBFAC,,, | Performed by: NURSE PRACTITIONER

## 2022-08-19 PROCEDURE — 3288F PR FALLS RISK ASSESSMENT DOCUMENTED: ICD-10-PCS | Mod: CPTII,S$GLB,, | Performed by: NURSE PRACTITIONER

## 2022-08-19 PROCEDURE — 1126F PR PAIN SEVERITY QUANTIFIED, NO PAIN PRESENT: ICD-10-PCS | Mod: CPTII,S$GLB,, | Performed by: NURSE PRACTITIONER

## 2022-08-19 PROCEDURE — 1159F MED LIST DOCD IN RCRD: CPT | Mod: CPTII,S$GLB,, | Performed by: NURSE PRACTITIONER

## 2022-08-19 PROCEDURE — 87801 DETECT AGNT MULT DNA AMPLI: CPT | Performed by: NURSE PRACTITIONER

## 2022-08-19 PROCEDURE — 1160F RVW MEDS BY RX/DR IN RCRD: CPT | Mod: CPTII,S$GLB,, | Performed by: NURSE PRACTITIONER

## 2022-08-19 PROCEDURE — 81001 URINALYSIS AUTO W/SCOPE: CPT | Performed by: NURSE PRACTITIONER

## 2022-08-19 PROCEDURE — 87481 CANDIDA DNA AMP PROBE: CPT | Mod: 59 | Performed by: NURSE PRACTITIONER

## 2022-08-19 PROCEDURE — 3044F HG A1C LEVEL LT 7.0%: CPT | Mod: CPTII,S$GLB,, | Performed by: NURSE PRACTITIONER

## 2022-08-19 PROCEDURE — 99999 PR PBB SHADOW E&M-EST. PATIENT-LVL V: CPT | Mod: PBBFAC,,, | Performed by: NURSE PRACTITIONER

## 2022-08-19 PROCEDURE — 1101F PR PT FALLS ASSESS DOC 0-1 FALLS W/OUT INJ PAST YR: ICD-10-PCS | Mod: CPTII,S$GLB,, | Performed by: NURSE PRACTITIONER

## 2022-08-19 PROCEDURE — 1126F AMNT PAIN NOTED NONE PRSNT: CPT | Mod: CPTII,S$GLB,, | Performed by: NURSE PRACTITIONER

## 2022-08-19 PROCEDURE — 4010F PR ACE/ARB THEARPY RXD/TAKEN: ICD-10-PCS | Mod: CPTII,S$GLB,, | Performed by: NURSE PRACTITIONER

## 2022-08-19 PROCEDURE — 3077F PR MOST RECENT SYSTOLIC BLOOD PRESSURE >= 140 MM HG: ICD-10-PCS | Mod: CPTII,S$GLB,, | Performed by: NURSE PRACTITIONER

## 2022-08-19 PROCEDURE — 87798 DETECT AGENT NOS DNA AMP: CPT | Performed by: NURSE PRACTITIONER

## 2022-08-19 PROCEDURE — 1160F PR REVIEW ALL MEDS BY PRESCRIBER/CLIN PHARMACIST DOCUMENTED: ICD-10-PCS | Mod: CPTII,S$GLB,, | Performed by: NURSE PRACTITIONER

## 2022-08-19 PROCEDURE — 99204 PR OFFICE/OUTPT VISIT, NEW, LEVL IV, 45-59 MIN: ICD-10-PCS | Mod: S$GLB,,, | Performed by: NURSE PRACTITIONER

## 2022-08-19 PROCEDURE — 1159F PR MEDICATION LIST DOCUMENTED IN MEDICAL RECORD: ICD-10-PCS | Mod: CPTII,S$GLB,, | Performed by: NURSE PRACTITIONER

## 2022-08-19 PROCEDURE — 51798 US URINE CAPACITY MEASURE: CPT | Mod: S$GLB,,, | Performed by: NURSE PRACTITIONER

## 2022-08-19 PROCEDURE — 4010F ACE/ARB THERAPY RXD/TAKEN: CPT | Mod: CPTII,S$GLB,, | Performed by: NURSE PRACTITIONER

## 2022-08-19 PROCEDURE — 3008F PR BODY MASS INDEX (BMI) DOCUMENTED: ICD-10-PCS | Mod: CPTII,S$GLB,, | Performed by: NURSE PRACTITIONER

## 2022-08-19 RX ORDER — TAMSULOSIN HYDROCHLORIDE 0.4 MG/1
0.4 CAPSULE ORAL DAILY
Qty: 30 CAPSULE | Refills: 11 | Status: SHIPPED | OUTPATIENT
Start: 2022-08-19 | End: 2023-02-03

## 2022-08-19 NOTE — PROGRESS NOTES
CHIEF COMPLAINT:    Mrs. Pillai is a 67 y.o. female presenting for urinary hesitancy.    .  PRESENTING ILLNESS:    Sunitha Pillai is a 67 y.o. female with a PMH of htn, breast cancer, type 2 diabetes, gerd who presents for urinary hesitancy.     New patient to urology department.  Presents today for urinary hesitancy. Feels does not empty bladder unless sits or squats for a long time.  This has been a chronic problem over the last year.  Denies hematuria, dysuria, nocturia.  Has history of diabetes, HgbA1C 5.7.       REVIEW OF SYSTEMS:    Review of Systems   Constitutional: Negative for chills and fever.   Respiratory: Negative for shortness of breath.    Cardiovascular: Negative for chest pain.   Gastrointestinal: Negative for constipation and diarrhea.   Genitourinary: Negative for dysuria, flank pain, frequency, hematuria and urgency.   Neurological: Negative for dizziness and weakness.        PATIENT HISTORY:    Past Medical History:   Diagnosis Date    Acute coronary syndrome 9/23/2018    Adjustment disorder     Anxiety     Arthritis     Cancer of breast 09/03/2020    Malignant neoplasm of lower-inner quadrant of left breast in female, estrogen receptor positive    Cholelithiasis with acute cholecystitis 3/5/2021    Depression     Diabetes mellitus type I     [facility] with AXIN2 and POLE variants of uncertain significance    GERD (gastroesophageal reflux disease)     Hypertension     Pneumonia due to COVID-19 virus 2/7/2021    Unstable angina 9/23/2018    Vertigo 05/13/2019    Vitamin D deficiency 2/7/2021       Family History   Problem Relation Age of Onset    Hypertension Mother     Hyperlipidemia Mother     Diabetes Mother     Heart disease Mother     Colon polyps Mother     Aneurysm Father     Diabetes Sister     Hypertension Sister     Heart disease Brother     Diabetes Brother     Hypertension Brother     Cancer  "Brother         brother German with prostate cancer; brother Cooper with throat cancer    Cervical cancer Daughter     Anxiety disorder Daughter     Depression Daughter     Anxiety disorder Daughter     Depression Daughter     Breast cancer Other     Cancer Paternal Aunt         unknown origin    Breast cancer Paternal Aunt     Breast cancer Maternal Aunt     Cancer Maternal Aunt         unknown origin    Prostate cancer Maternal Cousin     Leukemia Maternal Cousin     Prostate cancer Maternal Cousin     Colon cancer Neg Hx     Ovarian cancer Neg Hx        Allergies:  Patient has no known allergies.    Medications:    Current Outpatient Medications:     ACCU-CHEK GUIDE GLUCOSE METER Misc, USE  THREE TIMES DAILY, Disp: 1 each, Rfl: 0    acetaminophen (TYLENOL) 325 MG tablet, Take 2 tablets (650 mg total) by mouth every 8 (eight) hours as needed for Pain., Disp: , Rfl: 0    allopurinoL (ZYLOPRIM) 100 MG tablet, Take 1 tablet (100 mg total) by mouth once daily., Disp: 90 tablet, Rfl: 1    ascorbic acid, vitamin C, (VITAMIN C) 500 MG tablet, Take 1 tablet (500 mg total) by mouth 2 (two) times daily., Disp: , Rfl:     aspirin 81 mg Tab, Take 1 tablet by mouth Daily., Disp: , Rfl:     atorvastatin (LIPITOR) 10 MG tablet, Take 1 tablet (10 mg total) by mouth once daily., Disp: 90 tablet, Rfl: 3    BD ULTRA-FINE SHORT PEN NEEDLE 31 gauge x 5/16" Ndle, USE 1  4 TIMES DAILY WITH  INSULIN, Disp: 100 each, Rfl: 3    blood sugar diagnostic Strp, Strips and lancets covered by insurance E11.9, pt checks glucose three times daily, insulin dependent, Disp: 300 each, Rfl: 3    blood sugar diagnostic, drum (ACCU-CHEK COMPACT TEST) Strp, USE ONE STRIP TO CHECK GLUCOSE 4 TIMES DAILY BEFORE EACH MEAL AND AT BEDTIME, Disp: 100 each, Rfl: 0    candesartan (ATACAND) 8 MG tablet, Take 1 tablet (8 mg total) by mouth once daily., Disp: 90 tablet, Rfl: 3    clotrimazole-betamethasone 1-0.05% (LOTRISONE) cream, Apply " topically 2 (two) times daily., Disp: 45 g, Rfl: 0    diclofenac sodium (VOLTAREN) 1 % Gel, USE AS DIRECTED EVERY 8 HOURS, Disp: , Rfl:     dulaglutide (TRULICITY) 1.5 mg/0.5 mL pen injector, Inject 1.5 mg into the skin every 7 days., Disp: 12 pen, Rfl: 2    fexofenadine (ALLEGRA) 180 MG tablet, Take 1 tablet (180 mg total) by mouth once daily., Disp: , Rfl: 0    fluticasone propionate (FLONASE) 50 mcg/actuation nasal spray, 2 sprays (100 mcg total) by Each Nostril route once daily., Disp: 18.2 mL, Rfl: 0    lancets Misc, To check BG 4 times daily, to use with insurance preferred meter, insulin dependent, Disp: 400 each, Rfl: 3    letrozole (FEMARA) 2.5 mg Tab, Take 1 tablet (2.5 mg total) by mouth once daily., Disp: 90 tablet, Rfl: 3    metFORMIN (GLUCOPHAGE) 1000 MG tablet, Take 1 tablet (1,000 mg total) by mouth 2 (two) times daily., Disp: 180 tablet, Rfl: 3    pregabalin (LYRICA) 25 MG capsule, Take 2 capsules (50 mg total) by mouth 2 (two) times daily., Disp: 120 capsule, Rfl: 2    sertraline (ZOLOFT) 25 MG tablet, Take 1 tablet (25 mg total) by mouth once daily., Disp: 30 tablet, Rfl: 3    topiramate (TOPAMAX) 25 MG tablet, One at bedtime for 2 weeks then two at bedtime, Disp: 60 tablet, Rfl: 2    TRUEPLUS LANCETS 33 gauge Misc, USE 1 TO CHECK GLUCOSE 4 TIMES DAILY, Disp: , Rfl:     vitamin D (VITAMIN D3) 1000 units Tab, Take 1 tablet (1,000 Units total) by mouth once daily., Disp: 90 tablet, Rfl: 1    tamsulosin (FLOMAX) 0.4 mg Cap, Take 1 capsule (0.4 mg total) by mouth once daily., Disp: 30 capsule, Rfl: 11  No current facility-administered medications for this visit.    Facility-Administered Medications Ordered in Other Visits:     fentaNYL injection 25 mcg, 25 mcg, Intravenous, Q5 Min PRN, Kenneth Shannon MD    midazolam (VERSED) 1 mg/mL injection 0.5 mg, 0.5 mg, Intravenous, PRN, Kenneth Shannon MD, 2 mg at 09/17/20 0623    PHYSICAL EXAMINATION:    Physical  Exam  Vitals and nursing note reviewed. Exam conducted with a chaperone present.   Constitutional:       Appearance: Normal appearance. She is well-developed.   HENT:      Head: Normocephalic and atraumatic.   Eyes:      Pupils: Pupils are equal, round, and reactive to light.   Pulmonary:      Effort: Pulmonary effort is normal.   Genitourinary:     General: Normal vulva.      Exam position: Supine.      Pubic Area: No rash or pubic lice.       Vagina: Normal. No prolapsed vaginal walls.      Rectum: Normal.   Musculoskeletal:         General: Normal range of motion.      Cervical back: Normal range of motion.   Skin:     General: Skin is warm and dry.   Neurological:      Mental Status: She is alert and oriented to person, place, and time.   Psychiatric:         Behavior: Behavior normal.       Consent verbally obtained.  Betadine prep was applied to the urethral meatus. An in and out cath was performed after voiding.  The PVR was 90 ml.      LABS:    U/a dipstick performed in office today: light yellow, ph 5, 1.010, otherwise negative      IMPRESSION:    Encounter Diagnoses   Name Primary?    Urinary hesitancy        PLAN:    Problem List Items Addressed This Visit    None     Visit Diagnoses     Urinary hesitancy        Relevant Medications    tamsulosin (FLOMAX) 0.4 mg Cap    Other Relevant Orders    POCT URINE DIPSTICK WITHOUT MICROSCOPE (Completed)    POCT Bladder Scan (Completed)    VAGINOSIS SCREEN BY DNA PROBE    Ureaplasma PCR Urine    Mycoplasma genitalium Molecular Detection, PCR Urine    Urine culture    Urinalysis Microscopic    Urinalysis          1. Urinary hesitancy   - send urine for analysis, culture, ureaplasma, mycoplasma   - send vaginosis screen   - Trial of flomax.  Instructed patient to take 1st dose at night when in bed due to potential 1st dose syncope episode.  Patient was also informed and educated on side effects.    2. RTC in 6 weeks    I spent over 45 minutes with the patient. Over  50% of the visit was spent in counseling.      Aishwarya Goode NP

## 2022-08-22 LAB
SPECIMEN SOURCE: NORMAL
U PARVUM DNA SPEC QL NAA+PROBE: NEGATIVE
U UREALYTICUM DNA SPEC QL NAA+PROBE: NEGATIVE

## 2022-08-23 ENCOUNTER — OFFICE VISIT (OUTPATIENT)
Dept: URGENT CARE | Facility: CLINIC | Age: 67
End: 2022-08-23
Payer: MEDICARE

## 2022-08-23 VITALS
HEIGHT: 64 IN | RESPIRATION RATE: 17 BRPM | HEART RATE: 79 BPM | OXYGEN SATURATION: 100 % | SYSTOLIC BLOOD PRESSURE: 159 MMHG | TEMPERATURE: 98 F | DIASTOLIC BLOOD PRESSURE: 71 MMHG | BODY MASS INDEX: 28.34 KG/M2 | WEIGHT: 166 LBS

## 2022-08-23 DIAGNOSIS — J02.9 SORE THROAT: ICD-10-CM

## 2022-08-23 DIAGNOSIS — J30.9 ALLERGIC RHINITIS, UNSPECIFIED SEASONALITY, UNSPECIFIED TRIGGER: Primary | ICD-10-CM

## 2022-08-23 LAB
BACTERIAL VAGINOSIS DNA: NEGATIVE
CANDIDA GLABRATA DNA: NEGATIVE
CANDIDA KRUSEI DNA: NEGATIVE
CANDIDA RRNA VAG QL PROBE: NEGATIVE
CTP QC/QA: YES
SARS-COV-2 RDRP RESP QL NAA+PROBE: NEGATIVE
T VAGINALIS RRNA GENITAL QL PROBE: NEGATIVE

## 2022-08-23 PROCEDURE — 99213 PR OFFICE/OUTPT VISIT, EST, LEVL III, 20-29 MIN: ICD-10-PCS | Mod: S$GLB,,, | Performed by: NURSE PRACTITIONER

## 2022-08-23 PROCEDURE — 1159F MED LIST DOCD IN RCRD: CPT | Mod: CPTII,S$GLB,, | Performed by: NURSE PRACTITIONER

## 2022-08-23 PROCEDURE — 3078F DIAST BP <80 MM HG: CPT | Mod: CPTII,S$GLB,, | Performed by: NURSE PRACTITIONER

## 2022-08-23 PROCEDURE — 1125F AMNT PAIN NOTED PAIN PRSNT: CPT | Mod: CPTII,S$GLB,, | Performed by: NURSE PRACTITIONER

## 2022-08-23 PROCEDURE — 99213 OFFICE O/P EST LOW 20 MIN: CPT | Mod: S$GLB,,, | Performed by: NURSE PRACTITIONER

## 2022-08-23 PROCEDURE — 1160F RVW MEDS BY RX/DR IN RCRD: CPT | Mod: CPTII,S$GLB,, | Performed by: NURSE PRACTITIONER

## 2022-08-23 PROCEDURE — 3078F PR MOST RECENT DIASTOLIC BLOOD PRESSURE < 80 MM HG: ICD-10-PCS | Mod: CPTII,S$GLB,, | Performed by: NURSE PRACTITIONER

## 2022-08-23 PROCEDURE — 3044F PR MOST RECENT HEMOGLOBIN A1C LEVEL <7.0%: ICD-10-PCS | Mod: CPTII,S$GLB,, | Performed by: NURSE PRACTITIONER

## 2022-08-23 PROCEDURE — U0002 COVID-19 LAB TEST NON-CDC: HCPCS | Mod: QW,S$GLB,, | Performed by: NURSE PRACTITIONER

## 2022-08-23 PROCEDURE — 1125F PR PAIN SEVERITY QUANTIFIED, PAIN PRESENT: ICD-10-PCS | Mod: CPTII,S$GLB,, | Performed by: NURSE PRACTITIONER

## 2022-08-23 PROCEDURE — 1159F PR MEDICATION LIST DOCUMENTED IN MEDICAL RECORD: ICD-10-PCS | Mod: CPTII,S$GLB,, | Performed by: NURSE PRACTITIONER

## 2022-08-23 PROCEDURE — 4010F ACE/ARB THERAPY RXD/TAKEN: CPT | Mod: CPTII,S$GLB,, | Performed by: NURSE PRACTITIONER

## 2022-08-23 PROCEDURE — 4010F PR ACE/ARB THEARPY RXD/TAKEN: ICD-10-PCS | Mod: CPTII,S$GLB,, | Performed by: NURSE PRACTITIONER

## 2022-08-23 PROCEDURE — 1160F PR REVIEW ALL MEDS BY PRESCRIBER/CLIN PHARMACIST DOCUMENTED: ICD-10-PCS | Mod: CPTII,S$GLB,, | Performed by: NURSE PRACTITIONER

## 2022-08-23 PROCEDURE — 3077F SYST BP >= 140 MM HG: CPT | Mod: CPTII,S$GLB,, | Performed by: NURSE PRACTITIONER

## 2022-08-23 PROCEDURE — 3008F BODY MASS INDEX DOCD: CPT | Mod: CPTII,S$GLB,, | Performed by: NURSE PRACTITIONER

## 2022-08-23 PROCEDURE — U0002: ICD-10-PCS | Mod: QW,S$GLB,, | Performed by: NURSE PRACTITIONER

## 2022-08-23 PROCEDURE — 3008F PR BODY MASS INDEX (BMI) DOCUMENTED: ICD-10-PCS | Mod: CPTII,S$GLB,, | Performed by: NURSE PRACTITIONER

## 2022-08-23 PROCEDURE — 3044F HG A1C LEVEL LT 7.0%: CPT | Mod: CPTII,S$GLB,, | Performed by: NURSE PRACTITIONER

## 2022-08-23 PROCEDURE — 3077F PR MOST RECENT SYSTOLIC BLOOD PRESSURE >= 140 MM HG: ICD-10-PCS | Mod: CPTII,S$GLB,, | Performed by: NURSE PRACTITIONER

## 2022-08-23 RX ORDER — AZELASTINE 1 MG/ML
1 SPRAY, METERED NASAL 2 TIMES DAILY
Qty: 30 ML | Refills: 1 | Status: ON HOLD | OUTPATIENT
Start: 2022-08-23 | End: 2023-01-31 | Stop reason: HOSPADM

## 2022-08-23 RX ORDER — FLUTICASONE PROPIONATE 50 MCG
1 SPRAY, SUSPENSION (ML) NASAL DAILY
Qty: 15.8 ML | Refills: 1 | Status: SHIPPED | OUTPATIENT
Start: 2022-08-23 | End: 2023-10-17

## 2022-08-23 NOTE — PROGRESS NOTES
"Subjective:       Patient ID: Sunitha Pillai is a 67 y.o. female.    Vitals:  height is 5' 4.02" (1.626 m) and weight is 75.3 kg (166 lb). Her tympanic temperature is 98.4 °F (36.9 °C). Her blood pressure is 159/71 (abnormal) and her pulse is 79. Her respiration is 17 and oxygen saturation is 100%.     Chief Complaint: Sore Throat    67 y.o female presents today c/o headaches and sore throat x3 days.  Patient is vaccinated for Covid 19 and reports exposure to Covid 19.  Patient denies SOB and denies chest pain.    Sore Throat   This is a new problem. The current episode started in the past 7 days. Neither side of throat is experiencing more pain than the other. There has been no fever. The pain is at a severity of 2/10. The pain is mild. Associated symptoms include headaches. Pertinent negatives include no abdominal pain, congestion, coughing, diarrhea, drooling, ear discharge, ear pain, hoarse voice, plugged ear sensation, neck pain, shortness of breath, stridor, swollen glands, trouble swallowing or vomiting. She has tried nothing for the symptoms.       HENT: Positive for sore throat. Negative for ear pain, ear discharge, drooling, congestion and trouble swallowing.    Neck: Negative for neck pain.   Respiratory: Negative for cough, shortness of breath and stridor.    Gastrointestinal: Negative for abdominal pain, vomiting and diarrhea.   Neurological: Positive for headaches.       Objective:      Physical Exam   Constitutional: She is oriented to person, place, and time. She appears well-developed. She is cooperative.  Non-toxic appearance. She does not appear ill. No distress.      Comments:ambulatory     HENT:   Nose: Mucosal edema (significant swelling-- R > L), rhinorrhea and congestion present. No purulent discharge. Right sinus exhibits no maxillary sinus tenderness and no frontal sinus tenderness. Left sinus exhibits no maxillary sinus tenderness and no frontal sinus tenderness.   Mouth/Throat: " Uvula is midline and mucous membranes are normal. Mucous membranes are moist. No uvula swelling. Posterior oropharyngeal erythema (mild) present. No oropharyngeal exudate. No tonsillar exudate.   Eyes: Conjunctivae are normal. Extraocular movement intact   Pulmonary/Chest: Effort normal. No respiratory distress.   Neurological: She is alert and oriented to person, place, and time.   Skin: Skin is warm, dry and not diaphoretic.   Psychiatric: Her behavior is normal.   Nursing note and vitals reviewed.        Results for orders placed or performed in visit on 08/23/22   POCT COVID-19 Rapid Screening   Result Value Ref Range    POC Rapid COVID Negative Negative     Acceptable Yes        Assessment:       1. Allergic rhinitis, unspecified seasonality, unspecified trigger    2. Sore throat          Plan:         Allergic rhinitis, unspecified seasonality, unspecified trigger  -     fluticasone propionate (FLONASE) 50 mcg/actuation nasal spray; 1 spray (50 mcg total) by Each Nostril route once daily.  Dispense: 15.8 mL; Refill: 1  -     azelastine (ASTELIN) 137 mcg (0.1 %) nasal spray; 1 spray (137 mcg total) by Nasal route 2 (two) times daily.  Dispense: 30 mL; Refill: 1    Sore throat  -     POCT COVID-19 Rapid Screening  -     fluticasone propionate (FLONASE) 50 mcg/actuation nasal spray; 1 spray (50 mcg total) by Each Nostril route once daily.  Dispense: 15.8 mL; Refill: 1  -     azelastine (ASTELIN) 137 mcg (0.1 %) nasal spray; 1 spray (137 mcg total) by Nasal route 2 (two) times daily.  Dispense: 30 mL; Refill: 1

## 2022-08-24 ENCOUNTER — PATIENT MESSAGE (OUTPATIENT)
Dept: ADMINISTRATIVE | Facility: HOSPITAL | Age: 67
End: 2022-08-24
Payer: MEDICARE

## 2022-08-30 ENCOUNTER — DOCUMENTATION ONLY (OUTPATIENT)
Dept: REHABILITATION | Facility: HOSPITAL | Age: 67
End: 2022-08-30
Payer: MEDICARE

## 2022-08-30 DIAGNOSIS — R26.89 DECREASED FUNCTIONAL MOBILITY: ICD-10-CM

## 2022-08-30 DIAGNOSIS — M25.612 DECREASED RANGE OF MOTION OF LEFT SHOULDER: Primary | ICD-10-CM

## 2022-08-30 DIAGNOSIS — R29.898 WEAKNESS OF LEFT UPPER EXTREMITY: ICD-10-CM

## 2022-08-30 DIAGNOSIS — R29.3 POOR POSTURE: ICD-10-CM

## 2022-08-30 DIAGNOSIS — M62.89 MUSCLE TIGHTNESS: ICD-10-CM

## 2022-08-30 NOTE — PROGRESS NOTES
PHYSICAL THERAPY  Discharge  Date of Discharge 8/30/2022    Name: Sunitha Breanna [facility] #: [id number]    Pt was seen in Physical Therapy for initial evaluation on:4/29/2022  Pt's last date of treatment in Physical Therapy was:6/17/2022  Number of treatment sessions completed: 3  Reason for discharge:    self discharge/reason unknown  Status at Discharge:  Goals not met

## 2022-10-07 ENCOUNTER — OFFICE VISIT (OUTPATIENT)
Dept: UROLOGY | Facility: CLINIC | Age: 67
End: 2022-10-07
Payer: MEDICARE

## 2022-10-07 VITALS
HEIGHT: 64 IN | SYSTOLIC BLOOD PRESSURE: 174 MMHG | DIASTOLIC BLOOD PRESSURE: 78 MMHG | BODY MASS INDEX: 29.76 KG/M2 | WEIGHT: 174.31 LBS | HEART RATE: 84 BPM

## 2022-10-07 DIAGNOSIS — Z12.11 ENCOUNTER FOR SCREENING COLONOSCOPY FOR NON-HIGH-RISK PATIENT: Primary | ICD-10-CM

## 2022-10-07 DIAGNOSIS — R39.11 URINARY HESITANCY: Primary | ICD-10-CM

## 2022-10-07 LAB — POC RESIDUAL URINE VOLUME: 83 ML (ref 0–100)

## 2022-10-07 PROCEDURE — 3044F PR MOST RECENT HEMOGLOBIN A1C LEVEL <7.0%: ICD-10-PCS | Mod: CPTII,S$GLB,, | Performed by: NURSE PRACTITIONER

## 2022-10-07 PROCEDURE — 51798 POCT BLADDER SCAN: ICD-10-PCS | Mod: S$GLB,,, | Performed by: NURSE PRACTITIONER

## 2022-10-07 PROCEDURE — 1159F MED LIST DOCD IN RCRD: CPT | Mod: CPTII,S$GLB,, | Performed by: NURSE PRACTITIONER

## 2022-10-07 PROCEDURE — 4010F ACE/ARB THERAPY RXD/TAKEN: CPT | Mod: CPTII,S$GLB,, | Performed by: NURSE PRACTITIONER

## 2022-10-07 PROCEDURE — 3288F FALL RISK ASSESSMENT DOCD: CPT | Mod: CPTII,S$GLB,, | Performed by: NURSE PRACTITIONER

## 2022-10-07 PROCEDURE — 3044F HG A1C LEVEL LT 7.0%: CPT | Mod: CPTII,S$GLB,, | Performed by: NURSE PRACTITIONER

## 2022-10-07 PROCEDURE — 1101F PT FALLS ASSESS-DOCD LE1/YR: CPT | Mod: CPTII,S$GLB,, | Performed by: NURSE PRACTITIONER

## 2022-10-07 PROCEDURE — 51798 US URINE CAPACITY MEASURE: CPT | Mod: S$GLB,,, | Performed by: NURSE PRACTITIONER

## 2022-10-07 PROCEDURE — 3008F BODY MASS INDEX DOCD: CPT | Mod: CPTII,S$GLB,, | Performed by: NURSE PRACTITIONER

## 2022-10-07 PROCEDURE — 99999 PR PBB SHADOW E&M-EST. PATIENT-LVL II: CPT | Mod: PBBFAC,,, | Performed by: NURSE PRACTITIONER

## 2022-10-07 PROCEDURE — 1159F PR MEDICATION LIST DOCUMENTED IN MEDICAL RECORD: ICD-10-PCS | Mod: CPTII,S$GLB,, | Performed by: NURSE PRACTITIONER

## 2022-10-07 PROCEDURE — 1101F PR PT FALLS ASSESS DOC 0-1 FALLS W/OUT INJ PAST YR: ICD-10-PCS | Mod: CPTII,S$GLB,, | Performed by: NURSE PRACTITIONER

## 2022-10-07 PROCEDURE — 3008F PR BODY MASS INDEX (BMI) DOCUMENTED: ICD-10-PCS | Mod: CPTII,S$GLB,, | Performed by: NURSE PRACTITIONER

## 2022-10-07 PROCEDURE — 3077F PR MOST RECENT SYSTOLIC BLOOD PRESSURE >= 140 MM HG: ICD-10-PCS | Mod: CPTII,S$GLB,, | Performed by: NURSE PRACTITIONER

## 2022-10-07 PROCEDURE — 3078F DIAST BP <80 MM HG: CPT | Mod: CPTII,S$GLB,, | Performed by: NURSE PRACTITIONER

## 2022-10-07 PROCEDURE — 99999 PR PBB SHADOW E&M-EST. PATIENT-LVL II: ICD-10-PCS | Mod: PBBFAC,,, | Performed by: NURSE PRACTITIONER

## 2022-10-07 PROCEDURE — 4010F PR ACE/ARB THEARPY RXD/TAKEN: ICD-10-PCS | Mod: CPTII,S$GLB,, | Performed by: NURSE PRACTITIONER

## 2022-10-07 PROCEDURE — 1160F RVW MEDS BY RX/DR IN RCRD: CPT | Mod: CPTII,S$GLB,, | Performed by: NURSE PRACTITIONER

## 2022-10-07 PROCEDURE — 3078F PR MOST RECENT DIASTOLIC BLOOD PRESSURE < 80 MM HG: ICD-10-PCS | Mod: CPTII,S$GLB,, | Performed by: NURSE PRACTITIONER

## 2022-10-07 PROCEDURE — 3288F PR FALLS RISK ASSESSMENT DOCUMENTED: ICD-10-PCS | Mod: CPTII,S$GLB,, | Performed by: NURSE PRACTITIONER

## 2022-10-07 PROCEDURE — 3077F SYST BP >= 140 MM HG: CPT | Mod: CPTII,S$GLB,, | Performed by: NURSE PRACTITIONER

## 2022-10-07 PROCEDURE — 1160F PR REVIEW ALL MEDS BY PRESCRIBER/CLIN PHARMACIST DOCUMENTED: ICD-10-PCS | Mod: CPTII,S$GLB,, | Performed by: NURSE PRACTITIONER

## 2022-10-07 PROCEDURE — 99213 PR OFFICE/OUTPT VISIT, EST, LEVL III, 20-29 MIN: ICD-10-PCS | Mod: S$GLB,,, | Performed by: NURSE PRACTITIONER

## 2022-10-07 PROCEDURE — 99213 OFFICE O/P EST LOW 20 MIN: CPT | Mod: S$GLB,,, | Performed by: NURSE PRACTITIONER

## 2022-10-07 NOTE — PROGRESS NOTES
CHIEF COMPLAINT:    Mrs. Pillai is a 67 y.o. female presenting for urinary hesitancy.    .  PRESENTING ILLNESS:    Sunitha Pillai is a 67 y.o. female with a PMH of htn, breast cancer, type 2 diabetes, gerd who presents for urinary hesitancy follow up.     Established patient to urology department.  Presents today for urinary hesitancy follow up.  Last seen 8/19/20 and started on flomax for urinary hesitancy.  Began taking medication as prescribed but did not notice a difference.  Advised to stop medication if there is no benefit.  Feels does not empty bladder unless sits or squats for a long time approximately 15-20 minutes.  This has been a chronic problem over the last year.  Has history of diabetes, HgbA1C 5.7.       REVIEW OF SYSTEMS:    Review of Systems   Constitutional:  Negative for chills and fever.   Respiratory:  Negative for shortness of breath.    Cardiovascular:  Negative for chest pain.   Gastrointestinal:  Negative for constipation and diarrhea.   Genitourinary:  Negative for dysuria, flank pain, frequency, hematuria and urgency.   Neurological:  Negative for dizziness and weakness.      PATIENT HISTORY:    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, estrogen receptor positive    Cholelithiasis with acute cholecystitis 3/5/2021    Depression     Diabetes mellitus type I     Genetic testing of female 09/2020    Minerva via [employer] with AXIN2 and POLE variants of uncertain significance    GERD (gastroesophageal reflux disease)     Hypertension     Pneumonia due to COVID-19 virus 2/7/2021    Unstable angina 9/23/2018    Vertigo 05/13/2019    Vitamin D deficiency 2/7/2021       Family History   Problem Relation Age of Onset    Hypertension Mother     Hyperlipidemia Mother     Diabetes Mother     Heart  "disease Mother     Colon polyps Mother     Aneurysm Father     Diabetes Sister     Hypertension Sister     Heart disease Brother     Diabetes Brother     Hypertension Brother     Cancer Brother         brother German with prostate cancer; brother Cooper with throat cancer    Cervical cancer Daughter     Anxiety disorder Daughter     Depression Daughter     Anxiety disorder Daughter     Depression Daughter     Breast cancer Other     Cancer Paternal Aunt         unknown origin    Breast cancer Paternal Aunt     Breast cancer Maternal Aunt     Cancer Maternal Aunt         unknown origin    Prostate cancer Maternal Cousin     Leukemia Maternal Cousin     Prostate cancer Maternal Cousin     Colon cancer Neg Hx     Ovarian cancer Neg Hx        Allergies:  Patient has no known allergies.    Medications:    Current Outpatient Medications:     ACCU-CHEK GUIDE GLUCOSE METER Misc, USE  THREE TIMES DAILY, Disp: 1 each, Rfl: 0    acetaminophen (TYLENOL) 325 MG tablet, Take 2 tablets (650 mg total) by mouth every 8 (eight) hours as needed for Pain., Disp: , Rfl: 0    allopurinoL (ZYLOPRIM) 100 MG tablet, Take 1 tablet (100 mg total) by mouth once daily., Disp: 90 tablet, Rfl: 1    ascorbic acid, vitamin C, (VITAMIN C) 500 MG tablet, Take 1 tablet (500 mg total) by mouth 2 (two) times daily., Disp: , Rfl:     aspirin 81 mg Tab, Take 1 tablet by mouth Daily., Disp: , Rfl:     atorvastatin (LIPITOR) 10 MG tablet, Take 1 tablet (10 mg total) by mouth once daily., Disp: 90 tablet, Rfl: 3    azelastine (ASTELIN) 137 mcg (0.1 %) nasal spray, 1 spray (137 mcg total) by Nasal route 2 (two) times daily., Disp: 30 mL, Rfl: 1    BD ULTRA-FINE SHORT PEN NEEDLE 31 gauge x 5/16" Ndle, USE 1  4 TIMES DAILY WITH  INSULIN, Disp: 100 each, Rfl: 3    blood sugar diagnostic Strp, Strips and lancets covered by insurance E11.9, pt checks glucose three times daily, insulin dependent, Disp: 300 each, Rfl: 3    blood sugar diagnostic, drum (ACCU-CHEK " COMPACT TEST) Strp, USE ONE STRIP TO CHECK GLUCOSE 4 TIMES DAILY BEFORE EACH MEAL AND AT BEDTIME, Disp: 100 each, Rfl: 0    candesartan (ATACAND) 8 MG tablet, Take 1 tablet (8 mg total) by mouth once daily., Disp: 90 tablet, Rfl: 3    clotrimazole-betamethasone 1-0.05% (LOTRISONE) cream, Apply topically 2 (two) times daily., Disp: 45 g, Rfl: 0    diclofenac sodium (VOLTAREN) 1 % Gel, USE AS DIRECTED EVERY 8 HOURS, Disp: , Rfl:     dulaglutide (TRULICITY) 1.5 mg/0.5 mL pen injector, Inject 1.5 mg into the skin every 7 days., Disp: 12 pen, Rfl: 2    fexofenadine (ALLEGRA) 180 MG tablet, Take 1 tablet (180 mg total) by mouth once daily., Disp: , Rfl: 0    fluticasone propionate (FLONASE) 50 mcg/actuation nasal spray, 2 sprays (100 mcg total) by Each Nostril route once daily., Disp: 18.2 mL, Rfl: 0    fluticasone propionate (FLONASE) 50 mcg/actuation nasal spray, 1 spray (50 mcg total) by Each Nostril route once daily., Disp: 15.8 mL, Rfl: 1    lancets Misc, To check BG 4 times daily, to use with insurance preferred meter, insulin dependent, Disp: 400 each, Rfl: 3    letrozole (FEMARA) 2.5 mg Tab, Take 1 tablet (2.5 mg total) by mouth once daily., Disp: 90 tablet, Rfl: 3    metFORMIN (GLUCOPHAGE) 1000 MG tablet, Take 1 tablet (1,000 mg total) by mouth 2 (two) times daily., Disp: 180 tablet, Rfl: 3    pregabalin (LYRICA) 25 MG capsule, Take 2 capsules (50 mg total) by mouth 2 (two) times daily., Disp: 120 capsule, Rfl: 2    sertraline (ZOLOFT) 25 MG tablet, Take 1 tablet (25 mg total) by mouth once daily., Disp: 30 tablet, Rfl: 3    tamsulosin (FLOMAX) 0.4 mg Cap, Take 1 capsule (0.4 mg total) by mouth once daily., Disp: 30 capsule, Rfl: 11    topiramate (TOPAMAX) 25 MG tablet, One at bedtime for 2 weeks then two at bedtime, Disp: 60 tablet, Rfl: 2    TRUEPLUS LANCETS 33 gauge Misc, USE 1 TO CHECK GLUCOSE 4 TIMES DAILY, Disp: , Rfl:     vitamin D (VITAMIN D3) 1000 units Tab, Take 1 tablet (1,000 Units total) by mouth  once daily., Disp: 90 tablet, Rfl: 1  No current facility-administered medications for this visit.    Facility-Administered Medications Ordered in Other Visits:     fentaNYL injection 25 mcg, 25 mcg, Intravenous, Q5 Min PRN, Kenneth Shannon MD    midazolam (VERSED) 1 mg/mL injection 0.5 mg, 0.5 mg, Intravenous, PRN, Kenneth Shannon MD, 2 mg at 09/17/20 0637    PHYSICAL EXAMINATION:    Physical Exam  Vitals and nursing note reviewed.   Constitutional:       Appearance: Normal appearance. She is well-developed.   HENT:      Head: Normocephalic and atraumatic.   Eyes:      Pupils: Pupils are equal, round, and reactive to light.   Pulmonary:      Effort: Pulmonary effort is normal.   Musculoskeletal:         General: Normal range of motion.      Cervical back: Normal range of motion.   Skin:     General: Skin is warm and dry.   Neurological:      Mental Status: She is alert and oriented to person, place, and time.   Psychiatric:         Behavior: Behavior normal.     LABS:    Bladder scan performed by Nurse Sujey.  PVR 83 ml       IMPRESSION:    Encounter Diagnoses   Name Primary?    Urinary hesitancy Yes       PLAN:    Problem List Items Addressed This Visit    None  Visit Diagnoses       Urinary hesitancy    -  Primary    Relevant Orders    POCT URINE DIPSTICK WITHOUT MICROSCOPE    POCT Bladder Scan (Completed)    Simple urodynamics w/ cysto            1. Urinary hesitancy   - urinary infectious work up unremarkable   - trial of flomax with no difference   - schedule for suds/cysto to further investigate    2. RTC for suds/cysto    I spent over 30 minutes with the patient. Over 50% of the visit was spent in counseling.      Aishwarya Goode NP

## 2022-11-07 ENCOUNTER — TELEPHONE (OUTPATIENT)
Dept: UROLOGY | Facility: CLINIC | Age: 67
End: 2022-11-07
Payer: MEDICARE

## 2022-11-13 ENCOUNTER — HOSPITAL ENCOUNTER (EMERGENCY)
Facility: HOSPITAL | Age: 67
Discharge: HOME | End: 2022-11-13
Attending: EMERGENCY MEDICINE
Payer: MEDICARE

## 2022-11-13 VITALS
RESPIRATION RATE: 18 BRPM | BODY MASS INDEX: 28.67 KG/M2 | HEART RATE: 91 BPM | DIASTOLIC BLOOD PRESSURE: 91 MMHG | SYSTOLIC BLOOD PRESSURE: 194 MMHG | WEIGHT: 167 LBS | TEMPERATURE: 98 F | OXYGEN SATURATION: 99 %

## 2022-11-13 DIAGNOSIS — R07.9 CHEST PAIN: ICD-10-CM

## 2022-11-13 DIAGNOSIS — M25.519 SHOULDER PAIN: ICD-10-CM

## 2022-11-13 LAB
POCT GLUCOSE: 100 MG/DL (ref 70–110)
TROPONIN I SERPL DL<=0.01 NG/ML-MCNC: 0.02 NG/ML (ref 0–0.03)

## 2022-11-13 PROCEDURE — 84484 ASSAY OF TROPONIN QUANT: CPT | Performed by: EMERGENCY MEDICINE

## 2022-11-13 PROCEDURE — 25000003 PHARM REV CODE 250: Performed by: EMERGENCY MEDICINE

## 2022-11-13 PROCEDURE — 82962 GLUCOSE BLOOD TEST: CPT

## 2022-11-13 PROCEDURE — 93010 ELECTROCARDIOGRAM REPORT: CPT | Mod: ,,, | Performed by: INTERNAL MEDICINE

## 2022-11-13 PROCEDURE — 93005 ELECTROCARDIOGRAM TRACING: CPT

## 2022-11-13 PROCEDURE — 99284 EMERGENCY DEPT VISIT MOD MDM: CPT | Mod: ,,, | Performed by: EMERGENCY MEDICINE

## 2022-11-13 PROCEDURE — 99284 PR EMERGENCY DEPT VISIT,LEVEL IV: ICD-10-PCS | Mod: ,,, | Performed by: EMERGENCY MEDICINE

## 2022-11-13 PROCEDURE — 93010 EKG 12-LEAD: ICD-10-PCS | Mod: ,,, | Performed by: INTERNAL MEDICINE

## 2022-11-13 PROCEDURE — 99285 EMERGENCY DEPT VISIT HI MDM: CPT | Mod: 25

## 2022-11-13 RX ORDER — LIDOCAINE 50 MG/G
1 PATCH TOPICAL
Status: DISCONTINUED | OUTPATIENT
Start: 2022-11-13 | End: 2022-11-13 | Stop reason: HOSPADM

## 2022-11-13 RX ORDER — ACETAMINOPHEN 500 MG
500 TABLET ORAL EVERY 6 HOURS PRN
Qty: 20 TABLET | Refills: 0 | Status: SHIPPED | OUTPATIENT
Start: 2022-11-13 | End: 2023-01-16

## 2022-11-13 RX ORDER — LIDOCAINE 50 MG/G
1 PATCH TOPICAL DAILY PRN
Qty: 15 PATCH | Refills: 0 | Status: SHIPPED | OUTPATIENT
Start: 2022-11-13 | End: 2023-01-16

## 2022-11-13 RX ORDER — CYCLOBENZAPRINE HCL 10 MG
10 TABLET ORAL NIGHTLY PRN
Qty: 5 TABLET | Refills: 0 | Status: SHIPPED | OUTPATIENT
Start: 2022-11-13 | End: 2022-11-13 | Stop reason: SDUPTHER

## 2022-11-13 RX ORDER — NAPROXEN 250 MG/1
250 TABLET ORAL
Status: COMPLETED | OUTPATIENT
Start: 2022-11-13 | End: 2022-11-13

## 2022-11-13 RX ORDER — CYCLOBENZAPRINE HCL 10 MG
5 TABLET ORAL NIGHTLY PRN
Qty: 5 TABLET | Refills: 0 | Status: SHIPPED | OUTPATIENT
Start: 2022-11-13 | End: 2022-11-18

## 2022-11-13 RX ADMIN — LIDOCAINE 1 PATCH: 50 PATCH CUTANEOUS at 11:11

## 2022-11-13 RX ADMIN — NAPROXEN 250 MG: 250 TABLET ORAL at 11:11

## 2022-11-13 NOTE — ED PROVIDER NOTES
Encounter Date: 11/13/2022    SCRIBE #1 NOTE: I, Sandra Gonzalez, am scribing for, and in the presence of,  Deshawn Puentes MD. I have scribed the following portions of the note - Other sections scribed: HPI.     History     Chief Complaint   Patient presents with    Shoulder Pain    Hand Pain     Right shoulder and right hand pain; denies fall or injury     67 y.o. female with a h/o HTN, GERD, DM type II, anxiety, pneumonia, cholelithiasis, breast cancer (s/p chemo, lumpectomy) presenting with right sided chest and shoulder pain.     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 She does report falling out of bed on her right side a few days after the pain began.  No head injury or LOC.   Location: Right sided chest, shoulder, neck, and finger pain.  Duration: Over the past week.  Modifiers: The pain worsens with ROM. It is also difficult to sleep on the right side due to the pain. She took tylenol and a muscle relaxant which did not improve her symptoms.  Associated Symptoms: She denies numbness.       The history is provided by the patient and medical records. No  was used.   Review of patient's allergies indicates:  No Known Allergies  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, estrogen receptor positive    Cholelithiasis with acute cholecystitis 3/5/2021    Depression     Diabetes mellitus type I     Genetic testing of female 09/2020    Minerva via [employer] with AXIN2 and POLE variants of uncertain significance    GERD (gastroesophageal reflux disease)     Hypertension     Pneumonia due to COVID-19 virus 2/7/2021    Unstable angina 9/23/2018    Vertigo 05/13/2019    Vitamin D deficiency 2/7/2021     Past Surgical History:   Procedure Laterality Date      SECTION      INJECTION FOR SENTINEL NODE IDENTIFICATION Left 2020    Procedure: INJECTION, FOR SENTINEL NODE IDENTIFICATION;  Surgeon: Isabel Moulton MD;  Location: [facility] OR;  Service: General;  Laterality: Left;    INSERTION OF TUNNELED CENTRAL VENOUS CATHETER (CVC) WITH SUBCUTANEOUS PORT N/A 2020    Procedure: INSERTION, PORT-A-CATH;  Surgeon: Hardeep Martin MD;  Location: [facility] CATH LAB;  Service: Radiology;  Laterality: N/A;    LAPAROSCOPIC CHOLECYSTECTOMY N/A 3/6/2021    Procedure: CHOLECYSTECTOMY, LAPAROSCOPIC;  Surgeon: Buster Son MD;  Location: [facility] OR [facility] FLR;  Service: General;  Laterality: N/A;    MASTECTOMY, PARTIAL Left 2020    Procedure: MASTECTOMY, PARTIAL-w/reflector;  Surgeon: Isabel Moulton MD;  Location: [facility] OR;  Service: General;  Laterality: Left;    SENTINEL LYMPH NODE BIOPSY Left 2020    Procedure: BIOPSY, LYMPH NODE, SENTINEL;  Surgeon: Isabel Moulton MD;  Location: [facility] OR;  Service: General;  Laterality: Left;     Family History   Problem Relation Age of Onset    Hypertension Mother     Hyperlipidemia Mother     Diabetes Mother     Heart disease Mother     Colon polyps Mother     Aneurysm Father     Diabetes Sister     Hypertension Sister     Heart disease Brother     Diabetes Brother     Hypertension Brother     Cancer Brother         brother German with prostate cancer; brother Cooper with throat cancer    Cervical cancer Daughter     Anxiety disorder Daughter     Depression Daughter     Anxiety disorder Daughter     Depression Daughter     Breast cancer Other     Cancer Paternal Aunt         unknown origin    Breast cancer Paternal Aunt     Breast cancer Maternal Aunt     Cancer Maternal Aunt         unknown origin    Prostate cancer Maternal Cousin     Leukemia Maternal Cousin     Prostate cancer Maternal Cousin     Colon cancer Neg Hx     Ovarian cancer Neg Hx      Social History     Tobacco Use    Smoking status: Never    Smokeless  tobacco: Never   Substance Use Topics    Alcohol use: Never    Drug use: No     Review of Systems   Constitutional:  Negative for fever.   HENT:  Negative for trouble swallowing.    Respiratory:  Negative for shortness of breath.    Cardiovascular:  Positive for chest pain.   Gastrointestinal:  Negative for abdominal pain and vomiting.   Musculoskeletal:  Positive for arthralgias.   Allergic/Immunologic: Negative for food allergies.   Neurological:  Negative for numbness.   Hematological:  Does not bruise/bleed easily.     Physical Exam     Initial Vitals [11/13/22 1027]   BP Pulse Resp Temp SpO2   (!) 194/91 95 16 97.8 °F (36.6 °C) 100 %      MAP       --         Physical Exam    Nursing note and vitals reviewed.  Constitutional: She is not diaphoretic. No distress.   HENT:   Head: Normocephalic and atraumatic.   Eyes: Right eye exhibits no discharge. Left eye exhibits no discharge.   Neck: Neck supple. No tracheal deviation present.   Cardiovascular:  Normal rate, regular rhythm, normal heart sounds and intact distal pulses.     Exam reveals no friction rub.       Pulmonary/Chest: Breath sounds normal. No respiratory distress. She exhibits tenderness.   Abdominal: Abdomen is soft. There is no abdominal tenderness.   Musculoskeletal:         General: No edema.      Cervical back: Neck supple.      Comments: RUE:  Compartments soft  TTP right trapezius  TTP anterior shoulder/lateral chest  Shoulder ROM limited 2/2 pain, most pain with abduction, can abduct to 90 degrees  Intact radial pulse  SILT  No overlying erythema to right shoulder    5/5  strength, elbow flexion/extension   No focal bony TTP hands, moving all digits normally      Neurological: She is alert and oriented to person, place, and time.   Skin: Skin is warm. No rash noted.   Psychiatric: She has a normal mood and affect. Her behavior is normal.       ED Course   Procedures  Labs Reviewed   TROPONIN I   POCT GLUCOSE   POCT GLUCOSE MONITORING  CONTINUOUS     EKG Readings: (Independently Interpreted)   Initial Reading: No STEMI. Previous EKG: Compared with most recent EKG Rhythm: Normal Sinus Rhythm. Heart Rate: 77. Ectopy: No Ectopy. Clinical Impression: Normal Sinus Rhythm     Imaging Results              X-Ray Shoulder Trauma Right (Final result)  Result time 11/13/22 11:48:20      Final result by Ruby Zamudio MD (11/13/22 11:48:20)                   Impression:      Degenerative changes.      Electronically signed by: Ruby Zamudio MD  Date:    11/13/2022  Time:    11:48               Narrative:    EXAMINATION:  XR SHOULDER TRAUMA 3 VIEW RIGHT    CLINICAL HISTORY:  Pain in unspecified shoulder    TECHNIQUE:  Three or four views of the right shoulder were performed.    COMPARISON:  September 9, 2016    FINDINGS:  No acute fracture or bony destructive process is seen.  Alignment is preserved.  As previously, there are degenerative changes at the acromioclavicular joint with spurs on the glenoid.  No soft tissue calcifications to suggest calcific tendinitis.                                       X-Ray Chest PA And Lateral (Final result)  Result time 11/13/22 11:42:34      Final result by Bela Reece MD (11/13/22 11:42:34)                   Impression:      No acute abnormality.      Electronically signed by: Bela Reece  Date:    11/13/2022  Time:    11:42               Narrative:    EXAMINATION:  XR CHEST PA AND LATERAL    CLINICAL HISTORY:  Chest pain, unspecified    TECHNIQUE:  PA and lateral views of the chest were performed.    COMPARISON:  11/08/2021, 10/21/2020    FINDINGS:  Cardiac silhouette is stable and nonenlarged.  Lungs are clear.  There is no pleural effusion or pneumothorax.  Degenerative changes of the spine and shoulders noted.                                       Medications   LIDOcaine 5 % patch 1 patch (1 patch Transdermal Patch Applied 11/13/22 1120)   naproxen tablet 250 mg (250 mg Oral Given 11/13/22 1119)      Medical Decision Making:   History:   Old Medical Records: I decided to obtain old medical records.  Old Records Summarized: other records.       <> Summary of Records: 2020 stress:  · Normal left ventricular systolic function. The estimated ejection fraction is 60%.  · Normal LV diastolic function.  · The ECG portion of this study is negative for myocardial ischemia.  · The stress echo portion of this study is negative for myocardial ischemia.    Initial Assessment:   67-year-old female presenting with right shoulder pain.  Neurovascularly intact right upper extremity.  No acute distress.  Differential Diagnosis:   Including, but not limited to:    Musculoskeletal pain  Cervical radiculopathy  Fracture  Lower clinical suspicion for anginal equivalent  Considered PE, but no tachycardia or hypoxia  Considered aortic pathology, but intact and equal distal pulses, lower suspicion given duration of symptoms  Independently Interpreted Test(s):   I have ordered and independently interpreted X-rays - see summary below.       <> Summary of X-Ray Reading(s): Degenerative changes and right shoulder, no acute chest findings  I have ordered and independently interpreted EKG Reading(s) - see prior notes  Clinical Tests:   Lab Tests: Ordered and Reviewed  Radiological Study: Ordered and Reviewed  Medical Tests: Ordered and Reviewed  ED Management:  Doubt anginal equivalent, no acute ischemic EKG changes, negative troponin.  Favor musculoskeletal pain, likely soft tissue/muscle spasm.  Hand symptoms may be explained by cervical radiculopathy.  Pain improved in the ED.  Provided shoulder sling for comfort, but encouraged range of motion to prevent adhesive capsulitis.  Advised outpatient follow-up with orthopedics for further workup and management, as indicated, such as MRI.    Patient understands and agrees with the plan.  All questions answered prior to discharge.  Return precautions given.        Scribe Attestation:   Scribe  #1: I performed the above scribed service and the documentation accurately describes the services I performed. I attest to the accuracy of the note.    Attending Attestation:           Physician Attestation for Scribe:  Physician Attestation Statement for Scribe #1: I, Deshawn Puentes, reviewed documentation, as scribed by Sandra Gonzalez in my presence, and it is both accurate and complete.     Comments: Attending:   Physician Attestation Statement for Scribe #1: I, Deshawn Puentes MD, personally performed the services described in this documentation. All medical record entries made by the scribe were at my direction and in my presence.  I have reviewed the chart and agree that the record reflects my personal performance and is accurate and complete.                      Clinical Impression:   Final diagnoses:  [M25.519] Shoulder pain  [R07.9] Chest pain      ED Disposition Condition    Discharge Stable          ED Prescriptions       Medication Sig Dispense Start Date End Date Auth. Provider    LIDOcaine (LIDODERM) 5 % Place 1 patch onto the skin daily as needed (pain). Remove & Discard patch within 12 hours or as directed by MD 15 patch 11/13/2022 -- Deshawn Puentes MD    acetaminophen (TYLENOL) 500 MG tablet Take 1 tablet (500 mg total) by mouth every 6 (six) hours as needed for Pain. 20 tablet 11/13/2022 -- Deshawn Puentes MD    cyclobenzaprine (FLEXERIL) 10 MG tablet  (Status: Discontinued) Take 1 tablet (10 mg total) by mouth nightly as needed for Muscle spasms. 5 tablet 11/13/2022 11/13/2022 Deshawn Puentes MD    cyclobenzaprine (FLEXERIL) 10 MG tablet Take 0.5 tablets (5 mg total) by mouth nightly as needed for Muscle spasms. 5 tablet 11/13/2022 11/18/2022 Deshawn Puentes MD          Follow-up Information       Follow up With Specialties Details Why Contact Info Additional Information    Patty Louis MD Internal Medicine In 3 days repeat blood pressure [address]  [facility]  [address]  [phone]       Frankie Ott - Emergency Dept Emergency Medicine  As needed, If symptoms worsen [address]  [facility] [id number] [phone]     Frankie Ott - Orthopedics [facility] Orthopedics In 1 week  [address], [address]  [facility] [id number] [phone] [facility] - Main Building, 5th Floor Please park in [facility] and take [address] elevator             Deshawn Puentes MD  11/13/22 1405

## 2022-11-13 NOTE — ED NOTES
Patient given discharge instructions, sent home with 3 prescriptions and she ambulated out without incident.  
Placed sling on patients right arm.  
Pt reports R shoulder pain radiating down R arm for about x 1 week, denies any heavy lifting or known injury.  
Sling applied to extremity all dc information reviewed with Pt. Verbalized understanding Pt. Ambulated to exit  
Encephalopathy, likely 2/2 infectious etiology

## 2022-11-17 ENCOUNTER — HOSPITAL ENCOUNTER (OUTPATIENT)
Dept: RADIOLOGY | Facility: HOSPITAL | Age: 67
Discharge: HOME | End: 2022-11-17
Attending: INTERNAL MEDICINE
Payer: MEDICARE

## 2022-11-17 ENCOUNTER — OFFICE VISIT (OUTPATIENT)
Dept: RHEUMATOLOGY | Facility: CLINIC | Age: 67
End: 2022-11-17
Payer: MEDICARE

## 2022-11-17 ENCOUNTER — OFFICE VISIT (OUTPATIENT)
Dept: HEMATOLOGY/ONCOLOGY | Facility: CLINIC | Age: 67
End: 2022-11-17
Attending: INTERNAL MEDICINE
Payer: MEDICARE

## 2022-11-17 VITALS
DIASTOLIC BLOOD PRESSURE: 78 MMHG | SYSTOLIC BLOOD PRESSURE: 146 MMHG | WEIGHT: 171.94 LBS | HEIGHT: 64 IN | BODY MASS INDEX: 29.35 KG/M2 | HEART RATE: 85 BPM

## 2022-11-17 VITALS
RESPIRATION RATE: 18 BRPM | BODY MASS INDEX: 28.76 KG/M2 | DIASTOLIC BLOOD PRESSURE: 76 MMHG | HEIGHT: 64 IN | OXYGEN SATURATION: 98 % | HEART RATE: 89 BPM | WEIGHT: 168.44 LBS | SYSTOLIC BLOOD PRESSURE: 157 MMHG | TEMPERATURE: 98 F

## 2022-11-17 DIAGNOSIS — R76.8 HEPATITIS B CORE ANTIBODY POSITIVE: ICD-10-CM

## 2022-11-17 DIAGNOSIS — G62.0 CHEMOTHERAPY-INDUCED PERIPHERAL NEUROPATHY: ICD-10-CM

## 2022-11-17 DIAGNOSIS — M25.50 POLYARTHRALGIA: ICD-10-CM

## 2022-11-17 DIAGNOSIS — C50.312 MALIGNANT NEOPLASM OF LOWER-INNER QUADRANT OF LEFT BREAST IN FEMALE, ESTROGEN RECEPTOR POSITIVE: Primary | ICD-10-CM

## 2022-11-17 DIAGNOSIS — R76.8 ELEVATED RHEUMATOID FACTOR: ICD-10-CM

## 2022-11-17 DIAGNOSIS — M25.50 POLYARTHRALGIA: Primary | ICD-10-CM

## 2022-11-17 DIAGNOSIS — R76.8 ELEVATED RHEUMATOID FACTOR: Primary | ICD-10-CM

## 2022-11-17 DIAGNOSIS — Z17.0 MALIGNANT NEOPLASM OF LOWER-INNER QUADRANT OF LEFT BREAST IN FEMALE, ESTROGEN RECEPTOR POSITIVE: Primary | ICD-10-CM

## 2022-11-17 DIAGNOSIS — T45.1X5A CHEMOTHERAPY-INDUCED PERIPHERAL NEUROPATHY: ICD-10-CM

## 2022-11-17 DIAGNOSIS — Z12.31 ENCOUNTER FOR SCREENING MAMMOGRAM FOR HIGH-RISK PATIENT: ICD-10-CM

## 2022-11-17 PROCEDURE — 3077F SYST BP >= 140 MM HG: CPT | Mod: CPTII,S$GLB,, | Performed by: INTERNAL MEDICINE

## 2022-11-17 PROCEDURE — 96372 THER/PROPH/DIAG INJ SC/IM: CPT | Mod: S$GLB,,, | Performed by: INTERNAL MEDICINE

## 2022-11-17 PROCEDURE — 1125F PR PAIN SEVERITY QUANTIFIED, PAIN PRESENT: ICD-10-PCS | Mod: CPTII,S$GLB,, | Performed by: INTERNAL MEDICINE

## 2022-11-17 PROCEDURE — 3044F HG A1C LEVEL LT 7.0%: CPT | Mod: CPTII,S$GLB,, | Performed by: INTERNAL MEDICINE

## 2022-11-17 PROCEDURE — 77067 MAMMO DIGITAL SCREENING BILAT WITH TOMO: ICD-10-PCS | Mod: 26,,, | Performed by: RADIOLOGY

## 2022-11-17 PROCEDURE — 3288F FALL RISK ASSESSMENT DOCD: CPT | Mod: CPTII,S$GLB,, | Performed by: INTERNAL MEDICINE

## 2022-11-17 PROCEDURE — 77063 BREAST TOMOSYNTHESIS BI: CPT | Mod: TC

## 2022-11-17 PROCEDURE — 3078F PR MOST RECENT DIASTOLIC BLOOD PRESSURE < 80 MM HG: ICD-10-PCS | Mod: CPTII,S$GLB,, | Performed by: INTERNAL MEDICINE

## 2022-11-17 PROCEDURE — 4010F PR ACE/ARB THEARPY RXD/TAKEN: ICD-10-PCS | Mod: CPTII,S$GLB,, | Performed by: INTERNAL MEDICINE

## 2022-11-17 PROCEDURE — 1159F MED LIST DOCD IN RCRD: CPT | Mod: CPTII,S$GLB,, | Performed by: INTERNAL MEDICINE

## 2022-11-17 PROCEDURE — 77063 BREAST TOMOSYNTHESIS BI: CPT | Mod: 26,,, | Performed by: RADIOLOGY

## 2022-11-17 PROCEDURE — 99999 PR PBB SHADOW E&M-EST. PATIENT-LVL III: ICD-10-PCS | Mod: PBBFAC,,, | Performed by: INTERNAL MEDICINE

## 2022-11-17 PROCEDURE — 3078F DIAST BP <80 MM HG: CPT | Mod: CPTII,S$GLB,, | Performed by: INTERNAL MEDICINE

## 2022-11-17 PROCEDURE — 4010F ACE/ARB THERAPY RXD/TAKEN: CPT | Mod: CPTII,S$GLB,, | Performed by: INTERNAL MEDICINE

## 2022-11-17 PROCEDURE — 3077F PR MOST RECENT SYSTOLIC BLOOD PRESSURE >= 140 MM HG: ICD-10-PCS | Mod: CPTII,S$GLB,, | Performed by: INTERNAL MEDICINE

## 2022-11-17 PROCEDURE — 3044F PR MOST RECENT HEMOGLOBIN A1C LEVEL <7.0%: ICD-10-PCS | Mod: CPTII,S$GLB,, | Performed by: INTERNAL MEDICINE

## 2022-11-17 PROCEDURE — 3008F BODY MASS INDEX DOCD: CPT | Mod: CPTII,S$GLB,, | Performed by: INTERNAL MEDICINE

## 2022-11-17 PROCEDURE — 1101F PR PT FALLS ASSESS DOC 0-1 FALLS W/OUT INJ PAST YR: ICD-10-PCS | Mod: CPTII,S$GLB,, | Performed by: INTERNAL MEDICINE

## 2022-11-17 PROCEDURE — 99999 PR PBB SHADOW E&M-EST. PATIENT-LVL V: CPT | Mod: PBBFAC,,, | Performed by: INTERNAL MEDICINE

## 2022-11-17 PROCEDURE — 1159F PR MEDICATION LIST DOCUMENTED IN MEDICAL RECORD: ICD-10-PCS | Mod: CPTII,S$GLB,, | Performed by: INTERNAL MEDICINE

## 2022-11-17 PROCEDURE — 99205 PR OFFICE/OUTPT VISIT, NEW, LEVL V, 60-74 MIN: ICD-10-PCS | Mod: 25,S$GLB,, | Performed by: INTERNAL MEDICINE

## 2022-11-17 PROCEDURE — 99213 PR OFFICE/OUTPT VISIT, EST, LEVL III, 20-29 MIN: ICD-10-PCS | Mod: S$GLB,,, | Performed by: INTERNAL MEDICINE

## 2022-11-17 PROCEDURE — 77077 JOINT SURVEY SINGLE VIEW: CPT | Mod: 26,,, | Performed by: RADIOLOGY

## 2022-11-17 PROCEDURE — 99999 PR PBB SHADOW E&M-EST. PATIENT-LVL III: CPT | Mod: PBBFAC,,, | Performed by: INTERNAL MEDICINE

## 2022-11-17 PROCEDURE — 77063 MAMMO DIGITAL SCREENING BILAT WITH TOMO: ICD-10-PCS | Mod: 26,,, | Performed by: RADIOLOGY

## 2022-11-17 PROCEDURE — 99205 OFFICE O/P NEW HI 60 MIN: CPT | Mod: 25,S$GLB,, | Performed by: INTERNAL MEDICINE

## 2022-11-17 PROCEDURE — 99999 PR PBB SHADOW E&M-EST. PATIENT-LVL V: ICD-10-PCS | Mod: PBBFAC,,, | Performed by: INTERNAL MEDICINE

## 2022-11-17 PROCEDURE — 1101F PT FALLS ASSESS-DOCD LE1/YR: CPT | Mod: CPTII,S$GLB,, | Performed by: INTERNAL MEDICINE

## 2022-11-17 PROCEDURE — 77067 SCR MAMMO BI INCL CAD: CPT | Mod: 26,,, | Performed by: RADIOLOGY

## 2022-11-17 PROCEDURE — 3008F PR BODY MASS INDEX (BMI) DOCUMENTED: ICD-10-PCS | Mod: CPTII,S$GLB,, | Performed by: INTERNAL MEDICINE

## 2022-11-17 PROCEDURE — 1125F AMNT PAIN NOTED PAIN PRSNT: CPT | Mod: CPTII,S$GLB,, | Performed by: INTERNAL MEDICINE

## 2022-11-17 PROCEDURE — 99213 OFFICE O/P EST LOW 20 MIN: CPT | Mod: S$GLB,,, | Performed by: INTERNAL MEDICINE

## 2022-11-17 PROCEDURE — 77077 XR ARTHRITIS SURVEY: ICD-10-PCS | Mod: 26,,, | Performed by: RADIOLOGY

## 2022-11-17 PROCEDURE — 77077 JOINT SURVEY SINGLE VIEW: CPT | Mod: TC

## 2022-11-17 PROCEDURE — 96372 PR INJECTION,THERAP/PROPH/DIAG2ST, IM OR SUBCUT: ICD-10-PCS | Mod: S$GLB,,, | Performed by: INTERNAL MEDICINE

## 2022-11-17 PROCEDURE — 3288F PR FALLS RISK ASSESSMENT DOCUMENTED: ICD-10-PCS | Mod: CPTII,S$GLB,, | Performed by: INTERNAL MEDICINE

## 2022-11-17 RX ORDER — PREDNISONE 10 MG/1
TABLET ORAL
Qty: 120 TABLET | Refills: 3 | Status: ON HOLD | OUTPATIENT
Start: 2022-11-17 | End: 2023-01-31 | Stop reason: HOSPADM

## 2022-11-17 RX ORDER — TRIAMCINOLONE ACETONIDE 40 MG/ML
80 INJECTION, SUSPENSION INTRA-ARTICULAR; INTRAMUSCULAR
Status: COMPLETED | OUTPATIENT
Start: 2022-11-17 | End: 2022-11-17

## 2022-11-17 RX ADMIN — TRIAMCINOLONE ACETONIDE 80 MG: 40 INJECTION, SUSPENSION INTRA-ARTICULAR; INTRAMUSCULAR at 09:11

## 2022-11-17 NOTE — PROGRESS NOTES
Subjective:       Patient ID: Sunitha Pillai is a 67 y.o. female.    Chief Complaint: Disease Management    HPI  67 year old F with PMH of adjustment disorder, depression, type II DM,  GERD, HTN, vertigo, HTN, left breast cancer s/p chemo/ radiation in 2020, pulmonary nodules here for evaluation.  She has been having pain for last years. She is having in both shoulders (worse on right), elbows,wrists, hands, knees, ankles, and feet. Pain can be as high as 10/10.  She gets swelling of hands, worse in the right.She also gets swelling in ankles.   Denies any rashes. Denies smoking. Denies oral ulcers, fevers, raynauds, photosensitivity, or alopecia.         Family hx:2  aunts: cancer    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, estrogen receptor positive    Cholelithiasis with acute cholecystitis 3/5/2021    Depression     Diabetes mellitus type I     Genetic testing of female 09/2020    [facility] via [place] with AXIN2 and POLE variants of uncertain significance    GERD (gastroesophageal reflux disease)     Hypertension     Pneumonia due to COVID-19 virus 2/7/2021    Unstable angina 9/23/2018    Vertigo 05/13/2019    Vitamin D deficiency 2/7/2021       Review of Systems   Constitutional:  Negative for activity change, appetite change, chills, diaphoresis, fatigue, fever and unexpected weight change.   HENT:  Negative for congestion, ear discharge, ear pain, facial swelling, mouth sores, sinus pressure, sneezing, sore throat, tinnitus and trouble swallowing.    Eyes:  Negative for photophobia, pain, discharge, redness, itching and visual disturbance.   Respiratory:  Negative for apnea, chest tightness, shortness of breath, wheezing and stridor.    Cardiovascular:  Negative for leg swelling.   Gastrointestinal:  Negative for  "abdominal distention, abdominal pain, anal bleeding, blood in stool, constipation, diarrhea and nausea.   Endocrine: Negative for cold intolerance and heat intolerance.   Genitourinary:  Negative for difficulty urinating and dysuria.   Musculoskeletal:  Positive for arthralgias and joint swelling. Negative for back pain, gait problem, myalgias, neck pain and neck stiffness.   Skin:  Negative for color change, pallor, rash and wound.   Neurological:  Negative for dizziness, seizures, light-headedness and numbness.   Hematological:  Negative for adenopathy. Does not bruise/bleed easily.   Psychiatric/Behavioral:  Negative for sleep disturbance. The patient is not nervous/anxious.        Objective:   BP (!) 146/78   Pulse 85   Ht 5' 4" (1.626 m)   Wt 78 kg (171 lb 15.3 oz)   BMI 29.52 kg/m²      Physical Exam   Constitutional: normal appearance.   HENT:   Head: Normocephalic and atraumatic.   Nose: No rhinorrhea or nasal congestion.   Mouth/Throat: No oropharyngeal exudate or posterior oropharyngeal erythema.   Eyes: Conjunctivae are normal. Right eye exhibits no discharge. Left eye exhibits no discharge. No scleral icterus.   Cardiovascular: Normal rate and regular rhythm. Exam reveals no gallop and no friction rub.   No murmur heard.  Pulmonary/Chest: No stridor. No respiratory distress. She has no wheezes. She has no rales.   Abdominal: She exhibits no distension and no mass. There is no abdominal tenderness.   Musculoskeletal:         General: Swelling, tenderness and deformity present.   Neurological: She is alert.   Skin:   Limited abduction in right arm to 45 degrees  Synovitis in mcps, pips  Synovitis in mtps      No data to display     Assessment:       HPI  67 year old F with PMH of adjustment disorder, depression, type II DM,  GERD, HTN, vertigo, HTN, left breast cancer s/p chemo/ radiation in 2020, pulmonary nodules here for evaluation.  She has high titer +RF and +CCP consistent with rheumatoid " arthritis. She is coming in with severe flare.    1. Elevated rheumatoid factor              Plan:       Problem List Items Addressed This Visit    None  Visit Diagnoses       Elevated rheumatoid factor                Labs  Xrays  Kenalog 80mg IM x1  Start prednisone 40mg a day for 10days and then 10mg po qday  Handout given on RA and medications used to treat it  Emailed her pcp about diabetes management while on steroids  60 * minutes of total time spent on the encounter, which includes face to face time and non-face to face time preparing to see the patient (eg, review of tests), Obtaining and/or reviewing separately obtained history, Documenting clinical information in the electronic or other health record, Independently interpreting results (not separately reported) and communicating results to the patient/family/caregiver, or Care coordination (not separately reported).

## 2022-11-17 NOTE — PROGRESS NOTES
Subjective:      Patient ID: Sunitha Pillai is a 67 y.o. female.    Chief Complaint: No chief complaint on file.      HPI:  67-year-old female, patient of Dr. Moulton, who returns for follow-up of a diagnosis of left breast cancer.  She completed adjuvant chemotherapy with Taxotere and Cytoxan on January 24,2021.She is on adjuvant letrozole therapy.     She has had some neuropathy in hands and legs since her chemotherapy.She has been on Lyrica for that and reports that it has continued to improve.  No other issues.        Current history: Screening mammogram on July 30th showed a focal asymmetry in the lower outer portion left breast.  Biopsy on August 25, 2020 showed high-grade infiltrating ductal carcinoma (histologic grade 3, nuclear grade 3, mitotic index 3).  Tumor was 95% ER positive in 95% PA positive, HER2 was 2+ but negative by fish.  Ki-67 was 95%.    On September 17, 2020 lumpectomy and sentinel lymph node biopsy were performed.  That pathology showed a 1.5 cm infiltrating carcinoma with a single negative sentinel lymph node.  Margins were negative.  Final pathological stage T1c N0, stage I A.      Oncotype risk score - 29-high risk.    She received 4 cycles of adjuvant Taxotere and Cytoxan from 11/24/20 to 1/24/21.    Completed radiation 5/6/21.     Letrozole started in May 2021.    Review of Systems   Constitutional:  Negative for activity change, appetite change, fever and unexpected weight change.   HENT: Negative.     Cardiovascular:  Negative for chest pain.   Gastrointestinal:  Negative for constipation.   Genitourinary: Negative.    Musculoskeletal:  Positive for arthralgias.   Neurological:  Positive for numbness (improved).   Psychiatric/Behavioral:  Positive for dysphoric mood (since her husbands death).    Objective:   Physical Exam   Vitals reviewed.  Constitutional: She is oriented to person, place, and time. She appears well-developed. No distress.   Cardiovascular:  Normal rate and  regular rhythm.            Pulmonary/Chest: Effort normal and breath sounds normal. No respiratory distress. She has no wheezes. She has no rales. Right breast exhibits no mass, no nipple discharge and no skin change. Left breast exhibits skin change and tenderness. Left breast exhibits no mass and no nipple discharge.       Abdominal: Soft. She exhibits no mass. There is no abdominal tenderness.   Lymphadenopathy:     She has no cervical adenopathy.   Neurological: She is alert and oriented to person, place, and time.   Skin: No rash noted.     Psychiatric: Her behavior is normal. Thought content normal.   Assessment:     Mammogram - negative  1. Malignant neoplasm of lower-inner quadrant of left breast in female, estrogen receptor positive      2. Chemotherapy-induced peripheral neuropathy  Plan:      RTC 3 M.       Route Chart for Scheduling    Med Onc Chart Routing      Follow up with physician 3 months.   Follow up with [facility]    Infusion scheduling note    Injection scheduling note    Labs    Imaging    Pharmacy appointment    Other referrals

## 2022-11-21 ENCOUNTER — PES CALL (OUTPATIENT)
Dept: ADMINISTRATIVE | Facility: CLINIC | Age: 67
End: 2022-11-21
Payer: MEDICARE

## 2022-11-22 ENCOUNTER — TELEPHONE (OUTPATIENT)
Dept: RHEUMATOLOGY | Facility: CLINIC | Age: 67
End: 2022-11-22
Payer: MEDICARE

## 2022-11-22 ENCOUNTER — TELEPHONE (OUTPATIENT)
Dept: SURGERY | Facility: CLINIC | Age: 67
End: 2022-11-22
Payer: MEDICARE

## 2022-11-22 NOTE — TELEPHONE ENCOUNTER
Left voicemail asking patient to call the office back.      ----- Message from Rabia Richard MA sent at 11/21/2022  5:42 PM CST -----    ----- Message -----  From: Vidhya Dai MD  Sent: 11/21/2022   3:46 PM CST  To: [facility] Staff    Please call patient and let her know that one of the tests for hepatitis B was  abnomral.  Ask her to do labs ASAP .  Also tell her I need her to see liver specialist to go over her labs for hepatitis B and determine if she needs any treatment as precaution before I start her on RA medicines.  Tell her this does not mean she has hepatitis B . I just need more labs to be done.    Dr. Dai

## 2022-11-23 ENCOUNTER — PROCEDURE VISIT (OUTPATIENT)
Dept: UROLOGY | Facility: CLINIC | Age: 67
End: 2022-11-23
Payer: MEDICARE

## 2022-11-23 VITALS
TEMPERATURE: 98 F | HEIGHT: 64 IN | BODY MASS INDEX: 28.51 KG/M2 | DIASTOLIC BLOOD PRESSURE: 84 MMHG | SYSTOLIC BLOOD PRESSURE: 178 MMHG | HEART RATE: 95 BPM | WEIGHT: 167 LBS

## 2022-11-23 DIAGNOSIS — R39.11 URINARY HESITANCY: ICD-10-CM

## 2022-11-23 DIAGNOSIS — N39.8 DYSFUNCTIONAL VOIDING OF URINE: Primary | ICD-10-CM

## 2022-11-23 PROCEDURE — 51728 CYSTOMETROGRAM W/VP: CPT | Mod: 26,S$GLB,, | Performed by: UROLOGY

## 2022-11-23 PROCEDURE — 51784 ANAL/URINARY MUSCLE STUDY: CPT | Mod: 26,51,S$GLB, | Performed by: UROLOGY

## 2022-11-23 PROCEDURE — 51741 ELECTRO-UROFLOWMETRY FIRST: CPT | Mod: 26,51,S$GLB, | Performed by: UROLOGY

## 2022-11-23 PROCEDURE — 51797 INTRAABDOMINAL PRESSURE TEST: CPT | Mod: 26,S$GLB,, | Performed by: UROLOGY

## 2022-11-23 PROCEDURE — 51784 PR ANAL/URINARY MUSCLE STUDY: ICD-10-PCS | Mod: 26,51,S$GLB, | Performed by: UROLOGY

## 2022-11-23 PROCEDURE — 52000 CYSTOURETHROSCOPY: CPT | Mod: 59,S$GLB,, | Performed by: UROLOGY

## 2022-11-23 PROCEDURE — 51728 PR COMPLEX CYSTOMETROGRAM VOIDING PRESSURE STUDIES: ICD-10-PCS | Mod: 26,S$GLB,, | Performed by: UROLOGY

## 2022-11-23 PROCEDURE — 51741 PR UROFLOWMETRY, COMPLEX: ICD-10-PCS | Mod: 26,51,S$GLB, | Performed by: UROLOGY

## 2022-11-23 PROCEDURE — 52000 PR CYSTOURETHROSCOPY: ICD-10-PCS | Mod: 59,S$GLB,, | Performed by: UROLOGY

## 2022-11-23 PROCEDURE — 51797 PR VOIDING PRESS STUDY INTRA-ABDOMINAL VOID: ICD-10-PCS | Mod: 26,S$GLB,, | Performed by: UROLOGY

## 2022-11-23 RX ORDER — DOXYCYCLINE HYCLATE 100 MG
100 TABLET ORAL ONCE
Status: COMPLETED | OUTPATIENT
Start: 2022-11-23 | End: 2022-11-23

## 2022-11-23 RX ADMIN — Medication 100 MG: at 04:11

## 2022-11-23 NOTE — PATIENT INSTRUCTIONS
_                                                                                                                                                                                             If any problems after hours or weekends, you may call [phone] and ask for the urology resident on call. SIMPLE URODYNAMIC STUDY (SUDS) & CYSTOSCOPY  UROLOGY CLINIC DISCHARGE INSTRUCTIONS    You have had a procedure that will require time to properly heal. Follow the instructions you have been given on how to care for yourself once you are home. Below is additional information to help in your recovery.    ACTIVITY  There are no restrictions in activity. Start doing again the things you did before the procedure.  You may experience a slight burning sensation. You may notice a small amount of blood in your urine. This will clear up within a day. Call the clinic if this continues beyond 48 hours.    DIET  Continue your normal diet. You may eat the same foods you ate before your procedure.  Drink plenty of fluids during the first 24-48 hours following your procedure.    MEDICATIONS  Resume all other previous medications from your prescribing physician.  Continue any pre=procedure antibiotics until they are all gone.    SIGNS AND SYMPTOMS TO REPORT TO THE DOCTOR  Chills or fever greater than 101° F within 24 hours of procedure.  Changes in urination, such as increased bleeding, foul smell, cloudy urine, or painful urination.  Call your doctor with any questions or concerns.    For any emergency situation, call 911 immediately or go to your nearest emergency room.    [facility]  [phone]

## 2022-11-23 NOTE — PROCEDURES
Procedures    Urodynamic Report    Indication:  urinary hesitancy in a patient with history of diabetes mellitus    Patient was taken to the Urodynamic Suite with a comfortably full bladder and asked to perform a free uroflow.  Next, the patient was prepped and the urinary residual was drained with a 14 Fr catheter.  A 7 Fr dual lumen catheter was placed to measure intravesical pressures.  A 10 Fr balloon manometer was placed into the rectum for abdominal pressure measurements.  Patch EMG electrodes were placed on the perineum.  The patient was connected to the aroundtheway Urodynamic machine, using a multichannel technique, the data were interpreted.  The bladder was filled with sterile water at room temperature at a rate of 30 ml/min.  Patient is filled to urgency.  Filling is performed with the patient in the seated position.  Abdominal leak point pressures are checked at 1st desire, then serially at 50cc increments first with Valsalva then with coughing.  The patient was then asked to sit and void for a pressure flow study.    The following are the results of the study:  1.  Uroflow       Q max:  26.9 ml/sec       Voided volume:  214.5 ml       Pattern of the curve:  continuous    2.  PVR:  30 ml    3.  CMG       Sensation:         First Desire:  55 ml         Normal Desire:  197 ml         Strong Desire:  227 ml         Urgency:  356 ml       Capacity:  462 ml       Abnormal Contractions:  none       Compliance:  normal 21ml/cm H2O    4.  Abdominal Leak Point Pressure:  no stress incontinence    5.  EMG:  intermittently increased, during voiding, increased significantly just before and just after voiding    6.  Voiding phase       Q max:  35.2 ml/sec       P det at Q max:  30.2 cm H2O       Pattern of the curve:  continuous       Voided volume:  452 ml       PVR:  10 ml    7.  Analysis:  normal sensation and capacity, dysfunctional voiding, empties well. She states she has a long history of having to hold her urine.      8.  Recommendations:       a.  Recommend physical therapy for dysfunctional voiding       b.  Follow up in 6 months.             CYSTOSCOPY REPORT    Pre Procedure Diagnosis:  urinary hesitancy    Post Procedure Diagnosis:  normal lower urinary tract    Anesthesia: 10 cc 2% lidocaine jelly applied per urethra.    14 FR Flexible Olympus cystoscope used.    FINDINGS:  Dome, anterior, posterior, lateral walls and bladder base free of urothelial abnormalities. Right and left ureteral orifices in the normal postion and configuration, both effluxed clear urine.  Bladder neck and urethra were normal.    Specimen:  none    The patient was taken to the cystoscopy suite and placed in dorsal lithotomy position.  The genitalia was prepped and draped  in the usual sterile fashion.  Two percent lidocaine jelly was inserted in the urethra.  After sufficent time had passed to allow good local anesthesia, the cystoscope was inserted in the urethra and passed into the bladder visualizing the urethra along its entire course.  The dome, anterior, posterior and lateral walls were examined systematically.  The ureteral orifices were in their usual position and configuration.  The cystoscope was turned upon itself 180 degrees to visualize the bladder neck.  The cystoscope was then brought to the level of the bladder neck, the water was turned on and the urethra was visualized.  The cystoscope was removed and the patient was instructed to urinate prior to leaving the office.     Post procedure medication:  doxycycline 100 mg x 1     ASSESSMENT/PLAN:  67 year old woman status post flexible cystoscopy.  1. Push fluids for 24 hours.  2. May see blood in the urine, this should gradually improve over the next 2-3 days.  3. The patient was instructed to return to the office or go to the emergency should fever, chills, cloudy urine, or inability to urinate develop.  4. Follow up as above.

## 2022-12-02 ENCOUNTER — OFFICE VISIT (OUTPATIENT)
Dept: ORTHOPEDICS | Facility: CLINIC | Age: 67
End: 2022-12-02
Payer: MEDICARE

## 2022-12-02 ENCOUNTER — OFFICE VISIT (OUTPATIENT)
Dept: SURGERY | Facility: CLINIC | Age: 67
End: 2022-12-02
Payer: MEDICARE

## 2022-12-02 VITALS
WEIGHT: 160 LBS | DIASTOLIC BLOOD PRESSURE: 97 MMHG | SYSTOLIC BLOOD PRESSURE: 186 MMHG | BODY MASS INDEX: 27.31 KG/M2 | HEIGHT: 64 IN | HEART RATE: 85 BPM

## 2022-12-02 VITALS
DIASTOLIC BLOOD PRESSURE: 87 MMHG | HEIGHT: 64 IN | BODY MASS INDEX: 27.31 KG/M2 | SYSTOLIC BLOOD PRESSURE: 188 MMHG | HEART RATE: 81 BPM | WEIGHT: 160 LBS

## 2022-12-02 DIAGNOSIS — M25.519 SHOULDER PAIN: ICD-10-CM

## 2022-12-02 DIAGNOSIS — Z85.3 PERSONAL HISTORY OF BREAST CANCER: Primary | ICD-10-CM

## 2022-12-02 PROCEDURE — 3008F BODY MASS INDEX DOCD: CPT | Mod: CPTII,S$GLB,, | Performed by: PHYSICIAN ASSISTANT

## 2022-12-02 PROCEDURE — 1101F PT FALLS ASSESS-DOCD LE1/YR: CPT | Mod: CPTII,S$GLB,, | Performed by: PHYSICIAN ASSISTANT

## 2022-12-02 PROCEDURE — 99215 OFFICE O/P EST HI 40 MIN: CPT | Mod: S$GLB,,, | Performed by: PHYSICIAN ASSISTANT

## 2022-12-02 PROCEDURE — 1160F PR REVIEW ALL MEDS BY PRESCRIBER/CLIN PHARMACIST DOCUMENTED: ICD-10-PCS | Mod: CPTII,S$GLB,, | Performed by: PHYSICIAN ASSISTANT

## 2022-12-02 PROCEDURE — 3288F FALL RISK ASSESSMENT DOCD: CPT | Mod: CPTII,S$GLB,, | Performed by: PHYSICIAN ASSISTANT

## 2022-12-02 PROCEDURE — 3288F PR FALLS RISK ASSESSMENT DOCUMENTED: ICD-10-PCS | Mod: CPTII,S$GLB,, | Performed by: PHYSICIAN ASSISTANT

## 2022-12-02 PROCEDURE — 99499 NO LOS: ICD-10-PCS | Mod: S$GLB,,, | Performed by: PHYSICIAN ASSISTANT

## 2022-12-02 PROCEDURE — 1159F PR MEDICATION LIST DOCUMENTED IN MEDICAL RECORD: ICD-10-PCS | Mod: CPTII,S$GLB,, | Performed by: PHYSICIAN ASSISTANT

## 2022-12-02 PROCEDURE — 3080F PR MOST RECENT DIASTOLIC BLOOD PRESSURE >= 90 MM HG: ICD-10-PCS | Mod: CPTII,S$GLB,, | Performed by: PHYSICIAN ASSISTANT

## 2022-12-02 PROCEDURE — 3044F PR MOST RECENT HEMOGLOBIN A1C LEVEL <7.0%: ICD-10-PCS | Mod: CPTII,S$GLB,, | Performed by: PHYSICIAN ASSISTANT

## 2022-12-02 PROCEDURE — 1125F PR PAIN SEVERITY QUANTIFIED, PAIN PRESENT: ICD-10-PCS | Mod: CPTII,S$GLB,, | Performed by: PHYSICIAN ASSISTANT

## 2022-12-02 PROCEDURE — 3044F HG A1C LEVEL LT 7.0%: CPT | Mod: CPTII,S$GLB,, | Performed by: PHYSICIAN ASSISTANT

## 2022-12-02 PROCEDURE — 3079F DIAST BP 80-89 MM HG: CPT | Mod: CPTII,S$GLB,, | Performed by: PHYSICIAN ASSISTANT

## 2022-12-02 PROCEDURE — 3077F PR MOST RECENT SYSTOLIC BLOOD PRESSURE >= 140 MM HG: ICD-10-PCS | Mod: CPTII,S$GLB,, | Performed by: PHYSICIAN ASSISTANT

## 2022-12-02 PROCEDURE — 4010F PR ACE/ARB THEARPY RXD/TAKEN: ICD-10-PCS | Mod: CPTII,S$GLB,, | Performed by: PHYSICIAN ASSISTANT

## 2022-12-02 PROCEDURE — 99999 PR PBB SHADOW E&M-EST. PATIENT-LVL IV: ICD-10-PCS | Mod: PBBFAC,,, | Performed by: PHYSICIAN ASSISTANT

## 2022-12-02 PROCEDURE — 99215 PR OFFICE/OUTPT VISIT, EST, LEVL V, 40-54 MIN: ICD-10-PCS | Mod: S$GLB,,, | Performed by: PHYSICIAN ASSISTANT

## 2022-12-02 PROCEDURE — 99999 PR PBB SHADOW E&M-EST. PATIENT-LVL IV: CPT | Mod: PBBFAC,,, | Performed by: PHYSICIAN ASSISTANT

## 2022-12-02 PROCEDURE — 1101F PR PT FALLS ASSESS DOC 0-1 FALLS W/OUT INJ PAST YR: ICD-10-PCS | Mod: CPTII,S$GLB,, | Performed by: PHYSICIAN ASSISTANT

## 2022-12-02 PROCEDURE — 3079F PR MOST RECENT DIASTOLIC BLOOD PRESSURE 80-89 MM HG: ICD-10-PCS | Mod: CPTII,S$GLB,, | Performed by: PHYSICIAN ASSISTANT

## 2022-12-02 PROCEDURE — 1126F PR PAIN SEVERITY QUANTIFIED, NO PAIN PRESENT: ICD-10-PCS | Mod: CPTII,S$GLB,, | Performed by: PHYSICIAN ASSISTANT

## 2022-12-02 PROCEDURE — 3080F DIAST BP >= 90 MM HG: CPT | Mod: CPTII,S$GLB,, | Performed by: PHYSICIAN ASSISTANT

## 2022-12-02 PROCEDURE — 3077F SYST BP >= 140 MM HG: CPT | Mod: CPTII,S$GLB,, | Performed by: PHYSICIAN ASSISTANT

## 2022-12-02 PROCEDURE — 4010F ACE/ARB THERAPY RXD/TAKEN: CPT | Mod: CPTII,S$GLB,, | Performed by: PHYSICIAN ASSISTANT

## 2022-12-02 PROCEDURE — 3008F PR BODY MASS INDEX (BMI) DOCUMENTED: ICD-10-PCS | Mod: CPTII,S$GLB,, | Performed by: PHYSICIAN ASSISTANT

## 2022-12-02 PROCEDURE — 1125F AMNT PAIN NOTED PAIN PRSNT: CPT | Mod: CPTII,S$GLB,, | Performed by: PHYSICIAN ASSISTANT

## 2022-12-02 PROCEDURE — 1159F MED LIST DOCD IN RCRD: CPT | Mod: CPTII,S$GLB,, | Performed by: PHYSICIAN ASSISTANT

## 2022-12-02 PROCEDURE — 1126F AMNT PAIN NOTED NONE PRSNT: CPT | Mod: CPTII,S$GLB,, | Performed by: PHYSICIAN ASSISTANT

## 2022-12-02 PROCEDURE — 1160F RVW MEDS BY RX/DR IN RCRD: CPT | Mod: CPTII,S$GLB,, | Performed by: PHYSICIAN ASSISTANT

## 2022-12-02 PROCEDURE — 99499 UNLISTED E&M SERVICE: CPT | Mod: S$GLB,,, | Performed by: PHYSICIAN ASSISTANT

## 2022-12-02 NOTE — PROGRESS NOTES
[facility]    Breast Surgery Surveillance    2022    DIAGNOSIS:   This is a 65 y.o. female with a stage pT1c N0 M0 grade 3 ER + OH + HER2 - Invasive Ductal Carcinoma of the left breast.     TREATMENT:   1. S/p left partial mastectomy and sentinel node biopsy on 2020. Isabel Moulton M.D. Surgical Oncology  2. Chemotherapy, Taxotere/Cytoxan x4, from 2020  To 2021. Giovany Mcduffie M.D. Medical Oncology   3. Radiation therapy completed on 2021. Sai Conteh Jr, M.D. Radiation Oncology   4. Adjuvant endocrine therapy (Letrozole) started on 2021 with ARBEN Mcduffie MD Medical Oncology.     HISTORY OF PRESENT ILLNESS:   Sunitha Pillai is a 67 y.o. female presents for routine follow up. Patient was seen by DONOVAN Casillas in  for a new lump in the left breast. Dr. Doran found a new area in the left breast during exam and referred for evaluation. Imaging and US done today which showed skin thickening with no fluid collection. She was given 10 days of Clindamycin with resolution of her redness and swelling. Punch biopsy of skin negative for atypia or malignancy.      At her visit today, she states she has been doing well since she was last seen. Denies new breast complaints of palpable masses, skin changes or nipple discharge. She denies fever, chest pain, or shortness of breath. She amber note occasional difficulty with the loss of her  during her chemotherapy regimen.     IMAGIN/17/22 Bilateral Screening Mammo:     Findings:  This procedure was performed using tomosynthesis. Computer-aided detection was utilized in the interpretation of this examination.  The breasts have scattered areas of fibroglandular density.      Left  There are post-surgical findings from a previous lumpectomy with radiation seen in the lower outer quadrant of the left breast. Compared to the previous study, there are no significant changes. There has been no interval development of a  "suspicious mass, microcalcification, or architectural distortion.      There is no evidence of suspicious masses, calcifications, or other abnormal findings in the left breast.     Right  There is no evidence of suspicious masses, calcifications, or other abnormal findings in the right breast.     Impression:  Bilateral  There is no mammographic evidence of malignancy.     BI-RADS Category:   Overall: 2 - Benign        Recommendation:  Routine screening mammogram in 1 year is recommended.     MEDICATIONS/ALLERGIES:     Review of patient's allergies indicates:  No Known Allergies    PHYSICAL EXAM:   BP (!) 188/87 (BP Location: Right arm, Patient Position: Sitting, BP Method: Medium (Automatic))   Pulse 81   Ht 5' 4" (1.626 m)   Wt 72.6 kg (160 lb)   BMI 27.46 kg/m²   Physical Exam   Vitals reviewed.  Constitutional: She is oriented to person, place, and time.   HENT:   Head: Normocephalic and atraumatic.   Nose: Nose normal.   Eyes: Pupils are equal, round, and reactive to light. Right eye exhibits no discharge. Left eye exhibits no discharge.   Pulmonary/Chest: Effort normal and breath sounds normal. No stridor. No respiratory distress. She exhibits no mass, no tenderness and no edema. Right breast exhibits no inverted nipple, no mass, no nipple discharge, no skin change and no tenderness. Left breast exhibits no inverted nipple, no mass, no nipple discharge, no skin change and no tenderness. No breast swelling or bleeding. Breasts are symmetrical.       Abdominal: Normal appearance.   Genitourinary: No breast swelling or bleeding.   Neurological: She is alert and oriented to person, place, and time.   Skin: Skin is warm and dry.     Psychiatric: Her behavior is normal. Mood, judgment and thought content normal.      IMAGIN/17/22 Bilateral Screening Mammo:     Findings:  This procedure was performed using tomosynthesis. Computer-aided detection was utilized in the interpretation of this " examination.  The breasts have scattered areas of fibroglandular density.      Left  There are post-surgical findings from a previous lumpectomy with radiation seen in the lower outer quadrant of the left breast. Compared to the previous study, there are no significant changes. There has been no interval development of a suspicious mass, microcalcification, or architectural distortion.      There is no evidence of suspicious masses, calcifications, or other abnormal findings in the left breast.     Right  There is no evidence of suspicious masses, calcifications, or other abnormal findings in the right breast.     Impression:  Bilateral  There is no mammographic evidence of malignancy.     BI-RADS Category:   Overall: 2 - Benign     Recommendation:  Routine screening mammogram in 1 year is recommended.    ASSESSMENT:   This is a 67 y.o. female without evidence of recurrence by exam, history or imaging.      PLAN:   1. CBE without concerning findings today. Patient reassured.   2. Continue to follow up with Dr. Mcduffie and Hem Onc team   3. Continue monthly self breast exams and call the clinic sooner with any changes or problems.  4. Screening Mammogram due in November 2023. Will see back at that time for follow up CBE.   5. Long discussion with patient about loss of her  and her overall well being. She has good family support, but admits she might benefit from group therapy. Will contact Onc Psych regarding options.      The patient is in agreement with the plan. Questions were encouraged and answered to patient's satisfaction. Sunitha will call our office with any questions or concerns.     Total time spent with the patient: 40.  30 minutes of face to face consultation and 10 minutes of chart review and coordination of care.

## 2022-12-05 ENCOUNTER — TELEPHONE (OUTPATIENT)
Dept: INFUSION THERAPY | Facility: HOSPITAL | Age: 67
End: 2022-12-05
Payer: MEDICARE

## 2022-12-05 ENCOUNTER — TELEPHONE (OUTPATIENT)
Dept: RHEUMATOLOGY | Facility: CLINIC | Age: 67
End: 2022-12-05
Payer: MEDICARE

## 2022-12-05 NOTE — TELEPHONE ENCOUNTER
Left voicemail asking patient to call the office back.      ----- Message from Rama Paul sent at 12/5/2022  1:34 PM CST -----  Regarding: Appt  Contact: [phone]  Patient Sunitha is calling. Patient can only have Thursday and Friday appts. Patient needs to cancel appt for tomorrow unless she can be seen after 4;30. Please call patient at  [phone]    Thank You

## 2022-12-06 DIAGNOSIS — F43.21 GRIEF: ICD-10-CM

## 2022-12-06 DIAGNOSIS — Z85.3 PERSONAL HISTORY OF BREAST CANCER: Primary | ICD-10-CM

## 2022-12-14 DIAGNOSIS — E11.9 TYPE 2 DIABETES MELLITUS WITHOUT COMPLICATION: ICD-10-CM

## 2022-12-23 ENCOUNTER — PATIENT MESSAGE (OUTPATIENT)
Dept: HEPATOLOGY | Facility: CLINIC | Age: 67
End: 2022-12-23
Payer: MEDICARE

## 2022-12-23 ENCOUNTER — TELEPHONE (OUTPATIENT)
Dept: HEPATOLOGY | Facility: CLINIC | Age: 67
End: 2022-12-23
Payer: MEDICARE

## 2022-12-23 NOTE — TELEPHONE ENCOUNTER
Referral to hepatology for Hep B core antibody positive . Called pt to schedule appt with any [facility]    Scheduling attempt # 1    Left message for pt to return my call or schedule online on [facility]  Also sent [facility] message

## 2022-12-28 DIAGNOSIS — E11.9 TYPE 2 DIABETES MELLITUS WITHOUT COMPLICATION: ICD-10-CM

## 2023-01-06 ENCOUNTER — TELEPHONE (OUTPATIENT)
Dept: INTERNAL MEDICINE | Facility: CLINIC | Age: 68
End: 2023-01-06

## 2023-01-06 NOTE — TELEPHONE ENCOUNTER
----- Message from Xiao Sims sent at 1/6/2023 10:21 AM CST -----  Regarding: Pt called to speak to the nurse to request an Order for Covid 19 Testing and pt would like a hamilton back this morning asap due to having a headache, diarrhea and chest pains  Patient Advice:    Pt called to speak to the nurse to request an Order for Covid 19 Testing and pt would like a hamilton back this morning asap due to having a headache, diarrhea and chest pains.     Pt can be reached at [phone]

## 2023-01-13 ENCOUNTER — OFFICE VISIT (OUTPATIENT)
Dept: PSYCHIATRY | Facility: CLINIC | Age: 68
End: 2023-01-13
Payer: MEDICARE

## 2023-01-13 DIAGNOSIS — F43.81 PROLONGED GRIEF DISORDER: Primary | ICD-10-CM

## 2023-01-13 DIAGNOSIS — Z85.3 PERSONAL HISTORY OF BREAST CANCER: ICD-10-CM

## 2023-01-13 PROCEDURE — 99999 PR PBB SHADOW E&M-EST. PATIENT-LVL II: ICD-10-PCS | Mod: PBBFAC,,, | Performed by: PSYCHOLOGIST

## 2023-01-13 PROCEDURE — 90837 PR PSYCHOTHERAPY W/PATIENT, 60 MIN: ICD-10-PCS | Mod: S$GLB,,, | Performed by: PSYCHOLOGIST

## 2023-01-13 PROCEDURE — 1159F PR MEDICATION LIST DOCUMENTED IN MEDICAL RECORD: ICD-10-PCS | Mod: CPTII,S$GLB,, | Performed by: PSYCHOLOGIST

## 2023-01-13 PROCEDURE — 1159F MED LIST DOCD IN RCRD: CPT | Mod: CPTII,S$GLB,, | Performed by: PSYCHOLOGIST

## 2023-01-13 PROCEDURE — 99999 PR PBB SHADOW E&M-EST. PATIENT-LVL II: CPT | Mod: PBBFAC,,, | Performed by: PSYCHOLOGIST

## 2023-01-13 PROCEDURE — 90837 PSYTX W PT 60 MINUTES: CPT | Mod: S$GLB,,, | Performed by: PSYCHOLOGIST

## 2023-01-13 NOTE — PROGRESS NOTES
INFORMED CONSENT: Patient was identified using two patient-identifiers. The patient has been informed of the risks and benefits associated with engaging in psychotherapy, the handling of protected health information, the rights of privacy and the limits of confidentiality. The patient has also been informed of the importance of reporting any suicidal or homicidal ideation to this or any provider to ensure safety of all parties, and the Sunitha Pillai expressed understanding. The patient was agreeable to these terms and freely participates in individual psychotherapy.    PSYCHO-ONCOLOGY NOTE/ Individual Psychotherapy     Date: 1/13/2023   Site:  [facility]        Therapeutic Intervention: Met with patient.  Outpatient - Insight oriented psychotherapy 60 min - CPT code 75610      Patient was last seen by me on 5/25/2021    Problem list  Patient Active Problem List   Diagnosis    Type 2 diabetes mellitus, with long-term current use of insulin    Essential hypertension    GERD (gastroesophageal reflux disease)    Vertigo    Overweight (BMI 25.0-29.9)    Chest pain    Malignant neoplasm of lower-inner quadrant of left breast in female, estrogen receptor positive    Anxiety about health    Adjustment disorder with mixed anxiety and depressed mood    Tachycardia    Neutropenia    Constipation    History of pneumonia-covid infection    Vitamin D deficiency    History of COVID-19    HCAP (healthcare-associated pneumonia)    Right hip pain    Transaminitis    Acute gangrenous cholecystitis    Discharge planning issues    Decreased range of motion of left shoulder    Weakness of left upper extremity    Poor posture    Decreased functional mobility    Muscle tightness    Chemotherapy-induced peripheral neuropathy       Chief complaint/reason for encounter: grief   Met with patient to evaluate psychosocial adaptation to death of     Current Medications  Current Outpatient Medications   Medication    candesartan  "(ATACAND) 8 MG tablet    ACCU-CHEK GUIDE GLUCOSE METER Misc    acetaminophen (TYLENOL) 325 MG tablet    acetaminophen (TYLENOL) 500 MG tablet    allopurinoL (ZYLOPRIM) 100 MG tablet    ascorbic acid, vitamin C, (VITAMIN C) 500 MG tablet    aspirin 81 mg Tab    atorvastatin (LIPITOR) 10 MG tablet    azelastine (ASTELIN) 137 mcg (0.1 %) nasal spray    BD ULTRA-FINE SHORT PEN NEEDLE 31 gauge x 5/16" Ndle    blood sugar diagnostic Strp    blood sugar diagnostic, drum (ACCU-CHEK COMPACT TEST) Strp    clotrimazole-betamethasone 1-0.05% (LOTRISONE) cream    diclofenac sodium (VOLTAREN) 1 % Gel    dulaglutide (TRULICITY) 1.5 mg/0.5 mL pen injector    fexofenadine (ALLEGRA) 180 MG tablet    fluticasone propionate (FLONASE) 50 mcg/actuation nasal spray    fluticasone propionate (FLONASE) 50 mcg/actuation nasal spray    lancets Misc    letrozole (FEMARA) 2.5 mg Tab    LIDOcaine (LIDODERM) 5 %    metFORMIN (GLUCOPHAGE) 1000 MG tablet    predniSONE (DELTASONE) 10 MG tablet    pregabalin (LYRICA) 25 MG capsule    sertraline (ZOLOFT) 25 MG tablet    tamsulosin (FLOMAX) 0.4 mg Cap    topiramate (TOPAMAX) 25 MG tablet    TRUEPLUS LANCETS 33 gauge Misc    vitamin D (VITAMIN D3) 1000 units Tab     No current facility-administered medications for this visit.     Facility-Administered Medications Ordered in Other Visits   Medication Frequency    fentaNYL injection 25 mcg Q5 Min PRN    midazolam (VERSED) 1 mg/mL injection 0.5 mg PRN       Objective:  Sunitha Pillai arrived promptly for the session.   The patient was fully cooperative throughout the session.  Appearance: age appropriate, appropriately  dressed, adequately  groomed  Behavior/Cooperation: friendly and cooperative  Speech: normal in rate, volume, and tone and appropriate quality, quantity and organization of sentences  Mood: dysthymic  Affect: mood congruent  Thought Process: goal-directed, logical  Thought Content: normal,  No delusions or paranoia; did not appear to " be responding to internal stimuli during the session  Orientation: grossly intact  Memory: Grossly intact  Attention Span/Concentration: Attends to session without distraction; reports no difficulty  Fund of Knowledge: average  Estimate of Intelligence: average from verbal skills and history  Cognition: grossly intact  Insight: patient has awareness of illness; good insight into own behavior and behavior of others  Judgment: the patient's behavior is adequate to circumstances    Interval history and content of current session: Patient here to gain referral for grief group. Patient   in 2021 due to complications from covid and pneumonia. Patient was also admitted for COVID at the same time and was not able to be with him. Feels guilt. When patient is alone or is reminded of COVID she feels intense grief.   Worship beliefs are Restorationist.     Discussed treatment. Reports to be coping with significant difficulty. Evaluated cognitive response, paying particular attention to negative intrusive thoughts of a persistent and detrimental nature. Thoughts of this type are in evidence with moderate distress. Provided cognitive behavioral therapy to address negative cognitions. Identified and evaluated psychosocial and environmental stressors secondary to diagnosis and treatment.  Examined proactive behaviors that may be implemented to minimize or ameliorate psychosocial stressors secondary to diagnosis and treatment.         Risk parameters:   Patient reports no suicidal ideation  Patient reports no homicidal ideation  Patient reports no self-injurious behavior  Patient reports no violent behavior   Safety needs:  None at this time      Verbal deficits: None     Patient's response to intervention:The patient's response to intervention is accepting.     Progress toward goals and other mental status changes:  The patient's progress toward goals is limited.      Progress to date:Revise Objectives (Goals) and  Revise Interventions      Goals from last visit:  n/a     Patient reported outcomes:    Distress Thermometer:   Distress Score             Practical Problems Physical Problems                                                   Family Problems                                         Emotional Problems                                                         Spiritual/Religions Concerns               Other Problems                PHQ-9=    KEILA-7=               Client Strengths: verbal, intelligent, successful, good social support, good insight, commitment to wellness, strong lindy, strong cultural traditions     Diagnosis:     ICD-10-CM ICD-9-CM   1. Prolonged grief disorder  F43.81 309.1   2. Personal history of breast cancer  Z85.3 V10.3        Treatment Plan:group psychotherapy  Target symptoms: grief  Why chosen therapy is appropriate versus another modality: relevant to diagnosis, patient responds to this modality, evidence based practice  Outcome monitoring methods: observation, checklist/rating scale  Therapeutic intervention type: supportive psychotherapy  Prognosis: Good  Patient interest in group grief treatment-provided several community resources.     Return to clinic: as needed       Length of Service (minutes direct face-to-face contact): 60    Lety Llanos, PhD  Clinical Psychologist  [place] License #[id number]  [place] License #[id number]

## 2023-01-15 ENCOUNTER — HOSPITAL ENCOUNTER (OUTPATIENT)
Facility: HOSPITAL | Age: 68
Discharge: HOME | End: 2023-01-16
Attending: STUDENT IN AN ORGANIZED HEALTH CARE EDUCATION/TRAINING PROGRAM | Admitting: STUDENT IN AN ORGANIZED HEALTH CARE EDUCATION/TRAINING PROGRAM
Payer: MEDICARE

## 2023-01-15 DIAGNOSIS — R07.9 CHEST PAIN: ICD-10-CM

## 2023-01-15 LAB
ALBUMIN SERPL BCP-MCNC: 3.5 G/DL (ref 3.5–5.2)
ALP SERPL-CCNC: 81 U/L (ref 55–135)
ALT SERPL W/O P-5'-P-CCNC: 10 U/L (ref 10–44)
ANION GAP SERPL CALC-SCNC: 10 MMOL/L (ref 8–16)
AST SERPL-CCNC: 18 U/L (ref 10–40)
BASOPHILS # BLD AUTO: 0.01 K/UL (ref 0–0.2)
BASOPHILS NFR BLD: 0.2 % (ref 0–1.9)
BILIRUB SERPL-MCNC: 0.6 MG/DL (ref 0.1–1)
BNP SERPL-MCNC: 49 PG/ML (ref 0–99)
BUN SERPL-MCNC: 14 MG/DL (ref 8–23)
CALCIUM SERPL-MCNC: 9.2 MG/DL (ref 8.7–10.5)
CHLORIDE SERPL-SCNC: 108 MMOL/L (ref 95–110)
CO2 SERPL-SCNC: 24 MMOL/L (ref 23–29)
CREAT SERPL-MCNC: 1 MG/DL (ref 0.5–1.4)
D DIMER PPP IA.FEU-MCNC: 1.92 MG/L FEU
DIFFERENTIAL METHOD: ABNORMAL
EOSINOPHIL # BLD AUTO: 0 K/UL (ref 0–0.5)
EOSINOPHIL NFR BLD: 0.8 % (ref 0–8)
ERYTHROCYTE [DISTWIDTH] IN BLOOD BY AUTOMATED COUNT: 13.9 % (ref 11.5–14.5)
EST. GFR  (NO RACE VARIABLE): >60 ML/MIN/1.73 M^2
GLUCOSE SERPL-MCNC: 132 MG/DL (ref 70–110)
HCT VFR BLD AUTO: 30.9 % (ref 37–48.5)
HGB BLD-MCNC: 10.7 G/DL (ref 12–16)
IMM GRANULOCYTES # BLD AUTO: 0.01 K/UL (ref 0–0.04)
IMM GRANULOCYTES NFR BLD AUTO: 0.2 % (ref 0–0.5)
LYMPHOCYTES # BLD AUTO: 1.8 K/UL (ref 1–4.8)
LYMPHOCYTES NFR BLD: 34.5 % (ref 18–48)
MCH RBC QN AUTO: 31.8 PG (ref 27–31)
MCHC RBC AUTO-ENTMCNC: 34.6 G/DL (ref 32–36)
MCV RBC AUTO: 92 FL (ref 82–98)
MONOCYTES # BLD AUTO: 0.7 K/UL (ref 0.3–1)
MONOCYTES NFR BLD: 13.1 % (ref 4–15)
NEUTROPHILS # BLD AUTO: 2.7 K/UL (ref 1.8–7.7)
NEUTROPHILS NFR BLD: 51.2 % (ref 38–73)
NRBC BLD-RTO: 0 /100 WBC
PLATELET # BLD AUTO: 135 K/UL (ref 150–450)
PMV BLD AUTO: 9.9 FL (ref 9.2–12.9)
POTASSIUM SERPL-SCNC: 4 MMOL/L (ref 3.5–5.1)
PROT SERPL-MCNC: 7 G/DL (ref 6–8.4)
RBC # BLD AUTO: 3.36 M/UL (ref 4–5.4)
SODIUM SERPL-SCNC: 142 MMOL/L (ref 136–145)
TROPONIN I SERPL DL<=0.01 NG/ML-MCNC: 0.01 NG/ML (ref 0–0.03)
WBC # BLD AUTO: 5.33 K/UL (ref 3.9–12.7)

## 2023-01-15 PROCEDURE — 84484 ASSAY OF TROPONIN QUANT: CPT | Performed by: STUDENT IN AN ORGANIZED HEALTH CARE EDUCATION/TRAINING PROGRAM

## 2023-01-15 PROCEDURE — 99285 EMERGENCY DEPT VISIT HI MDM: CPT | Mod: 25

## 2023-01-15 PROCEDURE — 25000003 PHARM REV CODE 250: Performed by: STUDENT IN AN ORGANIZED HEALTH CARE EDUCATION/TRAINING PROGRAM

## 2023-01-15 PROCEDURE — 85025 COMPLETE CBC W/AUTO DIFF WBC: CPT | Performed by: STUDENT IN AN ORGANIZED HEALTH CARE EDUCATION/TRAINING PROGRAM

## 2023-01-15 PROCEDURE — 93010 ELECTROCARDIOGRAM REPORT: CPT | Mod: ,,, | Performed by: INTERNAL MEDICINE

## 2023-01-15 PROCEDURE — 80053 COMPREHEN METABOLIC PANEL: CPT | Performed by: STUDENT IN AN ORGANIZED HEALTH CARE EDUCATION/TRAINING PROGRAM

## 2023-01-15 PROCEDURE — 25000242 PHARM REV CODE 250 ALT 637 W/ HCPCS: Performed by: STUDENT IN AN ORGANIZED HEALTH CARE EDUCATION/TRAINING PROGRAM

## 2023-01-15 PROCEDURE — 93005 ELECTROCARDIOGRAM TRACING: CPT

## 2023-01-15 PROCEDURE — 93010 EKG 12-LEAD: ICD-10-PCS | Mod: ,,, | Performed by: INTERNAL MEDICINE

## 2023-01-15 PROCEDURE — 85379 FIBRIN DEGRADATION QUANT: CPT | Performed by: STUDENT IN AN ORGANIZED HEALTH CARE EDUCATION/TRAINING PROGRAM

## 2023-01-15 PROCEDURE — 83880 ASSAY OF NATRIURETIC PEPTIDE: CPT | Performed by: STUDENT IN AN ORGANIZED HEALTH CARE EDUCATION/TRAINING PROGRAM

## 2023-01-15 RX ORDER — ASPIRIN 325 MG
325 TABLET ORAL
Status: DISCONTINUED | OUTPATIENT
Start: 2023-01-15 | End: 2023-01-15

## 2023-01-15 RX ORDER — NITROGLYCERIN 0.4 MG/1
0.4 TABLET SUBLINGUAL EVERY 5 MIN PRN
Status: DISCONTINUED | OUTPATIENT
Start: 2023-01-15 | End: 2023-01-16 | Stop reason: HOSPADM

## 2023-01-15 RX ORDER — NAPROXEN SODIUM 220 MG/1
162 TABLET, FILM COATED ORAL
Status: COMPLETED | OUTPATIENT
Start: 2023-01-15 | End: 2023-01-15

## 2023-01-15 RX ADMIN — NITROGLYCERIN 0.4 MG: 0.4 TABLET, ORALLY DISINTEGRATING SUBLINGUAL at 10:01

## 2023-01-15 RX ADMIN — ASPIRIN 81 MG CHEWABLE TABLET 162 MG: 81 TABLET CHEWABLE at 09:01

## 2023-01-15 NOTE — Clinical Note
"Sunitha Ramirezia" Rosas was seen and treated in our emergency department on 1/15/2023.  She may return to work on 01/19/2023.       If you have any questions or concerns, please don't hesitate to call.      chucho Nix RN    "

## 2023-01-16 VITALS
WEIGHT: 160.06 LBS | TEMPERATURE: 98 F | OXYGEN SATURATION: 97 % | SYSTOLIC BLOOD PRESSURE: 137 MMHG | HEART RATE: 84 BPM | RESPIRATION RATE: 18 BRPM | BODY MASS INDEX: 27.33 KG/M2 | HEIGHT: 64 IN | DIASTOLIC BLOOD PRESSURE: 67 MMHG

## 2023-01-16 LAB
ALBUMIN SERPL BCP-MCNC: 3 G/DL (ref 3.5–5.2)
ALP SERPL-CCNC: 78 U/L (ref 55–135)
ALT SERPL W/O P-5'-P-CCNC: 9 U/L (ref 10–44)
ANION GAP SERPL CALC-SCNC: 6 MMOL/L (ref 8–16)
AST SERPL-CCNC: 13 U/L (ref 10–40)
AV INDEX (PROSTH): 0.53
AV MEAN GRADIENT: 6 MMHG
AV PEAK GRADIENT: 10 MMHG
AV VALVE AREA: 1.71 CM2
AV VELOCITY RATIO: 0.48
BASOPHILS # BLD AUTO: 0.01 K/UL (ref 0–0.2)
BASOPHILS NFR BLD: 0.3 % (ref 0–1.9)
BILIRUB SERPL-MCNC: 0.6 MG/DL (ref 0.1–1)
BSA FOR ECHO PROCEDURE: 1.81 M2
BUN SERPL-MCNC: 15 MG/DL (ref 8–23)
CALCIUM SERPL-MCNC: 8.8 MG/DL (ref 8.7–10.5)
CHLORIDE SERPL-SCNC: 111 MMOL/L (ref 95–110)
CO2 SERPL-SCNC: 24 MMOL/L (ref 23–29)
CREAT SERPL-MCNC: 1 MG/DL (ref 0.5–1.4)
CV ECHO LV RWT: 0.41 CM
DIFFERENTIAL METHOD: ABNORMAL
DOP CALC AO PEAK VEL: 1.6 M/S
DOP CALC AO VTI: 25.9 CM
DOP CALC LVOT AREA: 3.2 CM2
DOP CALC LVOT DIAMETER: 2.03 CM
DOP CALC LVOT PEAK VEL: 0.76 M/S
DOP CALC LVOT STROKE VOLUME: 44.32 CM3
DOP CALCLVOT PEAK VEL VTI: 13.7 CM
E WAVE DECELERATION TIME: 205.15 MSEC
E/A RATIO: 0.8
E/E' RATIO: 8.71 M/S
ECHO LV POSTERIOR WALL: 0.95 CM (ref 0.6–1.1)
EJECTION FRACTION: 60 %
EOSINOPHIL # BLD AUTO: 0 K/UL (ref 0–0.5)
EOSINOPHIL NFR BLD: 0.8 % (ref 0–8)
ERYTHROCYTE [DISTWIDTH] IN BLOOD BY AUTOMATED COUNT: 13.8 % (ref 11.5–14.5)
EST. GFR  (NO RACE VARIABLE): >60 ML/MIN/1.73 M^2
FRACTIONAL SHORTENING: 36 % (ref 28–44)
GLUCOSE SERPL-MCNC: 119 MG/DL (ref 70–110)
GLUCOSE SERPL-MCNC: 96 MG/DL (ref 70–110)
HCT VFR BLD AUTO: 29.8 % (ref 37–48.5)
HGB BLD-MCNC: 10.4 G/DL (ref 12–16)
IMM GRANULOCYTES # BLD AUTO: 0 K/UL (ref 0–0.04)
IMM GRANULOCYTES NFR BLD AUTO: 0 % (ref 0–0.5)
INTERVENTRICULAR SEPTUM: 0.99 CM (ref 0.6–1.1)
LA MAJOR: 5.37 CM
LA MINOR: 5.33 CM
LA WIDTH: 4.2 CM
LEFT ATRIUM SIZE: 4.43 CM
LEFT ATRIUM VOLUME INDEX: 47.5 ML/M2
LEFT ATRIUM VOLUME: 84.61 CM3
LEFT INTERNAL DIMENSION IN SYSTOLE: 2.98 CM (ref 2.1–4)
LEFT VENTRICLE DIASTOLIC VOLUME INDEX: 55.83 ML/M2
LEFT VENTRICLE DIASTOLIC VOLUME: 99.38 ML
LEFT VENTRICLE MASS INDEX: 87 G/M2
LEFT VENTRICLE SYSTOLIC VOLUME INDEX: 19.3 ML/M2
LEFT VENTRICLE SYSTOLIC VOLUME: 34.35 ML
LEFT VENTRICULAR INTERNAL DIMENSION IN DIASTOLE: 4.64 CM (ref 3.5–6)
LEFT VENTRICULAR MASS: 154.51 G
LV LATERAL E/E' RATIO: 8.22 M/S
LV SEPTAL E/E' RATIO: 9.25 M/S
LVOT MG: 1.24 MMHG
LVOT MV: 0.53 CM/S
LYMPHOCYTES # BLD AUTO: 1.8 K/UL (ref 1–4.8)
LYMPHOCYTES NFR BLD: 48.3 % (ref 18–48)
MCH RBC QN AUTO: 32 PG (ref 27–31)
MCHC RBC AUTO-ENTMCNC: 34.9 G/DL (ref 32–36)
MCV RBC AUTO: 92 FL (ref 82–98)
MONOCYTES # BLD AUTO: 0.5 K/UL (ref 0.3–1)
MONOCYTES NFR BLD: 12.9 % (ref 4–15)
MV PEAK A VEL: 0.92 M/S
MV PEAK E VEL: 0.74 M/S
MV STENOSIS PRESSURE HALF TIME: 59.49 MS
MV VALVE AREA P 1/2 METHOD: 3.7 CM2
NEUTROPHILS # BLD AUTO: 1.4 K/UL (ref 1.8–7.7)
NEUTROPHILS NFR BLD: 37.7 % (ref 38–73)
NRBC BLD-RTO: 0 /100 WBC
PISA TR MAX VEL: 2.65 M/S
PLATELET # BLD AUTO: 117 K/UL (ref 150–450)
PMV BLD AUTO: 8.9 FL (ref 9.2–12.9)
POTASSIUM SERPL-SCNC: 4 MMOL/L (ref 3.5–5.1)
PROT SERPL-MCNC: 6.1 G/DL (ref 6–8.4)
PV PEAK VELOCITY: 0.73 CM/S
RA MAJOR: 4.36 CM
RA PRESSURE: 15 MMHG
RA WIDTH: 3.4 CM
RBC # BLD AUTO: 3.25 M/UL (ref 4–5.4)
RIGHT VENTRICULAR END-DIASTOLIC DIMENSION: 3.25 CM
RV TISSUE DOPPLER FREE WALL SYSTOLIC VELOCITY 1 (APICAL 4 CHAMBER VIEW): 0.01 CM/S
SINUS: 2.81 CM
SODIUM SERPL-SCNC: 141 MMOL/L (ref 136–145)
STJ: 2.14 CM
TDI LATERAL: 0.09 M/S
TDI SEPTAL: 0.08 M/S
TDI: 0.09 M/S
TR MAX PG: 28 MMHG
TRICUSPID ANNULAR PLANE SYSTOLIC EXCURSION: 2.03 CM
TROPONIN I SERPL DL<=0.01 NG/ML-MCNC: 0.01 NG/ML (ref 0–0.03)
TROPONIN I SERPL DL<=0.01 NG/ML-MCNC: 0.01 NG/ML (ref 0–0.03)
TROPONIN I SERPL DL<=0.01 NG/ML-MCNC: <0.006 NG/ML (ref 0–0.03)
TV REST PULMONARY ARTERY PRESSURE: 43 MMHG
WBC # BLD AUTO: 3.79 K/UL (ref 3.9–12.7)

## 2023-01-16 PROCEDURE — 25000003 PHARM REV CODE 250: Performed by: STUDENT IN AN ORGANIZED HEALTH CARE EDUCATION/TRAINING PROGRAM

## 2023-01-16 PROCEDURE — 85025 COMPLETE CBC W/AUTO DIFF WBC: CPT

## 2023-01-16 PROCEDURE — G0378 HOSPITAL OBSERVATION PER HR: HCPCS

## 2023-01-16 PROCEDURE — 96374 THER/PROPH/DIAG INJ IV PUSH: CPT

## 2023-01-16 PROCEDURE — 80053 COMPREHEN METABOLIC PANEL: CPT

## 2023-01-16 PROCEDURE — 84484 ASSAY OF TROPONIN QUANT: CPT | Mod: 91

## 2023-01-16 PROCEDURE — 25500020 PHARM REV CODE 255: Performed by: STUDENT IN AN ORGANIZED HEALTH CARE EDUCATION/TRAINING PROGRAM

## 2023-01-16 PROCEDURE — 25000003 PHARM REV CODE 250

## 2023-01-16 PROCEDURE — 63600175 PHARM REV CODE 636 W HCPCS: Performed by: NURSE PRACTITIONER

## 2023-01-16 PROCEDURE — 84484 ASSAY OF TROPONIN QUANT: CPT | Mod: 91 | Performed by: STUDENT IN AN ORGANIZED HEALTH CARE EDUCATION/TRAINING PROGRAM

## 2023-01-16 RX ORDER — PROCHLORPERAZINE EDISYLATE 5 MG/ML
5 INJECTION INTRAMUSCULAR; INTRAVENOUS EVERY 6 HOURS PRN
Status: DISCONTINUED | OUTPATIENT
Start: 2023-01-16 | End: 2023-01-16 | Stop reason: HOSPADM

## 2023-01-16 RX ORDER — IPRATROPIUM BROMIDE AND ALBUTEROL SULFATE 2.5; .5 MG/3ML; MG/3ML
3 SOLUTION RESPIRATORY (INHALATION) EVERY 4 HOURS PRN
Status: DISCONTINUED | OUTPATIENT
Start: 2023-01-16 | End: 2023-01-16 | Stop reason: HOSPADM

## 2023-01-16 RX ORDER — ACETAMINOPHEN 325 MG/1
650 TABLET ORAL EVERY 4 HOURS PRN
Status: DISCONTINUED | OUTPATIENT
Start: 2023-01-16 | End: 2023-01-16 | Stop reason: HOSPADM

## 2023-01-16 RX ORDER — SERTRALINE HYDROCHLORIDE 25 MG/1
25 TABLET, FILM COATED ORAL DAILY
Status: DISCONTINUED | OUTPATIENT
Start: 2023-01-16 | End: 2023-01-16 | Stop reason: HOSPADM

## 2023-01-16 RX ORDER — MAG HYDROX/ALUMINUM HYD/SIMETH 200-200-20
30 SUSPENSION, ORAL (FINAL DOSE FORM) ORAL 4 TIMES DAILY PRN
Status: DISCONTINUED | OUTPATIENT
Start: 2023-01-16 | End: 2023-01-16 | Stop reason: HOSPADM

## 2023-01-16 RX ORDER — TOPIRAMATE 25 MG/1
50 TABLET ORAL NIGHTLY
Status: DISCONTINUED | OUTPATIENT
Start: 2023-01-16 | End: 2023-01-16 | Stop reason: HOSPADM

## 2023-01-16 RX ORDER — POLYETHYLENE GLYCOL 3350 17 G/17G
17 POWDER, FOR SOLUTION ORAL DAILY
Status: DISCONTINUED | OUTPATIENT
Start: 2023-01-16 | End: 2023-01-16 | Stop reason: HOSPADM

## 2023-01-16 RX ORDER — ENOXAPARIN SODIUM 100 MG/ML
40 INJECTION SUBCUTANEOUS EVERY 24 HOURS
Status: DISCONTINUED | OUTPATIENT
Start: 2023-01-16 | End: 2023-01-16 | Stop reason: HOSPADM

## 2023-01-16 RX ORDER — NALOXONE HCL 0.4 MG/ML
0.02 VIAL (ML) INJECTION
Status: DISCONTINUED | OUTPATIENT
Start: 2023-01-16 | End: 2023-01-16 | Stop reason: HOSPADM

## 2023-01-16 RX ORDER — IBUPROFEN 200 MG
24 TABLET ORAL
Status: DISCONTINUED | OUTPATIENT
Start: 2023-01-16 | End: 2023-01-16 | Stop reason: HOSPADM

## 2023-01-16 RX ORDER — IBUPROFEN 200 MG
16 TABLET ORAL
Status: DISCONTINUED | OUTPATIENT
Start: 2023-01-16 | End: 2023-01-16 | Stop reason: HOSPADM

## 2023-01-16 RX ORDER — DULAGLUTIDE 1.5 MG/.5ML
1.5 INJECTION, SOLUTION SUBCUTANEOUS
Status: CANCELLED
Start: 2023-01-16

## 2023-01-16 RX ORDER — PREGABALIN 25 MG/1
50 CAPSULE ORAL 2 TIMES DAILY
Status: DISCONTINUED | OUTPATIENT
Start: 2023-01-16 | End: 2023-01-16 | Stop reason: HOSPADM

## 2023-01-16 RX ORDER — NAPROXEN SODIUM 220 MG/1
81 TABLET, FILM COATED ORAL DAILY
Status: DISCONTINUED | OUTPATIENT
Start: 2023-01-16 | End: 2023-01-16 | Stop reason: HOSPADM

## 2023-01-16 RX ORDER — LETROZOLE 2.5 MG/1
2.5 TABLET, FILM COATED ORAL DAILY
Status: DISCONTINUED | OUTPATIENT
Start: 2023-01-16 | End: 2023-01-16 | Stop reason: HOSPADM

## 2023-01-16 RX ORDER — TAMSULOSIN HYDROCHLORIDE 0.4 MG/1
0.4 CAPSULE ORAL DAILY
Status: DISCONTINUED | OUTPATIENT
Start: 2023-01-16 | End: 2023-01-16 | Stop reason: HOSPADM

## 2023-01-16 RX ORDER — INSULIN ASPART 100 [IU]/ML
1-10 INJECTION, SOLUTION INTRAVENOUS; SUBCUTANEOUS
Status: DISCONTINUED | OUTPATIENT
Start: 2023-01-16 | End: 2023-01-16 | Stop reason: HOSPADM

## 2023-01-16 RX ORDER — LOSARTAN POTASSIUM 25 MG/1
50 TABLET ORAL DAILY
Status: DISCONTINUED | OUTPATIENT
Start: 2023-01-16 | End: 2023-01-16 | Stop reason: HOSPADM

## 2023-01-16 RX ORDER — TALC
6 POWDER (GRAM) TOPICAL NIGHTLY PRN
Status: DISCONTINUED | OUTPATIENT
Start: 2023-01-16 | End: 2023-01-16 | Stop reason: HOSPADM

## 2023-01-16 RX ORDER — GLUCAGON 1 MG
1 KIT INJECTION
Status: DISCONTINUED | OUTPATIENT
Start: 2023-01-16 | End: 2023-01-16 | Stop reason: HOSPADM

## 2023-01-16 RX ORDER — ONDANSETRON 8 MG/1
8 TABLET, ORALLY DISINTEGRATING ORAL EVERY 8 HOURS PRN
Status: DISCONTINUED | OUTPATIENT
Start: 2023-01-16 | End: 2023-01-16 | Stop reason: HOSPADM

## 2023-01-16 RX ORDER — ATORVASTATIN CALCIUM 10 MG/1
10 TABLET, FILM COATED ORAL DAILY
Status: DISCONTINUED | OUTPATIENT
Start: 2023-01-16 | End: 2023-01-16 | Stop reason: HOSPADM

## 2023-01-16 RX ORDER — KETOROLAC TROMETHAMINE 30 MG/ML
15 INJECTION, SOLUTION INTRAMUSCULAR; INTRAVENOUS ONCE
Status: COMPLETED | OUTPATIENT
Start: 2023-01-16 | End: 2023-01-16

## 2023-01-16 RX ADMIN — SERTRALINE HYDROCHLORIDE 25 MG: 25 TABLET ORAL at 10:01

## 2023-01-16 RX ADMIN — NITROGLYCERIN 0.4 MG: 0.4 TABLET, ORALLY DISINTEGRATING SUBLINGUAL at 01:01

## 2023-01-16 RX ADMIN — TAMSULOSIN HYDROCHLORIDE 0.4 MG: 0.4 CAPSULE ORAL at 08:01

## 2023-01-16 RX ADMIN — ATORVASTATIN CALCIUM 10 MG: 10 TABLET, FILM COATED ORAL at 08:01

## 2023-01-16 RX ADMIN — KETOROLAC TROMETHAMINE 15 MG: 30 INJECTION, SOLUTION INTRAMUSCULAR; INTRAVENOUS at 10:01

## 2023-01-16 RX ADMIN — ACETAMINOPHEN 650 MG: 325 TABLET ORAL at 08:01

## 2023-01-16 RX ADMIN — IOHEXOL 100 ML: 350 INJECTION, SOLUTION INTRAVENOUS at 01:01

## 2023-01-16 RX ADMIN — NITROGLYCERIN 1 INCH: 20 OINTMENT TOPICAL at 03:01

## 2023-01-16 RX ADMIN — PREGABALIN 50 MG: 25 CAPSULE ORAL at 10:01

## 2023-01-16 RX ADMIN — ASPIRIN 81 MG CHEWABLE TABLET 81 MG: 81 TABLET CHEWABLE at 08:01

## 2023-01-16 RX ADMIN — LETROZOLE 2.5 MG: 2.5 TABLET ORAL at 09:01

## 2023-01-16 NOTE — SUBJECTIVE & OBJECTIVE
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    [place] via [employer] with AXIN2 and POLE variants of uncertain significance    GERD (gastroesophageal reflux disease)     Hypertension     Pneumonia due to COVID-19 virus 2021    Unstable angina 2018    Vertigo 2019    Vitamin D deficiency 2021       Past Surgical History:   Procedure Laterality Date    BREAST BIOPSY Left 2022    Left punch biopsy; Unremarkable keratinized skin with intradermal hematoma     SECTION      INJECTION FOR SENTINEL NODE IDENTIFICATION Left 2020    Procedure: INJECTION, FOR SENTINEL NODE IDENTIFICATION;  Surgeon: Isabel Moulton MD;  [facility];  Service: General;  Laterality: Left;    INSERTION OF TUNNELED CENTRAL VENOUS CATHETER (CVC) WITH SUBCUTANEOUS PORT N/A 2020    Procedure: INSERTION, PORT-A-CATH;  Surgeon: Hardeep Martin MD;  Location: [facility] CATH LAB;  Service: Radiology;  Laterality: N/A;    LAPAROSCOPIC CHOLECYSTECTOMY N/A 2021    Procedure: CHOLECYSTECTOMY, LAPAROSCOPIC;  Surgeon: Buster Son MD;  Location: [address];  Service: General;  Laterality: N/A;    MASTECTOMY, PARTIAL Left 2020    Procedure: MASTECTOMY, PARTIAL-w/reflector;  Surgeon: Isabel Moulton MD;  Location: [facility];  Service: General;  Laterality: Left;    SENTINEL LYMPH NODE BIOPSY Left 2020    Procedure: BIOPSY, LYMPH NODE, SENTINEL;  Surgeon: Isabel Moulton MD;  [facility];  Service: General;  Laterality: Left;       Review of patient's allergies indicates:  No Known Allergies    Current Facility-Administered Medications on File  "Prior to Encounter   Medication    fentaNYL injection 25 mcg    midazolam (VERSED) 1 mg/mL injection 0.5 mg     Current Outpatient Medications on File Prior to Encounter   Medication Sig    aspirin 81 mg Tab Take 1 tablet by mouth Daily.    BD ULTRA-FINE SHORT PEN NEEDLE 31 gauge x 5/16" Ndle USE 1  4 TIMES DAILY WITH  INSULIN    blood sugar diagnostic Strp Strips and lancets covered by insurance E11.9, pt checks glucose three times daily, insulin dependent    blood sugar diagnostic, drum (ACCU-CHEK COMPACT TEST) Strp USE ONE STRIP TO CHECK GLUCOSE 4 TIMES DAILY BEFORE EACH MEAL AND AT BEDTIME    candesartan (ATACAND) 8 MG tablet Take 1 tablet by mouth once daily    dulaglutide (TRULICITY) 1.5 mg/0.5 mL pen injector Inject 1.5 mg into the skin every 7 days. (Patient taking differently: Inject 1.5 mg into the skin every 7 days. Thursdays)    letrozole (FEMARA) 2.5 mg Tab Take 1 tablet (2.5 mg total) by mouth once daily.    metFORMIN (GLUCOPHAGE) 1000 MG tablet Take 1 tablet (1,000 mg total) by mouth 2 (two) times daily.    ACCU-CHEK GUIDE GLUCOSE METER Misc USE  THREE TIMES DAILY    allopurinoL (ZYLOPRIM) 100 MG tablet Take 1 tablet (100 mg total) by mouth once daily. (Patient not taking: Reported on 1/16/2023)    atorvastatin (LIPITOR) 10 MG tablet Take 1 tablet (10 mg total) by mouth once daily. (Patient not taking: Reported on 1/16/2023)    azelastine (ASTELIN) 137 mcg (0.1 %) nasal spray 1 spray (137 mcg total) by Nasal route 2 (two) times daily. (Patient not taking: Reported on 1/16/2023)    fexofenadine (ALLEGRA) 180 MG tablet Take 1 tablet (180 mg total) by mouth once daily.    fluticasone propionate (FLONASE) 50 mcg/actuation nasal spray 1 spray (50 mcg total) by Each Nostril route once daily.    lancets Misc To check BG 4 times daily, to use with insurance preferred meter, insulin dependent    predniSONE (DELTASONE) 10 MG tablet Take 4 pills daily for 10 days and then 1 pill daily (Patient not taking: " Reported on 1/16/2023)    pregabalin (LYRICA) 25 MG capsule Take 2 capsules (50 mg total) by mouth 2 (two) times daily.    sertraline (ZOLOFT) 25 MG tablet Take 1 tablet (25 mg total) by mouth once daily.    tamsulosin (FLOMAX) 0.4 mg Cap Take 1 capsule (0.4 mg total) by mouth once daily.    topiramate (TOPAMAX) 25 MG tablet One at bedtime for 2 weeks then two at bedtime    TRUEPLUS LANCETS 33 gauge Misc USE 1 TO CHECK GLUCOSE 4 TIMES DAILY    [DISCONTINUED] acetaminophen (TYLENOL) 325 MG tablet Take 2 tablets (650 mg total) by mouth every 8 (eight) hours as needed for Pain.    [DISCONTINUED] acetaminophen (TYLENOL) 500 MG tablet Take 1 tablet (500 mg total) by mouth every 6 (six) hours as needed for Pain.    [DISCONTINUED] ascorbic acid, vitamin C, (VITAMIN C) 500 MG tablet Take 1 tablet (500 mg total) by mouth 2 (two) times daily.    [DISCONTINUED] clotrimazole-betamethasone 1-0.05% (LOTRISONE) cream Apply topically 2 (two) times daily.    [DISCONTINUED] diclofenac sodium (VOLTAREN) 1 % Gel USE AS DIRECTED EVERY 8 HOURS    [DISCONTINUED] fluticasone propionate (FLONASE) 50 mcg/actuation nasal spray 2 sprays (100 mcg total) by Each Nostril route once daily.    [DISCONTINUED] LIDOcaine (LIDODERM) 5 % Place 1 patch onto the skin daily as needed (pain). Remove & Discard patch within 12 hours or as directed by MD    [DISCONTINUED] vitamin D (VITAMIN D3) 1000 units Tab Take 1 tablet (1,000 Units total) by mouth once daily.     Family History       Problem Relation (Age of Onset)    Aneurysm Father    Anxiety disorder Daughter, Daughter    Breast cancer Other, Paternal Aunt, Maternal Aunt    Cancer Brother, Paternal Aunt, Maternal Aunt    Cervical cancer Daughter    Colon polyps Mother    Depression Daughter, Daughter    Diabetes Mother, Sister, Brother    Heart disease Mother, Brother    Hyperlipidemia Mother    Hypertension Mother, Sister, Brother    Leukemia Maternal Cousin    Prostate cancer Maternal Cousin, Maternal  Cousin          Tobacco Use    Smoking status: Never    Smokeless tobacco: Never   Substance and Sexual Activity    Alcohol use: Never    Drug use: No    Sexual activity: Yes     Partners: Male     Birth control/protection: Post-menopausal     Review of Systems   Constitutional:  Positive for activity change. Negative for appetite change, chills and fatigue.   HENT: Negative.     Respiratory:  Positive for chest tightness and shortness of breath.    Cardiovascular:  Positive for chest pain. Negative for palpitations and leg swelling.   Gastrointestinal: Negative.    Genitourinary: Negative.    Musculoskeletal: Negative.    Neurological: Negative.    Hematological: Negative.    Psychiatric/Behavioral: Negative.     Objective:     Vital Signs (Most Recent):  Temp: 98.3 °F (36.8 °C) (01/15/23 2319)  Pulse: 91 (01/16/23 0501)  Resp: 18 (01/16/23 0120)  BP: 122/85 (01/16/23 0501)  SpO2: 96 % (01/16/23 0501) Vital Signs (24h Range):  Temp:  [98.3 °F (36.8 °C)-98.9 °F (37.2 °C)] 98.3 °F (36.8 °C)  Pulse:  [] 91  Resp:  [18-28] 18  SpO2:  [96 %-100 %] 96 %  BP: (115-152)/(58-85) 122/85     Weight: 72.6 kg (160 lb 0.9 oz)  Body mass index is 27.47 kg/m².    Physical Exam  Constitutional:       Appearance: Normal appearance. She is not ill-appearing.   HENT:      Head: Normocephalic and atraumatic.      Nose: Nose normal.   Eyes:      Extraocular Movements: Extraocular movements intact.   Cardiovascular:      Rate and Rhythm: Normal rate and regular rhythm.   Pulmonary:      Effort: Pulmonary effort is normal.   Abdominal:      General: Abdomen is flat.      Palpations: Abdomen is soft.   Musculoskeletal:         General: Tenderness present. Injury: left chest wall tenderness on palpation and positioning.Normal range of motion.      Cervical back: Normal range of motion.      Right lower leg: No edema.      Left lower leg: No edema.   Neurological:      General: No focal deficit present.      Mental Status: She is alert  and oriented to person, place, and time.           Significant Labs: All pertinent labs within the past 24 hours have been reviewed.    Significant Imaging: I have reviewed all pertinent imaging results/findings within the past 24 hours.

## 2023-01-16 NOTE — H&P
[facility] [place] Emergency Dept  [facility]  History & Physical    Patient Name: Sunitha Pillai  MRN: [id number]  Patient Class: OP- Observation  Admission Date: 1/15/2023  Attending Physician: Orlando Benjamin MD   Primary Care Provider: Primary Doctor No         Patient information was obtained from patient and ER records.     Subjective:     Principal Problem:Chest pain    Chief Complaint:   Chief Complaint   Patient presents with    Chest Pain     Patient arrives via EMS with chest pain for the past 2 days. Sharp pain, located on left side, radiating to left arm. Worse at night. Nausea also for the past 2 days - patient vomited with EMS and was given zofran; vomited during triage. History of breast cancer - left limb alert        HPI: Sunitha Pillai is a 67-year-old female with significant history for hypertension, hyperlipidemia, diabetes type 2, left breast cancer status post chemoradiation, and anxiety/depression who presents to the hospital for left chest wall sharp pain associated with shortness of breath x1 week.  Symptoms worse with activity.  Symptoms would go away on its own with rest.  Patient works at the [employer].  Does not smoke drink alcohol or use illicit drugs.  Patient lives alone and no use no assistive device for ambulation.   Left catheterization in 2018 normal coronary arteries.  Patient has stress test in January 2020 no evidence of ischemia.  Cannot find EKG in ED. Troponin trend negative.  BNP negative D-dimer positive chronically.  CTA of the chest no PE.      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    GERD (gastroesophageal reflux disease)     Hypertension     Pneumonia due to COVID-19 virus 2021    Unstable angina 2018    Vertigo 2019    Vitamin D deficiency 2021       Past Surgical History:   Procedure Laterality Date    BREAST BIOPSY Left 2022    Left punch biopsy; Unremarkable keratinized skin with intradermal hematoma     SECTION      INJECTION FOR SENTINEL NODE IDENTIFICATION Left 2020    Procedure: INJECTION, FOR SENTINEL NODE IDENTIFICATION;  Surgeon: Isabel Moulton MD;  Location: [facility] OR;  Service: General;  Laterality: Left;    INSERTION OF TUNNELED CENTRAL VENOUS CATHETER (CVC) WITH SUBCUTANEOUS PORT N/A 2020    Procedure: INSERTION, PORT-A-CATH;  Surgeon: Hardeep Martin MD;  Location: [facility] CATH LAB;  Service: Radiology;  Laterality: N/A;    LAPAROSCOPIC CHOLECYSTECTOMY N/A 2021    Procedure: CHOLECYSTECTOMY, LAPAROSCOPIC;  Surgeon: Buster Son MD;  Location: [address];  Service: General;  Laterality: N/A;    MASTECTOMY, PARTIAL Left 2020    Procedure: MASTECTOMY, PARTIAL-w/reflector;  Surgeon: Isabel Moulton MD;  Location: [facility] OR;  Service: General;  Laterality: Left;    SENTINEL LYMPH NODE BIOPSY Left 2020    Procedure: BIOPSY, LYMPH NODE, SENTINEL;  Surgeon: Isabel Moulton MD;  Location: [facility];  Service: General;  Laterality: Left;       Review of patient's allergies indicates:  No Known Allergies    Current Facility-Administered Medications on File Prior to Encounter   Medication    fentaNYL injection 25 mcg    midazolam (VERSED) 1 mg/mL injection 0.5 mg     Current Outpatient Medications on File Prior to Encounter   Medication Sig    aspirin 81 mg Tab Take 1 tablet by mouth Daily.    BD ULTRA-FINE SHORT PEN NEEDLE 31 gauge x 5/16" Ndle USE 1  4 TIMES DAILY WITH  INSULIN    blood sugar diagnostic Strp Strips and lancets covered by " insurance E11.9, pt checks glucose three times daily, insulin dependent    blood sugar diagnostic, drum (ACCU-CHEK COMPACT TEST) Strp USE ONE STRIP TO CHECK GLUCOSE 4 TIMES DAILY BEFORE EACH MEAL AND AT BEDTIME    candesartan (ATACAND) 8 MG tablet Take 1 tablet by mouth once daily    dulaglutide (TRULICITY) 1.5 mg/0.5 mL pen injector Inject 1.5 mg into the skin every 7 days. (Patient taking differently: Inject 1.5 mg into the skin every 7 days. Thursdays)    letrozole (FEMARA) 2.5 mg Tab Take 1 tablet (2.5 mg total) by mouth once daily.    metFORMIN (GLUCOPHAGE) 1000 MG tablet Take 1 tablet (1,000 mg total) by mouth 2 (two) times daily.    ACCU-CHEK GUIDE GLUCOSE METER Misc USE  THREE TIMES DAILY    allopurinoL (ZYLOPRIM) 100 MG tablet Take 1 tablet (100 mg total) by mouth once daily. (Patient not taking: Reported on 1/16/2023)    atorvastatin (LIPITOR) 10 MG tablet Take 1 tablet (10 mg total) by mouth once daily. (Patient not taking: Reported on 1/16/2023)    azelastine (ASTELIN) 137 mcg (0.1 %) nasal spray 1 spray (137 mcg total) by Nasal route 2 (two) times daily. (Patient not taking: Reported on 1/16/2023)    fexofenadine (ALLEGRA) 180 MG tablet Take 1 tablet (180 mg total) by mouth once daily.    fluticasone propionate (FLONASE) 50 mcg/actuation nasal spray 1 spray (50 mcg total) by Each Nostril route once daily.    lancets Misc To check BG 4 times daily, to use with insurance preferred meter, insulin dependent    predniSONE (DELTASONE) 10 MG tablet Take 4 pills daily for 10 days and then 1 pill daily (Patient not taking: Reported on 1/16/2023)    pregabalin (LYRICA) 25 MG capsule Take 2 capsules (50 mg total) by mouth 2 (two) times daily.    sertraline (ZOLOFT) 25 MG tablet Take 1 tablet (25 mg total) by mouth once daily.    tamsulosin (FLOMAX) 0.4 mg Cap Take 1 capsule (0.4 mg total) by mouth once daily.    topiramate (TOPAMAX) 25 MG tablet One at bedtime for 2 weeks then two at bedtime     TRUEPLUS LANCETS 33 gauge Misc USE 1 TO CHECK GLUCOSE 4 TIMES DAILY    [DISCONTINUED] acetaminophen (TYLENOL) 325 MG tablet Take 2 tablets (650 mg total) by mouth every 8 (eight) hours as needed for Pain.    [DISCONTINUED] acetaminophen (TYLENOL) 500 MG tablet Take 1 tablet (500 mg total) by mouth every 6 (six) hours as needed for Pain.    [DISCONTINUED] ascorbic acid, vitamin C, (VITAMIN C) 500 MG tablet Take 1 tablet (500 mg total) by mouth 2 (two) times daily.    [DISCONTINUED] clotrimazole-betamethasone 1-0.05% (LOTRISONE) cream Apply topically 2 (two) times daily.    [DISCONTINUED] diclofenac sodium (VOLTAREN) 1 % Gel USE AS DIRECTED EVERY 8 HOURS    [DISCONTINUED] fluticasone propionate (FLONASE) 50 mcg/actuation nasal spray 2 sprays (100 mcg total) by Each Nostril route once daily.    [DISCONTINUED] LIDOcaine (LIDODERM) 5 % Place 1 patch onto the skin daily as needed (pain). Remove & Discard patch within 12 hours or as directed by MD    [DISCONTINUED] vitamin D (VITAMIN D3) 1000 units Tab Take 1 tablet (1,000 Units total) by mouth once daily.     Family History       Problem Relation (Age of Onset)    Aneurysm Father    Anxiety disorder Daughter, Daughter    Breast cancer Other, Paternal Aunt, Maternal Aunt    Cancer Brother, Paternal Aunt, Maternal Aunt    Cervical cancer Daughter    Colon polyps Mother    Depression Daughter, Daughter    Diabetes Mother, Sister, Brother    Heart disease Mother, Brother    Hyperlipidemia Mother    Hypertension Mother, Sister, Brother    Leukemia Maternal Cousin    Prostate cancer Maternal Cousin, Maternal Cousin          Tobacco Use    Smoking status: Never    Smokeless tobacco: Never   Substance and Sexual Activity    Alcohol use: Never    Drug use: No    Sexual activity: Yes     Partners: Male     Birth control/protection: Post-menopausal     Review of Systems   Constitutional:  Positive for activity change. Negative for appetite change, chills and fatigue.    HENT: Negative.     Respiratory:  Positive for chest tightness and shortness of breath.    Cardiovascular:  Positive for chest pain. Negative for palpitations and leg swelling.   Gastrointestinal: Negative.    Genitourinary: Negative.    Musculoskeletal: Negative.    Neurological: Negative.    Hematological: Negative.    Psychiatric/Behavioral: Negative.     Objective:     Vital Signs (Most Recent):  Temp: 98.3 °F (36.8 °C) (01/15/23 2319)  Pulse: 91 (01/16/23 0501)  Resp: 18 (01/16/23 0120)  BP: 122/85 (01/16/23 0501)  SpO2: 96 % (01/16/23 0501) Vital Signs (24h Range):  Temp:  [98.3 °F (36.8 °C)-98.9 °F (37.2 °C)] 98.3 °F (36.8 °C)  Pulse:  [] 91  Resp:  [18-28] 18  SpO2:  [96 %-100 %] 96 %  BP: (115-152)/(58-85) 122/85     Weight: 72.6 kg (160 lb 0.9 oz)  Body mass index is 27.47 kg/m².    Physical Exam  Constitutional:       Appearance: Normal appearance. She is not ill-appearing.   HENT:      Head: Normocephalic and atraumatic.      Nose: Nose normal.   Eyes:      Extraocular Movements: Extraocular movements intact.   Cardiovascular:      Rate and Rhythm: Normal rate and regular rhythm.   Pulmonary:      Effort: Pulmonary effort is normal.   Abdominal:      General: Abdomen is flat.      Palpations: Abdomen is soft.   Musculoskeletal:         General: Tenderness present. Injury: left chest wall tenderness on palpation and positioning.Normal range of motion.      Cervical back: Normal range of motion.      Right lower leg: No edema.      Left lower leg: No edema.   Neurological:      General: No focal deficit present.      Mental Status: She is alert and oriented to person, place, and time.           Significant Labs: All pertinent labs within the past 24 hours have been reviewed.    Significant Imaging: I have reviewed all pertinent imaging results/findings within the past 24 hours.    Assessment/Plan:     * Chest pain  Admission to observation for left-sided sharp pain that is reproducible with  palpation and positioning.  No EKG in ED or in epic.  Troponin trend negative BNP normal.  Review of chart patient had left heart catheterization in 2018 that showed normal coronary arteries.  January 2020 patient had nuclear stress test that showed no evidence of ischemia.  Currently patient is hemodynamically stable for discharge home pending 2D echo results. Will obtain EKG. If no abnormality on 2 D echo and EKG , then discharge home.         VTE Risk Mitigation (From admission, onward)         Ordered     enoxaparin injection 40 mg  Daily         01/16/23 0345     IP VTE HIGH RISK PATIENT  Once         01/16/23 0345     Place sequential compression device  Until discontinued         01/16/23 0345               As clarification on January 16, 2023 patient admitted to observation under my care in collaboration with Dr. Orlando Benjamin.    Maria Esther Feldman NP  Department of Hospital Medicine   [facility] [place]

## 2023-01-16 NOTE — ED NOTES
Patient c/o midsternal chest pain occasionally radiating to back, and left shoulder. Patient states she has had this pain sporadically for approximately 2 weeks, but was continuous today. Denies n/v, and dizziness.

## 2023-01-16 NOTE — ASSESSMENT & PLAN NOTE
Admission to observation for left-sided sharp pain that is reproducible with palpation and positioning.  No EKG in ED or in epic.  Troponin trend negative BNP normal.  Review of chart patient had left heart catheterization in 2018 that showed normal coronary arteries.  January 2020 patient had nuclear stress test that showed no evidence of ischemia.  Currently patient is hemodynamically stable for discharge home pending 2D echo results. Will obtain EKG. If no abnormality on 2 D echo and EKG , then discharge home.

## 2023-01-16 NOTE — HOSPITAL COURSE
Admission to observation for left-sided sharp pain that is reproducible with palpation and positioning.  No EKG in ED or in epic.  Troponin trend negative BNP normal.  Review of chart patient had left heart catheterization in 2018 that showed normal coronary arteries.  January 2020 patient had nuclear stress test that showed no evidence of ischemia.  Currently patient is hemodynamically stable for discharge home pending 2D echo results. Obtained EKG NSR no evidence of ischemia. 2 D echo showed normal EF, indeterminate diastolic function, and normal wall motion. Patient HDS for discharge home.

## 2023-01-16 NOTE — DISCHARGE SUMMARY
[facility] Emergency Dept  Hospital Medicine  Discharge Summary      Patient Name: Sunitha Pillai  MRN: [id number]  JULI: [id number]  Patient Class: OP- Observation  Admission Date: 1/15/2023  Hospital Length of Stay: 0 days  Discharge Date and Time:  01/16/2023 4:05 PM  Attending Physician: Orlando Benjamin MD  Discharging Provider: Maria Esther Feldman NP  Primary Care Provider: Primary Doctor No    Primary Care Team: Networked reference to record PCT     HPI:   Sunitha Pillai is a 67-year-old female with significant history for hypertension, hyperlipidemia, diabetes type 2, left breast cancer status post chemoradiation, and anxiety/depression who presents to the hospital for left chest wall sharp pain associated with shortness of breath x1 week.  Symptoms worse with activity.  Symptoms would go away on its own with rest.  Patient works at the [employer].  Does not smoke drink alcohol or use illicit drugs.  Patient lives alone and no use no assistive device for ambulation.   Left catheterization in 2018 normal coronary arteries.  Patient has stress test in January 2020 no evidence of ischemia.  Cannot find EKG in ED. Troponin trend negative.  BNP negative D-dimer positive chronically.  CTA of the chest no PE.      * No surgery found *      Hospital Course:   Admission to observation for left-sided sharp pain that is reproducible with palpation and positioning.  No EKG in ED or in epic.  Troponin trend negative BNP normal.  Review of chart patient had left heart catheterization in 2018 that showed normal coronary arteries.  January 2020 patient had nuclear stress test that showed no evidence of ischemia.  Currently patient is hemodynamically stable for discharge home pending 2D echo results. Obtained EKG NSR no evidence of ischemia. 2 D echo showed normal EF, indeterminate diastolic function, and normal wall motion. Patient HDS for discharge home.        Goals of Care Treatment Preferences:  Code Status: Full  Code      Consults:     No new Assessment & Plan notes have been filed under this hospital service since the last note was generated.  Service: Hospital Medicine    Final Active Diagnoses:    Diagnosis Date Noted POA    PRINCIPAL PROBLEM:  Chest pain [R07.9] 01/04/2020 Yes    Anxiety about health [F41.8] 11/24/2020 Yes    Overweight (BMI 25.0-29.9) [E66.3] 11/05/2015 Yes    Essential hypertension [I10] 07/23/2012 Yes     Chronic    Type 2 diabetes mellitus, with long-term current use of insulin [E11.9, Z79.4] 11/06/2011 Not Applicable      Problems Resolved During this Admission:       Discharged Condition: good    Disposition: Home or Self Care    Follow Up:   Follow-up Information     Primary Doctor No Follow up.                     Patient Instructions:      Diet Cardiac     Diet diabetic     Notify your health care provider if you experience any of the following:  temperature >100.4     Notify your health care provider if you experience any of the following:  difficulty breathing or increased cough     Notify your health care provider if you experience any of the following:  increased confusion or weakness     Activity as tolerated       Significant Diagnostic Studies: Labs: All labs within the past 24 hours have been reviewed    Pending Diagnostic Studies:     None         Medications:  Reconciled Home Medications:      Medication List      ASK your doctor about these medications    ACCU-CHEK GUIDE GLUCOSE METER Misc  Generic drug: blood-glucose meter  USE  THREE TIMES DAILY     allopurinoL 100 MG tablet  Commonly known as: ZYLOPRIM  Take 1 tablet (100 mg total) by mouth once daily.     aspirin 81 mg Tab  Take 1 tablet by mouth Daily.     atorvastatin 10 MG tablet  Commonly known as: LIPITOR  Take 1 tablet (10 mg total) by mouth once daily.     azelastine 137 mcg (0.1 %) nasal spray  Commonly known as: ASTELIN  1 spray (137 mcg total) by Nasal route 2 (two) times daily.     BD ULTRA-FINE SHORT PEN NEEDLE  "31 gauge x 5/16" Ndle  Generic drug: pen needle, diabetic  USE 1  4 TIMES DAILY WITH  INSULIN     blood sugar diagnostic Strp  Strips and lancets covered by insurance E11.9, pt checks glucose three times daily, insulin dependent     blood sugar diagnostic, drum Strp  Commonly known as: ACCU-CHEK COMPACT TEST  USE ONE STRIP TO CHECK GLUCOSE 4 TIMES DAILY BEFORE EACH MEAL AND AT BEDTIME     candesartan 8 MG tablet  Commonly known as: ATACAND  Take 1 tablet by mouth once daily     fexofenadine 180 MG tablet  Commonly known as: ALLEGRA  Take 1 tablet (180 mg total) by mouth once daily.     fluticasone propionate 50 mcg/actuation nasal spray  Commonly known as: FLONASE  1 spray (50 mcg total) by Each Nostril route once daily.  Ask about: Which instructions should I use?     * lancets Misc  To check BG 4 times daily, to use with insurance preferred meter, insulin dependent     * TRUEPLUS LANCETS 33 gauge Misc  Generic drug: lancets  USE 1 TO CHECK GLUCOSE 4 TIMES DAILY     letrozole 2.5 mg Tab  Commonly known as: FEMARA  Take 1 tablet (2.5 mg total) by mouth once daily.     metFORMIN 1000 MG tablet  Commonly known as: GLUCOPHAGE  Take 1 tablet (1,000 mg total) by mouth 2 (two) times daily.     predniSONE 10 MG tablet  Commonly known as: DELTASONE  Take 4 pills daily for 10 days and then 1 pill daily     pregabalin 25 MG capsule  Commonly known as: LYRICA  Take 2 capsules (50 mg total) by mouth 2 (two) times daily.     sertraline 25 MG tablet  Commonly known as: ZOLOFT  Take 1 tablet (25 mg total) by mouth once daily.     tamsulosin 0.4 mg Cap  Commonly known as: FLOMAX  Take 1 capsule (0.4 mg total) by mouth once daily.     topiramate 25 MG tablet  Commonly known as: TOPAMAX  One at bedtime for 2 weeks then two at bedtime     TRULICITY 1.5 mg/0.5 mL pen injector  Generic drug: dulaglutide  Inject 1.5 mg into the skin every 7 days.         * This list has 2 medication(s) that are the same as other medications prescribed " for you. Read the directions carefully, and ask your doctor or other care provider to review them with you.                Indwelling Lines/Drains at time of discharge:   Lines/Drains/Airways     Central Venous Catheter Line  Duration                PowerPort A Cath Single Lumen 11/18/20 0815 right internal jugular 789 days                Time spent on the discharge of patient: 30 minutes         Maria Esther Feldman NP  Department of Hospital Medicine  [facility] - Emergency Dept

## 2023-01-16 NOTE — ED PROVIDER NOTES
Encounter Date: 1/15/2023    SCRIBE #1 NOTE: I, Melony Maravilla, am scribing for, and in the presence of,  Harrison Dalal MD.     History     Chief Complaint   Patient presents with    Chest Pain     Patient arrives via EMS with chest pain for the past 2 days. Sharp pain, located on left side, radiating to left arm. Worse at night. Nausea also for the past 2 days - patient vomited with EMS and was given zofran; vomited during triage. History of breast cancer - left limb alert     Sunitha Pillai is a 67 y.o. female, with a PMHx of breast cancer, DM, HTN who presents to the ED with chest pain. Patient reports intermittent chest pain for the last week. States she experienced a worsening episode of pain last PM which she localized to her left breast and radiating to her left shoulder. She also reports associated nausea, vomiting, and mild dyspnea. Further endorses chills. No other exacerbating or alleviating factors. Denies PSHx mastectomy. Denies fever, syncope, diaphoresis, or other associated symptoms.     The history is provided by the patient.   Review of patient's allergies indicates:  No Known Allergies  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, estrogen receptor positive    Cholelithiasis with acute cholecystitis 3/5/2021    Depression     Diabetes mellitus type I     Genetic testing of female 09/2020    [facility] via [employer] with AXIN2 and POLE variants of uncertain significance    GERD (gastroesophageal reflux disease)     Hypertension     Pneumonia due to COVID-19 virus 2/7/2021    Unstable angina 9/23/2018    Vertigo 05/13/2019    Vitamin D deficiency 2/7/2021     Past Surgical History:   Procedure Laterality Date    BREAST BIOPSY Left 06/08/2022    Left punch biopsy; Unremarkable keratinized skin with  intradermal hematoma     SECTION      INJECTION FOR SENTINEL NODE IDENTIFICATION Left 2020    Procedure: INJECTION, FOR SENTINEL NODE IDENTIFICATION;  Surgeon: Isabel Moulton MD;  Location: [facility] OR;  Service: General;  Laterality: Left;    INSERTION OF TUNNELED CENTRAL VENOUS CATHETER (CVC) WITH SUBCUTANEOUS PORT N/A 2020    Procedure: INSERTION, PORT-A-CATH;  Surgeon: Hardeep Martin MD;  Location: [facility] CATH LAB;  Service: Radiology;  Laterality: N/A;    LAPAROSCOPIC CHOLECYSTECTOMY N/A 2021    Procedure: CHOLECYSTECTOMY, LAPAROSCOPIC;  Surgeon: Buster Son MD;  Location: [facility] OR [facility] FLR;  Service: General;  Laterality: N/A;    MASTECTOMY, PARTIAL Left 2020    Procedure: MASTECTOMY, PARTIAL-w/reflector;  Surgeon: Isabel Moulton MD;  Location: [facility] OR;  Service: General;  Laterality: Left;    SENTINEL LYMPH NODE BIOPSY Left 2020    Procedure: BIOPSY, LYMPH NODE, SENTINEL;  Surgeon: Isabel Moulton MD;  Location: [facility] OR;  Service: General;  Laterality: Left;     Family History   Problem Relation Age of Onset    Hypertension Mother     Hyperlipidemia Mother     Diabetes Mother     Heart disease Mother     Colon polyps Mother     Aneurysm Father     Diabetes Sister     Hypertension Sister     Heart disease Brother     Diabetes Brother     Hypertension Brother     Cancer Brother         brother German with prostate cancer; brother Cooper with throat cancer    Cervical cancer Daughter     Anxiety disorder Daughter     Depression Daughter     Anxiety disorder Daughter     Depression Daughter     Breast cancer Other     Cancer Paternal Aunt         unknown origin    Breast cancer Paternal Aunt     Breast cancer Maternal Aunt     Cancer Maternal Aunt         unknown origin    Prostate cancer Maternal Cousin     Leukemia Maternal Cousin     Prostate cancer Maternal Cousin     Colon cancer Neg Hx     Ovarian cancer Neg Hx      Social History     Tobacco Use    Smoking  status: Never    Smokeless tobacco: Never   Substance Use Topics    Alcohol use: Never    Drug use: No     Review of Systems   Constitutional:  Positive for chills. Negative for diaphoresis and fever.   HENT:  Negative for facial swelling and sore throat.    Eyes:  Negative for visual disturbance.   Respiratory:  Positive for shortness of breath. Negative for cough.    Cardiovascular:  Positive for chest pain. Negative for palpitations.   Gastrointestinal:  Positive for nausea and vomiting. Negative for abdominal pain.   Genitourinary:  Negative for dysuria and hematuria.   Musculoskeletal:  Negative for back pain.   Skin:  Negative for rash.   Neurological:  Negative for syncope, weakness and headaches.   Hematological:  Does not bruise/bleed easily.   Psychiatric/Behavioral: Negative.       Physical Exam     Initial Vitals   BP Pulse Resp Temp SpO2   01/15/23 2131 01/15/23 2131 01/15/23 2131 01/15/23 2137 01/15/23 2131   (!) 152/82 (!) 114 18 98.9 °F (37.2 °C) 98 %      MAP       --                Physical Exam    Nursing note and vitals reviewed.  Constitutional: She appears well-developed and well-nourished. She is not diaphoretic. No distress.   HENT:   Head: Normocephalic and atraumatic.   Right Ear: External ear normal.   Left Ear: External ear normal.   Nose: Nose normal.   Eyes: Conjunctivae are normal. No scleral icterus.   Neck: Neck supple. No tracheal deviation present.   Normal range of motion.  Cardiovascular:  Normal rate, regular rhythm and normal heart sounds.           2+ radial pulses and DP pulses bilaterally.   Pulmonary/Chest: Breath sounds normal. No respiratory distress. She has no wheezes. She has no rhonchi. She has no rales.   Abdominal: Abdomen is soft. Bowel sounds are normal. There is no abdominal tenderness.   Musculoskeletal:      Cervical back: Normal range of motion and neck supple.      Comments: No leg swelling bilaterally.      Neurological: She is alert and oriented to person,  place, and time.   Skin: Skin is warm and dry.   Psychiatric: She has a normal mood and affect. Thought content normal.       ED Course   Procedures  Labs Reviewed   CBC W/ AUTO DIFFERENTIAL - Abnormal; Notable for the following components:       Result Value    RBC 3.36 (*)     Hemoglobin 10.7 (*)     Hematocrit 30.9 (*)     MCH 31.8 (*)     Platelets 135 (*)     All other components within normal limits   COMPREHENSIVE METABOLIC PANEL - Abnormal; Notable for the following components:    Glucose 132 (*)     All other components within normal limits   D DIMER, QUANTITATIVE - Abnormal; Notable for the following components:    D-Dimer 1.92 (*)     All other components within normal limits   TROPONIN I   B-TYPE NATRIURETIC PEPTIDE   TROPONIN I   COMPREHENSIVE METABOLIC PANEL   CBC W/ AUTO DIFFERENTIAL   TROPONIN I   POCT GLUCOSE, HAND-HELD DEVICE          Imaging Results              CTA Chest Non-Coronary (PE Studies) (Final result)  Result time 01/16/23 02:27:14      Final result by Bret Romo MD (01/16/23 02:27:14)                   Impression:      1.  No evidence of pulmonary thromboembolism.    2.  Patchy centrilobular opacities at the lung bases which may relate to small airways inflammation or infection.    3.  Findings in keeping with sequelae of prior granulomatous disease.    4.  Asymmetric skin thickening and edema of the left breast, similar to prior examination.  Clinical correlation advised.    5.  Additional findings as above.      Electronically signed by: Bret Romo MD  Date:    01/16/2023  Time:    02:27               Narrative:    EXAMINATION:  CTA CHEST NON CORONARY (PE STUDIES)    CLINICAL HISTORY:  Pulmonary embolism (PE) suspected, positive D-dimer;    TECHNIQUE:  Low dose axial images, sagittal and coronal reformations were obtained from the thoracic inlet to the lung bases following the IV administration of 100 mL of Omnipaque 350.  Contrast timing was optimized to evaluate the  pulmonary arteries.  MIP images were performed.    COMPARISON:  Chest radiograph 01/15/2023, CT chest 11/10/2021    FINDINGS:  Visualized soft tissue structures at the base of the neck demonstrate a previously demonstrated 1.0 cm left thyroid lobe nodule.  Correlation with prior dedicated thyroid ultrasound examination of 08/18/2022 advised.  The visualized portions of the left breast again demonstrates asymmetric skin thickening and soft tissue induration, similar to prior study of 11/10/2021.    The thoracic aorta maintains normal caliber, contour, and course without significant atherosclerotic calcification within its course.  There is no evidence of aneurysmal dilation or dissection. The heart is not enlarged and there is no evidence of pericardial effusion.The esophagus maintains a normal course and caliber.There is no bulky mediastinal lymph node enlargement.  There are calcified subcarinal and right hilar lymph nodes.  No new bulky axillary lymph node enlargement identified.    The trachea is midline and proximal airways are patent. The lungs are symmetrically expanded. There is no evidence of pneumothorax.  There are patchy centrilobular opacities at the lung bases.  There are chronic peripheral reticular opacities within the left upper lobe likely reflecting post radiation change.  There are a few stable scattered punctate bilateral pulmonary micronodules present.  There are scattered calcified granuloma present.  No significant pleural effusion.    There is no evidence of pulmonary thromboembolism.    Limited images of the upper abdomen obtained during the course of this dedicated thoracic CT demonstrate no acute abnormalities.  There is stable lobular configuration involving the visualized upper poles of the bilateral kidneys with associated cortical/parenchymal scarring..    The osseous structures demonstrate degenerative changes of the spine..                                       X-Ray Chest AP  Portable (Final result)  Result time 01/15/23 22:57:01      Final result by Maria E Colón MD (01/15/23 22:57:01)                   Impression:      No acute cardiopulmonary process identified.      Electronically signed by: Maria E Colón MD  Date:    01/15/2023  Time:    22:57               Narrative:    EXAMINATION:  XR CHEST AP PORTABLE    CLINICAL HISTORY:  Chest Pain;    TECHNIQUE:  Single frontal view of the chest was performed.    COMPARISON:  November 2022.    FINDINGS:  Cardiac silhouette is normal in size.  Lungs are symmetrically expanded.  No evidence of focal consolidative process, pneumothorax, or significant pleural effusion.  No acute osseous abnormality identified.                                       Medications   nitroGLYCERIN SL tablet 0.4 mg (0.4 mg Sublingual Given 1/16/23 0115)   aspirin chewable tablet 81 mg (has no administration in time range)   atorvastatin tablet 10 mg (has no administration in time range)   losartan tablet 50 mg (has no administration in time range)   letrozole tablet 2.5 mg (has no administration in time range)   pregabalin capsule 50 mg (has no administration in time range)   sertraline tablet 25 mg (has no administration in time range)   tamsulosin 24 hr capsule 0.4 mg (has no administration in time range)   topiramate tablet 50 mg (has no administration in time range)   dextrose 10% bolus 125 mL 125 mL (has no administration in time range)   dextrose 10% bolus 250 mL 250 mL (has no administration in time range)   albuterol-ipratropium 2.5 mg-0.5 mg/3 mL nebulizer solution 3 mL (has no administration in time range)   melatonin tablet 6 mg (has no administration in time range)   ondansetron disintegrating tablet 8 mg (has no administration in time range)   prochlorperazine injection Soln 5 mg (has no administration in time range)   polyethylene glycol packet 17 g (has no administration in time range)   aluminum-magnesium hydroxide-simethicone 200-200-20 mg/5 mL  suspension 30 mL (has no administration in time range)   acetaminophen tablet 650 mg (has no administration in time range)   naloxone 0.4 mg/mL injection 0.02 mg (has no administration in time range)   glucose chewable tablet 16 g (has no administration in time range)   glucose chewable tablet 24 g (has no administration in time range)   glucagon (human recombinant) injection 1 mg (has no administration in time range)   dextrose 10% bolus 125 mL 125 mL (has no administration in time range)   dextrose 10% bolus 250 mL 250 mL (has no administration in time range)   enoxaparin injection 40 mg (has no administration in time range)   insulin aspart U-100 pen 1-10 Units (has no administration in time range)   aspirin chewable tablet 162 mg (162 mg Oral Given 1/15/23 7885)   iohexoL (OMNIPAQUE 350) injection 100 mL (100 mLs Intravenous Given 1/16/23 0132)   nitroGLYCERIN 2% TD oint ointment 1 inch (1 inch Topical (Top) Given 1/16/23 1896)     Medical Decision Making:   History:   Old Medical Records: I decided to obtain old medical records.  Independently Interpreted Test(s):   I have ordered and independently interpreted EKG Reading(s) - see prior notes  Clinical Tests:   Lab Tests: Ordered and Reviewed  Radiological Study: Ordered and Reviewed  Medical Tests: Ordered and Reviewed  Additional MDM:   Heart Score:    History:          Moderately suspicious.  ECG:             Normal  Age:               >65 years  Risk factors: 1-2 risk factors  Troponin:       Less than or equal to normal limit  Final Score: 4       Scribe Attestation:   Scribe #1: I performed the above scribed service and the documentation accurately describes the services I performed. I attest to the accuracy of the note.      ED Course as of 01/16/23 0549   Sun Emmanuel 15, 2023   2127 EKG: Interpreted by me.  Rate 102, regular rhythm, sinus rhythm, intervals within normal limits, frequent premature atrial complexes, no ST elevations or depressions noted.    [CC]   Mon Jan 16, 2023   0242 CTA Chest Non-Coronary (PE Studies)  Impression:     1.  No evidence of pulmonary thromboembolism.     2.  Patchy centrilobular opacities at the lung bases which may relate to small airways inflammation or infection.     3.  Findings in keeping with sequelae of prior granulomatous disease.     4.  Asymmetric skin thickening and edema of the left breast, similar to prior examination.  Clinical correlation advised.     5.  Additional findings as above.    [CC]   0306 Heart score of 4.  Patient did note some relief after nitro. [CC]   0544 MDM: I have independently evaluated and interpreted all available labs and imaging.  The suspected diagnosis, treatment and plan were discussed with the patient. All questions or concerns have been addressed.      After review of the patient's physical exam, ED testing, and history/symptoms, relevant labs, imaging, available outside records  a wide differential was considered including but not limited to: infectious, traumatic, vascular, toxicological , malignant, ischemic, embolic, psychological, genetic, iatrogenic, idiopathic, substance dependence/intoxication/withdrawal, electrolyte or blood dyscrasia, and other etiologies.    Patient is a 67-year-old female presenting to the emergency department for evaluation of left-sided chest pain.  Patient had some relief with nitroglycerin.  Ointment placed.  Patient high-risk for pulmonary embolism.  D-dimer elevated.  CTA shows no evidence of pulmonary embolism.  It does show some possible small airway inflammation or infection.  Patient afebrile.  Denies significant cough.  No leukocytosis.  Doubt pneumonia.  Mild anemia in the setting of chronic disease.  Glucose is been controlled it appears.  EKG is nonischemic.  Troponins are normal.  Atypical chest pain.  History of unstable angina per chart review.  Patient denies follow-up with Cardiology.  I believe patient is high-risk and plan to place in  observation for ACS rule out and cardiology evaluation.  I have spoken with Hospital mid-level for placement in observation.       [CC]      ED Course User Index  [CC] Harrison Dalal MD               I, Harrison Dalal MD, personally performed the services described in this documentation. All medical record entries made by the scribe were at my direction and in my presence. I have reviewed the chart and agree that the record reflects my personal performance and is accurate and complete.    Clinical Impression:   Final diagnoses:  [R07.9] Chest pain        ED Disposition Condition    Observation                 Harrison Dalal MD  01/16/23 0501

## 2023-01-16 NOTE — HPI
Sunitha Pillai is a 67-year-old female with significant history for hypertension, hyperlipidemia, diabetes type 2, left breast cancer status post chemoradiation, and anxiety/depression who presents to the hospital for left chest wall sharp pain associated with shortness of breath x1 week.  Symptoms worse with activity.  Symptoms would go away on its own with rest.  Patient works at the [employer].  Does not smoke drink alcohol or use illicit drugs.  Patient lives alone and no use no assistive device for ambulation.   Left catheterization in 2018 normal coronary arteries.  Patient has stress test in January 2020 no evidence of ischemia.  Cannot find EKG in ED. Troponin trend negative.  BNP negative D-dimer positive chronically.  CTA of the chest no PE.

## 2023-01-20 ENCOUNTER — PES CALL (OUTPATIENT)
Dept: ADMINISTRATIVE | Facility: CLINIC | Age: 68
End: 2023-01-20
Payer: MEDICARE

## 2023-01-30 ENCOUNTER — NURSE TRIAGE (OUTPATIENT)
Dept: ADMINISTRATIVE | Facility: CLINIC | Age: 68
End: 2023-01-30
Payer: MEDICARE

## 2023-01-30 ENCOUNTER — HOSPITAL ENCOUNTER (OUTPATIENT)
Facility: HOSPITAL | Age: 68
Discharge: HOME | End: 2023-01-31
Attending: STUDENT IN AN ORGANIZED HEALTH CARE EDUCATION/TRAINING PROGRAM | Admitting: STUDENT IN AN ORGANIZED HEALTH CARE EDUCATION/TRAINING PROGRAM
Payer: MEDICARE

## 2023-01-30 DIAGNOSIS — R07.9 CHEST PAIN: Primary | ICD-10-CM

## 2023-01-30 DIAGNOSIS — E11.00 TYPE 2 DIABETES MELLITUS WITH HYPEROSMOLARITY WITHOUT COMA, WITH LONG-TERM CURRENT USE OF INSULIN: ICD-10-CM

## 2023-01-30 DIAGNOSIS — R07.9 CHEST PAIN, RULE OUT ACUTE MYOCARDIAL INFARCTION: ICD-10-CM

## 2023-01-30 DIAGNOSIS — R07.89 CHEST DISCOMFORT: ICD-10-CM

## 2023-01-30 DIAGNOSIS — Z79.4 TYPE 2 DIABETES MELLITUS WITH HYPEROSMOLARITY WITHOUT COMA, WITH LONG-TERM CURRENT USE OF INSULIN: ICD-10-CM

## 2023-01-30 DIAGNOSIS — I10 ESSENTIAL HYPERTENSION: Chronic | ICD-10-CM

## 2023-01-30 LAB
ALBUMIN SERPL BCP-MCNC: 2.8 G/DL (ref 3.5–5.2)
ALP SERPL-CCNC: 74 U/L (ref 55–135)
ALT SERPL W/O P-5'-P-CCNC: 9 U/L (ref 10–44)
ANION GAP SERPL CALC-SCNC: 6 MMOL/L (ref 8–16)
AST SERPL-CCNC: 13 U/L (ref 10–40)
BASOPHILS # BLD AUTO: 0.02 K/UL (ref 0–0.2)
BASOPHILS NFR BLD: 0.3 % (ref 0–1.9)
BILIRUB SERPL-MCNC: 0.7 MG/DL (ref 0.1–1)
BILIRUB UR QL STRIP: NEGATIVE
BNP SERPL-MCNC: 39 PG/ML (ref 0–99)
BUN SERPL-MCNC: 9 MG/DL (ref 8–23)
CALCIUM SERPL-MCNC: 8.7 MG/DL (ref 8.7–10.5)
CHLORIDE SERPL-SCNC: 110 MMOL/L (ref 95–110)
CLARITY UR REFRACT.AUTO: CLEAR
CO2 SERPL-SCNC: 23 MMOL/L (ref 23–29)
COLOR UR AUTO: YELLOW
CREAT SERPL-MCNC: 0.7 MG/DL (ref 0.5–1.4)
DIFFERENTIAL METHOD: ABNORMAL
EOSINOPHIL # BLD AUTO: 0 K/UL (ref 0–0.5)
EOSINOPHIL NFR BLD: 0.5 % (ref 0–8)
ERYTHROCYTE [DISTWIDTH] IN BLOOD BY AUTOMATED COUNT: 14 % (ref 11.5–14.5)
EST. GFR  (NO RACE VARIABLE): >60 ML/MIN/1.73 M^2
GLUCOSE SERPL-MCNC: 66 MG/DL (ref 70–110)
GLUCOSE UR QL STRIP: NEGATIVE
HCT VFR BLD AUTO: 31 % (ref 37–48.5)
HGB BLD-MCNC: 10 G/DL (ref 12–16)
HGB UR QL STRIP: NEGATIVE
HIV 1+2 AB+HIV1 P24 AG SERPL QL IA: NORMAL
IMM GRANULOCYTES # BLD AUTO: 0.02 K/UL (ref 0–0.04)
IMM GRANULOCYTES NFR BLD AUTO: 0.3 % (ref 0–0.5)
KETONES UR QL STRIP: NEGATIVE
LEUKOCYTE ESTERASE UR QL STRIP: ABNORMAL
LYMPHOCYTES # BLD AUTO: 1.6 K/UL (ref 1–4.8)
LYMPHOCYTES NFR BLD: 26.4 % (ref 18–48)
MCH RBC QN AUTO: 31.4 PG (ref 27–31)
MCHC RBC AUTO-ENTMCNC: 32.3 G/DL (ref 32–36)
MCV RBC AUTO: 98 FL (ref 82–98)
MICROSCOPIC COMMENT: NORMAL
MONOCYTES # BLD AUTO: 0.6 K/UL (ref 0.3–1)
MONOCYTES NFR BLD: 9.2 % (ref 4–15)
NEUTROPHILS # BLD AUTO: 3.9 K/UL (ref 1.8–7.7)
NEUTROPHILS NFR BLD: 63.3 % (ref 38–73)
NITRITE UR QL STRIP: NEGATIVE
NRBC BLD-RTO: 0 /100 WBC
PH UR STRIP: 6 [PH] (ref 5–8)
PLATELET # BLD AUTO: 157 K/UL (ref 150–450)
PMV BLD AUTO: 8.8 FL (ref 9.2–12.9)
POTASSIUM SERPL-SCNC: 3.3 MMOL/L (ref 3.5–5.1)
PROT SERPL-MCNC: 6.3 G/DL (ref 6–8.4)
PROT UR QL STRIP: NEGATIVE
RBC # BLD AUTO: 3.18 M/UL (ref 4–5.4)
RBC #/AREA URNS AUTO: 1 /HPF (ref 0–4)
SODIUM SERPL-SCNC: 139 MMOL/L (ref 136–145)
SP GR UR STRIP: 1.01 (ref 1–1.03)
SQUAMOUS #/AREA URNS AUTO: 1 /HPF
TROPONIN I SERPL DL<=0.01 NG/ML-MCNC: 0.01 NG/ML (ref 0–0.03)
TROPONIN I SERPL DL<=0.01 NG/ML-MCNC: 0.01 NG/ML (ref 0–0.03)
TROPONIN I SERPL DL<=0.01 NG/ML-MCNC: <0.006 NG/ML (ref 0–0.03)
URN SPEC COLLECT METH UR: ABNORMAL
WBC # BLD AUTO: 6.09 K/UL (ref 3.9–12.7)
WBC #/AREA URNS AUTO: 4 /HPF (ref 0–5)

## 2023-01-30 PROCEDURE — 99285 EMERGENCY DEPT VISIT HI MDM: CPT | Mod: ,,, | Performed by: STUDENT IN AN ORGANIZED HEALTH CARE EDUCATION/TRAINING PROGRAM

## 2023-01-30 PROCEDURE — 80053 COMPREHEN METABOLIC PANEL: CPT | Performed by: PHYSICIAN ASSISTANT

## 2023-01-30 PROCEDURE — G0378 HOSPITAL OBSERVATION PER HR: HCPCS

## 2023-01-30 PROCEDURE — 99285 EMERGENCY DEPT VISIT HI MDM: CPT | Mod: 25

## 2023-01-30 PROCEDURE — 96372 THER/PROPH/DIAG INJ SC/IM: CPT | Performed by: PHYSICIAN ASSISTANT

## 2023-01-30 PROCEDURE — 99223 PR INITIAL HOSPITAL CARE,LEVL III: ICD-10-PCS | Mod: ,,, | Performed by: PHYSICIAN ASSISTANT

## 2023-01-30 PROCEDURE — 87389 HIV-1 AG W/HIV-1&-2 AB AG IA: CPT | Performed by: PHYSICIAN ASSISTANT

## 2023-01-30 PROCEDURE — 93010 EKG 12-LEAD: ICD-10-PCS | Mod: ,,, | Performed by: INTERNAL MEDICINE

## 2023-01-30 PROCEDURE — 63600175 PHARM REV CODE 636 W HCPCS: Performed by: PHYSICIAN ASSISTANT

## 2023-01-30 PROCEDURE — 99285 PR EMERGENCY DEPT VISIT,LEVEL V: ICD-10-PCS | Mod: ,,, | Performed by: STUDENT IN AN ORGANIZED HEALTH CARE EDUCATION/TRAINING PROGRAM

## 2023-01-30 PROCEDURE — 84484 ASSAY OF TROPONIN QUANT: CPT | Mod: 91 | Performed by: PHYSICIAN ASSISTANT

## 2023-01-30 PROCEDURE — 81001 URINALYSIS AUTO W/SCOPE: CPT | Performed by: PHYSICIAN ASSISTANT

## 2023-01-30 PROCEDURE — 25000003 PHARM REV CODE 250: Performed by: PHYSICIAN ASSISTANT

## 2023-01-30 PROCEDURE — 99223 1ST HOSP IP/OBS HIGH 75: CPT | Mod: ,,, | Performed by: PHYSICIAN ASSISTANT

## 2023-01-30 PROCEDURE — 93010 ELECTROCARDIOGRAM REPORT: CPT | Mod: ,,, | Performed by: INTERNAL MEDICINE

## 2023-01-30 PROCEDURE — 85025 COMPLETE CBC W/AUTO DIFF WBC: CPT | Performed by: PHYSICIAN ASSISTANT

## 2023-01-30 PROCEDURE — 83880 ASSAY OF NATRIURETIC PEPTIDE: CPT | Performed by: PHYSICIAN ASSISTANT

## 2023-01-30 PROCEDURE — 93005 ELECTROCARDIOGRAM TRACING: CPT

## 2023-01-30 RX ORDER — TOPIRAMATE 25 MG/1
50 TABLET ORAL NIGHTLY
Status: DISCONTINUED | OUTPATIENT
Start: 2023-01-30 | End: 2023-01-30

## 2023-01-30 RX ORDER — IBUPROFEN 200 MG
16 TABLET ORAL
Status: DISCONTINUED | OUTPATIENT
Start: 2023-01-30 | End: 2023-01-31 | Stop reason: HOSPADM

## 2023-01-30 RX ORDER — LOSARTAN POTASSIUM 50 MG/1
50 TABLET ORAL DAILY
Status: DISCONTINUED | OUTPATIENT
Start: 2023-01-31 | End: 2023-01-30

## 2023-01-30 RX ORDER — ASPIRIN 325 MG
325 TABLET ORAL
Status: COMPLETED | OUTPATIENT
Start: 2023-01-30 | End: 2023-01-30

## 2023-01-30 RX ORDER — IBUPROFEN 200 MG
24 TABLET ORAL
Status: DISCONTINUED | OUTPATIENT
Start: 2023-01-30 | End: 2023-01-31 | Stop reason: HOSPADM

## 2023-01-30 RX ORDER — GLUCAGON 1 MG
1 KIT INJECTION
Status: DISCONTINUED | OUTPATIENT
Start: 2023-01-30 | End: 2023-01-31 | Stop reason: HOSPADM

## 2023-01-30 RX ORDER — LETROZOLE 2.5 MG/1
2.5 TABLET, FILM COATED ORAL DAILY
Status: DISCONTINUED | OUTPATIENT
Start: 2023-01-31 | End: 2023-01-30

## 2023-01-30 RX ORDER — BISACODYL 10 MG
10 SUPPOSITORY, RECTAL RECTAL DAILY PRN
Status: DISCONTINUED | OUTPATIENT
Start: 2023-01-30 | End: 2023-01-31 | Stop reason: HOSPADM

## 2023-01-30 RX ORDER — ATORVASTATIN CALCIUM 40 MG/1
40 TABLET, FILM COATED ORAL DAILY
Status: DISCONTINUED | OUTPATIENT
Start: 2023-01-31 | End: 2023-01-31 | Stop reason: HOSPADM

## 2023-01-30 RX ORDER — POLYETHYLENE GLYCOL 3350 17 G/17G
17 POWDER, FOR SOLUTION ORAL DAILY PRN
Status: DISCONTINUED | OUTPATIENT
Start: 2023-01-30 | End: 2023-01-31 | Stop reason: HOSPADM

## 2023-01-30 RX ORDER — LETROZOLE 2.5 MG/1
2.5 TABLET, FILM COATED ORAL DAILY
Status: DISCONTINUED | OUTPATIENT
Start: 2023-01-31 | End: 2023-01-31 | Stop reason: HOSPADM

## 2023-01-30 RX ORDER — NAPROXEN SODIUM 220 MG/1
81 TABLET, FILM COATED ORAL DAILY
Status: DISCONTINUED | OUTPATIENT
Start: 2023-01-31 | End: 2023-01-30

## 2023-01-30 RX ORDER — TOPIRAMATE 25 MG/1
50 TABLET ORAL NIGHTLY
Status: DISCONTINUED | OUTPATIENT
Start: 2023-01-30 | End: 2023-01-31 | Stop reason: HOSPADM

## 2023-01-30 RX ORDER — SERTRALINE HYDROCHLORIDE 25 MG/1
25 TABLET, FILM COATED ORAL DAILY
Status: DISCONTINUED | OUTPATIENT
Start: 2023-01-31 | End: 2023-01-31 | Stop reason: HOSPADM

## 2023-01-30 RX ORDER — PREGABALIN 50 MG/1
50 CAPSULE ORAL 2 TIMES DAILY
Status: DISCONTINUED | OUTPATIENT
Start: 2023-01-30 | End: 2023-01-30

## 2023-01-30 RX ORDER — ENOXAPARIN SODIUM 100 MG/ML
40 INJECTION SUBCUTANEOUS EVERY 24 HOURS
Status: DISCONTINUED | OUTPATIENT
Start: 2023-01-30 | End: 2023-01-31 | Stop reason: HOSPADM

## 2023-01-30 RX ORDER — LOSARTAN POTASSIUM 50 MG/1
50 TABLET ORAL DAILY
Status: DISCONTINUED | OUTPATIENT
Start: 2023-01-31 | End: 2023-01-31 | Stop reason: HOSPADM

## 2023-01-30 RX ORDER — SODIUM CHLORIDE 0.9 % (FLUSH) 0.9 %
10 SYRINGE (ML) INJECTION
Status: DISCONTINUED | OUTPATIENT
Start: 2023-01-30 | End: 2023-01-31 | Stop reason: HOSPADM

## 2023-01-30 RX ORDER — TALC
6 POWDER (GRAM) TOPICAL NIGHTLY PRN
Status: DISCONTINUED | OUTPATIENT
Start: 2023-01-30 | End: 2023-01-31 | Stop reason: HOSPADM

## 2023-01-30 RX ORDER — INSULIN ASPART 100 [IU]/ML
0-5 INJECTION, SOLUTION INTRAVENOUS; SUBCUTANEOUS
Status: DISCONTINUED | OUTPATIENT
Start: 2023-01-30 | End: 2023-01-31 | Stop reason: HOSPADM

## 2023-01-30 RX ORDER — IPRATROPIUM BROMIDE AND ALBUTEROL SULFATE 2.5; .5 MG/3ML; MG/3ML
3 SOLUTION RESPIRATORY (INHALATION) EVERY 4 HOURS PRN
Status: DISCONTINUED | OUTPATIENT
Start: 2023-01-30 | End: 2023-01-31 | Stop reason: HOSPADM

## 2023-01-30 RX ORDER — SERTRALINE HYDROCHLORIDE 25 MG/1
25 TABLET, FILM COATED ORAL DAILY
Status: DISCONTINUED | OUTPATIENT
Start: 2023-01-31 | End: 2023-01-30

## 2023-01-30 RX ORDER — PROMETHAZINE HYDROCHLORIDE 25 MG/1
25 TABLET ORAL EVERY 6 HOURS PRN
Status: DISCONTINUED | OUTPATIENT
Start: 2023-01-30 | End: 2023-01-31 | Stop reason: HOSPADM

## 2023-01-30 RX ORDER — TAMSULOSIN HYDROCHLORIDE 0.4 MG/1
0.4 CAPSULE ORAL DAILY
Status: DISCONTINUED | OUTPATIENT
Start: 2023-01-31 | End: 2023-01-31 | Stop reason: HOSPADM

## 2023-01-30 RX ORDER — NAPROXEN SODIUM 220 MG/1
81 TABLET, FILM COATED ORAL DAILY
Status: DISCONTINUED | OUTPATIENT
Start: 2023-01-31 | End: 2023-01-31 | Stop reason: HOSPADM

## 2023-01-30 RX ORDER — ONDANSETRON 8 MG/1
8 TABLET, ORALLY DISINTEGRATING ORAL EVERY 8 HOURS PRN
Status: DISCONTINUED | OUTPATIENT
Start: 2023-01-30 | End: 2023-01-31 | Stop reason: HOSPADM

## 2023-01-30 RX ORDER — PREGABALIN 50 MG/1
50 CAPSULE ORAL 2 TIMES DAILY
Status: DISCONTINUED | OUTPATIENT
Start: 2023-01-30 | End: 2023-01-31 | Stop reason: HOSPADM

## 2023-01-30 RX ORDER — TAMSULOSIN HYDROCHLORIDE 0.4 MG/1
0.4 CAPSULE ORAL DAILY
Status: DISCONTINUED | OUTPATIENT
Start: 2023-01-31 | End: 2023-01-30

## 2023-01-30 RX ORDER — POTASSIUM CHLORIDE 20 MEQ/1
40 TABLET, EXTENDED RELEASE ORAL ONCE
Status: COMPLETED | OUTPATIENT
Start: 2023-01-30 | End: 2023-01-30

## 2023-01-30 RX ORDER — ACETAMINOPHEN 325 MG/1
650 TABLET ORAL EVERY 4 HOURS PRN
Status: DISCONTINUED | OUTPATIENT
Start: 2023-01-30 | End: 2023-01-31 | Stop reason: HOSPADM

## 2023-01-30 RX ADMIN — ENOXAPARIN SODIUM 40 MG: 40 INJECTION SUBCUTANEOUS at 06:01

## 2023-01-30 RX ADMIN — POTASSIUM CHLORIDE 40 MEQ: 1500 TABLET, EXTENDED RELEASE ORAL at 10:01

## 2023-01-30 RX ADMIN — ASPIRIN 325 MG: 325 TABLET ORAL at 12:01

## 2023-01-30 NOTE — TELEPHONE ENCOUNTER
Transferred from scheduling attempting to make same day apt: none available per .     C/o CP near L breast since yesterday, describes as tightness, worse when trying to take a deep breath. Also c/o R shoulder pain.     Dispo-ER now for eval.     Reason for Disposition   Pain also in shoulder(s) or arm(s) or jaw    Additional Information   Negative: SEVERE difficulty breathing (e.g., struggling for each breath, speaks in single words)   Negative: Passed out (i.e., fainted, collapsed and was not responding)   Negative: Difficult to awaken or acting confused (e.g., disoriented, slurred speech)   Negative: Shock suspected (e.g., cold/pale/clammy skin, too weak to stand, low BP, rapid pulse)   Negative: Chest pain lasting longer than 5 minutes and ANY of the following:* Over 44 years old* Over 30 years old and at least one cardiac risk factor (e.g., diabetes mellitus, high blood pressure, high cholesterol, smoker, or strong family history of heart disease)* History of heart disease (i.e., angina, heart attack, heart failure, bypass surgery, takes nitroglycerin)* Pain is crushing, pressure-like, or heavy   Negative: Heart beating < 50 beats per minute OR > 140 beats per minute   Negative: Visible sweat on face or sweat dripping down face   Negative: Sounds like a life-threatening emergency to the triager   Negative: SEVERE chest pain    Protocols used: Chest Pain-A-[place]

## 2023-01-30 NOTE — ED PROVIDER NOTES
Encounter Date: 2023       History     Chief Complaint   Patient presents with    Chest Pain     Seen 2 weeks ago with same thing didn't find any thing, denies cardiac hx     67-year-old female with history of diabetes GERD, hypertension, breast cancer in remission presents for intermittent chest tightness over the past several weeks.  Pain feels like tightness, is worse at night when she goes to sleep and worse with stress.  She is also reporting some pain and stiffness in her right little finger.  She denies shortness of breath, diaphoresis, nausea/vomiting, numbness/tingling/weakness, leg pain or swelling or fever/chills.    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    [facility] via [employer] with AXIN2 and POLE variants of uncertain significance    GERD (gastroesophageal reflux disease)     Hypertension     Pneumonia due to COVID-19 virus 2021    Unstable angina 2018    Vertigo 2019    Vitamin D deficiency 2021     Past Surgical History:   Procedure Laterality Date    BREAST BIOPSY Left 2022    Left punch biopsy; Unremarkable keratinized skin with intradermal hematoma     SECTION      INJECTION FOR SENTINEL NODE IDENTIFICATION Left 2020    Procedure: INJECTION, FOR SENTINEL NODE IDENTIFICATION;  Surgeon: Isabel Moulton MD;  Location: [facility] OR;  Service: General;  Laterality: Left;    INSERTION OF TUNNELED CENTRAL VENOUS CATHETER (CVC) WITH SUBCUTANEOUS PORT N/A 2020    Procedure: INSERTION, PORT-A-CATH;  Surgeon: Hardeep Martin MD;  Location: [facility] CATH  LAB;  Service: Radiology;  Laterality: N/A;    LAPAROSCOPIC CHOLECYSTECTOMY N/A 03/06/2021    Procedure: CHOLECYSTECTOMY, LAPAROSCOPIC;  Surgeon: Buster oSn MD;  Location: [facility] OR [address] [place] [address];  Service: General;  Laterality: N/A;    MASTECTOMY, PARTIAL Left 09/17/2020    Procedure: MASTECTOMY, PARTIAL-w/reflector;  Surgeon: Isabel Moulton MD;  Location: [facility] OR;  Service: General;  Laterality: Left;    SENTINEL LYMPH NODE BIOPSY Left 09/17/2020    Procedure: BIOPSY, LYMPH NODE, SENTINEL;  Surgeon: Isabel Moulton MD;  Location: [facility] OR;  Service: General;  Laterality: Left;     Family History   Problem Relation Age of Onset    Hypertension Mother     Hyperlipidemia Mother     Diabetes Mother     Heart disease Mother     Colon polyps Mother     Aneurysm Father     Diabetes Sister     Hypertension Sister     Heart disease Brother     Diabetes Brother     Hypertension Brother     Cancer Brother         brother German with prostate cancer; brother Cooper with throat cancer    Cervical cancer Daughter     Anxiety disorder Daughter     Depression Daughter     Anxiety disorder Daughter     Depression Daughter     Breast cancer Other     Cancer Paternal Aunt         unknown origin    Breast cancer Paternal Aunt     Breast cancer Maternal Aunt     Cancer Maternal Aunt         unknown origin    Prostate cancer Maternal Cousin     Leukemia Maternal Cousin     Prostate cancer Maternal Cousin     Colon cancer Neg Hx     Ovarian cancer Neg Hx      Social History     Tobacco Use    Smoking status: Never    Smokeless tobacco: Never   Substance Use Topics    Alcohol use: Never    Drug use: No     Review of Systems   Respiratory:  Positive for chest tightness. Negative for shortness of breath.    Cardiovascular:  Positive for chest pain. Negative for palpitations and leg swelling.   Gastrointestinal:  Negative for abdominal pain and nausea.   Musculoskeletal:  Positive for arthralgias.   Neurological:  Negative for weakness and  numbness.     Physical Exam     Initial Vitals [01/30/23 1145]   BP Pulse Resp Temp SpO2   (!) 172/78 85 17 98.5 °F (36.9 °C) 100 %      MAP       --         Physical Exam    Nursing note and vitals reviewed.  Constitutional: She appears well-developed and well-nourished.   HENT:   Head: Normocephalic and atraumatic.   Eyes: EOM are normal. Pupils are equal, round, and reactive to light.   Neck: Neck supple.   Normal range of motion.  Cardiovascular:  Normal rate, regular rhythm, normal heart sounds and intact distal pulses.     Exam reveals no gallop and no friction rub.       No murmur heard.  No lower extremity edema, erythema, tenderness or warmth   Pulmonary/Chest: Breath sounds normal. No respiratory distress. She has no wheezes. She has no rhonchi. She has no rales. She exhibits no tenderness.   Musculoskeletal:      Right hand: Tenderness and bony tenderness present. Decreased range of motion.      Left hand: Normal.      Cervical back: Normal range of motion and neck supple.      Comments: There is some tenderness and decreased range of motion of the right little finger.  No erythema for swelling     Neurological: She is alert and oriented to person, place, and time.   Skin: Skin is warm and dry.   Psychiatric: She has a normal mood and affect.       ED Course   Procedures  Labs Reviewed   CBC W/ AUTO DIFFERENTIAL - Abnormal; Notable for the following components:       Result Value    RBC 3.18 (*)     Hemoglobin 10.0 (*)     Hematocrit 31.0 (*)     MCH 31.4 (*)     MPV 8.8 (*)     All other components within normal limits   HIV 1 / 2 ANTIBODY    Narrative:     Release to patient->Immediate   TROPONIN I   TROPONIN I   B-TYPE NATRIURETIC PEPTIDE   TROPONIN I   COMPREHENSIVE METABOLIC PANEL   POCT TROPONIN   POCT TROPONIN     EKG Readings: (Independently Interpreted)   Initial Reading: No STEMI. Previous EKG: Compared with most recent EKG Previous EKG Date: 1/16/2023. Rhythm: Normal Sinus Rhythm. Heart Rate:  85. Ectopy: No Ectopy. ST Segment Elevation: V2. T Waves: Normal. Clinical Impression: Normal Sinus Rhythm     Imaging Results              X-Ray Finger 2 or More Views Right (Final result)  Result time 01/30/23 13:58:03      Final result by Giovany Mcguire MD (01/30/23 13:58:03)                   Impression:      As above      Electronically signed by: Giovany Mcguire MD  Date:    01/30/2023  Time:    13:58               Narrative:    EXAMINATION:  XR FINGER 2 OR MORE VIEWS RIGHT    CLINICAL HISTORY:  finger swelling;    TECHNIQUE:  Three views of the right little finger were obtained, with AP, lateral, and oblique projections submitted.    COMPARISON:  Comparison is made to 05/04/2022.  Clinical information indicates finger swelling, with no trauma history provided.    FINDINGS:  Visualized osseous structures demonstrate no evidence of recent or healing fracture, lytic destructive process, perihilar erosive change, or other significant abnormality.  Some minimal spurring at the right 5th DIP joint level is again noted.  No radiopaque soft tissue foreign body.  No significant detrimental interval change since 05/04/2022 is appreciated.                                       X-Ray Chest PA And Lateral (Final result)  Result time 01/30/23 13:46:26      Final result by Giovany Babcock MD (01/30/23 13:46:26)                   Impression:      See above      Electronically signed by: Giovany Babcock MD  Date:    01/30/2023  Time:    13:46               Narrative:    EXAMINATION:  XR CHEST PA AND LATERAL    CLINICAL HISTORY:  Chest Pain;    TECHNIQUE:  PA and lateral views of the chest were performed.    COMPARISON:  N 01/15/2023 one    FINDINGS:  Heart size normal.  No significant airspace consolidation or pleural effusion identified                                       Medications   aspirin tablet 325 mg (325 mg Oral Given 1/30/23 1240)     Medical Decision Making:   History:   Old Medical Records: I decided to obtain old medical  records.  Old Records Summarized: records from another hospital.       <> Summary of Records: Seen at [facility] 01/15/2023 for similar complaint.  An elevated D-dimer but CTA negative for PE.  Negative troponin trending.  She had an echo performed.  She had a dobutamine stress echo in 2020 which was negative for ischemia.  Cardiac catheterization 2018.  Initial Assessment:   67-year-old female presenting for intermittent chest discomfort as well as atraumatic pain in her right small finger.  Initially hypertensive at 172/78 with otherwise normal vitals.  Nontoxic appearing.  Differential Diagnosis:   ACS   I do suspect her symptoms might be related to grief as well  Anemia   GERD  Doubt infectious cause  Independently Interpreted Test(s):   I have ordered and independently interpreted X-rays - see summary below.       <> Summary of X-Ray Reading(s): No consolidation.  No acute  I have ordered and independently interpreted EKG Reading(s) - see prior notes  Clinical Tests:   Lab Tests: Ordered and Reviewed  Radiological Study: Ordered and Reviewed  Medical Tests: Ordered and Reviewed  ED Management:  Will check labs, give aspirin, do EKG, chest x-ray, finger x-ray and reassess.    Lab workup is reassuring with negative initial troponin.  Second troponin was drawn prematurely, therefore will draw again.  I discussed this patient with my supervising physician and I am signing out to James Galvan PA-C.    4:39 PM    Additional MDM:   Heart Score:    History:          Slightly suspicious.  ECG:             Nonspecific repolarisation disturbance  Age:               >65 years  Risk factors: >= 3 risk factors or history of atherosclerotic disease  Troponin:       Less than or equal to normal limit  Final Score: 5          ED Course as of 01/30/23 1639   Mon Jan 30, 2023   1308 I received notification from the lab that patient has a critically low platelet count of 22k [CC]   1324 Discussed again with lab, sample  was clotted which I think is the cause of her thrombocytopenia.  Will send repeat CBC. [CC]   1350 Troponin I: 0.006 [CC]   1419 Platelets: 157 [CC]   1501 Troponin I: 0.008 [CC]      ED Course User Index  [CC] Ashlyn Chew PA-C                 Clinical Impression:   Final diagnoses:  [R07.89] Chest discomfort  [R07.9] Chest pain (Primary)        ED Disposition Condition    Observation Stable                Ashlyn Chew PA-C  01/30/23 [id number]

## 2023-01-30 NOTE — ED NOTES
LOC: The patient is awake, alert and aware of environment with an appropriate affect, the patient is oriented x 3 and speaking appropriately.  APPEARANCE: Patient resting comfortably and in no acute distress, patient is clean and well groomed, patient's clothing is properly fastened.  SKIN: The skin is warm and dry, color consistent with ethnicity, patient has normal skin turgor and moist mucus membranes, skin intact, no breakdown or bruising noted.  MUSCULOSKELETAL: Patient moving all extremities spontaneously, no obvious swelling or deformities noted.  RESPIRATORY: Airway is open and patent, respirations are spontaneous, patient has a normal effort and rate, no accessory muscle use noted, bilateral breath sounds noted.  CARDIAC: Patient has a normal rate and regular rhythm, no periphreal edema noted, capillary refill < 3 seconds. Pt states she is having chest pain that started last night. No cardiac hx. States her  left two years ago and she has been very stressed. Chest is feeling tight. Has not taking any medication to try and relieve pain. 3/10 pain in her chest current. States it hurts worse when she takes a deep breathe.   ABDOMEN: Soft and non tender to palpation, no distention noted, normoactive bowel sounds present in all four quadrants.  NEUROLOGIC:  facial expression is symmetrical, patient moving all extremities spontaneously, normal sensation in all extremities when touched with a finger.  Follows all commands appropriately.

## 2023-01-30 NOTE — PROVIDER PROGRESS NOTES - EMERGENCY DEPT.
Encounter Date: 2023    ED Physician Progress Notes        Physician Note:   I took report on this patient from day team due to pending disposition and shift change. Briefly, 66 yo female, hx of HTN, DM, obesity, family history of CAD - mother and younger brother with hx of MI. Pt presenting c/o left sided chest pain that started last night and was still present when she woke up this morning. Work up in ER unremarkable thus far. Of note, she was recently admitted at [facility] for similar chest pain complaints on 1/15. No stress test during admission. Her last stress on record was in .  Normal LHC back in . Pt reports plan was for outpatient stress test, but she is concerned due to persistent episodes of left sided chest pain and returned to the ER. Pt notes that chest pain may be triggered by stress/anxiety -   from covid 2 years ago. Initial work up in the ER unremarkable per day shift, although her 2nd troponin level was drawn earlier and was reordered. I discussed the case with the ER attending physician who requests admission to hospital medicine. I discussed the case with hospital medicine who will admit for obs.

## 2023-01-31 VITALS
WEIGHT: 158 LBS | HEIGHT: 64 IN | DIASTOLIC BLOOD PRESSURE: 67 MMHG | TEMPERATURE: 98 F | OXYGEN SATURATION: 96 % | BODY MASS INDEX: 26.98 KG/M2 | SYSTOLIC BLOOD PRESSURE: 141 MMHG | HEART RATE: 87 BPM | RESPIRATION RATE: 18 BRPM

## 2023-01-31 LAB
ANION GAP SERPL CALC-SCNC: 9 MMOL/L (ref 8–16)
ASCENDING AORTA: 2.94 CM
BASOPHILS # BLD AUTO: 0.01 K/UL (ref 0–0.2)
BASOPHILS NFR BLD: 0.2 % (ref 0–1.9)
BSA FOR ECHO PROCEDURE: 1.8 M2
BUN SERPL-MCNC: 11 MG/DL (ref 8–23)
CALCIUM SERPL-MCNC: 9 MG/DL (ref 8.7–10.5)
CHLORIDE SERPL-SCNC: 111 MMOL/L (ref 95–110)
CHOLEST SERPL-MCNC: 135 MG/DL (ref 120–199)
CHOLEST/HDLC SERPL: 3 {RATIO} (ref 2–5)
CO2 SERPL-SCNC: 21 MMOL/L (ref 23–29)
CREAT SERPL-MCNC: 0.9 MG/DL (ref 0.5–1.4)
CV ECHO LV RWT: 0.47 CM
CV STRESS BASE HR: 84 BPM
DIASTOLIC BLOOD PRESSURE: 92 MMHG
DIFFERENTIAL METHOD: ABNORMAL
DOP CALC LVOT AREA: 3.9 CM2
DOP CALC LVOT DIAMETER: 2.22 CM
DOP CALC LVOT PEAK VEL: 0.81 M/S
DOP CALC LVOT STROKE VOLUME: 56.99 CM3
DOP CALCLVOT PEAK VEL VTI: 14.73 CM
E WAVE DECELERATION TIME: 144.02 MSEC
E/A RATIO: 0.85
E/E' RATIO: 11.73 M/S
ECHO LV POSTERIOR WALL: 1.06 CM (ref 0.6–1.1)
EJECTION FRACTION: 60 %
EOSINOPHIL # BLD AUTO: 0 K/UL (ref 0–0.5)
EOSINOPHIL NFR BLD: 0.8 % (ref 0–8)
ERYTHROCYTE [DISTWIDTH] IN BLOOD BY AUTOMATED COUNT: 13.8 % (ref 11.5–14.5)
EST. GFR  (NO RACE VARIABLE): >60 ML/MIN/1.73 M^2
ESTIMATED AVG GLUCOSE: 171 MG/DL (ref 68–131)
FRACTIONAL SHORTENING: 37 % (ref 28–44)
GLUCOSE SERPL-MCNC: 98 MG/DL (ref 70–110)
HBA1C MFR BLD: 7.6 % (ref 4–5.6)
HCT VFR BLD AUTO: 31.6 % (ref 37–48.5)
HDLC SERPL-MCNC: 45 MG/DL (ref 40–75)
HDLC SERPL: 33.3 % (ref 20–50)
HGB BLD-MCNC: 10.3 G/DL (ref 12–16)
IMM GRANULOCYTES # BLD AUTO: 0.01 K/UL (ref 0–0.04)
IMM GRANULOCYTES NFR BLD AUTO: 0.2 % (ref 0–0.5)
INTERVENTRICULAR SEPTUM: 1.07 CM (ref 0.6–1.1)
LA MAJOR: 4.1 CM
LA MINOR: 3.34 CM
LA WIDTH: 3.19 CM
LDLC SERPL CALC-MCNC: 77 MG/DL (ref 63–159)
LEFT ATRIUM SIZE: 3.63 CM
LEFT ATRIUM VOLUME INDEX MOD: 18.6 ML/M2
LEFT ATRIUM VOLUME INDEX: 20.5 ML/M2
LEFT ATRIUM VOLUME MOD: 32.97 CM3
LEFT ATRIUM VOLUME: 36.23 CM3
LEFT INTERNAL DIMENSION IN SYSTOLE: 2.82 CM (ref 2.1–4)
LEFT VENTRICLE DIASTOLIC VOLUME INDEX: 51.63 ML/M2
LEFT VENTRICLE DIASTOLIC VOLUME: 91.38 ML
LEFT VENTRICLE MASS INDEX: 94 G/M2
LEFT VENTRICLE SYSTOLIC VOLUME INDEX: 16.9 ML/M2
LEFT VENTRICLE SYSTOLIC VOLUME: 29.98 ML
LEFT VENTRICULAR INTERNAL DIMENSION IN DIASTOLE: 4.48 CM (ref 3.5–6)
LEFT VENTRICULAR MASS: 166.08 G
LV LATERAL E/E' RATIO: 9.78 M/S
LV SEPTAL E/E' RATIO: 14.67 M/S
LYMPHOCYTES # BLD AUTO: 1.8 K/UL (ref 1–4.8)
LYMPHOCYTES NFR BLD: 36.7 % (ref 18–48)
MAGNESIUM SERPL-MCNC: 1.7 MG/DL (ref 1.6–2.6)
MCH RBC QN AUTO: 30.8 PG (ref 27–31)
MCHC RBC AUTO-ENTMCNC: 32.6 G/DL (ref 32–36)
MCV RBC AUTO: 95 FL (ref 82–98)
MONOCYTES # BLD AUTO: 0.6 K/UL (ref 0.3–1)
MONOCYTES NFR BLD: 11 % (ref 4–15)
MV A" WAVE DURATION": 9.42 MSEC
MV PEAK A VEL: 1.03 M/S
MV PEAK E VEL: 0.88 M/S
MV STENOSIS PRESSURE HALF TIME: 41.77 MS
MV VALVE AREA P 1/2 METHOD: 5.27 CM2
NEUTROPHILS # BLD AUTO: 2.6 K/UL (ref 1.8–7.7)
NEUTROPHILS NFR BLD: 51.1 % (ref 38–73)
NONHDLC SERPL-MCNC: 90 MG/DL
NRBC BLD-RTO: 0 /100 WBC
OHS CV CPX 1 MINUTE RECOVERY HEART RATE: 133 BPM
OHS CV CPX 85 PERCENT MAX PREDICTED HEART RATE MALE: 124
OHS CV CPX MAX PREDICTED HEART RATE: 146
OHS CV CPX PATIENT IS FEMALE: 1
OHS CV CPX PATIENT IS MALE: 0
OHS CV CPX PEAK DIASTOLIC BLOOD PRESSURE: 61 MMHG
OHS CV CPX PEAK HEAR RATE: 133 BPM
OHS CV CPX PEAK RATE PRESSURE PRODUCT: NORMAL
OHS CV CPX PEAK SYSTOLIC BLOOD PRESSURE: 197 MMHG
OHS CV CPX PERCENT MAX PREDICTED HEART RATE ACHIEVED: 91
OHS CV CPX RATE PRESSURE PRODUCT PRESENTING: NORMAL
PHOSPHATE SERPL-MCNC: 3.1 MG/DL (ref 2.7–4.5)
PISA TR MAX VEL: 2.47 M/S
PLATELET # BLD AUTO: 168 K/UL (ref 150–450)
PMV BLD AUTO: 9.1 FL (ref 9.2–12.9)
POCT GLUCOSE: 101 MG/DL (ref 70–110)
POCT GLUCOSE: 93 MG/DL (ref 70–110)
POTASSIUM SERPL-SCNC: 4.1 MMOL/L (ref 3.5–5.1)
PULM VEIN S/D RATIO: 1.58
PV PEAK D VEL: 0.38 M/S
PV PEAK S VEL: 0.6 M/S
RA MAJOR: 3.66 CM
RA PRESSURE: 3 MMHG
RA WIDTH: 3.08 CM
RBC # BLD AUTO: 3.34 M/UL (ref 4–5.4)
RIGHT VENTRICULAR END-DIASTOLIC DIMENSION: 2.96 CM
RV TISSUE DOPPLER FREE WALL SYSTOLIC VELOCITY 1 (APICAL 4 CHAMBER VIEW): 11.89 CM/S
SINUS: 2.53 CM
SODIUM SERPL-SCNC: 141 MMOL/L (ref 136–145)
STJ: 2.43 CM
SYSTOLIC BLOOD PRESSURE: 187 MMHG
TDI LATERAL: 0.09 M/S
TDI SEPTAL: 0.06 M/S
TDI: 0.08 M/S
TR MAX PG: 24 MMHG
TRICUSPID ANNULAR PLANE SYSTOLIC EXCURSION: 1.98 CM
TRIGL SERPL-MCNC: 65 MG/DL (ref 30–150)
TSH SERPL DL<=0.005 MIU/L-ACNC: 2.03 UIU/ML (ref 0.4–4)
TV REST PULMONARY ARTERY PRESSURE: 27 MMHG
WBC # BLD AUTO: 4.99 K/UL (ref 3.9–12.7)

## 2023-01-31 PROCEDURE — 25000003 PHARM REV CODE 250

## 2023-01-31 PROCEDURE — 85025 COMPLETE CBC W/AUTO DIFF WBC: CPT | Performed by: PHYSICIAN ASSISTANT

## 2023-01-31 PROCEDURE — G0378 HOSPITAL OBSERVATION PER HR: HCPCS

## 2023-01-31 PROCEDURE — 25000003 PHARM REV CODE 250: Performed by: PHYSICIAN ASSISTANT

## 2023-01-31 PROCEDURE — 83735 ASSAY OF MAGNESIUM: CPT | Performed by: PHYSICIAN ASSISTANT

## 2023-01-31 PROCEDURE — 80061 LIPID PANEL: CPT | Performed by: PHYSICIAN ASSISTANT

## 2023-01-31 PROCEDURE — 36415 COLL VENOUS BLD VENIPUNCTURE: CPT | Performed by: PHYSICIAN ASSISTANT

## 2023-01-31 PROCEDURE — 99239 PR HOSPITAL DISCHARGE DAY,>30 MIN: ICD-10-PCS | Mod: ,,,

## 2023-01-31 PROCEDURE — 84443 ASSAY THYROID STIM HORMONE: CPT | Performed by: PHYSICIAN ASSISTANT

## 2023-01-31 PROCEDURE — 83036 HEMOGLOBIN GLYCOSYLATED A1C: CPT | Performed by: PHYSICIAN ASSISTANT

## 2023-01-31 PROCEDURE — 99239 HOSP IP/OBS DSCHRG MGMT >30: CPT | Mod: ,,,

## 2023-01-31 PROCEDURE — 80048 BASIC METABOLIC PNL TOTAL CA: CPT | Performed by: PHYSICIAN ASSISTANT

## 2023-01-31 PROCEDURE — 84100 ASSAY OF PHOSPHORUS: CPT | Performed by: PHYSICIAN ASSISTANT

## 2023-01-31 RX ORDER — AMLODIPINE BESYLATE 5 MG/1
5 TABLET ORAL DAILY
Status: DISCONTINUED | OUTPATIENT
Start: 2023-01-31 | End: 2023-01-31 | Stop reason: HOSPADM

## 2023-01-31 RX ADMIN — ATORVASTATIN CALCIUM 40 MG: 40 TABLET, FILM COATED ORAL at 08:01

## 2023-01-31 RX ADMIN — LETROZOLE 2.5 MG: 2.5 TABLET ORAL at 08:01

## 2023-01-31 RX ADMIN — TAMSULOSIN HYDROCHLORIDE 0.4 MG: 0.4 CAPSULE ORAL at 08:01

## 2023-01-31 RX ADMIN — AMLODIPINE BESYLATE 5 MG: 5 TABLET ORAL at 08:01

## 2023-01-31 RX ADMIN — PREGABALIN 50 MG: 50 CAPSULE ORAL at 06:01

## 2023-01-31 RX ADMIN — ASPIRIN 81 MG: 81 TABLET, CHEWABLE ORAL at 08:01

## 2023-01-31 RX ADMIN — LOSARTAN POTASSIUM 50 MG: 50 TABLET, FILM COATED ORAL at 08:01

## 2023-01-31 RX ADMIN — SERTRALINE HYDROCHLORIDE 25 MG: 25 TABLET ORAL at 08:01

## 2023-01-31 RX ADMIN — TOPIRAMATE 50 MG: 25 TABLET, FILM COATED ORAL at 06:01

## 2023-01-31 RX ADMIN — PREGABALIN 50 MG: 50 CAPSULE ORAL at 08:01

## 2023-01-31 NOTE — PLAN OF CARE
[facility] - Observation 11H  Discharge Final Note    Primary Care Provider: Primary Doctor No    Expected Discharge Date: 1/31/2023    Final Discharge Note (most recent)       Final Note - 01/31/23 1223          Final Note    Assessment Type Final Discharge Note (P)      Anticipated Discharge Disposition Home or Self Care (P)      What phone number can be called within the next 1-3 days to see how you are doing after discharge? [phone] (P)      Hospital Resources/Appts/Education Provided Provided patient/caregiver with written discharge plan information;Appointments scheduled and added to AVS (P)         Post-Acute Status    Discharge Delays None known at this time (P)                    Pt discharging home. Pt's appointments are scheduled and in her AVS. A message has been sent to cardiology staff to make an appointment with the Pt. Pt has referrals out to outpatient case management and [facility].      Pt has no other post acute needs and is cleared for discharge from a case management standpoint.     LINDA Irwin, SULEMAN  [facility]  [id number]

## 2023-01-31 NOTE — HPI
Sunitha Pillai is a 67 y.o. with a PMHx of diabetes GERD, hypertension, breast cancer in remission who [place] [facility] [place]. Patient reports intermittent, non-radiating left-sided chest tightness for the past several weeks, most recent episode occurred last night. Pain worse at night when she goes to sleep and worse with stress. Patient believes her symptoms may be related to stress/anxiety related to the death of her  who passed away 2 years ago from COVID. Of note, the patient was recently admitted to [facility] on 1/15/2023 for a similar episode of chest tightness where she received an echo (EF 60%) and was treated with nitroglycerin. Her symptoms resolved until last night. Endorses an extensive cardiac history in her family- her mother and brother both had MIs. Denies N/V/D, LE edema, cough, abdominal pain, fever or SOB.      ED: BP elevated to 172/78. Remaining vital signs stable. No leukocytosis. K+ 3.3. Glucose 66. Troponin negative x 3. Chest XR with no acute abnormalities. ECG with no acute ST or T wave changes. Given ASA 325mg x1.

## 2023-01-31 NOTE — SUBJECTIVE & OBJECTIVE
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    [place] via [employer] with AXIN2 and POLE variants of uncertain significance    GERD (gastroesophageal reflux disease)     Hypertension     Pneumonia due to COVID-19 virus 2021    Unstable angina 2018    Vertigo 2019    Vitamin D deficiency 2021       Past Surgical History:   Procedure Laterality Date    BREAST BIOPSY Left 2022    Left punch biopsy; Unremarkable keratinized skin with intradermal hematoma     SECTION      INJECTION FOR SENTINEL NODE IDENTIFICATION Left 2020    Procedure: INJECTION, FOR SENTINEL NODE IDENTIFICATION;  Surgeon: Isabel oMulton MD;  Location: [facility];  Service: General;  Laterality: Left;    INSERTION OF TUNNELED CENTRAL VENOUS CATHETER (CVC) WITH SUBCUTANEOUS PORT N/A 2020    Procedure: INSERTION, PORT-A-CATH;  Surgeon: Hardeep Martin MD;  Location: [facility] CATH LAB;  Service: Radiology;  Laterality: N/A;    LAPAROSCOPIC CHOLECYSTECTOMY N/A 2021    Procedure: CHOLECYSTECTOMY, LAPAROSCOPIC;  Surgeon: Buster Son MD;  Location: [address];  Service: General;  Laterality: N/A;    MASTECTOMY, PARTIAL Left 2020    Procedure: MASTECTOMY, PARTIAL-w/reflector;  Surgeon: Isabel Moulton MD;  Location: [facility] OR;  Service: General;  Laterality: Left;    SENTINEL LYMPH NODE BIOPSY Left 2020    Procedure: BIOPSY, LYMPH NODE, SENTINEL;  Surgeon: Isabel Moulton MD;  Location: [facility];  Service: General;  Laterality: Left;       Review of patient's allergies indicates:  No Known Allergies    Current Facility-Administered Medications on File  "Prior to Encounter   Medication    fentaNYL injection 25 mcg    midazolam (VERSED) 1 mg/mL injection 0.5 mg     Current Outpatient Medications on File Prior to Encounter   Medication Sig    ACCU-CHEK GUIDE GLUCOSE METER Misc USE  THREE TIMES DAILY    allopurinoL (ZYLOPRIM) 100 MG tablet Take 1 tablet (100 mg total) by mouth once daily. (Patient not taking: Reported on 1/16/2023)    aspirin 81 mg Tab Take 1 tablet by mouth Daily.    atorvastatin (LIPITOR) 10 MG tablet Take 1 tablet (10 mg total) by mouth once daily. (Patient not taking: Reported on 1/16/2023)    azelastine (ASTELIN) 137 mcg (0.1 %) nasal spray 1 spray (137 mcg total) by Nasal route 2 (two) times daily. (Patient not taking: Reported on 1/16/2023)    BD ULTRA-FINE SHORT PEN NEEDLE 31 gauge x 5/16" Ndle USE 1  4 TIMES DAILY WITH  INSULIN    blood sugar diagnostic Strp Strips and lancets covered by insurance E11.9, pt checks glucose three times daily, insulin dependent    blood sugar diagnostic, drum (ACCU-CHEK COMPACT TEST) Strp USE ONE STRIP TO CHECK GLUCOSE 4 TIMES DAILY BEFORE EACH MEAL AND AT BEDTIME    candesartan (ATACAND) 8 MG tablet Take 1 tablet by mouth once daily    dulaglutide (TRULICITY) 1.5 mg/0.5 mL pen injector Inject 1.5 mg into the skin every 7 days. (Patient taking differently: Inject 1.5 mg into the skin every 7 days. Thursdays)    fexofenadine (ALLEGRA) 180 MG tablet Take 1 tablet (180 mg total) by mouth once daily.    fluticasone propionate (FLONASE) 50 mcg/actuation nasal spray 1 spray (50 mcg total) by Each Nostril route once daily.    lancets Misc To check BG 4 times daily, to use with insurance preferred meter, insulin dependent    letrozole (FEMARA) 2.5 mg Tab Take 1 tablet (2.5 mg total) by mouth once daily.    metFORMIN (GLUCOPHAGE) 1000 MG tablet Take 1 tablet (1,000 mg total) by mouth 2 (two) times daily.    predniSONE (DELTASONE) 10 MG tablet Take 4 pills daily for 10 days and then 1 pill daily (Patient not taking: " Reported on 1/16/2023)    pregabalin (LYRICA) 25 MG capsule Take 2 capsules (50 mg total) by mouth 2 (two) times daily.    sertraline (ZOLOFT) 25 MG tablet Take 1 tablet (25 mg total) by mouth once daily.    tamsulosin (FLOMAX) 0.4 mg Cap Take 1 capsule (0.4 mg total) by mouth once daily.    topiramate (TOPAMAX) 25 MG tablet One at bedtime for 2 weeks then two at bedtime    TRUEPLUS LANCETS 33 gauge Misc USE 1 TO CHECK GLUCOSE 4 TIMES DAILY     Family History       Problem Relation (Age of Onset)    Aneurysm Father    Anxiety disorder Daughter, Daughter    Breast cancer Other, Paternal Aunt, Maternal Aunt    Cancer Brother, Paternal Aunt, Maternal Aunt    Cervical cancer Daughter    Colon polyps Mother    Depression Daughter, Daughter    Diabetes Mother, Sister, Brother    Heart disease Mother, Brother    Hyperlipidemia Mother    Hypertension Mother, Sister, Brother    Leukemia Maternal Cousin    Prostate cancer Maternal Cousin, Maternal Cousin          Tobacco Use    Smoking status: Never    Smokeless tobacco: Never   Substance and Sexual Activity    Alcohol use: Never    Drug use: No    Sexual activity: Yes     Partners: Male     Birth control/protection: Post-menopausal     Review of Systems   Constitutional:  Negative for chills, fatigue and fever.   HENT:  Negative for congestion, trouble swallowing and voice change.    Eyes:  Negative for photophobia and visual disturbance.   Respiratory:  Positive for chest tightness. Negative for cough, shortness of breath and wheezing.    Cardiovascular:  Negative for chest pain, palpitations and leg swelling.   Gastrointestinal:  Negative for abdominal distention, abdominal pain, constipation, diarrhea, nausea and vomiting.   Endocrine: Negative for polyphagia and polyuria.   Genitourinary:  Negative for decreased urine volume, difficulty urinating, dysuria, flank pain, frequency and hematuria.   Musculoskeletal:  Positive for arthralgias (R finger). Negative for back pain  and gait problem.   Skin:  Negative for color change and wound.   Neurological:  Negative for dizziness, seizures, speech difficulty, weakness, light-headedness, numbness and headaches.   Psychiatric/Behavioral:  Negative for confusion and decreased concentration. The patient is nervous/anxious.    Objective:     Vital Signs (Most Recent):  Temp: 98.6 °F (37 °C) (01/30/23 1857)  Pulse: 71 (01/30/23 1857)  Resp: 16 (01/30/23 1857)  BP: (!) 158/65 (01/30/23 1857)  SpO2: 98 % (01/30/23 1857)   Vital Signs (24h Range):  Temp:  [98.5 °F (36.9 °C)-98.7 °F (37.1 °C)] 98.6 °F (37 °C)  Pulse:  [71-88] 71  Resp:  [16-17] 16  SpO2:  [97 %-100 %] 98 %  BP: (158-172)/(65-78) 158/65        There is no height or weight on file to calculate BMI.    Physical Exam  Vitals and nursing note reviewed.   Constitutional:       General: She is not in acute distress.     Appearance: She is well-developed.   HENT:      Head: Normocephalic and atraumatic.      Mouth/Throat:      Pharynx: No oropharyngeal exudate.   Eyes:      General: No scleral icterus.     Extraocular Movements: Extraocular movements intact.      Conjunctiva/sclera: Conjunctivae normal.   Cardiovascular:      Rate and Rhythm: Normal rate and regular rhythm.      Heart sounds: Normal heart sounds.   Pulmonary:      Effort: Pulmonary effort is normal. No respiratory distress.      Breath sounds: Normal breath sounds. No wheezing.   Abdominal:      General: Bowel sounds are normal. There is no distension.      Palpations: Abdomen is soft.      Tenderness: There is no abdominal tenderness.   Musculoskeletal:         General: No tenderness. Normal range of motion.      Cervical back: Normal range of motion and neck supple.      Right lower leg: No edema.      Left lower leg: No edema.   Lymphadenopathy:      Cervical: No cervical adenopathy.   Skin:     General: Skin is warm and dry.      Capillary Refill: Capillary refill takes less than 2 seconds.      Findings: No rash.    Neurological:      Mental Status: She is alert and oriented to person, place, and time.      Cranial Nerves: No cranial nerve deficit.      Sensory: No sensory deficit.      Coordination: Coordination normal.   Psychiatric:         Behavior: Behavior normal.         Thought Content: Thought content normal.         Judgment: Judgment normal.           Significant Labs: All pertinent labs within the past 24 hours have been reviewed.  CBC:   Recent Labs   Lab 01/30/23  1404   WBC 6.09   HGB 10.0*   HCT 31.0*        CMP:   Recent Labs   Lab 01/30/23  1637      K 3.3*      CO2 23   GLU 66*   BUN 9   CREATININE 0.7   CALCIUM 8.7   PROT 6.3   ALBUMIN 2.8*   BILITOT 0.7   ALKPHOS 74   AST 13   ALT 9*   ANIONGAP 6*       Significant Imaging: I have reviewed all pertinent imaging results/findings within the past 24 hours.  X-Ray Finger 2 or More Views Right  Narrative: EXAMINATION:  XR FINGER 2 OR MORE VIEWS RIGHT    CLINICAL HISTORY:  finger swelling;    TECHNIQUE:  Three views of the right little finger were obtained, with AP, lateral, and oblique projections submitted.    COMPARISON:  Comparison is made to 05/04/2022.  Clinical information indicates finger swelling, with no trauma history provided.    FINDINGS:  Visualized osseous structures demonstrate no evidence of recent or healing fracture, lytic destructive process, perihilar erosive change, or other significant abnormality.  Some minimal spurring at the right 5th DIP joint level is again noted.  No radiopaque soft tissue foreign body.  No significant detrimental interval change since 05/04/2022 is appreciated.  Impression: As above    Electronically signed by: Giovany Mcguire MD  Date:    01/30/2023  Time:    13:58  X-Ray Chest PA And Lateral  Narrative: EXAMINATION:  XR CHEST PA AND LATERAL    CLINICAL HISTORY:  Chest Pain;    TECHNIQUE:  PA and lateral views of the chest were performed.    COMPARISON:  N 01/15/2023 one    FINDINGS:  Heart size  normal.  No significant airspace consolidation or pleural effusion identified  Impression: See above    Electronically signed by: Giovany Babcock MD  Date:    01/30/2023  Time:    13:46

## 2023-01-31 NOTE — H&P
[facility]  Hospital Medicine  History & Physical    Patient Name: Sunitha Pillai  MRN: [id number]  Patient Class: OP- Observation  Admission Date: 1/30/2023  Attending Physician: Paty Wells MD   Primary Care Provider: Primary Doctor No         Patient information was obtained from patient, relative(s), past medical records and ER records.     Subjective:     Principal Problem:Chest pain, rule out acute myocardial infarction    Chief Complaint:   Chief Complaint   Patient presents with    Chest Pain     Seen 2 weeks ago with same thing didn't find any thing, denies cardiac hx        HPI: Sunitha Pillai is a 67 y.o. with a PMHx of diabetes GERD, hypertension, breast cancer in remission who presents to [facility] for evaluation of chest pain. Patient reports intermittent, non-radiating left-sided chest tightness for the past several weeks, most recent episode occurred last night. Pain worse at night when she goes to sleep and worse with stress. Patient believes her symptoms may be related to stress/anxiety related to the death of her  who passed away 2 years ago from COVID. Of note, the patient was recently admitted to [facility] on 1/15/2023 for a similar episode of chest tightness where she received an echo (EF 60%) and was treated with nitroglycerin. Her symptoms resolved until last night. Endorses an extensive cardiac history in her family- her mother and brother both had MIs. Denies N/V/D, LE edema, cough, abdominal pain, fever or SOB.      ED: BP elevated to 172/78. Remaining vital signs stable. No leukocytosis. K+ 3.3. Glucose 66. Troponin negative x 3. Chest XR with no acute abnormalities. ECG with no acute ST or T wave changes. Given ASA 325mg x1.      Past Medical History:   Diagnosis Date    Acute coronary syndrome 9/23/2018    Adjustment disorder     Anxiety     Arthritis     Cancer of breast 09/03/2020    Malignant neoplasm of lower-inner quadrant of left  breast in female, estrogen receptor positive    Cholelithiasis with acute cholecystitis 3/5/2021    Depression     Diabetes mellitus type I     Genetic testing of female 2020    [place] via [employer] with AXIN2 and POLE variants of uncertain significance    GERD (gastroesophageal reflux disease)     Hypertension     Pneumonia due to COVID-19 virus 2021    Unstable angina 2018    Vertigo 2019    Vitamin D deficiency 2021       Past Surgical History:   Procedure Laterality Date    BREAST BIOPSY Left 2022    Left punch biopsy; Unremarkable keratinized skin with intradermal hematoma     SECTION      INJECTION FOR SENTINEL NODE IDENTIFICATION Left 2020    Procedure: INJECTION, FOR SENTINEL NODE IDENTIFICATION;  Surgeon: Isabel Moulton MD;  Location: [facility] OR;  Service: General;  Laterality: Left;    INSERTION OF TUNNELED CENTRAL VENOUS CATHETER (CVC) WITH SUBCUTANEOUS PORT N/A 2020    Procedure: INSERTION, PORT-A-CATH;  Surgeon: Hardeep Martin MD;  Location: [facility] CATH LAB;  Service: Radiology;  Laterality: N/A;    LAPAROSCOPIC CHOLECYSTECTOMY N/A 2021    Procedure: CHOLECYSTECTOMY, LAPAROSCOPIC;  Surgeon: Buster Son MD;  Location: [address];  Service: General;  Laterality: N/A;    MASTECTOMY, PARTIAL Left 2020    Procedure: MASTECTOMY, PARTIAL-w/reflector;  Surgeon: Isabel Moulton MD;  Location: [facility] OR;  Service: General;  Laterality: Left;    SENTINEL LYMPH NODE BIOPSY Left 2020    Procedure: BIOPSY, LYMPH NODE, SENTINEL;  Surgeon: Isabel Moulton MD;  Location: [facility];  Service: General;  Laterality: Left;       Review of patient's allergies indicates:  No Known Allergies    Current Facility-Administered Medications on File Prior to Encounter   Medication    fentaNYL injection 25 mcg    midazolam (VERSED) 1 mg/mL injection 0.5 mg     Current Outpatient Medications on File Prior to Encounter   Medication Sig     "ACCU-CHEK GUIDE GLUCOSE METER Misc USE  THREE TIMES DAILY    allopurinoL (ZYLOPRIM) 100 MG tablet Take 1 tablet (100 mg total) by mouth once daily. (Patient not taking: Reported on 1/16/2023)    aspirin 81 mg Tab Take 1 tablet by mouth Daily.    atorvastatin (LIPITOR) 10 MG tablet Take 1 tablet (10 mg total) by mouth once daily. (Patient not taking: Reported on 1/16/2023)    azelastine (ASTELIN) 137 mcg (0.1 %) nasal spray 1 spray (137 mcg total) by Nasal route 2 (two) times daily. (Patient not taking: Reported on 1/16/2023)    BD ULTRA-FINE SHORT PEN NEEDLE 31 gauge x 5/16" Ndle USE 1  4 TIMES DAILY WITH  INSULIN    blood sugar diagnostic Strp Strips and lancets covered by insurance E11.9, pt checks glucose three times daily, insulin dependent    blood sugar diagnostic, drum (ACCU-CHEK COMPACT TEST) Strp USE ONE STRIP TO CHECK GLUCOSE 4 TIMES DAILY BEFORE EACH MEAL AND AT BEDTIME    candesartan (ATACAND) 8 MG tablet Take 1 tablet by mouth once daily    dulaglutide (TRULICITY) 1.5 mg/0.5 mL pen injector Inject 1.5 mg into the skin every 7 days. (Patient taking differently: Inject 1.5 mg into the skin every 7 days. Thursdays)    fexofenadine (ALLEGRA) 180 MG tablet Take 1 tablet (180 mg total) by mouth once daily.    fluticasone propionate (FLONASE) 50 mcg/actuation nasal spray 1 spray (50 mcg total) by Each Nostril route once daily.    lancets Misc To check BG 4 times daily, to use with insurance preferred meter, insulin dependent    letrozole (FEMARA) 2.5 mg Tab Take 1 tablet (2.5 mg total) by mouth once daily.    metFORMIN (GLUCOPHAGE) 1000 MG tablet Take 1 tablet (1,000 mg total) by mouth 2 (two) times daily.    predniSONE (DELTASONE) 10 MG tablet Take 4 pills daily for 10 days and then 1 pill daily (Patient not taking: Reported on 1/16/2023)    pregabalin (LYRICA) 25 MG capsule Take 2 capsules (50 mg total) by mouth 2 (two) times daily.    sertraline (ZOLOFT) 25 MG tablet Take 1 tablet (25 mg " total) by mouth once daily.    tamsulosin (FLOMAX) 0.4 mg Cap Take 1 capsule (0.4 mg total) by mouth once daily.    topiramate (TOPAMAX) 25 MG tablet One at bedtime for 2 weeks then two at bedtime    TRUEPLUS LANCETS 33 gauge Misc USE 1 TO CHECK GLUCOSE 4 TIMES DAILY     Family History       Problem Relation (Age of Onset)    Aneurysm Father    Anxiety disorder Daughter, Daughter    Breast cancer Other, Paternal Aunt, Maternal Aunt    Cancer Brother, Paternal Aunt, Maternal Aunt    Cervical cancer Daughter    Colon polyps Mother    Depression Daughter, Daughter    Diabetes Mother, Sister, Brother    Heart disease Mother, Brother    Hyperlipidemia Mother    Hypertension Mother, Sister, Brother    Leukemia Maternal Cousin    Prostate cancer Maternal Cousin, Maternal Cousin          Tobacco Use    Smoking status: Never    Smokeless tobacco: Never   Substance and Sexual Activity    Alcohol use: Never    Drug use: No    Sexual activity: Yes     Partners: Male     Birth control/protection: Post-menopausal     Review of Systems   Constitutional:  Negative for chills, fatigue and fever.   HENT:  Negative for congestion, trouble swallowing and voice change.    Eyes:  Negative for photophobia and visual disturbance.   Respiratory:  Positive for chest tightness. Negative for cough, shortness of breath and wheezing.    Cardiovascular:  Negative for chest pain, palpitations and leg swelling.   Gastrointestinal:  Negative for abdominal distention, abdominal pain, constipation, diarrhea, nausea and vomiting.   Endocrine: Negative for polyphagia and polyuria.   Genitourinary:  Negative for decreased urine volume, difficulty urinating, dysuria, flank pain, frequency and hematuria.   Musculoskeletal:  Positive for arthralgias (R finger). Negative for back pain and gait problem.   Skin:  Negative for color change and wound.   Neurological:  Negative for dizziness, seizures, speech difficulty, weakness, light-headedness,  numbness and headaches.   Psychiatric/Behavioral:  Negative for confusion and decreased concentration. The patient is nervous/anxious.    Objective:     Vital Signs (Most Recent):  Temp: 98.6 °F (37 °C) (01/30/23 1857)  Pulse: 71 (01/30/23 1857)  Resp: 16 (01/30/23 1857)  BP: (!) 158/65 (01/30/23 1857)  SpO2: 98 % (01/30/23 1857)   Vital Signs (24h Range):  Temp:  [98.5 °F (36.9 °C)-98.7 °F (37.1 °C)] 98.6 °F (37 °C)  Pulse:  [71-88] 71  Resp:  [16-17] 16  SpO2:  [97 %-100 %] 98 %  BP: (158-172)/(65-78) 158/65        There is no height or weight on file to calculate BMI.    Physical Exam  Vitals and nursing note reviewed.   Constitutional:       General: She is not in acute distress.     Appearance: She is well-developed.   HENT:      Head: Normocephalic and atraumatic.      Mouth/Throat:      Pharynx: No oropharyngeal exudate.   Eyes:      General: No scleral icterus.     Extraocular Movements: Extraocular movements intact.      Conjunctiva/sclera: Conjunctivae normal.   Cardiovascular:      Rate and Rhythm: Normal rate and regular rhythm.      Heart sounds: Normal heart sounds.   Pulmonary:      Effort: Pulmonary effort is normal. No respiratory distress.      Breath sounds: Normal breath sounds. No wheezing.   Abdominal:      General: Bowel sounds are normal. There is no distension.      Palpations: Abdomen is soft.      Tenderness: There is no abdominal tenderness.   Musculoskeletal:         General: No tenderness. Normal range of motion.      Cervical back: Normal range of motion and neck supple.      Right lower leg: No edema.      Left lower leg: No edema.   Lymphadenopathy:      Cervical: No cervical adenopathy.   Skin:     General: Skin is warm and dry.      Capillary Refill: Capillary refill takes less than 2 seconds.      Findings: No rash.   Neurological:      Mental Status: She is alert and oriented to person, place, and time.      Cranial Nerves: No cranial nerve deficit.      Sensory: No sensory  deficit.      Coordination: Coordination normal.   Psychiatric:         Behavior: Behavior normal.         Thought Content: Thought content normal.         Judgment: Judgment normal.           Significant Labs: All pertinent labs within the past 24 hours have been reviewed.  CBC:   Recent Labs   Lab 01/30/23  1404   WBC 6.09   HGB 10.0*   HCT 31.0*        CMP:   Recent Labs   Lab 01/30/23  1637      K 3.3*      CO2 23   GLU 66*   BUN 9   CREATININE 0.7   CALCIUM 8.7   PROT 6.3   ALBUMIN 2.8*   BILITOT 0.7   ALKPHOS 74   AST 13   ALT 9*   ANIONGAP 6*       Significant Imaging: I have reviewed all pertinent imaging results/findings within the past 24 hours.  X-Ray Finger 2 or More Views Right  Narrative: EXAMINATION:  XR FINGER 2 OR MORE VIEWS RIGHT    CLINICAL HISTORY:  finger swelling;    TECHNIQUE:  Three views of the right little finger were obtained, with AP, lateral, and oblique projections submitted.    COMPARISON:  Comparison is made to 05/04/2022.  Clinical information indicates finger swelling, with no trauma history provided.    FINDINGS:  Visualized osseous structures demonstrate no evidence of recent or healing fracture, lytic destructive process, perihilar erosive change, or other significant abnormality.  Some minimal spurring at the right 5th DIP joint level is again noted.  No radiopaque soft tissue foreign body.  No significant detrimental interval change since 05/04/2022 is appreciated.  Impression: As above    Electronically signed by: Giovany Mcguire MD  Date:    01/30/2023  Time:    13:58  X-Ray Chest PA And Lateral  Narrative: EXAMINATION:  XR CHEST PA AND LATERAL    CLINICAL HISTORY:  Chest Pain;    TECHNIQUE:  PA and lateral views of the chest were performed.    COMPARISON:  N 01/15/2023 one    FINDINGS:  Heart size normal.  No significant airspace consolidation or pleural effusion identified  Impression: See above    Electronically signed by: Giovany Babcock  MD  Date:    01/30/2023  Time:    13:46        Assessment/Plan:     * Chest pain, rule out acute myocardial infarction  - likely 2/2 anxiety/stress vs uncontrolled BP, however does have multiple cardiac risk factors concerning for CAD/ACS  - EKG without ischemic changes  - troponin negative x3  - CXR with no acute abnormalities   - stress echo ordered  - NPO at midnight  - noncompliant with aspirin and statin at home-- will resume here  - lipid panel, A1c and TSH in AM  - PRN nitro   -monitor tele     Essential hypertension  - substitute candesartan w/ formulary losartan 50mg daily  - add amlodipine 5mg daily     Adjustment disorder with mixed anxiety and depressed mood  - continue zoloft and topamax    Malignant neoplasm of lower-inner quadrant of left breast in female, estrogen receptor positive  - follows with heme/onc   - continue letrozole 2.5mg daily     Type 2 diabetes mellitus, with long-term current use of insulin  - hold home metformin  - LDSSI for now given hypoglycemia  - ACHS accuchecks  - repeat A1c  Lab Results   Component Value Date    HGBA1C 5.7 (H) 07/29/2022         VTE Risk Mitigation (From admission, onward)         Ordered     enoxaparin injection 40 mg  Daily         01/30/23 1818     IP VTE HIGH RISK PATIENT  Once         01/30/23 1818                   Rosita Toney PA-C  Department of Hospital Medicine   [facility]

## 2023-01-31 NOTE — CARE UPDATE
01/31/2023      Sunitha Pillai  [address] [facility] [address]          Hospital Medicine Dept.  [facility] [address] [place] [address] [phone] after hours  [phone] fax Sunitha Pillai has been hospitalized at the [facility] since 1/30/2023.  Please excuse the patient from duties.  Patient may return on 2/1/23.  No restrictions.     Please contact me if you have any questions.                  __________________________  Nahed Cochran PA-C  01/31/2023

## 2023-01-31 NOTE — ASSESSMENT & PLAN NOTE
- hold home metformin  - LDSSI for now given hypoglycemia  - ACHS accuchecks  - repeat A1c  Lab Results   Component Value Date    HGBA1C 5.7 (H) 07/29/2022

## 2023-01-31 NOTE — ASSESSMENT & PLAN NOTE
- follows with heme/onc   - continue letrozole 2.5mg daily    rash likely viral+amox, no increased work of breathing, negative CXR, no fever, safe to d/c home with pediatrician f/u. Given return precautions to family.

## 2023-01-31 NOTE — ED TRIAGE NOTES
67-year-old female with history of diabetes GERD, hypertension, breast cancer in remission presents for intermittent chest tightness over the past several weeks.  Pain feels like tightness, is worse at night when she goes to sleep and worse with stress.  She is also reporting some pain and stiffness in her right little finger.  She denies shortness of breath, diaphoresis, nausea/vomiting, numbness/tingling/weakness, leg pain or swelling or fever/chills.

## 2023-01-31 NOTE — ASSESSMENT & PLAN NOTE
- likely 2/2 anxiety/stress vs uncontrolled BP, however does have multiple cardiac risk factors concerning for CAD/ACS  - EKG without ischemic changes  - troponin negative x3  - CXR with no acute abnormalities   - stress echo ordered  - NPO at midnight  - noncompliant with aspirin and statin at home-- will resume here  - lipid panel, A1c and TSH in AM  - PRN nitro   -monitor tele

## 2023-02-01 ENCOUNTER — PATIENT MESSAGE (OUTPATIENT)
Dept: ADMINISTRATIVE | Facility: OTHER | Age: 68
End: 2023-02-01
Payer: MEDICARE

## 2023-02-01 ENCOUNTER — OUTPATIENT CASE MANAGEMENT (OUTPATIENT)
Dept: ADMINISTRATIVE | Facility: OTHER | Age: 68
End: 2023-02-01
Payer: MEDICARE

## 2023-02-01 NOTE — DISCHARGE SUMMARY
[facility] [address] [place] [address] Medicine  Discharge Summary      Patient Name: Sunitha Pillai  MRN: [id number]  JULI: [id number]  Patient Class: OP- Observation  Admission Date: 1/30/2023  Hospital Length of Stay: 0 days  Discharge Date and Time:  02/01/2023 3:03 PM  Attending Physician: Nina att. providers found   Discharging Provider: Nahed Cochran PA-C  Primary Care Provider: Primary Doctor Nina  Hospital Medicine Team: [facility] E Nahed Cochran PA-C  Primary Care Team: [facility] E    HPI:   Sunitha Pillai is a 67 y.o. with a PMHx of diabetes GERD, hypertension, breast cancer in remission who [facility] of [facility] pain. Patient reports intermittent, non-radiating left-sided chest tightness for the past several weeks, most recent episode occurred last night. Pain worse at night when she goes to sleep and worse with stress. Patient believes her symptoms may be related to stress/anxiety related to the death of her  who passed away 2 years ago from COVID. Of note, the patient was recently admitted to [facility] on 1/15/2023 for a similar episode of chest tightness where she received an echo (EF 60%) and was treated with nitroglycerin. Her symptoms resolved until last night. Endorses an extensive cardiac history in her family- her mother and brother both had MIs. Denies N/V/D, LE edema, cough, abdominal pain, fever or SOB.      ED: BP elevated to 172/78. Remaining vital signs stable. No leukocytosis. K+ 3.3. Glucose 66. Troponin negative x 3. Chest XR with no acute abnormalities. ECG with no acute ST or T wave changes. Given ASA 325mg x1.      * No surgery found *      Hospital Course:   Sunitha Pillai is a 67 y.o. female admitted to observation for chest pain rule out. Workup unremarkable. Stress test w/o signs of ischemia. Chest pain resovled during admission. Encouraged patient to continue statin and aspirin outpatient. Referral sent for a Cardiology  follow-up. Patient is stable for discharge home. All questions answered at bedside with verbal understanding. Return precautions given.         Goals of Care Treatment Preferences:  Code Status: Full Code      Consults:     * Chest pain, rule out acute myocardial infarction  - likely 2/2 anxiety/stress vs uncontrolled BP, however does have multiple cardiac risk factors concerning for CAD/ACS  - EKG without ischemic changes  - troponin negative x3  - CXR with no acute abnormalities   - stress echo reviewed no signs of ischemia  - NPO at midnight  - noncompliant with aspirin and statin at home-- will resume here  - lipid panel, A1c and TSH pending   - PRN nitro   -monitor tele     Adjustment disorder with mixed anxiety and depressed mood  - continue zoloft and topamax    Malignant neoplasm of lower-inner quadrant of left breast in female, estrogen receptor positive  - follows with heme/onc   - continue letrozole 2.5mg daily     Essential hypertension  - substitute candesartan w/ formulary losartan 50mg daily      Type 2 diabetes mellitus, with long-term current use of insulin  - hold home metformin  - LDSSI for now given hypoglycemia  - ACHS accuchecks  - repeat A1c  Lab Results   Component Value Date    HGBA1C 7.6 (H) 01/31/2023         Final Active Diagnoses:    Diagnosis Date Noted POA    PRINCIPAL PROBLEM:  Chest pain, rule out acute myocardial infarction [R07.9] 01/04/2020 Yes    Adjustment disorder with mixed anxiety and depressed mood [F43.23] 12/17/2020 Yes    Malignant neoplasm of lower-inner quadrant of left breast in female, estrogen receptor positive [C50.312, Z17.0] 09/03/2020 Not Applicable    Essential hypertension [I10] 07/23/2012 Yes     Chronic    Type 2 diabetes mellitus, with long-term current use of insulin [E11.9, Z79.4] 11/06/2011 Not Applicable      Problems Resolved During this Admission:       Discharged Condition: stable    Disposition: Home or Self Care    Follow Up:    Patient  Instructions:      Ambulatory referral/consult to Cardiology   Standing Status: Future   Referral Priority: Routine Referral Type: Consultation   Referral Reason: Specialty Services Required   Requested Specialty: Cardiology   Number of Visits Requested: 1     Ambulatory referral/consult to Outpatient Case Management   Referral Priority: Routine Referral Type: Consultation   Referral Reason: Specialty Services Required   Number of Visits Requested: 1     Ambulatory referral/consult to [facility]   Standing Status: Future   Referral Priority: Routine Referral Type: Consultation   Referral Reason: Specialty Services Required   Number of Visits Requested: 1     Diet Cardiac     Notify your health care provider if you experience any of the following:  temperature >100.4     Notify your health care provider if you experience any of the following:  persistent nausea and vomiting or diarrhea     Notify your health care provider if you experience any of the following:  persistent dizziness, light-headedness, or visual disturbances     Notify your health care provider if you experience any of the following:  increased confusion or weakness     Activity as tolerated       Significant Diagnostic Studies: Labs:   BMP:   Recent Labs   Lab 01/30/23  1637 01/31/23  0406   GLU 66* 98    141   K 3.3* 4.1    111*   CO2 23 21*   BUN 9 11   CREATININE 0.7 0.9   CALCIUM 8.7 9.0   MG  --  1.7   , CBC   Recent Labs   Lab 01/31/23  0406   WBC 4.99   HGB 10.3*   HCT 31.6*       and Troponin   Recent Labs   Lab 01/30/23  1232 01/30/23  1404 01/30/23  1637   TROPONINI 0.006 0.008 <0.006       Pending Diagnostic Studies:     None         Medications:  Reconciled Home Medications:      Medication List      START taking these medications    atorvastatin 10 MG tablet  Commonly known as: LIPITOR  Take 1 tablet (10 mg total) by mouth once daily.        CONTINUE taking these medications   "  ACCU-CHEK GUIDE GLUCOSE METER Misc  Generic drug: blood-glucose meter  USE  THREE TIMES DAILY     aspirin 81 mg Tab  Take 1 tablet by mouth Daily.     BD ULTRA-FINE SHORT PEN NEEDLE 31 gauge x 5/16" Ndle  Generic drug: pen needle, diabetic  USE 1  4 TIMES DAILY WITH  INSULIN     blood sugar diagnostic Strp  Strips and lancets covered by insurance E11.9, pt checks glucose three times daily, insulin dependent     blood sugar diagnostic, drum Strp  Commonly known as: ACCU-CHEK COMPACT TEST  USE ONE STRIP TO CHECK GLUCOSE 4 TIMES DAILY BEFORE EACH MEAL AND AT BEDTIME     candesartan 8 MG tablet  Commonly known as: ATACAND  Take 1 tablet by mouth once daily     fexofenadine 180 MG tablet  Commonly known as: ALLEGRA  Take 1 tablet (180 mg total) by mouth once daily.     fluticasone propionate 50 mcg/actuation nasal spray  Commonly known as: FLONASE  1 spray (50 mcg total) by Each Nostril route once daily.     * lancets Misc  To check BG 4 times daily, to use with insurance preferred meter, insulin dependent     * TRUEPLUS LANCETS 33 gauge Misc  Generic drug: lancets  USE 1 TO CHECK GLUCOSE 4 TIMES DAILY     letrozole 2.5 mg Tab  Commonly known as: FEMARA  Take 1 tablet (2.5 mg total) by mouth once daily.     metFORMIN 1000 MG tablet  Commonly known as: GLUCOPHAGE  Take 1 tablet (1,000 mg total) by mouth 2 (two) times daily.     pregabalin 25 MG capsule  Commonly known as: LYRICA  Take 2 capsules (50 mg total) by mouth 2 (two) times daily.     sertraline 25 MG tablet  Commonly known as: ZOLOFT  Take 1 tablet (25 mg total) by mouth once daily.     tamsulosin 0.4 mg Cap  Commonly known as: FLOMAX  Take 1 capsule (0.4 mg total) by mouth once daily.     topiramate 25 MG tablet  Commonly known as: TOPAMAX  One at bedtime for 2 weeks then two at bedtime         * This list has 2 medication(s) that are the same as other medications prescribed for you. Read the directions carefully, and ask your doctor or other care provider to " review them with you.            STOP taking these medications    allopurinoL 100 MG tablet  Commonly known as: ZYLOPRIM     azelastine 137 mcg (0.1 %) nasal spray  Commonly known as: ASTELIN     predniSONE 10 MG tablet  Commonly known as: DELTASONE        ASK your doctor about these medications    TRULICITY 1.5 mg/0.5 mL pen injector  Generic drug: dulaglutide  Inject 1.5 mg into the skin every 7 days.            Indwelling Lines/Drains at time of discharge:   Lines/Drains/Airways     Central Venous Catheter Line  Duration                PowerPort A Cath Single Lumen 11/18/20 0815 right internal jugular 805 days                Time spent on the discharge of patient: 36 minutes         Nahed Cochran PA-C  Department of Hospital Medicine  [facility]

## 2023-02-01 NOTE — ASSESSMENT & PLAN NOTE
- hold home metformin  - LDSSI for now given hypoglycemia  - ACHS accuchecks  - repeat A1c  Lab Results   Component Value Date    HGBA1C 7.6 (H) 01/31/2023

## 2023-02-01 NOTE — LETTER
February 1, 2023    Sunitha Pillai  [address] [facility] [address]             [facility] [address] [place] [address] Dear Ms. Sunitha Pillai,    I work with [facility] Outpatient Case Management Department. We received a referral to call you to discuss your medical history. These services are free of charge and are offered to [facility] patients who have recently been discharged from any of our facilities or who have complex medical conditions that may require the skill of a nurse to assist with management.    I am a Registered Nurse who specializes in connecting patients with available medical and financial resources as well as addressing any educational needs that may be indicated.    I attempted to reach you by telephone, but I was unsuccessful. Please call our department so that we can go over some questions with you regarding your health.    The Outpatient Case Management Department can be reached at [phone] from 8:00 AM to 4:30 PM on Monday through Friday. [facility] also has a program where a nurse is available 24/7 to answer questions or provide medical advice. Their number is [phone].      Kind Regards,    Ximena Alejandro RN  Outpatient Case Management  [phone]

## 2023-02-01 NOTE — PROGRESS NOTES
Outpatient Care Management  Patient Does Not Consent    Patient: Sunitha Pillai  MRN:  [id number]  Date of Service:  2/10/2023  Completed by:  Ximena Alejandro RN    Chief Complaint   Patient presents with    OPCM Chart Review     2/1/23    OPCM RN First Assessment Attempt     2/1/23    OPCM RN Second Assessment Attempt     2/9/23    Case Closure     2/10/23       Patient Summary     Program:  OPCM High Risk     Consent Received:  Decline            2/10/23 Briefly spoke with patient and explained OPCM services. Patient declined to enroll into OPCM at this time. Gave patient OPCM RN contact information and encouraged patient to call if any needs arise. Closing case.    2/9/23 2nd attempt to complete initial assessment for Outpatient Care Management: Left message for patient requesting a return call. Will attempt to contact patient again at a later date.     2/1/23 1st attempt to complete initial assessment for [facility]: Left message for patient requesting a return call. Letter has been sent to patient with OPCM contact information. Will attempt to contact patient again at a later date.

## 2023-02-01 NOTE — ASSESSMENT & PLAN NOTE
- likely 2/2 anxiety/stress vs uncontrolled BP, however does have multiple cardiac risk factors concerning for CAD/ACS  - EKG without ischemic changes  - troponin negative x3  - CXR with no acute abnormalities   - stress echo reviewed no signs of ischemia  - NPO at midnight  - noncompliant with aspirin and statin at home-- will resume here  - lipid panel, A1c and TSH pending   - PRN nitro   -monitor tele

## 2023-02-01 NOTE — HOSPITAL COURSE
Sunitha Pillai is a 67 y.o. female admitted to observation for chest pain rule out. Workup unremarkable. Stress test w/o signs of ischemia. Chest pain resovled during admission. Encouraged patient to continue statin and aspirin outpatient. Referral sent for a Cardiology follow-up. Patient is stable for discharge home. All questions answered at bedside with verbal understanding. Return precautions given.

## 2023-02-02 ENCOUNTER — PES CALL (OUTPATIENT)
Dept: ADMINISTRATIVE | Facility: CLINIC | Age: 68
End: 2023-02-02
Payer: MEDICARE

## 2023-02-02 DIAGNOSIS — I10 ESSENTIAL HYPERTENSION: Chronic | ICD-10-CM

## 2023-02-03 ENCOUNTER — OFFICE VISIT (OUTPATIENT)
Dept: INTERNAL MEDICINE | Facility: CLINIC | Age: 68
End: 2023-02-03
Payer: MEDICARE

## 2023-02-03 ENCOUNTER — LAB VISIT (OUTPATIENT)
Dept: LAB | Facility: HOSPITAL | Age: 68
End: 2023-02-03
Attending: INTERNAL MEDICINE
Payer: MEDICARE

## 2023-02-03 ENCOUNTER — PES CALL (OUTPATIENT)
Dept: ADMINISTRATIVE | Facility: CLINIC | Age: 68
End: 2023-02-03
Payer: MEDICARE

## 2023-02-03 VITALS
DIASTOLIC BLOOD PRESSURE: 80 MMHG | SYSTOLIC BLOOD PRESSURE: 150 MMHG | WEIGHT: 164 LBS | HEIGHT: 64 IN | OXYGEN SATURATION: 98 % | HEART RATE: 60 BPM | BODY MASS INDEX: 28 KG/M2

## 2023-02-03 DIAGNOSIS — D64.9 ANEMIA, UNSPECIFIED TYPE: Primary | ICD-10-CM

## 2023-02-03 DIAGNOSIS — E53.8 B12 DEFICIENCY: ICD-10-CM

## 2023-02-03 DIAGNOSIS — R74.01 TRANSAMINITIS: ICD-10-CM

## 2023-02-03 DIAGNOSIS — R76.8 ELEVATED RHEUMATOID FACTOR: ICD-10-CM

## 2023-02-03 DIAGNOSIS — I10 ESSENTIAL HYPERTENSION: ICD-10-CM

## 2023-02-03 DIAGNOSIS — Z12.11 SCREENING FOR COLON CANCER: ICD-10-CM

## 2023-02-03 DIAGNOSIS — I70.0 AORTIC ATHEROSCLEROSIS: ICD-10-CM

## 2023-02-03 DIAGNOSIS — E11.00 TYPE 2 DIABETES MELLITUS WITH HYPEROSMOLARITY WITHOUT COMA, WITH LONG-TERM CURRENT USE OF INSULIN: ICD-10-CM

## 2023-02-03 DIAGNOSIS — Z79.4 TYPE 2 DIABETES MELLITUS WITH HYPEROSMOLARITY WITHOUT COMA, WITH LONG-TERM CURRENT USE OF INSULIN: ICD-10-CM

## 2023-02-03 DIAGNOSIS — M25.511 CHRONIC RIGHT SHOULDER PAIN: ICD-10-CM

## 2023-02-03 DIAGNOSIS — D64.9 ANEMIA, UNSPECIFIED TYPE: ICD-10-CM

## 2023-02-03 DIAGNOSIS — G89.29 CHRONIC RIGHT SHOULDER PAIN: ICD-10-CM

## 2023-02-03 LAB
ANION GAP SERPL CALC-SCNC: 12 MMOL/L (ref 8–16)
BASOPHILS # BLD AUTO: 0.02 K/UL (ref 0–0.2)
BASOPHILS NFR BLD: 0.3 % (ref 0–1.9)
BUN SERPL-MCNC: 12 MG/DL (ref 8–23)
CALCIUM SERPL-MCNC: 9.8 MG/DL (ref 8.7–10.5)
CHLORIDE SERPL-SCNC: 107 MMOL/L (ref 95–110)
CO2 SERPL-SCNC: 24 MMOL/L (ref 23–29)
CREAT SERPL-MCNC: 1 MG/DL (ref 0.5–1.4)
CRP SERPL-MCNC: 4.5 MG/L (ref 0–8.2)
DIFFERENTIAL METHOD: ABNORMAL
EOSINOPHIL # BLD AUTO: 0.1 K/UL (ref 0–0.5)
EOSINOPHIL NFR BLD: 0.8 % (ref 0–8)
ERYTHROCYTE [DISTWIDTH] IN BLOOD BY AUTOMATED COUNT: 13.8 % (ref 11.5–14.5)
ERYTHROCYTE [SEDIMENTATION RATE] IN BLOOD BY PHOTOMETRIC METHOD: 29 MM/HR (ref 0–36)
EST. GFR  (NO RACE VARIABLE): >60 ML/MIN/1.73 M^2
FERRITIN SERPL-MCNC: 368 NG/ML (ref 20–300)
GLUCOSE SERPL-MCNC: 82 MG/DL (ref 70–110)
HCT VFR BLD AUTO: 33.4 % (ref 37–48.5)
HGB BLD-MCNC: 11.2 G/DL (ref 12–16)
IMM GRANULOCYTES # BLD AUTO: 0.01 K/UL (ref 0–0.04)
IMM GRANULOCYTES NFR BLD AUTO: 0.2 % (ref 0–0.5)
IRON SERPL-MCNC: 44 UG/DL (ref 30–160)
LYMPHOCYTES # BLD AUTO: 1.9 K/UL (ref 1–4.8)
LYMPHOCYTES NFR BLD: 29.4 % (ref 18–48)
MCH RBC QN AUTO: 32.1 PG (ref 27–31)
MCHC RBC AUTO-ENTMCNC: 33.5 G/DL (ref 32–36)
MCV RBC AUTO: 96 FL (ref 82–98)
MONOCYTES # BLD AUTO: 0.6 K/UL (ref 0.3–1)
MONOCYTES NFR BLD: 9.2 % (ref 4–15)
NEUTROPHILS # BLD AUTO: 3.8 K/UL (ref 1.8–7.7)
NEUTROPHILS NFR BLD: 60.1 % (ref 38–73)
NRBC BLD-RTO: 0 /100 WBC
PLATELET # BLD AUTO: 209 K/UL (ref 150–450)
PMV BLD AUTO: 9.5 FL (ref 9.2–12.9)
POTASSIUM SERPL-SCNC: 3.9 MMOL/L (ref 3.5–5.1)
RBC # BLD AUTO: 3.49 M/UL (ref 4–5.4)
SATURATED IRON: 13 % (ref 20–50)
SODIUM SERPL-SCNC: 143 MMOL/L (ref 136–145)
TOTAL IRON BINDING CAPACITY: 330 UG/DL (ref 250–450)
TRANSFERRIN SERPL-MCNC: 223 MG/DL (ref 200–375)
VIT B12 SERPL-MCNC: 375 PG/ML (ref 210–950)
WBC # BLD AUTO: 6.32 K/UL (ref 3.9–12.7)

## 2023-02-03 PROCEDURE — 80048 BASIC METABOLIC PNL TOTAL CA: CPT | Performed by: INTERNAL MEDICINE

## 2023-02-03 PROCEDURE — G0008 ADMIN INFLUENZA VIRUS VAC: HCPCS | Mod: S$GLB,,, | Performed by: INTERNAL MEDICINE

## 2023-02-03 PROCEDURE — 1101F PT FALLS ASSESS-DOCD LE1/YR: CPT | Mod: CPTII,S$GLB,, | Performed by: INTERNAL MEDICINE

## 2023-02-03 PROCEDURE — 82607 VITAMIN B-12: CPT | Performed by: INTERNAL MEDICINE

## 2023-02-03 PROCEDURE — 3066F PR DOCUMENTATION OF TREATMENT FOR NEPHROPATHY: ICD-10-PCS | Mod: CPTII,S$GLB,, | Performed by: INTERNAL MEDICINE

## 2023-02-03 PROCEDURE — 84466 ASSAY OF TRANSFERRIN: CPT | Performed by: INTERNAL MEDICINE

## 2023-02-03 PROCEDURE — 1126F AMNT PAIN NOTED NONE PRSNT: CPT | Mod: CPTII,S$GLB,, | Performed by: INTERNAL MEDICINE

## 2023-02-03 PROCEDURE — 3288F FALL RISK ASSESSMENT DOCD: CPT | Mod: CPTII,S$GLB,, | Performed by: INTERNAL MEDICINE

## 2023-02-03 PROCEDURE — 3008F BODY MASS INDEX DOCD: CPT | Mod: CPTII,S$GLB,, | Performed by: INTERNAL MEDICINE

## 2023-02-03 PROCEDURE — 85652 RBC SED RATE AUTOMATED: CPT | Performed by: INTERNAL MEDICINE

## 2023-02-03 PROCEDURE — 90694 FLU VACCINE - QUADRIVALENT - ADJUVANTED: ICD-10-PCS | Mod: S$GLB,,, | Performed by: INTERNAL MEDICINE

## 2023-02-03 PROCEDURE — 36415 COLL VENOUS BLD VENIPUNCTURE: CPT | Performed by: INTERNAL MEDICINE

## 2023-02-03 PROCEDURE — 82728 ASSAY OF FERRITIN: CPT | Performed by: INTERNAL MEDICINE

## 2023-02-03 PROCEDURE — 99214 OFFICE O/P EST MOD 30 MIN: CPT | Mod: S$GLB,,, | Performed by: INTERNAL MEDICINE

## 2023-02-03 PROCEDURE — 3008F PR BODY MASS INDEX (BMI) DOCUMENTED: ICD-10-PCS | Mod: CPTII,S$GLB,, | Performed by: INTERNAL MEDICINE

## 2023-02-03 PROCEDURE — 3079F DIAST BP 80-89 MM HG: CPT | Mod: CPTII,S$GLB,, | Performed by: INTERNAL MEDICINE

## 2023-02-03 PROCEDURE — 3077F PR MOST RECENT SYSTOLIC BLOOD PRESSURE >= 140 MM HG: ICD-10-PCS | Mod: CPTII,S$GLB,, | Performed by: INTERNAL MEDICINE

## 2023-02-03 PROCEDURE — 99999 PR PBB SHADOW E&M-EST. PATIENT-LVL V: ICD-10-PCS | Mod: PBBFAC,,, | Performed by: INTERNAL MEDICINE

## 2023-02-03 PROCEDURE — 1126F PR PAIN SEVERITY QUANTIFIED, NO PAIN PRESENT: ICD-10-PCS | Mod: CPTII,S$GLB,, | Performed by: INTERNAL MEDICINE

## 2023-02-03 PROCEDURE — 3077F SYST BP >= 140 MM HG: CPT | Mod: CPTII,S$GLB,, | Performed by: INTERNAL MEDICINE

## 2023-02-03 PROCEDURE — 90694 VACC AIIV4 NO PRSRV 0.5ML IM: CPT | Mod: S$GLB,,, | Performed by: INTERNAL MEDICINE

## 2023-02-03 PROCEDURE — 1101F PR PT FALLS ASSESS DOC 0-1 FALLS W/OUT INJ PAST YR: ICD-10-PCS | Mod: CPTII,S$GLB,, | Performed by: INTERNAL MEDICINE

## 2023-02-03 PROCEDURE — 3051F HG A1C>EQUAL 7.0%<8.0%: CPT | Mod: CPTII,S$GLB,, | Performed by: INTERNAL MEDICINE

## 2023-02-03 PROCEDURE — 87517 HEPATITIS B DNA QUANT: CPT | Performed by: INTERNAL MEDICINE

## 2023-02-03 PROCEDURE — 3051F PR MOST RECENT HEMOGLOBIN A1C LEVEL 7.0 - < 8.0%: ICD-10-PCS | Mod: CPTII,S$GLB,, | Performed by: INTERNAL MEDICINE

## 2023-02-03 PROCEDURE — 83921 ORGANIC ACID SINGLE QUANT: CPT | Performed by: INTERNAL MEDICINE

## 2023-02-03 PROCEDURE — 3079F PR MOST RECENT DIASTOLIC BLOOD PRESSURE 80-89 MM HG: ICD-10-PCS | Mod: CPTII,S$GLB,, | Performed by: INTERNAL MEDICINE

## 2023-02-03 PROCEDURE — 99999 PR PBB SHADOW E&M-EST. PATIENT-LVL V: CPT | Mod: PBBFAC,,, | Performed by: INTERNAL MEDICINE

## 2023-02-03 PROCEDURE — 85025 COMPLETE CBC W/AUTO DIFF WBC: CPT | Performed by: INTERNAL MEDICINE

## 2023-02-03 PROCEDURE — 86140 C-REACTIVE PROTEIN: CPT | Performed by: INTERNAL MEDICINE

## 2023-02-03 PROCEDURE — 4010F PR ACE/ARB THEARPY RXD/TAKEN: ICD-10-PCS | Mod: CPTII,S$GLB,, | Performed by: INTERNAL MEDICINE

## 2023-02-03 PROCEDURE — 3060F PR POS MICROALBUMINURIA RESULT DOCUMENTED/REVIEW: ICD-10-PCS | Mod: CPTII,S$GLB,, | Performed by: INTERNAL MEDICINE

## 2023-02-03 PROCEDURE — G0008 FLU VACCINE - QUADRIVALENT - ADJUVANTED: ICD-10-PCS | Mod: S$GLB,,, | Performed by: INTERNAL MEDICINE

## 2023-02-03 PROCEDURE — 3066F NEPHROPATHY DOC TX: CPT | Mod: CPTII,S$GLB,, | Performed by: INTERNAL MEDICINE

## 2023-02-03 PROCEDURE — 99214 PR OFFICE/OUTPT VISIT, EST, LEVL IV, 30-39 MIN: ICD-10-PCS | Mod: S$GLB,,, | Performed by: INTERNAL MEDICINE

## 2023-02-03 PROCEDURE — 3288F PR FALLS RISK ASSESSMENT DOCUMENTED: ICD-10-PCS | Mod: CPTII,S$GLB,, | Performed by: INTERNAL MEDICINE

## 2023-02-03 PROCEDURE — 3060F POS MICROALBUMINURIA REV: CPT | Mod: CPTII,S$GLB,, | Performed by: INTERNAL MEDICINE

## 2023-02-03 PROCEDURE — 4010F ACE/ARB THERAPY RXD/TAKEN: CPT | Mod: CPTII,S$GLB,, | Performed by: INTERNAL MEDICINE

## 2023-02-03 RX ORDER — CANDESARTAN 16 MG/1
16 TABLET ORAL DAILY
Qty: 90 TABLET | Refills: 3 | Status: SHIPPED | OUTPATIENT
Start: 2023-02-03 | End: 2023-08-17

## 2023-02-03 NOTE — PROGRESS NOTES
Subjective:       Patient ID: Sunitha Pillai is a 67 y.o. female.    Chief Complaint: Annual Exam    HPIPt is still grieving from the loss of her .  No CP or SOB. Her R shoulder is painful.   Review of Systems   Respiratory:  Negative for shortness of breath (PND or orthopnea).    Cardiovascular:  Negative for chest pain (arm pain or jaw pain).   Gastrointestinal:  Negative for abdominal pain, diarrhea, nausea and vomiting.   Genitourinary:  Negative for dysuria.   Neurological:  Negative for seizures, syncope and headaches.     Objective:      Physical Exam  Constitutional:       General: She is not in acute distress.     Appearance: She is well-developed.   HENT:      Head: Normocephalic.   Eyes:      Pupils: Pupils are equal, round, and reactive to light.   Neck:      Thyroid: No thyromegaly.      Vascular: No JVD.   Cardiovascular:      Rate and Rhythm: Normal rate and regular rhythm.      Heart sounds: Normal heart sounds. No murmur heard.    No friction rub. No gallop.   Pulmonary:      Effort: Pulmonary effort is normal.      Breath sounds: Normal breath sounds. No wheezing or rales.   Abdominal:      General: Bowel sounds are normal. There is no distension.      Palpations: Abdomen is soft. There is no mass.      Tenderness: There is no abdominal tenderness. There is no guarding or rebound.   Musculoskeletal:      Cervical back: Neck supple.   Lymphadenopathy:      Cervical: No cervical adenopathy.   Skin:     General: Skin is warm and dry.   Neurological:      Mental Status: She is alert and oriented to person, place, and time.      Deep Tendon Reflexes: Reflexes are normal and symmetric.   Psychiatric:         Behavior: Behavior normal.         Thought Content: Thought content normal.         Judgment: Judgment normal.       Assessment:       1. Anemia, unspecified type    2. B12 deficiency    3. Essential hypertension    4. Transaminitis    5. Type 2 diabetes mellitus with hyperosmolarity  without coma, with long-term current use of insulin    6. Elevated rheumatoid factor    7. Screening for colon cancer    8. Chronic right shoulder pain    9. Aortic atherosclerosis          Plan:   Anemia, unspecified type  -     CBC Auto Differential; Future; Expected date: 02/03/2023  -     Iron and TIBC; Future; Expected date: 02/03/2023  -     Ferritin; Future; Expected date: 02/03/2023    B12 deficiency  -     Vitamin B12; Future; Expected date: 03/03/2023  -     Methylmalonic Acid, Serum; Future; Expected date: 02/03/2023    Essential hypertension  -     Basic Metabolic Panel; Future; Expected date: 02/03/2023  Uncontrolled - double her candesartan  Transaminitis  -     HEPATITIS B VIRAL DNA, QUANTITATIVE; Future; Expected date: 02/03/2023    Type 2 diabetes mellitus with hyperosmolarity without coma, with long-term current use of insulin  -     Urinalysis; Future; Expected date: 02/03/2023  -     Microalbumin/Creatinine Ratio, Urine; Future; Expected date: 02/03/2023    Elevated rheumatoid factor  -     Sedimentation rate; Future; Expected date: 02/03/2023  -     C-Reactive Protein; Future; Expected date: 02/03/2023    Screening for colon cancer  -     Ambulatory referral/consult to Endo Procedure ; Future; Expected date: 02/04/2023    Chronic right shoulder pain  -     Ambulatory referral/consult to Sports Medicine; Future; Expected date: 02/10/2023    Aortic atherosclerosis  On statin  Other orders  -     candesartan (ATACAND) 16 MG tablet; Take 1 tablet (16 mg total) by mouth once daily.  Dispense: 90 tablet; Refill: 3  -     Influenza - Quadrivalent (Adjuvanted)

## 2023-02-03 NOTE — TELEPHONE ENCOUNTER
Refill Routing Note   Medication(s) are not appropriate for processing by [facility] for the following reason(s):       Responsible provider unclear    [facility] action(s):  Defer                   Medication Therapy Plan: No PCP listed; FOVS(2/3/23) with Patty Louis MD      Appointments  past 12m or future 3m with PCP    Date Provider   Last Visit   7/29/2022 Patty Louis MD   Next Visit   2/3/2023 Patty Louis MD   ED visits in past 90 days: 1        Note composed:8:36 PM 02/02/2023

## 2023-02-05 RX ORDER — CANDESARTAN 8 MG/1
TABLET ORAL
Qty: 90 TABLET | Refills: 0 | OUTPATIENT
Start: 2023-02-05

## 2023-02-06 LAB
HBV DNA SERPL NAA+PROBE-ACNC: <10 IU/ML
HBV DNA SERPL NAA+PROBE-LOG IU: <1 LOG (10) IU/ML
HBV DNA SERPL QL NAA+PROBE: NOT DETECTED

## 2023-02-07 LAB — METHYLMALONATE SERPL-SCNC: 0.23 UMOL/L

## 2023-02-09 ENCOUNTER — HOSPITAL ENCOUNTER (EMERGENCY)
Facility: OTHER | Age: 68
Discharge: HOME | End: 2023-02-10
Attending: EMERGENCY MEDICINE
Payer: MEDICARE

## 2023-02-09 DIAGNOSIS — M79.605 ACUTE LEG PAIN, LEFT: Primary | ICD-10-CM

## 2023-02-09 DIAGNOSIS — R06.00 DYSPNEA: ICD-10-CM

## 2023-02-09 DIAGNOSIS — M25.511 RIGHT SHOULDER PAIN: ICD-10-CM

## 2023-02-09 DIAGNOSIS — J20.9 ACUTE BRONCHITIS, UNSPECIFIED ORGANISM: ICD-10-CM

## 2023-02-09 DIAGNOSIS — M25.552 ACUTE HIP PAIN, LEFT: ICD-10-CM

## 2023-02-09 DIAGNOSIS — R11.10 VOMITING: ICD-10-CM

## 2023-02-09 PROCEDURE — 25000003 PHARM REV CODE 250: Performed by: EMERGENCY MEDICINE

## 2023-02-09 PROCEDURE — 99285 EMERGENCY DEPT VISIT HI MDM: CPT | Mod: 25

## 2023-02-09 RX ORDER — NAPROXEN 500 MG/1
500 TABLET ORAL
Status: COMPLETED | OUTPATIENT
Start: 2023-02-09 | End: 2023-02-09

## 2023-02-09 RX ORDER — HYDROCODONE BITARTRATE AND ACETAMINOPHEN 5; 325 MG/1; MG/1
1 TABLET ORAL
Status: COMPLETED | OUTPATIENT
Start: 2023-02-09 | End: 2023-02-09

## 2023-02-09 RX ORDER — KETOROLAC TROMETHAMINE 30 MG/ML
10 INJECTION, SOLUTION INTRAMUSCULAR; INTRAVENOUS
Status: COMPLETED | OUTPATIENT
Start: 2023-02-09 | End: 2023-02-10

## 2023-02-09 RX ORDER — METHOCARBAMOL 500 MG/1
500 TABLET, FILM COATED ORAL
Status: COMPLETED | OUTPATIENT
Start: 2023-02-09 | End: 2023-02-09

## 2023-02-09 RX ORDER — ORPHENADRINE CITRATE 30 MG/ML
60 INJECTION INTRAMUSCULAR; INTRAVENOUS
Status: COMPLETED | OUTPATIENT
Start: 2023-02-09 | End: 2023-02-10

## 2023-02-09 RX ORDER — ONDANSETRON 2 MG/ML
8 INJECTION INTRAMUSCULAR; INTRAVENOUS
Status: COMPLETED | OUTPATIENT
Start: 2023-02-09 | End: 2023-02-10

## 2023-02-09 RX ORDER — HYDROMORPHONE HYDROCHLORIDE 1 MG/ML
0.25 INJECTION, SOLUTION INTRAMUSCULAR; INTRAVENOUS; SUBCUTANEOUS ONCE
Status: COMPLETED | OUTPATIENT
Start: 2023-02-10 | End: 2023-02-10

## 2023-02-09 RX ADMIN — HYDROCODONE BITARTRATE AND ACETAMINOPHEN 1 TABLET: 5; 325 TABLET ORAL at 10:02

## 2023-02-09 RX ADMIN — NAPROXEN 500 MG: 500 TABLET ORAL at 10:02

## 2023-02-09 RX ADMIN — METHOCARBAMOL 500 MG: 500 TABLET ORAL at 10:02

## 2023-02-10 VITALS
HEIGHT: 64 IN | HEART RATE: 77 BPM | OXYGEN SATURATION: 99 % | BODY MASS INDEX: 26.12 KG/M2 | WEIGHT: 153 LBS | RESPIRATION RATE: 17 BRPM | TEMPERATURE: 98 F | SYSTOLIC BLOOD PRESSURE: 152 MMHG | DIASTOLIC BLOOD PRESSURE: 74 MMHG

## 2023-02-10 LAB
ALBUMIN SERPL BCP-MCNC: 3.1 G/DL (ref 3.5–5.2)
ALP SERPL-CCNC: 80 U/L (ref 55–135)
ALT SERPL W/O P-5'-P-CCNC: 9 U/L (ref 10–44)
ANION GAP SERPL CALC-SCNC: 6 MMOL/L (ref 8–16)
AST SERPL-CCNC: 15 U/L (ref 10–40)
BASOPHILS # BLD AUTO: 0.02 K/UL (ref 0–0.2)
BASOPHILS NFR BLD: 0.4 % (ref 0–1.9)
BILIRUB SERPL-MCNC: 0.5 MG/DL (ref 0.1–1)
BUN SERPL-MCNC: 14 MG/DL (ref 8–23)
CALCIUM SERPL-MCNC: 9.5 MG/DL (ref 8.7–10.5)
CHLORIDE SERPL-SCNC: 113 MMOL/L (ref 95–110)
CO2 SERPL-SCNC: 25 MMOL/L (ref 23–29)
CREAT SERPL-MCNC: 0.9 MG/DL (ref 0.5–1.4)
D DIMER PPP IA.FEU-MCNC: 2.46 MG/L FEU
DIFFERENTIAL METHOD: ABNORMAL
EOSINOPHIL # BLD AUTO: 0.1 K/UL (ref 0–0.5)
EOSINOPHIL NFR BLD: 0.9 % (ref 0–8)
ERYTHROCYTE [DISTWIDTH] IN BLOOD BY AUTOMATED COUNT: 13.2 % (ref 11.5–14.5)
EST. GFR  (NO RACE VARIABLE): >60 ML/MIN/1.73 M^2
GLUCOSE SERPL-MCNC: 115 MG/DL (ref 70–110)
HCT VFR BLD AUTO: 30 % (ref 37–48.5)
HGB BLD-MCNC: 10.2 G/DL (ref 12–16)
IMM GRANULOCYTES # BLD AUTO: 0.01 K/UL (ref 0–0.04)
IMM GRANULOCYTES NFR BLD AUTO: 0.2 % (ref 0–0.5)
LYMPHOCYTES # BLD AUTO: 1.9 K/UL (ref 1–4.8)
LYMPHOCYTES NFR BLD: 32.9 % (ref 18–48)
MAGNESIUM SERPL-MCNC: 1.7 MG/DL (ref 1.6–2.6)
MCH RBC QN AUTO: 31.8 PG (ref 27–31)
MCHC RBC AUTO-ENTMCNC: 34 G/DL (ref 32–36)
MCV RBC AUTO: 94 FL (ref 82–98)
MONOCYTES # BLD AUTO: 0.6 K/UL (ref 0.3–1)
MONOCYTES NFR BLD: 11 % (ref 4–15)
NEUTROPHILS # BLD AUTO: 3.1 K/UL (ref 1.8–7.7)
NEUTROPHILS NFR BLD: 54.6 % (ref 38–73)
NRBC BLD-RTO: 0 /100 WBC
PLATELET # BLD AUTO: 169 K/UL (ref 150–450)
PMV BLD AUTO: 8.8 FL (ref 9.2–12.9)
POTASSIUM SERPL-SCNC: 3.4 MMOL/L (ref 3.5–5.1)
PROT SERPL-MCNC: 7 G/DL (ref 6–8.4)
RBC # BLD AUTO: 3.21 M/UL (ref 4–5.4)
SODIUM SERPL-SCNC: 144 MMOL/L (ref 136–145)
TROPONIN I SERPL DL<=0.01 NG/ML-MCNC: <0.006 NG/ML (ref 0–0.03)
WBC # BLD AUTO: 5.66 K/UL (ref 3.9–12.7)

## 2023-02-10 PROCEDURE — 63600175 PHARM REV CODE 636 W HCPCS: Performed by: EMERGENCY MEDICINE

## 2023-02-10 PROCEDURE — 63700000 PHARM REV CODE 250 ALT 637 W/O HCPCS: Performed by: EMERGENCY MEDICINE

## 2023-02-10 PROCEDURE — 96375 TX/PRO/DX INJ NEW DRUG ADDON: CPT | Mod: 59

## 2023-02-10 PROCEDURE — 25500020 PHARM REV CODE 255: Performed by: EMERGENCY MEDICINE

## 2023-02-10 PROCEDURE — 85025 COMPLETE CBC W/AUTO DIFF WBC: CPT | Performed by: EMERGENCY MEDICINE

## 2023-02-10 PROCEDURE — 93010 EKG 12-LEAD: ICD-10-PCS | Mod: ,,, | Performed by: INTERNAL MEDICINE

## 2023-02-10 PROCEDURE — 96374 THER/PROPH/DIAG INJ IV PUSH: CPT

## 2023-02-10 PROCEDURE — 80053 COMPREHEN METABOLIC PANEL: CPT | Performed by: EMERGENCY MEDICINE

## 2023-02-10 PROCEDURE — 93010 ELECTROCARDIOGRAM REPORT: CPT | Mod: ,,, | Performed by: INTERNAL MEDICINE

## 2023-02-10 PROCEDURE — 93005 ELECTROCARDIOGRAM TRACING: CPT

## 2023-02-10 PROCEDURE — 25000003 PHARM REV CODE 250: Performed by: EMERGENCY MEDICINE

## 2023-02-10 PROCEDURE — 84484 ASSAY OF TROPONIN QUANT: CPT | Performed by: EMERGENCY MEDICINE

## 2023-02-10 PROCEDURE — 85379 FIBRIN DEGRADATION QUANT: CPT | Performed by: EMERGENCY MEDICINE

## 2023-02-10 PROCEDURE — 96361 HYDRATE IV INFUSION ADD-ON: CPT

## 2023-02-10 PROCEDURE — 83735 ASSAY OF MAGNESIUM: CPT | Performed by: EMERGENCY MEDICINE

## 2023-02-10 RX ORDER — ALBUTEROL SULFATE 90 UG/1
1-2 AEROSOL, METERED RESPIRATORY (INHALATION) EVERY 6 HOURS PRN
Qty: 6.7 G | Refills: 0 | Status: SHIPPED | OUTPATIENT
Start: 2023-02-10 | End: 2023-10-17

## 2023-02-10 RX ORDER — AZITHROMYCIN 250 MG/1
250 TABLET, FILM COATED ORAL DAILY
Qty: 4 TABLET | Refills: 0 | Status: SHIPPED | OUTPATIENT
Start: 2023-02-11 | End: 2023-02-15

## 2023-02-10 RX ORDER — METHOCARBAMOL 750 MG/1
1500 TABLET, FILM COATED ORAL 3 TIMES DAILY PRN
Qty: 30 TABLET | Refills: 0 | Status: SHIPPED | OUTPATIENT
Start: 2023-02-10 | End: 2023-02-17

## 2023-02-10 RX ORDER — LIDOCAINE 50 MG/G
PATCH TOPICAL
Qty: 30 PATCH | Refills: 0 | Status: SHIPPED | OUTPATIENT
Start: 2023-02-10

## 2023-02-10 RX ORDER — NAPROXEN 500 MG/1
500 TABLET ORAL 2 TIMES DAILY PRN
Qty: 60 TABLET | Refills: 0 | Status: SHIPPED | OUTPATIENT
Start: 2023-02-10 | End: 2023-05-04

## 2023-02-10 RX ORDER — DICLOFENAC SODIUM 10 MG/G
2 GEL TOPICAL 3 TIMES DAILY PRN
Qty: 100 G | Refills: 0 | Status: SHIPPED | OUTPATIENT
Start: 2023-02-10

## 2023-02-10 RX ORDER — AZITHROMYCIN 250 MG/1
500 TABLET, FILM COATED ORAL
Status: COMPLETED | OUTPATIENT
Start: 2023-02-10 | End: 2023-02-10

## 2023-02-10 RX ADMIN — SODIUM CHLORIDE 1000 ML: 9 INJECTION, SOLUTION INTRAVENOUS at 01:02

## 2023-02-10 RX ADMIN — IOHEXOL 100 ML: 350 INJECTION, SOLUTION INTRAVENOUS at 02:02

## 2023-02-10 RX ADMIN — KETOROLAC TROMETHAMINE 10 MG: 30 INJECTION, SOLUTION INTRAMUSCULAR; INTRAVENOUS at 01:02

## 2023-02-10 RX ADMIN — ORPHENADRINE CITRATE 60 MG: 30 INJECTION INTRAMUSCULAR; INTRAVENOUS at 01:02

## 2023-02-10 RX ADMIN — HYDROMORPHONE HYDROCHLORIDE 0.25 MG: 1 INJECTION, SOLUTION INTRAMUSCULAR; INTRAVENOUS; SUBCUTANEOUS at 01:02

## 2023-02-10 RX ADMIN — AZITHROMYCIN MONOHYDRATE 500 MG: 250 TABLET ORAL at 02:02

## 2023-02-10 RX ADMIN — ONDANSETRON 8 MG: 2 INJECTION INTRAMUSCULAR; INTRAVENOUS at 01:02

## 2023-02-10 NOTE — DISCHARGE INSTRUCTIONS
Thank you for letting us take care of you today! It was nice meeting you, and I hope you feel better soon.     Call your primary care doctor to make the first available appointment.     Keep all your medical appointments.     Take your regular medication as prescribed. Contact your primary care provider before running out of medication, or for any problems obtaining them.    Do not drive or operate heavy machinery while taking opioid or muscle relaxing medications. Examples include norco, percocet, xanax, valium, flexeril.     Overuse or long term use of pain and sedating medication may lead to addiction, dependence, organ failure, family and work problems, legal problems, accidental overdose and death.    If you do not have health insurance, you probably can afford it:  Call [phone] ([facility] hotline) or go to www.[employer].[place].gov    Your evaluation in the ER does not suggest any emergent or life threatening medical condition requiring admission or immediate intervention beyond that provided in the ER.   However, the signs of a serious problem sometimes take more time to appear.     Do not hesitate to return to the ER if any of the following occur:    Weakness, dizziness, fainting, or loss of consciousness   Fever of 100.4ºF (38ºC) or higher  Any worse symptoms  Any new or concerning symptoms    To protect yourself and others from COVID19:  Get vaccinated.   Anyone over 6 months old is eligible for vaccination.   If your last dose was over 6 months ago, you are probably due for another shot.   Vaccination is shown to prevent getting sick, ending up in the hospital, or dying because of COVID19.     If not vaccinated or over a long time since your last dose:  Your shot is waiting for you. To get it:   Text your ZIP code to GETVAX ([id number]) or VACUNA ([id number]) in Italian  call 311, or [phone], or [phone], or [phone],   go to www.vaccines.gov, or  Call your health provider    If exposed to someone with  cold, flu, or COVID19 symptoms, consider quarantining or at least wearing a mask around others for at least 5 days.   Even if you have no symptoms   Otherwise you could give the virus to someone who dies from it    Some symptoms of COVID19 include congestion, fever, cough, muscle aches, sore throat, breathing troubles, headaches, stomach upset, diarrhea.   Every [place] household is eligible to order 4 free at-home COVID-19 tests from www.covidtests.gov    Acute Bronchitis  Your healthcare provider has told you that you have acute bronchitis. Bronchitis is infection or inflammation of the bronchial tubes (airways in the lungs). Normally, air moves easily in and out of the airways. Bronchitis narrows the airways, making it harder for air to flow in and out of the lungs. This causes symptoms such as shortness of breath, coughing, and wheezing. Bronchitis can be acute or chronic. Acute means the condition comes on quickly and goes away in a short time. Chronic means a condition lasts a long time and often comes back. Read on to learn more about acute bronchitis.    What causes acute bronchitis?  Acute bronchitis almost always starts as a viral respiratory infection, such as a cold or the flu. Certain factors make it more likely for a cold or flu to turn into bronchitis. These include being very young or very old or having a heart or lung problem. Cigarette smoking also makes bronchitis more likely.  When bronchitis develops, the airways become swollen. The airways may also become infected with bacteria. This is known as a secondary infection.  Diagnosing acute bronchitis  Your healthcare provider will examine you and ask about your symptoms and health history.   Treating acute bronchitis  Bronchitis usually clears up as the cold or flu goes away. This can take 3-4 weeks. You can help feel better faster by doing the following:  Take medicine as directed. You may be told to take ibuprofen or other over-the-counter  medicines. These help relieve inflammation in your bronchial tubes. Your doctor may prescribe an inhaler to help open up the bronchial tubes. Most of the time, acute bronchitis is caused by a viral infection. Antibiotics are usually not prescribed for viral infections.  Drink plenty of fluids, such as water, juice, or warm soup. Fluids loosen mucus so that you can cough it up. This helps you breathe more easily. Fluids also prevent dehydration.  Make sure you get plenty of rest.  Do not smoke. Do not allow anyone else to smoke in your home.  Recovery and follow-up  Follow up with your doctor as you are told. You will likely feel better in a week or two. It can be normal to have a cough for 4 weeks. Take steps to protect yourself from future infections. These steps include stopping smoking and avoiding tobacco smoke, washing your hands often, and getting a yearly flu shot.  When to call the doctor  Call the doctor if you have any of the following:  Fever of 100.4°F (38.0°C) higher  Symptoms that get worse, or new symptoms  Trouble breathing       Do the following to improve pain and swelling:  Rest. Limit the use of the injured body part. This helps prevent further damage to the body part and gives it time to heal. In some cases, you may need a sling, brace, splint, or cast to help keep the body part still until it has healed.   Ice. Applying ice right after an injury helps relieve pain and swelling. Wrap a bag of ice in a thin towel. (Frozen peas also works well.) Then, place it over the injured area. Do this for 10 to 15 minutes every 3 to 4 hours. Continue for the next 1 to 2 days or until your symptoms improve. Never put ice directly on your skin or ice an area longer than 15 minutes at a time.   Compression. Putting pressure on an injury helps reduce swelling and provides support. Wrap the injured area firmly with an elastic bandage (ACE wrap). Make sure not to wrap the bandage too tightly or you will cut off  blood flow to the injured area. If your bandage loosens, rewrap it. Do not wear an elastic bandage overnight.  Elevation. Keeping an injury raised above the level of your heart reduces swelling, pain, and throbbing. For instance, if you have a broken leg, it may help to rest your leg on several pillows when sitting or lying down.

## 2023-02-10 NOTE — ED NOTES
Patient complains of pain to right arm and left hip to knee. Denies fall or trauma.     Adult Physical Assessment  LOC: Sunitha Pillai, 67 y.o. female verified via two identifiers.  The patient is awake, alert, oriented and speaking appropriately at this time.  APPEARANCE: Patient resting comfortably and appears to be in no acute distress at this time. Patient is clean and well groomed, patient's clothing is properly fastened.  SKIN:The skin is warm and dry, color consistent with ethnicity, patient has normal skin turgor and moist mucus membranes, skin intact, no breakdown or brusing noted.  MUSCULOSKELETAL: Limited ROM to RUE and LLE. No signs of trauma or deformity. No swelling.  RESPIRATORY: Airway is open and patent, respirations are spontaneous, patient has a normal effort and rate, no accessory muscle use noted.  CARDIAC: Patient has a normal rate and rhythm, no periphreal edema noted in any extremity, capillary refill < 3 seconds in all extremities  ABDOMEN: Soft and non tender to palpation, no abdominal distention noted. Bowel sounds present in all four quadrants.  NEUROLOGIC: Eyes open spontaneously, behavior appropriate to situation, follows commands, facial expression symmetrical, bilateral hand grasp equal and even, purposeful motor response noted, normal sensation in all extremities when touched with a finger.

## 2023-02-10 NOTE — ED TRIAGE NOTES
Left leg pain radiating down the hip, also right shoulder pain. Pt states she is unable to get up out of the bed on her own. Pt states her daughter had to come over and get her out. Pt is alert and oriented,, respirations are even unlabored. Pt is in NAD

## 2023-02-10 NOTE — FIRST PROVIDER EVALUATION
Medical screening examination initiated.  I have conducted a focused provider triage encounter, findings are as follows:    Brief history of present illness:  right shoulder and left leg pain that began 4 days ago.  No injury. No relief with OTC meds    There were no vitals filed for this visit.    Pertinent physical exam:  well appearing    Brief workup plan:  eval once roomed    Preliminary workup initiated; this workup will be continued and followed by the physician or advanced practice provider that is assigned to the patient when roomed.

## 2023-02-10 NOTE — ED PROVIDER NOTES
"  Source of History:  Medical record, patient, daughter    Chief complaint:  Per triage note: "Leg Pain (Pt reporting pain to L leg x 4 days; Pt also reporting R shoulder pain for same period of time. denies injury)  "    HPI:    Patient presents with intermittent, gradually worsening left leg pain x 2 weeks, worsening in the past 3 days. Pt states that knee pain is worse at night.  She reports several instances where she has required assistance to get up from a seated position. Pt reports that she injured the left leg 3 years ago after tripping over a pipe while at work.  She denies any or recent injury, fall, or trauma.  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 She reports that bending can sometimes exacerbate pain.   Patient also complains of right shoulder pain for the past several weeks. The first episode was on January 15 while she was at a wedding.  Initially when she had these episodes, it was at her right shoulder,, and chest.  She reports these symptoms twice with negative evaluations, including admission on January 30.  Pt also reports ongoing SOB for several months.  He denies any acute worsening, but her daughter notes dyspnea with minimal exertion over the last several nights.  She denies paresthesias, fevers, diaphoresis, or dizziness.     ROS:   As per HPI and below:   General: No diaphoresis.   Respiratory:  Notes dyspnea.   Neuro:  No focal deficits.    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    GERD (gastroesophageal reflux disease)     Hypertension     Vertigo 2019    Vitamin D deficiency 2021       Past Surgical History:   Procedure Laterality Date    BREAST BIOPSY Left 2022    Left punch biopsy; Unremarkable keratinized skin with intradermal hematoma     SECTION      INJECTION FOR SENTINEL NODE IDENTIFICATION Left 2020    Procedure: INJECTION, FOR SENTINEL NODE IDENTIFICATION;  Surgeon: Isabel Moulton MD;  Location: [facility] OR;  Service: General;  Laterality: Left;    INSERTION OF TUNNELED CENTRAL VENOUS CATHETER (CVC) WITH SUBCUTANEOUS PORT N/A 2020    Procedure: INSERTION, PORT-A-CATH;  Surgeon: Hardeep Martin MD;  Location: [facility] CATH LAB;  Service: Radiology;  Laterality: N/A;    LAPAROSCOPIC CHOLECYSTECTOMY N/A 2021    Procedure: CHOLECYSTECTOMY, LAPAROSCOPIC;  Surgeon: Buster Son MD;  Location: [address];  Service: General;  Laterality: N/A;    MASTECTOMY, PARTIAL Left 2020    Procedure: MASTECTOMY, PARTIAL-w/reflector;  Surgeon: Isabel Moulton MD;  Location: [facility] OR;  Service: General;  Laterality: Left;    SENTINEL LYMPH NODE BIOPSY Left 2020    Procedure: BIOPSY, LYMPH NODE, SENTINEL;  Surgeon: Isabel Moulton MD;  Location: [facility];  Service: General;  Laterality: Left;       Social History     Tobacco Use    Smoking status: Never    Smokeless tobacco: Never   Substance Use Topics    Alcohol use: Never    Drug use: No       Physical Exam:      Nursing note and vitals reviewed.  BP (!) 152/74   Pulse 77   Temp 98.1 °F (36.7 °C) (Oral)   Resp 17   Ht 5' 4" (1.626 m)   Wt 69.4 kg (153 lb)   SpO2 99%   BMI 26.26 kg/m²     Constitutional: Daughter at bedside. AAOx3. Well appearing.  Right upper and left lower extremity movement reproduce her pain.  Eyes: " "EOMI. No discharge. Anicteric.  HENT:   Neck: Normal range of motion. Neck supple.    Cardiovascular: Normal rate  Pulmonary/Chest: No respiratory distress.   Abdominal: No distension   Musculoskeletal:    Left lower extremity: Decreased hip ROM due to pain. No knee or ankle joint tenderness. NVI distally.   Right Shoulder: Skin intact. Voluntary guarding and decreased ROM due to pain. No point tenderness.  Full active and passive range of motion at elbow/wrist. 5/5 strength at biceps, elbows, wrists, fingers. Light touch intact in axillary / median / radial / ulnar distributions. 2+ radial pulses. Capillary refill <2sec. Compartments soft.     Neurological: GCS15. Alert and oriented to person, place, and time. No gross cranial nerve II-XII, focal strength, or light touch deficit. Steady gait.   Skin: Skin is warm and dry.   Psychiatric: Behavior is normal. Judgment normal.        MDM:      ED Course as of 02/10/23 0328   Thu Feb 09, 2023 2235 Patient 67-year-old female diabetes, hypertension, history of anemia, history of left breast cancer in remission x2 years, CAD status post MI).   She presents with proximally 2 weeks intermittent right shoulder pain, several weeks worsening left hip pain.  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  Is improved with acetaminophen, lidocaine patch, however her left hip pain has become severe to the point that she is acquired assistance get out of a sitting position several times over the last few weeks    External notes and sources reviewed:    Per discharge note related to 01/30/23 admission, she had a negative workup for her chest pain with normal troponins, no ischemia on stress test, resolution of chest pain during her admission.  Patient had a CTA chest read on 1/16 which subacute pulmonary embolus, "Patchy centrilobular opacities at the lung bases which may relate to small airways inflammation or infection. ... Findings in keeping with " "sequelae of prior granulomatous disease."  On exam, patient has guarding, painful her right shoulder, with no joint tenderness.  She has painful decreased range of motion her left hip which limits knee examination.  She has no knee tenderness.  She is neurovascularly intact distally.  I doubt acute pulmonary embolus, acute CHF, unstable angina, acute DVT, shoulder dislocation.  Differential includes fracture, arthritis, inflammatory bursitis, repetitive stress injury. I doubt joint infection.  [RC]   2338 I was notified that the patient vomited after receiving her oral analgesics.  Further discussion with the patient's daughter suggested the patient may be minimizing similar symptoms.  Patient denied nausea on my interview.  She appeared annoyed when her daughter reported that the patient has been having dyspnea and dyspnea on exertion on my initial interview.   Will place IV, give parenteral analgesics, obtain screening labs including troponin. [RC]   Fri Feb 10, 2023   0028 I independently interpreted and reviewed the patient's films, notable for no acute fracture, dislocation, no  radiopaque foreign body, no acute chest process.  Significant osteoarthritic changes are noted.  [RC]   0105 --  EKG Interpretation: I independently reviewed and interpreted EKG which shows normal sinus rhythm at 77 beats per minute, no STEMI, no significant acute ST/T abnormalities, normal intervals.  No acute change compared to prior tracing.  --   [RC]   0112 I independently reviewed and interpreted labs which are notable for elevated D-dimer, mild anemia with hemoglobin 10.2, normal troponin.   We will obtain CTA chest [RC]   0235 I independently interpreted and reviewed the patient's CTA chest, notable for no evidence of acute pulmonary embolus, or other acute chest process. There is "stable nonspecific mild patchy ground-glass opacities at the visualized lung bases."  Will treat her respiratory complaints and radiologic findings " as acute bronchitis.  Steroids were considered, however are relatively contraindicated with her diabetes.  No evidence of acute fracture, dislocation, or other severe etiology to her arthralgias.  Plan is maximal supportive care, PCP f/u, physical therapy, with ortho f/u if not improved.  [RC]   0244 Patient has significantly improved range of motion of her affected joints, and reports significant improvement of her comfortable with discharge.   --  I discussed with patient and/or guardian/caretaker that this evaluation in the ED does not suggest any emergent or life threatening medical condition requiring admission or further immediate intervention or diagnostics. Regardless, an unremarkable evaluation in the ED does not preclude the development or presence of a serious or life threatening condition. As such, patient was instructed to return for any worsening, new, changed, or concerning symptoms.     I had a detailed discussion with patient and/or guardian/caretaker regarding findings, plan, return precautions, importance of medication adherence, need to follow-up with a PCP and specialist. All questions answered.  Patient's daughter at the bedside for discussion.    Management decisions for this encounter made during COVID-19 public health emergency. Available resources, standards for appropriate emergency department evaluation, and admission vs. discharge standards have necessarily shifted and remain dynamic.     Note was created using voice recognition software. It may have occasional typographical errors not identified and edited despite initial review prior to signing.   [RC]      ED Course User Index  [RC] Gildardo Bloom MD     Medications   naproxen tablet 500 mg (500 mg Oral Given 2/9/23 2254)   HYDROcodone-acetaminophen 5-325 mg per tablet 1 tablet (1 tablet Oral Given 2/9/23 2254)   methocarbamoL tablet 500 mg (500 mg Oral Given 2/9/23 2254)   ondansetron injection 8 mg (8 mg Intravenous Given 2/10/23  0114)   orphenadrine injection 60 mg (60 mg Intravenous Given 2/10/23 0120)   ketorolac injection 9.999 mg (9.999 mg Intravenous Given 2/10/23 0119)   HYDROmorphone injection 0.25 mg (0.25 mg Intravenous Given 2/10/23 0145)   sodium chloride 0.9% bolus 1,000 mL 1,000 mL (0 mLs Intravenous Stopped 2/10/23 0213)   iohexoL (OMNIPAQUE 350) injection 100 mL (100 mLs Intravenous Given 2/10/23 0201)   azithromycin tablet 500 mg (500 mg Oral Given 2/10/23 0251)            ---  I, Jasmina Crowder, scribed for, and in the presence of, Dr. Bloom. I performed the scribed service and the documentation accurately describes the services I performed. I attest to the accuracy of the note.     Physician Attestation for Scribe:   I, Gildardo Bloom MD, reviewed documentation as scribed in my presence, which is both accurate and complete.    Diagnostic Impression:    1. Acute leg pain, left    2. Acute hip pain, left    3. Right shoulder pain    4. Vomiting    5. Dyspnea    6. Acute bronchitis, unspecified organism         ED Disposition Condition    Discharge Good          Future Appointments   Date Time Provider Department Center   2/16/2023 12:00 PM Tushar Seaman NP [facility] IM [facility]   2/16/2023  1:00 PM Giovany Mcduffie MD [facility] HEM ONC [facility]   2/17/2023  9:20 AM Alycia Gutiérrez MD [facility] CARDIO [facility]   2/17/2023 12:00 PM Jessy Robbins NP [facility] [place]   2/23/2023  7:30 AM Esa Franco PA-C [facility] ORTHO [facility]   4/25/2023  9:30 AM Vidhya Dai MD [facility] RHEUM [facility]   5/4/2023  8:00 AM Patty Louis MD [facility] [place]   5/18/2023  8:00 AM PRE-ADMIT, ENDO -[facility] ENDO4 [facility]      ED Prescriptions       Medication Sig Dispense Start Date End Date Auth. Provider    naproxen (NAPROSYN) 500 MG tablet Take 1 tablet (500 mg total) by mouth 2 (two) times daily as needed (pain). 60 tablet 2/10/2023 -- Gildardo Bloom MD    LIDOcaine (LIDODERM) 5 % Apply to affected area as  needed for pain for 12 hours, then off for 12 hours. Discard after each use.  May use 4% lidocaine patch as alternative. 30 patch 2/10/2023 -- Gildardo Bloom MD    diclofenac sodium (VOLTAREN) 1 % Gel Apply 2 g topically 3 (three) times daily as needed (muscle spasm pain). Apply 2 grams to affected area 3 times daily as needed 100 g 2/10/2023 -- Gildardo Bloom MD    methocarbamoL (ROBAXIN) 750 MG Tab Take 2 tablets (1,500 mg total) by mouth 3 (three) times daily as needed (Muscle spasm pain). 30 tablet 2/10/2023 2/15/2023 Gildardo Bloom MD    albuterol (PROVENTIL/VENTOLIN HFA) 90 mcg/actuation inhaler Inhale 1-2 puffs into the lungs every 6 (six) hours as needed for Wheezing or Shortness of Breath (Please dispense with spacer). 6.7 g 2/10/2023 3/12/2023 Gildardo Bloom MD    azithromycin (Z-TIAN) 250 MG tablet Take 1 tablet (250 mg total) by mouth once daily. for 4 days 4 tablet 2/11/2023 2/15/2023 Gildardo Bloom MD          Follow-up Information       Follow up With Specialties Details Why Contact Info    Your primary care doctor  In 2 days For recheck with your primary care doctor                Gildardo Bloom MD  02/10/23 8302

## 2023-02-14 ENCOUNTER — TELEPHONE (OUTPATIENT)
Dept: HEPATOLOGY | Facility: CLINIC | Age: 68
End: 2023-02-14

## 2023-02-14 ENCOUNTER — PES CALL (OUTPATIENT)
Dept: ADMINISTRATIVE | Facility: CLINIC | Age: 68
End: 2023-02-14

## 2023-02-14 ENCOUNTER — PATIENT OUTREACH (OUTPATIENT)
Dept: ADMINISTRATIVE | Facility: OTHER | Age: 68
End: 2023-02-14

## 2023-02-14 NOTE — TELEPHONE ENCOUNTER
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     Scheduling attempt # 2    Please call pt to schedule appt with any [facility]

## 2023-02-14 NOTE — PROGRESS NOTES
CHW - Outreach Attempt    Community Health Worker left a voicemail message for 1st attempt to contact patient regarding: case management referral    Community Health Worker to attempt to contact patient on: 2/14/23

## 2023-02-15 ENCOUNTER — PES CALL (OUTPATIENT)
Dept: ADMINISTRATIVE | Facility: CLINIC | Age: 68
End: 2023-02-15

## 2023-02-15 NOTE — TELEPHONE ENCOUNTER
Referral appointment for Hepatology scheduled on 3/9/23 with A Scroggs.  Patient notified.  Appointment mailed.

## 2023-02-15 NOTE — PROGRESS NOTES
CHW - Outreach Attempt    Community Health Worker left a voicemail message for 2nd attempt to contact pt regarding: case management referral    Community Health Worker to attempt to contact patient on: 2/15/23

## 2023-02-16 ENCOUNTER — OFFICE VISIT (OUTPATIENT)
Dept: HEMATOLOGY/ONCOLOGY | Facility: CLINIC | Age: 68
End: 2023-02-16
Attending: INTERNAL MEDICINE
Payer: MEDICARE

## 2023-02-16 VITALS
WEIGHT: 160.69 LBS | OXYGEN SATURATION: 99 % | BODY MASS INDEX: 27.43 KG/M2 | HEIGHT: 64 IN | TEMPERATURE: 98 F | RESPIRATION RATE: 16 BRPM | HEART RATE: 77 BPM | DIASTOLIC BLOOD PRESSURE: 83 MMHG | SYSTOLIC BLOOD PRESSURE: 177 MMHG

## 2023-02-16 DIAGNOSIS — G62.0 CHEMOTHERAPY-INDUCED PERIPHERAL NEUROPATHY: ICD-10-CM

## 2023-02-16 DIAGNOSIS — D64.9 NORMOCYTIC ANEMIA: ICD-10-CM

## 2023-02-16 DIAGNOSIS — T45.1X5A CHEMOTHERAPY-INDUCED PERIPHERAL NEUROPATHY: ICD-10-CM

## 2023-02-16 DIAGNOSIS — C50.312 MALIGNANT NEOPLASM OF LOWER-INNER QUADRANT OF LEFT BREAST IN FEMALE, ESTROGEN RECEPTOR POSITIVE: Primary | ICD-10-CM

## 2023-02-16 DIAGNOSIS — Z17.0 MALIGNANT NEOPLASM OF LOWER-INNER QUADRANT OF LEFT BREAST IN FEMALE, ESTROGEN RECEPTOR POSITIVE: Primary | ICD-10-CM

## 2023-02-16 PROBLEM — D70.9 NEUTROPENIA: Status: RESOLVED | Noted: 2021-01-11 | Resolved: 2023-02-16

## 2023-02-16 PROCEDURE — 3008F PR BODY MASS INDEX (BMI) DOCUMENTED: ICD-10-PCS | Mod: CPTII,S$GLB,, | Performed by: INTERNAL MEDICINE

## 2023-02-16 PROCEDURE — 3066F PR DOCUMENTATION OF TREATMENT FOR NEPHROPATHY: ICD-10-PCS | Mod: CPTII,S$GLB,, | Performed by: INTERNAL MEDICINE

## 2023-02-16 PROCEDURE — 1125F PR PAIN SEVERITY QUANTIFIED, PAIN PRESENT: ICD-10-PCS | Mod: CPTII,S$GLB,, | Performed by: INTERNAL MEDICINE

## 2023-02-16 PROCEDURE — 1101F PR PT FALLS ASSESS DOC 0-1 FALLS W/OUT INJ PAST YR: ICD-10-PCS | Mod: CPTII,S$GLB,, | Performed by: INTERNAL MEDICINE

## 2023-02-16 PROCEDURE — 99214 OFFICE O/P EST MOD 30 MIN: CPT | Mod: S$GLB,,, | Performed by: INTERNAL MEDICINE

## 2023-02-16 PROCEDURE — 99214 PR OFFICE/OUTPT VISIT, EST, LEVL IV, 30-39 MIN: ICD-10-PCS | Mod: S$GLB,,, | Performed by: INTERNAL MEDICINE

## 2023-02-16 PROCEDURE — 1159F PR MEDICATION LIST DOCUMENTED IN MEDICAL RECORD: ICD-10-PCS | Mod: CPTII,S$GLB,, | Performed by: INTERNAL MEDICINE

## 2023-02-16 PROCEDURE — 3060F POS MICROALBUMINURIA REV: CPT | Mod: CPTII,S$GLB,, | Performed by: INTERNAL MEDICINE

## 2023-02-16 PROCEDURE — 3051F PR MOST RECENT HEMOGLOBIN A1C LEVEL 7.0 - < 8.0%: ICD-10-PCS | Mod: CPTII,S$GLB,, | Performed by: INTERNAL MEDICINE

## 2023-02-16 PROCEDURE — 3077F PR MOST RECENT SYSTOLIC BLOOD PRESSURE >= 140 MM HG: ICD-10-PCS | Mod: CPTII,S$GLB,, | Performed by: INTERNAL MEDICINE

## 2023-02-16 PROCEDURE — 3288F PR FALLS RISK ASSESSMENT DOCUMENTED: ICD-10-PCS | Mod: CPTII,S$GLB,, | Performed by: INTERNAL MEDICINE

## 2023-02-16 PROCEDURE — 99999 PR PBB SHADOW E&M-EST. PATIENT-LVL IV: ICD-10-PCS | Mod: PBBFAC,,, | Performed by: INTERNAL MEDICINE

## 2023-02-16 PROCEDURE — 4010F ACE/ARB THERAPY RXD/TAKEN: CPT | Mod: CPTII,S$GLB,, | Performed by: INTERNAL MEDICINE

## 2023-02-16 PROCEDURE — 3051F HG A1C>EQUAL 7.0%<8.0%: CPT | Mod: CPTII,S$GLB,, | Performed by: INTERNAL MEDICINE

## 2023-02-16 PROCEDURE — 3066F NEPHROPATHY DOC TX: CPT | Mod: CPTII,S$GLB,, | Performed by: INTERNAL MEDICINE

## 2023-02-16 PROCEDURE — 4010F PR ACE/ARB THEARPY RXD/TAKEN: ICD-10-PCS | Mod: CPTII,S$GLB,, | Performed by: INTERNAL MEDICINE

## 2023-02-16 PROCEDURE — 1159F MED LIST DOCD IN RCRD: CPT | Mod: CPTII,S$GLB,, | Performed by: INTERNAL MEDICINE

## 2023-02-16 PROCEDURE — 99999 PR PBB SHADOW E&M-EST. PATIENT-LVL IV: CPT | Mod: PBBFAC,,, | Performed by: INTERNAL MEDICINE

## 2023-02-16 PROCEDURE — 3077F SYST BP >= 140 MM HG: CPT | Mod: CPTII,S$GLB,, | Performed by: INTERNAL MEDICINE

## 2023-02-16 PROCEDURE — 3079F DIAST BP 80-89 MM HG: CPT | Mod: CPTII,S$GLB,, | Performed by: INTERNAL MEDICINE

## 2023-02-16 PROCEDURE — 3008F BODY MASS INDEX DOCD: CPT | Mod: CPTII,S$GLB,, | Performed by: INTERNAL MEDICINE

## 2023-02-16 PROCEDURE — 1125F AMNT PAIN NOTED PAIN PRSNT: CPT | Mod: CPTII,S$GLB,, | Performed by: INTERNAL MEDICINE

## 2023-02-16 PROCEDURE — 3060F PR POS MICROALBUMINURIA RESULT DOCUMENTED/REVIEW: ICD-10-PCS | Mod: CPTII,S$GLB,, | Performed by: INTERNAL MEDICINE

## 2023-02-16 PROCEDURE — 3079F PR MOST RECENT DIASTOLIC BLOOD PRESSURE 80-89 MM HG: ICD-10-PCS | Mod: CPTII,S$GLB,, | Performed by: INTERNAL MEDICINE

## 2023-02-16 PROCEDURE — 3288F FALL RISK ASSESSMENT DOCD: CPT | Mod: CPTII,S$GLB,, | Performed by: INTERNAL MEDICINE

## 2023-02-16 PROCEDURE — 1101F PT FALLS ASSESS-DOCD LE1/YR: CPT | Mod: CPTII,S$GLB,, | Performed by: INTERNAL MEDICINE

## 2023-02-16 RX ORDER — ALLOPURINOL 100 MG/1
100 TABLET ORAL DAILY
COMMUNITY
End: 2023-10-17

## 2023-02-16 NOTE — PROGRESS NOTES
Subjective:      Patient ID: Sunitha Pillai is a 67 y.o. female.    Chief Complaint: No chief complaint on file.        HPI:  67-year-old female, patient of Dr. Moulton, who returns for follow-up of a diagnosis of left breast cancer.  She completed adjuvant chemotherapy with Taxotere and Cytoxan on January 24,2021.She is on adjuvant letrozole therapy.     She has had some neuropathy in hands and legs since her chemotherapy.She has been on Lyrica for that.That is doing well.    She went to the emergency room on January 15th January 30th with chest pain.  She returns to emergency room on February 9th with some left leg pain.  Xrays of the left knee showed DJD.CT angiogram at that time was negative for pulmonary embolism.  She was prescribed medication for  her leg pain which has not helped.  She is to see orthopedics.  Has chronic right shoulder pain - Xrays negative.She is planning to get some PT for that.    She is had some mild anemia over the last year with hemoglobin ranging from 10-11-1/2 grams/deciliter.  MCV normal, WBC and plts normal.  Iron studies on February 3, 2023 showed iron of 44, TIBC 330, iron saturation 13%, ferritin 368, B12 normal.  Renal and hepatic function have been normal.  Her anemia seems to have started since her chemotherapy at the end of 2020.    Current history: Screening mammogram on July 30th showed a focal asymmetry in the lower outer portion left breast.  Biopsy on August 25, 2020 showed high-grade infiltrating ductal carcinoma (histologic grade 3, nuclear grade 3, mitotic index 3).  Tumor was 95% ER positive in 95% VT positive, HER2 was 2+ but negative by fish.  Ki-67 was 95%.    On September 17, 2020 lumpectomy and sentinel lymph node biopsy were performed.  That pathology showed a 1.5 cm infiltrating carcinoma with a single negative sentinel lymph node.  Margins were negative.  Final pathological stage T1c N0, stage I A.      Oncotype risk score - 29-high risk.    She  received 4 cycles of adjuvant Taxotere and Cytoxan from 11/24/20 to 1/24/21.    Completed radiation 5/6/21.     Letrozole started in May 2021.    Review of Systems   Constitutional:  Negative for activity change, appetite change, fever and unexpected weight change.   HENT: Negative.     Cardiovascular:  Negative for chest pain.   Gastrointestinal:  Negative for constipation.   Genitourinary: Negative.    Objective:   Physical Exam   Vitals reviewed.  Constitutional: She is oriented to person, place, and time. She appears well-developed. No distress.   Cardiovascular:  Normal rate and regular rhythm.            Pulmonary/Chest: Effort normal and breath sounds normal. No respiratory distress. She has no wheezes. She has no rales. Right breast exhibits no mass, no nipple discharge and no skin change. Left breast exhibits skin change. Left breast exhibits no mass, no nipple discharge and no tenderness.       Abdominal: Soft. She exhibits no mass. There is no abdominal tenderness.   Musculoskeletal: Swelling (left knee) present.      Comments: Tenderness and decreased ROM due to pain - left shoulder   Lymphadenopathy:     She has no cervical adenopathy.   Neurological: She is alert and oriented to person, place, and time.   Skin: No rash noted.     Psychiatric: Her behavior is normal. Thought content normal.   Assessment:     Mammogram - negative  1. Malignant neoplasm of lower-inner quadrant of left breast in female, estrogen receptor positive      2. Chemotherapy-induced peripheral neuropathy  3. Left knee pain - DJD?   Plan:      RTC 3 M.will get repeat CBC with retic at that time.    F/U with ortho and PT   Route Chart for Scheduling    Med Onc Chart Routing      Follow up with physician 3 months.   Follow up with [facility]    Infusion scheduling note    Injection scheduling note    Labs CBC and other   Lab interval:     Imaging    Pharmacy appointment    Other referrals

## 2023-02-17 ENCOUNTER — HOSPITAL ENCOUNTER (OUTPATIENT)
Dept: CARDIOLOGY | Facility: HOSPITAL | Age: 68
Discharge: HOME | End: 2023-02-17
Payer: MEDICARE

## 2023-02-17 ENCOUNTER — OFFICE VISIT (OUTPATIENT)
Dept: CARDIOLOGY | Facility: CLINIC | Age: 68
End: 2023-02-17
Payer: MEDICARE

## 2023-02-17 ENCOUNTER — DOCUMENTATION ONLY (OUTPATIENT)
Dept: CARDIOLOGY | Facility: CLINIC | Age: 68
End: 2023-02-17

## 2023-02-17 VITALS
HEIGHT: 64 IN | HEART RATE: 82 BPM | BODY MASS INDEX: 27.59 KG/M2 | DIASTOLIC BLOOD PRESSURE: 70 MMHG | SYSTOLIC BLOOD PRESSURE: 134 MMHG

## 2023-02-17 DIAGNOSIS — G62.0 CHEMOTHERAPY-INDUCED PERIPHERAL NEUROPATHY: ICD-10-CM

## 2023-02-17 DIAGNOSIS — R07.9 CHEST PAIN: ICD-10-CM

## 2023-02-17 DIAGNOSIS — T45.1X5A CHEMOTHERAPY-INDUCED PERIPHERAL NEUROPATHY: ICD-10-CM

## 2023-02-17 DIAGNOSIS — Z17.0 MALIGNANT NEOPLASM OF LOWER-INNER QUADRANT OF LEFT BREAST IN FEMALE, ESTROGEN RECEPTOR POSITIVE: ICD-10-CM

## 2023-02-17 DIAGNOSIS — I10 ESSENTIAL HYPERTENSION: Chronic | ICD-10-CM

## 2023-02-17 DIAGNOSIS — E11.00 TYPE 2 DIABETES MELLITUS WITH HYPEROSMOLARITY WITHOUT COMA, WITH LONG-TERM CURRENT USE OF INSULIN: ICD-10-CM

## 2023-02-17 DIAGNOSIS — M79.605 PAIN OF LEFT LOWER EXTREMITY: Primary | ICD-10-CM

## 2023-02-17 DIAGNOSIS — M79.605 PAIN OF LEFT LOWER EXTREMITY: ICD-10-CM

## 2023-02-17 DIAGNOSIS — C50.312 MALIGNANT NEOPLASM OF LOWER-INNER QUADRANT OF LEFT BREAST IN FEMALE, ESTROGEN RECEPTOR POSITIVE: ICD-10-CM

## 2023-02-17 DIAGNOSIS — Z79.4 TYPE 2 DIABETES MELLITUS WITH HYPEROSMOLARITY WITHOUT COMA, WITH LONG-TERM CURRENT USE OF INSULIN: ICD-10-CM

## 2023-02-17 PROCEDURE — 3060F POS MICROALBUMINURIA REV: CPT | Mod: CPTII,GC,S$GLB,

## 2023-02-17 PROCEDURE — 99999 PR PBB SHADOW E&M-EST. PATIENT-LVL V: CPT | Mod: PBBFAC,GC,,

## 2023-02-17 PROCEDURE — 4010F ACE/ARB THERAPY RXD/TAKEN: CPT | Mod: CPTII,GC,S$GLB,

## 2023-02-17 PROCEDURE — 99999 PR PBB SHADOW E&M-EST. PATIENT-LVL V: ICD-10-PCS | Mod: PBBFAC,GC,,

## 2023-02-17 PROCEDURE — 3288F FALL RISK ASSESSMENT DOCD: CPT | Mod: CPTII,GC,S$GLB,

## 2023-02-17 PROCEDURE — 93971 EXTREMITY STUDY: CPT | Mod: LT

## 2023-02-17 PROCEDURE — 4010F PR ACE/ARB THEARPY RXD/TAKEN: ICD-10-PCS | Mod: CPTII,GC,S$GLB,

## 2023-02-17 PROCEDURE — 3078F PR MOST RECENT DIASTOLIC BLOOD PRESSURE < 80 MM HG: ICD-10-PCS | Mod: CPTII,GC,S$GLB,

## 2023-02-17 PROCEDURE — 93971 EXTREMITY STUDY: CPT | Mod: 26,LT,, | Performed by: INTERNAL MEDICINE

## 2023-02-17 PROCEDURE — 3051F PR MOST RECENT HEMOGLOBIN A1C LEVEL 7.0 - < 8.0%: ICD-10-PCS | Mod: CPTII,GC,S$GLB,

## 2023-02-17 PROCEDURE — 3051F HG A1C>EQUAL 7.0%<8.0%: CPT | Mod: CPTII,GC,S$GLB,

## 2023-02-17 PROCEDURE — 3008F BODY MASS INDEX DOCD: CPT | Mod: CPTII,GC,S$GLB,

## 2023-02-17 PROCEDURE — 3060F PR POS MICROALBUMINURIA RESULT DOCUMENTED/REVIEW: ICD-10-PCS | Mod: CPTII,GC,S$GLB,

## 2023-02-17 PROCEDURE — 3066F NEPHROPATHY DOC TX: CPT | Mod: CPTII,GC,S$GLB,

## 2023-02-17 PROCEDURE — 93971 CV US DOPPLER VENOUS LEG LEFT (CUPID ONLY): ICD-10-PCS | Mod: 26,LT,, | Performed by: INTERNAL MEDICINE

## 2023-02-17 PROCEDURE — 3075F SYST BP GE 130 - 139MM HG: CPT | Mod: CPTII,GC,S$GLB,

## 2023-02-17 PROCEDURE — 3075F PR MOST RECENT SYSTOLIC BLOOD PRESS GE 130-139MM HG: ICD-10-PCS | Mod: CPTII,GC,S$GLB,

## 2023-02-17 PROCEDURE — 3288F PR FALLS RISK ASSESSMENT DOCUMENTED: ICD-10-PCS | Mod: CPTII,GC,S$GLB,

## 2023-02-17 PROCEDURE — 3078F DIAST BP <80 MM HG: CPT | Mod: CPTII,GC,S$GLB,

## 2023-02-17 PROCEDURE — 99204 PR OFFICE/OUTPT VISIT, NEW, LEVL IV, 45-59 MIN: ICD-10-PCS | Mod: GC,S$GLB,,

## 2023-02-17 PROCEDURE — 3008F PR BODY MASS INDEX (BMI) DOCUMENTED: ICD-10-PCS | Mod: CPTII,GC,S$GLB,

## 2023-02-17 PROCEDURE — 3066F PR DOCUMENTATION OF TREATMENT FOR NEPHROPATHY: ICD-10-PCS | Mod: CPTII,GC,S$GLB,

## 2023-02-17 PROCEDURE — 99204 OFFICE O/P NEW MOD 45 MIN: CPT | Mod: GC,S$GLB,,

## 2023-02-17 PROCEDURE — 1101F PT FALLS ASSESS-DOCD LE1/YR: CPT | Mod: CPTII,GC,S$GLB,

## 2023-02-17 PROCEDURE — 1159F MED LIST DOCD IN RCRD: CPT | Mod: CPTII,GC,S$GLB,

## 2023-02-17 PROCEDURE — 1125F AMNT PAIN NOTED PAIN PRSNT: CPT | Mod: CPTII,GC,S$GLB,

## 2023-02-17 PROCEDURE — 1125F PR PAIN SEVERITY QUANTIFIED, PAIN PRESENT: ICD-10-PCS | Mod: CPTII,GC,S$GLB,

## 2023-02-17 PROCEDURE — 1101F PR PT FALLS ASSESS DOC 0-1 FALLS W/OUT INJ PAST YR: ICD-10-PCS | Mod: CPTII,GC,S$GLB,

## 2023-02-17 PROCEDURE — 1159F PR MEDICATION LIST DOCUMENTED IN MEDICAL RECORD: ICD-10-PCS | Mod: CPTII,GC,S$GLB,

## 2023-02-17 NOTE — PROGRESS NOTES
I have reviewed the notes, assessments, and/or procedures performed this visit, and I concur with the documentation.  Agree that r/o DVT in order, yet most pain when she straightens leg and pain in buttock area - ? Sciatica?

## 2023-02-17 NOTE — PROGRESS NOTES
Subjective:       Patient ID:  Sunitha Pillai is a 67 y.o. female who presents for evaluation of Hypertension  67 y.o. with a PMHx of diabetes GERD, hypertension, breast cancer in remission came for post hospital follow up.  No recurrence of chest pain, SOB, orthopnea, PND, palpitations, syncope.  Left knee pain from thigh to feet few weeks ago, thought from gout and started on pain medications.     Of note, the patient was recently admitted to [facility] on 1/15/2023 for a similar episode of chest tightness where she received an echo (EF 60%) and was treated with nitroglycerin. Her symptoms resolved until last night. Endorses an extensive cardiac history in her family- her mother and brother both had MIs. Denies N/V/D, LE edema, cough, abdominal pain, fever or SOB.   ED: BP elevated to 172/78. Remaining vital signs stable. No leukocytosis. K+ 3.3. Glucose 66. Troponin negative x 3. Chest XR with no acute abnormalities. ECG with no acute ST or T wave changes. Given ASA 325mg x1.  She was admitted to observation for chest pain rule out. Workup unremarkable. Stress test w/o signs of ischemia. Chest pain resovled during admission.   CTA no PE.          HPI  Review of patient's allergies indicates:  No Known Allergies   Lab Results   Component Value Date     02/10/2023    K 3.4 (L) 02/10/2023     (H) 02/10/2023    CO2 25 02/10/2023    BUN 14 02/10/2023    CREATININE 0.9 02/10/2023     (H) 02/10/2023    HGBA1C 7.6 (H) 01/31/2023    MG 1.7 02/10/2023    AST 15 02/10/2023    ALT 9 (L) 02/10/2023    ALBUMIN 3.1 (L) 02/10/2023    PROT 7.0 02/10/2023    BILITOT 0.5 02/10/2023    WBC 5.66 02/10/2023    HGB 10.2 (L) 02/10/2023    HCT 30.0 (L) 02/10/2023    HCT 38 11/08/2021    MCV 94 02/10/2023     02/10/2023    INR 1.0 11/08/2021    TSH 2.028 01/31/2023         Lab Results   Component Value Date    CHOL 135 01/31/2023    HDL 45 01/31/2023    TRIG 65 01/31/2023       Lab Results    Component Value Date    LDLCALC 77.0 2023       Past Medical History:   Diagnosis Date    Acute coronary syndrome 2018    Adjustment disorder     Anxiety     Arthritis     Cancer of breast 2020    Malignant neoplasm of lower-inner quadrant of left breast in female, estrogen receptor positive    Cholelithiasis with acute cholecystitis 2021    Coronary artery disease     Depression     Diabetes mellitus type I     Genetic testing of female 2020    [employer] via [employer] with AXIN2 and POLE variants of uncertain significance    GERD (gastroesophageal reflux disease)     Hypertension     Vertigo 2019    Vitamin D deficiency 2021     Past Surgical History:   Procedure Laterality Date    BREAST BIOPSY Left 2022    Left punch biopsy; Unremarkable keratinized skin with intradermal hematoma     SECTION      INJECTION FOR SENTINEL NODE IDENTIFICATION Left 2020    Procedure: INJECTION, FOR SENTINEL NODE IDENTIFICATION;  Surgeon: Isabel Moulton MD;  Location: [facility] OR;  Service: General;  Laterality: Left;    INSERTION OF TUNNELED CENTRAL VENOUS CATHETER (CVC) WITH SUBCUTANEOUS PORT N/A 2020    Procedure: INSERTION, PORT-A-CATH;  Surgeon: Hardeep Martin MD;  Location: [facility] CATH LAB;  Service: Radiology;  Laterality: N/A;    LAPAROSCOPIC CHOLECYSTECTOMY N/A 2021    Procedure: CHOLECYSTECTOMY, LAPAROSCOPIC;  Surgeon: Buster Son MD;  Location: [address];  Service: General;  Laterality: N/A;    MASTECTOMY, PARTIAL Left 2020    Procedure: MASTECTOMY, PARTIAL-w/reflector;  Surgeon: Isabel Moulton MD;  Location: [facility] OR;  Service: General;  Laterality: Left;    SENTINEL LYMPH NODE BIOPSY Left 2020    Procedure: BIOPSY, LYMPH NODE, SENTINEL;  Surgeon: Isabel Moulton MD;  Location: [facility] OR;  Service: General;  Laterality: Left;      Tobacco Use    Smoking status: Never    Smokeless tobacco: Never   Substance and Sexual Activity     "Alcohol use: Never    Drug use: No    Sexual activity: Yes     Partners: Male     Birth control/protection: Post-menopausal      Family History   Problem Relation Age of Onset    Hypertension Mother     Hyperlipidemia Mother     Diabetes Mother     Heart disease Mother     Colon polyps Mother     Aneurysm Father     Diabetes Sister     Hypertension Sister     Heart disease Brother     Diabetes Brother     Hypertension Brother     Cancer Brother         brother German with prostate cancer; brother Cooper with throat cancer    Cervical cancer Daughter     Anxiety disorder Daughter     Depression Daughter     Anxiety disorder Daughter     Depression Daughter     Breast cancer Other     Cancer Paternal Aunt         unknown origin    Breast cancer Paternal Aunt     Breast cancer Maternal Aunt     Cancer Maternal Aunt         unknown origin    Prostate cancer Maternal Cousin     Leukemia Maternal Cousin     Prostate cancer Maternal Cousin     Colon cancer Neg Hx     Ovarian cancer Neg Hx         Current Outpatient Medications:     ACCU-CHEK GUIDE GLUCOSE METER Misc, USE  THREE TIMES DAILY, Disp: 1 each, Rfl: 0    albuterol (PROVENTIL/VENTOLIN HFA) 90 mcg/actuation inhaler, Inhale 1-2 puffs into the lungs every 6 (six) hours as needed for Wheezing or Shortness of Breath (Please dispense with spacer)., Disp: 6.7 g, Rfl: 0    allopurinoL (ZYLOPRIM) 100 MG tablet, Take 100 mg by mouth once daily., Disp: , Rfl:     aspirin 81 mg Tab, Take 1 tablet by mouth Daily., Disp: , Rfl:     atorvastatin (LIPITOR) 10 MG tablet, Take 1 tablet (10 mg total) by mouth once daily., Disp: 90 tablet, Rfl: 3    BD ULTRA-FINE SHORT PEN NEEDLE 31 gauge x 5/16" Ndle, USE 1  4 TIMES DAILY WITH  INSULIN, Disp: 100 each, Rfl: 3    blood sugar diagnostic Strp, Strips and lancets covered by insurance E11.9, pt checks glucose three times daily, insulin dependent, Disp: 300 each, Rfl: 3    blood sugar diagnostic, drum (ACCU-CHEK COMPACT TEST) Strp, USE " ONE STRIP TO CHECK GLUCOSE 4 TIMES DAILY BEFORE EACH MEAL AND AT BEDTIME, Disp: 100 each, Rfl: 0    candesartan (ATACAND) 16 MG tablet, Take 1 tablet (16 mg total) by mouth once daily., Disp: 90 tablet, Rfl: 3    diclofenac sodium (VOLTAREN) 1 % Gel, Apply 2 g topically 3 (three) times daily as needed (muscle spasm pain). Apply 2 grams to affected area 3 times daily as needed, Disp: 100 g, Rfl: 0    dulaglutide (TRULICITY) 1.5 mg/0.5 mL pen injector, Inject 1.5 mg into the skin every 7 days. (Patient taking differently: Inject 1.5 mg into the skin every 7 days. Thursdays), Disp: 12 pen, Rfl: 2    lancets Misc, To check BG 4 times daily, to use with insurance preferred meter, insulin dependent, Disp: 400 each, Rfl: 3    letrozole (FEMARA) 2.5 mg Tab, Take 1 tablet (2.5 mg total) by mouth once daily., Disp: 90 tablet, Rfl: 3    LIDOcaine (LIDODERM) 5 %, Apply to affected area as needed for pain for 12 hours, then off for 12 hours. Discard after each use. May use 4% lidocaine patch as alternative., Disp: 30 patch, Rfl: 0    metFORMIN (GLUCOPHAGE) 1000 MG tablet, Take 1 tablet (1,000 mg total) by mouth 2 (two) times daily., Disp: 180 tablet, Rfl: 3    methocarbamoL (ROBAXIN) 750 MG Tab, Take 2 tablets (1,500 mg total) by mouth 3 (three) times daily as needed (Muscle spasm pain)., Disp: 30 tablet, Rfl: 0    naproxen (NAPROSYN) 500 MG tablet, Take 1 tablet (500 mg total) by mouth 2 (two) times daily as needed (pain)., Disp: 60 tablet, Rfl: 0    sertraline (ZOLOFT) 25 MG tablet, Take 1 tablet (25 mg total) by mouth once daily., Disp: 30 tablet, Rfl: 3    topiramate (TOPAMAX) 25 MG tablet, One at bedtime for 2 weeks then two at bedtime, Disp: 60 tablet, Rfl: 2    TRUEPLUS LANCETS 33 gauge Misc, USE 1 TO CHECK GLUCOSE 4 TIMES DAILY, Disp: , Rfl:     fexofenadine (ALLEGRA) 180 MG tablet, Take 1 tablet (180 mg total) by mouth once daily. (Patient not taking: Reported on 2/16/2023), Disp: , Rfl: 0    fluticasone propionate  "(FLONASE) 50 mcg/actuation nasal spray, 1 spray (50 mcg total) by Each Nostril route once daily. (Patient not taking: Reported on 2/16/2023), Disp: 15.8 mL, Rfl: 1  No current facility-administered medications for this visit.    Facility-Administered Medications Ordered in Other Visits:     fentaNYL injection 25 mcg, 25 mcg, Intravenous, Q5 Min PRN, Kenneth Shannon MD    midazolam (VERSED) 1 mg/mL injection 0.5 mg, 0.5 mg, Intravenous, PRN, Kenneth Shannon MD, 2 mg at 09/17/20 0637          Review of Systems   Constitutional: Negative for decreased appetite, fever, malaise/fatigue and weight loss.   HENT:  Negative for congestion.    Eyes:  Negative for blurred vision.   Cardiovascular:  Negative for chest pain, dyspnea on exertion, orthopnea and palpitations.   Respiratory:  Negative for cough, shortness of breath and wheezing.    Endocrine: Negative for polyuria.   Skin:  Negative for itching.   Musculoskeletal:  Negative for joint swelling.   Gastrointestinal:  Negative for bloating and abdominal pain.   Genitourinary:  Negative for frequency.   Neurological:  Negative for dizziness.   Psychiatric/Behavioral:  Negative for altered mental status.    All other systems reviewed and are negative.     Objective:/70 (BP Location: Right arm, Patient Position: Sitting)   Pulse 82   Ht 5' 4" (1.626 m)   LMP  (LMP Unknown)   BMI 27.59 kg/m²             Physical Exam  Vitals and nursing note reviewed.   Constitutional:       Appearance: She is well-developed.   HENT:      Head: Normocephalic and atraumatic.      Mouth/Throat:      Mouth: Mucous membranes are moist.   Eyes:      Pupils: Pupils are equal, round, and reactive to light.   Cardiovascular:      Rate and Rhythm: Normal rate and regular rhythm.      Pulses: Normal pulses and intact distal pulses.      Heart sounds: No murmur heard.  Pulmonary:      Effort: Pulmonary effort is normal. No respiratory distress.      Breath sounds: " Normal breath sounds. No rales.   Abdominal:      General: Abdomen is flat. Bowel sounds are normal. There is no distension.      Palpations: Abdomen is soft.      Tenderness: There is no abdominal tenderness. There is no guarding.   Musculoskeletal:      Cervical back: Normal range of motion.      Right lower leg: No edema.      Left lower leg: No edema.      Comments: Swelling and tenderness left knee posterior aspect.   Skin:     General: Skin is warm.   Neurological:      General: No focal deficit present.      Mental Status: She is alert and oriented to person, place, and time.   Psychiatric:         Mood and Affect: Mood normal.       Assessment:       1. Pain of left lower extremity    2. Chemotherapy-induced peripheral neuropathy    3. Essential hypertension    4. Malignant neoplasm of lower-inner quadrant of left breast in female, estrogen receptor positive    5. Type 2 diabetes mellitus with hyperosmolarity without coma, with long-term current use of insulin    6. Chest pain         Plan:       67 yr old female with above comobidities came for regular hospital followup.  No recurrence of chest pain. Stress test normal. LDL 77. CTA negative for PE.  BP well controlled.   Her big compliant is her left leg pain, will get US LE venous on left.    D/W staff

## 2023-02-19 ENCOUNTER — NURSE TRIAGE (OUTPATIENT)
Dept: ADMINISTRATIVE | Facility: CLINIC | Age: 68
End: 2023-02-19

## 2023-02-19 NOTE — TELEPHONE ENCOUNTER
Pt says seen at [facility], told she had arthritis & gout, medication given not helping. She said in past while in [place] she was given IBU 800mg & Prednisone which helped a lot, she is requesting rx for these from PCP. Discussed triage process & questions in order to better assist, pt declines. Requesting callback from clinic in the morning regarding rx requests. Pt advised to callback if she decides she would like to complete triage, if symptoms change/worsen or does not hear back from clinic. Pt vu.       Reason for Disposition   Health Information question, no triage required and triager able to answer question     Declines triage.    Protocols used: Information Only Call - No Triage-A-

## 2023-02-22 PROBLEM — I70.0 AORTIC ATHEROSCLEROSIS: Status: ACTIVE | Noted: 2023-02-22

## 2023-02-23 NOTE — PROGRESS NOTES
CHW - Outreach Attempt    Community Health Worker left a voicemail message for 3rd attempt to contact Pt regarding: case management referral    Community Health Worker to attempt to contact patient on: 2/23/23

## 2023-03-03 ENCOUNTER — TELEPHONE (OUTPATIENT)
Dept: INTERNAL MEDICINE | Facility: CLINIC | Age: 68
End: 2023-03-03

## 2023-03-03 NOTE — TELEPHONE ENCOUNTER
----- Message from Radha Seaman sent at 3/3/2023 10:28 AM CST -----  Contact: self [phone]  Per pt her handicap placard paperwork was messed up and requesting if she can come pickup another one stated can leave at  contact when ready.    Please call and advise

## 2023-03-06 ENCOUNTER — TELEPHONE (OUTPATIENT)
Dept: INTERNAL MEDICINE | Facility: CLINIC | Age: 68
End: 2023-03-06

## 2023-03-06 NOTE — TELEPHONE ENCOUNTER
----- Message from Kristi Stewart sent at 3/6/2023  9:16 AM CST -----  Contact: [phone]  Patient is returning a phone call.  Who left a message for the patient: Servando Ramirez MA     Does patient know what this is regarding:  no  Would you like a call back, or a response through your [facility] portal?:   call back   Comments:

## 2023-03-09 ENCOUNTER — OFFICE VISIT (OUTPATIENT)
Dept: ORTHOPEDICS | Facility: CLINIC | Age: 68
End: 2023-03-09
Payer: MEDICARE

## 2023-03-09 ENCOUNTER — HOSPITAL ENCOUNTER (OUTPATIENT)
Dept: RADIOLOGY | Facility: HOSPITAL | Age: 68
Discharge: HOME | End: 2023-03-09
Attending: PHYSICIAN ASSISTANT
Payer: MEDICARE

## 2023-03-09 ENCOUNTER — OFFICE VISIT (OUTPATIENT)
Dept: HEPATOLOGY | Facility: CLINIC | Age: 68
End: 2023-03-09
Payer: MEDICARE

## 2023-03-09 VITALS — WEIGHT: 155.88 LBS | HEIGHT: 64 IN | BODY MASS INDEX: 26.61 KG/M2

## 2023-03-09 VITALS
WEIGHT: 156.63 LBS | BODY MASS INDEX: 26.74 KG/M2 | HEART RATE: 59 BPM | HEIGHT: 64 IN | SYSTOLIC BLOOD PRESSURE: 157 MMHG | DIASTOLIC BLOOD PRESSURE: 72 MMHG

## 2023-03-09 DIAGNOSIS — M17.12 PRIMARY OSTEOARTHRITIS OF LEFT KNEE: Primary | ICD-10-CM

## 2023-03-09 DIAGNOSIS — R76.8 HEPATITIS B CORE ANTIBODY POSITIVE: Primary | ICD-10-CM

## 2023-03-09 DIAGNOSIS — M25.511 CHRONIC RIGHT SHOULDER PAIN: ICD-10-CM

## 2023-03-09 DIAGNOSIS — G89.29 CHRONIC RIGHT SHOULDER PAIN: ICD-10-CM

## 2023-03-09 DIAGNOSIS — M19.011 PRIMARY OSTEOARTHRITIS OF RIGHT SHOULDER: ICD-10-CM

## 2023-03-09 DIAGNOSIS — M17.12 PRIMARY OSTEOARTHRITIS OF LEFT KNEE: ICD-10-CM

## 2023-03-09 DIAGNOSIS — E11.9 TYPE 2 DIABETES MELLITUS WITHOUT COMPLICATION, UNSPECIFIED WHETHER LONG TERM INSULIN USE: ICD-10-CM

## 2023-03-09 PROCEDURE — 3060F PR POS MICROALBUMINURIA RESULT DOCUMENTED/REVIEW: ICD-10-PCS | Mod: CPTII,S$GLB,, | Performed by: NURSE PRACTITIONER

## 2023-03-09 PROCEDURE — 3051F PR MOST RECENT HEMOGLOBIN A1C LEVEL 7.0 - < 8.0%: ICD-10-PCS | Mod: CPTII,S$GLB,, | Performed by: NURSE PRACTITIONER

## 2023-03-09 PROCEDURE — 99214 OFFICE O/P EST MOD 30 MIN: CPT | Mod: S$GLB,,, | Performed by: NURSE PRACTITIONER

## 2023-03-09 PROCEDURE — 99999 PR PBB SHADOW E&M-EST. PATIENT-LVL V: ICD-10-PCS | Mod: PBBFAC,,, | Performed by: NURSE PRACTITIONER

## 2023-03-09 PROCEDURE — 3060F POS MICROALBUMINURIA REV: CPT | Mod: CPTII,S$GLB,, | Performed by: PHYSICIAN ASSISTANT

## 2023-03-09 PROCEDURE — 3077F PR MOST RECENT SYSTOLIC BLOOD PRESSURE >= 140 MM HG: ICD-10-PCS | Mod: CPTII,S$GLB,, | Performed by: PHYSICIAN ASSISTANT

## 2023-03-09 PROCEDURE — 99213 PR OFFICE/OUTPT VISIT, EST, LEVL III, 20-29 MIN: ICD-10-PCS | Mod: S$GLB,,, | Performed by: PHYSICIAN ASSISTANT

## 2023-03-09 PROCEDURE — 1101F PR PT FALLS ASSESS DOC 0-1 FALLS W/OUT INJ PAST YR: ICD-10-PCS | Mod: CPTII,S$GLB,, | Performed by: PHYSICIAN ASSISTANT

## 2023-03-09 PROCEDURE — 3008F PR BODY MASS INDEX (BMI) DOCUMENTED: ICD-10-PCS | Mod: CPTII,S$GLB,, | Performed by: PHYSICIAN ASSISTANT

## 2023-03-09 PROCEDURE — 1160F PR REVIEW ALL MEDS BY PRESCRIBER/CLIN PHARMACIST DOCUMENTED: ICD-10-PCS | Mod: CPTII,S$GLB,, | Performed by: PHYSICIAN ASSISTANT

## 2023-03-09 PROCEDURE — 3060F PR POS MICROALBUMINURIA RESULT DOCUMENTED/REVIEW: ICD-10-PCS | Mod: CPTII,S$GLB,, | Performed by: PHYSICIAN ASSISTANT

## 2023-03-09 PROCEDURE — 1125F AMNT PAIN NOTED PAIN PRSNT: CPT | Mod: CPTII,S$GLB,, | Performed by: NURSE PRACTITIONER

## 2023-03-09 PROCEDURE — 3288F PR FALLS RISK ASSESSMENT DOCUMENTED: ICD-10-PCS | Mod: CPTII,S$GLB,, | Performed by: NURSE PRACTITIONER

## 2023-03-09 PROCEDURE — 3066F NEPHROPATHY DOC TX: CPT | Mod: CPTII,S$GLB,, | Performed by: NURSE PRACTITIONER

## 2023-03-09 PROCEDURE — 73565 XR KNEE AP STANDING BILATERAL: ICD-10-PCS | Mod: 26,,, | Performed by: RADIOLOGY

## 2023-03-09 PROCEDURE — 99999 PR PBB SHADOW E&M-EST. PATIENT-LVL V: CPT | Mod: PBBFAC,,, | Performed by: NURSE PRACTITIONER

## 2023-03-09 PROCEDURE — 4010F PR ACE/ARB THEARPY RXD/TAKEN: ICD-10-PCS | Mod: CPTII,S$GLB,, | Performed by: NURSE PRACTITIONER

## 2023-03-09 PROCEDURE — 4010F ACE/ARB THERAPY RXD/TAKEN: CPT | Mod: CPTII,S$GLB,, | Performed by: NURSE PRACTITIONER

## 2023-03-09 PROCEDURE — 3288F FALL RISK ASSESSMENT DOCD: CPT | Mod: CPTII,S$GLB,, | Performed by: PHYSICIAN ASSISTANT

## 2023-03-09 PROCEDURE — 1160F PR REVIEW ALL MEDS BY PRESCRIBER/CLIN PHARMACIST DOCUMENTED: ICD-10-PCS | Mod: CPTII,S$GLB,, | Performed by: NURSE PRACTITIONER

## 2023-03-09 PROCEDURE — 3066F PR DOCUMENTATION OF TREATMENT FOR NEPHROPATHY: ICD-10-PCS | Mod: CPTII,S$GLB,, | Performed by: NURSE PRACTITIONER

## 2023-03-09 PROCEDURE — 3078F DIAST BP <80 MM HG: CPT | Mod: CPTII,S$GLB,, | Performed by: PHYSICIAN ASSISTANT

## 2023-03-09 PROCEDURE — 4010F ACE/ARB THERAPY RXD/TAKEN: CPT | Mod: CPTII,S$GLB,, | Performed by: PHYSICIAN ASSISTANT

## 2023-03-09 PROCEDURE — 3051F HG A1C>EQUAL 7.0%<8.0%: CPT | Mod: CPTII,S$GLB,, | Performed by: PHYSICIAN ASSISTANT

## 2023-03-09 PROCEDURE — 1159F PR MEDICATION LIST DOCUMENTED IN MEDICAL RECORD: ICD-10-PCS | Mod: CPTII,S$GLB,, | Performed by: PHYSICIAN ASSISTANT

## 2023-03-09 PROCEDURE — 3066F NEPHROPATHY DOC TX: CPT | Mod: CPTII,S$GLB,, | Performed by: PHYSICIAN ASSISTANT

## 2023-03-09 PROCEDURE — 3078F PR MOST RECENT DIASTOLIC BLOOD PRESSURE < 80 MM HG: ICD-10-PCS | Mod: CPTII,S$GLB,, | Performed by: PHYSICIAN ASSISTANT

## 2023-03-09 PROCEDURE — 1160F RVW MEDS BY RX/DR IN RCRD: CPT | Mod: CPTII,S$GLB,, | Performed by: NURSE PRACTITIONER

## 2023-03-09 PROCEDURE — 1159F MED LIST DOCD IN RCRD: CPT | Mod: CPTII,S$GLB,, | Performed by: PHYSICIAN ASSISTANT

## 2023-03-09 PROCEDURE — 99999 PR PBB SHADOW E&M-EST. PATIENT-LVL V: CPT | Mod: PBBFAC,,, | Performed by: PHYSICIAN ASSISTANT

## 2023-03-09 PROCEDURE — 73565 X-RAY EXAM OF KNEES: CPT | Mod: TC

## 2023-03-09 PROCEDURE — 1101F PT FALLS ASSESS-DOCD LE1/YR: CPT | Mod: CPTII,S$GLB,, | Performed by: PHYSICIAN ASSISTANT

## 2023-03-09 PROCEDURE — 1159F MED LIST DOCD IN RCRD: CPT | Mod: CPTII,S$GLB,, | Performed by: NURSE PRACTITIONER

## 2023-03-09 PROCEDURE — 1125F AMNT PAIN NOTED PAIN PRSNT: CPT | Mod: CPTII,S$GLB,, | Performed by: PHYSICIAN ASSISTANT

## 2023-03-09 PROCEDURE — 3288F FALL RISK ASSESSMENT DOCD: CPT | Mod: CPTII,S$GLB,, | Performed by: NURSE PRACTITIONER

## 2023-03-09 PROCEDURE — 1159F PR MEDICATION LIST DOCUMENTED IN MEDICAL RECORD: ICD-10-PCS | Mod: CPTII,S$GLB,, | Performed by: NURSE PRACTITIONER

## 2023-03-09 PROCEDURE — 99999 PR PBB SHADOW E&M-EST. PATIENT-LVL V: ICD-10-PCS | Mod: PBBFAC,,, | Performed by: PHYSICIAN ASSISTANT

## 2023-03-09 PROCEDURE — 1160F RVW MEDS BY RX/DR IN RCRD: CPT | Mod: CPTII,S$GLB,, | Performed by: PHYSICIAN ASSISTANT

## 2023-03-09 PROCEDURE — 1125F PR PAIN SEVERITY QUANTIFIED, PAIN PRESENT: ICD-10-PCS | Mod: CPTII,S$GLB,, | Performed by: NURSE PRACTITIONER

## 2023-03-09 PROCEDURE — 3077F SYST BP >= 140 MM HG: CPT | Mod: CPTII,S$GLB,, | Performed by: PHYSICIAN ASSISTANT

## 2023-03-09 PROCEDURE — 1101F PR PT FALLS ASSESS DOC 0-1 FALLS W/OUT INJ PAST YR: ICD-10-PCS | Mod: CPTII,S$GLB,, | Performed by: NURSE PRACTITIONER

## 2023-03-09 PROCEDURE — 3288F PR FALLS RISK ASSESSMENT DOCUMENTED: ICD-10-PCS | Mod: CPTII,S$GLB,, | Performed by: PHYSICIAN ASSISTANT

## 2023-03-09 PROCEDURE — 3060F POS MICROALBUMINURIA REV: CPT | Mod: CPTII,S$GLB,, | Performed by: NURSE PRACTITIONER

## 2023-03-09 PROCEDURE — 4010F PR ACE/ARB THEARPY RXD/TAKEN: ICD-10-PCS | Mod: CPTII,S$GLB,, | Performed by: PHYSICIAN ASSISTANT

## 2023-03-09 PROCEDURE — 3051F PR MOST RECENT HEMOGLOBIN A1C LEVEL 7.0 - < 8.0%: ICD-10-PCS | Mod: CPTII,S$GLB,, | Performed by: PHYSICIAN ASSISTANT

## 2023-03-09 PROCEDURE — 3008F BODY MASS INDEX DOCD: CPT | Mod: CPTII,S$GLB,, | Performed by: PHYSICIAN ASSISTANT

## 2023-03-09 PROCEDURE — 99213 OFFICE O/P EST LOW 20 MIN: CPT | Mod: S$GLB,,, | Performed by: PHYSICIAN ASSISTANT

## 2023-03-09 PROCEDURE — 3008F BODY MASS INDEX DOCD: CPT | Mod: CPTII,S$GLB,, | Performed by: NURSE PRACTITIONER

## 2023-03-09 PROCEDURE — 3066F PR DOCUMENTATION OF TREATMENT FOR NEPHROPATHY: ICD-10-PCS | Mod: CPTII,S$GLB,, | Performed by: PHYSICIAN ASSISTANT

## 2023-03-09 PROCEDURE — 3051F HG A1C>EQUAL 7.0%<8.0%: CPT | Mod: CPTII,S$GLB,, | Performed by: NURSE PRACTITIONER

## 2023-03-09 PROCEDURE — 1125F PR PAIN SEVERITY QUANTIFIED, PAIN PRESENT: ICD-10-PCS | Mod: CPTII,S$GLB,, | Performed by: PHYSICIAN ASSISTANT

## 2023-03-09 PROCEDURE — 73565 X-RAY EXAM OF KNEES: CPT | Mod: 26,,, | Performed by: RADIOLOGY

## 2023-03-09 PROCEDURE — 1101F PT FALLS ASSESS-DOCD LE1/YR: CPT | Mod: CPTII,S$GLB,, | Performed by: NURSE PRACTITIONER

## 2023-03-09 PROCEDURE — 3008F PR BODY MASS INDEX (BMI) DOCUMENTED: ICD-10-PCS | Mod: CPTII,S$GLB,, | Performed by: NURSE PRACTITIONER

## 2023-03-09 PROCEDURE — 99214 PR OFFICE/OUTPT VISIT, EST, LEVL IV, 30-39 MIN: ICD-10-PCS | Mod: S$GLB,,, | Performed by: NURSE PRACTITIONER

## 2023-03-09 RX ORDER — MELOXICAM 15 MG/1
15 TABLET ORAL DAILY
Qty: 30 TABLET | Refills: 1 | Status: SHIPPED | OUTPATIENT
Start: 2023-03-09 | End: 2023-05-04

## 2023-03-09 NOTE — PROGRESS NOTES
"  SUBJECTIVE:     Chief Complaint & History of Present Illness:  Sunitha Pillai is a Established patient 67 y.o. female who is seen here today with a complaint of    Chief Complaint   Patient presents with    Left Knee - Pain    Right Shoulder - Pain    .  Patient is here today for evaluation treatment of intermittent episodes of soreness and pain in the left knee and right shoulder.  She was seen by me for her shoulder approximately 5 years ago that time she had demonstrate some levels of arthritis in the shoulder and associated decreased ranges of motion.  She is had off and on problems with her knees over the past 1-2 years.  Has not had any formal treatment was prescribed naproxen at her most recent ER visit for the knees but she only takes it intermittently with mixed results.  States she will occasionally have flare ups of both of these joints with swelling soreness pain and decreases in range of motion but they only last for 2-3 days and then resolved with conservative care.  On a scale of 1-10, with 10 being worst pain imaginable, he rates this pain as 1 on good days and 9 on bad days.  she describes the pain as sore and achy.    Review of patient's allergies indicates:  No Known Allergies      Current Outpatient Medications   Medication Sig Dispense Refill    ACCU-CHEK GUIDE GLUCOSE METER Misc USE  THREE TIMES DAILY 1 each 0    albuterol (PROVENTIL/VENTOLIN HFA) 90 mcg/actuation inhaler Inhale 1-2 puffs into the lungs every 6 (six) hours as needed for Wheezing or Shortness of Breath (Please dispense with spacer). 6.7 g 0    allopurinoL (ZYLOPRIM) 100 MG tablet Take 100 mg by mouth once daily.      aspirin 81 mg Tab Take 1 tablet by mouth Daily.      atorvastatin (LIPITOR) 10 MG tablet Take 1 tablet (10 mg total) by mouth once daily. 90 tablet 3    BD ULTRA-FINE SHORT PEN NEEDLE 31 gauge x 5/16" Ndle USE 1  4 TIMES DAILY WITH  INSULIN 100 each 3    blood sugar diagnostic Strp Strips and lancets " covered by insurance E11.9, pt checks glucose three times daily, insulin dependent 300 each 3    blood sugar diagnostic, drum (ACCU-CHEK COMPACT TEST) Strp USE ONE STRIP TO CHECK GLUCOSE 4 TIMES DAILY BEFORE EACH MEAL AND AT BEDTIME 100 each 0    candesartan (ATACAND) 16 MG tablet Take 1 tablet (16 mg total) by mouth once daily. 90 tablet 3    diclofenac sodium (VOLTAREN) 1 % Gel Apply 2 g topically 3 (three) times daily as needed (muscle spasm pain). Apply 2 grams to affected area 3 times daily as needed 100 g 0    dulaglutide (TRULICITY) 1.5 mg/0.5 mL pen injector Inject 1.5 mg into the skin every 7 days. (Patient taking differently: Inject 1.5 mg into the skin every 7 days. Thursdays) 12 pen 2    fexofenadine (ALLEGRA) 180 MG tablet Take 1 tablet (180 mg total) by mouth once daily.  0    fluticasone propionate (FLONASE) 50 mcg/actuation nasal spray 1 spray (50 mcg total) by Each Nostril route once daily. 15.8 mL 1    lancets Misc To check BG 4 times daily, to use with insurance preferred meter, insulin dependent 400 each 3    letrozole (FEMARA) 2.5 mg Tab Take 1 tablet (2.5 mg total) by mouth once daily. 90 tablet 3    LIDOcaine (LIDODERM) 5 % Apply to affected area as needed for pain for 12 hours, then off for 12 hours. Discard after each use.  May use 4% lidocaine patch as alternative. 30 patch 0    metFORMIN (GLUCOPHAGE) 1000 MG tablet Take 1 tablet (1,000 mg total) by mouth 2 (two) times daily. 180 tablet 3    naproxen (NAPROSYN) 500 MG tablet Take 1 tablet (500 mg total) by mouth 2 (two) times daily as needed (pain). 60 tablet 0    predniSONE (DELTASONE) 20 MG tablet Take 1 tablet (20 mg total) by mouth once daily. 5 tablet 0    sertraline (ZOLOFT) 25 MG tablet Take 1 tablet (25 mg total) by mouth once daily. 30 tablet 3    topiramate (TOPAMAX) 25 MG tablet One at bedtime for 2 weeks then two at bedtime 60 tablet 2    TRUEPLUS LANCETS 33 gauge Misc USE 1 TO CHECK GLUCOSE 4 TIMES DAILY      meloxicam (MOBIC)  15 MG tablet Take 1 tablet (15 mg total) by mouth once daily. 30 tablet 1     No current facility-administered medications for this visit.     Facility-Administered Medications Ordered in Other Visits   Medication Dose Route Frequency Provider Last Rate Last Admin    fentaNYL injection 25 mcg  25 mcg Intravenous Q5 Min PRN Kenneth Shannon MD        midazolam (VERSED) 1 mg/mL injection 0.5 mg  0.5 mg Intravenous PRN Kenneth Shannon MD   2 mg at 20 0637       Past Medical History:   Diagnosis Date    Acute coronary syndrome 2018    Adjustment disorder     Anxiety     Arthritis     Cancer of breast 2020    Malignant neoplasm of lower-inner quadrant of left breast in female, estrogen receptor positive    Cholelithiasis with acute cholecystitis 2021    Coronary artery disease     Depression     Diabetes mellitus type I     Genetic testing of female 2020    [employer] via [employer] with AXIN2 and POLE variants of uncertain significance    GERD (gastroesophageal reflux disease)     Hypertension     Vertigo 2019    Vitamin D deficiency 2021       Past Surgical History:   Procedure Laterality Date    BREAST BIOPSY Left 2022    Left punch biopsy; Unremarkable keratinized skin with intradermal hematoma     SECTION      INJECTION FOR SENTINEL NODE IDENTIFICATION Left 2020    Procedure: INJECTION, FOR SENTINEL NODE IDENTIFICATION;  Surgeon: Isabel Moulton MD;  Location: [facility] OR;  Service: General;  Laterality: Left;    INSERTION OF TUNNELED CENTRAL VENOUS CATHETER (CVC) WITH SUBCUTANEOUS PORT N/A 2020    Procedure: INSERTION, PORT-A-CATH;  Surgeon: Hardeep Martin MD;  Location: [facility] CATH LAB;  Service: Radiology;  Laterality: N/A;    LAPAROSCOPIC CHOLECYSTECTOMY N/A 2021    Procedure: CHOLECYSTECTOMY, LAPAROSCOPIC;  Surgeon: Buster Son MD;  [facility];  Service: General;  Laterality: N/A;    MASTECTOMY,  "PARTIAL Left 09/17/2020    Procedure: MASTECTOMY, PARTIAL-w/reflector;  Surgeon: Isabel Moulton MD;  Location: [facility] OR;  Service: General;  Laterality: Left;    SENTINEL LYMPH NODE BIOPSY Left 09/17/2020    Procedure: BIOPSY, LYMPH NODE, SENTINEL;  Surgeon: Isabel Moulton MD;  Location: [facility] OR;  Service: General;  Laterality: Left;       Vital Signs (Most Recent)  Vitals:    03/09/23 0746   BP: (!) 157/72   Pulse: (!) 59           Review of Systems:  ROS:  Constitutional: no fever or chills  Eyes: no visual changes  ENT: no nasal congestion or sore throat, positive for vertigo  Respiratory: no cough or shortness of breath  Cardiovascular: no chest pain or palpitations, positive CAD, aortic atherosclerosis acute coronary syndrome  Gastrointestinal: no nausea or vomiting, tolerating diet, positive for GERD acute gangrenous cholecystitis  transaminitis  Genitourinary: no hematuria or dysuria  Integument/Breast: no rash or pruritis  Hematologic/Lymphatic: no easy bruising or lymphadenopathy, positive normocytic anemia malignant neoplasm of the left breast  Musculoskeletal: no arthralgias or myalgias,    Neurological: no seizures or tremors, positive chemotherapy induced peripheral neuropathy  Behavioral/Psych: no auditory or visual hallucinations, positive for depression, anxiety, adjustment disorder,  Endocrine: no heat or cold intolerance, positive diabetes type 2 vitamin-D deficiency                OBJECTIVE:     PHYSICAL EXAM:  Height: 5' 4" (162.6 cm) Weight: 71.1 kg (156 lb 10.2 oz), General Appearance: Well nourished, well developed, in no acute distress.  Neurological: Mood & affect are normal.    left  Knee Exam:  Knee Range of Motion:0-120 degrees flexion   Effusion:none  Condition of skin:intact  Location of tenderness:Lateral joint line and Patellar tendon   Strength:limited by pain and 5 of 5  Stability:  Lachman: stable, LCL: stable, MCL: stable, PCL: stable, and posteromedial (dial): stable  Varus " /Valgus stress:  normal  Tg:   negative/negative    right  Knee Exam:  Knee Range of Motion:0-125 degrees flexion   Effusion:none  Condition of skin:intact  Location of tenderness:None   Strength:5 of 5  Stability:  stable to testing  Varus /Valgus stress:  normal  Tg:   negative/negative    Hip Examination:  full painless range of motion, without tenderness    Shoulder exam: left  Tenderness: lateral acromial, posterior acromial  ROM: forward flexion 150/150, extension 45/45, full abduction 150/150, abduction-glenohumeral 80/80, external rotation 30/30  Shoulder Strength: biceps 5/5, triceps 5/5, abduction 5/5, adduction 5/5, external rotation 5/5 with shoulder at side, flexion 5/5, and extension 5/5  positive for tenderness about the glenohumeral joint, negative for tenderness over the acromioclavicular joint, and negative for impingement sign  Stability tests: anterior apprehension test negative and posterior apprehension test negative                           RADIOGRAPHS:  Standing x-rays of the knees taken today films reviewed by me demonstrate mild degenerative changes throughout both knees left more so than right with medial joint space narrowing and early sclerotic changes noted no evidence of fracture dislocation or other bony abnormalities    X-rays of the shoulder from previous visit reviewed by me today demonstrate moderate arthritic changes throughout the shoulder with marked osteophytic spurring sclerotic changes throughout the glenohumeral region as well as the AC joint no evidence of fracture dislocation    ASSESSMENT/PLAN:       ICD-10-CM ICD-9-CM   1. Primary osteoarthritis of left knee  M17.12 715.16   2. Chronic right shoulder pain  M25.511 719.41    G89.29 338.29   3. Primary osteoarthritis of right shoulder  M19.011 715.11       Plan: We discussed with the patient at length all the different treatment options available for  the knee including anti-inflammatories, acetaminophen,  rest, ice, knee strengthening exercise, occasional cortisone injections for temporary relief, Viscosupplimentation injections, arthroscopic debridement osteotomy, and finally knee arthroplasty.   Patient is very apprehensive about any aggressive therapies to include injections would like to stick with oral treatment options.  She states she is been taking the anti-inflammatories intermittently with this in mind will prescribe meloxicam 15 mg q.d. with food times 7-10 days when she has a flare-up followed by p.r.n..  If at any point this fails to keep her comfortable she will contact the clinic for more aggressive treatment options

## 2023-03-09 NOTE — PATIENT INSTRUCTIONS
Your labs show that it is possible you were exposed to Hepatitis B and your body likely cleared the active Hepatitis B virus. However, it never fulls clears and it is housed in the liver. This typically doesn't cause any chronic (long standing) Hepatitis B. However, the Hepatitis B can flare, especially if any medications are prescribed to you in the future that can affect your immune system, such as high dose steroids, immunologic medications for rheumatologic disease, chemotherapy, etc.     If any of the above medications are ever prescribed to you, it is important for you to tell your healthcare provider that you have been exposed to Hepatitis B in the past, so are at risk for it flaring

## 2023-03-09 NOTE — Clinical Note
She stopped her Prednisone on her own. It didn't sound like she was interested in any medications for RA that may suppress her immune system, so no hepatology f/u needed. Let me know if you ever start any moderate/high risk medications in the future and I can see her back for further discussion of prophylaxis, no need currently for medications Thanks !

## 2023-03-09 NOTE — PROGRESS NOTES
[facility]    REFERRING PROVIDER:  Dr. Vidhya Dai  PCP: Patty Louis MD     CHIEF COMPLAINT: Hep B core antibody positive     HPI: This is a 67 y.o. female with PMH noted below, presenting for evaluation of Hep B core positive antibody, in the setting of RA followed by Dr. Dai    Previous serologic w/u negative for Hep B and C    Prior serologic workup:   Lab Results   Component Value Date    FERRITIN 368 (H) 02/03/2023    FESATURATED 13 (L) 02/03/2023    HEPBSAG Non-reactive 11/17/2022    HEPCAB Non-reactive 11/17/2022    HEPAIGM Negative 05/13/2019         Interval HPI: Presents today alone. Was given prednisone by rheumatology a few months ago but Stopped Prednisone on her own "a while ago"  + tattoo in her teens (home tattoo)  + baby boomers ([id number])  Denies previous healthcare job, no drug use, no family with Hep B  No knowledge of Hep B exposure prior       Hep B labs:   + core Ab x2  11/2022 and 3/2023  neg sAg  11/2022, will repeat   + sAb  3/2023  neg Hep B DNA  2/2023, will repeat     Labs done 2/2023 show normal transaminase levels     Lab Results   Component Value Date    ALT 9 (L) 02/10/2023    AST 15 02/10/2023    ALKPHOS 80 02/10/2023    BILITOT 0.5 02/10/2023    ALBUMIN 3.1 (L) 02/10/2023    INR 1.0 11/08/2021     02/10/2023       Hep C and HIV testing negative    Denies family history of liver disease . Denies alcohol consumption -   Social History     Substance and Sexual Activity   Alcohol Use Never         Immunity to Hep A : will check with labs          Allergy and medication list reviewed and updated     PMHX:  has a past medical history of Acute coronary syndrome (09/23/2018), Adjustment disorder, Anxiety, Arthritis, Cancer of breast (09/03/2020), Cholelithiasis with acute cholecystitis (03/05/2021), Coronary artery disease, Depression, Diabetes mellitus type I, Genetic testing of female (09/2020), GERD (gastroesophageal reflux " "disease), Hypertension, Vertigo (2019), and Vitamin D deficiency (2021).    PSHX:  has a past surgical history that includes  section; Injection for sentinel node identification (Left, 2020); [place] lymph node biopsy (Left, 2020); Insertion of tunneled central venous catheter (CVC) with subcutaneous port (N/A, 2020); Laparoscopic cholecystectomy (N/A, 2021); Mastectomy, partial (Left, 2020); and Breast biopsy (Left, 2022).    FAMILY HISTORY: Updated and reviewed in [facility]    SOCIAL HISTORY:   Social History     Substance and Sexual Activity   Alcohol Use Never       Social History     Substance and Sexual Activity   Drug Use No       ROS:   GENERAL: Denies fatigue  CARDIOVASCULAR: Denies edema  GI: Denies abdominal pain  SKIN: Denies rash, itching   NEURO: Denies confusion, memory loss, or mood changes    PHYSICAL EXAM:   Friendly Black or  female, in no acute distress; alert and oriented to person, place and time  VITALS: Ht 5' 4" (1.626 m)   Wt 70.7 kg (155 lb 13.8 oz)   LMP  (LMP Unknown)   BMI 26.75 kg/m²   EYES: Sclerae anicteric  GI: Soft, non-tender, non-distended. No ascites.  EXTREMITIES:  No edema.  SKIN: Warm and dry. No jaundice. No telangectasias noted. No palmar erythema.  NEURO:  No asterixis.  PSYCH:  Thought and speech pattern appropriate. Behavior normal      EDUCATION:  See instructions discussed with patient in Instructions section of the After Visit Summary     ASSESSMENT & PLAN:  67 y.o. female with:  1. Hep B core positive Ab, no chronic Hep B  -- No use of Hep B medications in the past  -- transaminases WNL  -- Hep B labs: see HPI  -- immunity to Hep A: will check today   -- risk factors for transmission : homemade tattoos?   -- screening for Hep C and HIV negative    2. RA  -- pt stopped Prednisone on her own, does not plan to restart or use in the future, not interested in any immunosuppressing medications for RA at " this time.   -- discussed with pt that if she ever is started on medications that may suppress her immune system, we can see her for f/u to discuss Hep B prophylaxis medication, no indication for prophylaxis currently         EDUCATION:    Discussed with patient presence of Hep B core positive antibody, negative antigen so likely suggests previous exposure, clearance, no active chronic Hep B. Discussed importance of using prophylactic medications to prevent reactivation of Hep B with use of particular immunosuppressive medications    Also, should notify if any change or addition of any steroids, immunologic medications for rheumatologic disease, chemotherapy, etc.     Agents with > 10% reactivation risk:   HIGH RISK GROUP > 10% - anti-viral prophylaxis is recommended:   - Anti CD 20 - B cell depleting agents: rituximab, ofatumumab, obinutuzumab   - Anthracycline derivatives: doxorubicin,epirubicin   - High dose steroids: > 20 mg prednisone or equivalent for > 4 weeks   - Undergoing stem cell transplant       Agents with 1-10% reactivation risk:   MODERATE RISK GROUP - monitoring versus Hep B prophylaxis is recommended and typically lasts for 6 months after d/c of immunologic medication:   - TNF-alpha inhibitors: etanercept, adalimumab, certolizumab, infliximab   - Cytokine or integrin inhibitors: abatacept, ustekinumab, natalizumab, vedolizumab   - Tyrosinekinase inhibitors: imatinib, nilotinib   - Low dose steroids: 10-20 mg prednisone or equivalent for > 4 weeks       Agents with <1% reactivation risk:   LOW RISK GROUP - can be carefully monitored with ALT, HBV DNA, and HBsAg with the intent for on-demand therapy if any of above classes of medications are added:   - 6-MP, AZA, MTX, intra-articular steroids, any dose steroid < 1 week, 10 mg prednisone < 4 weeks       Labs today   Follow up if ever started on immunosuppressing medications in the future. No need for hepatology f/u, can f/u if medications change in  the future   No orders of the defined types were placed in this encounter.       Thank you for allowing me to participate in the care of SunithaROBERT ColeC    I spent a total of 30 minutes on the day of the visit.This includes face to face time and non-face to face time preparing to see the patient (eg, review of tests), obtaining and/or reviewing separately obtained history, documenting clinical information in the electronic or other health record, independently interpreting results and communicating results to the patient/family/caregiver, and coordinating care.         CC'ed note to:   Vidhya Dai MD

## 2023-03-14 ENCOUNTER — TELEPHONE (OUTPATIENT)
Dept: HEPATOLOGY | Facility: CLINIC | Age: 68
End: 2023-03-14

## 2023-03-14 NOTE — TELEPHONE ENCOUNTER
Called pt to discuss repeat lab results. No chronic Hep B, liver enzymes normal. Discussed that her labs show that it is possible she was exposed to Hepatitis B and your body likely cleared the active Hepatitis B virus. Discussed that  the Hepatitis B can flare, especially if any medications are prescribed to in the future that can affect her immune system, such as high dose steroids, immunologic medications for rheumatologic disease, chemotherapy, etc.     If any of the above medications are ever prescribed to her, she is aware to tell the provider that she has been exposed to Hepatitis B in the past, so are at risk for it flaring     Not currently on immunosuppressing medications and no plans to start. No hepatology f/u needed unless plan for med changes in the future.

## 2023-03-24 ENCOUNTER — PATIENT OUTREACH (OUTPATIENT)
Dept: ADMINISTRATIVE | Facility: HOSPITAL | Age: 68
End: 2023-03-24

## 2023-03-24 NOTE — PROGRESS NOTES
Health Maintenance Due   Topic Date Due    Eye Exam  03/25/2020    Shingles Vaccine (2 of 2) 12/31/2020    Foot Exam  12/14/2022      Chart reviewed. Triggered LINKS. Updated Care Everywhere.     Rachel Green CMA  Population Health Care Coordinator  Primary Care Team

## 2023-03-27 ENCOUNTER — DOCUMENTATION ONLY (OUTPATIENT)
Dept: REHABILITATION | Facility: OTHER | Age: 68
End: 2023-03-27

## 2023-03-27 NOTE — PROGRESS NOTES
Patient was scheduled for a physical therapy appointment at [facility] location on 3/10/22. Patient failed to appear for the appointment without prior notification today.     King Solano PT, DPT, OCS

## 2023-04-03 ENCOUNTER — PATIENT MESSAGE (OUTPATIENT)
Dept: ADMINISTRATIVE | Facility: HOSPITAL | Age: 68
End: 2023-04-03

## 2023-04-06 ENCOUNTER — OFFICE VISIT (OUTPATIENT)
Dept: INTERNAL MEDICINE | Facility: CLINIC | Age: 68
End: 2023-04-06
Payer: MEDICARE

## 2023-04-06 VITALS
HEIGHT: 64 IN | WEIGHT: 153 LBS | SYSTOLIC BLOOD PRESSURE: 136 MMHG | BODY MASS INDEX: 26.12 KG/M2 | DIASTOLIC BLOOD PRESSURE: 66 MMHG

## 2023-04-06 DIAGNOSIS — E11.65 TYPE 2 DIABETES MELLITUS WITH HYPERGLYCEMIA, WITH LONG-TERM CURRENT USE OF INSULIN: ICD-10-CM

## 2023-04-06 DIAGNOSIS — I27.9 CHRONIC PULMONARY HEART DISEASE: ICD-10-CM

## 2023-04-06 DIAGNOSIS — J84.10 CALCIFIED GRANULOMA OF LUNG: ICD-10-CM

## 2023-04-06 DIAGNOSIS — F33.42 RECURRENT MAJOR DEPRESSIVE DISORDER, IN FULL REMISSION: ICD-10-CM

## 2023-04-06 DIAGNOSIS — T45.1X5A CHEMOTHERAPY-INDUCED PERIPHERAL NEUROPATHY: ICD-10-CM

## 2023-04-06 DIAGNOSIS — I10 ESSENTIAL HYPERTENSION: Chronic | ICD-10-CM

## 2023-04-06 DIAGNOSIS — I70.0 AORTIC ATHEROSCLEROSIS: ICD-10-CM

## 2023-04-06 DIAGNOSIS — M05.79 RHEUMATOID ARTHRITIS INVOLVING MULTIPLE SITES WITH POSITIVE RHEUMATOID FACTOR: ICD-10-CM

## 2023-04-06 DIAGNOSIS — R76.8 HEPATITIS B CORE ANTIBODY POSITIVE: ICD-10-CM

## 2023-04-06 DIAGNOSIS — G62.0 CHEMOTHERAPY-INDUCED PERIPHERAL NEUROPATHY: ICD-10-CM

## 2023-04-06 DIAGNOSIS — Z79.4 TYPE 2 DIABETES MELLITUS WITH HYPERGLYCEMIA, WITH LONG-TERM CURRENT USE OF INSULIN: ICD-10-CM

## 2023-04-06 DIAGNOSIS — D64.9 NORMOCYTIC ANEMIA: ICD-10-CM

## 2023-04-06 DIAGNOSIS — Z17.0 MALIGNANT NEOPLASM OF LOWER-INNER QUADRANT OF LEFT BREAST IN FEMALE, ESTROGEN RECEPTOR POSITIVE: ICD-10-CM

## 2023-04-06 DIAGNOSIS — C50.312 MALIGNANT NEOPLASM OF LOWER-INNER QUADRANT OF LEFT BREAST IN FEMALE, ESTROGEN RECEPTOR POSITIVE: ICD-10-CM

## 2023-04-06 DIAGNOSIS — R26.9 ABNORMALITY OF GAIT AND MOBILITY: ICD-10-CM

## 2023-04-06 DIAGNOSIS — Z00.00 ENCOUNTER FOR PREVENTIVE HEALTH EXAMINATION: Primary | ICD-10-CM

## 2023-04-06 PROBLEM — R26.89 DECREASED FUNCTIONAL MOBILITY: Status: RESOLVED | Noted: 2022-04-29 | Resolved: 2023-04-06

## 2023-04-06 PROBLEM — M62.89 MUSCLE TIGHTNESS: Status: RESOLVED | Noted: 2022-04-29 | Resolved: 2023-04-06

## 2023-04-06 PROBLEM — M25.551 RIGHT HIP PAIN: Status: RESOLVED | Noted: 2021-03-02 | Resolved: 2023-04-06

## 2023-04-06 PROBLEM — Z75.8 DISCHARGE PLANNING ISSUES: Status: RESOLVED | Noted: 2021-03-07 | Resolved: 2023-04-06

## 2023-04-06 PROBLEM — Z02.9 DISCHARGE PLANNING ISSUES: Status: RESOLVED | Noted: 2021-03-07 | Resolved: 2023-04-06

## 2023-04-06 PROBLEM — M25.612 DECREASED RANGE OF MOTION OF LEFT SHOULDER: Status: RESOLVED | Noted: 2022-04-29 | Resolved: 2023-04-06

## 2023-04-06 PROBLEM — R00.0 TACHYCARDIA: Status: RESOLVED | Noted: 2021-01-11 | Resolved: 2023-04-06

## 2023-04-06 PROBLEM — R29.898 WEAKNESS OF LEFT UPPER EXTREMITY: Status: RESOLVED | Noted: 2022-04-29 | Resolved: 2023-04-06

## 2023-04-06 PROBLEM — J98.4 CALCIFIED GRANULOMA OF LUNG: Status: ACTIVE | Noted: 2023-04-06

## 2023-04-06 PROBLEM — R29.3 POOR POSTURE: Status: RESOLVED | Noted: 2022-04-29 | Resolved: 2023-04-06

## 2023-04-06 PROCEDURE — 3066F PR DOCUMENTATION OF TREATMENT FOR NEPHROPATHY: ICD-10-PCS | Mod: CPTII,S$GLB,, | Performed by: NURSE PRACTITIONER

## 2023-04-06 PROCEDURE — 1101F PT FALLS ASSESS-DOCD LE1/YR: CPT | Mod: CPTII,S$GLB,, | Performed by: NURSE PRACTITIONER

## 2023-04-06 PROCEDURE — G0439 PPPS, SUBSEQ VISIT: HCPCS | Mod: S$GLB,,, | Performed by: NURSE PRACTITIONER

## 2023-04-06 PROCEDURE — 3008F PR BODY MASS INDEX (BMI) DOCUMENTED: ICD-10-PCS | Mod: CPTII,S$GLB,, | Performed by: NURSE PRACTITIONER

## 2023-04-06 PROCEDURE — 1170F FXNL STATUS ASSESSED: CPT | Mod: CPTII,S$GLB,, | Performed by: NURSE PRACTITIONER

## 2023-04-06 PROCEDURE — 3051F PR MOST RECENT HEMOGLOBIN A1C LEVEL 7.0 - < 8.0%: ICD-10-PCS | Mod: CPTII,S$GLB,, | Performed by: NURSE PRACTITIONER

## 2023-04-06 PROCEDURE — 3288F PR FALLS RISK ASSESSMENT DOCUMENTED: ICD-10-PCS | Mod: CPTII,S$GLB,, | Performed by: NURSE PRACTITIONER

## 2023-04-06 PROCEDURE — 3060F POS MICROALBUMINURIA REV: CPT | Mod: CPTII,S$GLB,, | Performed by: NURSE PRACTITIONER

## 2023-04-06 PROCEDURE — 3008F BODY MASS INDEX DOCD: CPT | Mod: CPTII,S$GLB,, | Performed by: NURSE PRACTITIONER

## 2023-04-06 PROCEDURE — 1160F RVW MEDS BY RX/DR IN RCRD: CPT | Mod: CPTII,S$GLB,, | Performed by: NURSE PRACTITIONER

## 2023-04-06 PROCEDURE — 99999 PR PBB SHADOW E&M-EST. PATIENT-LVL V: CPT | Mod: PBBFAC,,, | Performed by: NURSE PRACTITIONER

## 2023-04-06 PROCEDURE — 1101F PR PT FALLS ASSESS DOC 0-1 FALLS W/OUT INJ PAST YR: ICD-10-PCS | Mod: CPTII,S$GLB,, | Performed by: NURSE PRACTITIONER

## 2023-04-06 PROCEDURE — 1160F PR REVIEW ALL MEDS BY PRESCRIBER/CLIN PHARMACIST DOCUMENTED: ICD-10-PCS | Mod: CPTII,S$GLB,, | Performed by: NURSE PRACTITIONER

## 2023-04-06 PROCEDURE — 3288F FALL RISK ASSESSMENT DOCD: CPT | Mod: CPTII,S$GLB,, | Performed by: NURSE PRACTITIONER

## 2023-04-06 PROCEDURE — 1159F MED LIST DOCD IN RCRD: CPT | Mod: CPTII,S$GLB,, | Performed by: NURSE PRACTITIONER

## 2023-04-06 PROCEDURE — 1125F PR PAIN SEVERITY QUANTIFIED, PAIN PRESENT: ICD-10-PCS | Mod: CPTII,S$GLB,, | Performed by: NURSE PRACTITIONER

## 2023-04-06 PROCEDURE — G0439 PR MEDICARE ANNUAL WELLNESS SUBSEQUENT VISIT: ICD-10-PCS | Mod: S$GLB,,, | Performed by: NURSE PRACTITIONER

## 2023-04-06 PROCEDURE — 3075F SYST BP GE 130 - 139MM HG: CPT | Mod: CPTII,S$GLB,, | Performed by: NURSE PRACTITIONER

## 2023-04-06 PROCEDURE — 3075F PR MOST RECENT SYSTOLIC BLOOD PRESS GE 130-139MM HG: ICD-10-PCS | Mod: CPTII,S$GLB,, | Performed by: NURSE PRACTITIONER

## 2023-04-06 PROCEDURE — 3066F NEPHROPATHY DOC TX: CPT | Mod: CPTII,S$GLB,, | Performed by: NURSE PRACTITIONER

## 2023-04-06 PROCEDURE — 1125F AMNT PAIN NOTED PAIN PRSNT: CPT | Mod: CPTII,S$GLB,, | Performed by: NURSE PRACTITIONER

## 2023-04-06 PROCEDURE — 3078F PR MOST RECENT DIASTOLIC BLOOD PRESSURE < 80 MM HG: ICD-10-PCS | Mod: CPTII,S$GLB,, | Performed by: NURSE PRACTITIONER

## 2023-04-06 PROCEDURE — 3060F PR POS MICROALBUMINURIA RESULT DOCUMENTED/REVIEW: ICD-10-PCS | Mod: CPTII,S$GLB,, | Performed by: NURSE PRACTITIONER

## 2023-04-06 PROCEDURE — 99999 PR PBB SHADOW E&M-EST. PATIENT-LVL V: ICD-10-PCS | Mod: PBBFAC,,, | Performed by: NURSE PRACTITIONER

## 2023-04-06 PROCEDURE — 1170F PR FUNCTIONAL STATUS ASSESSED: ICD-10-PCS | Mod: CPTII,S$GLB,, | Performed by: NURSE PRACTITIONER

## 2023-04-06 PROCEDURE — 4010F PR ACE/ARB THEARPY RXD/TAKEN: ICD-10-PCS | Mod: CPTII,S$GLB,, | Performed by: NURSE PRACTITIONER

## 2023-04-06 PROCEDURE — 3051F HG A1C>EQUAL 7.0%<8.0%: CPT | Mod: CPTII,S$GLB,, | Performed by: NURSE PRACTITIONER

## 2023-04-06 PROCEDURE — 3078F DIAST BP <80 MM HG: CPT | Mod: CPTII,S$GLB,, | Performed by: NURSE PRACTITIONER

## 2023-04-06 PROCEDURE — 4010F ACE/ARB THERAPY RXD/TAKEN: CPT | Mod: CPTII,S$GLB,, | Performed by: NURSE PRACTITIONER

## 2023-04-06 PROCEDURE — 1159F PR MEDICATION LIST DOCUMENTED IN MEDICAL RECORD: ICD-10-PCS | Mod: CPTII,S$GLB,, | Performed by: NURSE PRACTITIONER

## 2023-04-06 NOTE — PATIENT INSTRUCTIONS
Counseling and Referral of Other Preventative  (Italic type indicates deductible and co-insurance are waived)    Patient Name: Sunitha Pillai  Today's Date: 4/6/2023    Health Maintenance       Date Due Completion Date    Eye Exam 03/25/2020 3/25/2019    Shingles Vaccine (2 of 2) 12/31/2020 11/5/2020    Colorectal Cancer Screening 05/19/2023 (Originally 1955) 6/19/2012    Hemoglobin A1c 07/31/2023 1/31/2023    Mammogram 11/17/2023 11/17/2022    Lipid Panel 01/31/2024 1/31/2023    Diabetes Urine Screening 02/03/2024 2/3/2023    Foot Exam 02/22/2024 2/22/2023 (Done)    Override on 2/22/2023: Done    Override on 8/14/2019: Done    High Dose Statin 03/09/2024 3/9/2023    Cervical Cancer Screening 05/30/2024 5/30/2019    Override on 6/11/2012: Done    TETANUS VACCINE 12/22/2024 12/22/2014    DEXA Scan 10/21/2025 10/21/2021        No orders of the defined types were placed in this encounter.    The following information is provided to all patients.  This information is to help you find resources for any of the problems found today that may be affecting your health:                Living healthy guide: www.[facility].[place].gov      Understanding Diabetes: www.diabetes.org      Eating healthy: www.cdc.gov/healthyweight      CDC home safety checklist: www.cdc.gov/steadi/patient.html      Agency on Aging: www.goea.[place].[facility]      Alcoholics anonymous (AA): www.aa.org      Physical Activity: www.chucho.nih.gov/hj3jcir      Tobacco use: www.quitwithusla.org

## 2023-04-06 NOTE — PROGRESS NOTES
"  Sunitha Pillai presented for a  Medicare AWV and comprehensive Health Risk Assessment today. The following components were reviewed and updated:    Medical history  Family History  Social history  Allergies and Current Medications  Health Risk Assessment  Health Maintenance  Care Team         ** See Completed Assessments for Annual Wellness Visit within the encounter summary.**         The following assessments were completed:  Living Situation  CAGE  Depression Screening  Timed Get Up and Go  Whisper Test  Cognitive Function Screening      Nutrition Screening  ADL Screening  PAQ Screening  OPIOID Screening: Patient does not have a prescription for narcotics. Patient does not use substance         Vitals:    04/06/23 0816 04/06/23 0900   BP: (!) 144/78 136/66   BP Location: Right arm Right arm   Weight: 69.4 kg (153 lb)    Height: 5' 4" (1.626 m)      Body mass index is 26.26 kg/m².  Physical Exam  Vitals and nursing note reviewed.   Constitutional:       Appearance: She is well-developed.   HENT:      Head: Normocephalic.   Cardiovascular:      Rate and Rhythm: Normal rate and regular rhythm.      Heart sounds: Normal heart sounds. No murmur heard.  Pulmonary:      Effort: Pulmonary effort is normal.      Breath sounds: Normal breath sounds.   Abdominal:      General: Bowel sounds are normal.      Palpations: Abdomen is soft.   Musculoskeletal:      Comments: Gait antalgic   Skin:     General: Skin is warm and dry.   Neurological:      Mental Status: She is alert and oriented to person, place, and time.      Motor: No abnormal muscle tone.   Psychiatric:         Mood and Affect: Mood normal.             Diagnoses and health risks identified today and associated recommendations/orders:    1. Encounter for preventive health examination  Here for Health Risk Assessment/Annual Wellness Visit.  Health maintenance reviewed and updated. Follow up in one year.   Prescription given for Shingrix (1st in 2020)  Advised to " schedule Optometry appointment - followed by outside provider.    2. Aortic atherosclerosis  Chronic, stable on current medications. Followed by PCP, Cardiology.    3. Chronic pulmonary heart disease  Chronic, stable on current medications. Estimated PA Systolic Pressure 43 noted on TTE 1/16/23. Followed by PCP, Cardiology.    4. Calcified granuloma of lung  Noted CTA Chest 2/10/23.    5. Type 2 diabetes mellitus with hyperglycemia, with long-term current use of insulin  Chronic, stable on current medications. Last A1c 7.6. Followed by PCP.     6. Recurrent major depressive disorder, in full remission  Chronic, stable on current medication. PHQ-2 score 0. Followed by PCP, Psychiatry.     7. Chemotherapy-induced peripheral neuropathy  Chronic, stable. Followed by PCP, Oncology.    8. Malignant neoplasm of lower-inner quadrant of left breast in female, estrogen receptor positive  Chronic, stable on current medications. Followed by Oncology, PCP..     9. Rheumatoid arthritis involving multiple sites with positive rheumatoid factor  Chronic, stable on current medications. Followed by PCP, Rheumatology    10. Abnormality of gait and mobility  Chronic, due to joint pain. No reported falls, no assistive device for ambulation. Followed by PCP.    11. Normocytic anemia  Chronic, stable. Followed by PCP.    12. Essential hypertension  Chronic, stable on current medications. Followed by PCP.     13. Hepatitis B core antibody positive  Chronic, stable. Followed by PCP, Hepatology.      Provided Sunitha with a 5-10 year written screening schedule and personal prevention plan. Recommendations were developed using the USPSTF age appropriate recommendations. Education, counseling, and referrals were provided as needed. After Visit Summary printed and given to patient which includes a list of additional screenings\tests needed.    Follow up in 4 weeks (on 5/4/2023).    Teresa Muniz NP

## 2023-04-24 ENCOUNTER — PATIENT OUTREACH (OUTPATIENT)
Dept: ADMINISTRATIVE | Facility: HOSPITAL | Age: 68
End: 2023-04-24

## 2023-04-24 NOTE — PROGRESS NOTES
Health Maintenance Due   Topic Date Due    Eye Exam  03/25/2020     Chart reviewed.   Immunizations: Reconciled  Orders placed: N/A  Upcoming appts to satisfy STEVEN topics: Endo- for Colonoscopy 5/18/2023

## 2023-05-04 ENCOUNTER — OFFICE VISIT (OUTPATIENT)
Dept: INTERNAL MEDICINE | Facility: CLINIC | Age: 68
End: 2023-05-04
Payer: MEDICARE

## 2023-05-04 ENCOUNTER — LAB VISIT (OUTPATIENT)
Dept: LAB | Facility: HOSPITAL | Age: 68
End: 2023-05-04
Attending: INTERNAL MEDICINE
Payer: MEDICARE

## 2023-05-04 VITALS
WEIGHT: 155.19 LBS | BODY MASS INDEX: 26.49 KG/M2 | SYSTOLIC BLOOD PRESSURE: 134 MMHG | HEIGHT: 64 IN | DIASTOLIC BLOOD PRESSURE: 78 MMHG | HEART RATE: 69 BPM | OXYGEN SATURATION: 97 %

## 2023-05-04 DIAGNOSIS — D64.9 NORMOCYTIC ANEMIA: ICD-10-CM

## 2023-05-04 DIAGNOSIS — M10.9 GOUT, UNSPECIFIED CAUSE, UNSPECIFIED CHRONICITY, UNSPECIFIED SITE: ICD-10-CM

## 2023-05-04 DIAGNOSIS — Z79.4 TYPE 2 DIABETES MELLITUS WITH HYPERGLYCEMIA, WITH LONG-TERM CURRENT USE OF INSULIN: ICD-10-CM

## 2023-05-04 DIAGNOSIS — M05.79 RHEUMATOID ARTHRITIS INVOLVING MULTIPLE SITES WITH POSITIVE RHEUMATOID FACTOR: Primary | ICD-10-CM

## 2023-05-04 DIAGNOSIS — M25.562 ACUTE PAIN OF LEFT KNEE: ICD-10-CM

## 2023-05-04 DIAGNOSIS — E11.9 TYPE 2 DIABETES MELLITUS WITHOUT COMPLICATION: ICD-10-CM

## 2023-05-04 DIAGNOSIS — E11.65 TYPE 2 DIABETES MELLITUS WITH HYPERGLYCEMIA, WITH LONG-TERM CURRENT USE OF INSULIN: ICD-10-CM

## 2023-05-04 LAB
ALBUMIN SERPL BCP-MCNC: 3.4 G/DL (ref 3.5–5.2)
ALBUMIN/CREAT UR: 24.1 UG/MG (ref 0–30)
ALP SERPL-CCNC: 87 U/L (ref 55–135)
ALT SERPL W/O P-5'-P-CCNC: 13 U/L (ref 10–44)
ANION GAP SERPL CALC-SCNC: 8 MMOL/L (ref 8–16)
AST SERPL-CCNC: 20 U/L (ref 10–40)
BILIRUB SERPL-MCNC: 0.5 MG/DL (ref 0.1–1)
BUN SERPL-MCNC: 12 MG/DL (ref 8–23)
CALCIUM SERPL-MCNC: 9.6 MG/DL (ref 8.7–10.5)
CHLORIDE SERPL-SCNC: 112 MMOL/L (ref 95–110)
CHOLEST SERPL-MCNC: 121 MG/DL (ref 120–199)
CHOLEST/HDLC SERPL: 2.5 {RATIO} (ref 2–5)
CO2 SERPL-SCNC: 24 MMOL/L (ref 23–29)
CREAT SERPL-MCNC: 1 MG/DL (ref 0.5–1.4)
CREAT UR-MCNC: 108 MG/DL (ref 15–325)
ERYTHROCYTE [DISTWIDTH] IN BLOOD BY AUTOMATED COUNT: 13.9 % (ref 11.5–14.5)
EST. GFR  (NO RACE VARIABLE): >60 ML/MIN/1.73 M^2
ESTIMATED AVG GLUCOSE: 111 MG/DL (ref 68–131)
GLUCOSE SERPL-MCNC: 78 MG/DL (ref 70–110)
HBA1C MFR BLD: 5.5 % (ref 4–5.6)
HCT VFR BLD AUTO: 32.7 % (ref 37–48.5)
HDLC SERPL-MCNC: 49 MG/DL (ref 40–75)
HDLC SERPL: 40.5 % (ref 20–50)
HGB BLD-MCNC: 10.5 G/DL (ref 12–16)
IMM GRANULOCYTES # BLD AUTO: 0.01 K/UL (ref 0–0.04)
LDLC SERPL CALC-MCNC: 58.6 MG/DL (ref 63–159)
MCH RBC QN AUTO: 30.5 PG (ref 27–31)
MCHC RBC AUTO-ENTMCNC: 32.1 G/DL (ref 32–36)
MCV RBC AUTO: 95 FL (ref 82–98)
MICROALBUMIN UR DL<=1MG/L-MCNC: 26 UG/ML
NEUTROPHILS # BLD AUTO: 2.5 K/UL (ref 1.8–7.7)
NONHDLC SERPL-MCNC: 72 MG/DL
PLATELET # BLD AUTO: 168 K/UL (ref 150–450)
PMV BLD AUTO: 10.1 FL (ref 9.2–12.9)
POTASSIUM SERPL-SCNC: 4.6 MMOL/L (ref 3.5–5.1)
PROT SERPL-MCNC: 7 G/DL (ref 6–8.4)
RBC # BLD AUTO: 3.44 M/UL (ref 4–5.4)
RETICS/RBC NFR AUTO: 1.3 % (ref 0.5–2.5)
SODIUM SERPL-SCNC: 144 MMOL/L (ref 136–145)
TRIGL SERPL-MCNC: 67 MG/DL (ref 30–150)
URATE SERPL-MCNC: 5.4 MG/DL (ref 2.4–5.7)
WBC # BLD AUTO: 5.19 K/UL (ref 3.9–12.7)

## 2023-05-04 PROCEDURE — 4010F ACE/ARB THERAPY RXD/TAKEN: CPT | Mod: CPTII,S$GLB,, | Performed by: INTERNAL MEDICINE

## 2023-05-04 PROCEDURE — 3075F SYST BP GE 130 - 139MM HG: CPT | Mod: CPTII,S$GLB,, | Performed by: INTERNAL MEDICINE

## 2023-05-04 PROCEDURE — 99214 OFFICE O/P EST MOD 30 MIN: CPT | Mod: S$GLB,,, | Performed by: INTERNAL MEDICINE

## 2023-05-04 PROCEDURE — 3008F PR BODY MASS INDEX (BMI) DOCUMENTED: ICD-10-PCS | Mod: CPTII,S$GLB,, | Performed by: INTERNAL MEDICINE

## 2023-05-04 PROCEDURE — 82570 ASSAY OF URINE CREATININE: CPT | Performed by: INTERNAL MEDICINE

## 2023-05-04 PROCEDURE — 3075F PR MOST RECENT SYSTOLIC BLOOD PRESS GE 130-139MM HG: ICD-10-PCS | Mod: CPTII,S$GLB,, | Performed by: INTERNAL MEDICINE

## 2023-05-04 PROCEDURE — 3288F FALL RISK ASSESSMENT DOCD: CPT | Mod: CPTII,S$GLB,, | Performed by: INTERNAL MEDICINE

## 2023-05-04 PROCEDURE — 1125F AMNT PAIN NOTED PAIN PRSNT: CPT | Mod: CPTII,S$GLB,, | Performed by: INTERNAL MEDICINE

## 2023-05-04 PROCEDURE — 3060F PR POS MICROALBUMINURIA RESULT DOCUMENTED/REVIEW: ICD-10-PCS | Mod: CPTII,S$GLB,, | Performed by: INTERNAL MEDICINE

## 2023-05-04 PROCEDURE — 3060F POS MICROALBUMINURIA REV: CPT | Mod: CPTII,S$GLB,, | Performed by: INTERNAL MEDICINE

## 2023-05-04 PROCEDURE — 1101F PT FALLS ASSESS-DOCD LE1/YR: CPT | Mod: CPTII,S$GLB,, | Performed by: INTERNAL MEDICINE

## 2023-05-04 PROCEDURE — 99999 PR PBB SHADOW E&M-EST. PATIENT-LVL V: ICD-10-PCS | Mod: PBBFAC,,, | Performed by: INTERNAL MEDICINE

## 2023-05-04 PROCEDURE — 3288F PR FALLS RISK ASSESSMENT DOCUMENTED: ICD-10-PCS | Mod: CPTII,S$GLB,, | Performed by: INTERNAL MEDICINE

## 2023-05-04 PROCEDURE — 3051F HG A1C>EQUAL 7.0%<8.0%: CPT | Mod: CPTII,S$GLB,, | Performed by: INTERNAL MEDICINE

## 2023-05-04 PROCEDURE — 3051F PR MOST RECENT HEMOGLOBIN A1C LEVEL 7.0 - < 8.0%: ICD-10-PCS | Mod: CPTII,S$GLB,, | Performed by: INTERNAL MEDICINE

## 2023-05-04 PROCEDURE — 85045 AUTOMATED RETICULOCYTE COUNT: CPT | Performed by: INTERNAL MEDICINE

## 2023-05-04 PROCEDURE — 99214 PR OFFICE/OUTPT VISIT, EST, LEVL IV, 30-39 MIN: ICD-10-PCS | Mod: S$GLB,,, | Performed by: INTERNAL MEDICINE

## 2023-05-04 PROCEDURE — 1101F PR PT FALLS ASSESS DOC 0-1 FALLS W/OUT INJ PAST YR: ICD-10-PCS | Mod: CPTII,S$GLB,, | Performed by: INTERNAL MEDICINE

## 2023-05-04 PROCEDURE — 83036 HEMOGLOBIN GLYCOSYLATED A1C: CPT | Performed by: INTERNAL MEDICINE

## 2023-05-04 PROCEDURE — 84550 ASSAY OF BLOOD/URIC ACID: CPT | Performed by: INTERNAL MEDICINE

## 2023-05-04 PROCEDURE — 1159F PR MEDICATION LIST DOCUMENTED IN MEDICAL RECORD: ICD-10-PCS | Mod: CPTII,S$GLB,, | Performed by: INTERNAL MEDICINE

## 2023-05-04 PROCEDURE — 85027 COMPLETE CBC AUTOMATED: CPT | Performed by: INTERNAL MEDICINE

## 2023-05-04 PROCEDURE — 80053 COMPREHEN METABOLIC PANEL: CPT | Performed by: INTERNAL MEDICINE

## 2023-05-04 PROCEDURE — 3078F DIAST BP <80 MM HG: CPT | Mod: CPTII,S$GLB,, | Performed by: INTERNAL MEDICINE

## 2023-05-04 PROCEDURE — 3008F BODY MASS INDEX DOCD: CPT | Mod: CPTII,S$GLB,, | Performed by: INTERNAL MEDICINE

## 2023-05-04 PROCEDURE — 3066F NEPHROPATHY DOC TX: CPT | Mod: CPTII,S$GLB,, | Performed by: INTERNAL MEDICINE

## 2023-05-04 PROCEDURE — 4010F PR ACE/ARB THEARPY RXD/TAKEN: ICD-10-PCS | Mod: CPTII,S$GLB,, | Performed by: INTERNAL MEDICINE

## 2023-05-04 PROCEDURE — 1125F PR PAIN SEVERITY QUANTIFIED, PAIN PRESENT: ICD-10-PCS | Mod: CPTII,S$GLB,, | Performed by: INTERNAL MEDICINE

## 2023-05-04 PROCEDURE — 80061 LIPID PANEL: CPT | Performed by: INTERNAL MEDICINE

## 2023-05-04 PROCEDURE — 99999 PR PBB SHADOW E&M-EST. PATIENT-LVL V: CPT | Mod: PBBFAC,,, | Performed by: INTERNAL MEDICINE

## 2023-05-04 PROCEDURE — 3078F PR MOST RECENT DIASTOLIC BLOOD PRESSURE < 80 MM HG: ICD-10-PCS | Mod: CPTII,S$GLB,, | Performed by: INTERNAL MEDICINE

## 2023-05-04 PROCEDURE — 3066F PR DOCUMENTATION OF TREATMENT FOR NEPHROPATHY: ICD-10-PCS | Mod: CPTII,S$GLB,, | Performed by: INTERNAL MEDICINE

## 2023-05-04 PROCEDURE — 1159F MED LIST DOCD IN RCRD: CPT | Mod: CPTII,S$GLB,, | Performed by: INTERNAL MEDICINE

## 2023-05-04 PROCEDURE — 36415 COLL VENOUS BLD VENIPUNCTURE: CPT | Performed by: INTERNAL MEDICINE

## 2023-05-04 RX ORDER — TOPIRAMATE 25 MG/1
TABLET ORAL
Qty: 60 TABLET | Refills: 2 | Status: SHIPPED | OUTPATIENT
Start: 2023-05-04 | End: 2023-10-17

## 2023-05-04 RX ORDER — SERTRALINE HYDROCHLORIDE 25 MG/1
25 TABLET, FILM COATED ORAL DAILY
Qty: 90 TABLET | Refills: 3 | Status: SHIPPED | OUTPATIENT
Start: 2023-05-04 | End: 2023-10-17

## 2023-05-04 RX ORDER — METFORMIN HYDROCHLORIDE 1000 MG/1
1000 TABLET ORAL 2 TIMES DAILY
Qty: 180 TABLET | Refills: 3 | Status: SHIPPED | OUTPATIENT
Start: 2023-05-04 | End: 2023-10-17 | Stop reason: CLARIF

## 2023-05-04 NOTE — PROGRESS NOTES
Subjective:       Patient ID: Sunitha Pillai is a 67 y.o. female.    Chief Complaint: Follow-up, Diabetes, and Arthritis    Follow-up  Pertinent negatives include no abdominal pain, chest pain (arm pain or jaw pain), headaches, nausea or vomiting.   Diabetes  Pertinent negatives for hypoglycemia include no headaches or seizures. Pertinent negatives for diabetes include no chest pain (arm pain or jaw pain).   Arthritis  Pertinent negatives include no diarrhea or dysuria. Pt is still having left knee and R shoulder pain despite oral NSAIDS.  No CP or SOB. L knee is swollen today.    Review of Systems   Respiratory:  Negative for shortness of breath (PND or orthopnea).    Cardiovascular:  Negative for chest pain (arm pain or jaw pain).   Gastrointestinal:  Negative for abdominal pain, diarrhea, nausea and vomiting.   Genitourinary:  Negative for dysuria.   Musculoskeletal:  Positive for arthritis.   Neurological:  Negative for seizures, syncope and headaches.     Objective:      Physical Exam  Constitutional:       General: She is not in acute distress.     Appearance: She is well-developed.   HENT:      Head: Normocephalic.   Eyes:      Pupils: Pupils are equal, round, and reactive to light.   Neck:      Thyroid: No thyromegaly.      Vascular: No JVD.   Cardiovascular:      Rate and Rhythm: Normal rate and regular rhythm.      Heart sounds: Normal heart sounds. No murmur heard.    No friction rub. No gallop.   Pulmonary:      Effort: Pulmonary effort is normal.      Breath sounds: Normal breath sounds. No wheezing or rales.   Abdominal:      General: Bowel sounds are normal. There is no distension.      Palpations: Abdomen is soft. There is no mass.      Tenderness: There is no abdominal tenderness. There is no guarding or rebound.   Musculoskeletal:      Cervical back: Neck supple.      Comments: L knee with effusion - no erythema   Lymphadenopathy:      Cervical: No cervical adenopathy.   Skin:     General:  Skin is warm and dry.   Neurological:      Mental Status: She is alert and oriented to person, place, and time.      Deep Tendon Reflexes: Reflexes are normal and symmetric.   Psychiatric:         Behavior: Behavior normal.         Thought Content: Thought content normal.         Judgment: Judgment normal.       Assessment:       1. Rheumatoid arthritis involving multiple sites with positive rheumatoid factor    2. Acute pain of left knee    3. Type 2 diabetes mellitus with hyperglycemia, with long-term current use of insulin    4. Gout, unspecified cause, unspecified chronicity, unspecified site        Plan:   Rheumatoid arthritis involving multiple sites with positive rheumatoid factor  -     Ambulatory referral/consult to Rheumatology; Future; Expected date: 05/11/2023    Acute pain of left knee  -     Ambulatory referral/consult to Orthopedics; Future; Expected date: 05/11/2023  -     Ambulatory referral/consult to Physical/Occupational Therapy; Future; Expected date: 05/11/2023    Type 2 diabetes mellitus with hyperglycemia, with long-term current use of insulin  -     Hemoglobin A1C; Future; Expected date: 05/04/2023  -     Comprehensive Metabolic Panel; Future; Expected date: 05/04/2023    Gout, unspecified cause, unspecified chronicity, unspecified site  -     Uric Acid; Future; Expected date: 05/04/2023    Other orders  -     topiramate (TOPAMAX) 25 MG tablet; One at bedtime for 2 weeks then two at bedtime  Dispense: 60 tablet; Refill: 2  -     metFORMIN (GLUCOPHAGE) 1000 MG tablet; Take 1 tablet (1,000 mg total) by mouth 2 (two) times daily.  Dispense: 180 tablet; Refill: 3  -     sertraline (ZOLOFT) 25 MG tablet; Take 1 tablet (25 mg total) by mouth once daily.  Dispense: 90 tablet; Refill: 3      Check to be sure not gouty flare- ORTHo today or tomorrow - remove fluid and start viscosupplementation

## 2023-05-04 NOTE — PROGRESS NOTES
Lab is normal with excellent diabetes control except for mild anemia.  Please discuss with Dr. Mcduffie.

## 2023-05-05 ENCOUNTER — HOSPITAL ENCOUNTER (OUTPATIENT)
Dept: RADIOLOGY | Facility: HOSPITAL | Age: 68
Discharge: HOME | End: 2023-05-05
Attending: PHYSICIAN ASSISTANT
Payer: MEDICARE

## 2023-05-05 ENCOUNTER — OFFICE VISIT (OUTPATIENT)
Dept: SPORTS MEDICINE | Facility: CLINIC | Age: 68
End: 2023-05-05
Payer: MEDICARE

## 2023-05-05 VITALS
DIASTOLIC BLOOD PRESSURE: 74 MMHG | HEIGHT: 64 IN | BODY MASS INDEX: 26.63 KG/M2 | WEIGHT: 156 LBS | SYSTOLIC BLOOD PRESSURE: 135 MMHG | HEART RATE: 81 BPM

## 2023-05-05 DIAGNOSIS — M17.12 OSTEOARTHRITIS OF LEFT KNEE, UNSPECIFIED OSTEOARTHRITIS TYPE: ICD-10-CM

## 2023-05-05 DIAGNOSIS — M25.462 EFFUSION OF LEFT KNEE JOINT: ICD-10-CM

## 2023-05-05 DIAGNOSIS — M25.561 CHRONIC PAIN OF RIGHT KNEE: Primary | ICD-10-CM

## 2023-05-05 DIAGNOSIS — G89.29 CHRONIC PAIN OF LEFT KNEE: ICD-10-CM

## 2023-05-05 DIAGNOSIS — M17.11 OSTEOARTHRITIS OF RIGHT KNEE, UNSPECIFIED OSTEOARTHRITIS TYPE: ICD-10-CM

## 2023-05-05 DIAGNOSIS — M25.562 CHRONIC PAIN OF LEFT KNEE: ICD-10-CM

## 2023-05-05 DIAGNOSIS — G89.29 CHRONIC PAIN OF RIGHT KNEE: Primary | ICD-10-CM

## 2023-05-05 DIAGNOSIS — M25.562 ACUTE PAIN OF LEFT KNEE: ICD-10-CM

## 2023-05-05 PROCEDURE — 1101F PR PT FALLS ASSESS DOC 0-1 FALLS W/OUT INJ PAST YR: ICD-10-PCS | Mod: CPTII,S$GLB,, | Performed by: PHYSICIAN ASSISTANT

## 2023-05-05 PROCEDURE — 4010F PR ACE/ARB THEARPY RXD/TAKEN: ICD-10-PCS | Mod: CPTII,S$GLB,, | Performed by: PHYSICIAN ASSISTANT

## 2023-05-05 PROCEDURE — 20610 PR DRAIN/INJECT LARGE JOINT/BURSA: ICD-10-PCS | Mod: LT,S$GLB,, | Performed by: PHYSICIAN ASSISTANT

## 2023-05-05 PROCEDURE — 1159F PR MEDICATION LIST DOCUMENTED IN MEDICAL RECORD: ICD-10-PCS | Mod: CPTII,S$GLB,, | Performed by: PHYSICIAN ASSISTANT

## 2023-05-05 PROCEDURE — 3061F NEG MICROALBUMINURIA REV: CPT | Mod: CPTII,S$GLB,, | Performed by: PHYSICIAN ASSISTANT

## 2023-05-05 PROCEDURE — 1101F PT FALLS ASSESS-DOCD LE1/YR: CPT | Mod: CPTII,S$GLB,, | Performed by: PHYSICIAN ASSISTANT

## 2023-05-05 PROCEDURE — 3288F PR FALLS RISK ASSESSMENT DOCUMENTED: ICD-10-PCS | Mod: CPTII,S$GLB,, | Performed by: PHYSICIAN ASSISTANT

## 2023-05-05 PROCEDURE — 99999 PR PBB SHADOW E&M-EST. PATIENT-LVL IV: CPT | Mod: PBBFAC,,, | Performed by: PHYSICIAN ASSISTANT

## 2023-05-05 PROCEDURE — 1125F AMNT PAIN NOTED PAIN PRSNT: CPT | Mod: CPTII,S$GLB,, | Performed by: PHYSICIAN ASSISTANT

## 2023-05-05 PROCEDURE — 1160F PR REVIEW ALL MEDS BY PRESCRIBER/CLIN PHARMACIST DOCUMENTED: ICD-10-PCS | Mod: CPTII,S$GLB,, | Performed by: PHYSICIAN ASSISTANT

## 2023-05-05 PROCEDURE — 99204 OFFICE O/P NEW MOD 45 MIN: CPT | Mod: 25,S$GLB,, | Performed by: PHYSICIAN ASSISTANT

## 2023-05-05 PROCEDURE — 99999 PR PBB SHADOW E&M-EST. PATIENT-LVL IV: ICD-10-PCS | Mod: PBBFAC,,, | Performed by: PHYSICIAN ASSISTANT

## 2023-05-05 PROCEDURE — 3044F HG A1C LEVEL LT 7.0%: CPT | Mod: CPTII,S$GLB,, | Performed by: PHYSICIAN ASSISTANT

## 2023-05-05 PROCEDURE — 1125F PR PAIN SEVERITY QUANTIFIED, PAIN PRESENT: ICD-10-PCS | Mod: CPTII,S$GLB,, | Performed by: PHYSICIAN ASSISTANT

## 2023-05-05 PROCEDURE — 1159F MED LIST DOCD IN RCRD: CPT | Mod: CPTII,S$GLB,, | Performed by: PHYSICIAN ASSISTANT

## 2023-05-05 PROCEDURE — 3078F PR MOST RECENT DIASTOLIC BLOOD PRESSURE < 80 MM HG: ICD-10-PCS | Mod: CPTII,S$GLB,, | Performed by: PHYSICIAN ASSISTANT

## 2023-05-05 PROCEDURE — 3075F PR MOST RECENT SYSTOLIC BLOOD PRESS GE 130-139MM HG: ICD-10-PCS | Mod: CPTII,S$GLB,, | Performed by: PHYSICIAN ASSISTANT

## 2023-05-05 PROCEDURE — 3075F SYST BP GE 130 - 139MM HG: CPT | Mod: CPTII,S$GLB,, | Performed by: PHYSICIAN ASSISTANT

## 2023-05-05 PROCEDURE — 99204 PR OFFICE/OUTPT VISIT, NEW, LEVL IV, 45-59 MIN: ICD-10-PCS | Mod: 25,S$GLB,, | Performed by: PHYSICIAN ASSISTANT

## 2023-05-05 PROCEDURE — 3066F PR DOCUMENTATION OF TREATMENT FOR NEPHROPATHY: ICD-10-PCS | Mod: CPTII,S$GLB,, | Performed by: PHYSICIAN ASSISTANT

## 2023-05-05 PROCEDURE — 20610 DRAIN/INJ JOINT/BURSA W/O US: CPT | Mod: LT,S$GLB,, | Performed by: PHYSICIAN ASSISTANT

## 2023-05-05 PROCEDURE — 73564 X-RAY EXAM KNEE 4 OR MORE: CPT | Mod: 26,LT,, | Performed by: STUDENT IN AN ORGANIZED HEALTH CARE EDUCATION/TRAINING PROGRAM

## 2023-05-05 PROCEDURE — 73564 X-RAY EXAM KNEE 4 OR MORE: CPT | Mod: TC,50

## 2023-05-05 PROCEDURE — 73564 XR KNEE ORTHO BILAT WITH FLEXION: ICD-10-PCS | Mod: 26,RT,, | Performed by: STUDENT IN AN ORGANIZED HEALTH CARE EDUCATION/TRAINING PROGRAM

## 2023-05-05 PROCEDURE — 3078F DIAST BP <80 MM HG: CPT | Mod: CPTII,S$GLB,, | Performed by: PHYSICIAN ASSISTANT

## 2023-05-05 PROCEDURE — 3008F BODY MASS INDEX DOCD: CPT | Mod: CPTII,S$GLB,, | Performed by: PHYSICIAN ASSISTANT

## 2023-05-05 PROCEDURE — 3008F PR BODY MASS INDEX (BMI) DOCUMENTED: ICD-10-PCS | Mod: CPTII,S$GLB,, | Performed by: PHYSICIAN ASSISTANT

## 2023-05-05 PROCEDURE — 3066F NEPHROPATHY DOC TX: CPT | Mod: CPTII,S$GLB,, | Performed by: PHYSICIAN ASSISTANT

## 2023-05-05 PROCEDURE — 4010F ACE/ARB THERAPY RXD/TAKEN: CPT | Mod: CPTII,S$GLB,, | Performed by: PHYSICIAN ASSISTANT

## 2023-05-05 PROCEDURE — 73564 X-RAY EXAM KNEE 4 OR MORE: CPT | Mod: 26,RT,, | Performed by: STUDENT IN AN ORGANIZED HEALTH CARE EDUCATION/TRAINING PROGRAM

## 2023-05-05 PROCEDURE — 3061F PR NEG MICROALBUMINURIA RESULT DOCUMENTED/REVIEW: ICD-10-PCS | Mod: CPTII,S$GLB,, | Performed by: PHYSICIAN ASSISTANT

## 2023-05-05 PROCEDURE — 1160F RVW MEDS BY RX/DR IN RCRD: CPT | Mod: CPTII,S$GLB,, | Performed by: PHYSICIAN ASSISTANT

## 2023-05-05 PROCEDURE — 3288F FALL RISK ASSESSMENT DOCD: CPT | Mod: CPTII,S$GLB,, | Performed by: PHYSICIAN ASSISTANT

## 2023-05-05 PROCEDURE — 3044F PR MOST RECENT HEMOGLOBIN A1C LEVEL <7.0%: ICD-10-PCS | Mod: CPTII,S$GLB,, | Performed by: PHYSICIAN ASSISTANT

## 2023-05-05 RX ORDER — TRIAMCINOLONE ACETONIDE 40 MG/ML
40 INJECTION, SUSPENSION INTRA-ARTICULAR; INTRAMUSCULAR
Status: COMPLETED | OUTPATIENT
Start: 2023-05-05 | End: 2023-05-05

## 2023-05-05 RX ADMIN — TRIAMCINOLONE ACETONIDE 40 MG: 40 INJECTION, SUSPENSION INTRA-ARTICULAR; INTRAMUSCULAR at 12:05

## 2023-05-05 NOTE — PROGRESS NOTES
CC: bilateral knee pain (referral from Dr. Torres)    67 y.o. Female  who reports diffuse knee pain refractory to conservative mgmt. Left knee is much worse then right knee.     Pain in knees for years. Progressively worsening.   Left knee swells intermittently and hurts a lot to bend it.   Last A1C was 5.5.     She reports that the pain is worse with steps.  It also bothers her at night.    Is affecting ADLs.     No mechanical symptoms, no instability    She has tried tylenol, NSAIDs, and ice for 8 weeks with no relief.     Review of Systems   Constitution: Negative. Negative for chills, fever and night sweats.   HENT: Negative for congestion and headaches.    Eyes: Negative for blurred vision, left vision loss and right vision loss.   Cardiovascular: Negative for chest pain and syncope.   Respiratory: Negative for cough and shortness of breath.    Endocrine: Negative for polydipsia, polyphagia and polyuria.   Hematologic/Lymphatic: Negative for bleeding problem. Does not bruise/bleed easily.   Skin: Negative for dry skin, itching and rash.   Musculoskeletal: Negative for falls and muscle weakness.   Gastrointestinal: Negative for abdominal pain and bowel incontinence.   Genitourinary: Negative for bladder incontinence and nocturia.   Neurological: Negative for disturbances in coordination, loss of balance and seizures.   Psychiatric/Behavioral: Negative for depression. The patient does not have insomnia.    Allergic/Immunologic: Negative for hives and persistent infections.   All other systems negative      PAST MEDICAL HISTORY:   Past Medical History:   Diagnosis Date    Acute coronary syndrome 09/23/2018    Adjustment disorder     Anxiety     Arthritis     Cancer of breast 09/03/2020    Malignant neoplasm of lower-inner quadrant of left breast in female, estrogen receptor positive    Cholelithiasis with acute cholecystitis 03/05/2021    Coronary artery disease     Depression     Diabetes  mellitus type I     Genetic testing of female 2020    [facility] via [employer] with AXIN2 and POLE variants of uncertain significance    GERD (gastroesophageal reflux disease)     Hepatitis B core antibody positive 3/9/2023    Negative sAg, suggests previous exposure but no chronic/active Hep B. At risk for reactivation with any immunosuppression medication, steroids, chemo, etc.      Hypertension     Vertigo 2019    Vitamin D deficiency 2021     PAST SURGICAL HISTORY:   Past Surgical History:   Procedure Laterality Date    BREAST BIOPSY Left 2022    Left punch biopsy; Unremarkable keratinized skin with intradermal hematoma     SECTION      INJECTION FOR SENTINEL NODE IDENTIFICATION Left 2020    Procedure: INJECTION, FOR SENTINEL NODE IDENTIFICATION;  Surgeon: Isabel Moulton MD;  Location: [facility] OR;  Service: General;  Laterality: Left;    INSERTION OF TUNNELED CENTRAL VENOUS CATHETER (CVC) WITH SUBCUTANEOUS PORT N/A 2020    Procedure: INSERTION, PORT-A-CATH;  Surgeon: Hardeep Martin MD;  Location: [facility] CATH LAB;  Service: Radiology;  Laterality: N/A;    LAPAROSCOPIC CHOLECYSTECTOMY N/A 2021    Procedure: CHOLECYSTECTOMY, LAPAROSCOPIC;  Surgeon: Buster Son MD;  Location: [address];  Service: General;  Laterality: N/A;    MASTECTOMY, PARTIAL Left 2020    Procedure: MASTECTOMY, PARTIAL-w/reflector;  Surgeon: Isabel Moulton MD;  Location: [facility] OR;  Service: General;  Laterality: Left;    SENTINEL LYMPH NODE BIOPSY Left 2020    Procedure: BIOPSY, LYMPH NODE, SENTINEL;  Surgeon: Isabel Moulton MD;  Location: [facility] OR;  Service: General;  Laterality: Left;     FAMILY HISTORY:   Family History   Problem Relation Age of Onset    Hypertension Mother     Hyperlipidemia Mother     Diabetes Mother     Heart disease Mother     Colon polyps Mother     Aneurysm Father     Diabetes Sister     Hypertension Sister     Heart disease Brother     Diabetes  Brother     Hypertension Brother     Cancer Brother         brother German with prostate cancer; brother Cooper with throat cancer    Cervical cancer Daughter     Anxiety disorder Daughter     Depression Daughter     Anxiety disorder Daughter     Depression Daughter     Breast cancer Maternal Aunt     Cancer Maternal Aunt         unknown origin    Cancer Paternal Aunt         unknown origin    Breast cancer Paternal Aunt     Prostate cancer Maternal Cousin     Leukemia Maternal Cousin     Prostate cancer Maternal Cousin     Breast cancer Other     Colon cancer Neg Hx     Ovarian cancer Neg Hx      SOCIAL HISTORY:   Social History     Socioeconomic History    Marital status:      Spouse name: Burak    Number of children: 3   Tobacco Use    Smoking status: Never    Smokeless tobacco: Never   Substance and Sexual Activity    Alcohol use: Never    Drug use: No    Sexual activity: Yes     Partners: Male     Birth control/protection: Post-menopausal     Social Determinants of Health     Financial Resource Strain: Medium Risk    Difficulty of Paying Living Expenses: Somewhat hard   Food Insecurity: No Food Insecurity    Worried About Running Out of Food in the Last Year: Never true    Ran Out of Food in the Last Year: Never true   Transportation Needs: No Transportation Needs    Lack of Transportation (Medical): No    Lack of Transportation (Non-Medical): No   Physical Activity: Inactive    Days of Exercise per Week: 0 days    Minutes of Exercise per Session: 0 min   Stress: No Stress Concern Present    Feeling of Stress : Not at all   Social Connections: Moderately Isolated    Frequency of Communication with Friends and Family: More than three times a week    Frequency of Social Gatherings with Friends and Family: Three times a week    Attends Hindu Services: More than 4 times per year    Active Member of Clubs or Organizations: No    Attends Club or Organization Meetings: Never    Marital Status:   "  Housing Stability: Unknown    Unable to Pay for Housing in the Last Year: No    Unstable Housing in the Last Year: No       MEDICATIONS:   Current Outpatient Medications:     ACCU-CHEK GUIDE GLUCOSE METER Misc, USE  THREE TIMES DAILY, Disp: 1 each, Rfl: 0    allopurinoL (ZYLOPRIM) 100 MG tablet, Take 100 mg by mouth once daily., Disp: , Rfl:     aspirin 81 mg Tab, Take 1 tablet by mouth Daily., Disp: , Rfl:     atorvastatin (LIPITOR) 10 MG tablet, Take 1 tablet (10 mg total) by mouth once daily., Disp: 90 tablet, Rfl: 3    BD ULTRA-FINE SHORT PEN NEEDLE 31 gauge x 5/16" Ndle, USE 1  4 TIMES DAILY WITH  INSULIN, Disp: 100 each, Rfl: 3    blood sugar diagnostic Strp, Strips and lancets covered by insurance [id number], pt checks glucose three times daily, insulin dependent, Disp: 300 each, Rfl: 3    blood sugar diagnostic, drum (ACCU-CHEK COMPACT TEST) Strp, USE ONE STRIP TO CHECK GLUCOSE 4 TIMES DAILY BEFORE EACH MEAL AND AT BEDTIME, Disp: 100 each, Rfl: 0    candesartan (ATACAND) 16 MG tablet, Take 1 tablet (16 mg total) by mouth once daily., Disp: 90 tablet, Rfl: 3    diclofenac sodium (VOLTAREN) 1 % Gel, Apply 2 g topically 3 (three) times daily as needed (muscle spasm pain). Apply 2 grams to affected area 3 times daily as needed, Disp: 100 g, Rfl: 0    dulaglutide (TRULICITY) 1.5 mg/0.5 mL pen injector, Inject 1.5 mg into the skin every 7 days., Disp: 12 pen, Rfl: 2    lancets Misc, To check BG 4 times daily, to use with insurance preferred meter, insulin dependent, Disp: 400 each, Rfl: 3    letrozole (FEMARA) 2.5 mg Tab, Take 1 tablet (2.5 mg total) by mouth once daily., Disp: 90 tablet, Rfl: 3    LIDOcaine (LIDODERM) 5 %, Apply to affected area as needed for pain for 12 hours, then off for 12 hours. Discard after each use. May use 4% lidocaine patch as alternative., Disp: 30 patch, Rfl: 0    metFORMIN (GLUCOPHAGE) 1000 MG tablet, Take 1 tablet (1,000 mg total) by mouth 2 (two) times daily., Disp: 180 tablet, Rfl: " "3    sertraline (ZOLOFT) 25 MG tablet, Take 1 tablet (25 mg total) by mouth once daily., Disp: 90 tablet, Rfl: 3    topiramate (TOPAMAX) 25 MG tablet, One at bedtime for 2 weeks then two at bedtime, Disp: 60 tablet, Rfl: 2    TRUEPLUS LANCETS 33 gauge Misc, USE 1 TO CHECK GLUCOSE 4 TIMES DAILY, Disp: , Rfl:     albuterol (PROVENTIL/VENTOLIN HFA) 90 mcg/actuation inhaler, Inhale 1-2 puffs into the lungs every 6 (six) hours as needed for Wheezing or Shortness of Breath (Please dispense with spacer)., Disp: 6.7 g, Rfl: 0    fluticasone propionate (FLONASE) 50 mcg/actuation nasal spray, 1 spray (50 mcg total) by Each Nostril route once daily. (Patient not taking: Reported on 4/6/2023), Disp: 15.8 mL, Rfl: 1  No current facility-administered medications for this visit.    Facility-Administered Medications Ordered in Other Visits:     fentaNYL injection 25 mcg, 25 mcg, Intravenous, Q5 Min PRN, Kenneth Shannon MD    midazolam (VERSED) 1 mg/mL injection 0.5 mg, 0.5 mg, Intravenous, PRN, Kenneth Shannon MD, 2 mg at 09/17/20 0637  ALLERGIES: Review of patient's allergies indicates:  No Known Allergies    VITAL SIGNS: /74   Pulse 81   Ht 5' 4" (1.626 m)   Wt 70.8 kg (156 lb)   LMP  (LMP Unknown)   BMI 26.78 kg/m²      PHYSICAL EXAMINATION    General:  The patient is alert and oriented x 3.  Mood is pleasant.  Observation of ears, eyes and nose reveal no gross abnormalities.  HEENT: NCAT, sclera nonicteric  Lungs: Respirations are equal and unlabored.    bilateral  KNEE EXAMINATION     OBSERVATION / INSPECTION   Gait:   antalgic - limp  Alignment:  Neutral   Scars:   None   Muscle atrophy: Mild  Effusion:  Moderate on left   Warmth:  None   Discoloration:   none     TENDERNESS / CREPITUS (T / C):          T / C      T / C   Patella   - / -   Lateral joint line   + bilateral / -      Peripatellar medial  + bilateral  Medial joint line    + bilateral / -   Peripatellar lateral + bilateral  Medial " plica   - / -   Patellar tendon -   Popliteal fossa  - / -   Quad tendon   -   Gastrocnemius   -   Prepatellar Bursa - / -   Quadricep   -   Tibial tubercle  -  Thigh/hamstring  -   Pes anserine/HS -  Fibula    -   ITB   - / -  Tibia     -   Tib/fib joint  - / -  LCL    -     MFC   - / -   MCL: Proximal  -    LFC   - / -    Distal   -          ROM: (* = pain)  PASSIVE   ACTIVE    Left :   0 / 0 / 90   0 / 0 / 80    Right :    0 / 0 / 120   0 / 0 / 120    Patellofemoral examination:  See above noted areas of tenderness.   Patella position    Subluxation / dislocation: Centered           Sup. / Inf;   Normal   Crepitus (PF):    Absent   Patellar Mobility:       Medial-lateral:   Normal    Superior-inferior:  Normal    Inferior tilt   Normal    Patellar tendon:  Normal   Lateral tilt:    Normal   J-sign:     None   Patellofemoral grind:   +       MENISCAL SIGNS:     Pain on terminal extension:  + bilateral   Pain on terminal flexion:  + bilateral  Tgs maneuver:  -  Squat     NT    LIGAMENT EXAMINATION:  ACL / Lachman:  normal (-1 to 2mm)    PCL-Post.  drawer: normal 0 to 2mm  MCL- Valgus:  normal 0 to 2mm  LCL- Varus:  normal 0 to 2mm  Pivot shift: normal (Equal)   Dial Test: difference c/w other side   At 30° flexion: normal (< 5°)    At 90° flexion: normal (< 5°)       STRENGTH: (* = with pain) PAINFUL SIDE   Quadricep   5/5   Hamstrin/5    EXTREMITY NEURO-VASCULAR EXAMINATION:   Sensation:  Grossly intact to light touch all dermatomal regions.   Motor Function:  Fully intact motor function at hip, knee, foot and ankle    DTRs;  quadriceps and  achilles 2+.  No clonus and downgoing Babinski.    Vascular status:  DP and PT pulses 2+, brisk capillary refill, symmetric.     Other Findings:       Xrays:  Xrays of the bilateral knees with flexion were ordered and reviewed by me today. No fracture, subluxation, or other significant bony or joint abnormality is identified. Moderate tricompartmental DJD of  bilateral knees.   Kellgren Surjit 2 or greater noted.         ASSESSMENT:    1. Bilateral Knee pain  2. Bilateral knee osteoarthritis   3. Left knee effusion  Bilateral hip abd/core weakness    PLAN:    1. PROCEDURE NOTE:   left KNEE ASPIRATION and INJECTION  After consent was obtained, time out was performed, including verification of patient ID, procedure, site and side, availability of information and equipment, review of safety issues, and agreement with consent, the procedure site was marked and the patient was prepped aseptically.    Using a sterile technique the left knee was prepped from the superior lateral knee approach. The knee joint was entered with a 18 gauge needle and 45 ml's of serous colored fluid was withdrawn.   The procedure was well tolerated.       A diagnostic and therapeutic injection of 1cc 40 mg kenalog and 1% lidocaine/0.25% bupivacaine was given under sterile technique using the same 18 gauge needle into the superior lateral knee site with patient in supine position.     The patient had no adverse reactions to the medication. Pain decreased. The patient was instructed to apply ice to the joint for 20 minutes and avoid strenuous activities for 24-36 hours following the injection. Patient was warned of possible blood sugar and/or blood pressure changes during that time. Following that time, patient can resume regular activities.  Watch for fever, or increased swelling or persistent pain in knee. Call or return to clinic prn if such symptoms occur or the knee fails to improve as anticipated.     2. Ice compresses prn pain  Compression sleeve to left knee.     3. Benefit outweigh the risk of left knee CSI today but I do not want to keep doing this going forward.   4. Prior auth placed for bilateral knee euflexxa injections.   5. She declined a walker today.           Medical Necessary Criteria for Requested Treatment    This patient has radiologic confirmation of Kellgren-Surjit Scale  score of grade 2 or greater on most recent imaging for the affected knee.        This patient has had inadequate response to TWO or more of the following conservative nonpharmacologic therapy which is signified with an [X]:        __X_: Cardiovascular (aerobic) activity, such as: walking, biking, stationary bike, aquatic exercise      ___: Resistance exercise      ___: Weight reduction (for persons who are overweight)      ___: Participation in self-management programs      ___: Wear of medially directed patellar taping      ___: Wear of wedged insoles      _X__: Thermal agents      ___: Walking aids      ___: Physical therapy      ___: Occupational therapy                  Inadequate response, intolerance, or contraindication to TWO or more of the following which is signified with an [X]:        _X__: Acetaminophen      __X_: Oral NSAIDs      ___: Topical NSAIDs                  Inadequate response, intolerance, or contraindication to intra-articular      steroid injections in which efficacy lasted less than 8 weeks.                     All questions were answered, pt will contact us for questions or concerns in the interim.    I made the decision to obtain old records of the patient including previous notes and imaging. New imaging was ordered today of the extremity or extremities evaluated. I independently reviewed and interpreted the radiographs and/or MRIs today as well as prior imaging.

## 2023-05-08 ENCOUNTER — OFFICE VISIT (OUTPATIENT)
Dept: RHEUMATOLOGY | Facility: CLINIC | Age: 68
End: 2023-05-08
Payer: MEDICARE

## 2023-05-08 VITALS
DIASTOLIC BLOOD PRESSURE: 77 MMHG | HEART RATE: 89 BPM | BODY MASS INDEX: 27.25 KG/M2 | SYSTOLIC BLOOD PRESSURE: 158 MMHG | HEIGHT: 64 IN | WEIGHT: 159.63 LBS

## 2023-05-08 DIAGNOSIS — M05.79 RHEUMATOID ARTHRITIS INVOLVING MULTIPLE SITES WITH POSITIVE RHEUMATOID FACTOR: ICD-10-CM

## 2023-05-08 PROCEDURE — 3061F NEG MICROALBUMINURIA REV: CPT | Mod: CPTII,S$GLB,, | Performed by: INTERNAL MEDICINE

## 2023-05-08 PROCEDURE — 99999 PR PBB SHADOW E&M-EST. PATIENT-LVL IV: CPT | Mod: PBBFAC,,, | Performed by: INTERNAL MEDICINE

## 2023-05-08 PROCEDURE — 99214 PR OFFICE/OUTPT VISIT, EST, LEVL IV, 30-39 MIN: ICD-10-PCS | Mod: S$GLB,,, | Performed by: INTERNAL MEDICINE

## 2023-05-08 PROCEDURE — 3044F PR MOST RECENT HEMOGLOBIN A1C LEVEL <7.0%: ICD-10-PCS | Mod: CPTII,S$GLB,, | Performed by: INTERNAL MEDICINE

## 2023-05-08 PROCEDURE — 3078F DIAST BP <80 MM HG: CPT | Mod: CPTII,S$GLB,, | Performed by: INTERNAL MEDICINE

## 2023-05-08 PROCEDURE — 1159F MED LIST DOCD IN RCRD: CPT | Mod: CPTII,S$GLB,, | Performed by: INTERNAL MEDICINE

## 2023-05-08 PROCEDURE — 3008F BODY MASS INDEX DOCD: CPT | Mod: CPTII,S$GLB,, | Performed by: INTERNAL MEDICINE

## 2023-05-08 PROCEDURE — 99214 OFFICE O/P EST MOD 30 MIN: CPT | Mod: S$GLB,,, | Performed by: INTERNAL MEDICINE

## 2023-05-08 PROCEDURE — 3077F SYST BP >= 140 MM HG: CPT | Mod: CPTII,S$GLB,, | Performed by: INTERNAL MEDICINE

## 2023-05-08 PROCEDURE — 1125F PR PAIN SEVERITY QUANTIFIED, PAIN PRESENT: ICD-10-PCS | Mod: CPTII,S$GLB,, | Performed by: INTERNAL MEDICINE

## 2023-05-08 PROCEDURE — 3066F PR DOCUMENTATION OF TREATMENT FOR NEPHROPATHY: ICD-10-PCS | Mod: CPTII,S$GLB,, | Performed by: INTERNAL MEDICINE

## 2023-05-08 PROCEDURE — 3078F PR MOST RECENT DIASTOLIC BLOOD PRESSURE < 80 MM HG: ICD-10-PCS | Mod: CPTII,S$GLB,, | Performed by: INTERNAL MEDICINE

## 2023-05-08 PROCEDURE — 1159F PR MEDICATION LIST DOCUMENTED IN MEDICAL RECORD: ICD-10-PCS | Mod: CPTII,S$GLB,, | Performed by: INTERNAL MEDICINE

## 2023-05-08 PROCEDURE — 3061F PR NEG MICROALBUMINURIA RESULT DOCUMENTED/REVIEW: ICD-10-PCS | Mod: CPTII,S$GLB,, | Performed by: INTERNAL MEDICINE

## 2023-05-08 PROCEDURE — 99999 PR PBB SHADOW E&M-EST. PATIENT-LVL IV: ICD-10-PCS | Mod: PBBFAC,,, | Performed by: INTERNAL MEDICINE

## 2023-05-08 PROCEDURE — 3008F PR BODY MASS INDEX (BMI) DOCUMENTED: ICD-10-PCS | Mod: CPTII,S$GLB,, | Performed by: INTERNAL MEDICINE

## 2023-05-08 PROCEDURE — 4010F ACE/ARB THERAPY RXD/TAKEN: CPT | Mod: CPTII,S$GLB,, | Performed by: INTERNAL MEDICINE

## 2023-05-08 PROCEDURE — 3044F HG A1C LEVEL LT 7.0%: CPT | Mod: CPTII,S$GLB,, | Performed by: INTERNAL MEDICINE

## 2023-05-08 PROCEDURE — 3077F PR MOST RECENT SYSTOLIC BLOOD PRESSURE >= 140 MM HG: ICD-10-PCS | Mod: CPTII,S$GLB,, | Performed by: INTERNAL MEDICINE

## 2023-05-08 PROCEDURE — 4010F PR ACE/ARB THEARPY RXD/TAKEN: ICD-10-PCS | Mod: CPTII,S$GLB,, | Performed by: INTERNAL MEDICINE

## 2023-05-08 PROCEDURE — 3066F NEPHROPATHY DOC TX: CPT | Mod: CPTII,S$GLB,, | Performed by: INTERNAL MEDICINE

## 2023-05-08 PROCEDURE — 1125F AMNT PAIN NOTED PAIN PRSNT: CPT | Mod: CPTII,S$GLB,, | Performed by: INTERNAL MEDICINE

## 2023-05-08 NOTE — PROGRESS NOTES
Subjective:       Patient ID: Sunitha Pillai is a 67 y.o. female.    Chief Complaint: Disease Management    HPI  67 year old F with PMH of adjustment disorder, depression, type II DM,  GERD, HTN, vertigo, HTN, left breast cancer s/p chemo/ radiation in 2020, pulmonary nodules here for evaluation.  She has been having pain for last years. She is having in both shoulders (worse on right), elbows,wrists, hands, knees, ankles, and feet. Pain can be as high as 10/10.  She gets swelling of hands, worse in the right.She also gets swelling in ankles.   Denies any rashes. Denies smoking. Denies oral ulcers, fevers, raynauds, photosensitivity, or alopecia.         Family hx:2  aunts: cancer    Interval history: She had drainage in left knee and it feels better.  She has pain in right shoulder.  Pain level us 2/10.     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, estrogen receptor positive    Cholelithiasis with acute cholecystitis 3/5/2021    Depression     Diabetes mellitus type I     Genetic testing of female 09/2020    [facility] via [employer] with AXIN2 and POLE variants of uncertain significance    GERD (gastroesophageal reflux disease)     Hypertension     Pneumonia due to COVID-19 virus 2/7/2021    Unstable angina 9/23/2018    Vertigo 05/13/2019    Vitamin D deficiency 2/7/2021       Review of Systems   Constitutional:  Negative for activity change, appetite change, chills, diaphoresis, fatigue, fever and unexpected weight change.   HENT:  Negative for congestion, ear discharge, ear pain, facial swelling, mouth sores, sinus pressure, sneezing, sore throat, tinnitus and trouble swallowing.    Eyes:  Negative for photophobia, pain, discharge, redness, itching and visual disturbance.   Respiratory:  Negative for apnea, chest  "tightness, shortness of breath, wheezing and stridor.    Cardiovascular:  Negative for leg swelling.   Gastrointestinal:  Negative for abdominal distention, abdominal pain, anal bleeding, blood in stool, constipation, diarrhea and nausea.   Endocrine: Negative for cold intolerance and heat intolerance.   Genitourinary:  Negative for difficulty urinating and dysuria.   Musculoskeletal:  Positive for arthralgias and joint swelling. Negative for back pain, gait problem, myalgias, neck pain and neck stiffness.   Skin:  Negative for color change, pallor, rash and wound.   Neurological:  Negative for dizziness, seizures, light-headedness and numbness.   Hematological:  Negative for adenopathy. Does not bruise/bleed easily.   Psychiatric/Behavioral:  Negative for sleep disturbance. The patient is not nervous/anxious.        Objective:   BP (!) 146/78   Pulse 85   Ht 5' 4" (1.626 m)   Wt 78 kg (171 lb 15.3 oz)   BMI 29.52 kg/m²      Physical Exam   Constitutional: normal appearance.   HENT:   Head: Normocephalic and atraumatic.   Nose: No rhinorrhea or nasal congestion.   Mouth/Throat: No oropharyngeal exudate or posterior oropharyngeal erythema.   Eyes: Conjunctivae are normal. Right eye exhibits no discharge. Left eye exhibits no discharge. No scleral icterus.   Cardiovascular: Normal rate and regular rhythm. Exam reveals no gallop and no friction rub.   No murmur heard.  Pulmonary/Chest: No stridor. No respiratory distress. She has no wheezes. She has no rales.   Abdominal: She exhibits no distension and no mass. There is no abdominal tenderness.   Musculoskeletal:         General: Swelling, tenderness and deformity present.   Neurological: She is alert.   Skin:   Limited abduction in right arm to 45 degrees  Synovitis in mcps, pips  Synovitis in mtps      No data to display     Assessment:       67 year old F with PMH of adjustment disorder, depression, type II DM,  GERD, HTN, vertigo, HTN, left breast cancer s/p " "chemo/ radiation in 2020, pulmonary nodules here for evaluation.  She has high titer +RF and +CCP consistent with rheumatoid arthritis.   She reports she is feeling "fine" and would like to hold off on treatment.  I told her she is at risk of joint damage and she understands risks. I discussed with her in detail RA and management options.  1. Elevated rheumatoid factor              Plan:     Handout given on RA and Humira  Not MTX given on pulmonary nodules    30 * minutes of total time spent on the encounter, which includes face to face time and non-face to face time preparing to see the patient (eg, review of tests), Obtaining and/or reviewing separately obtained history, Documenting clinical information in the electronic or other health record, Independently interpreting results (not separately reported) and communicating results to the patient/family/caregiver, or Care coordination (not separately reported).         "

## 2023-05-09 ENCOUNTER — TELEPHONE (OUTPATIENT)
Dept: SPORTS MEDICINE | Facility: CLINIC | Age: 68
End: 2023-05-09

## 2023-05-09 DIAGNOSIS — M25.561 CHRONIC PAIN OF RIGHT KNEE: ICD-10-CM

## 2023-05-09 DIAGNOSIS — M17.11 OSTEOARTHRITIS OF RIGHT KNEE, UNSPECIFIED OSTEOARTHRITIS TYPE: Primary | ICD-10-CM

## 2023-05-09 DIAGNOSIS — G89.29 CHRONIC PAIN OF RIGHT KNEE: ICD-10-CM

## 2023-05-15 ENCOUNTER — OFFICE VISIT (OUTPATIENT)
Dept: HEMATOLOGY/ONCOLOGY | Facility: CLINIC | Age: 68
End: 2023-05-15
Attending: INTERNAL MEDICINE
Payer: MEDICARE

## 2023-05-15 VITALS
BODY MASS INDEX: 26.57 KG/M2 | HEIGHT: 64 IN | RESPIRATION RATE: 17 BRPM | HEART RATE: 80 BPM | SYSTOLIC BLOOD PRESSURE: 140 MMHG | WEIGHT: 155.63 LBS | DIASTOLIC BLOOD PRESSURE: 72 MMHG | OXYGEN SATURATION: 100 % | TEMPERATURE: 99 F

## 2023-05-15 DIAGNOSIS — Z17.0 MALIGNANT NEOPLASM OF LOWER-INNER QUADRANT OF LEFT BREAST IN FEMALE, ESTROGEN RECEPTOR POSITIVE: Primary | ICD-10-CM

## 2023-05-15 DIAGNOSIS — D64.9 NORMOCYTIC ANEMIA: ICD-10-CM

## 2023-05-15 DIAGNOSIS — C50.312 MALIGNANT NEOPLASM OF LOWER-INNER QUADRANT OF LEFT BREAST IN FEMALE, ESTROGEN RECEPTOR POSITIVE: Primary | ICD-10-CM

## 2023-05-15 PROCEDURE — 99213 PR OFFICE/OUTPT VISIT, EST, LEVL III, 20-29 MIN: ICD-10-PCS | Mod: S$GLB,,, | Performed by: INTERNAL MEDICINE

## 2023-05-15 PROCEDURE — 1101F PT FALLS ASSESS-DOCD LE1/YR: CPT | Mod: CPTII,S$GLB,, | Performed by: INTERNAL MEDICINE

## 2023-05-15 PROCEDURE — 99999 PR PBB SHADOW E&M-EST. PATIENT-LVL IV: CPT | Mod: PBBFAC,,, | Performed by: INTERNAL MEDICINE

## 2023-05-15 PROCEDURE — 3077F PR MOST RECENT SYSTOLIC BLOOD PRESSURE >= 140 MM HG: ICD-10-PCS | Mod: CPTII,S$GLB,, | Performed by: INTERNAL MEDICINE

## 2023-05-15 PROCEDURE — 1101F PR PT FALLS ASSESS DOC 0-1 FALLS W/OUT INJ PAST YR: ICD-10-PCS | Mod: CPTII,S$GLB,, | Performed by: INTERNAL MEDICINE

## 2023-05-15 PROCEDURE — 1125F PR PAIN SEVERITY QUANTIFIED, PAIN PRESENT: ICD-10-PCS | Mod: CPTII,S$GLB,, | Performed by: INTERNAL MEDICINE

## 2023-05-15 PROCEDURE — 1159F MED LIST DOCD IN RCRD: CPT | Mod: CPTII,S$GLB,, | Performed by: INTERNAL MEDICINE

## 2023-05-15 PROCEDURE — 3077F SYST BP >= 140 MM HG: CPT | Mod: CPTII,S$GLB,, | Performed by: INTERNAL MEDICINE

## 2023-05-15 PROCEDURE — 3044F PR MOST RECENT HEMOGLOBIN A1C LEVEL <7.0%: ICD-10-PCS | Mod: CPTII,S$GLB,, | Performed by: INTERNAL MEDICINE

## 2023-05-15 PROCEDURE — 99999 PR PBB SHADOW E&M-EST. PATIENT-LVL IV: ICD-10-PCS | Mod: PBBFAC,,, | Performed by: INTERNAL MEDICINE

## 2023-05-15 PROCEDURE — 3288F FALL RISK ASSESSMENT DOCD: CPT | Mod: CPTII,S$GLB,, | Performed by: INTERNAL MEDICINE

## 2023-05-15 PROCEDURE — 4010F PR ACE/ARB THEARPY RXD/TAKEN: ICD-10-PCS | Mod: CPTII,S$GLB,, | Performed by: INTERNAL MEDICINE

## 2023-05-15 PROCEDURE — 3078F PR MOST RECENT DIASTOLIC BLOOD PRESSURE < 80 MM HG: ICD-10-PCS | Mod: CPTII,S$GLB,, | Performed by: INTERNAL MEDICINE

## 2023-05-15 PROCEDURE — 3066F NEPHROPATHY DOC TX: CPT | Mod: CPTII,S$GLB,, | Performed by: INTERNAL MEDICINE

## 2023-05-15 PROCEDURE — 4010F ACE/ARB THERAPY RXD/TAKEN: CPT | Mod: CPTII,S$GLB,, | Performed by: INTERNAL MEDICINE

## 2023-05-15 PROCEDURE — 1125F AMNT PAIN NOTED PAIN PRSNT: CPT | Mod: CPTII,S$GLB,, | Performed by: INTERNAL MEDICINE

## 2023-05-15 PROCEDURE — 3061F PR NEG MICROALBUMINURIA RESULT DOCUMENTED/REVIEW: ICD-10-PCS | Mod: CPTII,S$GLB,, | Performed by: INTERNAL MEDICINE

## 2023-05-15 PROCEDURE — 3066F PR DOCUMENTATION OF TREATMENT FOR NEPHROPATHY: ICD-10-PCS | Mod: CPTII,S$GLB,, | Performed by: INTERNAL MEDICINE

## 2023-05-15 PROCEDURE — 3288F PR FALLS RISK ASSESSMENT DOCUMENTED: ICD-10-PCS | Mod: CPTII,S$GLB,, | Performed by: INTERNAL MEDICINE

## 2023-05-15 PROCEDURE — 1159F PR MEDICATION LIST DOCUMENTED IN MEDICAL RECORD: ICD-10-PCS | Mod: CPTII,S$GLB,, | Performed by: INTERNAL MEDICINE

## 2023-05-15 PROCEDURE — 3078F DIAST BP <80 MM HG: CPT | Mod: CPTII,S$GLB,, | Performed by: INTERNAL MEDICINE

## 2023-05-15 PROCEDURE — 3061F NEG MICROALBUMINURIA REV: CPT | Mod: CPTII,S$GLB,, | Performed by: INTERNAL MEDICINE

## 2023-05-15 PROCEDURE — 99213 OFFICE O/P EST LOW 20 MIN: CPT | Mod: S$GLB,,, | Performed by: INTERNAL MEDICINE

## 2023-05-15 PROCEDURE — 3044F HG A1C LEVEL LT 7.0%: CPT | Mod: CPTII,S$GLB,, | Performed by: INTERNAL MEDICINE

## 2023-05-15 PROCEDURE — 3008F BODY MASS INDEX DOCD: CPT | Mod: CPTII,S$GLB,, | Performed by: INTERNAL MEDICINE

## 2023-05-15 PROCEDURE — 3008F PR BODY MASS INDEX (BMI) DOCUMENTED: ICD-10-PCS | Mod: CPTII,S$GLB,, | Performed by: INTERNAL MEDICINE

## 2023-05-15 NOTE — PROGRESS NOTES
Subjective:      Patient ID: Sunitha Pillai is a 67 y.o. female.    Chief Complaint: No chief complaint on file.        HPI:  67-year-old female, patient of Dr. Moulton, who returns for follow-up of a diagnosis of left breast cancer.  She is on adjuvant letrozole therapy.    Her biggest recent issue has been arthritis in her left knee.  She recently had an effusion that was drained of about 45 cc.  She also received a steroid injection at that time which has helped.    Her peripheral neuropathy has been doing well.  She denies any shortness of breath.  Appetite and bowel function are normal.    She does report that she is sensitive to the cold.      She has had mild anemia over the last year with hemoglobin ranging from 10-11-1/2 grams/deciliter.  MCV normal, WBC and plts normal.  Iron studies on February 3, 2023 showed iron of 44, TIBC 330, iron saturation 13%, ferritin 368, B12 normal.  Her anemia seems to have started since her chemotherapy at the end of 2020.    Labs 5/4/23 - HGB 10.5 with retic 1.3.    Current history:   Screening mammogram on July 30th, 2020 showed a focal asymmetry in the lower outer portion left breast.  Biopsy on August 25, 2020 showed high-grade infiltrating ductal carcinoma (histologic grade 3, nuclear grade 3, mitotic index 3).  Tumor was 95% ER positive in 95% SC positive, HER2 was 2+ but negative by fish.  Ki-67 was 95%.    On September 17, 2020 lumpectomy and sentinel lymph node biopsy were performed.  That pathology showed a 1.5 cm infiltrating carcinoma with a single negative sentinel lymph node.  Margins were negative.  Final pathological stage T1c N0, stage I A.      Oncotype risk score - 29-high risk.    She received 4 cycles of adjuvant Taxotere and Cytoxan from 11/24/20 to 1/24/21.    Completed radiation 5/6/21.     Letrozole started in May 2021.    Review of Systems   Constitutional:  Negative for activity change, appetite change, fever and unexpected weight change.    HENT: Negative.     Cardiovascular:  Negative for chest pain.   Gastrointestinal:  Negative for constipation.   Genitourinary: Negative.    Musculoskeletal:  Positive for arthralgias (Left knee).   Psychiatric/Behavioral: Negative.     Objective:   Physical Exam   Vitals reviewed.  Constitutional: She is oriented to person, place, and time. She appears well-developed. No distress.   Cardiovascular:  Normal rate and regular rhythm.            Pulmonary/Chest: Effort normal and breath sounds normal. No respiratory distress. She has no wheezes. She has no rales. Right breast exhibits no mass, no nipple discharge and no skin change. Left breast exhibits skin change. Left breast exhibits no mass, no nipple discharge and no tenderness.       Abdominal: Soft. She exhibits no mass. There is no abdominal tenderness.   Musculoskeletal: Lymphadenopathy:      Cervical: No cervical adenopathy.     Neurological: She is alert and oriented to person, place, and time.   Skin: No rash noted.     Psychiatric: Her behavior is normal. Thought content normal.   Assessment:      1. Malignant neoplasm of lower-inner quadrant of left breast in female, estrogen receptor positive    2. Normocytic anemia      3. Chemotherapy-induced peripheral neuropathy  Plan:      RTC 3 M.     Route Chart for Scheduling    Med Onc Chart Routing      Follow up with physician 3 months.   Follow up with [facility]    Infusion scheduling note    Injection scheduling note    Labs None   Scheduling:  Preferred lab:  Lab interval:     Imaging None      Pharmacy appointment    Other referrals  No additional referrals needed

## 2023-05-18 ENCOUNTER — CLINICAL SUPPORT (OUTPATIENT)
Dept: ENDOSCOPY | Facility: HOSPITAL | Age: 68
End: 2023-05-18
Attending: INTERNAL MEDICINE
Payer: MEDICARE

## 2023-05-18 VITALS — WEIGHT: 154 LBS | BODY MASS INDEX: 26.29 KG/M2 | HEIGHT: 64 IN

## 2023-05-18 DIAGNOSIS — Z12.11 SCREENING FOR COLON CANCER: ICD-10-CM

## 2023-06-02 ENCOUNTER — OFFICE VISIT (OUTPATIENT)
Dept: SPORTS MEDICINE | Facility: CLINIC | Age: 68
End: 2023-06-02
Payer: MEDICARE

## 2023-06-02 VITALS
WEIGHT: 154 LBS | DIASTOLIC BLOOD PRESSURE: 77 MMHG | HEIGHT: 64 IN | SYSTOLIC BLOOD PRESSURE: 122 MMHG | BODY MASS INDEX: 26.29 KG/M2 | HEART RATE: 89 BPM

## 2023-06-02 DIAGNOSIS — M25.562 CHRONIC PAIN OF LEFT KNEE: Primary | ICD-10-CM

## 2023-06-02 DIAGNOSIS — M17.12 OSTEOARTHRITIS OF LEFT KNEE, UNSPECIFIED OSTEOARTHRITIS TYPE: ICD-10-CM

## 2023-06-02 DIAGNOSIS — G89.29 CHRONIC PAIN OF LEFT KNEE: Primary | ICD-10-CM

## 2023-06-02 PROCEDURE — 20610 DRAIN/INJ JOINT/BURSA W/O US: CPT | Mod: LT,S$GLB,, | Performed by: PHYSICIAN ASSISTANT

## 2023-06-02 PROCEDURE — 99499 UNLISTED E&M SERVICE: CPT | Mod: S$GLB,,, | Performed by: PHYSICIAN ASSISTANT

## 2023-06-02 PROCEDURE — 99999 PR PBB SHADOW E&M-EST. PATIENT-LVL IV: CPT | Mod: PBBFAC,,, | Performed by: PHYSICIAN ASSISTANT

## 2023-06-02 PROCEDURE — 20610 PR DRAIN/INJECT LARGE JOINT/BURSA: ICD-10-PCS | Mod: LT,S$GLB,, | Performed by: PHYSICIAN ASSISTANT

## 2023-06-02 PROCEDURE — 99999 PR PBB SHADOW E&M-EST. PATIENT-LVL IV: ICD-10-PCS | Mod: PBBFAC,,, | Performed by: PHYSICIAN ASSISTANT

## 2023-06-02 PROCEDURE — 99499 NO LOS: ICD-10-PCS | Mod: S$GLB,,, | Performed by: PHYSICIAN ASSISTANT

## 2023-06-05 NOTE — PROGRESS NOTES
Patient is here for follow up of Left knee arthritis. Pt is requesting Euflexxa injection #1.  PMFH reviewed per encounter record. Has failed other conservative modalities including NSAIDS, activity modification, weight loss.    She has not received these injections in the past.     The prior shots were tolerated well.    PHYSICAL EXAMINATION:     General: The patient is alert and oriented x 3. Mood is pleasant.   Observation of ears, eyes and nose reveals no gross abnormalities. No   labored breathing observed.     No signs of infection or adverse reaction to knee.        Euflexxa Injection Procedure #1 Left     A time out was performed, including verification of patient ID, procedure, site and side, availability of information and equipment, review of safety issues, and agreement with consent, the procedure site was marked.    The patient was prepped aseptically with povidone-iodine swabsticks. A diagnostic and therapeutic injection of 2cc Euflexxa was given under sterile technique using a 21.5g x 1.5 needle from the anterolateral aspect of the left knee Joint with the patient in the seated position.     Sunitha Pillai had no adverse reactions to the medication. Pain decreased. female was instructed to apply ice to the joint for 20 minutes and avoid strenuous activities for 24-36 hours following the injection. female was warned of possible blood pressure changes during that time. Following that time, female can resume activities as prior to the injection.    female was reminded to call the clinic immediately for any adverse side effects as explained in clinic today.    RTC in 1 week for injection #2  All patients questions were answered. Patient was advised to call us with any concerns or questions.

## 2023-06-09 ENCOUNTER — CLINICAL SUPPORT (OUTPATIENT)
Dept: REHABILITATION | Facility: HOSPITAL | Age: 68
End: 2023-06-09
Payer: MEDICARE

## 2023-06-09 DIAGNOSIS — G89.29 CHRONIC BILATERAL LOW BACK PAIN WITHOUT SCIATICA: ICD-10-CM

## 2023-06-09 DIAGNOSIS — M25.562 CHRONIC PAIN OF LEFT KNEE: ICD-10-CM

## 2023-06-09 DIAGNOSIS — G89.29 CHRONIC PAIN OF LEFT KNEE: ICD-10-CM

## 2023-06-09 DIAGNOSIS — M25.562 ACUTE PAIN OF LEFT KNEE: ICD-10-CM

## 2023-06-09 DIAGNOSIS — M54.50 CHRONIC BILATERAL LOW BACK PAIN WITHOUT SCIATICA: ICD-10-CM

## 2023-06-09 DIAGNOSIS — M25.552 PAIN OF LEFT HIP: ICD-10-CM

## 2023-06-09 PROCEDURE — 97162 PT EVAL MOD COMPLEX 30 MIN: CPT

## 2023-06-09 PROCEDURE — 97112 NEUROMUSCULAR REEDUCATION: CPT

## 2023-06-09 PROCEDURE — 97140 MANUAL THERAPY 1/> REGIONS: CPT

## 2023-06-09 NOTE — PLAN OF CARE
[facility]   Physical Therapy Initial Evaluation      Name: Sunitha Pillai  Clinic Number: [id number]    Therapy Diagnosis:   Encounter Diagnoses   Name Primary?    Acute pain of left knee     Chronic bilateral low back pain without sciatica     Pain of left hip     Chronic pain of left knee         Physician: Patty Louis MD    Physician Orders: PT Eval and Treat   Medical Diagnosis from Referral: M25.562 (ICD-10-CM) - Acute pain of left knee  Evaluation Date: 6/9/2023  Authorization Period Expiration: 6/8/24  Plan of Care Expiration: 11/1/23  Progress Note Due: 7/9/23  Visit # / Visits authorized: 1/ 1   FOTO: 44% limitation  FOTO 1st Follow Up:  FOTO 2nd Follow Up:      Precautions: Standard and diabetes    Time In: 8:03  Time Out: 8:45  Total Appointment Time (timed & untimed codes): 42 minutes    Subjective     Date of onset: years    History of current condition - Sunitha reports: has been dealing with left knee/hip pain for years. Recently her knee pain has gotten worse. Received an injection a couple weeks ago that helped. Also started the series injections for her knee with the 1st one last week. Pain has improved from injection but having some soreness. Also having left hip pain that has been bothering her for a while. Pain affects her with sleeping, walking, working, and prolonged positions. Stands all day at work but pushes through the pain. No surgeries or prior injuries. Has prior history of low back pain with getting symptoms down left leg at times.     Falls: years ago on her left knee    Imaging: x-ray - FINDINGS:  No acute fracture, dislocation, or osseous destruction.  Mild degenerative change of both knees with marginal osteophytosis most notable at the bilateral patellofemoral and right medial compartment.  Moderate left suprapatellar effusion.    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[employer], standing  Prior Level of Function:  independent  Current Level of Function: pain with standing, ambulating, prolonged position    Pain:  Current 3/10, worst 10/10, best 0/10   Location: left and bilateral back , knee , and hip    Description: Aching, Dull, and Sharp  Aggravating Factors: Sitting, Standing, Morning, and Getting out of bed/chair  Easing Factors: heating pad and rest    Patients goals: improved tolerance and pain for work and ADL's     Medical History:   Past Medical History:   Diagnosis Date    Acute coronary syndrome 2018    Adjustment disorder     Anxiety     Arthritis     Cancer of breast 2020    Malignant neoplasm of lower-inner quadrant of left breast in female, estrogen receptor positive    Cholelithiasis with acute cholecystitis 2021    Coronary artery disease     Depression     Diabetes mellitus type I     Genetic testing of female 2020    Minerva via [employer] with AXIN2 and POLE variants of uncertain significance    GERD (gastroesophageal reflux disease)     Hepatitis B core antibody positive 3/9/2023    Negative sAg, suggests previous exposure but no chronic/active Hep B. At risk for reactivation with any immunosuppression medication, steroids, chemo, etc.      Hypertension     Vertigo 2019    Vitamin D deficiency 2021       Surgical History:   Sunitha Pillai  has a past surgical history that includes  section; Injection for sentinel node identification (Left, 2020); Ava lymph node biopsy (Left, 2020); Insertion of tunneled central venous catheter (CVC) with subcutaneous port (N/A, 2020); Laparoscopic cholecystectomy (N/A, 2021); Mastectomy, partial (Left, 2020); and Breast biopsy (Left, 2022).    Medications:   Sunitha has a current medication list which includes the following prescription(s): accu-chek guide glucose meter, albuterol, allopurinol, aspirin, atorvastatin, bd ultra-fine short pen needle, blood sugar diagnostic, blood  sugar diagnostic, drum, candesartan, diclofenac sodium, trulicity, fluticasone propionate, lancets, letrozole, lidocaine, metformin, sertraline, topiramate, and trueplus lancets, and the following Facility-Administered Medications: fentanyl and midazolam.    Allergies:   Review of patient's allergies indicates:  No Known Allergies     Objective      Observation: ambulation with slight lateral trunk lean, decreased knee flex during swing.     Hip Range of Motion:   Right Passive Left Passive   Flexion 100 60*   Abduction nt nt   Extension nt nt   Ext. Rotation 35 15*   Int. Rotation 10* 0*     Knee Passive range of motion:   Knee Right Left   Flexion 130 90*   Ext 0 0*   *indicates pain      Lower Extremity Strength  Right LE  Left LE    Quadriceps: 4/5 Quadriceps: 4-/5   Hamstrings: nt Hamstrings: nt   Iliopsoas: nt Iliopsoas: nt   Hip extension:  nt Hip extension: nt   PGM: nt PGM: nt   Hip ER: nt Hip ER: nt   Hip IR: nt Hip IR: nt   Glut Max nt   Glut Max nt       Functional Tests:  Squat to chair: slowed transition, weight shift to right, pain    Sensation: intact to light touch vanessa LE    Special Tests: nt due to pain  FABERs:     CAITY:  Hip Scour:   Slump:     Joint Mobility: decreased left     Palpation: TTP left hip, medial knee joint line    Edema: slightly increased edema along knee      Limitation/Restriction for FOTO knee Survey    Therapist reviewed FOTO scores for Sunitha Pillai on 6/9/2023.   FOTO documents entered into [employer] - see Media section.    Limitation Score: 44%         Treatment     Total Treatment time (time-based codes) separate from Evaluation: 20 minutes     Sunitha received the treatments listed below:      manual therapy techniques: Joint mobilizations were applied to the: hip for 9 minutes, including:  Lateral hip distraction  Inferior hip mobilizations  Long axis distraction  Long axis oscillations     neuromuscular re-education activities to improve: Proprioception, Posture, and  "motor control for 11 minutes. The following activities were included:  Hot pack used to left lateral hip during exercises  - quad sets 30x5"  - lower trunk rotation 15x  - glute sets 10x5"    Pt education    Patient Education and Home Exercises     Education provided:   - HEP  - importance of decreasing irritability    Written Home Exercises Provided: yes. Exercises were reviewed and Sunitha was able to demonstrate them prior to the end of the session.  Sunitha demonstrated good  understanding of the education provided. See EMR under Patient Instructions for exercises provided during therapy sessions.    Assessment     Sunitha is a 67 y.o. female referred to outpatient Physical Therapy with a medical diagnosis of M25.562 (ICD-10-CM) - Acute pain of left knee. Patient presents with decreased hip and knee range of motion, high irritability, muscle guarding, decreased strength, and pain affecting everyday activities. Patient at first was not irritable but when performing testing had increased pain and guarding that made it difficult to fully assess. Heavy education on movement and reducing irritability.     Patient prognosis is Good.   Patient will benefit from skilled outpatient Physical Therapy to address the deficits stated above and in the chart below, provide patient /family education, and to maximize patientt's level of independence.     Plan of care discussed with patient: Yes  Patient's spiritual, cultural and educational needs considered and patient is agreeable to the plan of care and goals as stated below:     Anticipated Barriers for therapy: work, scheduling, irritability    Medical Necessity is demonstrated by the following  History  Co-morbidities and personal factors that may impact the plan of care [] LOW: no personal factors / co-morbidities  [x] MODERATE: 1-2 personal factors / co-morbidities  [] HIGH: 3+ personal factors / co-morbidities    Moderate / High Support Documentation:   Co-morbidities " affecting plan of care: age, DM    Personal Factors:   age     Examination  Body Structures and Functions, activity limitations and participation restrictions that may impact the plan of care [] LOW: addressing 1-2 elements  [] MODERATE: 3+ elements  [x] HIGH: 4+ elements (please support below)    Moderate / High Support Documentation: pain, irritability, range of motion, strength, motor control, endurance     Clinical Presentation [] LOW: stable  [x] MODERATE: Evolving  [] HIGH: Unstable     Decision Making/ Complexity Score: moderate       GOALS: Short Term Goals:  2-4 weeks  1.Report decreased hip/knee pain  < / =  2/10  to increase tolerance for ambulation  2. Increase ROM by 5-10 degrees where limited in order to perform ADLs without difficulty.  3. Increase strength by 1/3 MMT grade in hip  to increase tolerance for ADL and work activities.  4. Pt to tolerate HEP to improve ROM and independence with ADL's    Long Term Goals: 8-12 weeks  1.Report decreased hip/knee pain < / = 1/10  to increase tolerance for ambulation  2.Patient goal: improve tolerance for work and ambulation activities  3.Increase strength to 4/5 in  hip  to increase tolerance for ADL and work activities.  4. Pt will report at CJ level (20-40% impaired) on LEFS  to demonstrate increase in LE function with every day tasks.     Plan     Plan of care Certification: 6/9/2023 to 11/1/23.    Outpatient Physical Therapy 1-2 times weekly for 8-16 weeks to include the following interventions: Gait Training, Manual Therapy, Moist Heat/ Ice, Neuromuscular Re-ed, Patient Education, Self Care, Therapeutic Activities, and Therapeutic Exercise.     Td Gomez, PT

## 2023-06-11 ENCOUNTER — HOSPITAL ENCOUNTER (EMERGENCY)
Facility: HOSPITAL | Age: 68
Discharge: HOME | End: 2023-06-11
Attending: EMERGENCY MEDICINE
Payer: MEDICARE

## 2023-06-11 VITALS
SYSTOLIC BLOOD PRESSURE: 105 MMHG | HEART RATE: 97 BPM | BODY MASS INDEX: 26.43 KG/M2 | WEIGHT: 154 LBS | TEMPERATURE: 98 F | RESPIRATION RATE: 18 BRPM | DIASTOLIC BLOOD PRESSURE: 58 MMHG | OXYGEN SATURATION: 99 %

## 2023-06-11 DIAGNOSIS — K59.00 CONSTIPATION, UNSPECIFIED CONSTIPATION TYPE: Primary | ICD-10-CM

## 2023-06-11 LAB
ALBUMIN SERPL BCP-MCNC: 3.6 G/DL (ref 3.5–5.2)
ALP SERPL-CCNC: 85 U/L (ref 55–135)
ALT SERPL W/O P-5'-P-CCNC: 11 U/L (ref 10–44)
ANION GAP SERPL CALC-SCNC: 10 MMOL/L (ref 8–16)
AST SERPL-CCNC: 15 U/L (ref 10–40)
BASOPHILS # BLD AUTO: 0.01 K/UL (ref 0–0.2)
BASOPHILS NFR BLD: 0.2 % (ref 0–1.9)
BILIRUB SERPL-MCNC: 0.6 MG/DL (ref 0.1–1)
BILIRUB UR QL STRIP: NEGATIVE
BUN SERPL-MCNC: 12 MG/DL (ref 8–23)
CALCIUM SERPL-MCNC: 9.7 MG/DL (ref 8.7–10.5)
CHLORIDE SERPL-SCNC: 110 MMOL/L (ref 95–110)
CLARITY UR REFRACT.AUTO: CLEAR
CO2 SERPL-SCNC: 22 MMOL/L (ref 23–29)
COLOR UR AUTO: COLORLESS
CREAT SERPL-MCNC: 1 MG/DL (ref 0.5–1.4)
DIFFERENTIAL METHOD: ABNORMAL
EOSINOPHIL # BLD AUTO: 0 K/UL (ref 0–0.5)
EOSINOPHIL NFR BLD: 0.5 % (ref 0–8)
ERYTHROCYTE [DISTWIDTH] IN BLOOD BY AUTOMATED COUNT: 14 % (ref 11.5–14.5)
EST. GFR  (NO RACE VARIABLE): >60 ML/MIN/1.73 M^2
GLUCOSE SERPL-MCNC: 133 MG/DL (ref 70–110)
GLUCOSE UR QL STRIP: NEGATIVE
HCT VFR BLD AUTO: 36.4 % (ref 37–48.5)
HGB BLD-MCNC: 12 G/DL (ref 12–16)
HGB UR QL STRIP: NEGATIVE
IMM GRANULOCYTES # BLD AUTO: 0.01 K/UL (ref 0–0.04)
IMM GRANULOCYTES NFR BLD AUTO: 0.2 % (ref 0–0.5)
KETONES UR QL STRIP: NEGATIVE
LEUKOCYTE ESTERASE UR QL STRIP: NEGATIVE
LIPASE SERPL-CCNC: 12 U/L (ref 4–60)
LYMPHOCYTES # BLD AUTO: 2.1 K/UL (ref 1–4.8)
LYMPHOCYTES NFR BLD: 35.6 % (ref 18–48)
MAGNESIUM SERPL-MCNC: 1.7 MG/DL (ref 1.6–2.6)
MCH RBC QN AUTO: 31.2 PG (ref 27–31)
MCHC RBC AUTO-ENTMCNC: 33 G/DL (ref 32–36)
MCV RBC AUTO: 95 FL (ref 82–98)
MONOCYTES # BLD AUTO: 0.5 K/UL (ref 0.3–1)
MONOCYTES NFR BLD: 9.1 % (ref 4–15)
NEUTROPHILS # BLD AUTO: 3.2 K/UL (ref 1.8–7.7)
NEUTROPHILS NFR BLD: 54.4 % (ref 38–73)
NITRITE UR QL STRIP: NEGATIVE
NRBC BLD-RTO: 0 /100 WBC
PH UR STRIP: 7 [PH] (ref 5–8)
PLATELET # BLD AUTO: 148 K/UL (ref 150–450)
PMV BLD AUTO: 9.2 FL (ref 9.2–12.9)
POCT GLUCOSE: 120 MG/DL (ref 70–110)
POTASSIUM SERPL-SCNC: 3.9 MMOL/L (ref 3.5–5.1)
PROT SERPL-MCNC: 7.2 G/DL (ref 6–8.4)
PROT UR QL STRIP: NEGATIVE
RBC # BLD AUTO: 3.85 M/UL (ref 4–5.4)
SARS-COV-2 RDRP RESP QL NAA+PROBE: NEGATIVE
SODIUM SERPL-SCNC: 142 MMOL/L (ref 136–145)
SP GR UR STRIP: 1.01 (ref 1–1.03)
TROPONIN I SERPL DL<=0.01 NG/ML-MCNC: 0.01 NG/ML (ref 0–0.03)
URN SPEC COLLECT METH UR: ABNORMAL
WBC # BLD AUTO: 5.81 K/UL (ref 3.9–12.7)

## 2023-06-11 PROCEDURE — 81003 URINALYSIS AUTO W/O SCOPE: CPT | Performed by: EMERGENCY MEDICINE

## 2023-06-11 PROCEDURE — 84484 ASSAY OF TROPONIN QUANT: CPT | Performed by: EMERGENCY MEDICINE

## 2023-06-11 PROCEDURE — 83690 ASSAY OF LIPASE: CPT | Performed by: EMERGENCY MEDICINE

## 2023-06-11 PROCEDURE — 83735 ASSAY OF MAGNESIUM: CPT | Performed by: EMERGENCY MEDICINE

## 2023-06-11 PROCEDURE — U0002 COVID-19 LAB TEST NON-CDC: HCPCS | Performed by: EMERGENCY MEDICINE

## 2023-06-11 PROCEDURE — 99284 EMERGENCY DEPT VISIT MOD MDM: CPT | Mod: 25

## 2023-06-11 PROCEDURE — 99284 PR EMERGENCY DEPT VISIT,LEVEL IV: ICD-10-PCS | Mod: ,,, | Performed by: EMERGENCY MEDICINE

## 2023-06-11 PROCEDURE — 99284 EMERGENCY DEPT VISIT MOD MDM: CPT | Mod: ,,, | Performed by: EMERGENCY MEDICINE

## 2023-06-11 PROCEDURE — 85025 COMPLETE CBC W/AUTO DIFF WBC: CPT | Performed by: EMERGENCY MEDICINE

## 2023-06-11 PROCEDURE — 80053 COMPREHEN METABOLIC PANEL: CPT | Performed by: EMERGENCY MEDICINE

## 2023-06-11 PROCEDURE — 82962 GLUCOSE BLOOD TEST: CPT

## 2023-06-11 RX ORDER — AMOXICILLIN 250 MG
2 CAPSULE ORAL DAILY
Qty: 30 TABLET | Refills: 0 | Status: SHIPPED | OUTPATIENT
Start: 2023-06-11 | End: 2023-10-17

## 2023-06-11 NOTE — ED TRIAGE NOTES
Sunitha Pillai, an 67 y.o. female presents to the ED complaining of headaches, neck pain, and left upper arm pain that has been ongoing since x4 days. Tried taking Tylenol yesterday with no relief of Sx. Explains that she vomited on Friday but also states that after each meal she typically will vomit.       LOC: The patient is awake, alert and aware of environment with an appropriate affect, the patient is oriented x 3 and speaking appropriately.   APPEARANCE: Patient appears comfortable and in no acute distress, patient is clean and well groomed.  SKIN: The skin is warm and dry, color consistent with ethnicity.   MUSCULOSKELETAL: Patient moving all extremities spontaneously, no swelling noted. +left upper arm pain.  RESPIRATORY: Airway is open and patent, respirations are spontaneous, patient has a normal effort and rate, no accessory muscle use noted.  CARDIAC: Patient has a normal rate and regular rhythm, no edema noted, capillary refill < 3 seconds.   GASTRO: Soft and non tender to palpation, no distention noted.   : Pt denies any pain or frequency with urination.  NEURO: Pt opens eyes spontaneously, behavior appropriate to situation, follows commands.  HEENT: Headache pain, neck pain.       Chief Complaint   Patient presents with    Multiple complaints     4 days HA, neck pain, upper back pain, upper L arm pain that is progressing.      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 At risk for reactivation with any immunosuppression medication, steroids, chemo, etc.      Hypertension     Vertigo 05/13/2019    Vitamin D deficiency 02/07/2021

## 2023-06-11 NOTE — ED PROVIDER NOTES
Encounter Date: 2023       History     Chief Complaint   Patient presents with    Multiple complaints     4 days HA, neck pain, upper back pain, upper L arm pain that is progressing.        67-year-old female, history of CAD, diabetes, hypertension, breast cancer, cholecystectomy, presenting with multiple complaints.  Patient reports headache that is diffuse, primarily in the occipital region and severe and initially stated that have been present for 5 days but then later on in the interview says it has been present for many years but it is just gotten worse over the last 5 days.  She denies any neck stiffness, any fevers or chills, no photosensitivity or nausea or vomiting.  She is also complaining of right upper quadrant abdominal pain that has been present for several days now.  No fevers or chills.    The history is provided by the patient.   Review of patient's allergies indicates:  No Known Allergies  Past Medical History:   Diagnosis Date    Acute coronary syndrome 2018    Adjustment disorder     Anxiety     Arthritis     Cancer of breast 2020    Malignant neoplasm of lower-inner quadrant of left breast in female, estrogen receptor positive    Cholelithiasis with acute cholecystitis 2021    Coronary artery disease     Depression     Diabetes mellitus type I     Genetic testing of female 2020    [employer] via [employer] with AXIN2 and POLE variants of uncertain significance    GERD (gastroesophageal reflux disease)     Hepatitis B core antibody positive 3/9/2023    Negative sAg, suggests previous exposure but no chronic/active Hep B. At risk for reactivation with any immunosuppression medication, steroids, chemo, etc.      Hypertension     Vertigo 2019    Vitamin D deficiency 2021     Past Surgical History:   Procedure Laterality Date    BREAST BIOPSY Left 2022    Left punch biopsy; Unremarkable keratinized skin with intradermal hematoma     SECTION       INJECTION FOR SENTINEL NODE IDENTIFICATION Left 09/17/2020    Procedure: INJECTION, FOR SENTINEL NODE IDENTIFICATION;  Surgeon: Isabel Moulton MD;  Location: [facility] OR;  Service: General;  Laterality: Left;    INSERTION OF TUNNELED CENTRAL VENOUS CATHETER (CVC) WITH SUBCUTANEOUS PORT N/A 11/18/2020    Procedure: INSERTION, PORT-A-CATH;  Surgeon: Hardeep Martin MD;  Location: [facility] CATH LAB;  Service: Radiology;  Laterality: N/A;    LAPAROSCOPIC CHOLECYSTECTOMY N/A 03/06/2021    Procedure: CHOLECYSTECTOMY, LAPAROSCOPIC;  Surgeon: Buster Son MD;  Location: [facility] OR 2ND FLR;  Service: General;  Laterality: N/A;    MASTECTOMY, PARTIAL Left 09/17/2020    Procedure: MASTECTOMY, PARTIAL-w/reflector;  Surgeon: Isabel Moulton MD;  Location: [facility] OR;  Service: General;  Laterality: Left;    SENTINEL LYMPH NODE BIOPSY Left 09/17/2020    Procedure: BIOPSY, LYMPH NODE, SENTINEL;  Surgeon: Isabel Moulton MD;  Location: [facility] OR;  Service: General;  Laterality: Left;     Family History   Problem Relation Age of Onset    Hypertension Mother     Hyperlipidemia Mother     Diabetes Mother     Heart disease Mother     Colon polyps Mother     Aneurysm Father     Diabetes Sister     Hypertension Sister     Heart disease Brother     Diabetes Brother     Hypertension Brother     Cancer Brother         brother German with prostate cancer; brother Cooper with throat cancer    Cervical cancer Daughter     Anxiety disorder Daughter     Depression Daughter     Anxiety disorder Daughter     Depression Daughter     Breast cancer Maternal Aunt     Cancer Maternal Aunt         unknown origin    Cancer Paternal Aunt         unknown origin    Breast cancer Paternal Aunt     Prostate cancer Maternal Cousin     Leukemia Maternal Cousin     Prostate cancer Maternal Cousin     Breast cancer Other     Colon cancer Neg Hx     Ovarian cancer Neg Hx      Social History     Tobacco Use    Smoking status: Never    Smokeless tobacco: Never    Substance Use Topics    Alcohol use: Never    Drug use: No     Review of Systems    Physical Exam     Initial Vitals   BP Pulse Resp Temp SpO2   06/11/23 1334 06/11/23 1337 06/11/23 1334 06/11/23 1334 06/11/23 1337   (!) 105/58 97 18 98 °F (36.7 °C) 99 %      MAP       --                Physical Exam    Nursing note and vitals reviewed.  Constitutional: Vital signs are normal. She appears well-developed and well-nourished. She is not diaphoretic.  Non-toxic appearance. She does not appear ill. No distress.   HENT:   Head: Normocephalic and atraumatic.   Mouth/Throat: Mucous membranes are normal. Mucous membranes are not dry.   Eyes: Conjunctivae and lids are normal.   Neck: Neck supple.   Normal range of motion.  Cardiovascular:  Normal rate and regular rhythm.           Pulmonary/Chest: Breath sounds normal. No respiratory distress. She has no wheezes.   Abdominal: Abdomen is soft. She exhibits no distension. There is abdominal tenderness.   + RUQ tenderness There is no guarding.   Musculoskeletal:      Cervical back: Normal range of motion and neck supple.     Neurological: She is alert and oriented to person, place, and time. She has normal strength. No cranial nerve deficit or sensory deficit. GCS score is 15. GCS eye subscore is 4. GCS verbal subscore is 5. GCS motor subscore is 6.   Skin: Skin is dry and intact. No pallor.   Psychiatric: She has a normal mood and affect. Her speech is normal and behavior is normal.       ED Course   Procedures  Labs Reviewed   CBC W/ AUTO DIFFERENTIAL - Abnormal; Notable for the following components:       Result Value    RBC 3.85 (*)     Hematocrit 36.4 (*)     MCH 31.2 (*)     Platelets 148 (*)     All other components within normal limits   COMPREHENSIVE METABOLIC PANEL - Abnormal; Notable for the following components:    CO2 22 (*)     Glucose 133 (*)     All other components within normal limits   URINALYSIS, REFLEX TO URINE CULTURE - Abnormal; Notable for the following  components:    Color, UA Colorless (*)     All other components within normal limits    Narrative:     Specimen Source->Urine   POCT GLUCOSE - Abnormal; Notable for the following components:    POCT Glucose 120 (*)     All other components within normal limits   LIPASE   MAGNESIUM   TROPONIN I   SARS-COV-2 RNA AMPLIFICATION, QUAL          Imaging Results               CT Abdomen Pelvis  Without Contrast (Final result)  Result time 06/11/23 16:55:55   Procedure changed from CT Abdomen Pelvis With Contrast     Final result by Carl Weldon MD (06/11/23 16:55:55)                   Impression:      This report was flagged in Epic as abnormal.    1. Moderate to large amount of stool throughout the colon, could reflect sequela of constipation, correlation is advised.  2. Hepatomegaly, correlation with LFTs recommended.  3. The stomach is distended with ingested content, correlation with any history of outlet obstruction or delayed gastric emptying.  4. Bilateral nonobstructive nephrolithiasis noting lobular contour of the kidneys bilaterally.  Nonemergent ultrasound could be performed as warranted.  5. Slight wall thickening involving a few proximal jejunal loops, this is likely on the basis of nondistention, enteritis felt less likely though correlation is needed.  6. Skin thickening and heterogeneous inflammatory appearance of the left breast, correlation with mammographic history advised.      Electronically signed by: Carl Weldon MD  Date:    06/11/2023  Time:    16:55               Narrative:    EXAMINATION:  CT ABDOMEN PELVIS WITHOUT CONTRAST    CLINICAL HISTORY:  Abdominal pain, acute, nonlocalized;    TECHNIQUE:  Low dose axial images, sagittal and coronal reformations were obtained from the lung bases to the pubic symphysis.  Oral contrast was not administered.    COMPARISON:  CT abdomen and pelvis 03/02/2021    FINDINGS:  Images of the lower thorax are remarkable for minimal dependent atelectasis.   There is a pulmonary nodule within the right lower lobe measuring 3 mm, likely stable as compared to the previous exam noting several nodular foci on prior exam have resolved.  There is a calcified granuloma along the anterior aspect of the right middle lobe.  Additional calcified granuloma is noted within the left lower lobe.    The liver is enlarged, correlation with LFTs recommended.  The spleen and adrenal glands are unremarkable noting a few scattered splenic granulomas.  The stomach is distended with ingested content without wall thickening.  The gallbladder is surgically absent.  There are calcific appearing foci within the royal hepatis, could reflect retained calculi or related to surgical change.  No biliary dilation.  The pancreas is atrophic without ductal dilation.  No significant abdominal lymphadenopathy.    The kidneys have a lobular contour bilaterally.  There is bilateral nonobstructive nephrolithiasis.  The bilateral ureters are unable to be followed in their entirety to the urinary bladder, no definite calculi seen or secondary findings to suggest obstructive uropathy.  The urinary bladder is nondistended.  The uterus and adnexa are grossly unremarkable.    There is a large amount of stool in the rectum.  There is moderate stool throughout the remainder of the colon.  There are several scattered colonic diverticula without inflammation.  The terminal ileum is unremarkable noting lipomatous hypertrophy about the ileocecal valve.  Terminal ileum is unremarkable.  The small bowel is remarkable for mild proximal jejunal thickening.  There may be slight mesenteric edema in the region versus artifact from motion.  The remainder of the small bowel is grossly unremarkable.  There is atherosclerotic calcification of the aortic branches.  No focal organized pelvic fluid collection.    There is osteopenia.  There are degenerative changes of the bilateral femoroacetabular joints, pubic symphysis, sacroiliac  joints, and spine.  No significant inguinal lymphadenopathy.  There is skin thickening and inflammatory appearance of the left breast, correlation with mammographic history advised, inflammatory breast malignancy not excluded.                                       CT Head Without Contrast (Final result)  Result time 06/11/23 16:55:49   Notes recorded by Patty Louis MD on 6/12/2023 at 12:32 PM CDT  Normal head CT.     Final result by Albin Michaud MD (06/11/23 16:55:49)                   Impression:      No evidence of acute intracranial pathology, specifically no acute hemorrhage or major vascular distribution infarct.    Electronically signed by resident: Sandy Arevalo  Date:    06/11/2023  Time:    16:30    Electronically signed by: Albin Michaud  Date:    06/11/2023  Time:    16:55               Narrative:    EXAMINATION:  CT HEAD WITHOUT CONTRAST    CLINICAL HISTORY:  Headache, new or worsening (Age >= 50y);    TECHNIQUE:  Low dose axial CT images obtained throughout the head without the use of intravenous contrast.  Axial, sagittal and coronal reconstructions were performed.    COMPARISON:  CT head 11/27/2020, 08/30/2020    FINDINGS:  Ventricles and sulci are stable.  No hydrocephalus.    The brain parenchyma maintains normal attenuation.  No parenchymal mass effect, intraparenchymal hemorrhage, edema, or acute territorial infarct.    No extra-axial blood or fluid collections.    No displaced calvarial fracture.  The visualized sinuses and mastoid air cells are essentially clear.                                       Medications - No data to display  Medical Decision Making:   History:   Old Medical Records: I decided to obtain old medical records.  Old Records Summarized: records from clinic visits and records from previous admission(s).  Initial Assessment:   Patient here with multiple complaints, headache, back pain, constipation    1. Abdominal pain.  DDx for abdominal pain would include  cholecystitis, appendicitis, diverticulitis, pancreatitis, cholangitis, hepatitis, bowel perforation or obstruction, volvulus, gastritis, renal colic, vascular catastrophe like AAA, aortic dissection, or mesenteric ischemia.  Other less dangerous problems such as gastroparesis, cyclic vomiting syndrome, and constipation are also possible.    -This patient does need emergent laboratory testing given age and comorbidities  -Medications to be administered: will hold off for now  -Emergent imaging is indicated at this time:  CT abd/pelvis  -Disposition: uncertain pending work-up    2. HA, uncertain if this is new for pt?  Benign neuro exam but given age, will get CT head to further assess.  Clinical Tests:   Lab Tests: Ordered and Reviewed  Radiological Study: Reviewed and Ordered  Medical Tests: Ordered and Reviewed                        Clinical Impression:   Final diagnoses:  [K59.00] Constipation, unspecified constipation type (Primary)        ED Disposition Condition    Discharge Stable          ED Prescriptions       Medication Sig Dispense Start Date End Date Auth. Provider    senna-docusate 8.6-50 mg (SENNA WITH DOCUSATE SODIUM) 8.6-50 mg per tablet Take 2 tablets by mouth once daily. 30 tablet 6/11/2023 -- Kellen Johns MD          Follow-up Information       Follow up With Specialties Details Why Contact Info    Patty Louis MD Internal Medicine Call in 1 day  [address]  [place], [address]  [facility] [id number]  [phone]               Kellen Johns MD  06/16/23 1746

## 2023-06-16 ENCOUNTER — OFFICE VISIT (OUTPATIENT)
Dept: SPORTS MEDICINE | Facility: CLINIC | Age: 68
End: 2023-06-16
Payer: MEDICARE

## 2023-06-16 VITALS
HEIGHT: 64 IN | DIASTOLIC BLOOD PRESSURE: 83 MMHG | BODY MASS INDEX: 26.29 KG/M2 | HEART RATE: 87 BPM | WEIGHT: 154 LBS | SYSTOLIC BLOOD PRESSURE: 120 MMHG

## 2023-06-16 DIAGNOSIS — M25.562 CHRONIC PAIN OF LEFT KNEE: Primary | ICD-10-CM

## 2023-06-16 DIAGNOSIS — M17.12 OSTEOARTHRITIS OF LEFT KNEE, UNSPECIFIED OSTEOARTHRITIS TYPE: ICD-10-CM

## 2023-06-16 DIAGNOSIS — G89.29 CHRONIC PAIN OF LEFT KNEE: Primary | ICD-10-CM

## 2023-06-16 PROCEDURE — 20610 DRAIN/INJ JOINT/BURSA W/O US: CPT | Mod: LT,S$GLB,, | Performed by: PHYSICIAN ASSISTANT

## 2023-06-16 PROCEDURE — 99999 PR PBB SHADOW E&M-EST. PATIENT-LVL IV: CPT | Mod: PBBFAC,,, | Performed by: PHYSICIAN ASSISTANT

## 2023-06-16 PROCEDURE — 20610 PR DRAIN/INJECT LARGE JOINT/BURSA: ICD-10-PCS | Mod: LT,S$GLB,, | Performed by: PHYSICIAN ASSISTANT

## 2023-06-16 PROCEDURE — 99499 NO LOS: ICD-10-PCS | Mod: S$GLB,,, | Performed by: PHYSICIAN ASSISTANT

## 2023-06-16 PROCEDURE — 99999 PR PBB SHADOW E&M-EST. PATIENT-LVL IV: ICD-10-PCS | Mod: PBBFAC,,, | Performed by: PHYSICIAN ASSISTANT

## 2023-06-16 PROCEDURE — 99499 UNLISTED E&M SERVICE: CPT | Mod: S$GLB,,, | Performed by: PHYSICIAN ASSISTANT

## 2023-06-20 NOTE — PROGRESS NOTES
Patient is here for follow up of Left knee arthritis. Pt is requesting Euflexxa injection #2.  PMFH reviewed per encounter record. Has failed other conservative modalities including NSAIDS, activity modification, weight loss.    She has not received these injections in the past.     The prior shots were tolerated well.    PHYSICAL EXAMINATION:     General: The patient is alert and oriented x 3. Mood is pleasant.   Observation of ears, eyes and nose reveals no gross abnormalities. No   labored breathing observed.     No signs of infection or adverse reaction to knee.        Euflexxa Injection Procedure #2 Left     A time out was performed, including verification of patient ID, procedure, site and side, availability of information and equipment, review of safety issues, and agreement with consent, the procedure site was marked.    The patient was prepped aseptically with povidone-iodine swabsticks. A diagnostic and therapeutic injection of 2cc Euflexxa was given under sterile technique using a 21.5g x 1.5 needle from the anterolateral aspect of the left knee Joint with the patient in the seated position.     Sunitha Pillai had no adverse reactions to the medication. Pain decreased. female was instructed to apply ice to the joint for 20 minutes and avoid strenuous activities for 24-36 hours following the injection. female was warned of possible blood pressure changes during that time. Following that time, female can resume activities as prior to the injection.    female was reminded to call the clinic immediately for any adverse side effects as explained in clinic today.    RTC in 1 week for injection #3  All patients questions were answered. Patient was advised to call us with any concerns or questions.

## 2023-06-30 ENCOUNTER — OFFICE VISIT (OUTPATIENT)
Dept: SPORTS MEDICINE | Facility: CLINIC | Age: 68
End: 2023-06-30
Payer: MEDICARE

## 2023-06-30 VITALS
HEIGHT: 64 IN | HEART RATE: 88 BPM | DIASTOLIC BLOOD PRESSURE: 81 MMHG | SYSTOLIC BLOOD PRESSURE: 140 MMHG | WEIGHT: 154 LBS | BODY MASS INDEX: 26.29 KG/M2

## 2023-06-30 DIAGNOSIS — M25.562 CHRONIC PAIN OF LEFT KNEE: Primary | ICD-10-CM

## 2023-06-30 DIAGNOSIS — M17.12 OSTEOARTHRITIS OF LEFT KNEE, UNSPECIFIED OSTEOARTHRITIS TYPE: ICD-10-CM

## 2023-06-30 DIAGNOSIS — G89.29 CHRONIC PAIN OF LEFT KNEE: Primary | ICD-10-CM

## 2023-06-30 PROCEDURE — 99999 PR PBB SHADOW E&M-EST. PATIENT-LVL IV: CPT | Mod: PBBFAC,,, | Performed by: PHYSICIAN ASSISTANT

## 2023-06-30 PROCEDURE — 99499 NO LOS: ICD-10-PCS | Mod: S$GLB,,, | Performed by: PHYSICIAN ASSISTANT

## 2023-06-30 PROCEDURE — 99999 PR PBB SHADOW E&M-EST. PATIENT-LVL IV: ICD-10-PCS | Mod: PBBFAC,,, | Performed by: PHYSICIAN ASSISTANT

## 2023-06-30 PROCEDURE — 20610 PR DRAIN/INJECT LARGE JOINT/BURSA: ICD-10-PCS | Mod: LT,S$GLB,, | Performed by: PHYSICIAN ASSISTANT

## 2023-06-30 PROCEDURE — 99499 UNLISTED E&M SERVICE: CPT | Mod: S$GLB,,, | Performed by: PHYSICIAN ASSISTANT

## 2023-06-30 PROCEDURE — 20610 DRAIN/INJ JOINT/BURSA W/O US: CPT | Mod: LT,S$GLB,, | Performed by: PHYSICIAN ASSISTANT

## 2023-06-30 NOTE — PROGRESS NOTES
Patient is here for follow up of Left knee arthritis. Pt is requesting Euflexxa injection #3.  PMFH reviewed per encounter record. Has failed other conservative modalities including NSAIDS, activity modification, weight loss.    She has not received these injections in the past.     The prior shots were tolerated well.    PHYSICAL EXAMINATION:     General: The patient is alert and oriented x 3. Mood is pleasant.   Observation of ears, eyes and nose reveals no gross abnormalities. No   labored breathing observed.     No signs of infection or adverse reaction to knee.        Euflexxa Injection Procedure #3 Left     A time out was performed, including verification of patient ID, procedure, site and side, availability of information and equipment, review of safety issues, and agreement with consent, the procedure site was marked.    The patient was prepped aseptically with povidone-iodine swabsticks. A diagnostic and therapeutic injection of 2cc Euflexxa was given under sterile technique using a 21.5g x 1.5 needle from the anterolateral aspect of the left knee Joint with the patient in the seated position.     Sunitha Pillai had no adverse reactions to the medication. Pain decreased. female was instructed to apply ice to the joint for 20 minutes and avoid strenuous activities for 24-36 hours following the injection. female was warned of possible blood pressure changes during that time. Following that time, female can resume activities as prior to the injection.    female was reminded to call the clinic immediately for any adverse side effects as explained in clinic today.    RTC as needed.     She is having 1 week of pain in her right knee. Right knee DJD is slightly worse then left knee. If does not get better then can consider euflexxa vs CSI.     All patients questions were answered. Patient was advised to call us with any concerns or questions.

## 2023-07-11 ENCOUNTER — TELEPHONE (OUTPATIENT)
Dept: ENDOSCOPY | Facility: HOSPITAL | Age: 68
End: 2023-07-11
Payer: MEDICARE

## 2023-07-11 NOTE — TELEPHONE ENCOUNTER
Contacted the patient to reschedule an endoscopy procedure(s) colonoscopy. The patient did not answer the call and left a voice message requesting a call back.

## 2023-08-01 ENCOUNTER — HOSPITAL ENCOUNTER (EMERGENCY)
Facility: HOSPITAL | Age: 68
Discharge: HOME | End: 2023-08-01
Attending: EMERGENCY MEDICINE
Payer: MEDICARE

## 2023-08-01 VITALS
OXYGEN SATURATION: 100 % | SYSTOLIC BLOOD PRESSURE: 158 MMHG | WEIGHT: 154 LBS | DIASTOLIC BLOOD PRESSURE: 74 MMHG | HEART RATE: 71 BPM | TEMPERATURE: 98 F | RESPIRATION RATE: 16 BRPM | BODY MASS INDEX: 26.29 KG/M2 | HEIGHT: 64 IN

## 2023-08-01 DIAGNOSIS — M25.511 RIGHT SHOULDER PAIN: ICD-10-CM

## 2023-08-01 DIAGNOSIS — M25.561 RIGHT KNEE PAIN: Primary | ICD-10-CM

## 2023-08-01 DIAGNOSIS — Z87.39 HISTORY OF RHEUMATOID ARTHRITIS: ICD-10-CM

## 2023-08-01 LAB
ALBUMIN SERPL BCP-MCNC: 3.6 G/DL (ref 3.5–5.2)
ALP SERPL-CCNC: 83 U/L (ref 55–135)
ALT SERPL W/O P-5'-P-CCNC: 8 U/L (ref 10–44)
ANION GAP SERPL CALC-SCNC: 7 MMOL/L (ref 8–16)
ANISOCYTOSIS BLD QL SMEAR: SLIGHT
AST SERPL-CCNC: 15 U/L (ref 10–40)
BASOPHILS # BLD AUTO: 0.01 K/UL (ref 0–0.2)
BASOPHILS NFR BLD: 0.2 % (ref 0–1.9)
BILIRUB SERPL-MCNC: 0.7 MG/DL (ref 0.1–1)
BUN SERPL-MCNC: 11 MG/DL (ref 8–23)
CALCIUM SERPL-MCNC: 9.8 MG/DL (ref 8.7–10.5)
CHLORIDE SERPL-SCNC: 109 MMOL/L (ref 95–110)
CO2 SERPL-SCNC: 23 MMOL/L (ref 23–29)
CREAT SERPL-MCNC: 0.9 MG/DL (ref 0.5–1.4)
CRP SERPL-MCNC: 0.8 MG/L (ref 0–8.2)
DIFFERENTIAL METHOD: ABNORMAL
EOSINOPHIL # BLD AUTO: 0 K/UL (ref 0–0.5)
EOSINOPHIL NFR BLD: 0 % (ref 0–8)
ERYTHROCYTE [DISTWIDTH] IN BLOOD BY AUTOMATED COUNT: 14.5 % (ref 11.5–14.5)
ERYTHROCYTE [SEDIMENTATION RATE] IN BLOOD BY PHOTOMETRIC METHOD: 30 MM/HR (ref 0–36)
EST. GFR  (NO RACE VARIABLE): >60 ML/MIN/1.73 M^2
GLUCOSE SERPL-MCNC: 93 MG/DL (ref 70–110)
HCT VFR BLD AUTO: 32.4 % (ref 37–48.5)
HGB BLD-MCNC: 11 G/DL (ref 12–16)
HYPOCHROMIA BLD QL SMEAR: ABNORMAL
IMM GRANULOCYTES # BLD AUTO: 0.01 K/UL (ref 0–0.04)
IMM GRANULOCYTES NFR BLD AUTO: 0.2 % (ref 0–0.5)
LYMPHOCYTES # BLD AUTO: 1.7 K/UL (ref 1–4.8)
LYMPHOCYTES NFR BLD: 28 % (ref 18–48)
MCH RBC QN AUTO: 32.1 PG (ref 27–31)
MCHC RBC AUTO-ENTMCNC: 34 G/DL (ref 32–36)
MCV RBC AUTO: 95 FL (ref 82–98)
MONOCYTES # BLD AUTO: 0.7 K/UL (ref 0.3–1)
MONOCYTES NFR BLD: 10.6 % (ref 4–15)
NEUTROPHILS # BLD AUTO: 3.7 K/UL (ref 1.8–7.7)
NEUTROPHILS NFR BLD: 61 % (ref 38–73)
NRBC BLD-RTO: 0 /100 WBC
OVALOCYTES BLD QL SMEAR: ABNORMAL
PLATELET # BLD AUTO: 148 K/UL (ref 150–450)
PLATELET BLD QL SMEAR: ABNORMAL
PMV BLD AUTO: 9.3 FL (ref 9.2–12.9)
POIKILOCYTOSIS BLD QL SMEAR: SLIGHT
POLYCHROMASIA BLD QL SMEAR: ABNORMAL
POTASSIUM SERPL-SCNC: 3.8 MMOL/L (ref 3.5–5.1)
PROT SERPL-MCNC: 7.1 G/DL (ref 6–8.4)
RBC # BLD AUTO: 3.43 M/UL (ref 4–5.4)
SODIUM SERPL-SCNC: 139 MMOL/L (ref 136–145)
TROPONIN I SERPL DL<=0.01 NG/ML-MCNC: 0.01 NG/ML (ref 0–0.03)
WBC # BLD AUTO: 6.11 K/UL (ref 3.9–12.7)

## 2023-08-01 PROCEDURE — 84484 ASSAY OF TROPONIN QUANT: CPT | Mod: HCNC | Performed by: PHYSICIAN ASSISTANT

## 2023-08-01 PROCEDURE — 99285 EMERGENCY DEPT VISIT HI MDM: CPT | Mod: 25,HCNC

## 2023-08-01 PROCEDURE — 85025 COMPLETE CBC W/AUTO DIFF WBC: CPT | Mod: HCNC | Performed by: PHYSICIAN ASSISTANT

## 2023-08-01 PROCEDURE — 85652 RBC SED RATE AUTOMATED: CPT | Mod: HCNC | Performed by: PHYSICIAN ASSISTANT

## 2023-08-01 PROCEDURE — 86140 C-REACTIVE PROTEIN: CPT | Mod: HCNC | Performed by: PHYSICIAN ASSISTANT

## 2023-08-01 PROCEDURE — 93005 ELECTROCARDIOGRAM TRACING: CPT | Mod: HCNC

## 2023-08-01 PROCEDURE — 63600175 PHARM REV CODE 636 W HCPCS: Mod: HCNC | Performed by: PHYSICIAN ASSISTANT

## 2023-08-01 PROCEDURE — 93010 ELECTROCARDIOGRAM REPORT: CPT | Mod: HCNC,,, | Performed by: INTERNAL MEDICINE

## 2023-08-01 PROCEDURE — 80053 COMPREHEN METABOLIC PANEL: CPT | Mod: HCNC | Performed by: PHYSICIAN ASSISTANT

## 2023-08-01 PROCEDURE — 93010 EKG 12-LEAD: ICD-10-PCS | Mod: HCNC,,, | Performed by: INTERNAL MEDICINE

## 2023-08-01 PROCEDURE — 96374 THER/PROPH/DIAG INJ IV PUSH: CPT | Mod: HCNC

## 2023-08-01 RX ORDER — KETOROLAC TROMETHAMINE 30 MG/ML
10 INJECTION, SOLUTION INTRAMUSCULAR; INTRAVENOUS
Status: DISCONTINUED | OUTPATIENT
Start: 2023-08-01 | End: 2023-08-01

## 2023-08-01 RX ORDER — TRAMADOL HYDROCHLORIDE 50 MG/1
50 TABLET ORAL EVERY 4 HOURS PRN
Qty: 15 TABLET | Refills: 0 | Status: SHIPPED | OUTPATIENT
Start: 2023-08-01 | End: 2023-08-04

## 2023-08-01 RX ORDER — KETOROLAC TROMETHAMINE 30 MG/ML
10 INJECTION, SOLUTION INTRAMUSCULAR; INTRAVENOUS
Status: COMPLETED | OUTPATIENT
Start: 2023-08-01 | End: 2023-08-01

## 2023-08-01 RX ADMIN — KETOROLAC TROMETHAMINE 10 MG: 30 INJECTION, SOLUTION INTRAMUSCULAR; INTRAVENOUS at 10:08

## 2023-08-01 NOTE — ED TRIAGE NOTES
Pt complains of right knee pain times three weeks  and right shoulder pain times one week Rates her  right knee pain a 5/10 and right shoulder pain a 6/10

## 2023-08-01 NOTE — ED PROVIDER NOTES
"Encounter Date: 8/1/2023       History     Chief Complaint   Patient presents with    Multiple Complaints     Patient presents for evaluation of right shoulder and knee pain. Denies trauma or injury. Hx of arthritis.     Patient is a 67-year-old female. She has a past medical history significant for hypertension, diabetes, CAD, osteoarthritis, and rheumatoid arthritis. She presents to the ER for an emergent evaluation due to increased pain of her right shoulder and right knee for the past 1-2 weeks. 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 She states, "my arthritis is really getting bad. I just had my left knee drained and they warned me that I would need to get my right knee treated pretty soon. I just couldn't make it to work today". She takes Tylenol and is using diclofenac gel and/or topical Lidoderm patches for pain, but states that her pain is currently uncontrolled. She states that her shoulder pain is worse with right arm movement. She states that her right knee pain is worse with walking and weight-bearing. She denies any chest pain or shortness of breath. She denies any additional symptoms or concerns.    Review of patient's allergies indicates:  No Known Allergies  Past Medical History:   Diagnosis Date    Acute coronary syndrome 09/23/2018    Adjustment disorder     Anxiety     Arthritis     Cancer of breast 09/03/2020    Malignant neoplasm of lower-inner quadrant of left breast in female, estrogen receptor positive    Cholelithiasis with acute cholecystitis 03/05/2021    Coronary artery disease     Depression     Diabetes mellitus type I     Genetic testing of female 09/2020    [employer] with AXIN2 and POLE variants of uncertain significance    GERD (gastroesophageal reflux disease)     Hepatitis B core antibody positive 3/9/2023    Negative sAg, suggests previous exposure but no " chronic/active Hep B. At risk for reactivation with any immunosuppression medication, steroids, chemo, etc.      Hypertension     Vertigo 2019    Vitamin D deficiency 2021     Past Surgical History:   Procedure Laterality Date    BREAST BIOPSY Left 2022    Left punch biopsy; Unremarkable keratinized skin with intradermal hematoma     SECTION      INJECTION FOR SENTINEL NODE IDENTIFICATION Left 2020    Procedure: INJECTION, FOR SENTINEL NODE IDENTIFICATION;  Surgeon: Isabel Moulton MD;  Location: [facility] OR;  Service: General;  Laterality: Left;    INSERTION OF TUNNELED CENTRAL VENOUS CATHETER (CVC) WITH SUBCUTANEOUS PORT N/A 2020    Procedure: INSERTION, PORT-A-CATH;  Surgeon: Hardeep Martin MD;  Location: [facility] CATH LAB;  Service: Radiology;  Laterality: N/A;    LAPAROSCOPIC CHOLECYSTECTOMY N/A 2021    Procedure: CHOLECYSTECTOMY, LAPAROSCOPIC;  Surgeon: Buster Son MD;  Location[facility] OR [address] [place] [address];  Service: General;  Laterality: N/A;    MASTECTOMY, PARTIAL Left 2020    Procedure: MASTECTOMY, PARTIAL-w/reflector;  Surgeon: Isabel Moulton MD;  Location: [facility] OR;  Service: General;  Laterality: Left;    SENTINEL LYMPH NODE BIOPSY Left 2020    Procedure: BIOPSY, LYMPH NODE, SENTINEL;  Surgeon: Isabel Moulton MD;  Location: [facility] OR;  Service: General;  Laterality: Left;     Family History   Problem Relation Age of Onset    Hypertension Mother     Hyperlipidemia Mother     Diabetes Mother     Heart disease Mother     Colon polyps Mother     Aneurysm Father     Diabetes Sister     Hypertension Sister     Heart disease Brother     Diabetes Brother     Hypertension Brother     Cancer Brother         brother German with prostate cancer; brother Cooper with throat cancer    Cervical cancer Daughter     Anxiety disorder Daughter     Depression Daughter     Anxiety disorder Daughter     Depression Daughter     Breast cancer Maternal  Aunt     Cancer Maternal Aunt         unknown origin    Cancer Paternal Aunt         unknown origin    Breast cancer Paternal Aunt     Prostate cancer Maternal Cousin     Leukemia Maternal Cousin     Prostate cancer Maternal Cousin     Breast cancer Other     Colon cancer Neg Hx     Ovarian cancer Neg Hx      Social History     Tobacco Use    Smoking status: Never    Smokeless tobacco: Never   Substance Use Topics    Alcohol use: Never    Drug use: No     Review of Systems   Constitutional:  Negative for chills and fever.   HENT:  Negative for congestion, rhinorrhea and sore throat.    Eyes:  Negative for visual disturbance.   Respiratory:  Negative for cough and shortness of breath.    Cardiovascular:  Negative for chest pain and palpitations.   Gastrointestinal:  Negative for abdominal pain, diarrhea, nausea and vomiting.   Genitourinary:  Negative for decreased urine volume, difficulty urinating, dysuria and urgency.   Musculoskeletal:  Positive for arthralgias, gait problem and joint swelling. Negative for back pain and neck pain.   Skin:  Negative for color change and rash.   Allergic/Immunologic: Negative for immunocompromised state.   Neurological:  Negative for dizziness, syncope, facial asymmetry, light-headedness, numbness and headaches.   Psychiatric/Behavioral:  Negative for confusion.        Physical Exam     Initial Vitals [08/01/23 0727]   BP Pulse Resp Temp SpO2   (!) 177/79 82 16 98.3 °F (36.8 °C) 99 %      MAP       --         Physical Exam    Nursing note and vitals reviewed.  Constitutional: She appears well-developed and well-nourished. She is not diaphoretic.   HENT:   Head: Normocephalic.   Eyes: Conjunctivae are normal.   Neck: Neck supple.   Cardiovascular:  Normal rate and intact distal pulses.           Pulmonary/Chest: No respiratory distress.   Abdominal: Abdomen is soft. There is no abdominal tenderness. There is no rebound.   Musculoskeletal:      Cervical back: Neck  supple.      Comments: Right shoulder pain with active/passive range of motion.  Right knee pain with flexion.     Neurological: She is alert and oriented to person, place, and time. She has normal strength. No sensory deficit.   Skin: Skin is warm and dry. No rash noted. No erythema.   Psychiatric: She has a normal mood and affect.       ED Course   Procedures  Labs Reviewed   CBC W/ AUTO DIFFERENTIAL - Abnormal; Notable for the following components:       Result Value    RBC 3.43 (*)     Hemoglobin 11.0 (*)     Hematocrit 32.4 (*)     MCH 32.1 (*)     Platelets 148 (*)     All other components within normal limits    Narrative:     add on troponin per Bi Chang RN/Rama Donald MD order#   [id number] 08/01/2023 @ 09:14    COMPREHENSIVE METABOLIC PANEL - Abnormal; Notable for the following components:    ALT 8 (*)     Anion Gap 7 (*)     All other components within normal limits   SEDIMENTATION RATE    Narrative:     add on troponin per Bi Chang RN/Rama Donald MD order#   [id number] 08/01/2023 @ 09:14    C-REACTIVE PROTEIN   TROPONIN I   TROPONIN I    Narrative:     add on troponin per Bi Chang RN/Rama Donald MD order#   [id number] 08/01/2023 @ 09:14      Results for orders placed or performed during the hospital encounter of 08/01/23   CBC auto differential   Result Value Ref Range    WBC 6.11 3.90 - 12.70 K/uL    RBC 3.43 (L) 4.00 - 5.40 M/uL    Hemoglobin 11.0 (L) 12.0 - 16.0 g/dL    Hematocrit 32.4 (L) 37.0 - 48.5 %    MCV 95 82 - 98 fL    MCH 32.1 (H) 27.0 - 31.0 pg    MCHC 34.0 32.0 - 36.0 g/dL    RDW 14.5 11.5 - 14.5 %    Platelets 148 (L) 150 - 450 K/uL    MPV 9.3 9.2 - 12.9 fL    Immature Granulocytes 0.2 0.0 - 0.5 %    Gran # (ANC) 3.7 1.8 - 7.7 K/uL    Immature Grans (Abs) 0.01 0.00 - 0.04 K/uL    Lymph # 1.7 1.0 - 4.8 K/uL    Mono # 0.7 0.3 - 1.0 K/uL    Eos # 0.0 0.0 - 0.5 K/uL    Baso # 0.01 0.00 - 0.20 K/uL    nRBC 0 0 /100 WBC    Gran % 61.0 38.0 - 73.0 %    Lymph % 28.0 18.0 -  48.0 %    Mono % 10.6 4.0 - 15.0 %    Eosinophil % 0.0 0.0 - 8.0 %    Basophil % 0.2 0.0 - 1.9 %    Platelet Estimate Appears normal     Aniso Slight     Poik Slight     Poly Occasional     Hypo Occasional     Ovalocytes Occasional     Differential Method Automated    Comprehensive metabolic panel   Result Value Ref Range    Sodium 139 136 - 145 mmol/L    Potassium 3.8 3.5 - 5.1 mmol/L    Chloride 109 95 - 110 mmol/L    CO2 23 23 - 29 mmol/L    Glucose 93 70 - 110 mg/dL    BUN 11 8 - 23 mg/dL    Creatinine 0.9 0.5 - 1.4 mg/dL    Calcium 9.8 8.7 - 10.5 mg/dL    Total Protein 7.1 6.0 - 8.4 g/dL    Albumin 3.6 3.5 - 5.2 g/dL    Total Bilirubin 0.7 0.1 - 1.0 mg/dL    Alkaline Phosphatase 83 55 - 135 U/L    AST 15 10 - 40 U/L    ALT 8 (L) 10 - 44 U/L    eGFR >60.0 >60 mL/min/1.73 m^2    Anion Gap 7 (L) 8 - 16 mmol/L   Sedimentation rate   Result Value Ref Range    Sed Rate 30 0 - 36 mm/Hr   C-reactive protein   Result Value Ref Range    CRP 0.8 0.0 - 8.2 mg/L   Troponin I   Result Value Ref Range    Troponin I 0.012 0.000 - 0.026 ng/mL     *Note: Due to a large number of results and/or encounters for the requested time period, some results have not been displayed. A complete set of results can be found in Results Review.            Imaging Results              X-Ray Knee 3 View Right (Final result)  Result time 08/01/23 09:10:14      Final result by Mik Darby III, MD (08/01/23 09:10:14)                   Narrative:    EXAMINATION:  XR KNEE 3 VIEW RIGHT    CLINICAL HISTORY:  Pain in right knee    FINDINGS:  Knee complete three views right: There is baseline DJD.  There are spurs on the patella.  No acute fracture dislocation bone destruction seen.      Electronically signed by: Mik Darby MD  Date:    08/01/2023  Time:    09:10                                     X-Ray Shoulder Trauma Right (Final result)  Result time 08/01/23 09:10:42      Final result by Mik Darby III, MD (08/01/23 09:10:42)                    Narrative:    EXAMINATION:  XR SHOULDER TRAUMA 3 VIEW RIGHT    CLINICAL HISTORY:  Pain in right shoulder    FINDINGS:  Shoulder trauma three views right: There is baseline DJD.  No fracture, dislocation, or bone destruction seen.      Electronically signed by: Mik Darby MD  Date:    08/01/2023  Time:    09:10                                     X-Ray Chest AP Portable (Final result)  Result time 08/01/23 09:11:02      Final result by Mik Darby III, MD (08/01/23 09:11:02)                   Impression:      No acute process seen.      Electronically signed by: Mik Darby MD  Date:    08/01/2023  Time:    09:11               Narrative:    EXAMINATION:  XR CHEST AP PORTABLE    CLINICAL HISTORY:  Pain in right shoulder    FINDINGS:  Heart size is normal.  Lungs are clear.  The bones are noncontributory.                                       Medications   ketorolac injection 9.999 mg (9.999 mg Intravenous Given 8/1/23 1033)     Medical Decision Making:   History:   Old Medical Records: I decided to obtain old medical records.  Initial Assessment:   67-year-old female with history of cardiovascular disease and rheumatoid arthritis presenting to the ER for an urgent evaluation due to an acute exacerbation of chronic right shoulder pain and chronic right knee pain  Differential Diagnosis:   Chronic pain, osteoarthritis, rheumatoid flare, bursitis, tendonitis, degenerative joint disease, ACS, etc.  Clinical Tests:   Lab Tests: Ordered and Reviewed  Radiological Study: Ordered and Reviewed  Medical Tests: Ordered and Reviewed  ED Management:  Vital signs reviewed, benign  Records reviewed   I discussed the case in detail with the ER attending physician  Right shoulder and right knee pain consistent with musculoskeletal etiology, reproducible on exam with movement and palpation  ACS workup was completed in an abundance of caution due to high risk and known history of CAD with complaints of acute right  shoulder pain  ACS workup unremarkable  Plain radiographs right shoulder and right knee were obtained demonstrating chronic degenerative changes but no acute findings  Analgesic provided  Patient instructed to follow up with her primary care physician in the next 2 days   Patient advised to follow up with her rheumatologist and orthopedist at next available   Patient instructed to return to the ER promptly if unimproved or if worse in any way    Additional MDM:     EKG: I have independently interpreted EKG(s) - see notes.     X-Rays: I have independently interpreted X-Ray(s) - see notes., Chest X-Ray - Independent Interpretation - Heart size normal, Lungs clear, No acute abnormality.          Attending Attestation:     Physician Attestation Statement for NP/PA:   I have directed and reviewed the workup performed by the PA/NP.  I performed the substantive portion of the medical decision making.     Other NP/PA Attestation Additions:    History of Present Illness: This is a 67-year-old female who has multiple medical comorbidities including listed both osteoarthritis and rheumatoid arthritis, diabetes, hypertension, and coronary artery disease.  She is left-hand dominant.  She is been having increasing discomfort in her right shoulder and right knee over the past approximate 2 weeks.  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  It is improved with rest.  She is had other problems with her left knee in the past in other joints.  She is had no fevers or chills.  She is no actual chest pain or back pain.  No shortness of breath.  No abdominal pain or vomiting.   Physical Exam: VS upon arrival:  158/74; 71; 16; 100% room air; afebrile.  Consititutional: Pt is awake, alert, and oriented x 4. Does not appear to be in any jc distress unless she is trying to arrange that right shoulder right knee.  HEENT: PERRL; EOMI; nares patent; op clear; mmm without  lesions.  Neck: Supple with good ROM.  No midline tenderness to the neck.  CV: Normal rate; regular rhythm; no mrg. Heart sounds normal. No peripheral edema. 2+ radials bilateral and symmetric.  Respiratory: CTA bilaterally with no focal rales, ronchi, or wheezes.  GI: Abdomen soft, NTND. No rebound. No guarding. BS normal.  Completely benign abdominal exam.  MSK: No long bone deformities; no focal joint swellings.  Focusing on the patient's right shoulder the patient is hesitant to range the shoulder actively through full abduction or forward flexion.  She states that increases her discomfort.  She is also tender when palpating the joint particularly anteriorly and superiorly.  She has no obvious effusion or swelling.  She has no obvious skin changes there.  She has median, radial, and ulnar nerves intact to motor and sensation distally.  Focusing on the right knee.  The patient's extensor mechanism is intact.  She is able to fully actively range the knee although this does cause her increased discomfort.  She is not necessarily tender to palpation throughout that knee though and there is no effusion present or overlying skin changes.  Distally she has 5/5 plantar flexion and dorsiflexion and she also has positive EHL.  Neuro:  As above.  Skin: No skin lesions or rash.  Including no overlying skin changes to the right shoulder or right knee.      Medical Decision Making: The patient's right shoulder and right knee complaints seem consistent with her past osteoarthritic type complaints.  There is no actual joint effusion that concerns me for a septic joint in either area.  There is no overlying redness or warmth either.  Distally she is neurovascularly intact.  We did obtain films to make sure that there was no bony abnormality present or malalignment.  Also, given the shoulder pain the patient initially had some cardiac workup performed as well although my suspicion for this is of low likelihood.  ECG,  independently reviewed and interpreted by me, reveals sinus rhythm with nonspecific st/t wave changes not concerning for acute ischemia or infarction. The patient's labs were reassuring with a normal WBC, ESR, and CRP as well as a normal CMP and normal troponin. CXR, independently reviewed and interpreted by me and interpreted by radiology, reveals no infiltrate, effusion, fracture or ptx. Films of the right shoulder and right knee, also independently reviewed and interpreted by me and interpreted by radiology, reveal DJD but no acute fracture or malalignment.   This was all very reassuring. I feel that, especially in light of the concomittant knee pain, this is arthralgia due to her osteoarthritis. She has no concerns for septic joint. This is not consistent with ACS. The patient can be discharged home with supportive care measures for her discomfort and follow up with sports med/ortho/PCP.   ED Diagnosis:  1. Acute exacerbation of arthralgias in right shoulder and right knee, complicated by known osteoarthritis/RA.   2. Above diagnosis also complicated by known CAD, DM, and htn.   *I performed the substantive portion of the MDM on this patient.*                        Clinical Impression:   Final diagnoses:  [M25.561] Right knee pain (Primary)  [M25.511] Right shoulder pain  [Z87.39] History of rheumatoid arthritis        ED Disposition Condition    Discharge Stable          ED Prescriptions       Medication Sig Dispense Start Date End Date Auth. Provider    traMADoL (ULTRAM) 50 mg tablet Take 1 tablet (50 mg total) by mouth every 4 (four) hours as needed for Pain. 15 tablet 8/1/2023 8/4/2023 James Galvan PA-C          Follow-up Information       Follow up With Specialties Details Why Contact Info    Patty Louis MD Internal Medicine Schedule an appointment as soon as possible for a visit in 2 days follow up with your primary care physician within the next 1-2 days for re-evaluation and further  management [address]  [address], [address]  [facility] [address]  [phone]      Meño Ivy III, PA-C Sports Medicine  follow up with your sports medicine clinic at next available [address]  [facility] [address]  [phone]      Vidhya Dai MD Rheumatology  follow up with your rheumatologist at next available [address]  [address]  [facility] [id number] [phone]      [facility] - Emergency Dept Emergency Medicine  return to the ER promptly if unimproved or if worse in any way [address] [facility] [id number] [phone]             Arely Galvan PA-C  08/01/23 2147       Rama Donald MD  08/03/23 1131

## 2023-08-01 NOTE — ED NOTES
Pt identifiers Sunitha Pillai were checked and are correct  LOC: The patient is awake, alert, aware of environment with an appropriate affect. Oriented x4, speaking appropriately  APPEARANCE: Pt rates right knee pain a 5/10 and right shoulder pain a 6/10 resting comfortably, in no acute distress, pt is clean and well groomed, clothing properly fastened  SKIN: Skin warm, dry and intact, normal skin turgor, moist mucus membranes  RESPIRATORY: Airway is open and patent, respirations are spontaneous, even and unlabored, normal effort and rate  CARDIAC: Normal rate and rhythm, no peripheral edema noted, capillary refill < 3 seconds, bilateral radial pulses 2+  ABDOMEN: Soft, nontender, nondistended.  NEUROLOGIC: PERRL, facial expression is symmetrical, patient moving all extremities spontaneously, normal sensation in all extremities when touched with a finger.  Follows all commands appropriately  MUSCULOSKELETAL: swelling noted to left knee

## 2023-08-03 ENCOUNTER — PATIENT OUTREACH (OUTPATIENT)
Dept: ADMINISTRATIVE | Facility: HOSPITAL | Age: 68
End: 2023-08-03
Payer: MEDICARE

## 2023-08-03 NOTE — PROGRESS NOTES
Health Maintenance Due   Topic Date Due    High Dose Statin  Never done    Eye Exam  03/25/2020    Colorectal Cancer Screening  06/19/2022    COVID-19 Vaccine (5 - Pfizer series) 04/20/2023     Chart reviewed.   Immunizations: Reconciled  Orders placed: N/A  Upcoming appts to satisfy STEVEN topics: N/A

## 2023-08-14 ENCOUNTER — OFFICE VISIT (OUTPATIENT)
Dept: HEMATOLOGY/ONCOLOGY | Facility: CLINIC | Age: 68
End: 2023-08-14
Attending: INTERNAL MEDICINE
Payer: MEDICARE

## 2023-08-14 VITALS
SYSTOLIC BLOOD PRESSURE: 144 MMHG | OXYGEN SATURATION: 100 % | RESPIRATION RATE: 18 BRPM | DIASTOLIC BLOOD PRESSURE: 70 MMHG | BODY MASS INDEX: 26.61 KG/M2 | WEIGHT: 155 LBS | HEART RATE: 71 BPM | TEMPERATURE: 98 F

## 2023-08-14 DIAGNOSIS — Z17.0 MALIGNANT NEOPLASM OF LOWER-INNER QUADRANT OF LEFT BREAST IN FEMALE, ESTROGEN RECEPTOR POSITIVE: Primary | ICD-10-CM

## 2023-08-14 DIAGNOSIS — Z12.31 ENCOUNTER FOR SCREENING MAMMOGRAM FOR HIGH-RISK PATIENT: ICD-10-CM

## 2023-08-14 DIAGNOSIS — C50.312 MALIGNANT NEOPLASM OF LOWER-INNER QUADRANT OF LEFT BREAST IN FEMALE, ESTROGEN RECEPTOR POSITIVE: Primary | ICD-10-CM

## 2023-08-14 PROCEDURE — 1125F AMNT PAIN NOTED PAIN PRSNT: CPT | Mod: HCNC,CPTII,S$GLB, | Performed by: INTERNAL MEDICINE

## 2023-08-14 PROCEDURE — 1101F PR PT FALLS ASSESS DOC 0-1 FALLS W/OUT INJ PAST YR: ICD-10-PCS | Mod: HCNC,CPTII,S$GLB, | Performed by: INTERNAL MEDICINE

## 2023-08-14 PROCEDURE — 99999 PR PBB SHADOW E&M-EST. PATIENT-LVL IV: CPT | Mod: PBBFAC,HCNC,, | Performed by: INTERNAL MEDICINE

## 2023-08-14 PROCEDURE — 1101F PT FALLS ASSESS-DOCD LE1/YR: CPT | Mod: HCNC,CPTII,S$GLB, | Performed by: INTERNAL MEDICINE

## 2023-08-14 PROCEDURE — 1159F MED LIST DOCD IN RCRD: CPT | Mod: HCNC,CPTII,S$GLB, | Performed by: INTERNAL MEDICINE

## 2023-08-14 PROCEDURE — 3077F SYST BP >= 140 MM HG: CPT | Mod: HCNC,CPTII,S$GLB, | Performed by: INTERNAL MEDICINE

## 2023-08-14 PROCEDURE — 3078F DIAST BP <80 MM HG: CPT | Mod: HCNC,CPTII,S$GLB, | Performed by: INTERNAL MEDICINE

## 2023-08-14 PROCEDURE — 3066F PR DOCUMENTATION OF TREATMENT FOR NEPHROPATHY: ICD-10-PCS | Mod: HCNC,CPTII,S$GLB, | Performed by: INTERNAL MEDICINE

## 2023-08-14 PROCEDURE — 3061F PR NEG MICROALBUMINURIA RESULT DOCUMENTED/REVIEW: ICD-10-PCS | Mod: HCNC,CPTII,S$GLB, | Performed by: INTERNAL MEDICINE

## 2023-08-14 PROCEDURE — 3061F NEG MICROALBUMINURIA REV: CPT | Mod: HCNC,CPTII,S$GLB, | Performed by: INTERNAL MEDICINE

## 2023-08-14 PROCEDURE — 1159F PR MEDICATION LIST DOCUMENTED IN MEDICAL RECORD: ICD-10-PCS | Mod: HCNC,CPTII,S$GLB, | Performed by: INTERNAL MEDICINE

## 2023-08-14 PROCEDURE — 3044F HG A1C LEVEL LT 7.0%: CPT | Mod: HCNC,CPTII,S$GLB, | Performed by: INTERNAL MEDICINE

## 2023-08-14 PROCEDURE — 3008F BODY MASS INDEX DOCD: CPT | Mod: HCNC,CPTII,S$GLB, | Performed by: INTERNAL MEDICINE

## 2023-08-14 PROCEDURE — 99999 PR PBB SHADOW E&M-EST. PATIENT-LVL IV: ICD-10-PCS | Mod: PBBFAC,HCNC,, | Performed by: INTERNAL MEDICINE

## 2023-08-14 PROCEDURE — 3008F PR BODY MASS INDEX (BMI) DOCUMENTED: ICD-10-PCS | Mod: HCNC,CPTII,S$GLB, | Performed by: INTERNAL MEDICINE

## 2023-08-14 PROCEDURE — 1125F PR PAIN SEVERITY QUANTIFIED, PAIN PRESENT: ICD-10-PCS | Mod: HCNC,CPTII,S$GLB, | Performed by: INTERNAL MEDICINE

## 2023-08-14 PROCEDURE — 99213 OFFICE O/P EST LOW 20 MIN: CPT | Mod: HCNC,S$GLB,, | Performed by: INTERNAL MEDICINE

## 2023-08-14 PROCEDURE — 3066F NEPHROPATHY DOC TX: CPT | Mod: HCNC,CPTII,S$GLB, | Performed by: INTERNAL MEDICINE

## 2023-08-14 PROCEDURE — 3044F PR MOST RECENT HEMOGLOBIN A1C LEVEL <7.0%: ICD-10-PCS | Mod: HCNC,CPTII,S$GLB, | Performed by: INTERNAL MEDICINE

## 2023-08-14 PROCEDURE — 4010F PR ACE/ARB THEARPY RXD/TAKEN: ICD-10-PCS | Mod: HCNC,CPTII,S$GLB, | Performed by: INTERNAL MEDICINE

## 2023-08-14 PROCEDURE — 3288F PR FALLS RISK ASSESSMENT DOCUMENTED: ICD-10-PCS | Mod: HCNC,CPTII,S$GLB, | Performed by: INTERNAL MEDICINE

## 2023-08-14 PROCEDURE — 3077F PR MOST RECENT SYSTOLIC BLOOD PRESSURE >= 140 MM HG: ICD-10-PCS | Mod: HCNC,CPTII,S$GLB, | Performed by: INTERNAL MEDICINE

## 2023-08-14 PROCEDURE — 3288F FALL RISK ASSESSMENT DOCD: CPT | Mod: HCNC,CPTII,S$GLB, | Performed by: INTERNAL MEDICINE

## 2023-08-14 PROCEDURE — 3078F PR MOST RECENT DIASTOLIC BLOOD PRESSURE < 80 MM HG: ICD-10-PCS | Mod: HCNC,CPTII,S$GLB, | Performed by: INTERNAL MEDICINE

## 2023-08-14 PROCEDURE — 4010F ACE/ARB THERAPY RXD/TAKEN: CPT | Mod: HCNC,CPTII,S$GLB, | Performed by: INTERNAL MEDICINE

## 2023-08-14 PROCEDURE — 99213 PR OFFICE/OUTPT VISIT, EST, LEVL III, 20-29 MIN: ICD-10-PCS | Mod: HCNC,S$GLB,, | Performed by: INTERNAL MEDICINE

## 2023-08-14 RX ORDER — TRAMADOL HYDROCHLORIDE 50 MG/1
50 TABLET ORAL
COMMUNITY
End: 2023-09-15 | Stop reason: SDUPTHER

## 2023-08-14 NOTE — PROGRESS NOTES
Subjective:      Patient ID: Sunitha Pillai is a 68 y.o. female.    Chief Complaint: No chief complaint on file.        HPI:  68-year-old female, patient of Dr. Moulton, who returns for follow-up of a diagnosis of left breast cancer.  She is on adjuvant letrozole therapy.    Today she reports that she has had some occasional left breast pain and some occasional pain in the volar surface of her left forearm.    She also has some occasional shortness of breath which may occur at rest or with activity.  Appetite has been chronically low.  Bowel function has been soft/ watery at times.        She has had mild anemia with hemoglobin ranging from 10-11-1/2 grams/deciliter.  MCV normal, WBC and plts normal.  Iron studies on February 3, 2023 showed iron of 44, TIBC 330, iron saturation 13%, ferritin 368, B12 normal.  Her anemia seems to have started since her chemotherapy at the end of 2020.  HGB was 12 in June, 11 gm/dl in August of 2023.    Current history:   Screening mammogram on July 30th, 2020 showed a focal asymmetry in the lower outer portion left breast.  Biopsy on August 25, 2020 showed high-grade infiltrating ductal carcinoma (histologic grade 3, nuclear grade 3, mitotic index 3).  Tumor was 95% ER positive in 95% NV positive, HER2 was 2+ but negative by fish.  Ki-67 was 95%.    On September 17, 2020 lumpectomy and sentinel lymph node biopsy were performed.  That pathology showed a 1.5 cm infiltrating carcinoma with a single negative sentinel lymph node.  Margins were negative.  Final pathological stage T1c N0, stage I A.      Oncotype risk score - 29-high risk.    She received 4 cycles of adjuvant Taxotere and Cytoxan from 11/24/20 to 1/24/21.    Completed radiation 5/6/21.     Letrozole started in May 2021.    Review of Systems   Constitutional:  Negative for activity change, appetite change (Decreased but unchanged), fever and unexpected weight change.   HENT: Negative.     Respiratory:  Positive for  shortness of breath.    Cardiovascular:  Negative for chest pain.   Gastrointestinal:  Negative for constipation.   Genitourinary: Negative.    Musculoskeletal:  Positive for arthralgias (Left knee).        Left arm pain   Neurological: Negative.    Psychiatric/Behavioral: Negative.       Objective:   Physical Exam   Vitals reviewed.  Constitutional: She is oriented to person, place, and time. She appears well-developed. No distress.   Cardiovascular:  Normal rate and regular rhythm.            Pulmonary/Chest: Effort normal and breath sounds normal. No respiratory distress. She has no wheezes. She has no rales. Right breast exhibits no mass, no nipple discharge and no skin change. Left breast exhibits skin change. Left breast exhibits no mass, no nipple discharge and no tenderness.       Abdominal: Soft. She exhibits no mass. There is no abdominal tenderness.   Musculoskeletal: Lymphadenopathy:      Cervical: No cervical adenopathy.     Neurological: She is alert and oriented to person, place, and time.   Skin: No rash noted.     Psychiatric: Her behavior is normal. Thought content normal.     Assessment:      1. Malignant neoplasm of lower-inner quadrant of left breast in female, estrogen receptor positive        Plan:      RTC 3 M.Mammograms  Increase fiber in diet.   Route Chart for Scheduling    Med Onc Chart Routing      Follow up with physician 3 months.   Follow up with [facility]    Infusion scheduling note    Injection scheduling note    Labs None   Scheduling:  Preferred lab:  Lab interval:     Imaging Mammogram      Pharmacy appointment    Other referrals     No additional referrals needed

## 2023-08-17 ENCOUNTER — LAB VISIT (OUTPATIENT)
Dept: LAB | Facility: HOSPITAL | Age: 68
End: 2023-08-17
Attending: INTERNAL MEDICINE
Payer: MEDICARE

## 2023-08-17 ENCOUNTER — OFFICE VISIT (OUTPATIENT)
Dept: INTERNAL MEDICINE | Facility: CLINIC | Age: 68
End: 2023-08-17
Payer: MEDICARE

## 2023-08-17 VITALS
OXYGEN SATURATION: 98 % | BODY MASS INDEX: 25.44 KG/M2 | WEIGHT: 149 LBS | SYSTOLIC BLOOD PRESSURE: 148 MMHG | DIASTOLIC BLOOD PRESSURE: 80 MMHG | HEART RATE: 72 BPM | HEIGHT: 64 IN

## 2023-08-17 DIAGNOSIS — E11.65 TYPE 2 DIABETES MELLITUS WITH HYPERGLYCEMIA, WITH LONG-TERM CURRENT USE OF INSULIN: ICD-10-CM

## 2023-08-17 DIAGNOSIS — I10 ESSENTIAL HYPERTENSION: Primary | Chronic | ICD-10-CM

## 2023-08-17 DIAGNOSIS — D64.9 ANEMIA, UNSPECIFIED TYPE: ICD-10-CM

## 2023-08-17 DIAGNOSIS — Z12.11 SCREENING FOR COLON CANCER: ICD-10-CM

## 2023-08-17 DIAGNOSIS — Z01.419 NORMAL GYNECOLOGIC EXAMINATION: ICD-10-CM

## 2023-08-17 DIAGNOSIS — I10 ESSENTIAL HYPERTENSION: Chronic | ICD-10-CM

## 2023-08-17 DIAGNOSIS — E55.9 MILD VITAMIN D DEFICIENCY: ICD-10-CM

## 2023-08-17 DIAGNOSIS — Z79.4 TYPE 2 DIABETES MELLITUS WITH HYPERGLYCEMIA, WITH LONG-TERM CURRENT USE OF INSULIN: ICD-10-CM

## 2023-08-17 DIAGNOSIS — M05.79 RHEUMATOID ARTHRITIS INVOLVING MULTIPLE SITES WITH POSITIVE RHEUMATOID FACTOR: ICD-10-CM

## 2023-08-17 DIAGNOSIS — Z85.3 HX OF BREAST CANCER: ICD-10-CM

## 2023-08-17 DIAGNOSIS — R06.00 DYSPNEA, UNSPECIFIED TYPE: ICD-10-CM

## 2023-08-17 DIAGNOSIS — M25.511 ACUTE PAIN OF RIGHT SHOULDER: ICD-10-CM

## 2023-08-17 LAB
25(OH)D3+25(OH)D2 SERPL-MCNC: 37 NG/ML (ref 30–96)
BASOPHILS # BLD AUTO: 0 K/UL (ref 0–0.2)
BASOPHILS NFR BLD: 0 % (ref 0–1.9)
DIFFERENTIAL METHOD: ABNORMAL
EOSINOPHIL # BLD AUTO: 0 K/UL (ref 0–0.5)
EOSINOPHIL NFR BLD: 0 % (ref 0–8)
ERYTHROCYTE [DISTWIDTH] IN BLOOD BY AUTOMATED COUNT: 14.1 % (ref 11.5–14.5)
ESTIMATED AVG GLUCOSE: 97 MG/DL (ref 68–131)
FERRITIN SERPL-MCNC: 278 NG/ML (ref 20–300)
HBA1C MFR BLD: 5 % (ref 4–5.6)
HCT VFR BLD AUTO: 36.6 % (ref 37–48.5)
HGB BLD-MCNC: 11.8 G/DL (ref 12–16)
IMM GRANULOCYTES # BLD AUTO: 0.01 K/UL (ref 0–0.04)
IMM GRANULOCYTES NFR BLD AUTO: 0.2 % (ref 0–0.5)
IRON SERPL-MCNC: 59 UG/DL (ref 30–160)
LYMPHOCYTES # BLD AUTO: 1.4 K/UL (ref 1–4.8)
LYMPHOCYTES NFR BLD: 31.8 % (ref 18–48)
MCH RBC QN AUTO: 32 PG (ref 27–31)
MCHC RBC AUTO-ENTMCNC: 32.2 G/DL (ref 32–36)
MCV RBC AUTO: 99 FL (ref 82–98)
MONOCYTES # BLD AUTO: 0.3 K/UL (ref 0.3–1)
MONOCYTES NFR BLD: 6.2 % (ref 4–15)
NEUTROPHILS # BLD AUTO: 2.8 K/UL (ref 1.8–7.7)
NEUTROPHILS NFR BLD: 61.8 % (ref 38–73)
NRBC BLD-RTO: 0 /100 WBC
PLATELET # BLD AUTO: 161 K/UL (ref 150–450)
PMV BLD AUTO: 9.7 FL (ref 9.2–12.9)
RBC # BLD AUTO: 3.69 M/UL (ref 4–5.4)
SATURATED IRON: 18 % (ref 20–50)
TOTAL IRON BINDING CAPACITY: 334 UG/DL (ref 250–450)
TRANSFERRIN SERPL-MCNC: 226 MG/DL (ref 200–375)
TSH SERPL DL<=0.005 MIU/L-ACNC: 1.59 UIU/ML (ref 0.4–4)
WBC # BLD AUTO: 4.53 K/UL (ref 3.9–12.7)

## 2023-08-17 PROCEDURE — 99214 PR OFFICE/OUTPT VISIT, EST, LEVL IV, 30-39 MIN: ICD-10-PCS | Mod: HCNC,S$GLB,, | Performed by: INTERNAL MEDICINE

## 2023-08-17 PROCEDURE — 3077F PR MOST RECENT SYSTOLIC BLOOD PRESSURE >= 140 MM HG: ICD-10-PCS | Mod: HCNC,CPTII,S$GLB, | Performed by: INTERNAL MEDICINE

## 2023-08-17 PROCEDURE — 3288F PR FALLS RISK ASSESSMENT DOCUMENTED: ICD-10-PCS | Mod: HCNC,CPTII,S$GLB, | Performed by: INTERNAL MEDICINE

## 2023-08-17 PROCEDURE — 3066F PR DOCUMENTATION OF TREATMENT FOR NEPHROPATHY: ICD-10-PCS | Mod: HCNC,CPTII,S$GLB, | Performed by: INTERNAL MEDICINE

## 2023-08-17 PROCEDURE — 1159F PR MEDICATION LIST DOCUMENTED IN MEDICAL RECORD: ICD-10-PCS | Mod: HCNC,CPTII,S$GLB, | Performed by: INTERNAL MEDICINE

## 2023-08-17 PROCEDURE — 1126F PR PAIN SEVERITY QUANTIFIED, NO PAIN PRESENT: ICD-10-PCS | Mod: HCNC,CPTII,S$GLB, | Performed by: INTERNAL MEDICINE

## 2023-08-17 PROCEDURE — 1126F AMNT PAIN NOTED NONE PRSNT: CPT | Mod: HCNC,CPTII,S$GLB, | Performed by: INTERNAL MEDICINE

## 2023-08-17 PROCEDURE — 3008F PR BODY MASS INDEX (BMI) DOCUMENTED: ICD-10-PCS | Mod: HCNC,CPTII,S$GLB, | Performed by: INTERNAL MEDICINE

## 2023-08-17 PROCEDURE — 3288F FALL RISK ASSESSMENT DOCD: CPT | Mod: HCNC,CPTII,S$GLB, | Performed by: INTERNAL MEDICINE

## 2023-08-17 PROCEDURE — 82306 VITAMIN D 25 HYDROXY: CPT | Mod: HCNC | Performed by: INTERNAL MEDICINE

## 2023-08-17 PROCEDURE — 3044F PR MOST RECENT HEMOGLOBIN A1C LEVEL <7.0%: ICD-10-PCS | Mod: HCNC,CPTII,S$GLB, | Performed by: INTERNAL MEDICINE

## 2023-08-17 PROCEDURE — 3008F BODY MASS INDEX DOCD: CPT | Mod: HCNC,CPTII,S$GLB, | Performed by: INTERNAL MEDICINE

## 2023-08-17 PROCEDURE — 99999 PR PBB SHADOW E&M-EST. PATIENT-LVL V: ICD-10-PCS | Mod: PBBFAC,HCNC,, | Performed by: INTERNAL MEDICINE

## 2023-08-17 PROCEDURE — 1159F MED LIST DOCD IN RCRD: CPT | Mod: HCNC,CPTII,S$GLB, | Performed by: INTERNAL MEDICINE

## 2023-08-17 PROCEDURE — 1101F PT FALLS ASSESS-DOCD LE1/YR: CPT | Mod: HCNC,CPTII,S$GLB, | Performed by: INTERNAL MEDICINE

## 2023-08-17 PROCEDURE — 83036 HEMOGLOBIN GLYCOSYLATED A1C: CPT | Mod: HCNC | Performed by: INTERNAL MEDICINE

## 2023-08-17 PROCEDURE — 99214 OFFICE O/P EST MOD 30 MIN: CPT | Mod: HCNC,S$GLB,, | Performed by: INTERNAL MEDICINE

## 2023-08-17 PROCEDURE — 3079F DIAST BP 80-89 MM HG: CPT | Mod: HCNC,CPTII,S$GLB, | Performed by: INTERNAL MEDICINE

## 2023-08-17 PROCEDURE — 83540 ASSAY OF IRON: CPT | Mod: HCNC | Performed by: INTERNAL MEDICINE

## 2023-08-17 PROCEDURE — 3061F PR NEG MICROALBUMINURIA RESULT DOCUMENTED/REVIEW: ICD-10-PCS | Mod: HCNC,CPTII,S$GLB, | Performed by: INTERNAL MEDICINE

## 2023-08-17 PROCEDURE — 99999 PR PBB SHADOW E&M-EST. PATIENT-LVL V: CPT | Mod: PBBFAC,HCNC,, | Performed by: INTERNAL MEDICINE

## 2023-08-17 PROCEDURE — 3079F PR MOST RECENT DIASTOLIC BLOOD PRESSURE 80-89 MM HG: ICD-10-PCS | Mod: HCNC,CPTII,S$GLB, | Performed by: INTERNAL MEDICINE

## 2023-08-17 PROCEDURE — 85025 COMPLETE CBC W/AUTO DIFF WBC: CPT | Mod: HCNC | Performed by: INTERNAL MEDICINE

## 2023-08-17 PROCEDURE — 3061F NEG MICROALBUMINURIA REV: CPT | Mod: HCNC,CPTII,S$GLB, | Performed by: INTERNAL MEDICINE

## 2023-08-17 PROCEDURE — 4010F ACE/ARB THERAPY RXD/TAKEN: CPT | Mod: HCNC,CPTII,S$GLB, | Performed by: INTERNAL MEDICINE

## 2023-08-17 PROCEDURE — 3044F HG A1C LEVEL LT 7.0%: CPT | Mod: HCNC,CPTII,S$GLB, | Performed by: INTERNAL MEDICINE

## 2023-08-17 PROCEDURE — 84443 ASSAY THYROID STIM HORMONE: CPT | Mod: HCNC | Performed by: INTERNAL MEDICINE

## 2023-08-17 PROCEDURE — 36415 COLL VENOUS BLD VENIPUNCTURE: CPT | Mod: HCNC | Performed by: INTERNAL MEDICINE

## 2023-08-17 PROCEDURE — 82728 ASSAY OF FERRITIN: CPT | Mod: HCNC | Performed by: INTERNAL MEDICINE

## 2023-08-17 PROCEDURE — 4010F PR ACE/ARB THEARPY RXD/TAKEN: ICD-10-PCS | Mod: HCNC,CPTII,S$GLB, | Performed by: INTERNAL MEDICINE

## 2023-08-17 PROCEDURE — 3077F SYST BP >= 140 MM HG: CPT | Mod: HCNC,CPTII,S$GLB, | Performed by: INTERNAL MEDICINE

## 2023-08-17 PROCEDURE — 1101F PR PT FALLS ASSESS DOC 0-1 FALLS W/OUT INJ PAST YR: ICD-10-PCS | Mod: HCNC,CPTII,S$GLB, | Performed by: INTERNAL MEDICINE

## 2023-08-17 PROCEDURE — 84466 ASSAY OF TRANSFERRIN: CPT | Mod: HCNC | Performed by: INTERNAL MEDICINE

## 2023-08-17 PROCEDURE — 3066F NEPHROPATHY DOC TX: CPT | Mod: HCNC,CPTII,S$GLB, | Performed by: INTERNAL MEDICINE

## 2023-08-17 RX ORDER — DICLOFENAC SODIUM 50 MG/1
50 TABLET, DELAYED RELEASE ORAL 2 TIMES DAILY
Qty: 20 TABLET | Refills: 0 | Status: SHIPPED | OUTPATIENT
Start: 2023-08-17 | End: 2023-10-17

## 2023-08-17 RX ORDER — CANDESARTAN 16 MG/1
32 TABLET ORAL DAILY
Qty: 180 TABLET | Refills: 3
Start: 2023-08-17 | End: 2023-10-17

## 2023-08-17 RX ORDER — LOSARTAN POTASSIUM 100 MG/1
100 TABLET ORAL DAILY
Qty: 90 TABLET | Refills: 3 | Status: SHIPPED | OUTPATIENT
Start: 2023-08-17 | End: 2024-08-16

## 2023-08-17 NOTE — PROGRESS NOTES
Subjective:       Patient ID: Sunitha Pillai is a 68 y.o. female.    Chief Complaint: Follow-up    Follow-up  Pertinent negatives include no abdominal pain, chest pain (arm pain or jaw pain), headaches, nausea or vomiting. Cough: mild.  Pt having significant pain in her R shoulder and R knee - went to ED for it. Xrays are unrevealing.  No CP.  Mild dyspnea and fatigue.Review of Systems   Respiratory:  Positive for shortness of breath (PND or orthopnea). Cough: mild.   Cardiovascular:  Negative for chest pain (arm pain or jaw pain).   Gastrointestinal:  Negative for abdominal pain, diarrhea, nausea and vomiting.   Genitourinary:  Negative for dysuria.   Neurological:  Negative for seizures, syncope and headaches.       Objective:      Physical Exam  Constitutional:       General: She is not in acute distress.     Appearance: She is well-developed.   HENT:      Head: Normocephalic.   Eyes:      Pupils: Pupils are equal, round, and reactive to light.   Neck:      Thyroid: No thyromegaly.      Vascular: No JVD.   Cardiovascular:      Rate and Rhythm: Normal rate and regular rhythm.      Heart sounds: Normal heart sounds. No murmur heard.     No friction rub. No gallop.   Pulmonary:      Effort: Pulmonary effort is normal.      Breath sounds: Normal breath sounds. No wheezing or rales.   Abdominal:      General: Bowel sounds are normal. There is no distension.      Palpations: Abdomen is soft. There is no mass.      Tenderness: There is no abdominal tenderness. There is no guarding or rebound.   Musculoskeletal:      Cervical back: Neck supple.   Lymphadenopathy:      Cervical: No cervical adenopathy.   Skin:     General: Skin is warm and dry.   Neurological:      Mental Status: She is alert and oriented to person, place, and time.      Deep Tendon Reflexes: Reflexes are normal and symmetric.   Psychiatric:         Behavior: Behavior normal.         Thought Content: Thought content normal.         Judgment:  Judgment normal.         Assessment:       1. Essential hypertension    2. Rheumatoid arthritis involving multiple sites with positive rheumatoid factor    3. Type 2 diabetes mellitus with hyperglycemia, with long-term current use of insulin    4. Hx of breast cancer    5. Acute pain of right shoulder    6. Anemia, unspecified type    7. Mild vitamin D deficiency    8. Dyspnea, unspecified type    9. Screening for colon cancer    10. Normal gynecologic examination        Plan:   Essential hypertension  -     TSH; Future; Expected date: 08/17/2023  Uncontrolled - double candesartan if unable to afford switch to losartan 100mg daily - elevated BP may be from pain today  Rheumatoid arthritis involving multiple sites with positive rheumatoid factor  Pt declines therapy at this time  Type 2 diabetes mellitus with hyperglycemia, with long-term current use of insulin  -     Hemoglobin A1C; Future; Expected date: 08/17/2023    Hx of breast cancer  Follows closely with heme Onc  Acute pain of right shoulder  -     Ambulatory referral/consult to Sports Medicine; Future; Expected date: 08/24/2023    Anemia, unspecified type  -     CBC Auto Differential; Future; Expected date: 08/17/2023  -     Iron and TIBC; Future; Expected date: 08/17/2023  -     Ferritin; Future; Expected date: 08/17/2023    Mild vitamin D deficiency  -     Vitamin D; Future; Expected date: 08/17/2023    Dyspnea, unspecified type  -     Complete PFT w/ bronchodilator; Future    Screening for colon cancer  -     Ambulatory referral/consult to Endo Procedure ; Future; Expected date: 08/18/2023    Normal gynecologic examination  -     Ambulatory referral/consult to Gynecology; Future; Expected date: 08/24/2023    Other orders  -     losartan (COZAAR) 100 MG tablet; Take 1 tablet (100 mg total) by mouth once daily.  Dispense: 90 tablet; Refill: 3  -     candesartan (ATACAND) 16 MG tablet; Take 2 tablets (32 mg total) by mouth once daily.  Dispense: 180  tablet; Refill: 3  -     diclofenac (VOLTAREN) 50 MG EC tablet; Take 1 tablet (50 mg total) by mouth 2 (two) times daily.  Dispense: 20 tablet; Ref:0  - for shoulder pain

## 2023-09-05 ENCOUNTER — TELEPHONE (OUTPATIENT)
Dept: INTERNAL MEDICINE | Facility: CLINIC | Age: 68
End: 2023-09-05
Payer: MEDICARE

## 2023-09-05 NOTE — TELEPHONE ENCOUNTER
----- Message from Bertha Jonas sent at 9/5/2023 10:43 AM CDT -----  Contact: [phone]  Pt wants a call to make sure he appt will change to the [facility] location when her PCP moves. Please call pt back.

## 2023-09-15 ENCOUNTER — HOSPITAL ENCOUNTER (EMERGENCY)
Facility: HOSPITAL | Age: 68
Discharge: HOME | End: 2023-09-15
Attending: EMERGENCY MEDICINE
Payer: MEDICARE

## 2023-09-15 VITALS
WEIGHT: 140 LBS | SYSTOLIC BLOOD PRESSURE: 187 MMHG | DIASTOLIC BLOOD PRESSURE: 85 MMHG | TEMPERATURE: 98 F | RESPIRATION RATE: 18 BRPM | OXYGEN SATURATION: 95 % | HEART RATE: 67 BPM | BODY MASS INDEX: 24.03 KG/M2

## 2023-09-15 DIAGNOSIS — M25.50 ARTHRALGIA, UNSPECIFIED JOINT: ICD-10-CM

## 2023-09-15 DIAGNOSIS — M19.90 OSTEOARTHRITIS, UNSPECIFIED OSTEOARTHRITIS TYPE, UNSPECIFIED SITE: Primary | ICD-10-CM

## 2023-09-15 PROCEDURE — 99284 EMERGENCY DEPT VISIT MOD MDM: CPT | Mod: HCNC

## 2023-09-15 PROCEDURE — 25000003 PHARM REV CODE 250: Mod: HCNC | Performed by: EMERGENCY MEDICINE

## 2023-09-15 RX ORDER — NAPROXEN 500 MG/1
500 TABLET ORAL 2 TIMES DAILY PRN
Qty: 20 TABLET | Refills: 0 | Status: SHIPPED | OUTPATIENT
Start: 2023-09-15 | End: 2023-10-17

## 2023-09-15 RX ORDER — NAPROXEN 500 MG/1
500 TABLET ORAL
Status: COMPLETED | OUTPATIENT
Start: 2023-09-15 | End: 2023-09-15

## 2023-09-15 RX ORDER — HYALURONATE SODIUM 20 MG/2 ML
SYRINGE (ML) INTRAARTICULAR
COMMUNITY
Start: 2023-06-30 | End: 2023-10-17

## 2023-09-15 RX ORDER — TRAMADOL HYDROCHLORIDE 50 MG/1
50 TABLET ORAL EVERY 6 HOURS PRN
Qty: 15 EACH | Refills: 0 | Status: SHIPPED | OUTPATIENT
Start: 2023-09-15 | End: 2023-09-19

## 2023-09-15 RX ORDER — CAPSAICIN 0 G/G
1 CREAM TOPICAL 2 TIMES DAILY PRN
Qty: 42.5 G | Refills: 0 | Status: SHIPPED | OUTPATIENT
Start: 2023-09-15

## 2023-09-15 RX ORDER — TRIAMCINOLONE ACETONIDE 40 MG/ML
INJECTION, SUSPENSION INTRA-ARTICULAR; INTRAMUSCULAR
COMMUNITY
Start: 2023-05-05 | End: 2023-10-17

## 2023-09-15 RX ORDER — METFORMIN HYDROCHLORIDE 1000 MG/1
TABLET ORAL
COMMUNITY
End: 2023-09-22 | Stop reason: SDUPTHER

## 2023-09-15 RX ORDER — CANDESARTAN 8 MG/1
TABLET ORAL
COMMUNITY
End: 2023-10-17

## 2023-09-15 RX ADMIN — NAPROXEN 500 MG: 500 TABLET ORAL at 09:09

## 2023-09-15 NOTE — ED TRIAGE NOTES
Patient is a 68 year old female that presents to the ED via personal vehcile with c/o right shoulder and knee pain and swelling. Pt states hx of arthritis.

## 2023-09-15 NOTE — DISCHARGE INSTRUCTIONS
Take naproxen twice a day as needed for pain.  If your pain persists, you may use tramadol.  Tramadol is an opioid and will make you drowsy so do not take it before driving.  You can also try using capsaicin cream to see if that helps your symptoms.  I recommend you see sports medicine for potential injections in the joint to decrease the pain and swelling.  Return to the ER for any worsening symptoms.

## 2023-09-15 NOTE — ED PROVIDER NOTES
Encounter Date: 9/15/2023       History     Chief Complaint   Patient presents with    Arm Pain     R arm and leg pain started x 1 week ago. Denies injury. Denies numbness or tingling. Hx arthritis in shoulder.     67 yo W with pmhx IDDM, HTN, GERD, OA, CAD/ACS, anxiety, depression, breast CA, obesity presents with a chief complaint of arthralgias.  Patient notes arthralgias in bilateral shoulders and bilateral knees.  She has suffered from pain in bilateral knees and right shoulder before.  She now has pain in the left shoulder accompanying all the other joints. Pain is similar in quality to previous arthritis flares which she believes is the etiology this time as well.  Her last ED encounter was approximately 6 weeks ago on August 1st at which time she was evaluated for ACS, septic arthritis, and other etiologies of her arthralgias but workup was unrevealing and she was discharged on course of tramadol.  She notes that pain was initially controlled with the use of tramadol but not completely.  She has been using an unknown topical cream without relief.  She has tried Tylenol and Aleve at times.  She denies any injuries.  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  She is previously received injection in the knee with improvement in symptoms.  She has not contacted her PCP or sports medicine this time.  No weakness or numbness in extremities.  No chest pain or shortness of breath.  No fevers.        Review of patient's allergies indicates:  No Known Allergies  Past Medical History:   Diagnosis Date    Acute coronary syndrome 09/23/2018    Adjustment disorder     Anxiety     Arthritis     Cancer of breast 09/03/2020    Malignant neoplasm of lower-inner quadrant of left breast in female, estrogen receptor positive    Cholelithiasis with acute cholecystitis 03/05/2021    Coronary artery disease     Depression     Diabetes mellitus type I     Genetic testing of  female 2020    myRisk via [email or web address] with AXIN2 and POLE variants of uncertain significance    GERD (gastroesophageal reflux disease)     Hepatitis B core antibody positive 3/9/2023    Negative sAg, suggests previous exposure but no chronic/active Hep B. At risk for reactivation with any immunosuppression medication, steroids, chemo, etc.      Hypertension     Vertigo 2019    Vitamin D deficiency 2021     Past Surgical History:   Procedure Laterality Date    BREAST BIOPSY Left 2022    Left punch biopsy; Unremarkable keratinized skin with intradermal hematoma     SECTION      INJECTION FOR SENTINEL NODE IDENTIFICATION Left 2020    Procedure: INJECTION, FOR SENTINEL NODE IDENTIFICATION;  Surgeon: Isabel Moulton MD;  Location: [facility] OR;  Service: General;  Laterality: Left;    INSERTION OF TUNNELED CENTRAL VENOUS CATHETER (CVC) WITH SUBCUTANEOUS PORT N/A 2020    Procedure: INSERTION, PORT-A-CATH;  Surgeon: Hardeep Martin MD;  Location: [facility] CATH LAB;  Service: Radiology;  Laterality: N/A;    LAPAROSCOPIC CHOLECYSTECTOMY N/A 2021    Procedure: CHOLECYSTECTOMY, LAPAROSCOPIC;  Surgeon: Buster Son MD;  Location: [facility] OR [address] [place] [address];  Service: General;  Laterality: N/A;    MASTECTOMY, PARTIAL Left 2020    Procedure: MASTECTOMY, PARTIAL-w/reflector;  Surgeon: Isabel Moulton MD;  Location: [facility] OR;  Service: General;  Laterality: Left;    SENTINEL LYMPH NODE BIOPSY Left 2020    Procedure: BIOPSY, LYMPH NODE, SENTINEL;  Surgeon: Isabel Moulton MD;  Location: [facility] OR;  Service: General;  Laterality: Left;     Family History   Problem Relation Age of Onset    Hypertension Mother     Hyperlipidemia Mother     Diabetes Mother     Heart disease Mother     Colon polyps Mother     Aneurysm Father     Diabetes Sister     Hypertension Sister     Heart disease Brother     Diabetes Brother     Hypertension Brother     Cancer Brother         brother German with  prostate cancer; brother Cooper with throat cancer    Cervical cancer Daughter     Anxiety disorder Daughter     Depression Daughter     Anxiety disorder Daughter     Depression Daughter     Breast cancer Maternal Aunt     Cancer Maternal Aunt         unknown origin    Cancer Paternal Aunt         unknown origin    Breast cancer Paternal Aunt     Prostate cancer Maternal Cousin     Leukemia Maternal Cousin     Prostate cancer Maternal Cousin     Breast cancer Other     Colon cancer Neg Hx     Ovarian cancer Neg Hx      Social History     Tobacco Use    Smoking status: Never    Smokeless tobacco: Never   Substance Use Topics    Alcohol use: Never    Drug use: No     Review of Systems    Physical Exam     Initial Vitals [09/15/23 0833]   BP Pulse Resp Temp SpO2   (!) 188/89 77 16 98 °F (36.7 °C) 95 %      MAP       --         Physical Exam    Nursing note and vitals reviewed.  Constitutional: She appears well-developed and well-nourished. She is not diaphoretic. No distress.   HENT:   Head: Normocephalic.   Cardiovascular:  Normal rate.           Pulmonary/Chest: No stridor. No respiratory distress.   Musculoskeletal:      Comments: Abduction >90 degrees in shoulders is limited secondary to pain; patient with bony tenderness to shoulders bilaterally but no step-offs and no pain with passive range of motion  Full active range of motion of bilateral knees  Small effusion to right knee  No calf asymmetry or tenderness  No left axillary lymphadenopathy     Neurological: She is alert.   Skin: Skin is warm and dry.   Psychiatric: Thought content normal.         ED Course   Procedures  Labs Reviewed - No data to display       Imaging Results    None          Medications   naproxen tablet 500 mg (500 mg Oral Given 9/15/23 0937)     Medical Decision Making  67 yo W with pmhx IDDM, HTN, GERD, OA, CAD/ACS, anxiety, depression, breast CA, obesity presents with a chief complaint of arthralgias.      My differential includes, but  is not limited to:  Osteoarthritis flare, overuse injury    No acute trauma, discrete bony tenderness, doubt fracture and will defer x-rays.  Full active range of motion of joints, no fevers, doubt septic arthritis.  Patient with reproducible symptoms, no chest pain, symptoms present bilaterally and diffusely throughout joints, doubt ACS.    Discussed management options with the patient's.  Given IDDM, will defer steroids and patient declined them anyway.  She was provided with refills for naproxen and tramadol with tramadol precautions.  Also script for capsaicin.  Referral to Sports Medicine.  Patient comfortable with plan.  Return precautions.    Risk  OTC drugs.  Prescription drug management.                               Clinical Impression:   Final diagnoses:  [M19.90] Osteoarthritis, unspecified osteoarthritis type, unspecified site (Primary)  [M25.50] Arthralgia, unspecified joint        ED Disposition Condition    Discharge Stable          ED Prescriptions       Medication Sig Dispense Start Date End Date Auth. Provider    traMADoL (ULTRAM) 50 mg tablet Take 1 tablet (50 mg total) by mouth every 6 (six) hours as needed for Pain. 15 each 9/15/2023 9/19/2023 Humberto Liu MD    naproxen (NAPROSYN) 500 MG tablet Take 1 tablet (500 mg total) by mouth 2 (two) times daily as needed (pain). 20 tablet 9/15/2023 -- Humberto Liu MD    capsaicin 0.1 % Crea Apply 1 application  topically 2 (two) times daily as needed (pain). 42.5 g 9/15/2023 -- Humberto Liu MD          Follow-up Information       Follow up With Specialties Details Why Contact Info Additional Information    [facility] [address] Physical Medicine and Rehabilitation Schedule an appointment as soon as possible for a visit   [address]  [facility] [address] [phone] [facility] - Main Building, 7th Floor Please park in [facility] and use Clinic elevator    [facility] - Emergency Dept Emergency  Medicine  As needed, If symptoms worsen [address]  [facility] [address] [phone]     Patty Louis MD Internal Medicine Schedule an appointment as soon as possible for a visit   [address] [facility], [address]  [facility] [address]  [phone]                Humberto Liu MD  09/15/23 0933

## 2023-09-20 ENCOUNTER — PATIENT MESSAGE (OUTPATIENT)
Dept: ADMINISTRATIVE | Facility: HOSPITAL | Age: 68
End: 2023-09-20
Payer: MEDICARE

## 2023-09-22 NOTE — TELEPHONE ENCOUNTER
No care due was identified.  [facility] Embedded Care Due Messages. Reference number: [id number].   9/22/2023 1:58:50 PM CDT

## 2023-09-22 NOTE — TELEPHONE ENCOUNTER
----- Message from Libertad Sara sent at 9/22/2023  2:03 PM CDT -----  Contact: [phone] Patient  Requesting an RX refill or new RX.  Is this a refill or new RX: new  RX name and strength (copy/paste from chart):  metFORMIN (GLUCOPHAGE) 1000 MG tablet  Is this a 30 day or 90 day RX: 90  Pharmacy name and phone # (copy/paste from chart):    [address] [address]  Phone: [phone] Fax: [phone]    Requesting an RX refill or new RX.  Is this a refill or new RX: new  RX name and strength (copy/paste from chart):  atorvastatin (LIPITOR) 10 MG tablet  Is this a 30 day or 90 day RX: 90  Pharmacy name and phone # (copy/paste from chart):    [address]   [address] [id number]  Phone: [phone] Fax: [phone]       The doctors have asked that we provide their patients with the following 2 reminders -- prescription refills can take up to 72 hours, and a friendly reminder that in the future you can use your [facility] account to request refills: yes

## 2023-09-22 NOTE — TELEPHONE ENCOUNTER
No care due was identified.  [facility] Embedded Care Due Messages. Reference number: [id number].   9/22/2023 3:46:54 PM CDT

## 2023-09-28 RX ORDER — METFORMIN HYDROCHLORIDE 1000 MG/1
TABLET ORAL
Qty: 180 TABLET | Refills: 3 | Status: SHIPPED | OUTPATIENT
Start: 2023-09-28

## 2023-09-28 RX ORDER — ATORVASTATIN CALCIUM 10 MG/1
10 TABLET, FILM COATED ORAL
Qty: 90 TABLET | Refills: 0 | Status: SHIPPED | OUTPATIENT
Start: 2023-09-28 | End: 2024-01-03

## 2023-10-06 ENCOUNTER — PATIENT OUTREACH (OUTPATIENT)
Dept: ADMINISTRATIVE | Facility: HOSPITAL | Age: 68
End: 2023-10-06
Payer: MEDICARE

## 2023-10-06 NOTE — PROGRESS NOTES
Health Maintenance Due   Topic Date Due    Eye Exam  03/25/2020    Influenza Vaccine (1) 09/01/2023    COVID-19 Vaccine (5 - 2023-24 season) 09/01/2023     Chart reviewed.   Immunizations: Reconciled  Orders placed: N/A  Upcoming appts to satisfy STEVEN topics: Mammogram 11/24/2023

## 2023-10-17 ENCOUNTER — HOSPITAL ENCOUNTER (INPATIENT)
Facility: HOSPITAL | Age: 68
LOS: 2 days | Discharge: HOME | DRG: 545 | End: 2023-10-22
Attending: EMERGENCY MEDICINE | Admitting: HOSPITALIST
Payer: MEDICARE

## 2023-10-17 DIAGNOSIS — M25.50 CHRONIC PAIN OF MULTIPLE JOINTS: Primary | ICD-10-CM

## 2023-10-17 DIAGNOSIS — R07.9 CHEST PAIN: ICD-10-CM

## 2023-10-17 DIAGNOSIS — M79.10 MYALGIA: ICD-10-CM

## 2023-10-17 DIAGNOSIS — R93.89 INCREASED ENDOMETRIAL STRIPE THICKNESS: ICD-10-CM

## 2023-10-17 DIAGNOSIS — B34.8 RHINOVIRUS INFECTION: ICD-10-CM

## 2023-10-17 DIAGNOSIS — G89.29 CHRONIC PAIN OF MULTIPLE JOINTS: Primary | ICD-10-CM

## 2023-10-17 DIAGNOSIS — E04.1 THYROID NODULE: ICD-10-CM

## 2023-10-17 PROBLEM — E11.9 TYPE 2 DIABETES MELLITUS, WITHOUT LONG-TERM CURRENT USE OF INSULIN: Status: ACTIVE | Noted: 2023-10-17

## 2023-10-17 PROBLEM — B34.9 VIRAL ILLNESS: Status: ACTIVE | Noted: 2023-10-17

## 2023-10-17 LAB
ALBUMIN SERPL BCP-MCNC: 3.6 G/DL (ref 3.5–5.2)
ALP SERPL-CCNC: 75 U/L (ref 55–135)
ALT SERPL W/O P-5'-P-CCNC: 8 U/L (ref 10–44)
ANION GAP SERPL CALC-SCNC: 11 MMOL/L (ref 8–16)
ANISOCYTOSIS BLD QL SMEAR: SLIGHT
AST SERPL-CCNC: 16 U/L (ref 10–40)
BASOPHILS # BLD AUTO: 0 K/UL (ref 0–0.2)
BASOPHILS NFR BLD: 0 % (ref 0–1.9)
BILIRUB SERPL-MCNC: 0.9 MG/DL (ref 0.1–1)
BILIRUB UR QL STRIP: NEGATIVE
BUN SERPL-MCNC: 13 MG/DL (ref 6–30)
BUN SERPL-MCNC: 13 MG/DL (ref 8–23)
CALCIUM SERPL-MCNC: 9.3 MG/DL (ref 8.7–10.5)
CHLORIDE SERPL-SCNC: 106 MMOL/L (ref 95–110)
CHLORIDE SERPL-SCNC: 110 MMOL/L (ref 95–110)
CLARITY UR REFRACT.AUTO: CLEAR
CO2 SERPL-SCNC: 22 MMOL/L (ref 23–29)
COLOR UR AUTO: COLORLESS
CREAT SERPL-MCNC: 1.1 MG/DL (ref 0.5–1.4)
CREAT SERPL-MCNC: 1.1 MG/DL (ref 0.5–1.4)
DIFFERENTIAL METHOD: ABNORMAL
EOSINOPHIL # BLD AUTO: 0 K/UL (ref 0–0.5)
EOSINOPHIL NFR BLD: 0 % (ref 0–8)
ERYTHROCYTE [DISTWIDTH] IN BLOOD BY AUTOMATED COUNT: 12.9 % (ref 11.5–14.5)
EST. GFR  (NO RACE VARIABLE): 54.4 ML/MIN/1.73 M^2
GLUCOSE SERPL-MCNC: 117 MG/DL (ref 70–110)
GLUCOSE SERPL-MCNC: 121 MG/DL (ref 70–110)
GLUCOSE UR QL STRIP: NEGATIVE
HCT VFR BLD AUTO: 32.7 % (ref 37–48.5)
HCT VFR BLD CALC: 32 %PCV (ref 36–54)
HGB BLD-MCNC: 11.1 G/DL (ref 12–16)
HGB UR QL STRIP: NEGATIVE
IMM GRANULOCYTES # BLD AUTO: 0.02 K/UL (ref 0–0.04)
IMM GRANULOCYTES NFR BLD AUTO: 0.3 % (ref 0–0.5)
INFLUENZA A, MOLECULAR: NOT DETECTED
INFLUENZA B, MOLECULAR: NOT DETECTED
KETONES UR QL STRIP: ABNORMAL
LACTATE SERPL-SCNC: 1.9 MMOL/L (ref 0.5–2.2)
LEUKOCYTE ESTERASE UR QL STRIP: NEGATIVE
LIPASE SERPL-CCNC: 14 U/L (ref 4–60)
LYMPHOCYTES # BLD AUTO: 0.4 K/UL (ref 1–4.8)
LYMPHOCYTES NFR BLD: 6.1 % (ref 18–48)
MCH RBC QN AUTO: 31.4 PG (ref 27–31)
MCHC RBC AUTO-ENTMCNC: 33.9 G/DL (ref 32–36)
MCV RBC AUTO: 93 FL (ref 82–98)
MONOCYTES # BLD AUTO: 0.3 K/UL (ref 0.3–1)
MONOCYTES NFR BLD: 5.8 % (ref 4–15)
NEUTROPHILS # BLD AUTO: 5.2 K/UL (ref 1.8–7.7)
NEUTROPHILS NFR BLD: 87.8 % (ref 38–73)
NITRITE UR QL STRIP: NEGATIVE
NRBC BLD-RTO: 0 /100 WBC
PH UR STRIP: 8 [PH] (ref 5–8)
PLATELET # BLD AUTO: 122 K/UL (ref 150–450)
PLATELET BLD QL SMEAR: ABNORMAL
PMV BLD AUTO: 9.4 FL (ref 9.2–12.9)
POC IONIZED CALCIUM: 1.13 MMOL/L (ref 1.06–1.42)
POC TCO2 (MEASURED): 22 MMOL/L (ref 23–29)
POCT GLUCOSE: 121 MG/DL (ref 70–110)
POTASSIUM BLD-SCNC: 3.1 MMOL/L (ref 3.5–5.1)
POTASSIUM SERPL-SCNC: 3.1 MMOL/L (ref 3.5–5.1)
PROT SERPL-MCNC: 7.2 G/DL (ref 6–8.4)
PROT UR QL STRIP: NEGATIVE
RBC # BLD AUTO: 3.53 M/UL (ref 4–5.4)
RSV AG BY MOLECULAR METHOD: NOT DETECTED
SAMPLE: ABNORMAL
SARS-COV-2 RNA RESP QL NAA+PROBE: NOT DETECTED
SODIUM BLD-SCNC: 143 MMOL/L (ref 136–145)
SODIUM SERPL-SCNC: 143 MMOL/L (ref 136–145)
SP GR UR STRIP: 1.01 (ref 1–1.03)
TROPONIN I SERPL DL<=0.01 NG/ML-MCNC: 0.03 NG/ML (ref 0–0.03)
URN SPEC COLLECT METH UR: ABNORMAL
WBC # BLD AUTO: 5.88 K/UL (ref 3.9–12.7)

## 2023-10-17 PROCEDURE — G0378 HOSPITAL OBSERVATION PER HR: HCPCS | Mod: HCNC

## 2023-10-17 PROCEDURE — 96372 THER/PROPH/DIAG INJ SC/IM: CPT

## 2023-10-17 PROCEDURE — 99285 EMERGENCY DEPT VISIT HI MDM: CPT | Mod: 25,HCNC

## 2023-10-17 PROCEDURE — 80053 COMPREHEN METABOLIC PANEL: CPT | Mod: HCNC

## 2023-10-17 PROCEDURE — 84484 ASSAY OF TROPONIN QUANT: CPT | Mod: HCNC

## 2023-10-17 PROCEDURE — 63600175 PHARM REV CODE 636 W HCPCS: Mod: HCNC

## 2023-10-17 PROCEDURE — 96361 HYDRATE IV INFUSION ADD-ON: CPT | Mod: HCNC

## 2023-10-17 PROCEDURE — 25000003 PHARM REV CODE 250: Mod: HCNC

## 2023-10-17 PROCEDURE — 81003 URINALYSIS AUTO W/O SCOPE: CPT | Mod: HCNC

## 2023-10-17 PROCEDURE — 80047 BASIC METABLC PNL IONIZED CA: CPT | Mod: HCNC

## 2023-10-17 PROCEDURE — 83605 ASSAY OF LACTIC ACID: CPT | Mod: HCNC

## 2023-10-17 PROCEDURE — 85025 COMPLETE CBC W/AUTO DIFF WBC: CPT | Mod: HCNC

## 2023-10-17 PROCEDURE — 93005 ELECTROCARDIOGRAM TRACING: CPT | Mod: HCNC

## 2023-10-17 PROCEDURE — 83690 ASSAY OF LIPASE: CPT | Mod: HCNC

## 2023-10-17 PROCEDURE — 93010 ELECTROCARDIOGRAM REPORT: CPT | Mod: HCNC,,, | Performed by: INTERNAL MEDICINE

## 2023-10-17 PROCEDURE — 93010 EKG 12-LEAD: ICD-10-PCS | Mod: HCNC,,, | Performed by: INTERNAL MEDICINE

## 2023-10-17 PROCEDURE — 96375 TX/PRO/DX INJ NEW DRUG ADDON: CPT | Mod: HCNC

## 2023-10-17 PROCEDURE — 0241U SARS-COV2 (COVID) WITH FLU/RSV BY PCR: CPT | Mod: HCNC

## 2023-10-17 RX ORDER — ENOXAPARIN SODIUM 100 MG/ML
40 INJECTION SUBCUTANEOUS EVERY 24 HOURS
Status: DISCONTINUED | OUTPATIENT
Start: 2023-10-17 | End: 2023-10-18

## 2023-10-17 RX ORDER — PROCHLORPERAZINE EDISYLATE 5 MG/ML
5 INJECTION INTRAMUSCULAR; INTRAVENOUS EVERY 6 HOURS PRN
Status: DISCONTINUED | OUTPATIENT
Start: 2023-10-17 | End: 2023-10-22 | Stop reason: HOSPADM

## 2023-10-17 RX ORDER — IBUPROFEN 200 MG
16 TABLET ORAL
Status: DISCONTINUED | OUTPATIENT
Start: 2023-10-17 | End: 2023-10-22 | Stop reason: HOSPADM

## 2023-10-17 RX ORDER — TALC
9 POWDER (GRAM) TOPICAL NIGHTLY PRN
Status: DISCONTINUED | OUTPATIENT
Start: 2023-10-17 | End: 2023-10-22 | Stop reason: HOSPADM

## 2023-10-17 RX ORDER — ACETAMINOPHEN 500 MG
500 TABLET ORAL EVERY 8 HOURS PRN
COMMUNITY

## 2023-10-17 RX ORDER — METHOCARBAMOL 500 MG/1
500 TABLET, FILM COATED ORAL 3 TIMES DAILY
Status: DISCONTINUED | OUTPATIENT
Start: 2023-10-17 | End: 2023-10-22 | Stop reason: HOSPADM

## 2023-10-17 RX ORDER — INSULIN ASPART 100 [IU]/ML
0-5 INJECTION, SOLUTION INTRAVENOUS; SUBCUTANEOUS
Status: DISCONTINUED | OUTPATIENT
Start: 2023-10-17 | End: 2023-10-22 | Stop reason: HOSPADM

## 2023-10-17 RX ORDER — POLYETHYLENE GLYCOL 3350 17 G/17G
17 POWDER, FOR SOLUTION ORAL 2 TIMES DAILY PRN
Status: DISCONTINUED | OUTPATIENT
Start: 2023-10-17 | End: 2023-10-22 | Stop reason: HOSPADM

## 2023-10-17 RX ORDER — CAPSAICIN 0.03 G/100G
CREAM TOPICAL 2 TIMES DAILY
Status: DISCONTINUED | OUTPATIENT
Start: 2023-10-17 | End: 2023-10-19

## 2023-10-17 RX ORDER — CANDESARTAN 16 MG/1
16 TABLET ORAL DAILY
Status: ON HOLD | COMMUNITY
End: 2023-10-21 | Stop reason: HOSPADM

## 2023-10-17 RX ORDER — TOPIRAMATE 25 MG/1
25 TABLET ORAL NIGHTLY
Status: DISCONTINUED | OUTPATIENT
Start: 2023-10-17 | End: 2023-10-22 | Stop reason: HOSPADM

## 2023-10-17 RX ORDER — LOSARTAN POTASSIUM 50 MG/1
100 TABLET ORAL DAILY
Status: DISCONTINUED | OUTPATIENT
Start: 2023-10-17 | End: 2023-10-22 | Stop reason: HOSPADM

## 2023-10-17 RX ORDER — NAPROXEN SODIUM 220 MG/1
81 TABLET, FILM COATED ORAL DAILY
Status: DISCONTINUED | OUTPATIENT
Start: 2023-10-17 | End: 2023-10-18

## 2023-10-17 RX ORDER — AMOXICILLIN 250 MG
1 CAPSULE ORAL DAILY
Status: DISCONTINUED | OUTPATIENT
Start: 2023-10-17 | End: 2023-10-19

## 2023-10-17 RX ORDER — ONDANSETRON 8 MG/1
8 TABLET, ORALLY DISINTEGRATING ORAL EVERY 8 HOURS PRN
Status: DISCONTINUED | OUTPATIENT
Start: 2023-10-17 | End: 2023-10-22 | Stop reason: HOSPADM

## 2023-10-17 RX ORDER — MORPHINE SULFATE 4 MG/ML
4 INJECTION, SOLUTION INTRAMUSCULAR; INTRAVENOUS
Status: COMPLETED | OUTPATIENT
Start: 2023-10-17 | End: 2023-10-17

## 2023-10-17 RX ORDER — POTASSIUM CHLORIDE 20 MEQ/1
40 TABLET, EXTENDED RELEASE ORAL ONCE
Status: COMPLETED | OUTPATIENT
Start: 2023-10-17 | End: 2023-10-17

## 2023-10-17 RX ORDER — ATORVASTATIN CALCIUM 10 MG/1
10 TABLET, FILM COATED ORAL NIGHTLY
Status: DISCONTINUED | OUTPATIENT
Start: 2023-10-17 | End: 2023-10-22 | Stop reason: HOSPADM

## 2023-10-17 RX ORDER — DICLOFENAC SODIUM 10 MG/G
2 GEL TOPICAL 3 TIMES DAILY PRN
Status: DISCONTINUED | OUTPATIENT
Start: 2023-10-17 | End: 2023-10-22 | Stop reason: HOSPADM

## 2023-10-17 RX ORDER — ACETAMINOPHEN 325 MG/1
650 TABLET ORAL EVERY 6 HOURS PRN
Status: DISCONTINUED | OUTPATIENT
Start: 2023-10-17 | End: 2023-10-18

## 2023-10-17 RX ORDER — GLUCAGON 1 MG
1 KIT INJECTION
Status: DISCONTINUED | OUTPATIENT
Start: 2023-10-17 | End: 2023-10-22 | Stop reason: HOSPADM

## 2023-10-17 RX ORDER — SODIUM CHLORIDE 0.9 % (FLUSH) 0.9 %
10 SYRINGE (ML) INJECTION EVERY 8 HOURS PRN
Status: DISCONTINUED | OUTPATIENT
Start: 2023-10-17 | End: 2023-10-22 | Stop reason: HOSPADM

## 2023-10-17 RX ORDER — IBUPROFEN 200 MG
24 TABLET ORAL
Status: DISCONTINUED | OUTPATIENT
Start: 2023-10-17 | End: 2023-10-22 | Stop reason: HOSPADM

## 2023-10-17 RX ORDER — LETROZOLE 2.5 MG/1
2.5 TABLET, FILM COATED ORAL DAILY
Status: DISCONTINUED | OUTPATIENT
Start: 2023-10-17 | End: 2023-10-22 | Stop reason: HOSPADM

## 2023-10-17 RX ORDER — IBUPROFEN 600 MG/1
600 TABLET ORAL
Status: COMPLETED | OUTPATIENT
Start: 2023-10-17 | End: 2023-10-17

## 2023-10-17 RX ORDER — MAG HYDROX/ALUMINUM HYD/SIMETH 200-200-20
30 SUSPENSION, ORAL (FINAL DOSE FORM) ORAL 4 TIMES DAILY PRN
Status: DISCONTINUED | OUTPATIENT
Start: 2023-10-17 | End: 2023-10-22 | Stop reason: HOSPADM

## 2023-10-17 RX ORDER — NALOXONE HCL 0.4 MG/ML
0.02 VIAL (ML) INJECTION
Status: DISCONTINUED | OUTPATIENT
Start: 2023-10-17 | End: 2023-10-22 | Stop reason: HOSPADM

## 2023-10-17 RX ORDER — ACETAMINOPHEN 500 MG
1000 TABLET ORAL
Status: COMPLETED | OUTPATIENT
Start: 2023-10-17 | End: 2023-10-17

## 2023-10-17 RX ADMIN — POTASSIUM CHLORIDE 40 MEQ: 1500 TABLET, EXTENDED RELEASE ORAL at 09:10

## 2023-10-17 RX ADMIN — ATORVASTATIN CALCIUM 10 MG: 10 TABLET, FILM COATED ORAL at 10:10

## 2023-10-17 RX ADMIN — INSULIN DETEMIR 9 UNITS: 100 INJECTION, SOLUTION SUBCUTANEOUS at 10:10

## 2023-10-17 RX ADMIN — METHOCARBAMOL 500 MG: 500 TABLET ORAL at 10:10

## 2023-10-17 RX ADMIN — TOPIRAMATE 25 MG: 25 TABLET, FILM COATED ORAL at 10:10

## 2023-10-17 RX ADMIN — ASPIRIN 81 MG CHEWABLE TABLET 81 MG: 81 TABLET CHEWABLE at 02:10

## 2023-10-17 RX ADMIN — ACETAMINOPHEN 1000 MG: 500 TABLET ORAL at 11:10

## 2023-10-17 RX ADMIN — METHOCARBAMOL 500 MG: 500 TABLET ORAL at 02:10

## 2023-10-17 RX ADMIN — SODIUM CHLORIDE, SODIUM LACTATE, POTASSIUM CHLORIDE, AND CALCIUM CHLORIDE 1000 ML: .6; .31; .03; .02 INJECTION, SOLUTION INTRAVENOUS at 02:10

## 2023-10-17 RX ADMIN — IBUPROFEN 600 MG: 600 TABLET ORAL at 08:10

## 2023-10-17 RX ADMIN — MORPHINE SULFATE 4 MG: 4 INJECTION INTRAVENOUS at 08:10

## 2023-10-17 RX ADMIN — LOSARTAN POTASSIUM 100 MG: 50 TABLET, FILM COATED ORAL at 02:10

## 2023-10-17 NOTE — ED PROVIDER NOTES
Encounter Date: 10/17/2023       History     Chief Complaint   Patient presents with    Abdominal Pain     Patient with complaints of N/V/D, and abdominal pain, as well as generalized body aches.      68-year-old female with a past medical history of breast cancer in remission, CAD depression, type 1 diabetes, GERD, HTN presents to the ED with daughter at bedside with multiple complaints.  She reports generalized abdominal pain, nonbloody emesis, nausea, generalized myalgias as well as bilateral shoulder and bilateral hip pain.  She states that the pain is chronic, however, has acutely worsened this morning.  History is extremely limited due to the acuity of patient's condition.  Daughter was not able to provide much more collateral history.    The history is provided by the patient and medical records.     Review of patient's allergies indicates:  No Known Allergies  Past Medical History:   Diagnosis Date    Acute coronary syndrome 2018    Adjustment disorder     Anxiety     Arthritis     Cancer of breast 2020    Malignant neoplasm of lower-inner quadrant of left breast in female, estrogen receptor positive    Cholelithiasis with acute cholecystitis 2021    Coronary artery disease     Depression     Diabetes mellitus type I     Genetic testing of female 2020    [employer] via [employer] with AXIN2 and POLE variants of uncertain significance    GERD (gastroesophageal reflux disease)     Hepatitis B core antibody positive 3/9/2023    Negative sAg, suggests previous exposure but no chronic/active Hep B. At risk for reactivation with any immunosuppression medication, steroids, chemo, etc.      Hypertension     Vertigo 2019    Vitamin D deficiency 2021     Past Surgical History:   Procedure Laterality Date    BREAST BIOPSY Left 2022    Left punch biopsy; Unremarkable keratinized skin with intradermal hematoma     SECTION      INJECTION FOR SENTINEL NODE IDENTIFICATION Left  09/17/2020    Procedure: INJECTION, FOR SENTINEL NODE IDENTIFICATION;  Surgeon: Isabel Moulton MD;  Location: [facility] OR;  Service: General;  Laterality: Left;    INSERTION OF TUNNELED CENTRAL VENOUS CATHETER (CVC) WITH SUBCUTANEOUS PORT N/A 11/18/2020    Procedure: INSERTION, PORT-A-CATH;  Surgeon: Hardeep Martin MD;  Location: [facility] CATH LAB;  Service: Radiology;  Laterality: N/A;    LAPAROSCOPIC CHOLECYSTECTOMY N/A 03/06/2021    Procedure: CHOLECYSTECTOMY, LAPAROSCOPIC;  Surgeon: Buster Son MD;  Location: [facility] OR [facility] FLR;  Service: General;  Laterality: N/A;    MASTECTOMY, PARTIAL Left 09/17/2020    Procedure: MASTECTOMY, PARTIAL-w/reflector;  Surgeon: Isabel Moulton MD;  Location: [facility] OR;  Service: General;  Laterality: Left;    SENTINEL LYMPH NODE BIOPSY Left 09/17/2020    Procedure: BIOPSY, LYMPH NODE, SENTINEL;  Surgeon: Isabel Moulton MD;  Location: [facility] OR;  Service: General;  Laterality: Left;     Family History   Problem Relation Age of Onset    Hypertension Mother     Hyperlipidemia Mother     Diabetes Mother     Heart disease Mother     Colon polyps Mother     Aneurysm Father     Diabetes Sister     Hypertension Sister     Heart disease Brother     Diabetes Brother     Hypertension Brother     Cancer Brother         brother German with prostate cancer; brother Cooper with throat cancer    Cervical cancer Daughter     Anxiety disorder Daughter     Depression Daughter     Anxiety disorder Daughter     Depression Daughter     Breast cancer Maternal Aunt     Cancer Maternal Aunt         unknown origin    Cancer Paternal Aunt         unknown origin    Breast cancer Paternal Aunt     Prostate cancer Maternal Cousin     Leukemia Maternal Cousin     Prostate cancer Maternal Cousin     Breast cancer Other     Colon cancer Neg Hx     Ovarian cancer Neg Hx      Social History     Tobacco Use    Smoking status: Never    Smokeless tobacco: Never   Substance Use Topics    Alcohol use: Never    Drug use:  No     Review of Systems    Physical Exam     Initial Vitals   BP Pulse Resp Temp SpO2   10/17/23 0643 10/17/23 0643 10/17/23 0735 10/17/23 0643 10/17/23 0643   (!) 180/90 110 18 98.6 °F (37 °C) 100 %      MAP       --                Physical Exam    Nursing note and vitals reviewed.  Constitutional: Vital signs are normal. She appears well-developed and well-nourished. She is not diaphoretic. She appears distressed (Mild-to-moderate distress due to pain.).   HENT:   Head: Normocephalic and atraumatic.   Right Ear: External ear normal.   Left Ear: External ear normal.   Eyes: EOM are normal. Right eye exhibits no discharge. Left eye exhibits no discharge.   Neck: Trachea normal. Neck supple. No thyroid mass present.   Normal range of motion.  Cardiovascular:  Regular rhythm, normal heart sounds and intact distal pulses.     Exam reveals no gallop and no friction rub.       No murmur heard.  Borderline tachycardic.   Pulmonary/Chest: Breath sounds normal. No respiratory distress. She has no wheezes. She has no rhonchi. She has no rales.   Abdominal: Abdomen is soft. Bowel sounds are normal. She exhibits no distension. There is abdominal tenderness (Generalized). There is no rebound and no guarding.   Musculoskeletal:         General: Tenderness present. No edema.      Cervical back: Normal range of motion and neck supple.      Comments: Decreased range of motion of all 4 extremities due to pain.  Tenderness to palpation of all 4 extremities along long bones and joints.     Neurological: She is alert and oriented to person, place, and time. She has normal strength. No cranial nerve deficit or sensory deficit. GCS score is 15. GCS eye subscore is 4. GCS verbal subscore is 5. GCS motor subscore is 6.   Skin: Skin is warm and dry. Capillary refill takes less than 2 seconds. No rash noted.   Psychiatric: She has a normal mood and affect.         ED Course   Procedures  Labs Reviewed   CBC W/ AUTO DIFFERENTIAL - Abnormal;  Notable for the following components:       Result Value    RBC 3.53 (*)     Hemoglobin 11.1 (*)     Hematocrit 32.7 (*)     MCH 31.4 (*)     Platelets 122 (*)     Lymph # 0.4 (*)     Gran % 87.8 (*)     Lymph % 6.1 (*)     Platelet Estimate Decreased (*)     All other components within normal limits   COMPREHENSIVE METABOLIC PANEL - Abnormal; Notable for the following components:    Potassium 3.1 (*)     CO2 22 (*)     Glucose 117 (*)     ALT 8 (*)     eGFR 54.4 (*)     All other components within normal limits   URINALYSIS, REFLEX TO URINE CULTURE - Abnormal; Notable for the following components:    Color, UA Colorless (*)     Ketones, UA Trace (*)     All other components within normal limits    Narrative:     Specimen Source->Urine   ISTAT PROCEDURE - Abnormal; Notable for the following components:    POC Glucose 121 (*)     POC Potassium 3.1 (*)     POC TCO2 (MEASURED) 22 (*)     POC Hematocrit 32 (*)     All other components within normal limits   LIPASE   SARS-COV2 (COVID) WITH FLU/RSV BY PCR   ISTAT CHEM8     EKG Readings: (Independently Interpreted)   110 beats per minute.  Sinus tachycardia.  Borderline left axis deviation.  T-wave inversions V1, V2, aVL.  Some T-wave inversions present on prior EKG.     ECG Results              EKG 12-lead (Final result)  Result time 10/17/23 09:37:58      Final result by Interface, Lab In [facility] (10/17/23 09:37:58)                   Narrative:    Test Reason : M79.10,    Vent. Rate : 110 BPM     Atrial Rate : 110 BPM     P-R Int : 120 ms          QRS Dur : 104 ms      QT Int : 342 ms       P-R-T Axes : 085 -28 098 degrees     QTc Int : 462 ms    Sinus tachycardia with Premature atrial complexes  Incomplete right bundle branch block  LVH with repolarization abnormality ( R in aVL , Jimmie product ,   Romhilt-Ghotra )  Abnormal ECG  When compared with ECG of 01-AUG-2023 08:13,  Significant changes have occurred  Confirmed by Truman ENCINAS MD ([id number]) on 10/17/2023 9:37:46  AM    Referred By: AARON   SELF           Confirmed By:Truman ENCINAS MD                                  Imaging Results              X-Ray Chest AP Portable (In process)                      Medications   morphine injection 4 mg (4 mg Intravenous Given 10/17/23 0836)   ibuprofen tablet 600 mg (600 mg Oral Given 10/17/23 0836)   potassium chloride SA CR tablet 40 mEq (40 mEq Oral Given 10/17/23 0919)   acetaminophen tablet 1,000 mg (1,000 mg Oral Given 10/17/23 1120)     Medical Decision Making  68-year-old female who appears to be in mild-to-moderate distress due to pain presents to the ED with multiple complaints.  ABC's intact.  Initial triage vital signs reveal tachycardia and hypertension.    Differential diagnosis includes but is not limited to pancreatitis, anemia, electrolyte abnormality, viral URI, chronic pain syndrome.    Amount and/or Complexity of Data Reviewed  Labs: ordered. Decision-making details documented in ED Course.  Radiology: ordered.    Risk  OTC drugs.  Prescription drug management.               ED Course as of 10/17/23 1155   Tue Oct 17, 2023   0843 Urinalysis, Reflex to Urine Culture Urine, Clean Catch(!)  Not indicative of a UTI. [BG]   0912 Lipase: 14  Decreases likelihood of pancreatitis. [BG]   0912 Potassium(!): 3.1  Will supplement with oral potassium. [BG]   0913 Hemoglobin(!): 11.1  Mild anemia consistent with historical data. [BG]   0913 SARS-Cov2 (COVID) with FLU/RSV by PCR  Negative. [BG]   0959 On re-evaluation patient reports some alleviation of pain, however, still reports pain in the bilateral shoulders.  I will give the patient 1 g of Tylenol. [BG]   1152 Patient will be admitted to Hospital Medicine for intractable pain and changes in the ability to ambulate.  Patient understands and agrees with the plan.    Patient received morphine, ibuprofen and Tylenol without complete alleviation of her pain.  Patient is not able to ambulate without significant assistance. [BG]       ED Course User Index  [BG] Deanne Shaffer MD                    Clinical Impression:   Final diagnoses:  [M79.10] Myalgia        ED Disposition Condition    Observation                 Deanne Shaffer MD  Resident  10/17/23 1154       Deanne Shaffer MD  Resident  10/17/23 1158

## 2023-10-17 NOTE — SUBJECTIVE & OBJECTIVE
Past Medical History:   Diagnosis Date    Acute coronary syndrome 2018    Adjustment disorder     Anxiety     Arthritis     Cancer of breast 2020    Malignant neoplasm of lower-inner quadrant of left breast in female, estrogen receptor positive    Cholelithiasis with acute cholecystitis 2021    Coronary artery disease     Depression     Diabetes mellitus type I     Genetic testing of female 2020    [employer] via [employer] with AXIN2 and POLE variants of uncertain significance    GERD (gastroesophageal reflux disease)     Hepatitis B core antibody positive 3/9/2023    Negative sAg, suggests previous exposure but no chronic/active Hep B. At risk for reactivation with any immunosuppression medication, steroids, chemo, etc.      Hypertension     Vertigo 2019    Vitamin D deficiency 2021       Past Surgical History:   Procedure Laterality Date    BREAST BIOPSY Left 2022    Left punch biopsy; Unremarkable keratinized skin with intradermal hematoma     SECTION      INJECTION FOR SENTINEL NODE IDENTIFICATION Left 2020    Procedure: INJECTION, FOR SENTINEL NODE IDENTIFICATION;  Surgeon: Isabel Moulton MD;  Location: [facility];  Service: General;  Laterality: Left;    INSERTION OF TUNNELED CENTRAL VENOUS CATHETER (CVC) WITH SUBCUTANEOUS PORT N/A 2020    Procedure: INSERTION, PORT-A-CATH;  Surgeon: Hardeep Martin MD;  Location: [facility] CATH LAB;  Service: Radiology;  Laterality: N/A;    LAPAROSCOPIC CHOLECYSTECTOMY N/A 2021    Procedure: CHOLECYSTECTOMY, LAPAROSCOPIC;  Surgeon: Buster Son MD;  Location: [address];  Service: General;  Laterality: N/A;    MASTECTOMY, PARTIAL Left 2020    Procedure: MASTECTOMY, PARTIAL-w/reflector;  Surgeon: Isabel Moulton MD;  Location: [facility] OR;  Service: General;  Laterality: Left;    SENTINEL LYMPH NODE BIOPSY Left 2020    Procedure: BIOPSY, LYMPH NODE, SENTINEL;  Surgeon: Isabel Moulton MD;   "Location: [facility] OR;  Service: General;  Laterality: Left;       Review of patient's allergies indicates:  No Known Allergies    Current Facility-Administered Medications on File Prior to Encounter   Medication    fentaNYL injection 25 mcg    midazolam (VERSED) 1 mg/mL injection 0.5 mg     Current Outpatient Medications on File Prior to Encounter   Medication Sig    ACCU-CHEK GUIDE GLUCOSE METER Misc USE  THREE TIMES DAILY    albuterol (PROVENTIL/VENTOLIN HFA) 90 mcg/actuation inhaler Inhale 1-2 puffs into the lungs every 6 (six) hours as needed for Wheezing or Shortness of Breath (Please dispense with spacer).    aspirin 81 mg Tab Take 1 tablet by mouth Daily.    atorvastatin (LIPITOR) 10 MG tablet Take 1 tablet by mouth once daily    BD ULTRA-FINE SHORT PEN NEEDLE 31 gauge x 5/16" Ndle USE 1  4 TIMES DAILY WITH  INSULIN    blood sugar diagnostic Strp Strips and lancets covered by insurance [id number], pt checks glucose three times daily, insulin dependent    blood sugar diagnostic, drum (ACCU-CHEK COMPACT TEST) Strp USE ONE STRIP TO CHECK GLUCOSE 4 TIMES DAILY BEFORE EACH MEAL AND AT BEDTIME    capsaicin 0.1 % Crea Apply 1 application  topically 2 (two) times daily as needed (pain).    diclofenac (VOLTAREN) 50 MG EC tablet Take 1 tablet (50 mg total) by mouth 2 (two) times daily.    diclofenac sodium (VOLTAREN) 1 % Gel Apply 2 g topically 3 (three) times daily as needed (muscle spasm pain). Apply 2 grams to affected area 3 times daily as needed    dulaglutide (TRULICITY) 1.5 mg/0.5 mL pen injector Inject 1.5 mg into the skin every 7 days.    EUFLEXXA 10 mg/mL(mw 2.4 -3.6 million) IAtc injection     fluticasone propionate (FLONASE) 50 mcg/actuation nasal spray 1 spray (50 mcg total) by Each Nostril route once daily.    lancets Misc To check BG 4 times daily, to use with insurance preferred meter, insulin dependent    letrozole (FEMARA) 2.5 mg Tab Take 1 tablet (2.5 mg total) by mouth once daily.    LIDOcaine (LIDODERM) 5 " % Apply to affected area as needed for pain for 12 hours, then off for 12 hours. Discard after each use.  May use 4% lidocaine patch as alternative.    losartan (COZAAR) 100 MG tablet Take 1 tablet (100 mg total) by mouth once daily.    metFORMIN (GLUCOPHAGE) 1000 MG tablet Take 1 tablet (1,000 mg total) by mouth 2 (two) times daily.    metFORMIN (GLUCOPHAGE) 1000 MG tablet 1 tablet with a meal Orally Once a day    senna-docusate 8.6-50 mg (SENNA WITH DOCUSATE SODIUM) 8.6-50 mg per tablet Take 2 tablets by mouth once daily.    sertraline (ZOLOFT) 25 MG tablet Take 1 tablet (25 mg total) by mouth once daily.    topiramate (TOPAMAX) 25 MG tablet One at bedtime for 2 weeks then two at bedtime    triamcinolone acetonide (KENALOG-40) 40 mg/mL injection     TRUEPLUS LANCETS 33 gauge Misc USE 1 TO CHECK GLUCOSE 4 TIMES DAILY    [DISCONTINUED] allopurinoL (ZYLOPRIM) 100 MG tablet Take 100 mg by mouth once daily.    [DISCONTINUED] candesartan (ATACAND) 16 MG tablet Take 2 tablets (32 mg total) by mouth once daily.    [DISCONTINUED] candesartan (ATACAND) 8 MG tablet 1 tablet Orally Once a day    [DISCONTINUED] naproxen (NAPROSYN) 500 MG tablet Take 1 tablet (500 mg total) by mouth 2 (two) times daily as needed (pain).     Family History       Problem Relation (Age of Onset)    Aneurysm Father    Anxiety disorder Daughter, Daughter    Breast cancer Maternal Aunt, Paternal Aunt, Other    Cancer Brother, Maternal Aunt, Paternal Aunt    Cervical cancer Daughter    Colon polyps Mother    Depression Daughter, Daughter    Diabetes Mother, Sister, Brother    Heart disease Mother, Brother    Hyperlipidemia Mother    Hypertension Mother, Sister, Brother    Leukemia Maternal Cousin    Prostate cancer Maternal Cousin, Maternal Cousin          Tobacco Use    Smoking status: Never    Smokeless tobacco: Never   Substance and Sexual Activity    Alcohol use: Never    Drug use: No    Sexual activity: Yes     Partners: Male     Birth  control/protection: Post-menopausal     Review of Systems   Constitutional:  Positive for activity change. Negative for chills and fever.   HENT:  Negative for trouble swallowing.    Eyes:  Negative for photophobia and visual disturbance.   Respiratory:  Positive for cough. Negative for chest tightness and shortness of breath.    Cardiovascular:  Positive for chest pain (chronic, over L breast). Negative for palpitations and leg swelling.   Gastrointestinal:  Positive for diarrhea (chronic), nausea and vomiting. Negative for abdominal pain and constipation.   Genitourinary:  Negative for dysuria, frequency and hematuria.   Musculoskeletal:  Positive for arthralgias, gait problem and myalgias. Negative for back pain and neck pain.   Skin:  Negative for rash and wound.   Neurological:  Positive for weakness. Negative for dizziness, syncope, speech difficulty and light-headedness.   Psychiatric/Behavioral:  Negative for agitation and confusion. The patient is not nervous/anxious.      Objective:     Vital Signs (Most Recent):  Temp: 98.2 °F (36.8 °C) (10/17/23 1115)  Pulse: (!) 115 (10/17/23 1115)  Resp: 20 (10/17/23 1115)  BP: (!) 131/90 (10/17/23 1115)  SpO2: 98 % (10/17/23 1115) Vital Signs (24h Range):  Temp:  [98.2 °F (36.8 °C)-98.6 °F (37 °C)] 98.2 °F (36.8 °C)  Pulse:  [] 115  Resp:  [16-20] 20  SpO2:  [98 %-100 %] 98 %  BP: (131-180)/(89-90) 131/90     Weight: 63.5 kg (140 lb)  Body mass index is 24.03 kg/m².     Physical Exam  Vitals and nursing note reviewed.   Constitutional:       General: She is not in acute distress.     Appearance: She is well-developed. She is ill-appearing. She is not toxic-appearing.   HENT:      Head: Normocephalic and atraumatic.      Mouth/Throat:      Pharynx: No oropharyngeal exudate.   Eyes:      Conjunctiva/sclera: Conjunctivae normal.      Pupils: Pupils are equal, round, and reactive to light.   Cardiovascular:      Rate and Rhythm: Tachycardia present. Rhythm  irregular.      Heart sounds: Normal heart sounds.   Pulmonary:      Effort: Pulmonary effort is normal. No respiratory distress.      Breath sounds: Normal breath sounds. No wheezing.      Comments: Lungs clear  Abdominal:      General: Bowel sounds are normal. There is no distension.      Palpations: Abdomen is soft.      Tenderness: There is abdominal tenderness (diffuse).   Musculoskeletal:         General: No tenderness.      Cervical back: Normal range of motion and neck supple.      Comments: ROM limited 2/2 pain at bilateral shoulders, hips, knees. Tender to palpation of affected joints and muscles of upper and lower extremities.    Lymphadenopathy:      Cervical: No cervical adenopathy.   Skin:     General: Skin is warm and dry.      Capillary Refill: Capillary refill takes less than 2 seconds.      Findings: No rash.   Neurological:      Mental Status: She is alert and oriented to person, place, and time.      Cranial Nerves: No cranial nerve deficit.      Sensory: No sensory deficit.      Coordination: Coordination normal.   Psychiatric:         Behavior: Behavior normal.      Comments: Appears to have depressed mood              CRANIAL NERVES     CN III, IV, VI   Pupils are equal, round, and reactive to light.       Significant Labs: All pertinent labs within the past 24 hours have been reviewed.  CMP:   Recent Labs   Lab 10/17/23  0824      K 3.1*      CO2 22*   *   BUN 13   CREATININE 1.1   CALCIUM 9.3   PROT 7.2   ALBUMIN 3.6   BILITOT 0.9   ALKPHOS 75   AST 16   ALT 8*   ANIONGAP 11       Significant Imaging: I have reviewed all pertinent imaging results/findings within the past 24 hours.    Imaging Results              CT Head Without Contrast (In process)  Result time 10/17/23 13:26:00                     X-Ray Chest AP Portable (Final result)  Result time 10/17/23 12:30:02      Final result by Doroteo Sagastume MD (10/17/23 12:30:02)                   Impression:      No  convincing evidence of acute cardiopulmonary disease.      Electronically signed by: Doroteo Sagastume  Date:    10/17/2023  Time:    12:30               Narrative:    EXAMINATION:  XR CHEST AP PORTABLE    CLINICAL HISTORY:  sob;    TECHNIQUE:  Single frontal view of the chest was performed.    COMPARISON:  Chest radiograph performed 08/01/2023.    FINDINGS:  Cardiomediastinal contours grossly unchanged.  Chronic appearing mild interstitial coarsening appears relatively similar in appearance to 08/01/2023 radiograph.  No definite focal airspace consolidation.    No definite pneumothorax or large volume pleural effusion.    No acute findings in the visualized abdomen.    Osseous and soft tissue structures appear without definite acute change.

## 2023-10-17 NOTE — ASSESSMENT & PLAN NOTE
- well-controlled   -  on admit, last A1c 5.5   - home regimen Metformin and trulicity - hold while admitted   - start low dose SSI   - diabetic diet   - Of note, patient prescribed Metformin 1000 BID, but she says she can not tolerate this dose due to GI side effects. She is well-controlled on 1000 QD, so will continue once daily at [place].

## 2023-10-17 NOTE — ASSESSMENT & PLAN NOTE
3 days of fatigue, generalized myalgias, worsened chronic joint pain, N/V, cough. Tachycardia on admission, but otherwise no evidence of sepsis. Do not suspect bacterial cause.   - negative for covid, flu, rsv   - negative CXR, negative UA  - suspect alternate viral illness   - check LA  - supportive treatment, IVF   - monitor response

## 2023-10-17 NOTE — PHARMACY MED REC
"Admission Medication History     The home medication history was taken by Ivett Galvez.    You may go to "Admission" then "Reconcile Home Medications" tabs to review and/or act upon these items.     The home medication list has been updated by the Pharmacy department.   Please read ALL comments highlighted in yellow.   Please address this information as you see fit.    Feel free to contact us if you have any questions or require assistance.      The medications listed below were removed from the home medication list. Please reorder if appropriate:  Patient reports no longer taking the following medication(s):  ALBUTEROL 90 MCG/ ACTUATION INHALER  DICLOFENAC 50 MG EC TAB  FLUTICASONE PROPIONATE 50 MCG  SENNA-DOCUSATE 8.6-50 MG  SERTRALINE 25 MG  TOPIRAMATE 25 MG    Medications listed below were obtained from: Patient/family and Patient's pharmacy  Current Outpatient Medications on File Prior to Encounter   Medication Sig    acetaminophen (TYLENOL) 500 MG tablet Take 500 mg by mouth every 8 (eight) hours as needed for Pain.    aspirin 81 mg Tab Take 1 tablet by mouth Daily.    atorvastatin (LIPITOR) 10 MG tablet Take 1 tablet by mouth once daily    candesartan (ATACAND) 16 MG tablet Take 16 mg by mouth once daily. Taking remainder of rx. Once runs out will start Losartan 100 daily    diclofenac sodium (VOLTAREN) 1 % Gel Apply 2 g topically 3 (three) times daily as needed (muscle spasm pain).     dulaglutide (TRULICITY) 1.5 mg/0.5 mL pen injector Inject 1.5 mg into the skin every Thursday. Pt states she is using every week. Last filled June 2022    letrozole (FEMARA) 2.5 mg Tab Take 1 tablet (2.5 mg total) by mouth once daily.    metFORMIN (GLUCOPHAGE) 1000 MG tablet 1 tablet with a meal Orally Once a day    ACCU-CHEK GUIDE GLUCOSE METER Misc USE  THREE TIMES DAILY    BD ULTRA-FINE SHORT PEN NEEDLE 31 gauge x 5/16" Ndle USE 1  4 TIMES DAILY WITH  INSULIN    blood sugar diagnostic Strp Strips and lancets covered by " insurance E11.9, pt checks glucose three times daily, insulin dependent    blood sugar diagnostic, drum (ACCU-CHEK COMPACT TEST) Strp USE ONE STRIP TO CHECK GLUCOSE 4 TIMES DAILY BEFORE EACH MEAL AND AT BEDTIME    capsaicin 0.1 % Crea Apply 1 application  topically 2 (two) times daily as needed (pain).    lancets Misc To check BG 4 times daily, to use with insurance preferred meter, insulin dependent    LIDOcaine (LIDODERM) 5 % Apply to affected area as needed for pain for 12 hours, then off for 12 hours. Discard after each use.  May use 4% lidocaine patch as alternative.    losartan (COZAAR) 100 MG tablet Take 1 tablet (100 mg total) by mouth once daily. (Patient not taking: Reported on 10/17/2023)    TRUEPLUS LANCETS 33 gauge Misc USE 1 TO CHECK GLUCOSE 4 TIMES DAILY               Potential issues to be addressed PRIOR TO DISCHARGE  {Potential issues to be addressed PRIOR TO DISCHARGE:01607:p}    Ivett Galvez  EXT [id number]                  .

## 2023-10-17 NOTE — ASSESSMENT & PLAN NOTE
- seen by rheumatology 5/2023, but patient's symptoms controlled at that time and she was not interested in starting Humira   - will refer her back to rheum for follow up and further discussion given worsening pain

## 2023-10-17 NOTE — HPI
Sunitha Pillai is a 68-year-old female with a past medical history of breast cancer, CAD, depression, type 1 diabetes, GERD, HTN, and rheumatoid arthritis who presents to the ED with primary complaint of worsening joint pains. Patient with chronic polyarthralgias due to RA, including bilateral shoulders, hips, knees. Patient is not on MTX due to pulmonary nodules. Saw rheumatology in May, but patient not interested in trial of Humira at that time and felt her symptoms were controlled then. Today she reports worsening of her chronic pain over the past 2-3 days, now severe enough to impair her mobility. Additionally patient reports onset of generalized myalgias, fatigue, abdominal pain, nonbloody emesis, nausea, and non-productive cough around the same time (2-3 days ago). Denies fevers, sick contacts, SOB, sore throat, congestion, lightheadedness, falls, dysuria. Endorses diarrhea which is chronic since starting Metformin.     In the ED: Tachycardic to 115. Hypertensive to 180/90 on arrival, but improved to 131/90. Otherwise VSS AF. Labs reveal baseline chronic anemia. Hypokalemia 3.1 (replaced). Cr 1.1, baseline. . UA unremarkable. COVID/flu/RSV negative. EKG in sinus tach with PAC, RBBB, no ischemic changes. Given 600 mg ibuprofen, 1g tylenol, and 4mg IV morphine for pain.

## 2023-10-17 NOTE — ED NOTES
Nurses Note -- 4 Eyes      10/17/2023   11:30 AM      Skin assessed during: Daily Assessment      [x] No Altered Skin Integrity Present    []Prevention Measures Documented      [] Yes- Altered Skin Integrity Present or Discovered   [] LDA Added if Not in Epic (Describe Wound)   [] New Altered Skin Integrity was Present on Admit and Documented in LDA   [] Wound Image Taken    Wound Care Consulted? No    Attending Nurse:  Zaria Carr RN/Staff Member:   Moy

## 2023-10-17 NOTE — ED NOTES
I-STAT Chem-8+ Results:   Value Reference Range   Sodium 143 136-145 mmol/L   Potassium  3.1 3.5-5.1 mmol/L   Chloride 106  mmol/L   Ionized Calcium 1.13 1.06-1.42 mmol/L   CO2 (measured) 22 23-29 mmol/L   Glucose 121  mg/dL   BUN 13 6-30 mg/dL   Creatinine 1.1 0.5-1.4 mg/dL   Hematocrit 32 36-54%     143

## 2023-10-17 NOTE — H&P
[facility] - Emergency Dept  [facility] Medicine  History & Physical    Patient Name: Sunitha Pillai  MRN: [id number]  Patient Class: OP- Observation  Admission Date: 10/17/2023  Attending Physician: Zaria Clark MD   Primary Care Provider: Patty Louis MD         Patient information was obtained from patient, relative(s) and ER records.     Subjective:     Principal Problem:Chronic pain of multiple joints    Chief Complaint:   Chief Complaint   Patient presents with    Abdominal Pain     Patient with complaints of N/V/D, and abdominal pain, as well as generalized body aches.         HPI: Sunitha Pillai is a 68-year-old female with a past medical history of breast cancer, CAD, depression, type 1 diabetes, GERD, HTN, and rheumatoid arthritis who presents to the ED with primary complaint of worsening joint pains. Patient with chronic polyarthralgias due to RA, including bilateral shoulders, hips, knees. Patient is not on MTX due to pulmonary nodules. Saw rheumatology in May, but patient not interested in trial of Humira at that time and felt her symptoms were controlled then. Today she reports worsening of her chronic pain over the past 2-3 days, now severe enough to impair her mobility. Additionally patient reports onset of generalized myalgias, fatigue, abdominal pain, nonbloody emesis, nausea, and non-productive cough around the same time (2-3 days ago). Denies fevers, sick contacts, SOB, sore throat, congestion, lightheadedness, falls, dysuria. Endorses diarrhea which is chronic since starting Metformin.     In the ED: Tachycardic to 115. Hypertensive to 180/90 on arrival, but improved to 131/90. Otherwise VSS AF. Labs reveal baseline chronic anemia. Hypokalemia 3.1 (replaced). Cr 1.1, baseline. . UA unremarkable. COVID/flu/RSV negative. EKG in sinus tach with PAC, RBBB, no ischemic changes. Given 600 mg ibuprofen, 1g tylenol, and 4mg IV morphine for pain.       Past Medical History:    Diagnosis Date    Acute coronary syndrome 2018    Adjustment disorder     Anxiety     Arthritis     Cancer of breast 2020    Malignant neoplasm of lower-inner quadrant of left breast in female, estrogen receptor positive    Cholelithiasis with acute cholecystitis 2021    Coronary artery disease     Depression     Diabetes mellitus type I     Genetic testing of female 2020    [place] via [employer] with AXIN2 and POLE variants of uncertain significance    GERD (gastroesophageal reflux disease)     Hepatitis B core antibody positive 3/9/2023    Negative sAg, suggests previous exposure but no chronic/active Hep B. At risk for reactivation with any immunosuppression medication, steroids, chemo, etc.      Hypertension     Vertigo 2019    Vitamin D deficiency 2021       Past Surgical History:   Procedure Laterality Date    BREAST BIOPSY Left 2022    Left punch biopsy; Unremarkable keratinized skin with intradermal hematoma     SECTION      INJECTION FOR SENTINEL NODE IDENTIFICATION Left 2020    Procedure: INJECTION, FOR SENTINEL NODE IDENTIFICATION;  Surgeon: Isabel Moulton MD;  Location: [facility];  Service: General;  Laterality: Left;    INSERTION OF TUNNELED CENTRAL VENOUS CATHETER (CVC) WITH SUBCUTANEOUS PORT N/A 2020    Procedure: INSERTION, PORT-A-CATH;  Surgeon: Hardeep Martin MD;  Location: [facility] CATH LAB;  Service: Radiology;  Laterality: N/A;    LAPAROSCOPIC CHOLECYSTECTOMY N/A 2021    Procedure: CHOLECYSTECTOMY, LAPAROSCOPIC;  Surgeon: Buster Son MD;  Location: [address];  Service: General;  Laterality: N/A;    MASTECTOMY, PARTIAL Left 2020    Procedure: MASTECTOMY, PARTIAL-w/reflector;  Surgeon: Isabel Moulton MD;  Location: [facility];  Service: General;  Laterality: Left;    SENTINEL LYMPH NODE BIOPSY Left 2020    Procedure: BIOPSY, LYMPH NODE, SENTINEL;  Surgeon: Isabel Moulton MD;   "Location: [facility] OR;  Service: General;  Laterality: Left;       Review of patient's allergies indicates:  No Known Allergies    Current Facility-Administered Medications on File Prior to Encounter   Medication    fentaNYL injection 25 mcg    midazolam (VERSED) 1 mg/mL injection 0.5 mg     Current Outpatient Medications on File Prior to Encounter   Medication Sig    ACCU-CHEK GUIDE GLUCOSE METER Misc USE  THREE TIMES DAILY    albuterol (PROVENTIL/VENTOLIN HFA) 90 mcg/actuation inhaler Inhale 1-2 puffs into the lungs every 6 (six) hours as needed for Wheezing or Shortness of Breath (Please dispense with spacer).    aspirin 81 mg Tab Take 1 tablet by mouth Daily.    atorvastatin (LIPITOR) 10 MG tablet Take 1 tablet by mouth once daily    BD ULTRA-FINE SHORT PEN NEEDLE 31 gauge x 5/16" Ndle USE 1  4 TIMES DAILY WITH  INSULIN    blood sugar diagnostic Strp Strips and lancets covered by insurance E11.9, pt checks glucose three times daily, insulin dependent    blood sugar diagnostic, drum (ACCU-CHEK COMPACT TEST) Strp USE ONE STRIP TO CHECK GLUCOSE 4 TIMES DAILY BEFORE EACH MEAL AND AT BEDTIME    capsaicin 0.1 % Crea Apply 1 application  topically 2 (two) times daily as needed (pain).    diclofenac (VOLTAREN) 50 MG EC tablet Take 1 tablet (50 mg total) by mouth 2 (two) times daily.    diclofenac sodium (VOLTAREN) 1 % Gel Apply 2 g topically 3 (three) times daily as needed (muscle spasm pain). Apply 2 grams to affected area 3 times daily as needed    dulaglutide (TRULICITY) 1.5 mg/0.5 mL pen injector Inject 1.5 mg into the skin every 7 days.    EUFLEXXA 10 mg/mL(mw 2.4 -3.6 million) IAtc injection     fluticasone propionate (FLONASE) 50 mcg/actuation nasal spray 1 spray (50 mcg total) by Each Nostril route once daily.    lancets Misc To check BG 4 times daily, to use with insurance preferred meter, insulin dependent    letrozole (FEMARA) 2.5 mg Tab Take 1 tablet (2.5 mg total) by mouth once daily.    " LIDOcaine (LIDODERM) 5 % Apply to affected area as needed for pain for 12 hours, then off for 12 hours. Discard after each use.  May use 4% lidocaine patch as alternative.    losartan (COZAAR) 100 MG tablet Take 1 tablet (100 mg total) by mouth once daily.    metFORMIN (GLUCOPHAGE) 1000 MG tablet Take 1 tablet (1,000 mg total) by mouth 2 (two) times daily.    metFORMIN (GLUCOPHAGE) 1000 MG tablet 1 tablet with a meal Orally Once a day    senna-docusate 8.6-50 mg (SENNA WITH DOCUSATE SODIUM) 8.6-50 mg per tablet Take 2 tablets by mouth once daily.    sertraline (ZOLOFT) 25 MG tablet Take 1 tablet (25 mg total) by mouth once daily.    topiramate (TOPAMAX) 25 MG tablet One at bedtime for 2 weeks then two at bedtime    triamcinolone acetonide (KENALOG-40) 40 mg/mL injection     TRUEPLUS LANCETS 33 gauge Misc USE 1 TO CHECK GLUCOSE 4 TIMES DAILY    [DISCONTINUED] allopurinoL (ZYLOPRIM) 100 MG tablet Take 100 mg by mouth once daily.    [DISCONTINUED] candesartan (ATACAND) 16 MG tablet Take 2 tablets (32 mg total) by mouth once daily.    [DISCONTINUED] candesartan (ATACAND) 8 MG tablet 1 tablet Orally Once a day    [DISCONTINUED] naproxen (NAPROSYN) 500 MG tablet Take 1 tablet (500 mg total) by mouth 2 (two) times daily as needed (pain).     Family History       Problem Relation (Age of Onset)    Aneurysm Father    Anxiety disorder Daughter, Daughter    Breast cancer Maternal Aunt, Paternal Aunt, Other    Cancer Brother, Maternal Aunt, Paternal Aunt    Cervical cancer Daughter    Colon polyps Mother    Depression Daughter, Daughter    Diabetes Mother, Sister, Brother    Heart disease Mother, Brother    Hyperlipidemia Mother    Hypertension Mother, Sister, Brother    Leukemia Maternal Cousin    Prostate cancer Maternal Cousin, Maternal Cousin          Tobacco Use    Smoking status: Never    Smokeless tobacco: Never   Substance and Sexual Activity    Alcohol use: Never    Drug use: No    Sexual activity: Yes      Partners: Male     Birth control/protection: Post-menopausal     Review of Systems   Constitutional:  Positive for activity change. Negative for chills and fever.   HENT:  Negative for trouble swallowing.    Eyes:  Negative for photophobia and visual disturbance.   Respiratory:  Positive for cough. Negative for chest tightness and shortness of breath.    Cardiovascular:  Positive for chest pain (chronic, over L breast). Negative for palpitations and leg swelling.   Gastrointestinal:  Positive for diarrhea (chronic), nausea and vomiting. Negative for abdominal pain and constipation.   Genitourinary:  Negative for dysuria, frequency and hematuria.   Musculoskeletal:  Positive for arthralgias, gait problem and myalgias. Negative for back pain and neck pain.   Skin:  Negative for rash and wound.   Neurological:  Positive for weakness. Negative for dizziness, syncope, speech difficulty and light-headedness.   Psychiatric/Behavioral:  Negative for agitation and confusion. The patient is not nervous/anxious.      Objective:     Vital Signs (Most Recent):  Temp: 98.2 °F (36.8 °C) (10/17/23 1115)  Pulse: (!) 115 (10/17/23 1115)  Resp: 20 (10/17/23 1115)  BP: (!) 131/90 (10/17/23 1115)  SpO2: 98 % (10/17/23 1115) Vital Signs (24h Range):  Temp:  [98.2 °F (36.8 °C)-98.6 °F (37 °C)] 98.2 °F (36.8 °C)  Pulse:  [] 115  Resp:  [16-20] 20  SpO2:  [98 %-100 %] 98 %  BP: (131-180)/(89-90) 131/90     Weight: 63.5 kg (140 lb)  Body mass index is 24.03 kg/m².     Physical Exam  Vitals and nursing note reviewed.   Constitutional:       General: She is not in acute distress.     Appearance: She is well-developed. She is ill-appearing. She is not toxic-appearing.   HENT:      Head: Normocephalic and atraumatic.      Mouth/Throat:      Pharynx: No oropharyngeal exudate.   Eyes:      Conjunctiva/sclera: Conjunctivae normal.      Pupils: Pupils are equal, round, and reactive to light.   Cardiovascular:      Rate and Rhythm:  Tachycardia present. Rhythm irregular.      Heart sounds: Normal heart sounds.   Pulmonary:      Effort: Pulmonary effort is normal. No respiratory distress.      Breath sounds: Normal breath sounds. No wheezing.      Comments: Lungs clear  Abdominal:      General: Bowel sounds are normal. There is no distension.      Palpations: Abdomen is soft.      Tenderness: There is abdominal tenderness (diffuse).   Musculoskeletal:         General: No tenderness.      Cervical back: Normal range of motion and neck supple.      Comments: ROM limited 2/2 pain at bilateral shoulders, hips, knees. Tender to palpation of affected joints and muscles of upper and lower extremities.    Lymphadenopathy:      Cervical: No cervical adenopathy.   Skin:     General: Skin is warm and dry.      Capillary Refill: Capillary refill takes less than 2 seconds.      Findings: No rash.   Neurological:      Mental Status: She is alert and oriented to person, place, and time.      Cranial Nerves: No cranial nerve deficit.      Sensory: No sensory deficit.      Coordination: Coordination normal.   Psychiatric:         Behavior: Behavior normal.      Comments: Appears to have depressed mood              CRANIAL NERVES     CN III, IV, VI   Pupils are equal, round, and reactive to light.       Significant Labs: All pertinent labs within the past 24 hours have been reviewed.  CMP:   Recent Labs   Lab 10/17/23  0824      K 3.1*      CO2 22*   *   BUN 13   CREATININE 1.1   CALCIUM 9.3   PROT 7.2   ALBUMIN 3.6   BILITOT 0.9   ALKPHOS 75   AST 16   ALT 8*   ANIONGAP 11       Significant Imaging: I have reviewed all pertinent imaging results/findings within the past 24 hours.    Imaging Results              CT Head Without Contrast (In process)  Result time 10/17/23 13:26:00                     X-Ray Chest AP Portable (Final result)  Result time 10/17/23 12:30:02      Final result by Doroteo Sagastume MD (10/17/23 12:30:02)                    Impression:      No convincing evidence of acute cardiopulmonary disease.      Electronically signed by: Doroteo Sagastume  Date:    10/17/2023  Time:    12:30               Narrative:    EXAMINATION:  XR CHEST AP PORTABLE    CLINICAL HISTORY:  sob;    TECHNIQUE:  Single frontal view of the chest was performed.    COMPARISON:  Chest radiograph performed 08/01/2023.    FINDINGS:  Cardiomediastinal contours grossly unchanged.  Chronic appearing mild interstitial coarsening appears relatively similar in appearance to 08/01/2023 radiograph.  No definite focal airspace consolidation.    No definite pneumothorax or large volume pleural effusion.    No acute findings in the visualized abdomen.    Osseous and soft tissue structures appear without definite acute change.                                        Assessment/Plan:     Viral illness  3 days of fatigue, generalized myalgias, worsened chronic joint pain, N/V, cough. Tachycardia on admission, but otherwise no evidence of sepsis. Do not suspect bacterial cause.   - negative for covid, flu, rsv   - negative CXR, negative UA  - suspect alternate viral illness   - check LA  - supportive treatment, IVF   - monitor response       Type 2 diabetes mellitus, without long-term current use of insulin  - well-controlled   -  on admit, last A1c 5.5   - home regimen Metformin and trulicity - hold while admitted   - start low dose SSI   - diabetic diet   - Of note, patient prescribed Metformin 1000 BID, but she says she can not tolerate this dose due to GI side effects. She is well-controlled on 1000 QD, so will continue once daily at [place].       Chronic pain of multiple joints  - acute worsening of chronic pain in setting of suspected viral illness   - start supportive care and MM pain control   - PT/OT consulted for assessment       Rheumatoid arthritis involving multiple sites with positive rheumatoid factor  - seen by rheumatology 5/2023, but patient's symptoms controlled  at that time and she was not interested in starting Humira   - will refer her back to rheum for follow up and further discussion given worsening pain         Essential hypertension  - elevated in setting of pain, now improved and stable   - continue losartan        VTE Risk Mitigation (From admission, onward)         Ordered     enoxaparin injection 40 mg  Daily         10/17/23 1224     IP VTE HIGH RISK PATIENT  Once         10/17/23 1224     Place sequential compression device  Until discontinued         10/17/23 1224                     On 10/17/2023, patient should be placed in hospital observation services under my care in collaboration with Dr. Zaria Clark.      Kavitha Brady PA-C  Department of Hospital Medicine  [facility] - Emergency Dept

## 2023-10-17 NOTE — ED NOTES
Pt presents to ED via EMS w/ family member w/ c/o all over body pain, N/V/D, and abdominal pain. Pt reports N/V/D and abdominal pain that started yesterday. Pt also endorsing body pain -- specifically her hips and knee. Reports similar pain in the past. Denies taking anything OTC for pain.     Patient identifiers for Sunitha Pillai 68 y.o. female checked and correct.  Chief Complaint   Patient presents with    Abdominal Pain     Patient with complaints of N/V/D, and abdominal pain, as well as generalized body aches.      Allergies reported: Review of patient's allergies indicates:  No Known Allergies

## 2023-10-18 PROBLEM — R10.13 EPIGASTRIC PAIN: Status: ACTIVE | Noted: 2023-10-18

## 2023-10-18 PROBLEM — E83.42 HYPOMAGNESEMIA: Status: ACTIVE | Noted: 2023-10-18

## 2023-10-18 PROBLEM — E87.6 HYPOKALEMIA: Status: ACTIVE | Noted: 2023-10-18

## 2023-10-18 LAB
ANION GAP SERPL CALC-SCNC: 9 MMOL/L (ref 8–16)
ANISOCYTOSIS BLD QL SMEAR: SLIGHT
ANISOCYTOSIS BLD QL SMEAR: SLIGHT
BASOPHILS # BLD AUTO: ABNORMAL K/UL (ref 0–0.2)
BASOPHILS NFR BLD: 0 % (ref 0–1.9)
BASOPHILS NFR BLD: 0 % (ref 0–1.9)
BUN SERPL-MCNC: 16 MG/DL (ref 8–23)
CALCIUM SERPL-MCNC: 8.6 MG/DL (ref 8.7–10.5)
CHLORIDE SERPL-SCNC: 110 MMOL/L (ref 95–110)
CO2 SERPL-SCNC: 21 MMOL/L (ref 23–29)
CREAT SERPL-MCNC: 1.2 MG/DL (ref 0.5–1.4)
DIFFERENTIAL METHOD: ABNORMAL
DIFFERENTIAL METHOD: ABNORMAL
EOSINOPHIL # BLD AUTO: ABNORMAL K/UL (ref 0–0.5)
EOSINOPHIL NFR BLD: 0 % (ref 0–8)
EOSINOPHIL NFR BLD: 0 % (ref 0–8)
ERYTHROCYTE [DISTWIDTH] IN BLOOD BY AUTOMATED COUNT: 13.1 % (ref 11.5–14.5)
ERYTHROCYTE [DISTWIDTH] IN BLOOD BY AUTOMATED COUNT: 13.1 % (ref 11.5–14.5)
EST. GFR  (NO RACE VARIABLE): 49 ML/MIN/1.73 M^2
GLUCOSE SERPL-MCNC: 98 MG/DL (ref 70–110)
HCT VFR BLD AUTO: 27.7 % (ref 37–48.5)
HCT VFR BLD AUTO: 28 % (ref 37–48.5)
HGB BLD-MCNC: 9.5 G/DL (ref 12–16)
HGB BLD-MCNC: 9.6 G/DL (ref 12–16)
HYPOCHROMIA BLD QL SMEAR: ABNORMAL
HYPOCHROMIA BLD QL SMEAR: ABNORMAL
IMM GRANULOCYTES # BLD AUTO: ABNORMAL K/UL (ref 0–0.04)
IMM GRANULOCYTES # BLD AUTO: ABNORMAL K/UL (ref 0–0.04)
IMM GRANULOCYTES NFR BLD AUTO: ABNORMAL % (ref 0–0.5)
IMM GRANULOCYTES NFR BLD AUTO: ABNORMAL % (ref 0–0.5)
LACTATE SERPL-SCNC: 1.2 MMOL/L (ref 0.5–2.2)
LYMPHOCYTES # BLD AUTO: ABNORMAL K/UL (ref 1–4.8)
LYMPHOCYTES NFR BLD: 12 % (ref 18–48)
LYMPHOCYTES NFR BLD: 17 % (ref 18–48)
MAGNESIUM SERPL-MCNC: 1.4 MG/DL (ref 1.6–2.6)
MCH RBC QN AUTO: 31.7 PG (ref 27–31)
MCH RBC QN AUTO: 32.3 PG (ref 27–31)
MCHC RBC AUTO-ENTMCNC: 33.9 G/DL (ref 32–36)
MCHC RBC AUTO-ENTMCNC: 34.7 G/DL (ref 32–36)
MCV RBC AUTO: 93 FL (ref 82–98)
MCV RBC AUTO: 93 FL (ref 82–98)
METAMYELOCYTES NFR BLD MANUAL: 3 %
MONOCYTES # BLD AUTO: ABNORMAL K/UL (ref 0.3–1)
MONOCYTES NFR BLD: 1 % (ref 4–15)
MONOCYTES NFR BLD: 2 % (ref 4–15)
NEUTROPHILS NFR BLD: 59 % (ref 38–73)
NEUTROPHILS NFR BLD: 76 % (ref 38–73)
NEUTS BAND NFR BLD MANUAL: 11 %
NEUTS BAND NFR BLD MANUAL: 19 %
NRBC BLD-RTO: 0 /100 WBC
NRBC BLD-RTO: 0 /100 WBC
OVALOCYTES BLD QL SMEAR: ABNORMAL
OVALOCYTES BLD QL SMEAR: ABNORMAL
PLATELET # BLD AUTO: 107 K/UL (ref 150–450)
PLATELET # BLD AUTO: 123 K/UL (ref 150–450)
PLATELET BLD QL SMEAR: ABNORMAL
PMV BLD AUTO: 10.7 FL (ref 9.2–12.9)
PMV BLD AUTO: 9.3 FL (ref 9.2–12.9)
POCT GLUCOSE: 120 MG/DL (ref 70–110)
POCT GLUCOSE: 123 MG/DL (ref 70–110)
POCT GLUCOSE: 69 MG/DL (ref 70–110)
POCT GLUCOSE: 82 MG/DL (ref 70–110)
POCT GLUCOSE: 91 MG/DL (ref 70–110)
POIKILOCYTOSIS BLD QL SMEAR: SLIGHT
POIKILOCYTOSIS BLD QL SMEAR: SLIGHT
POLYCHROMASIA BLD QL SMEAR: ABNORMAL
POLYCHROMASIA BLD QL SMEAR: ABNORMAL
POTASSIUM SERPL-SCNC: 3.1 MMOL/L (ref 3.5–5.1)
PROCALCITONIN SERPL IA-MCNC: 4.85 NG/ML
RBC # BLD AUTO: 2.97 M/UL (ref 4–5.4)
RBC # BLD AUTO: 3 M/UL (ref 4–5.4)
SCHISTOCYTES BLD QL SMEAR: ABNORMAL
SCHISTOCYTES BLD QL SMEAR: PRESENT
SODIUM SERPL-SCNC: 140 MMOL/L (ref 136–145)
SPHEROCYTES BLD QL SMEAR: ABNORMAL
SPHEROCYTES BLD QL SMEAR: ABNORMAL
TROPONIN I SERPL DL<=0.01 NG/ML-MCNC: 0.02 NG/ML (ref 0–0.03)
WBC # BLD AUTO: 10.09 K/UL (ref 3.9–12.7)
WBC # BLD AUTO: 8.81 K/UL (ref 3.9–12.7)

## 2023-10-18 PROCEDURE — 83605 ASSAY OF LACTIC ACID: CPT | Mod: HCNC

## 2023-10-18 PROCEDURE — 97116 GAIT TRAINING THERAPY: CPT | Mod: HCNC

## 2023-10-18 PROCEDURE — 96368 THER/DIAG CONCURRENT INF: CPT

## 2023-10-18 PROCEDURE — 87040 BLOOD CULTURE FOR BACTERIA: CPT | Mod: HCNC

## 2023-10-18 PROCEDURE — 80048 BASIC METABOLIC PNL TOTAL CA: CPT | Mod: HCNC

## 2023-10-18 PROCEDURE — G0378 HOSPITAL OBSERVATION PER HR: HCPCS | Mod: HCNC

## 2023-10-18 PROCEDURE — 96366 THER/PROPH/DIAG IV INF ADDON: CPT

## 2023-10-18 PROCEDURE — 63600175 PHARM REV CODE 636 W HCPCS: Mod: HCNC

## 2023-10-18 PROCEDURE — 85027 COMPLETE CBC AUTOMATED: CPT | Mod: HCNC

## 2023-10-18 PROCEDURE — 63600175 PHARM REV CODE 636 W HCPCS: Mod: HCNC | Performed by: HOSPITALIST

## 2023-10-18 PROCEDURE — 25000003 PHARM REV CODE 250: Mod: HCNC

## 2023-10-18 PROCEDURE — 84145 PROCALCITONIN (PCT): CPT | Mod: HCNC

## 2023-10-18 PROCEDURE — 36415 COLL VENOUS BLD VENIPUNCTURE: CPT | Mod: HCNC

## 2023-10-18 PROCEDURE — 85025 COMPLETE CBC W/AUTO DIFF WBC: CPT | Mod: HCNC

## 2023-10-18 PROCEDURE — 85007 BL SMEAR W/DIFF WBC COUNT: CPT | Mod: NCS,HCNC

## 2023-10-18 PROCEDURE — 25000003 PHARM REV CODE 250: Mod: HCNC | Performed by: HOSPITALIST

## 2023-10-18 PROCEDURE — 97165 OT EVAL LOW COMPLEX 30 MIN: CPT | Mod: HCNC

## 2023-10-18 PROCEDURE — 97530 THERAPEUTIC ACTIVITIES: CPT | Mod: HCNC

## 2023-10-18 PROCEDURE — 83735 ASSAY OF MAGNESIUM: CPT | Mod: HCNC

## 2023-10-18 PROCEDURE — 97161 PT EVAL LOW COMPLEX 20 MIN: CPT | Mod: HCNC

## 2023-10-18 PROCEDURE — 96365 THER/PROPH/DIAG IV INF INIT: CPT

## 2023-10-18 PROCEDURE — 84484 ASSAY OF TROPONIN QUANT: CPT | Mod: HCNC

## 2023-10-18 PROCEDURE — 96367 TX/PROPH/DG ADDL SEQ IV INF: CPT

## 2023-10-18 RX ORDER — LIDOCAINE 50 MG/G
1 PATCH TOPICAL
Status: DISCONTINUED | OUTPATIENT
Start: 2023-10-18 | End: 2023-10-22 | Stop reason: HOSPADM

## 2023-10-18 RX ORDER — POTASSIUM CHLORIDE 750 MG/1
30 CAPSULE, EXTENDED RELEASE ORAL
Status: COMPLETED | OUTPATIENT
Start: 2023-10-18 | End: 2023-10-18

## 2023-10-18 RX ORDER — PANTOPRAZOLE SODIUM 40 MG/1
40 TABLET, DELAYED RELEASE ORAL DAILY
Status: DISCONTINUED | OUTPATIENT
Start: 2023-10-18 | End: 2023-10-22 | Stop reason: HOSPADM

## 2023-10-18 RX ORDER — ACETAMINOPHEN 500 MG
1000 TABLET ORAL EVERY 8 HOURS
Status: DISCONTINUED | OUTPATIENT
Start: 2023-10-18 | End: 2023-10-20

## 2023-10-18 RX ORDER — MAGNESIUM SULFATE HEPTAHYDRATE 40 MG/ML
2 INJECTION, SOLUTION INTRAVENOUS ONCE
Status: COMPLETED | OUTPATIENT
Start: 2023-10-18 | End: 2023-10-18

## 2023-10-18 RX ADMIN — MAGNESIUM SULFATE HEPTAHYDRATE 2 G: 40 INJECTION, SOLUTION INTRAVENOUS at 03:10

## 2023-10-18 RX ADMIN — METHOCARBAMOL 500 MG: 500 TABLET ORAL at 08:10

## 2023-10-18 RX ADMIN — LIDOCAINE 1 PATCH: 50 PATCH CUTANEOUS at 03:10

## 2023-10-18 RX ADMIN — SENNOSIDES AND DOCUSATE SODIUM 1 TABLET: 50; 8.6 TABLET ORAL at 12:10

## 2023-10-18 RX ADMIN — ACETAMINOPHEN 1000 MG: 500 TABLET ORAL at 03:10

## 2023-10-18 RX ADMIN — IBUPROFEN 600 MG: 400 TABLET ORAL at 05:10

## 2023-10-18 RX ADMIN — TOPIRAMATE 25 MG: 25 TABLET, FILM COATED ORAL at 08:10

## 2023-10-18 RX ADMIN — GUAIFENESIN AND DEXTROMETHORPHAN HYDROBROMIDE 1 TABLET: 600; 30 TABLET, EXTENDED RELEASE ORAL at 08:10

## 2023-10-18 RX ADMIN — IBUPROFEN 600 MG: 400 TABLET ORAL at 12:10

## 2023-10-18 RX ADMIN — POTASSIUM CHLORIDE 30 MEQ: 10 CAPSULE, COATED, EXTENDED RELEASE ORAL at 02:10

## 2023-10-18 RX ADMIN — POTASSIUM CHLORIDE 30 MEQ: 10 CAPSULE, COATED, EXTENDED RELEASE ORAL at 12:10

## 2023-10-18 RX ADMIN — PANTOPRAZOLE SODIUM 40 MG: 40 TABLET, DELAYED RELEASE ORAL at 12:10

## 2023-10-18 RX ADMIN — CEFTRIAXONE 1 G: 1 INJECTION, POWDER, FOR SOLUTION INTRAMUSCULAR; INTRAVENOUS at 12:10

## 2023-10-18 RX ADMIN — METHOCARBAMOL 500 MG: 500 TABLET ORAL at 03:10

## 2023-10-18 RX ADMIN — ACETAMINOPHEN 1000 MG: 500 TABLET ORAL at 10:10

## 2023-10-18 RX ADMIN — GUAIFENESIN AND DEXTROMETHORPHAN HYDROBROMIDE 1 TABLET: 600; 30 TABLET, EXTENDED RELEASE ORAL at 03:10

## 2023-10-18 RX ADMIN — ATORVASTATIN CALCIUM 10 MG: 10 TABLET, FILM COATED ORAL at 08:10

## 2023-10-18 RX ADMIN — CAPSAICIN: 0.25 CREAM TOPICAL at 12:10

## 2023-10-18 RX ADMIN — ASPIRIN 81 MG CHEWABLE TABLET 81 MG: 81 TABLET CHEWABLE at 08:10

## 2023-10-18 RX ADMIN — CAPSAICIN: 0.25 CREAM TOPICAL at 09:10

## 2023-10-18 RX ADMIN — VANCOMYCIN HYDROCHLORIDE 1750 MG: 10 INJECTION, POWDER, LYOPHILIZED, FOR SOLUTION INTRAVENOUS at 02:10

## 2023-10-18 RX ADMIN — LOSARTAN POTASSIUM 100 MG: 50 TABLET, FILM COATED ORAL at 08:10

## 2023-10-18 NOTE — PLAN OF CARE
Problem: Adult Inpatient Plan of Care  Goal: Plan of Care Review  Outcome: Ongoing, Progressing  Goal: Patient-Specific Goal (Individualized)  Outcome: Ongoing, Progressing  Goal: Absence of Hospital-Acquired Illness or Injury  Outcome: Ongoing, Progressing  Goal: Optimal Comfort and Wellbeing  Outcome: Ongoing, Progressing  Goal: Readiness for Transition of Care  Outcome: Ongoing, Progressing     Problem: Infection  Goal: Absence of Infection Signs and Symptoms  Outcome: Ongoing, Progressing     Problem: Diabetes Comorbidity  Goal: Blood Glucose Level Within Targeted Range  Outcome: Ongoing, Progressing     Problem: Fluid Imbalance (Pneumonia)  Goal: Fluid Balance  Outcome: Ongoing, Progressing     Problem: Infection (Pneumonia)  Goal: Resolution of Infection Signs and Symptoms  Outcome: Ongoing, Progressing     Problem: Respiratory Compromise (Pneumonia)  Goal: Effective Oxygenation and Ventilation  Outcome: Ongoing, Progressing     Problem: Mobility Impairment  Goal: Optimal Mobility  Outcome: Ongoing, Progressing     Problem: Pain Acute  Goal: Acceptable Pain Control and Functional Ability  Outcome: Ongoing, Progressing     Problem: Fall Injury Risk  Goal: Absence of Fall and Fall-Related Injury  Outcome: Ongoing, Progressing     Problem: Asthma Comorbidity  Goal: Maintenance of Asthma Control  Outcome: Ongoing, Progressing     Problem: Behavioral Health Comorbidity  Goal: Maintenance of Behavioral Health Symptom Control  Outcome: Ongoing, Progressing     Problem: COPD (Chronic Obstructive Pulmonary Disease) Comorbidity  Goal: Maintenance of COPD Symptom Control  Outcome: Ongoing, Progressing     Problem: Heart Failure Comorbidity  Goal: Maintenance of Heart Failure Symptom Control  Outcome: Ongoing, Progressing     Problem: Hypertension Comorbidity  Goal: Blood Pressure in Desired Range  Outcome: Ongoing, Progressing     Problem: Obstructive Sleep Apnea Risk or Actual Comorbidity Management  Goal:  Unobstructed Breathing During Sleep  Outcome: Ongoing, Progressing     Problem: Osteoarthritis Comorbidity  Goal: Maintenance of Osteoarthritis Symptom Control  Outcome: Ongoing, Progressing     Problem: Pain Chronic (Persistent) (Comorbidity Management)  Goal: Acceptable Pain Control and Functional Ability  10/18/2023 1647 by Huan Mcgrath LPN  Outcome: Ongoing, Progressing  10/18/2023 1647 by Huan Mcgrath LPN  Outcome: Ongoing, Progressing     Problem: Seizure Disorder Comorbidity  Goal: Maintenance of Seizure Control  10/18/2023 1647 by Huan cMgrath LPN  Outcome: Ongoing, Progressing  10/18/2023 1647 by Huan Mcgrath LPN  Outcome: Ongoing, Progressing     Problem: Hypertension Acute  Goal: Blood Pressure Within Desired Range  10/18/2023 1647 by Huan Mcgrath LPN  Outcome: Ongoing, Progressing  10/18/2023 1647 by Huan Mcgrath LPN  Outcome: Ongoing, Progressing

## 2023-10-18 NOTE — NURSING
Nurses Note -- 4 Eyes      10/18/2023   3:45 PM      Skin assessed during: Admit      [x] No Altered Skin Integrity Present    []Prevention Measures Documented      [] Yes- Altered Skin Integrity Present or Discovered   [] LDA Added if Not in Epic (Describe Wound)   [] New Altered Skin Integrity was Present on Admit and Documented in LDA   [] Wound Image Taken    Wound Care Consulted? No    Attending Nurse:  Huan Carr RN/Staff Member:   Ofelia

## 2023-10-18 NOTE — PT/OT/SLP EVAL
Occupational Therapy   Evaluation    Name: Sunitha Pillai  MRN: [id number]  Admitting Diagnosis: Chronic pain of multiple joints  Recent Surgery: * No surgery found *      Recommendations:     Discharge Recommendations: No Therapy Indicated  Discharge Equipment Recommendations:  none  Barriers to discharge:  None    Assessment:     Sunitha Pillai is a 68 y.o. female with a medical diagnosis of Chronic pain of multiple joints.  She was able to ambulate 200' this date with SPV and no AD. Pt's UE strength is limited by R shoulder pain, appropriate for therapy 2x/week. Performance deficits affecting function: weakness, pain, impaired functional mobility, impaired self care skills, gait instability.      Rehab Prognosis: Good; patient would benefit from acute skilled OT services to address these deficits and reach maximum level of function.       Plan:     Patient to be seen 2 x/week to address the above listed problems via self-care/home management, therapeutic activities, therapeutic exercises  Plan of Care Expires: 11/17/23  Plan of Care Reviewed with: patient    Subjective     Chief Complaint: R shoulder pain  Patient/Family Comments/goals: return to OF    Occupational Profile:  The patient lives alone in a [facility], [address], T/S SC with GB. 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   Prior to admission, patients level of function was independent.   Equipment Used at Home: none    Pain/Comfort:  Pain Rating 1: 4/10  Location - Side 1: Right  Location 1: shoulder  Pain Addressed 1: Reposition, Distraction    Patients cultural, spiritual, Orthodox conflicts given the current situation: no    Objective:     Communicated with: RN prior to session.  Patient found HOB elevated with peripheral IV upon OT entry to room.    General Precautions: Standard, fall  Orthopedic Precautions: N/A  Braces: N/A  Respiratory Status: Room air    Occupational Performance:    Bed Mobility:    Patient completed  Rolling/Turning to Right with supervision  Patient completed Scooting/Bridging with supervision  Patient completed Supine to Sit with supervision    Functional Mobility/Transfers:  Patient completed Sit <> Stand Transfer with supervision  with  no assistive device   Patient completed Bed <> Chair Transfer using Step Transfer technique with supervision with no assistive device  Functional Mobility: Pt ambulated 200' with no AD and SPV with OT/PT    Activities of Daily Living:  Lower Body Dressing: supervision donning/doffing socks    Cognitive/Visual Perceptual:  Cognitive/Psychosocial Skills:     -       Oriented to: Person, Place, Time, and Situation   -       Follows Commands/attention:Follows multistep  commands  -       Communication: clear/fluent  -       Safety awareness/insight to disability: intact     Physical Exam:  Upper Extremity Range of Motion:     -       Right Upper Extremity: WFL  -       Left Upper Extremity: WNL  Upper Extremity Strength:    -       Right Upper Extremity: Deficits: 3/5 R shld flexion  -       Left Upper Extremity: WNL   Strength:    -       Right Upper Extremity: WFL  -       Left Upper Extremity: WFL    AMPAC 6 Click ADL:  AMPAC Total Score: 23    Treatment & Education:  UE ROM/MMT  Bed mobility training / assessment  Functional mobility assessment  Sit/standing balance assessment  Educated on importance of sitting OOB in bedside chair to promote increased strength, endurance & breathing.  Discussed OT POC / Post-acute plan          Patient left up in chair with call button in reach    GOALS:   Multidisciplinary Problems       Occupational Therapy Goals          Problem: Occupational Therapy    Goal Priority Disciplines Outcome Interventions   Occupational Therapy Goal     OT, PT/OT Ongoing, Progressing    Description: Goals to be met by: 10/25/23     Patient will increase functional independence with ADLs by performing:    UE Dressing with Set-up Assistance.  LE Dressing  with Modified [place].  Bathing from  standing at sink with Set-up Assistance.  Step transfer with Modified [place]                         History:     Past Medical History:   Diagnosis Date    Acute coronary syndrome 2018    Adjustment disorder     Anxiety     Arthritis     Cancer of breast 2020    Malignant neoplasm of lower-inner quadrant of left breast in female, estrogen receptor positive    Cholelithiasis with acute cholecystitis 2021    Coronary artery disease     Depression     Diabetes mellitus type I     Genetic testing of female 2020    [employer] via [employer] with AXIN2 and POLE variants of uncertain significance    GERD (gastroesophageal reflux disease)     Hepatitis B core antibody positive 3/9/2023    Negative sAg, suggests previous exposure but no chronic/active Hep B. At risk for reactivation with any immunosuppression medication, steroids, chemo, etc.      Hypertension     Vertigo 2019    Vitamin D deficiency 2021         Past Surgical History:   Procedure Laterality Date    BREAST BIOPSY Left 2022    Left punch biopsy; Unremarkable keratinized skin with intradermal hematoma     SECTION      INJECTION FOR SENTINEL NODE IDENTIFICATION Left 2020    Procedure: INJECTION, FOR SENTINEL NODE IDENTIFICATION;  Surgeon: Isabel Moulton MD;  Location: [facility];  Service: General;  Laterality: Left;    INSERTION OF TUNNELED CENTRAL VENOUS CATHETER (CVC) WITH SUBCUTANEOUS PORT N/A 2020    Procedure: INSERTION, PORT-A-CATH;  Surgeon: Hardeep Martin MD;  Location: [facility] CATH LAB;  Service: Radiology;  Laterality: N/A;    LAPAROSCOPIC CHOLECYSTECTOMY N/A 2021    Procedure: CHOLECYSTECTOMY, LAPAROSCOPIC;  Surgeon: Buster Son MD;  Location: [address];  Service: General;  Laterality: N/A;    MASTECTOMY, PARTIAL Left 2020    Procedure: MASTECTOMY, PARTIAL-w/reflector;  Surgeon: Isabel Moulton MD;  Location: [facility] OR;   Service: General;  Laterality: Left;    SENTINEL LYMPH NODE BIOPSY Left 09/17/2020    Procedure: BIOPSY, LYMPH NODE, SENTINEL;  Surgeon: Isabel Moulton MD;  Location: [facility];  Service: General;  Laterality: Left;       Time Tracking:     OT Date of Treatment: 10/18/23  OT Start Time: 0917  OT Stop Time: 0931  OT Total Time (min): 14 min    Billable Minutes:Evaluation 6  Therapeutic Activity 8    10/18/2023

## 2023-10-18 NOTE — ASSESSMENT & PLAN NOTE
- Hgb baseline ~11.5  - Hgb 11.1 on admit --> 9.5 overnight.   - new onset of scant blood-tinged sputum production, may be irritation induced   - monitor closely for GIB vs worsening hemoptysis   - trend Hgb   - LA 1.9 -> 1.2   - hold NSADIS, lovenox

## 2023-10-18 NOTE — PLAN OF CARE
Problem: Occupational Therapy  Goal: Occupational Therapy Goal  Description: Goals to be met by: 10/25/23     Patient will increase functional independence with ADLs by performing:    UE Dressing with Set-up Assistance.  LE Dressing with Modified [place].  Bathing from  standing at sink with Set-up Assistance.  Step transfer with Modified [place]    Outcome: Ongoing, Progressing

## 2023-10-18 NOTE — NURSING
Pt is resting in bed tolerates medications well. No acute distress noted. Bed in lowest position, call light within reach. wctm

## 2023-10-18 NOTE — ASSESSMENT & PLAN NOTE
Hypomagnesemia     - K 3.1, Mag 1.4  - given IV mag and PO potassium   - repeat K pending.   - telemonitoring

## 2023-10-18 NOTE — PT/OT/SLP EVAL
"Physical Therapy Evaluation and Discharge Note  Co-evaluation with OT due to acuity of condition, level of skilled assist needed for assessment of safety with mobility.   Patient Name:  Sunitha Pillai   MRN:  [id number]    Recommendations:     Discharge Recommendations: No Therapy Indicated  Discharge Equipment Recommendations: none   Barriers to discharge: None    Assessment:     Sunitha Pillai is a 68 y.o. female admitted with a medical diagnosis of Chronic pain of multiple joints. The patient ambulated 200' with no AD and supervision assistance.  At this time, patient is functioning at their prior level of function and does not require further acute PT services.     Recent Surgery: * No surgery found *      Plan:     During this hospitalization, patient does not require further acute PT services.  Please re-consult if situation changes.      Subjective     Chief Complaint: "My stomach was hurting and I was having pain all over my whole body"  Patient/Family Comments/goals: return home  Pain/Comfort:  Pain Rating 1: 5/10  Location - Side 1: Right  Location 1: shoulder  Pain Addressed 1: Reposition, Distraction, Pre-medicate for activity    Patients cultural, spiritual, Mormonism conflicts given the current situation: no    Living Environment:  The patient lives alone in a [facility], [address], T/S SC with GB. 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   Prior to admission, patients level of function was independent.  Equipment used at home: none.  DME owned (not currently used): none.  Upon discharge, patient will have assistance from spouse.    Objective:     Communicated with RN prior to session.  Patient found HOB elevated with peripheral IV upon PT entry to room.    General Precautions: Standard, fall    Orthopedic Precautions:N/A   Braces: N/A  Respiratory Status: Room air    Exams:    Cognitive Exam  Patient is A&O x4 and follows 100% of one -step commands    Fine Motor Coordination   -       " WNL     Postural Exam Patient presented with the following abnormalities:    -       Rounded shoulders  -       Forward head  -       Kyphosis  -       Posterior pelvic tilt  -         Sensation    -       Light touch intact NIRANJAN LE   Skin Integrity/Edema     -       Skin integrity: visibly intact  -       Edema: mild swelling NIRANJAN LE   R LE ROM WNL   R LE Strength 4/5 hip flexion, knee ext/flex, and ankle DF/PF   L LE ROM WNL   L LE Strength  4/5 hip flexion, knee ext/flex, and ankle DF/PF       Functional Mobility:    Bed Mobility  Supine to Sit on the R side:  supervision assistance    Transfers Sit to Stand:  supervision assistance    Gait  Gait Distance: 200 ft with no AD  Assistance Level: supervision assistance   Description: slightly decreased step length, deliberate gait speed, patient holding stomach due to discomfort   Stairs Patient deferred, stated she felt she could ascend/descend her stairs at home        AM-PAC 6 CLICK MOBILITY  Total Score:24       Treatment and Education:  Patient educated on:  -role of therapy  -goals of session  -PT POC  -benefits of out of bed mobility and consequences of immobility  -calling for staff assist to mobilize safely  Patient agreeable to mobilize with therapy.      Gait training: cued for upright posture, reciprocal strides, pacing for energy conservation     Patient encouraged to ambulate, sit up in chair 3x/day to prevent deconditioning during hospitalization. Patient verbalized understanding and agreement to mobilize only with RN assist for safety.     Patient safe to ambulate with supervision assistance.     AM-PAC 6 CLICK MOBILITY  Total Score:24     Patient left up in chair with all lines intact and call button in reach.    GOALS:   Multidisciplinary Problems       Physical Therapy Goals          Problem: Physical Therapy    Goal Priority Disciplines Outcome Goal Variances Interventions   Physical Therapy Goal     PT, PT/OT Ongoing, Progressing     Description:  The patient is at their functional baseline, they are safe to return home with no therapy needs. Discharge acute PT, patient in agreement and aware to request therapy re-consult should they experience a decline in functional mobility. Encouraged patient to continue ambulating daily with RN assist to prevent functional decline.     Patient safe to ambulate with supervision assistance and no AD.                        History:     Past Medical History:   Diagnosis Date    Acute coronary syndrome 2018    Adjustment disorder     Anxiety     Arthritis     Cancer of breast 2020    Malignant neoplasm of lower-inner quadrant of left breast in female, estrogen receptor positive    Cholelithiasis with acute cholecystitis 2021    Coronary artery disease     Depression     Diabetes mellitus type I     Genetic testing of female 2020    [facility] via [employer] with AXIN2 and POLE variants of uncertain significance    GERD (gastroesophageal reflux disease)     Hepatitis B core antibody positive 3/9/2023    Negative sAg, suggests previous exposure but no chronic/active Hep B. At risk for reactivation with any immunosuppression medication, steroids, chemo, etc.      Hypertension     Vertigo 2019    Vitamin D deficiency 2021       Past Surgical History:   Procedure Laterality Date    BREAST BIOPSY Left 2022    Left punch biopsy; Unremarkable keratinized skin with intradermal hematoma     SECTION      INJECTION FOR SENTINEL NODE IDENTIFICATION Left 2020    Procedure: INJECTION, FOR SENTINEL NODE IDENTIFICATION;  Surgeon: Isabel Moulton MD;  Location: [facility] OR;  Service: General;  Laterality: Left;    INSERTION OF TUNNELED CENTRAL VENOUS CATHETER (CVC) WITH SUBCUTANEOUS PORT N/A 2020    Procedure: INSERTION, PORT-A-CATH;  Surgeon: Hardeep Martin MD;  Location: [facility] CATH LAB;  Service: Radiology;  Laterality: N/A;    LAPAROSCOPIC CHOLECYSTECTOMY N/A 2021     Procedure: CHOLECYSTECTOMY, LAPAROSCOPIC;  Surgeon: Buster Son MD;  Location: [address];  Service: General;  Laterality: N/A;    MASTECTOMY, PARTIAL Left 09/17/2020    Procedure: MASTECTOMY, PARTIAL-w/reflector;  Surgeon: Isabel Moulton MD;  Location: [facility];  Service: General;  Laterality: Left;    SENTINEL LYMPH NODE BIOPSY Left 09/17/2020    Procedure: BIOPSY, LYMPH NODE, SENTINEL;  Surgeon: Isabel Moulton MD;  Location: [facility];  Service: General;  Laterality: Left;       Time Tracking:     PT Received On: 10/18/23  PT Start Time: 0918     PT Stop Time: 0934  PT Total Time (min): 16 min     Billable Minutes: Evaluation 8 and Gait Training 8      10/18/2023

## 2023-10-18 NOTE — HOSPITAL COURSE
Sunitha Pillai was admitted to hospital medicine for management of polyarthralgia and debility.  PT/OT consulted, no therapy indicated. Initially placed on IV ceftriaxone and vanc for elevated procal; later discontinued given low suspicion for infection. Rheumatology consulted. Experienced viral URI symptoms, viral panel positive for rhinovirus. Hosptial course complicated by fever while off of antibiotics. CTA chest negative for PE but did show new lung opacities; treated empirically for pneumonia. CT AP also revealed increased lung nodules, thickened endometrial stripe and thyroid nodules. Outpatient transvaginal and thyroid US ordered, outpatient oncologist notified of findings. Patient discharged on prolonged course of antibiotics given high risk factors.       On day of discharge patient's vital signs were stable and patient appeared clinically ready for discharge. Hospital course, discharge plan and return precautions discussed - patient expressed understanding. All questions answered at that time.

## 2023-10-18 NOTE — PLAN OF CARE
Problem: Physical Therapy  Goal: Physical Therapy Goal  Description: The patient is at their functional baseline, they are safe to return home with no therapy needs. Discharge acute PT, patient in agreement and aware to request therapy re-consult should they experience a decline in functional mobility. Encouraged patient to continue ambulating daily with RN assist to prevent functional decline.     Patient safe to ambulate with supervision assistance and no AD.   Outcome: Ongoing, Progressing   Iris Sanchez, PT  10/18/2023

## 2023-10-18 NOTE — ASSESSMENT & PLAN NOTE
- acute worsening of chronic pain in setting of suspected viral/ bacterial illness   - start supportive care and MM pain control   - HOLD NSAIDS given bleeding risk and Hgb below baseline  - PT/OT consulted for assessment

## 2023-10-18 NOTE — PLAN OF CARE
Problem: Adult Inpatient Plan of Care  Goal: Plan of Care Review  Outcome: Ongoing, Progressing  Goal: Patient-Specific Goal (Individualized)  Outcome: Ongoing, Progressing  Goal: Absence of Hospital-Acquired Illness or Injury  Outcome: Ongoing, Progressing  Goal: Optimal Comfort and Wellbeing  Outcome: Ongoing, Progressing  Goal: Readiness for Transition of Care  Outcome: Ongoing, Progressing     Problem: Infection  Goal: Absence of Infection Signs and Symptoms  Outcome: Ongoing, Progressing     Problem: Diabetes Comorbidity  Goal: Blood Glucose Level Within Targeted Range  Outcome: Ongoing, Progressing     Problem: Fluid Imbalance (Pneumonia)  Goal: Fluid Balance  Outcome: Ongoing, Progressing     Problem: Infection (Pneumonia)  Goal: Resolution of Infection Signs and Symptoms  Outcome: Ongoing, Progressing     Problem: Respiratory Compromise (Pneumonia)  Goal: Effective Oxygenation and Ventilation  Outcome: Ongoing, Progressing     Problem: Mobility Impairment  Goal: Optimal Mobility  Outcome: Ongoing, Progressing

## 2023-10-18 NOTE — PLAN OF CARE
[facility]  Initial Discharge Assessment       Primary Care Provider: Patty Louis MD    Admission Diagnosis: Myalgia [M79.10]  Chest pain [R07.9]    Admission Date: 10/17/2023  Expected Discharge Date: 10/18/2023         Payor: HUMANA MANAGED MEDICARE / Plan: HUMANA TOTAL CARE ADVANTAGE / Product Type: Medicare Advantage /     Extended Emergency Contact Information  Primary Emergency Contact: Janet Carlos  Home Phone: [phone]  Mobile Phone: [phone]  Relation: Daughter  Secondary Emergency Contact: Theo Carlos   [place]  Mobile Phone: [phone]  Relation: Daughter    Discharge Plan A: (P) Home  Discharge Plan B: (P) Home      [facility] [address] [place] [address] [address] [place] [address]  [address] [facility] [address]  Phone: [phone] Fax: [phone]             SW completed Discharge Planning Assessment with patient via bedside. Discharge planning booklet given to patient/family and whiteboard updated with SANJEEV and phone #. All questions answered.    Patient reported that she will have transportation upon discharge.     Patient reported that she lives at home alone, and prior to hospitalization she was independent with her ADL's. Patient reported that she is not on dialysis and does not go to a Coumadin clinic.      Patient lives in a [facility] with four steps to enter.       Dasha Ramey LMSW  [facility]  Ext. [phone]

## 2023-10-18 NOTE — PROGRESS NOTES
"Pharmacokinetic Initial Assessment: IV Vancomycin    Assessment/Plan:    Initiate intravenous vancomycin with loading dose of 1750 mg once followed by a maintenance dose of vancomycin 1250mg IV every 24 hours  Desired empiric serum trough concentration is 15-20  Draw vancomycin trough level 60 min prior to third dose on 10/20/2023 at approximately 1130  Pharmacy will continue to follow and monitor vancomycin.      Please contact pharmacy at extension [phone] with any questions regarding this assessment.     Thank you for the consult,   Hector Seaman       Patient brief summary:  Sunitha Pillai is a 68 y.o. female initiated on antimicrobial therapy with IV Vancomycin for treatment of suspected sepsis    Drug Allergies:   Review of patient's allergies indicates:  No Known Allergies    Actual Body Weight:   73.6KG    Renal Function:   Estimated Creatinine Clearance: 43.5 mL/min (based on SCr of 1.2 mg/dL).,     Dialysis Method (if applicable):  N/A    CBC (last 72 hours):  Recent Labs   Lab Result Units 10/17/23  0824 10/18/23  0414   WBC K/uL 5.88 8.81   Hemoglobin g/dL 11.1* 9.5*   Hematocrit % 32.7* 28.0*   Platelets K/uL 122* 107*   Gran % % 87.8* 76.0*   Lymph % % 6.1* 12.0*   Mono % % 5.8 1.0*   Eosinophil % % 0.0 0.0   Basophil % % 0.0 0.0   Differential Method  Automated Automated       Metabolic Panel (last 72 hours):  Recent Labs   Lab Result Units 10/17/23  0811 10/17/23  0824 10/18/23  0414   Sodium mmol/L  --  143 140   Potassium mmol/L  --  3.1* 3.1*   Chloride mmol/L  --  110 110   CO2 mmol/L  --  22* 21*   Glucose mg/dL  --  117* 98   Glucose, UA  Negative  --   --    BUN mg/dL  --  13 16   Creatinine mg/dL  --  1.1 1.2   Albumin g/dL  --  3.6  --    Total Bilirubin mg/dL  --  0.9  --    Alkaline Phosphatase U/L  --  75  --    AST U/L  --  16  --    ALT U/L  --  8*  --    Magnesium mg/dL  --   --  1.4*       Drug levels (last 3 results):  No results for input(s): "VANCOMYCINRA", "VANCORANDOM", " ""VANCOMYCINPE", "VANCOPEAK", "VANCOMYCINTR", "VANCOTROUGH" in the last 72 hours.    Microbiologic Results:  Microbiology Results (last 7 days)       Procedure Component Value Units Date/Time    Blood culture [[id number]] Collected: 10/18/23 0908    Order Status: Sent Specimen: Blood Updated: 10/18/23 0917    Blood culture [[id number]] Collected: 10/18/23 0910    Order Status: Sent Specimen: Blood from Antecubital, Right Arm Updated: 10/18/23 0910            "

## 2023-10-18 NOTE — ASSESSMENT & PLAN NOTE
- well-controlled   -  on admit, last A1c 5.5   - home regimen Metformin and trulicity - hold while admitted   - start low dose SSI   - diabetic diet   - Of note, patient prescribed Metformin 1000 BID, but she says she can not tolerate this dose due to GI side effects. She is well-controlled on 1000 QD, so will continue once daily at [place].

## 2023-10-18 NOTE — PROGRESS NOTES
[facility] Observation [address] [place] [address] Medicine  Progress Note    Patient Name: Sunitha Pillai  MRN: [id number]  Patient Class: OP- Observation   Admission Date: 10/17/2023  Length of Stay: 0 days  Attending Physician: Zaria Clark MD  Primary Care Provider: Patty Louis MD        Subjective:     Principal Problem:Chronic pain of multiple joints        HPI:  Sunitha Pillai is a 68-year-old female with a past medical history of breast cancer, CAD, depression, type 1 diabetes, GERD, HTN, and rheumatoid arthritis who presents to the ED with primary complaint of worsening joint pains. Patient with chronic polyarthralgias due to RA, including bilateral shoulders, hips, knees. Patient is not on MTX due to pulmonary nodules. Saw rheumatology in May, but patient not interested in trial of Humira at that time and felt her symptoms were controlled then. Today she reports worsening of her chronic pain over the past 2-3 days, now severe enough to impair her mobility. Additionally patient reports onset of generalized myalgias, fatigue, abdominal pain, nonbloody emesis, nausea, and non-productive cough around the same time (2-3 days ago). Denies fevers, sick contacts, SOB, sore throat, congestion, lightheadedness, falls, dysuria. Endorses diarrhea which is chronic since starting Metformin.     In the ED: Tachycardic to 115. Hypertensive to 180/90 on arrival, but improved to 131/90. Otherwise VSS AF. Labs reveal baseline chronic anemia. Hypokalemia 3.1 (replaced). Cr 1.1, baseline. . UA unremarkable. COVID/flu/RSV negative. EKG in sinus tach with PAC, RBBB, no ischemic changes. Given 600 mg ibuprofen, 1g tylenol, and 4mg IV morphine for pain.       Overview/Hospital Course:  Sunitha Pillai was placed in  observation for acute worsening of chronic arthralgias. Symptoms indicating viral illness present for the past 2-3 days. Sx on admission include cough, generalized fatigue, myalgias, N/V,  abdominal pain. Trop 0.029 -> 0.023. CXR clear, UA noninfectious. LA 1.9, repeat 1.2. HR persistently elevated >90. Check procal - elevated to 4.85. Check blood cultures. Start empiric vanc/ rocephin. On 10/18 patient with new concern for hemoptysis with scant bright blood in sputum. Hgb 11.1 -> 9.5 (baseline ~11.5). Some concern for upper GIB given epigastric abdominal pain, N/V, possible NSAID use. Patient has been prescribed daily use of NSAIDS for chronic RA pain, but patient does not think she has been taking any of these meds. Vomiting resolved at time of admission and patient unable to recall if there was blood in her emesis contents. Denies dark, black, or bloody stools. Will HOLD daily ASA, NSAIDS, VTE ppx lovenox for now. Trend Hgb and monitor for worsened bleeding, persistent hemoptysis, or hematemesis. Patient reporting persistent epigastric pain, small volume watery stools. Diffuse upper abdominal pain on exam, hypoactive BS. Check KUB to r/o bowel obstruction.       Interval History: NAEON. HR remains >90. Procal elevated, concerning for bacterial infection. Vague complaints of myalgias, cough, fatigue, nausea. Blood cultures collected. Start empiric rocephin, vanc. Pt with persistent abdominal pain, small volume diarrhea. Hypoactive BS. Check KUB.  Update provided to family.     Review of Systems   Constitutional:  Positive for activity change. Negative for chills and fever.   HENT:  Negative for trouble swallowing.    Eyes:  Negative for photophobia and visual disturbance.   Respiratory:  Positive for cough. Negative for chest tightness and shortness of breath.    Cardiovascular:  Positive for chest pain (chronic, over L breast). Negative for palpitations and leg swelling.   Gastrointestinal:  Positive for diarrhea (chronic), nausea and vomiting. Negative for abdominal pain and constipation.   Genitourinary:  Negative for dysuria, frequency and hematuria.   Musculoskeletal:  Positive for arthralgias,  gait problem and myalgias. Negative for back pain and neck pain.   Skin:  Negative for rash and wound.   Neurological:  Positive for weakness. Negative for dizziness, syncope, speech difficulty and light-headedness.   Psychiatric/Behavioral:  Negative for agitation and confusion. The patient is not nervous/anxious.      Objective:     Vital Signs (Most Recent):  Temp: 97.6 °F (36.4 °C) (10/18/23 1202)  Pulse: 101 (10/18/23 1202)  Resp: 18 (10/18/23 1202)  BP: (!) 112/54 (10/18/23 1202)  SpO2: 98 % (10/18/23 1202) Vital Signs (24h Range):  Temp:  [97.6 °F (36.4 °C)-100 °F (37.8 °C)] 97.6 °F (36.4 °C)  Pulse:  [] 101  Resp:  [18-20] 18  SpO2:  [95 %-100 %] 98 %  BP: (112-135)/(54-69) 112/54     Weight: 73.6 kg (162 lb 4.1 oz)  Body mass index is 27.85 kg/m².    Intake/Output Summary (Last 24 hours) at 10/18/2023 1436  Last data filed at 10/17/2023 1708  Gross per 24 hour   Intake 1000 ml   Output --   Net 1000 ml         Physical Exam  Vitals and nursing note reviewed.   Constitutional:       General: She is not in acute distress.     Appearance: She is well-developed. She is not ill-appearing or toxic-appearing.      Comments: Appearance improved.    HENT:      Head: Normocephalic and atraumatic.      Mouth/Throat:      Pharynx: No oropharyngeal exudate.   Eyes:      Conjunctiva/sclera: Conjunctivae normal.      Pupils: Pupils are equal, round, and reactive to light.   Cardiovascular:      Rate and Rhythm: Tachycardia present. Rhythm irregular.      Heart sounds: Normal heart sounds.   Pulmonary:      Effort: Pulmonary effort is normal. No respiratory distress.      Breath sounds: Normal breath sounds. No wheezing.      Comments: Lungs clear  Cough productive of scant blood-tinged sputum.   Abdominal:      General: Bowel sounds are normal. There is no distension.      Palpations: Abdomen is soft.      Tenderness: There is abdominal tenderness (diffuse upper abdomen).      Comments: Flank tenderness, diffuse,  paraspinal. Cannot confirm CVA tenderness.   Musculoskeletal:         General: No tenderness.      Cervical back: Normal range of motion and neck supple.      Comments: ROM limited 2/2 pain at bilateral shoulders, hips, knees. Tender to palpation of affected joints and muscles of upper and lower extremities.    Lymphadenopathy:      Cervical: No cervical adenopathy.   Skin:     General: Skin is warm and dry.      Capillary Refill: Capillary refill takes less than 2 seconds.      Findings: No rash.   Neurological:      Mental Status: She is alert and oriented to person, place, and time.      Cranial Nerves: No cranial nerve deficit.      Sensory: No sensory deficit.      Coordination: Coordination normal.   Psychiatric:         Mood and Affect: Mood normal.         Behavior: Behavior normal.             Significant Labs: All pertinent labs within the past 24 hours have been reviewed.  CMP:   Recent Labs   Lab 10/17/23  0824 10/18/23  0414    140   K 3.1* 3.1*    110   CO2 22* 21*   * 98   BUN 13 16   CREATININE 1.1 1.2   CALCIUM 9.3 8.6*   PROT 7.2  --    ALBUMIN 3.6  --    BILITOT 0.9  --    ALKPHOS 75  --    AST 16  --    ALT 8*  --    ANIONGAP 11 9       Significant Imaging: I have reviewed all pertinent imaging results/findings within the past 24 hours.      Assessment/Plan:      * Chronic pain of multiple joints  - acute worsening of chronic pain in setting of suspected viral/ bacterial illness   - start supportive care and MM pain control   - HOLD NSAIDS given bleeding risk and Hgb below baseline  - PT/OT consulted for assessment       Epigastric pain  Persistent epigastric pain despite improvement in N/V. Pt reports only one small volume watery BM yesterday. Additionally, new onset of scant blood in sputum contents. May have risk of gastric bleed given prescribed daily NSAIDs, but pt believes she has not been taking them.   - check KUB to rule out SBO   - monitor Hgb and close watching for  worsened hemoptysis, hematemesis, black or bloody stools. Cough has been persistent for a few days, so may be irritation-induced.   - Holding ASA, NSAIDS, VTE ppx   - start oral protonix      Hypokalemia  Hypomagnesemia     - K 3.1, Mag 1.4  - given IV mag and PO potassium   - repeat K pending.   - telemonitoring    Viral illness  3 days of fatigue, generalized myalgias, worsened chronic joint pain, N/V, cough. Tachycardia on admission, but otherwise no evidence of sepsis.   - negative for covid, flu, rsv   - negative CXR, negative UA  - suspect alternate viral illness v bacterial illness   - LA 1.9, repeat 1.2 after IVF   - supportive treatment, IVF   - Procal elevated to 4.8 - blood cultures collected   - Start empiric vanc/ rocephin  - monitor response       Type 2 diabetes mellitus, without long-term current use of insulin  - well-controlled   -  on admit, last A1c 5.5   - home regimen Metformin and trulicity - hold while admitted   - start low dose SSI   - diabetic diet   - Of note, patient prescribed Metformin 1000 BID, but she says she can not tolerate this dose due to GI side effects. She is well-controlled on 1000 QD, so will continue once daily at [place].       Rheumatoid arthritis involving multiple sites with positive rheumatoid factor  - seen by rheumatology 5/2023, but patient's symptoms controlled at that time and she was not interested in starting Humira   - will refer her back to rheum for follow up and further discussion given worsening pain         Normocytic anemia  - Hgb baseline ~11.5  - Hgb 11.1 on admit --> 9.5 overnight.   - new onset of scant blood-tinged sputum production, may be irritation induced   - monitor closely for GIB vs worsening hemoptysis   - trend Hgb   - LA 1.9 -> 1.2   - hold NSADIS, lovenox       Malignant neoplasm of lower-inner quadrant of left breast in female, estrogen receptor positive  - continue lostrazole      Essential hypertension  - elevated in setting of pain,  now improved and stable   - continue losartan        VTE Risk Mitigation (From admission, onward)         Ordered     IP VTE HIGH RISK PATIENT  Once         10/17/23 1224     Place sequential compression device  Until discontinued         10/17/23 1224                Discharge Planning   SANJEEV: 10/20/2023     Code Status: Full Code   Is the patient medically ready for discharge?: No    Reason for patient still in hospital (select all that apply): Patient new problem, Patient trending condition, Laboratory test, Treatment, Imaging and PT / OT recommendations  Discharge Plan A: Home                  Kavitha Brady PA-C  Department of Hospital Medicine   [facility] - Observation 11H

## 2023-10-18 NOTE — ASSESSMENT & PLAN NOTE
Persistent epigastric pain despite improvement in N/V. Pt reports only one small volume watery BM yesterday. Additionally, new onset of scant blood in sputum contents. May have risk of gastric bleed given prescribed daily NSAIDs, but pt believes she has not been taking them.   - check KUB to rule out SBO   - monitor Hgb and close watching for worsened hemoptysis, hematemesis, black or bloody stools. Cough has been persistent for a few days, so may be irritation-induced.   - Holding ASA, NSAIDS, VTE ppx   - start oral protonix

## 2023-10-18 NOTE — SUBJECTIVE & OBJECTIVE
Interval History: NAEON. HR remains >90. Procal elevated, concerning for bacterial infection. Vague complaints of myalgias, cough, fatigue, nausea. Blood cultures collected. Start empiric rocephin, vanc. Pt with persistent abdominal pain, small volume diarrhea. Hypoactive BS. Check KUB.  Update provided to family.     Review of Systems   Constitutional:  Positive for activity change. Negative for chills and fever.   HENT:  Negative for trouble swallowing.    Eyes:  Negative for photophobia and visual disturbance.   Respiratory:  Positive for cough. Negative for chest tightness and shortness of breath.    Cardiovascular:  Positive for chest pain (chronic, over L breast). Negative for palpitations and leg swelling.   Gastrointestinal:  Positive for diarrhea (chronic), nausea and vomiting. Negative for abdominal pain and constipation.   Genitourinary:  Negative for dysuria, frequency and hematuria.   Musculoskeletal:  Positive for arthralgias, gait problem and myalgias. Negative for back pain and neck pain.   Skin:  Negative for rash and wound.   Neurological:  Positive for weakness. Negative for dizziness, syncope, speech difficulty and light-headedness.   Psychiatric/Behavioral:  Negative for agitation and confusion. The patient is not nervous/anxious.      Objective:     Vital Signs (Most Recent):  Temp: 97.6 °F (36.4 °C) (10/18/23 1202)  Pulse: 101 (10/18/23 1202)  Resp: 18 (10/18/23 1202)  BP: (!) 112/54 (10/18/23 1202)  SpO2: 98 % (10/18/23 1202) Vital Signs (24h Range):  Temp:  [97.6 °F (36.4 °C)-100 °F (37.8 °C)] 97.6 °F (36.4 °C)  Pulse:  [] 101  Resp:  [18-20] 18  SpO2:  [95 %-100 %] 98 %  BP: (112-135)/(54-69) 112/54     Weight: 73.6 kg (162 lb 4.1 oz)  Body mass index is 27.85 kg/m².    Intake/Output Summary (Last 24 hours) at 10/18/2023 1436  Last data filed at 10/17/2023 1708  Gross per 24 hour   Intake 1000 ml   Output --   Net 1000 ml         Physical Exam  Vitals and nursing note reviewed.    Constitutional:       General: She is not in acute distress.     Appearance: She is well-developed. She is not ill-appearing or toxic-appearing.      Comments: Appearance improved.    HENT:      Head: Normocephalic and atraumatic.      Mouth/Throat:      Pharynx: No oropharyngeal exudate.   Eyes:      Conjunctiva/sclera: Conjunctivae normal.      Pupils: Pupils are equal, round, and reactive to light.   Cardiovascular:      Rate and Rhythm: Tachycardia present. Rhythm irregular.      Heart sounds: Normal heart sounds.   Pulmonary:      Effort: Pulmonary effort is normal. No respiratory distress.      Breath sounds: Normal breath sounds. No wheezing.      Comments: Lungs clear  Cough productive of scant blood-tinged sputum.   Abdominal:      General: Bowel sounds are normal. There is no distension.      Palpations: Abdomen is soft.      Tenderness: There is abdominal tenderness (diffuse upper abdomen).      Comments: Flank tenderness, diffuse, paraspinal. Cannot confirm CVA tenderness.   Musculoskeletal:         General: No tenderness.      Cervical back: Normal range of motion and neck supple.      Comments: ROM limited 2/2 pain at bilateral shoulders, hips, knees. Tender to palpation of affected joints and muscles of upper and lower extremities.    Lymphadenopathy:      Cervical: No cervical adenopathy.   Skin:     General: Skin is warm and dry.      Capillary Refill: Capillary refill takes less than 2 seconds.      Findings: No rash.   Neurological:      Mental Status: She is alert and oriented to person, place, and time.      Cranial Nerves: No cranial nerve deficit.      Sensory: No sensory deficit.      Coordination: Coordination normal.   Psychiatric:         Mood and Affect: Mood normal.         Behavior: Behavior normal.             Significant Labs: All pertinent labs within the past 24 hours have been reviewed.  CMP:   Recent Labs   Lab 10/17/23  0824 10/18/23  0414    140   K 3.1* 3.1*     110   CO2 22* 21*   * 98   BUN 13 16   CREATININE 1.1 1.2   CALCIUM 9.3 8.6*   PROT 7.2  --    ALBUMIN 3.6  --    BILITOT 0.9  --    ALKPHOS 75  --    AST 16  --    ALT 8*  --    ANIONGAP 11 9       Significant Imaging: I have reviewed all pertinent imaging results/findings within the past 24 hours.

## 2023-10-18 NOTE — ASSESSMENT & PLAN NOTE
3 days of fatigue, generalized myalgias, worsened chronic joint pain, N/V, cough. Tachycardia on admission, but otherwise no evidence of sepsis.   - negative for covid, flu, rsv   - negative CXR, negative UA  - suspect alternate viral illness v bacterial illness   - LA 1.9, repeat 1.2 after IVF   - supportive treatment, IVF   - Procal elevated to 4.8 - blood cultures collected   - Start empiric vanc/ rocephin  - monitor response

## 2023-10-19 PROBLEM — B34.8 RHINOVIRUS INFECTION: Status: ACTIVE | Noted: 2023-10-17

## 2023-10-19 PROBLEM — M25.511 SHOULDER PAIN, RIGHT: Status: ACTIVE | Noted: 2023-10-19

## 2023-10-19 PROBLEM — D69.6 THROMBOCYTOPENIA: Status: ACTIVE | Noted: 2023-10-19

## 2023-10-19 LAB
ADENOVIRUS: NOT DETECTED
ANION GAP SERPL CALC-SCNC: 10 MMOL/L (ref 8–16)
BASOPHILS # BLD AUTO: 0.01 K/UL (ref 0–0.2)
BASOPHILS NFR BLD: 0.1 % (ref 0–1.9)
BORDETELLA PARAPERTUSSIS (IS1001): NOT DETECTED
BORDETELLA PERTUSSIS (PTXP): NOT DETECTED
BUN SERPL-MCNC: 14 MG/DL (ref 8–23)
CALCIUM SERPL-MCNC: 9.3 MG/DL (ref 8.7–10.5)
CHLAMYDIA PNEUMONIAE: NOT DETECTED
CHLORIDE SERPL-SCNC: 111 MMOL/L (ref 95–110)
CO2 SERPL-SCNC: 20 MMOL/L (ref 23–29)
CORONAVIRUS 229E, COMMON COLD VIRUS: NOT DETECTED
CORONAVIRUS HKU1, COMMON COLD VIRUS: NOT DETECTED
CORONAVIRUS NL63, COMMON COLD VIRUS: NOT DETECTED
CORONAVIRUS OC43, COMMON COLD VIRUS: NOT DETECTED
CREAT SERPL-MCNC: 1 MG/DL (ref 0.5–1.4)
DIFFERENTIAL METHOD: ABNORMAL
EOSINOPHIL # BLD AUTO: 0 K/UL (ref 0–0.5)
EOSINOPHIL NFR BLD: 0 % (ref 0–8)
ERYTHROCYTE [DISTWIDTH] IN BLOOD BY AUTOMATED COUNT: 13.2 % (ref 11.5–14.5)
EST. GFR  (NO RACE VARIABLE): >60 ML/MIN/1.73 M^2
FLUBV RNA NPH QL NAA+NON-PROBE: NOT DETECTED
GLUCOSE SERPL-MCNC: 122 MG/DL (ref 70–110)
HCT VFR BLD AUTO: 31.3 % (ref 37–48.5)
HGB BLD-MCNC: 10.4 G/DL (ref 12–16)
HPIV1 RNA NPH QL NAA+NON-PROBE: NOT DETECTED
HPIV2 RNA NPH QL NAA+NON-PROBE: NOT DETECTED
HPIV3 RNA NPH QL NAA+NON-PROBE: NOT DETECTED
HPIV4 RNA NPH QL NAA+NON-PROBE: NOT DETECTED
HUMAN METAPNEUMOVIRUS: NOT DETECTED
IMM GRANULOCYTES # BLD AUTO: 0.14 K/UL (ref 0–0.04)
IMM GRANULOCYTES NFR BLD AUTO: 1.6 % (ref 0–0.5)
INFLUENZA A (SUBTYPES H1,H1-2009,H3): NOT DETECTED
LYMPHOCYTES # BLD AUTO: 1.2 K/UL (ref 1–4.8)
LYMPHOCYTES NFR BLD: 14.5 % (ref 18–48)
MAGNESIUM SERPL-MCNC: 1.8 MG/DL (ref 1.6–2.6)
MCH RBC QN AUTO: 32.1 PG (ref 27–31)
MCHC RBC AUTO-ENTMCNC: 33.2 G/DL (ref 32–36)
MCV RBC AUTO: 97 FL (ref 82–98)
MONOCYTES # BLD AUTO: 0.6 K/UL (ref 0.3–1)
MONOCYTES NFR BLD: 6.6 % (ref 4–15)
MYCOPLASMA PNEUMONIAE: NOT DETECTED
NEUTROPHILS # BLD AUTO: 6.6 K/UL (ref 1.8–7.7)
NEUTROPHILS NFR BLD: 77.2 % (ref 38–73)
NRBC BLD-RTO: 0 /100 WBC
PLATELET # BLD AUTO: 107 K/UL (ref 150–450)
PLATELET BLD QL SMEAR: ABNORMAL
PMV BLD AUTO: 9.8 FL (ref 9.2–12.9)
POCT GLUCOSE: 102 MG/DL (ref 70–110)
POCT GLUCOSE: 111 MG/DL (ref 70–110)
POCT GLUCOSE: 116 MG/DL (ref 70–110)
POCT GLUCOSE: 143 MG/DL (ref 70–110)
POCT GLUCOSE: 151 MG/DL (ref 70–110)
POTASSIUM SERPL-SCNC: 3.7 MMOL/L (ref 3.5–5.1)
RBC # BLD AUTO: 3.24 M/UL (ref 4–5.4)
RESPIRATORY INFECTION PANEL SOURCE: ABNORMAL
RSV RNA NPH QL NAA+NON-PROBE: NOT DETECTED
RV+EV RNA NPH QL NAA+NON-PROBE: DETECTED
SARS-COV-2 RNA RESP QL NAA+PROBE: NOT DETECTED
SODIUM SERPL-SCNC: 141 MMOL/L (ref 136–145)
WBC # BLD AUTO: 8.54 K/UL (ref 3.9–12.7)

## 2023-10-19 PROCEDURE — 25000003 PHARM REV CODE 250: Mod: HCNC

## 2023-10-19 PROCEDURE — 87633 RESP VIRUS 12-25 TARGETS: CPT | Mod: HCNC

## 2023-10-19 PROCEDURE — 87798 DETECT AGENT NOS DNA AMP: CPT | Mod: 59,HCNC

## 2023-10-19 PROCEDURE — 96366 THER/PROPH/DIAG IV INF ADDON: CPT

## 2023-10-19 PROCEDURE — G0378 HOSPITAL OBSERVATION PER HR: HCPCS | Mod: HCNC

## 2023-10-19 PROCEDURE — 94761 N-INVAS EAR/PLS OXIMETRY MLT: CPT | Mod: HCNC

## 2023-10-19 PROCEDURE — 63600175 PHARM REV CODE 636 W HCPCS: Mod: HCNC

## 2023-10-19 PROCEDURE — 99213 PR OFFICE/OUTPT VISIT, EST, LEVL III, 20-29 MIN: ICD-10-PCS | Mod: HCNC,GC,, | Performed by: INTERNAL MEDICINE

## 2023-10-19 PROCEDURE — 99213 OFFICE O/P EST LOW 20 MIN: CPT | Mod: HCNC,GC,, | Performed by: INTERNAL MEDICINE

## 2023-10-19 PROCEDURE — 85025 COMPLETE CBC W/AUTO DIFF WBC: CPT | Mod: HCNC

## 2023-10-19 PROCEDURE — 36415 COLL VENOUS BLD VENIPUNCTURE: CPT | Mod: HCNC

## 2023-10-19 PROCEDURE — 80048 BASIC METABOLIC PNL TOTAL CA: CPT | Mod: HCNC

## 2023-10-19 PROCEDURE — 83735 ASSAY OF MAGNESIUM: CPT | Mod: HCNC

## 2023-10-19 RX ORDER — GUAIFENESIN 100 MG/5ML
200 SOLUTION ORAL EVERY 4 HOURS PRN
Status: DISCONTINUED | OUTPATIENT
Start: 2023-10-19 | End: 2023-10-22 | Stop reason: HOSPADM

## 2023-10-19 RX ORDER — MELOXICAM 7.5 MG/1
7.5 TABLET ORAL DAILY
Status: DISCONTINUED | OUTPATIENT
Start: 2023-10-19 | End: 2023-10-22 | Stop reason: HOSPADM

## 2023-10-19 RX ORDER — SENNOSIDES 8.6 MG/1
8.6 TABLET ORAL 2 TIMES DAILY
Status: DISCONTINUED | OUTPATIENT
Start: 2023-10-19 | End: 2023-10-22 | Stop reason: HOSPADM

## 2023-10-19 RX ADMIN — ACETAMINOPHEN 1000 MG: 500 TABLET ORAL at 05:10

## 2023-10-19 RX ADMIN — ACETAMINOPHEN 1000 MG: 500 TABLET ORAL at 02:10

## 2023-10-19 RX ADMIN — PANTOPRAZOLE SODIUM 40 MG: 40 TABLET, DELAYED RELEASE ORAL at 09:10

## 2023-10-19 RX ADMIN — METHOCARBAMOL 500 MG: 500 TABLET ORAL at 09:10

## 2023-10-19 RX ADMIN — GUAIFENESIN AND DEXTROMETHORPHAN HYDROBROMIDE 1 TABLET: 600; 30 TABLET, EXTENDED RELEASE ORAL at 09:10

## 2023-10-19 RX ADMIN — SENNOSIDES 8.6 MG: 8.6 TABLET, FILM COATED ORAL at 09:10

## 2023-10-19 RX ADMIN — CEFTRIAXONE 1 G: 1 INJECTION, POWDER, FOR SOLUTION INTRAMUSCULAR; INTRAVENOUS at 12:10

## 2023-10-19 RX ADMIN — SENNOSIDES AND DOCUSATE SODIUM 1 TABLET: 50; 8.6 TABLET ORAL at 09:10

## 2023-10-19 RX ADMIN — LIDOCAINE 1 PATCH: 50 PATCH CUTANEOUS at 03:10

## 2023-10-19 RX ADMIN — ACETAMINOPHEN 1000 MG: 500 TABLET ORAL at 09:10

## 2023-10-19 RX ADMIN — LETROZOLE 2.5 MG: 2.5 TABLET ORAL at 09:10

## 2023-10-19 RX ADMIN — TOPIRAMATE 25 MG: 25 TABLET, FILM COATED ORAL at 09:10

## 2023-10-19 RX ADMIN — METHOCARBAMOL 500 MG: 500 TABLET ORAL at 02:10

## 2023-10-19 RX ADMIN — ATORVASTATIN CALCIUM 10 MG: 10 TABLET, FILM COATED ORAL at 09:10

## 2023-10-19 RX ADMIN — LOSARTAN POTASSIUM 100 MG: 50 TABLET, FILM COATED ORAL at 09:10

## 2023-10-19 RX ADMIN — MELOXICAM 7.5 MG: 7.5 TABLET ORAL at 06:10

## 2023-10-19 NOTE — SUBJECTIVE & OBJECTIVE
Interval History:  NAEON. AFVSS.  Patient evaluated at bedside. Tearful, endorses low mood secondary to pain and decreased function. Denies improvement in symptoms. Rheum consulted for assistance. Start PO meloxicam for joint pain.   Low suspicion for infection given normal WBC and no fever, will DC abx and monitor overnight.     Review of Systems   Constitutional:  Positive for activity change. Negative for chills and fever.   Respiratory:  Negative for chest tightness and shortness of breath.    Cardiovascular:  Negative for chest pain and leg swelling.   Gastrointestinal:  Positive for abdominal pain (generalized). Negative for nausea.   Musculoskeletal:  Positive for arthralgias, gait problem and myalgias.   Neurological:  Negative for dizziness and weakness.   Psychiatric/Behavioral:  The patient is nervous/anxious.      Objective:     Vital Signs (Most Recent):  Temp: 97.8 °F (36.6 °C) (10/19/23 1129)  Pulse: 89 (10/19/23 1129)  Resp: 18 (10/19/23 1129)  BP: 136/66 (10/19/23 1129)  SpO2: 95 % (10/19/23 1129) Vital Signs (24h Range):  Temp:  [97 °F (36.1 °C)-98.6 °F (37 °C)] 97.8 °F (36.6 °C)  Pulse:  [] 89  Resp:  [18-19] 18  SpO2:  [93 %-98 %] 95 %  BP: (136-149)/(63-71) 136/66     Weight: 73.6 kg (162 lb 4.1 oz)  Body mass index is 27.85 kg/m².  No intake or output data in the 24 hours ending 10/19/23 1438      Physical Exam  Vitals and nursing note reviewed.   Constitutional:       General: She is not in acute distress.     Appearance: She is well-developed.   HENT:      Head: Normocephalic and atraumatic.   Eyes:      Extraocular Movements: Extraocular movements intact.   Cardiovascular:      Rate and Rhythm: Normal rate and regular rhythm.      Heart sounds: Normal heart sounds.   Pulmonary:      Effort: Pulmonary effort is normal. No respiratory distress.   Abdominal:      General: There is no distension.   Musculoskeletal:      Right shoulder: Tenderness present. Decreased range of motion.       Left knee: Decreased range of motion. Tenderness present.   Skin:     General: Skin is warm and dry.      Findings: No rash.   Neurological:      Mental Status: She is alert and oriented to person, place, and time.   Psychiatric:         Mood and Affect: Affect is tearful.         Behavior: Behavior normal.         Thought Content: Thought content normal.         Judgment: Judgment normal.   Significant Labs: All pertinent labs within the past 24 hours have been reviewed.    Significant Imaging: I have reviewed all pertinent imaging results/findings within the past 24 hours.

## 2023-10-19 NOTE — NURSING
Patient alert and oriented. Able to make needs known. Patient has been asked to call when getting out of bed. Takes meds whole and swallows with ease. Bed is in the lowest position. Call light is within reach.

## 2023-10-19 NOTE — SUBJECTIVE & OBJECTIVE
Past Medical History:   Diagnosis Date    Acute coronary syndrome 2018    Adjustment disorder     Anxiety     Arthritis     Cancer of breast 2020    Malignant neoplasm of lower-inner quadrant of left breast in female, estrogen receptor positive    Cholelithiasis with acute cholecystitis 2021    Coronary artery disease     Depression     Diabetes mellitus type I     Genetic testing of female 2020    [place]a via GILUPI with AXIN2 and POLE variants of uncertain significance    GERD (gastroesophageal reflux disease)     Hepatitis B core antibody positive 3/9/2023    Negative sAg, suggests previous exposure but no chronic/active Hep B. At risk for reactivation with any immunosuppression medication, steroids, chemo, etc.      Hypertension     Vertigo 2019    Vitamin D deficiency 2021       Past Surgical History:   Procedure Laterality Date    BREAST BIOPSY Left 2022    Left punch biopsy; Unremarkable keratinized skin with intradermal hematoma     SECTION      INJECTION FOR SENTINEL NODE IDENTIFICATION Left 2020    Procedure: INJECTION, FOR SENTINEL NODE IDENTIFICATION;  Surgeon: Isabel Moulton MD;  [facility];  Service: General;  Laterality: Left;    INSERTION OF TUNNELED CENTRAL VENOUS CATHETER (CVC) WITH SUBCUTANEOUS PORT N/A 2020    Procedure: INSERTION, PORT-A-CATH;  Surgeon: Hardeep Martin MD;  Location: [facility] operated [facility] CATH LAB;  Service: Radiology;  Laterality: N/A;    LAPAROSCOPIC CHOLECYSTECTOMY N/A 2021    Procedure: CHOLECYSTECTOMY, LAPAROSCOPIC;  Surgeon: Buster Son MD;  Location: [address];  Service: General;  Laterality: N/A;    MASTECTOMY, PARTIAL Left 2020    Procedure: MASTECTOMY, PARTIAL-w/reflector;  Surgeon: Isabel Moulton MD;  Location: [facility] OR;  Service: General;  Laterality: Left;    SENTINEL LYMPH NODE BIOPSY Left 2020    Procedure: BIOPSY, LYMPH NODE, SENTINEL;  Surgeon: Isabel Moulton MD;   Location: [facility];  Service: General;  Laterality: Left;       Immunization History   Administered Date(s) Administered    COVID-19, MRNA, LN-S, PF (Pfizer) (Gray Cap) 01/25/2022    COVID-19, MRNA, LN-S, PF (Pfizer) (Purple Cap) 06/04/2021, 06/26/2021    COVID-19, mRNA, LNP-S, bivalent booster, PF (PFIZER OMICRON) 12/20/2022    Influenza 10/16/2008, 09/30/2009, 09/21/2010, 12/08/2011, 10/30/2012, 09/16/2013    Influenza (FLUAD) - Quadrivalent - Adjuvanted - PF *Preferred* (65+) 11/05/2020, 10/21/2021, 02/03/2023    Influenza - High Dose - PF (65 years and older) 12/17/2019    Influenza - Quadrivalent 10/12/2015, 10/06/2016    Influenza - Quadrivalent - PF *Preferred* (6 months and older) 10/27/2017    Influenza - Trivalent (ADULT) 10/16/2008, 09/30/2009, 09/21/2010, 12/08/2011, 10/30/2012, 09/16/2013    Influenza Split 10/30/2012, 09/16/2013    Pneumococcal Conjugate - 13 Valent 08/13/2019    Pneumococcal Polysaccharide - 23 Valent 10/23/2006, 07/28/2020    Tdap 12/22/2014    Zoster Recombinant 11/05/2020, 11/05/2020, 04/06/2023       Review of patient's allergies indicates:  No Known Allergies  Current Facility-Administered Medications   Medication Frequency    acetaminophen tablet 1,000 mg Q8H    aluminum-magnesium hydroxide-simethicone 200-200-20 mg/5 mL suspension 30 mL QID PRN    atorvastatin tablet 10 mg QHS    dextromethorphan-guaiFENesin  mg per 12 hr tablet 1 tablet BID    dextrose 10% bolus 125 mL 125 mL PRN    dextrose 10% bolus 250 mL 250 mL PRN    diclofenac sodium 1 % gel 2 g TID PRN    glucagon (human recombinant) injection 1 mg PRN    glucose chewable tablet 16 g PRN    glucose chewable tablet 24 g PRN    insulin aspart U-100 pen 0-5 Units QID (AC + HS) PRN    insulin detemir U-100 (Levemir) pen 9 Units QHS    letrozole tablet 2.5 mg Daily    LIDOcaine 5 % patch 1 patch Q24H    losartan tablet 100 mg Daily    melatonin tablet 9 mg Nightly PRN    methocarbamoL tablet 500 mg TID    naloxone 0.4  mg/mL injection 0.02 mg PRN    ondansetron disintegrating tablet 8 mg Q8H PRN    pantoprazole EC tablet 40 mg Daily    polyethylene glycol packet 17 g BID PRN    prochlorperazine injection Soln 5 mg Q6H PRN    senna-docusate 8.6-50 mg per tablet 1 tablet Daily    sodium chloride 0.9% flush 10 mL Q8H PRN    topiramate tablet 25 mg QHS     Facility-Administered Medications Ordered in Other Encounters   Medication Frequency    fentaNYL injection 25 mcg Q5 Min PRN    midazolam (VERSED) 1 mg/mL injection 0.5 mg PRN     Family History       Problem Relation (Age of Onset)    Aneurysm Father    Anxiety disorder Daughter, Daughter    Breast cancer Maternal Aunt, Paternal Aunt, Other    Cancer Brother, Maternal Aunt, Paternal Aunt    Cervical cancer Daughter    Colon polyps Mother    Depression Daughter, Daughter    Diabetes Mother, Sister, Brother    Heart disease Mother, Brother    Hyperlipidemia Mother    Hypertension Mother, Sister, Brother    Leukemia Maternal Cousin    Prostate cancer Maternal Cousin, Maternal Cousin          Tobacco Use    Smoking status: Never    Smokeless tobacco: Never   Substance and Sexual Activity    Alcohol use: Never    Drug use: No    Sexual activity: Yes     Partners: Male     Birth control/protection: Post-menopausal     Review of Systems   Constitutional:  Negative for fatigue, fever and unexpected weight change.   HENT:  Negative for mouth sores and nosebleeds.    Eyes:  Negative for pain and redness.   Respiratory:  Positive for cough and shortness of breath.    Cardiovascular:  Negative for chest pain and leg swelling.   Gastrointestinal:  Negative for abdominal pain, blood in stool, diarrhea, nausea and vomiting.   Genitourinary:  Negative for decreased urine volume, genital sores and hematuria.   Musculoskeletal:  Positive for arthralgias. Negative for joint swelling and myalgias.   Skin:  Negative for color change and rash.   Neurological:  Negative for weakness and headaches.    Hematological:  Does not bruise/bleed easily.   Psychiatric/Behavioral: Negative.       Objective:     Vital Signs (Most Recent):  Temp: 99.7 °F (37.6 °C) (10/19/23 1529)  Pulse: 101 (10/19/23 1529)  Resp: 18 (10/19/23 1529)  BP: 130/82 (10/19/23 1529)  SpO2: 95 % (10/19/23 1529) Vital Signs (24h Range):  Temp:  [97 °F (36.1 °C)-99.7 °F (37.6 °C)] 99.7 °F (37.6 °C)  Pulse:  [] 101  Resp:  [18-19] 18  SpO2:  [93 %-98 %] 95 %  BP: (130-149)/(63-82) 130/82     Weight: 73.6 kg (162 lb 4.1 oz) (10/17/23 2034)  Body mass index is 27.85 kg/m².  Body surface area is 1.82 meters squared.    No intake or output data in the 24 hours ending 10/19/23 1636      Physical Exam   Constitutional: She is oriented to person, place, and time. She appears well-developed and well-nourished. No distress.   HENT:   Head: Normocephalic and atraumatic.   Eyes: Conjunctivae are normal. No scleral icterus.   Cardiovascular: Normal rate and normal heart sounds.   Pulmonary/Chest: Effort normal and breath sounds normal.   Abdominal: Soft. Bowel sounds are normal. There is no abdominal tenderness.   Musculoskeletal:         General: Tenderness (R AC joint) present. No swelling or deformity.      Comments: No synovitis noted in hands   Lymphadenopathy:     She has no cervical adenopathy.   Neurological: She is alert and oriented to person, place, and time.   Skin: Skin is warm and dry. No rash noted.   Vitals reviewed.       Significant Labs:  All pertinent lab results from the last 24 hours have been reviewed.    Significant Imaging:  Imaging results within the past 24 hours have been reviewed.

## 2023-10-19 NOTE — PROGRESS NOTES
[facility] [address] [place] [address] Medicine  Progress Note    Patient Name: Sunitha Pillai  MRN: [id number]  Patient Class: OP- Observation   Admission Date: 10/17/2023  Length of Stay: 0 days  Attending Physician: Albin Mckeon MD  Primary Care Provider: Patty Louis MD        Subjective:     Principal Problem:Chronic pain of multiple joints        HPI:  Sunitha Pillai is a 68-year-old female with a past medical history of breast cancer, CAD, depression, type 1 diabetes, GERD, HTN, and rheumatoid arthritis who presents to the ED with primary complaint of worsening joint pains. Patient with chronic polyarthralgias due to RA, including bilateral shoulders, hips, knees. Patient is not on MTX due to pulmonary nodules. Saw rheumatology in May, but patient not interested in trial of Humira at that time and felt her symptoms were controlled then. Today she reports worsening of her chronic pain over the past 2-3 days, now severe enough to impair her mobility. Additionally patient reports onset of generalized myalgias, fatigue, abdominal pain, nonbloody emesis, nausea, and non-productive cough around the same time (2-3 days ago). Denies fevers, sick contacts, SOB, sore throat, congestion, lightheadedness, falls, dysuria. Endorses diarrhea which is chronic since starting Metformin.     In the ED: Tachycardic to 115. Hypertensive to 180/90 on arrival, but improved to 131/90. Otherwise VSS AF. Labs reveal baseline chronic anemia. Hypokalemia 3.1 (replaced). Cr 1.1, baseline. . UA unremarkable. COVID/flu/RSV negative. EKG in sinus tach with PAC, RBBB, no ischemic changes. Given 600 mg ibuprofen, 1g tylenol, and 4mg IV morphine for pain.       Overview/Hospital Course:  Sunitha Pillai was admitted to hospital medicine for management of polyarthralgia and debility.  PT/OT consulted, no therapy indicated. Initially placed on IV ceftriaxone and vanc for elevated procal; later discontinued  given low suspicion for infection. Experienced viral URI symptoms, viral panel pending. Rheumatology consulted.      Interval History:  NAEON. AFVSS.  Patient evaluated at bedside. Tearful, endorses low mood secondary to pain and decreased function. Denies improvement in symptoms. Rheum consulted for assistance. Start PO meloxicam for joint pain.   Low suspicion for infection given normal WBC and no fever, will DC abx and monitor overnight.     Review of Systems   Constitutional:  Positive for activity change. Negative for chills and fever.   Respiratory:  Negative for chest tightness and shortness of breath.    Cardiovascular:  Negative for chest pain and leg swelling.   Gastrointestinal:  Positive for abdominal pain (generalized). Negative for nausea.   Musculoskeletal:  Positive for arthralgias, gait problem and myalgias.   Neurological:  Negative for dizziness and weakness.   Psychiatric/Behavioral:  The patient is nervous/anxious.      Objective:     Vital Signs (Most Recent):  Temp: 97.8 °F (36.6 °C) (10/19/23 1129)  Pulse: 89 (10/19/23 1129)  Resp: 18 (10/19/23 1129)  BP: 136/66 (10/19/23 1129)  SpO2: 95 % (10/19/23 1129) Vital Signs (24h Range):  Temp:  [97 °F (36.1 °C)-98.6 °F (37 °C)] 97.8 °F (36.6 °C)  Pulse:  [] 89  Resp:  [18-19] 18  SpO2:  [93 %-98 %] 95 %  BP: (136-149)/(63-71) 136/66     Weight: 73.6 kg (162 lb 4.1 oz)  Body mass index is 27.85 kg/m².  No intake or output data in the 24 hours ending 10/19/23 1438      Physical Exam  Vitals and nursing note reviewed.   Constitutional:       General: She is not in acute distress.     Appearance: She is well-developed.   HENT:      Head: Normocephalic and atraumatic.   Eyes:      Extraocular Movements: Extraocular movements intact.   Cardiovascular:      Rate and Rhythm: Normal rate and regular rhythm.      Heart sounds: Normal heart sounds.   Pulmonary:      Effort: Pulmonary effort is normal. No respiratory distress.   Abdominal:      General:  There is no distension.   Musculoskeletal:      Right shoulder: Tenderness present. Decreased range of motion.      Left knee: Decreased range of motion. Tenderness present.   Skin:     General: Skin is warm and dry.      Findings: No rash.   Neurological:      Mental Status: She is alert and oriented to person, place, and time.   Psychiatric:         Mood and Affect: Affect is tearful.         Behavior: Behavior normal.         Thought Content: Thought content normal.         Judgment: Judgment normal.   Significant Labs: All pertinent labs within the past 24 hours have been reviewed.    Significant Imaging: I have reviewed all pertinent imaging results/findings within the past 24 hours.      Assessment/Plan:      * Chronic pain of multiple joints  - acute worsening of chronic pain in setting of suspected viral/ bacterial illness   - start supportive care and MM pain control   - HOLD NSAIDS given bleeding risk and Hgb below baseline  - PT/OT consulted for assessment       Thrombocytopenia  Platelets: 107 at 10/19/2023  5:29 PM  - hx of low platelets  - monitor on CBC     Epigastric pain  Persistent epigastric pain despite improvement in N/V. Pt reports only one small volume watery BM yesterday. Additionally, new onset of scant blood in sputum contents. May have risk of gastric bleed given prescribed daily NSAIDs, but pt believes she has not been taking them.   - check KUB to rule out SBO   - monitor Hgb and close watching for worsened hemoptysis, hematemesis, black or bloody stools. Cough has been persistent for a few days, so may be irritation-induced.   - Holding ASA, NSAIDS, VTE ppx   - start oral protonix      Hypokalemia  Hypomagnesemia     - K 3.1, Mag 1.4  - given IV mag and PO potassium   - repeat K pending.   - telemonitoring    Rhinovirus infection  fatigue, generalized myalgias, worsened chronic joint pain, N/V, cough. Tachycardia on admission, but otherwise no evidence of sepsis.   - RIP positive for  rhinovirus   - suspect contributing to joint pain   - supportive care; cough suppressant, fluids and tylenol  - vitals q4h     Type 2 diabetes mellitus, without long-term current use of insulin  - well-controlled   -  on admit, last A1c 5.5   - home regimen Metformin and trulicity - hold while admitted   - start low dose SSI   - diabetic diet   - Of note, patient prescribed Metformin 1000 BID, but she says she can not tolerate this dose due to GI side effects. She is well-controlled on 1000 QD, so will continue once daily at [place].     Rheumatoid arthritis involving multiple sites with positive rheumatoid factor  Chronic pain of multiple joints  Shoulder pain, right  - seen by rheumatology 5/2023, but patient's symptoms controlled at that time and she was not interested in starting Humira   - rheum consulted, appreciate recs   -CT Chest Abdomen for evaluation of SOB, cough and abdominal pain  -consider increasing pantoprazole to BID dosing  -Tylenol around the clock 1000 mg q8 hours  -Tramadol 50 mg BID as needed for pain  -outpatient referral to sports medicine/hand orthopedic for shoulder injection if they are unable to do it in hospital  - trial po mobic   - PT/OT recommending no therapy     Normocytic anemia  Patient's anemia is currently controlled. S/p 0 units of PRBCs  Current CBC reviewed-   Lab Results   Component Value Date    HGB 10.4 (L) 10/19/2023    HCT 31.3 (L) 10/19/2023   Monitor serial CBC and transfuse if patient becomes hemodynamically unstable, symptomatic or H/H drops below 7/21.     Malignant neoplasm of lower-inner quadrant of left breast in female, estrogen receptor positive  - continue lostrazole  - followed by outpatient oncology     Essential hypertension  - elevated in setting of pain, now improved and stable   - continue losartan      VTE Risk Mitigation (From admission, onward)         Ordered     IP VTE HIGH RISK PATIENT  Once         10/17/23 1224     Place sequential compression  device  Until discontinued         10/17/23 1224                Discharge Planning   SANJEEV: 10/20/2023     Code Status: Full Code   Is the patient medically ready for discharge?: No    Reason for patient still in hospital (select all that apply): Patient new problem, Patient trending condition, Laboratory test, Treatment and Consult recommendations  Discharge Plan A: Home            Paty Lynn PA-C  Department of Hospital Medicine   [facility]

## 2023-10-19 NOTE — ASSESSMENT & PLAN NOTE
68-year-old female with a past medical history of breast cancer, CAD, depression, type 1 diabetes, GERD, HTN, and rheumatoid arthritis who presents to the ED with primary complaint of worsening joint pains. Primary pain is in her right shoulder. She has CCP+ and RF+. Diagnosed as having seropositive RA but does not appear to have any significant synovitis concerning for RA flare. XR compatible with osteoarthritis    She has had significant shortness of breath and cough. Also has been having blood tinged sputum with abdominal pain in epigastric region    Recommendations  -CT Chest Abdomen for evaluation of SOB, cough and abdominal pain  -consider increasing pantoprazole to BID dosing  -Tylenol around the clock 1000 mg q8 hours  -Tramadol 50 mg BID as needed for pain  -outpatient referral to sports medicine/hand orthopedic for shoulder injection if they are unable to do it in hospital

## 2023-10-19 NOTE — PLAN OF CARE
Problem: Adult Inpatient Plan of Care  Goal: Plan of Care Review  Outcome: Ongoing, Progressing  Goal: Patient-Specific Goal (Individualized)  Outcome: Ongoing, Progressing  Goal: Absence of Hospital-Acquired Illness or Injury  Outcome: Ongoing, Progressing  Goal: Optimal Comfort and Wellbeing  Outcome: Ongoing, Progressing  Goal: Readiness for Transition of Care  Outcome: Ongoing, Progressing     Problem: Infection  Goal: Absence of Infection Signs and Symptoms  Outcome: Ongoing, Progressing     Problem: Fluid Imbalance (Pneumonia)  Goal: Fluid Balance  Outcome: Ongoing, Progressing     Problem: Diabetes Comorbidity  Goal: Blood Glucose Level Within Targeted Range  Outcome: Ongoing, Progressing     Problem: Infection (Pneumonia)  Goal: Resolution of Infection Signs and Symptoms  Outcome: Ongoing, Progressing     Problem: Respiratory Compromise (Pneumonia)  Goal: Effective Oxygenation and Ventilation  Outcome: Ongoing, Progressing     Problem: Mobility Impairment  Goal: Optimal Mobility  Outcome: Ongoing, Progressing     Problem: Pain Acute  Goal: Acceptable Pain Control and Functional Ability  Outcome: Ongoing, Progressing     Problem: Fall Injury Risk  Goal: Absence of Fall and Fall-Related Injury  Outcome: Ongoing, Progressing     Problem: Asthma Comorbidity  Goal: Maintenance of Asthma Control  Outcome: Ongoing, Progressing     Problem: Behavioral Health Comorbidity  Goal: Maintenance of Behavioral Health Symptom Control  Outcome: Ongoing, Progressing     Problem: COPD (Chronic Obstructive Pulmonary Disease) Comorbidity  Goal: Maintenance of COPD Symptom Control  Outcome: Ongoing, Progressing     Problem: Heart Failure Comorbidity  Goal: Maintenance of Heart Failure Symptom Control  Outcome: Ongoing, Progressing     Problem: Hypertension Comorbidity  Goal: Blood Pressure in Desired Range  Outcome: Ongoing, Progressing     Problem: Obstructive Sleep Apnea Risk or Actual Comorbidity Management  Goal:  Unobstructed Breathing During Sleep  Outcome: Ongoing, Progressing     Problem: Osteoarthritis Comorbidity  Goal: Maintenance of Osteoarthritis Symptom Control  Outcome: Ongoing, Progressing     Problem: Pain Chronic (Persistent) (Comorbidity Management)  Goal: Acceptable Pain Control and Functional Ability  Outcome: Ongoing, Progressing     Problem: Seizure Disorder Comorbidity  Goal: Maintenance of Seizure Control  Outcome: Ongoing, Progressing     Problem: Hypertension Acute  Goal: Blood Pressure Within Desired Range  Outcome: Ongoing, Progressing     Pt progressing towards goals. No distress notice. No falls or injuries during shift. Bed in lowest position, call light within reach, belonging at bedside. Safety precaution maintain.Plan of Care reviewed. Pt verbalized understanding.

## 2023-10-19 NOTE — ASSESSMENT & PLAN NOTE
- well-controlled   -  on admit, last A1c 5.5   - home regimen Metformin and trulicity - hold while admitted   - start low dose SSI   - diabetic diet   - Of note, patient prescribed Metformin 1000 BID, but she says she can not tolerate this dose due to GI side effects. She is well-controlled on 1000 QD, so will continue once daily at [place].

## 2023-10-19 NOTE — ASSESSMENT & PLAN NOTE
fatigue, generalized myalgias, worsened chronic joint pain, N/V, cough. Tachycardia on admission, but otherwise no evidence of sepsis.   - RIP positive for rhinovirus   - suspect contributing to joint pain   - supportive care; cough suppressant, fluids and tylenol  - vitals q4h

## 2023-10-19 NOTE — CONSULTS
[facility]  Rheumatology  Consult Note    Patient Name: Sunitha Pillai  MRN: [id number]  Admission Date: 10/17/2023  Hospital Length of Stay: 0 days  Code Status: Full Code   Attending Provider: Albin Mckeon MD  Primary Care Physician: Patty Louis MD  Principal Problem:Chronic pain of multiple joints    Inpatient consult to Rheumatology  Consult performed by: Earline Turner DO  Consult ordered by: Paty Lynn PA-C        Subjective:     HPI: Sunitha Pillai is a 68-year-old female with a past medical history of breast cancer, CAD, depression, type 1 diabetes, GERD, HTN, and rheumatoid arthritis who presents to the ED with primary complaint of worsening joint pains. Patient with chronic polyarthralgias due to RA, including bilateral shoulders, hips, knees. Patient is not on MTX due to pulmonary nodules. Saw rheumatology in May, but patient not interested in trial of Humira at that time and felt her symptoms were controlled then. Today she reports worsening of her chronic pain over the past 2-3 days, now severe enough to impair her mobility. Additionally patient reports onset of generalized myalgias, fatigue, abdominal pain, nonbloody emesis, nausea, and non-productive cough around the same time (2-3 days ago). Denies fevers, sick contacts, SOB, sore throat, congestion, lightheadedness, falls, dysuria. Endorses diarrhea which is chronic since starting Metformin.     Labs show no leukocytosis, procal elevated to 4.85. Started on ceftrixaone and given one dose of vancomycin. Course complicated by concern for upper GIB 2/2 possible NSAID use. Cultures negative so far.    Rheumatology Hx  -Seen by Dr. Dai in 2022 and then again last May for CCP and RF positive RA.   -in May recommended to start Humira but did not want to because she was feeling ok    Feels like her shoulder is mostly in pain. Has had previous CSI and gel injections in knee with success.Currently has  SOB and cough      Past Medical History:   Diagnosis Date    Acute coronary syndrome 2018    Adjustment disorder     Anxiety     Arthritis     Cancer of breast 2020    Malignant neoplasm of lower-inner quadrant of left breast in female, estrogen receptor positive    Cholelithiasis with acute cholecystitis 2021    Coronary artery disease     Depression     Diabetes mellitus type I     Genetic testing of female 2020    [place] via [employer] with AXIN2 and POLE variants of uncertain significance    GERD (gastroesophageal reflux disease)     Hepatitis B core antibody positive 3/9/2023    Negative sAg, suggests previous exposure but no chronic/active Hep B. At risk for reactivation with any immunosuppression medication, steroids, chemo, etc.      Hypertension     Vertigo 2019    Vitamin D deficiency 2021       Past Surgical History:   Procedure Laterality Date    BREAST BIOPSY Left 2022    Left punch biopsy; Unremarkable keratinized skin with intradermal hematoma     SECTION      INJECTION FOR SENTINEL NODE IDENTIFICATION Left 2020    Procedure: INJECTION, FOR SENTINEL NODE IDENTIFICATION;  Surgeon: Isabel Moulton MD;  Location: [facility] OR;  Service: General;  Laterality: Left;    INSERTION OF TUNNELED CENTRAL VENOUS CATHETER (CVC) WITH SUBCUTANEOUS PORT N/A 2020    Procedure: INSERTION, PORT-A-CATH;  Surgeon: Hardeep Martin MD;  Location: [facility] CATH LAB;  Service: Radiology;  Laterality: N/A;    LAPAROSCOPIC CHOLECYSTECTOMY N/A 2021    Procedure: CHOLECYSTECTOMY, LAPAROSCOPIC;  Surgeon: Buster Son MD;  Location: [address];  Service: General;  Laterality: N/A;    MASTECTOMY, PARTIAL Left 2020    Procedure: MASTECTOMY, PARTIAL-w/reflector;  Surgeon: Isabel Moulton MD;  Location: [facility] OR;  Service: General;  Laterality: Left;    SENTINEL LYMPH NODE BIOPSY Left 2020    Procedure: BIOPSY, LYMPH NODE,  SENTINEL;  Surgeon: Isabel Moulton MD;  Location: [facility];  Service: General;  Laterality: Left;       Immunization History   Administered Date(s) Administered    COVID-19, MRNA, LN-S, PF (Pfizer) (Gray Cap) 01/25/2022    COVID-19, MRNA, LN-S, PF (Pfizer) (Purple Cap) 06/04/2021, 06/26/2021    COVID-19, mRNA, LNP-S, bivalent booster, PF (PFIZER OMICRON) 12/20/2022    Influenza 10/16/2008, 09/30/2009, 09/21/2010, 12/08/2011, 10/30/2012, 09/16/2013    Influenza (FLUAD) - Quadrivalent - Adjuvanted - PF *Preferred* (65+) 11/05/2020, 10/21/2021, 02/03/2023    Influenza - High Dose - PF (65 years and older) 12/17/2019    Influenza - Quadrivalent 10/12/2015, 10/06/2016    Influenza - Quadrivalent - PF *Preferred* (6 months and older) 10/27/2017    Influenza - Trivalent (ADULT) 10/16/2008, 09/30/2009, 09/21/2010, 12/08/2011, 10/30/2012, 09/16/2013    Influenza Split 10/30/2012, 09/16/2013    Pneumococcal Conjugate - 13 Valent 08/13/2019    Pneumococcal Polysaccharide - 23 Valent 10/23/2006, 07/28/2020    Tdap 12/22/2014    Zoster Recombinant 11/05/2020, 11/05/2020, 04/06/2023       Review of patient's allergies indicates:  No Known Allergies  Current Facility-Administered Medications   Medication Frequency    acetaminophen tablet 1,000 mg Q8H    aluminum-magnesium hydroxide-simethicone 200-200-20 mg/5 mL suspension 30 mL QID PRN    atorvastatin tablet 10 mg QHS    dextromethorphan-guaiFENesin  mg per 12 hr tablet 1 tablet BID    dextrose 10% bolus 125 mL 125 mL PRN    dextrose 10% bolus 250 mL 250 mL PRN    diclofenac sodium 1 % gel 2 g TID PRN    glucagon (human recombinant) injection 1 mg PRN    glucose chewable tablet 16 g PRN    glucose chewable tablet 24 g PRN    insulin aspart U-100 pen 0-5 Units QID (AC + HS) PRN    insulin detemir U-100 (Levemir) pen 9 Units QHS    letrozole tablet 2.5 mg Daily    LIDOcaine 5 % patch 1 patch Q24H    losartan tablet 100 mg Daily    melatonin tablet  9 mg Nightly PRN    methocarbamoL tablet 500 mg TID    naloxone 0.4 mg/mL injection 0.02 mg PRN    ondansetron disintegrating tablet 8 mg Q8H PRN    pantoprazole EC tablet 40 mg Daily    polyethylene glycol packet 17 g BID PRN    prochlorperazine injection Soln 5 mg Q6H PRN    senna-docusate 8.6-50 mg per tablet 1 tablet Daily    sodium chloride 0.9% flush 10 mL Q8H PRN    topiramate tablet 25 mg QHS     Facility-Administered Medications Ordered in Other Encounters   Medication Frequency    fentaNYL injection 25 mcg Q5 Min PRN    midazolam (VERSED) 1 mg/mL injection 0.5 mg PRN     Family History       Problem Relation (Age of Onset)    Aneurysm Father    Anxiety disorder Daughter, Daughter    Breast cancer Maternal Aunt, Paternal Aunt, Other    Cancer Brother, Maternal Aunt, Paternal Aunt    Cervical cancer Daughter    Colon polyps Mother    Depression Daughter, Daughter    Diabetes Mother, Sister, Brother    Heart disease Mother, Brother    Hyperlipidemia Mother    Hypertension Mother, Sister, Brother    Leukemia Maternal Cousin    Prostate cancer Maternal Cousin, Maternal Cousin          Tobacco Use    Smoking status: Never    Smokeless tobacco: Never   Substance and Sexual Activity    Alcohol use: Never    Drug use: No    Sexual activity: Yes     Partners: Male     Birth control/protection: Post-menopausal     Review of Systems   Constitutional:  Negative for fatigue, fever and unexpected weight change.   HENT:  Negative for mouth sores and nosebleeds.    Eyes:  Negative for pain and redness.   Respiratory:  Positive for cough and shortness of breath.    Cardiovascular:  Negative for chest pain and leg swelling.   Gastrointestinal:  Negative for abdominal pain, blood in stool, diarrhea, nausea and vomiting.   Genitourinary:  Negative for decreased urine volume, genital sores and hematuria.   Musculoskeletal:  Positive for arthralgias. Negative for joint swelling and myalgias.   Skin:  Negative for  color change and rash.   Neurological:  Negative for weakness and headaches.   Hematological:  Does not bruise/bleed easily.   Psychiatric/Behavioral: Negative.       Objective:     Vital Signs (Most Recent):  Temp: 99.7 °F (37.6 °C) (10/19/23 1529)  Pulse: 101 (10/19/23 1529)  Resp: 18 (10/19/23 1529)  BP: 130/82 (10/19/23 1529)  SpO2: 95 % (10/19/23 1529) Vital Signs (24h Range):  Temp:  [97 °F (36.1 °C)-99.7 °F (37.6 °C)] 99.7 °F (37.6 °C)  Pulse:  [] 101  Resp:  [18-19] 18  SpO2:  [93 %-98 %] 95 %  BP: (130-149)/(63-82) 130/82     Weight: 73.6 kg (162 lb 4.1 oz) (10/17/23 2034)  Body mass index is 27.85 kg/m².  Body surface area is 1.82 meters squared.    No intake or output data in the 24 hours ending 10/19/23 1636      Physical Exam   Constitutional: She is oriented to person, place, and time. She appears well-developed and well-nourished. No distress.   HENT:   Head: Normocephalic and atraumatic.   Eyes: Conjunctivae are normal. No scleral icterus.   Cardiovascular: Normal rate and normal heart sounds.   Pulmonary/Chest: Effort normal and breath sounds normal.   Abdominal: Soft. Bowel sounds are normal. There is no abdominal tenderness.   Musculoskeletal:         General: Tenderness (R AC joint) present. No swelling or deformity.      Comments: No synovitis noted in hands   Lymphadenopathy:     She has no cervical adenopathy.   Neurological: She is alert and oriented to person, place, and time.   Skin: Skin is warm and dry. No rash noted.   Vitals reviewed.       Significant Labs:  All pertinent lab results from the last 24 hours have been reviewed.    Significant Imaging:  Imaging results within the past 24 hours have been reviewed.    Assessment/Plan:     Orthopedic  Shoulder pain, right  68-year-old female with a past medical history of breast cancer, CAD, depression, type 1 diabetes, GERD, HTN, and rheumatoid arthritis who presents to the ED with primary complaint of worsening joint pains. Primary  pain is in her right shoulder. She has CCP+ and RF+. Diagnosed as having seropositive RA but does not appear to have any significant synovitis concerning for RA flare. XR compatible with osteoarthritis    She has had significant shortness of breath and cough. Also has been having blood tinged sputum with abdominal pain in epigastric region    Recommendations  -CT Chest Abdomen for evaluation of SOB, cough and abdominal pain  -consider increasing pantoprazole to BID dosing  -Tylenol around the clock 1000 mg q8 hours  -Tramadol 50 mg BID as needed for pain  -outpatient referral to sports medicine/hand orthopedic for shoulder injection if they are unable to do it in hospital        Thank you for your consult. I will follow-up with patient. Please contact us if you have any additional questions.    Earline Turner, DO  Rheumatology  [facility]

## 2023-10-19 NOTE — HPI
Sunitha Pillai is a 68-year-old female with a past medical history of breast cancer, CAD, depression, type 1 diabetes, GERD, HTN, and rheumatoid arthritis who presents to the ED with primary complaint of worsening joint pains. Patient with chronic polyarthralgias due to RA, including bilateral shoulders, hips, knees. Patient is not on MTX due to pulmonary nodules. Saw rheumatology in May, but patient not interested in trial of Humira at that time and felt her symptoms were controlled then. Today she reports worsening of her chronic pain over the past 2-3 days, now severe enough to impair her mobility. Additionally patient reports onset of generalized myalgias, fatigue, abdominal pain, nonbloody emesis, nausea, and non-productive cough around the same time (2-3 days ago). Denies fevers, sick contacts, SOB, sore throat, congestion, lightheadedness, falls, dysuria. Endorses diarrhea which is chronic since starting Metformin.     Labs show no leukocytosis, procal elevated to 4.85. Started on ceftrixaone and given one dose of vancomycin. Course complicated by concern for upper GIB 2/2 possible NSAID use. Cultures negative so far.    Rheumatology Hx  -Seen by Dr. Dai in 2022 and then again last May for CCP and RF positive RA.   -in May recommended to start Humira but did not want to because she was feeling ok    Feels like her shoulder is mostly in pain. Has had previous CSI and gel injections in knee with success.Currently has SOB and cough

## 2023-10-19 NOTE — PLAN OF CARE
Problem: Adult Inpatient Plan of Care  Goal: Plan of Care Review  Outcome: Ongoing, Progressing  Goal: Patient-Specific Goal (Individualized)  Outcome: Ongoing, Progressing  Goal: Absence of Hospital-Acquired Illness or Injury  Outcome: Ongoing, Progressing  Goal: Optimal Comfort and Wellbeing  Outcome: Ongoing, Progressing  Goal: Readiness for Transition of Care  Outcome: Ongoing, Progressing     Problem: Infection  Goal: Absence of Infection Signs and Symptoms  Outcome: Ongoing, Progressing     Problem: Diabetes Comorbidity  Goal: Blood Glucose Level Within Targeted Range  Outcome: Ongoing, Progressing     Problem: Fluid Imbalance (Pneumonia)  Goal: Fluid Balance  Outcome: Ongoing, Progressing     Problem: Infection (Pneumonia)  Goal: Resolution of Infection Signs and Symptoms  Outcome: Ongoing, Progressing     Problem: Respiratory Compromise (Pneumonia)  Goal: Effective Oxygenation and Ventilation  Outcome: Ongoing, Progressing     Problem: Mobility Impairment  Goal: Optimal Mobility  Outcome: Ongoing, Progressing     Problem: Pain Acute  Goal: Acceptable Pain Control and Functional Ability  Outcome: Ongoing, Progressing     Problem: Fall Injury Risk  Goal: Absence of Fall and Fall-Related Injury  Outcome: Ongoing, Progressing     Problem: Asthma Comorbidity  Goal: Maintenance of Asthma Control  Outcome: Ongoing, Progressing     Problem: Behavioral Health Comorbidity  Goal: Maintenance of Behavioral Health Symptom Control  Outcome: Ongoing, Progressing     Problem: COPD (Chronic Obstructive Pulmonary Disease) Comorbidity  Goal: Maintenance of COPD Symptom Control  Outcome: Ongoing, Progressing     Problem: Heart Failure Comorbidity  Goal: Maintenance of Heart Failure Symptom Control  Outcome: Ongoing, Progressing     Problem: Hypertension Comorbidity  Goal: Blood Pressure in Desired Range  Outcome: Ongoing, Progressing     Problem: Obstructive Sleep Apnea Risk or Actual Comorbidity Management  Goal:  Unobstructed Breathing During Sleep  Outcome: Ongoing, Progressing     Problem: Osteoarthritis Comorbidity  Goal: Maintenance of Osteoarthritis Symptom Control  Outcome: Ongoing, Progressing     Problem: Pain Chronic (Persistent) (Comorbidity Management)  Goal: Acceptable Pain Control and Functional Ability  Outcome: Ongoing, Progressing     Problem: Seizure Disorder Comorbidity  Goal: Maintenance of Seizure Control  Outcome: Ongoing, Progressing     Problem: Hypertension Acute  Goal: Blood Pressure Within Desired Range  Outcome: Ongoing, Progressing

## 2023-10-19 NOTE — ASSESSMENT & PLAN NOTE
Chronic pain of multiple joints  Shoulder pain, right  - seen by rheumatology 5/2023, but patient's symptoms controlled at that time and she was not interested in starting Humira   - rheum consulted, appreciate recs   -CT Chest Abdomen for evaluation of SOB, cough and abdominal pain  -consider increasing pantoprazole to BID dosing  -Tylenol around the clock 1000 mg q8 hours  -Tramadol 50 mg BID as needed for pain  -outpatient referral to sports medicine/hand orthopedic for shoulder injection if they are unable to do it in hospital  - trial po mobic   - PT/OT recommending no therapy

## 2023-10-19 NOTE — ASSESSMENT & PLAN NOTE
Patient's anemia is currently controlled. S/p 0 units of PRBCs  Current CBC reviewed-   Lab Results   Component Value Date    HGB 10.4 (L) 10/19/2023    HCT 31.3 (L) 10/19/2023   Monitor serial CBC and transfuse if patient becomes hemodynamically unstable, symptomatic or H/H drops below 7/21.

## 2023-10-20 PROBLEM — E83.42 HYPOMAGNESEMIA: Status: RESOLVED | Noted: 2023-10-18 | Resolved: 2023-10-20

## 2023-10-20 PROBLEM — R10.13 EPIGASTRIC PAIN: Status: RESOLVED | Noted: 2023-10-18 | Resolved: 2023-10-20

## 2023-10-20 LAB
ANION GAP SERPL CALC-SCNC: 5 MMOL/L (ref 8–16)
ANISOCYTOSIS BLD QL SMEAR: SLIGHT
BASOPHILS # BLD AUTO: ABNORMAL K/UL (ref 0–0.2)
BASOPHILS NFR BLD: 0 % (ref 0–1.9)
BILIRUB UR QL STRIP: NEGATIVE
BUN SERPL-MCNC: 12 MG/DL (ref 8–23)
BURR CELLS BLD QL SMEAR: ABNORMAL
CALCIUM SERPL-MCNC: 8.8 MG/DL (ref 8.7–10.5)
CHLORIDE SERPL-SCNC: 113 MMOL/L (ref 95–110)
CLARITY UR REFRACT.AUTO: CLEAR
CO2 SERPL-SCNC: 22 MMOL/L (ref 23–29)
COLOR UR AUTO: YELLOW
CREAT SERPL-MCNC: 0.9 MG/DL (ref 0.5–1.4)
DIFFERENTIAL METHOD: ABNORMAL
DOHLE BOD BLD QL SMEAR: PRESENT
EOSINOPHIL # BLD AUTO: ABNORMAL K/UL (ref 0–0.5)
EOSINOPHIL NFR BLD: 0 % (ref 0–8)
ERYTHROCYTE [DISTWIDTH] IN BLOOD BY AUTOMATED COUNT: 13 % (ref 11.5–14.5)
EST. GFR  (NO RACE VARIABLE): >60 ML/MIN/1.73 M^2
GLUCOSE SERPL-MCNC: 108 MG/DL (ref 70–110)
GLUCOSE UR QL STRIP: NEGATIVE
HCT VFR BLD AUTO: 26.8 % (ref 37–48.5)
HGB BLD-MCNC: 9.1 G/DL (ref 12–16)
HGB UR QL STRIP: NEGATIVE
IMM GRANULOCYTES # BLD AUTO: ABNORMAL K/UL (ref 0–0.04)
IMM GRANULOCYTES NFR BLD AUTO: ABNORMAL % (ref 0–0.5)
KETONES UR QL STRIP: NEGATIVE
LEUKOCYTE ESTERASE UR QL STRIP: NEGATIVE
LYMPHOCYTES # BLD AUTO: ABNORMAL K/UL (ref 1–4.8)
LYMPHOCYTES NFR BLD: 24 % (ref 18–48)
MCH RBC QN AUTO: 31.4 PG (ref 27–31)
MCHC RBC AUTO-ENTMCNC: 34 G/DL (ref 32–36)
MCV RBC AUTO: 92 FL (ref 82–98)
MONOCYTES # BLD AUTO: ABNORMAL K/UL (ref 0.3–1)
MONOCYTES NFR BLD: 7 % (ref 4–15)
NEUTROPHILS NFR BLD: 67 % (ref 38–73)
NEUTS BAND NFR BLD MANUAL: 2 %
NITRITE UR QL STRIP: NEGATIVE
NRBC BLD-RTO: 0 /100 WBC
OVALOCYTES BLD QL SMEAR: ABNORMAL
PH UR STRIP: 6 [PH] (ref 5–8)
PLATELET # BLD AUTO: 124 K/UL (ref 150–450)
PLATELET BLD QL SMEAR: ABNORMAL
PMV BLD AUTO: 9.8 FL (ref 9.2–12.9)
POCT GLUCOSE: 117 MG/DL (ref 70–110)
POCT GLUCOSE: 76 MG/DL (ref 70–110)
POCT GLUCOSE: 85 MG/DL (ref 70–110)
POIKILOCYTOSIS BLD QL SMEAR: SLIGHT
POLYCHROMASIA BLD QL SMEAR: ABNORMAL
POTASSIUM SERPL-SCNC: 3.1 MMOL/L (ref 3.5–5.1)
PROT UR QL STRIP: NEGATIVE
RBC # BLD AUTO: 2.9 M/UL (ref 4–5.4)
SODIUM SERPL-SCNC: 140 MMOL/L (ref 136–145)
SP GR UR STRIP: 1.01 (ref 1–1.03)
URN SPEC COLLECT METH UR: NORMAL
WBC # BLD AUTO: 7.59 K/UL (ref 3.9–12.7)

## 2023-10-20 PROCEDURE — 25500020 PHARM REV CODE 255: Mod: HCNC | Performed by: HOSPITALIST

## 2023-10-20 PROCEDURE — 25500020 PHARM REV CODE 255: Mod: HCNC

## 2023-10-20 PROCEDURE — 36415 COLL VENOUS BLD VENIPUNCTURE: CPT | Mod: HCNC

## 2023-10-20 PROCEDURE — 85007 BL SMEAR W/DIFF WBC COUNT: CPT | Mod: HCNC

## 2023-10-20 PROCEDURE — 97530 THERAPEUTIC ACTIVITIES: CPT | Mod: HCNC

## 2023-10-20 PROCEDURE — 81003 URINALYSIS AUTO W/O SCOPE: CPT | Mod: HCNC

## 2023-10-20 PROCEDURE — 99233 SBSQ HOSP IP/OBS HIGH 50: CPT | Mod: HCNC,GC,, | Performed by: INTERNAL MEDICINE

## 2023-10-20 PROCEDURE — 99233 PR SUBSEQUENT HOSPITAL CARE,LEVL III: ICD-10-PCS | Mod: HCNC,GC,, | Performed by: INTERNAL MEDICINE

## 2023-10-20 PROCEDURE — 85027 COMPLETE CBC AUTOMATED: CPT | Mod: HCNC

## 2023-10-20 PROCEDURE — 80048 BASIC METABOLIC PNL TOTAL CA: CPT | Mod: HCNC

## 2023-10-20 PROCEDURE — 21400001 HC TELEMETRY ROOM: Mod: HCNC

## 2023-10-20 PROCEDURE — 25000003 PHARM REV CODE 250: Mod: HCNC

## 2023-10-20 PROCEDURE — 94761 N-INVAS EAR/PLS OXIMETRY MLT: CPT | Mod: HCNC

## 2023-10-20 PROCEDURE — 27000207 HC ISOLATION: Mod: HCNC

## 2023-10-20 PROCEDURE — 87040 BLOOD CULTURE FOR BACTERIA: CPT | Mod: HCNC

## 2023-10-20 RX ORDER — POTASSIUM CHLORIDE 20 MEQ/1
40 TABLET, EXTENDED RELEASE ORAL ONCE
Status: COMPLETED | OUTPATIENT
Start: 2023-10-20 | End: 2023-10-20

## 2023-10-20 RX ADMIN — ACETAMINOPHEN 1000 MG: 500 TABLET ORAL at 05:10

## 2023-10-20 RX ADMIN — LETROZOLE 2.5 MG: 2.5 TABLET ORAL at 11:10

## 2023-10-20 RX ADMIN — SENNOSIDES 8.6 MG: 8.6 TABLET, FILM COATED ORAL at 10:10

## 2023-10-20 RX ADMIN — POTASSIUM CHLORIDE 40 MEQ: 1500 TABLET, EXTENDED RELEASE ORAL at 04:10

## 2023-10-20 RX ADMIN — IOHEXOL 15 ML: 350 INJECTION, SOLUTION INTRAVENOUS at 01:10

## 2023-10-20 RX ADMIN — LIDOCAINE 1 PATCH: 50 PATCH CUTANEOUS at 04:10

## 2023-10-20 RX ADMIN — ATORVASTATIN CALCIUM 10 MG: 10 TABLET, FILM COATED ORAL at 09:10

## 2023-10-20 RX ADMIN — ACETAMINOPHEN 1000 MG: 500 TABLET ORAL at 02:10

## 2023-10-20 RX ADMIN — TOPIRAMATE 25 MG: 25 TABLET, FILM COATED ORAL at 09:10

## 2023-10-20 RX ADMIN — IOHEXOL 150 ML: 350 INJECTION, SOLUTION INTRAVENOUS at 04:10

## 2023-10-20 RX ADMIN — PANTOPRAZOLE SODIUM 40 MG: 40 TABLET, DELAYED RELEASE ORAL at 10:10

## 2023-10-20 RX ADMIN — METHOCARBAMOL 500 MG: 500 TABLET ORAL at 09:10

## 2023-10-20 RX ADMIN — SENNOSIDES 8.6 MG: 8.6 TABLET, FILM COATED ORAL at 09:10

## 2023-10-20 RX ADMIN — METHOCARBAMOL 500 MG: 500 TABLET ORAL at 02:10

## 2023-10-20 RX ADMIN — MELOXICAM 7.5 MG: 7.5 TABLET ORAL at 10:10

## 2023-10-20 RX ADMIN — LOSARTAN POTASSIUM 100 MG: 50 TABLET, FILM COATED ORAL at 10:10

## 2023-10-20 RX ADMIN — METHOCARBAMOL 500 MG: 500 TABLET ORAL at 10:10

## 2023-10-20 NOTE — PLAN OF CARE
Patient is alert and oriented. Needs assistance with ambulating to and from the bathroom. Patient has been instructed to call when needing assistance to the restroom. Patient is able to make her needs known. Takes meds whole and swallows with ease. Bed is in the lowest position. Call light is within reach.    Problem: Adult Inpatient Plan of Care  Goal: Plan of Care Review  Outcome: Ongoing, Progressing  Goal: Patient-Specific Goal (Individualized)  Outcome: Ongoing, Progressing  Goal: Absence of Hospital-Acquired Illness or Injury  Outcome: Ongoing, Progressing  Goal: Optimal Comfort and Wellbeing  Outcome: Ongoing, Progressing  Goal: Readiness for Transition of Care  Outcome: Ongoing, Progressing     Problem: Infection  Goal: Absence of Infection Signs and Symptoms  Outcome: Ongoing, Progressing     Problem: Diabetes Comorbidity  Goal: Blood Glucose Level Within Targeted Range  Outcome: Ongoing, Progressing     Problem: Fluid Imbalance (Pneumonia)  Goal: Fluid Balance  Outcome: Ongoing, Progressing     Problem: Infection (Pneumonia)  Goal: Resolution of Infection Signs and Symptoms  Outcome: Ongoing, Progressing     Problem: Respiratory Compromise (Pneumonia)  Goal: Effective Oxygenation and Ventilation  Outcome: Ongoing, Progressing     Problem: Mobility Impairment  Goal: Optimal Mobility  Outcome: Ongoing, Progressing     Problem: Pain Acute  Goal: Acceptable Pain Control and Functional Ability  Outcome: Ongoing, Progressing     Problem: Fall Injury Risk  Goal: Absence of Fall and Fall-Related Injury  Outcome: Ongoing, Progressing     Problem: Asthma Comorbidity  Goal: Maintenance of Asthma Control  Outcome: Ongoing, Progressing     Problem: Behavioral Health Comorbidity  Goal: Maintenance of Behavioral Health Symptom Control  Outcome: Ongoing, Progressing     Problem: COPD (Chronic Obstructive Pulmonary Disease) Comorbidity  Goal: Maintenance of COPD Symptom Control  Outcome: Ongoing, Progressing     Problem:  Heart Failure Comorbidity  Goal: Maintenance of Heart Failure Symptom Control  Outcome: Ongoing, Progressing     Problem: Hypertension Comorbidity  Goal: Blood Pressure in Desired Range  Outcome: Ongoing, Progressing     Problem: Obstructive Sleep Apnea Risk or Actual Comorbidity Management  Goal: Unobstructed Breathing During Sleep  Outcome: Ongoing, Progressing     Problem: Osteoarthritis Comorbidity  Goal: Maintenance of Osteoarthritis Symptom Control  Outcome: Ongoing, Progressing     Problem: Pain Chronic (Persistent) (Comorbidity Management)  Goal: Acceptable Pain Control and Functional Ability  Outcome: Ongoing, Progressing     Problem: Seizure Disorder Comorbidity  Goal: Maintenance of Seizure Control  Outcome: Ongoing, Progressing     Problem: Hypertension Acute  Goal: Blood Pressure Within Desired Range  Outcome: Ongoing, Progressing

## 2023-10-20 NOTE — ASSESSMENT & PLAN NOTE
68-year-old female with a past medical history of breast cancer, CAD, depression, type 1 diabetes, GERD, HTN, and rheumatoid arthritis who presents to the ED with primary complaint of worsening joint pains. Primary pain is in her right shoulder. She has CCP+ and RF+. Diagnosed as having seropositive RA but does not appear to have any significant synovitis concerning for RA flare. XR compatible with osteoarthritis    She has had significant shortness of breath and cough. Also has been having blood tinged sputum with abdominal pain in epigastric region    Recommendations  -follow up CT Chest Abdomen for evaluation of SOB, cough and abdominal pain  -consider increasing pantoprazole to BID dosing  -Tylenol around the clock 1000 mg q8 hours  -consider prednisone 20 mg for 5 days for shoulder pain. Would hold meloxicam during short course of prednisone to prevent gastric complications  -would avoid NSAID use given gastric symptoms  -outpatient follow up with Dr. Dai    Rheumatology will sign off. Please message with any questions.

## 2023-10-20 NOTE — SUBJECTIVE & OBJECTIVE
Interval History: Some improvement in shoulder pain. Started on meloxicam by primary    Current Facility-Administered Medications   Medication Frequency    acetaminophen tablet 1,000 mg Q8H    aluminum-magnesium hydroxide-simethicone 200-200-20 mg/5 mL suspension 30 mL QID PRN    atorvastatin tablet 10 mg QHS    dextrose 10% bolus 125 mL 125 mL PRN    dextrose 10% bolus 250 mL 250 mL PRN    diclofenac sodium 1 % gel 2 g TID PRN    glucagon (human recombinant) injection 1 mg PRN    glucose chewable tablet 16 g PRN    glucose chewable tablet 24 g PRN    guaiFENesin 100 mg/5 ml syrup 200 mg Q4H PRN    insulin aspart U-100 pen 0-5 Units QID (AC + HS) PRN    insulin detemir U-100 (Levemir) pen 9 Units QHS    letrozole tablet 2.5 mg Daily    LIDOcaine 5 % patch 1 patch Q24H    losartan tablet 100 mg Daily    melatonin tablet 9 mg Nightly PRN    meloxicam tablet 7.5 mg Daily    methocarbamoL tablet 500 mg TID    naloxone 0.4 mg/mL injection 0.02 mg PRN    ondansetron disintegrating tablet 8 mg Q8H PRN    pantoprazole EC tablet 40 mg Daily    polyethylene glycol packet 17 g BID PRN    prochlorperazine injection Soln 5 mg Q6H PRN    senna tablet 8.6 mg BID    sodium chloride 0.9% flush 10 mL Q8H PRN    topiramate tablet 25 mg QHS     Facility-Administered Medications Ordered in Other Encounters   Medication Frequency    fentaNYL injection 25 mcg Q5 Min PRN    midazolam (VERSED) 1 mg/mL injection 0.5 mg PRN     Objective:     Vital Signs (Most Recent):  Temp: 98.1 °F (36.7 °C) (10/20/23 0736)  Pulse: 75 (10/20/23 0736)  Resp: 16 (10/20/23 0736)  BP: (!) 140/83 (10/20/23 0736)  SpO2: 96 % (10/20/23 0736) Vital Signs (24h Range):  Temp:  [97.8 °F (36.6 °C)-100.9 °F (38.3 °C)] 98.1 °F (36.7 °C)  Pulse:  [] 75  Resp:  [16-18] 16  SpO2:  [94 %-100 %] 96 %  BP: (130-158)/(66-83) 140/83     Weight: 73.6 kg (162 lb 4.1 oz) (10/17/23 2034)  Body mass index is 27.85 kg/m².  Body surface area is 1.82 meters squared.    No intake  or output data in the 24 hours ending 10/20/23 1053     Physical Exam   Constitutional: She is oriented to person, place, and time. She appears well-developed and well-nourished. No distress.   HENT:   Head: Normocephalic and atraumatic.   Eyes: Conjunctivae are normal. No scleral icterus.   Cardiovascular: Normal rate and normal heart sounds.   Pulmonary/Chest: Effort normal and breath sounds normal.   Abdominal: Soft. Bowel sounds are normal. There is no abdominal tenderness.   Musculoskeletal:         General: Tenderness (R AC joint) present. No swelling or deformity.      Comments: No synovitis noted in hands   Lymphadenopathy:     She has no cervical adenopathy.   Neurological: She is alert and oriented to person, place, and time.   Skin: Skin is warm and dry. No rash noted.   Vitals reviewed.       Significant Labs:  All pertinent lab results from the last 24 hours have been reviewed.    Significant Imaging:  Imaging results within the past 24 hours have been reviewed.

## 2023-10-20 NOTE — SUBJECTIVE & OBJECTIVE
Interval History:  Febrile to 100.9 F overnight while on scheduled tylenol after discontinuing IV antibiotics yesterday. Tachycardic to 107 BMP.   2/4 SIRS, repeat infectious workup initiated. UA, Bcx, CTA and CT AP pending.     Review of Systems   Constitutional:  Negative for fatigue, fever and unexpected weight change.   HENT:  Negative for mouth sores and nosebleeds.    Eyes:  Negative for pain and redness.   Respiratory:  Positive for cough and shortness of breath.    Cardiovascular:  Negative for chest pain and leg swelling.   Gastrointestinal:  Negative for abdominal pain, blood in stool, diarrhea, nausea and vomiting.   Genitourinary:  Negative for decreased urine volume, genital sores and hematuria.   Musculoskeletal:  Positive for arthralgias. Negative for joint swelling and myalgias.   Skin:  Negative for color change and rash.   Neurological:  Negative for weakness and headaches.   Hematological:  Does not bruise/bleed easily.   Psychiatric/Behavioral: Negative.       Objective:     Vital Signs (Most Recent):  Temp: 98.2 °F (36.8 °C) (10/20/23 1628)  Pulse: 104 (10/20/23 1628)  Resp: 16 (10/20/23 1628)  BP: (!) 141/67 (10/20/23 1628)  SpO2: 100 % (10/20/23 1141) Vital Signs (24h Range):  Temp:  [97.5 °F (36.4 °C)-100.9 °F (38.3 °C)] 98.2 °F (36.8 °C)  Pulse:  [] 104  Resp:  [16-17] 16  SpO2:  [94 %-100 %] 100 %  BP: (135-173)/(67-83) 141/67     Weight: 73.6 kg (162 lb 4.1 oz)  Body mass index is 27.85 kg/m².  No intake or output data in the 24 hours ending 10/20/23 1832      Physical Exam  Vitals and nursing note reviewed.   Constitutional:       General: She is not in acute distress.     Appearance: She is well-developed.   HENT:      Head: Normocephalic and atraumatic.   Eyes:      Extraocular Movements: Extraocular movements intact.   Cardiovascular:      Rate and Rhythm: Normal rate and regular rhythm.      Heart sounds: Normal heart sounds.   Pulmonary:      Effort: Pulmonary effort is normal.  No respiratory distress.   Abdominal:      General: There is no distension.   Musculoskeletal:         General: Normal range of motion.      Right shoulder: Tenderness (improving) present.   Skin:     General: Skin is warm and dry.      Findings: No rash.   Neurological:      Mental Status: She is alert and oriented to person, place, and time.   Psychiatric:         Behavior: Behavior normal.         Thought Content: Thought content normal.         Judgment: Judgment normal.       Significant Labs: All pertinent labs within the past 24 hours have been reviewed.    Significant Imaging: I have reviewed all pertinent imaging results/findings within the past 24 hours.

## 2023-10-20 NOTE — PROGRESS NOTES
[facility] [address] [place] [address] Medicine  Progress Note    Patient Name: Sunitha Pillai  MRN: [id number]  Patient Class: IP- Inpatient   Admission Date: 10/17/2023  Length of Stay: 0 days  Attending Physician: Albin Mckeon MD  Primary Care Provider: Patty Louis MD        Subjective:     Principal Problem:Chronic pain of multiple joints        HPI:  Sunitha Pillai is a 68-year-old female with a past medical history of breast cancer, CAD, depression, type 1 diabetes, GERD, HTN, and rheumatoid arthritis who presents to the ED with primary complaint of worsening joint pains. Patient with chronic polyarthralgias due to RA, including bilateral shoulders, hips, knees. Patient is not on MTX due to pulmonary nodules. Saw rheumatology in May, but patient not interested in trial of Humira at that time and felt her symptoms were controlled then. Today she reports worsening of her chronic pain over the past 2-3 days, now severe enough to impair her mobility. Additionally patient reports onset of generalized myalgias, fatigue, abdominal pain, nonbloody emesis, nausea, and non-productive cough around the same time (2-3 days ago). Denies fevers, sick contacts, SOB, sore throat, congestion, lightheadedness, falls, dysuria. Endorses diarrhea which is chronic since starting Metformin.     In the ED: Tachycardic to 115. Hypertensive to 180/90 on arrival, but improved to 131/90. Otherwise VSS AF. Labs reveal baseline chronic anemia. Hypokalemia 3.1 (replaced). Cr 1.1, baseline. . UA unremarkable. COVID/flu/RSV negative. EKG in sinus tach with PAC, RBBB, no ischemic changes. Given 600 mg ibuprofen, 1g tylenol, and 4mg IV morphine for pain.       Overview/Hospital Course:  Sunitha Pillai was admitted to hospital medicine for management of polyarthralgia and debility.  PT/OT consulted, no therapy indicated. Initially placed on IV ceftriaxone and vanc for elevated procal; later discontinued given  low suspicion for infection. Experienced viral URI symptoms, viral panel pending. Rheumatology consulted.      Interval History:  Febrile to 100.9 F overnight while on scheduled tylenol after discontinuing IV antibiotics yesterday. Tachycardic to 107 BMP.   2/4 SIRS, repeat infectious workup initiated. UA, Bcx, CTA and CT AP pending.     Review of Systems   Constitutional:  Negative for fatigue, fever and unexpected weight change.   HENT:  Negative for mouth sores and nosebleeds.    Eyes:  Negative for pain and redness.   Respiratory:  Positive for cough and shortness of breath.    Cardiovascular:  Negative for chest pain and leg swelling.   Gastrointestinal:  Negative for abdominal pain, blood in stool, diarrhea, nausea and vomiting.   Genitourinary:  Negative for decreased urine volume, genital sores and hematuria.   Musculoskeletal:  Positive for arthralgias. Negative for joint swelling and myalgias.   Skin:  Negative for color change and rash.   Neurological:  Negative for weakness and headaches.   Hematological:  Does not bruise/bleed easily.   Psychiatric/Behavioral: Negative.       Objective:     Vital Signs (Most Recent):  Temp: 98.2 °F (36.8 °C) (10/20/23 1628)  Pulse: 104 (10/20/23 1628)  Resp: 16 (10/20/23 1628)  BP: (!) 141/67 (10/20/23 1628)  SpO2: 100 % (10/20/23 1141) Vital Signs (24h Range):  Temp:  [97.5 °F (36.4 °C)-100.9 °F (38.3 °C)] 98.2 °F (36.8 °C)  Pulse:  [] 104  Resp:  [16-17] 16  SpO2:  [94 %-100 %] 100 %  BP: (135-173)/(67-83) 141/67     Weight: 73.6 kg (162 lb 4.1 oz)  Body mass index is 27.85 kg/m².  No intake or output data in the 24 hours ending 10/20/23 1832      Physical Exam  Vitals and nursing note reviewed.   Constitutional:       General: She is not in acute distress.     Appearance: She is well-developed.   HENT:      Head: Normocephalic and atraumatic.   Eyes:      Extraocular Movements: Extraocular movements intact.   Cardiovascular:      Rate and Rhythm: Normal rate  and regular rhythm.      Heart sounds: Normal heart sounds.   Pulmonary:      Effort: Pulmonary effort is normal. No respiratory distress.   Abdominal:      General: There is no distension.   Musculoskeletal:         General: Normal range of motion.      Right shoulder: Tenderness (improving) present.   Skin:     General: Skin is warm and dry.      Findings: No rash.   Neurological:      Mental Status: She is alert and oriented to person, place, and time.   Psychiatric:         Behavior: Behavior normal.         Thought Content: Thought content normal.         Judgment: Judgment normal.       Significant Labs: All pertinent labs within the past 24 hours have been reviewed.    Significant Imaging: I have reviewed all pertinent imaging results/findings within the past 24 hours.      Assessment/Plan:      * Chronic pain of multiple joints  - acute worsening of chronic pain in setting of suspected viral/ bacterial illness   - start supportive care and MM pain control   - HOLD NSAIDS given bleeding risk and Hgb below baseline  - PT/OT consulted for assessment       Thrombocytopenia  Platelets: 124 at 10/20/2023  6:37 PM  - hx of low platelets  - monitor on CBC     Hypokalemia  Lab Results   Component Value Date    K 3.1 (L) 10/20/2023   - given and PO potassium   - telemonitoring    Rhinovirus infection  fatigue, generalized myalgias, worsened chronic joint pain, N/V, cough. Tachycardia on admission, but otherwise no evidence of sepsis.   - RIP positive for rhinovirus   - suspect contributing to joint pain   - supportive care; cough suppressant, fluids and tylenol  - vitals q4h     Type 2 diabetes mellitus, without long-term current use of insulin  - well-controlled   -  on admit, last A1c 5.5   - home regimen Metformin and trulicity - hold while admitted   - start low dose SSI   - diabetic diet     Rheumatoid arthritis involving multiple sites with positive rheumatoid factor  Chronic pain of multiple  joints  Shoulder pain, right  - seen by rheumatology 5/2023, but patient's symptoms controlled at that time and she was not interested in starting Humira   - rheum consulted, appreciate recs   -follow up CT Chest Abdomen for evaluation of SOB, cough and abdominal pain  -consider increasing pantoprazole to BID dosing  -Tylenol around the clock 1000 mg q8 hours  -consider prednisone 20 mg for 5 days for shoulder pain. Would hold meloxicam during short course of prednisone to prevent gastric complications  - trial po mobic   - PT/OT recommending no therapy     Normocytic anemia  Patient's anemia is currently controlled. S/p 0 units of PRBCs  Current CBC reviewed-   Lab Results   Component Value Date    HGB 9.1 (L) 10/20/2023    HCT 26.8 (L) 10/20/2023   Monitor serial CBC and transfuse if patient becomes hemodynamically unstable, symptomatic or H/H drops below 7/21.     Malignant neoplasm of lower-inner quadrant of left breast in female, estrogen receptor positive  - continue lostrazole  - followed by outpatient oncology     Essential hypertension  - elevated in setting of pain, now improved and stable   - continue losartan      VTE Risk Mitigation (From admission, onward)         Ordered     IP VTE HIGH RISK PATIENT  Once         10/17/23 1224     Place sequential compression device  Until discontinued         10/17/23 1224                Discharge Planning   SANJEEV: 10/23/2023     Code Status: Full Code   Is the patient medically ready for discharge?: No    Reason for patient still in hospital (select all that apply): Patient trending condition, Laboratory test, Treatment and Imaging  Discharge Plan A: Home          Paty Lynn PA-C  Department of Hospital Medicine   [facility]

## 2023-10-20 NOTE — ASSESSMENT & PLAN NOTE
Chronic pain of multiple joints  Shoulder pain, right  - seen by rheumatology 5/2023, but patient's symptoms controlled at that time and she was not interested in starting Humira   - rheum consulted, appreciate recs   -follow up CT Chest Abdomen for evaluation of SOB, cough and abdominal pain  -consider increasing pantoprazole to BID dosing  -Tylenol around the clock 1000 mg q8 hours  -consider prednisone 20 mg for 5 days for shoulder pain. Would hold meloxicam during short course of prednisone to prevent gastric complications  - trial po mobic   - PT/OT recommending no therapy

## 2023-10-20 NOTE — PT/OT/SLP PROGRESS
Occupational Therapy   Treatment    Name: Sunitha Pillai  MRN: [id number]  Admitting Diagnosis:  Chronic pain of multiple joints       Recommendations:     Discharge Recommendations: No Therapy Indicated  Discharge Equipment Recommendations:  none  Barriers to discharge:       Assessment:     Sunitha Pillai is a 68 y.o. female with a medical diagnosis of Chronic pain of multiple joints. Pt walked 200' with SBA. Performance deficits affecting function are impaired endurance, impaired self care skills, gait instability, decreased coordination.     Rehab Prognosis:  Good; patient would benefit from acute skilled OT services to address these deficits and reach maximum level of function.       Plan:     Patient to be seen 3 x/week to address the above listed problems via self-care/home management, therapeutic activities, therapeutic exercises  Plan of Care Expires: 11/17/23  Plan of Care Reviewed with: patient    Subjective     Pain/Comfort:  Pain Rating 1: 0/10    Objective:     Communicated with: rn prior to session.  Patient found up in chair with   upon OT entry to room.    General Precautions: Standard, fall    Orthopedic Precautions:N/A  Braces: N/A  Respiratory Status: Room air     Occupational Performance:     Functional Mobility/Transfers:  Patient completed Sit <> Stand Transfer with supervision  with  no assistive device   Functional Mobility: Pt walked 200' with SBA / no AD.    Activities of Daily Living:  Pt declined.    [facility] 6 Click ADL: 22    Treatment & Education:  Discussed OT POC.  Encouraged activity / OOB with staff over the weekend.      Patient left up in chair with call button in reach    GOALS:   Multidisciplinary Problems       Occupational Therapy Goals          Problem: Occupational Therapy    Goal Priority Disciplines Outcome Interventions   Occupational Therapy Goal     OT, PT/OT Ongoing, Progressing    Description: Goals to be met by: 10/25/23     Patient will increase functional  independence with ADLs by performing:    UE Dressing with Set-up Assistance.  LE Dressing with Modified [place].  Bathing from  standing at sink with Set-up Assistance.  Step transfer with Modified [place]                         Time Tracking:     OT Date of Treatment: 10/20/23  OT Start Time: 1100  OT Stop Time: 1110  OT Total Time (min): 10 min    Billable Minutes:Therapeutic Activity 10    OT/CLINT: OT          10/20/2023

## 2023-10-20 NOTE — ASSESSMENT & PLAN NOTE
Patient's anemia is currently controlled. S/p 0 units of PRBCs  Current CBC reviewed-   Lab Results   Component Value Date    HGB 9.1 (L) 10/20/2023    HCT 26.8 (L) 10/20/2023   Monitor serial CBC and transfuse if patient becomes hemodynamically unstable, symptomatic or H/H drops below 7/21.

## 2023-10-20 NOTE — PLAN OF CARE
Problem: Adult Inpatient Plan of Care  Goal: Plan of Care Review  Outcome: Ongoing, Progressing  Goal: Patient-Specific Goal (Individualized)  Outcome: Ongoing, Progressing  Goal: Absence of Hospital-Acquired Illness or Injury  Outcome: Ongoing, Progressing  Goal: Optimal Comfort and Wellbeing  Outcome: Ongoing, Progressing  Goal: Readiness for Transition of Care  Outcome: Ongoing, Progressing     Problem: Infection  Goal: Absence of Infection Signs and Symptoms  Outcome: Ongoing, Progressing     Problem: Diabetes Comorbidity  Goal: Blood Glucose Level Within Targeted Range  Outcome: Ongoing, Progressing     Problem: Fluid Imbalance (Pneumonia)  Goal: Fluid Balance  Outcome: Ongoing, Progressing     Problem: Infection (Pneumonia)  Goal: Resolution of Infection Signs and Symptoms  Outcome: Ongoing, Progressing     Problem: Respiratory Compromise (Pneumonia)  Goal: Effective Oxygenation and Ventilation  Outcome: Ongoing, Progressing     Problem: Mobility Impairment  Goal: Optimal Mobility  Outcome: Ongoing, Progressing     Problem: Pain Acute  Goal: Acceptable Pain Control and Functional Ability  Outcome: Ongoing, Progressing     Problem: Fall Injury Risk  Goal: Absence of Fall and Fall-Related Injury  Outcome: Ongoing, Progressing     Problem: Asthma Comorbidity  Goal: Maintenance of Asthma Control  Outcome: Ongoing, Progressing     Problem: Behavioral Health Comorbidity  Goal: Maintenance of Behavioral Health Symptom Control  Outcome: Ongoing, Progressing     Problem: COPD (Chronic Obstructive Pulmonary Disease) Comorbidity  Goal: Maintenance of COPD Symptom Control  Outcome: Ongoing, Progressing     Problem: Heart Failure Comorbidity  Goal: Maintenance of Heart Failure Symptom Control  Outcome: Ongoing, Progressing     Problem: Hypertension Comorbidity  Goal: Blood Pressure in Desired Range  Outcome: Ongoing, Progressing     Problem: Obstructive Sleep Apnea Risk or Actual Comorbidity Management  Goal:  Unobstructed Breathing During Sleep  Outcome: Ongoing, Progressing     Problem: Osteoarthritis Comorbidity  Goal: Maintenance of Osteoarthritis Symptom Control  Outcome: Ongoing, Progressing     Problem: Pain Chronic (Persistent) (Comorbidity Management)  Goal: Acceptable Pain Control and Functional Ability  Outcome: Ongoing, Progressing     Problem: Seizure Disorder Comorbidity  Goal: Maintenance of Seizure Control  Outcome: Ongoing, Progressing     Problem: Hypertension Acute  Goal: Blood Pressure Within Desired Range  Outcome: Ongoing, Progressing     Pt progressing towards goals. No distress notice. No falls or injuries during shift. Bed in lowest position, call light within reach, belonging at bedside. Safety precaution maintain.Plan of Care reviewed. Pt verbalized understanding.

## 2023-10-20 NOTE — PROGRESS NOTES
[facility]  Rheumatology  Progress Note    Patient Name: Sunitha Pillai  MRN: [id number]  Admission Date: 10/17/2023  Hospital Length of Stay: 0 days  Code Status: Full Code   Attending Provider: Albin Mckeon MD  Primary Care Physician: Patty Louis MD  Principal Problem: Chronic pain of multiple joints    Subjective:     HPI: Sunitha Pillai is a 68-year-old female with a past medical history of breast cancer, CAD, depression, type 1 diabetes, GERD, HTN, and rheumatoid arthritis who presents to the ED with primary complaint of worsening joint pains. Patient with chronic polyarthralgias due to RA, including bilateral shoulders, hips, knees. Patient is not on MTX due to pulmonary nodules. Saw rheumatology in May, but patient not interested in trial of Humira at that time and felt her symptoms were controlled then. Today she reports worsening of her chronic pain over the past 2-3 days, now severe enough to impair her mobility. Additionally patient reports onset of generalized myalgias, fatigue, abdominal pain, nonbloody emesis, nausea, and non-productive cough around the same time (2-3 days ago). Denies fevers, sick contacts, SOB, sore throat, congestion, lightheadedness, falls, dysuria. Endorses diarrhea which is chronic since starting Metformin.     Labs show no leukocytosis, procal elevated to 4.85. Started on ceftrixaone and given one dose of vancomycin. Course complicated by concern for upper GIB 2/2 possible NSAID use. Cultures negative so far.    Rheumatology Hx  -Seen by Dr. Dai in 2022 and then again last May for CCP and RF positive RA.   -in May recommended to start Humira but did not want to because she was feeling ok    Feels like her shoulder is mostly in pain. Has had previous CSI and gel injections in knee with success.Currently has SOB and cough      Interval History: Some improvement in shoulder pain. Started on meloxicam by primary    Current  Facility-Administered Medications   Medication Frequency    acetaminophen tablet 1,000 mg Q8H    aluminum-magnesium hydroxide-simethicone 200-200-20 mg/5 mL suspension 30 mL QID PRN    atorvastatin tablet 10 mg QHS    dextrose 10% bolus 125 mL 125 mL PRN    dextrose 10% bolus 250 mL 250 mL PRN    diclofenac sodium 1 % gel 2 g TID PRN    glucagon (human recombinant) injection 1 mg PRN    glucose chewable tablet 16 g PRN    glucose chewable tablet 24 g PRN    guaiFENesin 100 mg/5 ml syrup 200 mg Q4H PRN    insulin aspart U-100 pen 0-5 Units QID (AC + HS) PRN    insulin detemir U-100 (Levemir) pen 9 Units QHS    letrozole tablet 2.5 mg Daily    LIDOcaine 5 % patch 1 patch Q24H    losartan tablet 100 mg Daily    melatonin tablet 9 mg Nightly PRN    meloxicam tablet 7.5 mg Daily    methocarbamoL tablet 500 mg TID    naloxone 0.4 mg/mL injection 0.02 mg PRN    ondansetron disintegrating tablet 8 mg Q8H PRN    pantoprazole EC tablet 40 mg Daily    polyethylene glycol packet 17 g BID PRN    prochlorperazine injection Soln 5 mg Q6H PRN    senna tablet 8.6 mg BID    sodium chloride 0.9% flush 10 mL Q8H PRN    topiramate tablet 25 mg QHS     Facility-Administered Medications Ordered in Other Encounters   Medication Frequency    fentaNYL injection 25 mcg Q5 Min PRN    midazolam (VERSED) 1 mg/mL injection 0.5 mg PRN     Objective:     Vital Signs (Most Recent):  Temp: 98.1 °F (36.7 °C) (10/20/23 0736)  Pulse: 75 (10/20/23 0736)  Resp: 16 (10/20/23 0736)  BP: (!) 140/83 (10/20/23 0736)  SpO2: 96 % (10/20/23 0736) Vital Signs (24h Range):  Temp:  [97.8 °F (36.6 °C)-100.9 °F (38.3 °C)] 98.1 °F (36.7 °C)  Pulse:  [] 75  Resp:  [16-18] 16  SpO2:  [94 %-100 %] 96 %  BP: (130-158)/(66-83) 140/83     Weight: 73.6 kg (162 lb 4.1 oz) (10/17/23 2034)  Body mass index is 27.85 kg/m².  Body surface area is 1.82 meters squared.    No intake or output data in the 24 hours ending 10/20/23 1059     Physical Exam    Constitutional: She is oriented to person, place, and time. She appears well-developed and well-nourished. No distress.   HENT:   Head: Normocephalic and atraumatic.   Eyes: Conjunctivae are normal. No scleral icterus.   Cardiovascular: Normal rate and normal heart sounds.   Pulmonary/Chest: Effort normal and breath sounds normal.   Abdominal: Soft. Bowel sounds are normal. There is no abdominal tenderness.   Musculoskeletal:         General: Tenderness (R AC joint) present. No swelling or deformity.      Comments: No synovitis noted in hands   Lymphadenopathy:     She has no cervical adenopathy.   Neurological: She is alert and oriented to person, place, and time.   Skin: Skin is warm and dry. No rash noted.   Vitals reviewed.       Significant Labs:  All pertinent lab results from the last 24 hours have been reviewed.    Significant Imaging:  Imaging results within the past 24 hours have been reviewed.    Assessment/Plan:     Orthopedic  Shoulder pain, right  68-year-old female with a past medical history of breast cancer, CAD, depression, type 1 diabetes, GERD, HTN, and rheumatoid arthritis who presents to the ED with primary complaint of worsening joint pains. Primary pain is in her right shoulder. She has CCP+ and RF+. Diagnosed as having seropositive RA but does not appear to have any significant synovitis concerning for RA flare. XR compatible with osteoarthritis    She has had significant shortness of breath and cough. Also has been having blood tinged sputum with abdominal pain in epigastric region    Recommendations  -follow up CT Chest Abdomen for evaluation of SOB, cough and abdominal pain  -consider increasing pantoprazole to BID dosing  -Tylenol around the clock 1000 mg q8 hours  -consider prednisone 20 mg for 5 days for shoulder pain. Would hold meloxicam during short course of prednisone to prevent gastric complications  -would avoid NSAID use given gastric symptoms  -outpatient follow up with   [facility]    Rheumatology will sign off. Please message with any questions.          Earline Turner, DO  Rheumatology  [facility]

## 2023-10-20 NOTE — NURSING
Pt is AAOx4. Pt up sitting in chair pt has weakness  and informed that she must call before getting out of chair or bed. No complaints of any at this time, pt understands she cant eat until ct scan is done, No distress noted. Call light in reach. Bed in low locked position.Side rails x2. Belongings at bedside.   Pt free of fall and injuries Questions and concerns voiced and answered.    Plan of care continues.

## 2023-10-20 NOTE — NURSING
Spoke with Aishwarya in CT informing her of first dose admin. of oral contrast. 2nd dose due at 1430

## 2023-10-20 NOTE — ASSESSMENT & PLAN NOTE
- well-controlled   -  on admit, last A1c 5.5   - home regimen Metformin and trulicity - hold while admitted   - start low dose SSI   - diabetic diet

## 2023-10-21 LAB
ALBUMIN SERPL BCP-MCNC: 2.5 G/DL (ref 3.5–5.2)
ALP SERPL-CCNC: 75 U/L (ref 55–135)
ALT SERPL W/O P-5'-P-CCNC: 9 U/L (ref 10–44)
ANION GAP SERPL CALC-SCNC: 7 MMOL/L (ref 8–16)
AST SERPL-CCNC: 13 U/L (ref 10–40)
BASOPHILS # BLD AUTO: 0.01 K/UL (ref 0–0.2)
BASOPHILS NFR BLD: 0.2 % (ref 0–1.9)
BILIRUB SERPL-MCNC: 0.4 MG/DL (ref 0.1–1)
BUN SERPL-MCNC: 10 MG/DL (ref 8–23)
CALCIUM SERPL-MCNC: 9.2 MG/DL (ref 8.7–10.5)
CHLORIDE SERPL-SCNC: 114 MMOL/L (ref 95–110)
CO2 SERPL-SCNC: 19 MMOL/L (ref 23–29)
CREAT SERPL-MCNC: 0.9 MG/DL (ref 0.5–1.4)
DIFFERENTIAL METHOD: ABNORMAL
EOSINOPHIL # BLD AUTO: 0 K/UL (ref 0–0.5)
EOSINOPHIL NFR BLD: 0 % (ref 0–8)
ERYTHROCYTE [DISTWIDTH] IN BLOOD BY AUTOMATED COUNT: 13.1 % (ref 11.5–14.5)
EST. GFR  (NO RACE VARIABLE): >60 ML/MIN/1.73 M^2
GLUCOSE SERPL-MCNC: 87 MG/DL (ref 70–110)
HCT VFR BLD AUTO: 27.7 % (ref 37–48.5)
HGB BLD-MCNC: 9.5 G/DL (ref 12–16)
IMM GRANULOCYTES # BLD AUTO: 0.02 K/UL (ref 0–0.04)
IMM GRANULOCYTES NFR BLD AUTO: 0.4 % (ref 0–0.5)
LYMPHOCYTES # BLD AUTO: 1.7 K/UL (ref 1–4.8)
LYMPHOCYTES NFR BLD: 34 % (ref 18–48)
MAGNESIUM SERPL-MCNC: 1.8 MG/DL (ref 1.6–2.6)
MCH RBC QN AUTO: 31.4 PG (ref 27–31)
MCHC RBC AUTO-ENTMCNC: 34.3 G/DL (ref 32–36)
MCV RBC AUTO: 91 FL (ref 82–98)
MONOCYTES # BLD AUTO: 0.6 K/UL (ref 0.3–1)
MONOCYTES NFR BLD: 12.1 % (ref 4–15)
NEUTROPHILS # BLD AUTO: 2.6 K/UL (ref 1.8–7.7)
NEUTROPHILS NFR BLD: 53.3 % (ref 38–73)
NRBC BLD-RTO: 0 /100 WBC
PLATELET # BLD AUTO: 134 K/UL (ref 150–450)
PMV BLD AUTO: 9.7 FL (ref 9.2–12.9)
POCT GLUCOSE: 150 MG/DL (ref 70–110)
POCT GLUCOSE: 80 MG/DL (ref 70–110)
POTASSIUM SERPL-SCNC: 3.4 MMOL/L (ref 3.5–5.1)
PROT SERPL-MCNC: 6.4 G/DL (ref 6–8.4)
RBC # BLD AUTO: 3.03 M/UL (ref 4–5.4)
SODIUM SERPL-SCNC: 140 MMOL/L (ref 136–145)
WBC # BLD AUTO: 4.94 K/UL (ref 3.9–12.7)

## 2023-10-21 PROCEDURE — 86140 C-REACTIVE PROTEIN: CPT | Mod: HCNC

## 2023-10-21 PROCEDURE — 85025 COMPLETE CBC W/AUTO DIFF WBC: CPT | Mod: HCNC

## 2023-10-21 PROCEDURE — 83735 ASSAY OF MAGNESIUM: CPT | Mod: HCNC

## 2023-10-21 PROCEDURE — 36415 COLL VENOUS BLD VENIPUNCTURE: CPT | Mod: HCNC

## 2023-10-21 PROCEDURE — 80053 COMPREHEN METABOLIC PANEL: CPT | Mod: HCNC

## 2023-10-21 PROCEDURE — 27000207 HC ISOLATION: Mod: HCNC

## 2023-10-21 PROCEDURE — 11000001 HC ACUTE MED/SURG PRIVATE ROOM: Mod: HCNC

## 2023-10-21 PROCEDURE — 25000003 PHARM REV CODE 250: Mod: HCNC

## 2023-10-21 RX ORDER — CEFDINIR 300 MG/1
300 CAPSULE ORAL 2 TIMES DAILY
Qty: 10 CAPSULE | Refills: 0 | Status: SHIPPED | OUTPATIENT
Start: 2023-10-21 | End: 2023-10-22 | Stop reason: HOSPADM

## 2023-10-21 RX ORDER — TOPIRAMATE 25 MG/1
TABLET ORAL
Qty: 60 TABLET | Refills: 2
Start: 2023-10-21

## 2023-10-21 RX ORDER — POTASSIUM CHLORIDE 20 MEQ/1
20 TABLET, EXTENDED RELEASE ORAL ONCE
Status: COMPLETED | OUTPATIENT
Start: 2023-10-21 | End: 2023-10-21

## 2023-10-21 RX ORDER — LEVOFLOXACIN 750 MG/1
750 TABLET ORAL DAILY
Status: DISCONTINUED | OUTPATIENT
Start: 2023-10-21 | End: 2023-10-22 | Stop reason: HOSPADM

## 2023-10-21 RX ORDER — ALBUTEROL SULFATE 90 UG/1
2 AEROSOL, METERED RESPIRATORY (INHALATION) EVERY 6 HOURS PRN
Qty: 6.7 G | Refills: 0 | Status: SHIPPED | OUTPATIENT
Start: 2023-10-21

## 2023-10-21 RX ORDER — AZITHROMYCIN 500 MG/1
500 TABLET, FILM COATED ORAL DAILY
Qty: 5 TABLET | Refills: 0 | Status: SHIPPED | OUTPATIENT
Start: 2023-10-21 | End: 2023-10-22 | Stop reason: HOSPADM

## 2023-10-21 RX ADMIN — MELOXICAM 7.5 MG: 7.5 TABLET ORAL at 09:10

## 2023-10-21 RX ADMIN — METHOCARBAMOL 500 MG: 500 TABLET ORAL at 08:10

## 2023-10-21 RX ADMIN — LIDOCAINE 1 PATCH: 50 PATCH CUTANEOUS at 03:10

## 2023-10-21 RX ADMIN — LEVOFLOXACIN 750 MG: 750 TABLET, FILM COATED ORAL at 01:10

## 2023-10-21 RX ADMIN — METHOCARBAMOL 500 MG: 500 TABLET ORAL at 03:10

## 2023-10-21 RX ADMIN — POTASSIUM CHLORIDE 20 MEQ: 1500 TABLET, EXTENDED RELEASE ORAL at 01:10

## 2023-10-21 RX ADMIN — TOPIRAMATE 25 MG: 25 TABLET, FILM COATED ORAL at 08:10

## 2023-10-21 RX ADMIN — ATORVASTATIN CALCIUM 10 MG: 10 TABLET, FILM COATED ORAL at 08:10

## 2023-10-21 RX ADMIN — SENNOSIDES 8.6 MG: 8.6 TABLET, FILM COATED ORAL at 09:10

## 2023-10-21 RX ADMIN — METHOCARBAMOL 500 MG: 500 TABLET ORAL at 09:10

## 2023-10-21 RX ADMIN — PANTOPRAZOLE SODIUM 40 MG: 40 TABLET, DELAYED RELEASE ORAL at 09:10

## 2023-10-21 RX ADMIN — LETROZOLE 2.5 MG: 2.5 TABLET ORAL at 06:10

## 2023-10-21 RX ADMIN — LOSARTAN POTASSIUM 100 MG: 50 TABLET, FILM COATED ORAL at 09:10

## 2023-10-21 NOTE — PROGRESS NOTES
[facility] [address] [place] [address] Medicine  Progress Note    Patient Name: Sunitha Pillai  MRN: [id number]  Patient Class: IP- Inpatient   Admission Date: 10/17/2023  Length of Stay: 1 days  Attending Physician: Albin Mckeon MD  Primary Care Provider: Patty Louis MD        Subjective:     Principal Problem:Pneumonia of left lower lobe due to infectious organism        HPI:  Sunitha Pillai is a 68-year-old female with a past medical history of breast cancer, CAD, depression, type 1 diabetes, GERD, HTN, and rheumatoid arthritis who presents to the ED with primary complaint of worsening joint pains. Patient with chronic polyarthralgias due to RA, including bilateral shoulders, hips, knees. Patient is not on MTX due to pulmonary nodules. Saw rheumatology in May, but patient not interested in trial of Humira at that time and felt her symptoms were controlled then. Today she reports worsening of her chronic pain over the past 2-3 days, now severe enough to impair her mobility. Additionally patient reports onset of generalized myalgias, fatigue, abdominal pain, nonbloody emesis, nausea, and non-productive cough around the same time (2-3 days ago). Denies fevers, sick contacts, SOB, sore throat, congestion, lightheadedness, falls, dysuria. Endorses diarrhea which is chronic since starting Metformin.     In the ED: Tachycardic to 115. Hypertensive to 180/90 on arrival, but improved to 131/90. Otherwise VSS AF. Labs reveal baseline chronic anemia. Hypokalemia 3.1 (replaced). Cr 1.1, baseline. . UA unremarkable. COVID/flu/RSV negative. EKG in sinus tach with PAC, RBBB, no ischemic changes. Given 600 mg ibuprofen, 1g tylenol, and 4mg IV morphine for pain.       Overview/Hospital Course:  Sunitha Pillai was admitted to hospital medicine for management of polyarthralgia and debility.  PT/OT consulted, no therapy indicated. Initially placed on IV ceftriaxone and vanc for elevated procal;  later discontinued given low suspicion for infection. Experienced viral URI symptoms, viral panel pending. Rheumatology consulted.      Interval History: NAEON. AFVSS, low grade temp of 99F.   CTA resulted, showing new left sided opacity. In setting of recent fever, viral illness and imaging changes will treat for bacterial pneumonia superimposed on viral infection.  Patient evaluated at bedside, cough and congestion not significantly improving. No new complaints.     Review of Systems   Constitutional:  Negative for fatigue, fever and unexpected weight change.   HENT:  Negative for mouth sores and nosebleeds.    Eyes:  Negative for pain and redness.   Respiratory:  Positive for cough and shortness of breath.    Cardiovascular:  Negative for chest pain and leg swelling.   Gastrointestinal:  Negative for abdominal pain, blood in stool, diarrhea, nausea and vomiting.   Genitourinary:  Negative for decreased urine volume, genital sores and hematuria.   Musculoskeletal:  Positive for arthralgias. Negative for joint swelling and myalgias.   Skin:  Negative for color change and rash.   Neurological:  Negative for weakness and headaches.   Hematological:  Does not bruise/bleed easily.   Psychiatric/Behavioral: Negative.       Objective:     Vital Signs (Most Recent):  Temp: 97.3 °F (36.3 °C) (10/21/23 1129)  Pulse: 83 (10/21/23 1129)  Resp: 16 (10/21/23 1129)  BP: (!) 140/61 (10/21/23 1129)  SpO2: 96 % (10/21/23 1129) Vital Signs (24h Range):  Temp:  [97 °F (36.1 °C)-99.3 °F (37.4 °C)] 97.3 °F (36.3 °C)  Pulse:  [] 83  Resp:  [16-20] 16  SpO2:  [94 %-96 %] 96 %  BP: (138-156)/(61-85) 140/61     Weight: 73.6 kg (162 lb 4.1 oz)  Body mass index is 27.85 kg/m².  No intake or output data in the 24 hours ending 10/21/23 1346      Physical Exam  Vitals and nursing note reviewed.   Constitutional:       General: She is not in acute distress.     Appearance: She is well-developed.   HENT:      Head: Normocephalic and  atraumatic.   Eyes:      Extraocular Movements: Extraocular movements intact.   Musculoskeletal:         General: No tenderness. Normal range of motion.   Skin:     General: Skin is warm and dry.      Findings: No rash.   Neurological:      Mental Status: She is alert and oriented to person, place, and time.   Psychiatric:         Behavior: Behavior normal.         Thought Content: Thought content normal.         Judgment: Judgment normal.       Significant Labs: All pertinent labs within the past 24 hours have been reviewed.    Significant Imaging: I have reviewed all pertinent imaging results/findings within the past 24 hours.      Assessment/Plan:      * Pneumonia of left lower lobe due to infectious organism  Rhinovirus infection  Suspected to be superimposed on viral infection  - CTA with scattered patchy consolidative opacities   - start PO levequin for empiric treatment   - prn duo nebs  - mucinex bid     Thrombocytopenia  Platelets: 134 at 10/21/2023  1:54 PM  - hx of low platelets  - monitor on CBC     Hypokalemia  Lab Results   Component Value Date    K 3.4 (L) 10/21/2023   - given and PO potassium   - telemonitoring    Rhinovirus infection  fatigue, generalized myalgias, worsened chronic joint pain, N/V, cough. Tachycardia on admission, but otherwise no evidence of sepsis.   - RIP positive for rhinovirus   - suspect contributing to joint pain   - supportive care; cough suppressant, fluids and tylenol  - vitals q4h     Type 2 diabetes mellitus, without long-term current use of insulin  - well-controlled   -  on admit, last A1c 5.5   - home regimen Metformin and trulicity - hold while admitted   - start low dose SSI   - diabetic diet     Chronic pain of multiple joints  - acute worsening of chronic pain in setting of suspected viral/ bacterial illness   - start supportive care and MM pain control   - HOLD NSAIDS given bleeding risk and Hgb below baseline  - PT/OT consulted for assessment        Rheumatoid arthritis involving multiple sites with positive rheumatoid factor  Chronic pain of multiple joints  Shoulder pain, right  - seen by rheumatology 5/2023, but patient's symptoms controlled at that time and she was not interested in starting Humira   - rheum consulted, appreciate recs   -follow up CT Chest Abdomen for evaluation of SOB, cough and abdominal pain  -consider increasing pantoprazole to BID dosing  -Tylenol around the clock 1000 mg q8 hours  -consider prednisone 20 mg for 5 days for shoulder pain. Would hold meloxicam during short course of prednisone to prevent gastric complications  - trial po mobic   - PT/OT recommending no therapy     Normocytic anemia  Patient's anemia is currently controlled. S/p 0 units of PRBCs  Current CBC reviewed-   Lab Results   Component Value Date    HGB 9.5 (L) 10/21/2023    HCT 27.7 (L) 10/21/2023   Monitor serial CBC and transfuse if patient becomes hemodynamically unstable, symptomatic or H/H drops below 7/21.     Malignant neoplasm of lower-inner quadrant of left breast in female, estrogen receptor positive  - continue lostrazole  - followed by outpatient oncology     Essential hypertension  - elevated in setting of pain, now improved and stable   - continue losartan      VTE Risk Mitigation (From admission, onward)         Ordered     IP VTE HIGH RISK PATIENT  Once         10/17/23 1224     Place sequential compression device  Until discontinued         10/17/23 1224                Discharge Planning   SANJEEV: 10/21/2023     Code Status: Full Code   Is the patient medically ready for discharge?: No    Reason for patient still in hospital (select all that apply): Patient new problem, Patient trending condition and Treatment  Discharge Plan A: Home   Discharge Delays: None known at this time        Paty Lynn PA-C  Department of Hospital Medicine   [facility]

## 2023-10-21 NOTE — NURSING
Pt is resting in bed,Tolerated medications well.No acute distress noted. Able to voice needs to staff. Bed in lowest position,call light within reach. tm

## 2023-10-21 NOTE — ASSESSMENT & PLAN NOTE
Rhinovirus infection  Suspected to be superimposed on viral infection  - CTA with scattered patchy consolidative opacities   - start PO levequin for empiric treatment   - prn duo nebs  - mucinex bid

## 2023-10-21 NOTE — PLAN OF CARE
[facility]  Discharge Final Note    Primary Care Provider: Patty Louis MD    Expected Discharge Date: 10/21/2023    Final Discharge Note (most recent)       Final Note - 10/21/23 1109          Final Note    Assessment Type Final Discharge Note     Anticipated Discharge Disposition Home or Self Care     Hospital Resources/Appts/Education Provided Provided patient/caregiver with written discharge plan information        Post-Acute Status    Post-Acute Authorization Other     Other Status No Post-Acute Service Needs     Discharge Delays None known at this time                                Contact Info       Patty Louis MD   Specialty: Internal Medicine   Relationship: PCP - General    [facility]  [address], [address]  [place], located within [facility] [address]   Phone: [phone]       Next Steps: Follow up    Giovany Mcduffie MD   Specialty: Hematology and Oncology, Hematology   Relationship: Consulting Physician    [address]  [facility] [address]   Phone: [phone]       Next Steps: Follow up            Kellen Allan MSW, LCSW  Weekend -[facility]  [facility] [phone]

## 2023-10-21 NOTE — PLAN OF CARE
Problem: Adult Inpatient Plan of Care  Goal: Plan of Care Review  Outcome: Met  Goal: Patient-Specific Goal (Individualized)  Outcome: Met  Goal: Absence of Hospital-Acquired Illness or Injury  Outcome: Met  Goal: Optimal Comfort and Wellbeing  Outcome: Met  Goal: Readiness for Transition of Care  Outcome: Met     Problem: Infection  Goal: Absence of Infection Signs and Symptoms  Outcome: Met     Problem: Diabetes Comorbidity  Goal: Blood Glucose Level Within Targeted Range  Outcome: Met     Problem: Fluid Imbalance (Pneumonia)  Goal: Fluid Balance  Outcome: Met     Problem: Infection (Pneumonia)  Goal: Resolution of Infection Signs and Symptoms  Outcome: Met     Problem: Respiratory Compromise (Pneumonia)  Goal: Effective Oxygenation and Ventilation  Outcome: Met     Problem: Mobility Impairment  Goal: Optimal Mobility  Outcome: Met     Problem: Pain Acute  Goal: Acceptable Pain Control and Functional Ability  Outcome: Met     Problem: Fall Injury Risk  Goal: Absence of Fall and Fall-Related Injury  Outcome: Met     Problem: Asthma Comorbidity  Goal: Maintenance of Asthma Control  Outcome: Met     Problem: Behavioral Health Comorbidity  Goal: Maintenance of Behavioral Health Symptom Control  Outcome: Met     Problem: Heart Failure Comorbidity  Goal: Maintenance of Heart Failure Symptom Control  Outcome: Met     Problem: Hypertension Comorbidity  Goal: Blood Pressure in Desired Range  Outcome: Met     Problem: Obstructive Sleep Apnea Risk or Actual Comorbidity Management  Goal: Unobstructed Breathing During Sleep  Outcome: Met     Problem: Osteoarthritis Comorbidity  Goal: Maintenance of Osteoarthritis Symptom Control  Outcome: Met     Problem: Pain Chronic (Persistent) (Comorbidity Management)  Goal: Acceptable Pain Control and Functional Ability  Outcome: Met     Problem: Seizure Disorder Comorbidity  Goal: Maintenance of Seizure Control  Outcome: Met     Problem: Hypertension Acute  Goal: Blood Pressure Within  Desired Range  Outcome: Met

## 2023-10-21 NOTE — PLAN OF CARE
Problem: COPD (Chronic Obstructive Pulmonary Disease) Comorbidity  Goal: Maintenance of COPD Symptom Control  10/21/2023 1622 by Huan Mcgrath LPN  Outcome: Ongoing, Progressing  10/21/2023 1621 by Huan Mcgrath LPN  Outcome: Ongoing, Progressing  10/21/2023 1620 by Huan Mcgrath LPN  Outcome: Ongoing, Progressing     Problem: Diabetes Comorbidity  Goal: Blood Glucose Level Within Targeted Range  10/21/2023 1622 by Huan Mcgrath LPN  Outcome: Ongoing, Progressing  10/21/2023 1621 by Huan Mcgrath LPN  Outcome: Ongoing, Progressing     Problem: Fluid Imbalance (Pneumonia)  Goal: Fluid Balance  10/21/2023 1622 by Huan Mcgrath LPN  Outcome: Ongoing, Progressing  10/21/2023 1621 by Huan Mcgrath LPN  Outcome: Ongoing, Progressing     Problem: Infection (Pneumonia)  Goal: Resolution of Infection Signs and Symptoms  10/21/2023 1622 by Huan Mcgrath LPN  Outcome: Ongoing, Progressing  10/21/2023 1621 by Huan Mcgrath LPN  Outcome: Ongoing, Progressing     Problem: Respiratory Compromise (Pneumonia)  Goal: Effective Oxygenation and Ventilation  10/21/2023 1622 by Huna Mcgrath LPN  Outcome: Ongoing, Progressing  10/21/2023 1621 by Huan Mcgrath LPN  Outcome: Ongoing, Progressing     Problem: Mobility Impairment  Goal: Optimal Mobility  10/21/2023 1622 by Huan Mcgrath LPN  Outcome: Ongoing, Progressing  10/21/2023 1621 by Huan Mcgrath LPN  Outcome: Ongoing, Progressing     Problem: Fall Injury Risk  Goal: Absence of Fall and Fall-Related Injury  10/21/2023 1622 by Huan Mcgrath LPN  Outcome: Ongoing, Progressing  10/21/2023 1621 by Huan Mcgrath LPN  Outcome: Ongoing, Progressing     Problem: Pain Acute  Goal: Acceptable Pain Control and Functional Ability  Outcome: Ongoing, Progressing

## 2023-10-21 NOTE — ASSESSMENT & PLAN NOTE
Lab Results   Component Value Date    K 3.4 (L) 10/21/2023   - given and PO potassium   - telemonitoring

## 2023-10-21 NOTE — SUBJECTIVE & OBJECTIVE
Interval History: NAEON. AFVSS, low grade temp of 99F.   CTA resulted, showing new left sided opacity. In setting of recent fever, viral illness and imaging changes will treat for bacterial pneumonia superimposed on viral infection.  Patient evaluated at bedside, cough and congestion not significantly improving. No new complaints.     Review of Systems   Constitutional:  Negative for fatigue, fever and unexpected weight change.   HENT:  Negative for mouth sores and nosebleeds.    Eyes:  Negative for pain and redness.   Respiratory:  Positive for cough and shortness of breath.    Cardiovascular:  Negative for chest pain and leg swelling.   Gastrointestinal:  Negative for abdominal pain, blood in stool, diarrhea, nausea and vomiting.   Genitourinary:  Negative for decreased urine volume, genital sores and hematuria.   Musculoskeletal:  Positive for arthralgias. Negative for joint swelling and myalgias.   Skin:  Negative for color change and rash.   Neurological:  Negative for weakness and headaches.   Hematological:  Does not bruise/bleed easily.   Psychiatric/Behavioral: Negative.       Objective:     Vital Signs (Most Recent):  Temp: 97.3 °F (36.3 °C) (10/21/23 1129)  Pulse: 83 (10/21/23 1129)  Resp: 16 (10/21/23 1129)  BP: (!) 140/61 (10/21/23 1129)  SpO2: 96 % (10/21/23 1129) Vital Signs (24h Range):  Temp:  [97 °F (36.1 °C)-99.3 °F (37.4 °C)] 97.3 °F (36.3 °C)  Pulse:  [] 83  Resp:  [16-20] 16  SpO2:  [94 %-96 %] 96 %  BP: (138-156)/(61-85) 140/61     Weight: 73.6 kg (162 lb 4.1 oz)  Body mass index is 27.85 kg/m².  No intake or output data in the 24 hours ending 10/21/23 1346      Physical Exam  Vitals and nursing note reviewed.   Constitutional:       General: She is not in acute distress.     Appearance: She is well-developed.   HENT:      Head: Normocephalic and atraumatic.   Eyes:      Extraocular Movements: Extraocular movements intact.   Musculoskeletal:         General: No tenderness. Normal range  of motion.   Skin:     General: Skin is warm and dry.      Findings: No rash.   Neurological:      Mental Status: She is alert and oriented to person, place, and time.   Psychiatric:         Behavior: Behavior normal.         Thought Content: Thought content normal.         Judgment: Judgment normal.       Significant Labs: All pertinent labs within the past 24 hours have been reviewed.    Significant Imaging: I have reviewed all pertinent imaging results/findings within the past 24 hours.

## 2023-10-21 NOTE — ASSESSMENT & PLAN NOTE
Patient's anemia is currently controlled. S/p 0 units of PRBCs  Current CBC reviewed-   Lab Results   Component Value Date    HGB 9.5 (L) 10/21/2023    HCT 27.7 (L) 10/21/2023   Monitor serial CBC and transfuse if patient becomes hemodynamically unstable, symptomatic or H/H drops below 7/21.

## 2023-10-22 VITALS
DIASTOLIC BLOOD PRESSURE: 66 MMHG | SYSTOLIC BLOOD PRESSURE: 140 MMHG | HEART RATE: 91 BPM | TEMPERATURE: 98 F | HEIGHT: 64 IN | OXYGEN SATURATION: 96 % | RESPIRATION RATE: 18 BRPM | WEIGHT: 162.25 LBS | BODY MASS INDEX: 27.7 KG/M2

## 2023-10-22 LAB
BASOPHILS # BLD AUTO: 0.01 K/UL (ref 0–0.2)
BASOPHILS NFR BLD: 0.3 % (ref 0–1.9)
CRP SERPL-MCNC: 109.3 MG/L (ref 0–8.2)
DIFFERENTIAL METHOD: ABNORMAL
EOSINOPHIL # BLD AUTO: 0 K/UL (ref 0–0.5)
EOSINOPHIL NFR BLD: 0 % (ref 0–8)
ERYTHROCYTE [DISTWIDTH] IN BLOOD BY AUTOMATED COUNT: 12.8 % (ref 11.5–14.5)
ERYTHROCYTE [SEDIMENTATION RATE] IN BLOOD BY PHOTOMETRIC METHOD: >120 MM/HR (ref 0–36)
HCT VFR BLD AUTO: 27.3 % (ref 37–48.5)
HGB BLD-MCNC: 9.3 G/DL (ref 12–16)
IMM GRANULOCYTES # BLD AUTO: 0.06 K/UL (ref 0–0.04)
IMM GRANULOCYTES NFR BLD AUTO: 1.5 % (ref 0–0.5)
LYMPHOCYTES # BLD AUTO: 1.3 K/UL (ref 1–4.8)
LYMPHOCYTES NFR BLD: 32.4 % (ref 18–48)
MCH RBC QN AUTO: 31.5 PG (ref 27–31)
MCHC RBC AUTO-ENTMCNC: 34.1 G/DL (ref 32–36)
MCV RBC AUTO: 93 FL (ref 82–98)
MONOCYTES # BLD AUTO: 0.7 K/UL (ref 0.3–1)
MONOCYTES NFR BLD: 16.8 % (ref 4–15)
NEUTROPHILS # BLD AUTO: 2 K/UL (ref 1.8–7.7)
NEUTROPHILS NFR BLD: 49 % (ref 38–73)
NRBC BLD-RTO: 0 /100 WBC
PATH REV BLD -IMP: NORMAL
PLATELET # BLD AUTO: 141 K/UL (ref 150–450)
PMV BLD AUTO: 9.7 FL (ref 9.2–12.9)
POCT GLUCOSE: 120 MG/DL (ref 70–110)
RBC # BLD AUTO: 2.95 M/UL (ref 4–5.4)
WBC # BLD AUTO: 3.98 K/UL (ref 3.9–12.7)

## 2023-10-22 PROCEDURE — 85025 COMPLETE CBC W/AUTO DIFF WBC: CPT | Mod: HCNC

## 2023-10-22 PROCEDURE — 25000003 PHARM REV CODE 250: Mod: HCNC

## 2023-10-22 PROCEDURE — 85060 BLOOD SMEAR INTERPRETATION: CPT | Mod: HCNC,,, | Performed by: PATHOLOGY

## 2023-10-22 PROCEDURE — 36415 COLL VENOUS BLD VENIPUNCTURE: CPT | Mod: HCNC

## 2023-10-22 PROCEDURE — 85060 PATHOLOGIST REVIEW: ICD-10-PCS | Mod: HCNC,,, | Performed by: PATHOLOGY

## 2023-10-22 PROCEDURE — 85652 RBC SED RATE AUTOMATED: CPT | Mod: HCNC

## 2023-10-22 PROCEDURE — 94761 N-INVAS EAR/PLS OXIMETRY MLT: CPT | Mod: HCNC

## 2023-10-22 RX ORDER — LEVOFLOXACIN 750 MG/1
750 TABLET ORAL DAILY
Qty: 9 TABLET | Refills: 0 | Status: SHIPPED | OUTPATIENT
Start: 2023-10-23 | End: 2023-11-01

## 2023-10-22 RX ORDER — LEVOFLOXACIN 750 MG/1
750 TABLET ORAL DAILY
Qty: 4 TABLET | Refills: 0 | Status: SHIPPED | OUTPATIENT
Start: 2023-10-23 | End: 2023-10-22 | Stop reason: SDUPTHER

## 2023-10-22 RX ADMIN — SENNOSIDES 8.6 MG: 8.6 TABLET, FILM COATED ORAL at 08:10

## 2023-10-22 RX ADMIN — LEVOFLOXACIN 750 MG: 750 TABLET, FILM COATED ORAL at 08:10

## 2023-10-22 RX ADMIN — PANTOPRAZOLE SODIUM 40 MG: 40 TABLET, DELAYED RELEASE ORAL at 08:10

## 2023-10-22 RX ADMIN — LETROZOLE 2.5 MG: 2.5 TABLET ORAL at 08:10

## 2023-10-22 RX ADMIN — LOSARTAN POTASSIUM 100 MG: 50 TABLET, FILM COATED ORAL at 08:10

## 2023-10-22 RX ADMIN — MELOXICAM 7.5 MG: 7.5 TABLET ORAL at 08:10

## 2023-10-22 RX ADMIN — METHOCARBAMOL 500 MG: 500 TABLET ORAL at 08:10

## 2023-10-22 NOTE — PLAN OF CARE
Problem: COPD (Chronic Obstructive Pulmonary Disease) Comorbidity  Goal: Maintenance of COPD Symptom Control  Outcome: Met     Problem: Diabetes Comorbidity  Goal: Blood Glucose Level Within Targeted Range  Outcome: Met     Problem: Fluid Imbalance (Pneumonia)  Goal: Fluid Balance  Outcome: Met     Problem: Infection (Pneumonia)  Goal: Resolution of Infection Signs and Symptoms  Outcome: Met     Problem: Respiratory Compromise (Pneumonia)  Goal: Effective Oxygenation and Ventilation  Outcome: Met     Problem: Mobility Impairment  Goal: Optimal Mobility  Outcome: Met     Problem: Fall Injury Risk  Goal: Absence of Fall and Fall-Related Injury  Outcome: Met     Problem: Pain Acute  Goal: Acceptable Pain Control and Functional Ability  Outcome: Met

## 2023-10-22 NOTE — CARE UPDATE
10/22/2023      Sunitha Pillai  [address] [address]          Hospital Medicine Dept.  [facility] [address] [place] [address] [phone] after hours  [phone] fax Sunitha Pillai has been hospitalized at the [facility] since 10/17/2023.  Please excuse the patient from duties.  Patient may return on 10/26/2023.  No restrictions.     Please contact me if you have any questions.                  __________________________  Paty Lynn PA-C  10/22/2023

## 2023-10-22 NOTE — NURSING
Pt is discharged in stable condition,No acute distress noted. IV and tele removed.Discharge instructions read and understood.

## 2023-10-22 NOTE — PLAN OF CARE
Problem: COPD (Chronic Obstructive Pulmonary Disease) Comorbidity  Goal: Maintenance of COPD Symptom Control  Outcome: Ongoing, Progressing     Problem: Diabetes Comorbidity  Goal: Blood Glucose Level Within Targeted Range  Outcome: Ongoing, Progressing     Problem: Fluid Imbalance (Pneumonia)  Goal: Fluid Balance  Outcome: Ongoing, Progressing     Problem: Infection (Pneumonia)  Goal: Resolution of Infection Signs and Symptoms  Outcome: Ongoing, Progressing     Problem: Respiratory Compromise (Pneumonia)  Goal: Effective Oxygenation and Ventilation  Outcome: Ongoing, Progressing     Problem: Mobility Impairment  Goal: Optimal Mobility  Outcome: Ongoing, Progressing     Problem: Fall Injury Risk  Goal: Absence of Fall and Fall-Related Injury  Outcome: Ongoing, Progressing     Problem: Pain Acute  Goal: Acceptable Pain Control and Functional Ability  Outcome: Ongoing, Progressing

## 2023-10-23 LAB
BACTERIA BLD CULT: NORMAL
PATH REV BLD -IMP: NORMAL

## 2023-10-23 NOTE — ASSESSMENT & PLAN NOTE
Rhinovirus infection  Suspected to be superimposed on viral infection  - CTA with scattered patchy consolidative opacities   - PO levequin for empiric treatment   - prn duo nebs  - mucinex bid

## 2023-10-23 NOTE — DISCHARGE SUMMARY
[facility] - Observation [address] [place] [address] Medicine  Discharge Summary      Patient Name: Sunitha Pillai  MRN: [id number]  JULI: [id number]  Patient Class: IP- Inpatient  Admission Date: 10/17/2023  Hospital Length of Stay: 2 days  Discharge Date and Time: 10/22/2023  2:10 PM  Attending Physician: No att. providers found   Discharging Provider: Paty Lynn PA-C  Primary Care Provider: Patty Louis MD  [facility] Medicine Team: [facility] [place] [facility] Paty Lynn PA-C  Primary Care Team: [facility] [place] [facility]    HPI:   Sunitha Pillai is a 68-year-old female with a past medical history of breast cancer, CAD, depression, type 1 diabetes, GERD, HTN, and rheumatoid arthritis who presents to the ED with primary complaint of worsening joint pains. Patient with chronic polyarthralgias due to RA, including bilateral shoulders, hips, knees. Patient is not on MTX due to pulmonary nodules. Saw rheumatology in May, but patient not interested in trial of Humira at that time and felt her symptoms were controlled then. Today she reports worsening of her chronic pain over the past 2-3 days, now severe enough to impair her mobility. Additionally patient reports onset of generalized myalgias, fatigue, abdominal pain, nonbloody emesis, nausea, and non-productive cough around the same time (2-3 days ago). Denies fevers, sick contacts, SOB, sore throat, congestion, lightheadedness, falls, dysuria. Endorses diarrhea which is chronic since starting Metformin.     In the ED: Tachycardic to 115. Hypertensive to 180/90 on arrival, but improved to 131/90. Otherwise VSS AF. Labs reveal baseline chronic anemia. Hypokalemia 3.1 (replaced). Cr 1.1, baseline. . UA unremarkable. COVID/flu/RSV negative. EKG in sinus tach with PAC, RBBB, no ischemic changes. Given 600 mg ibuprofen, 1g tylenol, and 4mg IV morphine for pain.       * No surgery found *      Hospital Course:   Sunitha Pillai was admitted to hospital medicine for  management of polyarthralgia and debility.  PT/OT consulted, no therapy indicated. Initially placed on IV ceftriaxone and vanc for elevated procal; later discontinued given low suspicion for infection. Rheumatology consulted. Experienced viral URI symptoms, viral panel positive for rhinovirus. Hosptial course complicated by fever while off of antibiotics. CTA chest negative for PE but did show new lung opacities; treated empirically for pneumonia. CT AP also revealed increased lung nodules, thickened endometrial stripe and thyroid nodules. Outpatient transvaginal and thyroid US ordered, outpatient oncologist notified of findings. Patient discharged on prolonged course of antibiotics given high risk factors.       On day of discharge patient's vital signs were stable and patient appeared clinically ready for discharge. Hospital course, discharge plan and return precautions discussed - patient expressed understanding. All questions answered at that time.        Goals of Care Treatment Preferences:  Code Status: Full Code      Consults:   Consults (From admission, onward)        Status Ordering Provider     Inpatient consult to Rheumatology  Once        Provider:  (Not yet assigned)    Completed SARAH MOLINA     Inpatient consult to PICC team ([facility])  Once        Provider:  (Not yet assigned)    Completed GEETA CALDERA          Pulmonary  * Pneumonia of left lower lobe due to infectious organism  Rhinovirus infection  Suspected to be superimposed on viral infection  - CTA with scattered patchy consolidative opacities   - PO levequin for empiric treatment   - prn duo nebs  - mucinex bid       Final Active Diagnoses:    Diagnosis Date Noted POA    PRINCIPAL PROBLEM:  Pneumonia of left lower lobe due to infectious organism [J18.9] 03/02/2021 No    Thrombocytopenia [D69.6] 10/19/2023 Yes    Shoulder pain, right [M25.511] 10/19/2023 Yes    Hypokalemia [E87.6] 10/18/2023 Yes    Chronic pain of multiple joints  [M25.50, G89.29] 10/17/2023 Yes    Type 2 diabetes mellitus, without long-term current use of insulin [E11.9] 10/17/2023 Yes    Rhinovirus infection [B34.8] 10/17/2023 Yes    Rheumatoid arthritis involving multiple sites with positive rheumatoid factor [M05.79] 04/06/2023 Yes    Chronic pulmonary heart disease [I27.9] 04/06/2023 Yes    Normocytic anemia [D64.9] 02/16/2023 Yes    Malignant neoplasm of lower-inner quadrant of left breast in female, estrogen receptor positive [C50.312, Z17.0] 09/03/2020 Not Applicable    Essential hypertension [I10] 07/23/2012 Yes     Chronic      Problems Resolved During this Admission:    Diagnosis Date Noted Date Resolved POA    Hypomagnesemia [E83.42] 10/18/2023 10/20/2023 Yes    Epigastric pain [R10.13] 10/18/2023 10/20/2023 Yes       Discharged Condition: good    Disposition: Home or Self Care    Follow Up:   Follow-up Information     Patty Louis MD Follow up.    Specialty: Internal Medicine  Contact information:  [address]  [address], [address]  [facility] [id number] [phone]             Giovany Mcduffie MD Follow up.    Specialties: Hematology and Oncology, Hematology  Contact information:  [address] [facility] [id number] [phone]                       Patient Instructions:      US Transvaginal Non OB   Standing Status: Future Standing Exp. Date: 10/21/24     Order Specific Question Answer Comments   Reason for Exam: thickened endometrium on CT, follow up    May the Radiologist modify the order per protocol to meet the clinical needs of the patient? Yes    Release to patient Immediate      US Thyroid   Standing Status: Future Standing Exp. Date: 10/21/24     Order Specific Question Answer Comments   May the Radiologist modify the order per protocol to meet the clinical needs of the patient? Yes    Release to patient Immediate      Ambulatory referral/consult to Outpatient Case Management   Referral Priority: Routine Referral Type:  Consultation   Referral Reason: Specialty Services Required   Number of Visits Requested: 1     Ambulatory referral/consult to Hematology / Oncology   Standing Status: Future   Referral Priority: Urgent Referral Type: Consultation   Referral Reason: Specialty Services Required   Referred to Provider: VLADISLAV LOZANO Requested Specialty: Hematology and Oncology   Number of Visits Requested: 1     Diet Cardiac     Diet Cardiac     Notify your health care provider if you experience any of the following:  temperature >100.4     Notify your health care provider if you experience any of the following:  persistent nausea and vomiting or diarrhea     Notify your health care provider if you experience any of the following:  severe uncontrolled pain     Notify your health care provider if you experience any of the following:  difficulty breathing or increased cough     Notify your health care provider if you experience any of the following:  severe persistent headache     Notify your health care provider if you experience any of the following:  worsening rash     Notify your health care provider if you experience any of the following:  persistent dizziness, light-headedness, or visual disturbances     Notify your health care provider if you experience any of the following:  increased confusion or weakness     Notify your health care provider if you experience any of the following:  temperature >100.4     Notify your health care provider if you experience any of the following:  persistent nausea and vomiting or diarrhea     Notify your health care provider if you experience any of the following:  severe uncontrolled pain     Notify your health care provider if you experience any of the following:  difficulty breathing or increased cough     Notify your health care provider if you experience any of the following:  severe persistent headache     Notify your health care provider if you experience any of the following:  worsening  rash     Notify your health care provider if you experience any of the following:  persistent dizziness, light-headedness, or visual disturbances     Notify your health care provider if you experience any of the following:  increased confusion or weakness     Activity as tolerated     Activity as tolerated       Significant Diagnostic Studies:   Lab Results   Component Value Date    WBC 3.98 10/22/2023    HGB 9.3 (L) 10/22/2023    HCT 27.3 (L) 10/22/2023    MCV 93 10/22/2023     (L) 10/22/2023       BMP  Lab Results   Component Value Date     10/21/2023    K 3.4 (L) 10/21/2023     (H) 10/21/2023    CO2 19 (L) 10/21/2023    BUN 10 10/21/2023    CREATININE 0.9 10/21/2023    CALCIUM 9.2 10/21/2023    ANIONGAP 7 (L) 10/21/2023    EGFRNORACEVR >60.0 10/21/2023     Microbiology Results (last 7 days)     Procedure Component Value Units Date/Time    Respiratory Infection Panel (PCR), Nasopharyngeal [[id number]]  (Abnormal) Collected: 10/19/23 0957    Order Status: Completed Specimen: Nasopharyngeal Swab Updated: 10/19/23 1521     Respiratory Infection Panel Source NP Swab     Adenovirus Not Detected     Coronavirus 229E, Common Cold Virus Not Detected     Coronavirus HKU1, Common Cold Virus Not Detected     Coronavirus NL63, Common Cold Virus Not Detected     Coronavirus OC43, Common Cold Virus Not Detected     Comment: The Coronavirus strains detected in this test cause the common cold.  These strains are not the COVID-19 (novel Coronavirus)strain   associated with the respiratory disease outbreak.          SARS-CoV2 (COVID-19) Qualitative PCR Not Detected     Human Metapneumovirus Not Detected     Human Rhinovirus/Enterovirus Detected     Influenza A (subtypes H1, H1-2009,H3) Not Detected     Influenza B Not Detected     Parainfluenza Virus 1 Not Detected     Parainfluenza Virus 2 Not Detected     Parainfluenza Virus 3 Not Detected     Parainfluenza Virus 4 Not Detected     Respiratory Syncytial Virus  Not Detected     Bordetella Parapertussis (YO0076) Not Detected     Bordetella pertussis (ptxP) Not Detected     Chlamydia pneumoniae Not Detected     Mycoplasma pneumoniae Not Detected    Narrative:      For all other respiratory sources, order [id number] -  Respiratory Viral Panel by PCR    Blood culture [[id number]] Collected: 10/18/23 0910    Order Status: Sent Specimen: Blood from Antecubital, Right Arm Updated: 10/18/23 0910        Imaging Results          CT Head Without Contrast (Final result)  Result time 10/17/23 13:28:28    Final result by Tramaine Nash MD (10/17/23 13:28:28)                 Impression:      No evidence of acute hemorrhage or major vascular distribution infarct.      Electronically signed by: Tramaine Nash MD  Date:    10/17/2023  Time:    13:28             Narrative:    EXAMINATION:  CT HEAD WITHOUT CONTRAST    CLINICAL HISTORY:  Mental status change, unknown cause;    TECHNIQUE:  Low dose axial CT images obtained throughout the head without the use of intravenous contrast.  Axial, sagittal and coronal reconstructions were performed.    COMPARISON:  06/11/2023    FINDINGS:  Intracranial compartment:    Ventricles and sulci are stable in size, without evidence of hydrocephalus.    The brain parenchyma appears unchanged.  No new parenchymal hemorrhage, edema, mass effect or major vascular distribution infarct.    No new extra-axial blood or fluid collections.    Skull/extracranial contents (limited evaluation):    No displaced calvarial fracture.    The mastoid air cells and visualized paranasal sinuses are essentially clear.    Bilateral pseudophakia.                               X-Ray Chest AP Portable (Final result)  Result time 10/17/23 12:30:02    Final result by Doroteo Sagastume MD (10/17/23 12:30:02)                 Impression:      No convincing evidence of acute cardiopulmonary disease.      Electronically signed by: Doroteo Sagastume  Date:    10/17/2023  Time:    12:30        "      Narrative:    EXAMINATION:  XR CHEST AP PORTABLE    CLINICAL HISTORY:  sob;    TECHNIQUE:  Single frontal view of the chest was performed.    COMPARISON:  Chest radiograph performed 08/01/2023.    FINDINGS:  Cardiomediastinal contours grossly unchanged.  Chronic appearing mild interstitial coarsening appears relatively similar in appearance to 08/01/2023 radiograph.  No definite focal airspace consolidation.    No definite pneumothorax or large volume pleural effusion.    No acute findings in the visualized abdomen.    Osseous and soft tissue structures appear without definite acute change.                                Pending Diagnostic Studies:     None         Medications:  Reconciled Home Medications:      Medication List      START taking these medications    albuterol 90 mcg/actuation inhaler  Commonly known as: VENTOLIN HFA  Inhale 2 puffs into the lungs every 6 (six) hours as needed for Wheezing or Shortness of Breath. Rescue     levoFLOXacin 750 MG tablet  Commonly known as: LEVAQUIN  Take 1 tablet (750 mg total) by mouth once daily. for 9 days        CHANGE how you take these medications    TRULICITY 1.5 mg/0.5 mL pen injector  Generic drug: dulaglutide  Inject 1.5 mg into the skin every 7 days.  What changed: when to take this        CONTINUE taking these medications    ACCU-CHEK GUIDE GLUCOSE METER Misc  Generic drug: blood-glucose meter  USE  THREE TIMES DAILY     acetaminophen 500 MG tablet  Commonly known as: TYLENOL  Take 500 mg by mouth every 8 (eight) hours as needed for Pain.     aspirin 81 mg Tab  Take 1 tablet by mouth Daily.     atorvastatin 10 MG tablet  Commonly known as: LIPITOR  Take 1 tablet by mouth once daily     BD ULTRA-FINE SHORT PEN NEEDLE 31 gauge x 5/16" Ndle  Generic drug: pen needle, diabetic  USE 1  4 TIMES DAILY WITH  INSULIN     blood sugar diagnostic Strp  Strips and lancets covered by insurance E11.9, pt checks glucose three times daily, insulin dependent     blood " sugar diagnostic, drum Strp  Commonly known as: ACCU-CHEK COMPACT TEST  USE ONE STRIP TO CHECK GLUCOSE 4 TIMES DAILY BEFORE EACH MEAL AND AT BEDTIME     capsaicin 0.1 % Crea  Apply 1 application  topically 2 (two) times daily as needed (pain).     diclofenac sodium 1 % Gel  Commonly known as: VOLTAREN  Apply 2 g topically 3 (three) times daily as needed (muscle spasm pain). Apply 2 grams to affected area 3 times daily as needed     * lancets Misc  To check BG 4 times daily, to use with insurance preferred meter, insulin dependent     * TRUEPLUS LANCETS 33 gauge Misc  Generic drug: lancets  USE 1 TO CHECK GLUCOSE 4 TIMES DAILY     letrozole 2.5 mg Tab  Commonly known as: FEMARA  Take 1 tablet (2.5 mg total) by mouth once daily.     LIDOcaine 5 %  Commonly known as: LIDODERM  Apply to affected area as needed for pain for 12 hours, then off for 12 hours. Discard after each use.  May use 4% lidocaine patch as alternative.     metFORMIN 1000 MG tablet  Commonly known as: GLUCOPHAGE  1 tablet with a meal Orally Once a day     topiramate 25 MG tablet  Commonly known as: TOPAMAX  One at bedtime for 2 weeks then two at bedtime         * This list has 2 medication(s) that are the same as other medications prescribed for you. Read the directions carefully, and ask your doctor or other care provider to review them with you.            STOP taking these medications    candesartan 16 MG tablet  Commonly known as: ATACAND        ASK your doctor about these medications    losartan 100 MG tablet  Commonly known as: COZAAR  Take 1 tablet (100 mg total) by mouth once daily.            Indwelling Lines/Drains at time of discharge:   Lines/Drains/Airways     Central Venous Catheter Line  Duration                PowerPort A Cath Single Lumen 11/18/20 0815 right internal jugular 1069 days                Time spent on the discharge of patient: 36 minutes         Paty Lynn PA-C  Department of Hospital Medicine  [facility]  Observation 11[facility]

## 2023-10-25 LAB
BACTERIA BLD CULT: NORMAL
BACTERIA BLD CULT: NORMAL

## 2023-11-07 ENCOUNTER — PATIENT OUTREACH (OUTPATIENT)
Dept: ADMINISTRATIVE | Facility: HOSPITAL | Age: 68
End: 2023-11-07
Payer: MEDICARE

## 2023-11-07 NOTE — PROGRESS NOTES
Health Maintenance Due   Topic Date Due    RSV Vaccine (Age 60+) (1 - 1-dose 60+ series) Never done    Eye Exam  03/25/2020    Colorectal Cancer Screening  06/19/2022    Influenza Vaccine (1) 09/01/2023    COVID-19 Vaccine (5 - 2023-24 season) 09/01/2023     Chart reviewed.   Immunizations: Reconciled  Orders placed: N/a  Upcoming appts to satisfy STEVEN topics: Mammogram 11/24/2023

## 2023-11-09 ENCOUNTER — OUTPATIENT CASE MANAGEMENT (OUTPATIENT)
Dept: ADMINISTRATIVE | Facility: OTHER | Age: 68
End: 2023-11-09
Payer: MEDICARE

## 2023-11-09 NOTE — LETTER
Sunitha Pillai  [address] [facility] [address]      Dear MsGopi Sunitha Pillai,     I work with [facility] Outpatient Care Management Department. We received a referral to call you to discuss your medical history. These services are free of charge and are offered to [facility] patients who have recently been discharged from any of our facilities or who have medical conditions that may require the skill of a nurse to assist with management.     I am a Registered Nurse who specializes in connecting patients with available medical and financial resources as well as addressing any educational needs that may be indicated.    I attempted to reach you by telephone, but I was unsuccessful. Please call our department so that we can go over some questions with you, regarding your health.    The Outpatient Care Management Department can be reached at [phone], from 8:00 AM to 4:30 PM on Monday through Friday.     Additionally, [facility] also has a program where a nurse is available 24/7 to answer questions or provide medical advice, their number is [phone].      Kind Regards,      Ximena Alejandro RN  Outpatient Care Management  Phone: [phone]

## 2023-11-09 NOTE — PROGRESS NOTES
11/9/23 1st attempt to complete initial assessment for [facility]: Called twice and patient hung up phone both times. Letter has been sent to patient with [facility] contact information. Will attempt to contact patient again at a later date.

## 2023-11-14 ENCOUNTER — OUTPATIENT CASE MANAGEMENT (OUTPATIENT)
Dept: ADMINISTRATIVE | Facility: OTHER | Age: 68
End: 2023-11-14
Payer: MEDICARE

## 2023-11-14 NOTE — PROGRESS NOTES
Outpatient Care Management  Patient Not Qualified    Patient: Sunitha Pillai  MRN:  [id number]  Date of Service:  11/14/2023  Completed by:  Ximena Alejandro RN    Chief Complaint   Patient presents with    [facility] Chart Review     11/14/23    Case Closure     11/14/23       Patient Summary           Reason Not Qualified:  Unable to reach    11/14/23 Unable to reach patient for enrollment after multiple attempts and letter. No response from patient. Closing case.

## 2023-11-16 ENCOUNTER — LAB VISIT (OUTPATIENT)
Dept: LAB | Facility: HOSPITAL | Age: 68
End: 2023-11-16
Attending: INTERNAL MEDICINE
Payer: MEDICARE

## 2023-11-16 ENCOUNTER — OFFICE VISIT (OUTPATIENT)
Dept: INTERNAL MEDICINE | Facility: CLINIC | Age: 68
End: 2023-11-16
Payer: MEDICARE

## 2023-11-16 VITALS — WEIGHT: 154 LBS | HEIGHT: 64 IN | BODY MASS INDEX: 26.29 KG/M2

## 2023-11-16 DIAGNOSIS — M05.79 RHEUMATOID ARTHRITIS INVOLVING MULTIPLE SITES WITH POSITIVE RHEUMATOID FACTOR: ICD-10-CM

## 2023-11-16 DIAGNOSIS — I49.9 IRREGULAR HEART RATE: ICD-10-CM

## 2023-11-16 DIAGNOSIS — D64.9 ANEMIA, UNSPECIFIED TYPE: ICD-10-CM

## 2023-11-16 DIAGNOSIS — E11.65 TYPE 2 DIABETES MELLITUS WITH HYPERGLYCEMIA, WITHOUT LONG-TERM CURRENT USE OF INSULIN: ICD-10-CM

## 2023-11-16 DIAGNOSIS — Z23 NEED FOR VACCINATION: ICD-10-CM

## 2023-11-16 DIAGNOSIS — R93.89 ENDOMETRIAL STRIPE INCREASED: ICD-10-CM

## 2023-11-16 DIAGNOSIS — E04.1 THYROID NODULE: ICD-10-CM

## 2023-11-16 DIAGNOSIS — I10 ESSENTIAL HYPERTENSION: ICD-10-CM

## 2023-11-16 DIAGNOSIS — M25.511 ACUTE PAIN OF RIGHT SHOULDER: Primary | ICD-10-CM

## 2023-11-16 DIAGNOSIS — R06.00 DYSPNEA, UNSPECIFIED TYPE: ICD-10-CM

## 2023-11-16 LAB
ALBUMIN SERPL BCP-MCNC: 3.4 G/DL (ref 3.5–5.2)
ALP SERPL-CCNC: 77 U/L (ref 55–135)
ALT SERPL W/O P-5'-P-CCNC: 11 U/L (ref 10–44)
ANION GAP SERPL CALC-SCNC: 6 MMOL/L (ref 8–16)
AST SERPL-CCNC: 16 U/L (ref 10–40)
BASOPHILS # BLD AUTO: 0 K/UL (ref 0–0.2)
BASOPHILS NFR BLD: 0 % (ref 0–1.9)
BILIRUB SERPL-MCNC: 0.6 MG/DL (ref 0.1–1)
BUN SERPL-MCNC: 14 MG/DL (ref 8–23)
CALCIUM SERPL-MCNC: 9.2 MG/DL (ref 8.7–10.5)
CHLORIDE SERPL-SCNC: 116 MMOL/L (ref 95–110)
CO2 SERPL-SCNC: 25 MMOL/L (ref 23–29)
CREAT SERPL-MCNC: 0.9 MG/DL (ref 0.5–1.4)
CRP SERPL-MCNC: 0.9 MG/L (ref 0–8.2)
DIFFERENTIAL METHOD: ABNORMAL
EOSINOPHIL # BLD AUTO: 0 K/UL (ref 0–0.5)
EOSINOPHIL NFR BLD: 0 % (ref 0–8)
ERYTHROCYTE [DISTWIDTH] IN BLOOD BY AUTOMATED COUNT: 14.6 % (ref 11.5–14.5)
ERYTHROCYTE [SEDIMENTATION RATE] IN BLOOD BY PHOTOMETRIC METHOD: <2 MM/HR (ref 0–36)
EST. GFR  (NO RACE VARIABLE): >60 ML/MIN/1.73 M^2
ESTIMATED AVG GLUCOSE: 108 MG/DL (ref 68–131)
FERRITIN SERPL-MCNC: 216 NG/ML (ref 20–300)
GLUCOSE SERPL-MCNC: 80 MG/DL (ref 70–110)
HBA1C MFR BLD: 5.4 % (ref 4–5.6)
HCT VFR BLD AUTO: 31.9 % (ref 37–48.5)
HGB BLD-MCNC: 10.5 G/DL (ref 12–16)
IMM GRANULOCYTES # BLD AUTO: 0.02 K/UL (ref 0–0.04)
IMM GRANULOCYTES NFR BLD AUTO: 0.4 % (ref 0–0.5)
IRON SERPL-MCNC: 56 UG/DL (ref 30–160)
LYMPHOCYTES # BLD AUTO: 2.2 K/UL (ref 1–4.8)
LYMPHOCYTES NFR BLD: 47.3 % (ref 18–48)
MCH RBC QN AUTO: 31.4 PG (ref 27–31)
MCHC RBC AUTO-ENTMCNC: 32.9 G/DL (ref 32–36)
MCV RBC AUTO: 96 FL (ref 82–98)
MONOCYTES # BLD AUTO: 0.6 K/UL (ref 0.3–1)
MONOCYTES NFR BLD: 12.6 % (ref 4–15)
NEUTROPHILS # BLD AUTO: 1.8 K/UL (ref 1.8–7.7)
NEUTROPHILS NFR BLD: 39.7 % (ref 38–73)
NRBC BLD-RTO: 0 /100 WBC
PLATELET # BLD AUTO: 181 K/UL (ref 150–450)
PMV BLD AUTO: 10.1 FL (ref 9.2–12.9)
POTASSIUM SERPL-SCNC: 3.7 MMOL/L (ref 3.5–5.1)
PROT SERPL-MCNC: 7.1 G/DL (ref 6–8.4)
RBC # BLD AUTO: 3.34 M/UL (ref 4–5.4)
SATURATED IRON: 18 % (ref 20–50)
SODIUM SERPL-SCNC: 147 MMOL/L (ref 136–145)
TOTAL IRON BINDING CAPACITY: 314 UG/DL (ref 250–450)
TRANSFERRIN SERPL-MCNC: 212 MG/DL (ref 200–375)
WBC # BLD AUTO: 4.61 K/UL (ref 3.9–12.7)

## 2023-11-16 PROCEDURE — G0008 ADMIN INFLUENZA VIRUS VAC: HCPCS | Mod: HCNC,S$GLB,, | Performed by: INTERNAL MEDICINE

## 2023-11-16 PROCEDURE — 93010 EKG 12-LEAD: ICD-10-PCS | Mod: HCNC,S$GLB,, | Performed by: INTERNAL MEDICINE

## 2023-11-16 PROCEDURE — 3008F BODY MASS INDEX DOCD: CPT | Mod: HCNC,CPTII,S$GLB, | Performed by: INTERNAL MEDICINE

## 2023-11-16 PROCEDURE — 93005 EKG 12-LEAD: ICD-10-PCS | Mod: HCNC,S$GLB,, | Performed by: INTERNAL MEDICINE

## 2023-11-16 PROCEDURE — 1101F PR PT FALLS ASSESS DOC 0-1 FALLS W/OUT INJ PAST YR: ICD-10-PCS | Mod: HCNC,CPTII,S$GLB, | Performed by: INTERNAL MEDICINE

## 2023-11-16 PROCEDURE — 3008F PR BODY MASS INDEX (BMI) DOCUMENTED: ICD-10-PCS | Mod: HCNC,CPTII,S$GLB, | Performed by: INTERNAL MEDICINE

## 2023-11-16 PROCEDURE — 3061F PR NEG MICROALBUMINURIA RESULT DOCUMENTED/REVIEW: ICD-10-PCS | Mod: HCNC,CPTII,S$GLB, | Performed by: INTERNAL MEDICINE

## 2023-11-16 PROCEDURE — 3066F NEPHROPATHY DOC TX: CPT | Mod: HCNC,CPTII,S$GLB, | Performed by: INTERNAL MEDICINE

## 2023-11-16 PROCEDURE — 1159F MED LIST DOCD IN RCRD: CPT | Mod: HCNC,CPTII,S$GLB, | Performed by: INTERNAL MEDICINE

## 2023-11-16 PROCEDURE — 3044F PR MOST RECENT HEMOGLOBIN A1C LEVEL <7.0%: ICD-10-PCS | Mod: HCNC,CPTII,S$GLB, | Performed by: INTERNAL MEDICINE

## 2023-11-16 PROCEDURE — 93010 ELECTROCARDIOGRAM REPORT: CPT | Mod: HCNC,S$GLB,, | Performed by: INTERNAL MEDICINE

## 2023-11-16 PROCEDURE — 80053 COMPREHEN METABOLIC PANEL: CPT | Mod: HCNC | Performed by: INTERNAL MEDICINE

## 2023-11-16 PROCEDURE — 99999 PR PBB SHADOW E&M-EST. PATIENT-LVL V: ICD-10-PCS | Mod: PBBFAC,HCNC,, | Performed by: INTERNAL MEDICINE

## 2023-11-16 PROCEDURE — 4010F PR ACE/ARB THEARPY RXD/TAKEN: ICD-10-PCS | Mod: HCNC,CPTII,S$GLB, | Performed by: INTERNAL MEDICINE

## 2023-11-16 PROCEDURE — 99214 OFFICE O/P EST MOD 30 MIN: CPT | Mod: HCNC,S$GLB,, | Performed by: INTERNAL MEDICINE

## 2023-11-16 PROCEDURE — 82728 ASSAY OF FERRITIN: CPT | Mod: HCNC | Performed by: INTERNAL MEDICINE

## 2023-11-16 PROCEDURE — 1159F PR MEDICATION LIST DOCUMENTED IN MEDICAL RECORD: ICD-10-PCS | Mod: HCNC,CPTII,S$GLB, | Performed by: INTERNAL MEDICINE

## 2023-11-16 PROCEDURE — 36415 COLL VENOUS BLD VENIPUNCTURE: CPT | Mod: HCNC,PO | Performed by: INTERNAL MEDICINE

## 2023-11-16 PROCEDURE — 1111F PR DISCHARGE MEDS RECONCILED W/ CURRENT OUTPATIENT MED LIST: ICD-10-PCS | Mod: HCNC,CPTII,S$GLB, | Performed by: INTERNAL MEDICINE

## 2023-11-16 PROCEDURE — 3066F PR DOCUMENTATION OF TREATMENT FOR NEPHROPATHY: ICD-10-PCS | Mod: HCNC,CPTII,S$GLB, | Performed by: INTERNAL MEDICINE

## 2023-11-16 PROCEDURE — 99999 PR PBB SHADOW E&M-EST. PATIENT-LVL V: CPT | Mod: PBBFAC,HCNC,, | Performed by: INTERNAL MEDICINE

## 2023-11-16 PROCEDURE — 4010F ACE/ARB THERAPY RXD/TAKEN: CPT | Mod: HCNC,CPTII,S$GLB, | Performed by: INTERNAL MEDICINE

## 2023-11-16 PROCEDURE — 1125F PR PAIN SEVERITY QUANTIFIED, PAIN PRESENT: ICD-10-PCS | Mod: HCNC,CPTII,S$GLB, | Performed by: INTERNAL MEDICINE

## 2023-11-16 PROCEDURE — G0008 FLU VACCINE - QUADRIVALENT - ADJUVANTED: ICD-10-PCS | Mod: HCNC,S$GLB,, | Performed by: INTERNAL MEDICINE

## 2023-11-16 PROCEDURE — 85025 COMPLETE CBC W/AUTO DIFF WBC: CPT | Mod: HCNC | Performed by: INTERNAL MEDICINE

## 2023-11-16 PROCEDURE — 99214 PR OFFICE/OUTPT VISIT, EST, LEVL IV, 30-39 MIN: ICD-10-PCS | Mod: HCNC,S$GLB,, | Performed by: INTERNAL MEDICINE

## 2023-11-16 PROCEDURE — 85652 RBC SED RATE AUTOMATED: CPT | Mod: HCNC | Performed by: INTERNAL MEDICINE

## 2023-11-16 PROCEDURE — 84466 ASSAY OF TRANSFERRIN: CPT | Mod: HCNC | Performed by: INTERNAL MEDICINE

## 2023-11-16 PROCEDURE — 3044F HG A1C LEVEL LT 7.0%: CPT | Mod: HCNC,CPTII,S$GLB, | Performed by: INTERNAL MEDICINE

## 2023-11-16 PROCEDURE — 1101F PT FALLS ASSESS-DOCD LE1/YR: CPT | Mod: HCNC,CPTII,S$GLB, | Performed by: INTERNAL MEDICINE

## 2023-11-16 PROCEDURE — 1111F DSCHRG MED/CURRENT MED MERGE: CPT | Mod: HCNC,CPTII,S$GLB, | Performed by: INTERNAL MEDICINE

## 2023-11-16 PROCEDURE — 1125F AMNT PAIN NOTED PAIN PRSNT: CPT | Mod: HCNC,CPTII,S$GLB, | Performed by: INTERNAL MEDICINE

## 2023-11-16 PROCEDURE — 93005 ELECTROCARDIOGRAM TRACING: CPT | Mod: HCNC,S$GLB,, | Performed by: INTERNAL MEDICINE

## 2023-11-16 PROCEDURE — 3061F NEG MICROALBUMINURIA REV: CPT | Mod: HCNC,CPTII,S$GLB, | Performed by: INTERNAL MEDICINE

## 2023-11-16 PROCEDURE — 3288F FALL RISK ASSESSMENT DOCD: CPT | Mod: HCNC,CPTII,S$GLB, | Performed by: INTERNAL MEDICINE

## 2023-11-16 PROCEDURE — 90694 VACC AIIV4 NO PRSRV 0.5ML IM: CPT | Mod: HCNC,S$GLB,, | Performed by: INTERNAL MEDICINE

## 2023-11-16 PROCEDURE — 83540 ASSAY OF IRON: CPT | Mod: HCNC | Performed by: INTERNAL MEDICINE

## 2023-11-16 PROCEDURE — 3288F PR FALLS RISK ASSESSMENT DOCUMENTED: ICD-10-PCS | Mod: HCNC,CPTII,S$GLB, | Performed by: INTERNAL MEDICINE

## 2023-11-16 PROCEDURE — 90694 FLU VACCINE - QUADRIVALENT - ADJUVANTED: ICD-10-PCS | Mod: HCNC,S$GLB,, | Performed by: INTERNAL MEDICINE

## 2023-11-16 PROCEDURE — 86140 C-REACTIVE PROTEIN: CPT | Mod: HCNC | Performed by: INTERNAL MEDICINE

## 2023-11-16 PROCEDURE — 83036 HEMOGLOBIN GLYCOSYLATED A1C: CPT | Mod: HCNC | Performed by: INTERNAL MEDICINE

## 2023-11-16 RX ORDER — DULAGLUTIDE 1.5 MG/.5ML
1.5 INJECTION, SOLUTION SUBCUTANEOUS
Qty: 12 PEN | Refills: 2 | Status: SHIPPED | OUTPATIENT
Start: 2023-11-16

## 2023-11-16 RX ORDER — AMLODIPINE BESYLATE 2.5 MG/1
2.5 TABLET ORAL DAILY
Qty: 90 TABLET | Refills: 3 | Status: SHIPPED | OUTPATIENT
Start: 2023-11-16 | End: 2024-11-15

## 2023-11-16 NOTE — PROGRESS NOTES
Subjective:       Patient ID: Sunitha Pillai is a 68 y.o. female.    Chief Complaint: Follow-up, Diabetes, and Shoulder Pain (right)    Follow-up  Pertinent negatives include no abdominal pain, chest pain (arm pain or jaw pain), headaches, nausea or vomiting.   Diabetes  Pertinent negatives for hypoglycemia include no headaches or seizures. Pertinent negatives for diabetes include no chest pain (arm pain or jaw pain).   Shoulder Pain   Pertinent negatives include no headaches.   Pt hospitalized with pneumonia. She completed her antibiotics. She is feeling better but still a bit SOB.  She is back at work and is active in her work.  No CP.  Review of Systems   Respiratory:  Positive for shortness of breath (PND or orthopnea).    Cardiovascular:  Negative for chest pain (arm pain or jaw pain).   Gastrointestinal:  Negative for abdominal pain, diarrhea, nausea and vomiting.   Genitourinary:  Negative for dysuria.   Neurological:  Negative for seizures, syncope and headaches.       Objective:      Physical Exam  Constitutional:       General: She is not in acute distress.     Appearance: She is well-developed.   HENT:      Head: Normocephalic.   Eyes:      Pupils: Pupils are equal, round, and reactive to light.   Neck:      Thyroid: No thyromegaly.      Vascular: No JVD.   Cardiovascular:      Rate and Rhythm: Normal rate. Rhythm irregular.      Heart sounds: Normal heart sounds. No murmur heard.     No friction rub. No gallop.      Comments: EKG - PACs  Pulmonary:      Effort: Pulmonary effort is normal.      Breath sounds: Normal breath sounds. No wheezing or rales.   Abdominal:      General: Bowel sounds are normal. There is no distension.      Palpations: Abdomen is soft. There is no mass.      Tenderness: There is no abdominal tenderness. There is no guarding or rebound.   Musculoskeletal:      Cervical back: Neck supple.   Lymphadenopathy:      Cervical: No cervical adenopathy.   Skin:     General: Skin is  warm and dry.   Neurological:      Mental Status: She is alert and oriented to person, place, and time.      Deep Tendon Reflexes: Reflexes are normal and symmetric.   Psychiatric:         Behavior: Behavior normal.         Thought Content: Thought content normal.         Judgment: Judgment normal.         Assessment:       1. Acute pain of right shoulder    2. Thyroid nodule    3. Rheumatoid arthritis involving multiple sites with positive rheumatoid factor    4. Dyspnea, unspecified type    5. Endometrial stripe increased    6. Type 2 diabetes mellitus with hyperglycemia, without long-term current use of insulin    7. Anemia, unspecified type    8. Irregular heart rate        Plan:   Acute pain of right shoulder  -     Ambulatory referral/consult to Sports Medicine; Future; Expected date: 11/23/2023    Thyroid nodule  -     US Soft Tissue Head Neck Thyroid; Future; Expected date: 11/16/2023    Rheumatoid arthritis involving multiple sites with positive rheumatoid factor  -     Ambulatory referral/consult to Rheumatology; Future; Expected date: 11/23/2023  -     Sedimentation rate; Future; Expected date: 11/16/2023  -     C-Reactive Protein; Future; Expected date: 11/16/2023    Dyspnea, unspecified type  -     Complete PFT with bronchodilator; Future  -     CT Chest Without Contrast; Future; Expected date: 11/16/2023    Endometrial stripe increased  -     US Pelvis Complete Non OB; Future; Expected date: 11/16/2023    Type 2 diabetes mellitus with hyperglycemia, without long-term current use of insulin  -     CBC Auto Differential; Future; Expected date: 11/16/2023  -     Comprehensive Metabolic Panel; Future; Expected date: 11/16/2023  -     Hemoglobin A1C; Future; Expected date: 11/16/2023    Anemia, unspecified type  -     Iron and TIBC; Future; Expected date: 11/16/2023  -     Ferritin; Future; Expected date: 11/16/2023    Irregular heart rate  -     EKG 12-lead    Other orders  -     dulaglutide (TRULICITY)  1.5 mg/0.5 mL pen injector; Inject 1.5 mg into the skin every 7 days.  Dispense: 12 pen ; Refill: 2  -     amLODIPine (NORVASC) 2.5 MG tablet; Take 1 tablet (2.5 mg total) by mouth once daily.  Dispense: 90 tablet; Refill: 3    HTN  Please start amlodipine and continue losartan 100mg daily - BP check in 2 weeks

## 2023-11-17 ENCOUNTER — PATIENT MESSAGE (OUTPATIENT)
Dept: ADMINISTRATIVE | Facility: HOSPITAL | Age: 68
End: 2023-11-17
Payer: MEDICARE

## 2023-11-24 ENCOUNTER — HOSPITAL ENCOUNTER (OUTPATIENT)
Dept: RADIOLOGY | Facility: HOSPITAL | Age: 68
Discharge: HOME | End: 2023-11-24
Attending: INTERNAL MEDICINE
Payer: MEDICARE

## 2023-11-24 DIAGNOSIS — Z12.31 ENCOUNTER FOR SCREENING MAMMOGRAM FOR HIGH-RISK PATIENT: ICD-10-CM

## 2023-11-24 DIAGNOSIS — R06.00 DYSPNEA, UNSPECIFIED TYPE: ICD-10-CM

## 2023-11-24 PROCEDURE — 77067 SCR MAMMO BI INCL CAD: CPT | Mod: TC,HCNC

## 2023-11-24 PROCEDURE — 77067 SCR MAMMO BI INCL CAD: CPT | Mod: 26,HCNC,, | Performed by: RADIOLOGY

## 2023-11-24 PROCEDURE — 77063 MAMMO DIGITAL SCREENING BILAT WITH TOMO: ICD-10-PCS | Mod: 26,HCNC,, | Performed by: RADIOLOGY

## 2023-11-24 PROCEDURE — 71250 CT THORAX DX C-: CPT | Mod: 26,HCNC,, | Performed by: RADIOLOGY

## 2023-11-24 PROCEDURE — 71250 CT CHEST WITHOUT CONTRAST: ICD-10-PCS | Mod: 26,HCNC,, | Performed by: RADIOLOGY

## 2023-11-24 PROCEDURE — 77063 BREAST TOMOSYNTHESIS BI: CPT | Mod: 26,HCNC,, | Performed by: RADIOLOGY

## 2023-11-24 PROCEDURE — 71250 CT THORAX DX C-: CPT | Mod: TC,HCNC

## 2023-11-24 PROCEDURE — 77067 MAMMO DIGITAL SCREENING BILAT WITH TOMO: ICD-10-PCS | Mod: 26,HCNC,, | Performed by: RADIOLOGY

## 2023-11-27 ENCOUNTER — OFFICE VISIT (OUTPATIENT)
Dept: HEMATOLOGY/ONCOLOGY | Facility: CLINIC | Age: 68
End: 2023-11-27
Attending: INTERNAL MEDICINE
Payer: MEDICARE

## 2023-11-27 VITALS
HEART RATE: 85 BPM | DIASTOLIC BLOOD PRESSURE: 78 MMHG | TEMPERATURE: 98 F | SYSTOLIC BLOOD PRESSURE: 173 MMHG | OXYGEN SATURATION: 93 % | HEIGHT: 64 IN | BODY MASS INDEX: 25.9 KG/M2 | WEIGHT: 151.69 LBS | RESPIRATION RATE: 18 BRPM

## 2023-11-27 DIAGNOSIS — J15.8 PNEUMONIA DUE TO OTHER SPECIFIED BACTERIA: ICD-10-CM

## 2023-11-27 DIAGNOSIS — C50.312 MALIGNANT NEOPLASM OF LOWER-INNER QUADRANT OF LEFT BREAST IN FEMALE, ESTROGEN RECEPTOR POSITIVE: Primary | ICD-10-CM

## 2023-11-27 DIAGNOSIS — R93.89 INCREASED ENDOMETRIAL STRIPE THICKNESS: ICD-10-CM

## 2023-11-27 DIAGNOSIS — Z17.0 MALIGNANT NEOPLASM OF LOWER-INNER QUADRANT OF LEFT BREAST IN FEMALE, ESTROGEN RECEPTOR POSITIVE: Primary | ICD-10-CM

## 2023-11-27 PROCEDURE — 3288F PR FALLS RISK ASSESSMENT DOCUMENTED: ICD-10-PCS | Mod: HCNC,CPTII,S$GLB, | Performed by: INTERNAL MEDICINE

## 2023-11-27 PROCEDURE — 3061F NEG MICROALBUMINURIA REV: CPT | Mod: HCNC,CPTII,S$GLB, | Performed by: INTERNAL MEDICINE

## 2023-11-27 PROCEDURE — 3288F FALL RISK ASSESSMENT DOCD: CPT | Mod: HCNC,CPTII,S$GLB, | Performed by: INTERNAL MEDICINE

## 2023-11-27 PROCEDURE — 3078F DIAST BP <80 MM HG: CPT | Mod: HCNC,CPTII,S$GLB, | Performed by: INTERNAL MEDICINE

## 2023-11-27 PROCEDURE — 99999 PR PBB SHADOW E&M-EST. PATIENT-LVL V: CPT | Mod: PBBFAC,HCNC,, | Performed by: INTERNAL MEDICINE

## 2023-11-27 PROCEDURE — 3078F PR MOST RECENT DIASTOLIC BLOOD PRESSURE < 80 MM HG: ICD-10-PCS | Mod: HCNC,CPTII,S$GLB, | Performed by: INTERNAL MEDICINE

## 2023-11-27 PROCEDURE — 1159F PR MEDICATION LIST DOCUMENTED IN MEDICAL RECORD: ICD-10-PCS | Mod: HCNC,CPTII,S$GLB, | Performed by: INTERNAL MEDICINE

## 2023-11-27 PROCEDURE — 3061F PR NEG MICROALBUMINURIA RESULT DOCUMENTED/REVIEW: ICD-10-PCS | Mod: HCNC,CPTII,S$GLB, | Performed by: INTERNAL MEDICINE

## 2023-11-27 PROCEDURE — 3008F PR BODY MASS INDEX (BMI) DOCUMENTED: ICD-10-PCS | Mod: HCNC,CPTII,S$GLB, | Performed by: INTERNAL MEDICINE

## 2023-11-27 PROCEDURE — 1101F PT FALLS ASSESS-DOCD LE1/YR: CPT | Mod: HCNC,CPTII,S$GLB, | Performed by: INTERNAL MEDICINE

## 2023-11-27 PROCEDURE — 1125F AMNT PAIN NOTED PAIN PRSNT: CPT | Mod: HCNC,CPTII,S$GLB, | Performed by: INTERNAL MEDICINE

## 2023-11-27 PROCEDURE — 3044F HG A1C LEVEL LT 7.0%: CPT | Mod: HCNC,CPTII,S$GLB, | Performed by: INTERNAL MEDICINE

## 2023-11-27 PROCEDURE — 1125F PR PAIN SEVERITY QUANTIFIED, PAIN PRESENT: ICD-10-PCS | Mod: HCNC,CPTII,S$GLB, | Performed by: INTERNAL MEDICINE

## 2023-11-27 PROCEDURE — 99213 OFFICE O/P EST LOW 20 MIN: CPT | Mod: HCNC,S$GLB,, | Performed by: INTERNAL MEDICINE

## 2023-11-27 PROCEDURE — 3077F PR MOST RECENT SYSTOLIC BLOOD PRESSURE >= 140 MM HG: ICD-10-PCS | Mod: HCNC,CPTII,S$GLB, | Performed by: INTERNAL MEDICINE

## 2023-11-27 PROCEDURE — 3008F BODY MASS INDEX DOCD: CPT | Mod: HCNC,CPTII,S$GLB, | Performed by: INTERNAL MEDICINE

## 2023-11-27 PROCEDURE — 4010F ACE/ARB THERAPY RXD/TAKEN: CPT | Mod: HCNC,CPTII,S$GLB, | Performed by: INTERNAL MEDICINE

## 2023-11-27 PROCEDURE — 3044F PR MOST RECENT HEMOGLOBIN A1C LEVEL <7.0%: ICD-10-PCS | Mod: HCNC,CPTII,S$GLB, | Performed by: INTERNAL MEDICINE

## 2023-11-27 PROCEDURE — 99213 PR OFFICE/OUTPT VISIT, EST, LEVL III, 20-29 MIN: ICD-10-PCS | Mod: HCNC,S$GLB,, | Performed by: INTERNAL MEDICINE

## 2023-11-27 PROCEDURE — 3066F PR DOCUMENTATION OF TREATMENT FOR NEPHROPATHY: ICD-10-PCS | Mod: HCNC,CPTII,S$GLB, | Performed by: INTERNAL MEDICINE

## 2023-11-27 PROCEDURE — 1159F MED LIST DOCD IN RCRD: CPT | Mod: HCNC,CPTII,S$GLB, | Performed by: INTERNAL MEDICINE

## 2023-11-27 PROCEDURE — 1101F PR PT FALLS ASSESS DOC 0-1 FALLS W/OUT INJ PAST YR: ICD-10-PCS | Mod: HCNC,CPTII,S$GLB, | Performed by: INTERNAL MEDICINE

## 2023-11-27 PROCEDURE — 99999 PR PBB SHADOW E&M-EST. PATIENT-LVL V: ICD-10-PCS | Mod: PBBFAC,HCNC,, | Performed by: INTERNAL MEDICINE

## 2023-11-27 PROCEDURE — 3066F NEPHROPATHY DOC TX: CPT | Mod: HCNC,CPTII,S$GLB, | Performed by: INTERNAL MEDICINE

## 2023-11-27 PROCEDURE — 3077F SYST BP >= 140 MM HG: CPT | Mod: HCNC,CPTII,S$GLB, | Performed by: INTERNAL MEDICINE

## 2023-11-27 PROCEDURE — 4010F PR ACE/ARB THEARPY RXD/TAKEN: ICD-10-PCS | Mod: HCNC,CPTII,S$GLB, | Performed by: INTERNAL MEDICINE

## 2023-11-27 NOTE — PROGRESS NOTES
Subjective:      Patient ID: Sunitha Pillai is a 68 y.o. female.    Chief Complaint: No chief complaint on file.        HPI:  68-year-old female, patient of Dr. Moulton, who returns for follow-up of a diagnosis of left breast cancer.  She is on adjuvant letrozole therapy.    Hospitalized in October with increased joint pain. CT showed new opacities and small  lung nodules.  She was treated with antibiotics.  Today she reports that she has a mild cough minimal mucus.  She is had no fever.  She does have some dyspnea on exertion which has really not changed.  Her biggest complaint is some right knee and right shoulder pain.  Appetite is decreased.  That has been the case since her  .      She has had mild anemia with hemoglobin ranging from 10-11-1/2 grams/deciliter.  MCV normal, WBC and plts normal.  Iron studies on February 3, 2023 showed iron of 44, TIBC 330, iron saturation 13%, ferritin 368, B12 normal.  Her anemia seems to have started since her chemotherapy at the end of .  HGB was 12 in , 11 gm/dl in 2023.HGB 10.5 in 2023.    Current history:   Screening mammogram on 2020 showed a focal asymmetry in the lower outer portion left breast.  Biopsy on 2020 showed high-grade infiltrating ductal carcinoma (histologic grade 3, nuclear grade 3, mitotic index 3).  Tumor was 95% ER positive in 95% [place] positive, HER2 was 2+ but negative by fish.  Ki-67 was 95%.    On 2020 lumpectomy and sentinel lymph node biopsy were performed.  That pathology showed a 1.5 cm infiltrating carcinoma with a single negative sentinel lymph node.  Margins were negative.  Final pathological stage T1c N0, stage I A.      Oncotype risk score - 29-high risk.    She received 4 cycles of adjuvant Taxotere and Cytoxan from 20 to 21.    Completed radiation 21.     Letrozole started in May 2021.    Mammogram 23 -negative    Review of Systems    Constitutional:  Negative for activity change, appetite change (Decreased but unchanged), fever and unexpected weight change.   HENT: Negative.     Respiratory:  Positive for cough (Mild) and shortness of breath (with exertion).    Cardiovascular:  Negative for chest pain.   Gastrointestinal:  Negative for constipation.   Genitourinary: Negative.    Musculoskeletal:  Positive for arthralgias.   Neurological: Negative.    Psychiatric/Behavioral: Negative.       Objective:   Physical Exam   Vitals reviewed.  Constitutional: She is oriented to person, place, and time. She appears well-developed. No distress.   Cardiovascular:  Normal rate and regular rhythm.            Pulmonary/Chest: Effort normal and breath sounds normal. No respiratory distress. She has no wheezes. She has no rales. Right breast exhibits no mass, no nipple discharge and no skin change. Left breast exhibits skin change. Left breast exhibits no mass, no nipple discharge and no tenderness.       Abdominal: Soft. She exhibits no mass. There is no abdominal tenderness.   Musculoskeletal: Lymphadenopathy:      Cervical: No cervical adenopathy.     Neurological: She is alert and oriented to person, place, and time.   Skin: No rash noted.     Psychiatric: Her behavior is normal. Thought content normal.     Assessment:    Chest CT 11/24 - Diffuse ground-glass and consolidative opacities in the lungs similar in distribution with slightly less confluent airspace consolidation.  Findings may relate to resolving pneumonia with atypical infection including viral infection in the differential.  Recommend follow-up to radiographic resolution.  Consider consultation with pulmonary medicine for further evaluation.   1. Malignant neoplasm of lower-inner quadrant of left breast in female, estrogen receptor positive        Plan:      RTC 6 M    I recommended she return to see Rheumatology.  Pulmonary consult for her CT findings.             Route Chart for  Scheduling    Med Onc Chart Routing      Follow up with physician 6 months.   Follow up with [facility]    Infusion scheduling note    Injection scheduling note    Labs None   Scheduling:  Preferred lab:  Lab interval:     Imaging None      Pharmacy appointment No pharmacy appointment needed      Other referrals       Additional referrals needed  Pulmonary

## 2023-11-30 ENCOUNTER — CLINICAL SUPPORT (OUTPATIENT)
Dept: INTERNAL MEDICINE | Facility: CLINIC | Age: 68
End: 2023-11-30
Payer: MEDICARE

## 2023-11-30 DIAGNOSIS — M25.511 ACUTE PAIN OF RIGHT SHOULDER: Primary | ICD-10-CM

## 2023-12-03 DIAGNOSIS — C50.312 MALIGNANT NEOPLASM OF LOWER-INNER QUADRANT OF LEFT BREAST IN FEMALE, ESTROGEN RECEPTOR POSITIVE: ICD-10-CM

## 2023-12-03 DIAGNOSIS — Z17.0 MALIGNANT NEOPLASM OF LOWER-INNER QUADRANT OF LEFT BREAST IN FEMALE, ESTROGEN RECEPTOR POSITIVE: ICD-10-CM

## 2023-12-04 RX ORDER — LETROZOLE 2.5 MG/1
2.5 TABLET, FILM COATED ORAL
Qty: 90 TABLET | Refills: 3 | Status: SHIPPED | OUTPATIENT
Start: 2023-12-04

## 2023-12-07 ENCOUNTER — OFFICE VISIT (OUTPATIENT)
Dept: OBSTETRICS AND GYNECOLOGY | Facility: CLINIC | Age: 68
End: 2023-12-07
Payer: MEDICARE

## 2023-12-07 VITALS
SYSTOLIC BLOOD PRESSURE: 140 MMHG | HEIGHT: 62 IN | WEIGHT: 148 LBS | DIASTOLIC BLOOD PRESSURE: 78 MMHG | BODY MASS INDEX: 27.23 KG/M2

## 2023-12-07 DIAGNOSIS — Z17.0 MALIGNANT NEOPLASM OF LOWER-INNER QUADRANT OF LEFT BREAST IN FEMALE, ESTROGEN RECEPTOR POSITIVE: ICD-10-CM

## 2023-12-07 DIAGNOSIS — Z01.419 NORMAL GYNECOLOGIC EXAMINATION: ICD-10-CM

## 2023-12-07 DIAGNOSIS — C50.312 MALIGNANT NEOPLASM OF LOWER-INNER QUADRANT OF LEFT BREAST IN FEMALE, ESTROGEN RECEPTOR POSITIVE: ICD-10-CM

## 2023-12-07 DIAGNOSIS — Z12.4 SCREENING FOR CERVICAL CANCER: ICD-10-CM

## 2023-12-07 DIAGNOSIS — Z01.419 WELL WOMAN EXAM WITH ROUTINE GYNECOLOGICAL EXAM: Primary | ICD-10-CM

## 2023-12-07 PROCEDURE — G0101 PR CA SCREEN;PELVIC/BREAST EXAM: ICD-10-PCS | Mod: HCNC,S$GLB,, | Performed by: OBSTETRICS & GYNECOLOGY

## 2023-12-07 PROCEDURE — 3078F PR MOST RECENT DIASTOLIC BLOOD PRESSURE < 80 MM HG: ICD-10-PCS | Mod: HCNC,CPTII,S$GLB, | Performed by: OBSTETRICS & GYNECOLOGY

## 2023-12-07 PROCEDURE — 3077F PR MOST RECENT SYSTOLIC BLOOD PRESSURE >= 140 MM HG: ICD-10-PCS | Mod: HCNC,CPTII,S$GLB, | Performed by: OBSTETRICS & GYNECOLOGY

## 2023-12-07 PROCEDURE — 1101F PT FALLS ASSESS-DOCD LE1/YR: CPT | Mod: HCNC,CPTII,S$GLB, | Performed by: OBSTETRICS & GYNECOLOGY

## 2023-12-07 PROCEDURE — 87624 HPV HI-RISK TYP POOLED RSLT: CPT | Mod: HCNC | Performed by: OBSTETRICS & GYNECOLOGY

## 2023-12-07 PROCEDURE — 3077F SYST BP >= 140 MM HG: CPT | Mod: HCNC,CPTII,S$GLB, | Performed by: OBSTETRICS & GYNECOLOGY

## 2023-12-07 PROCEDURE — 1160F PR REVIEW ALL MEDS BY PRESCRIBER/CLIN PHARMACIST DOCUMENTED: ICD-10-PCS | Mod: HCNC,CPTII,S$GLB, | Performed by: OBSTETRICS & GYNECOLOGY

## 2023-12-07 PROCEDURE — G0101 CA SCREEN;PELVIC/BREAST EXAM: HCPCS | Mod: HCNC,S$GLB,, | Performed by: OBSTETRICS & GYNECOLOGY

## 2023-12-07 PROCEDURE — 1160F RVW MEDS BY RX/DR IN RCRD: CPT | Mod: HCNC,CPTII,S$GLB, | Performed by: OBSTETRICS & GYNECOLOGY

## 2023-12-07 PROCEDURE — 1159F MED LIST DOCD IN RCRD: CPT | Mod: HCNC,CPTII,S$GLB, | Performed by: OBSTETRICS & GYNECOLOGY

## 2023-12-07 PROCEDURE — 4010F ACE/ARB THERAPY RXD/TAKEN: CPT | Mod: HCNC,CPTII,S$GLB, | Performed by: OBSTETRICS & GYNECOLOGY

## 2023-12-07 PROCEDURE — 3061F PR NEG MICROALBUMINURIA RESULT DOCUMENTED/REVIEW: ICD-10-PCS | Mod: HCNC,CPTII,S$GLB, | Performed by: OBSTETRICS & GYNECOLOGY

## 2023-12-07 PROCEDURE — 1159F PR MEDICATION LIST DOCUMENTED IN MEDICAL RECORD: ICD-10-PCS | Mod: HCNC,CPTII,S$GLB, | Performed by: OBSTETRICS & GYNECOLOGY

## 2023-12-07 PROCEDURE — 3078F DIAST BP <80 MM HG: CPT | Mod: HCNC,CPTII,S$GLB, | Performed by: OBSTETRICS & GYNECOLOGY

## 2023-12-07 PROCEDURE — 1126F PR PAIN SEVERITY QUANTIFIED, NO PAIN PRESENT: ICD-10-PCS | Mod: HCNC,CPTII,S$GLB, | Performed by: OBSTETRICS & GYNECOLOGY

## 2023-12-07 PROCEDURE — 3066F PR DOCUMENTATION OF TREATMENT FOR NEPHROPATHY: ICD-10-PCS | Mod: HCNC,CPTII,S$GLB, | Performed by: OBSTETRICS & GYNECOLOGY

## 2023-12-07 PROCEDURE — 1126F AMNT PAIN NOTED NONE PRSNT: CPT | Mod: HCNC,CPTII,S$GLB, | Performed by: OBSTETRICS & GYNECOLOGY

## 2023-12-07 PROCEDURE — 3288F PR FALLS RISK ASSESSMENT DOCUMENTED: ICD-10-PCS | Mod: HCNC,CPTII,S$GLB, | Performed by: OBSTETRICS & GYNECOLOGY

## 2023-12-07 PROCEDURE — 4010F PR ACE/ARB THEARPY RXD/TAKEN: ICD-10-PCS | Mod: HCNC,CPTII,S$GLB, | Performed by: OBSTETRICS & GYNECOLOGY

## 2023-12-07 PROCEDURE — 88175 CYTOPATH C/V AUTO FLUID REDO: CPT | Mod: HCNC | Performed by: OBSTETRICS & GYNECOLOGY

## 2023-12-07 PROCEDURE — 99999 PR PBB SHADOW E&M-EST. PATIENT-LVL IV: ICD-10-PCS | Mod: PBBFAC,HCNC,, | Performed by: OBSTETRICS & GYNECOLOGY

## 2023-12-07 PROCEDURE — 3066F NEPHROPATHY DOC TX: CPT | Mod: HCNC,CPTII,S$GLB, | Performed by: OBSTETRICS & GYNECOLOGY

## 2023-12-07 PROCEDURE — 1101F PR PT FALLS ASSESS DOC 0-1 FALLS W/OUT INJ PAST YR: ICD-10-PCS | Mod: HCNC,CPTII,S$GLB, | Performed by: OBSTETRICS & GYNECOLOGY

## 2023-12-07 PROCEDURE — 3061F NEG MICROALBUMINURIA REV: CPT | Mod: HCNC,CPTII,S$GLB, | Performed by: OBSTETRICS & GYNECOLOGY

## 2023-12-07 PROCEDURE — 3044F PR MOST RECENT HEMOGLOBIN A1C LEVEL <7.0%: ICD-10-PCS | Mod: HCNC,CPTII,S$GLB, | Performed by: OBSTETRICS & GYNECOLOGY

## 2023-12-07 PROCEDURE — 3288F FALL RISK ASSESSMENT DOCD: CPT | Mod: HCNC,CPTII,S$GLB, | Performed by: OBSTETRICS & GYNECOLOGY

## 2023-12-07 PROCEDURE — 99999 PR PBB SHADOW E&M-EST. PATIENT-LVL IV: CPT | Mod: PBBFAC,HCNC,, | Performed by: OBSTETRICS & GYNECOLOGY

## 2023-12-07 PROCEDURE — 3044F HG A1C LEVEL LT 7.0%: CPT | Mod: HCNC,CPTII,S$GLB, | Performed by: OBSTETRICS & GYNECOLOGY

## 2023-12-07 NOTE — PROGRESS NOTES
Subjective:      Patient ID: Sunitha Pillai is a 68 y.o. female.    Chief Complaint:  Annual Exam      History of Present Illness  HPI  Annual Exam-Postmenopausal  Patient presents for annual exam. The patient has no complaints today. The patient is not currently sexually active. GYN screening history: last pap: was normal. The patient is not taking hormone replacement therapy. Patient denies post-menopausal vaginal bleeding. The patient wears seatbelts: yes. The patient participates in regular exercise: no. Has the patient ever been transfused or tattooed?: no. The patient reports that there is not domestic violence in her life.    GYN & OB History  No LMP recorded (lmp unknown). Patient is postmenopausal.   Date of Last Pap: No result found    OB History    Para Term  AB Living   3 3 3     3   SAB IAB Ectopic Multiple Live Births           3      # Outcome Date GA Lbr Jay/2nd Weight Sex Delivery Anes PTL Lv   3 Term      CS-LTranv  N BRITTANY   2 Term      CS-LTranv  N BRITTANY   1 Term      CS-LTranv  N BRITTANY       Review of Systems  Review of Systems   Constitutional:  Negative for activity change, appetite change and fatigue.   HENT: Negative.  Negative for tinnitus.    Eyes: Negative.    Respiratory:  Negative for cough and shortness of breath.    Cardiovascular:  Negative for chest pain and palpitations.   Gastrointestinal: Negative.  Negative for abdominal pain, blood in stool, constipation, diarrhea and nausea.   Endocrine: Negative.  Negative for hot flashes.   Genitourinary:  Negative for dysmenorrhea, dyspareunia, dysuria, frequency, menstrual problem, pelvic pain, vaginal discharge, urinary incontinence and postcoital bleeding.   Musculoskeletal:  Positive for arthralgias and joint swelling. Negative for back pain.   Integumentary:  Negative for rash, breast mass, nipple discharge and breast skin changes.   Neurological: Negative.  Negative for headaches.   Hematological: Negative.  Does not  bruise/bleed easily.   Psychiatric/Behavioral:  The patient is not nervous/anxious.    Breast: negative.  Negative for mass, nipple discharge and skin changes         Objective:     Physical Exam:   Constitutional: Vital signs are normal. She appears well-developed and well-nourished. She is active and cooperative. She does not appear ill. No distress.    HENT:   Head: Normocephalic and atraumatic.   Mouth/Throat: Mucous membranes are normal.    Eyes: Pupils are equal, round, and reactive to light. Conjunctivae, EOM and lids are normal.    Neck: No thyroid mass and no thyromegaly present.    Cardiovascular:  Normal rate, regular rhythm, S1 normal, S2 normal and normal pulses.             Pulmonary/Chest: Effort normal. No accessory muscle usage. Right breast exhibits no inverted nipple, no mass, no nipple discharge, no skin change, no tenderness and no swelling. Left breast exhibits skin change and swelling. Left breast exhibits no inverted nipple, no mass, no nipple discharge and no tenderness.   Post-surgical changes            Abdominal: Soft. Bowel sounds are normal. She exhibits no distension and no mass. There is no abdominal tenderness. There is no rebound and no guarding.     Genitourinary:    Vagina and uterus normal.      Pelvic exam was performed with patient supine.   The external female genitalia was normal.   Labial bartholins normal.There is no tenderness or injury on the right labia. There is no tenderness or injury on the left labia. Cervix is normal. Right adnexum displays no mass and no fullness. Left adnexum displays no mass and no fullness. No erythema,  no vaginal discharge, rectocele or cystocele in the vagina. Cervix exhibits no motion tenderness and no discharge. Uterus is not deviated and not hosting fibroids. Normal urethral meatus.Urethra findings: no tendernessBladder findings: no bladder tenderness              Neurological: She is alert. She has normal strength.    Skin: Skin is warm  and dry.    Psychiatric: She has a normal mood and affect. Her behavior is normal. Thought content normal. Her mood appears not anxious. Her affect is not inappropriate. Her speech is not slurred. She is not agitated and not aggressive. Thought content is not paranoid. She expresses no suicidal plans and no homicidal plans.         Assessment:     1. Well woman exam with routine gynecological exam    2. Normal gynecologic examination    3. Screening for cervical cancer    4. Malignant neoplasm of lower-inner quadrant of left breast in female, estrogen receptor positive               Plan:     Sunitha was seen today for annual exam.    Diagnoses and all orders for this visit:    Well woman exam with routine gynecological exam    Normal gynecologic examination  -     Ambulatory referral/consult to Gynecology    Screening for cervical cancer  -     HPV High Risk Genotypes, PCR  -     Liquid-Based Pap Smear, Screening  If normal, no future pap indicated per [facility]P guidelines.  Would recommend WWE q 2 years  Malignant neoplasm of lower-inner quadrant of left breast in female, estrogen receptor positive

## 2023-12-08 ENCOUNTER — HOSPITAL ENCOUNTER (OUTPATIENT)
Dept: RADIOLOGY | Facility: HOSPITAL | Age: 68
Discharge: HOME | End: 2023-12-08
Attending: INTERNAL MEDICINE
Payer: MEDICARE

## 2023-12-08 ENCOUNTER — HOSPITAL ENCOUNTER (OUTPATIENT)
Dept: RADIOLOGY | Facility: HOSPITAL | Age: 68
Discharge: HOME | End: 2023-12-08
Attending: PHYSICIAN ASSISTANT
Payer: MEDICARE

## 2023-12-08 ENCOUNTER — OFFICE VISIT (OUTPATIENT)
Dept: SPORTS MEDICINE | Facility: CLINIC | Age: 68
End: 2023-12-08
Payer: MEDICARE

## 2023-12-08 VITALS
HEIGHT: 62 IN | DIASTOLIC BLOOD PRESSURE: 69 MMHG | HEART RATE: 70 BPM | WEIGHT: 150 LBS | BODY MASS INDEX: 27.6 KG/M2 | SYSTOLIC BLOOD PRESSURE: 142 MMHG

## 2023-12-08 DIAGNOSIS — E04.1 THYROID NODULE: ICD-10-CM

## 2023-12-08 DIAGNOSIS — M25.511 ACUTE PAIN OF RIGHT SHOULDER: ICD-10-CM

## 2023-12-08 DIAGNOSIS — M17.11 OSTEOARTHRITIS OF RIGHT KNEE, UNSPECIFIED OSTEOARTHRITIS TYPE: ICD-10-CM

## 2023-12-08 DIAGNOSIS — M25.511 ACUTE PAIN OF RIGHT SHOULDER: Primary | ICD-10-CM

## 2023-12-08 DIAGNOSIS — M75.41 SHOULDER IMPINGEMENT SYNDROME, RIGHT: ICD-10-CM

## 2023-12-08 DIAGNOSIS — M25.561 ACUTE PAIN OF RIGHT KNEE: ICD-10-CM

## 2023-12-08 DIAGNOSIS — M25.461 KNEE EFFUSION, RIGHT: ICD-10-CM

## 2023-12-08 PROCEDURE — 99214 OFFICE O/P EST MOD 30 MIN: CPT | Mod: 25,HCNC,S$GLB, | Performed by: PHYSICIAN ASSISTANT

## 2023-12-08 PROCEDURE — 20610 DRAIN/INJ JOINT/BURSA W/O US: CPT | Mod: HCNC,RT,S$GLB, | Performed by: PHYSICIAN ASSISTANT

## 2023-12-08 PROCEDURE — 4010F ACE/ARB THERAPY RXD/TAKEN: CPT | Mod: HCNC,CPTII,S$GLB, | Performed by: PHYSICIAN ASSISTANT

## 2023-12-08 PROCEDURE — 3077F SYST BP >= 140 MM HG: CPT | Mod: HCNC,CPTII,S$GLB, | Performed by: PHYSICIAN ASSISTANT

## 2023-12-08 PROCEDURE — 3078F DIAST BP <80 MM HG: CPT | Mod: HCNC,CPTII,S$GLB, | Performed by: PHYSICIAN ASSISTANT

## 2023-12-08 PROCEDURE — 1125F AMNT PAIN NOTED PAIN PRSNT: CPT | Mod: HCNC,CPTII,S$GLB, | Performed by: PHYSICIAN ASSISTANT

## 2023-12-08 PROCEDURE — 3044F PR MOST RECENT HEMOGLOBIN A1C LEVEL <7.0%: ICD-10-PCS | Mod: HCNC,CPTII,S$GLB, | Performed by: PHYSICIAN ASSISTANT

## 2023-12-08 PROCEDURE — 3077F PR MOST RECENT SYSTOLIC BLOOD PRESSURE >= 140 MM HG: ICD-10-PCS | Mod: HCNC,CPTII,S$GLB, | Performed by: PHYSICIAN ASSISTANT

## 2023-12-08 PROCEDURE — 3288F FALL RISK ASSESSMENT DOCD: CPT | Mod: HCNC,CPTII,S$GLB, | Performed by: PHYSICIAN ASSISTANT

## 2023-12-08 PROCEDURE — 76536 US SOFT TISSUE HEAD NECK THYROID: ICD-10-PCS | Mod: 26,HCNC,, | Performed by: RADIOLOGY

## 2023-12-08 PROCEDURE — 73030 X-RAY EXAM OF SHOULDER: CPT | Mod: 26,HCNC,RT, | Performed by: RADIOLOGY

## 2023-12-08 PROCEDURE — 3066F NEPHROPATHY DOC TX: CPT | Mod: HCNC,CPTII,S$GLB, | Performed by: PHYSICIAN ASSISTANT

## 2023-12-08 PROCEDURE — 3008F BODY MASS INDEX DOCD: CPT | Mod: HCNC,CPTII,S$GLB, | Performed by: PHYSICIAN ASSISTANT

## 2023-12-08 PROCEDURE — 76536 US EXAM OF HEAD AND NECK: CPT | Mod: TC,HCNC

## 2023-12-08 PROCEDURE — 99999 PR PBB SHADOW E&M-EST. PATIENT-LVL V: CPT | Mod: PBBFAC,HCNC,, | Performed by: PHYSICIAN ASSISTANT

## 2023-12-08 PROCEDURE — 99999 PR PBB SHADOW E&M-EST. PATIENT-LVL V: ICD-10-PCS | Mod: PBBFAC,HCNC,, | Performed by: PHYSICIAN ASSISTANT

## 2023-12-08 PROCEDURE — 73030 XR SHOULDER COMPLETE 2 OR MORE VIEWS RIGHT: ICD-10-PCS | Mod: 26,HCNC,RT, | Performed by: RADIOLOGY

## 2023-12-08 PROCEDURE — 1160F PR REVIEW ALL MEDS BY PRESCRIBER/CLIN PHARMACIST DOCUMENTED: ICD-10-PCS | Mod: HCNC,CPTII,S$GLB, | Performed by: PHYSICIAN ASSISTANT

## 2023-12-08 PROCEDURE — 1125F PR PAIN SEVERITY QUANTIFIED, PAIN PRESENT: ICD-10-PCS | Mod: HCNC,CPTII,S$GLB, | Performed by: PHYSICIAN ASSISTANT

## 2023-12-08 PROCEDURE — 3066F PR DOCUMENTATION OF TREATMENT FOR NEPHROPATHY: ICD-10-PCS | Mod: HCNC,CPTII,S$GLB, | Performed by: PHYSICIAN ASSISTANT

## 2023-12-08 PROCEDURE — 1159F PR MEDICATION LIST DOCUMENTED IN MEDICAL RECORD: ICD-10-PCS | Mod: HCNC,CPTII,S$GLB, | Performed by: PHYSICIAN ASSISTANT

## 2023-12-08 PROCEDURE — 1160F RVW MEDS BY RX/DR IN RCRD: CPT | Mod: HCNC,CPTII,S$GLB, | Performed by: PHYSICIAN ASSISTANT

## 2023-12-08 PROCEDURE — 3061F NEG MICROALBUMINURIA REV: CPT | Mod: HCNC,CPTII,S$GLB, | Performed by: PHYSICIAN ASSISTANT

## 2023-12-08 PROCEDURE — 1159F MED LIST DOCD IN RCRD: CPT | Mod: HCNC,CPTII,S$GLB, | Performed by: PHYSICIAN ASSISTANT

## 2023-12-08 PROCEDURE — 3061F PR NEG MICROALBUMINURIA RESULT DOCUMENTED/REVIEW: ICD-10-PCS | Mod: HCNC,CPTII,S$GLB, | Performed by: PHYSICIAN ASSISTANT

## 2023-12-08 PROCEDURE — 76536 US EXAM OF HEAD AND NECK: CPT | Mod: 26,HCNC,, | Performed by: RADIOLOGY

## 2023-12-08 PROCEDURE — 73030 X-RAY EXAM OF SHOULDER: CPT | Mod: TC,HCNC,RT

## 2023-12-08 PROCEDURE — 1101F PT FALLS ASSESS-DOCD LE1/YR: CPT | Mod: HCNC,CPTII,S$GLB, | Performed by: PHYSICIAN ASSISTANT

## 2023-12-08 PROCEDURE — 3008F PR BODY MASS INDEX (BMI) DOCUMENTED: ICD-10-PCS | Mod: HCNC,CPTII,S$GLB, | Performed by: PHYSICIAN ASSISTANT

## 2023-12-08 PROCEDURE — 3078F PR MOST RECENT DIASTOLIC BLOOD PRESSURE < 80 MM HG: ICD-10-PCS | Mod: HCNC,CPTII,S$GLB, | Performed by: PHYSICIAN ASSISTANT

## 2023-12-08 PROCEDURE — 4010F PR ACE/ARB THEARPY RXD/TAKEN: ICD-10-PCS | Mod: HCNC,CPTII,S$GLB, | Performed by: PHYSICIAN ASSISTANT

## 2023-12-08 PROCEDURE — 3288F PR FALLS RISK ASSESSMENT DOCUMENTED: ICD-10-PCS | Mod: HCNC,CPTII,S$GLB, | Performed by: PHYSICIAN ASSISTANT

## 2023-12-08 PROCEDURE — 99214 PR OFFICE/OUTPT VISIT, EST, LEVL IV, 30-39 MIN: ICD-10-PCS | Mod: 25,HCNC,S$GLB, | Performed by: PHYSICIAN ASSISTANT

## 2023-12-08 PROCEDURE — 1101F PR PT FALLS ASSESS DOC 0-1 FALLS W/OUT INJ PAST YR: ICD-10-PCS | Mod: HCNC,CPTII,S$GLB, | Performed by: PHYSICIAN ASSISTANT

## 2023-12-08 PROCEDURE — 3044F HG A1C LEVEL LT 7.0%: CPT | Mod: HCNC,CPTII,S$GLB, | Performed by: PHYSICIAN ASSISTANT

## 2023-12-08 PROCEDURE — 20610 PR DRAIN/INJECT LARGE JOINT/BURSA: ICD-10-PCS | Mod: HCNC,RT,S$GLB, | Performed by: PHYSICIAN ASSISTANT

## 2023-12-08 RX ORDER — TRIAMCINOLONE ACETONIDE 40 MG/ML
40 INJECTION, SUSPENSION INTRA-ARTICULAR; INTRAMUSCULAR
Status: COMPLETED | OUTPATIENT
Start: 2023-12-08 | End: 2023-12-08

## 2023-12-08 RX ADMIN — TRIAMCINOLONE ACETONIDE 40 MG: 40 INJECTION, SUSPENSION INTRA-ARTICULAR; INTRAMUSCULAR at 02:12

## 2023-12-08 NOTE — PROGRESS NOTES
CC: right knee pain (referral from Dr. Torres)    68 y.o. Female  at [employer] who reports diffuse knee pain refractory to conservative mgmt. Left knee is much better recently. The right knee recently flared up and has been hurting her over recent weeks. It is swollen. She is requesting an injection..     Pain in knees for years. Progressively worsening.   Last A1C was 5.5.     She reports that the pain is worse with steps.  It also bothers her at night.    Is affecting ADLs.     No mechanical symptoms, no instability    She has tried tylenol, NSAIDs, and ice for 8 weeks with no relief.     Review of Systems   Constitution: Negative. Negative for chills, fever and night sweats.   HENT: Negative for congestion and headaches.    Eyes: Negative for blurred vision, left vision loss and right vision loss.   Cardiovascular: Negative for chest pain and syncope.   Respiratory: Negative for cough and shortness of breath.    Endocrine: Negative for polydipsia, polyphagia and polyuria.   Hematologic/Lymphatic: Negative for bleeding problem. Does not bruise/bleed easily.   Skin: Negative for dry skin, itching and rash.   Musculoskeletal: Negative for falls and muscle weakness.   Gastrointestinal: Negative for abdominal pain and bowel incontinence.   Genitourinary: Negative for bladder incontinence and nocturia.   Neurological: Negative for disturbances in coordination, loss of balance and seizures.   Psychiatric/Behavioral: Negative for depression. The patient does not have insomnia.    Allergic/Immunologic: Negative for hives and persistent infections.   All other systems negative      PAST MEDICAL HISTORY:   Past Medical History:   Diagnosis Date    Acute coronary syndrome 09/23/2018    Adjustment disorder     Anxiety     Arthritis     Cancer of breast 09/03/2020    Malignant neoplasm of lower-inner quadrant of left breast in female, estrogen receptor positive    Cholelithiasis with acute cholecystitis  2021    Coronary artery disease     Depression     Diabetes mellitus type I     Genetic testing of female 2020    Minerva via [employer] with AXIN2 and POLE variants of uncertain significance    GERD (gastroesophageal reflux disease)     Hepatitis B core antibody positive 3/9/2023    Negative sAg, suggests previous exposure but no chronic/active Hep B. At risk for reactivation with any immunosuppression medication, steroids, chemo, etc.      Hypertension     Vertigo 2019    Vitamin D deficiency 2021     PAST SURGICAL HISTORY:   Past Surgical History:   Procedure Laterality Date    BREAST BIOPSY Left 2022    Left punch biopsy; Unremarkable keratinized skin with intradermal hematoma     SECTION      INJECTION FOR SENTINEL NODE IDENTIFICATION Left 2020    Procedure: INJECTION, FOR SENTINEL NODE IDENTIFICATION;  Surgeon: Isabel Moulton MD;  Location: [facility] OR;  Service: General;  Laterality: Left;    INSERTION OF TUNNELED CENTRAL VENOUS CATHETER (CVC) WITH SUBCUTANEOUS PORT N/A 2020    Procedure: INSERTION, PORT-A-CATH;  Surgeon: Hardeep Martin MD;  Location: [facility] CATH LAB;  Service: Radiology;  Laterality: N/A;    LAPAROSCOPIC CHOLECYSTECTOMY N/A 2021    Procedure: CHOLECYSTECTOMY, LAPAROSCOPIC;  Surgeon: Buster Son MD;  Location: [address];  Service: General;  Laterality: N/A;    MASTECTOMY, PARTIAL Left 2020    Procedure: MASTECTOMY, PARTIAL-w/reflector;  Surgeon: Isabel Moulton MD;  Location: [facility] OR;  Service: General;  Laterality: Left;    SENTINEL LYMPH NODE BIOPSY Left 2020    Procedure: BIOPSY, LYMPH NODE, SENTINEL;  Surgeon: Isabel Moulton MD;  [facility];  Service: General;  Laterality: Left;     FAMILY HISTORY:   Family History   Problem Relation Age of Onset    Hypertension Mother     Hyperlipidemia Mother     Diabetes Mother     Heart disease Mother     Colon polyps Mother     Aneurysm Father     Diabetes Sister      Hypertension Sister     Heart disease Brother     Diabetes Brother     Hypertension Brother     Cancer Brother         brother German with prostate cancer; brother Cooper with throat cancer    Cervical cancer Daughter     Anxiety disorder Daughter     Depression Daughter     Anxiety disorder Daughter     Depression Daughter     Breast cancer Maternal Aunt     Cancer Maternal Aunt         unknown origin    Cancer Paternal Aunt         unknown origin    Breast cancer Paternal Aunt     Prostate cancer Maternal Cousin     Leukemia Maternal Cousin     Prostate cancer Maternal Cousin     Breast cancer Other     Colon cancer Neg Hx     Ovarian cancer Neg Hx      SOCIAL HISTORY:   Social History     Socioeconomic History    Marital status:      Spouse name: Burak    Number of children: 3   Tobacco Use    Smoking status: Never    Smokeless tobacco: Never   Substance and Sexual Activity    Alcohol use: Never    Drug use: No    Sexual activity: Yes     Partners: Male     Birth control/protection: Post-menopausal     Social Determinants of Health     Financial Resource Strain: Medium Risk (4/6/2023)    Overall Financial Resource Strain (CARDIA)     Difficulty of Paying Living Expenses: Somewhat hard   Food Insecurity: No Food Insecurity (4/6/2023)    Hunger Vital Sign     Worried About Running Out of Food in the Last Year: Never true     Ran Out of Food in the Last Year: Never true   Transportation Needs: No Transportation Needs (4/6/2023)    PRAPARE - Transportation     Lack of Transportation (Medical): No     Lack of Transportation (Non-Medical): No   Physical Activity: Inactive (4/6/2023)    Exercise Vital Sign     Days of Exercise per Week: 0 days     Minutes of Exercise per Session: 0 min   Stress: No Stress Concern Present (4/6/2023)    [employer] - Occupational Stress Questionnaire     Feeling of Stress : Not at all   Social Connections: Moderately Isolated (4/6/2023)    Social  "Connection and Isolation Panel [NHANES]     Frequency of Communication with Friends and Family: More than three times a week     Frequency of Social Gatherings with Friends and Family: Three times a week     Attends Alevism Services: More than 4 times per year     Active Member of Clubs or Organizations: No     Attends Club or Organization Meetings: Never     Marital Status:    Housing Stability: Unknown (4/6/2023)    Housing Stability Vital Sign     Unable to Pay for Housing in the Last Year: No     Unstable Housing in the Last Year: No       MEDICATIONS:   Current Outpatient Medications:     ACCU-CHEK GUIDE GLUCOSE METER Misc, USE  THREE TIMES DAILY, Disp: 1 each, Rfl: 0    acetaminophen (TYLENOL) 500 MG tablet, Take 500 mg by mouth every 8 (eight) hours as needed for Pain., Disp: , Rfl:     albuterol (VENTOLIN HFA) 90 mcg/actuation inhaler, Inhale 2 puffs into the lungs every 6 (six) hours as needed for Wheezing or Shortness of Breath. Rescue, Disp: 6.7 g, Rfl: 0    amLODIPine (NORVASC) 2.5 MG tablet, Take 1 tablet (2.5 mg total) by mouth once daily., Disp: 90 tablet, Rfl: 3    aspirin 81 mg Tab, Take 1 tablet by mouth Daily., Disp: , Rfl:     atorvastatin (LIPITOR) 10 MG tablet, Take 1 tablet by mouth once daily, Disp: 90 tablet, Rfl: 0    BD ULTRA-FINE SHORT PEN NEEDLE 31 gauge x 5/16" Ndle, USE 1  4 TIMES DAILY WITH  INSULIN, Disp: 100 each, Rfl: 3    blood sugar diagnostic Strp, Strips and lancets covered by insurance E11.9, pt checks glucose three times daily, insulin dependent, Disp: 300 each, Rfl: 3    blood sugar diagnostic, drum (ACCU-CHEK COMPACT TEST) Strp, USE ONE STRIP TO CHECK GLUCOSE 4 TIMES DAILY BEFORE EACH MEAL AND AT BEDTIME, Disp: 100 each, Rfl: 0    capsaicin 0.1 % Crea, Apply 1 application  topically 2 (two) times daily as needed (pain)., Disp: 42.5 g, Rfl: 0    diclofenac sodium (VOLTAREN) 1 % Gel, Apply 2 g topically 3 (three) times daily as needed (muscle spasm pain). Apply 2 " "grams to affected area 3 times daily as needed (Patient taking differently: Apply 2 g topically 3 (three) times daily as needed (muscle spasm pain).), Disp: 100 g, Rfl: 0    dulaglutide (TRULICITY) 1.5 mg/0.5 mL pen injector, Inject 1.5 mg into the skin every 7 days., Disp: 12 pen , Rfl: 2    lancets Misc, To check BG 4 times daily, to use with insurance preferred meter, insulin dependent, Disp: 400 each, Rfl: 3    letrozole (FEMARA) 2.5 mg Tab, Take 1 tablet by mouth once daily, Disp: 90 tablet, Rfl: 3    LIDOcaine (LIDODERM) 5 %, Apply to affected area as needed for pain for 12 hours, then off for 12 hours. Discard after each use. May use 4% lidocaine patch as alternative., Disp: 30 patch, Rfl: 0    losartan (COZAAR) 100 MG tablet, Take 1 tablet (100 mg total) by mouth once daily., Disp: 90 tablet, Rfl: 3    metFORMIN (GLUCOPHAGE) 1000 MG tablet, 1 tablet with a meal Orally Once a day, Disp: 180 tablet, Rfl: 3    topiramate (TOPAMAX) 25 MG tablet, One at bedtime for 2 weeks then two at bedtime, Disp: 60 tablet, Rfl: 2    TRUEPLUS LANCETS 33 gauge Misc, USE 1 TO CHECK GLUCOSE 4 TIMES DAILY, Disp: , Rfl:   No current facility-administered medications for this visit.    Facility-Administered Medications Ordered in Other Visits:     fentaNYL injection 25 mcg, 25 mcg, Intravenous, Q5 Min PRN, Kenneth Shannon MD    midazolam (VERSED) 1 mg/mL injection 0.5 mg, 0.5 mg, Intravenous, PRN, Kenneth Shannon MD, 2 mg at 09/17/20 0637  ALLERGIES: Review of patient's allergies indicates:  No Known Allergies    VITAL SIGNS: BP (!) 142/69   Pulse 70   Ht 5' 2" (1.575 m)   Wt 68 kg (150 lb)   LMP  (LMP Unknown)   BMI 27.44 kg/m²      PHYSICAL EXAMINATION    General:  The patient is alert and oriented x 3.  Mood is pleasant.  Observation of ears, eyes and nose reveal no gross abnormalities.  HEENT: NCAT, sclera nonicteric  Lungs: Respirations are equal and unlabored.    Right KNEE EXAMINATION "     OBSERVATION / INSPECTION   Gait:   antalgic - limp  Alignment:  Neutral   Scars:   None   Muscle atrophy: Mild  Effusion:  Moderate on right  Warmth:  None   Discoloration:   none     TENDERNESS / CREPITUS (T / C):          T / C      T / C   Patella   - / -   Lateral joint line   +  / -      Peripatellar medial  +   Medial joint line    +  / -   Peripatellar lateral +   Medial plica   - / -   Patellar tendon -   Popliteal fossa  - / -   Quad tendon   -   Gastrocnemius   -   Prepatellar Bursa - / -   Quadricep   -   Tibial tubercle  -  Thigh/hamstring  -   Pes anserine/HS -  Fibula    -   ITB   - / -  Tibia     -   Tib/fib joint  - / -  LCL    -     MFC   - / -   MCL: Proximal  -    LFC   - / -    Distal   -          ROM: (* = pain)  PASSIVE   ACTIVE    Left :   0 / 0 / 120   0 / 0 / 120    Right :    0 / 0 / 100   0 / 0 / 100    Patellofemoral examination:  See above noted areas of tenderness.   Patella position    Subluxation / dislocation: Centered           Sup. / Inf;   Normal   Crepitus (PF):    Absent   Patellar Mobility:       Medial-lateral:   Normal    Superior-inferior:  Normal    Inferior tilt   Normal    Patellar tendon:  Normal   Lateral tilt:    Normal   J-sign:     None   Patellofemoral grind:   +       MENISCAL SIGNS:     Pain on terminal extension:  + bilateral   Pain on terminal flexion:  + bilateral  Tgs maneuver:  -  Squat     NT    LIGAMENT EXAMINATION:  ACL / Lachman:  normal (-1 to 2mm)    PCL-Post.  drawer: normal 0 to 2mm  MCL- Valgus:  normal 0 to 2mm  LCL- Varus:  normal 0 to 2mm  Pivot shift: normal (Equal)   Dial Test: difference c/w other side   At 30° flexion: normal (< 5°)    At 90° flexion: normal (< 5°)       STRENGTH: (* = with pain) PAINFUL SIDE   Quadricep   5/5   Hamstrin/5    EXTREMITY NEURO-VASCULAR EXAMINATION:   Sensation:  Grossly intact to light touch all dermatomal regions.   Motor Function:  Fully intact motor function at hip, knee, foot and ankle     DTRs;  quadriceps and  achilles 2+.  No clonus and downgoing Babinski.    Vascular status:  DP and PT pulses 2+, brisk capillary refill, symmetric.     Other Findings:       Xrays:  Xrays of the bilateral knees with flexion were ordered and reviewed by me today. No fracture, subluxation, or other significant bony or joint abnormality is identified. Moderate tricompartmental DJD of bilateral knees.   Kellgren Surjit 2 or greater noted.         ASSESSMENT:    1. right Knee pain  2. right knee osteoarthritis   3. right knee effusion  Bilateral hip abd/core weakness    PLAN:    1. PROCEDURE NOTE:   Right KNEE ASPIRATION and INJECTION  After consent was obtained, time out was performed, including verification of patient ID, procedure, site and side, availability of information and equipment, review of safety issues, and agreement with consent, the procedure site was marked and the patient was prepped aseptically.    Using a sterile technique the right knee was prepped from the superior lateral knee approach. The knee joint was entered with a 18 gauge needle and 53 ml's of serous colored fluid was withdrawn.   The procedure was well tolerated.       A diagnostic and therapeutic injection of 1cc 40 mg kenalog and 1% lidocaine/0.25% bupivacaine was given under sterile technique using the same 18 gauge needle into the superior lateral knee site with patient in supine position.     The patient had no adverse reactions to the medication. Pain decreased. The patient was instructed to apply ice to the joint for 20 minutes and avoid strenuous activities for 24-36 hours following the injection. Patient was warned of possible blood sugar and/or blood pressure changes during that time. Following that time, patient can resume regular activities.  Watch for fever, or increased swelling or persistent pain in knee. Call or return to clinic prn if such symptoms occur or the knee fails to improve as anticipated.     2. Ice compresses  prn pain  Compression sleeve to right knee.        All questions were answered, pt will contact us for questions or concerns in the interim.    I made the decision to obtain old records of the patient including previous notes and imaging. New imaging was ordered today of the extremity or extremities evaluated. I independently reviewed and interpreted the radiographs and/or MRIs today as well as prior imaging.     CC: right shoulder pain     68 y.o. Female who reports that the pain is severe and not responding to any conservative care.      Pain in the shoulder started over last 2-3 weeks. Constant pain that is not improving.     She reports that the pain is worse with overhead activity. It also bothers her at night.    Is affecting ADLs.     Review of Systems   Constitution: Negative. Negative for chills, fever and night sweats.   HENT: Negative for congestion and headaches.    Eyes: Negative for blurred vision, left vision loss and right vision loss.   Cardiovascular: Negative for chest pain and syncope.   Respiratory: Negative for cough and shortness of breath.    Endocrine: Negative for polydipsia, polyphagia and polyuria.   Hematologic/Lymphatic: Negative for bleeding problem. Does not bruise/bleed easily.   Skin: Negative for dry skin, itching and rash.   Musculoskeletal: Negative for falls and muscle weakness.   Gastrointestinal: Negative for abdominal pain and bowel incontinence.   Genitourinary: Negative for bladder incontinence and nocturia.   Neurological: Negative for disturbances in coordination, loss of balance and seizures.   Psychiatric/Behavioral: Negative for depression. The patient does not have insomnia.    Allergic/Immunologic: Negative for hives and persistent infections.     PAST MEDICAL HISTORY:   Past Medical History:   Diagnosis Date    Acute coronary syndrome 09/23/2018    Adjustment disorder     Anxiety     Arthritis     Cancer of breast 09/03/2020    Malignant neoplasm of lower-inner  quadrant of left breast in female, estrogen receptor positive    Cholelithiasis with acute cholecystitis 2021    Coronary artery disease     Depression     Diabetes mellitus type I     Genetic testing of female 2020    [employer]a via [employer] with AXIN2 and POLE variants of uncertain significance    GERD (gastroesophageal reflux disease)     Hepatitis B core antibody positive 3/9/2023    Negative sAg, suggests previous exposure but no chronic/active Hep B. At risk for reactivation with any immunosuppression medication, steroids, chemo, etc.      Hypertension     Vertigo 2019    Vitamin D deficiency 2021     PAST SURGICAL HISTORY:   Past Surgical History:   Procedure Laterality Date    BREAST BIOPSY Left 2022    Left punch biopsy; Unremarkable keratinized skin with intradermal hematoma     SECTION      INJECTION FOR SENTINEL NODE IDENTIFICATION Left 2020    Procedure: INJECTION, FOR SENTINEL NODE IDENTIFICATION;  Surgeon: Isabel Moulton MD;  Location: [facility] OR;  Service: General;  Laterality: Left;    INSERTION OF TUNNELED CENTRAL VENOUS CATHETER (CVC) WITH SUBCUTANEOUS PORT N/A 2020    Procedure: INSERTION, PORT-A-CATH;  Surgeon: Hardeep Martin MD;  Location: [facility] CATH LAB;  Service: Radiology;  Laterality: N/A;    LAPAROSCOPIC CHOLECYSTECTOMY N/A 2021    Procedure: CHOLECYSTECTOMY, LAPAROSCOPIC;  Surgeon: Buster Son MD;  Location: [address];  Service: General;  Laterality: N/A;    MASTECTOMY, PARTIAL Left 2020    Procedure: MASTECTOMY, PARTIAL-w/reflector;  Surgeon: Isabel Moulton MD;  Location: [facility] OR;  Service: General;  Laterality: Left;    SENTINEL LYMPH NODE BIOPSY Left 2020    Procedure: BIOPSY, LYMPH NODE, SENTINEL;  Surgeon: Isabel Moulton MD;  Location: [facility] OR;  Service: General;  Laterality: Left;     FAMILY HISTORY:   Family History   Problem Relation Age of Onset    Hypertension Mother     Hyperlipidemia Mother      Diabetes Mother     Heart disease Mother     Colon polyps Mother     Aneurysm Father     Diabetes Sister     Hypertension Sister     Heart disease Brother     Diabetes Brother     Hypertension Brother     Cancer Brother         brother German with prostate cancer; brother Cooper with throat cancer    Cervical cancer Daughter     Anxiety disorder Daughter     Depression Daughter     Anxiety disorder Daughter     Depression Daughter     Breast cancer Maternal Aunt     Cancer Maternal Aunt         unknown origin    Cancer Paternal Aunt         unknown origin    Breast cancer Paternal Aunt     Prostate cancer Maternal Cousin     Leukemia Maternal Cousin     Prostate cancer Maternal Cousin     Breast cancer Other     Colon cancer Neg Hx     Ovarian cancer Neg Hx      SOCIAL HISTORY:   Social History     Socioeconomic History    Marital status:      Spouse name: Burak    Number of children: 3   Tobacco Use    Smoking status: Never    Smokeless tobacco: Never   Substance and Sexual Activity    Alcohol use: Never    Drug use: No    Sexual activity: Yes     Partners: Male     Birth control/protection: Post-menopausal     Social Determinants of Health     Financial Resource Strain: Medium Risk (4/6/2023)    Overall Financial Resource Strain (CARDIA)     Difficulty of Paying Living Expenses: Somewhat hard   Food Insecurity: No Food Insecurity (4/6/2023)    Hunger Vital Sign     Worried About Running Out of Food in the Last Year: Never true     Ran Out of Food in the Last Year: Never true   Transportation Needs: No Transportation Needs (4/6/2023)    PRAPARE - Transportation     Lack of Transportation (Medical): No     Lack of Transportation (Non-Medical): No   Physical Activity: Inactive (4/6/2023)    Exercise Vital Sign     Days of Exercise per Week: 0 days     Minutes of Exercise per Session: 0 min   Stress: No Stress Concern Present (4/6/2023)    [employer] - Occupational Stress  "Questionnaire     Feeling of Stress : Not at all   Social Connections: Moderately Isolated (4/6/2023)    Social Connection and Isolation Panel [NHANES]     Frequency of Communication with Friends and Family: More than three times a week     Frequency of Social Gatherings with Friends and Family: Three times a week     Attends Confucianist Services: More than 4 times per year     Active Member of Clubs or Organizations: No     Attends Club or Organization Meetings: Never     Marital Status:    Housing Stability: Unknown (4/6/2023)    Housing Stability Vital Sign     Unable to Pay for Housing in the Last Year: No     Unstable Housing in the Last Year: No       MEDICATIONS:   Current Outpatient Medications:     ACCU-CHEK GUIDE GLUCOSE METER Misc, USE  THREE TIMES DAILY, Disp: 1 each, Rfl: 0    acetaminophen (TYLENOL) 500 MG tablet, Take 500 mg by mouth every 8 (eight) hours as needed for Pain., Disp: , Rfl:     albuterol (VENTOLIN HFA) 90 mcg/actuation inhaler, Inhale 2 puffs into the lungs every 6 (six) hours as needed for Wheezing or Shortness of Breath. Rescue, Disp: 6.7 g, Rfl: 0    amLODIPine (NORVASC) 2.5 MG tablet, Take 1 tablet (2.5 mg total) by mouth once daily., Disp: 90 tablet, Rfl: 3    aspirin 81 mg Tab, Take 1 tablet by mouth Daily., Disp: , Rfl:     atorvastatin (LIPITOR) 10 MG tablet, Take 1 tablet by mouth once daily, Disp: 90 tablet, Rfl: 0    BD ULTRA-FINE SHORT PEN NEEDLE 31 gauge x 5/16" Ndle, USE 1  4 TIMES DAILY WITH  INSULIN, Disp: 100 each, Rfl: 3    blood sugar diagnostic Strp, Strips and lancets covered by insurance E11.9, pt checks glucose three times daily, insulin dependent, Disp: 300 each, Rfl: 3    blood sugar diagnostic, drum (ACCU-CHEK COMPACT TEST) Strp, USE ONE STRIP TO CHECK GLUCOSE 4 TIMES DAILY BEFORE EACH MEAL AND AT BEDTIME, Disp: 100 each, Rfl: 0    capsaicin 0.1 % Crea, Apply 1 application  topically 2 (two) times daily as needed (pain)., Disp: 42.5 g, Rfl: 0    diclofenac " "sodium (VOLTAREN) 1 % Gel, Apply 2 g topically 3 (three) times daily as needed (muscle spasm pain). Apply 2 grams to affected area 3 times daily as needed (Patient taking differently: Apply 2 g topically 3 (three) times daily as needed (muscle spasm pain).), Disp: 100 g, Rfl: 0    dulaglutide (TRULICITY) 1.5 mg/0.5 mL pen injector, Inject 1.5 mg into the skin every 7 days., Disp: 12 pen , Rfl: 2    lancets Misc, To check BG 4 times daily, to use with insurance preferred meter, insulin dependent, Disp: 400 each, Rfl: 3    letrozole (FEMARA) 2.5 mg Tab, Take 1 tablet by mouth once daily, Disp: 90 tablet, Rfl: 3    LIDOcaine (LIDODERM) 5 %, Apply to affected area as needed for pain for 12 hours, then off for 12 hours. Discard after each use. May use 4% lidocaine patch as alternative., Disp: 30 patch, Rfl: 0    losartan (COZAAR) 100 MG tablet, Take 1 tablet (100 mg total) by mouth once daily., Disp: 90 tablet, Rfl: 3    metFORMIN (GLUCOPHAGE) 1000 MG tablet, 1 tablet with a meal Orally Once a day, Disp: 180 tablet, Rfl: 3    topiramate (TOPAMAX) 25 MG tablet, One at bedtime for 2 weeks then two at bedtime, Disp: 60 tablet, Rfl: 2    TRUEPLUS LANCETS 33 gauge Misc, USE 1 TO CHECK GLUCOSE 4 TIMES DAILY, Disp: , Rfl:   No current facility-administered medications for this visit.    Facility-Administered Medications Ordered in Other Visits:     fentaNYL injection 25 mcg, 25 mcg, Intravenous, Q5 Min PRN, Kenneth Shannon MD    midazolam (VERSED) 1 mg/mL injection 0.5 mg, 0.5 mg, Intravenous, PRN, Kenneth Shannon MD, 2 mg at 09/17/20 0637  ALLERGIES: Review of patient's allergies indicates:  No Known Allergies    VITAL SIGNS: BP (!) 142/69   Pulse 70   Ht 5' 2" (1.575 m)   Wt 68 kg (150 lb)   LMP  (LMP Unknown)   BMI 27.44 kg/m²      PHYSICAL EXAMINATION:  General:  The patient is alert and oriented x 3.  Mood is pleasant.  Observation of ears, eyes and nose reveal no gross abnormalities.  No " labored breathing observed.  Gait is coordinated. Patient can toe walk and heel walk without difficulty.      right SHOULDER / UPPER EXTREMITY EXAM    OBSERVATION:     Swelling  none  Deformity  none   Discoloration  none   Scapular winging none   Scars   none  Atrophy  none    TENDERNESS / CREPITUS (T/C):          T/C      T/C   Clavicle   -/-  SUPRAspinatus    +/-     AC Jt.    -/-  INFRAspinatus  -/-    SC Jt.    -/-  Deltoid    +/-      G. Tuberosity  +/-  LH BICEP groove  +/-   Acromion:  -/-  Midline Neck   -/-     Scapular Spine -/-  Trapezium   -/-   SMA Scapula  -/-  GH jt. line - post  -/-     Scapulothoracic  -/-         ROM: (* = with pain)  Right shoulder   Left shoulder        AROM (PROM)   AROM (PROM)   FE    150° (155°) *    170° (175°)     ER at 0°    45°  (50°)    60°  (65°)   ER at 90° ABD  NT   90°  (90°)   IR at 90°  ABD   NA  (40°)     NA  (40°)      IR (spine level)   L3*    T10    STRENGTH: (* = with pain) RIGHT SHOULDER  LEFT SHOULDER   SCAPTION   3/5 at 0 and 30    5/5    IR    5/5    5/5   ER    4/5    5/5   BICEPS   5/5    5/5   Deltoid    5/5    5/5     SIGNS:  Painful side       NEER   +    [place]S  +    [place]   +    SPEEDS  +     DROP ARM   neg   BELLY PRESS neg   Superior escape none    LIFT-OFF  neg   X-Body ADD    neg    MOVING VALGUS neg        STABILITY TESTING    RIGHT SHOULDER   LEFT SHOULDER     Translation     Anterior  up face     up face    Posterior  up face    up face    Sulcus   < 10mm    < 10 mm     Signs   Apprehension   neg      neg       Relocation   no change     no change      Jerk test  neg     neg    EXTREMITY NEURO-VASCULAR EXAM    Sensation grossly intact to light touch all dermatomal regions.    DTR 2+ Biceps, Triceps, BR and Negative Polis sign   Grossly intact motor function at Elbow, Wrist and Hand   Distal pulses radial and ulnar 2+, brisk cap refill, symmetric.      NECK:  Painless FROM and spinous processes non-tender. Negative Spurlings  sign.      OTHER FINDINGS:  + scapular dyskinesia    XRAYS:  Shoulder 3 view series right,  were obtained and reviewed  No convincing fracture or dislocation is noted. The osseous structures appear well mineralized and well aligned. Moderate glenohumeral DJD with subacromial bone spur noted.     ASSESSMENT:   right:  1. Shoulder pain, acute   2.   Shoulder pain, impingement  3.   Scapular dyskinesia      PLAN:    1.PROCEDURE NOTE:   After cortisone consent and post-injection information was given, the shoulder was prepped with betadyne and alcohol. The right subacromial space of the shoulder was injected with 1 cc 40 mg kenalog and 4 cc lidocaine and 4 cc 0.25% marcaine.   Bandaid was applied. Patient was reminded to rest with RICE for 48 hours post injection and to call clinic immediately for any adverse side effects as explained in clinic today. Patient was warned of possible blood sugar and/or blood pressure changes during that time. Told to call clinic or go to ED if the changes become concerning.      2. RTC in 3 weeks for recheck and if shoulder is not improved with improved ROM and strength then will get MRI to assess for RCT.     3. Ice compresses today.   OTC pain control.       All questions were answered, pt will contact us for questions or concerns in the interim.

## 2023-12-13 LAB
HPV HR 12 DNA SPEC QL NAA+PROBE: NEGATIVE
HPV16 AG SPEC QL: NEGATIVE
HPV18 DNA SPEC QL NAA+PROBE: NEGATIVE

## 2023-12-15 ENCOUNTER — HOSPITAL ENCOUNTER (OUTPATIENT)
Dept: RADIOLOGY | Facility: HOSPITAL | Age: 68
Discharge: HOME | End: 2023-12-15
Attending: INTERNAL MEDICINE
Payer: MEDICARE

## 2023-12-15 ENCOUNTER — HOSPITAL ENCOUNTER (OUTPATIENT)
Dept: PULMONOLOGY | Facility: CLINIC | Age: 68
Discharge: HOME | End: 2023-12-15
Payer: MEDICARE

## 2023-12-15 DIAGNOSIS — R06.00 DYSPNEA, UNSPECIFIED TYPE: ICD-10-CM

## 2023-12-15 DIAGNOSIS — R93.89 ENDOMETRIAL STRIPE INCREASED: ICD-10-CM

## 2023-12-15 PROCEDURE — 76830 US PELVIS COMP WITH TRANSVAG NON-OB (XPD): ICD-10-PCS | Mod: 26,HCNC,, | Performed by: RADIOLOGY

## 2023-12-15 PROCEDURE — 76856 US PELVIS COMP WITH TRANSVAG NON-OB (XPD): ICD-10-PCS | Mod: 26,HCNC,, | Performed by: RADIOLOGY

## 2023-12-15 PROCEDURE — 76830 TRANSVAGINAL US NON-OB: CPT | Mod: 26,HCNC,, | Performed by: RADIOLOGY

## 2023-12-15 PROCEDURE — 76830 TRANSVAGINAL US NON-OB: CPT | Mod: TC,HCNC

## 2023-12-15 PROCEDURE — 76856 US EXAM PELVIC COMPLETE: CPT | Mod: 26,HCNC,, | Performed by: RADIOLOGY

## 2023-12-19 LAB
FINAL PATHOLOGIC DIAGNOSIS: NORMAL
Lab: NORMAL

## 2024-01-03 RX ORDER — ATORVASTATIN CALCIUM 10 MG/1
10 TABLET, FILM COATED ORAL
Qty: 90 TABLET | Refills: 1 | Status: SHIPPED | OUTPATIENT
Start: 2024-01-03

## 2024-01-03 NOTE — TELEPHONE ENCOUNTER
No care due was identified.  [facility] Embedded Care Due Messages. Reference number: [id number].   1/03/2024 4:22:01 PM CST

## 2024-01-03 NOTE — TELEPHONE ENCOUNTER
Refill Decision Note   Sunitha Rosas  is requesting a refill authorization.  Brief Assessment and Rationale for Refill:  Approve     Medication Therapy Plan:         Comments:     Note composed:5:29 PM 01/03/2024

## 2024-01-05 ENCOUNTER — OFFICE VISIT (OUTPATIENT)
Dept: SPORTS MEDICINE | Facility: CLINIC | Age: 69
End: 2024-01-05
Payer: MEDICARE

## 2024-01-05 VITALS
SYSTOLIC BLOOD PRESSURE: 158 MMHG | WEIGHT: 147.94 LBS | HEART RATE: 66 BPM | DIASTOLIC BLOOD PRESSURE: 78 MMHG | HEIGHT: 62 IN | BODY MASS INDEX: 27.22 KG/M2

## 2024-01-05 DIAGNOSIS — M25.511 ACUTE PAIN OF RIGHT SHOULDER: Primary | ICD-10-CM

## 2024-01-05 DIAGNOSIS — M25.561 ACUTE PAIN OF RIGHT KNEE: ICD-10-CM

## 2024-01-05 PROCEDURE — 1125F AMNT PAIN NOTED PAIN PRSNT: CPT | Mod: HCNC,CPTII,S$GLB, | Performed by: PHYSICIAN ASSISTANT

## 2024-01-05 PROCEDURE — 3077F SYST BP >= 140 MM HG: CPT | Mod: HCNC,CPTII,S$GLB, | Performed by: PHYSICIAN ASSISTANT

## 2024-01-05 PROCEDURE — 1159F MED LIST DOCD IN RCRD: CPT | Mod: HCNC,CPTII,S$GLB, | Performed by: PHYSICIAN ASSISTANT

## 2024-01-05 PROCEDURE — 99214 OFFICE O/P EST MOD 30 MIN: CPT | Mod: HCNC,S$GLB,, | Performed by: PHYSICIAN ASSISTANT

## 2024-01-05 PROCEDURE — 3008F BODY MASS INDEX DOCD: CPT | Mod: HCNC,CPTII,S$GLB, | Performed by: PHYSICIAN ASSISTANT

## 2024-01-05 PROCEDURE — 99999 PR PBB SHADOW E&M-EST. PATIENT-LVL IV: CPT | Mod: PBBFAC,HCNC,, | Performed by: PHYSICIAN ASSISTANT

## 2024-01-05 PROCEDURE — 1160F RVW MEDS BY RX/DR IN RCRD: CPT | Mod: HCNC,CPTII,S$GLB, | Performed by: PHYSICIAN ASSISTANT

## 2024-01-05 PROCEDURE — 3078F DIAST BP <80 MM HG: CPT | Mod: HCNC,CPTII,S$GLB, | Performed by: PHYSICIAN ASSISTANT

## 2024-01-05 NOTE — PROGRESS NOTES
CC: right knee pain (referral from Dr. Torres)    68 y.o. Female  at [facility] who reports diffuse knee pain refractory to conservative mgmt. Left knee is much better recently. The right knee recently flared up and has been hurting her over recent weeks. She received a CSI to the knee 4 weeks ago with great improvement and no issues since.     Pain in knees for years. Progressively worsening.   Last A1C was 5.5.     Is no longer affecting ADLs.     No mechanical symptoms, no instability      Review of Systems   Constitution: Negative. Negative for chills, fever and night sweats.   HENT: Negative for congestion and headaches.    Eyes: Negative for blurred vision, left vision loss and right vision loss.   Cardiovascular: Negative for chest pain and syncope.   Respiratory: Negative for cough and shortness of breath.    Endocrine: Negative for polydipsia, polyphagia and polyuria.   Hematologic/Lymphatic: Negative for bleeding problem. Does not bruise/bleed easily.   Skin: Negative for dry skin, itching and rash.   Musculoskeletal: Negative for falls and muscle weakness.   Gastrointestinal: Negative for abdominal pain and bowel incontinence.   Genitourinary: Negative for bladder incontinence and nocturia.   Neurological: Negative for disturbances in coordination, loss of balance and seizures.   Psychiatric/Behavioral: Negative for depression. The patient does not have insomnia.    Allergic/Immunologic: Negative for hives and persistent infections.   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 At risk for reactivation with any immunosuppression medication, steroids, chemo, etc.      Hypertension     Vertigo 2019    Vitamin D deficiency 2021     PAST SURGICAL HISTORY:   Past Surgical History:   Procedure Laterality Date    BREAST BIOPSY Left 2022    Left punch biopsy; Unremarkable keratinized skin with intradermal hematoma     SECTION      INJECTION FOR SENTINEL NODE IDENTIFICATION Left 2020    Procedure: INJECTION, FOR SENTINEL NODE IDENTIFICATION;  Surgeon: Isabel Moulton MD;  Location: [facility] OR;  Service: General;  Laterality: Left;    INSERTION OF TUNNELED CENTRAL VENOUS CATHETER (CVC) WITH SUBCUTANEOUS PORT N/A 2020    Procedure: INSERTION, PORT-A-CATH;  Surgeon: Hardeep Martin MD;  Location: [facility] CATH LAB;  Service: Radiology;  Laterality: N/A;    LAPAROSCOPIC CHOLECYSTECTOMY N/A 2021    Procedure: CHOLECYSTECTOMY, LAPAROSCOPIC;  Surgeon: Buster Son MD;  Location: [facility] OR [address] [place] [address];  Service: General;  Laterality: N/A;    MASTECTOMY, PARTIAL Left 2020    Procedure: MASTECTOMY, PARTIAL-w/reflector;  Surgeon: Isabel Moulton MD;  Location: [facility] OR;  Service: General;  Laterality: Left;    SENTINEL LYMPH NODE BIOPSY Left 2020    Procedure: BIOPSY, LYMPH NODE, SENTINEL;  Surgeon: Isabel Moulton MD;  Location: [facility] OR;  Service: General;  Laterality: Left;     FAMILY HISTORY:   Family History   Problem Relation Age of Onset    Hypertension Mother     Hyperlipidemia Mother     Diabetes Mother     Heart disease Mother     Colon polyps Mother     Aneurysm Father     Diabetes Sister     Hypertension Sister     Heart disease Brother     Diabetes Brother     Hypertension Brother     Cancer  Brother         brother German with prostate cancer; brother Cooper with throat cancer    Cervical cancer Daughter     Anxiety disorder Daughter     Depression Daughter     Anxiety disorder Daughter     Depression Daughter     Breast cancer Maternal Aunt     Cancer Maternal Aunt         unknown origin    Cancer Paternal Aunt         unknown origin    Breast cancer Paternal Aunt     Prostate cancer Maternal Cousin     Leukemia Maternal Cousin     Prostate cancer Maternal Cousin     Breast cancer Other     Colon cancer Neg Hx     Ovarian cancer Neg Hx      SOCIAL HISTORY:   Social History     Socioeconomic History    Marital status:      Spouse name: Burak    Number of children: 3   Tobacco Use    Smoking status: Never    Smokeless tobacco: Never   Substance and Sexual Activity    Alcohol use: Never    Drug use: No    Sexual activity: Yes     Partners: Male     Birth control/protection: Post-menopausal     Social Determinants of Health     Financial Resource Strain: Medium Risk (4/6/2023)    Overall Financial Resource Strain (CARDIA)     Difficulty of Paying Living Expenses: Somewhat hard   Food Insecurity: No Food Insecurity (4/6/2023)    Hunger Vital Sign     Worried About Running Out of Food in the Last Year: Never true     Ran Out of Food in the Last Year: Never true   Transportation Needs: No Transportation Needs (4/6/2023)    PRAPARE - Transportation     Lack of Transportation (Medical): No     Lack of Transportation (Non-Medical): No   Physical Activity: Inactive (4/6/2023)    Exercise Vital Sign     Days of Exercise per Week: 0 days     Minutes of Exercise per Session: 0 min   Stress: No Stress Concern Present (4/6/2023)    [employer] [place] [employer] - Occupational Stress Questionnaire     Feeling of Stress : Not at all   Social Connections: Moderately Isolated (4/6/2023)    Social Connection and Isolation Panel [NHANES]     Frequency of Communication with Friends and Family: More than  "three times a week     Frequency of Social Gatherings with Friends and Family: Three times a week     Attends Faith Services: More than 4 times per year     Active Member of Clubs or Organizations: No     Attends Club or Organization Meetings: Never     Marital Status:    Housing Stability: Unknown (4/6/2023)    Housing Stability Vital Sign     Unable to Pay for Housing in the Last Year: No     Unstable Housing in the Last Year: No       MEDICATIONS:   Current Outpatient Medications:     ACCU-CHEK GUIDE GLUCOSE METER Misc, USE  THREE TIMES DAILY, Disp: 1 each, Rfl: 0    acetaminophen (TYLENOL) 500 MG tablet, Take 500 mg by mouth every 8 (eight) hours as needed for Pain., Disp: , Rfl:     albuterol (VENTOLIN HFA) 90 mcg/actuation inhaler, Inhale 2 puffs into the lungs every 6 (six) hours as needed for Wheezing or Shortness of Breath. Rescue, Disp: 6.7 g, Rfl: 0    amLODIPine (NORVASC) 2.5 MG tablet, Take 1 tablet (2.5 mg total) by mouth once daily., Disp: 90 tablet, Rfl: 3    aspirin 81 mg Tab, Take 1 tablet by mouth Daily., Disp: , Rfl:     atorvastatin (LIPITOR) 10 MG tablet, Take 1 tablet by mouth once daily, Disp: 90 tablet, Rfl: 1    BD ULTRA-FINE SHORT PEN NEEDLE 31 gauge x 5/16" Ndle, USE 1  4 TIMES DAILY WITH  INSULIN, Disp: 100 each, Rfl: 3    blood sugar diagnostic Strp, Strips and lancets covered by insurance E11.9, pt checks glucose three times daily, insulin dependent, Disp: 300 each, Rfl: 3    blood sugar diagnostic, drum (ACCU-CHEK COMPACT TEST) Strp, USE ONE STRIP TO CHECK GLUCOSE 4 TIMES DAILY BEFORE EACH MEAL AND AT BEDTIME, Disp: 100 each, Rfl: 0    capsaicin 0.1 % Crea, Apply 1 application  topically 2 (two) times daily as needed (pain)., Disp: 42.5 g, Rfl: 0    diclofenac sodium (VOLTAREN) 1 % Gel, Apply 2 g topically 3 (three) times daily as needed (muscle spasm pain). Apply 2 grams to affected area 3 times daily as needed (Patient taking differently: Apply 2 g topically 3 (three) " "times daily as needed (muscle spasm pain).), Disp: 100 g, Rfl: 0    dulaglutide (TRULICITY) 1.5 mg/0.5 mL pen injector, Inject 1.5 mg into the skin every 7 days., Disp: 12 pen , Rfl: 2    lancets Misc, To check BG 4 times daily, to use with insurance preferred meter, insulin dependent, Disp: 400 each, Rfl: 3    letrozole (FEMARA) 2.5 mg Tab, Take 1 tablet by mouth once daily, Disp: 90 tablet, Rfl: 3    LIDOcaine (LIDODERM) 5 %, Apply to affected area as needed for pain for 12 hours, then off for 12 hours. Discard after each use. May use 4% lidocaine patch as alternative., Disp: 30 patch, Rfl: 0    losartan (COZAAR) 100 MG tablet, Take 1 tablet (100 mg total) by mouth once daily., Disp: 90 tablet, Rfl: 3    metFORMIN (GLUCOPHAGE) 1000 MG tablet, 1 tablet with a meal Orally Once a day, Disp: 180 tablet, Rfl: 3    topiramate (TOPAMAX) 25 MG tablet, One at bedtime for 2 weeks then two at bedtime, Disp: 60 tablet, Rfl: 2    TRUEPLUS LANCETS 33 gauge Misc, USE 1 TO CHECK GLUCOSE 4 TIMES DAILY, Disp: , Rfl:   No current facility-administered medications for this visit.    Facility-Administered Medications Ordered in Other Visits:     fentaNYL injection 25 mcg, 25 mcg, Intravenous, Q5 Min PRN, Kenneth Shannon MD    midazolam (VERSED) 1 mg/mL injection 0.5 mg, 0.5 mg, Intravenous, PRN, Kenneth Shannon MD, 2 mg at 09/17/20 0637  ALLERGIES: Review of patient's allergies indicates:  No Known Allergies    VITAL SIGNS: BP (!) 158/78   Pulse 66   Ht 5' 2" (1.575 m)   Wt 67.1 kg (147 lb 14.9 oz)   LMP  (LMP Unknown)   BMI 27.06 kg/m²      PHYSICAL EXAMINATION    General:  The patient is alert and oriented x 3.  Mood is pleasant.  Observation of ears, eyes and nose reveal no gross abnormalities.  HEENT: NCAT, sclera nonicteric  Lungs: Respirations are equal and unlabored.    Right KNEE EXAMINATION     OBSERVATION / INSPECTION   Gait:   normal  Alignment:  Neutral   Scars:   None   Muscle " atrophy: Mild  Effusion:  none  Warmth:  None   Discoloration:   none     TENDERNESS / CREPITUS (T / C):          T / C      T / C   Patella   - / -   Lateral joint line   -  / -      Peripatellar medial  -  Medial joint line    -  / -   Peripatellar lateral -  Medial plica   - / -   Patellar tendon -   Popliteal fossa  - / -   Quad tendon   -   Gastrocnemius   -   Prepatellar Bursa - / -   Quadricep   -   Tibial tubercle  -  Thigh/hamstring  -   Pes anserine/HS -  Fibula    -   ITB   - / -  Tibia     -   Tib/fib joint  - / -  LCL    -     MFC   - / -   MCL: Proximal  -    LFC   - / -    Distal   -          ROM: (* = pain)  PASSIVE   ACTIVE    Left :   0 / 0 / 120   0 / 0 / 120    Right :    0 / 0 / 120   0 / 0 / 120    Patellofemoral examination:  See above noted areas of tenderness.   Patella position    Subluxation / dislocation: Centered           Sup. / Inf;   Normal   Crepitus (PF):    Absent   Patellar Mobility:       Medial-lateral:   Normal    Superior-inferior:  Normal    Inferior tilt   Normal    Patellar tendon:  Normal   Lateral tilt:    Normal   J-sign:     None   Patellofemoral grind:   none      MENISCAL SIGNS:     Pain on terminal extension:  - bilateral   Pain on terminal flexion:  - bilateral  Tgs maneuver:  -  Squat     NT    LIGAMENT EXAMINATION:  ACL / Lachman:  normal (-1 to 2mm)    PCL-Post.  drawer: normal 0 to 2mm  MCL- Valgus:  normal 0 to 2mm  LCL- Varus:  normal 0 to 2mm  Pivot shift: normal (Equal)   Dial Test: difference c/w other side   At 30° flexion: normal (< 5°)    At 90° flexion: normal (< 5°)       STRENGTH: (* = with pain) PAINFUL SIDE   Quadricep   5/5   Hamstrin/5    EXTREMITY NEURO-VASCULAR EXAMINATION:   Sensation:  Grossly intact to light touch all dermatomal regions.   Motor Function:  Fully intact motor function at hip, knee, foot and ankle    DTRs;  quadriceps and  achilles 2+.  No clonus and downgoing Babinski.    Vascular status:  DP and PT pulses  2+, brisk capillary refill, symmetric.     Other Findings:       Xrays:  Xrays of the bilateral knees with flexion were ordered and reviewed by me today. No fracture, subluxation, or other significant bony or joint abnormality is identified. Moderate tricompartmental DJD of bilateral knees.   Kellgren Surjit 2 or greater noted.         ASSESSMENT:    1. right Knee pain  2. right knee osteoarthritis   Bilateral hip abd/core weakness    PLAN:    1. Activity as tolerated   2. Ice compresses prn pain        All questions were answered, pt will contact us for questions or concerns in the interim.    I made the decision to obtain old records of the patient including previous notes and imaging. New imaging was ordered today of the extremity or extremities evaluated. I independently reviewed and interpreted the radiographs and/or MRIs today as well as prior imaging.     CC: right shoulder pain     68 y.o. Female who reports that the pain was severe and responded well to CSI 4 weeks ago      Pain in the shoulder started over last 2 months ago. She reports great improvement since injection.       Is not affecting ADLs.     Review of Systems   Constitution: Negative. Negative for chills, fever and night sweats.   HENT: Negative for congestion and headaches.    Eyes: Negative for blurred vision, left vision loss and right vision loss.   Cardiovascular: Negative for chest pain and syncope.   Respiratory: Negative for cough and shortness of breath.    Endocrine: Negative for polydipsia, polyphagia and polyuria.   Hematologic/Lymphatic: Negative for bleeding problem. Does not bruise/bleed easily.   Skin: Negative for dry skin, itching and rash.   Musculoskeletal: Negative for falls and muscle weakness.   Gastrointestinal: Negative for abdominal pain and bowel incontinence.   Genitourinary: Negative for bladder incontinence and nocturia.   Neurological: Negative for disturbances in coordination, loss of balance and seizures.    Psychiatric/Behavioral: Negative for depression. The patient does not have insomnia.    Allergic/Immunologic: Negative for hives and persistent infections.     PAST MEDICAL HISTORY:   Past Medical History:   Diagnosis Date    Acute coronary syndrome 2018    Adjustment disorder     Anxiety     Arthritis     Cancer of breast 2020    Malignant neoplasm of lower-inner quadrant of left breast in female, estrogen receptor positive    Cholelithiasis with acute cholecystitis 2021    Coronary artery disease     Depression     Diabetes mellitus type I     Genetic testing of female 2020    Yesysk via [employer] with AXIN2 and POLE variants of uncertain significance    GERD (gastroesophageal reflux disease)     Hepatitis B core antibody positive 3/9/2023    Negative sAg, suggests previous exposure but no chronic/active Hep B. At risk for reactivation with any immunosuppression medication, steroids, chemo, etc.      Hypertension     Vertigo 2019    Vitamin D deficiency 2021     PAST SURGICAL HISTORY:   Past Surgical History:   Procedure Laterality Date    BREAST BIOPSY Left 2022    Left punch biopsy; Unremarkable keratinized skin with intradermal hematoma     SECTION      INJECTION FOR SENTINEL NODE IDENTIFICATION Left 2020    Procedure: INJECTION, FOR SENTINEL NODE IDENTIFICATION;  Surgeon: Isabel Moulton MD;  Location: [facility];  Service: General;  Laterality: Left;    INSERTION OF TUNNELED CENTRAL VENOUS CATHETER (CVC) WITH SUBCUTANEOUS PORT N/A 2020    Procedure: INSERTION, PORT-A-CATH;  Surgeon: Hardeep Martin MD;  Location: [facility] CATH LAB;  Service: Radiology;  Laterality: N/A;    LAPAROSCOPIC CHOLECYSTECTOMY N/A 2021    Procedure: CHOLECYSTECTOMY, LAPAROSCOPIC;  Surgeon: Buster Son MD;  Location: [address];  Service: General;  Laterality: N/A;    MASTECTOMY, PARTIAL Left 2020    Procedure: MASTECTOMY, PARTIAL-w/reflector;   Surgeon: Isabel Moulton MD;  Location: [facility] OR;  Service: General;  Laterality: Left;    SENTINEL LYMPH NODE BIOPSY Left 09/17/2020    Procedure: BIOPSY, LYMPH NODE, SENTINEL;  Surgeon: Isabel Moulton MD;  Location: [facility] OR;  Service: General;  Laterality: Left;     FAMILY HISTORY:   Family History   Problem Relation Age of Onset    Hypertension Mother     Hyperlipidemia Mother     Diabetes Mother     Heart disease Mother     Colon polyps Mother     Aneurysm Father     Diabetes Sister     Hypertension Sister     Heart disease Brother     Diabetes Brother     Hypertension Brother     Cancer Brother         brother German with prostate cancer; brother Cooper with throat cancer    Cervical cancer Daughter     Anxiety disorder Daughter     Depression Daughter     Anxiety disorder Daughter     Depression Daughter     Breast cancer Maternal Aunt     Cancer Maternal Aunt         unknown origin    Cancer Paternal Aunt         unknown origin    Breast cancer Paternal Aunt     Prostate cancer Maternal Cousin     Leukemia Maternal Cousin     Prostate cancer Maternal Cousin     Breast cancer Other     Colon cancer Neg Hx     Ovarian cancer Neg Hx      SOCIAL HISTORY:   Social History     Socioeconomic History    Marital status:      Spouse name: Burak    Number of children: 3   Tobacco Use    Smoking status: Never    Smokeless tobacco: Never   Substance and Sexual Activity    Alcohol use: Never    Drug use: No    Sexual activity: Yes     Partners: Male     Birth control/protection: Post-menopausal     Social Determinants of Health     Financial Resource Strain: Medium Risk (4/6/2023)    Overall Financial Resource Strain (CARDIA)     Difficulty of Paying Living Expenses: Somewhat hard   Food Insecurity: No Food Insecurity (4/6/2023)    Hunger Vital Sign     Worried About Running Out of Food in the Last Year: Never true     Ran Out of Food in the Last Year: Never true   Transportation Needs: No Transportation Needs  "(4/6/2023)    PRAPARE - Transportation     Lack of Transportation (Medical): No     Lack of Transportation (Non-Medical): No   Physical Activity: Inactive (4/6/2023)    Exercise Vital Sign     Days of Exercise per Week: 0 days     Minutes of Exercise per Session: 0 min   Stress: No Stress Concern Present (4/6/2023)    [employer] [place] of [employer]ational [employer] - Occupational Stress Questionnaire     Feeling of Stress : Not at all   Social Connections: Moderately Isolated (4/6/2023)    Social Connection and Isolation Panel [NHANES]     Frequency of Communication with Friends and Family: More than three times a week     Frequency of Social Gatherings with Friends and Family: Three times a week     Attends Confucianist Services: More than 4 times per year     Active Member of Clubs or Organizations: No     Attends Club or Organization Meetings: Never     Marital Status:    Housing Stability: Unknown (4/6/2023)    Housing Stability Vital Sign     Unable to Pay for Housing in the Last Year: No     Unstable Housing in the Last Year: No       MEDICATIONS:   Current Outpatient Medications:     ACCU-CHEK GUIDE GLUCOSE METER Misc, USE  THREE TIMES DAILY, Disp: 1 each, Rfl: 0    acetaminophen (TYLENOL) 500 MG tablet, Take 500 mg by mouth every 8 (eight) hours as needed for Pain., Disp: , Rfl:     albuterol (VENTOLIN HFA) 90 mcg/actuation inhaler, Inhale 2 puffs into the lungs every 6 (six) hours as needed for Wheezing or Shortness of Breath. Rescue, Disp: 6.7 g, Rfl: 0    amLODIPine (NORVASC) 2.5 MG tablet, Take 1 tablet (2.5 mg total) by mouth once daily., Disp: 90 tablet, Rfl: 3    aspirin 81 mg Tab, Take 1 tablet by mouth Daily., Disp: , Rfl:     atorvastatin (LIPITOR) 10 MG tablet, Take 1 tablet by mouth once daily, Disp: 90 tablet, Rfl: 1    BD ULTRA-FINE SHORT PEN NEEDLE 31 gauge x 5/16" Ndle, USE 1  4 TIMES DAILY WITH  INSULIN, Disp: 100 each, Rfl: 3    blood sugar diagnostic Strp, Strips and lancets covered by " insurance E11.9, pt checks glucose three times daily, insulin dependent, Disp: 300 each, Rfl: 3    blood sugar diagnostic, drum (ACCU-CHEK COMPACT TEST) Strp, USE ONE STRIP TO CHECK GLUCOSE 4 TIMES DAILY BEFORE EACH MEAL AND AT BEDTIME, Disp: 100 each, Rfl: 0    capsaicin 0.1 % Crea, Apply 1 application  topically 2 (two) times daily as needed (pain)., Disp: 42.5 g, Rfl: 0    diclofenac sodium (VOLTAREN) 1 % Gel, Apply 2 g topically 3 (three) times daily as needed (muscle spasm pain). Apply 2 grams to affected area 3 times daily as needed (Patient taking differently: Apply 2 g topically 3 (three) times daily as needed (muscle spasm pain).), Disp: 100 g, Rfl: 0    dulaglutide (TRULICITY) 1.5 mg/0.5 mL pen injector, Inject 1.5 mg into the skin every 7 days., Disp: 12 pen , Rfl: 2    lancets Misc, To check BG 4 times daily, to use with insurance preferred meter, insulin dependent, Disp: 400 each, Rfl: 3    letrozole (FEMARA) 2.5 mg Tab, Take 1 tablet by mouth once daily, Disp: 90 tablet, Rfl: 3    LIDOcaine (LIDODERM) 5 %, Apply to affected area as needed for pain for 12 hours, then off for 12 hours. Discard after each use. May use 4% lidocaine patch as alternative., Disp: 30 patch, Rfl: 0    losartan (COZAAR) 100 MG tablet, Take 1 tablet (100 mg total) by mouth once daily., Disp: 90 tablet, Rfl: 3    metFORMIN (GLUCOPHAGE) 1000 MG tablet, 1 tablet with a meal Orally Once a day, Disp: 180 tablet, Rfl: 3    topiramate (TOPAMAX) 25 MG tablet, One at bedtime for 2 weeks then two at bedtime, Disp: 60 tablet, Rfl: 2    TRUEPLUS LANCETS 33 gauge Misc, USE 1 TO CHECK GLUCOSE 4 TIMES DAILY, Disp: , Rfl:   No current facility-administered medications for this visit.    Facility-Administered Medications Ordered in Other Visits:     fentaNYL injection 25 mcg, 25 mcg, Intravenous, Q5 Min PRN, Kenneth Shannon MD    midazolam (VERSED) 1 mg/mL injection 0.5 mg, 0.5 mg, Intravenous, PRN, Kenneth Shannon MD, 2  "mg at 09/17/20 0637  ALLERGIES: Review of patient's allergies indicates:  No Known Allergies    VITAL SIGNS: BP (!) 158/78   Pulse 66   Ht 5' 2" (1.575 m)   Wt 67.1 kg (147 lb 14.9 oz)   LMP  (LMP Unknown)   BMI 27.06 kg/m²      PHYSICAL EXAMINATION:  General:  The patient is alert and oriented x 3.  Mood is pleasant.  Observation of ears, eyes and nose reveal no gross abnormalities.  No labored breathing observed.  Gait is coordinated. Patient can toe walk and heel walk without difficulty.      right SHOULDER / UPPER EXTREMITY EXAM    OBSERVATION:     Swelling  none  Deformity  none   Discoloration  none   Scapular winging none   Scars   none  Atrophy  none    TENDERNESS / CREPITUS (T/C):          T/C      T/C   Clavicle   -/-  SUPRAspinatus    -/-     AC Jt.    -/-  INFRAspinatus  -/-    SC Jt.    -/-  Deltoid    -/-      G. Tuberosity  -/-  LH BICEP groove  -/-   Acromion:  -/-  Midline Neck   -/-     Scapular Spine -/-  Trapezium   -/-   SMA Scapula  -/-  GH jt. line - post  -/-     Scapulothoracic  -/-         ROM: (* = with pain)  Right shoulder   Left shoulder        AROM (PROM)   AROM (PROM)   FE    165° (165°)    170° (175°)     ER at 0°    45°  (50°)    60°  (65°)   ER at 90° ABD  NT   90°  (90°)   IR at 90°  ABD   NA  (40°)     NA  (40°)      IR (spine level)   L3    T10    STRENGTH: (* = with pain) RIGHT SHOULDER  LEFT SHOULDER   SCAPTION   4+/5 at 0 and 5-/5 30    5/5    IR    5/5    5/5   ER    4+/5    5/5   BICEPS   5/5    5/5   Deltoid    5/5    5/5     SIGNS:  Painful side       NEER   Neg improved   OCHEYENNES  Neg improved    [place]   Neg improved   SPEEDS  Neg improved     DROP ARM   neg   BELLY PRESS neg   Superior escape none    LIFT-OFF  neg   X-Body ADD    neg    MOVING VALGUS neg        STABILITY TESTING    RIGHT SHOULDER   LEFT SHOULDER     Translation     Anterior  up face     up face    Posterior  up face    up face    Sulcus   < 10mm    < 10 mm     Signs   Apprehension   neg "      neg       Relocation   no change     no change      Jerk test  neg     neg    EXTREMITY NEURO-VASCULAR EXAM    Sensation grossly intact to light touch all dermatomal regions.    DTR 2+ Biceps, Triceps, BR and Negative Polis sign   Grossly intact motor function at Elbow, Wrist and Hand   Distal pulses radial and ulnar 2+, brisk cap refill, symmetric.      NECK:  Painless FROM and spinous processes non-tender. Negative Spurlings sign.      OTHER FINDINGS:  + scapular dyskinesia    XRAYS:  Shoulder 3 view series right,  were obtained and reviewed  No convincing fracture or dislocation is noted. The osseous structures appear well mineralized and well aligned. Moderate glenohumeral DJD with subacromial bone spur noted.     ASSESSMENT:   right:  1. Shoulder pain, acute   2.   Shoulder pain, impingement  3.   Scapular dyskinesia      PLAN:    Activity modifications at work to prevent flare ups    2. RTC as needed.     3. Ice compresses today.       All questions were answered, pt will contact us for questions or concerns in the interim.

## 2024-01-11 DIAGNOSIS — Z00.00 ENCOUNTER FOR MEDICARE ANNUAL WELLNESS EXAM: ICD-10-CM

## 2024-01-22 PROBLEM — J18.9 PNEUMONIA OF LEFT LOWER LOBE DUE TO INFECTIOUS ORGANISM: Status: RESOLVED | Noted: 2021-03-02 | Resolved: 2024-01-22

## 2024-01-25 ENCOUNTER — PATIENT OUTREACH (OUTPATIENT)
Dept: ADMINISTRATIVE | Facility: HOSPITAL | Age: 69
End: 2024-01-25
Payer: MEDICARE

## 2024-01-30 NOTE — ASSESSMENT & PLAN NOTE
Lab Results   Component Value Date    K 3.1 (L) 10/20/2023   - given and PO potassium   - telemonitoring   Patient requests all Lab, Cardiology, and Radiology Results on their Discharge Instructions

## 2024-02-01 ENCOUNTER — OFFICE VISIT (OUTPATIENT)
Dept: PULMONOLOGY | Facility: CLINIC | Age: 69
End: 2024-02-01
Payer: MEDICARE

## 2024-02-01 ENCOUNTER — HOSPITAL ENCOUNTER (OUTPATIENT)
Dept: PULMONOLOGY | Facility: CLINIC | Age: 69
Discharge: HOME | End: 2024-02-01
Payer: MEDICARE

## 2024-02-01 VITALS
BODY MASS INDEX: 26.57 KG/M2 | WEIGHT: 155.63 LBS | HEIGHT: 64 IN | SYSTOLIC BLOOD PRESSURE: 136 MMHG | DIASTOLIC BLOOD PRESSURE: 76 MMHG

## 2024-02-01 DIAGNOSIS — R06.00 DYSPNEA, UNSPECIFIED TYPE: ICD-10-CM

## 2024-02-01 DIAGNOSIS — I89.8 CALCIFIED LYMPH NODES: ICD-10-CM

## 2024-02-01 DIAGNOSIS — Z23 NEED FOR COVID-19 VACCINE: ICD-10-CM

## 2024-02-01 DIAGNOSIS — J18.9 UNRESOLVED PNEUMONIA: Primary | ICD-10-CM

## 2024-02-01 DIAGNOSIS — Z29.11 NEED FOR RSV VACCINATION: ICD-10-CM

## 2024-02-01 DIAGNOSIS — R93.89 ABNORMAL CHEST CT: ICD-10-CM

## 2024-02-01 DIAGNOSIS — J15.8 PNEUMONIA DUE TO OTHER SPECIFIED BACTERIA: ICD-10-CM

## 2024-02-01 PROBLEM — J98.4 CALCIFIED GRANULOMA OF LUNG: Status: RESOLVED | Noted: 2023-04-06 | Resolved: 2024-02-01

## 2024-02-01 PROBLEM — J84.10 CALCIFIED GRANULOMA OF LUNG: Status: RESOLVED | Noted: 2023-04-06 | Resolved: 2024-02-01

## 2024-02-01 PROCEDURE — 3078F DIAST BP <80 MM HG: CPT | Mod: CPTII,S$GLB,, | Performed by: INTERNAL MEDICINE

## 2024-02-01 PROCEDURE — 4010F ACE/ARB THERAPY RXD/TAKEN: CPT | Mod: CPTII,S$GLB,, | Performed by: INTERNAL MEDICINE

## 2024-02-01 PROCEDURE — 99999 PR PBB SHADOW E&M-EST. PATIENT-LVL IV: CPT | Mod: PBBFAC,,, | Performed by: INTERNAL MEDICINE

## 2024-02-01 PROCEDURE — 3288F FALL RISK ASSESSMENT DOCD: CPT | Mod: CPTII,S$GLB,, | Performed by: INTERNAL MEDICINE

## 2024-02-01 PROCEDURE — 1101F PT FALLS ASSESS-DOCD LE1/YR: CPT | Mod: CPTII,S$GLB,, | Performed by: INTERNAL MEDICINE

## 2024-02-01 PROCEDURE — 99204 OFFICE O/P NEW MOD 45 MIN: CPT | Mod: S$GLB,,, | Performed by: INTERNAL MEDICINE

## 2024-02-01 PROCEDURE — 3075F SYST BP GE 130 - 139MM HG: CPT | Mod: CPTII,S$GLB,, | Performed by: INTERNAL MEDICINE

## 2024-02-01 PROCEDURE — 1159F MED LIST DOCD IN RCRD: CPT | Mod: CPTII,S$GLB,, | Performed by: INTERNAL MEDICINE

## 2024-02-01 PROCEDURE — 1126F AMNT PAIN NOTED NONE PRSNT: CPT | Mod: CPTII,S$GLB,, | Performed by: INTERNAL MEDICINE

## 2024-02-01 PROCEDURE — 3008F BODY MASS INDEX DOCD: CPT | Mod: CPTII,S$GLB,, | Performed by: INTERNAL MEDICINE

## 2024-02-01 RX ORDER — RESPIRATORY SYNCYTIAL VISUS VACCINE RECOMBINANT, ADJUVANTED 120MCG/0.5
0.5 KIT INTRAMUSCULAR ONCE
Qty: 0.5 ML | Refills: 0 | Status: SHIPPED | OUTPATIENT
Start: 2024-02-01 | End: 2024-02-01

## 2024-02-01 NOTE — PROGRESS NOTES
History & Physical  [facility]    SUBJECTIVE:     Chief Complaint:   Abnormal chest CT    History of Present Illness:  Sunitha Pillai is a 68 y.o. female who presents for evaluation of an abnormal chest CT.    The patient was hospitalized with pneumonia 10/2023. She was found to have bilateral infiltrates on chest CT & tested positive for rhinovirus.     The pt denies any excessive coughing or dyspnea at present.    The pt is a lifetime nonsmoker.    The pt has history of radiation to the left breast in .    Review of patient's allergies indicates:  No Known Allergies    Past Medical History:   Diagnosis Date    Acute coronary syndrome 2018    Adjustment disorder     Anxiety     Arthritis     Cancer of breast 2020    Malignant neoplasm of lower-inner quadrant of left breast in female, estrogen receptor positive    Cholelithiasis with acute cholecystitis 2021    Coronary artery disease     Depression     Diabetes mellitus type I     Genetic testing of female 2020    [employer] via [employer] with AXIN2 and POLE variants of uncertain significance    GERD (gastroesophageal reflux disease)     Hepatitis B core antibody positive 3/9/2023    Negative sAg, suggests previous exposure but no chronic/active Hep B. At risk for reactivation with any immunosuppression medication, steroids, chemo, etc.      Hypertension     Vertigo 2019    Vitamin D deficiency 2021     Past Surgical History:   Procedure Laterality Date    BREAST BIOPSY Left 2022    Left punch biopsy; Unremarkable keratinized skin with intradermal hematoma     SECTION      INJECTION FOR SENTINEL NODE IDENTIFICATION Left 2020    Procedure: INJECTION, FOR SENTINEL NODE IDENTIFICATION;  Surgeon: Isabel Moulton MD;  Location: [facility];  Service: General;  Laterality: Left;    INSERTION OF TUNNELED CENTRAL VENOUS CATHETER (CVC) WITH SUBCUTANEOUS PORT N/A 2020    Procedure: INSERTION,  PORT-A-CATH;  Surgeon: Hardeep Martin MD;  Location: [facility] CATH LAB;  Service: Radiology;  Laterality: N/A;    LAPAROSCOPIC CHOLECYSTECTOMY N/A 03/06/2021    Procedure: CHOLECYSTECTOMY, LAPAROSCOPIC;  Surgeon: Buster Son MD;  Location: [facility] OR [facility] FLR;  Service: General;  Laterality: N/A;    MASTECTOMY, PARTIAL Left 09/17/2020    Procedure: MASTECTOMY, PARTIAL-w/reflector;  Surgeon: Isabel Moulton MD;  Location: [facility] OR;  Service: General;  Laterality: Left;    SENTINEL LYMPH NODE BIOPSY Left 09/17/2020    Procedure: BIOPSY, LYMPH NODE, SENTINEL;  Surgeon: Isabel Moulton MD;  Location: [facility] OR;  Service: General;  Laterality: Left;     Family History   Problem Relation Age of Onset    Hypertension Mother     Hyperlipidemia Mother     Diabetes Mother     Heart disease Mother     Colon polyps Mother     Aneurysm Father     Diabetes Sister     Hypertension Sister     Heart disease Brother     Diabetes Brother     Hypertension Brother     Cancer Brother         brother German with prostate cancer; brother Cooper with throat cancer    Cervical cancer Daughter     Anxiety disorder Daughter     Depression Daughter     Anxiety disorder Daughter     Depression Daughter     Breast cancer Maternal Aunt     Cancer Maternal Aunt         unknown origin    Cancer Paternal Aunt         unknown origin    Breast cancer Paternal Aunt     Prostate cancer Maternal Cousin     Leukemia Maternal Cousin     Prostate cancer Maternal Cousin     Breast cancer Other     Colon cancer Neg Hx     Ovarian cancer Neg Hx      Social History     Socioeconomic History    Marital status:      Spouse name: Burak    Number of children: 3   Tobacco Use    Smoking status: Never    Smokeless tobacco: Never   Substance and Sexual Activity    Alcohol use: Never    Drug use: No    Sexual activity: Yes     Partners: Male     Birth control/protection: Post-menopausal     Social Determinants of Health     Financial Resource Strain: Medium Risk  "(4/6/2023)    Overall Financial Resource Strain (CARDIA)     Difficulty of Paying Living Expenses: Somewhat hard   Food Insecurity: No Food Insecurity (4/6/2023)    Hunger Vital Sign     Worried About Running Out of Food in the Last Year: Never true     Ran Out of Food in the Last Year: Never true   Transportation Needs: No Transportation Needs (4/6/2023)    PRAPARE - Transportation     Lack of Transportation (Medical): No     Lack of Transportation (Non-Medical): No   Physical Activity: Inactive (4/6/2023)    Exercise Vital Sign     Days of Exercise per Week: 0 days     Minutes of Exercise per Session: 0 min   Stress: No Stress Concern Present (4/6/2023)    [employer] - Occupational Stress Questionnaire     Feeling of Stress : Not at all   Social Connections: Moderately Isolated (4/6/2023)    Social Connection and Isolation Panel [NHANES]     Frequency of Communication with Friends and Family: More than three times a week     Frequency of Social Gatherings with Friends and Family: Three times a week     Attends Druze Services: More than 4 times per year     Active Member of Clubs or Organizations: No     Attends Club or Organization Meetings: Never     Marital Status:    Housing Stability: Unknown (4/6/2023)    Housing Stability Vital Sign     Unable to Pay for Housing in the Last Year: No     Unstable Housing in the Last Year: No     Review of Systems:  No pertinent positives.    OBJECTIVE:     Vital Signs  Vitals:    02/01/24 0851   BP: 136/76   Weight: 70.6 kg (155 lb 10.3 oz)   Height: 5' 4" (1.626 m)     Body mass index is 26.72 kg/m².    Physical Exam:  General: no distress  Eyes:  conjunctivae/corneas clear  Nose: no discharge  Neck: trachea midline with no masses appreciated  Lungs:  normal respiratory effort, no wheezes, no rales  Heart: regular rate and rhythm and no murmur  Abdomen: non-distended  Extremities: no cyanosis, no edema, no clubbing  Skin: No rashes or " lesions. good skin turgor  Neurologic: alert, oriented, thought content appropriate    Laboratory:  Lab Results   Component Value Date    WBC 4.61 11/16/2023    HGB 10.5 (L) 11/16/2023    HCT 31.9 (L) 11/16/2023    MCV 96 11/16/2023     11/16/2023     Chest Imaging, My Impression:   CT chest 11/2023: there are multi-focal infiltrates that appear moderately improved in comparison to 10/2023    ASSESSMENT/PLAN:     1) Abnormal chest CT  2) Unresolving pneumonia  3) History of breast cancer  4) Calcified mediastinal lymph node    Pt suffered a severe pneumonia 10/2023 requiring hospitalization. Chest CT one month after showed that the radiographic abnormalities had improved but not resolved. Reviewed chest CT with patient & provided education. I suspect the patient just had slow-to-resolve infiltrates rather than a new diagnosis of lung disease- Recommend follow up chest CT now.    5) Need for vaccinations. Pt is due for covid booster & RSV vaccine. Recommended vaccination now.    Chato Diez MD  [facility]

## 2024-02-02 ENCOUNTER — HOSPITAL ENCOUNTER (OUTPATIENT)
Dept: RADIOLOGY | Facility: HOSPITAL | Age: 69
Discharge: HOME | End: 2024-02-02
Attending: INTERNAL MEDICINE
Payer: MEDICARE

## 2024-02-02 DIAGNOSIS — J18.9 UNRESOLVED PNEUMONIA: ICD-10-CM

## 2024-02-02 PROCEDURE — 71250 CT THORAX DX C-: CPT | Mod: TC

## 2024-02-02 PROCEDURE — 71250 CT THORAX DX C-: CPT | Mod: 26,,, | Performed by: RADIOLOGY

## 2024-02-05 ENCOUNTER — TELEPHONE (OUTPATIENT)
Dept: PULMONOLOGY | Facility: CLINIC | Age: 69
End: 2024-02-05
Payer: MEDICARE

## 2024-02-05 NOTE — TELEPHONE ENCOUNTER
Reviewed chest CT. Called pt & discussed the results. CT shows significant improvement in abnormal findings consistent with pneumonia that is now resolved. No additional pulmonary follow up is needed for this.    Chato Diez MD

## 2024-02-06 ENCOUNTER — TELEPHONE (OUTPATIENT)
Dept: INTERNAL MEDICINE | Facility: CLINIC | Age: 69
End: 2024-02-06
Payer: MEDICARE

## 2024-02-06 NOTE — TELEPHONE ENCOUNTER
----- Message from Kristi Stewart sent at 2/6/2024 12:47 PM CST -----  Contact: [phone]  [email or web address]     Patient is calling for Medical Advice regarding:speak to the dr     How long has patient had these symptoms:    Pharmacy name and phone#:    Would like response via SCHADt:  no     Comments:  Pt has an appt 02/08 and she is asking if the appt can be changed top Friday she states she has a dentist appt and they told her it will take about 2 hours please give return call      Alert and oriented, no focal deficits, no motor or sensory deficits.

## 2024-02-10 ENCOUNTER — HOSPITAL ENCOUNTER (EMERGENCY)
Facility: HOSPITAL | Age: 69
Discharge: HOME | End: 2024-02-10
Attending: STUDENT IN AN ORGANIZED HEALTH CARE EDUCATION/TRAINING PROGRAM
Payer: MEDICARE

## 2024-02-10 VITALS
DIASTOLIC BLOOD PRESSURE: 82 MMHG | RESPIRATION RATE: 16 BRPM | TEMPERATURE: 99 F | HEART RATE: 71 BPM | OXYGEN SATURATION: 96 % | SYSTOLIC BLOOD PRESSURE: 169 MMHG

## 2024-02-10 DIAGNOSIS — R07.9 CHEST PAIN: ICD-10-CM

## 2024-02-10 LAB
ALBUMIN SERPL BCP-MCNC: 3.3 G/DL (ref 3.5–5.2)
ALP SERPL-CCNC: 84 U/L (ref 55–135)
ALT SERPL W/O P-5'-P-CCNC: 9 U/L (ref 10–44)
ANION GAP SERPL CALC-SCNC: 8 MMOL/L (ref 8–16)
ANISOCYTOSIS BLD QL SMEAR: SLIGHT
AST SERPL-CCNC: 18 U/L (ref 10–40)
BASOPHILS # BLD AUTO: 0.02 K/UL (ref 0–0.2)
BASOPHILS NFR BLD: 0.4 % (ref 0–1.9)
BILIRUB SERPL-MCNC: 0.6 MG/DL (ref 0.1–1)
BNP SERPL-MCNC: 105 PG/ML (ref 0–99)
BUN SERPL-MCNC: 12 MG/DL (ref 8–23)
CALCIUM SERPL-MCNC: 9 MG/DL (ref 8.7–10.5)
CHLORIDE SERPL-SCNC: 111 MMOL/L (ref 95–110)
CO2 SERPL-SCNC: 24 MMOL/L (ref 23–29)
CREAT SERPL-MCNC: 1.1 MG/DL (ref 0.5–1.4)
DIFFERENTIAL METHOD BLD: ABNORMAL
EOSINOPHIL # BLD AUTO: 0 K/UL (ref 0–0.5)
EOSINOPHIL NFR BLD: 0 % (ref 0–8)
ERYTHROCYTE [DISTWIDTH] IN BLOOD BY AUTOMATED COUNT: 14.4 % (ref 11.5–14.5)
EST. GFR  (NO RACE VARIABLE): 54.4 ML/MIN/1.73 M^2
GLUCOSE SERPL-MCNC: 134 MG/DL (ref 70–110)
HCT VFR BLD AUTO: 31.8 % (ref 37–48.5)
HCV AB SERPL QL IA: NORMAL
HGB BLD-MCNC: 10.6 G/DL (ref 12–16)
HIV 1+2 AB+HIV1 P24 AG SERPL QL IA: NORMAL
IMM GRANULOCYTES # BLD AUTO: 0.01 K/UL (ref 0–0.04)
IMM GRANULOCYTES NFR BLD AUTO: 0.2 % (ref 0–0.5)
INFLUENZA A, MOLECULAR: NEGATIVE
INFLUENZA B, MOLECULAR: NEGATIVE
INR PPP: 1 (ref 0.8–1.2)
LYMPHOCYTES # BLD AUTO: 1.3 K/UL (ref 1–4.8)
LYMPHOCYTES NFR BLD: 25 % (ref 18–48)
MCH RBC QN AUTO: 32.1 PG (ref 27–31)
MCHC RBC AUTO-ENTMCNC: 33.3 G/DL (ref 32–36)
MCV RBC AUTO: 96 FL (ref 82–98)
MONOCYTES # BLD AUTO: 0.7 K/UL (ref 0.3–1)
MONOCYTES NFR BLD: 13.8 % (ref 4–15)
NEUTROPHILS # BLD AUTO: 3.3 K/UL (ref 1.8–7.7)
NEUTROPHILS NFR BLD: 60.6 % (ref 38–73)
NRBC BLD-RTO: 0 /100 WBC
OHS QRS DURATION: 98 MS
OHS QTC CALCULATION: 442 MS
PLATELET # BLD AUTO: 130 K/UL (ref 150–450)
PLATELET BLD QL SMEAR: ABNORMAL
PMV BLD AUTO: 9.5 FL (ref 9.2–12.9)
POTASSIUM SERPL-SCNC: 3.8 MMOL/L (ref 3.5–5.1)
PROT SERPL-MCNC: 6.9 G/DL (ref 6–8.4)
PROTHROMBIN TIME: 10.9 SEC (ref 9–12.5)
RBC # BLD AUTO: 3.3 M/UL (ref 4–5.4)
SARS-COV-2 RDRP RESP QL NAA+PROBE: NEGATIVE
SODIUM SERPL-SCNC: 143 MMOL/L (ref 136–145)
SPECIMEN SOURCE: NORMAL
TROPONIN I SERPL DL<=0.01 NG/ML-MCNC: 0.01 NG/ML (ref 0–0.03)
WBC # BLD AUTO: 5.37 K/UL (ref 3.9–12.7)

## 2024-02-10 PROCEDURE — 93010 ELECTROCARDIOGRAM REPORT: CPT | Mod: ,,, | Performed by: INTERNAL MEDICINE

## 2024-02-10 PROCEDURE — 25000003 PHARM REV CODE 250: Performed by: STUDENT IN AN ORGANIZED HEALTH CARE EDUCATION/TRAINING PROGRAM

## 2024-02-10 PROCEDURE — 85025 COMPLETE CBC W/AUTO DIFF WBC: CPT | Performed by: STUDENT IN AN ORGANIZED HEALTH CARE EDUCATION/TRAINING PROGRAM

## 2024-02-10 PROCEDURE — 86803 HEPATITIS C AB TEST: CPT | Performed by: STUDENT IN AN ORGANIZED HEALTH CARE EDUCATION/TRAINING PROGRAM

## 2024-02-10 PROCEDURE — 87502 INFLUENZA DNA AMP PROBE: CPT | Performed by: STUDENT IN AN ORGANIZED HEALTH CARE EDUCATION/TRAINING PROGRAM

## 2024-02-10 PROCEDURE — 85610 PROTHROMBIN TIME: CPT | Performed by: STUDENT IN AN ORGANIZED HEALTH CARE EDUCATION/TRAINING PROGRAM

## 2024-02-10 PROCEDURE — 84484 ASSAY OF TROPONIN QUANT: CPT | Performed by: STUDENT IN AN ORGANIZED HEALTH CARE EDUCATION/TRAINING PROGRAM

## 2024-02-10 PROCEDURE — 80053 COMPREHEN METABOLIC PANEL: CPT | Performed by: STUDENT IN AN ORGANIZED HEALTH CARE EDUCATION/TRAINING PROGRAM

## 2024-02-10 PROCEDURE — 87389 HIV-1 AG W/HIV-1&-2 AB AG IA: CPT | Performed by: STUDENT IN AN ORGANIZED HEALTH CARE EDUCATION/TRAINING PROGRAM

## 2024-02-10 PROCEDURE — 99285 EMERGENCY DEPT VISIT HI MDM: CPT | Mod: 25

## 2024-02-10 PROCEDURE — 83880 ASSAY OF NATRIURETIC PEPTIDE: CPT | Performed by: STUDENT IN AN ORGANIZED HEALTH CARE EDUCATION/TRAINING PROGRAM

## 2024-02-10 PROCEDURE — 94761 N-INVAS EAR/PLS OXIMETRY MLT: CPT

## 2024-02-10 PROCEDURE — U0002 COVID-19 LAB TEST NON-CDC: HCPCS | Performed by: STUDENT IN AN ORGANIZED HEALTH CARE EDUCATION/TRAINING PROGRAM

## 2024-02-10 PROCEDURE — 93005 ELECTROCARDIOGRAM TRACING: CPT

## 2024-02-10 RX ORDER — ACETAMINOPHEN 325 MG/1
650 TABLET ORAL
Status: COMPLETED | OUTPATIENT
Start: 2024-02-10 | End: 2024-02-10

## 2024-02-10 RX ORDER — ASPIRIN 325 MG
325 TABLET ORAL
Status: COMPLETED | OUTPATIENT
Start: 2024-02-10 | End: 2024-02-10

## 2024-02-10 RX ADMIN — ASPIRIN 325 MG ORAL TABLET 325 MG: 325 PILL ORAL at 03:02

## 2024-02-10 RX ADMIN — ACETAMINOPHEN 650 MG: 325 TABLET ORAL at 03:02

## 2024-02-10 NOTE — ED PROVIDER NOTES
Chief Complaint   Chest Pain (Intermittent L sided CP radiating down L arm since Thursday. Pain described as pressure)      History Of Present Illness   Sunitha Pillai is a 68 y.o. female with a PMHx including CAD, breast cancer s/p lumpectomy, rheumatoid arthritis, DM  presenting with chest pain.  Patient states that since Thursday she has been having intermittent pain on the left side of her chest and left shoulder.  States it was worse with movement of her left shoulder and her chest is tender to palpation.  She reports no exertional chest pain.  She reports no associated dyspnea, diaphoresis.  States that the pain started after she had been doing some heavy lifting on Monday.  States she thought the pain would go away but since it was continued she became concerned.  She otherwise reports no falls, or traumatic injury.  She reports no associated nausea, vomiting, abdominal pain.  She reports no back pain.  She reports no medications taken prior to arrival.    Independent Historian: Yes  Other Historian or Collateral: Chart review  Interpretor: No      Review of patient's allergies indicates:  No Known Allergies    Current Facility-Administered Medications on File Prior to Encounter   Medication Dose Route Frequency Provider Last Rate Last Admin    fentaNYL injection 25 mcg  25 mcg Intravenous Q5 Min PRN Kenneth Shannon MD        midazolam (VERSED) 1 mg/mL injection 0.5 mg  0.5 mg Intravenous PRN Kenneth Shannon MD   2 mg at 09/17/20 0637     Current Outpatient Medications on File Prior to Encounter   Medication Sig Dispense Refill    ACCU-CHEK GUIDE GLUCOSE METER Misc USE  THREE TIMES DAILY 1 each 0    acetaminophen (TYLENOL) 500 MG tablet Take 500 mg by mouth every 8 (eight) hours as needed for Pain.      albuterol (VENTOLIN HFA) 90 mcg/actuation inhaler Inhale 2 puffs into the lungs every 6 (six) hours as needed for Wheezing or Shortness of Breath. Rescue (Patient not taking:  "Reported on 2/12/2024) 6.7 g 0    amLODIPine (NORVASC) 2.5 MG tablet Take 1 tablet (2.5 mg total) by mouth once daily. 90 tablet 3    aspirin 81 mg Tab Take 1 tablet by mouth Daily.      atorvastatin (LIPITOR) 10 MG tablet Take 1 tablet by mouth once daily 90 tablet 1    BD ULTRA-FINE SHORT PEN NEEDLE 31 gauge x 5/16" Ndle USE 1  4 TIMES DAILY WITH  INSULIN 100 each 3    blood sugar diagnostic Strp Strips and lancets covered by insurance [id number], pt checks glucose three times daily, insulin dependent 300 each 3    blood sugar diagnostic, drum (ACCU-CHEK COMPACT TEST) Strp USE ONE STRIP TO CHECK GLUCOSE 4 TIMES DAILY BEFORE EACH MEAL AND AT BEDTIME 100 each 0    capsaicin 0.1 % Crea Apply 1 application  topically 2 (two) times daily as needed (pain). (Patient not taking: Reported on 2/12/2024) 42.5 g 0    diclofenac sodium (VOLTAREN) 1 % Gel Apply 2 g topically 3 (three) times daily as needed (muscle spasm pain). Apply 2 grams to affected area 3 times daily as needed (Patient not taking: Reported on 2/12/2024) 100 g 0    dulaglutide (TRULICITY) 1.5 mg/0.5 mL pen injector Inject 1.5 mg into the skin every 7 days. 12 pen 2    lancets Misc To check BG 4 times daily, to use with insurance preferred meter, insulin dependent (Patient not taking: Reported on 2/12/2024) 400 each 3    letrozole (FEMARA) 2.5 mg Tab Take 1 tablet by mouth once daily 90 tablet 3    LIDOcaine (LIDODERM) 5 % Apply to affected area as needed for pain for 12 hours, then off for 12 hours. Discard after each use.  May use 4% lidocaine patch as alternative. (Patient not taking: Reported on 2/12/2024) 30 patch 0    losartan (COZAAR) 100 MG tablet Take 1 tablet (100 mg total) by mouth once daily. (Patient not taking: Reported on 2/12/2024) 90 tablet 3    metFORMIN (GLUCOPHAGE) 1000 MG tablet 1 tablet with a meal Orally Once a day 180 tablet 3    topiramate (TOPAMAX) 25 MG tablet One at bedtime for 2 weeks then two at bedtime 60 tablet 2    TRUEPLUS LANCETS " 33 gauge Misc USE 1 TO CHECK GLUCOSE 4 TIMES DAILY         Past History   As per HPI and below:  Past Medical History:   Diagnosis Date    Acute coronary syndrome 2018    Adjustment disorder     Anxiety     Arthritis     Cancer of breast 2020    Malignant neoplasm of lower-inner quadrant of left breast in female, estrogen receptor positive    Cholelithiasis with acute cholecystitis 2021    Coronary artery disease     Depression     Diabetes mellitus type I     Genetic testing of female 2020    [employer] via [employer] with AXIN2 and POLE variants of uncertain significance    GERD (gastroesophageal reflux disease)     Hepatitis B core antibody positive 3/9/2023    Negative sAg, suggests previous exposure but no chronic/active Hep B. At risk for reactivation with any immunosuppression medication, steroids, chemo, etc.      Hypertension     Vertigo 2019    Vitamin D deficiency 2021     Past Surgical History:   Procedure Laterality Date    BREAST BIOPSY Left 2022    Left punch biopsy; Unremarkable keratinized skin with intradermal hematoma     SECTION      INJECTION FOR SENTINEL NODE IDENTIFICATION Left 2020    Procedure: INJECTION, FOR SENTINEL NODE IDENTIFICATION;  Surgeon: Isabel Moulton MD;  Location: [facility] OR;  Service: General;  Laterality: Left;    INSERTION OF TUNNELED CENTRAL VENOUS CATHETER (CVC) WITH SUBCUTANEOUS PORT N/A 2020    Procedure: INSERTION, PORT-A-CATH;  Surgeon: Hardeep Martin MD;  Location: [facility] CATH LAB;  Service: Radiology;  Laterality: N/A;    LAPAROSCOPIC CHOLECYSTECTOMY N/A 2021    Procedure: CHOLECYSTECTOMY, LAPAROSCOPIC;  Surgeon: Buster Son MD;  Location: [address];  Service: General;  Laterality: N/A;    MASTECTOMY, PARTIAL Left 2020    Procedure: MASTECTOMY, PARTIAL-w/reflector;  Surgeon: Isabel Moulton MD;  Location: [facility] OR;  Service: General;  Laterality: Left;    SENTINEL LYMPH NODE BIOPSY  Left 09/17/2020    Procedure: BIOPSY, LYMPH NODE, SENTINEL;  Surgeon: Isabel Moulton MD;  Location: [facility];  Service: General;  Laterality: Left;       Social History     Socioeconomic History    Marital status:      Spouse name: Burak    Number of children: 3   Tobacco Use    Smoking status: Never    Smokeless tobacco: Never   Substance and Sexual Activity    Alcohol use: Never    Drug use: No    Sexual activity: Yes     Partners: Male     Birth control/protection: Post-menopausal     Social Determinants of Health     Financial Resource Strain: Medium Risk (4/6/2023)    Overall Financial Resource Strain (CARDIA)     Difficulty of Paying Living Expenses: Somewhat hard   Food Insecurity: No Food Insecurity (4/6/2023)    Hunger Vital Sign     Worried About Running Out of Food in the Last Year: Never true     Ran Out of Food in the Last Year: Never true   Transportation Needs: No Transportation Needs (4/6/2023)    PRAPARE - Transportation     Lack of Transportation (Medical): No     Lack of Transportation (Non-Medical): No   Physical Activity: Inactive (4/6/2023)    Exercise Vital Sign     Days of Exercise per Week: 0 days     Minutes of Exercise per Session: 0 min   Stress: No Stress Concern Present (4/6/2023)    [employer] - Occupational Stress Questionnaire     Feeling of Stress : Not at all   Social Connections: Moderately Isolated (4/6/2023)    Social Connection and Isolation Panel [NHANES]     Frequency of Communication with Friends and Family: More than three times a week     Frequency of Social Gatherings with Friends and Family: Three times a week     Attends Cheondoism Services: More than 4 times per year     Active Member of Clubs or Organizations: No     Attends Club or Organization Meetings: Never     Marital Status:    Housing Stability: Unknown (4/6/2023)    Housing Stability Vital Sign     Unable to Pay for Housing in the Last Year: No     Unstable Housing in  the Last Year: No       Family History   Problem Relation Age of Onset    Hypertension Mother     Hyperlipidemia Mother     Diabetes Mother     Heart disease Mother     Colon polyps Mother     Aneurysm Father     Diabetes Sister     Hypertension Sister     Heart disease Brother     Diabetes Brother     Hypertension Brother     Cancer Brother         brother German with prostate cancer; brother Cooper with throat cancer    Cervical cancer Daughter     Anxiety disorder Daughter     Depression Daughter     Anxiety disorder Daughter     Depression Daughter     Breast cancer Maternal Aunt     Cancer Maternal Aunt         unknown origin    Cancer Paternal Aunt         unknown origin    Breast cancer Paternal Aunt     Prostate cancer Maternal Cousin     Leukemia Maternal Cousin     Prostate cancer Maternal Cousin     Breast cancer Other     Colon cancer Neg Hx     Ovarian cancer Neg Hx        Physical Exam     Vitals:    02/10/24 0038 02/10/24 0338 02/10/24 0539 02/10/24 0744   BP: (!) 161/81 (!) 155/69 (!) 147/61 (!) 169/82   BP Location: Right arm Right arm  Right arm   Patient Position: Sitting Sitting Sitting Sitting   Pulse: 86 73 67 71   Resp: 18 16 16 16   Temp: 99.4 °F (37.4 °C) 98.6 °F (37 °C)  98.7 °F (37.1 °C)   TempSrc: Oral Oral     SpO2: 100% 99% 99% 96%       Physical Exam  Vitals reviewed.   Constitutional:       General: She is not in acute distress.     Appearance: She is not toxic-appearing.   HENT:      Head: Normocephalic and atraumatic.      Nose: No congestion.      Mouth/Throat:      Mouth: Mucous membranes are moist.   Eyes:      General: No scleral icterus.     Extraocular Movements: Extraocular movements intact.      Pupils: Pupils are equal, round, and reactive to light.   Cardiovascular:      Rate and Rhythm: Normal rate and regular rhythm.   Pulmonary:      Effort: No respiratory distress.      Breath sounds: No wheezing or rhonchi.   Chest:      Chest wall: Tenderness present.           Comments: Minimally tender to palpation of left upper anterior chest and left anterior shoulder without swelling, deformity, or crepitus.  Normal range of left shoulder but with some tenderness.  No palpable effusion.  Abdominal:      General: There is no distension.      Palpations: Abdomen is soft.      Tenderness: There is no abdominal tenderness. There is no guarding.   Musculoskeletal:         General: No deformity.      Cervical back: Normal range of motion. No rigidity.   Skin:     General: Skin is warm and dry.      Capillary Refill: Capillary refill takes less than 2 seconds.   Neurological:      General: No focal deficit present.      Mental Status: She is alert and oriented to person, place, and time.             Results     Labs Reviewed   CBC W/ AUTO DIFFERENTIAL - Abnormal; Notable for the following components:       Result Value    RBC 3.30 (*)     Hemoglobin 10.6 (*)     Hematocrit 31.8 (*)     MCH 32.1 (*)     Platelets 130 (*)     Platelet Estimate Decreased (*)     All other components within normal limits   COMPREHENSIVE METABOLIC PANEL - Abnormal; Notable for the following components:    Chloride 111 (*)     Glucose 134 (*)     Albumin 3.3 (*)     ALT 9 (*)     eGFR 54.4 (*)     All other components within normal limits   B-TYPE NATRIURETIC PEPTIDE - Abnormal; Notable for the following components:     (*)     All other components within normal limits   INFLUENZA A & B BY MOLECULAR   HIV 1 / 2 ANTIBODY    Narrative:     Release to patient->Immediate   HEPATITIS C ANTIBODY    Narrative:     Release to patient->Immediate   TROPONIN I   PROTIME-INR   SARS-COV-2 RNA AMPLIFICATION, QUAL       Imaging Results              X-Ray Chest PA And Lateral (Final result)  Result time 02/10/24 06:23:59      Final result by Doroteo Sagastume MD (02/10/24 06:23:59)                   Impression:      No convincing evidence of acute cardiopulmonary disease.      Electronically signed by: Doroteo  Mitesh  Date:    02/10/2024  Time:    06:23               Narrative:    EXAMINATION:  XR CHEST PA AND LATERAL    CLINICAL HISTORY:  Chest pain, unspecified    TECHNIQUE:  PA and lateral views of the chest were performed.    COMPARISON:  Chest radiograph 10/17/2023.    FINDINGS:  Cardiomediastinal contours appear to be within normal limits.    Chronic appearing interstitial coarsening, similar to prior study of 10/17/2023.  No definite acute appearing airspace consolidation identified.    No definite pneumothorax or large volume pleural effusion.    No acute findings in the visualized abdomen.    Osseous and soft tissue structures appear without definite acute abnormality.                                            Initial [facility]   Medical Decision Making  Patient is a 68-year-old female presenting with chest pain.  Given her exam and her recent history, most concerning for MSK pain.  Consider ACS, arrhythmia, electrolyte abnormality, acute anemia though less likely.  We will get workup including EKG, and labs including troponin.  We will get chest x-ray to further evaluate for rib fracture, shoulder fracture, pneumothorax.  Very low suspicion for dissection, PE    Amount and/or Complexity of Data Reviewed  Labs: ordered.  Radiology: ordered.    Risk  OTC drugs.               Medications Given / Interventions     Medications   aspirin tablet 325 mg (325 mg Oral Given 2/10/24 0321)   acetaminophen tablet 650 mg (650 mg Oral Given 2/10/24 0321)       Procedures     ED POCUS Performed: No    Reassessment and ED Course      COVID/flu negative.  EKG shows normal sinus rhythm, no acute ischemia  Troponin within normal limits  CBC with a baseline anemia, thrombocytopenia  CMP grossly okay  Chest x-ray reassuring    Discussed results with the patient.  Discussed need for outpatient follow up.  Discussed strict return precautions.  DC to home.         Final diagnoses:  [R07.9] Chest pain           Dispo      ED Disposition  Condition    Discharge Stable            ED Prescriptions    None       Follow-up Information       Follow up With Specialties Details Why Contact Info    Patty Louis MD Internal Medicine Schedule an appointment as soon as possible for a visit in 1 week  [address], [address]  [place] [facility] [address]  [phone]                          Antonette Kaufman MD  02/12/24 8344

## 2024-02-10 NOTE — ED NOTES
"  Patient identifiers for Sunitha Pillai 68 y.o. female checked and correct.  Chief Complaint   Patient presents with    Chest Pain     Intermittent L sided CP radiating down L arm since Thursday. Pain described as pressure   Pt also endorses min productive cough and worsening "chest tightness" on inspiration. Denies N/V, abd pain, jaw pain.   Past Medical History:   Diagnosis Date    Acute coronary syndrome 09/23/2018    Adjustment disorder     Anxiety     Arthritis     Cancer of breast 09/03/2020    Malignant neoplasm of lower-inner quadrant of left breast in female, estrogen receptor positive    Cholelithiasis with acute cholecystitis 03/05/2021    Coronary artery disease     Depression     Diabetes mellitus type I     Genetic testing of female 09/2020    [employer] via [employer] with AXIN2 and POLE variants of uncertain significance    GERD (gastroesophageal reflux disease)     Hepatitis B core antibody positive 3/9/2023    Negative sAg, suggests previous exposure but no chronic/active Hep B. At risk for reactivation with any immunosuppression medication, steroids, chemo, etc.      Hypertension     Vertigo 05/13/2019    Vitamin D deficiency 02/07/2021     Allergies reported: Review of patient's allergies indicates:  No Known Allergies      HEENT: Denies vision changes. Denies ear drainage or hearing loss. No c/o nasal drainage. Denies dysphagia or voice changes.   Appearance: Pt awake, alert & oriented to person, place & time. Pt in no acute distress at present time. Pt is clean and well groomed with clothes appropriately fastened.   Skin: Skin warm, dry & intact. Color consistent with ethnicity. Mucous membranes moist. No breakdown or brusing noted.   Musculoskeletal: Patient moving all extremities well, no obvious swelling or deformities noted. Pt reports "tightness" to LUE. Denies numbness/ tingling to LUE. CMS intact  Respiratory: Respirations spontaneous, even, and non-labored. Visible chest rise " "noted. Airway is open and patent. No accessory muscle use noted.   Neurologic: Sensation is intact. Speech is clear and appropriate. Eyes open spontaneously, behavior appropriate to situation, follows commands, facial expression symmetrical, bilateral hand grasp equal and even, purposeful motor response noted.  Cardiac: All peripheral pulses present. No Bilateral lower extremity edema. Cap refill is <3 seconds. Pt describes chest pain as "tightness" w/ pain radiating down L arm. Pain is worse on inspiration.   Abdomen: Abdomen soft, non distended, non tender to palpation.   : Pt voids independently, denies dysuria, hematuria, frequency.  "

## 2024-02-12 ENCOUNTER — OFFICE VISIT (OUTPATIENT)
Dept: URGENT CARE | Facility: CLINIC | Age: 69
End: 2024-02-12
Payer: MEDICARE

## 2024-02-12 VITALS
BODY MASS INDEX: 26.57 KG/M2 | HEART RATE: 83 BPM | TEMPERATURE: 99 F | HEIGHT: 64 IN | DIASTOLIC BLOOD PRESSURE: 52 MMHG | OXYGEN SATURATION: 99 % | RESPIRATION RATE: 14 BRPM | SYSTOLIC BLOOD PRESSURE: 157 MMHG | WEIGHT: 155.63 LBS

## 2024-02-12 DIAGNOSIS — M79.632 PAIN OF LEFT FOREARM: ICD-10-CM

## 2024-02-12 DIAGNOSIS — R05.9 COUGH, UNSPECIFIED TYPE: ICD-10-CM

## 2024-02-12 DIAGNOSIS — J06.9 VIRAL URI WITH COUGH: Primary | ICD-10-CM

## 2024-02-12 LAB
CTP QC/QA: YES
SARS-COV-2 AG RESP QL IA.RAPID: NEGATIVE

## 2024-02-12 PROCEDURE — 99213 OFFICE O/P EST LOW 20 MIN: CPT | Mod: S$GLB,,,

## 2024-02-12 PROCEDURE — 87811 SARS-COV-2 COVID19 W/OPTIC: CPT | Mod: QW,S$GLB,,

## 2024-02-12 RX ORDER — BENZONATATE 100 MG/1
100 CAPSULE ORAL 3 TIMES DAILY PRN
Qty: 30 CAPSULE | Refills: 0 | Status: SHIPPED | OUTPATIENT
Start: 2024-02-12 | End: 2024-02-22

## 2024-02-12 NOTE — PROGRESS NOTES
"Subjective:      Patient ID: Sunitha Pillai is a 68 y.o. female.    Vitals:  height is 5' 4" (1.626 m) and weight is 70.6 kg (155 lb 10.3 oz). Her oral temperature is 98.7 °F (37.1 °C). Her blood pressure is 157/52 (abnormal) and her pulse is 83. Her respiration is 14 and oxygen saturation is 99%.     Chief Complaint: Cough    Pt reports that she has been having a non productive cough since Saturday night 2/10. Pt has been having some chest congestion and rib pain when coughing. Pt reports that she used mucinex with no relief. Also c/o left forearm/wrist pain x a few days. No injury or swelling.     Cough  This is a new problem. The current episode started in the past 7 days (saturday). The problem has been unchanged. The cough is Non-productive. Associated symptoms include postnasal drip. Pertinent negatives include no chest pain, chills, ear congestion, ear pain, fever, headaches, heartburn, hemoptysis, myalgias, nasal congestion, rash, rhinorrhea, sore throat, shortness of breath, sweats, weight loss or wheezing. She has tried OTC cough suppressant (mucinex) for the symptoms. The treatment provided no relief. There is no history of asthma, bronchitis or COPD.       Constitution: Negative for chills, fatigue and fever.   HENT:  Positive for congestion and postnasal drip. Negative for ear pain, sinus pressure and sore throat.    Cardiovascular:  Negative for chest pain.   Respiratory:  Positive for cough. Negative for bloody sputum, shortness of breath, wheezing and asthma.    Gastrointestinal:  Negative for abdominal pain, nausea, vomiting, diarrhea and heartburn.   Musculoskeletal:  Negative for muscle ache.   Skin:  Negative for rash.   Allergic/Immunologic: Negative for asthma.   Neurological:  Negative for headaches.      Objective:     Physical Exam   Constitutional: She is oriented to person, place, and time. She appears well-developed. She is cooperative.  Non-toxic appearance. She does not appear " ill. No distress.   HENT:   Head: Normocephalic and atraumatic.   Ears:   Right Ear: Hearing, tympanic membrane, external ear and ear canal normal.   Left Ear: Hearing, tympanic membrane, external ear and ear canal normal.   Nose: Congestion present. No mucosal edema, rhinorrhea or nasal deformity. No epistaxis. Right sinus exhibits maxillary sinus tenderness. Right sinus exhibits no frontal sinus tenderness. Left sinus exhibits maxillary sinus tenderness. Left sinus exhibits no frontal sinus tenderness.   Mouth/Throat: Uvula is midline, oropharynx is clear and moist and mucous membranes are normal. No trismus in the jaw. Normal dentition. No uvula swelling. No oropharyngeal exudate, posterior oropharyngeal edema or posterior oropharyngeal erythema.   Eyes: Conjunctivae and lids are normal. Pupils are equal, round, and reactive to light. No scleral icterus.   Neck: Trachea normal and phonation normal. Neck supple. No edema present. No erythema present. No neck rigidity present.   Cardiovascular: Normal rate, regular rhythm, normal heart sounds and normal pulses.   Pulmonary/Chest: Effort normal and breath sounds normal. No stridor. No respiratory distress. She has no decreased breath sounds. She has no wheezes. She has no rhonchi. She has no rales.   Abdominal: Normal appearance.   Musculoskeletal:         General: No deformity.      Left wrist: She exhibits decreased range of motion and tenderness (mid forearm). She exhibits no swelling.   Lymphadenopathy:     She has cervical adenopathy.   Neurological: She is alert and oriented to person, place, and time. She exhibits normal muscle tone. Coordination normal.   Skin: Skin is warm, dry, intact, not diaphoretic and not pale.   Psychiatric: Her speech is normal and behavior is normal. Judgment and thought content normal.   Nursing note and vitals reviewed.      Assessment:     1. Viral URI with cough    2. Cough, unspecified type    3. Pain of left forearm         Plan:     Results for orders placed or performed in visit on 02/12/24   SARS Coronavirus 2 Antigen, POCT Manual Read   Result Value Ref Range    SARS Coronavirus 2 Antigen Negative Negative     Acceptable Yes      *Note: Due to a large number of results and/or encounters for the requested time period, some results have not been displayed. A complete set of results can be found in Results Review.       Viral URI with cough    Cough, unspecified type  -     SARS Coronavirus 2 Antigen, POCT Manual Read  -     benzonatate (TESSALON) 100 MG capsule; Take 1 capsule (100 mg total) by mouth 3 (three) times daily as needed for Cough.  Dispense: 30 capsule; Refill: 0    Pain of left forearm  -     WRIST BRACE FOR HOME USE            Discussed results/diagnosis/plan with patient in clinic. Strict precautions given to patient to monitor for worsening signs and symptoms. Advised to follow up with PCP or specialist.  Explained side effects of medications prescribed with patient and informed him/her to discontinue use if he/she has any side effects and to inform UC or PCP if this occurs. All questions answered. Strict ED verses clinic return precautions stressed and given in depth. Advised if symptoms worsens of fail to improve he/she should go to the Emergency Room. Discharge and follow-up instructions given verbally/printed with the patient who expressed understanding and willingness to comply with my recommendations. Patient voiced understanding and in agreement with current treatment plan. Patient exits the exam room in no acute distress. Conversant and engaged during discharge discussion, verbalized understanding.

## 2024-02-13 NOTE — PROGRESS NOTES
Provided pt with [facility] 24/7 nurse care line phone number during her clinic visit today.  
Smokes 1 pack of cigarettes every 2-3 days.

## 2024-02-13 NOTE — PATIENT INSTRUCTIONS
Negative for covid. Symptoms are likely viral.     Try tessalon perles as directed during the day for your cough. It will help numb the back of your throat so you do not have the urge to cough.      Try a decongestant and corticosteroid nasal spray like flonase for the next few days for sinus relief. Initial: 2 sprays in each nostril once daily for 1 week. Reduce to 1 spray in each nostril once per day. Stop taking if you develop a nose bleed. Nasal saline spray can be used together with flonase to help moisten nostrils. An antihistamine like zyrtec, claritin, allegra can also be helpful for sinus relief and will help dry nasal passages.     Regular (Guaifenesin) Mucinex 1200 mg twice per day for 10 days can help thin secretions for better clearance. Drink plenty of fluids with this.     Honey is a natural cough suppressant.     If you do have Hypertension or palpitations, it is safe to take Coricidin HBP (multi-symptom flu) for relief of sinus symptoms.  Try DASH diet to help lower BP and buy a blood pressure cuff for home monitoring. Check blood pressure at least 2 times per day and create a log. Avoid eating foods that are high in salt. Eat more foods with potassium, magnesium and calcium which will help dilate your vessels and decrease your BP.     Warm tea/warm liquids will help soothe the back of your throat. Warm water salt gurgles can also be helpful. A dry throat will cause pain. Make sure to stay hydrated. Water and pedilyte are the best to drink. Neti pot irrigation, humidifier in your room, avoiding fans, warm compresses to face, eating/drinking hot soups, hot shower before bedtime can help.     The recommended daily fluid intake for women is 2.7 liters (five 16 oz bottles).      Alternate Tylenol and ibuprofen every 4 hours as needed for fever and body aches.  Please take NSAIDs with a full glass of water and food to avoid GI upset.      Please only use over the counter cough and cold medications for  3-5 days at a time to avoid rebound symptoms.     Getting plenty of rest can aid in a faster recovery of illnesses.     Please follow-up with your primary care provider or return to the clinic if not better/worsening symptoms in 1 week.     Report to the ER if you have chest pain, shortness of breath, palpitations.

## 2024-02-15 ENCOUNTER — HOSPITAL ENCOUNTER (EMERGENCY)
Facility: HOSPITAL | Age: 69
Discharge: HOME | End: 2024-02-15
Attending: EMERGENCY MEDICINE
Payer: MEDICARE

## 2024-02-15 VITALS
WEIGHT: 155 LBS | OXYGEN SATURATION: 100 % | RESPIRATION RATE: 18 BRPM | HEART RATE: 79 BPM | BODY MASS INDEX: 26.61 KG/M2 | TEMPERATURE: 99 F | DIASTOLIC BLOOD PRESSURE: 78 MMHG | SYSTOLIC BLOOD PRESSURE: 175 MMHG

## 2024-02-15 DIAGNOSIS — R07.9 CHEST PAIN: Primary | ICD-10-CM

## 2024-02-15 LAB
ALBUMIN SERPL BCP-MCNC: 3.3 G/DL (ref 3.5–5.2)
ALP SERPL-CCNC: 83 U/L (ref 55–135)
ALT SERPL W/O P-5'-P-CCNC: 11 U/L (ref 10–44)
ANION GAP SERPL CALC-SCNC: 8 MMOL/L (ref 8–16)
AST SERPL-CCNC: 22 U/L (ref 10–40)
BASOPHILS # BLD AUTO: 0.01 K/UL (ref 0–0.2)
BASOPHILS NFR BLD: 0.2 % (ref 0–1.9)
BILIRUB SERPL-MCNC: 0.7 MG/DL (ref 0.1–1)
BNP SERPL-MCNC: 103 PG/ML (ref 0–99)
BUN SERPL-MCNC: 16 MG/DL (ref 8–23)
CALCIUM SERPL-MCNC: 9.7 MG/DL (ref 8.7–10.5)
CHLORIDE SERPL-SCNC: 110 MMOL/L (ref 95–110)
CO2 SERPL-SCNC: 26 MMOL/L (ref 23–29)
CREAT SERPL-MCNC: 1 MG/DL (ref 0.5–1.4)
DIFFERENTIAL METHOD BLD: ABNORMAL
EOSINOPHIL # BLD AUTO: 0 K/UL (ref 0–0.5)
EOSINOPHIL NFR BLD: 0 % (ref 0–8)
ERYTHROCYTE [DISTWIDTH] IN BLOOD BY AUTOMATED COUNT: 13.9 % (ref 11.5–14.5)
EST. GFR  (NO RACE VARIABLE): >60 ML/MIN/1.73 M^2
GLUCOSE SERPL-MCNC: 156 MG/DL (ref 70–110)
HCT VFR BLD AUTO: 32.3 % (ref 37–48.5)
HGB BLD-MCNC: 10.9 G/DL (ref 12–16)
IMM GRANULOCYTES # BLD AUTO: 0.02 K/UL (ref 0–0.04)
IMM GRANULOCYTES NFR BLD AUTO: 0.4 % (ref 0–0.5)
LYMPHOCYTES # BLD AUTO: 1.9 K/UL (ref 1–4.8)
LYMPHOCYTES NFR BLD: 36.5 % (ref 18–48)
MCH RBC QN AUTO: 32.2 PG (ref 27–31)
MCHC RBC AUTO-ENTMCNC: 33.7 G/DL (ref 32–36)
MCV RBC AUTO: 96 FL (ref 82–98)
MONOCYTES # BLD AUTO: 0.5 K/UL (ref 0.3–1)
MONOCYTES NFR BLD: 8.9 % (ref 4–15)
NEUTROPHILS # BLD AUTO: 2.8 K/UL (ref 1.8–7.7)
NEUTROPHILS NFR BLD: 54 % (ref 38–73)
NRBC BLD-RTO: 0 /100 WBC
PLATELET # BLD AUTO: 149 K/UL (ref 150–450)
PMV BLD AUTO: 10.1 FL (ref 9.2–12.9)
POC CARDIAC TROPONIN I: 0 NG/ML (ref 0–0.08)
POTASSIUM SERPL-SCNC: 4 MMOL/L (ref 3.5–5.1)
PROT SERPL-MCNC: 7 G/DL (ref 6–8.4)
RBC # BLD AUTO: 3.38 M/UL (ref 4–5.4)
SAMPLE: NORMAL
SODIUM SERPL-SCNC: 144 MMOL/L (ref 136–145)
TROPONIN I SERPL DL<=0.01 NG/ML-MCNC: 0.02 NG/ML (ref 0–0.03)
TROPONIN I SERPL DL<=0.01 NG/ML-MCNC: 0.02 NG/ML (ref 0–0.03)
WBC # BLD AUTO: 5.26 K/UL (ref 3.9–12.7)

## 2024-02-15 PROCEDURE — 93005 ELECTROCARDIOGRAM TRACING: CPT

## 2024-02-15 PROCEDURE — 84484 ASSAY OF TROPONIN QUANT: CPT

## 2024-02-15 PROCEDURE — 85025 COMPLETE CBC W/AUTO DIFF WBC: CPT | Performed by: EMERGENCY MEDICINE

## 2024-02-15 PROCEDURE — 80053 COMPREHEN METABOLIC PANEL: CPT | Performed by: EMERGENCY MEDICINE

## 2024-02-15 PROCEDURE — 99284 EMERGENCY DEPT VISIT MOD MDM: CPT | Mod: 25

## 2024-02-15 PROCEDURE — 84484 ASSAY OF TROPONIN QUANT: CPT | Mod: 91 | Performed by: EMERGENCY MEDICINE

## 2024-02-15 PROCEDURE — 83880 ASSAY OF NATRIURETIC PEPTIDE: CPT | Performed by: EMERGENCY MEDICINE

## 2024-02-15 PROCEDURE — 93010 ELECTROCARDIOGRAM REPORT: CPT | Mod: ,,, | Performed by: INTERNAL MEDICINE

## 2024-02-15 NOTE — ED PROVIDER NOTES
Encounter Date: 2/15/2024       History     Chief Complaint   Patient presents with    Chest Pain     Intermittent CP starting at 1100. Endorses SOB. States 5/10 CP     68-year-old female, history of diabetes, CAD, hypertension, complaining of chest pain, episode earlier this afternoon, she can not pinpoint what time but says that she was at a  and developed tightness in her chest that lasted about 15 minutes.  She said she did have some shortness of breath but no diaphoresis or vomiting.  Pain resolved spontaneously, she took an aspirin at the .  EMS transported patient here and she reports they did not give her anything.  Patient reports that all of her pain has now gone and she would like to go home.  Otherwise no recent illnesses reported.    The history is provided by the patient.     Review of patient's allergies indicates:  No Known Allergies  Past Medical History:   Diagnosis Date    Acute coronary syndrome 2018    Adjustment disorder     Anxiety     Arthritis     Cancer of breast 2020    Malignant neoplasm of lower-inner quadrant of left breast in female, estrogen receptor positive    Cholelithiasis with acute cholecystitis 2021    Coronary artery disease     Depression     Diabetes mellitus type I     Genetic testing of female 2020    [employer] via [employer] with AXIN2 and POLE variants of uncertain significance    GERD (gastroesophageal reflux disease)     Hepatitis B core antibody positive 3/9/2023    Negative sAg, suggests previous exposure but no chronic/active Hep B. At risk for reactivation with any immunosuppression medication, steroids, chemo, etc.      Hypertension     Vertigo 2019    Vitamin D deficiency 2021     Past Surgical History:   Procedure Laterality Date    BREAST BIOPSY Left 2022    Left punch biopsy; Unremarkable keratinized skin with intradermal hematoma     SECTION      INJECTION FOR SENTINEL NODE IDENTIFICATION Left  09/17/2020    Procedure: INJECTION, FOR SENTINEL NODE IDENTIFICATION;  Surgeon: Isabel Moulton MD;  Location: [facility] OR;  Service: General;  Laterality: Left;    INSERTION OF TUNNELED CENTRAL VENOUS CATHETER (CVC) WITH SUBCUTANEOUS PORT N/A 11/18/2020    Procedure: INSERTION, PORT-A-CATH;  Surgeon: Hardeep Martin MD;  Location: [facility] CATH LAB;  Service: Radiology;  Laterality: N/A;    LAPAROSCOPIC CHOLECYSTECTOMY N/A 03/06/2021    Procedure: CHOLECYSTECTOMY, LAPAROSCOPIC;  Surgeon: Buster Son MD;  Location: [facility] OR [facility] FLR;  Service: General;  Laterality: N/A;    MASTECTOMY, PARTIAL Left 09/17/2020    Procedure: MASTECTOMY, PARTIAL-w/reflector;  Surgeon: Isabel Moulton MD;  Location: [facility] OR;  Service: General;  Laterality: Left;    SENTINEL LYMPH NODE BIOPSY Left 09/17/2020    Procedure: BIOPSY, LYMPH NODE, SENTINEL;  Surgeon: Isabel Moulton MD;  Location: [facility] OR;  Service: General;  Laterality: Left;     Family History   Problem Relation Age of Onset    Hypertension Mother     Hyperlipidemia Mother     Diabetes Mother     Heart disease Mother     Colon polyps Mother     Aneurysm Father     Diabetes Sister     Hypertension Sister     Heart disease Brother     Diabetes Brother     Hypertension Brother     Cancer Brother         brother German with prostate cancer; brother Cooper with throat cancer    Cervical cancer Daughter     Anxiety disorder Daughter     Depression Daughter     Anxiety disorder Daughter     Depression Daughter     Breast cancer Maternal Aunt     Cancer Maternal Aunt         unknown origin    Cancer Paternal Aunt         unknown origin    Breast cancer Paternal Aunt     Prostate cancer Maternal Cousin     Leukemia Maternal Cousin     Prostate cancer Maternal Cousin     Breast cancer Other     Colon cancer Neg Hx     Ovarian cancer Neg Hx      Social History     Tobacco Use    Smoking status: Never    Smokeless tobacco: Never   Substance Use Topics    Alcohol use: Never    Drug use:  No     Review of Systems    Physical Exam     Initial Vitals [02/15/24 1642]   BP Pulse Resp Temp SpO2   (!) 159/90 103 18 98.1 °F (36.7 °C) 99 %      MAP       --         Physical Exam    Nursing note and vitals reviewed.  Constitutional: Vital signs are normal. She appears well-developed and well-nourished. She is not diaphoretic.  Non-toxic appearance. She does not appear ill. No distress.   HENT:   Head: Normocephalic and atraumatic.   Mouth/Throat: Mucous membranes are normal. Mucous membranes are not dry.   Eyes: Conjunctivae and lids are normal.   Neck: Neck supple.   Normal range of motion.  Cardiovascular:  Normal rate.           Pulmonary/Chest: No respiratory distress.   Musculoskeletal:         General: No edema.      Cervical back: Normal range of motion and neck supple.     Neurological: She is alert and oriented to person, place, and time.   Skin: Skin is dry and intact. No pallor.   Psychiatric: She has a normal mood and affect. Her speech is normal and behavior is normal.         ED Course   Procedures  Labs Reviewed   CBC W/ AUTO DIFFERENTIAL - Abnormal; Notable for the following components:       Result Value    RBC 3.38 (*)     Hemoglobin 10.9 (*)     Hematocrit 32.3 (*)     MCH 32.2 (*)     Platelets 149 (*)     All other components within normal limits   COMPREHENSIVE METABOLIC PANEL - Abnormal; Notable for the following components:    Glucose 156 (*)     Albumin 3.3 (*)     All other components within normal limits   B-TYPE NATRIURETIC PEPTIDE - Abnormal; Notable for the following components:     (*)     All other components within normal limits   TROPONIN I   TROPONIN I   TROPONIN ISTAT   POCT TROPONIN   POCT TROPONIN     EKG Readings: (Independently Interpreted)   Initial Reading: No STEMI. Rhythm: Normal Sinus Rhythm. Ectopy: No Ectopy. Conduction: Normal. ST Segments: Normal ST Segments. T Waves: Normal.       Imaging Results    None          Medications - No data to  display  Medical Decision Making  DDx for chest pain would be broad, including but not limited to serious diagnoses such as ACS, PE, aortic dissection, pericarditis, myocarditis, pneumothorax, pneumonia or pleural effusion, and esophageal rupture.  Other less serious problems such as panic attack, muscle strain, costochrondritis, pleurisy, GERD, and esophageal spasm also considered.      Pt with frequent ED visits for CP, has had many negative work-ups in the past, lowering my suspicion for ACS    ED work-up will include the following:  -EKG: nonischemic  -Labs: CBC, CMP, troponin x 2  -Imaging: CXR  -Cardiac monitoring  -Medications: already received ASA, asymptomatic in the ED  -Disposition: likely d/c home if ED work-up negative given atypical nature of sx      Amount and/or Complexity of Data Reviewed  External Data Reviewed: labs and notes.  Labs: ordered. Decision-making details documented in ED Course.  Radiology: ordered and independent interpretation performed. Decision-making details documented in ED Course.  ECG/medicine tests: ordered and independent interpretation performed. Decision-making details documented in ED Course.    Risk  Decision regarding hospitalization.               ED Course as of 02/15/24 2235   Thu Feb 15, 2024   1927 Troponin I: 0.020 [AS]   2156 Troponin negative x2, patient with no recurrent chest pain during ED visit, patient comfortable with plan for discharge home. [AS]      ED Course User Index  [AS] Kellen Johns MD                           Clinical Impression:  Final diagnoses:  [R07.9] Chest pain (Primary)          ED Disposition Condition    Discharge Stable          ED Prescriptions    None       Follow-up Information       Follow up With Specialties Details Why Contact Info    Patty Louis MD Internal Medicine Call in 1 day  [address]  [address], [address]  [facility] [address]  [phone]               Kellen Johns MD  02/15/24 2230

## 2024-02-15 NOTE — ED TRIAGE NOTES
Patient is a 68-year-old female presents to the ED via EMS with c/o chest pain 2/10. Patient state she was at a  when she suddenly started having chest pain while at a . Chest pain is now resolved.

## 2024-02-16 LAB
OHS QRS DURATION: 98 MS
OHS QTC CALCULATION: 459 MS

## 2024-03-08 ENCOUNTER — OFFICE VISIT (OUTPATIENT)
Dept: URGENT CARE | Facility: CLINIC | Age: 69
End: 2024-03-08
Payer: MEDICARE

## 2024-03-08 VITALS
WEIGHT: 155 LBS | SYSTOLIC BLOOD PRESSURE: 163 MMHG | DIASTOLIC BLOOD PRESSURE: 78 MMHG | HEART RATE: 74 BPM | TEMPERATURE: 99 F | HEIGHT: 64 IN | BODY MASS INDEX: 26.46 KG/M2 | OXYGEN SATURATION: 95 % | RESPIRATION RATE: 20 BRPM

## 2024-03-08 DIAGNOSIS — M05.742 RHEUMATOID ARTHRITIS INVOLVING BOTH HANDS WITH POSITIVE RHEUMATOID FACTOR: ICD-10-CM

## 2024-03-08 DIAGNOSIS — M79.89 SWELLING OF FINGER OF BOTH HANDS: Primary | ICD-10-CM

## 2024-03-08 DIAGNOSIS — M06.9 RHEUMATOID ARTHRITIS FLARE: ICD-10-CM

## 2024-03-08 DIAGNOSIS — M05.741 RHEUMATOID ARTHRITIS INVOLVING BOTH HANDS WITH POSITIVE RHEUMATOID FACTOR: ICD-10-CM

## 2024-03-08 PROCEDURE — 99214 OFFICE O/P EST MOD 30 MIN: CPT | Mod: S$GLB,,, | Performed by: FAMILY MEDICINE

## 2024-03-08 RX ORDER — IBUPROFEN 800 MG/1
800 TABLET ORAL 3 TIMES DAILY
Qty: 21 TABLET | Refills: 0 | Status: SHIPPED | OUTPATIENT
Start: 2024-03-08 | End: 2024-03-15

## 2024-03-08 RX ORDER — CYCLOSPORINE 0.5 MG/ML
1 EMULSION OPHTHALMIC 2 TIMES DAILY
COMMUNITY
Start: 2024-03-02

## 2024-03-08 NOTE — PATIENT INSTRUCTIONS
Trial of ibuprofen.  If no improvement within 1 week then you will need to be reevaluated by primary care or return to urgent care. Return sooner if worsening symptoms.

## 2024-03-08 NOTE — PROGRESS NOTES
"Subjective:      Patient ID: Sunitha Pillai is a 68 y.o. female.    Vitals:  height is 5' 4" (1.626 m) and weight is 70.3 kg (155 lb). Her oral temperature is 98.5 °F (36.9 °C). Her blood pressure is 163/78 (abnormal) and her pulse is 74. Her respiration is 20 and oxygen saturation is 95%.     Chief Complaint: Swelling of fingers.    Pt c/o unknown finger swelling on both hands, pain scale at about a 6. Denies fever. Denies early morning stiffness.      Musculoskeletal:  Positive for joint swelling and arthritis. Negative for trauma and abnormal ROM of joint.      Objective:     Vitals:    03/08/24 0957   BP: (!) 163/78   BP Location: Left arm   Patient Position: Sitting   BP Method: Medium (Automatic)   Pulse: 74   Resp: 20   Temp: 98.5 °F (36.9 °C)   TempSrc: Oral   SpO2: 95%   Weight: 70.3 kg (155 lb)   Height: 5' 4" (1.626 m)      Physical Exam   Pulmonary/Chest: Effort normal.   Musculoskeletal: Normal range of motion.         General: Swelling (all fingers) present. No tenderness or signs of injury. Normal range of motion.      Comments: Peripheral pulses intact. Bilateral  strength normal.   Neurological: She is alert. No sensory deficit.   Psychiatric: Thought content normal.       Assessment:     1. Swelling of finger of both hands    2. Rheumatoid arthritis flare    3. Rheumatoid arthritis involving both hands with positive rheumatoid factor        Plan:       Swelling of finger of both hands  -     ibuprofen (ADVIL,MOTRIN) 800 MG tablet; Take 1 tablet (800 mg total) by mouth 3 (three) times daily. Take with meals. for 7 days  Dispense: 21 tablet; Refill: 0    2. Rheumatoid arthritis flare  3. Rheumatoid arthritis involving both hands with positive rheumatoid factor  She is not on immunosuppressive therapy. Trial of NSAIDs. I have reviewed labs from 2022 to present.  Follows with rheumatology.    I have reviewed rheumatology documentation 5/8/23. Rheumatoid factor positive 4/29/22    Patient " Instructions   Trial of ibuprofen.  If no improvement within 1 week then you will need to be reevaluated by primary care or return to urgent care. Return sooner if worsening symptoms.

## 2024-03-12 NOTE — TELEPHONE ENCOUNTER
CBC order in please schedule.   Left message for patient's daughters to return our call to schedule Revision right total hip arthroplasty surgery with Dr. Jean Blanc.     PSR: if patient calls back, please try . If no answer, please obtain a good call back # from the patient. We will call them back when able.

## 2024-03-19 ENCOUNTER — HOSPITAL ENCOUNTER (EMERGENCY)
Facility: HOSPITAL | Age: 69
Discharge: HOME | End: 2024-03-19
Attending: EMERGENCY MEDICINE
Payer: MEDICARE

## 2024-03-19 VITALS
HEART RATE: 68 BPM | TEMPERATURE: 98 F | DIASTOLIC BLOOD PRESSURE: 88 MMHG | WEIGHT: 150 LBS | RESPIRATION RATE: 18 BRPM | BODY MASS INDEX: 25.75 KG/M2 | SYSTOLIC BLOOD PRESSURE: 129 MMHG | OXYGEN SATURATION: 100 %

## 2024-03-19 DIAGNOSIS — M79.601 PAIN IN BOTH UPPER EXTREMITIES: ICD-10-CM

## 2024-03-19 DIAGNOSIS — M06.9 RHEUMATOID ARTHRITIS FLARE: Primary | ICD-10-CM

## 2024-03-19 DIAGNOSIS — M79.602 PAIN IN BOTH UPPER EXTREMITIES: ICD-10-CM

## 2024-03-19 PROCEDURE — 25000003 PHARM REV CODE 250: Mod: HCNC | Performed by: EMERGENCY MEDICINE

## 2024-03-19 PROCEDURE — 63600175 PHARM REV CODE 636 W HCPCS: Mod: HCNC

## 2024-03-19 PROCEDURE — 99283 EMERGENCY DEPT VISIT LOW MDM: CPT | Mod: HCNC

## 2024-03-19 RX ORDER — PREDNISONE 20 MG/1
40 TABLET ORAL DAILY
Qty: 10 TABLET | Refills: 0 | Status: SHIPPED | OUTPATIENT
Start: 2024-03-19 | End: 2024-03-24

## 2024-03-19 RX ORDER — ACETAMINOPHEN 325 MG/1
650 TABLET ORAL
Status: COMPLETED | OUTPATIENT
Start: 2024-03-19 | End: 2024-03-19

## 2024-03-19 RX ORDER — PREDNISONE 20 MG/1
40 TABLET ORAL
Status: COMPLETED | OUTPATIENT
Start: 2024-03-19 | End: 2024-03-19

## 2024-03-19 RX ADMIN — ACETAMINOPHEN 650 MG: 325 TABLET ORAL at 07:03

## 2024-03-19 RX ADMIN — PREDNISONE 40 MG: 20 TABLET ORAL at 06:03

## 2024-03-19 NOTE — DISCHARGE INSTRUCTIONS
You have been given a prescription for prednisone to take over the next 5 days.  Each prednisone pill is 20 mg, and you can take a total of 40 mg (2 pills) every day.  Please take the pills at the same time, preferably in the morning.  When you follow up with your rheumatologist in clinic, please have a discussion with them about whether or not you should continue to take steroids for your condition.  Please return to the emergency department if you experience any severe chest/head pain.

## 2024-03-19 NOTE — ED TRIAGE NOTES
Sunitha Pillai, a 68 y.o. female presents to the ED w/ complaint of arm pain. Pt stated she has bilateral hand swelling for the past 2 week. Pt went to urgent care. Pt stated she was told to come here if the swelling doesn't decrease.     Triage note:  Chief Complaint   Patient presents with    Arm Pain     Swelling and pain to bilateral arms x 1 week, states she was seen at  and was given ibuprofen, states it is not helping     Review of patient's allergies indicates:  No Known Allergies  Past Medical History:   Diagnosis Date    Acute coronary syndrome 09/23/2018    Adjustment disorder     Anxiety     Arthritis     Cancer of breast 09/03/2020    Malignant neoplasm of lower-inner quadrant of left breast in female, estrogen receptor positive    Cholelithiasis with acute cholecystitis 03/05/2021    Coronary artery disease     Depression     Diabetes mellitus type I     Genetic testing of female 09/2020    [employer] via [employer] with AXIN2 and POLE variants of uncertain significance    GERD (gastroesophageal reflux disease)     Hepatitis B core antibody positive 3/9/2023    Negative sAg, suggests previous exposure but no chronic/active Hep B. At risk for reactivation with any immunosuppression medication, steroids, chemo, etc.      Hypertension     Vertigo 05/13/2019    Vitamin D deficiency 02/07/2021

## 2024-03-19 NOTE — Clinical Note
"Sunitha Isbelleric Pillai was seen and treated in our emergency department on 3/19/2024.  She may return to work on 03/21/2024.       If you have any questions or concerns, please don't hesitate to call.      Aarti Woods MD"

## 2024-03-19 NOTE — ED PROVIDER NOTES
Encounter Date: 3/19/2024       History     Chief Complaint   Patient presents with    Arm Pain     Swelling and pain to bilateral arms x 1 week, states she was seen at  and was given ibuprofen, states it is not helping     The history is provided by the patient.     Ms. Pillai this is a 68-year-old female with a past medical history of HTN, DM, CAD who presents due to b/l arm pain/swelling as well as generalized neck patent.  She states that these symptoms started approximately 1 week ago, and at that time she presented to an urgent care.  At set urgent care visit, she was given a prescription for 800 mg ibuprofen 3 times daily for a week, and instructed to present to the ED if her symptoms persisted, which they have.  She notes the pain is worse with moving her arms, and specifically notes that her digits on both of her upper extremities are swelling.  She also notes some generalized pains in her left knee/right foot.    Review of patient's allergies indicates:  No Known Allergies  Past Medical History:   Diagnosis Date    Acute coronary syndrome 09/23/2018    Adjustment disorder     Anxiety     Arthritis     Cancer of breast 09/03/2020    Malignant neoplasm of lower-inner quadrant of left breast in female, estrogen receptor positive    Cholelithiasis with acute cholecystitis 03/05/2021    Coronary artery disease     Depression     Diabetes mellitus type I     Genetic testing of female 09/2020    [employer] via [employer] with AXIN2 and POLE variants of uncertain significance    GERD (gastroesophageal reflux disease)     Hepatitis B core antibody positive 3/9/2023    Negative sAg, suggests previous exposure but no chronic/active Hep B. At risk for reactivation with any immunosuppression medication, steroids, chemo, etc.      Hypertension     Vertigo 05/13/2019    Vitamin D deficiency 02/07/2021     Past Surgical History:   Procedure Laterality Date    BREAST BIOPSY Left 06/08/2022    Left punch biopsy;  Unremarkable keratinized skin with intradermal hematoma     SECTION      INJECTION FOR SENTINEL NODE IDENTIFICATION Left 2020    Procedure: INJECTION, FOR SENTINEL NODE IDENTIFICATION;  Surgeon: Isabel Moulton MD;  Location: [facility] OR;  Service: General;  Laterality: Left;    INSERTION OF TUNNELED CENTRAL VENOUS CATHETER (CVC) WITH SUBCUTANEOUS PORT N/A 2020    Procedure: INSERTION, PORT-A-CATH;  Surgeon: Hardeep Martin MD;  Location: [facility] CATH LAB;  Service: Radiology;  Laterality: N/A;    LAPAROSCOPIC CHOLECYSTECTOMY N/A 2021    Procedure: CHOLECYSTECTOMY, LAPAROSCOPIC;  Surgeon: Buster Son MD;  Location: [facility] OR [facility] FLR;  Service: General;  Laterality: N/A;    MASTECTOMY, PARTIAL Left 2020    Procedure: MASTECTOMY, PARTIAL-w/reflector;  Surgeon: Isabel Moulton MD;  Location: [facility] OR;  Service: General;  Laterality: Left;    SENTINEL LYMPH NODE BIOPSY Left 2020    Procedure: BIOPSY, LYMPH NODE, SENTINEL;  Surgeon: Isabel Moulton MD;  Location: [facility] OR;  Service: General;  Laterality: Left;     Family History   Problem Relation Age of Onset    Hypertension Mother     Hyperlipidemia Mother     Diabetes Mother     Heart disease Mother     Colon polyps Mother     Aneurysm Father     Diabetes Sister     Hypertension Sister     Heart disease Brother     Diabetes Brother     Hypertension Brother     Cancer Brother         brother German with prostate cancer; brother Cooper with throat cancer    Cervical cancer Daughter     Anxiety disorder Daughter     Depression Daughter     Anxiety disorder Daughter     Depression Daughter     Breast cancer Maternal Aunt     Cancer Maternal Aunt         unknown origin    Cancer Paternal Aunt         unknown origin    Breast cancer Paternal Aunt     Prostate cancer Maternal Cousin     Leukemia Maternal Cousin     Prostate cancer Maternal Cousin     Breast cancer Other     Colon cancer Neg Hx     Ovarian cancer Neg Hx      Social  History     Tobacco Use    Smoking status: Never    Smokeless tobacco: Never   Substance Use Topics    Alcohol use: Never    Drug use: No         Physical Exam     Initial Vitals [03/19/24 0527]   BP Pulse Resp Temp SpO2   (!) 131/93 69 20 97.7 °F (36.5 °C) 100 %      MAP       --         Physical Exam    Nursing note and vitals reviewed.  Constitutional: She appears well-developed. No distress.   Patient is somewhat tired appearing but does not appear acutely distressed.   HENT:   Head: Normocephalic and atraumatic.   Mouth/Throat: Oropharynx is clear and moist and mucous membranes are normal.   Eyes: EOM are normal. Pupils are equal, round, and reactive to light.   Neck:   Normal range of motion.  Cardiovascular:  Normal rate and regular rhythm.           Pulmonary/Chest: No respiratory distress.   Abdominal: She exhibits no distension.   Musculoskeletal:      Cervical back: Normal range of motion.      Comments: Palpation throughout her upper extremities induces pain.  No signs of swelling other than in the digits of both extremities, most prominent in the metacarpal joints/PIP.  In his to palpation present in the cervical region of the patient's spine, no tenderness to palpation in a of the region of the spine.  Some swelling is also noted in the left lower extremity, specifically around the ankle.     Neurological: She is alert and oriented to person, place, and time.   Skin: Skin is warm.   There is no erythema or changes in color of the patient's skin in any of the reasons that she has swelling.   Psychiatric: She has a normal mood and affect. Her behavior is normal. Thought content normal.         ED Course   Procedures  Labs Reviewed - No data to display       Imaging Results    None          Medications   predniSONE tablet 40 mg (40 mg Oral Given 3/19/24 0655)   acetaminophen tablet 650 mg (650 mg Oral Given 3/19/24 0724)     Medical Decision Making  Ms. Pillai is a 68-year-old female who presents due to  bilateral arm pain/pain in her neck.  Differential diagnosis includes but is not limited to osteoarthritis, neurogenic claudication, oncologic, musculoskeletal.  Upon my initial evaluation of the patient, she would overall reassuring vital signs but had clear tenderness to palpation/movement of her bilateral upper extremities.  After reviewing her rheumatology note from her office visit in 05/2023, as well as urgent care note a week ago, I believe that the symptoms she is presenting with today are very similar to those of her previous office visits.  Specifically, the location of her swelling,/the positional locations of her pain.  She has been taking 2400 mg of ibuprofen daily for the past week without a relieve the symptoms, so I will give her a dose of prednisone 40 mg here.    After further reviewing her chart, it was noted that she had a follow up with Rheumatology in 3 days in clinic.  Based on her presentation and various areas of her body that have swelling and pain, I believe that this patient is experiencing a flare of her already documented rheumatoid arthritis.  I am not suspicious for any of her joints being septic, or specifically there being any spine involvement to cause the areas of pain.  The patient was unaware that she had a history of rheumatoid arthritis, and I described her in details the basis of this condition.  I expressed the importance of her following up with her rheumatology clinic visit in a few days, which she agreed to do.  I also gave her a 5 day course of 40 mg of prednisone for her to take until she is able to follow up in clinic, and specifically instructed her to have a discussion with her rheumatologist about whether or not to continue steroids.  The patient verbalized understanding.  She remained hemodynamically stable here in the emergency department.  Stable for discharge at this time.    Amount and/or Complexity of Data Reviewed  External Data Reviewed: notes.     Details:  Previous medical records, specifically from her rheumatology clinic visit in 05/2023 as well as her urgent care a week ago was reviewed to better understand the patient's current/past medical problems.    Risk  OTC drugs.  Prescription drug management.                                      Clinical Impression:  Final diagnoses:  [M79.601, M79.602] Pain in both upper extremities  [M06.9] Rheumatoid arthritis flare (Primary)          ED Disposition Condition    Discharge Stable          ED Prescriptions       Medication Sig Dispense Start Date End Date Auth. Provider    predniSONE (DELTASONE) 20 MG tablet Take 2 tablets (40 mg total) by mouth once daily. for 5 days 10 tablet 3/19/2024 3/24/2024 Tramaine Blackburn MD          Follow-up Information    None          Tramaine Blackburn MD  Resident  03/19/24 8911

## 2024-03-19 NOTE — Clinical Note
"Sunitha Isbelleric Pillai was seen and treated in our emergency department on 3/19/2024.  She may return to work on 03/20/2024.       If you have any questions or concerns, please don't hesitate to call.      Aarti Woods MD"

## 2024-03-20 ENCOUNTER — PATIENT OUTREACH (OUTPATIENT)
Dept: EMERGENCY MEDICINE | Facility: HOSPITAL | Age: 69
End: 2024-03-20
Payer: MEDICARE

## 2024-03-21 NOTE — PROGRESS NOTES
Call placed to Pt to f/u from recent ED visit. No answer x 2 attempts. Encounter to be closed at this time.

## 2024-03-22 ENCOUNTER — PATIENT OUTREACH (OUTPATIENT)
Dept: ADMINISTRATIVE | Facility: HOSPITAL | Age: 69
End: 2024-03-22
Payer: MEDICARE

## 2024-03-22 NOTE — PROGRESS NOTES
Population Health Chart Review & Patient Outreach Details      Additional [facility] Notes:               Updates Requested / Reviewed:      Care Everywhere, , and Immunizations Reconciliation Completed or Queried: [place]         Health Maintenance Topics Overdue:      [facility] Score: 4     Colon Cancer Screening  Eye Exam  Foot Exam  Uncontrolled BP                       Health Maintenance Topic(s) Outreach Outcomes & Actions Taken:    Primary Care Appt - Outreach Outcomes & Actions Taken  : Primary Care Appt Scheduled

## 2024-04-04 ENCOUNTER — TELEPHONE (OUTPATIENT)
Dept: HEMATOLOGY/ONCOLOGY | Facility: CLINIC | Age: 69
End: 2024-04-04
Payer: MEDICARE

## 2024-04-04 ENCOUNTER — LAB VISIT (OUTPATIENT)
Dept: LAB | Facility: HOSPITAL | Age: 69
End: 2024-04-04
Attending: INTERNAL MEDICINE
Payer: MEDICARE

## 2024-04-04 ENCOUNTER — OFFICE VISIT (OUTPATIENT)
Dept: INTERNAL MEDICINE | Facility: CLINIC | Age: 69
End: 2024-04-04
Payer: MEDICARE

## 2024-04-04 VITALS
DIASTOLIC BLOOD PRESSURE: 84 MMHG | WEIGHT: 159.5 LBS | HEIGHT: 64 IN | SYSTOLIC BLOOD PRESSURE: 138 MMHG | OXYGEN SATURATION: 96 % | HEART RATE: 60 BPM | BODY MASS INDEX: 27.23 KG/M2

## 2024-04-04 DIAGNOSIS — G62.0 CHEMOTHERAPY-INDUCED PERIPHERAL NEUROPATHY: ICD-10-CM

## 2024-04-04 DIAGNOSIS — E11.9 TYPE 2 DIABETES MELLITUS WITHOUT COMPLICATION, WITHOUT LONG-TERM CURRENT USE OF INSULIN: ICD-10-CM

## 2024-04-04 DIAGNOSIS — F33.42 RECURRENT MAJOR DEPRESSIVE DISORDER, IN FULL REMISSION: ICD-10-CM

## 2024-04-04 DIAGNOSIS — E11.65 TYPE 2 DIABETES MELLITUS WITH HYPERGLYCEMIA, WITHOUT LONG-TERM CURRENT USE OF INSULIN: ICD-10-CM

## 2024-04-04 DIAGNOSIS — D51.8 OTHER VITAMIN B12 DEFICIENCY ANEMIAS: ICD-10-CM

## 2024-04-04 DIAGNOSIS — Z85.3 HISTORY OF BREAST CANCER: ICD-10-CM

## 2024-04-04 DIAGNOSIS — D64.9 ANEMIA, UNSPECIFIED TYPE: ICD-10-CM

## 2024-04-04 DIAGNOSIS — Z12.11 SCREENING FOR COLON CANCER: ICD-10-CM

## 2024-04-04 DIAGNOSIS — E78.5 HYPERLIPIDEMIA, UNSPECIFIED HYPERLIPIDEMIA TYPE: ICD-10-CM

## 2024-04-04 DIAGNOSIS — D69.6 THROMBOCYTOPENIA: ICD-10-CM

## 2024-04-04 DIAGNOSIS — N39.0 URINARY TRACT INFECTION WITHOUT HEMATURIA, SITE UNSPECIFIED: ICD-10-CM

## 2024-04-04 DIAGNOSIS — I70.0 AORTIC ATHEROSCLEROSIS: ICD-10-CM

## 2024-04-04 DIAGNOSIS — T45.1X5A CHEMOTHERAPY-INDUCED PERIPHERAL NEUROPATHY: ICD-10-CM

## 2024-04-04 DIAGNOSIS — I10 ESSENTIAL HYPERTENSION: Primary | Chronic | ICD-10-CM

## 2024-04-04 DIAGNOSIS — M05.79 RHEUMATOID ARTHRITIS INVOLVING MULTIPLE SITES WITH POSITIVE RHEUMATOID FACTOR: ICD-10-CM

## 2024-04-04 PROBLEM — Z17.0 MALIGNANT NEOPLASM OF LOWER-INNER QUADRANT OF LEFT BREAST IN FEMALE, ESTROGEN RECEPTOR POSITIVE: Status: RESOLVED | Noted: 2020-09-03 | Resolved: 2024-04-04

## 2024-04-04 PROBLEM — I27.9 CHRONIC PULMONARY HEART DISEASE: Status: RESOLVED | Noted: 2023-04-06 | Resolved: 2024-04-04

## 2024-04-04 PROBLEM — C50.312 MALIGNANT NEOPLASM OF LOWER-INNER QUADRANT OF LEFT BREAST IN FEMALE, ESTROGEN RECEPTOR POSITIVE: Status: RESOLVED | Noted: 2020-09-03 | Resolved: 2024-04-04

## 2024-04-04 LAB
ALBUMIN/CREAT UR: 207.8 UG/MG (ref 0–30)
BILIRUB UR QL STRIP: NEGATIVE
CLARITY UR REFRACT.AUTO: CLEAR
COLOR UR AUTO: COLORLESS
CREAT UR-MCNC: 51 MG/DL (ref 15–325)
GLUCOSE UR QL STRIP: NEGATIVE
HGB UR QL STRIP: NEGATIVE
KETONES UR QL STRIP: NEGATIVE
LEUKOCYTE ESTERASE UR QL STRIP: NEGATIVE
MICROALBUMIN UR DL<=1MG/L-MCNC: 106 UG/ML
NITRITE UR QL STRIP: NEGATIVE
PH UR STRIP: 6 [PH] (ref 5–8)
PROT UR QL STRIP: ABNORMAL
SP GR UR STRIP: 1.01 (ref 1–1.03)
URN SPEC COLLECT METH UR: ABNORMAL

## 2024-04-04 PROCEDURE — 87086 URINE CULTURE/COLONY COUNT: CPT | Mod: HCNC | Performed by: INTERNAL MEDICINE

## 2024-04-04 PROCEDURE — 82043 UR ALBUMIN QUANTITATIVE: CPT | Mod: HCNC | Performed by: INTERNAL MEDICINE

## 2024-04-04 PROCEDURE — 3079F DIAST BP 80-89 MM HG: CPT | Mod: HCNC,CPTII,S$GLB, | Performed by: INTERNAL MEDICINE

## 2024-04-04 PROCEDURE — 81003 URINALYSIS AUTO W/O SCOPE: CPT | Mod: HCNC | Performed by: INTERNAL MEDICINE

## 2024-04-04 PROCEDURE — 3288F FALL RISK ASSESSMENT DOCD: CPT | Mod: HCNC,CPTII,S$GLB, | Performed by: INTERNAL MEDICINE

## 2024-04-04 PROCEDURE — 3060F POS MICROALBUMINURIA REV: CPT | Mod: HCNC,CPTII,S$GLB, | Performed by: INTERNAL MEDICINE

## 2024-04-04 PROCEDURE — 90471 IMMUNIZATION ADMIN: CPT | Mod: HCNC,S$GLB,, | Performed by: INTERNAL MEDICINE

## 2024-04-04 PROCEDURE — 3075F SYST BP GE 130 - 139MM HG: CPT | Mod: HCNC,CPTII,S$GLB, | Performed by: INTERNAL MEDICINE

## 2024-04-04 PROCEDURE — 3044F HG A1C LEVEL LT 7.0%: CPT | Mod: HCNC,CPTII,S$GLB, | Performed by: INTERNAL MEDICINE

## 2024-04-04 PROCEDURE — 99214 OFFICE O/P EST MOD 30 MIN: CPT | Mod: HCNC,25,S$GLB, | Performed by: INTERNAL MEDICINE

## 2024-04-04 PROCEDURE — 90714 TD VACC NO PRESV 7 YRS+ IM: CPT | Mod: HCNC,S$GLB,, | Performed by: INTERNAL MEDICINE

## 2024-04-04 PROCEDURE — 3008F BODY MASS INDEX DOCD: CPT | Mod: HCNC,CPTII,S$GLB, | Performed by: INTERNAL MEDICINE

## 2024-04-04 PROCEDURE — 1125F AMNT PAIN NOTED PAIN PRSNT: CPT | Mod: HCNC,CPTII,S$GLB, | Performed by: INTERNAL MEDICINE

## 2024-04-04 PROCEDURE — 4010F ACE/ARB THERAPY RXD/TAKEN: CPT | Mod: HCNC,CPTII,S$GLB, | Performed by: INTERNAL MEDICINE

## 2024-04-04 PROCEDURE — 1159F MED LIST DOCD IN RCRD: CPT | Mod: HCNC,CPTII,S$GLB, | Performed by: INTERNAL MEDICINE

## 2024-04-04 PROCEDURE — 3066F NEPHROPATHY DOC TX: CPT | Mod: HCNC,CPTII,S$GLB, | Performed by: INTERNAL MEDICINE

## 2024-04-04 PROCEDURE — 99999 PR PBB SHADOW E&M-EST. PATIENT-LVL V: CPT | Mod: PBBFAC,HCNC,, | Performed by: INTERNAL MEDICINE

## 2024-04-04 PROCEDURE — 1101F PT FALLS ASSESS-DOCD LE1/YR: CPT | Mod: HCNC,CPTII,S$GLB, | Performed by: INTERNAL MEDICINE

## 2024-04-04 RX ORDER — TOPIRAMATE 25 MG/1
TABLET ORAL
Qty: 60 TABLET | Refills: 2 | Status: SHIPPED | OUTPATIENT
Start: 2024-04-04

## 2024-04-04 RX ORDER — DULAGLUTIDE 1.5 MG/.5ML
1.5 INJECTION, SOLUTION SUBCUTANEOUS
Qty: 12 PEN | Refills: 2 | Status: SHIPPED | OUTPATIENT
Start: 2024-04-04

## 2024-04-04 NOTE — PROGRESS NOTES
Subjective:       Patient ID: Sunitha Pillai is a 68 y.o. female.    Chief Complaint: Follow-up, Diabetes, Foot Pain, and Swelling (Hand and ankles)    Follow-up  Pertinent negatives include no abdominal pain, chest pain (arm pain or jaw pain), headaches, nausea or vomiting.   Diabetes  Pertinent negatives for hypoglycemia include no headaches or seizures. Pertinent negatives for diabetes include no chest pain (arm pain or jaw pain).   Foot Pain  Pertinent negatives include no abdominal pain, chest pain (arm pain or jaw pain), headaches, nausea or vomiting.   Swelling  Pertinent negatives include no abdominal pain, chest pain (arm pain or jaw pain), headaches, nausea or vomiting.   Pt with joint pain - missed Rheum appt.  Still grieving the loss of her .  No CP or SOB.  Review of Systems   Respiratory:  Negative for shortness of breath (PND or orthopnea).    Cardiovascular:  Negative for chest pain (arm pain or jaw pain).   Gastrointestinal:  Negative for abdominal pain, diarrhea, nausea and vomiting.   Genitourinary:  Negative for dysuria.   Neurological:  Negative for seizures, syncope and headaches.       Objective:      Physical Exam  Constitutional:       General: She is not in acute distress.     Appearance: She is well-developed.   HENT:      Head: Normocephalic.   Eyes:      Pupils: Pupils are equal, round, and reactive to light.   Neck:      Thyroid: No thyromegaly.      Vascular: No JVD.   Cardiovascular:      Rate and Rhythm: Normal rate and regular rhythm.      Heart sounds: Normal heart sounds. No murmur heard.     No friction rub. No gallop.   Pulmonary:      Effort: Pulmonary effort is normal.      Breath sounds: Normal breath sounds. No wheezing or rales.   Abdominal:      General: Bowel sounds are normal. There is no distension.      Palpations: Abdomen is soft. There is no mass.      Tenderness: There is no abdominal tenderness. There is no guarding or rebound.   Musculoskeletal:       Cervical back: Neck supple.   Lymphadenopathy:      Cervical: No cervical adenopathy.   Skin:     General: Skin is warm and dry.   Neurological:      Mental Status: She is alert and oriented to person, place, and time.      Deep Tendon Reflexes: Reflexes are normal and symmetric.   Psychiatric:         Behavior: Behavior normal.         Thought Content: Thought content normal.         Judgment: Judgment normal.         Assessment:       1. Essential hypertension    2. Rheumatoid arthritis involving multiple sites with positive rheumatoid factor    3. Type 2 diabetes mellitus without complication, without long-term current use of insulin    4. Thrombocytopenia    5. Recurrent major depressive disorder, in full remission    6. Aortic atherosclerosis    7. Chemotherapy-induced peripheral neuropathy    8. Type 2 diabetes mellitus with hyperglycemia, without long-term current use of insulin    9. History of breast cancer    10. Anemia, unspecified type    11. Hyperlipidemia, unspecified hyperlipidemia type    12. Urinary tract infection without hematuria, site unspecified    13. Screening for colon cancer    14. Other vitamin B12 deficiency anemias        Plan:   Essential hypertension  Controlled - continue current meds    Rheumatoid arthritis involving multiple sites with positive rheumatoid factor  -     Ambulatory referral/consult to Rheumatology; Future; Expected date: 04/11/2024    Type 2 diabetes mellitus without complication, without long-term current use of insulin  -     Hemoglobin A1C; Future; Expected date: 04/04/2024  -     Microalbumin/Creatinine Ratio, Urine; Future; Expected date: 04/04/2024  -     Urinalysis; Future; Expected date: 04/04/2024    Thrombocytopenia  Stable/monitored  Recurrent major depressive disorder, in full remission  Stable, not suicidal or homicidal  Aortic atherosclerosis  On statin  Chemotherapy-induced peripheral neuropathy  stable  Type 2 diabetes mellitus with hyperglycemia,  without long-term current use of insulin  Check HgbA1c  History of breast cancer  -     Ambulatory referral/consult to Hematology / Oncology; Future; Expected date: 05/04/2024    Anemia, unspecified type  -     CBC Auto Differential; Future; Expected date: 04/04/2024  -     Iron and TIBC; Future; Expected date: 04/04/2024  -     Ferritin; Future; Expected date: 04/04/2024  -     Vitamin B12; Future; Expected date: 05/04/2024  -     Methylmalonic Acid, Serum; Future; Expected date: 04/04/2024    Hyperlipidemia, unspecified hyperlipidemia type  -     Lipid Panel; Future; Expected date: 04/04/2024    Urinary tract infection without hematuria, site unspecified  -     Urine culture; Future; Expected date: 04/04/2024    Screening for colon cancer  -     Ambulatory referral/consult to Endo Procedure ; Future; Expected date: 04/05/2024    Other vitamin B12 deficiency anemias  -     Vitamin B12; Future; Expected date: 05/04/2024    Other orders  -     (In Office Administered) Td Vaccine - Preservative Free  -     topiramate (TOPAMAX) 25 MG tablet; One at bedtime for 2 weeks then two at bedtime  Dispense: 60 tablet; Refill: 2  -     dulaglutide (TRULICITY) 1.5 mg/0.5 mL pen injector; Inject 1.5 mg into the skin every 7 days.  Dispense: 12 pen ; Refill: 2      Pt had pelvic ultrasound with ?thickened endometrium - she has not vaginal bleeding - message sent to her GYn Dr. Doran to review.

## 2024-04-04 NOTE — TELEPHONE ENCOUNTER
----- Message from Yolanda Winn RN sent at 4/4/2024 12:27 PM CDT -----    ----- Message -----  From: Talita Manuel  Sent: 4/4/2024  10:43 AM CDT  To: Froy ALBERTO Staff    Pt has a referral for a Consult to Hematology/Oncology with Dr Mcduffie. Please call Pt to schedule.    Contact #  [phone]    MRN: [id number] [id number]    Diagnosis: History of breast cancer [Z85.3]        Thanks  Talita BARRERA       August 14, 2021       Charbel Eagle MD  [address] [facility] [address]  Via In Basket      Patient: Wolfgang Behrendt   YOB: 1938   Date of Visit: 8/13/2021       Dear Dr. Eagle:    Thank you for referring Wolfgang Behrendt to me for evaluation. Below are my notes for this visit with him.  His only new finding on blood work appears to be increasing creatinine, for which he has been recommended adequate hydration.  There is no evidence of recurrence of his anal cancer on recent evaluation.  If you have questions, please do not hesitate to call me. I look forward to following your patient along with you.      Sincerely,    Carlos Bennett MD  Gastrointestinal Medical Oncology  [facility]  Office phone: [phone]        CC: No Recipients  Carlos Bennett MD  8/14/2021  2:59 PM  Signed  [facility] OUTPATIENT  ENCOUNTER  HEMATOLOGY ONCOLOGY NOTE    DATE: 8/13/2021  PATIENT: Wolfgang Behrendt  Referred By: Charbel Eagle MD      Complaint/Presenting Problem:  Anal carcinoma-squamous cell cancer  5-FU with mitomycin initiated August 17, 2020 and completed September 24, 2020.     Primary care physician: Dr. Charbel Eagle.  Colorectal surgeon: Dr. Delbert Brown  Radiation oncologist: Dr. Marysol Bello     HPI:  INITIAL VISIT NOTE: 7/29/2020    82-year-old white male, with past medical history of hyperlipidemia, remote stroke and therefore on aspirin and Plavix, history of prostate surgery and previous history of hemorrhoidal bleeding, now with anal cancer diagnosed in July 2020.  Patient has had minimal recurrent rectal bleeding since March 2020.  The bleeding was typically noticed on the paper when he wipes.  No abdominal pain/no weight loss.  No shortness of breath.  Patient had colonoscopy in 2011, when diverticular disease was found.    Repeat colonoscopy completed on June 24, 2020, showed 3 cm distal rectal/anal mass.   Diverticular disease was found in the colon.  Biopsy showed invasive poorly differentiated squamous cell carcinoma.  Tumor cells are positive for P 16.  MRI pelvis dated July 15, 2020, showed that in the anterior wall of the anal canal, there is a mass measuring 3 cm from superior to inferior, 1.9 cm from anterior to posterior, and 1.5 cm from left to right.  It extends roughly from the 11:00 to the 1:00 position.  It involves the internal anal sphincter.  Patient is here to discuss next steps. He is here with his wife, Deidra.    9/3/2020: Patient is here for follow up.  He has completed 2 weeks of chemoradiation.  He has tolerated this fairly well, but is getting increasing diarrhea.  He also has a small sore on his lips.  Blood pressure and weight are similar.  He does not think he is hydrating himself adequately.  He is on Aquaphor cream for rectal excoriation.    September 14, 2020: He has rectal excoriation and irritation, for which she is applying topical creams.  He reports fatigue.  He has 9 more sessions of radiation left.  He also has urinary frequency.      October 9, 2020: Patient is here for follow up.  Patient completed treatment on 9/24/2020.  No fevers or chills.  ANC has normalized.  Dry mouth at times.  He reports that the diarrhea is occasional.  Silvadene cream helping with rectal excoriation.  He is trying to hydrate himself as much as possible.  Reports fatigue.    November 16, 2020: Patient was briefly hospitalized from October 12-14,, for syncopal episode.  He was quite weak and fatigued at that time after completing chemoradiation therapy few weeks prior.  Ultimately, he was discharged after negative cardiac work-up and was also felt to have vertigo.  He reports having had canalith repositioning for the right-sided BPPV in mid to late October.  He has felt better following this.  No recurrent episodes of syncope or shortness of breath.  Port-A-Cath is in place and is wondering when this will  be removed.    12/11/2020: Patient is here with his wife Deidra.  Repeat PET scan shows no evidence of FDG avid disease.  He is looking forward to going to [place].  His Port-A-Cath removal is pending for 12/14/2020.  No anorectal bleeding or pain.  Appetite is great.  He has recovered very well from chemoradiation.    6/11/2021: Patient is here for follow-up after spending the winter and spring in [place].  He reports easy bruising at times.  He had a flexible sigmoidoscopy recently by Dr. Brown in June 2021, which showed no lesions in the entire examined colon, including the anorectal region.  MRI pelvis completed on June 9 also shows no evidence for recurrence.  Full report as mentioned below.  He reports Coughing dark-colored phlegm at times.  Chest x-ray shows no acute abnormality.    August 13, 2021: No nausea/vomiting.  No abdominal pain.  No fevers or chills or bright red bleeding per rectum..  No new medical issues.  Creatinine is elevated from prior at 1.47.  He is looking forward to going to [place] from October 2021 to May 2022.      PMH:  Patient Active Problem List   Diagnosis   • Actinic keratosis   • Basal cell carcinoma, face   • Benign localized prostatic hyperplasia with lower urinary tract symptoms (LUTS)   • Calcified granuloma of lung (CMS/HCC)   • Diverticulosis   • Encounter for general health examination   • Encounter for screening for malignant neoplasm of colon   • Enlarged prostate without lower urinary tract symptoms (luts)   • Essential familial hypercholesterolemia   • Need for immunization against influenza   • History of strabismus surgery   • Tendinitis of right rotator cuff   • Tendonitis, bicipital   • Tremor   • Vertebral artery dissection (CMS/HCC)   • SCC (squamous cell carcinoma)   • Cancer of anal canal (CMS/HCC): squamous : s/p chemoRT - [facility]   • BPPV (benign paroxysmal positional vertigo)   • Shortness of breath   • Change in stool: Color, black or gray   • Change in bowel  habits   • Cough   • Dysphagia       Current Medications:  Current Outpatient Medications   Medication Sig   • clopidogrel (PLAVIX) 75 MG tablet Take 1 tablet by mouth daily.   • simvastatin (ZOCOR) 40 MG tablet Take 1 tablet by mouth daily. As directed.   • propranolol (INDERAL LA) 80 MG 24 hr capsule Take 1 capsule by mouth daily.   • Aspirin (ECOTRIN PO) Take 1 tablet by mouth daily.   • tamsulosin (FLOMAX) 0.4 MG Cap Take 0.4 mg by mouth daily.      No current facility-administered medications for this visit.       Allergies:  ALLERGIES:  No Known Allergies    Past Surgical History:  Past Surgical History:   Procedure Laterality Date   • Cataract extrac w/ intraocular lens imp&ant vit,bilaterl     • Colonoscopy  2020    with biopsy of rectal mass   • Flexible sigmoidoscopy  2021   • Skin cancer excision Left 2019    cheek   • Transurethral resection of prostate  2017    in [place]       Family History:   Family History   Problem Relation Age of Onset   • Cancer, Prostate Brother    • Stroke Father 81           • Parkinsonism Father    • Cancer, Prostate Other        REVIEW OF SYSTEMS:  Review of Systems    Constitutional No fevers, chills, night sweats.   No weight loss.  Moderate fatigue.   Allergic/Immunologic No reactions.   Eyes No significant visual difficulties. No diplopia.   ENMT No problems with hearing, no sore throat, no sinus drainage.   Endocrine No diabetes, thyroid disease or hormone replacement. No hot flashes or night sweats.   Hematologic/Lymphatic No bleeding.  The patient denies any tender or palpable lymph nodes.  History of easy bruising.   Respiratory No chest pain, cough or hemoptysis.  Mild shortness of breathing at times.   Cardiovascular No anginal chest pain, palpitations or orthopnea.   Gastrointestinal No nausea/vomiting.  No heartburn/early satiety.  No rectal bleeding.  No diarrhea/constipation.   Genitourinary No hematuria, hesitancy, incontinence, vaginal  bleeding, discharge or other problems with urination.   Musculoskeletal No joint pain, swelling or redness. No decreased range of motion.   Integumentary No chronic rashes, inflammation, ulcerations or skin changes.   Neurologic No headache, blurred vision, and no areas of focal weakness or numbness. Normal gait. No sensory problems.   Psychiatric No insomnia, depression, mood swings, anxiety        A ten point review of systems was reviewed and otherwise negative unless stated above.    Vitals:  Oncology Encounter Vitals [08/13/21 1017]   ONC OP Encounter Vitals Group      /55      Heart Rate (!) 58      Resp 18      Temp 96.8 °F (36 °C)      Temp src Temporal      SpO2       Weight 184 lb 8.4 oz (83.7 kg)      Height       Pain Score  0      Pain Location       Pain Education?       BSA (Calculated - m2) - [employer] Zh[employer]       BMI (Calculated)        ECOG Performance Status   ECOG [08/14/21 1453]   ECOG Performance Status 0        Physical Exam:  CONSTITUTIONAL: alert, awake, appears stated age and well nourished  HEENT: normocephalic,EOMI,  no icterus  HEME/LYMPHATIC: no supraclavicular, cervical, or axillary adenopathy  LUNGS: CTAB, no increase in respiratory effort   HEART: RRR, Port-A-Cath removed in December 2020.  ABD: soft, non-distended, no hepatosplenomegaly.  Rectal exam: No anorectal masses seen on recent exam by Dr. Brown.  BACK: no tenderness to spine  EXTREMITIES: no deformities, no edema  SKIN: no rashes or lesions suggestive of malignancy  CNS: No focal deficits.  Normal gait.  No motor/sensory abnormalities.  PSYCH: cooperative, normal judgment, affect congruent with mood     Labs     Lab Services on 08/10/2021   Component Date Value Ref Range Status   • Fasting Status 08/10/2021 0  Hours Final   • Sodium 08/10/2021 140  135 - 145 mmol/L Final   • Potassium 08/10/2021 4.3  3.4 - 5.1 mmol/L Final   • Chloride 08/10/2021 109* 98 - 107 mmol/L Final   • Carbon Dioxide 08/10/2021 24  21 - 32  mmol/L Final   • Anion Gap 08/10/2021 11  10 - 20 mmol/L Final   • Glucose 08/10/2021 97  65 - 99 mg/dL Final   • BUN 08/10/2021 19  6 - 20 mg/dL Final   • Creatinine 08/10/2021 1.47* 0.67 - 1.17 mg/dL Final   • Glomerular Filtration Rate 08/10/2021 43* >90 mL/min/1.73m2 Final    eGFR 30-59 mL/min/1.73m2 = Moderate decrease in kidney function. Stage 3 CKD (chronic kidney disease) or moderate kidney disease.   • BUN/ Creatinine Ratio 08/10/2021 13  7 - 25 Final   • Calcium 08/10/2021 8.7  8.4 - 10.2 mg/dL Final   • Bilirubin, Total 08/10/2021 0.5  0.2 - 1.0 mg/dL Final   • GOT/AST 08/10/2021 15  <=37 Units/L Final   • GPT/ALT 08/10/2021 19  <64 Units/L Final   • Alkaline Phosphatase 08/10/2021 63  45 - 117 Units/L Final   • Albumin 08/10/2021 3.3* 3.6 - 5.1 g/dL Final   • Protein, Total 08/10/2021 6.5  6.4 - 8.2 g/dL Final   • Globulin 08/10/2021 3.2  2.0 - 4.0 g/dL Final   • A/G Ratio 08/10/2021 1.0  1.0 - 2.4 Final   • WBC 08/10/2021 3.5* 4.2 - 11.0 K/mcL Final   • RBC 08/10/2021 4.34* 4.50 - 5.90 mil/mcL Final   • HGB 08/10/2021 13.0  13.0 - 17.0 g/dL Final   • HCT 08/10/2021 40.8  39.0 - 51.0 % Final   • MCV 08/10/2021 94.0  78.0 - 100.0 fl Final   • MCH 08/10/2021 30.0  26.0 - 34.0 pg Final   • MCHC 08/10/2021 31.9* 32.0 - 36.5 g/dL Final   • RDW-CV 08/10/2021 12.7  11.0 - 15.0 % Final   • RDW-SD 08/10/2021 43.8  39.0 - 50.0 fL Final   • PLT 08/10/2021 172  140 - 450 K/mcL Final   • NRBC 08/10/2021 0  <=0 /100 WBC Final   • Neutrophil, Percent 08/10/2021 56  % Final   • Lymphocytes, Percent 08/10/2021 20  % Final   • Mono, Percent 08/10/2021 16  % Final   • Eosinophils, Percent 08/10/2021 6  % Final   • Basophils, Percent 08/10/2021 1  % Final   • Immature Granulocytes 08/10/2021 1  % Final   • Absolute Neutrophils 08/10/2021 2.0  1.8 - 7.7 K/mcL Final   • Absolute Lymphocytes 08/10/2021 0.7* 1.0 - 4.0 K/mcL Final   • Absolute Monocytes 08/10/2021 0.6  0.3 - 0.9 K/mcL Final   • Absolute Eosinophils  08/10/2021  0.2  0.0 - 0.5 K/mcL Final   • Absolute Basophils 08/10/2021 0.0  0.0 - 0.3 K/mcL Final   • Absolute Immmature Granulocytes 08/10/2021 0.0  0.0 - 0.2 K/mcL Final     Radiology Results:  EXAM: MRI PELVIS W WO CONTRAST     CLINICAL INDICATION: Anal squamous cell carcinoma. Status post  chemoradiation therapy.     TECHNIQUE: Multiplanar MR imaging of the pelvis was performed using  T1-weighted and T2-weighted sequences.  Axial oblique and coronal oblique  T2-weighted small field-of-view sequences orthogonal to the plane of rectal  tumor were obtained.  Diffusion-weighted sequences were also obtained. 8.2  mL Gadavist were administered intravenously.     COMPARISON: PET/CT scan on 12/09/2020. MRI pelvis on 07/15/2020.     FINDINGS:  The previous anal tumor appears resolved. There is no discrete measurable  solid mass. There is no significant restricted diffusion. There is linear  hypointense signal at the site of previous tumor suggesting scarring  (series 6 images 48-50). This scarring is centered in the anterior midline  at the interface of internal sphincter and intersphincteric space. There is  no visualized extramural venous invasion.      There is no visualized suspicious lymphadenopathy.     There is nonspecific trace free fluid and/or mild fat stranding in the  pelvis. There is a stable nonspecific cystic focus along the precoccygeal  ligament. There is colonic diverticulosis. The urinary bladder is poorly  distended and difficult to evaluate. There are findings suggesting chronic  partial outlet obstruction. The prostate gland is slightly enlarged. There  is no visualized aggressive bone lesion. There is a trace right hip bursal  effusion. There is a small left inguinal hernia.     IMPRESSION:  1.   There is apparent resolution of anal tumor. There is scarring at this  site. Residual microscopic tumor is difficult to fully exclude on MRI.  Correlation with direct visualization may be considered.  2.   No  suspicious lymphadenopathy.     Electronically Signed by: CHARLENE BOUDREAUX MD   Signed on: 6/11/2021 10:44 AM          Impression:   83 year old male with past medical history of hyperlipidemia, remote stroke on aspirin Plavix  #3 cm anal cancer-poorly differentiated squamous cell cancer, diagnosed July 2020.  No enlarged lymph nodes on MRI.  T2 N0 disease. 5-FU/mitomycin with radiation started on August 17 and completed on September 24.  Repeat PET scan on 12/9/2020 shows no evidence of FDG activity in the anorectal region and no evidence for metastatic disease.  MRI on June 9, 2020 one of the pelvis shows no evidence of anal tumor.  Colonoscopy in June 2021 also shows no evidence for anorectal lesions or elsewhere.  #Intermittent rectal bleeding related to the anal cancer-resolved.  #Benign prostatic hypertrophy.  #Vertigo status post canalith repositioning in mid October 2020.  #Leukopenia and thrombocytopenia resolved.  #Vitamin D deficiency  #Negative chest x-ray in June 2021.  #Mildly increased creatinine compared to prior, potentially from dehydration    Patient is here with his wife Deidra today.  He is being followed by Dr. Eagle, for concern for dysphagia.  Blood work reviewed.  Hemoglobin 13.1.  Platelets 168K.    Recommendations:  -Given that patient is feeling well, will defer upcoming visit in winter 2021, to spring 2022, when patient returns from [place].  CBC/CMP/CEA to be done at that time.   -Patient has completed chemoradiation successfully on September 24 with no evidence of recurrence in December 2020 and June 2021 on pelvic MRI and sigmoidoscopy.  -CT chest abdomen pelvis with IV/p.o. contrast in May 2022 recommended.  -Follow-up with Dr. Brown, per schedule.  -Continued Vitamin D repletion recommended at 2000 units daily, given data suggesting improved outcomes in patients with different malignancies.  -Recommended adequate hydration, given creatinine increased to 1.47, from 1.22 prior.   Recommended at least 64 ounces per day.  He reports that he does not like water and will try to add other forms of liquids such as 0-calorie Gatorade/juices etc.  He also drinks quite a bit of coffee, which can cause dehydration from diuresis.    Return Appointment: May 2022.    Carlos Bennett MD  Gastrointestinal Medical oncology  [facility]  Office phone: [phone]

## 2024-04-05 LAB
BACTERIA UR CULT: NORMAL
BACTERIA UR CULT: NORMAL

## 2024-04-09 ENCOUNTER — TELEPHONE (OUTPATIENT)
Dept: INTERNAL MEDICINE | Facility: CLINIC | Age: 69
End: 2024-04-09

## 2024-04-09 DIAGNOSIS — R93.89 THICKENED ENDOMETRIUM: Primary | ICD-10-CM

## 2024-04-09 NOTE — TELEPHONE ENCOUNTER
----- Message from Hank Doran MD sent at 4/8/2024  7:41 AM CDT -----  Jarad Brambila,  I had a chance to review her imaging.  If she isn't having any bleeding, I would recommend a repeat u/s in 6 months and if it still looks like her endometrial stripe is > 5mm, we could coordinate an endometrial biopsy.  I don't see any prior biopsies.      Thanks,     Hank    ----- Message -----  From: Patty Louis MD  Sent: 4/4/2024   9:52 AM CDT  To: Hank Doran MD    Hi,    Thanks for seeing her in December 2023.  She is doing fine with no post menopausal bleeding.  However she did have a CT 10/23 that showed a thickened endometrium and an ultrasound 12/23 that showed the same - can you please review these and see if we need to do anything further - thank you!    Patty

## 2024-04-18 ENCOUNTER — HOSPITAL ENCOUNTER (EMERGENCY)
Facility: HOSPITAL | Age: 69
Discharge: HOME | End: 2024-04-18
Attending: EMERGENCY MEDICINE
Payer: MEDICARE

## 2024-04-18 VITALS
SYSTOLIC BLOOD PRESSURE: 174 MMHG | TEMPERATURE: 98 F | DIASTOLIC BLOOD PRESSURE: 85 MMHG | RESPIRATION RATE: 16 BRPM | OXYGEN SATURATION: 97 % | HEART RATE: 81 BPM

## 2024-04-18 DIAGNOSIS — R07.9 CHEST PAIN: ICD-10-CM

## 2024-04-18 DIAGNOSIS — R10.13 EPIGASTRIC ABDOMINAL PAIN: ICD-10-CM

## 2024-04-18 DIAGNOSIS — R11.2 NAUSEA AND VOMITING, UNSPECIFIED VOMITING TYPE: ICD-10-CM

## 2024-04-18 DIAGNOSIS — I10 HYPERTENSION, UNSPECIFIED TYPE: Primary | ICD-10-CM

## 2024-04-18 LAB
ALBUMIN SERPL BCP-MCNC: 3.6 G/DL (ref 3.5–5.2)
ALP SERPL-CCNC: 84 U/L (ref 55–135)
ALT SERPL W/O P-5'-P-CCNC: 9 U/L (ref 10–44)
ANION GAP SERPL CALC-SCNC: 8 MMOL/L (ref 8–16)
AST SERPL-CCNC: 18 U/L (ref 10–40)
BASOPHILS # BLD AUTO: 0.01 K/UL (ref 0–0.2)
BASOPHILS NFR BLD: 0.2 % (ref 0–1.9)
BILIRUB SERPL-MCNC: 0.6 MG/DL (ref 0.1–1)
BILIRUB UR QL STRIP: NEGATIVE
BNP SERPL-MCNC: 89 PG/ML (ref 0–99)
BUN SERPL-MCNC: 17 MG/DL (ref 8–23)
BUN SERPL-MCNC: 18 MG/DL (ref 6–30)
CALCIUM SERPL-MCNC: 9.6 MG/DL (ref 8.7–10.5)
CHLORIDE SERPL-SCNC: 108 MMOL/L (ref 95–110)
CHLORIDE SERPL-SCNC: 112 MMOL/L (ref 95–110)
CLARITY UR REFRACT.AUTO: CLEAR
CO2 SERPL-SCNC: 23 MMOL/L (ref 23–29)
COLOR UR AUTO: COLORLESS
CREAT SERPL-MCNC: 1.1 MG/DL (ref 0.5–1.4)
CREAT SERPL-MCNC: 1.1 MG/DL (ref 0.5–1.4)
DIFFERENTIAL METHOD BLD: ABNORMAL
EOSINOPHIL # BLD AUTO: 0 K/UL (ref 0–0.5)
EOSINOPHIL NFR BLD: 0 % (ref 0–8)
ERYTHROCYTE [DISTWIDTH] IN BLOOD BY AUTOMATED COUNT: 13.5 % (ref 11.5–14.5)
EST. GFR  (NO RACE VARIABLE): 54.4 ML/MIN/1.73 M^2
GLUCOSE SERPL-MCNC: 102 MG/DL (ref 70–110)
GLUCOSE SERPL-MCNC: 98 MG/DL (ref 70–110)
GLUCOSE UR QL STRIP: NEGATIVE
HCT VFR BLD AUTO: 32 % (ref 37–48.5)
HCT VFR BLD CALC: 28 %PCV (ref 36–54)
HGB BLD-MCNC: 10.9 G/DL (ref 12–16)
HGB UR QL STRIP: NEGATIVE
IMM GRANULOCYTES # BLD AUTO: 0.01 K/UL (ref 0–0.04)
IMM GRANULOCYTES NFR BLD AUTO: 0.2 % (ref 0–0.5)
KETONES UR QL STRIP: NEGATIVE
LEUKOCYTE ESTERASE UR QL STRIP: NEGATIVE
LIPASE SERPL-CCNC: 24 U/L (ref 4–60)
LYMPHOCYTES # BLD AUTO: 1.8 K/UL (ref 1–4.8)
LYMPHOCYTES NFR BLD: 38.1 % (ref 18–48)
MCH RBC QN AUTO: 32.9 PG (ref 27–31)
MCHC RBC AUTO-ENTMCNC: 34.1 G/DL (ref 32–36)
MCV RBC AUTO: 97 FL (ref 82–98)
MONOCYTES # BLD AUTO: 0.6 K/UL (ref 0.3–1)
MONOCYTES NFR BLD: 12.1 % (ref 4–15)
NEUTROPHILS # BLD AUTO: 2.3 K/UL (ref 1.8–7.7)
NEUTROPHILS NFR BLD: 49.4 % (ref 38–73)
NITRITE UR QL STRIP: NEGATIVE
NRBC BLD-RTO: 0 /100 WBC
OHS QRS DURATION: 102 MS
OHS QRS DURATION: 98 MS
OHS QTC CALCULATION: 418 MS
OHS QTC CALCULATION: 446 MS
PH UR STRIP: 7 [PH] (ref 5–8)
PLATELET # BLD AUTO: 144 K/UL (ref 150–450)
PMV BLD AUTO: 9.9 FL (ref 9.2–12.9)
POC CARDIAC TROPONIN I: 0 NG/ML (ref 0–0.08)
POC CARDIAC TROPONIN I: 0.02 NG/ML (ref 0–0.08)
POC IONIZED CALCIUM: 1.28 MMOL/L (ref 1.06–1.42)
POC TCO2 (MEASURED): 25 MMOL/L (ref 23–29)
POTASSIUM BLD-SCNC: 3.9 MMOL/L (ref 3.5–5.1)
POTASSIUM SERPL-SCNC: 3.9 MMOL/L (ref 3.5–5.1)
PROT SERPL-MCNC: 7.3 G/DL (ref 6–8.4)
PROT UR QL STRIP: NEGATIVE
RBC # BLD AUTO: 3.31 M/UL (ref 4–5.4)
SAMPLE: ABNORMAL
SAMPLE: NORMAL
SAMPLE: NORMAL
SARS-COV-2 RDRP RESP QL NAA+PROBE: NEGATIVE
SODIUM BLD-SCNC: 143 MMOL/L (ref 136–145)
SODIUM SERPL-SCNC: 143 MMOL/L (ref 136–145)
SP GR UR STRIP: 1 (ref 1–1.03)
TROPONIN I SERPL DL<=0.01 NG/ML-MCNC: 0.01 NG/ML (ref 0–0.03)
TROPONIN I SERPL DL<=0.01 NG/ML-MCNC: 0.02 NG/ML (ref 0–0.03)
URN SPEC COLLECT METH UR: ABNORMAL
WBC # BLD AUTO: 4.64 K/UL (ref 3.9–12.7)

## 2024-04-18 PROCEDURE — 25500020 PHARM REV CODE 255: Mod: HCNC | Performed by: EMERGENCY MEDICINE

## 2024-04-18 PROCEDURE — 80053 COMPREHEN METABOLIC PANEL: CPT | Mod: HCNC

## 2024-04-18 PROCEDURE — 84484 ASSAY OF TROPONIN QUANT: CPT | Mod: HCNC,91

## 2024-04-18 PROCEDURE — 93005 ELECTROCARDIOGRAM TRACING: CPT | Mod: HCNC

## 2024-04-18 PROCEDURE — 83690 ASSAY OF LIPASE: CPT | Mod: HCNC

## 2024-04-18 PROCEDURE — U0002 COVID-19 LAB TEST NON-CDC: HCPCS | Mod: HCNC

## 2024-04-18 PROCEDURE — 85025 COMPLETE CBC W/AUTO DIFF WBC: CPT | Mod: HCNC

## 2024-04-18 PROCEDURE — 80048 BASIC METABOLIC PNL TOTAL CA: CPT | Mod: HCNC,XB

## 2024-04-18 PROCEDURE — 93010 ELECTROCARDIOGRAM REPORT: CPT | Mod: HCNC,,, | Performed by: INTERNAL MEDICINE

## 2024-04-18 PROCEDURE — 63600175 PHARM REV CODE 636 W HCPCS: Mod: HCNC

## 2024-04-18 PROCEDURE — 83880 ASSAY OF NATRIURETIC PEPTIDE: CPT | Mod: HCNC

## 2024-04-18 PROCEDURE — 99285 EMERGENCY DEPT VISIT HI MDM: CPT | Mod: 25,HCNC

## 2024-04-18 PROCEDURE — 96374 THER/PROPH/DIAG INJ IV PUSH: CPT | Mod: HCNC

## 2024-04-18 PROCEDURE — 96361 HYDRATE IV INFUSION ADD-ON: CPT | Mod: HCNC

## 2024-04-18 PROCEDURE — 81003 URINALYSIS AUTO W/O SCOPE: CPT | Mod: HCNC

## 2024-04-18 PROCEDURE — 93010 ELECTROCARDIOGRAM REPORT: CPT | Mod: HCNC,76,, | Performed by: INTERNAL MEDICINE

## 2024-04-18 PROCEDURE — 84484 ASSAY OF TROPONIN QUANT: CPT | Mod: HCNC

## 2024-04-18 RX ORDER — ONDANSETRON HYDROCHLORIDE 2 MG/ML
4 INJECTION, SOLUTION INTRAVENOUS ONCE
Status: COMPLETED | OUTPATIENT
Start: 2024-04-18 | End: 2024-04-18

## 2024-04-18 RX ADMIN — IOHEXOL 100 ML: 350 INJECTION, SOLUTION INTRAVENOUS at 05:04

## 2024-04-18 RX ADMIN — ONDANSETRON 4 MG: 2 INJECTION INTRAMUSCULAR; INTRAVENOUS at 03:04

## 2024-04-18 RX ADMIN — SODIUM CHLORIDE, POTASSIUM CHLORIDE, SODIUM LACTATE AND CALCIUM CHLORIDE 1000 ML: 600; 310; 30; 20 INJECTION, SOLUTION INTRAVENOUS at 03:04

## 2024-04-18 NOTE — ED PROVIDER NOTES
Encounter Date: 4/18/2024       History     Chief Complaint   Patient presents with    Hypertension     Pt complaining of chest pain, dizziness and a headache that started yesterday afternoon. Pt states son took her blood pressure at home and states it was high. Pt states taking blood pressure medicine regularly      The history is provided by the patient.     Ms. Esqueda is a 68-year-old female with past medical history of HTN, type 1 diabetes, CAD who presents due to  nausea, vomiting, general malaise, headache and chest/abdominal pain for the past day.  She states that she had a meal in the afternoon  2 days ago, with a yesterday morning she started having these symptoms in his lost her appetite since then.  She has continued to consume fluids however.  The nausea and vomiting started earlier in the day, and her most recent episode of vomiting was here in the emergency department.  As for her headache, she states that it started around the same time yesterday that the vomiting did.  Additionally, she has felt her chest/ abdomen have been uncomfortable, however can not point to a specific location where this pain is most specific. Prior to the onset of the symptoms 2 days ago she was in her otherwise normal state of health.    Review of patient's allergies indicates:  No Known Allergies  Past Medical History:   Diagnosis Date    Acute coronary syndrome 09/23/2018    Adjustment disorder     Anxiety     Arthritis     Cancer of breast 09/03/2020    Malignant neoplasm of lower-inner quadrant of left breast in female, estrogen receptor positive    Cholelithiasis with acute cholecystitis 03/05/2021    Coronary artery disease     Depression     Diabetes mellitus type I     Genetic testing of female 09/2020    [employer] via [employer] with AXIN2 and POLE variants of uncertain significance    GERD (gastroesophageal reflux disease)     Hepatitis B core antibody positive 3/9/2023    Negative sAg, suggests previous  exposure but no chronic/active Hep B. At risk for reactivation with any immunosuppression medication, steroids, chemo, etc.      Hypertension     Vertigo 2019    Vitamin D deficiency 2021     Past Surgical History:   Procedure Laterality Date    BREAST BIOPSY Left 2022    Left punch biopsy; Unremarkable keratinized skin with intradermal hematoma     SECTION      INJECTION FOR SENTINEL NODE IDENTIFICATION Left 2020    Procedure: INJECTION, FOR SENTINEL NODE IDENTIFICATION;  Surgeon: Isabel Moulton MD;  Location: [facility] OR;  Service: General;  Laterality: Left;    INSERTION OF TUNNELED CENTRAL VENOUS CATHETER (CVC) WITH SUBCUTANEOUS PORT N/A 2020    Procedure: INSERTION, PORT-A-CATH;  Surgeon: Hardeep Martin MD;  Location: [facility] CATH LAB;  Service: Radiology;  Laterality: N/A;    LAPAROSCOPIC CHOLECYSTECTOMY N/A 2021    Procedure: CHOLECYSTECTOMY, LAPAROSCOPIC;  Surgeon: Buster Son MD;  Location: [address];  Service: General;  Laterality: N/A;    MASTECTOMY, PARTIAL Left 2020    Procedure: MASTECTOMY, PARTIAL-w/reflector;  Surgeon: Isabel Moulton MD;  Location: [facility] OR;  Service: General;  Laterality: Left;    SENTINEL LYMPH NODE BIOPSY Left 2020    Procedure: BIOPSY, LYMPH NODE, SENTINEL;  Surgeon: Isabel Moulton MD;  Location: [facility] OR;  Service: General;  Laterality: Left;     Family History   Problem Relation Name Age of Onset    Hypertension Mother      Hyperlipidemia Mother      Diabetes Mother      Heart disease Mother      Colon polyps Mother      Aneurysm Father      Diabetes Sister 2     Hypertension Sister 2     Heart disease Brother 2     Diabetes Brother 2     Hypertension Brother 2     Cancer Brother 1         brother German with prostate cancer; brother Cooper with throat cancer    Cervical cancer Daughter Daughter1     Anxiety disorder Daughter Daughter2     Depression Daughter Daughter2     Anxiety disorder Daughter Daughter3      Depression Daughter Daughter3     Breast cancer Maternal Aunt Teresa     Cancer Maternal Aunt Nikki         unknown origin    Cancer Paternal Aunt Esperanza         unknown origin    Breast cancer Paternal Aunt Esperanza     Prostate cancer Maternal Cousin Chris     Leukemia Maternal Cousin Chris     Prostate cancer Maternal Cousin      Breast cancer Other Pat 1/2aunt Quiana     Colon cancer Neg Hx      Ovarian cancer Neg Hx       Social History     Tobacco Use    Smoking status: Never    Smokeless tobacco: Never   Substance Use Topics    Alcohol use: Never    Drug use: No       Physical Exam     Initial Vitals [04/18/24 0123]   BP Pulse Resp Temp SpO2   (!) 200/95 78 18 97.8 °F (36.6 °C) 99 %      MAP       --         Physical Exam    Nursing note and vitals reviewed.  Constitutional: She appears well-developed. No distress.   HENT:   Head: Normocephalic and atraumatic.   Mouth/Throat: Mucous membranes are normal.   Oropharynx noted to be moderately dry   Eyes: EOM are normal. Pupils are equal, round, and reactive to light.   Neck:   Normal range of motion.  Cardiovascular:  Normal rate and regular rhythm.           A systolic heart murmur is auscultated most prominently in the 2nd intercostal space just left to the sternum.   Pulmonary/Chest: No respiratory distress.   Abdominal:   Mild-to-moderate tenderness to palpation present most prominently in the epigastric region of the abdomen   Musculoskeletal:      Cervical back: Normal range of motion.      Comments: 1+ pitting edema noted in the bilateral lower extremities     Neurological: She is alert and oriented to person, place, and time.   Skin: Skin is warm.   Psychiatric: She has a normal mood and affect. Her behavior is normal. Thought content normal.         ED Course   Procedures  Labs Reviewed   CBC W/ AUTO DIFFERENTIAL - Abnormal; Notable for the following components:       Result Value    RBC 3.31 (*)     Hemoglobin 10.9 (*)     Hematocrit 32.0 (*)      MCH 32.9 (*)     Platelets 144 (*)     All other components within normal limits   COMPREHENSIVE METABOLIC PANEL - Abnormal; Notable for the following components:    Chloride 112 (*)     ALT 9 (*)     eGFR 54.4 (*)     All other components within normal limits   URINALYSIS, REFLEX TO URINE CULTURE - Abnormal; Notable for the following components:    Color, UA Colorless (*)     All other components within normal limits    Narrative:     Specimen Source->Urine   ISTAT PROCEDURE - Abnormal; Notable for the following components:    POC Hematocrit 28 (*)     All other components within normal limits   TROPONIN I   TROPONIN I   B-TYPE NATRIURETIC PEPTIDE   LIPASE   SARS-COV-2 RNA AMPLIFICATION, QUAL   TROPONIN ISTAT   TROPONIN ISTAT   POCT TROPONIN   POCT TROPONIN   ISTAT CHEM8     EKG Readings: (Independently Interpreted)   Normal sinus rhythm, left axis deviation, questionable right bundle branch block, GA interval with a normal limits, QRS complex is narrow, QT segment with a normal limits, no STEMI.       Imaging Results              CT Abdomen Pelvis With IV Contrast NO Oral Contrast (Final result)  Result time 04/18/24 06:44:12      Final result by Doroteo Sagastume MD (04/18/24 06:44:12)                   Impression:      Mild diffuse gastric wall thickening, a nonspecific finding which can be seen with gastritis or related to nondistention.  Otherwise no acute findings in the abdomen or pelvis.    Partially imaged asymmetric skin thickening and increased density in the subjacent left breast subcutaneous soft tissues, for which continued attention on dedicated follow-up be recommended in this patient with breast cancer.  Of note, suspect little if any interval change when compared to most recent dedicated chest CT performed 02/02/2024.    Additional details, as provided in the body of the report.    Electronically signed by resident: Luis Barrientos  Date:    04/18/2024  Time:    05:45    Electronically signed  by: Doroteo Hinojosajonathon  Date:    04/18/2024  Time:    06:44               Narrative:    EXAMINATION:  CT ABDOMEN PELVIS WITH IV CONTRAST    CLINICAL HISTORY:  Significant epigastric abdominal pain, multiple episodes of vomiting.;    TECHNIQUE:  Axial images of the abdomen and pelvis were acquired  after the use of 100 cc [id number] IV contrast.  Coronal and sagittal reconstructions were also obtained.    COMPARISON:  CT abdomen pelvis 10/20/2023, 06/11/2023    FINDINGS:  Heart: Normal in size. No pericardial effusion.    Lungs: Mild bibasilar atelectasis.  Calcified granuloma right middle lobe.  No consolidation.  No pleural fluid.    Liver: Enlarged measuring 18.8 cm in craniocaudal dimension.  Normal contour.  No focal hepatic lesion.  Portal veins are patent.    Gallbladder: Surgically absent    Bile Ducts: No evidence of dilated ducts.    Pancreas: No mass or peripancreatic fat stranding.    Spleen: Unremarkable.    Stomach and duodenum: Mild diffuse gastric wall thickening, a nonspecific finding which can be seen with gastritis or related to nondistention.  Duodenum unremarkable.    Adrenals: Unremarkable.    Kidneys/Ureters: Lobular contour of the bilateral kidneys with cortical scarring and numerous bilateral parenchymal calcifications and/or tiny nonobstructing renal stones.  These findings are similar to prior exams.  Normal enhancement.  No hydronephrosis or ureteral dilatation.    Bladder: No evidence of wall thickening.    Reproductive organs: Unremarkable.    Bowel/Mesentery: Small bowel is normal in caliber with no evidence of obstruction. No evidence of inflammation or wall thickening.  Normal appendix.  Colon demonstrates no focal wall thickening.    Peritoneum: No intraperitoneal free air or fluid.    Lymph nodes: No retroperitoneal lymphadenopathy.    Vasculature: No aneurysm. No significant calcific atherosclerosis.    Abdominal wall:  Diastasis recti.    Bones: No acute fracture. No suspicious osseous  lesions.    Partially imaged asymmetric skin thickening and increased density in the subjacent left breast subcutaneous soft tissues, for which continued attention on dedicated follow-up be recommended in this patient with breast cancer.  Of note, suspect little if any interval change when compared to most recent dedicated chest CT performed 02/02/2024.                                       X-Ray Chest AP Portable (Final result)  Result time 04/18/24 04:58:53      Final result by Facundo Goodwin MD (04/18/24 04:58:53)                   Impression:      No acute cardiopulmonary finding.    Electronically signed by resident: Luis Barrientos  Date:    04/18/2024  Time:    04:47    Electronically signed by: Facundo Goodwin  Date:    04/18/2024  Time:    04:58               Narrative:    EXAMINATION:  XR CHEST AP PORTABLE    CLINICAL HISTORY:  Chest pain, unspecified    TECHNIQUE:  Single frontal view of the chest was performed.    COMPARISON:  Chest radiograph 02/10/2024    CT chest 02/02/2024    FINDINGS:  Mediastinal structures midline.  Cardiac silhouette normal.    Lungs clear without consolidation.  No pleural fluid or pneumothorax.    Degenerative change about the shoulders.                                       Medications   ondansetron injection 4 mg (4 mg Intravenous Given 4/18/24 0331)   lactated ringers bolus 1,000 mL (0 mLs Intravenous Stopped 4/18/24 0645)   iohexoL (OMNIPAQUE 350) injection 100 mL (100 mLs Intravenous Given 4/18/24 0518)     Medical Decision Making    Ms. Pillai is a 68-year-old female who presents due to hypertension, nausea, vomiting and generalized headache/ chest pain/ abdominal pain.  Differential diagnosis includes but is not limited to gastroenteritis, costochondritis, ACS, pancreatitis, cholelithiasis, diverticulitis, medication noncompliance, essential hypertension, lower clinical suspicion for intracranial process.  Upon my initial evaluation of the patient she was having multiple  episodes of emesis for which I immediately gave IV Zofran, after which the symptom resolved.  Her blood pressure was noted to be in the 200s over 90s which I believe is a result of her current medical presentation.  Given how dry she was on physical exam I will go ahead and give a L of fluids.  I will also further workup my differential with basic labs, EKG, BNP, troponin, lipase, UA, chest x-ray and a CT abdomen.    After receiving the fluids/Zofran her symptoms have improved.  The entirety of our laboratory/radiologic workup was shown to be unremarkable.  The patient's blood pressure also subsequently came down to the 180s.  I had a discussion with her regarding the importance of following up with her primary care physician to discuss blood pressure medication regimen to which she verbalized understanding and was in agreement with making an appointment within the next week.  She continued to remain hemodynamically stable while here in the emergency department.  Stable for discharge at this time.    Amount and/or Complexity of Data Reviewed  External Data Reviewed: notes.     Details: Previous medical records, specifically from her most recent office visit in 04/2024 was reviewed to better understand the patient's current/past medical problems.  Labs: ordered.  Radiology: ordered.    Risk  Prescription drug management.                                    Attending ED Notes:   ATTENDING PHYSICIAN ATTESTATION    I have personally seen and examined this patient and repeated the key portions of the resident's history and physical, reviewed and agree with the resident medical documentation, and supervised and managed the medical care of the patient.  I was present and supervising any critical portions of procedures performed        Clinical Impression:  Final diagnoses:  [R07.9] Chest pain  [I10] Hypertension, unspecified type (Primary)  [R11.2] Nausea and vomiting, unspecified vomiting type  [R10.13] Epigastric  abdominal pain          ED Disposition Condition    Discharge Stable          ED Prescriptions    None       Follow-up Information       Follow up With Specialties Details Why Contact Info    Patty Louis MD Internal Medicine In 1 week As needed [address]  [facility], [address]  [facility] [place] [address]  [phone]               Tramaine Blackburn MD  Resident  04/18/24 0058       Julian Aguillon MD  04/18/24 [id number]

## 2024-04-18 NOTE — ED NOTES
Sunitha Pillai, a 68 y.o. female presents to the ED w/ complaint of Hypertension   Pt complaining of HTN, chest pain, dizziness and headache x1day. +nausea/vomiting. Reports taking BP meds regularly     Triage note:  Chief Complaint   Patient presents with    Hypertension     Pt complaining of chest pain, dizziness and a headache that started yesterday afternoon. Pt states son took her blood pressure at home and states it was high. 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 At risk for reactivation with any immunosuppression medication, steroids, chemo, etc.      Hypertension     Vertigo 05/13/2019    Vitamin D deficiency 02/07/2021

## 2024-04-18 NOTE — DISCHARGE INSTRUCTIONS
He did important that you continue to take your blood pressure medication pills at home.  Please follow-up with your primary care doctor in the next week as it may be necessary for UTI change your current regimen to better control your blood pressure.  Please return to the emergency department should he experience new onset or worsening symptoms such as severe chest pain, headache that will not go away, or any other alarming /worsening symptoms.

## 2024-04-18 NOTE — ED NOTES
Assumed care for pt after recieving report from nightshift RN. Pt. resting in bed in NAD, RR e/u. Vital signs stable and within desired limits at this time of assessment. Pt. offered bathroom assistance and denies need at this time. Explanation of care/wait provided. Pt verbalizes no needs at this time. Bed in low, locked position with rails up and call bell in reach. Pt's white board updated with today's care team and plan.     Patient identifiers for Sunitha Pillai 68 y.o. female checked and correct.  Chief Complaint   Patient presents with    Hypertension     Pt complaining of chest pain, dizziness and a headache that started yesterday afternoon. Pt states son took her blood pressure at home and states it was high. 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 At risk for reactivation with any immunosuppression medication, steroids, chemo, etc.      Hypertension     Vertigo 05/13/2019    Vitamin D deficiency 02/07/2021     Allergies reported: Review of patient's allergies indicates:  No Known Allergies      LOC: Patient is awake, alert, and aware of environment with an appropriate affect. Patient is oriented x 4 and speaking appropriately.  APPEARANCE: Patient resting comfortably and in no acute distress. Patient is clean and well groomed,  patient's clothing is properly fastened.  HEENT: WDL  SKIN: The skin is warm and dry. Patient has normal skin turgor and moist mucus membranes.   MUSCULOSKELETAL: Patient is moving all extremities well, no obvious deformities noted. Pulses intact.   RESPIRATORY: Airway is open and patent. Respirations are spontaneous and non-labored with normal effort and rate.  CARDIAC: Patient has a normal rate and rhythm. 80 on cardiac monitor. No peripheral edema noted. Denies chest pain and SOB at at time of assessment.   ABDOMEN: No distention noted. Soft and non-tender upon palpation.  NEUROLOGICAL: pupils 3mm, PERRL. Facial expression is symmetrical. Hand grasps are equal bilaterally. Normal sensation in all extremities when touched with finger.

## 2024-04-19 ENCOUNTER — PATIENT OUTREACH (OUTPATIENT)
Dept: EMERGENCY MEDICINE | Facility: HOSPITAL | Age: 69
End: 2024-04-19
Payer: MEDICARE

## 2024-05-23 ENCOUNTER — OFFICE VISIT (OUTPATIENT)
Dept: HEMATOLOGY/ONCOLOGY | Facility: CLINIC | Age: 69
End: 2024-05-23
Payer: MEDICARE

## 2024-05-23 ENCOUNTER — TELEPHONE (OUTPATIENT)
Dept: PSYCHIATRY | Facility: CLINIC | Age: 69
End: 2024-05-23
Payer: MEDICARE

## 2024-05-23 VITALS
TEMPERATURE: 98 F | OXYGEN SATURATION: 98 % | SYSTOLIC BLOOD PRESSURE: 191 MMHG | HEIGHT: 64 IN | DIASTOLIC BLOOD PRESSURE: 81 MMHG | WEIGHT: 157.19 LBS | HEART RATE: 67 BPM | BODY MASS INDEX: 26.84 KG/M2

## 2024-05-23 DIAGNOSIS — Z12.31 ENCOUNTER FOR SCREENING MAMMOGRAM FOR HIGH-RISK PATIENT: ICD-10-CM

## 2024-05-23 DIAGNOSIS — Z85.3 HISTORY OF BREAST CANCER: ICD-10-CM

## 2024-05-23 DIAGNOSIS — F32.9 REACTIVE DEPRESSION: Primary | ICD-10-CM

## 2024-05-23 PROCEDURE — 3008F BODY MASS INDEX DOCD: CPT | Mod: HCNC,CPTII,S$GLB, | Performed by: INTERNAL MEDICINE

## 2024-05-23 PROCEDURE — 3288F FALL RISK ASSESSMENT DOCD: CPT | Mod: HCNC,CPTII,S$GLB, | Performed by: INTERNAL MEDICINE

## 2024-05-23 PROCEDURE — 3060F POS MICROALBUMINURIA REV: CPT | Mod: HCNC,CPTII,S$GLB, | Performed by: INTERNAL MEDICINE

## 2024-05-23 PROCEDURE — 3066F NEPHROPATHY DOC TX: CPT | Mod: HCNC,CPTII,S$GLB, | Performed by: INTERNAL MEDICINE

## 2024-05-23 PROCEDURE — 1101F PT FALLS ASSESS-DOCD LE1/YR: CPT | Mod: HCNC,CPTII,S$GLB, | Performed by: INTERNAL MEDICINE

## 2024-05-23 PROCEDURE — 3077F SYST BP >= 140 MM HG: CPT | Mod: HCNC,CPTII,S$GLB, | Performed by: INTERNAL MEDICINE

## 2024-05-23 PROCEDURE — 3079F DIAST BP 80-89 MM HG: CPT | Mod: HCNC,CPTII,S$GLB, | Performed by: INTERNAL MEDICINE

## 2024-05-23 PROCEDURE — 99999 PR PBB SHADOW E&M-EST. PATIENT-LVL V: CPT | Mod: PBBFAC,HCNC,, | Performed by: INTERNAL MEDICINE

## 2024-05-23 PROCEDURE — 99214 OFFICE O/P EST MOD 30 MIN: CPT | Mod: HCNC,S$GLB,, | Performed by: INTERNAL MEDICINE

## 2024-05-23 PROCEDURE — 4010F ACE/ARB THERAPY RXD/TAKEN: CPT | Mod: HCNC,CPTII,S$GLB, | Performed by: INTERNAL MEDICINE

## 2024-05-23 PROCEDURE — 3044F HG A1C LEVEL LT 7.0%: CPT | Mod: HCNC,CPTII,S$GLB, | Performed by: INTERNAL MEDICINE

## 2024-05-23 PROCEDURE — 1125F AMNT PAIN NOTED PAIN PRSNT: CPT | Mod: HCNC,CPTII,S$GLB, | Performed by: INTERNAL MEDICINE

## 2024-05-23 PROCEDURE — 1159F MED LIST DOCD IN RCRD: CPT | Mod: HCNC,CPTII,S$GLB, | Performed by: INTERNAL MEDICINE

## 2024-05-23 RX ORDER — DULOXETIN HYDROCHLORIDE 20 MG/1
20 CAPSULE, DELAYED RELEASE ORAL DAILY
Qty: 30 CAPSULE | Refills: 11 | Status: SHIPPED | OUTPATIENT
Start: 2024-05-23 | End: 2025-05-23

## 2024-05-23 NOTE — PROGRESS NOTES
Subjective:      Patient ID: Sunitha Pillai is a 68 y.o. female.    Chief Complaint: Malignant neoplasm of lower-inner quadrant of left breast i        HPI:  68-year-old female, patient of Dr. Moulton, who returns for follow-up of a diagnosis of left breast cancer.  She is on adjuvant letrozole therapy.  She continues to grieve for her  who  several years ago.  Co-worker feels she is depressed.    Her pulmonary symptoms have resolved.  She has some chronic pain in her left breast and feeling of fullness.    Appetite remains decreased.  Her weight has been stable      She has had mild anemia with hemoglobin ranging from 10-11-1/2 grams/deciliter.  MCV normal, WBC and plts normal.  Iron studies on February 3, 2023 showed iron of 44, TIBC 330, iron saturation 13%, ferritin 368, B12 normal.  Her anemia seems to have started since her chemotherapy at the end of .  HGB was 12 in , 11 gm/dl in 2023.HGB 10.5 in 2023.    Current history:   Screening mammogram on 2020 showed a focal asymmetry in the lower outer portion left breast.  Biopsy on 2020 showed high-grade infiltrating ductal carcinoma (histologic grade 3, nuclear grade 3, mitotic index 3).  Tumor was 95% ER positive in 95% DE positive, HER2 was 2+ but negative by fish.  Ki-67 was 95%.    On 2020 lumpectomy and sentinel lymph node biopsy were performed.  That pathology showed a 1.5 cm infiltrating carcinoma with a single negative sentinel lymph node.  Margins were negative.  Final pathological stage T1c N0, stage I A.      Oncotype risk score - 29-high risk.    She received 4 cycles of adjuvant Taxotere and Cytoxan from 20 to 21.    Completed radiation 21.     Letrozole started in May 2021.    Mammogram 23 -negative    Review of Systems   Constitutional:  Negative for activity change, appetite change (Decreased but unchanged), fever and unexpected weight change.    HENT: Negative.     Respiratory:  Cough: Mild.    Cardiovascular:  Negative for chest pain.   Gastrointestinal:  Negative for constipation.   Genitourinary: Negative.    Musculoskeletal:  Positive for arthralgias.   Neurological: Negative.    Psychiatric/Behavioral: Negative.       Objective:   Physical Exam   Vitals reviewed.  Constitutional: She is oriented to person, place, and time. She appears well-developed. No distress.   Cardiovascular:  Normal rate and regular rhythm.            Pulmonary/Chest: Effort normal and breath sounds normal. No respiratory distress. She has no wheezes. She has no rales. Right breast exhibits no mass, no nipple discharge and no skin change. Left breast exhibits skin change and tenderness. Left breast exhibits no mass and no nipple discharge.       Abdominal: Soft. She exhibits no mass. There is no abdominal tenderness.   Musculoskeletal: Lymphadenopathy:      Cervical: No cervical adenopathy.     Neurological: She is alert and oriented to person, place, and time.   Skin: No rash noted.     Psychiatric: Her behavior is normal. Thought content normal.     Assessment:    .   1. History of breast cancer      2. Breast pain - post treatment  3. Depression/grief reaction  Plan:     Trial of cymbalta for mood and chronic pain.  Psychology   RTC 6 M with mammogram         Route Chart for Scheduling    Med Onc Chart Routing      Follow up with physician 6 months.   Follow up with [facility]    Infusion scheduling note    Injection scheduling note    Labs None   Scheduling:  Preferred lab:  Lab interval:     Imaging Mammogram      Pharmacy appointment    Other referrals       Additional referrals needed  psychology

## 2024-06-02 ENCOUNTER — HOSPITAL ENCOUNTER (EMERGENCY)
Facility: HOSPITAL | Age: 69
Discharge: HOME | End: 2024-06-02
Attending: STUDENT IN AN ORGANIZED HEALTH CARE EDUCATION/TRAINING PROGRAM
Payer: MEDICARE

## 2024-06-02 VITALS
DIASTOLIC BLOOD PRESSURE: 78 MMHG | BODY MASS INDEX: 25.95 KG/M2 | SYSTOLIC BLOOD PRESSURE: 173 MMHG | TEMPERATURE: 98 F | RESPIRATION RATE: 18 BRPM | OXYGEN SATURATION: 100 % | HEART RATE: 60 BPM | HEIGHT: 64 IN | WEIGHT: 152 LBS

## 2024-06-02 DIAGNOSIS — Z87.39 PERSONAL HISTORY OF RHEUMATOID ARTHRITIS: ICD-10-CM

## 2024-06-02 DIAGNOSIS — M25.50 POLYARTHRALGIA: Primary | ICD-10-CM

## 2024-06-02 LAB — POCT GLUCOSE: 82 MG/DL (ref 70–110)

## 2024-06-02 PROCEDURE — 82962 GLUCOSE BLOOD TEST: CPT | Mod: HCNC

## 2024-06-02 PROCEDURE — 63600175 PHARM REV CODE 636 W HCPCS: Mod: HCNC | Performed by: PHYSICIAN ASSISTANT

## 2024-06-02 PROCEDURE — 99284 EMERGENCY DEPT VISIT MOD MDM: CPT | Mod: HCNC

## 2024-06-02 RX ORDER — GABAPENTIN 300 MG/1
CAPSULE ORAL
Qty: 21 CAPSULE | Refills: 0 | Status: SHIPPED | OUTPATIENT
Start: 2024-06-02 | End: 2024-06-02

## 2024-06-02 RX ORDER — GABAPENTIN 300 MG/1
CAPSULE ORAL
Qty: 21 CAPSULE | Refills: 0 | Status: SHIPPED | OUTPATIENT
Start: 2024-06-02 | End: 2024-06-11

## 2024-06-02 RX ORDER — ACETAMINOPHEN 500 MG
1000 TABLET ORAL EVERY 8 HOURS PRN
Qty: 30 TABLET | Refills: 0 | Status: SHIPPED | OUTPATIENT
Start: 2024-06-02 | End: 2024-06-02

## 2024-06-02 RX ORDER — PREDNISONE 20 MG/1
40 TABLET ORAL
Status: COMPLETED | OUTPATIENT
Start: 2024-06-02 | End: 2024-06-02

## 2024-06-02 RX ORDER — ACETAMINOPHEN 500 MG
1000 TABLET ORAL EVERY 8 HOURS PRN
Qty: 30 TABLET | Refills: 0 | Status: SHIPPED | OUTPATIENT
Start: 2024-06-02 | End: 2024-06-07

## 2024-06-02 RX ORDER — PREDNISONE 20 MG/1
40 TABLET ORAL DAILY
Qty: 8 TABLET | Refills: 0 | Status: SHIPPED | OUTPATIENT
Start: 2024-06-03 | End: 2024-06-02

## 2024-06-02 RX ORDER — PREDNISONE 20 MG/1
40 TABLET ORAL DAILY
Qty: 8 TABLET | Refills: 0 | Status: SHIPPED | OUTPATIENT
Start: 2024-06-03 | End: 2024-06-07

## 2024-06-02 RX ADMIN — PREDNISONE 40 MG: 20 TABLET ORAL at 08:06

## 2024-06-02 NOTE — ED NOTES
Pt identifiers Sunitha Pillai were checked and are correct  LOC: The patient is awake, alert, aware of environment with an appropriate affect. Oriented x, speaking appropriately  APPEARANCE: Pt rates pain a 4/10 , in no acute distress, pt is clean and well groomed, clothing properly fastened  SKIN: Skin warm, dry and intact, normal skin turgor, moist mucus membranes  RESPIRATORY: Airway is open and patent, respirations are spontaneous, even and unlabored, normal effort and rate  CARDIAC: Normal rate and rhythm, no peripheral edema noted, capillary refill < 3 seconds, bilateral radial pulses 2+  ABDOMEN: Soft, nontender, nondistended  NEUROLOGIC: PERRL, facial expression is symmetrical, patient moving all extremities spontaneously, normal sensation in all extremities when touched with a finger.  Follows all commands appropriately  MUSCULOSKELETAL: Swelling noted to left fingers and left knee Rates pain a 4/10

## 2024-06-02 NOTE — ED PROVIDER NOTES
Encounter Date: 6/2/2024       History     Chief Complaint   Patient presents with    Joint Swelling     Pt complaining of swelling to both knees and hands that started a week ago. Pt states having this issue before and being told she had Rheumatoid Arthritis      68-year-old female with medical comorbidities listed below and significant for non-insulin dependent DM, history of breast cancer on maintenance letrozole, rheumatoid arthritis presents to the ED with polyarthralgias.  For the past week, patient complains of pain and swelling to both knees ( L>R), swelling to 2nd and 3rd digits of the left hand as well as pain to both of her feet.  Denies history of neuropathy.  She has not been seen by Rheumatology, but does have an appointment next month.  She has not attempted treatment for her symptoms prior to arrival.  Patient also does not check her blood sugar at home.  No reported injuries.       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 At risk for reactivation with any immunosuppression medication, steroids, chemo, etc.      Hypertension     Vertigo 05/13/2019    Vitamin D deficiency 02/07/2021     Past Surgical History:   Procedure Laterality Date    BREAST BIOPSY Left 06/08/2022    Left punch biopsy; Unremarkable keratinized skin with  intradermal hematoma     SECTION      INJECTION FOR SENTINEL NODE IDENTIFICATION Left 2020    Procedure: INJECTION, FOR SENTINEL NODE IDENTIFICATION;  Surgeon: Isabel Moulton MD;  Location: [facility] OR;  Service: General;  Laterality: Left;    INSERTION OF TUNNELED CENTRAL VENOUS CATHETER (CVC) WITH SUBCUTANEOUS PORT N/A 2020    Procedure: INSERTION, PORT-A-CATH;  Surgeon: Hardeep Martin MD;  Location: [facility] CATH LAB;  Service: Radiology;  Laterality: N/A;    LAPAROSCOPIC CHOLECYSTECTOMY N/A 2021    Procedure: CHOLECYSTECTOMY, LAPAROSCOPIC;  Surgeon: Buster Son MD;  Location: [facility] OR [facility] FLR;  Service: General;  Laterality: N/A;    MASTECTOMY, PARTIAL Left 2020    Procedure: MASTECTOMY, PARTIAL-w/reflector;  Surgeon: Isabel Moulton MD;  Location: [facility] OR;  Service: General;  Laterality: Left;    SENTINEL LYMPH NODE BIOPSY Left 2020    Procedure: BIOPSY, LYMPH NODE, SENTINEL;  Surgeon: Isabel Moulton MD;  Location: [facility] OR;  Service: General;  Laterality: Left;     Family History   Problem Relation Name Age of Onset    Hypertension Mother      Hyperlipidemia Mother      Diabetes Mother      Heart disease Mother      Colon polyps Mother      Aneurysm Father      Diabetes Sister 2     Hypertension Sister 2     Heart disease Brother 2     Diabetes Brother 2     Hypertension Brother 2     Cancer Brother 1         brother German with prostate cancer; brother Cooper with throat cancer    Cervical cancer Daughter Daughter1     Anxiety disorder Daughter Daughter2     Depression Daughter Daughter2     Anxiety disorder Daughter Daughter3     Depression Daughter Daughter3     Breast cancer Maternal Aunt Teresa     Cancer Maternal Aunt Nikki         unknown origin    Cancer Paternal Aunt Esperanza         unknown origin    Breast cancer Paternal Aunt Esperanza     Prostate cancer Maternal Cousin Chris     Leukemia Maternal Cousin Chris     Prostate cancer Maternal Cousin Nirmal      Breast cancer Other Pat 1/2aedgard Araya     Colon cancer Neg Hx      Ovarian cancer Neg Hx       Social History     Tobacco Use    Smoking status: Never    Smokeless tobacco: Never   Substance Use Topics    Alcohol use: Never    Drug use: No     Review of Systems   Musculoskeletal:  Positive for arthralgias and joint swelling.       Physical Exam     Initial Vitals [06/02/24 0640]   BP Pulse Resp Temp SpO2   139/85 68 18 98.2 °F (36.8 °C) 100 %      MAP       --         Physical Exam    Nursing note and vitals reviewed.  Constitutional: She appears well-developed and well-nourished. She is not diaphoretic.  Non-toxic appearance. She does not appear ill. No distress.   HENT:   Head: Normocephalic and atraumatic.   Neck: Neck supple.   Cardiovascular:  Normal rate and regular rhythm.     Exam reveals no gallop and no friction rub.       No murmur heard.  Pulmonary/Chest: Effort normal and breath sounds normal. No accessory muscle usage. No tachypnea. No respiratory distress. She has no decreased breath sounds. She has no wheezes. She has no rhonchi. She has no rales.   Abdominal: She exhibits no distension.   Musculoskeletal:      Cervical back: Neck supple.      Comments: Mild swelling to bilateral knee joints.  No erythema or warmth noted.  Full range of motion without significant pain or difficulty.   Mild tenderness to the medial aspect of both feet and great toes.  No swelling or erythema.Pedal pulses intact.  Sensation intact.  Mild swelling to 2nd, third digits left hand.      Neurological: She is alert.   Skin: No rash noted.   Psychiatric: She has a normal mood and affect. Her behavior is normal.         ED Course   Procedures  Labs Reviewed   POCT GLUCOSE MONITORING CONTINUOUS          Imaging Results    None          Medications   predniSONE tablet 40 mg (40 mg Oral Given 6/2/24 0852)     Medical Decision Making  68-year-old female with a history of DM and rheumatoid arthritis presents to the ED with  polyarthralgias for the past week.  Vitals within normal limits.  Patient in no acute distress.  There is no evidence of infection to suggest septic arthritis.  My differential diagnosis includes but is not limited to:  Exacerbation of rheumatoid arthritis, osteoarthritis, neuropathy.  Considered but do not suspect septic arthritis.    Plan:  Check glucose.  Will plan to treat with steroids, Tylenol, gabapentin glucose under control.  Patient has follow-up with Rheumatology next month.    Risk  OTC drugs.  Prescription drug management.               ED Course as of 06/02/24 1110   Sun Jun 02, 2024   0856 Glucose 88.  Will treat for exacerbation of rheumatoid arthritis with steroids, Tylenol.  Will also add gabapentin as patient may have component of neuropathy in her feet.  Discharged in stable condition.  Advised close follow-up with PCP or rheumatology.  ED return precautions given.  Patient voiced understanding. I have reviewed the patient's records and discussed this case with my supervising physician.      [EH]      ED Course User Index  [EH] Edith Ingram PA-C                           Clinical Impression:  Final diagnoses:  [M25.50] Polyarthralgia (Primary)  [Z87.39] Personal history of rheumatoid arthritis          ED Disposition Condition    Discharge Stable          ED Prescriptions       Medication Sig Dispense Start Date End Date Auth. Provider    predniSONE (DELTASONE) 20 MG tablet  (Status: Discontinued) Take 2 tablets (40 mg total) by mouth once daily. for 4 days 8 tablet 6/3/2024 6/2/2024 Edith Ingram PA-C    acetaminophen (TYLENOL) 500 MG tablet  (Status: Discontinued) Take 2 tablets (1,000 mg total) by mouth every 8 (eight) hours as needed for Pain. 30 tablet 6/2/2024 6/2/2024 Edith Ingram PA-C    gabapentin (NEURONTIN) 300 MG capsule  (Status: Discontinued) Take 1 capsule (300 mg total) by mouth every evening for 2 days, THEN 1 capsule (300 mg total) 2 (two) times daily for 2 days,  THEN 1 capsule (300 mg total) 3 (three) times daily for 5 days. If the increased dose is too sedating, you can always reduce down to the previous dose. 21 capsule 6/2/2024 6/2/2024 Edith Ingram PA-C    acetaminophen (TYLENOL) 500 MG tablet Take 2 tablets (1,000 mg total) by mouth every 8 (eight) hours as needed for Pain. 30 tablet 6/2/2024 6/7/2024 Edith Ingram PA-C    gabapentin (NEURONTIN) 300 MG capsule Take 1 capsule (300 mg total) by mouth every evening for 2 days, THEN 1 capsule (300 mg total) 2 (two) times daily for 2 days, THEN 1 capsule (300 mg total) 3 (three) times daily for 5 days. If the increased dose is too sedating, you can always reduce down to the previous dose. 21 capsule 6/2/2024 6/11/2024 Edith Ingram PA-C    predniSONE (DELTASONE) 20 MG tablet Take 2 tablets (40 mg total) by mouth once daily. for 4 days 8 tablet 6/3/2024 6/7/2024 Edith Ingram PA-C          Follow-up Information       Follow up With Specialties Details Why Contact Info Additional Information    [id number]   [address] [facility] [address] [phone] [facility] - Main Building, 5th Floor Please park in [facility] and use Atrium elevator    [facility]   [address]  [address]  [facility] [address] [phone] [address]             Edith Ingram PA-C  06/02/24 1110

## 2024-06-02 NOTE — DISCHARGE INSTRUCTIONS
Considered moving your appointment up sooner or getting placed on the wait list for a sooner appointment with rheumatology.    You received your dose of prednisone today.  Take your next dose tomorrow 6/3.  You should check your blood sugar twice a day while taking the prednisone as this medication can typically elevate your blood sugar.  You should follow a strict diabetic diet as well.     Return to the ER if you develop fever greater than 100.4°, worsening swelling, redness to the joints or any other worrisome symptoms.

## 2024-06-02 NOTE — ED TRIAGE NOTES
Pt complains of pain to left knee radiating down to her toes for past week  Pt also complains of swelling to left index , middle, ring and little fingers

## 2024-06-02 NOTE — Clinical Note
"Sunitha Ramirezben Pillai was seen and treated in our emergency department on 6/2/2024.  She may return to work on 06/03/2024.       If you have any questions or concerns, please don't hesitate to call.      Katiana Rios RN    "

## 2024-06-03 ENCOUNTER — PATIENT OUTREACH (OUTPATIENT)
Dept: EMERGENCY MEDICINE | Facility: HOSPITAL | Age: 69
End: 2024-06-03
Payer: MEDICARE

## 2024-06-13 ENCOUNTER — TELEPHONE (OUTPATIENT)
Dept: ENDOSCOPY | Facility: HOSPITAL | Age: 69
End: 2024-06-13

## 2024-06-13 ENCOUNTER — CLINICAL SUPPORT (OUTPATIENT)
Dept: ENDOSCOPY | Facility: HOSPITAL | Age: 69
End: 2024-06-13
Attending: INTERNAL MEDICINE
Payer: MEDICARE

## 2024-06-13 DIAGNOSIS — Z12.11 ENCOUNTER FOR SCREENING COLONOSCOPY FOR NON-HIGH-RISK PATIENT: Primary | ICD-10-CM

## 2024-06-13 DIAGNOSIS — Z12.11 SCREENING FOR COLON CANCER: ICD-10-CM

## 2024-06-13 RX ORDER — POLYETHYLENE GLYCOL 3350, SODIUM SULFATE ANHYDROUS, SODIUM BICARBONATE, SODIUM CHLORIDE, POTASSIUM CHLORIDE 236; 22.74; 6.74; 5.86; 2.97 G/4L; G/4L; G/4L; G/4L; G/4L
4 POWDER, FOR SOLUTION ORAL ONCE
Qty: 4000 ML | Refills: 0 | Status: SHIPPED | OUTPATIENT
Start: 2024-06-13 | End: 2024-06-13

## 2024-06-13 RX ORDER — SOD SULF/POT CHLORIDE/MAG SULF 1.479 G
12 TABLET ORAL DAILY
Qty: 24 TABLET | Refills: 0 | Status: SHIPPED | OUTPATIENT
Start: 2024-06-13 | End: 2024-06-13

## 2024-06-13 NOTE — PLAN OF CARE
Called patient to schedule colonoscopy. Patient stated she is at the GI  and will have procedure scheduled in person.

## 2024-06-13 NOTE — TELEPHONE ENCOUNTER
Spoke to pt to schedule procedure(s) Colonoscopy       Physician to perform procedure(s) Dr. PORTILLO Esqueda  Date of Procedure (s) 9/6/24  Arrival Time 8:20 AM  Time of Procedure(s) 9:20 AM   Location of Procedure(s) [place] 4th Floor  Type of Rx Prep sent to patient: [facility]  Instructions provided to patient via [facility]    Patient was informed on the following information and verbalized understanding. Screening questionnaire reviewed with patient and complete. If procedure requires anesthesia, a responsible adult needs to be present to accompany the patient home, patient cannot drive after receiving anesthesia. Appointment details are tentative, especially check-in time. Patient will receive a prep-op call 7 days prior to confirm check-in time for procedure. If applicable the patient should contact their pharmacy to verify Rx for procedure prep is ready for pick-up. Patient was advised to call the scheduling department at [phone] if pharmacy states no Rx is available. Patient was advised to call the endoscopy scheduling department if any questions or concerns arise.      [facility] Scheduling Department

## 2024-06-14 ENCOUNTER — HOSPITAL ENCOUNTER (OUTPATIENT)
Dept: RADIOLOGY | Facility: HOSPITAL | Age: 69
Discharge: HOME | End: 2024-06-14
Attending: INTERNAL MEDICINE
Payer: MEDICARE

## 2024-06-14 DIAGNOSIS — R93.89 THICKENED ENDOMETRIUM: ICD-10-CM

## 2024-06-14 PROCEDURE — 76856 US EXAM PELVIC COMPLETE: CPT | Mod: 26,HCNC,, | Performed by: RADIOLOGY

## 2024-06-14 PROCEDURE — 76830 TRANSVAGINAL US NON-OB: CPT | Mod: 26,HCNC,, | Performed by: RADIOLOGY

## 2024-06-14 PROCEDURE — 76856 US EXAM PELVIC COMPLETE: CPT | Mod: TC,HCNC

## 2024-07-12 ENCOUNTER — TELEPHONE (OUTPATIENT)
Dept: INTERNAL MEDICINE | Facility: CLINIC | Age: 69
End: 2024-07-12
Payer: MEDICARE

## 2024-07-12 NOTE — TELEPHONE ENCOUNTER
----- Message from Tim Wilson sent at 7/12/2024  9:49 AM CDT -----  Contact: Pt  [phone]  Pt called in regards to getting a form filled out to get her DL renewed pt wanted to know could it be done on today she will stop by with forms please advise.

## 2024-07-13 ENCOUNTER — HOSPITAL ENCOUNTER (EMERGENCY)
Facility: HOSPITAL | Age: 69
Discharge: HOME | End: 2024-07-13
Attending: STUDENT IN AN ORGANIZED HEALTH CARE EDUCATION/TRAINING PROGRAM
Payer: MEDICARE

## 2024-07-13 VITALS
WEIGHT: 150 LBS | OXYGEN SATURATION: 100 % | BODY MASS INDEX: 25.61 KG/M2 | TEMPERATURE: 98 F | HEART RATE: 80 BPM | DIASTOLIC BLOOD PRESSURE: 75 MMHG | HEIGHT: 64 IN | RESPIRATION RATE: 18 BRPM | SYSTOLIC BLOOD PRESSURE: 128 MMHG

## 2024-07-13 DIAGNOSIS — M25.552 LEFT HIP PAIN: ICD-10-CM

## 2024-07-13 DIAGNOSIS — M79.2 NEUROPATHIC PAIN: ICD-10-CM

## 2024-07-13 DIAGNOSIS — M25.511 RIGHT SHOULDER PAIN: ICD-10-CM

## 2024-07-13 DIAGNOSIS — M25.50 POLYARTHRALGIA: Primary | ICD-10-CM

## 2024-07-13 LAB — POCT GLUCOSE: 73 MG/DL (ref 70–110)

## 2024-07-13 PROCEDURE — 99284 EMERGENCY DEPT VISIT MOD MDM: CPT | Mod: 25,HCNC

## 2024-07-13 PROCEDURE — 82962 GLUCOSE BLOOD TEST: CPT | Mod: HCNC

## 2024-07-13 PROCEDURE — 63600175 PHARM REV CODE 636 W HCPCS: Mod: HCNC | Performed by: PHYSICIAN ASSISTANT

## 2024-07-13 PROCEDURE — 25000003 PHARM REV CODE 250: Mod: HCNC | Performed by: PHYSICIAN ASSISTANT

## 2024-07-13 RX ORDER — GABAPENTIN 100 MG/1
100 CAPSULE ORAL 3 TIMES DAILY
Qty: 90 CAPSULE | Refills: 0 | Status: SHIPPED | OUTPATIENT
Start: 2024-07-13 | End: 2024-08-12

## 2024-07-13 RX ORDER — ACETAMINOPHEN 500 MG
1000 TABLET ORAL
Status: COMPLETED | OUTPATIENT
Start: 2024-07-13 | End: 2024-07-13

## 2024-07-13 RX ORDER — PREDNISONE 20 MG/1
60 TABLET ORAL
Status: COMPLETED | OUTPATIENT
Start: 2024-07-13 | End: 2024-07-13

## 2024-07-13 RX ORDER — PREDNISONE 20 MG/1
TABLET ORAL
Qty: 11 TABLET | Refills: 0 | Status: SHIPPED | OUTPATIENT
Start: 2024-07-13 | End: 2024-07-22

## 2024-07-13 RX ORDER — DICLOFENAC SODIUM 10 MG/G
2 GEL TOPICAL DAILY PRN
Qty: 100 G | Refills: 0 | Status: SHIPPED | OUTPATIENT
Start: 2024-07-13

## 2024-07-13 RX ADMIN — PREDNISONE 60 MG: 20 TABLET ORAL at 05:07

## 2024-07-13 RX ADMIN — ACETAMINOPHEN 1000 MG: 500 TABLET ORAL at 05:07

## 2024-07-13 NOTE — ED PROVIDER NOTES
"Encounter Date: 7/13/2024       History     Chief Complaint   Patient presents with    Knee Pain     Pt c/o left knee pain and swelling, left arm/hand/wrist pain & swelling, pain to bottoms of bilateral feet, & bilateral shoulder pain (R>L) ongoing awhile but worsened the past week; pt has hx of arthritis & degenerative joint disease; pt reports having Steroid shot in knee in the past that greatly helped with pain; pt also reports having fluid drained from knee in the past    Shoulder Pain     67yo F presents to ED with acute on chronic R shoulder pain, L hip pain, bilateral foot/toes pain.     Patient states proximally one-week of atraumatic pain to her right shoulder, to her left hip, to her left knee, along with pain to bilateral feet mostly to her toes.    She states he has had similar pain to these joints in the past.  She denies any recent falls or injuries.  No open wounds.  No joint redness or warmth.  Does admit to mild swelling to the left knee, she has had cortisone injections in his knee in the past.  She admits to difficulty with weight-bearing ambulation due to pain to her feet and left knee and hip.  No fever chills.  No previous injury or surgery to these regions.  There is repetitive movement at work, which she thinks has exacerbated her right shoulder pain.  There is mild discomfort with certain range of motion of her shoulder, but pain mostly when she lies on her shoulder at night.  There is pain with weight-bearing ambulation involving her left hip and left knee.  She states the most severe pain is to her toes, states they are extremely sensitive, even with sheets of her bed.  Denies any swelling to her feet.  No open wounds.  Denies any change in color.  Denies any new unilateral leg swelling or calf pain.  No history of VTE.  No meds taken prior to arrival. Patient does not remember if gabapentin or tramadol "makes me crazy".  She thinks she may have stopped taking the gabapentin because it was " not helping.  No neck pain or stiffness.  No acute back pain.    Left-hand dominant    PMH:  Chemotherapy-induced peripheral neuropathy  Anxiety  Depression  Prolonged grief disorder  Hx vertigo  Essential hypertension  Aortic atherosclerosis  Hx hypokalemia  Rheumatoid arthritis involving multiple sites with positive rheumatoid factor  Thrombocytopenia  Normocytic anemia  History of breast cancer on aromatase inhibitor   Vitamin D deficiency  NIDDM  GERD  Transaminitis  Chronic bilateral low back pain without sciatica  Pain of left hip  Chronic pain of left knee  Chronic pain of multiple joints  Chronic R shoulder pain      Review of patient's allergies indicates:  No Known Allergies  Past Medical History:   Diagnosis Date    Acute coronary syndrome 2018    Adjustment disorder     Anxiety     Arthritis     Cancer of breast 2020    Malignant neoplasm of lower-inner quadrant of left breast in female, estrogen receptor positive    Cholelithiasis with acute cholecystitis 2021    Coronary artery disease     Depression     Diabetes mellitus type I     Genetic testing of female 2020    Minerva via [email or web address] with AXIN2 and POLE variants of uncertain significance    GERD (gastroesophageal reflux disease)     Hepatitis B core antibody positive 3/9/2023    Negative sAg, suggests previous exposure but no chronic/active Hep B. At risk for reactivation with any immunosuppression medication, steroids, chemo, etc.      Hypertension     Vertigo 2019    Vitamin D deficiency 2021     Past Surgical History:   Procedure Laterality Date    BREAST BIOPSY Left 2022    Left punch biopsy; Unremarkable keratinized skin with intradermal hematoma     SECTION      INJECTION FOR SENTINEL NODE IDENTIFICATION Left 2020    Procedure: INJECTION, FOR SENTINEL NODE IDENTIFICATION;  Surgeon: Isabel Moulton MD;  Location: [facility];  Service: General;  Laterality: Left;    INSERTION OF TUNNELED  CENTRAL VENOUS CATHETER (CVC) WITH SUBCUTANEOUS PORT N/A 11/18/2020    Procedure: INSERTION, PORT-A-CATH;  Surgeon: Hardeep Martin MD;  Location: [facility] CATH LAB;  Service: Radiology;  Laterality: N/A;    LAPAROSCOPIC CHOLECYSTECTOMY N/A 03/06/2021    Procedure: CHOLECYSTECTOMY, LAPAROSCOPIC;  Surgeon: Buster Son MD;  Location: [facility] OR 2ND FLR;  Service: General;  Laterality: N/A;    MASTECTOMY, PARTIAL Left 09/17/2020    Procedure: MASTECTOMY, PARTIAL-w/reflector;  Surgeon: Isabel Moulton MD;  Location: [facility] OR;  Service: General;  Laterality: Left;    SENTINEL LYMPH NODE BIOPSY Left 09/17/2020    Procedure: BIOPSY, LYMPH NODE, SENTINEL;  Surgeon: Isabel Moulton MD;  Location: [facility] OR;  Service: General;  Laterality: Left;     Family History   Problem Relation Name Age of Onset    Hypertension Mother      Hyperlipidemia Mother      Diabetes Mother      Heart disease Mother      Colon polyps Mother      Aneurysm Father      Diabetes Sister 2     Hypertension Sister 2     Heart disease Brother 2     Diabetes Brother 2     Hypertension Brother 2     Cancer Brother 1         brother German with prostate cancer; brother Cooper with throat cancer    Cervical cancer Daughter Daughter1     Anxiety disorder Daughter Daughter2     Depression Daughter Daughter2     Anxiety disorder Daughter Daughter3     Depression Daughter Daughter3     Breast cancer Maternal Aunt Teresa     Cancer Maternal Aunt Nikki         unknown origin    Cancer Paternal Aunt Esperanza         unknown origin    Breast cancer Paternal Aunt Esperanza     Prostate cancer Maternal Cousin Chris     Leukemia Maternal Cousin Chris     Prostate cancer Maternal Cousin Nirmal     Breast cancer Other Pat 1/2aunt Quiana     Colon cancer Neg Hx      Ovarian cancer Neg Hx       Social History     Tobacco Use    Smoking status: Never    Smokeless tobacco: Never   Substance Use Topics    Alcohol use: Never    Drug use: No     Review of Systems   Constitutional:   Negative for chills and fever.   Cardiovascular:  Negative for leg swelling.   Musculoskeletal:  Positive for arthralgias, gait problem and joint swelling. Negative for back pain, neck pain and neck stiffness.   Skin:  Negative for rash and wound.   Neurological:  Negative for syncope.       Physical Exam     Initial Vitals [07/13/24 0521]   BP Pulse Resp Temp SpO2   139/64 78 19 97.7 °F (36.5 °C) 100 %      MAP       --         Physical Exam    Nursing note and vitals reviewed.  Constitutional: She appears well-developed and well-nourished. She is not diaphoretic. No distress.   Uncomfortable, nontoxic.  Ambulates with slow, steady gait.   HENT:   Head: Normocephalic and atraumatic.   Neck: Neck supple.   Normal range of motion.  Cardiovascular:  Intact distal pulses.           1+ radial bilaterally, always DP bilaterally.  No unilateral leg swelling or calf tenderness, no pretibial edema.  There is very mild edema about bilateral ankles.   Musculoskeletal:      Cervical back: Normal range of motion and neck supple.      Comments: Right shoulder:  Mild tenderness to the lateral clavicle, AC joint, superior border of the scapula.  Full active range of motion without significant discomfort or difficulty.    Left hip:  Mild tenderness to the greater trochanter, to the soft tissues posterior proximal to the greater trochanter.  Mild discomfort with active, passive range of motion of the hip.    Left knee:  Small effusion, chronic bony changes, limited active/passive range of motion of the knee secondary to discomfort.    Bilateral feet:  Tenderness even to light touch of all of her toes, to plantar aspect of forefoot bilaterally.  No open wounds.     Neurological: She is alert and oriented to person, place, and time.   Skin: Skin is warm.   Psychiatric: Thought content normal.   Mildly anxious         ED Course   Procedures  Labs Reviewed   POCT GLUCOSE          Imaging Results              X-Ray Hip 2 or 3 views Left  with Pelvis when performed (Final result)  Result time 07/13/24 08:05:39      Final result by Gildardo Humphrey MD (07/13/24 08:05:39)                   Impression:      No evidence of fracture.Degenerative changes level of the acetabulum unchanged from 02/09/2023.      Electronically signed by: Gildardo Humphrey MD  Date:    07/13/2024  Time:    08:05               Narrative:    EXAMINATION:  XR HIP WITH PELVIS WHEN PERFORMED 2 OR 3 VIEWS LEFT    CLINICAL HISTORY:  Pain in left hip    COMPARISON:  02/09/2023.    FINDINGS:  The alignment is within normal limits.  No displaced fractures identified.  No evidence of lytic or blastic lesions.Superolateral acetabular spurring.Soft tissues are unremarkable.MRI of the hip could be performed to exclude occult hip fracture, if clinically suspected, with increased sensitivity.                                       X-Ray Shoulder Trauma Right (Final result)  Result time 07/13/24 08:02:53      Final result by Gildardo Humphrey MD (07/13/24 08:02:53)                   Impression:      No evidence of fracture.Stable degenerative changes.      Electronically signed by: Gildardo Humphrey MD  Date:    07/13/2024  Time:    08:02               Narrative:    EXAMINATION:  XR SHOULDER TRAUMA 3 VIEW RIGHT    CLINICAL HISTORY:  Pain in right shoulder    COMPARISON:  12/08/2023.    FINDINGS:  The alignment is within normal limits.  Mild degenerative changes at the acromioclavicular joint.  Spurring at the inferior glenoid.  No displaced fractures identified.  No evidence of lytic or blastic lesions.Joint spaces are unremarkable.Soft tissues are unremarkable.                                       Medications   predniSONE tablet 60 mg (60 mg Oral Given 7/13/24 0546)   acetaminophen tablet 1,000 mg (1,000 mg Oral Given 7/13/24 0548)     Medical Decision Making  Differential diagnosis:  Chronic pain, rheumatoid arthritis flare, arthritis, peripheral neuropathy, occult fracture, claudication,  septic joint    Amount and/or Complexity of Data Reviewed  External Data Reviewed: notes.  Radiology: ordered and independent interpretation performed. Decision-making details documented in ED Course.  Discussion of management or test interpretation with external provider(s): I do think that this is likely polyarthralgia related to rheumatoid arthritis, however given she is on aromatase inhibitor, x-ray of hip and shoulder added to evaluate for occult fracture.  Plan to discharge on short course of systemic corticosteroids, advised Tylenol or Voltaren as needed for discomfort.  She thinks she has an upcoming appointment with rheumatology on the 25th.  She does not think gabapentin was helpful for what seems like lower extremity neuropathic type pain; suspected hyperesthesia/allodynia secondary to neuropathy.  I may start her on a low-dose gabapentin t.i.d., as well as advised her to continue with Tylenol and Voltaren p.r.n. discomfort and have her increase dose if no improvement; she will discontinue if any side effects.    No joint erythema or warmth.  No fever.  No micro motion pain.  Admits to history of similar left knee swelling in the past.  Low suspicion for septic joint.  Convincing acute bony abnormality images.  Discussed interim red flags and reasons for return.    Risk  OTC drugs.  Prescription drug management.               ED Course as of 07/14/24 0051   Sat Jul 13, 2024   0604 A1c 5.9% on 04/04/2024. [SM]      ED Course User Index  [SM] Travis Orr PA-C                           Clinical Impression:  Final diagnoses:  [M25.552] Left hip pain  [M25.511] Right shoulder pain  [M79.2] Neuropathic pain  [M25.50] Polyarthralgia (Primary)          ED Disposition Condition    Discharge Stable          ED Prescriptions       Medication Sig Dispense Start Date End Date Auth. Provider    gabapentin (NEURONTIN) 100 MG capsule Take 1 capsule (100 mg total) by mouth 3 (three) times daily. 90 capsule  7/13/2024 8/12/2024 Travis Orr PA-C    predniSONE (DELTASONE) 20 MG tablet Take 2 tablets (40 mg total) by mouth once daily for 3 days, THEN 1 tablet (20 mg total) once daily for 3 days, THEN 0.5 tablets (10 mg total) once daily for 3 days. 11 tablet 7/13/2024 7/22/2024 Travis Orr PA-C    diclofenac sodium (VOLTAREN ARTHRITIS PAIN) 1 % Gel Apply 2 g topically daily as needed (joint pain). 100 g 7/13/2024 -- Travis Orr PA-C          Follow-up Information       Follow up With Specialties Details Why Contact Info    Patty Louis MD Internal Medicine Schedule an appointment as soon as possible for a visit  For reevaluation [address]  [facility], [address]  [facility] [place] [address]  [phone]      [facility]  As needed, If symptoms worsen [address] [facility] [place] [id number] [phone]             Travis Orr PA-C  07/14/24 0051

## 2024-07-13 NOTE — DISCHARGE INSTRUCTIONS
Begin taking 100 mg of gabapentin 3 times daily.  If no improvement after 3-4 days of treatment, we can increase your dose to 300 mg 3 times daily to see if any relief of severe pain to your feet.  Take prednisone as written.  Tylenol as needed for pain.  We can also try the Voltaren gel on your shoulder, your knee, hip to see if any improvement in the discomfort.    Contact your primary care provider for repeat exam, for re-evaluation of any persistent symptoms.    Return to this ED if unable to walk or bear weight, if you develop joint swelling, if you begin with fever, if unable to tolerate pain despite treatment, if any other problems occur.

## 2024-07-15 ENCOUNTER — PATIENT OUTREACH (OUTPATIENT)
Dept: EMERGENCY MEDICINE | Facility: HOSPITAL | Age: 69
End: 2024-07-15
Payer: MEDICARE

## 2024-07-16 NOTE — PROGRESS NOTES
Patient was seen in the ED on 7/13/24. Phoned patient on 2 separate occasions to assist with Post ED Discharge Navigation. Patient was unavailable. Message left on voice mail. Encounter closed.  Blanca Hdz      pt c/o dizziness since 5pm

## 2024-07-17 ENCOUNTER — TELEPHONE (OUTPATIENT)
Dept: INTERNAL MEDICINE | Facility: CLINIC | Age: 69
End: 2024-07-17
Payer: MEDICARE

## 2024-07-17 NOTE — TELEPHONE ENCOUNTER
----- Message from Saima Nguyen sent at 7/17/2024  8:28 AM CDT -----  Contact: Mobile [phone]  Patient said that she dropped of some paperwork on last Thursday and she would like to pick that paperwork up on today.     Please give the patient a call. Patient said if she does not answer please leave a message on her machine.

## 2024-07-18 ENCOUNTER — PATIENT OUTREACH (OUTPATIENT)
Dept: ADMINISTRATIVE | Facility: HOSPITAL | Age: 69
End: 2024-07-18
Payer: MEDICARE

## 2024-07-18 NOTE — PROGRESS NOTES
Population Health Chart Review & Patient Outreach Details      Additional [facility] Health Notes:               Updates Requested / Reviewed:      Care Everywhere, , and Immunizations Reconciliation Completed or Queried: [place]         Health Maintenance Topics Overdue:      [facility] Score: 3     Eye Exam  Foot Exam  Uncontrolled BP                       Health Maintenance Topic(s) Outreach Outcomes & Actions Taken:    Primary Care Appt - Outreach Outcomes & Actions Taken  : Primary Care Appt Scheduled

## 2024-07-21 NOTE — TELEPHONE ENCOUNTER
Care Due:                  Date            Visit Type   Department     Provider  --------------------------------------------------------------------------------                                EP -                              PRIMARY      MET INTERNAL  Last Visit: 04-      CARE ([facility])   MEDICINE       Patty Louis                              EP -                              PRIMARY      [facility] INTERNAL  Next Visit: 08-      CARE ([facility])   MEDICINE       Patty Louis                                                            Last  Test          Frequency    Reason                     Performed    Due Date  --------------------------------------------------------------------------------    HBA1C.......  6 months...  dulaglutide, metFORMIN...  04-   10-    [facility] Embedded Care Due Messages. Reference number: [id number].   7/21/2024 5:40:59 PM CDT

## 2024-07-22 RX ORDER — ATORVASTATIN CALCIUM 10 MG/1
10 TABLET, FILM COATED ORAL
Qty: 90 TABLET | Refills: 2 | Status: SHIPPED | OUTPATIENT
Start: 2024-07-22

## 2024-07-22 NOTE — TELEPHONE ENCOUNTER
Refill Routing Note   Medication(s) are not appropriate for processing by [facility] for the following reason(s):        ED/Hospital Visit since last OV with provider    [facility] action(s):  Defer   Requires labs : Yes      Medication Therapy Plan: MULTIPLE ED VISITS SINCE LOV      Appointments  past 12m or future 3m with PCP    Date Provider   Last Visit   4/4/2024 Patty Louis MD   Next Visit   8/1/2024 Patty Louis MD   ED visits in past 90 days: 2        Note composed:10:39 AM 07/22/2024

## 2024-07-25 ENCOUNTER — TELEPHONE (OUTPATIENT)
Dept: INTERNAL MEDICINE | Facility: CLINIC | Age: 69
End: 2024-07-25

## 2024-07-25 ENCOUNTER — OFFICE VISIT (OUTPATIENT)
Dept: RHEUMATOLOGY | Facility: CLINIC | Age: 69
End: 2024-07-25
Payer: MEDICARE

## 2024-07-25 ENCOUNTER — LAB VISIT (OUTPATIENT)
Dept: LAB | Facility: HOSPITAL | Age: 69
End: 2024-07-25
Attending: STUDENT IN AN ORGANIZED HEALTH CARE EDUCATION/TRAINING PROGRAM
Payer: MEDICARE

## 2024-07-25 VITALS
HEIGHT: 64 IN | TEMPERATURE: 98 F | BODY MASS INDEX: 27.55 KG/M2 | RESPIRATION RATE: 18 BRPM | SYSTOLIC BLOOD PRESSURE: 174 MMHG | WEIGHT: 161.38 LBS | HEART RATE: 68 BPM | DIASTOLIC BLOOD PRESSURE: 81 MMHG

## 2024-07-25 DIAGNOSIS — M25.562 PAIN AND SWELLING OF LEFT KNEE: ICD-10-CM

## 2024-07-25 DIAGNOSIS — M25.472 LEFT ANKLE SWELLING: ICD-10-CM

## 2024-07-25 DIAGNOSIS — R93.89 THICKENED ENDOMETRIUM: Primary | ICD-10-CM

## 2024-07-25 DIAGNOSIS — M25.462 PAIN AND SWELLING OF LEFT KNEE: ICD-10-CM

## 2024-07-25 DIAGNOSIS — M05.79 RHEUMATOID ARTHRITIS INVOLVING MULTIPLE SITES WITH POSITIVE RHEUMATOID FACTOR: ICD-10-CM

## 2024-07-25 DIAGNOSIS — M05.79 RHEUMATOID ARTHRITIS INVOLVING MULTIPLE SITES WITH POSITIVE RHEUMATOID FACTOR: Primary | ICD-10-CM

## 2024-07-25 DIAGNOSIS — R76.8 HEPATITIS B CORE ANTIBODY POSITIVE: ICD-10-CM

## 2024-07-25 LAB
ALBUMIN SERPL BCP-MCNC: 3.5 G/DL (ref 3.5–5.2)
ALP SERPL-CCNC: 76 U/L (ref 55–135)
ALT SERPL W/O P-5'-P-CCNC: 6 U/L (ref 10–44)
ANION GAP SERPL CALC-SCNC: 8 MMOL/L (ref 8–16)
AST SERPL-CCNC: 15 U/L (ref 10–40)
BASOPHILS # BLD AUTO: 0.01 K/UL (ref 0–0.2)
BASOPHILS NFR BLD: 0.2 % (ref 0–1.9)
BILIRUB SERPL-MCNC: 0.8 MG/DL (ref 0.1–1)
BUN SERPL-MCNC: 16 MG/DL (ref 8–23)
CALCIUM SERPL-MCNC: 9.4 MG/DL (ref 8.7–10.5)
CHLORIDE SERPL-SCNC: 110 MMOL/L (ref 95–110)
CO2 SERPL-SCNC: 23 MMOL/L (ref 23–29)
CREAT SERPL-MCNC: 1.1 MG/DL (ref 0.5–1.4)
CRP SERPL-MCNC: 0.3 MG/L (ref 0–8.2)
DIFFERENTIAL METHOD BLD: ABNORMAL
EOSINOPHIL # BLD AUTO: 0 K/UL (ref 0–0.5)
EOSINOPHIL NFR BLD: 0 % (ref 0–8)
ERYTHROCYTE [DISTWIDTH] IN BLOOD BY AUTOMATED COUNT: 13.9 % (ref 11.5–14.5)
ERYTHROCYTE [SEDIMENTATION RATE] IN BLOOD BY PHOTOMETRIC METHOD: <2 MM/HR (ref 0–36)
EST. GFR  (NO RACE VARIABLE): 54 ML/MIN/1.73 M^2
GLUCOSE SERPL-MCNC: 128 MG/DL (ref 70–110)
HBV CORE AB SERPL QL IA: REACTIVE
HBV SURFACE AB SER-ACNC: >1000 MIU/ML
HBV SURFACE AB SER-ACNC: REACTIVE M[IU]/ML
HBV SURFACE AG SERPL QL IA: NORMAL
HCT VFR BLD AUTO: 32.6 % (ref 37–48.5)
HCV AB SERPL QL IA: NORMAL
HGB BLD-MCNC: 11.1 G/DL (ref 12–16)
HIV 1+2 AB+HIV1 P24 AG SERPL QL IA: NORMAL
IMM GRANULOCYTES # BLD AUTO: 0.01 K/UL (ref 0–0.04)
IMM GRANULOCYTES NFR BLD AUTO: 0.2 % (ref 0–0.5)
LYMPHOCYTES # BLD AUTO: 1.8 K/UL (ref 1–4.8)
LYMPHOCYTES NFR BLD: 31.3 % (ref 18–48)
MCH RBC QN AUTO: 32.1 PG (ref 27–31)
MCHC RBC AUTO-ENTMCNC: 34 G/DL (ref 32–36)
MCV RBC AUTO: 94 FL (ref 82–98)
MONOCYTES # BLD AUTO: 0.5 K/UL (ref 0.3–1)
MONOCYTES NFR BLD: 8 % (ref 4–15)
NEUTROPHILS # BLD AUTO: 3.4 K/UL (ref 1.8–7.7)
NEUTROPHILS NFR BLD: 60.3 % (ref 38–73)
NRBC BLD-RTO: 0 /100 WBC
PLATELET # BLD AUTO: 153 K/UL (ref 150–450)
PMV BLD AUTO: 9.9 FL (ref 9.2–12.9)
POTASSIUM SERPL-SCNC: 3.6 MMOL/L (ref 3.5–5.1)
PROT SERPL-MCNC: 6.7 G/DL (ref 6–8.4)
RBC # BLD AUTO: 3.46 M/UL (ref 4–5.4)
SODIUM SERPL-SCNC: 141 MMOL/L (ref 136–145)
WBC # BLD AUTO: 5.62 K/UL (ref 3.9–12.7)

## 2024-07-25 PROCEDURE — 99215 OFFICE O/P EST HI 40 MIN: CPT | Mod: HCNC,S$GLB,, | Performed by: STUDENT IN AN ORGANIZED HEALTH CARE EDUCATION/TRAINING PROGRAM

## 2024-07-25 PROCEDURE — 3066F NEPHROPATHY DOC TX: CPT | Mod: HCNC,CPTII,S$GLB, | Performed by: STUDENT IN AN ORGANIZED HEALTH CARE EDUCATION/TRAINING PROGRAM

## 2024-07-25 PROCEDURE — 3008F BODY MASS INDEX DOCD: CPT | Mod: HCNC,CPTII,S$GLB, | Performed by: STUDENT IN AN ORGANIZED HEALTH CARE EDUCATION/TRAINING PROGRAM

## 2024-07-25 PROCEDURE — 3079F DIAST BP 80-89 MM HG: CPT | Mod: HCNC,CPTII,S$GLB, | Performed by: STUDENT IN AN ORGANIZED HEALTH CARE EDUCATION/TRAINING PROGRAM

## 2024-07-25 PROCEDURE — 4010F ACE/ARB THERAPY RXD/TAKEN: CPT | Mod: HCNC,CPTII,S$GLB, | Performed by: STUDENT IN AN ORGANIZED HEALTH CARE EDUCATION/TRAINING PROGRAM

## 2024-07-25 PROCEDURE — 86480 TB TEST CELL IMMUN MEASURE: CPT | Mod: HCNC | Performed by: STUDENT IN AN ORGANIZED HEALTH CARE EDUCATION/TRAINING PROGRAM

## 2024-07-25 PROCEDURE — 87340 HEPATITIS B SURFACE AG IA: CPT | Mod: HCNC | Performed by: STUDENT IN AN ORGANIZED HEALTH CARE EDUCATION/TRAINING PROGRAM

## 2024-07-25 PROCEDURE — 1101F PT FALLS ASSESS-DOCD LE1/YR: CPT | Mod: HCNC,CPTII,S$GLB, | Performed by: STUDENT IN AN ORGANIZED HEALTH CARE EDUCATION/TRAINING PROGRAM

## 2024-07-25 PROCEDURE — 86803 HEPATITIS C AB TEST: CPT | Mod: HCNC | Performed by: STUDENT IN AN ORGANIZED HEALTH CARE EDUCATION/TRAINING PROGRAM

## 2024-07-25 PROCEDURE — 3044F HG A1C LEVEL LT 7.0%: CPT | Mod: HCNC,CPTII,S$GLB, | Performed by: STUDENT IN AN ORGANIZED HEALTH CARE EDUCATION/TRAINING PROGRAM

## 2024-07-25 PROCEDURE — 86140 C-REACTIVE PROTEIN: CPT | Mod: HCNC | Performed by: STUDENT IN AN ORGANIZED HEALTH CARE EDUCATION/TRAINING PROGRAM

## 2024-07-25 PROCEDURE — 80053 COMPREHEN METABOLIC PANEL: CPT | Mod: HCNC | Performed by: STUDENT IN AN ORGANIZED HEALTH CARE EDUCATION/TRAINING PROGRAM

## 2024-07-25 PROCEDURE — 3288F FALL RISK ASSESSMENT DOCD: CPT | Mod: HCNC,CPTII,S$GLB, | Performed by: STUDENT IN AN ORGANIZED HEALTH CARE EDUCATION/TRAINING PROGRAM

## 2024-07-25 PROCEDURE — 99999 PR PBB SHADOW E&M-EST. PATIENT-LVL III: CPT | Mod: PBBFAC,HCNC,, | Performed by: STUDENT IN AN ORGANIZED HEALTH CARE EDUCATION/TRAINING PROGRAM

## 2024-07-25 PROCEDURE — 3060F POS MICROALBUMINURIA REV: CPT | Mod: HCNC,CPTII,S$GLB, | Performed by: STUDENT IN AN ORGANIZED HEALTH CARE EDUCATION/TRAINING PROGRAM

## 2024-07-25 PROCEDURE — 87389 HIV-1 AG W/HIV-1&-2 AB AG IA: CPT | Mod: HCNC | Performed by: STUDENT IN AN ORGANIZED HEALTH CARE EDUCATION/TRAINING PROGRAM

## 2024-07-25 PROCEDURE — 3077F SYST BP >= 140 MM HG: CPT | Mod: HCNC,CPTII,S$GLB, | Performed by: STUDENT IN AN ORGANIZED HEALTH CARE EDUCATION/TRAINING PROGRAM

## 2024-07-25 PROCEDURE — 86704 HEP B CORE ANTIBODY TOTAL: CPT | Mod: HCNC | Performed by: STUDENT IN AN ORGANIZED HEALTH CARE EDUCATION/TRAINING PROGRAM

## 2024-07-25 PROCEDURE — 1125F AMNT PAIN NOTED PAIN PRSNT: CPT | Mod: HCNC,CPTII,S$GLB, | Performed by: STUDENT IN AN ORGANIZED HEALTH CARE EDUCATION/TRAINING PROGRAM

## 2024-07-25 PROCEDURE — 85652 RBC SED RATE AUTOMATED: CPT | Mod: HCNC | Performed by: STUDENT IN AN ORGANIZED HEALTH CARE EDUCATION/TRAINING PROGRAM

## 2024-07-25 PROCEDURE — 86706 HEP B SURFACE ANTIBODY: CPT | Mod: HCNC | Performed by: STUDENT IN AN ORGANIZED HEALTH CARE EDUCATION/TRAINING PROGRAM

## 2024-07-25 PROCEDURE — 36415 COLL VENOUS BLD VENIPUNCTURE: CPT | Mod: HCNC | Performed by: STUDENT IN AN ORGANIZED HEALTH CARE EDUCATION/TRAINING PROGRAM

## 2024-07-25 PROCEDURE — 85025 COMPLETE CBC W/AUTO DIFF WBC: CPT | Mod: HCNC | Performed by: STUDENT IN AN ORGANIZED HEALTH CARE EDUCATION/TRAINING PROGRAM

## 2024-07-25 RX ORDER — ADALIMUMAB 40MG/0.4ML
40 KIT SUBCUTANEOUS
Qty: 2 PEN | Refills: 11 | Status: SHIPPED | OUTPATIENT
Start: 2024-07-25 | End: 2025-07-25

## 2024-07-25 RX ORDER — DICLOFENAC SODIUM 30 MG/G
GEL TOPICAL
Qty: 100 G | Refills: 0 | Status: SHIPPED | OUTPATIENT
Start: 2024-07-25

## 2024-07-25 RX ORDER — PREDNISONE 5 MG/1
TABLET ORAL
Qty: 70 TABLET | Refills: 0 | Status: SHIPPED | OUTPATIENT
Start: 2024-07-25 | End: 2024-09-19

## 2024-07-25 NOTE — PROGRESS NOTES
Subjective:       Patient ID: Sunitha Pillai is a 67 y.o. female.    Chief Complaint: Disease Management    HPI  67 year old F with PMH of adjustment disorder, depression, type II DM,  GERD, HTN, vertigo, HTN, left breast cancer s/p chemo/ radiation in 2020, pulmonary nodules here for evaluation.  She has been having pain for last years. She is having in both shoulders (worse on right), elbows,wrists, hands, knees, ankles, and feet. Pain can be as high as 10/10.  She gets swelling of hands, worse in the right.She also gets swelling in ankles.   Denies any rashes. Denies smoking. Denies oral ulcers, fevers, raynauds, photosensitivity, or alopecia  Family hx:2  aunts: cancer  Last appt  She had drainage in left knee and it feels better.  She has pain in right shoulder.  Pain level us 2/10.     Today: Patient here for follow up of RA   Patient is having active swelling in joints of hands and feet.Most ly L hand wrist and middle finger and L foot with L ankle swelling and knee but she has an appt with ortho who will inject knee     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, estrogen receptor positive    Cholelithiasis with acute cholecystitis 3/5/2021    Depression     Diabetes mellitus type I     Genetic testing of female 09/2020    Minerva via [employer] with AXIN2 and POLE variants of uncertain significance    GERD (gastroesophageal reflux disease)     Hypertension     Pneumonia due to COVID-19 virus 2/7/2021    Unstable angina 9/23/2018    Vertigo 05/13/2019    Vitamin D deficiency 2/7/2021       Review of Systems   Constitutional:  Negative for activity change, appetite change, chills, diaphoresis, fatigue, fever and unexpected weight change.   HENT:  Negative for congestion, ear discharge, ear pain, facial swelling,  "mouth sores, sinus pressure, sneezing, sore throat, tinnitus and trouble swallowing.    Eyes:  Negative for photophobia, pain, discharge, redness, itching and visual disturbance.   Respiratory:  Negative for apnea, chest tightness, shortness of breath, wheezing and stridor.    Cardiovascular:  Negative for leg swelling.   Gastrointestinal:  Negative for abdominal distention, abdominal pain, anal bleeding, blood in stool, constipation, diarrhea and nausea.   Endocrine: Negative for cold intolerance and heat intolerance.   Genitourinary:  Negative for difficulty urinating and dysuria.   Musculoskeletal:  Positive for arthralgias and joint swelling. Negative for back pain, gait problem, myalgias, neck pain and neck stiffness.   Skin:  Negative for color change, pallor, rash and wound.   Neurological:  Negative for dizziness, seizures, light-headedness and numbness.   Hematological:  Negative for adenopathy. Does not bruise/bleed easily.   Psychiatric/Behavioral:  Negative for sleep disturbance. The patient is not nervous/anxious.        Objective:   BP (!) 146/78   Pulse 85   Ht 5' 4" (1.626 m)   Wt 78 kg (171 lb 15.3 oz)   BMI 29.52 kg/m²      Physical Exam   Constitutional: normal appearance.   HENT:   Head: Normocephalic and atraumatic.   Nose: No rhinorrhea or nasal congestion.   Mouth/Throat: No oropharyngeal exudate or posterior oropharyngeal erythema.   Eyes: Conjunctivae are normal. Right eye exhibits no discharge. Left eye exhibits no discharge. No scleral icterus.   Cardiovascular: Normal rate and regular rhythm. Exam reveals no gallop and no friction rub.   No murmur heard.  Pulmonary/Chest: No stridor. No respiratory distress. She has no wheezes. She has no rales.   Abdominal: She exhibits no distension and no mass. There is no abdominal tenderness.   Musculoskeletal:         General: Swelling, tenderness and deformity present.   Neurological: She is alert.   Skin:   Synovitis in mcps, pips and wrist " "  Synovitis in mtps      No data to display     Assessment:       67 year old F with PMH of adjustment disorder, depression, type II DM,  GERD, HTN, vertigo, HTN, left breast cancer s/p chemo/ radiation in 2020, pulmonary nodules here for evaluation.  She has high titer +RF and +CCP consistent with rheumatoid arthritis.   She reports she is feeling "fine" and would like to hold off on treatment.  I told her she is at risk of joint damage and she understands risks. I discussed with her in detail RA and management options.  1. Rheumatoid Arthritis with positive RF of multiple sites   2. Left knee pain and swelling   3. Swelling of L ankle   4. Hep B core ab positive       Plan:   - Saw educational video of Humira with patient in order for her to feel more comfortable with medication   - Not MTX given on pulmonary nodules  - Update labs and repeat infectious screening   - Prednisone taper   - Solaraze 3% gel- patient using with good response- she had no improvement with 1% and she is already taking PDN cannot take any other oral NSAID      40 * minutes of total time spent on the encounter, which includes face to face time and non-face to face time preparing to see the patient (eg, review of tests), Obtaining and/or reviewing separately obtained history, Documenting clinical information in the electronic or other health record, Independently interpreting results (not separately reported) and communicating results to the patient/family/caregiver, or Care coordination (not separately reported).           "

## 2024-07-26 LAB
GAMMA INTERFERON BACKGROUND BLD IA-ACNC: 0.01 IU/ML
M TB IFN-G CD4+ BCKGRND COR BLD-ACNC: 0 IU/ML
M TB IFN-G CD4+ BCKGRND COR BLD-ACNC: 0 IU/ML
MITOGEN IGNF BCKGRD COR BLD-ACNC: 9.99 IU/ML
TB GOLD PLUS: NEGATIVE

## 2024-08-01 ENCOUNTER — LAB VISIT (OUTPATIENT)
Dept: LAB | Facility: HOSPITAL | Age: 69
End: 2024-08-01
Attending: INTERNAL MEDICINE
Payer: MEDICARE

## 2024-08-01 ENCOUNTER — OFFICE VISIT (OUTPATIENT)
Dept: INTERNAL MEDICINE | Facility: CLINIC | Age: 69
End: 2024-08-01
Payer: MEDICARE

## 2024-08-01 VITALS — WEIGHT: 162.25 LBS | BODY MASS INDEX: 27.7 KG/M2 | HEART RATE: 68 BPM | OXYGEN SATURATION: 99 % | HEIGHT: 64 IN

## 2024-08-01 DIAGNOSIS — E11.65 TYPE 2 DIABETES MELLITUS WITH HYPERGLYCEMIA, WITHOUT LONG-TERM CURRENT USE OF INSULIN: ICD-10-CM

## 2024-08-01 DIAGNOSIS — N18.31 CHRONIC KIDNEY DISEASE, STAGE 3A: ICD-10-CM

## 2024-08-01 DIAGNOSIS — I73.9 PAD (PERIPHERAL ARTERY DISEASE): Primary | ICD-10-CM

## 2024-08-01 DIAGNOSIS — Z79.899 DRUG-INDUCED IMMUNODEFICIENCY: ICD-10-CM

## 2024-08-01 DIAGNOSIS — D84.821 DRUG-INDUCED IMMUNODEFICIENCY: ICD-10-CM

## 2024-08-01 LAB
ESTIMATED AVG GLUCOSE: 117 MG/DL (ref 68–131)
HBA1C MFR BLD: 5.7 % (ref 4–5.6)
TSH SERPL DL<=0.005 MIU/L-ACNC: 2.1 UIU/ML (ref 0.4–4)

## 2024-08-01 PROCEDURE — 83036 HEMOGLOBIN GLYCOSYLATED A1C: CPT | Mod: HCNC | Performed by: INTERNAL MEDICINE

## 2024-08-01 PROCEDURE — 36415 COLL VENOUS BLD VENIPUNCTURE: CPT | Mod: HCNC,PO | Performed by: INTERNAL MEDICINE

## 2024-08-01 PROCEDURE — 84443 ASSAY THYROID STIM HORMONE: CPT | Mod: HCNC | Performed by: INTERNAL MEDICINE

## 2024-08-01 PROCEDURE — 99999 PR PBB SHADOW E&M-EST. PATIENT-LVL III: CPT | Mod: PBBFAC,HCNC,, | Performed by: INTERNAL MEDICINE

## 2024-08-01 RX ORDER — TOPIRAMATE 25 MG/1
TABLET ORAL
Qty: 60 TABLET | Refills: 2 | Status: SHIPPED | OUTPATIENT
Start: 2024-08-01

## 2024-08-01 RX ORDER — INSULIN PUMP SYRINGE, 3 ML
EACH MISCELLANEOUS
Qty: 1 EACH | Refills: 0 | Status: SHIPPED | OUTPATIENT
Start: 2024-08-01

## 2024-08-01 RX ORDER — TIRZEPATIDE 2.5 MG/.5ML
2.5 INJECTION, SOLUTION SUBCUTANEOUS
Qty: 4 PEN | Refills: 1 | Status: SHIPPED | OUTPATIENT
Start: 2024-08-01

## 2024-08-01 RX ORDER — AMLODIPINE BESYLATE 5 MG/1
5 TABLET ORAL DAILY
Qty: 90 TABLET | Refills: 3 | Status: SHIPPED | OUTPATIENT
Start: 2024-08-01

## 2024-08-01 RX ORDER — DULAGLUTIDE 1.5 MG/.5ML
1.5 INJECTION, SOLUTION SUBCUTANEOUS
Qty: 12 PEN | Refills: 2 | Status: SHIPPED | OUTPATIENT
Start: 2024-08-01 | End: 2024-08-01

## 2024-08-01 NOTE — PROGRESS NOTES
Subjective:       Patient ID: Sunitha Pillai is a 69 y.o. female.    Chief Complaint: Follow-up, Diabetes, and Foot Pain (left)    Follow-up  Pertinent negatives include no abdominal pain, chest pain (arm pain or jaw pain), headaches, nausea or vomiting.   Diabetes  Pertinent negatives for hypoglycemia include no headaches or seizures. Pertinent negatives for diabetes include no chest pain (arm pain or jaw pain).   Foot Pain  Pertinent negatives include no abdominal pain, chest pain (arm pain or jaw pain), headaches, nausea or vomiting.   Pt with some left foot pain.  No CP or SOB. Mood is improved.    Review of Systems   Respiratory:  Negative for shortness of breath (PND or orthopnea).    Cardiovascular:  Negative for chest pain (arm pain or jaw pain).   Gastrointestinal:  Negative for abdominal pain, diarrhea, nausea and vomiting.   Genitourinary:  Negative for dysuria.   Neurological:  Negative for seizures, syncope and headaches.       Objective:      Physical Exam  Constitutional:       General: She is not in acute distress.     Appearance: She is well-developed.   HENT:      Head: Normocephalic.   Eyes:      Pupils: Pupils are equal, round, and reactive to light.   Neck:      Thyroid: No thyromegaly.      Vascular: No JVD.   Cardiovascular:      Rate and Rhythm: Normal rate and regular rhythm.      Heart sounds: Normal heart sounds. No murmur heard.     No friction rub. No gallop.   Pulmonary:      Effort: Pulmonary effort is normal.      Breath sounds: Normal breath sounds. No wheezing or rales.   Abdominal:      General: Bowel sounds are normal. There is no distension.      Palpations: Abdomen is soft. There is no mass.      Tenderness: There is no abdominal tenderness. There is no guarding or rebound.   Musculoskeletal:      Cervical back: Neck supple.   Lymphadenopathy:      Cervical: No cervical adenopathy.   Skin:     General: Skin is warm and dry.   Neurological:      Mental Status: She is alert  and oriented to person, place, and time.      Deep Tendon Reflexes: Reflexes are normal and symmetric.   Psychiatric:         Behavior: Behavior normal.         Thought Content: Thought content normal.         Judgment: Judgment normal.         Assessment:       1. PAD (peripheral artery disease)    2. Chronic kidney disease, stage 3a    3. Drug-induced immunodeficiency    4. Type 2 diabetes mellitus with hyperglycemia, without long-term current use of insulin        Plan:   PAD (peripheral artery disease)  -     VAS US Ankle Brachial Indices with Exercise; Future    Chronic kidney disease, stage 3a  Stable and monitored  Drug-induced immunodeficiency  Starting humira  Type 2 diabetes mellitus with hyperglycemia, without long-term current use of insulin  -     Hemoglobin A1C; Future; Expected date: 08/01/2024  -     TSH; Future; Expected date: 08/01/2024  -      dulaglutide (TRULICITY) 1.5 mg/0.5 mL pen injector; Inject 1.5 mg into the skin every 7 days.  Dispense: 12 pen ; Refill: 2  -     tirzepatide (MOUNJARO) 2.5 mg/0.5 mL PnIj; Inject 2.5 mg into the skin every 7 days.  Dispense: 4 Pen; Refill: 1  Try to get mounjaro for better weight loss  -     blood-glucose meter kit; Check glucose daily E11.9 meter covered by insurance  Dispense: 1 each; Refill: 0  -     blood sugar diagnostic Strp; Check glucose daily Strips and lancets covered by insurance E11.9  Dispense: 100 each; Refill: 3  Promote weight loss  -     topiramate (TOPAMAX) 25 MG tablet; One at bedtime for 2 weeks then two at bedtime  Dispense: 60 tablet; Refill: 2  HTN  -     amLODIPine (NORVASC) 5 MG tablet; Take 1 tablet (5 mg total) by mouth once daily.  Dispense: 90 tablet; Refill: 3  3

## 2024-08-02 ENCOUNTER — TELEPHONE (OUTPATIENT)
Dept: INTERNAL MEDICINE | Facility: CLINIC | Age: 69
End: 2024-08-02
Payer: MEDICARE

## 2024-08-02 NOTE — TELEPHONE ENCOUNTER
----- Message from Bertha Jonas sent at 8/2/2024  9:21 AM CDT -----  Contact: [facility] [phone]  Pharmacy is calling to clarify an RX.    RX name:  TRUEPLUS LANCETS 33 gauge Misc and blood sugar diagnostic Strp    What do they need to clarify:  needs to know how many times     Comments:       [facility] - [place], [place] - [address]  [address] [address]  Phone: [phone] Fax: [phone]

## 2024-08-04 ENCOUNTER — TELEPHONE (OUTPATIENT)
Dept: INTERNAL MEDICINE | Facility: CLINIC | Age: 69
End: 2024-08-04
Payer: MEDICARE

## 2024-08-04 RX ORDER — BLOOD-GLUCOSE CONTROL, NORMAL
EACH MISCELLANEOUS
Qty: 100 EACH | Refills: 3 | Status: SHIPPED | OUTPATIENT
Start: 2024-08-04

## 2024-08-09 ENCOUNTER — OFFICE VISIT (OUTPATIENT)
Dept: URGENT CARE | Facility: CLINIC | Age: 69
End: 2024-08-09
Payer: MEDICARE

## 2024-08-09 VITALS
SYSTOLIC BLOOD PRESSURE: 160 MMHG | OXYGEN SATURATION: 99 % | RESPIRATION RATE: 17 BRPM | WEIGHT: 162.25 LBS | BODY MASS INDEX: 27.7 KG/M2 | HEIGHT: 64 IN | DIASTOLIC BLOOD PRESSURE: 80 MMHG | HEART RATE: 76 BPM | TEMPERATURE: 103 F

## 2024-08-09 DIAGNOSIS — R50.9 FEVER, UNSPECIFIED FEVER CAUSE: ICD-10-CM

## 2024-08-09 DIAGNOSIS — U07.1 COVID-19: ICD-10-CM

## 2024-08-09 DIAGNOSIS — N18.31 CHRONIC KIDNEY DISEASE, STAGE 3A: ICD-10-CM

## 2024-08-09 DIAGNOSIS — R05.9 COUGH, UNSPECIFIED TYPE: Primary | ICD-10-CM

## 2024-08-09 LAB
CTP QC/QA: YES
SARS-COV-2 AG RESP QL IA.RAPID: POSITIVE

## 2024-08-09 RX ORDER — ACETAMINOPHEN 500 MG
1000 TABLET ORAL
Status: COMPLETED | OUTPATIENT
Start: 2024-08-09 | End: 2024-08-09

## 2024-08-09 RX ADMIN — Medication 1000 MG: at 05:08

## 2024-08-15 ENCOUNTER — HOSPITAL ENCOUNTER (OUTPATIENT)
Dept: VASCULAR SURGERY | Facility: CLINIC | Age: 69
Discharge: HOME | End: 2024-08-15
Attending: INTERNAL MEDICINE
Payer: MEDICARE

## 2024-08-15 DIAGNOSIS — R09.89 DECREASED PEDAL PULSES: Primary | ICD-10-CM

## 2024-08-15 DIAGNOSIS — I73.9 PAD (PERIPHERAL ARTERY DISEASE): ICD-10-CM

## 2024-08-15 PROCEDURE — 93923 UPR/LXTR ART STDY 3+ LVLS: CPT | Mod: HCNC,S$GLB,, | Performed by: SURGERY

## 2024-08-28 ENCOUNTER — PATIENT MESSAGE (OUTPATIENT)
Dept: ENDOSCOPY | Facility: HOSPITAL | Age: 69
End: 2024-08-28
Payer: MEDICARE

## 2024-08-28 ENCOUNTER — TELEPHONE (OUTPATIENT)
Dept: ENDOSCOPY | Facility: HOSPITAL | Age: 69
End: 2024-08-28
Payer: MEDICARE

## 2024-08-28 NOTE — TELEPHONE ENCOUNTER
Contacted Pt for Endoscopy precall to confirm scheduled procedure(s) Colonoscopy on 9/6/24. Pt did not answer. Left voicemail for pt to call Endoscopy Scheduling to confirm.

## 2024-08-30 ENCOUNTER — HOSPITAL ENCOUNTER (EMERGENCY)
Facility: OTHER | Age: 69
Discharge: HOME | End: 2024-08-30
Attending: EMERGENCY MEDICINE
Payer: MEDICARE

## 2024-08-30 ENCOUNTER — TELEPHONE (OUTPATIENT)
Dept: RHEUMATOLOGY | Facility: CLINIC | Age: 69
End: 2024-08-30
Payer: MEDICARE

## 2024-08-30 VITALS
SYSTOLIC BLOOD PRESSURE: 186 MMHG | DIASTOLIC BLOOD PRESSURE: 72 MMHG | RESPIRATION RATE: 20 BRPM | OXYGEN SATURATION: 97 % | WEIGHT: 150 LBS | HEIGHT: 64 IN | TEMPERATURE: 98 F | HEART RATE: 73 BPM | BODY MASS INDEX: 25.61 KG/M2

## 2024-08-30 DIAGNOSIS — M06.9 RHEUMATOID ARTHRITIS INVOLVING MULTIPLE SITES, UNSPECIFIED WHETHER RHEUMATOID FACTOR PRESENT: ICD-10-CM

## 2024-08-30 DIAGNOSIS — M25.50 POLYARTHRALGIA: Primary | ICD-10-CM

## 2024-08-30 PROCEDURE — 99281 EMR DPT VST MAYX REQ PHY/QHP: CPT | Mod: HCNC

## 2024-08-30 NOTE — ED PROVIDER NOTES
Encounter Date: 2024       History     Chief Complaint   Patient presents with    Hand Pain     Pt. Complains of left hand,knee and foot pain an swelling. Pt. States it started on . Pt. Is alert and ABC's are intact.     This is a 69-year-old female with PMHx rheumatoid arthritis, anxiety, adjustment disorder, TIDM, HTN who presents to the ED with complaints of pain and swelling of multiple joints. Patient describes pain to her left hand, left knee, and left foot. She has not taken anything for her pain. She states that she was prescribed a medication by her rheumatologist one month ago, however, was not able to started because of insurance problems.  Denies numbness or tingling, fever, shortness of breath, chest pain    The history is provided by the patient and medical records.     Review of patient's allergies indicates:  No Known Allergies  Past Medical History:   Diagnosis Date    Acute coronary syndrome 2018    Adjustment disorder     Anxiety     Arthritis     Cancer of breast 2020    Malignant neoplasm of lower-inner quadrant of left breast in female, estrogen receptor positive    Cholelithiasis with acute cholecystitis 2021    Coronary artery disease     Depression     Diabetes mellitus type I     Genetic testing of female 2020    [place] via [employer] with AXIN2 and POLE variants of uncertain significance    GERD (gastroesophageal reflux disease)     Hepatitis B core antibody positive 3/9/2023    Negative sAg, suggests previous exposure but no chronic/active Hep B. At risk for reactivation with any immunosuppression medication, steroids, chemo, etc.      Hypertension     Vertigo 2019    Vitamin D deficiency 2021     Past Surgical History:   Procedure Laterality Date    BREAST BIOPSY Left 2022    Left punch biopsy; Unremarkable keratinized skin with intradermal hematoma     SECTION      INJECTION FOR SENTINEL NODE IDENTIFICATION Left  09/17/2020    Procedure: INJECTION, FOR SENTINEL NODE IDENTIFICATION;  Surgeon: Isabel Moulton MD;  Location: [facility] OR;  Service: General;  Laterality: Left;    INSERTION OF TUNNELED CENTRAL VENOUS CATHETER (CVC) WITH SUBCUTANEOUS PORT N/A 11/18/2020    Procedure: INSERTION, PORT-A-CATH;  Surgeon: Hardeep Martin MD;  Location: [facility] CATH LAB;  Service: Radiology;  Laterality: N/A;    LAPAROSCOPIC CHOLECYSTECTOMY N/A 03/06/2021    Procedure: CHOLECYSTECTOMY, LAPAROSCOPIC;  Surgeon: Buster Son MD;  Location: [facility] OR 2ND FLR;  Service: General;  Laterality: N/A;    MASTECTOMY, PARTIAL Left 09/17/2020    Procedure: MASTECTOMY, PARTIAL-w/reflector;  Surgeon: Isabel Moulton MD;  Location: [facility] OR;  Service: General;  Laterality: Left;    SENTINEL LYMPH NODE BIOPSY Left 09/17/2020    Procedure: BIOPSY, LYMPH NODE, SENTINEL;  Surgeon: Isabel Moulton MD;  Location: [facility] OR;  Service: General;  Laterality: Left;     Family History   Problem Relation Name Age of Onset    Hypertension Mother      Hyperlipidemia Mother      Diabetes Mother      Heart disease Mother      Colon polyps Mother      Aneurysm Father      Diabetes Sister 2     Hypertension Sister 2     Heart disease Brother 2     Diabetes Brother 2     Hypertension Brother 2     Cancer Brother 1         brother German with prostate cancer; brother Cooper with throat cancer    Cervical cancer Daughter Daughter1     Anxiety disorder Daughter Daughter2     Depression Daughter Daughter2     Anxiety disorder Daughter Daughter3     Depression Daughter Daughter3     Breast cancer Maternal Aunt Teresa     Cancer Maternal Aunt Nikki         unknown origin    Cancer Paternal Aunt Esperanza         unknown origin    Breast cancer Paternal Aunt Esperanza     Prostate cancer Maternal Cousin Chris     Leukemia Maternal Cousin Chris     Prostate cancer Maternal Cousin Nirmal     Breast cancer Other Pat 1/2aunt Quiana     Colon cancer Neg Hx      Ovarian cancer Neg Hx        Social History     Tobacco Use    Smoking status: Never    Smokeless tobacco: Never   Substance Use Topics    Alcohol use: Never    Drug use: No     Review of Systems  10 point ROS performed and negative except as stated in HPI   Physical Exam     Initial Vitals [08/30/24 1110]   BP Pulse Resp Temp SpO2   (!) 186/72 73 20 98 °F (36.7 °C) 97 %      MAP       --         Physical Exam    Constitutional: She appears well-developed and well-nourished.  Non-toxic appearance. She does not appear ill. No distress.   HENT:   Head: Normocephalic and atraumatic.   Eyes: Conjunctivae are normal.   Neck: Neck supple.   Cardiovascular:  Normal rate and regular rhythm.           Pulmonary/Chest: Effort normal. No tachypnea. No respiratory distress.   Musculoskeletal:      Cervical back: Neck supple.      Comments: Mild swelling to all PIPs of left hand with some tenderness. Stiff but still able to make fist.  No overlying erythema or warmth.  Left knee grossly unremarkable.  Swelling and tenderness to left 1st MCP without erythema or warmth     Neurological: She is alert and oriented to person, place, and time.   Skin: Skin is warm, dry and intact.   Psychiatric: She has a normal mood and affect. Her speech is normal and behavior is normal.         ED Course   Procedures  Labs Reviewed - No data to display       Imaging Results    None          Medications - No data to display  Medical Decision Making  Urgent evaluation of an afebrile 69-year-old female with polyarthralgias. Patient with known rheumatoid arthritis. Chart review shows patient seen at outpatient rheumatology clinic 7/25/24 and was prescribed to start Humira and prednisone taper with follow up in 10/2024. Patient currently not on any medications for RA - she has not started Humira and stopped taking prednisone. There is no current evidence of complication, sepsis, hemarthrosis, gouty arthritis, or any other musculoskeletal emergency for which she needs further  workup here in the ED today. Discussed with patient the need for close follow up with rheumatology as she will continue to have flares of pain and polyarthralgia if she does not.  Staff message sent to her rheumatologist to help facilitate closer ED follow-up.    Differential diagnosis includes but isn't limited to:  Rheumatoid arthritis, osteoarthritis, chronic pain                                Clinical Impression:  Final diagnoses:  [M25.50] Polyarthralgia (Primary)  [M06.9] Rheumatoid arthritis involving multiple sites, unspecified whether rheumatoid factor present          ED Disposition Condition    Discharge Stable          ED Prescriptions    None       Follow-up Information       Follow up With Specialties Details Why Contact Info    Renu Teixeira MD Rheumatology Schedule an appointment as soon as possible for a visit  for follow up with rheumatology [address] [address]  [phone]      [facility] Emergency Dept Emergency Medicine Go to  If symptoms worsen [address] [address]  [phone]             Radha Hood PAPRASANNA  08/30/24 1502

## 2024-08-30 NOTE — ED TRIAGE NOTES
Pt reports to ED with complaints of pain L hand, L knee and bottom of L foot (primarily under great toe) x1 week. Pt denies fall/trauma. Visible swelling in L hand. No visible trauma or abnormalities noted. Pt reports pain 7/10 with no relief from tylenol.

## 2024-08-30 NOTE — DISCHARGE INSTRUCTIONS
Please call Dr. Torres, your rheumatologist, to schedule close follow-up in the clinic.  She had previously discussed use starting a biologic medicine to help with your symptoms which would be beneficial.  I am unable to prescribe that from the emergency room.    You may take your prescribed gabapentin or Tylenol as needed for pain.

## 2024-08-30 NOTE — TELEPHONE ENCOUNTER
----- Message from Radha Hood PA-C sent at 8/30/2024 11:44 AM CDT -----  Regarding: ED follow up  Hi there,     This patient came to the ED with polyarthralgias. Per chart review, was started on Humira for RA at last visit 07/25/24 and is scheduled for f/u 10/30/24. Patient states that there was insurance issues or something so she has not been taking Humira. She has swelling and pain of her left hand, left foot, and left knee. Would it be possible to move her October follow up appointment up for an ED f/u visit and have her social barriers addressed so she can start the Humira?    Thank you!  Radha Hood PA-C

## 2024-09-03 ENCOUNTER — PATIENT OUTREACH (OUTPATIENT)
Dept: EMERGENCY MEDICINE | Facility: OTHER | Age: 69
End: 2024-09-03
Payer: MEDICARE

## 2024-09-03 RX ORDER — AMLODIPINE BESYLATE 2.5 MG/1
2.5 TABLET ORAL
Qty: 90 TABLET | Refills: 0 | OUTPATIENT
Start: 2024-09-03

## 2024-09-03 NOTE — TELEPHONE ENCOUNTER
Refill Decision Note   Sunitha Pillai  is requesting a refill authorization.  Brief Assessment and Rationale for Refill:  Quick Discontinue     Medication Therapy Plan:  discontinued 8/1/24 by Patty Louis MD. Dose adjustment      Comments:     Note composed:3:35 PM 09/03/2024

## 2024-09-03 NOTE — TELEPHONE ENCOUNTER
No care due was identified.  [facility] Embedded Care Due Messages. Reference number: [id number].   9/02/2024 7:12:33 PM CDT

## 2024-09-05 ENCOUNTER — TELEPHONE (OUTPATIENT)
Dept: ENDOSCOPY | Facility: HOSPITAL | Age: 69
End: 2024-09-05
Payer: MEDICARE

## 2024-09-05 NOTE — H&P
COLONOSCOPY HISTORY AND PHYSICAL EXAM    Procedure : Colonoscopy      INDICATIONS: asymptomatic screening exam    Family Hx of CRC: None    Last Colonoscopy:  06/2012  Findings: single rectal hyperplastic polyp       Past Medical History:   Diagnosis Date    Acute coronary syndrome 09/23/2018    Adjustment disorder     Anxiety     Arthritis     Cancer of breast 09/03/2020    Malignant neoplasm of lower-inner quadrant of left breast in female, estrogen receptor positive    Cholelithiasis with acute cholecystitis 03/05/2021    Coronary artery disease     Depression     Diabetes mellitus type I     Genetic testing of female 09/2020    [place] via [employer] with AXIN2 and POLE variants of uncertain significance    GERD (gastroesophageal reflux disease)     Hepatitis B core antibody positive 3/9/2023    Negative sAg, suggests previous exposure but no chronic/active Hep B. At risk for reactivation with any immunosuppression medication, steroids, chemo, etc.      Hypertension     Vertigo 05/13/2019    Vitamin D deficiency 02/07/2021       Family History   Problem Relation Name Age of Onset    Hypertension Mother      Hyperlipidemia Mother      Diabetes Mother      Heart disease Mother      Colon polyps Mother      Aneurysm Father      Diabetes Sister 2     Hypertension Sister 2     Heart disease Brother 2     Diabetes Brother 2     Hypertension Brother 2     Cancer Brother 1         brother German with prostate cancer; brother Cooper with throat cancer    Cervical cancer Daughter Daughter1     Anxiety disorder Daughter Daughter2     Depression Daughter Daughter2     Anxiety disorder Daughter Daughter3     Depression Daughter Daughter3     Breast cancer Maternal Aunt Teresa     Cancer Maternal Aunt Nikki         unknown origin    Cancer Paternal Aunt Esperanza         unknown origin    Breast cancer Paternal Aunt Esperanza     Prostate cancer Maternal Cousin Chris     Leukemia Maternal Cousin Chris     Prostate cancer  Maternal Cousin Nirmal     Breast cancer Other Pat 1/2aedgard Araya     Colon cancer Neg Hx      Ovarian cancer Neg Hx       Social History     Socioeconomic History    Marital status:      Spouse name: Burak    Number of children: 3   Tobacco Use    Smoking status: Never    Smokeless tobacco: Never   Substance and Sexual Activity    Alcohol use: Never    Drug use: No    Sexual activity: Yes     Partners: Male     Birth control/protection: Post-menopausal     Social Determinants of Health     Financial Resource Strain: Medium Risk (4/6/2023)    Overall Financial Resource Strain (CARDIA)     Difficulty of Paying Living Expenses: Somewhat hard   Food Insecurity: No Food Insecurity (4/6/2023)    Hunger Vital Sign     Worried About Running Out of Food in the Last Year: Never true     Ran Out of Food in the Last Year: Never true   Transportation Needs: No Transportation Needs (4/6/2023)    PRAPARE - Transportation     Lack of Transportation (Medical): No     Lack of Transportation (Non-Medical): No   Physical Activity: Inactive (4/6/2023)    Exercise Vital Sign     Days of Exercise per Week: 0 days     Minutes of Exercise per Session: 0 min   Stress: No Stress Concern Present (4/6/2023)    [employer] - Occupational Stress Questionnaire     Feeling of Stress : Not at all   Housing Stability: Unknown (4/6/2023)    Housing Stability Vital Sign     Unable to Pay for Housing in the Last Year: No     Unstable Housing in the Last Year: No       Review of Systems - Negative except   Respiratory ROS: no dyspnea  Cardiovascular ROS: no exertional chest pain  Gastrointestinal ROS: NO abdominal discomfort,  NO rectal bleeding  Musculoskeletal ROS: no muscular pain  Neurological ROS: no recent stroke    Physical Exam:  LMP  (LMP Unknown)   General: NAD, AAOx3  Resp: non-labored breathing on room air  Abdomen: soft, NT, ND    PLAN  COLONOSCOPY.    SedationPlan :MAC    The details of the procedure,  the possible need for biopsy or polypectomy and the potential risks including bleeding, perforation, missed polyps were discussed in detail.

## 2024-09-06 ENCOUNTER — ANESTHESIA (OUTPATIENT)
Dept: ENDOSCOPY | Facility: HOSPITAL | Age: 69
End: 2024-09-06
Payer: MEDICARE

## 2024-09-06 ENCOUNTER — HOSPITAL ENCOUNTER (OUTPATIENT)
Facility: HOSPITAL | Age: 69
Discharge: HOME | End: 2024-09-06
Attending: COLON & RECTAL SURGERY | Admitting: COLON & RECTAL SURGERY
Payer: MEDICARE

## 2024-09-06 ENCOUNTER — ANESTHESIA EVENT (OUTPATIENT)
Dept: ENDOSCOPY | Facility: HOSPITAL | Age: 69
End: 2024-09-06
Payer: MEDICARE

## 2024-09-06 VITALS
DIASTOLIC BLOOD PRESSURE: 117 MMHG | RESPIRATION RATE: 18 BRPM | BODY MASS INDEX: 25.61 KG/M2 | WEIGHT: 150 LBS | HEART RATE: 69 BPM | OXYGEN SATURATION: 96 % | TEMPERATURE: 98 F | SYSTOLIC BLOOD PRESSURE: 177 MMHG | HEIGHT: 64 IN

## 2024-09-06 DIAGNOSIS — Z86.010 PERSONAL HISTORY OF COLONIC POLYPS: ICD-10-CM

## 2024-09-06 DIAGNOSIS — Z12.11 SCREENING FOR COLON CANCER: Primary | ICD-10-CM

## 2024-09-06 LAB — POCT GLUCOSE: 87 MG/DL (ref 70–110)

## 2024-09-06 PROCEDURE — G0121 COLON CA SCRN NOT HI RSK IND: HCPCS | Mod: HCNC | Performed by: COLON & RECTAL SURGERY

## 2024-09-06 PROCEDURE — 37000008 HC ANESTHESIA 1ST 15 MINUTES: Mod: HCNC | Performed by: COLON & RECTAL SURGERY

## 2024-09-06 PROCEDURE — 63600175 PHARM REV CODE 636 W HCPCS: Mod: HCNC | Performed by: REGISTERED NURSE

## 2024-09-06 PROCEDURE — 37000009 HC ANESTHESIA EA ADD 15 MINS: Mod: HCNC | Performed by: COLON & RECTAL SURGERY

## 2024-09-06 PROCEDURE — 25000003 PHARM REV CODE 250: Mod: HCNC | Performed by: COLON & RECTAL SURGERY

## 2024-09-06 PROCEDURE — 25000003 PHARM REV CODE 250: Mod: HCNC | Performed by: REGISTERED NURSE

## 2024-09-06 PROCEDURE — G0121 COLON CA SCRN NOT HI RSK IND: HCPCS | Mod: HCNC,,, | Performed by: COLON & RECTAL SURGERY

## 2024-09-06 RX ORDER — PROPOFOL 10 MG/ML
VIAL (ML) INTRAVENOUS CONTINUOUS PRN
Status: DISCONTINUED | OUTPATIENT
Start: 2024-09-06 | End: 2024-09-06

## 2024-09-06 RX ORDER — LIDOCAINE HYDROCHLORIDE 20 MG/ML
INJECTION INTRAVENOUS
Status: DISCONTINUED | OUTPATIENT
Start: 2024-09-06 | End: 2024-09-06

## 2024-09-06 RX ORDER — SODIUM CHLORIDE 9 MG/ML
INJECTION, SOLUTION INTRAVENOUS CONTINUOUS
Status: DISCONTINUED | OUTPATIENT
Start: 2024-09-06 | End: 2024-09-06 | Stop reason: HOSPADM

## 2024-09-06 RX ADMIN — PROPOFOL 70 MG: 10 INJECTION, EMULSION INTRAVENOUS at 09:09

## 2024-09-06 RX ADMIN — PROPOFOL 125 MCG/KG/MIN: 10 INJECTION, EMULSION INTRAVENOUS at 09:09

## 2024-09-06 RX ADMIN — PROPOFOL 30 MG: 10 INJECTION, EMULSION INTRAVENOUS at 09:09

## 2024-09-06 RX ADMIN — SODIUM CHLORIDE: 0.9 INJECTION, SOLUTION INTRAVENOUS at 09:09

## 2024-09-06 RX ADMIN — LIDOCAINE HYDROCHLORIDE 100 MG: 20 INJECTION INTRAVENOUS at 09:09

## 2024-09-06 RX ADMIN — SODIUM CHLORIDE: 9 INJECTION, SOLUTION INTRAVENOUS at 08:09

## 2024-09-06 NOTE — [employer] PATIENT INSTRUCTIONS
Discharge Summary/Instructions after an Endoscopic Procedure  Patient Name: Sunitha Pillai  Patient MRN: [id number]  Patient YOB: 1955 Friday, September 6, 2024  Edward Esqueda MD  Dear patient,  As a result of recent federal legislation (The Federal Cures Act), you may   receive lab or pathology results from your procedure in your [facility]   account before your physician is able to contact you. Your physician or   their representative will relay the results to you with their   recommendations at their soonest availability.  Thank you,  RESTRICTIONS:  During your procedure today, you received medications for sedation.  These   medications may affect your judgment, balance and coordination.  Therefore,   for 24 hours, you have the following restrictions:   - DO NOT drive a car, operate machinery, make legal/financial decisions,   sign important papers or drink alcohol.    ACTIVITY:  Today: no heavy lifting, straining or running due to procedural   sedation/anesthesia.  The following day: return to full activity including work.  DIET:  Eat and drink normally unless instructed otherwise.     TREATMENT FOR COMMON SIDE EFFECTS:  - Mild abdominal pain, nausea, belching, bloating or excessive gas:  rest,   eat lightly and use a heating pad.  - Sore Throat: treat with throat lozenges and/or gargle with warm salt   water.  - Because air was used during the procedure, expelling large amounts of air   from your rectum or belching is normal.  - If a bowel prep was taken, you may not have a bowel movement for 1-3 days.    This is normal.  SYMPTOMS TO WATCH FOR AND REPORT TO YOUR PHYSICIAN:  1. Abdominal pain or bloating, other than gas cramps.  2. Chest pain.  3. Back pain.  4. Signs of infection such as: chills or fever occurring within 24 hours   after the procedure.  5. Rectal bleeding, which would show as bright red, maroon, or black stools.   (A tablespoon of blood from the rectum is not serious,  especially if   hemorrhoids are present.)  6. Vomiting.  7. Weakness or dizziness.  GO DIRECTLY TO THE NEAREST EMERGENCY ROOM IF YOU HAVE ANY OF THE FOLLOWING:      Difficulty breathing              Chills and/or fever over 101 F   Persistent vomiting and/or vomiting blood   Severe abdominal pain   Severe chest pain   Black, tarry stools   Bleeding- more than one tablespoon   Any other symptom or condition that you feel may need urgent attention  Your doctor recommends these additional instructions:  If any biopsies were taken, your doctors clinic will contact you in 1 to 2   weeks with any results.  - Discharge patient to home.   - Resume previous diet.   - Continue present medications.   - Repeat colonoscopy in 10 years for screening purposes.  For questions, problems or results please call your physician - Edward Esqueda MD at Work:  [phone].  [facility] EMERGENCY ROOM PHONE NUMBER: [phone]  IF A COMPLICATION OR EMERGENCY SITUATION ARISES AND YOU ARE UNABLE TO REACH   YOUR PHYSICIAN - GO DIRECTLY TO THE EMERGENCY ROOM.  Edward Esqueda MD  9/6/2024 9:49:33 AM  This report has been verified and signed electronically.  Dear patient,  As a result of recent federal legislation (The Federal Cures Act), you may   receive lab or pathology results from your procedure in your [facility]   account before your physician is able to contact you. Your physician or   their representative will relay the results to you with their   recommendations at their soonest availability.  Thank you,  [employer]

## 2024-09-06 NOTE — ANESTHESIA PREPROCEDURE EVALUATION
09/06/2024  Sunitha Pillai is a 69 y.o., female.    Patient Active Problem List   Diagnosis    Essential hypertension    GERD (gastroesophageal reflux disease)    Vertigo    Overweight (BMI 25.0-29.9)    Chest pain, rule out acute myocardial infarction    Anxiety about health    Adjustment disorder with mixed anxiety and depressed mood    Constipation    History of pneumonia-covid infection    Vitamin D deficiency    History of COVID-19    Transaminitis    Acute gangrenous cholecystitis    Chemotherapy-induced peripheral neuropathy    Prolonged grief disorder    Normocytic anemia    Aortic atherosclerosis    Hepatitis B core antibody positive    Recurrent major depressive disorder, in full remission    Rheumatoid arthritis involving multiple sites with positive rheumatoid factor    Chronic bilateral low back pain without sciatica    Pain of left hip    Chronic pain of left knee    Chronic pain of multiple joints    Type 2 diabetes mellitus with hyperglycemia, without long-term current use of insulin    Rhinovirus infection    Hypokalemia    Thrombocytopenia    Shoulder pain, right    History of breast cancer    Chronic kidney disease, stage 3a    Drug-induced immunodeficiency    Decreased pedal pulses     Past Medical History:   Diagnosis Date    Acute coronary syndrome 09/23/2018    Adjustment disorder     Anxiety     Arthritis     Cancer of breast 09/03/2020    Malignant neoplasm of lower-inner quadrant of left breast in female, estrogen receptor positive    Cholelithiasis with acute cholecystitis 03/05/2021    Coronary artery disease     Depression     Diabetes mellitus type I     Genetic testing of female 09/2020    Minerva via [employer] with AXIN2 and POLE variants of uncertain significance    GERD (gastroesophageal reflux disease)     Hepatitis B core antibody positive 3/9/2023    Negative  sAg, suggests previous exposure but no chronic/active Hep B. At risk for reactivation with any immunosuppression medication, steroids, chemo, etc.      Hypertension     Vertigo 2019    Vitamin D deficiency 2021     Past Surgical History:   Procedure Laterality Date    BREAST BIOPSY Left 2022    Left punch biopsy; Unremarkable keratinized skin with intradermal hematoma     SECTION      INJECTION FOR SENTINEL NODE IDENTIFICATION Left 2020    Procedure: INJECTION, FOR SENTINEL NODE IDENTIFICATION;  Surgeon: Isabel Moulton MD;  Location: [facility] OR;  Service: General;  Laterality: Left;    INSERTION OF TUNNELED CENTRAL VENOUS CATHETER (CVC) WITH SUBCUTANEOUS PORT N/A 2020    Procedure: INSERTION, PORT-A-CATH;  Surgeon: Hardeep Martin MD;  Location: [facility] CATH LAB;  Service: Radiology;  Laterality: N/A;    LAPAROSCOPIC CHOLECYSTECTOMY N/A 2021    Procedure: CHOLECYSTECTOMY, LAPAROSCOPIC;  Surgeon: Buster Son MD;  Location: [address];  Service: General;  Laterality: N/A;    MASTECTOMY, PARTIAL Left 2020    Procedure: MASTECTOMY, PARTIAL-w/reflector;  Surgeon: Isabel Moulton MD;  Location: [facility];  Service: General;  Laterality: Left;    SENTINEL LYMPH NODE BIOPSY Left 2020    Procedure: BIOPSY, LYMPH NODE, SENTINEL;  Surgeon: Isabel Moulton MD;  Location: [facility];  Service: General;  Laterality: Left;           Pre-op Assessment    I have reviewed the Patient Summary Reports.    I have reviewed the NPO Status.   I have reviewed the Medications.     Review of Systems  Anesthesia Hx:  No problems with previous Anesthesia                Hematology/Oncology:  Hematology Normal   Oncology Normal                                   EENT/Dental:  EENT/Dental Normal           Cardiovascular:     Hypertension   CAD                                        Pulmonary:  Pulmonary Normal                       Renal/:  Chronic Renal Disease                 Hepatic/GI:     GERD             Musculoskeletal:  Musculoskeletal Normal                Neurological:    Neuromuscular Disease,                                   Endocrine:  Diabetes           Dermatological:  Skin Normal    Psych:  Psychiatric History                  Physical Exam  General: Well nourished, Cooperative, Alert and Oriented    Airway:  Mallampati: II   Mouth Opening: Normal  TM Distance: Normal  Tongue: Normal  Neck ROM: Normal ROM        Anesthesia Plan  Type of Anesthesia, risks & benefits discussed:    Anesthesia Type: Gen Natural Airway  Intra-op Monitoring Plan: Standard ASA Monitors  Induction:  IV  Informed Consent: Informed consent signed with the Patient and all parties understand the risks and agree with anesthesia plan.  All questions answered.   ASA Score: 3  Day of Surgery Review of History & Physical: H&P Update referred to the surgeon/provider.    Ready For Surgery From Anesthesia Perspective.     .

## 2024-09-06 NOTE — TRANSFER OF CARE
"Anesthesia Transfer of Care Note    Patient: Sunitha Pillai    Procedure(s) Performed: Procedure(s) (LRB):  COLONOSCOPY (N/A)    Patient location: GI    Anesthesia Type: general    Transport from OR: Transported from OR on room air with adequate spontaneous ventilation    Post pain: adequate analgesia    Post assessment: no apparent anesthetic complications and tolerated procedure well    Post vital signs: stable    Level of consciousness: sedated    Nausea/Vomiting: no nausea/vomiting    Complications: none    Transfer of care protocol was followed    Last vitals: Visit Vitals  BP (!) 184/79 (BP Location: Left arm, Patient Position: Lying)   Pulse 68   Temp 37.1 °C (98.8 °F) (Temporal)   Resp 16   Ht 5' 4" (1.626 m)   Wt 68 kg (150 lb)   LMP  (LMP Unknown)   SpO2 100%   BMI 25.75 kg/m²     "

## 2024-09-06 NOTE — ANESTHESIA POSTPROCEDURE EVALUATION
Anesthesia Post Evaluation    Patient: Sunitha Pillai    Procedure(s) Performed: Procedure(s) (LRB):  COLONOSCOPY (N/A)    Final Anesthesia Type: general      Patient location during evaluation: GI PACU  Patient participation: Yes- Able to Participate  Level of consciousness: awake and alert  Post-procedure vital signs: reviewed and stable  Pain management: adequate  Airway patency: patent    PONV status at discharge: No PONV  Anesthetic complications: no      Cardiovascular status: stable  Respiratory status: unassisted and spontaneous ventilation  Hydration status: euvolemic  Follow-up not needed.              Vitals Value Taken Time   /117 09/06/24 1030   Temp 36.6 °C (97.9 °F) 09/06/24 1030   Pulse 69 09/06/24 1030   Resp 18 09/06/24 1030   SpO2 96 % 09/06/24 1030         Event Time   Out of Recovery 10:54:50         Pain/Troy Score: Troy Score: 9 (9/6/2024  9:57 AM)

## 2024-09-07 ENCOUNTER — HOSPITAL ENCOUNTER (EMERGENCY)
Facility: HOSPITAL | Age: 69
Discharge: HOME | End: 2024-09-07
Attending: STUDENT IN AN ORGANIZED HEALTH CARE EDUCATION/TRAINING PROGRAM
Payer: MEDICARE

## 2024-09-07 VITALS
WEIGHT: 150 LBS | OXYGEN SATURATION: 98 % | RESPIRATION RATE: 18 BRPM | BODY MASS INDEX: 25.61 KG/M2 | DIASTOLIC BLOOD PRESSURE: 71 MMHG | HEIGHT: 64 IN | HEART RATE: 71 BPM | SYSTOLIC BLOOD PRESSURE: 121 MMHG | TEMPERATURE: 98 F

## 2024-09-07 DIAGNOSIS — M06.9 RHEUMATOID ARTHRITIS FLARE: Primary | ICD-10-CM

## 2024-09-07 PROCEDURE — 99283 EMERGENCY DEPT VISIT LOW MDM: CPT | Mod: HCNC

## 2024-09-07 PROCEDURE — 63600175 PHARM REV CODE 636 W HCPCS: Mod: HCNC

## 2024-09-07 RX ORDER — PREDNISONE 50 MG/1
50 TABLET ORAL DAILY
Qty: 5 TABLET | Refills: 0 | Status: SHIPPED | OUTPATIENT
Start: 2024-09-07 | End: 2024-09-12

## 2024-09-07 RX ADMIN — PREDNISONE 50 MG: 20 TABLET ORAL at 08:09

## 2024-09-08 NOTE — ED NOTES
Patient identifiers for Sunitha Pillai 69 y.o. female checked and correct.  Chief Complaint   Patient presents with    Joint Swelling     Joint swelling x2weeks. Hx of Gout and RA     Past Medical History:   Diagnosis Date    Acute coronary syndrome 09/23/2018    Adjustment disorder     Anxiety     Arthritis     Cancer of breast 09/03/2020    Malignant neoplasm of lower-inner quadrant of left breast in female, estrogen receptor positive    Cholelithiasis with acute cholecystitis 03/05/2021    Coronary artery disease     Depression     Diabetes mellitus type I     Genetic testing of female 09/2020    Minerva via [employer] with AXIN2 and POLE variants of uncertain significance    GERD (gastroesophageal reflux disease)     Hepatitis B core antibody positive 3/9/2023    Negative sAg, suggests previous exposure but no chronic/active Hep B. At risk for reactivation with any immunosuppression medication, steroids, chemo, etc.      Hypertension     Vertigo 05/13/2019    Vitamin D deficiency 02/07/2021     Allergies reported: Review of patient's allergies indicates:  No Known Allergies      HEENT: Denies vision changes. Denies ear drainage or hearing loss. No c/o nasal drainage. Denies dysphagia or voice changes.   Appearance: Pt awake, alert & oriented to person, place & time. Pt in no acute distress at present time. Pt is clean and well groomed with clothes appropriately fastened.   Skin: Skin warm, dry & intact. Color consistent with ethnicity. Mucous membranes moist. No breakdown or brusing noted.   Musculoskeletal: Patient moving all extremities well, +joint swelling  Respiratory: Respirations spontaneous, even, and non-labored. Visible chest rise noted. Airway is open and patent. No accessory muscle use noted.   Neurologic: Sensation is intact. Speech is clear and appropriate. Eyes open spontaneously, behavior appropriate to situation, follows commands, facial expression symmetrical, bilateral hand grasp  equal and even, purposeful motor response noted.  Cardiac: All peripheral pulses present. No Bilateral lower extremity edema. Cap refill is <3 seconds.  Abdomen: Abdomen soft, non distended, non tender to palpation.   : Pt voids independently, denies dysuria, hematuria, frequency.

## 2024-09-08 NOTE — ED PROVIDER NOTES
Encounter Date: 9/7/2024       History     Chief Complaint   Patient presents with    Joint Swelling     Joint swelling x2weeks. Hx of Gout and RA     69-year-old female with a history of acute coronary syndrome (ACS), adjustment disorder, anxiety, breast neoplasm, type 1 diabetes, gastroesophageal reflux disease (GERD), hypertension, and rheumatoid arthritis presents to the emergency department with polyarthralgias and associated swelling. The pain is primarily located in the proximal interphalangeal (PIP) and distal interphalangeal (DIP) joints of the left hand, as well as the left knee and right elbow. The patient was prescribed Humira and a steroid taper by her rheumatologist but reports difficulty understanding the prescription instructions and, therefore, takes prednisone only occasionally. Additionally, she has not picked up her Humira from the pharmacy. She had a similar presentation at [facility], where issues with prescription management were discussed. The patient attempted to follow up with her rheumatologist but could not secure an appointment. She denies experiencing fever, chills, nausea, or vomiting and is still able to ambulate at her baseline.  No falls          Review of patient's allergies indicates:  No Known Allergies  Past Medical History:   Diagnosis Date    Acute coronary syndrome 09/23/2018    Adjustment disorder     Anxiety     Arthritis     Cancer of breast 09/03/2020    Malignant neoplasm of lower-inner quadrant of left breast in female, estrogen receptor positive    Cholelithiasis with acute cholecystitis 03/05/2021    Coronary artery disease     Depression     Diabetes mellitus type I     Genetic testing of female 09/2020    Minerva via [employer] with AXIN2 and POLE variants of uncertain significance    GERD (gastroesophageal reflux disease)     Hepatitis B core antibody positive 3/9/2023    Negative sAg, suggests previous exposure but no chronic/active Hep B. At risk for  reactivation with any immunosuppression medication, steroids, chemo, etc.      Hypertension     Vertigo 2019    Vitamin D deficiency 2021     Past Surgical History:   Procedure Laterality Date    BREAST BIOPSY Left 2022    Left punch biopsy; Unremarkable keratinized skin with intradermal hematoma     SECTION      COLONOSCOPY N/A 2024    Procedure: COLONOSCOPY;  Surgeon: PORTILLO Esqueda MD;  Location: [facility] (4TH FLR);  Service: Endoscopy;  Laterality: N/A;  ref by ,insrt sent via portal and giving to pt,states she not on any weight loss meds at this time, and not taking trulicity.sutab.AC  -lvm for pre call-tb-hx of constipation??  -peg, pt confirmed appt, see telephone encounter -Kpvt    INJECTION FOR SENTINEL NODE IDENTIFICATION Left 2020    Procedure: INJECTION, FOR SENTINEL NODE IDENTIFICATION;  Surgeon: Isaebl Moulton MD;  Location: [facility] OR;  Service: General;  Laterality: Left;    INSERTION OF TUNNELED CENTRAL VENOUS CATHETER (CVC) WITH SUBCUTANEOUS PORT N/A 2020    Procedure: INSERTION, PORT-A-CATH;  Surgeon: Hardeep Martin MD;  Location: [facility] CATH LAB;  Service: Radiology;  Laterality: N/A;    LAPAROSCOPIC CHOLECYSTECTOMY N/A 2021    Procedure: CHOLECYSTECTOMY, LAPAROSCOPIC;  Surgeon: Buster Son MD;  Location: [facility] 2ND FLR;  Service: General;  Laterality: N/A;    MASTECTOMY, PARTIAL Left 2020    Procedure: MASTECTOMY, PARTIAL-w/reflector;  Surgeon: Isabel Moulton MD;  Location: [facility] OR;  Service: General;  Laterality: Left;    SENTINEL LYMPH NODE BIOPSY Left 2020    Procedure: BIOPSY, LYMPH NODE, SENTINEL;  Surgeon: Isabel Moulton MD;  Location: [facility] OR;  Service: General;  Laterality: Left;     Family History   Problem Relation Name Age of Onset    Hypertension Mother      Hyperlipidemia Mother      Diabetes Mother      Heart disease Mother      Colon polyps Mother      Aneurysm Father      Diabetes  Sister 2     Hypertension Sister 2     Heart disease Brother 2     Diabetes Brother 2     Hypertension Brother 2     Cancer Brother 1         brother German with prostate cancer; brother Cooper with throat cancer    Cervical cancer Daughter Daughter1     Anxiety disorder Daughter Daughter2     Depression Daughter Daughter2     Anxiety disorder Daughter Daughter3     Depression Daughter Daughter3     Breast cancer Maternal Aunt Teresa     Cancer Maternal Aunt Nikki         unknown origin    Cancer Paternal Aunt Esperanza         unknown origin    Breast cancer Paternal Aunt Esperanza     Prostate cancer Maternal Cousin Chris     Leukemia Maternal Cousin Chris     Prostate cancer Maternal Cousin Nirmal     Breast cancer Other Pat 1/2aunt Quiana     Colon cancer Neg Hx      Ovarian cancer Neg Hx       Social History     Tobacco Use    Smoking status: Never    Smokeless tobacco: Never   Substance Use Topics    Alcohol use: Never    Drug use: No     Review of Systems  See HPI  Physical Exam     Initial Vitals [09/07/24 1934]   BP Pulse Resp Temp SpO2   115/82 76 16 98 °F (36.7 °C) 98 %      MAP       --         Physical Exam    Vitals reviewed.  Constitutional: She appears well-developed and well-nourished.   HENT:   Head: Normocephalic and atraumatic.   Eyes: Conjunctivae and EOM are normal.   Neck:   Normal range of motion.  Cardiovascular:  Normal rate.           Pulmonary/Chest: No respiratory distress.   Abdominal: Abdomen is soft. She exhibits no distension.   Musculoskeletal:         General: Tenderness and edema present. Normal range of motion.      Cervical back: Normal range of motion.      Comments: There is swelling and tenderness overlying the MTP, PIP and DIP.  Joints are not warm to the touch, no erythema or induration.  Mildly limited range of motion however still able to make a fist.  Knee bilaterally without significant joint effusion or erythema/induration appreciated.       Neurological: She is alert and  oriented to person, place, and time.   Skin: Skin is warm and dry.   Psychiatric: She has a normal mood and affect. Thought content normal.     E get    ED Course   Procedures  Labs Reviewed - No data to display       Imaging Results    None          Medications   predniSONE tablet 50 mg (has no administration in time range)     Medical Decision Making  Patient  is a 69 y.o.  female  presenting to the emergency department with complaints of  polyarthralgias with swelling    Differential diagnosis includes but  not limited to septic arthritis, gout, rheumatoid arthritis flare    Significant labwork and diagnostic findings patient was afebrile without systemic symptoms.  Physical exam not consistent with septic arthritis due to multiple joint involvement likely rheumatoid arthritis flare.    Extensively reviewed patient's chart this has not a new presentation.  Noncompliance with medication it was a factor and recurrent flares.  We will prescribe a short steroid burst with clear easy instructions for patient.  I I discussed medication at pharmacy [facility] that has still needs to be retrieved        Disposition   Patient was discharged home discussed return precautions  P  Patient reassessed, discussed dispo, given opportunity to ask additional questions prior to disposition                                          Clinical Impression:  Final diagnoses:  [M06.9] Rheumatoid arthritis flare (Primary)                 Colette Pat, AIME  09/07/24 2042

## 2024-09-08 NOTE — DISCHARGE INSTRUCTIONS
The swelling and pain in your joints is due to your rheumatoid arthritis.    Tomorrow go to [facility] in you will  to prescriptions  Walter read the instructions listed intake as prescribed  The 2nd prescription we will be in additional 5 day course of steroids.  This has a higher steroid dose than what you currently have at home.  So do not take your old prescription while taking the steroids I have prescribed.  3. If you experience worsening swelling or changes in the skin such as it becomes red or hot to the touch you can return to the emergency department.

## 2024-09-10 NOTE — TELEPHONE ENCOUNTER
----- Message from Trina Fan sent at 1/21/2019 11:47 AM CST -----  Contact: [phone]  Patient's daughter  stated in order for patient to get on plane with insulin and syringe patient need a letter from MD stating why she need medication  . Please call and advise, Thanks      Follows with [facility] and Neurologist Dr. Peters  Step down progression with most recent stroke. Worse short term memory per family  Spouse reports that over the past several months patient has been more forgetful with regards to medications and not able   Likely vascular dementia

## 2024-09-13 ENCOUNTER — TELEPHONE (OUTPATIENT)
Dept: RHEUMATOLOGY | Facility: CLINIC | Age: 69
End: 2024-09-13
Payer: MEDICARE

## 2024-09-13 NOTE — TELEPHONE ENCOUNTER
----- Message from Carlita Don sent at 9/13/2024 12:31 PM CDT -----  Type:  Patient Returning Call    Who Called:Pt   Would the patient rather a call back or a response via [facility]? Callback   Best Call Back Number: [phone]  Additional Information: forgot she had and appointment and wants to reschedule

## 2024-09-25 DIAGNOSIS — Z00.00 ENCOUNTER FOR MEDICARE ANNUAL WELLNESS EXAM: ICD-10-CM

## 2024-09-27 ENCOUNTER — TELEPHONE (OUTPATIENT)
Dept: OBSTETRICS AND GYNECOLOGY | Facility: CLINIC | Age: 69
End: 2024-09-27
Payer: MEDICARE

## 2024-09-27 ENCOUNTER — TELEPHONE (OUTPATIENT)
Dept: RHEUMATOLOGY | Facility: CLINIC | Age: 69
End: 2024-09-27
Payer: MEDICARE

## 2024-09-27 NOTE — TELEPHONE ENCOUNTER
----- Message from Mary Hong sent at 9/27/2024  3:06 PM CDT -----  Appointment Request    Name of Caller:PATRICIO TORRES [[id number]]  Symptoms or reason for appointment:reschedule   Best Call Back Number:[phone]  Additional Information: Pt would like her appt rescheduled to a Thurs or Fri if possible

## 2024-09-27 NOTE — TELEPHONE ENCOUNTER
Spoke with pt in regards to rescheduling appt, informed pt that provider is only in on Mondays. Pt verbalized understanding.

## 2024-09-27 NOTE — TELEPHONE ENCOUNTER
----- Message from Mary Hong sent at 9/27/2024  3:02 PM CDT -----  Appointment Request    Name of Caller:Sunitha  Symptoms or reason for appointment:SUSI  Best Call Back Number:[phone]  Additional Information: Pt missed her appt, states she was unaware it had been rescheduled.

## 2024-09-27 NOTE — TELEPHONE ENCOUNTER
----- Message from Mary Hong sent at 9/27/2024  3:06 PM CDT -----  Appointment Request    Name of Caller:PATRICIO TORRES [[id number]]  Symptoms or reason for appointment:reschedule   Best Call Back Number:[phone]  Additional Information: Pt would like her appt rescheduled to a Thurs or Fri if possible

## 2024-10-16 ENCOUNTER — PATIENT MESSAGE (OUTPATIENT)
Dept: ADMINISTRATIVE | Facility: HOSPITAL | Age: 69
End: 2024-10-16
Payer: MEDICARE

## 2024-10-18 ENCOUNTER — HOSPITAL ENCOUNTER (OUTPATIENT)
Dept: RADIOLOGY | Facility: HOSPITAL | Age: 69
Discharge: HOME | End: 2024-10-18
Attending: PHYSICIAN ASSISTANT
Payer: MEDICARE

## 2024-10-18 ENCOUNTER — NURSE TRIAGE (OUTPATIENT)
Dept: ADMINISTRATIVE | Facility: CLINIC | Age: 69
End: 2024-10-18
Payer: MEDICARE

## 2024-10-18 ENCOUNTER — OFFICE VISIT (OUTPATIENT)
Dept: INTERNAL MEDICINE | Facility: CLINIC | Age: 69
End: 2024-10-18
Payer: MEDICARE

## 2024-10-18 VITALS
SYSTOLIC BLOOD PRESSURE: 126 MMHG | DIASTOLIC BLOOD PRESSURE: 76 MMHG | HEIGHT: 64 IN | OXYGEN SATURATION: 98 % | HEART RATE: 75 BPM | WEIGHT: 168.19 LBS | BODY MASS INDEX: 28.71 KG/M2

## 2024-10-18 DIAGNOSIS — F43.23 ADJUSTMENT DISORDER WITH MIXED ANXIETY AND DEPRESSED MOOD: ICD-10-CM

## 2024-10-18 DIAGNOSIS — M05.79 RHEUMATOID ARTHRITIS INVOLVING MULTIPLE SITES WITH POSITIVE RHEUMATOID FACTOR: Primary | ICD-10-CM

## 2024-10-18 DIAGNOSIS — M05.79 RHEUMATOID ARTHRITIS INVOLVING MULTIPLE SITES WITH POSITIVE RHEUMATOID FACTOR: ICD-10-CM

## 2024-10-18 DIAGNOSIS — M25.562 CHRONIC PAIN OF LEFT KNEE: ICD-10-CM

## 2024-10-18 DIAGNOSIS — Z79.899 MEDICATION MANAGEMENT: ICD-10-CM

## 2024-10-18 DIAGNOSIS — G89.29 CHRONIC PAIN OF LEFT KNEE: ICD-10-CM

## 2024-10-18 DIAGNOSIS — I10 ESSENTIAL HYPERTENSION: Chronic | ICD-10-CM

## 2024-10-18 PROCEDURE — 3078F DIAST BP <80 MM HG: CPT | Mod: HCNC,CPTII,S$GLB, | Performed by: PHYSICIAN ASSISTANT

## 2024-10-18 PROCEDURE — 73562 X-RAY EXAM OF KNEE 3: CPT | Mod: 26,50,HCNC, | Performed by: RADIOLOGY

## 2024-10-18 PROCEDURE — 3074F SYST BP LT 130 MM HG: CPT | Mod: HCNC,CPTII,S$GLB, | Performed by: PHYSICIAN ASSISTANT

## 2024-10-18 PROCEDURE — 1101F PT FALLS ASSESS-DOCD LE1/YR: CPT | Mod: HCNC,CPTII,S$GLB, | Performed by: PHYSICIAN ASSISTANT

## 2024-10-18 PROCEDURE — 4010F ACE/ARB THERAPY RXD/TAKEN: CPT | Mod: HCNC,CPTII,S$GLB, | Performed by: PHYSICIAN ASSISTANT

## 2024-10-18 PROCEDURE — 3066F NEPHROPATHY DOC TX: CPT | Mod: HCNC,CPTII,S$GLB, | Performed by: PHYSICIAN ASSISTANT

## 2024-10-18 PROCEDURE — 1159F MED LIST DOCD IN RCRD: CPT | Mod: HCNC,CPTII,S$GLB, | Performed by: PHYSICIAN ASSISTANT

## 2024-10-18 PROCEDURE — 99214 OFFICE O/P EST MOD 30 MIN: CPT | Mod: HCNC,S$GLB,, | Performed by: PHYSICIAN ASSISTANT

## 2024-10-18 PROCEDURE — 99999 PR PBB SHADOW E&M-EST. PATIENT-LVL V: CPT | Mod: PBBFAC,HCNC,, | Performed by: PHYSICIAN ASSISTANT

## 2024-10-18 PROCEDURE — 3060F POS MICROALBUMINURIA REV: CPT | Mod: HCNC,CPTII,S$GLB, | Performed by: PHYSICIAN ASSISTANT

## 2024-10-18 PROCEDURE — 3044F HG A1C LEVEL LT 7.0%: CPT | Mod: HCNC,CPTII,S$GLB, | Performed by: PHYSICIAN ASSISTANT

## 2024-10-18 PROCEDURE — 73562 X-RAY EXAM OF KNEE 3: CPT | Mod: TC,50,HCNC

## 2024-10-18 PROCEDURE — 1160F RVW MEDS BY RX/DR IN RCRD: CPT | Mod: HCNC,CPTII,S$GLB, | Performed by: PHYSICIAN ASSISTANT

## 2024-10-18 PROCEDURE — 1125F AMNT PAIN NOTED PAIN PRSNT: CPT | Mod: HCNC,CPTII,S$GLB, | Performed by: PHYSICIAN ASSISTANT

## 2024-10-18 PROCEDURE — 3008F BODY MASS INDEX DOCD: CPT | Mod: HCNC,CPTII,S$GLB, | Performed by: PHYSICIAN ASSISTANT

## 2024-10-18 PROCEDURE — 3288F FALL RISK ASSESSMENT DOCD: CPT | Mod: HCNC,CPTII,S$GLB, | Performed by: PHYSICIAN ASSISTANT

## 2024-10-18 NOTE — PROGRESS NOTES
"Subjective     Patient ID: Sunitha Pillai is a 69 y.o. female.    Chief Complaint: Joint Swelling    HPI  Established pt of Patty Louis MD (new to me)    Same day/urgent care appt    Here with concern of right knee pain and swelling, chronic recurrent. This flare started about 1 month ago. She is able to bear weight. No injury or activity change. No relieving measures tried. Inquires about repeat knee injection (last about 6-8 mo ago, helped significantly)    Has questions about her medications, brings all bottles for review    RA: followed by rheum, no current pharmacotherapy,  unable to start Humira 2/2 cost. Freq ED visits for RA flares, treated with steroid burst, last about 1 mo ago. Due for f/u with Rheum    Past Medical History:   Diagnosis Date    Acute coronary syndrome 09/23/2018    Adjustment disorder     Anxiety     Arthritis     Cancer of breast 09/03/2020    Malignant neoplasm of lower-inner quadrant of left breast in female, estrogen receptor positive    Cholelithiasis with acute cholecystitis 03/05/2021    Coronary artery disease     Depression     Diabetes mellitus type I     Genetic testing of female 09/2020    Minerva via [employer] with AXIN2 and POLE variants of uncertain significance    GERD (gastroesophageal reflux disease)     Hepatitis B core antibody positive 3/9/2023    Negative sAg, suggests previous exposure but no chronic/active Hep B. At risk for reactivation with any immunosuppression medication, steroids, chemo, etc.      Hypertension     Vertigo 05/13/2019    Vitamin D deficiency 02/07/2021     Social History     Tobacco Use    Smoking status: Never    Smokeless tobacco: Never   Substance Use Topics    Alcohol use: Never    Drug use: No     Review of patient's allergies indicates:  No Known Allergies      Review of Systems       Objective  /76 (BP Location: Left arm, Patient Position: Sitting)   Pulse 75   Ht 5' 4" (1.626 m)   Wt 76.3 kg (168 lb 3.4 oz) "   LMP  (LMP Unknown)   SpO2 98%   BMI 28.87 kg/m²       Physical Exam  Vitals reviewed.   Constitutional:       General: She is not in acute distress.     Appearance: She is well-developed.   HENT:      Head: Normocephalic and atraumatic.   Cardiovascular:      Rate and Rhythm: Normal rate and regular rhythm.      Heart sounds: No murmur heard.  Pulmonary:      Effort: Pulmonary effort is normal.      Breath sounds: Normal breath sounds. No wheezing or rales.   Musculoskeletal:      Left knee: Swelling (mild) present. No erythema, ecchymosis, bony tenderness or crepitus. Normal range of motion. Tenderness present over the medial joint line.      Right lower leg: No edema.      Left lower leg: No edema.   Skin:     General: Skin is warm and dry.      Findings: No rash.   Neurological:      Mental Status: She is alert.   Psychiatric:         Mood and Affect: Mood normal.            Assessment and Plan     1. Rheumatoid arthritis involving multiple sites with positive rheumatoid factor  Advised on need for Rheum f/I (will msg staff, pt reports difficult with appt)  -     X-Ray Knee 3 View Bilateral; Future; Expected date: 10/18/2024    2. Chronic pain of left knee  Referred to Ortho for consideration of repeat joint injection  Elevated, ice, diclofenac topical prn  -     Ambulatory referral/consult to Orthopedics; Future; Expected date: 10/25/2024  -     X-Ray Knee 3 View Bilateral; Future; Expected date: 10/18/2024    3. Essential hypertension  Meds reviewed  At goal, continue amlodipine 5mg    4. Medication management  Medication bottles/indications and reconciliation completed    5. Adjustment disorder with mixed anxiety and depressed mood  Stable, continue Cymbalta     Future Appointments   Date Time Provider Department Center   11/15/2024  2:30 PM Meño Ivy III, PA-C [facility] SPORTS [place]   11/18/2024  9:00 AM Hank Doran MD [place] [id number]   11/25/2024  9:00 AM JANELLE ARVIZUO2 JANELLE PFEIFFER  Frankie Ott   11/25/2024 10:00 AM Giovany Mcduffie MD [place] [id number] [address]

## 2024-10-18 NOTE — Clinical Note
Pt needs assistance with Rheum f/u, pt reports never starting Humira 2/2 cost, freq ED visit for RA glares. Thanks for your help.   [facility]

## 2024-10-18 NOTE — TELEPHONE ENCOUNTER
Pt was trying to make an appt with dr. Louis. Having leg and knee swelling/pain. Also seems like her other knee is trying to swell as well. Swelling is to left knee and underneath her foot. Rates current pain 7/10. Started about a week ago but getting worse. Has dealt with knee issues before, but has never also had pain swelling to the bottom of her foot as well. Pain extending to hip, but she feels unsure if that's from how she slept.     Dispo- go to office now. Appt sched within timeframe with primary care. Care advice given. Pt VU.  Reason for Disposition   Thigh or calf pain and only 1 side and present > 1 hour    Additional Information   Negative: Sounds like a life-threatening emergency to the triager   Negative: Swollen knee joint and fever   Negative: Patient sounds very sick or weak to the triager   Negative: Can't move swollen joint at all    Protocols used: Knee Pain-A-[place]

## 2024-10-20 PROBLEM — R07.9 CHEST PAIN, RULE OUT ACUTE MYOCARDIAL INFARCTION: Status: RESOLVED | Noted: 2020-01-04 | Resolved: 2024-10-20

## 2024-10-21 ENCOUNTER — TELEPHONE (OUTPATIENT)
Dept: RHEUMATOLOGY | Facility: CLINIC | Age: 69
End: 2024-10-21
Payer: MEDICARE

## 2024-10-21 NOTE — TELEPHONE ENCOUNTER
----- Message from Elif sent at 10/21/2024  9:14 AM CDT -----  Who Called:   Pt  Who Left Message for Patient:  Luz Elena Cruz  Does the patient know what this is regarding?:   yes  Would the patient rather a call back or a response via [facility]?  Call back     Best Call Back Number:   Telephone Information:  Mobile          [phone]    Additional Information:     Thank you.

## 2024-10-31 RX ORDER — METFORMIN HYDROCHLORIDE 1000 MG/1
TABLET ORAL
Qty: 90 TABLET | Refills: 1 | Status: SHIPPED | OUTPATIENT
Start: 2024-10-31

## 2024-11-13 ENCOUNTER — PATIENT MESSAGE (OUTPATIENT)
Dept: SPORTS MEDICINE | Facility: CLINIC | Age: 69
End: 2024-11-13
Payer: MEDICARE

## 2024-11-13 ENCOUNTER — TELEPHONE (OUTPATIENT)
Dept: SPORTS MEDICINE | Facility: CLINIC | Age: 69
End: 2024-11-13
Payer: MEDICARE

## 2024-11-13 NOTE — TELEPHONE ENCOUNTER
----- Message from Jenaro sent at 11/13/2024  1:53 PM CST -----  Type:  Sooner Apoointment Request    Caller is requesting a sooner appointment.  Name of Caller:pt   When is the first available appointment?3370  Symptoms:right knee pain   Would the patient rather a call back or a response via [facility]? Call   Best Call Back Number: [phone]  Additional Information: pt needs an appt at  4 if possible

## 2024-11-18 ENCOUNTER — HOSPITAL ENCOUNTER (OUTPATIENT)
Dept: RADIOLOGY | Facility: HOSPITAL | Age: 69
Discharge: HOME | End: 2024-11-18
Attending: PHYSICIAN ASSISTANT
Payer: MEDICARE

## 2024-11-18 ENCOUNTER — OFFICE VISIT (OUTPATIENT)
Dept: OBSTETRICS AND GYNECOLOGY | Facility: CLINIC | Age: 69
End: 2024-11-18
Payer: MEDICARE

## 2024-11-18 ENCOUNTER — OFFICE VISIT (OUTPATIENT)
Dept: SPORTS MEDICINE | Facility: CLINIC | Age: 69
End: 2024-11-18
Payer: MEDICARE

## 2024-11-18 VITALS
SYSTOLIC BLOOD PRESSURE: 114 MMHG | DIASTOLIC BLOOD PRESSURE: 68 MMHG | BODY MASS INDEX: 27.85 KG/M2 | WEIGHT: 162.25 LBS

## 2024-11-18 VITALS
WEIGHT: 163.56 LBS | HEART RATE: 87 BPM | BODY MASS INDEX: 27.92 KG/M2 | HEIGHT: 64 IN | SYSTOLIC BLOOD PRESSURE: 133 MMHG | DIASTOLIC BLOOD PRESSURE: 78 MMHG

## 2024-11-18 DIAGNOSIS — G89.29 CHRONIC PAIN OF LEFT KNEE: Primary | ICD-10-CM

## 2024-11-18 DIAGNOSIS — M25.562 CHRONIC PAIN OF LEFT KNEE: ICD-10-CM

## 2024-11-18 DIAGNOSIS — R93.89 THICKENED ENDOMETRIUM: ICD-10-CM

## 2024-11-18 DIAGNOSIS — M25.562 CHRONIC PAIN OF LEFT KNEE: Primary | ICD-10-CM

## 2024-11-18 DIAGNOSIS — M17.12 OSTEOARTHRITIS OF LEFT KNEE, UNSPECIFIED OSTEOARTHRITIS TYPE: ICD-10-CM

## 2024-11-18 DIAGNOSIS — M25.462 EFFUSION OF LEFT KNEE JOINT: ICD-10-CM

## 2024-11-18 DIAGNOSIS — G89.29 CHRONIC PAIN OF LEFT KNEE: ICD-10-CM

## 2024-11-18 PROCEDURE — 73564 X-RAY EXAM KNEE 4 OR MORE: CPT | Mod: TC,50,HCNC

## 2024-11-18 PROCEDURE — 3078F DIAST BP <80 MM HG: CPT | Mod: HCNC,CPTII,S$GLB, | Performed by: PHYSICIAN ASSISTANT

## 2024-11-18 PROCEDURE — 3060F POS MICROALBUMINURIA REV: CPT | Mod: HCNC,CPTII,S$GLB, | Performed by: OBSTETRICS & GYNECOLOGY

## 2024-11-18 PROCEDURE — 1159F MED LIST DOCD IN RCRD: CPT | Mod: HCNC,CPTII,S$GLB, | Performed by: PHYSICIAN ASSISTANT

## 2024-11-18 PROCEDURE — 4010F ACE/ARB THERAPY RXD/TAKEN: CPT | Mod: HCNC,CPTII,S$GLB, | Performed by: PHYSICIAN ASSISTANT

## 2024-11-18 PROCEDURE — 1160F RVW MEDS BY RX/DR IN RCRD: CPT | Mod: HCNC,CPTII,S$GLB, | Performed by: PHYSICIAN ASSISTANT

## 2024-11-18 PROCEDURE — 1101F PT FALLS ASSESS-DOCD LE1/YR: CPT | Mod: HCNC,CPTII,S$GLB, | Performed by: PHYSICIAN ASSISTANT

## 2024-11-18 PROCEDURE — 1159F MED LIST DOCD IN RCRD: CPT | Mod: HCNC,CPTII,S$GLB, | Performed by: OBSTETRICS & GYNECOLOGY

## 2024-11-18 PROCEDURE — 20610 DRAIN/INJ JOINT/BURSA W/O US: CPT | Mod: HCNC,LT,S$GLB, | Performed by: PHYSICIAN ASSISTANT

## 2024-11-18 PROCEDURE — 1125F AMNT PAIN NOTED PAIN PRSNT: CPT | Mod: HCNC,CPTII,S$GLB, | Performed by: OBSTETRICS & GYNECOLOGY

## 2024-11-18 PROCEDURE — 3066F NEPHROPATHY DOC TX: CPT | Mod: HCNC,CPTII,S$GLB, | Performed by: PHYSICIAN ASSISTANT

## 2024-11-18 PROCEDURE — 3288F FALL RISK ASSESSMENT DOCD: CPT | Mod: HCNC,CPTII,S$GLB, | Performed by: PHYSICIAN ASSISTANT

## 2024-11-18 PROCEDURE — 99214 OFFICE O/P EST MOD 30 MIN: CPT | Mod: 57,HCNC,S$GLB, | Performed by: OBSTETRICS & GYNECOLOGY

## 2024-11-18 PROCEDURE — 99214 OFFICE O/P EST MOD 30 MIN: CPT | Mod: 25,HCNC,S$GLB, | Performed by: PHYSICIAN ASSISTANT

## 2024-11-18 PROCEDURE — 3060F POS MICROALBUMINURIA REV: CPT | Mod: HCNC,CPTII,S$GLB, | Performed by: PHYSICIAN ASSISTANT

## 2024-11-18 PROCEDURE — 3008F BODY MASS INDEX DOCD: CPT | Mod: HCNC,CPTII,S$GLB, | Performed by: OBSTETRICS & GYNECOLOGY

## 2024-11-18 PROCEDURE — 99999 PR PBB SHADOW E&M-EST. PATIENT-LVL V: CPT | Mod: PBBFAC,HCNC,, | Performed by: OBSTETRICS & GYNECOLOGY

## 2024-11-18 PROCEDURE — 1125F AMNT PAIN NOTED PAIN PRSNT: CPT | Mod: HCNC,CPTII,S$GLB, | Performed by: PHYSICIAN ASSISTANT

## 2024-11-18 PROCEDURE — 99999 PR PBB SHADOW E&M-EST. PATIENT-LVL V: CPT | Mod: PBBFAC,HCNC,, | Performed by: PHYSICIAN ASSISTANT

## 2024-11-18 PROCEDURE — 3008F BODY MASS INDEX DOCD: CPT | Mod: HCNC,CPTII,S$GLB, | Performed by: PHYSICIAN ASSISTANT

## 2024-11-18 PROCEDURE — 73564 X-RAY EXAM KNEE 4 OR MORE: CPT | Mod: 26,50,HCNC, | Performed by: RADIOLOGY

## 2024-11-18 PROCEDURE — 3074F SYST BP LT 130 MM HG: CPT | Mod: HCNC,CPTII,S$GLB, | Performed by: OBSTETRICS & GYNECOLOGY

## 2024-11-18 PROCEDURE — 3044F HG A1C LEVEL LT 7.0%: CPT | Mod: HCNC,CPTII,S$GLB, | Performed by: OBSTETRICS & GYNECOLOGY

## 2024-11-18 PROCEDURE — 4010F ACE/ARB THERAPY RXD/TAKEN: CPT | Mod: HCNC,CPTII,S$GLB, | Performed by: OBSTETRICS & GYNECOLOGY

## 2024-11-18 PROCEDURE — 3075F SYST BP GE 130 - 139MM HG: CPT | Mod: HCNC,CPTII,S$GLB, | Performed by: PHYSICIAN ASSISTANT

## 2024-11-18 PROCEDURE — 3044F HG A1C LEVEL LT 7.0%: CPT | Mod: HCNC,CPTII,S$GLB, | Performed by: PHYSICIAN ASSISTANT

## 2024-11-18 PROCEDURE — 3066F NEPHROPATHY DOC TX: CPT | Mod: HCNC,CPTII,S$GLB, | Performed by: OBSTETRICS & GYNECOLOGY

## 2024-11-18 PROCEDURE — 3078F DIAST BP <80 MM HG: CPT | Mod: HCNC,CPTII,S$GLB, | Performed by: OBSTETRICS & GYNECOLOGY

## 2024-11-18 RX ORDER — TRIAMCINOLONE ACETONIDE 40 MG/ML
40 INJECTION, SUSPENSION INTRA-ARTICULAR; INTRAMUSCULAR
Status: COMPLETED | OUTPATIENT
Start: 2024-11-18 | End: 2024-11-18

## 2024-11-18 RX ORDER — SODIUM CHLORIDE 9 MG/ML
INJECTION, SOLUTION INTRAVENOUS CONTINUOUS
OUTPATIENT
Start: 2024-11-18

## 2024-11-18 RX ORDER — FAMOTIDINE 20 MG/1
20 TABLET, FILM COATED ORAL
OUTPATIENT
Start: 2024-11-18

## 2024-11-18 RX ORDER — MUPIROCIN 20 MG/G
OINTMENT TOPICAL
OUTPATIENT
Start: 2024-11-18

## 2024-11-18 RX ADMIN — TRIAMCINOLONE ACETONIDE 40 MG: 40 INJECTION, SUSPENSION INTRA-ARTICULAR; INTRAMUSCULAR at 02:11

## 2024-11-18 NOTE — PROGRESS NOTES
Subjective     Patient ID: Sunitha Pillai is a 69 y.o. female.    Chief Complaint:  Endometriosis      History of Present Illness  HPI  Pt is 69 y.o. here in follow up due to thickened endometrium seen on last pelvic us as follows:    EXAMINATION:  US PELVIS COMP WITH TRANSVAG NON-OB (XPD)     CLINICAL HISTORY:  Abnormal findings on diagnostic imaging of other specified body structures     TECHNIQUE:  Transabdominal sonography of the pelvis was performed, followed by transvaginal sonography to better evaluate the uterus and ovaries.     COMPARISON:  12/15/2023     FINDINGS:  Uterus:     Size: 6.3 x 3.5 x 4.4 cm     Masses: None     Endometrium: Thickened in this post menopausal patient, measuring 15 mm.  With cystic region measuring 0.4 x 0.4 x 0.4 cm the level of the endometrium.  Endometrial hyperplasia/cystic hyperplasia can have this appearance.  Endometrial carcinoma not excluded.  Gynecologic evaluation recommended.     Right ovary:     Size: 1.0 x 0.9 x 0.8 cm     Appearance: Normal     Vascular flow: Normal.     Left ovary:     Size: 1.6 x 1.1 x 1.0 cm     Appearance: Normal     Vascular Flow: Normal.     Free Fluid:     None.     Impression:     Heterogeneous thickening of the endometrium up to 15 mm.  While hyperplasia could have this appearance, endometrial carcinoma not excluded.  Gynecologic evaluation recommended.       No post-menopausal bleeding.    GYN & OB History  No LMP recorded (lmp unknown). Patient is postmenopausal.   Date of Last Pap: 2023    OB History    Para Term  AB Living   3 3 3     3   SAB IAB Ectopic Multiple Live Births           3      # Outcome Date GA Lbr Jay/2nd Weight Sex Type Anes PTL Lv   3 Term      CS-LTranv  N BRITTANY   2 Term      CS-LTranv  N BRITTANY   1 Term      CS-LTranv  N BRITTANY       Review of Systems  Review of Systems   Constitutional:  Negative for activity change, appetite change and fatigue.   HENT: Negative.  Negative for tinnitus.    Eyes:  Negative.    Respiratory:  Negative for cough and shortness of breath.    Cardiovascular:  Negative for chest pain and palpitations.   Gastrointestinal: Negative.  Negative for abdominal pain, blood in stool, constipation, diarrhea and nausea.   Endocrine: Negative.  Negative for hot flashes.   Genitourinary:  Negative for dysmenorrhea, dyspareunia, dysuria, frequency, menstrual problem, pelvic pain, vaginal discharge, urinary incontinence and postcoital bleeding.   Musculoskeletal:  Negative for back pain and joint swelling.   Integumentary:  Negative for rash, breast mass, nipple discharge and breast skin changes.   Neurological: Negative.  Negative for headaches.   Hematological: Negative.  Does not bruise/bleed easily.   Psychiatric/Behavioral:  The patient is not nervous/anxious.    Breast: negative.  Negative for mass, nipple discharge and skin changes         Objective   Physical Exam    Def (pt's knees and feet are hurting very bad today)     Assessment and Plan     1. Thickened endometrium             Plan:  Sunitha was seen today for endometriosis.    Diagnoses and all orders for this visit:    Thickened endometrium  -     Ambulatory referral/consult to Gynecology  -     Case Request Operating Room: HYSTEROSCOPY, WITH DILATION AND CURETTAGE OF UTERUS  -     Full code; Standing  -     Vital signs; Standing  -     Insert peripheral IV; Standing  -     POCT glucose; Standing  -     Notify physician if BS > 180 for hysterectomy patients; Standing  -     Chlorhexidine (CHG) 2% Wipes; Standing  -     Notify Physician/Vital Signs Parameters Urine output less than 0.5mL/kg/hr (with indwelling catheter) or 30 mL/hr (without indwelling catheter) or blood glucose greater than 200 mg/dL; Standing  -     Notify physician; Standing  -     Notify Physician - Potential Need of Opioid Reversal; Standing  -     Diet NPO; Standing  -     Place sequential compression device; Standing  -     Chlorohexidine Gluconate Bath;  Standing  -     Place in Outpatient; Standing  -     Bed rest with bathroom privileges; Standing    Other orders  -     0.9%  NaCl infusion  -     IP VTE HIGH RISK PATIENT; Standing  -     mupirocin 2 % ointment  -     famotidine tablet 20 mg      Factual information regarding the medical condition and the need for HSC D&C was discussed with the patient, who verbalized understanding. The therapeutic rationale, benefits, risks and potential complications were discussed, including the possibility of pain, bleeding, infection, loss of function, disfigurement, death or other unforeseen complications. Alternatives to the procedure were discussed (including potential risks, complications and benefits of each). No guarantee was expressed or implied as to the results of the procedure. Informed consent was given, as well as a request to proceed.    Didn't attempt EMBx b/c pt was too uncomfortable to get into the stirrups today.               [No Gallop] : no gallop [Normal Rate] : normal [Normal S1] : normal S1 [Normal S2] : normal S2 [II] : a grade 2 [No Pitting Edema] : no pitting edema present [2+] : left 2+ [No Abnormalities] : the abdominal aorta was not enlarged and no bruit was heard [Soft] : abdomen soft [Non Tender] : non-tender [Normal Radial B/L] : normal radial B/L [Normal PT B/L] : normal PT B/L [Normal DP B/L] : normal DP B/L [Normal] : no rash, no skin lesions [S3] : no S3 [S4] : no S4 [Right Carotid Bruit] : no bruit heard over the right carotid [Left Carotid Bruit] : no bruit heard over the left carotid [Right Femoral Bruit] : no bruit heard over the right femoral artery [Left Femoral Bruit] : no bruit heard over the left femoral artery

## 2024-11-18 NOTE — LETTER
November 18, 2024    Sunitha Pillai  [address] [facility] [address]         [facility]  [address] [address]  Phone: [phone]  Fax: [phone] November 18, 2024     Patient: Sunitha Pillai   YOB: 1955   Date of Visit: 11/18/2024       To Whom It May Concern:    It is my medical opinion that Sunitha Pillai  will need a minor surgical procedure on Monday, December 2nd and will be out for the day at least.  The surgery is tentatively scheduled for the morning. .    If you have any questions or concerns, please don't hesitate to call.    Sincerely,        Hank Doran MD

## 2024-11-18 NOTE — H&P (VIEW-ONLY)
Subjective     Patient ID: Sunitha Pillai is a 69 y.o. female.    Chief Complaint:  Endometriosis      History of Present Illness  HPI  Pt is 69 y.o. here in follow up due to thickened endometrium seen on last pelvic us as follows:    EXAMINATION:  US PELVIS COMP WITH TRANSVAG NON-OB (XPD)     CLINICAL HISTORY:  Abnormal findings on diagnostic imaging of other specified body structures     TECHNIQUE:  Transabdominal sonography of the pelvis was performed, followed by transvaginal sonography to better evaluate the uterus and ovaries.     COMPARISON:  12/15/2023     FINDINGS:  Uterus:     Size: 6.3 x 3.5 x 4.4 cm     Masses: None     Endometrium: Thickened in this post menopausal patient, measuring 15 mm.  With cystic region measuring 0.4 x 0.4 x 0.4 cm the level of the endometrium.  Endometrial hyperplasia/cystic hyperplasia can have this appearance.  Endometrial carcinoma not excluded.  Gynecologic evaluation recommended.     Right ovary:     Size: 1.0 x 0.9 x 0.8 cm     Appearance: Normal     Vascular flow: Normal.     Left ovary:     Size: 1.6 x 1.1 x 1.0 cm     Appearance: Normal     Vascular Flow: Normal.     Free Fluid:     None.     Impression:     Heterogeneous thickening of the endometrium up to 15 mm.  While hyperplasia could have this appearance, endometrial carcinoma not excluded.  Gynecologic evaluation recommended.       No post-menopausal bleeding.    GYN & OB History  No LMP recorded (lmp unknown). Patient is postmenopausal.   Date of Last Pap: 2023    OB History    Para Term  AB Living   3 3 3     3   SAB IAB Ectopic Multiple Live Births           3      # Outcome Date GA Lbr Jay/2nd Weight Sex Type Anes PTL Lv   3 Term      CS-LTranv  N BRITTANY   2 Term      CS-LTranv  N BRITTANY   1 Term      CS-LTranv  N BRITTANY       Review of Systems  Review of Systems   Constitutional:  Negative for activity change, appetite change and fatigue.   HENT: Negative.  Negative for tinnitus.    Eyes:  Negative.    Respiratory:  Negative for cough and shortness of breath.    Cardiovascular:  Negative for chest pain and palpitations.   Gastrointestinal: Negative.  Negative for abdominal pain, blood in stool, constipation, diarrhea and nausea.   Endocrine: Negative.  Negative for hot flashes.   Genitourinary:  Negative for dysmenorrhea, dyspareunia, dysuria, frequency, menstrual problem, pelvic pain, vaginal discharge, urinary incontinence and postcoital bleeding.   Musculoskeletal:  Negative for back pain and joint swelling.   Integumentary:  Negative for rash, breast mass, nipple discharge and breast skin changes.   Neurological: Negative.  Negative for headaches.   Hematological: Negative.  Does not bruise/bleed easily.   Psychiatric/Behavioral:  The patient is not nervous/anxious.    Breast: negative.  Negative for mass, nipple discharge and skin changes         Objective   Physical Exam    Def (pt's knees and feet are hurting very bad today)     Assessment and Plan     1. Thickened endometrium             Plan:  Sunitha was seen today for endometriosis.    Diagnoses and all orders for this visit:    Thickened endometrium  -     Ambulatory referral/consult to Gynecology  -     Case Request Operating Room: HYSTEROSCOPY, WITH DILATION AND CURETTAGE OF UTERUS  -     Full code; Standing  -     Vital signs; Standing  -     Insert peripheral IV; Standing  -     POCT glucose; Standing  -     Notify physician if BS > 180 for hysterectomy patients; Standing  -     Chlorhexidine (CHG) 2% Wipes; Standing  -     Notify Physician/Vital Signs Parameters Urine output less than 0.5mL/kg/hr (with indwelling catheter) or 30 mL/hr (without indwelling catheter) or blood glucose greater than 200 mg/dL; Standing  -     Notify physician; Standing  -     Notify Physician - Potential Need of Opioid Reversal; Standing  -     Diet NPO; Standing  -     Place sequential compression device; Standing  -     Chlorohexidine Gluconate Bath;  Standing  -     Place in Outpatient; Standing  -     Bed rest with bathroom privileges; Standing    Other orders  -     0.9%  NaCl infusion  -     IP VTE HIGH RISK PATIENT; Standing  -     mupirocin 2 % ointment  -     famotidine tablet 20 mg      Factual information regarding the medical condition and the need for HSC D&C was discussed with the patient, who verbalized understanding. The therapeutic rationale, benefits, risks and potential complications were discussed, including the possibility of pain, bleeding, infection, loss of function, disfigurement, death or other unforeseen complications. Alternatives to the procedure were discussed (including potential risks, complications and benefits of each). No guarantee was expressed or implied as to the results of the procedure. Informed consent was given, as well as a request to proceed.    Didn't attempt EMBx b/c pt was too uncomfortable to get into the stirrups today.

## 2024-11-19 ENCOUNTER — TELEPHONE (OUTPATIENT)
Dept: OBSTETRICS AND GYNECOLOGY | Facility: CLINIC | Age: 69
End: 2024-11-19
Payer: MEDICARE

## 2024-11-19 NOTE — PROGRESS NOTES
CC: left knee pain (referral from Dr. Torres)    69 y.o. Female  at [facility] who reports diffuse knee pain refractory to conservative mgmt. She has had bilateral knee pain flare ups and effusions previously The left knee recently flared up and has been hurting her over recent 2-3 weeks. No new injury or trauma. She received CSI and euflexxa injections with improvement to the knee in the past.     Pain in knees for years. Progressively worsening.   Last A1C was 5.5.     Pain is affecting ADLs.     No mechanical symptoms, no instability    She denies fever, chills, malaise.       Review of Systems   Constitution: Negative. Negative for chills, fever and night sweats.   HENT: Negative for congestion and headaches.    Eyes: Negative for blurred vision, left vision loss and right vision loss.   Cardiovascular: Negative for chest pain and syncope.   Respiratory: Negative for cough and shortness of breath.    Endocrine: Negative for polydipsia, polyphagia and polyuria.   Hematologic/Lymphatic: Negative for bleeding problem. Does not bruise/bleed easily.   Skin: Negative for dry skin, itching and rash.   Musculoskeletal: Negative for falls and muscle weakness.   Gastrointestinal: Negative for abdominal pain and bowel incontinence.   Genitourinary: Negative for bladder incontinence and nocturia.   Neurological: Negative for disturbances in coordination, loss of balance and seizures.   Psychiatric/Behavioral: Negative for depression. The patient does not have insomnia.    Allergic/Immunologic: Negative for hives and persistent infections.   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 At risk for reactivation with any immunosuppression medication, steroids, chemo, etc.      Hypertension     Vertigo 2019    Vitamin D deficiency 2021     PAST SURGICAL HISTORY:   Past Surgical History:   Procedure Laterality Date    BREAST BIOPSY Left 2022    Left punch biopsy; Unremarkable keratinized skin with intradermal hematoma     SECTION      COLONOSCOPY N/A 2024    Procedure: COLONOSCOPY;  Surgeon: PORTILLO Esqueda MD;  Location: [facility] (4TH FLR);  Service: Endoscopy;  Laterality: N/A;  ref by ,insrt sent via portal and giving to pt,states she not on any weight loss meds at this time, and not taking trulicity.sutab.AC  -lvm for pre call-tb-hx of constipation??  -peg, pt confirmed appt, see telephone encounter -Kpvt    INJECTION FOR SENTINEL NODE IDENTIFICATION Left 2020    Procedure: INJECTION, FOR SENTINEL NODE IDENTIFICATION;  Surgeon: Isabel Moulton MD;  Location: [facility] OR;  Service: General;  Laterality: Left;    INSERTION OF TUNNELED CENTRAL VENOUS CATHETER (CVC) WITH SUBCUTANEOUS PORT N/A 2020    Procedure: INSERTION, PORT-A-CATH;  Surgeon: Hardeep Martin MD;  Location: [facility] CATH LAB;  Service: Radiology;  Laterality: N/A;    LAPAROSCOPIC CHOLECYSTECTOMY N/A 2021    Procedure: CHOLECYSTECTOMY, LAPAROSCOPIC;  Surgeon: Buster Son MD;  Location: [facility] OR 2ND FLR;  Service: General;  Laterality: N/A;    MASTECTOMY, PARTIAL Left 2020    Procedure: MASTECTOMY, PARTIAL-w/reflector;  Surgeon: Isabel Moulton MD;  Location: [facility] OR;  Service: General;  Laterality: Left;     SENTINEL LYMPH NODE BIOPSY Left 09/17/2020    Procedure: BIOPSY, LYMPH NODE, SENTINEL;  Surgeon: Isabel Moulton MD;  Location: [facility] OR;  Service: General;  Laterality: Left;     FAMILY HISTORY:   Family History   Problem Relation Name Age of Onset    Hypertension Mother      Hyperlipidemia Mother      Diabetes Mother      Heart disease Mother      Colon polyps Mother      Aneurysm Father      Diabetes Sister 2     Hypertension Sister 2     Heart disease Brother 2     Diabetes Brother 2     Hypertension Brother 2     Cancer Brother 1         brother German with prostate cancer; brother Cooper with throat cancer    Cervical cancer Daughter Daughter1     Anxiety disorder Daughter Daughter2     Depression Daughter Daughter2     Anxiety disorder Daughter Daughter3     Depression Daughter Daughter3     Breast cancer Maternal Aunt Teresa     Cancer Maternal Aunt Nikki         unknown origin    Cancer Paternal Aunt Esperanza         unknown origin    Breast cancer Paternal Aunt Esperanza     Prostate cancer Maternal Cousin Chris     Leukemia Maternal Cousin Chris     Prostate cancer Maternal Cousin Nirmal     Breast cancer Other Pat 1/2aunt Quiana     Colon cancer Neg Hx      Ovarian cancer Neg Hx       SOCIAL HISTORY:   Social History     Socioeconomic History    Marital status:      Spouse name: Burak    Number of children: 3   Tobacco Use    Smoking status: Never    Smokeless tobacco: Never   Substance and Sexual Activity    Alcohol use: Never    Drug use: No    Sexual activity: Yes     Partners: Male     Birth control/protection: Post-menopausal     Social Drivers of Health     Financial Resource Strain: Medium Risk (4/6/2023)    Overall Financial Resource Strain (CARDIA)     Difficulty of Paying Living Expenses: Somewhat hard   Food Insecurity: No Food Insecurity (4/6/2023)    Hunger Vital Sign     Worried About Running Out of Food in the Last Year: Never true     Ran Out of Food in the Last Year: Never true  "  Transportation Needs: No Transportation Needs (4/6/2023)    PRAPARE - Transportation     Lack of Transportation (Medical): No     Lack of Transportation (Non-Medical): No   Physical Activity: Inactive (4/6/2023)    Exercise Vital Sign     Days of Exercise per Week: 0 days     Minutes of Exercise per Session: 0 min   Stress: No Stress Concern Present (4/6/2023)    [employer] - Occupational Stress Questionnaire     Feeling of Stress : Not at all   Housing Stability: Unknown (4/6/2023)    Housing Stability Vital Sign     Unable to Pay for Housing in the Last Year: No     Unstable Housing in the Last Year: No       MEDICATIONS:   Current Outpatient Medications:     acetaminophen (TYLENOL) 500 MG tablet, Take 500 mg by mouth every 8 (eight) hours as needed for Pain., Disp: , Rfl:     amLODIPine (NORVASC) 5 MG tablet, Take 1 tablet (5 mg total) by mouth once daily., Disp: 90 tablet, Rfl: 3    aspirin 81 mg Tab, Take 1 tablet by mouth Daily., Disp: , Rfl:     atorvastatin (LIPITOR) 10 MG tablet, Take 1 tablet by mouth once daily, Disp: 90 tablet, Rfl: 2    BD ULTRA-FINE SHORT PEN NEEDLE 31 gauge x 5/16" Ndle, USE 1  4 TIMES DAILY WITH  INSULIN, Disp: 100 each, Rfl: 3    blood sugar diagnostic Strp, Strips and lancets covered by insurance E11.9, pt checks glucose three times daily, insulin dependent, Disp: 300 each, Rfl: 3    blood sugar diagnostic Strp, Check glucose daily Strips and lancets covered by insurance E11.9, Disp: 100 each, Rfl: 3    blood sugar diagnostic, drum (ACCU-CHEK COMPACT TEST) Strp, USE ONE STRIP TO CHECK GLUCOSE 4 TIMES DAILY BEFORE EACH MEAL AND AT BEDTIME, Disp: 100 each, Rfl: 0    blood-glucose meter kit, Check glucose daily E11.9 meter covered by insurance, Disp: 1 each, Rfl: 0    DULoxetine (CYMBALTA) 20 MG capsule, Take 1 capsule (20 mg total) by mouth once daily., Disp: 30 capsule, Rfl: 11    letrozole (FEMARA) 2.5 mg Tab, Take 1 tablet by mouth once daily, Disp: " 90 tablet, Rfl: 3    metFORMIN (GLUCOPHAGE) 1000 MG tablet, TAKE 1 TABLET BY MOUTH ONCE DAILY WITH A MEAL, Disp: 90 tablet, Rfl: 1    RESTASIS 0.05 % ophthalmic emulsion, Place 1 drop into both eyes 2 (two) times daily., Disp: , Rfl:     topiramate (TOPAMAX) 25 MG tablet, One at bedtime for 2 weeks then two at bedtime, Disp: 60 tablet, Rfl: 2    ACCU-CHEK GUIDE GLUCOSE METER Misc, USE  THREE TIMES DAILY (Patient not taking: Reported on 10/18/2024), Disp: 1 each, Rfl: 0    adalimumab (HUMIRA,CF, PEN) 40 mg/0.4 mL PnKt, Inject 0.4 mLs (40 mg total) into the skin every 14 (fourteen) days. (Patient not taking: Reported on 10/18/2024), Disp: 2 pen , Rfl: 11    albuterol (VENTOLIN HFA) 90 mcg/actuation inhaler, Inhale 2 puffs into the lungs every 6 (six) hours as needed for Wheezing or Shortness of Breath. Rescue (Patient not taking: Reported on 10/18/2024), Disp: 6.7 g, Rfl: 0    capsaicin 0.1 % Crea, Apply 1 application  topically 2 (two) times daily as needed (pain). (Patient not taking: Reported on 10/18/2024), Disp: 42.5 g, Rfl: 0    diclofenac sodium (SOLARAZE) 3 % gel, Apply to affected area twice a day (Patient not taking: Reported on 10/18/2024), Disp: 100 g, Rfl: 0    lancets 30 gauge Misc, Check glucose daily, E11.9 (Patient not taking: Reported on 10/18/2024), Disp: 100 each, Rfl: 3    lancets Misc, To check BG 4 times daily, to use with insurance preferred meter, insulin dependent (Patient not taking: Reported on 10/18/2024), Disp: 400 each, Rfl: 3    LIDOcaine (LIDODERM) 5 %, Apply to affected area as needed for pain for 12 hours, then off for 12 hours. Discard after each use. May use 4% lidocaine patch as alternative. (Patient not taking: Reported on 10/18/2024), Disp: 30 patch, Rfl: 0    losartan (COZAAR) 100 MG tablet, Take 1 tablet (100 mg total) by mouth once daily., Disp: 90 tablet, Rfl: 3    tirzepatide (MOUNJARO) 2.5 mg/0.5 mL PnIj, Inject 2.5 mg into the skin every 7 days. (Patient not taking:  "Reported on 11/18/2024), Disp: 4 Pen, Rfl: 1    TRUEPLUS LANCETS 33 gauge [facility], USE 1 TO CHECK GLUCOSE 4 TIMES DAILY (Patient not taking: Reported on 10/18/2024), Disp: , Rfl:   No current facility-administered medications for this visit.    Facility-Administered Medications Ordered in Other Visits:     fentaNYL injection 25 mcg, 25 mcg, Intravenous, Q5 Min PRN, Kenneth Shannon MD    midazolam (VERSED) 1 mg/mL injection 0.5 mg, 0.5 mg, Intravenous, PRN, Kenneth Shannon MD, 2 mg at 09/17/20 0637  ALLERGIES: Review of patient's allergies indicates:  No Known Allergies    VITAL SIGNS: /78   Pulse 87   Ht 5' 4" (1.626 m)   Wt 74.2 kg (163 lb 9.3 oz)   LMP  (LMP Unknown)   BMI 28.08 kg/m²      PHYSICAL EXAMINATION    General:  The patient is alert and oriented x 3.  Mood is pleasant.  Observation of ears, eyes and nose reveal no gross abnormalities.  HEENT: NCAT, sclera nonicteric  Lungs: Respirations are equal and unlabored.    Left KNEE EXAMINATION     OBSERVATION / INSPECTION   Gait:   normal  Alignment:  Neutral   Scars:   None   Muscle atrophy: Mild  Effusion:  moderate  Warmth:  None   Discoloration:   none     TENDERNESS / CREPITUS (T / C):          T / C      T / C   Patella   - / -   Lateral joint line   -  / -      Peripatellar medial  +  Medial joint line    +  / -   Peripatellar lateral +  Medial plica   - / -   Patellar tendon -   Popliteal fossa  - / -   Quad tendon   -   Gastrocnemius   -   Prepatellar Bursa - / -   Quadricep   -   Tibial tubercle  -  Thigh/hamstring  -   Pes anserine/HS -  Fibula    -   ITB   - / -  Tibia     -   Tib/fib joint  - / -  LCL    -     MFC   - / -   MCL: Proximal  -    LFC   - / -    Distal   -          ROM: (* = pain)  PASSIVE   ACTIVE    Left :   0 / 0 / 120   0 / 0 / 120    Right :    0 / 0 / 120   0 / 0 / 120    Patellofemoral examination:  See above noted areas of tenderness.   Patella position    Subluxation / dislocation: Centered "           Sup. / Inf;   Normal   Crepitus (PF):    Absent   Patellar Mobility:       Medial-lateral:   Normal    Superior-inferior:  Normal    Inferior tilt   Normal    Patellar tendon:  Normal   Lateral tilt:    Normal   J-sign:     None   Patellofemoral grind:   none      MENISCAL SIGNS:     Pain on terminal extension:  +  Pain on terminal flexion:  +  Tgs maneuver:  -  Squat     NT    LIGAMENT EXAMINATION:  ACL / Lachman:  normal (-1 to 2mm)    PCL-Post.  drawer: normal 0 to 2mm  MCL- Valgus:  normal 0 to 2mm  LCL- Varus:  normal 0 to 2mm      STRENGTH: (* = with pain) PAINFUL SIDE   Quadricep   5/5   Hamstrin/5    EXTREMITY NEURO-VASCULAR EXAMINATION:   Sensation:  Grossly intact to light touch all dermatomal regions.   Motor Function:  Fully intact motor function at hip, knee, foot and ankle    DTRs;  quadriceps and  achilles 2+.  No clonus and downgoing Babinski.    Vascular status:  DP and PT pulses 2+, brisk capillary refill, symmetric.     Other Findings:       Xrays:  Xrays of the bilateral knees with flexion were ordered and reviewed by me today. No fracture, subluxation, or other significant bony or joint abnormality is identified. Moderate tricompartmental DJD of bilateral knees.   Kellgren Surjit 2 or greater noted.         ASSESSMENT:    1. Left Knee pain  2. Left knee osteoarthritis   3. Left knee effusion  Bilateral hip abd/core weakness    I have considered and do not suspect septic joint or infection at this time.     PLAN:    1. PROCEDURE NOTE:   left KNEE ASPIRATION and INJECTION  After consent was obtained, time out was performed, including verification of patient ID, procedure, site and side, availability of information and equipment, review of safety issues, and agreement with consent, the procedure site was marked and the patient was prepped aseptically.    Using a sterile technique the left knee was prepped from the superior lateral knee approach. The knee joint was  entered with a 18 gauge needle and 45 ml's of serous colored fluid was withdrawn.   The procedure was well tolerated.       A diagnostic and therapeutic injection of 1cc 40 mg kenalog and 1% lidocaine/0.25% bupivacaine was given under sterile technique using the same 18 gauge needle into the superior lateral knee site with patient in supine position.     The patient had no adverse reactions to the medication. Pain decreased. The patient was instructed to apply ice to the joint for 20 minutes and avoid strenuous activities for 24-36 hours following the injection. Patient was warned of possible blood sugar and/or blood pressure changes during that time. Following that time, patient can resume regular activities.  Watch for fever, or increased swelling or persistent pain in knee. Call or return to clinic prn if such symptoms occur or the knee fails to improve as anticipated.     2. Ice compresses prn pain   Compression sleeve given to wear for next few days.   3. Activity modifications discussed.   Return to activity as tolerate.   RTC if no improvement or worsening.     All patients questions were answered. Patient had opportunity and has no other questions or concerns at this time. Patient was advised to contact us if they have any further questions or if symptoms worsen, change, or new symptoms begin occurring. This could indicate that something different/worse is occurring that was not present at our time of discussion/evaluation.       I made the decision to obtain old records of the patient including previous notes and imaging. New imaging was ordered today of the extremity or extremities evaluated. I independently reviewed and interpreted the radiographs and/or MRIs today as well as prior imaging.     .

## 2024-11-19 NOTE — TELEPHONE ENCOUNTER
----- Message from Tasha sent at 11/19/2024 11:51 AM CST -----  Patient called stating she received a call from pre-op to schedule her surgery in January. Patient says Dr. Doran gave her December to schedule her appt. She would like a call back. Patient says she would rather do December because her job has already approved her time for December. She would like  a call back at [phone]

## 2024-11-25 ENCOUNTER — OFFICE VISIT (OUTPATIENT)
Dept: HEMATOLOGY/ONCOLOGY | Facility: CLINIC | Age: 69
End: 2024-11-25
Attending: INTERNAL MEDICINE
Payer: MEDICARE

## 2024-11-25 ENCOUNTER — HOSPITAL ENCOUNTER (OUTPATIENT)
Dept: RADIOLOGY | Facility: HOSPITAL | Age: 69
Discharge: HOME | End: 2024-11-25
Attending: INTERNAL MEDICINE
Payer: MEDICARE

## 2024-11-25 VITALS
BODY MASS INDEX: 27.96 KG/M2 | TEMPERATURE: 98 F | DIASTOLIC BLOOD PRESSURE: 67 MMHG | OXYGEN SATURATION: 99 % | RESPIRATION RATE: 17 BRPM | HEIGHT: 64 IN | HEART RATE: 77 BPM | SYSTOLIC BLOOD PRESSURE: 143 MMHG | WEIGHT: 163.81 LBS

## 2024-11-25 DIAGNOSIS — Z12.31 ENCOUNTER FOR SCREENING MAMMOGRAM FOR HIGH-RISK PATIENT: ICD-10-CM

## 2024-11-25 DIAGNOSIS — Z85.3 HISTORY OF BREAST CANCER: Primary | ICD-10-CM

## 2024-11-25 DIAGNOSIS — Z17.1 MALIGNANT NEOPLASM OF LOWER-INNER QUADRANT OF LEFT BREAST IN FEMALE, ESTROGEN RECEPTOR NEGATIVE: ICD-10-CM

## 2024-11-25 DIAGNOSIS — C50.312 MALIGNANT NEOPLASM OF LOWER-INNER QUADRANT OF LEFT BREAST IN FEMALE, ESTROGEN RECEPTOR NEGATIVE: ICD-10-CM

## 2024-11-25 PROCEDURE — 77067 SCR MAMMO BI INCL CAD: CPT | Mod: 26,,, | Performed by: RADIOLOGY

## 2024-11-25 PROCEDURE — 77063 BREAST TOMOSYNTHESIS BI: CPT | Mod: 26,,, | Performed by: RADIOLOGY

## 2024-11-25 PROCEDURE — 99999 PR PBB SHADOW E&M-EST. PATIENT-LVL IV: CPT | Mod: PBBFAC,,, | Performed by: INTERNAL MEDICINE

## 2024-11-25 PROCEDURE — 77063 BREAST TOMOSYNTHESIS BI: CPT | Mod: TC

## 2024-11-25 NOTE — PROGRESS NOTES
Subjective:      Patient ID: Sunitha Pillai is a 69 y.o. female.    Chief Complaint: No chief complaint on file.        HPI:  69 year-old female, patient of Dr. Moulton, who returns for follow-up of a diagnosis of ER+ left breast cancer. She is on adjuvant letrozole therapy.    Today she reports that she has some occasional left breast soreness and tenderness.    Her mood may be slightly better.    Appetite is variable.  She denies any shortness of breath.        She has had mild anemia with hemoglobin ranging from 10-11-1/2 grams/deciliter.  Her anemia seems to have started since her chemotherapy at the end of 2020.  Anemia work-up was negative.    Current history:   Screening mammogram on July 30th, 2020 showed a focal asymmetry in the lower outer portion left breast.  Biopsy on August 25, 2020 showed high-grade infiltrating ductal carcinoma (histologic grade 3, nuclear grade 3, mitotic index 3).  Tumor was 95% ER positive in 95% OH positive, HER2 was 2+ but negative by FISH.  Ki-67 was 95%.    On September 17, 2020 lumpectomy and sentinel lymph node biopsy were performed.  That pathology showed a 1.5 cm infiltrating carcinoma with a single negative sentinel lymph node.  Margins were negative.  Final pathological stage T1c N0, stage I A.    Oncotype risk score - 29- high risk.    She received 4 cycles of adjuvant Taxotere and Cytoxan from 11/24/20 to 1/24/21.    Completed radiation 5/6/21.     Letrozole started in May 2021.    Mammogram 11/25/24 -negative    Review of Systems   Constitutional:  Negative for activity change, appetite change (Decreased but unchanged), fever and unexpected weight change.   HENT: Negative.     Respiratory:  Cough: Mild.    Cardiovascular:  Negative for chest pain.   Gastrointestinal:  Negative for constipation.   Genitourinary: Negative.    Musculoskeletal:  Negative for arthralgias.   Neurological: Negative.    Psychiatric/Behavioral:  Positive for dysphoric mood.       Objective:   Physical Exam   Vitals reviewed.  Constitutional: She is oriented to person, place, and time. She appears well-developed. No distress.   Cardiovascular:  Normal rate and regular rhythm.            Pulmonary/Chest: Effort normal and breath sounds normal. No respiratory distress. She has no wheezes. She has no rales. Right breast exhibits no mass, no nipple discharge and no skin change. Left breast exhibits skin change and tenderness. Left breast exhibits no mass and no nipple discharge.       Abdominal: Soft. She exhibits no mass. There is no abdominal tenderness.   Musculoskeletal: Lymphadenopathy:      Cervical: No cervical adenopathy.     Neurological: She is alert and oriented to person, place, and time.   Skin: No rash noted.     Psychiatric: Her behavior is normal. Thought content normal.     Assessment:    . Mammogram - negative  1. History of breast cancer    2. Malignant neoplasm of lower-inner quadrant of left breast in female, estrogen receptor negative        Plan:       RTC 6 M     Route Chart for Scheduling    Med Onc Chart Routing      Follow up with physician    Follow up with [facility] 6 months.   Infusion scheduling note    Injection scheduling note    Labs None   Scheduling:  Preferred lab:  Lab interval:     Imaging None      Pharmacy appointment No pharmacy appointment needed      Other referrals no referral to Oncology Primary Care needed -  no Massage appointment needed    No additional referrals needed

## 2024-11-29 ENCOUNTER — HOSPITAL ENCOUNTER (OUTPATIENT)
Dept: PREADMISSION TESTING | Facility: OTHER | Age: 69
Discharge: HOME | End: 2024-11-29
Attending: OBSTETRICS & GYNECOLOGY
Payer: MEDICARE

## 2024-11-29 ENCOUNTER — ANESTHESIA EVENT (OUTPATIENT)
Dept: SURGERY | Facility: OTHER | Age: 69
End: 2024-11-29
Payer: MEDICARE

## 2024-11-29 VITALS
TEMPERATURE: 98 F | WEIGHT: 163 LBS | BODY MASS INDEX: 27.83 KG/M2 | DIASTOLIC BLOOD PRESSURE: 73 MMHG | RESPIRATION RATE: 18 BRPM | SYSTOLIC BLOOD PRESSURE: 168 MMHG | HEART RATE: 74 BPM | HEIGHT: 64 IN

## 2024-11-29 DIAGNOSIS — Z01.818 PREOP TESTING: Primary | ICD-10-CM

## 2024-11-29 LAB
ANION GAP SERPL CALC-SCNC: 7 MMOL/L (ref 8–16)
BASOPHILS # BLD AUTO: 0.01 K/UL (ref 0–0.2)
BASOPHILS NFR BLD: 0.2 % (ref 0–1.9)
BUN SERPL-MCNC: 17 MG/DL (ref 8–23)
CALCIUM SERPL-MCNC: 9 MG/DL (ref 8.7–10.5)
CHLORIDE SERPL-SCNC: 112 MMOL/L (ref 95–110)
CO2 SERPL-SCNC: 24 MMOL/L (ref 23–29)
CREAT SERPL-MCNC: 1.1 MG/DL (ref 0.5–1.4)
DIFFERENTIAL METHOD BLD: ABNORMAL
EOSINOPHIL # BLD AUTO: 0 K/UL (ref 0–0.5)
EOSINOPHIL NFR BLD: 0 % (ref 0–8)
ERYTHROCYTE [DISTWIDTH] IN BLOOD BY AUTOMATED COUNT: 13.2 % (ref 11.5–14.5)
EST. GFR  (NO RACE VARIABLE): 54 ML/MIN/1.73 M^2
GLUCOSE SERPL-MCNC: 139 MG/DL (ref 70–110)
HCT VFR BLD AUTO: 33 % (ref 37–48.5)
HGB BLD-MCNC: 11.1 G/DL (ref 12–16)
IMM GRANULOCYTES # BLD AUTO: 0.02 K/UL (ref 0–0.04)
IMM GRANULOCYTES NFR BLD AUTO: 0.4 % (ref 0–0.5)
LYMPHOCYTES # BLD AUTO: 1.7 K/UL (ref 1–4.8)
LYMPHOCYTES NFR BLD: 33 % (ref 18–48)
MCH RBC QN AUTO: 32.4 PG (ref 27–31)
MCHC RBC AUTO-ENTMCNC: 33.6 G/DL (ref 32–36)
MCV RBC AUTO: 96 FL (ref 82–98)
MONOCYTES # BLD AUTO: 0.6 K/UL (ref 0.3–1)
MONOCYTES NFR BLD: 11.8 % (ref 4–15)
NEUTROPHILS # BLD AUTO: 2.9 K/UL (ref 1.8–7.7)
NEUTROPHILS NFR BLD: 54.6 % (ref 38–73)
NRBC BLD-RTO: 0 /100 WBC
PLATELET # BLD AUTO: 146 K/UL (ref 150–450)
PMV BLD AUTO: 9.5 FL (ref 9.2–12.9)
POTASSIUM SERPL-SCNC: 5 MMOL/L (ref 3.5–5.1)
RBC # BLD AUTO: 3.43 M/UL (ref 4–5.4)
SODIUM SERPL-SCNC: 143 MMOL/L (ref 136–145)
WBC # BLD AUTO: 5.25 K/UL (ref 3.9–12.7)

## 2024-11-29 PROCEDURE — 36415 COLL VENOUS BLD VENIPUNCTURE: CPT | Performed by: ANESTHESIOLOGY

## 2024-11-29 PROCEDURE — 80048 BASIC METABOLIC PNL TOTAL CA: CPT | Performed by: ANESTHESIOLOGY

## 2024-11-29 PROCEDURE — 85025 COMPLETE CBC W/AUTO DIFF WBC: CPT | Performed by: ANESTHESIOLOGY

## 2024-11-29 RX ORDER — SODIUM CHLORIDE, SODIUM LACTATE, POTASSIUM CHLORIDE, CALCIUM CHLORIDE 600; 310; 30; 20 MG/100ML; MG/100ML; MG/100ML; MG/100ML
INJECTION, SOLUTION INTRAVENOUS CONTINUOUS
OUTPATIENT
Start: 2024-11-29

## 2024-11-29 RX ORDER — ACETAMINOPHEN 500 MG
1000 TABLET ORAL
OUTPATIENT
Start: 2024-11-29 | End: 2024-11-29

## 2024-11-29 NOTE — TELEPHONE ENCOUNTER
Lov 8/1/24     Confirmed she is suppose to be on both meds.    Amlodpine was given year rx 8/1/24  Losartan last filled  8/17/23    Requesting refills on losartan.   Normal for race

## 2024-11-29 NOTE — ANESTHESIA PREPROCEDURE EVALUATION
11/29/2024  Sunitha Pillai is a 69 y.o., female.      Pre-op Assessment    I have reviewed the Patient Summary Reports.     I have reviewed the Nursing Notes. I have reviewed the NPO Status.   I have reviewed the Medications.     Review of Systems  Anesthesia Hx:  No problems with previous Anesthesia             Denies Family Hx of Anesthesia complications.    Denies Personal Hx of Anesthesia complications.                    Social:  Non-Smoker       Hematology/Oncology:  Hematology Normal                     Current/Recent Cancer.  Breast       chemotherapy and surgery       EENT/Dental:  EENT/Dental Normal           Cardiovascular:  Exercise tolerance: good   Hypertension   CAD                                          Pulmonary:  Pulmonary Normal                       Renal/:  Chronic Renal Disease, CKD                Hepatic/GI:     GERD                Musculoskeletal:  Arthritis          Spine Disorders: lumbar Chronic Pain           Neurological:  Neurology Normal          vertigo                            Endocrine:  Diabetes, type 2           Dermatological:  Skin Normal    Psych:   anxiety depression                Physical Exam  General: Well nourished, Cooperative, Oriented and Alert    Airway:  Mallampati: II / II  Mouth Opening: Normal  TM Distance: Normal  Neck ROM: Normal ROM    Dental:  Intact        Anesthesia Plan  Type of Anesthesia, risks & benefits discussed:    Anesthesia Type: Gen Supraglottic Airway, Gen ETT  Intra-op Monitoring Plan: Standard ASA Monitors  Post Op Pain Control Plan: multimodal analgesia  Induction:  IV  Airway Plan: Video and Direct  Informed Consent: Informed consent signed with the Patient and all parties understand the risks and agree with anesthesia plan.  All questions answered.   ASA Score: 3  Day of Surgery Review of History & Physical: H&P Update  referred to the surgeon/provider.  Anesthesia Plan Notes: CBC and BMP today    Ready For Surgery From Anesthesia Perspective.     .

## 2024-11-29 NOTE — TELEPHONE ENCOUNTER
----- Message from Elva sent at 2024  4:25 PM CST -----  Contact: [phone]  Requesting an RX refill or new RX.    Is this a refill or new RX: refill, NEEDS PA, Pt has surgery Tuesday also wants to know if she should also take amlodipine 5 mg    RX name and strength (copy/paste from chart):                losartan (COZAAR) 100 MG tablet ()      Is this a 30 day or 90 day RX: 90    Pharmacy name and phone # (copy/paste from chart):    [facility] [address] [facility] [address]  [address] [facility] [address]  Phone: [phone] Fax: [phone]    The doctors have asked that we provide their patients with the following 2 reminders -- prescription refills can take up to 72 hours, and a friendly reminder that in the future you can use your [facility] account to request refills: y

## 2024-11-29 NOTE — DISCHARGE INSTRUCTIONS
Information to Prepare you for your Surgery    PRE-ADMIT TESTING -  [phone]    [address]  [facility]          Your surgery has been scheduled at [facility]. We are pleased to have the opportunity to serve you. For Further Information please call [phone].    On the day of surgery please report to the Information Desk on the 1st floor.    CONTACT YOUR PHYSICIAN'S OFFICE THE DAY PRIOR TO YOUR SURGERY TO OBTAIN YOUR ARRIVAL TIME.     The evening before surgery do not eat anything after 9 p.m. ( this includes hard candy, chewing gum and mints).  You may only have GATORADE, POWERADE AND WATER  from 9 p.m. until you leave your home.   DO NOT DRINK ANY LIQUIDS ON THE WAY TO THE HOSPITAL.      Why does your anesthesiologist allow you to drink Gatorade/Powerade before surgery?  Gatorade/Powerade helps to increase your comfort before surgery and to decrease your nausea after surgery. The carbohydrates in Gatorade/Powerade help reduce your body's stress response to surgery.  If you are a diabetic-drink only water prior to surgery.    Outpatient Surgery- May allow 2 adult (18 and older) Support Persons (1 being the designated ) for all surgical/procedural patients. A breastfeeding mother will be allowed her infant and 2 adult Support Persons. No one under the age of 18 will be allowed in the building. No swapping out of visitors in the [facility].      SPECIAL MEDICATION INSTRUCTIONS: TAKE medications checked off by the Anesthesiologist on your Medication List.    Angiogram Patients: Take medications as instructed by your physician, including aspirin.     Surgery Patients:    If you take ASPIRIN - Your PHYSICIAN/SURGEON will need to inform you IF/OR when you need to stop taking aspirin prior to your surgery.     The week prior to surgery do not ot take any medications containing IBUPROFEN or NSAIDS ( Advil, Motrin, Goodys, BC, Aleve, Naproxen etc)  If you are not sure if you should take a medicine please call your surgeon's office.  Ok to take Tylenol    Do Not Wear any make-up (especially eye make-up) to surgery. Please remove any false eyelashes or eyelash extensions. If you arrive the day of surgery with makeup/eyelashes on you will be required to remove prior to surgery. (There is a risk of corneal abrasions if eye makeup/eyelash extensions are not removed)      Leave all valuables at home.   Do Not wear any jewelry or watches, including any metal in body piercings. Jewelry must be removed prior to coming to the hospital.  There is a possibility that rings that are unable to be removed may be cut off if they are on the surgical extremity.    Please remove all hair extensions, wigs, clips and any other metal accessories/ ornaments from your hair.  These items may pose a flammable/fire risk in Surgery and must be removed.    Do not shave your surgical area at least 5 days prior to your surgery. The surgical prep will be performed at the hospital according to Infection Control regulations.    Contact Lens must be removed before surgery. Either do not wear the contact lens or bring a case and solution for storage.  Please bring a container for eyeglasses or dentures as required.  Bring any paperwork your physician has provided, such as consent forms,  history and physicals, doctor's orders, etc.   Bring comfortable clothes that are loose fitting to wear upon discharge. Take into consideration the type of surgery being performed.  Maintain your diet as advised per your physician the day prior to surgery.      Adequate rest the night before surgery is advised.   Park in the Parking lot behind the hospital or in the [facility] across the street from the parking lot. Parking is complimentary.  If you will be discharged the same day as your procedure, please arrange for a responsible adult to drive you home or to accompany you if traveling by taxi.    YOU WILL NOT BE PERMITTED TO DRIVE OR TO LEAVE THE HOSPITAL ALONE AFTER SURGERY.   If you are being discharged the same day, it is strongly recommended that you arrange for someone to remain with you for the first 24 hrs following your surgery.    The Surgeon will speak to your family/visitor after your surgery regarding the outcome of your surgery and post op care.  The Surgeon may speak to you after your surgery, but there is a possibility you may not remember the details.  Please check with your family members regarding the conversation with the Surgeon.    We strongly recommend whoever is bringing you home be present for discharge instructions.  This will ensure a thorough understanding for your post op home care.    If the patient has fever, cough, or signs/symptoms of Flu or Covid please do not come in for your surgery. Contact your surgeon and your primary care physician for further instructions.           Thank you for your cooperation.  The Staff of [facility].            Bathing Instructions with Hibiclens    Shower the evening before and morning of your procedure with Chlorhexidine (Hibiclens)  do not use Chlorhexidine on your face or genitals. Do not get in your eyes.  Wash your face with water and your regular face wash/soap  Use your regular shampoo  Apply Chlorhexidine (Hibiclens) directly on your skin or on a wet washcloth and wash gently. When showering: Move away from the shower stream when applying Chlorhexidine (Hibiclens) to avoid rinsing off too soon.  Rinse thoroughly with warm water  Do not dilute Chlorhexidine (Hibiclens)   Dry off as usual, do not use any deodorant, powder, body lotions, perfume, after shave or cologne.

## 2024-11-29 NOTE — TELEPHONE ENCOUNTER
No care due was identified.  [facility] Embedded Care Due Messages. Reference number: [id number].   11/29/2024 4:53:25 PM CST

## 2024-11-30 RX ORDER — LOSARTAN POTASSIUM 100 MG/1
100 TABLET ORAL DAILY
Qty: 90 TABLET | Refills: 2 | Status: SHIPPED | OUTPATIENT
Start: 2024-11-30

## 2024-11-30 NOTE — TELEPHONE ENCOUNTER
Refill Routing Note   Medication(s) are not appropriate for processing by [facility] for the following reason(s):        Required vitals abnormal    [facility] action(s):  Defer             Appointments  past 12m or future 3m with PCP    Date Provider   Last Visit   8/1/2024 Patty Louis MD   Next Visit   Visit date not found Patty Louis MD   ED visits in past 90 days: 1        Note composed:10:10 PM 11/29/2024

## 2024-12-02 ENCOUNTER — HOSPITAL ENCOUNTER (OUTPATIENT)
Facility: OTHER | Age: 69
Discharge: HOME | End: 2024-12-02
Attending: OBSTETRICS & GYNECOLOGY | Admitting: OBSTETRICS & GYNECOLOGY
Payer: MEDICARE

## 2024-12-02 ENCOUNTER — ANESTHESIA (OUTPATIENT)
Dept: SURGERY | Facility: OTHER | Age: 69
End: 2024-12-02
Payer: MEDICARE

## 2024-12-02 VITALS
WEIGHT: 163 LBS | OXYGEN SATURATION: 100 % | DIASTOLIC BLOOD PRESSURE: 66 MMHG | TEMPERATURE: 99 F | SYSTOLIC BLOOD PRESSURE: 146 MMHG | BODY MASS INDEX: 27.83 KG/M2 | HEIGHT: 64 IN | RESPIRATION RATE: 16 BRPM | HEART RATE: 73 BPM

## 2024-12-02 DIAGNOSIS — Z98.890 S/P DILATION AND CURETTAGE: Primary | ICD-10-CM

## 2024-12-02 DIAGNOSIS — R93.89 THICKENED ENDOMETRIUM: ICD-10-CM

## 2024-12-02 LAB
POCT GLUCOSE: 122 MG/DL (ref 70–110)
POCT GLUCOSE: 124 MG/DL (ref 70–110)

## 2024-12-02 PROCEDURE — 88305 TISSUE EXAM BY PATHOLOGIST: CPT | Performed by: PATHOLOGY

## 2024-12-02 PROCEDURE — 37000008 HC ANESTHESIA 1ST 15 MINUTES: Performed by: OBSTETRICS & GYNECOLOGY

## 2024-12-02 PROCEDURE — D9220A PRA ANESTHESIA: Mod: ANES,,, | Performed by: ANESTHESIOLOGY

## 2024-12-02 PROCEDURE — 63600175 PHARM REV CODE 636 W HCPCS: Performed by: ANESTHESIOLOGY

## 2024-12-02 PROCEDURE — 37000009 HC ANESTHESIA EA ADD 15 MINS: Performed by: OBSTETRICS & GYNECOLOGY

## 2024-12-02 PROCEDURE — 36000707: Performed by: OBSTETRICS & GYNECOLOGY

## 2024-12-02 PROCEDURE — D9220A PRA ANESTHESIA: Mod: CRNA,,, | Performed by: NURSE ANESTHETIST, CERTIFIED REGISTERED

## 2024-12-02 PROCEDURE — 71000015 HC POSTOP RECOV 1ST HR: Performed by: OBSTETRICS & GYNECOLOGY

## 2024-12-02 PROCEDURE — 36000706: Performed by: OBSTETRICS & GYNECOLOGY

## 2024-12-02 PROCEDURE — 63600175 PHARM REV CODE 636 W HCPCS: Performed by: NURSE ANESTHETIST, CERTIFIED REGISTERED

## 2024-12-02 PROCEDURE — 71000033 HC RECOVERY, INTIAL HOUR: Performed by: OBSTETRICS & GYNECOLOGY

## 2024-12-02 PROCEDURE — 25000003 PHARM REV CODE 250: Performed by: ANESTHESIOLOGY

## 2024-12-02 PROCEDURE — 27201423 OPTIME MED/SURG SUP & DEVICES STERILE SUPPLY: Performed by: OBSTETRICS & GYNECOLOGY

## 2024-12-02 PROCEDURE — 71000039 HC RECOVERY, EACH ADD'L HOUR: Performed by: OBSTETRICS & GYNECOLOGY

## 2024-12-02 PROCEDURE — 63600175 PHARM REV CODE 636 W HCPCS: Mod: JZ,JG | Performed by: OBSTETRICS & GYNECOLOGY

## 2024-12-02 PROCEDURE — 25000003 PHARM REV CODE 250: Performed by: OBSTETRICS & GYNECOLOGY

## 2024-12-02 PROCEDURE — 58558 HYSTEROSCOPY BIOPSY: CPT | Mod: ,,, | Performed by: OBSTETRICS & GYNECOLOGY

## 2024-12-02 PROCEDURE — 71000016 HC POSTOP RECOV ADDL HR: Performed by: OBSTETRICS & GYNECOLOGY

## 2024-12-02 RX ORDER — KETOROLAC TROMETHAMINE 30 MG/ML
15 INJECTION, SOLUTION INTRAMUSCULAR; INTRAVENOUS EVERY 6 HOURS PRN
Status: DISCONTINUED | OUTPATIENT
Start: 2024-12-02 | End: 2024-12-02 | Stop reason: HOSPADM

## 2024-12-02 RX ORDER — IBUPROFEN 800 MG/1
800 TABLET ORAL EVERY 6 HOURS PRN
Qty: 60 TABLET | Refills: 1 | Status: SHIPPED | OUTPATIENT
Start: 2024-12-02

## 2024-12-02 RX ORDER — GLUCAGON 1 MG
1 KIT INJECTION
Status: DISCONTINUED | OUTPATIENT
Start: 2024-12-02 | End: 2024-12-02 | Stop reason: HOSPADM

## 2024-12-02 RX ORDER — DEXAMETHASONE SODIUM PHOSPHATE 4 MG/ML
INJECTION, SOLUTION INTRA-ARTICULAR; INTRALESIONAL; INTRAMUSCULAR; INTRAVENOUS; SOFT TISSUE
Status: DISCONTINUED | OUTPATIENT
Start: 2024-12-02 | End: 2024-12-02

## 2024-12-02 RX ORDER — DIPHENHYDRAMINE HYDROCHLORIDE 50 MG/ML
25 INJECTION INTRAMUSCULAR; INTRAVENOUS EVERY 6 HOURS PRN
Status: DISCONTINUED | OUTPATIENT
Start: 2024-12-02 | End: 2024-12-02 | Stop reason: HOSPADM

## 2024-12-02 RX ORDER — VASOPRESSIN 20 [USP'U]/ML
INJECTION, SOLUTION INTRAMUSCULAR; SUBCUTANEOUS
Status: DISCONTINUED | OUTPATIENT
Start: 2024-12-02 | End: 2024-12-02 | Stop reason: HOSPADM

## 2024-12-02 RX ORDER — HYDROMORPHONE HYDROCHLORIDE 2 MG/ML
0.4 INJECTION, SOLUTION INTRAMUSCULAR; INTRAVENOUS; SUBCUTANEOUS EVERY 5 MIN PRN
Status: DISCONTINUED | OUTPATIENT
Start: 2024-12-02 | End: 2024-12-02 | Stop reason: HOSPADM

## 2024-12-02 RX ORDER — ACETAMINOPHEN 500 MG
1000 TABLET ORAL
Status: COMPLETED | OUTPATIENT
Start: 2024-12-02 | End: 2024-12-02

## 2024-12-02 RX ORDER — FAMOTIDINE 20 MG/1
20 TABLET, FILM COATED ORAL
Status: COMPLETED | OUTPATIENT
Start: 2024-12-02 | End: 2024-12-02

## 2024-12-02 RX ORDER — ONDANSETRON HYDROCHLORIDE 2 MG/ML
4 INJECTION, SOLUTION INTRAVENOUS EVERY 4 HOURS PRN
Status: DISCONTINUED | OUTPATIENT
Start: 2024-12-02 | End: 2024-12-02 | Stop reason: HOSPADM

## 2024-12-02 RX ORDER — MEPERIDINE HYDROCHLORIDE 25 MG/ML
12.5 INJECTION INTRAMUSCULAR; INTRAVENOUS; SUBCUTANEOUS ONCE AS NEEDED
Status: DISCONTINUED | OUTPATIENT
Start: 2024-12-02 | End: 2024-12-02 | Stop reason: HOSPADM

## 2024-12-02 RX ORDER — LIDOCAINE HYDROCHLORIDE 20 MG/ML
INJECTION INTRAVENOUS
Status: DISCONTINUED | OUTPATIENT
Start: 2024-12-02 | End: 2024-12-02

## 2024-12-02 RX ORDER — SODIUM CHLORIDE 9 MG/ML
INJECTION, SOLUTION INTRAVENOUS CONTINUOUS
Status: DISCONTINUED | OUTPATIENT
Start: 2024-12-02 | End: 2024-12-02 | Stop reason: HOSPADM

## 2024-12-02 RX ORDER — PHENYLEPHRINE HYDROCHLORIDE 10 MG/ML
INJECTION INTRAVENOUS
Status: DISCONTINUED | OUTPATIENT
Start: 2024-12-02 | End: 2024-12-02

## 2024-12-02 RX ORDER — FENTANYL CITRATE 50 UG/ML
INJECTION, SOLUTION INTRAMUSCULAR; INTRAVENOUS
Status: DISCONTINUED | OUTPATIENT
Start: 2024-12-02 | End: 2024-12-02

## 2024-12-02 RX ORDER — PROPOFOL 10 MG/ML
VIAL (ML) INTRAVENOUS
Status: DISCONTINUED | OUTPATIENT
Start: 2024-12-02 | End: 2024-12-02

## 2024-12-02 RX ORDER — SODIUM CHLORIDE, SODIUM LACTATE, POTASSIUM CHLORIDE, CALCIUM CHLORIDE 600; 310; 30; 20 MG/100ML; MG/100ML; MG/100ML; MG/100ML
INJECTION, SOLUTION INTRAVENOUS CONTINUOUS
Status: DISCONTINUED | OUTPATIENT
Start: 2024-12-02 | End: 2024-12-02 | Stop reason: HOSPADM

## 2024-12-02 RX ORDER — PROCHLORPERAZINE EDISYLATE 5 MG/ML
5 INJECTION INTRAMUSCULAR; INTRAVENOUS EVERY 30 MIN PRN
Status: DISCONTINUED | OUTPATIENT
Start: 2024-12-02 | End: 2024-12-02 | Stop reason: HOSPADM

## 2024-12-02 RX ORDER — HYDROMORPHONE HYDROCHLORIDE 2 MG/ML
0.2 INJECTION, SOLUTION INTRAMUSCULAR; INTRAVENOUS; SUBCUTANEOUS
Status: DISCONTINUED | OUTPATIENT
Start: 2024-12-02 | End: 2024-12-02 | Stop reason: HOSPADM

## 2024-12-02 RX ORDER — ONDANSETRON HYDROCHLORIDE 2 MG/ML
INJECTION, SOLUTION INTRAVENOUS
Status: DISCONTINUED | OUTPATIENT
Start: 2024-12-02 | End: 2024-12-02

## 2024-12-02 RX ORDER — OXYCODONE HYDROCHLORIDE 5 MG/1
5 TABLET ORAL
Status: DISCONTINUED | OUTPATIENT
Start: 2024-12-02 | End: 2024-12-02 | Stop reason: HOSPADM

## 2024-12-02 RX ORDER — ACETAMINOPHEN 325 MG/1
650 TABLET ORAL EVERY 6 HOURS
Qty: 60 TABLET | Refills: 1 | Status: SHIPPED | OUTPATIENT
Start: 2024-12-02

## 2024-12-02 RX ORDER — SODIUM CHLORIDE 0.9 % (FLUSH) 0.9 %
3 SYRINGE (ML) INJECTION
Status: DISCONTINUED | OUTPATIENT
Start: 2024-12-02 | End: 2024-12-02 | Stop reason: HOSPADM

## 2024-12-02 RX ORDER — ACETAMINOPHEN 500 MG
1000 TABLET ORAL EVERY 6 HOURS PRN
Status: DISCONTINUED | OUTPATIENT
Start: 2024-12-02 | End: 2024-12-02 | Stop reason: HOSPADM

## 2024-12-02 RX ORDER — DOCUSATE SODIUM 100 MG/1
100 CAPSULE, LIQUID FILLED ORAL 2 TIMES DAILY
Qty: 60 CAPSULE | Refills: 1 | Status: SHIPPED | OUTPATIENT
Start: 2024-12-02

## 2024-12-02 RX ORDER — MUPIROCIN 20 MG/G
OINTMENT TOPICAL
Status: DISCONTINUED | OUTPATIENT
Start: 2024-12-02 | End: 2024-12-02 | Stop reason: HOSPADM

## 2024-12-02 RX ADMIN — FENTANYL CITRATE 100 MCG: 50 INJECTION, SOLUTION INTRAMUSCULAR; INTRAVENOUS at 09:12

## 2024-12-02 RX ADMIN — PROPOFOL 160 MG: 10 INJECTION, EMULSION INTRAVENOUS at 09:12

## 2024-12-02 RX ADMIN — ACETAMINOPHEN 1000 MG: 500 TABLET, FILM COATED ORAL at 08:12

## 2024-12-02 RX ADMIN — LIDOCAINE HYDROCHLORIDE 75 MG: 20 INJECTION, SOLUTION INTRAVENOUS at 09:12

## 2024-12-02 RX ADMIN — PHENYLEPHRINE HYDROCHLORIDE 100 MCG: 10 INJECTION INTRAVENOUS at 09:12

## 2024-12-02 RX ADMIN — PHENYLEPHRINE HYDROCHLORIDE 100 MCG: 10 INJECTION INTRAVENOUS at 10:12

## 2024-12-02 RX ADMIN — FAMOTIDINE 20 MG: 20 TABLET, FILM COATED ORAL at 08:12

## 2024-12-02 RX ADMIN — MUPIROCIN: 20 OINTMENT TOPICAL at 08:12

## 2024-12-02 RX ADMIN — DEXAMETHASONE SODIUM PHOSPHATE 4 MG: 4 INJECTION, SOLUTION INTRAMUSCULAR; INTRAVENOUS at 10:12

## 2024-12-02 RX ADMIN — SODIUM CHLORIDE, SODIUM LACTATE, POTASSIUM CHLORIDE, AND CALCIUM CHLORIDE: 600; 310; 30; 20 INJECTION, SOLUTION INTRAVENOUS at 09:12

## 2024-12-02 RX ADMIN — ONDANSETRON HYDROCHLORIDE 4 MG: 2 INJECTION INTRAMUSCULAR; INTRAVENOUS at 10:12

## 2024-12-02 NOTE — DISCHARGE SUMMARY
Discharge Summary  Gynecology      Admit Date: 2024    Discharge Date and Time: 2024     Attending Physician: Hank Doran MD    Principal Diagnoses: S/P dilation and curettage    Active Hospital Problems    Diagnosis  POA    *S/P hysteroscopy/D&C [Z98.890]  Not Applicable      Resolved Hospital Problems   No resolved problems to display.       Procedures: Procedure(s) (LRB):  HYSTEROSCOPY, WITH DILATION AND CURETTAGE OF UTERUS (N/A)    Discharged Condition: good    Hospital Course:   Sunitha Pillai is a 69 y.o. y.o.  female who presented on 2024   for above procedures for the treatment of thickened endometrium. Patient tolerated procedure. PMH significant for HTN, GERD, hx of brast cancer, CKD. Post-operative course was uncomplicated.  On day of discharge, patient was urinating, ambulating, and tolerating a regular diet without difficulty. Pain was well controlled on PO medication. She was discharged home on POD#0 in stable condition with instructions to follow up with Dr. Doran in 4 weeks.     Consults: None    Significant Diagnostic Studies:  Recent Labs   Lab 24  1151   WBC 5.25   HGB 11.1*   HCT 33.0*   MCV 96   *        Treatments:   1. Surgery as above    Disposition: Home or Self Care    Patient Instructions:   Current Discharge Medication List        START taking these medications    Details   docusate sodium (COLACE) 100 MG capsule Take 1 capsule (100 mg total) by mouth 2 (two) times daily.  Qty: 60 capsule, Refills: 1      ibuprofen (ADVIL,MOTRIN) 800 MG tablet Take 1 tablet (800 mg total) by mouth every 6 (six) hours as needed for Pain. Alternate between ibuprofen and tylenol every 3 hours. For example: @0800: ibuprofen 600mg @1100: tylenol 650mg @1400: ibuprofen 600mg @1700: tylenol 650 mg @2000: ibuprofen 600mg  Qty: 60 tablet, Refills: 1           CONTINUE these medications which have CHANGED    Details   acetaminophen (TYLENOL) 325 MG tablet Take 2 tablets  "(650 mg total) by mouth every 6 (six) hours. Alternate between ibuprofen and tylenol every 3 hours. For example: @0800: ibuprofen 600mg @1100: tylenol 650mg @1400: ibuprofen 600mg @1700: tylenol 650 mg @2000: ibuprofen 600mg  Qty: 60 tablet, Refills: 1           CONTINUE these medications which have NOT CHANGED    Details   topiramate (TOPAMAX) 25 MG tablet One at bedtime for 2 weeks then two at bedtime  Qty: 60 tablet, Refills: 2      ACCU-CHEK GUIDE GLUCOSE METER Misc USE  THREE TIMES DAILY  Qty: 1 each, Refills: 0    Associated Diagnoses: Uncontrolled type 2 diabetes mellitus with hyperglycemia      amLODIPine (NORVASC) 5 MG tablet Take 1 tablet (5 mg total) by mouth once daily.  Qty: 90 tablet, Refills: 3      aspirin 81 mg Tab Take 1 tablet by mouth Daily.      atorvastatin (LIPITOR) 10 MG tablet Take 1 tablet by mouth once daily  Qty: 90 tablet, Refills: 2      BD ULTRA-FINE SHORT PEN NEEDLE 31 gauge x 5/16" Ndle USE 1  4 TIMES DAILY WITH  INSULIN  Qty: 100 each, Refills: 3    Comments: Please consider 90 day supplies to promote better adherence      !! blood sugar diagnostic Strp Strips and lancets covered by insurance E11.9, pt checks glucose three times daily, insulin dependent  Qty: 300 each, Refills: 3    Associated Diagnoses: Uncontrolled type 2 diabetes mellitus with hyperglycemia      !! blood sugar diagnostic Strp Check glucose daily Strips and lancets covered by insurance E11.9  Qty: 100 each, Refills: 3      blood sugar diagnostic, drum (ACCU-CHEK COMPACT TEST) Strp USE ONE STRIP TO CHECK GLUCOSE 4 TIMES DAILY BEFORE EACH MEAL AND AT BEDTIME  Qty: 100 each, Refills: 0      blood-glucose meter kit Check glucose daily E11.9 meter covered by insurance  Qty: 1 each, Refills: 0      letrozole (FEMARA) 2.5 mg Tab Take 1 tablet by mouth once daily  Qty: 90 tablet, Refills: 3    Associated Diagnoses: Malignant neoplasm of lower-inner quadrant of left breast in female, estrogen receptor positive    "   losartan (COZAAR) 100 MG tablet Take 1 tablet (100 mg total) by mouth once daily.  Qty: 90 tablet, Refills: 2    Comments: .      metFORMIN (GLUCOPHAGE) 1000 MG tablet TAKE 1 TABLET BY MOUTH ONCE DAILY WITH A MEAL  Qty: 90 tablet, Refills: 1       !! - Potential duplicate medications found. Please discuss with provider.        STOP taking these medications       adalimumab (HUMIRA,CF, PEN) 40 mg/0.4 mL PnKt Comments:   Reason for Stopping:         albuterol (VENTOLIN HFA) 90 mcg/actuation inhaler Comments:   Reason for Stopping:         capsaicin 0.1 % Crea Comments:   Reason for Stopping:         diclofenac sodium (SOLARAZE) 3 % gel Comments:   Reason for Stopping:         DULoxetine (CYMBALTA) 20 MG capsule Comments:   Reason for Stopping:         lancets 30 gauge Misc Comments:   Reason for Stopping:         lancets Misc Comments:   Reason for Stopping:         LIDOcaine (LIDODERM) 5 % Comments:   Reason for Stopping:         RESTASIS 0.05 % ophthalmic emulsion Comments:   Reason for Stopping:         tirzepatide (MOUNJARO) 2.5 mg/0.5 mL PnIj Comments:   Reason for Stopping:         TRUEPLUS LANCETS 33 gauge Misc Comments:   Reason for Stopping:               Discharge Procedure Orders   Diet general     Lifting restrictions   Order Comments: LIFTING:  No lifting greater than 15 pounds for six weeks.     PELVIC REST:  No douching, tampons, or intercourse for 6 weeks.  If prescribed, vaginal estrogen cream may be used during the postoperative period.     Other restrictions (specify):   Order Comments: DRIVING:  No driving while on narcotics. Driving may be resumed initially with a competent passenger one to two weeks after surgery if no longer taking narcotics.     EXERCISE:  For six weeks your exercise should be limited to walking. You may walk as far as you wish, as long as you increase your level of exertion gradually and avoid slippery surfaces. You may climb stairs as needed to get around, but should not  use stair climbing for exercise.     Leave dressing on - Keep it clean, dry, and intact until clinic visit     Remove dressing in 24 hours   Order Comments: If you have a bandage on wound, you may remove it the day after dismissal.  If you had steri-strips remove them once they begin to peel off (usually 2 weeks). Keep incision clean and dry.  Inspect the incision daily for signs and symptoms of infection.     Call MD for:  temperature >100.4     Call MD for:  persistent nausea and vomiting     Call MD for:  severe uncontrolled pain     Call MD for:  difficulty breathing, headache or visual disturbances     Call MD for:  redness, tenderness, or signs of infection (pain, swelling, redness, odor or green/yellow discharge around incision site)     Call MD for:  hives     Call MD for:   Order Comments: inability to void,urine is ketchup colored or you have large clots, vaginal bleeding is heavier than a period.    VAGINAL DISCHARGE: You may develop a vaginal discharge and intermittent vaginal spotting after surgery and up to 6 weeks postoperatively.  The discharge may have an odor and may change in color but it is normal.  This is due to dissolving stiches.  Contact your surgical team if you develop vaginal or vulvar irritation along with a discharge.  Also contact your surgical team if you have vaginal discharge that smells like urine or stool.    PAIN MEDICATIONS:     Take your pain medications as instructed. It is best to take pain medications before your pain becomes severe. This will allow you to take less medication yet have better pain relief. For the first 2 or 3 days it may be helpful to take your pain medications on a regular schedule (e.g. every 4 to 6 hours). This will help you to keep your pain under better control. You should then begin to take fewer medications each day until you no longer need them. Do not take pain medication on an empty stomach. This may lead to nausea and vomiting.    CONSTIPATION  REMEDIES: Patients are often constipated after surgery or with use of oral narcotic medicine. You should continue to take the stool softener, Senokot-S during the next six weeks, and consume adequate amounts of water.  If you have not had a bowel movement for 3 days after dismissal, or are uncomfortable and unable to pass stool, please try one or all of the following measures:  1.  Milk of Magnesia - 30 cc by mouth every 12 hours   2.  Dulcolax suppository - One suppository per rectum every 4-6 hours   3.  Metamucil, Fibercon or other bulk former - use as directed  4.  Fleets Enema  5.  Prunes or Prune juice    If you continue to have constipation after trying the above remedies, you should contact your surgical team using the contact information listed above     Activity as tolerated   Order Comments: Return to normal activity slowly as you feel able.  For 6 weeks your exercise should be limited to walking.  You may walk as far as you wish, as long as you increase your level of exertion gradually and avoid slippery surfaces.     Shower on day dressing removed (No bath)   Order Comments: You may shower at any time but should avoid immersing any abdominal incisions in water for at least 2 weeks after surgery or until the wound is completely healed.  If given, please shower with Hibaclens soap until bottle is completely finish        Follow-up Information       Hank Doran MD Follow up in 4 week(s).    Specialties: Obstetrics, Obstetrics and Gynecology  Why: Post-Op  Contact information:  [address] [id number] [phone]                           Ansley Solomon MD  Obstetrics & Gynecology, PGY-1

## 2024-12-02 NOTE — LETTER
December 2, 2024        No Recipients                [address]  [place] [address]  Phone: [phone]  Fax: [phone]   Patient: Sunitha Pillai   MR Number: [id number]   YOB: 1955   Date of Visit: 11/18/2024       Dear Dr. Wood Recipients:    Thank you for referring Sunitha Pillai to me for evaluation. Below are the relevant portions of my assessment and plan of care.            If you have questions, please do not hesitate to call me. I look forward to following Sunitha along with you.    Sincerely,      Maria C Tong RN           CC  No Recipients

## 2024-12-02 NOTE — ANESTHESIA POSTPROCEDURE EVALUATION
Anesthesia Post Evaluation    Patient: Sunitha Pillai    Procedure(s) Performed: Procedure(s) (LRB):  HYSTEROSCOPY, WITH DILATION AND CURETTAGE OF UTERUS (N/A)    Final Anesthesia Type: general      Patient location during evaluation: PACU  Patient participation: Yes- Able to Participate  Level of consciousness: awake and alert  Post-procedure vital signs: reviewed and stable  Pain management: adequate  Airway patency: patent    PONV status at discharge: No PONV  Anesthetic complications: no      Cardiovascular status: blood pressure returned to baseline  Respiratory status: unassisted  Hydration status: euvolemic  Follow-up not needed.              Vitals Value Taken Time   /65 12/02/24 1102   Temp 36.7 °C (98.1 °F) 12/02/24 1029   Pulse 59 12/02/24 1104   Resp 16 12/02/24 1029   SpO2 98 % 12/02/24 1104   Vitals shown include unfiled device data.      No case tracking events are documented in the log.      Pain/Troy Score: Pain Rating Prior to Med Admin: 0 (12/2/2024  8:16 AM)  Troy Score: 8 (12/2/2024 10:29 AM)

## 2024-12-02 NOTE — ANESTHESIA PROCEDURE NOTES
Intubation    Date/Time: 12/2/2024 9:47 AM    Performed by: Janina Abbasi CRNA  Authorized by: Leoncio Plasencia MD    Intubation:     Induction:  Intravenous    Intubated:  Postinduction    Mask Ventilation:  Easy with oral airway    Attempts:  1    Attempted By:  Other (see comments) (ER resident)    Difficult Airway Encountered?: No      Complications:  None    Airway Device:  Supraglottic airway/LMA    Airway Device Size:  4.0    Placement Verified By:  Capnometry    Complicating Factors:  None    Findings Post-Intubation:  BS equal bilateral and atraumatic/condition of teeth unchanged

## 2024-12-02 NOTE — LETTER
December 2, 2024         [address]  [place] [address]  Phone: [phone]  Fax: [phone]       Patient: Sunitha Pillai   YOB: 1955  Date of Visit: 12/02/2024    To Whom It May Concern:    Bj Pillai  was at [facility] on 12/02/2024. The patient may return to work/school on 12/03/2024 with lifting restrictions (no lifting anything greater than 15 pounds until cleared by MD). If you have any questions or concerns, or if I can be of further assistance, please do not hesitate to contact me.    Sincerely,    Maria C Tong RN

## 2024-12-02 NOTE — TRANSFER OF CARE
"Anesthesia Transfer of Care Note    Patient: Sunitha Pillai    Procedure(s) Performed: Procedure(s) (LRB):  HYSTEROSCOPY, WITH DILATION AND CURETTAGE OF UTERUS (N/A)    Patient location: PACU    Anesthesia Type: general    Transport from OR: Transported from OR on 6-10 L/min O2 by face mask with adequate spontaneous ventilation    Post pain: adequate analgesia    Post assessment: no apparent anesthetic complications    Post vital signs: stable    Level of consciousness: awake and alert    Nausea/Vomiting: no nausea/vomiting    Complications: none    Transfer of care protocol was followed      Last vitals: Visit Vitals  BP (!) 140/62 (BP Location: Right arm, Patient Position: Lying)   Pulse 69   Temp 36.3 °C (97.4 °F) (Oral)   Resp 18   Ht 5' 4" (1.626 m)   Wt 73.9 kg (163 lb)   LMP  (LMP Unknown)   SpO2 98%   Breastfeeding No   BMI 27.98 kg/m²     "

## 2024-12-02 NOTE — OP NOTE
OPERATIVE REPORT FOR HYSTEROSCOPY, POLYPECTOMY, DILATION AND CURETTAGE                                                                                                                                Date of Procedure: 12/02/2024                                                                                 SURGEON:  Hank Doran M.D.                                                                                                                    ASSISTANT:  Ansley Solomon MD (RES)                                                                                              PREOPERATIVE DIAGNOSIS:        Thickened endometrium on ultrasound                                                                                POSTOPERATIVE DIAGNOSIS:     Uterine polyp                                                                               PROCEDURE:  Diagnostic Hysteroscopy, polypectomy using the myosure reach , dilation and curettage                                                                                                                       ANESTHESIA:  LMA    BLOOD LOSS:  5 mL.                                                                                                                                      URINE OUTPUT:  200 cc                                                                                       IV FLUIDS:  500 cc    FLUID DEFICIT:  100 cc    COMPLICATIONS:  None    SPECIMINES:  1) Endometrial polyp; 2) endometrial curettage      FINDINGS:  1) Normal tubal ostia bilaterally  normal uterine cavity  normal cervical canal  1 large endometrial polyp       STATEMENT OF MEDICAL NECESSITY:  Pt is a 69 y.o. year old female who has a thickened endometrium on ultrasound.  After discussing risks/benefits/alternatives/and indications, pt wished to proceed with office hysteroscopy and directed biopsy.                                                                               PROCEDURE IN DETAIL:   After appropriate consents had been obtained and time out performed, the patient was taken to the operating room at which time general anesthesia was administered.  The patient was then placed in the lithotomy position, prepped in the appropriate sterile fashion for a vaginal procedure.  The bladder was decompressed using an in/out catheter.  A sterile speculum was put in place.  A single tooth tenaculum was placed on the anterior lip of the cervix.  The uterine cavity sounded to approximately 8 cm.  The cervix was dilated to 7mm.  The hysteroscope was placed through the cervix and was allowed to hydrodilate.  Once the endometrial cavity was entered, a thorough inspection was performed with the above stated findings.  Using the myosure reach, a polypectomy was performed where there was felt to be pathology that may explain her abnormal bleeding.  A sharp curettage was then performed to remove any residual tissue and restore a normal lining.  All tissue was sent to pathology.  After confirming hemostasis, the hysteroscope was removed.  The tenaculum and speculum were then removed.  The patient was then extubated and taken to recovery in stable condition.    All counts were correct x 2 at the end of the procedure.  Minimal bleeding was noted.  Antibiotics were not indicated for this case.      I was scrubbed and provided direct supervision for the entire procedure.    Thanks,     Hank Doran MD

## 2024-12-03 LAB
FINAL PATHOLOGIC DIAGNOSIS: NORMAL
GROSS: NORMAL
Lab: NORMAL

## 2024-12-13 ENCOUNTER — OFFICE VISIT (OUTPATIENT)
Dept: URGENT CARE | Facility: CLINIC | Age: 69
End: 2024-12-13
Payer: MEDICARE

## 2024-12-13 VITALS
RESPIRATION RATE: 17 BRPM | DIASTOLIC BLOOD PRESSURE: 64 MMHG | HEART RATE: 95 BPM | WEIGHT: 163.81 LBS | SYSTOLIC BLOOD PRESSURE: 139 MMHG | OXYGEN SATURATION: 99 % | HEIGHT: 64 IN | BODY MASS INDEX: 27.96 KG/M2 | TEMPERATURE: 98 F

## 2024-12-13 DIAGNOSIS — J06.9 VIRAL URI: Primary | ICD-10-CM

## 2024-12-13 DIAGNOSIS — R05.1 ACUTE COUGH: ICD-10-CM

## 2024-12-13 LAB
CTP QC/QA: YES
SARS-COV-2 AG RESP QL IA.RAPID: NEGATIVE

## 2024-12-13 RX ORDER — POLYETHYLENE GLYCOL 3350, SODIUM SULFATE ANHYDROUS, SODIUM BICARBONATE, SODIUM CHLORIDE, POTASSIUM CHLORIDE 236; 22.74; 6.74; 5.86; 2.97 G/4L; G/4L; G/4L; G/4L; G/4L
4000 POWDER, FOR SOLUTION ORAL ONCE
COMMUNITY
Start: 2024-08-30

## 2024-12-13 RX ORDER — PROMETHAZINE HYDROCHLORIDE AND DEXTROMETHORPHAN HYDROBROMIDE 6.25; 15 MG/5ML; MG/5ML
5 SYRUP ORAL NIGHTLY PRN
Qty: 118 ML | Refills: 0 | Status: SHIPPED | OUTPATIENT
Start: 2024-12-13

## 2024-12-13 RX ORDER — AMLODIPINE BESYLATE 2.5 MG/1
2.5 TABLET ORAL
COMMUNITY
Start: 2024-08-09

## 2024-12-13 RX ORDER — BENZONATATE 100 MG/1
100 CAPSULE ORAL 3 TIMES DAILY PRN
Qty: 30 CAPSULE | Refills: 0 | Status: SHIPPED | OUTPATIENT
Start: 2024-12-13 | End: 2024-12-23

## 2024-12-13 NOTE — PROGRESS NOTES
"Subjective:      Patient ID: Sunitha Pillai is a 69 y.o. female.    Vitals:  height is 5' 4" (1.626 m) and weight is 74.3 kg (163 lb 12.8 oz). Her oral temperature is 98.3 °F (36.8 °C). Her blood pressure is 139/64 and her pulse is 95. Her respiration is 17 and oxygen saturation is 99%.     Chief Complaint: Cough    Pt presents today w/ productive cough (unknown color ) accompanied w/ sore throat ; onset yesterday 12/12/24. Pt c/o chest discomfort when coughing. Pt denies fever and emesis. Pt has hx bronchitis. Pt tried safe tussin;mild relief.     Cough  This is a new problem. The current episode started yesterday. The problem has been unchanged. The problem occurs constantly. The cough is Productive of sputum. Associated symptoms include postnasal drip and a sore throat. Pertinent negatives include no chest pain, chills, ear congestion, ear pain, fever, headaches, heartburn, hemoptysis, myalgias, nasal congestion, rash, rhinorrhea, shortness of breath, sweats, weight loss or wheezing. Treatments tried: safe tussin. The treatment provided mild relief. Her past medical history is significant for bronchitis. There is no history of asthma, bronchiectasis, COPD, emphysema, environmental allergies or pneumonia.       Constitution: Negative for chills, fatigue and fever.   HENT:  Positive for congestion, postnasal drip and sore throat. Negative for ear pain.    Cardiovascular:  Negative for chest pain.   Respiratory:  Positive for cough. Negative for bloody sputum, shortness of breath and wheezing.    Gastrointestinal:  Negative for abdominal pain, nausea, vomiting, diarrhea and heartburn.   Musculoskeletal:  Negative for muscle ache.   Skin:  Negative for rash.   Allergic/Immunologic: Negative for environmental allergies.   Neurological:  Negative for headaches.      Objective:     Physical Exam   Constitutional: She is oriented to person, place, and time. She appears well-developed. She is cooperative.  " Non-toxic appearance. She does not appear ill. No distress.   HENT:   Head: Normocephalic and atraumatic.   Ears:   Right Ear: Hearing, tympanic membrane, external ear and ear canal normal.   Left Ear: Hearing, tympanic membrane, external ear and ear canal normal.   Nose: Nose normal. No mucosal edema, rhinorrhea, nasal deformity or congestion. No epistaxis. Right sinus exhibits no maxillary sinus tenderness and no frontal sinus tenderness. Left sinus exhibits no maxillary sinus tenderness and no frontal sinus tenderness.   Mouth/Throat: Uvula is midline, oropharynx is clear and moist and mucous membranes are normal. No trismus in the jaw. Normal dentition. No uvula swelling. No oropharyngeal exudate, posterior oropharyngeal edema or posterior oropharyngeal erythema.   Eyes: Conjunctivae and lids are normal. Pupils are equal, round, and reactive to light. No scleral icterus.   Neck: Trachea normal and phonation normal. Neck supple. No edema present. No erythema present. No neck rigidity present.   Cardiovascular: Normal rate, regular rhythm, normal heart sounds and normal pulses.   Pulmonary/Chest: Effort normal and breath sounds normal. No stridor. No respiratory distress. She has no decreased breath sounds. She has no wheezes. She has no rhonchi. She has no rales.   Abdominal: Normal appearance.   Musculoskeletal: Normal range of motion.         General: No deformity. Normal range of motion.   Neurological: She is alert and oriented to person, place, and time. She exhibits normal muscle tone. Coordination normal.   Skin: Skin is warm, dry, intact, not diaphoretic and not pale.   Psychiatric: Her speech is normal and behavior is normal. Judgment and thought content normal.   Nursing note and vitals reviewed.      Assessment:     1. Viral URI    2. Acute cough        Plan:     Results for orders placed or performed in visit on 12/13/24   SARS Coronavirus 2 Antigen, POCT Manual Read    Collection Time: 12/13/24 12:11  PM   Result Value Ref Range    SARS Coronavirus 2 Antigen Negative Negative     Acceptable Yes      *Note: Due to a large number of results and/or encounters for the requested time period, some results have not been displayed. A complete set of results can be found in Results Review.       Viral URI    Acute cough  -     SARS Coronavirus 2 Antigen, POCT Manual Read  -     benzonatate (TESSALON) 100 MG capsule; Take 1 capsule (100 mg total) by mouth 3 (three) times daily as needed for Cough.  Dispense: 30 capsule; Refill: 0  -     promethazine-dextromethorphan (PROMETHAZINE-DM) 6.25-15 mg/5 mL Syrp; Take 5 mLs by mouth nightly as needed (cough).  Dispense: 118 mL; Refill: 0            Discussed results/diagnosis/plan with patient in clinic. Strict precautions given to patient to monitor for worsening signs and symptoms. Advised to follow up with PCP or specialist.  Explained side effects of medications prescribed with patient and informed him/her to discontinue use if he/she has any side effects and to inform UC or PCP if this occurs. All questions answered. Strict ED verses clinic return precautions stressed and given in depth. Advised if symptoms worsens of fail to improve he/she should go to the Emergency Room. Discharge and follow-up instructions given verbally/printed with the patient who expressed understanding and willingness to comply with my recommendations. Patient voiced understanding and in agreement with current treatment plan. Patient exits the exam room in no acute distress. Conversant and engaged during discharge discussion, verbalized understanding.

## 2024-12-13 NOTE — PATIENT INSTRUCTIONS
Negative for COVID. Symptoms are likely from a virus or allergen at this time.     When sick with a viral illness, cover your mouth and nose when sneezing and coughing. Wash hands frequently. Sanitize areas at home. Wear a mask in public if you need. Stay home if you are having fevers, chills, body aches, fatigue. Symptoms should resolve in 7-14 days. There is no specific treatment for viral illnesses. We treat symptoms with supportive care. Getting plenty of rest but completing light activities daily, eating a well-balanced diet, drinking plenty of fluids, managing stress levels can aid in faster recovery.      Try tessalon perles as directed during the day for your cough. It will help numb the back of your throat so you do not have the urge to cough.      Take promethazine/DM cough syrup at night for cough. Promethazine is an oral anti-histamine and will help with sinus relief. DM (dextromethorphan) is a decongestant. This medication will make you drowsy. Make sure you do not drive a vehicle, drink alcohol, operate machinery or take other sedating medications while taking.     Try a decongestant and corticosteroid nasal spray like flonase for the next few days for sinus relief. Initial: 2 sprays in each nostril once daily for 1 week. Reduce to 1 spray in each nostril once per day. Stop taking if you develop a nose bleed. Nasal saline spray can be used together with flonase to help moisten nostrils. An antihistamine like zyrtec, claritin, allegra can also be helpful for sinus relief and will help dry nasal passages.     Honey is a natural cough suppressant.     If you do have Hypertension or palpitations, it is safe to take Coricidin HBP (multi-symptom flu) for relief of sinus symptoms.  Try DASH diet to help lower BP and buy a blood pressure cuff for home monitoring. Check blood pressure at least 2 times per day and create a log. Avoid eating foods that are high in salt. Eat more foods with potassium, magnesium  and calcium which will help dilate your vessels and decrease your BP.     Warm tea/warm liquids will help soothe the back of your throat. Warm water salt gurgles can also be helpful. A dry throat will cause pain. Make sure to stay hydrated. Water and pedilyte are the best to drink. Neti pot irrigation, humidifier in your room, avoiding fans, warm compresses to face, eating/drinking hot soups, hot shower before bedtime can help.     The recommended daily fluid intake for women is 2.7 liters (five 16 oz bottles).      Alternate Tylenol and ibuprofen every 4 hours as needed for fever and body aches.  Please take NSAIDs with a full glass of water and food to avoid GI upset.      Please only use over the counter cough and cold medications for 3-5 days at a time to avoid rebound symptoms.     Getting plenty of rest can aid in a faster recovery of illnesses.     Please follow-up with your primary care provider or return to the clinic if not better/worsening symptoms in 1 week.     Report to the ER if you have chest pain, shortness of breath, palpitations.

## 2025-01-08 DIAGNOSIS — Z17.0 MALIGNANT NEOPLASM OF LOWER-INNER QUADRANT OF LEFT BREAST IN FEMALE, ESTROGEN RECEPTOR POSITIVE: ICD-10-CM

## 2025-01-08 DIAGNOSIS — C50.312 MALIGNANT NEOPLASM OF LOWER-INNER QUADRANT OF LEFT BREAST IN FEMALE, ESTROGEN RECEPTOR POSITIVE: ICD-10-CM

## 2025-01-08 RX ORDER — LETROZOLE 2.5 MG/1
2.5 TABLET, FILM COATED ORAL
Qty: 90 TABLET | Refills: 3 | Status: SHIPPED | OUTPATIENT
Start: 2025-01-08

## 2025-01-24 ENCOUNTER — OFFICE VISIT (OUTPATIENT)
Dept: SPORTS MEDICINE | Facility: CLINIC | Age: 70
End: 2025-01-24
Payer: MEDICARE

## 2025-01-24 VITALS — BODY MASS INDEX: 27.85 KG/M2 | WEIGHT: 163.13 LBS | HEIGHT: 64 IN

## 2025-01-24 DIAGNOSIS — M25.461 KNEE EFFUSION, RIGHT: Primary | ICD-10-CM

## 2025-01-24 DIAGNOSIS — M25.561 CHRONIC PAIN OF RIGHT KNEE: ICD-10-CM

## 2025-01-24 DIAGNOSIS — G89.29 CHRONIC PAIN OF RIGHT KNEE: ICD-10-CM

## 2025-01-24 DIAGNOSIS — M17.11 OSTEOARTHRITIS OF RIGHT KNEE, UNSPECIFIED OSTEOARTHRITIS TYPE: ICD-10-CM

## 2025-01-24 PROCEDURE — 89060 EXAM SYNOVIAL FLUID CRYSTALS: CPT | Performed by: PHYSICIAN ASSISTANT

## 2025-01-24 PROCEDURE — 1159F MED LIST DOCD IN RCRD: CPT | Mod: CPTII,S$GLB,, | Performed by: PHYSICIAN ASSISTANT

## 2025-01-24 PROCEDURE — 1101F PT FALLS ASSESS-DOCD LE1/YR: CPT | Mod: CPTII,S$GLB,, | Performed by: PHYSICIAN ASSISTANT

## 2025-01-24 PROCEDURE — 3008F BODY MASS INDEX DOCD: CPT | Mod: CPTII,S$GLB,, | Performed by: PHYSICIAN ASSISTANT

## 2025-01-24 PROCEDURE — 3288F FALL RISK ASSESSMENT DOCD: CPT | Mod: CPTII,S$GLB,, | Performed by: PHYSICIAN ASSISTANT

## 2025-01-24 PROCEDURE — 99999 PR PBB SHADOW E&M-EST. PATIENT-LVL III: CPT | Mod: PBBFAC,,, | Performed by: PHYSICIAN ASSISTANT

## 2025-01-24 PROCEDURE — 20610 DRAIN/INJ JOINT/BURSA W/O US: CPT | Mod: RT,S$GLB,, | Performed by: PHYSICIAN ASSISTANT

## 2025-01-24 PROCEDURE — 1125F AMNT PAIN NOTED PAIN PRSNT: CPT | Mod: CPTII,S$GLB,, | Performed by: PHYSICIAN ASSISTANT

## 2025-01-24 PROCEDURE — 99214 OFFICE O/P EST MOD 30 MIN: CPT | Mod: 25,S$GLB,, | Performed by: PHYSICIAN ASSISTANT

## 2025-01-24 PROCEDURE — 1160F RVW MEDS BY RX/DR IN RCRD: CPT | Mod: CPTII,S$GLB,, | Performed by: PHYSICIAN ASSISTANT

## 2025-01-24 RX ORDER — TRIAMCINOLONE ACETONIDE 40 MG/ML
40 INJECTION, SUSPENSION INTRA-ARTICULAR; INTRAMUSCULAR
Status: COMPLETED | OUTPATIENT
Start: 2025-01-24 | End: 2025-01-24

## 2025-01-24 RX ADMIN — TRIAMCINOLONE ACETONIDE 40 MG: 40 INJECTION, SUSPENSION INTRA-ARTICULAR; INTRAMUSCULAR at 10:01

## 2025-01-24 NOTE — LETTER
Patient: Sunitha Pillai   YOB: 1955   Clinic Number: [id number]   Today's Date: January 24, 2025        Certificate to Return to Work     Sunitha  was seen by Meño Ivy PA-C on 1/24/2025.      Sunitha  can return to work on 01/27/25           If you have any questions or concerns, please feel free to contact the office at [phone].    Thank you.    Meño Ivy PA-C        Signature: __________________________________________________

## 2025-01-25 LAB
BODY FLD TYPE: NORMAL
CRYSTALS FLD MICRO: NEGATIVE
PATH INTERP FLD-IMP: NORMAL

## 2025-01-29 ENCOUNTER — TELEPHONE (OUTPATIENT)
Dept: SPORTS MEDICINE | Facility: CLINIC | Age: 70
End: 2025-01-29
Payer: MEDICARE

## 2025-01-29 NOTE — PROGRESS NOTES
CC: right knee pain (referral from Dr. Torres)    69 y.o. Female  at [employer] who reports diffuse knee pain refractory to conservative mgmt. Left knee is better recently. The right knee recently flared up and has been hurting her over recent weeks. She received a CSI to this knee in past with great improvement and no issues since.     Pain in knees for years. Progressively worsening.   Last A1C was 5.5.     Is no longer affecting ADLs.     No mechanical symptoms, no instability      Review of Systems   Constitution: Negative. Negative for chills, fever and night sweats.   HENT: Negative for congestion and headaches.    Eyes: Negative for blurred vision, left vision loss and right vision loss.   Cardiovascular: Negative for chest pain and syncope.   Respiratory: Negative for cough and shortness of breath.    Endocrine: Negative for polydipsia, polyphagia and polyuria.   Hematologic/Lymphatic: Negative for bleeding problem. Does not bruise/bleed easily.   Skin: Negative for dry skin, itching and rash.   Musculoskeletal: Negative for falls and muscle weakness.   Gastrointestinal: Negative for abdominal pain and bowel incontinence.   Genitourinary: Negative for bladder incontinence and nocturia.   Neurological: Negative for disturbances in coordination, loss of balance and seizures.   Psychiatric/Behavioral: Negative for depression. The patient does not have insomnia.    Allergic/Immunologic: Negative for hives and persistent infections.   All other systems negative      PAST MEDICAL HISTORY:   Past Medical History:   Diagnosis Date    Acute coronary syndrome 09/23/2018    Adjustment disorder     Anxiety     Arthritis     Cancer of breast 09/03/2020    Malignant neoplasm of lower-inner quadrant of left breast in female, estrogen receptor positive    Cholelithiasis with acute cholecystitis 03/05/2021    Coronary artery disease     Depression     Diabetes mellitus type I     Genetic testing of female  2020    [facility] via [employer] [facility] [employer] with AXIN2 and POLE variants of uncertain significance    GERD (gastroesophageal reflux disease)     Hepatitis B core antibody positive 3/9/2023    Negative sAg, suggests previous exposure but no chronic/active Hep B. At risk for reactivation with any immunosuppression medication, steroids, chemo, etc.      Hypertension     Vertigo 2019    Vitamin D deficiency 2021     PAST SURGICAL HISTORY:   Past Surgical History:   Procedure Laterality Date    BREAST BIOPSY Left 2022    Left punch biopsy; Unremarkable keratinized skin with intradermal hematoma     SECTION      COLONOSCOPY N/A 2024    Procedure: COLONOSCOPY;  Surgeon: PORTILLO Esqueda MD;  Location: [facility] (4TH FLR);  Service: Endoscopy;  Laterality: N/A;  ref by ,insrt sent via portal and giving to pt,states she not on any weight loss meds at this time, and not taking trulicity.sutab.AC  -lvm for pre call-tb-hx of constipation??  -peg, pt confirmed appt, see telephone encounter -Kpvt    HYSTEROSCOPY WITH DILATION AND CURETTAGE OF UTERUS N/A 2024    Procedure: HYSTEROSCOPY, WITH DILATION AND CURETTAGE OF UTERUS;  Surgeon: Hank Doran MD;  Location: [facility];  Service: OB/GYN;  Laterality: N/A;    INJECTION FOR SENTINEL NODE IDENTIFICATION Left 2020    Procedure: INJECTION, FOR SENTINEL NODE IDENTIFICATION;  Surgeon: Isabel Moulton MD;  Location: [facility] OR;  Service: General;  Laterality: Left;    INSERTION OF TUNNELED CENTRAL VENOUS CATHETER (CVC) WITH SUBCUTANEOUS PORT N/A 2020    Procedure: INSERTION, PORT-A-CATH;  Surgeon: Hardeep Martin MD;  Location: [facility] CATH LAB;  Service: Radiology;  Laterality: N/A;    LAPAROSCOPIC CHOLECYSTECTOMY N/A 2021    Procedure: CHOLECYSTECTOMY, LAPAROSCOPIC;  Surgeon: Buster Son MD;  Location: [facility] 2ND FLR;  Service: General;  Laterality: N/A;    MASTECTOMY, PARTIAL Left 2020     Procedure: MASTECTOMY, PARTIAL-w/reflector;  Surgeon: Isabel Moulton MD;  Location: [facility] OR;  Service: General;  Laterality: Left;    SENTINEL LYMPH NODE BIOPSY Left 09/17/2020    Procedure: BIOPSY, LYMPH NODE, SENTINEL;  Surgeon: Isabel Moulton MD;  Location: [facility] OR;  Service: General;  Laterality: Left;     FAMILY HISTORY:   Family History   Problem Relation Name Age of Onset    Hypertension Mother      Hyperlipidemia Mother      Diabetes Mother      Heart disease Mother      Colon polyps Mother      Aneurysm Father      Diabetes Sister 2     Hypertension Sister 2     Heart disease Brother 2     Diabetes Brother 2     Hypertension Brother 2     Cancer Brother 1         brother German with prostate cancer; brother Cooper with throat cancer    Cervical cancer Daughter Daughter1     Anxiety disorder Daughter Daughter2     Depression Daughter Daughter2     Anxiety disorder Daughter Daughter3     Depression Daughter Daughter3     Breast cancer Maternal Aunt Teresa     Cancer Maternal Aunt Nikki         unknown origin    Cancer Paternal Aunt Esperanza         unknown origin    Breast cancer Paternal Aunt Esperanza     Prostate cancer Maternal Cousin Chris     Leukemia Maternal Cousin Chris     Prostate cancer Maternal Cousin Nirmal     Breast cancer Other Pat 1/2aunt Quiana     Colon cancer Neg Hx      Ovarian cancer Neg Hx       SOCIAL HISTORY:   Social History     Socioeconomic History    Marital status:      Spouse name: Burak    Number of children: 3   Tobacco Use    Smoking status: Never    Smokeless tobacco: Never   Substance and Sexual Activity    Alcohol use: Never    Drug use: No    Sexual activity: Yes     Partners: Male     Birth control/protection: Post-menopausal   Social History Narrative    ** Merged History Encounter **          Social Drivers of Health     Financial Resource Strain: Medium Risk (4/6/2023)    Overall Financial Resource Strain (CARDIA)     Difficulty of Paying Living Expenses:  "Somewhat hard   Food Insecurity: No Food Insecurity (11/25/2024)    Hunger Vital Sign     Worried About Running Out of Food in the Last Year: Never true     Ran Out of Food in the Last Year: Never true   Transportation Needs: No Transportation Needs (4/6/2023)    PRAPARE - Transportation     Lack of Transportation (Medical): No     Lack of Transportation (Non-Medical): No   Physical Activity: Inactive (4/6/2023)    Exercise Vital Sign     Days of Exercise per Week: 0 days     Minutes of Exercise per Session: 0 min   Stress: No Stress Concern Present (4/6/2023)    [employer] [place] [employer] - Occupational Stress Questionnaire     Feeling of Stress : Not at all   Housing Stability: Unknown (4/6/2023)    Housing Stability Vital Sign     Unable to Pay for Housing in the Last Year: No     Unstable Housing in the Last Year: No       MEDICATIONS:   Current Outpatient Medications:     ACCU-CHEK GUIDE GLUCOSE METER Misc, USE  THREE TIMES DAILY (Patient not taking: Reported on 1/31/2025), Disp: 1 each, Rfl: 0    acetaminophen (TYLENOL) 325 MG tablet, Take 2 tablets (650 mg total) by mouth every 6 (six) hours. Alternate between ibuprofen and tylenol every 3 hours. For example: @0800: ibuprofen 600mg @1100: tylenol 650mg @1400: ibuprofen 600mg @1700: tylenol 650 mg @2000: ibuprofen 600mg, Disp: 60 tablet, Rfl: 1    amLODIPine (NORVASC) 2.5 MG tablet, Take 2.5 mg by mouth. (Patient not taking: Reported on 1/31/2025), Disp: , Rfl:     amLODIPine (NORVASC) 5 MG tablet, Take 1 tablet (5 mg total) by mouth once daily., Disp: 90 tablet, Rfl: 3    aspirin 81 mg Tab, Take 1 tablet by mouth Daily., Disp: , Rfl:     atorvastatin (LIPITOR) 10 MG tablet, Take 1 tablet by mouth once daily, Disp: 90 tablet, Rfl: 2    BD ULTRA-FINE SHORT PEN NEEDLE 31 gauge x 5/16" Ndle, USE 1  4 TIMES DAILY WITH  INSULIN (Patient not taking: Reported on 1/31/2025), Disp: 100 each, Rfl: 3    blood sugar diagnostic Strp, Strips and lancets " covered by insurance E11.9, pt checks glucose three times daily, insulin dependent (Patient not taking: Reported on 1/31/2025), Disp: 300 each, Rfl: 3    blood sugar diagnostic Strp, Check glucose daily Strips and lancets covered by insurance E11.9 (Patient not taking: Reported on 1/31/2025), Disp: 100 each, Rfl: 3    blood sugar diagnostic, drum (ACCU-CHEK COMPACT TEST) Strp, USE ONE STRIP TO CHECK GLUCOSE 4 TIMES DAILY BEFORE EACH MEAL AND AT BEDTIME (Patient not taking: Reported on 1/31/2025), Disp: 100 each, Rfl: 0    blood-glucose meter kit, Check glucose daily E11.9 meter covered by insurance (Patient not taking: Reported on 1/31/2025), Disp: 1 each, Rfl: 0    docusate sodium (COLACE) 100 MG capsule, Take 1 capsule (100 mg total) by mouth 2 (two) times daily. (Patient not taking: Reported on 1/31/2025), Disp: 60 capsule, Rfl: 1    ibuprofen (ADVIL,MOTRIN) 800 MG tablet, Take 1 tablet (800 mg total) by mouth every 6 (six) hours as needed for Pain. Alternate between ibuprofen and tylenol every 3 hours. For example: @0800: ibuprofen 600mg @1100: tylenol 650mg @1400: ibuprofen 600mg @1700: tylenol 650 mg @2000: ibuprofen 600mg, Disp: 60 tablet, Rfl: 1    letrozole (FEMARA) 2.5 mg Tab, Take 1 tablet by mouth once daily, Disp: 90 tablet, Rfl: 3    losartan (COZAAR) 100 MG tablet, Take 1 tablet (100 mg total) by mouth once daily., Disp: 90 tablet, Rfl: 2    metFORMIN (GLUCOPHAGE) 1000 MG tablet, TAKE 1 TABLET BY MOUTH ONCE DAILY WITH A MEAL, Disp: 90 tablet, Rfl: 1    polyethylene glycol (GOLYTELY) 236-22.74-6.74 -5.86 gram suspension, Take 4,000 mLs by mouth once. (Patient not taking: Reported on 1/31/2025), Disp: , Rfl:     promethazine-dextromethorphan (PROMETHAZINE-DM) 6.25-15 mg/5 mL Syrp, Take 5 mLs by mouth nightly as needed (cough)., Disp: 118 mL, Rfl: 0    topiramate (TOPAMAX) 25 MG tablet, One at bedtime for 2 weeks then two at bedtime, Disp: 60 tablet, Rfl: 2  No current facility-administered medications  "for this visit.    Facility-Administered Medications Ordered in Other Visits:     fentaNYL injection 25 mcg, 25 mcg, Intravenous, Q5 Min PRN, Kenneth Shannon MD    midazolam (VERSED) 1 mg/mL injection 0.5 mg, 0.5 mg, Intravenous, PRN, Kenneth Shannon MD, 2 mg at 09/17/20 0637  ALLERGIES: Review of patient's allergies indicates:  No Known Allergies    VITAL SIGNS: Ht 5' 4" (1.626 m)   Wt 74 kg (163 lb 2.3 oz)   LMP  (LMP Unknown)   BMI 28.00 kg/m²      PHYSICAL EXAMINATION    General:  The patient is alert and oriented x 3.  Mood is pleasant.  Observation of ears, eyes and nose reveal no gross abnormalities.  HEENT: NCAT, sclera nonicteric  Lungs: Respirations are equal and unlabored.    Right KNEE EXAMINATION     OBSERVATION / INSPECTION   Gait:   normal  Alignment:  Neutral   Scars:   None   Muscle atrophy: Mild  Effusion:  moderate  Warmth:  None   Discoloration:   none     TENDERNESS / CREPITUS (T / C):          T / C      T / C   Patella   - / -   Lateral joint line   + / -      Peripatellar medial  -  Medial joint line    +  / -   Peripatellar lateral -  Medial plica   - / -   Patellar tendon -   Popliteal fossa  - / -   Quad tendon   -   Gastrocnemius   -   Prepatellar Bursa - / -   Quadricep   -   Tibial tubercle  -  Thigh/hamstring  -   Pes anserine/HS -  Fibula    -   ITB   - / -  Tibia     -   Tib/fib joint  - / -  LCL    -     MFC   - / -   MCL: Proximal  -    LFC   - / -    Distal   -          ROM: (* = pain)  PASSIVE   ACTIVE    Left :   0 / 0 / 120   0 / 0 / 120    Right :    0 / 0 / 120   0 / 0 / 120    Patellofemoral examination:  See above noted areas of tenderness.   Patella position    Subluxation / dislocation: Centered           Sup. / Inf;   Normal   Crepitus (PF):    Absent   Patellar Mobility:       Medial-lateral:   Normal    Superior-inferior:  Normal    Inferior tilt   Normal    Patellar tendon:  Normal   Lateral tilt:    Normal   J-sign:     None "   Patellofemoral grind:   none      MENISCAL SIGNS:     Pain on terminal extension:  +  Pain on terminal flexion:  +  Tgs maneuver:  -  Squat     NT    LIGAMENT EXAMINATION:  ACL / Lachman:  normal (-1 to 2mm)    PCL-Post.  drawer: normal 0 to 2mm  MCL- Valgus:  normal 0 to 2mm  LCL- Varus:  normal 0 to 2mm  Pivot shift: normal (Equal)   Dial Test: difference c/w other side   At 30° flexion: normal (< 5°)    At 90° flexion: normal (< 5°)       STRENGTH: (* = with pain) PAINFUL SIDE   Quadricep   5/5   Hamstrin/5    EXTREMITY NEURO-VASCULAR EXAMINATION:   Sensation:  Grossly intact to light touch all dermatomal regions.   Motor Function:  Fully intact motor function at hip, knee, foot and ankle    DTRs;  quadriceps and  achilles 2+.  No clonus and downgoing Babinski.    Vascular status:  DP and PT pulses 2+, brisk capillary refill, symmetric.     Other Findings:       Xrays:  Xrays of the bilateral knees with flexion were reviewed by me today. No fracture, subluxation, or other significant bony or joint abnormality is identified. Moderate tricompartmental DJD of bilateral knees.   Kellgren Surjit 2 or greater noted.         ASSESSMENT:    1. right Knee pain  2. right knee osteoarthritis   3. Right knee effusion  Bilateral hip abd/core weakness    PLAN:    Procedure Note:  After consent was obtained, using sterile technique the right knee was prepped with iodine. The knee joint was entered from the lateral suprapatellar approach and 70 ml's of serous colored fluid was withdrawn and was  sent for GOUT analysis.  The procedure was well tolerated.  The patient is asked to continue to rest the knee for a few more days before resuming regular activities.  It may be more painful for the first 1-2 days.  Watch for fever, or increased swelling or persistent pain in knee. Call or return to clinic prn if such symptoms occur or the knee fails to improve as anticipated.    2. Ice compresses prn pain     3. Knee  compression sleeve given.     All patients questions were answered. Patient had opportunity and has no other questions or concerns at this time. Patient was advised to contact us if they have any further questions or if symptoms worsen, change, or new symptoms begin occurring. This could indicate that something different/worse is occurring that was not present at our time of discussion/evaluation.       I made the decision to obtain old records of the patient including previous notes and imaging. New imaging was ordered today of the extremity or extremities evaluated. I independently reviewed and interpreted the radiographs and/or MRIs today as well as prior imaging.

## 2025-01-29 NOTE — TELEPHONE ENCOUNTER
----- Message from Meño Ivy PA-C sent at 1/29/2025  9:53 AM CST -----  Please call her and see how she is doing after knee aspiration and CSI. Also let her know that the fluid did NOT show signs of gout.

## 2025-01-31 ENCOUNTER — OFFICE VISIT (OUTPATIENT)
Dept: INTERNAL MEDICINE | Facility: CLINIC | Age: 70
End: 2025-01-31
Payer: MEDICARE

## 2025-01-31 VITALS
TEMPERATURE: 97 F | DIASTOLIC BLOOD PRESSURE: 74 MMHG | RESPIRATION RATE: 18 BRPM | HEIGHT: 64 IN | SYSTOLIC BLOOD PRESSURE: 132 MMHG | BODY MASS INDEX: 27.21 KG/M2 | WEIGHT: 159.38 LBS

## 2025-01-31 DIAGNOSIS — Z17.1 MALIGNANT NEOPLASM OF LOWER-INNER QUADRANT OF LEFT BREAST IN FEMALE, ESTROGEN RECEPTOR NEGATIVE: ICD-10-CM

## 2025-01-31 DIAGNOSIS — N18.31 CHRONIC KIDNEY DISEASE, STAGE 3A: ICD-10-CM

## 2025-01-31 DIAGNOSIS — M05.79 RHEUMATOID ARTHRITIS INVOLVING MULTIPLE SITES WITH POSITIVE RHEUMATOID FACTOR: ICD-10-CM

## 2025-01-31 DIAGNOSIS — Z00.00 ENCOUNTER FOR PREVENTIVE HEALTH EXAMINATION: Primary | ICD-10-CM

## 2025-01-31 DIAGNOSIS — E11.65 TYPE 2 DIABETES MELLITUS WITH HYPERGLYCEMIA, WITHOUT LONG-TERM CURRENT USE OF INSULIN: ICD-10-CM

## 2025-01-31 DIAGNOSIS — D69.6 THROMBOCYTOPENIA: ICD-10-CM

## 2025-01-31 DIAGNOSIS — C50.312 MALIGNANT NEOPLASM OF LOWER-INNER QUADRANT OF LEFT BREAST IN FEMALE, ESTROGEN RECEPTOR NEGATIVE: ICD-10-CM

## 2025-01-31 DIAGNOSIS — Z23 NEED FOR VACCINATION: ICD-10-CM

## 2025-01-31 PROCEDURE — 99999 PR PBB SHADOW E&M-EST. PATIENT-LVL V: CPT | Mod: PBBFAC,,,

## 2025-01-31 NOTE — PATIENT INSTRUCTIONS
Counseling and Referral of Other Preventative  (Italic type indicates deductible and co-insurance are waived)    Patient Name: Sunitha Pillai  Today's Date: 1/31/2025    Health Maintenance       Date Due Completion Date    Diabetic Eye Exam 03/25/2020 3/25/2019    Foot Exam 02/22/2024 2/22/2023 (Done)    Override on 2/22/2023: Done    Override on 8/14/2019: Done    Influenza Vaccine (1) 09/01/2024 11/16/2023    COVID-19 Vaccine (5 - 2024-25 season) 09/01/2024 12/20/2022    Hemoglobin A1c 02/01/2025 8/1/2024    Diabetes Urine Screening 04/04/2025 4/4/2024    Lipid Panel 04/04/2025 4/4/2024    DEXA Scan 10/21/2025 10/21/2021    Mammogram 11/25/2025 11/25/2024    High Dose Statin 01/31/2026 1/31/2025    Cervical Cancer Screening 12/07/2028 12/7/2023    Override on 6/11/2012: Done    TETANUS VACCINE 04/04/2034 4/4/2024    Colorectal Cancer Screening 09/06/2034 9/6/2024        Orders Placed This Encounter   Procedures    Ambulatory referral/consult to Podiatry     The following information is provided to all patients.  This information is to help you find resources for any of the problems found today that may be affecting your health:                  Living healthy guide: www.[facility].[place].gov      Understanding Diabetes: www.diabetes.org      Eating healthy: www.cdc.gov/healthyweight      CDC home safety checklist: www.cdc.gov/steadi/patient.html      Agency on Aging: www.goea.[place].[facility]      Alcoholics anonymous (AA): www.aa.org      Physical Activity: www.chucho.nih.gov/zu8pyyh      Tobacco use: www.quitwithusla.org

## 2025-01-31 NOTE — PROGRESS NOTES
"  Sunitha Pillai presented for a follow-up Medicare AWV today. The following components were reviewed and updated:    Medical history  Family History  Social history  Allergies and Current Medications  Health Risk Assessment  Health Maintenance  Care Team    **See Completed Assessments for Annual Wellness visit with in the encounter summary    The following assessments were completed:  Depression Screening  Cognitive function Screening    Timed Get Up Test  Whisper Test      Opioid documentation:      Patient does not have a current opioid prescription.          Vitals:    01/31/25 0820 01/31/25 0911   BP: (!) 154/82 132/74   BP Location: Left arm Right arm   Patient Position: Sitting Sitting   Resp: 18    Temp: 97 °F (36.1 °C)    TempSrc: Other (see comments)    Weight: 72.3 kg (159 lb 6.3 oz)    Height: 5' 4" (1.626 m)      Body mass index is 27.36 kg/m².       Physical Exam  Vitals reviewed.   Constitutional:       Appearance: Normal appearance.   HENT:      Head: Normocephalic and atraumatic.   Cardiovascular:      Rate and Rhythm: Normal rate and regular rhythm.      Pulses: Normal pulses.           Radial pulses are 2+ on the right side and 2+ on the left side.        Dorsalis pedis pulses are 2+ on the right side and 2+ on the left side.        Posterior tibial pulses are 2+ on the right side and 2+ on the left side.      Heart sounds: Normal heart sounds.   Pulmonary:      Effort: Pulmonary effort is normal.      Breath sounds: Normal breath sounds.   Musculoskeletal:      Right lower leg: No edema.      Left lower leg: No edema.        Feet:    Feet:      Right foot:      Protective Sensation: 10 sites tested.  10 sites sensed.      Skin integrity: Skin integrity normal.      Toenail Condition: Right toenails are normal.      Left foot:      Protective Sensation: 10 sites tested.  10 sites sensed.      Skin integrity: Skin integrity normal.      Toenail Condition: Left toenails are normal.   Skin:     " General: Skin is warm and dry.      Capillary Refill: Capillary refill takes less than 2 seconds.   Neurological:      General: No focal deficit present.      Mental Status: She is alert and oriented to person, place, and time.   Psychiatric:         Mood and Affect: Mood normal.         Behavior: Behavior normal.       Diagnoses and health risks identified today and associated recommendations/orders:  1. Encounter for preventive health examination  Assessment and evaluation performed as stated above.    2. Type 2 diabetes mellitus with hyperglycemia, without long-term current use of insulin  Stable on metformin.  Hemoglobin A1c 5.7 on 08/01/2024.  Follow-up with PCP  - Ambulatory referral/consult to Podiatry; Future    3. Malignant neoplasm of lower-inner quadrant of left breast in female, estrogen receptor negative  Patient history breast cancer, currently on letrozole for maintenance.  Last visit with Oncology on 11/25/2024.  Per hematology/oncology note, patient to follow up six months from visit.    4. Rheumatoid arthritis involving multiple sites with positive rheumatoid factor  Stable with daily stretching and p.r.n. use of ibuprofen.  Follow-up with PCP    5. Chronic kidney disease, stage 3a  Stable and protected on losartan. I recommend avoiding nephrotoxic medications such as NSAIDS (aleve, motrin, naproxen, ibuprofen).  These medications put extra stress on the kidneys.  Increase hydration to keep kidneys operating at an optimal level.  Follow-up with PCP    6. Need for vaccination  Patient requested flu vaccine.  Vaccine administered during today's visit.  Patient tolerated injection well.  - Influenza - Trivalent (Adjuvanted)    7. Thrombocytopenia  Stable on current treatment regimen.  Platelets 146 on 11/29/2024.  Follow up with PCP.      Provided Sunitha with a 5-10 year written screening schedule and personal prevention plan. Recommendations were developed using the USPSTF age appropriate  recommendations. Education, counseling, and referrals were provided as needed.  After Visit Summary printed and given to patient which includes a list of additional screenings\tests needed.    Follow up in about 1 year (around 1/31/2026), or if symptoms worsen or fail to improve.      Sophie Rodriguez, DONOVAN    I offered to discuss advanced care planning, including how to pick a person who would make decisions for you if you were unable to make them for yourself, called a health care power of , and what kind of decisions you might make such as use of life sustaining treatments such as ventilators and tube feeding when faced with a life limiting illness recorded on a living will that they will need to know. (How you want to be cared for as you near the end of your natural life)     X  Patient is unwilling to engage in a discussion regarding advance directives at this time.

## 2025-02-05 DIAGNOSIS — E11.9 TYPE 2 DIABETES MELLITUS WITHOUT COMPLICATION: ICD-10-CM

## 2025-02-06 ENCOUNTER — OFFICE VISIT (OUTPATIENT)
Dept: PODIATRY | Facility: CLINIC | Age: 70
End: 2025-02-06
Payer: MEDICARE

## 2025-02-06 VITALS
BODY MASS INDEX: 27.21 KG/M2 | SYSTOLIC BLOOD PRESSURE: 131 MMHG | HEART RATE: 73 BPM | WEIGHT: 159.38 LBS | HEIGHT: 64 IN | DIASTOLIC BLOOD PRESSURE: 73 MMHG

## 2025-02-06 DIAGNOSIS — E11.65 TYPE 2 DIABETES MELLITUS WITH HYPERGLYCEMIA, WITHOUT LONG-TERM CURRENT USE OF INSULIN: ICD-10-CM

## 2025-02-06 DIAGNOSIS — M79.671 FOOT PAIN, BILATERAL: ICD-10-CM

## 2025-02-06 DIAGNOSIS — M77.9 CAPSULITIS: Primary | ICD-10-CM

## 2025-02-06 DIAGNOSIS — M79.672 FOOT PAIN, BILATERAL: ICD-10-CM

## 2025-02-06 DIAGNOSIS — S96.912A STRAIN OF ANKLE AND FOOT, LEFT, INITIAL ENCOUNTER: ICD-10-CM

## 2025-02-06 DIAGNOSIS — M24.573 EQUINUS CONTRACTURE OF ANKLE: ICD-10-CM

## 2025-02-06 DIAGNOSIS — S96.911A STRAIN OF ANKLE AND FOOT, RIGHT, INITIAL ENCOUNTER: ICD-10-CM

## 2025-02-06 DIAGNOSIS — E11.42 TYPE 2 DIABETES MELLITUS WITH DIABETIC POLYNEUROPATHY, UNSPECIFIED WHETHER LONG TERM INSULIN USE: ICD-10-CM

## 2025-02-06 PROCEDURE — 3288F FALL RISK ASSESSMENT DOCD: CPT | Mod: CPTII,S$GLB,, | Performed by: PODIATRIST

## 2025-02-06 PROCEDURE — 99204 OFFICE O/P NEW MOD 45 MIN: CPT | Mod: S$GLB,,, | Performed by: PODIATRIST

## 2025-02-06 PROCEDURE — 1159F MED LIST DOCD IN RCRD: CPT | Mod: CPTII,S$GLB,, | Performed by: PODIATRIST

## 2025-02-06 PROCEDURE — 3008F BODY MASS INDEX DOCD: CPT | Mod: CPTII,S$GLB,, | Performed by: PODIATRIST

## 2025-02-06 PROCEDURE — 1101F PT FALLS ASSESS-DOCD LE1/YR: CPT | Mod: CPTII,S$GLB,, | Performed by: PODIATRIST

## 2025-02-06 PROCEDURE — 99999 PR PBB SHADOW E&M-EST. PATIENT-LVL IV: CPT | Mod: PBBFAC,,, | Performed by: PODIATRIST

## 2025-02-06 PROCEDURE — 3078F DIAST BP <80 MM HG: CPT | Mod: CPTII,S$GLB,, | Performed by: PODIATRIST

## 2025-02-06 PROCEDURE — 1125F AMNT PAIN NOTED PAIN PRSNT: CPT | Mod: CPTII,S$GLB,, | Performed by: PODIATRIST

## 2025-02-06 PROCEDURE — 3075F SYST BP GE 130 - 139MM HG: CPT | Mod: CPTII,S$GLB,, | Performed by: PODIATRIST

## 2025-02-06 PROCEDURE — 1160F RVW MEDS BY RX/DR IN RCRD: CPT | Mod: CPTII,S$GLB,, | Performed by: PODIATRIST

## 2025-02-06 RX ORDER — LIDOCAINE AND PRILOCAINE 25; 25 MG/G; MG/G
CREAM TOPICAL
Qty: 30 G | Refills: 3 | Status: SHIPPED | OUTPATIENT
Start: 2025-02-06

## 2025-02-06 NOTE — PROGRESS NOTES
Subjective:      Patient ID: Sunitha Pillai is a 69 y.o. female.    Chief Complaint: Diabetic Foot Exam (PCP Sophie Rodriguez, NP 01/31/2025)    Diabetes, increased risk amputation needing evaluation/management/optomization of foot care.    Cc2 sharp and throbbing pain in the bottom of the right and left forefoot.  Gradual onset, worsening over the past few years.  Aggravated with increased weight-bearing prolonged standing some shoes particularly over the past 2 weeks she has been more active.  No prior medical treatment.  No self-treatment.  Denies trauma and surgery both feet.    Chief Complaint   Patient presents with    Diabetic Foot Exam     PCP Sophie Rodriguez, NP 01/31/2025       Casual shoes both feet    Review of Systems   Constitutional: Negative for chills, diaphoresis, fever, malaise/fatigue and night sweats.   Cardiovascular:  Negative for claudication, cyanosis, leg swelling and syncope.   Skin:  Negative for color change, dry skin, nail changes, rash, suspicious lesions and unusual hair distribution.   Musculoskeletal:  Positive for joint pain. Negative for falls, joint swelling, muscle cramps, muscle weakness and stiffness.   Gastrointestinal:  Negative for constipation, diarrhea, nausea and vomiting.   Neurological:  Positive for paresthesias and sensory change. Negative for brief paralysis, disturbances in coordination, focal weakness, numbness and tremors.         Objective:      Physical Exam  Constitutional:       General: She is not in acute distress.     Appearance: She is well-developed. She is not diaphoretic.   Cardiovascular:      Pulses:           Popliteal pulses are 2+ on the right side and 2+ on the left side.        Dorsalis pedis pulses are 2+ on the right side and 2+ on the left side.        Posterior tibial pulses are 2+ on the right side and 2+ on the left side.      Comments: Capillary refill 3 seconds all toes/distal feet, all toes/both feet warm to touch.       Negative lymphadenopathy bilateral popliteal fossa and tarsal tunnel.      Negavie lower extremity edema bilateral.    Musculoskeletal:      Right ankle: No swelling, deformity, ecchymosis or lacerations. Normal range of motion. Normal pulse.      Right Achilles Tendon: Normal. No defects. Galvez's test negative.      Comments: Pain to palpation inferior mtpj one through 5 bilateral without evidence of trauma or infection.    Ankle dorsiflexion decreased at <10 degrees bilateral with moderate increase with knee flexion bilateral.    Otherwise, Normal angle, base, station of gait. All ten toes without clubbing, cyanosis, or signs of ischemia.  No pain to palpation bilateral lower extremities.  Range of motion, stability, muscle strength, and muscle tone normal bilateral feet and legs.    Lymphadenopathy:      Lower Body: No right inguinal adenopathy. No left inguinal adenopathy.      Comments: Negative lymphadenopathy bilateral popliteal fossa and tarsal tunnel.    Negative lymphangitic streaking bilateral feet/ankles/legs.   Skin:     General: Skin is warm and dry.      Capillary Refill: Capillary refill takes 2 to 3 seconds.      Coloration: Skin is not pale.      Findings: No abrasion, bruising, burn, ecchymosis, erythema, laceration, lesion or rash.      Nails: There is no clubbing.      Comments: Skin is normal age and health appropriate color, turgor, texture, and temperature bilateral lower extremities without ulceration, hyperpigmentation, discoloration, masses nodules or cords palpated.  No ecchymosis, erythema, edema, or cardinal signs of infection bilateral lower extremities.    Toenails normal color and trophic qualities.        Neurological:      Mental Status: She is alert and oriented to person, place, and time.      Sensory: No sensory deficit.      Motor: No tremor, atrophy or abnormal muscle tone.      Gait: Gait normal.      Deep Tendon Reflexes:      Reflex Scores:       Patellar reflexes are 2+  on the right side and 2+ on the left side.       Achilles reflexes are 2+ on the right side and 2+ on the left side.     Comments: Paresthesias, and burning bilateral feet with no clearly identified trigger or source.    Negative tinel sign to percussion sural, superficial peroneal, deep peroneal, saphenous, and posterior tibial nerves right and left ankles and feet.     Psychiatric:         Behavior: Behavior is cooperative.           Assessment:       Encounter Diagnoses   Name Primary?    Type 2 diabetes mellitus with hyperglycemia, without long-term current use of insulin     Capsulitis Yes    Foot pain, bilateral     Equinus contracture of ankle     Type 2 diabetes mellitus with diabetic polyneuropathy, unspecified whether long term insulin use          Plan:       Sunitha was seen today for diabetic foot exam.    Diagnoses and all orders for this visit:    Capsulitis    Type 2 diabetes mellitus with hyperglycemia, without long-term current use of insulin  -     Ambulatory referral/consult to Podiatry    Foot pain, bilateral    Equinus contracture of ankle    Type 2 diabetes mellitus with diabetic polyneuropathy, unspecified whether long term insulin use    Other orders  -     LIDOcaine-prilocaine (EMLA) cream; Apply topically as needed.      I counseled the patient on her conditions, their implications and medical management.        The patient has received literature on basic diabetic foot care.  Patient will inspect feet daily, wear protective shoe gear when ambulatory, and apply moisturizer to skin as needed to maintain elasticity and help prevent ulceration.    Inspect feet multiple times daily for signs of occurrence/recurrence ulceration.        Patient will stretch the tendo achilles complex three times daily as demonstrated in the office.  Literature was dispensed illustrating proper stretching technique.    I applied a plantar rest strapping to the patient's right and left foot to offload  symptomatic area, support the arch, and relieve pain.    Patient will obtain over the counter arch supports and wear them in shoes whenever possible.  Athletic shoes intended for walking or running are usually best.    The patient was advised that NSAID-type medications have two very important potential side effects: gastrointestinal irritation including hemorrhage and renal injuries. She was asked to take the medication with food and to stop if she experiences any GI upset. I asked her to call for vomiting, abdominal pain or black/bloody stools. The patient expresses understanding of these issues and questions were answered.    Discussed conservative treatment with shoes of adequate dimensions, material, and style to alleviate symptoms and delay or prevent surgical intervention.    emla          No follow-ups on file.

## 2025-02-07 ENCOUNTER — TELEPHONE (OUTPATIENT)
Dept: OBSTETRICS AND GYNECOLOGY | Facility: CLINIC | Age: 70
End: 2025-02-07
Payer: MEDICARE

## 2025-02-07 ENCOUNTER — TELEPHONE (OUTPATIENT)
Dept: INTERNAL MEDICINE | Facility: CLINIC | Age: 70
End: 2025-02-07
Payer: MEDICARE

## 2025-02-07 NOTE — TELEPHONE ENCOUNTER
Lvm for pt she can bring her paper up her to get signed and for her to call back if any more questions.

## 2025-02-07 NOTE — TELEPHONE ENCOUNTER
----- Message from Denise sent at 2/7/2025 10:16 AM CST -----  Type:  Appointment Request    Name of Caller:PATRICIO TORRES [[id number]]    When is the first available appointment?06/23    Symptoms:post op     Would the patient rather a call back or a response via [facility]? Call back     Best Call Back Number:[phone]    Additional Information: patient states she is scheduled for an appointment for 2/10 but needs to move the appointment to a different date or a later time. Patient states she is off on Thursdays and Fridays but if another day is available she will need an appointment for the afternoon if possible please call back with further assistance.

## 2025-02-07 NOTE — TELEPHONE ENCOUNTER
----- Message from Vidhya sent at 2/7/2025 12:58 PM CST -----  Contact: [phone]  Patient called, would like to know if she can bring paper work that she received from the insurance, for pcp to fill out, need to be fill before the end of the month. Please advise

## 2025-02-13 ENCOUNTER — TELEPHONE (OUTPATIENT)
Dept: INTERNAL MEDICINE | Facility: CLINIC | Age: 70
End: 2025-02-13
Payer: MEDICARE

## 2025-02-13 NOTE — TELEPHONE ENCOUNTER
----- Message from Leanne sent at 2/13/2025  3:42 PM CST -----  Contact: [phone]@patient  Type: Forms    What type of form? Ins     Was the form dropped off or mailed? Drop off     When was the form dropped off or mailed? Not yet     If dropped off, who was the form given to?    Advise patient that provider has up to 7 business days to complete paperwork.    Would the patient rather a call back or a response via [facility]? Call back     Comments: Patient would like to know when she can drop her forms off to get filled out

## 2025-02-17 ENCOUNTER — TELEPHONE (OUTPATIENT)
Dept: SPORTS MEDICINE | Facility: CLINIC | Age: 70
End: 2025-02-17
Payer: MEDICARE

## 2025-02-17 NOTE — TELEPHONE ENCOUNTER
----- Message from Tanner Camacho sent at 2/14/2025 11:05 AM CST -----  Regarding: FW: PT'S REQUETING A CALL BACK REGARDING SCHEDULING FOR LEFT KNEE PAIN  Contact: pt  I called the patient and scheduled her for an appointment on 3/7 she would still like to be seen sooner than that but I didn't see anywhere that I could put her. I let her know that I would reach out to to you to see if you could fit her in sooner.     She said that Kehinde told her to come in before her knee started swelling, this is not the same knee he saw her for last time.     Janice Ding   Clinical/OR assistant to Dr. Tran Mcpherson  ----- Message -----  From: Rosa Harvey  Sent: 2/14/2025  10:41 AM CST  To: [facility] Staff  Subject: PT'S REQUETING A CALL BACK REGARDING SCHEDUL#    First available is 3/17    Confirmed contact info below:  Contact Name: Sunitha Pillai  Phone Number: [phone]

## 2025-02-18 ENCOUNTER — TELEPHONE (OUTPATIENT)
Dept: INTERNAL MEDICINE | Facility: CLINIC | Age: 70
End: 2025-02-18
Payer: MEDICARE

## 2025-02-18 NOTE — TELEPHONE ENCOUNTER
----- Message from Yaquelin sent at 2/18/2025 11:02 AM CST -----  Name of Who is Calling:PATRICIO TORRES [[id number]]What is the request in detail:Pt would like a callback from the office in regards to getting an update on paperwork that was dropped off at the office on last week. Pt is stating paperwork is for [employer] and is very important and time sensitive.Please advise thank you Can the clinic reply by [facility]:NO What Number to Call Back if not in [facility]:Telephone Information:Mobile          [phone]

## 2025-02-18 NOTE — TELEPHONE ENCOUNTER
Pt dropped off paperwork in regards to [employer] that is very important to be done ASAP. If not, patient will be removed from insurance.

## 2025-02-19 ENCOUNTER — TELEPHONE (OUTPATIENT)
Dept: INTERNAL MEDICINE | Facility: CLINIC | Age: 70
End: 2025-02-19
Payer: MEDICARE

## 2025-02-19 NOTE — TELEPHONE ENCOUNTER
----- Message from Angela sent at 2/19/2025  9:37 AM CST -----  Contact: [phone]  Returning a phone call.Who left a message for the patient:  Servando Ramirez MADo they know what this is regarding:  yesWould they like a phone call back or a response via [facility]:  call back

## 2025-02-20 ENCOUNTER — TELEPHONE (OUTPATIENT)
Dept: INTERNAL MEDICINE | Facility: CLINIC | Age: 70
End: 2025-02-20
Payer: MEDICARE

## 2025-02-20 NOTE — TELEPHONE ENCOUNTER
----- Message from Leona sent at 2/20/2025 12:40 PM CST -----  Contact: [phone]  [email or web address] Patient is calling for Medical Advice regarding: Patient is requesting a call back from the staff no regarding  symptoms. How long has patient had these symptoms: n/a:Patient wants a call back or thru [facility]: Call back Comments: Thank youPlease advise patient replies from provider may take up to 48 hours.

## 2025-02-24 ENCOUNTER — HOSPITAL ENCOUNTER (EMERGENCY)
Facility: HOSPITAL | Age: 70
Discharge: HOME | End: 2025-02-24
Attending: EMERGENCY MEDICINE
Payer: MEDICARE

## 2025-02-24 ENCOUNTER — TELEPHONE (OUTPATIENT)
Dept: INTERNAL MEDICINE | Facility: CLINIC | Age: 70
End: 2025-02-24
Payer: MEDICARE

## 2025-02-24 VITALS
DIASTOLIC BLOOD PRESSURE: 79 MMHG | RESPIRATION RATE: 18 BRPM | BODY MASS INDEX: 26.12 KG/M2 | TEMPERATURE: 98 F | SYSTOLIC BLOOD PRESSURE: 180 MMHG | OXYGEN SATURATION: 100 % | HEART RATE: 79 BPM | HEIGHT: 64 IN | WEIGHT: 153 LBS

## 2025-02-24 DIAGNOSIS — M18.0 OSTEOARTHRITIS OF CARPOMETACARPAL (CMC) JOINTS OF BOTH THUMBS, UNSPECIFIED OSTEOARTHRITIS TYPE: Primary | ICD-10-CM

## 2025-02-24 PROCEDURE — 96372 THER/PROPH/DIAG INJ SC/IM: CPT | Performed by: EMERGENCY MEDICINE

## 2025-02-24 PROCEDURE — 99284 EMERGENCY DEPT VISIT MOD MDM: CPT | Mod: 25

## 2025-02-24 PROCEDURE — 63600175 PHARM REV CODE 636 W HCPCS: Mod: JZ,TB | Performed by: EMERGENCY MEDICINE

## 2025-02-24 RX ORDER — KETOROLAC TROMETHAMINE 30 MG/ML
15 INJECTION, SOLUTION INTRAMUSCULAR; INTRAVENOUS
Status: COMPLETED | OUTPATIENT
Start: 2025-02-24 | End: 2025-02-24

## 2025-02-24 RX ADMIN — KETOROLAC TROMETHAMINE 15 MG: 30 INJECTION, SOLUTION INTRAMUSCULAR; INTRAVENOUS at 12:02

## 2025-02-24 NOTE — DISCHARGE INSTRUCTIONS
Take acetaminophen 1000 mg every 8 hours as needed for pain and inflammation.  I recommend that you take it 3 times a day for the next week to help decrease the inflammation in your hands     I have placed a referral to Rheumatology since you missed her last appointment with him in September     If you experience fever or any new or concerning symptoms, return to the emergency department

## 2025-02-24 NOTE — ED PROVIDER NOTES
Encounter Date: 2/24/2025       History     Chief Complaint   Patient presents with    Joint Pain     L index , hand and r thumb pain and swelling     HPI  69-year-old female presents with complaint of pain to her right thumb.  She reports that she had no recent trauma.  She reports that she was pulling some weeds a few days before she noticed the pain, which has been present for 3 days.  She was shown a picture poison ivy and denies coming into contact with this.  She denies fever or chills.  She denies wounds or breaks in the skin.  She has significant past medical history as noted below     She is unsure if she has a history of gout because she has been diagnosed with it in the past but also told that she does not have gout.  During the interview, I noticed that the patient's left 2nd MCP with significantly swollen and she reports she has pain in this area to over the same timeframe.  Review of patient's allergies indicates:  No Known Allergies  Past Medical History:   Diagnosis Date    Acute coronary syndrome 09/23/2018    Adjustment disorder     Anxiety     Arthritis     Cancer of breast 09/03/2020    Malignant neoplasm of lower-inner quadrant of left breast in female, estrogen receptor positive    Cholelithiasis with acute cholecystitis 03/05/2021    Coronary artery disease     Depression     Diabetes mellitus type I     Genetic testing of female 09/2020    Minerva via [employer] with AXIN2 and POLE variants of uncertain significance    GERD (gastroesophageal reflux disease)     Hepatitis B core antibody positive 3/9/2023    Negative sAg, suggests previous exposure but no chronic/active Hep B. At risk for reactivation with any immunosuppression medication, steroids, chemo, etc.      Hypertension     Vertigo 05/13/2019    Vitamin D deficiency 02/07/2021     Past Surgical History:   Procedure Laterality Date    BREAST BIOPSY Left 06/08/2022    Left punch biopsy; Unremarkable keratinized skin with intradermal  hematoma     SECTION      COLONOSCOPY N/A 2024    Procedure: COLONOSCOPY;  Surgeon: PORTILLO Esqueda MD;  Location: [facility] ENDO (4TH FLR);  Service: Endoscopy;  Laterality: N/A;  ref by ,insrt sent via portal and giving to pt,states she not on any weight loss meds at this time, and not taking trulicity.sutab.AC  -lvm for pre call-tb-hx of constipation??  -peg, pt confirmed appt, see telephone encounter -Kpvt    HYSTEROSCOPY WITH DILATION AND CURETTAGE OF UTERUS N/A 2024    Procedure: HYSTEROSCOPY, WITH DILATION AND CURETTAGE OF UTERUS;  Surgeon: Hank Doran MD;  Location: [facility] OR;  Service: OB/GYN;  Laterality: N/A;    INJECTION FOR SENTINEL NODE IDENTIFICATION Left 2020    Procedure: INJECTION, FOR SENTINEL NODE IDENTIFICATION;  Surgeon: Isabel Moulton MD;  Location: [facility] OR;  Service: General;  Laterality: Left;    INSERTION OF TUNNELED CENTRAL VENOUS CATHETER (CVC) WITH SUBCUTANEOUS PORT N/A 2020    Procedure: INSERTION, PORT-A-CATH;  Surgeon: Hardeep Martin MD;  Location: [facility] CATH LAB;  Service: Radiology;  Laterality: N/A;    LAPAROSCOPIC CHOLECYSTECTOMY N/A 2021    Procedure: CHOLECYSTECTOMY, LAPAROSCOPIC;  Surgeon: Buster Son MD;  Location: [facility] OR 2ND FLR;  Service: General;  Laterality: N/A;    MASTECTOMY, PARTIAL Left 2020    Procedure: MASTECTOMY, PARTIAL-w/reflector;  Surgeon: Isabel Moulton MD;  Location: [facility] OR;  Service: General;  Laterality: Left;    SENTINEL LYMPH NODE BIOPSY Left 2020    Procedure: BIOPSY, LYMPH NODE, SENTINEL;  Surgeon: Isabel Moulton MD;  Location: [facility] OR;  Service: General;  Laterality: Left;     Family History   Problem Relation Name Age of Onset    Hypertension Mother      Hyperlipidemia Mother      Diabetes Mother      Heart disease Mother      Colon polyps Mother      Aneurysm Father      Diabetes Sister 2     Hypertension Sister 2     Heart disease Brother 2     Diabetes Brother 2      Hypertension Brother 2     Cancer Brother 1         brother German with prostate cancer; brother Cooper with throat cancer    Cervical cancer Daughter Daughter1     Anxiety disorder Daughter Daughter2     Depression Daughter Daughter2     Anxiety disorder Daughter Daughter3     Depression Daughter Daughter3     Breast cancer Maternal Aunt Teresa     Cancer Maternal Aunt Nikki         unknown origin    Cancer Paternal Aunt Esperanza         unknown origin    Breast cancer Paternal Aunt Esperanza     Prostate cancer Maternal Cousin Chris     Leukemia Maternal Cousin Chris     Prostate cancer Maternal Cousin Nirmal     Breast cancer Other Pat 1/2aunt Quiana     Colon cancer Neg Hx      Ovarian cancer Neg Hx       Social History[1]  Review of Systems    Physical Exam     Initial Vitals [02/24/25 1001]   BP Pulse Resp Temp SpO2   (!) 180/79 79 18 98.3 °F (36.8 °C) 100 %      MAP       --         Physical Exam    Nursing note and vitals reviewed.  Constitutional: She appears well-developed and well-nourished.   HENT:   Head: Normocephalic and atraumatic.   Eyes: EOM are normal. Pupils are equal, round, and reactive to light.   Neck: Neck supple.   Normal range of motion.  Cardiovascular:  Normal rate, regular rhythm and intact distal pulses.           Pulmonary/Chest: Breath sounds normal. No respiratory distress.   Abdominal: Abdomen is soft. She exhibits no distension.   Musculoskeletal:      Cervical back: Normal range of motion and neck supple.      Comments: Edema and soft tissue tenderness to palpation with warmth but no erythema to right 1st and left 2nd MCP     Neurological: She is alert and oriented to person, place, and time. She has normal strength. No sensory deficit. GCS score is 15. GCS eye subscore is 4. GCS verbal subscore is 5. GCS motor subscore is 6.   Skin: Skin is warm and dry. Capillary refill takes less than 2 seconds.   Psychiatric: She has a normal mood and affect.         ED Course    Procedures  Labs Reviewed - No data to display       Imaging Results    None          Medications   ketorolac injection 15 mg (has no administration in time range)     Medical Decision Making  Chart review shows the patient had an arthrocentesis of her knee on January 24th.  No gouty crystals were seen.  Further chart review showed that the patient has a history of an elevated uric acid in 2021 in 2022, but a normal uric acid this January.  She has no systemic symptoms.  The patient has polyarthritis with a negative arthrocentesis for gouty crystals is consistent with likely osteoarthritis.  Chart review shows the patient no showed an appointment with Rheumatology in September this year was subsequently lost to follow-up.  Will refer back to rheumatology.  Given history of mild renal dysfunction and thrombocytopenia, will not pursue provide extended course of NSAIDs.  Additionally, patient has diabetes preclude the use of steroids at this time given her mild symptoms.  Single dose of Toradol and course of Tylenol prescribed.  I referred the patient back to rheumatology    Risk  Prescription drug management.                                      Clinical Impression:  Final diagnoses:  [M18.0] Osteoarthritis of carpometacarpal (CMC) joints of both thumbs, unspecified osteoarthritis type (Primary)                   [1]   Social History  Tobacco Use    Smoking status: Never    Smokeless tobacco: Never   Substance Use Topics    Alcohol use: Never    Drug use: No        Sessions, Jose THEODORE MD  02/24/25 2721

## 2025-02-24 NOTE — ED NOTES
Discharge home,states understanding to follow up as directed. Ambulates out of ED without difficulty. Tolerated shot time

## 2025-02-24 NOTE — TELEPHONE ENCOUNTER
Spoke with patient, not sure what's going on with her insurance and stating that they are trying to give her a new provider (which she does not won't) patient has an appointment this month on the 27th will be in to explain more of what's going.

## 2025-02-24 NOTE — ED TRIAGE NOTES
Pt presents to ED for c/o left hand swelling starting after waking this yesterday morning. Pt endorses pain to hand. Pt was pulling weeds and may have come in contact with poison ivy. She was gardening and was dealing with plants and WD-40. Pt did not take any medications. Marked swelling to hand noted. Denies injury. More swelling to index finger.

## 2025-02-24 NOTE — TELEPHONE ENCOUNTER
----- Message from Tim sent at 2/24/2025 10:40 AM CST -----  Contact: Pt [phone]  [email or web address] Patient is calling for Medical Advice regarding:Pt called in requesting to speak with staff she has a concernHow long has patient had these symptoms:N/aPharmacy name and phone#:N/aPatient wants a call back or thru [facility]:CallbackComments:Please advise patient replies from provider may take up to 48 hours.

## 2025-02-26 ENCOUNTER — HOSPITAL ENCOUNTER (EMERGENCY)
Facility: HOSPITAL | Age: 70
Discharge: HOME | End: 2025-02-26
Attending: EMERGENCY MEDICINE
Payer: MEDICARE

## 2025-02-26 VITALS
WEIGHT: 153 LBS | OXYGEN SATURATION: 99 % | RESPIRATION RATE: 16 BRPM | TEMPERATURE: 98 F | SYSTOLIC BLOOD PRESSURE: 142 MMHG | BODY MASS INDEX: 26.12 KG/M2 | DIASTOLIC BLOOD PRESSURE: 88 MMHG | HEIGHT: 64 IN | HEART RATE: 88 BPM

## 2025-02-26 DIAGNOSIS — M06.9 RHEUMATOID ARTHRITIS FLARE: Primary | ICD-10-CM

## 2025-02-26 PROCEDURE — 63600175 PHARM REV CODE 636 W HCPCS: Mod: JZ,TB | Performed by: EMERGENCY MEDICINE

## 2025-02-26 PROCEDURE — 99284 EMERGENCY DEPT VISIT MOD MDM: CPT | Mod: 25

## 2025-02-26 PROCEDURE — 96372 THER/PROPH/DIAG INJ SC/IM: CPT | Performed by: EMERGENCY MEDICINE

## 2025-02-26 PROCEDURE — 25000003 PHARM REV CODE 250: Performed by: EMERGENCY MEDICINE

## 2025-02-26 RX ORDER — DICLOFENAC SODIUM 10 MG/G
2 GEL TOPICAL DAILY
Qty: 1 EACH | Refills: 0 | Status: SHIPPED | OUTPATIENT
Start: 2025-02-26

## 2025-02-26 RX ORDER — ACETAMINOPHEN 500 MG
1000 TABLET ORAL
Status: COMPLETED | OUTPATIENT
Start: 2025-02-26 | End: 2025-02-26

## 2025-02-26 RX ORDER — KETOROLAC TROMETHAMINE 30 MG/ML
10 INJECTION, SOLUTION INTRAMUSCULAR; INTRAVENOUS
Status: COMPLETED | OUTPATIENT
Start: 2025-02-26 | End: 2025-02-26

## 2025-02-26 RX ORDER — PREDNISONE 10 MG/1
10 TABLET ORAL
Status: DISCONTINUED | OUTPATIENT
Start: 2025-02-26 | End: 2025-02-26

## 2025-02-26 RX ORDER — PREDNISONE 10 MG/1
TABLET ORAL
Qty: 22 TABLET | Refills: 0 | Status: SHIPPED | OUTPATIENT
Start: 2025-02-26 | End: 2025-03-18

## 2025-02-26 RX ORDER — PREDNISONE 20 MG/1
20 TABLET ORAL
Status: COMPLETED | OUTPATIENT
Start: 2025-02-26 | End: 2025-02-26

## 2025-02-26 RX ADMIN — ACETAMINOPHEN 1000 MG: 500 TABLET ORAL at 08:02

## 2025-02-26 RX ADMIN — KETOROLAC TROMETHAMINE 10 MG: 30 INJECTION, SOLUTION INTRAMUSCULAR; INTRAVENOUS at 08:02

## 2025-02-26 RX ADMIN — PREDNISONE 20 MG: 20 TABLET ORAL at 08:02

## 2025-02-26 NOTE — ED NOTES
LOC: Patient name and date of birth verified. The patient is awake, alert and aware of environment with an appropriate affect, the patient is oriented x 3 and speaking appropriately.   APPEARANCE: Patient resting comfortably, patient is clean and well groomed, patient's clothing is properly fastened.  SKIN: The skin is warm and dry, color consistent with ethnicity, patient has normal skin turgor and moist mucus membranes, skin intact, no breakdown or bruising noted.  MUSCULOSKELETAL: Patient moving all extremities well, swelling noted to right hand   RESPIRATORY: Respirations are spontaneous, patient has a normal effort and rate, no accessory muscle use noted.  CARDIAC: Patient has a normal rate and rhythm, no periphreal edema noted, capillary refill < 3 seconds.  ABDOMEN: Soft and non tender to palpation, no distention noted. Bowel sounds present in all four quadrants.  NEUROLOGIC: Eyes open spontaneously, behavior appropriate to situation, follows commands, facial expression symmetrical, bilateral hand grasp equal and even, purposeful motor response noted, normal sensation in all extremities when touched with a finger.

## 2025-02-26 NOTE — ED PROVIDER NOTES
Encounter Date: 2/26/2025       History     Chief Complaint   Patient presents with    Hand Pain     Seen here Monday, more pain and swelling to L wrist     HPI  Sunitha Pillai is a 69 y.o. F with a PMHx of depression, type II DM, GERD, HTN, vertigo, questionable history of gout, seropositive RA, left breast cancer s/p chemo/ radiation in 2020, pulmonary nodules presenting for left index finger and right thumb pain. Patient was here two days ago for evaluation of the same issue. She was given Toradol and tylenol for symptomatic control and counseled to follow up with her rheumatologist. At that time, NSAIDs and steroids deferred due to her kidney function and history of diabetes, respectively. Patient returns today for the same symptoms which started over the weekend. Again, denies trauma, fever, chills. Patient does report she had an increased intake of crawfish on Sunday. Pain has not worsened, just not controlled.     Review of patient's allergies indicates:  No Known Allergies  Past Medical History:   Diagnosis Date    Acute coronary syndrome 09/23/2018    Adjustment disorder     Anxiety     Arthritis     Cancer of breast 09/03/2020    Malignant neoplasm of lower-inner quadrant of left breast in female, estrogen receptor positive    Cholelithiasis with acute cholecystitis 03/05/2021    Coronary artery disease     Depression     Diabetes mellitus type I     Genetic testing of female 09/2020    [facility] via [employer] with AXIN2 and POLE variants of uncertain significance    GERD (gastroesophageal reflux disease)     Hepatitis B core antibody positive 3/9/2023    Negative sAg, suggests previous exposure but no chronic/active Hep B. At risk for reactivation with any immunosuppression medication, steroids, chemo, etc.      Hypertension     Vertigo 05/13/2019    Vitamin D deficiency 02/07/2021     Past Surgical History:   Procedure Laterality Date    BREAST BIOPSY Left 06/08/2022    Left punch biopsy;  Unremarkable keratinized skin with intradermal hematoma     SECTION      COLONOSCOPY N/A 2024    Procedure: COLONOSCOPY;  Surgeon: PORTILLO Esqueda MD;  Location: [facility] ENDO (4TH FLR);  Service: Endoscopy;  Laterality: N/A;  ref by ,insrt sent via portal and giving to pt,states she not on any weight loss meds at this time, and not taking trulicity.sutab.AC  -lvm for pre call-tb-hx of constipation??  -peg, pt confirmed appt, see telephone encounter -Kpvt    HYSTEROSCOPY WITH DILATION AND CURETTAGE OF UTERUS N/A 2024    Procedure: HYSTEROSCOPY, WITH DILATION AND CURETTAGE OF UTERUS;  Surgeon: Hank Doran MD;  Location: [facility] OR;  Service: OB/GYN;  Laterality: N/A;    INJECTION FOR SENTINEL NODE IDENTIFICATION Left 2020    Procedure: INJECTION, FOR SENTINEL NODE IDENTIFICATION;  Surgeon: Isabel Moulton MD;  Location: [facility] OR;  Service: General;  Laterality: Left;    INSERTION OF TUNNELED CENTRAL VENOUS CATHETER (CVC) WITH SUBCUTANEOUS PORT N/A 2020    Procedure: INSERTION, PORT-A-CATH;  Surgeon: Hardeep Martin MD;  Location: [facility] CATH LAB;  Service: Radiology;  Laterality: N/A;    LAPAROSCOPIC CHOLECYSTECTOMY N/A 2021    Procedure: CHOLECYSTECTOMY, LAPAROSCOPIC;  Surgeon: Buster Son MD;  Location: [facility] OR 2ND FLR;  Service: General;  Laterality: N/A;    MASTECTOMY, PARTIAL Left 2020    Procedure: MASTECTOMY, PARTIAL-w/reflector;  Surgeon: Isabel Moulton MD;  Location: [facility] OR;  Service: General;  Laterality: Left;    SENTINEL LYMPH NODE BIOPSY Left 2020    Procedure: BIOPSY, LYMPH NODE, SENTINEL;  Surgeon: Isabel Moulton MD;  Location: [facility] OR;  Service: General;  Laterality: Left;     Family History   Problem Relation Name Age of Onset    Hypertension Mother      Hyperlipidemia Mother      Diabetes Mother      Heart disease Mother      Colon polyps Mother      Aneurysm Father      Diabetes Sister 2     Hypertension Sister 2      Heart disease Brother 2     Diabetes Brother 2     Hypertension Brother 2     Cancer Brother 1         brother German with prostate cancer; brother Cooper with throat cancer    Cervical cancer Daughter Daughter1     Anxiety disorder Daughter Daughter2     Depression Daughter Daughter2     Anxiety disorder Daughter Daughter3     Depression Daughter Daughter3     Breast cancer Maternal Aunt Teresa     Cancer Maternal Aunt Nikki         unknown origin    Cancer Paternal Aunt Esperanza         unknown origin    Breast cancer Paternal Aunt Esperanza     Prostate cancer Maternal Cousin Chris     Leukemia Maternal Cousin Chris     Prostate cancer Maternal Cousin Nirmal     Breast cancer Other Pat 1/2aunt Quiana     Colon cancer Neg Hx      Ovarian cancer Neg Hx       Social History[1]  Review of Systems    Physical Exam     Initial Vitals [02/26/25 0734]   BP Pulse Resp Temp SpO2   (!) 194/74 81 20 97.8 °F (36.6 °C) 100 %      MAP       --         Physical Exam    Constitutional: She appears well-developed and well-nourished. No distress.   HENT:   Head: Normocephalic and atraumatic.   Eyes: EOM are normal.   Cardiovascular:  Normal rate, regular rhythm and intact distal pulses.           Pulmonary/Chest: Breath sounds normal. No respiratory distress.   Abdominal: Abdomen is soft.   Musculoskeletal:      Comments: Edema and increased warmth with TTP of the soft tissue to the MCP of the left second digit. There is also mild edema with TTP of the interphalangeal joint of the right thumb. No erythema of either joints.      Neurological: She is alert.   Skin: Skin is warm and dry.         ED Course   Procedures  Labs Reviewed - No data to display       Imaging Results              X-Ray Hand 3 View Left (Final result)  Result time 02/26/25 10:16:01      Final result by Jonathan Gonzáles MD (02/26/25 10:16:01)                   Impression:      No new fracture dislocation.  Additional findings above.      Electronically  signed by: Jonathan Gonzáles MD  Date:    02/26/2025  Time:    10:16               Narrative:    EXAMINATION:  XR HAND COMPLETE 3 VIEW LEFT    CLINICAL HISTORY:  L first MCP swelling.;.    TECHNIQUE:  PA, lateral, and oblique views of the left hand were performed.    COMPARISON:  Radiographs dating back 04/20/2020    FINDINGS:  Some progression of DJD change 3rd D IP joint seen best on lateral view.  DJD change of IP joints 1st metacarpal-carpal, distal navicular carpal, radioulnar and radiocarpal otherwise stable.                                       Medications   acetaminophen tablet 1,000 mg (1,000 mg Oral Given 2/26/25 0833)   ketorolac injection 9.999 mg (9.999 mg Intramuscular Given 2/26/25 0836)   predniSONE tablet 20 mg (20 mg Oral Given 2/26/25 0833)     Medical Decision Making  69F returning to the ED for evaluation of right thumb and left index finger pain with a background of seropositive RA and questionable gout. Per chart review, has a history of an elevated uric acid in 2021 in 2022, but a normal uric acid this January.  No systemic symptoms and AF on vitals. Symptoms possibly due to gout or due to a rheumatoid arthritis flare. Recent aspiration of the left knee in January negative for crystals. Will treat her here with tylenol, toradol, and prednisone 20mg. XR of the hand today as well which was negative for fracture.  She will need to follow up with Rheumatology for further management. Referral sent. Will send her home with prednisone taper and diclofenac gel. Counseled patient the importance of checking her blood glucose levels while on steroids given her history of diabetes. Given history of mild renal dysfunction and thrombocytopenia, will not pursue provide extended course of NSAIDs.     Patient stable for discharge home. Follow up with PCP and Rheumatology. Return precautions given.     Amount and/or Complexity of Data Reviewed  Radiology: ordered. Decision-making details documented in ED  Course.    Risk  OTC drugs.  Prescription drug management.               ED Course as of 02/26/25 1025   Wed Feb 26, 2025   0831 ATTENDING PHYSICIAN ATTESTATION  I have repeated the key portions of the resident's history and physical face to face with the patient, reviewed and agree with the resident medical documentation except as noted below, and supervised and managed the medical care of the patient.     Patient with history of rheumatoid arthritis, no diagnosed gout on arthrocentesis but has had elevated uric acids in the past, coming in with bilateral hand pain left 1st MCP swelling right thumb pain and discomfort over the last 5 days.  Seen recently for similar given Tylenol and Toradol shot but did not control her symptoms.    No fevers, no trauma, chart review shows that she has had similar complaints in the past with polyarteritis and diagnosed with rheumatoid arthritis.  She was supposed to start on Humira but was lost to follow up.    She says she attempted to get into Rheumatology but has not been able to get an appointment.    History and exam is consistent with likely RA flare given the multiple joints involved.  No signs of septic arthritis.  Low suspicion for gout although this remains in the differential.  Will obtain x-ray of the hand given her 2nd visit although I have low suspicion for fracture.     Will control symptoms with Tylenol here re-dose Toradol and will start on prednisone taper starting at 20 mg for 20 days.    Recommend attempt to continued follow up with rheumatoid arthritis as she may need to be started on DMARDs to control her symptoms.     Reviewed outside charts previous rheumatology notes.    Tremaine Mccollum MD  Department of Emergency Medicine   [BA]   1021 X-Ray Hand 3 View Left  No acute fracture [TN]      ED Course User Index  [BA] Tremaine Mccollum MD  [TN] Mele Feldman MD                           Clinical Impression:  Final diagnoses:  [M06.9] Rheumatoid arthritis flare  (Primary)          ED Disposition Condition    Discharge Stable          ED Prescriptions       Medication Sig Dispense Start Date End Date Auth. Provider    diclofenac sodium (VOLTAREN) 1 % Gel Apply 2 g topically once daily. 1 each 2/26/2025 -- Mele Feldman MD    predniSONE (DELTASONE) 10 MG tablet Take 2 tablets (20 mg total) by mouth once daily for 6 days, THEN 1 tablet (10 mg total) once daily for 6 days, THEN 0.5 tablets (5 mg total) once daily for 8 days. 22 tablet 2/26/2025 3/18/2025 Mele Feldman MD          Follow-up Information       Follow up With Specialties Details Why Contact Info    Patty Louis MD Internal Medicine Schedule an appointment as soon as possible for a visit in 1 week  [address], [address]  [place] [address]  [phone]      [facility] - Emergency Dept Emergency Medicine  If symptoms worsen [address] [id number] [phone]                 [1]   Social History  Tobacco Use    Smoking status: Never    Smokeless tobacco: Never   Substance Use Topics    Alcohol use: Never    Drug use: No        Mele Feldman MD  Resident  02/26/25 1026

## 2025-02-26 NOTE — ED NOTES
Sunitha Pillai, a 69 y.o. female presents to the ED w/ complaint of hand, elbow, swelling. Seen for it already    Triage note:  Chief Complaint   Patient presents with    Hand Pain     Seen here Monday, more pain and swelling to L wrist     Review of patient's allergies indicates:  No Known Allergies

## 2025-02-27 ENCOUNTER — LAB VISIT (OUTPATIENT)
Dept: LAB | Facility: HOSPITAL | Age: 70
End: 2025-02-27
Attending: INTERNAL MEDICINE
Payer: MEDICARE

## 2025-02-27 ENCOUNTER — OFFICE VISIT (OUTPATIENT)
Dept: INTERNAL MEDICINE | Facility: CLINIC | Age: 70
End: 2025-02-27
Payer: MEDICARE

## 2025-02-27 VITALS
HEIGHT: 64 IN | DIASTOLIC BLOOD PRESSURE: 66 MMHG | OXYGEN SATURATION: 98 % | BODY MASS INDEX: 27.87 KG/M2 | HEART RATE: 76 BPM | WEIGHT: 163.25 LBS | SYSTOLIC BLOOD PRESSURE: 132 MMHG

## 2025-02-27 DIAGNOSIS — M05.79 RHEUMATOID ARTHRITIS INVOLVING MULTIPLE SITES WITH POSITIVE RHEUMATOID FACTOR: ICD-10-CM

## 2025-02-27 DIAGNOSIS — E78.5 HYPERLIPIDEMIA, UNSPECIFIED HYPERLIPIDEMIA TYPE: ICD-10-CM

## 2025-02-27 DIAGNOSIS — E11.65 TYPE 2 DIABETES MELLITUS WITH HYPERGLYCEMIA, WITHOUT LONG-TERM CURRENT USE OF INSULIN: ICD-10-CM

## 2025-02-27 DIAGNOSIS — E55.9 MILD VITAMIN D DEFICIENCY: ICD-10-CM

## 2025-02-27 DIAGNOSIS — I10 ESSENTIAL HYPERTENSION: Chronic | ICD-10-CM

## 2025-02-27 DIAGNOSIS — I10 ESSENTIAL HYPERTENSION: Primary | Chronic | ICD-10-CM

## 2025-02-27 LAB
25(OH)D3+25(OH)D2 SERPL-MCNC: 17 NG/ML (ref 30–96)
ALBUMIN SERPL BCP-MCNC: 3.5 G/DL (ref 3.5–5.2)
ALP SERPL-CCNC: 85 U/L (ref 40–150)
ALT SERPL W/O P-5'-P-CCNC: 9 U/L (ref 10–44)
ANION GAP SERPL CALC-SCNC: 8 MMOL/L (ref 8–16)
AST SERPL-CCNC: 21 U/L (ref 10–40)
BASOPHILS # BLD AUTO: 0.01 K/UL (ref 0–0.2)
BASOPHILS NFR BLD: 0.2 % (ref 0–1.9)
BILIRUB SERPL-MCNC: 0.5 MG/DL (ref 0.1–1)
BUN SERPL-MCNC: 17 MG/DL (ref 8–23)
CALCIUM SERPL-MCNC: 9.1 MG/DL (ref 8.7–10.5)
CHLORIDE SERPL-SCNC: 110 MMOL/L (ref 95–110)
CHOLEST SERPL-MCNC: 138 MG/DL (ref 120–199)
CHOLEST/HDLC SERPL: 2.1 {RATIO} (ref 2–5)
CO2 SERPL-SCNC: 22 MMOL/L (ref 23–29)
CREAT SERPL-MCNC: 1 MG/DL (ref 0.5–1.4)
CRP SERPL-MCNC: 0.6 MG/L (ref 0–8.2)
DIFFERENTIAL METHOD BLD: ABNORMAL
EOSINOPHIL # BLD AUTO: 0 K/UL (ref 0–0.5)
EOSINOPHIL NFR BLD: 0 % (ref 0–8)
ERYTHROCYTE [DISTWIDTH] IN BLOOD BY AUTOMATED COUNT: 13.7 % (ref 11.5–14.5)
ERYTHROCYTE [SEDIMENTATION RATE] IN BLOOD BY PHOTOMETRIC METHOD: 9 MM/HR (ref 0–36)
EST. GFR  (NO RACE VARIABLE): >60 ML/MIN/1.73 M^2
ESTIMATED AVG GLUCOSE: 128 MG/DL (ref 68–131)
GLUCOSE SERPL-MCNC: 87 MG/DL (ref 70–110)
HBA1C MFR BLD: 6.1 % (ref 4–5.6)
HCT VFR BLD AUTO: 34.8 % (ref 37–48.5)
HDLC SERPL-MCNC: 65 MG/DL (ref 40–75)
HDLC SERPL: 47.1 % (ref 20–50)
HGB BLD-MCNC: 11.3 G/DL (ref 12–16)
IMM GRANULOCYTES # BLD AUTO: 0.01 K/UL (ref 0–0.04)
IMM GRANULOCYTES NFR BLD AUTO: 0.2 % (ref 0–0.5)
LDLC SERPL CALC-MCNC: 61 MG/DL (ref 63–159)
LYMPHOCYTES # BLD AUTO: 2.4 K/UL (ref 1–4.8)
LYMPHOCYTES NFR BLD: 40.7 % (ref 18–48)
MCH RBC QN AUTO: 31.6 PG (ref 27–31)
MCHC RBC AUTO-ENTMCNC: 32.5 G/DL (ref 32–36)
MCV RBC AUTO: 97 FL (ref 82–98)
MONOCYTES # BLD AUTO: 0.6 K/UL (ref 0.3–1)
MONOCYTES NFR BLD: 10.9 % (ref 4–15)
NEUTROPHILS # BLD AUTO: 2.8 K/UL (ref 1.8–7.7)
NEUTROPHILS NFR BLD: 48 % (ref 38–73)
NONHDLC SERPL-MCNC: 73 MG/DL
NRBC BLD-RTO: 0 /100 WBC
PLATELET # BLD AUTO: 150 K/UL (ref 150–450)
PMV BLD AUTO: 10 FL (ref 9.2–12.9)
POTASSIUM SERPL-SCNC: 4 MMOL/L (ref 3.5–5.1)
PROT SERPL-MCNC: 6.9 G/DL (ref 6–8.4)
RBC # BLD AUTO: 3.58 M/UL (ref 4–5.4)
SODIUM SERPL-SCNC: 140 MMOL/L (ref 136–145)
TRIGL SERPL-MCNC: 60 MG/DL (ref 30–150)
TSH SERPL DL<=0.005 MIU/L-ACNC: 1.6 UIU/ML (ref 0.4–4)
WBC # BLD AUTO: 5.85 K/UL (ref 3.9–12.7)

## 2025-02-27 PROCEDURE — 99999 PR PBB SHADOW E&M-EST. PATIENT-LVL V: CPT | Mod: PBBFAC,,, | Performed by: INTERNAL MEDICINE

## 2025-02-27 PROCEDURE — 83036 HEMOGLOBIN GLYCOSYLATED A1C: CPT | Performed by: INTERNAL MEDICINE

## 2025-02-27 PROCEDURE — 1101F PT FALLS ASSESS-DOCD LE1/YR: CPT | Mod: CPTII,S$GLB,, | Performed by: INTERNAL MEDICINE

## 2025-02-27 PROCEDURE — 99214 OFFICE O/P EST MOD 30 MIN: CPT | Mod: S$GLB,,, | Performed by: INTERNAL MEDICINE

## 2025-02-27 PROCEDURE — 3075F SYST BP GE 130 - 139MM HG: CPT | Mod: CPTII,S$GLB,, | Performed by: INTERNAL MEDICINE

## 2025-02-27 PROCEDURE — 3078F DIAST BP <80 MM HG: CPT | Mod: CPTII,S$GLB,, | Performed by: INTERNAL MEDICINE

## 2025-02-27 PROCEDURE — 36415 COLL VENOUS BLD VENIPUNCTURE: CPT | Mod: PO | Performed by: INTERNAL MEDICINE

## 2025-02-27 PROCEDURE — 1125F AMNT PAIN NOTED PAIN PRSNT: CPT | Mod: CPTII,S$GLB,, | Performed by: INTERNAL MEDICINE

## 2025-02-27 PROCEDURE — 82306 VITAMIN D 25 HYDROXY: CPT | Performed by: INTERNAL MEDICINE

## 2025-02-27 PROCEDURE — 4010F ACE/ARB THERAPY RXD/TAKEN: CPT | Mod: CPTII,S$GLB,, | Performed by: INTERNAL MEDICINE

## 2025-02-27 PROCEDURE — 3008F BODY MASS INDEX DOCD: CPT | Mod: CPTII,S$GLB,, | Performed by: INTERNAL MEDICINE

## 2025-02-27 PROCEDURE — 3044F HG A1C LEVEL LT 7.0%: CPT | Mod: CPTII,S$GLB,, | Performed by: INTERNAL MEDICINE

## 2025-02-27 PROCEDURE — 86140 C-REACTIVE PROTEIN: CPT | Performed by: INTERNAL MEDICINE

## 2025-02-27 PROCEDURE — 84443 ASSAY THYROID STIM HORMONE: CPT | Performed by: INTERNAL MEDICINE

## 2025-02-27 PROCEDURE — 85025 COMPLETE CBC W/AUTO DIFF WBC: CPT | Performed by: INTERNAL MEDICINE

## 2025-02-27 PROCEDURE — 80061 LIPID PANEL: CPT | Performed by: INTERNAL MEDICINE

## 2025-02-27 PROCEDURE — 3288F FALL RISK ASSESSMENT DOCD: CPT | Mod: CPTII,S$GLB,, | Performed by: INTERNAL MEDICINE

## 2025-02-27 PROCEDURE — 80053 COMPREHEN METABOLIC PANEL: CPT | Performed by: INTERNAL MEDICINE

## 2025-02-27 PROCEDURE — 3060F POS MICROALBUMINURIA REV: CPT | Mod: CPTII,S$GLB,, | Performed by: INTERNAL MEDICINE

## 2025-02-27 PROCEDURE — 85652 RBC SED RATE AUTOMATED: CPT | Performed by: INTERNAL MEDICINE

## 2025-02-27 PROCEDURE — 1159F MED LIST DOCD IN RCRD: CPT | Mod: CPTII,S$GLB,, | Performed by: INTERNAL MEDICINE

## 2025-02-27 PROCEDURE — 3066F NEPHROPATHY DOC TX: CPT | Mod: CPTII,S$GLB,, | Performed by: INTERNAL MEDICINE

## 2025-02-27 NOTE — PATIENT INSTRUCTIONS
Prednisone Two daily from today thru March 4 (Isaiah Gras)                     One daily March 5 -10, 2025                     Half daily March 11 - 19, 2025

## 2025-02-27 NOTE — TELEPHONE ENCOUNTER
Refill Routing Note   Medication(s) are not appropriate for processing by [facility] for the following reason(s):        Outside of protocol    [facility] action(s):  Route               Appointments  past 12m or future 3m with PCP    Date Provider   Last Visit   8/1/2024 Patty Louis MD   Next Visit   2/27/2025 Patty Louis MD   ED visits in past 90 days: 2        Note composed:6:57 AM 02/27/2025

## 2025-03-03 RX ORDER — TOPIRAMATE 25 MG/1
TABLET ORAL
Qty: 60 TABLET | Refills: 3 | Status: SHIPPED | OUTPATIENT
Start: 2025-03-03

## 2025-03-04 ENCOUNTER — RESULTS FOLLOW-UP (OUTPATIENT)
Dept: INTERNAL MEDICINE | Facility: CLINIC | Age: 70
End: 2025-03-04
Payer: MEDICARE

## 2025-03-04 ENCOUNTER — TELEPHONE (OUTPATIENT)
Dept: INTERNAL MEDICINE | Facility: CLINIC | Age: 70
End: 2025-03-04
Payer: MEDICARE

## 2025-03-04 RX ORDER — ERGOCALCIFEROL 1.25 MG/1
CAPSULE ORAL
Qty: 12 CAPSULE | Refills: 0 | Status: SHIPPED | OUTPATIENT
Start: 2025-03-04

## 2025-03-04 NOTE — PROGRESS NOTES
Subjective:       Patient ID: Sunitha Pillai is a 69 y.o. female.    Chief Complaint: Annual Exam and Hand Pain (left)    Hand Pain   Pertinent negatives include no chest pain (arm pain or jaw pain).   She is feeling better on the steroids as her pain is from RA.  No CP or SOB.  Review of Systems   Respiratory:  Negative for shortness of breath (PND or orthopnea).    Cardiovascular:  Negative for chest pain (arm pain or jaw pain).   Gastrointestinal:  Negative for abdominal pain, diarrhea, nausea and vomiting.   Genitourinary:  Negative for dysuria.   Neurological:  Negative for seizures, syncope and headaches.       Objective:      Physical Exam  Constitutional:       General: She is not in acute distress.     Appearance: She is well-developed.   HENT:      Head: Normocephalic.   Eyes:      Pupils: Pupils are equal, round, and reactive to light.   Neck:      Thyroid: No thyromegaly.      Vascular: No JVD.   Cardiovascular:      Rate and Rhythm: Normal rate and regular rhythm.      Heart sounds: Normal heart sounds. No murmur heard.     No friction rub. No gallop.   Pulmonary:      Effort: Pulmonary effort is normal.      Breath sounds: Normal breath sounds. No wheezing or rales.   Abdominal:      General: Bowel sounds are normal. There is no distension.      Palpations: Abdomen is soft. There is no mass.      Tenderness: There is no abdominal tenderness. There is no guarding or rebound.   Musculoskeletal:      Cervical back: Neck supple.   Lymphadenopathy:      Cervical: No cervical adenopathy.   Skin:     General: Skin is warm and dry.   Neurological:      Mental Status: She is alert and oriented to person, place, and time.      Deep Tendon Reflexes: Reflexes are normal and symmetric.   Psychiatric:         Behavior: Behavior normal.         Thought Content: Thought content normal.         Judgment: Judgment normal.         Assessment:       1. Essential hypertension    2. Type 2 diabetes mellitus with  hyperglycemia, without long-term current use of insulin    3. Rheumatoid arthritis involving multiple sites with positive rheumatoid factor    4. Hyperlipidemia, unspecified hyperlipidemia type    5. Mild vitamin D deficiency        Plan:   Essential hypertension  -     CBC Auto Differential; Future; Expected date: 02/27/2025  -     Comprehensive Metabolic Panel; Future; Expected date: 02/27/2025    Type 2 diabetes mellitus with hyperglycemia, without long-term current use of insulin  -     TSH; Future; Expected date: 02/27/2025  -     Hemoglobin A1C; Future; Expected date: 02/27/2025  -     Urinalysis Microscopic; Future; Expected date: 02/28/2025  -     Urinalysis; Future; Expected date: 02/27/2025  -     Microalbumin/Creatinine Ratio, Urine; Future; Expected date: 02/27/2025    Rheumatoid arthritis involving multiple sites with positive rheumatoid factor  -     Sedimentation rate; Future; Expected date: 02/27/2025  -     C-Reactive Protein; Future; Expected date: 02/27/2025    Hyperlipidemia, unspecified hyperlipidemia type  -     Lipid Panel; Future; Expected date: 02/27/2025    Mild vitamin D deficiency  -     Vitamin D; Future; Expected date: 02/27/2025    Keep Rheum appt.

## 2025-03-04 NOTE — PROGRESS NOTES
Please inform - excellent diabetes and cholesterol.  Mild anemia but stable (probably from rheumatoid arthritis, chronic disease).   Vit D is low I am sending a weekly prescription to improve it.

## 2025-03-05 ENCOUNTER — TELEPHONE (OUTPATIENT)
Dept: INTERNAL MEDICINE | Facility: CLINIC | Age: 70
End: 2025-03-05
Payer: MEDICARE

## 2025-03-05 NOTE — TELEPHONE ENCOUNTER
Pt asked she will be put on the insulin medication previously discussed as a result of her recent lab results

## 2025-03-05 NOTE — TELEPHONE ENCOUNTER
----- Message from Leona sent at 3/5/2025  8:40 AM CST -----  Contact: [phone]  [email or web address] Patient is calling for Medical Advice regarding: Patient is requesting a call back not regarding symptoms. Patient states she has a question Patient wants a call back or thru [facility]: Call back Comments: Thank you Please advise patient replies from provider may take up to 48 hours.

## 2025-03-05 NOTE — TELEPHONE ENCOUNTER
----- Message from Patty Louis MD sent at 3/4/2025  8:45 AM CST -----  Please inform - urine protein (microalbumin) is improved.   ----- Message -----  From: [facility]  Sent: 2/27/2025   5:16 PM CST  To: Patty Louis MD

## 2025-03-05 NOTE — TELEPHONE ENCOUNTER
Pt advised, excellent diabetes and cholesterol.  Mild anemia but stable (probably from rheumatoid arthritis, chronic disease).   Vit D is low I am sending a weekly prescription to improve it.

## 2025-03-07 ENCOUNTER — OFFICE VISIT (OUTPATIENT)
Dept: SPORTS MEDICINE | Facility: CLINIC | Age: 70
End: 2025-03-07
Payer: MEDICARE

## 2025-03-07 VITALS
BODY MASS INDEX: 27.85 KG/M2 | SYSTOLIC BLOOD PRESSURE: 162 MMHG | DIASTOLIC BLOOD PRESSURE: 77 MMHG | WEIGHT: 163.13 LBS | HEIGHT: 64 IN

## 2025-03-07 DIAGNOSIS — M17.12 OSTEOARTHRITIS OF LEFT KNEE, UNSPECIFIED OSTEOARTHRITIS TYPE: ICD-10-CM

## 2025-03-07 DIAGNOSIS — M25.562 CHRONIC PAIN OF LEFT KNEE: Primary | ICD-10-CM

## 2025-03-07 DIAGNOSIS — G89.29 CHRONIC PAIN OF LEFT KNEE: Primary | ICD-10-CM

## 2025-03-07 PROCEDURE — 99999 PR PBB SHADOW E&M-EST. PATIENT-LVL IV: CPT | Mod: PBBFAC,,, | Performed by: PHYSICIAN ASSISTANT

## 2025-03-07 NOTE — PROGRESS NOTES
CC: left knee pain (referral from Dr. Torres)    69 y.o. Female  at [facility] with known RA who reports knee pain that started about 3 weeks ago and some mild swelling. She also had hand swelling of her knuckles. Started oral prednisone which she is still taking. This likely helped her knee pain resolve and swelling improve. Knee is not bothering her now. She has had bilateral knee pain flare ups and effusions previously. Negative GOUT fluid culture and likely RA flare up.  No new injury or trauma. She received CSI and euflexxa injections with improvement to the knee in the past.     Pain in knees for years.   Last A1C was 5.5.     She has plans to follow up with rheumatology to start RA mgmt.    Pain is not affecting ADLs.     No mechanical symptoms, no instability    She denies fever, chills, malaise.       Review of Systems   Constitution: Negative. Negative for chills, fever and night sweats.   HENT: Negative for congestion and headaches.    Eyes: Negative for blurred vision, left vision loss and right vision loss.   Cardiovascular: Negative for chest pain and syncope.   Respiratory: Negative for cough and shortness of breath.    Endocrine: Negative for polydipsia, polyphagia and polyuria.   Hematologic/Lymphatic: Negative for bleeding problem. Does not bruise/bleed easily.   Skin: Negative for dry skin, itching and rash.   Musculoskeletal: Negative for falls and muscle weakness.   Gastrointestinal: Negative for abdominal pain and bowel incontinence.   Genitourinary: Negative for bladder incontinence and nocturia.   Neurological: Negative for disturbances in coordination, loss of balance and seizures.   Psychiatric/Behavioral: Negative for depression. The patient does not have insomnia.    Allergic/Immunologic: Negative for hives and persistent infections.   All other systems negative      PAST MEDICAL HISTORY:   Past Medical History:   Diagnosis Date    Acute coronary syndrome 09/23/2018     Adjustment disorder     Anxiety     Arthritis     Cancer of breast 2020    Malignant neoplasm of lower-inner quadrant of left breast in female, estrogen receptor positive    Cholelithiasis with acute cholecystitis 2021    Coronary artery disease     Depression     Diabetes mellitus type I     Genetic testing of female 2020    [place] via [employer] with AXIN2 and POLE variants of uncertain significance    GERD (gastroesophageal reflux disease)     Hepatitis B core antibody positive 3/9/2023    Negative sAg, suggests previous exposure but no chronic/active Hep B. At risk for reactivation with any immunosuppression medication, steroids, chemo, etc.      Hypertension     Vertigo 2019    Vitamin D deficiency 2021     PAST SURGICAL HISTORY:   Past Surgical History:   Procedure Laterality Date    BREAST BIOPSY Left 2022    Left punch biopsy; Unremarkable keratinized skin with intradermal hematoma     SECTION      COLONOSCOPY N/A 2024    Procedure: COLONOSCOPY;  Surgeon: PORTILLO Esqueda MD;  Location: [address]);  Service: Endoscopy;  Laterality: N/A;  ref by ,insrt sent via portal and giving to pt,states she not on any weight loss meds at this time, and not taking trulicity.sutab.AC  -lvm for pre call-tb-hx of constipation??  -peg, pt confirmed appt, see telephone encounter -Kpvt    HYSTEROSCOPY WITH DILATION AND CURETTAGE OF UTERUS N/A 2024    Procedure: HYSTEROSCOPY, WITH DILATION AND CURETTAGE OF UTERUS;  Surgeon: Hank Doran MD;  Location: [facility] OR;  Service: OB/GYN;  Laterality: N/A;    INJECTION FOR SENTINEL NODE IDENTIFICATION Left 2020    Procedure: INJECTION, FOR SENTINEL NODE IDENTIFICATION;  Surgeon: Isabel Moulton MD;  Location: [facility] OR;  Service: General;  Laterality: Left;    INSERTION OF TUNNELED CENTRAL VENOUS CATHETER (CVC) WITH SUBCUTANEOUS PORT N/A 2020    Procedure: INSERTION, PORT-A-CATH;   Surgeon: Hardeep Martin MD;  Location: [facility] CATH LAB;  Service: Radiology;  Laterality: N/A;    LAPAROSCOPIC CHOLECYSTECTOMY N/A 03/06/2021    Procedure: CHOLECYSTECTOMY, LAPAROSCOPIC;  Surgeon: Buster Son MD;  Location: [facility] OR 2ND FLR;  Service: General;  Laterality: N/A;    MASTECTOMY, PARTIAL Left 09/17/2020    Procedure: MASTECTOMY, PARTIAL-w/reflector;  Surgeon: Isabel Moulton MD;  Location: [facility] OR;  Service: General;  Laterality: Left;    SENTINEL LYMPH NODE BIOPSY Left 09/17/2020    Procedure: BIOPSY, LYMPH NODE, SENTINEL;  Surgeon: Isabel Moulton MD;  Location: [facility] OR;  Service: General;  Laterality: Left;     FAMILY HISTORY:   Family History   Problem Relation Name Age of Onset    Hypertension Mother      Hyperlipidemia Mother      Diabetes Mother      Heart disease Mother      Colon polyps Mother      Aneurysm Father      Diabetes Sister 2     Hypertension Sister 2     Heart disease Brother 2     Diabetes Brother 2     Hypertension Brother 2     Cancer Brother 1         brother German with prostate cancer; brother Cooper with throat cancer    Cervical cancer Daughter Daughter1     Anxiety disorder Daughter Daughter2     Depression Daughter Daughter2     Anxiety disorder Daughter Daughter3     Depression Daughter Daughter3     Breast cancer Maternal Aunt Teresa     Cancer Maternal Aunt Nikki         unknown origin    Cancer Paternal Aunt Esperanza         unknown origin    Breast cancer Paternal Aunt Esperanza     Prostate cancer Maternal Cousin Chris     Leukemia Maternal Cousin Chris     Prostate cancer Maternal Cousin Nirmal     Breast cancer Other Pat 1/2aunt Quiana     Colon cancer Neg Hx      Ovarian cancer Neg Hx       SOCIAL HISTORY:   Social History     Socioeconomic History    Marital status:      Spouse name: Burak    Number of children: 3   Tobacco Use    Smoking status: Never    Smokeless tobacco: Never   Substance and Sexual Activity    Alcohol use: Never    Drug use: No     "Sexual activity: Yes     Partners: Male     Birth control/protection: Post-menopausal   Social History Narrative    ** Merged History Encounter **          Social Drivers of Health     Financial Resource Strain: Medium Risk (4/6/2023)    Overall Financial Resource Strain (CARDIA)     Difficulty of Paying Living Expenses: Somewhat hard   Food Insecurity: No Food Insecurity (11/25/2024)    Hunger Vital Sign     Worried About Running Out of Food in the Last Year: Never true     Ran Out of Food in the Last Year: Never true   Transportation Needs: No Transportation Needs (4/6/2023)    PRAPARE - Transportation     Lack of Transportation (Medical): No     Lack of Transportation (Non-Medical): No   Physical Activity: Inactive (4/6/2023)    Exercise Vital Sign     Days of Exercise per Week: 0 days     Minutes of Exercise per Session: 0 min   Stress: No Stress Concern Present (4/6/2023)    [employer] [place] [employer] - Occupational Stress Questionnaire     Feeling of Stress : Not at all   Housing Stability: Unknown (4/6/2023)    Housing Stability Vital Sign     Unable to Pay for Housing in the Last Year: No     Unstable Housing in the Last Year: No       MEDICATIONS:   Current Outpatient Medications:     ACCU-CHEK GUIDE GLUCOSE METER Misc, USE  THREE TIMES DAILY, Disp: 1 each, Rfl: 0    acetaminophen (TYLENOL) 325 MG tablet, Take 2 tablets (650 mg total) by mouth every 6 (six) hours. Alternate between ibuprofen and tylenol every 3 hours. For example: @0800: ibuprofen 600mg @1100: tylenol 650mg @1400: ibuprofen 600mg @1700: tylenol 650 mg @2000: ibuprofen 600mg, Disp: 60 tablet, Rfl: 1    amLODIPine (NORVASC) 5 MG tablet, Take 1 tablet (5 mg total) by mouth once daily., Disp: 90 tablet, Rfl: 3    aspirin 81 mg Tab, Take 1 tablet by mouth Daily., Disp: , Rfl:     atorvastatin (LIPITOR) 10 MG tablet, Take 1 tablet by mouth once daily, Disp: 90 tablet, Rfl: 2    BD ULTRA-FINE SHORT PEN NEEDLE 31 gauge x 5/16" Ndle, " USE 1  4 TIMES DAILY WITH  INSULIN, Disp: 100 each, Rfl: 3    blood sugar diagnostic Strp, Strips and lancets covered by insurance E11.9, pt checks glucose three times daily, insulin dependent, Disp: 300 each, Rfl: 3    blood sugar diagnostic Strp, Check glucose daily Strips and lancets covered by insurance E11.9, Disp: 100 each, Rfl: 3    blood sugar diagnostic, drum (ACCU-CHEK COMPACT TEST) Strp, USE ONE STRIP TO CHECK GLUCOSE 4 TIMES DAILY BEFORE EACH MEAL AND AT BEDTIME, Disp: 100 each, Rfl: 0    blood-glucose meter kit, Check glucose daily E11.9 meter covered by insurance, Disp: 1 each, Rfl: 0    diclofenac sodium (VOLTAREN) 1 % Gel, Apply 2 g topically once daily., Disp: 1 each, Rfl: 0    docusate sodium (COLACE) 100 MG capsule, Take 1 capsule (100 mg total) by mouth 2 (two) times daily., Disp: 60 capsule, Rfl: 1    ergocalciferol (ERGOCALCIFEROL) 50,000 unit Cap, One weekly for 12 weeks then 2,000 IU daily thereafter, Disp: 12 capsule, Rfl: 0    ibuprofen (ADVIL,MOTRIN) 800 MG tablet, Take 1 tablet (800 mg total) by mouth every 6 (six) hours as needed for Pain. Alternate between ibuprofen and tylenol every 3 hours. For example: @0800: ibuprofen 600mg @1100: tylenol 650mg @1400: ibuprofen 600mg @1700: tylenol 650 mg @2000: ibuprofen 600mg, Disp: 60 tablet, Rfl: 1    letrozole (FEMARA) 2.5 mg Tab, Take 1 tablet by mouth once daily, Disp: 90 tablet, Rfl: 3    LIDOcaine-prilocaine (EMLA) cream, Apply topically as needed., Disp: 30 g, Rfl: 3    losartan (COZAAR) 100 MG tablet, Take 1 tablet (100 mg total) by mouth once daily., Disp: 90 tablet, Rfl: 2    metFORMIN (GLUCOPHAGE) 1000 MG tablet, TAKE 1 TABLET BY MOUTH ONCE DAILY WITH A MEAL, Disp: 90 tablet, Rfl: 1    polyethylene glycol (GOLYTELY) 236-22.74-6.74 -5.86 gram suspension, Take 4,000 mLs by mouth once., Disp: , Rfl:     predniSONE (DELTASONE) 10 MG tablet, Take 2 tablets (20 mg total) by mouth once daily for 6 days, THEN 1 tablet (10 mg total) once daily  "for 6 days, THEN 0.5 tablets (5 mg total) once daily for 8 days., Disp: 22 tablet, Rfl: 0    topiramate (TOPAMAX) 25 MG tablet, TAKE 1 TABLET BY MOUTH ONCE DAILY AT BEDTIME FOR  2  WEEKS  THEN  INCREASE TO 2 TABLETS  AT  BEDTIME, Disp: 60 tablet, Rfl: 3  No current facility-administered medications for this visit.    Facility-Administered Medications Ordered in Other Visits:     fentaNYL injection 25 mcg, 25 mcg, Intravenous, Q5 Min PRN, Kenneth Shannon MD    midazolam (VERSED) 1 mg/mL injection 0.5 mg, 0.5 mg, Intravenous, PRN, Kenneth Shannon MD, 2 mg at 09/17/20 0637  ALLERGIES: Review of patient's allergies indicates:  No Known Allergies    VITAL SIGNS: BP (!) 162/77 (Patient Position: Sitting)   Ht 5' 4" (1.626 m)   Wt 74 kg (163 lb 2.3 oz)   LMP  (LMP Unknown)   BMI 28.00 kg/m²      PHYSICAL EXAMINATION    General:  The patient is alert and oriented x 3.  Mood is pleasant.  Observation of ears, eyes and nose reveal no gross abnormalities.  HEENT: NCAT, sclera nonicteric  Lungs: Respirations are equal and unlabored.    Left KNEE EXAMINATION     OBSERVATION / INSPECTION   Gait:   normal  Alignment:  Neutral   Scars:   None   Muscle atrophy: Mild  Effusion:  negative  Warmth:  None   Discoloration:   none     TENDERNESS / CREPITUS (T / C):          T / C      T / C   Patella   - / -   Lateral joint line   -  / -      Peripatellar medial  -  Medial joint line    -  / -   Peripatellar lateral -  Medial plica   - / -   Patellar tendon -   Popliteal fossa  - / -   Quad tendon   -   Gastrocnemius   -   Prepatellar Bursa - / -   Quadricep   -   Tibial tubercle  -  Thigh/hamstring  -   Pes anserine/HS -  Fibula    -   ITB   - / -  Tibia     -   Tib/fib joint  - / -  LCL    -     MFC   - / -   MCL: Proximal  -    LFC   - / -    Distal   -          ROM: (* = pain)  PASSIVE   ACTIVE    Left :   0 / 0 / 130   0 / 0 / 130    Right :    0 / 0 / 130   0 / 0 / 130    Patellofemoral " examination:  See above noted areas of tenderness.   Patella position    Subluxation / dislocation: Centered           Sup. / Inf;   Normal   Crepitus (PF):    Absent   Patellar Mobility:       Medial-lateral:   Normal    Superior-inferior:  Normal    Inferior tilt   Normal    Patellar tendon:  Normal   Lateral tilt:    Normal   J-sign:     None   Patellofemoral grind:   none      MENISCAL SIGNS:     Pain on terminal extension:  -  Pain on terminal flexion:  -  Tgs maneuver:  -  Squat     NT    LIGAMENT EXAMINATION:  ACL / Lachman:  normal (-1 to 2mm)    PCL-Post.  drawer: normal 0 to 2mm  MCL- Valgus:  normal 0 to 2mm  LCL- Varus:  normal 0 to 2mm      STRENGTH: (* = with pain) PAINFUL SIDE   Quadricep   5/5   Hamstrin/5    EXTREMITY NEURO-VASCULAR EXAMINATION:   Sensation:  Grossly intact to light touch all dermatomal regions.   Motor Function:  Fully intact motor function at hip, knee, foot and ankle    DTRs;  quadriceps and  achilles 2+.  No clonus and downgoing Babinski.    Vascular status:  DP and PT pulses 2+, brisk capillary refill, symmetric.     Other Findings:       Xrays:  Xrays of the bilateral knees with flexion were reviewed by me today. No fracture, subluxation, or other significant bony or joint abnormality is identified. Moderate tricompartmental DJD of bilateral knees.   Kellgren Surjit 2 or greater noted.         ASSESSMENT:    1. Left Knee pain  2. Left knee rehumatoid arthritis     Bilateral hip abd/core weakness      PLAN:    1. Knee pain and swelling has resolved.   2. Ice compresses prn pain   3. Activity modifications discussed.   Return to activity as tolerate.   RTC if no improvement or worsening.     All patients questions were answered. Patient had opportunity and has no other questions or concerns at this time. Patient was advised to contact us if they have any further questions or if symptoms worsen, change, or new symptoms begin occurring. This could indicate that  something different/worse is occurring that was not present at our time of discussion/evaluation.       I made the decision to obtain old records of the patient including previous notes and imaging. New imaging was ordered today of the extremity or extremities evaluated. I independently reviewed and interpreted the radiographs and/or MRIs today as well as prior imaging.     .

## 2025-03-13 ENCOUNTER — OFFICE VISIT (OUTPATIENT)
Dept: OBSTETRICS AND GYNECOLOGY | Facility: CLINIC | Age: 70
End: 2025-03-13
Payer: MEDICARE

## 2025-03-13 DIAGNOSIS — Z98.890 S/P DILATION AND CURETTAGE: ICD-10-CM

## 2025-03-13 DIAGNOSIS — R39.198 URINARY STREAM SLOWING: Primary | ICD-10-CM

## 2025-03-13 PROCEDURE — 3066F NEPHROPATHY DOC TX: CPT | Mod: CPTII,GC,S$GLB,

## 2025-03-13 PROCEDURE — 4010F ACE/ARB THERAPY RXD/TAKEN: CPT | Mod: CPTII,GC,S$GLB,

## 2025-03-13 PROCEDURE — 99999 PR PBB SHADOW E&M-EST. PATIENT-LVL I: CPT | Mod: PBBFAC,GC,,

## 2025-03-13 PROCEDURE — 1160F RVW MEDS BY RX/DR IN RCRD: CPT | Mod: CPTII,GC,S$GLB,

## 2025-03-13 PROCEDURE — 3044F HG A1C LEVEL LT 7.0%: CPT | Mod: CPTII,GC,S$GLB,

## 2025-03-13 PROCEDURE — 99024 POSTOP FOLLOW-UP VISIT: CPT | Mod: GE,S$GLB,,

## 2025-03-13 PROCEDURE — 1159F MED LIST DOCD IN RCRD: CPT | Mod: CPTII,GC,S$GLB,

## 2025-03-13 PROCEDURE — 3060F POS MICROALBUMINURIA REV: CPT | Mod: CPTII,GC,S$GLB,

## 2025-03-13 NOTE — PROGRESS NOTES
Past medical, surgical, social, family, and obstetric histories; medications; prior records and results; and available outside records were reviewed and updated in the EMR.  Pertinent findings were noted below.    Reason for Visit   Post Op    HPI   69 y.o. female   H/o C/S x3     No LMP recorded (lmp unknown). Patient is postmenopausal.    Pt is a 69 y.o.F, now 3 months post op from hysteroscopy D&C due to thickened endometrium (15mm with cystic region). Pathology below.   Pathology:    Diagnosis 1. Fragments of a benign endometrial polyp  2. Small fragments of benign cervical tissue and blood clot, no definitive endometrial tissue identified.       Pt has been doing very well since surgery. No pain, tolerating PO, performing all ADLs. Denies fever, chills, N/V/D, rash, cough, congestion, SOB, chest pain. Denies vaginal bleeding, abdominal or pelvic pain. Denies ever having any episodes of PMB. Unsure of when LMP was but reports many years ago.   Reports difficulty initiating a urinary stream and does not urinate at all during work. States she holds it throughout the day. Denies any pain, dysuria, hematuria, frequency, or urgency. Denies incontinence or nocturia.     PMHx significant for HTN, GERD, H/o Breast cancer (s/p chemotherapy), CKD, anxiety/depression, rheumatoid arthritis.     Pap: 2023: NILM/HPV neg  Mammogram: 2024: BIRADS2    Exam   LMP  (LMP Unknown)     Physical Exam  Constitutional:       Appearance: Normal appearance.     Genitourinary:    Urethra, vagina and urethral meatus normal.   The external female genitalia was normal.   Genitalia hair distrobution normal .   There is no rash, tenderness or lesion on the right labia. There is no rash, tenderness or lesion on the left labia. There is no rash on the right vulva.There is no rash on the left vulva.No vaginal discharge, tenderness, bleeding or granulation tissue in the vagina. Vagina was moist.   Vaginal exam comments: No cystocele or  rectocele appreciated  No prolapse with valsalva  No urinary incontinence.   Cervix is normal. Cervix exhibits no lesion, no discharge and no friability. Urethral Meatus exhibits: no urethral lesion and no prolapsed  Pulmonary:      Effort: Pulmonary effort is normal. No respiratory distress.   Musculoskeletal:         General: No swelling or tenderness.   Neurological:      General: No focal deficit present.      Mental Status: She is alert and oriented to person, place, and time.   Psychiatric:         Mood and Affect: Mood normal.         Behavior: Behavior normal.   Vitals and nursing note reviewed. Exam conducted with a chaperone present.       Assessment and Plan   Urinary stream slowing  -     Urinalysis, Reflex to Urine Culture Urine, Clean Catch    S/P hysteroscopy/D&C      Thickened EMS/Post op hysteroscopy D&C  Meeting post operative milestones  Discussed pathology results with patient  Suspect endometrial thickening due to polyp, now s/p polypectomy  Questions answered  RTC if PMB    Difficulty with urinary stream  -urinalysis with reflex to culture ordered  -no POP on exam   -reviewed bladder/urinary hygiene     Follow up: if PMB, PRN    Jud Allen MD, MPH  OBGYN PGY-4

## 2025-04-03 ENCOUNTER — LAB VISIT (OUTPATIENT)
Dept: LAB | Facility: HOSPITAL | Age: 70
End: 2025-04-03
Attending: STUDENT IN AN ORGANIZED HEALTH CARE EDUCATION/TRAINING PROGRAM
Payer: MEDICARE

## 2025-04-03 ENCOUNTER — OFFICE VISIT (OUTPATIENT)
Dept: RHEUMATOLOGY | Facility: CLINIC | Age: 70
End: 2025-04-03
Payer: MEDICARE

## 2025-04-03 ENCOUNTER — RESULTS FOLLOW-UP (OUTPATIENT)
Dept: INTERNAL MEDICINE | Facility: CLINIC | Age: 70
End: 2025-04-03

## 2025-04-03 VITALS
WEIGHT: 168.63 LBS | HEIGHT: 64 IN | SYSTOLIC BLOOD PRESSURE: 139 MMHG | TEMPERATURE: 98 F | BODY MASS INDEX: 28.79 KG/M2 | HEART RATE: 69 BPM | RESPIRATION RATE: 14 BRPM | DIASTOLIC BLOOD PRESSURE: 73 MMHG

## 2025-04-03 DIAGNOSIS — M25.562 PAIN AND SWELLING OF LEFT KNEE: ICD-10-CM

## 2025-04-03 DIAGNOSIS — M25.462 PAIN AND SWELLING OF LEFT KNEE: ICD-10-CM

## 2025-04-03 DIAGNOSIS — M06.9 RHEUMATOID ARTHRITIS FLARE: ICD-10-CM

## 2025-04-03 DIAGNOSIS — M05.79 RHEUMATOID ARTHRITIS INVOLVING MULTIPLE SITES WITH POSITIVE RHEUMATOID FACTOR: Primary | ICD-10-CM

## 2025-04-03 DIAGNOSIS — E11.9 TYPE 2 DIABETES MELLITUS WITHOUT COMPLICATION: ICD-10-CM

## 2025-04-03 DIAGNOSIS — M05.79 RHEUMATOID ARTHRITIS INVOLVING MULTIPLE SITES WITH POSITIVE RHEUMATOID FACTOR: ICD-10-CM

## 2025-04-03 DIAGNOSIS — M18.0 OSTEOARTHRITIS OF CARPOMETACARPAL (CMC) JOINTS OF BOTH THUMBS, UNSPECIFIED OSTEOARTHRITIS TYPE: ICD-10-CM

## 2025-04-03 DIAGNOSIS — M25.472 LEFT ANKLE SWELLING: ICD-10-CM

## 2025-04-03 LAB
ABSOLUTE EOSINOPHIL (OHS): 0 K/UL
ABSOLUTE MONOCYTE (OHS): 0.5 K/UL (ref 0.3–1)
ABSOLUTE NEUTROPHIL COUNT (OHS): 2.57 K/UL (ref 1.8–7.7)
ALBUMIN SERPL BCP-MCNC: 3.8 G/DL (ref 3.5–5.2)
ALP SERPL-CCNC: 94 UNIT/L (ref 40–150)
ALT SERPL W/O P-5'-P-CCNC: 10 UNIT/L (ref 10–44)
ANION GAP (OHS): 8 MMOL/L (ref 8–16)
AST SERPL-CCNC: 15 UNIT/L (ref 11–45)
BASOPHILS # BLD AUTO: 0.01 K/UL
BASOPHILS NFR BLD AUTO: 0.2 %
BILIRUB SERPL-MCNC: 0.4 MG/DL (ref 0.1–1)
BUN SERPL-MCNC: 17 MG/DL (ref 8–23)
CALCIUM SERPL-MCNC: 9.5 MG/DL (ref 8.7–10.5)
CHLORIDE SERPL-SCNC: 118 MMOL/L (ref 95–110)
CO2 SERPL-SCNC: 19 MMOL/L (ref 23–29)
CREAT SERPL-MCNC: 1.1 MG/DL (ref 0.5–1.4)
CRP SERPL-MCNC: 0.8 MG/L
EAG (OHS): 148 MG/DL (ref 68–131)
ERYTHROCYTE [DISTWIDTH] IN BLOOD BY AUTOMATED COUNT: 13.4 % (ref 11.5–14.5)
ERYTHROCYTE [SEDIMENTATION RATE] IN BLOOD BY PHOTOMETRIC METHOD: 4 MM/HR
GFR SERPLBLD CREATININE-BSD FMLA CKD-EPI: 54 ML/MIN/1.73/M2
GLUCOSE SERPL-MCNC: 96 MG/DL (ref 70–110)
HBA1C MFR BLD: 6.8 % (ref 4–5.6)
HBV CORE AB SERPL QL IA: REACTIVE
HBV SURFACE AB SER-ACNC: >1000 MIU/ML
HBV SURFACE AB SERPL IA-ACNC: REACTIVE M[IU]/ML
HBV SURFACE AG SERPL QL IA: NORMAL
HCT VFR BLD AUTO: 36.2 % (ref 37–48.5)
HCV AB SERPL QL IA: NORMAL
HGB BLD-MCNC: 11.8 GM/DL (ref 12–16)
HIV 1+2 AB+HIV1 P24 AG SERPL QL IA: NORMAL
IMM GRANULOCYTES # BLD AUTO: 0.02 K/UL (ref 0–0.04)
IMM GRANULOCYTES NFR BLD AUTO: 0.4 % (ref 0–0.5)
LYMPHOCYTES # BLD AUTO: 2.13 K/UL (ref 1–4.8)
MCH RBC QN AUTO: 31 PG (ref 27–31)
MCHC RBC AUTO-ENTMCNC: 32.6 G/DL (ref 32–36)
MCV RBC AUTO: 95 FL (ref 82–98)
NUCLEATED RBC (/100WBC) (OHS): 0 /100 WBC
PLATELET # BLD AUTO: 176 K/UL (ref 150–450)
PMV BLD AUTO: 9.6 FL (ref 9.2–12.9)
POTASSIUM SERPL-SCNC: 4.8 MMOL/L (ref 3.5–5.1)
PROT SERPL-MCNC: 7.7 GM/DL (ref 6–8.4)
RBC # BLD AUTO: 3.81 M/UL (ref 4–5.4)
RELATIVE EOSINOPHIL (OHS): 0 %
RELATIVE LYMPHOCYTE (OHS): 40.7 % (ref 18–48)
RELATIVE MONOCYTE (OHS): 9.6 % (ref 4–15)
RELATIVE NEUTROPHIL (OHS): 49.1 % (ref 38–73)
SODIUM SERPL-SCNC: 145 MMOL/L (ref 136–145)
WBC # BLD AUTO: 5.23 K/UL (ref 3.9–12.7)

## 2025-04-03 PROCEDURE — 36415 COLL VENOUS BLD VENIPUNCTURE: CPT | Mod: HCNC

## 2025-04-03 PROCEDURE — 87389 HIV-1 AG W/HIV-1&-2 AB AG IA: CPT | Mod: HCNC

## 2025-04-03 PROCEDURE — 3075F SYST BP GE 130 - 139MM HG: CPT | Mod: HCNC,CPTII,S$GLB, | Performed by: STUDENT IN AN ORGANIZED HEALTH CARE EDUCATION/TRAINING PROGRAM

## 2025-04-03 PROCEDURE — 86706 HEP B SURFACE ANTIBODY: CPT | Mod: HCNC

## 2025-04-03 PROCEDURE — 86704 HEP B CORE ANTIBODY TOTAL: CPT | Mod: HCNC

## 2025-04-03 PROCEDURE — G2211 COMPLEX E/M VISIT ADD ON: HCPCS | Mod: HCNC,S$GLB,, | Performed by: STUDENT IN AN ORGANIZED HEALTH CARE EDUCATION/TRAINING PROGRAM

## 2025-04-03 PROCEDURE — 85025 COMPLETE CBC W/AUTO DIFF WBC: CPT | Mod: HCNC

## 2025-04-03 PROCEDURE — 85652 RBC SED RATE AUTOMATED: CPT | Mod: HCNC

## 2025-04-03 PROCEDURE — 3044F HG A1C LEVEL LT 7.0%: CPT | Mod: HCNC,CPTII,S$GLB, | Performed by: STUDENT IN AN ORGANIZED HEALTH CARE EDUCATION/TRAINING PROGRAM

## 2025-04-03 PROCEDURE — 80053 COMPREHEN METABOLIC PANEL: CPT | Mod: HCNC

## 2025-04-03 PROCEDURE — 4010F ACE/ARB THERAPY RXD/TAKEN: CPT | Mod: HCNC,CPTII,S$GLB, | Performed by: STUDENT IN AN ORGANIZED HEALTH CARE EDUCATION/TRAINING PROGRAM

## 2025-04-03 PROCEDURE — 3066F NEPHROPATHY DOC TX: CPT | Mod: HCNC,CPTII,S$GLB, | Performed by: STUDENT IN AN ORGANIZED HEALTH CARE EDUCATION/TRAINING PROGRAM

## 2025-04-03 PROCEDURE — 86140 C-REACTIVE PROTEIN: CPT | Mod: HCNC

## 2025-04-03 PROCEDURE — 86480 TB TEST CELL IMMUN MEASURE: CPT | Mod: HCNC

## 2025-04-03 PROCEDURE — 1159F MED LIST DOCD IN RCRD: CPT | Mod: HCNC,CPTII,S$GLB, | Performed by: STUDENT IN AN ORGANIZED HEALTH CARE EDUCATION/TRAINING PROGRAM

## 2025-04-03 PROCEDURE — 87340 HEPATITIS B SURFACE AG IA: CPT | Mod: HCNC

## 2025-04-03 PROCEDURE — 99999 PR PBB SHADOW E&M-EST. PATIENT-LVL V: CPT | Mod: PBBFAC,HCNC,, | Performed by: STUDENT IN AN ORGANIZED HEALTH CARE EDUCATION/TRAINING PROGRAM

## 2025-04-03 PROCEDURE — 1125F AMNT PAIN NOTED PAIN PRSNT: CPT | Mod: HCNC,CPTII,S$GLB, | Performed by: STUDENT IN AN ORGANIZED HEALTH CARE EDUCATION/TRAINING PROGRAM

## 2025-04-03 PROCEDURE — 86803 HEPATITIS C AB TEST: CPT | Mod: HCNC

## 2025-04-03 PROCEDURE — 3078F DIAST BP <80 MM HG: CPT | Mod: HCNC,CPTII,S$GLB, | Performed by: STUDENT IN AN ORGANIZED HEALTH CARE EDUCATION/TRAINING PROGRAM

## 2025-04-03 PROCEDURE — 3008F BODY MASS INDEX DOCD: CPT | Mod: HCNC,CPTII,S$GLB, | Performed by: STUDENT IN AN ORGANIZED HEALTH CARE EDUCATION/TRAINING PROGRAM

## 2025-04-03 PROCEDURE — 3060F POS MICROALBUMINURIA REV: CPT | Mod: HCNC,CPTII,S$GLB, | Performed by: STUDENT IN AN ORGANIZED HEALTH CARE EDUCATION/TRAINING PROGRAM

## 2025-04-03 PROCEDURE — 99215 OFFICE O/P EST HI 40 MIN: CPT | Mod: HCNC,S$GLB,, | Performed by: STUDENT IN AN ORGANIZED HEALTH CARE EDUCATION/TRAINING PROGRAM

## 2025-04-03 PROCEDURE — 83036 HEMOGLOBIN GLYCOSYLATED A1C: CPT | Mod: HCNC

## 2025-04-03 RX ORDER — CYCLOSPORINE 0.5 MG/ML
EMULSION OPHTHALMIC
COMMUNITY
Start: 2025-02-05

## 2025-04-03 RX ORDER — ADALIMUMAB 40MG/0.4ML
40 KIT SUBCUTANEOUS
Qty: 2 PEN | Refills: 11 | Status: SHIPPED | OUTPATIENT
Start: 2025-04-03 | End: 2025-04-03

## 2025-04-03 RX ORDER — METHYLPREDNISOLONE 4 MG/1
TABLET ORAL
Qty: 21 EACH | Refills: 0 | Status: SHIPPED | OUTPATIENT
Start: 2025-04-03 | End: 2025-04-24

## 2025-04-03 RX ORDER — ADALIMUMAB 40MG/0.4ML
40 KIT SUBCUTANEOUS
Qty: 2 PEN | Refills: 11 | Status: ACTIVE | OUTPATIENT
Start: 2025-04-03 | End: 2026-04-03

## 2025-04-03 NOTE — PROGRESS NOTES
Subjective:       Patient ID: Sunitha Pillai is a 67 y.o. female.    Chief Complaint: Disease Management    HPI  67 year old F with PMH of adjustment disorder, depression, type II DM,  GERD, HTN, vertigo, HTN, left breast cancer s/p chemo/ radiation in 2020, pulmonary nodules here for evaluation.  She has been having pain for last years. She is having in both shoulders (worse on right), elbows,wrists, hands, knees, ankles, and feet. Pain can be as high as 10/10.  She gets swelling of hands, worse in the right.She also gets swelling in ankles.   Denies any rashes. Denies smoking. Denies oral ulcers, fevers, raynauds, photosensitivity, or alopecia  Family hx:2  aunts: cancer  Last appt  She had drainage in left knee and it feels better.  She has pain in right shoulder.  Pain level us 2/10.     Last appointment:  Patient here for follow up of RA   Patient is having active swelling in joints of hands and feet.Most ly L hand wrist and middle finger and L foot with L ankle swelling and knee but she has an appt with ortho who will inject knee     04/03/2025:  Patient here for follow-up of rheumatoid arthritis  Started Humira-- patient never started Humira  Joint swelling- bilateral swelling of PIP joints in 2nd and 3rd PIP  Joint stiffness- more than 3 hours in the morning  Joint pain- all day long 9/10  On 02/26/2025 she had a rheumatoid arthritis flare-up and had to go to the emergency room- she was given steroids, Toradol, Tylenol  Last labs during flare-up CRP and sed rate normal  She had aspiration if knee no signs of gout  Unclear why CRP and sed rate are normal when she has visible synovitis  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, estrogen receptor positive    Cholelithiasis with acute cholecystitis 3/5/2021    Depression   "   Diabetes mellitus type I     Genetic testing of female 09/2020    Minerva via [employer] with AXIN2 and POLE variants of uncertain significance    GERD (gastroesophageal reflux disease)     Hypertension     Pneumonia due to COVID-19 virus 2/7/2021    Unstable angina 9/23/2018    Vertigo 05/13/2019    Vitamin D deficiency 2/7/2021       Review of Systems   Constitutional:  Negative for activity change, appetite change, chills, diaphoresis, fatigue, fever and unexpected weight change.   HENT:  Negative for congestion, ear discharge, ear pain, facial swelling, mouth sores, sinus pressure, sneezing, sore throat, tinnitus and trouble swallowing.    Eyes:  Negative for photophobia, pain, discharge, redness, itching and visual disturbance.   Respiratory:  Negative for apnea, chest tightness, shortness of breath, wheezing and stridor.    Cardiovascular:  Negative for leg swelling.   Gastrointestinal:  Negative for abdominal distention, abdominal pain, anal bleeding, blood in stool, constipation, diarrhea and nausea.   Endocrine: Negative for cold intolerance and heat intolerance.   Genitourinary:  Negative for difficulty urinating and dysuria.   Musculoskeletal:  Positive for arthralgias and joint swelling. Negative for back pain, gait problem, myalgias, neck pain and neck stiffness.   Skin:  Negative for color change, pallor, rash and wound.   Neurological:  Negative for dizziness, seizures, light-headedness and numbness.   Hematological:  Negative for adenopathy. Does not bruise/bleed easily.   Psychiatric/Behavioral:  Negative for sleep disturbance. The patient is not nervous/anxious.        Objective:   BP (!) 146/78   Pulse 85   Ht 5' 4" (1.626 m)   Wt 78 kg (171 lb 15.3 oz)   BMI 29.52 kg/m²      Physical Exam   Constitutional: normal appearance.   HENT:   Head: Normocephalic and atraumatic.   Nose: No rhinorrhea or nasal congestion.   Mouth/Throat: No oropharyngeal exudate or posterior oropharyngeal " "erythema.   Eyes: Conjunctivae are normal. Right eye exhibits no discharge. Left eye exhibits no discharge. No scleral icterus.   Cardiovascular: Normal rate and regular rhythm. Exam reveals no gallop and no friction rub.   No murmur heard.  Pulmonary/Chest: No stridor. No respiratory distress. She has no wheezes. She has no rales.   Abdominal: She exhibits no distension and no mass. There is no abdominal tenderness.   Musculoskeletal:         General: Swelling, tenderness and deformity present.   Neurological: She is alert.   Skin:   Synovitis in mcps, pips and wrist   Synovitis in mtps      No data to display     Assessment:       67 year old F with PMH of adjustment disorder, depression, type II DM,  GERD, HTN, vertigo, HTN, left breast cancer s/p chemo/ radiation in 2020, pulmonary nodules here for evaluation.  She has high titer +RF and +CCP consistent with rheumatoid arthritis.   She reports she is feeling "fine" and would like to hold off on treatment.  I told her she is at risk of joint damage and she understands risks. I discussed with her in detail RA and management options.  1. Rheumatoid Arthritis with positive RF of multiple sites   2. Left knee pain and swelling   3. Swelling of L ankle   4. Hep B core ab positive   5. Rheumatoid arthritis flare      Plan:   - Saw educational video of Humira with patient in order for her to feel more comfortable with medication again  - Not MTX given on pulmonary nodules  - Update labs and repeat infectious screening   - prefers no prednisone at this time  - order Humira 40 mg every other week  - risks and benefits explained in length to patient  - update blood work today    40 * minutes of total time spent on the encounter, which includes face to face time and non-face to face time preparing to see the patient (eg, review of tests), Obtaining and/or reviewing separately obtained history, Documenting clinical information in the electronic or other health record, " Independently interpreting results (not separately reported) and communicating results to the patient/family/caregiver, or Care coordination (not separately reported).     Today's visit is associated with current or anticipated ongoing medical care related to this patient's diagnosis of rheumatoid arthritis,  which is a chronic disease which will require regular follow up to manage symptoms and progression. The patient is to return to the office within a minimum of 3-6 months to review symptoms and function at that time. CPT code

## 2025-04-04 ENCOUNTER — TELEPHONE (OUTPATIENT)
Dept: INTERNAL MEDICINE | Facility: CLINIC | Age: 70
End: 2025-04-04
Payer: MEDICARE

## 2025-04-04 LAB
(RETIRING) TB2 AG MINUS NIL RESULT (OHS): 0.03
GAMMA INTERFERON BACKGROUND BLD IA-ACNC: 0.04 [IU]/ML
M TB IFN-G CD4+ BCKGRND COR BLD-ACNC: 0.03 [IU]/ML
MITOGEN IGNF BCKGRD COR BLD-ACNC: >9.96 [IU]/ML
QUANTIFERON GOLD TB INTERP: NEGATIVE

## 2025-04-04 NOTE — TELEPHONE ENCOUNTER
----- Message from Patty Louis MD sent at 4/3/2025  5:36 PM CDT -----  Diabetes is controlled.  ----- Message -----  From: Lab, Background User  Sent: 4/3/2025  11:45 AM CDT  To: Patty Louis MD

## 2025-05-03 ENCOUNTER — HOSPITAL ENCOUNTER (EMERGENCY)
Facility: HOSPITAL | Age: 70
Discharge: HOME | End: 2025-05-03
Attending: STUDENT IN AN ORGANIZED HEALTH CARE EDUCATION/TRAINING PROGRAM
Payer: MEDICARE

## 2025-05-03 VITALS
OXYGEN SATURATION: 100 % | BODY MASS INDEX: 29.19 KG/M2 | RESPIRATION RATE: 20 BRPM | SYSTOLIC BLOOD PRESSURE: 161 MMHG | WEIGHT: 171 LBS | HEIGHT: 64 IN | TEMPERATURE: 98 F | HEART RATE: 72 BPM | DIASTOLIC BLOOD PRESSURE: 96 MMHG

## 2025-05-03 DIAGNOSIS — E11.65 TYPE 2 DIABETES MELLITUS WITH HYPERGLYCEMIA, WITHOUT LONG-TERM CURRENT USE OF INSULIN: ICD-10-CM

## 2025-05-03 DIAGNOSIS — I10 ESSENTIAL HYPERTENSION: Chronic | ICD-10-CM

## 2025-05-03 DIAGNOSIS — M06.9 RHEUMATOID ARTHRITIS, INVOLVING UNSPECIFIED SITE, UNSPECIFIED WHETHER RHEUMATOID FACTOR PRESENT: Primary | ICD-10-CM

## 2025-05-03 LAB — POCT GLUCOSE: 85 MG/DL (ref 70–110)

## 2025-05-03 PROCEDURE — 82962 GLUCOSE BLOOD TEST: CPT | Mod: HCNC

## 2025-05-03 PROCEDURE — 25000003 PHARM REV CODE 250: Mod: HCNC

## 2025-05-03 PROCEDURE — 63600175 PHARM REV CODE 636 W HCPCS: Mod: HCNC

## 2025-05-03 PROCEDURE — 99283 EMERGENCY DEPT VISIT LOW MDM: CPT | Mod: HCNC

## 2025-05-03 RX ORDER — OXYCODONE HYDROCHLORIDE 5 MG/1
5 TABLET ORAL
Refills: 0 | Status: DISCONTINUED | OUTPATIENT
Start: 2025-05-03 | End: 2025-05-03

## 2025-05-03 RX ORDER — PREDNISONE 20 MG/1
60 TABLET ORAL
Status: COMPLETED | OUTPATIENT
Start: 2025-05-03 | End: 2025-05-03

## 2025-05-03 RX ORDER — PREDNISONE 10 MG/1
10 TABLET ORAL DAILY
Qty: 10 TABLET | Refills: 0 | Status: SHIPPED | OUTPATIENT
Start: 2025-05-03 | End: 2025-05-13

## 2025-05-03 RX ORDER — IBUPROFEN 600 MG/1
600 TABLET ORAL
Status: COMPLETED | OUTPATIENT
Start: 2025-05-03 | End: 2025-05-03

## 2025-05-03 RX ADMIN — PREDNISONE 60 MG: 20 TABLET ORAL at 08:05

## 2025-05-03 RX ADMIN — IBUPROFEN 600 MG: 600 TABLET ORAL at 08:05

## 2025-05-03 NOTE — DISCHARGE INSTRUCTIONS
Please follow up with your rheumatologist in the next coming weeks to ensure that you continue taking medications to control her symptoms.    Please take prednisone as prescribed.  Please check your sugars daily, and follow up with your PCP to ensure that sugars too elevated.

## 2025-05-03 NOTE — ED PROVIDER NOTES
Encounter Date: 5/3/2025       History     Chief Complaint   Patient presents with    Joint Pain     69 year old female with past medical history of type 2 diabetes, breast cancer status post chemo/radiation () with history of pulmonary nodule, rheumatoid arthritis, hypertension and hyperlipidemia that presents to emergency department with joint pain. 1 month of progressive worsening joint pain in bilateral ankles, feet, left knee, left elbow, and all PIP on B/L hands. Previously on prednisone, ran out and has been off prednisone for 1 month. Difficulty sleeping, walking and moving secondary to pain. Denies fever, shortness of breath, abdominal pain, nausea or vomiting.     Per chart review: saw Rheum in February and April. Per their note she felt better in April and prednisone was discontinued.     The history is provided by the patient and medical records.     Review of patient's allergies indicates:  No Known Allergies  Past Medical History:   Diagnosis Date    Acute coronary syndrome 2018    Adjustment disorder     Anxiety     Arthritis     Cancer of breast 2020    Malignant neoplasm of lower-inner quadrant of left breast in female, estrogen receptor positive    Cholelithiasis with acute cholecystitis 2021    Coronary artery disease     Depression     Diabetes mellitus type I     Genetic testing of female 2020    Minerva via [employer] with AXIN2 and POLE variants of uncertain significance    GERD (gastroesophageal reflux disease)     Hepatitis B core antibody positive 3/9/2023    Negative sAg, suggests previous exposure but no chronic/active Hep B. At risk for reactivation with any immunosuppression medication, steroids, chemo, etc.      Hypertension     Vertigo 2019    Vitamin D deficiency 2021     Past Surgical History:   Procedure Laterality Date    BREAST BIOPSY Left 2022    Left punch biopsy; Unremarkable keratinized skin with intradermal hematoma      SECTION      COLONOSCOPY N/A 9/6/2024    Procedure: COLONOSCOPY;  Surgeon: PORTILLO Esqueda MD;  Location: [facility] ENDO (4TH FLR);  Service: Endoscopy;  Laterality: N/A;  ref by ,insrt sent via portal and giving to pt,states she not on any weight loss meds at this time, and not taking trulicity.sutab.AC  8/28-lvm for pre call-tb-hx of constipation??  9/5-peg, pt confirmed appt, see telephone encounter 9/5-Kpvt    HYSTEROSCOPY WITH DILATION AND CURETTAGE OF UTERUS N/A 12/2/2024    Procedure: HYSTEROSCOPY, WITH DILATION AND CURETTAGE OF UTERUS;  Surgeon: Hank Doran MD;  Location: [facility] OR;  Service: OB/GYN;  Laterality: N/A;    INJECTION FOR SENTINEL NODE IDENTIFICATION Left 09/17/2020    Procedure: INJECTION, FOR SENTINEL NODE IDENTIFICATION;  Surgeon: Isabel Moulton MD;  Location: [facility] OR;  Service: General;  Laterality: Left;    INSERTION OF TUNNELED CENTRAL VENOUS CATHETER (CVC) WITH SUBCUTANEOUS PORT N/A 11/18/2020    Procedure: INSERTION, PORT-A-CATH;  Surgeon: Hardeep Martin MD;  Location: [facility] CATH LAB;  Service: Radiology;  Laterality: N/A;    LAPAROSCOPIC CHOLECYSTECTOMY N/A 03/06/2021    Procedure: CHOLECYSTECTOMY, LAPAROSCOPIC;  Surgeon: Buster Son MD;  Location: [facility] OR 2ND FLR;  Service: General;  Laterality: N/A;    MASTECTOMY, PARTIAL Left 09/17/2020    Procedure: MASTECTOMY, PARTIAL-w/reflector;  Surgeon: Isabel Moulton MD;  Location: [facility] OR;  Service: General;  Laterality: Left;    SENTINEL LYMPH NODE BIOPSY Left 09/17/2020    Procedure: BIOPSY, LYMPH NODE, SENTINEL;  Surgeon: Isabel Moulton MD;  Location: [facility] OR;  Service: General;  Laterality: Left;     Family History   Problem Relation Name Age of Onset    Hypertension Mother      Hyperlipidemia Mother      Diabetes Mother      Heart disease Mother      Colon polyps Mother      Aneurysm Father      Diabetes Sister 2     Hypertension Sister 2     Heart disease Brother 2     Diabetes Brother 2     Hypertension  Brother 2     Cancer Brother 1         brother German with prostate cancer; brother Cooper with throat cancer    Cervical cancer Daughter Daughter1     Anxiety disorder Daughter Daughter2     Depression Daughter Daughter2     Anxiety disorder Daughter Daughter3     Depression Daughter Daughter3     Breast cancer Maternal Aunt Teresa     Cancer Maternal Aunt Nikki         unknown origin    Cancer Paternal Aunt Esperanza         unknown origin    Breast cancer Paternal Aunt Esperanza     Prostate cancer Maternal Cousin Chris     Leukemia Maternal Cousin Chris     Prostate cancer Maternal Cousin Nirmal     Breast cancer Other Pat 1/2aunt Quiana     Colon cancer Neg Hx      Ovarian cancer Neg Hx       Social History[1]      Physical Exam     Initial Vitals [05/03/25 0705]   BP Pulse Resp Temp SpO2   (!) 161/96 72 20 97.7 °F (36.5 °C) 100 %      MAP       --         Physical Exam    Nursing note and vitals reviewed.  Constitutional: She is not diaphoretic. No distress.   HENT:   Head: Atraumatic. Mouth/Throat: Oropharynx is clear and moist.   Eyes: Conjunctivae are normal.   Cardiovascular:  Normal rate and intact distal pulses.           Pulmonary/Chest: No respiratory distress.   Abdominal: She exhibits no distension.   Musculoskeletal:      Right elbow: Normal.      Left elbow: Effusion present. Tenderness present.      Right hand: Swelling (1st PIP joint) present.      Left hand: Swelling (1st PIP joint) present.      Right knee: Normal.      Left knee: Swelling present. Tenderness present.      Right ankle: Swelling present. Tenderness present.      Left ankle: Swelling present. Tenderness present.      Comments: Feet tender with obvious effusions throughout multiple joints           ED Course   Procedures  Labs Reviewed   POCT GLUCOSE       Result Value    POCT Glucose 85     POCT GLUCOSE MONITORING CONTINUOUS          Imaging Results    None          Medications   predniSONE tablet 60 mg (60 mg Oral Given 5/3/25  0831)   ibuprofen tablet 600 mg (600 mg Oral Given 5/3/25 0831)     Medical Decision Making  69 year old female with past medical history of type 2 diabetes, breast cancer status post chemo/radiation (2020) with history of pulmonary nodule, rheumatoid arthritis, hypertension and hyperlipidemia that presents to emergency department with joint pain.     On initial evaluation patient was alert and oriented and in mild distress secondary to pain, cooperative with history and physical examination.  Vitals are significant for mild hypertension otherwise within normal limits    Differential for patient's presentation includes rheumatoid arthritis flare.  Also considered septic arthritis however physical exam is reassuring, evidence of fever or systemic symptoms at this point, effusions are present but in multiple joints more consistent with RA than with single septic joint.  Per rheumatology had evidence of synovitis at last clinic visit however preferred not to continue at that time.     Per chart review she was tolerated steroids well in the past with improvement in his symptoms.  We will start new course in encourage her to follow up with rheumatologist.  No other concerns at this time.  Stable for discharge.  ER returned precautions given.    Amount and/or Complexity of Data Reviewed  Labs:  Decision-making details documented in ED Course.    Risk  Prescription drug management.               ED Course as of 05/03/25 0857   Sat May 03, 2025   0831 POCT glucose  85 [TK]      ED Course User Index  [TK] Sandy Loomis DO                           Clinical Impression:  Final diagnoses:  [M06.9] Rheumatoid arthritis, involving unspecified site, unspecified whether rheumatoid factor present (Primary)  [E11.65] Type 2 diabetes mellitus with hyperglycemia, without long-term current use of insulin  [I10] Essential hypertension (Chronic)          ED Disposition Condition    Discharge Stable          ED Prescriptions        Medication Sig Dispense Start Date End Date Auth. Provider    predniSONE (DELTASONE) 10 MG tablet Take 1 tablet (10 mg total) by mouth once daily. for 10 days 10 tablet 5/3/2025 5/13/2025 Sandy Loomis DO          Follow-up Information       Follow up With Specialties Details Why Contact Info    [facility] - Emergency Dept Emergency Medicine  If symptoms worsen [address] [facility] [address] [phone]    Patty Louis MD Internal Medicine   [address]  [facility] [address], [address]  [facility] [address]  [phone]      Vidhya Dai MD Rheumatology   [address] [facility] [address] [facility]  [address]  [facility] [address] [phone]                   [1]   Social History  Tobacco Use    Smoking status: Never    Smokeless tobacco: Never   Substance Use Topics    Alcohol use: Never    Drug use: No        Sandy Loomis DO  Resident  05/03/25 0857

## 2025-05-03 NOTE — ED NOTES
LOC: The patient is awake, alert, and oriented to self, place, time, and situation. Pt is calm and cooperative. Affect is appropriate.  Speech is appropriate and clear.     APPEARANCE: Patient resting uncomfortably, reporting swollen joints, requesting prescription refill,  in no acute distress.  Patient is clean and well groomed.    SKIN: The skin is warm and dry; color consistent with ethnicity.  Patient has normal skin turgor and moist mucus membranes.  Skin intact; no breakdown or bruising noted.     MUSCULOSKELETAL: Patient moving upper and lower extremities with difficulty; reporting  pain.  Denies weakness.  Joints with swelling.    RESPIRATORY: Airway is open and patent. Respirations spontaneous, even, easy, and non-labored.  Patient has a normal effort and rate.  No accessory muscle use noted. Denies cough.     CARDIAC:    No peripheral edema noted. No complaints of chest pain.     ABDOMEN: Soft and non tender to palpation.  No distention noted. Pt denies abdominal pain; denies nausea, vomiting, diarrhea, or constipation.    NEUROLOGIC: Eyes open spontaneously.  Behavior appropriate to situation.  Follows commands; facial expression symmetrical.  Purposeful motor response noted; normal sensation in all extremities. Pt denies headache; denies lightheadedness or dizziness; denies visual disturbances; denies loss of balance; denies unilateral weakness.

## 2025-05-05 ENCOUNTER — PATIENT OUTREACH (OUTPATIENT)
Facility: OTHER | Age: 70
End: 2025-05-05
Payer: MEDICARE

## 2025-05-05 ENCOUNTER — OFFICE VISIT (OUTPATIENT)
Dept: URGENT CARE | Facility: CLINIC | Age: 70
End: 2025-05-05
Payer: MEDICARE

## 2025-05-05 VITALS
HEIGHT: 64 IN | RESPIRATION RATE: 16 BRPM | TEMPERATURE: 99 F | HEART RATE: 97 BPM | SYSTOLIC BLOOD PRESSURE: 167 MMHG | DIASTOLIC BLOOD PRESSURE: 79 MMHG | BODY MASS INDEX: 29.19 KG/M2 | OXYGEN SATURATION: 100 % | WEIGHT: 171 LBS

## 2025-05-05 DIAGNOSIS — I10 HYPERTENSION, UNSPECIFIED TYPE: ICD-10-CM

## 2025-05-05 DIAGNOSIS — J06.9 VIRAL URI: Primary | ICD-10-CM

## 2025-05-05 DIAGNOSIS — R05.1 ACUTE COUGH: ICD-10-CM

## 2025-05-05 LAB
CTP QC/QA: YES
SARS CORONAVIRUS 2 ANTIGEN: NEGATIVE

## 2025-05-05 PROCEDURE — 99214 OFFICE O/P EST MOD 30 MIN: CPT | Mod: S$GLB,,,

## 2025-05-05 PROCEDURE — 87811 SARS-COV-2 COVID19 W/OPTIC: CPT | Mod: QW,S$GLB,,

## 2025-05-05 RX ORDER — PROMETHAZINE HYDROCHLORIDE AND DEXTROMETHORPHAN HYDROBROMIDE 6.25; 15 MG/5ML; MG/5ML
5 SYRUP ORAL NIGHTLY PRN
Qty: 118 ML | Refills: 0 | Status: SHIPPED | OUTPATIENT
Start: 2025-05-05

## 2025-05-05 RX ORDER — BENZONATATE 100 MG/1
100 CAPSULE ORAL 3 TIMES DAILY PRN
Qty: 30 CAPSULE | Refills: 0 | Status: SHIPPED | OUTPATIENT
Start: 2025-05-05 | End: 2025-05-15

## 2025-05-05 NOTE — PROGRESS NOTES
"Subjective:      Patient ID: Sunitha Pillai is a 69 y.o. female.    Vitals:  height is 5' 4" (1.626 m) and weight is 77.6 kg (171 lb). Her temperature is 98.9 °F (37.2 °C). Her blood pressure is 167/79 (abnormal) and her pulse is 97. Her respiration is 16 and oxygen saturation is 100%.     Chief Complaint: Cough    69 y.o female presents to clinic c.o cough x yesterday.     Cough  This is a new problem. The current episode started yesterday. Associated symptoms include postnasal drip. Pertinent negatives include no chest pain, chills, ear congestion, ear pain, fever, headaches, heartburn, hemoptysis, myalgias, nasal congestion, rash, rhinorrhea, sore throat, shortness of breath, sweats, weight loss or wheezing. The treatment provided no relief. Her past medical history is significant for bronchitis. There is no history of asthma, bronchiectasis, COPD, emphysema, environmental allergies or pneumonia.       Constitution: Negative for chills and fever.   HENT:  Positive for congestion, postnasal drip and sinus pressure. Negative for ear pain and sore throat.    Cardiovascular:  Negative for chest pain.   Respiratory:  Positive for cough. Negative for bloody sputum, shortness of breath and wheezing.    Gastrointestinal:  Negative for abdominal pain, nausea, vomiting, diarrhea and heartburn.   Musculoskeletal:  Negative for muscle ache.   Skin:  Negative for rash.   Allergic/Immunologic: Negative for environmental allergies.   Neurological:  Negative for headaches.      Objective:     Physical Exam   Constitutional: She is oriented to person, place, and time. She appears well-developed. She is cooperative.  Non-toxic appearance. She does not appear ill. No distress.   HENT:   Head: Normocephalic and atraumatic.   Ears:   Right Ear: Hearing, tympanic membrane, external ear and ear canal normal.   Left Ear: Hearing, tympanic membrane, external ear and ear canal normal.   Nose: No mucosal edema, rhinorrhea, nasal " deformity or congestion. No epistaxis. Right sinus exhibits maxillary sinus tenderness. Right sinus exhibits no frontal sinus tenderness. Left sinus exhibits maxillary sinus tenderness. Left sinus exhibits no frontal sinus tenderness.   Mouth/Throat: Uvula is midline, oropharynx is clear and moist and mucous membranes are normal. No trismus in the jaw. Normal dentition. No uvula swelling. No oropharyngeal exudate, posterior oropharyngeal edema or posterior oropharyngeal erythema.   Eyes: Conjunctivae and lids are normal. Pupils are equal, round, and reactive to light. No scleral icterus.   Neck: Trachea normal and phonation normal. Neck supple. No edema present. No erythema present. No neck rigidity present.   Cardiovascular: Normal rate, regular rhythm, normal heart sounds and normal pulses.   Pulmonary/Chest: Effort normal and breath sounds normal. No stridor. No respiratory distress. She has no decreased breath sounds. She has no wheezes. She has no rhonchi. She has no rales.   Abdominal: Normal appearance.   Musculoskeletal: Normal range of motion.         General: No deformity. Normal range of motion.   Lymphadenopathy:     She has no cervical adenopathy.   Neurological: She is alert and oriented to person, place, and time. She exhibits normal muscle tone. Coordination normal.   Skin: Skin is warm, dry, intact, not diaphoretic and not pale.   Psychiatric: Her speech is normal and behavior is normal. Judgment and thought content normal.   Nursing note and vitals reviewed.      Assessment:     1. Viral URI    2. Acute cough    3. Hypertension, unspecified type        Plan:     Results for orders placed or performed in visit on 05/05/25   SARS Coronavirus 2 Antigen, POCT Manual Read    Collection Time: 05/05/25  6:38 PM   Result Value Ref Range    SARS Coronavirus 2 Antigen Negative Negative, Presumptive Negative     Acceptable Yes      *Note: Due to a large number of results and/or encounters for  the requested time period, some results have not been displayed. A complete set of results can be found in Results Review.       Viral URI    Acute cough  -     SARS Coronavirus 2 Antigen, POCT Manual Read  -     benzonatate (TESSALON) 100 MG capsule; Take 1 capsule (100 mg total) by mouth 3 (three) times daily as needed for Cough.  Dispense: 30 capsule; Refill: 0  -     promethazine-dextromethorphan (PROMETHAZINE-DM) 6.25-15 mg/5 mL Syrp; Take 5 mLs by mouth nightly as needed (cough).  Dispense: 118 mL; Refill: 0    Hypertension, unspecified type            Discussed results/diagnosis/plan with patient in clinic. Strict precautions given to patient to monitor for worsening signs and symptoms. Advised to follow up with PCP or specialist.  Explained side effects of medications prescribed with patient and informed him/her to discontinue use if he/she has any side effects and to inform UC or PCP if this occurs. All questions answered. Strict ED verses clinic return precautions stressed and given in depth. Advised if symptoms worsens of fail to improve he/she should go to the Emergency Room. Discharge and follow-up instructions given verbally/printed with the patient who expressed understanding and willingness to comply with my recommendations. Patient voiced understanding and in agreement with current treatment plan. Patient exits the exam room in no acute distress. Conversant and engaged during discharge discussion, verbalized understanding.

## 2025-05-05 NOTE — PATIENT INSTRUCTIONS
Negative for COVID. Symptoms are likely viral today.     When sick with a viral illness, cover your mouth and nose when sneezing and coughing. Wash hands frequently. Sanitize areas at home. Wear a mask in public if you need. Stay home if you are having fevers, chills, body aches, fatigue. Symptoms should resolve in 7-14 days. There is no specific treatment for viral illnesses. We treat symptoms with supportive care. Getting plenty of rest but completing light activities daily, eating a well-balanced diet, drinking plenty of fluids, managing stress levels can aid in faster recovery.      Try tessalon perles as directed during the day for your cough. It will help numb the back of your throat so you do not have the urge to cough.      Take promethazine/DM cough syrup at night for cough. Promethazine is an oral anti-histamine and will help with sinus relief. DM (dextromethorphan) is a decongestant. This medication will make you drowsy. Make sure you do not drive a vehicle, drink alcohol, operate machinery or take other sedating medications while taking.     Try a decongestant and corticosteroid nasal spray like flonase for the next few days for sinus relief. Initial: 2 sprays in each nostril once daily for 1 week. Reduce to 1 spray in each nostril once per day. Stop taking if you develop a nose bleed. Nasal saline spray can be used together with flonase to help moisten nostrils. An antihistamine like zyrtec, claritin, allegra can also be helpful for sinus relief and will help dry nasal passages.     Regular (Guaifenesin) Mucinex 1200 mg twice per day for 10 days can help thin secretions for better clearance. Drink plenty of fluids with this.     Honey is a natural cough suppressant.     If you do have Hypertension or palpitations, it is safe to take Coricidin HBP (multi-symptom flu) for relief of sinus symptoms.    Hypertension: Try DASH diet to help lower BP and buy a blood pressure cuff for home monitoring. Check  blood pressure at least 2 times per day and create a log. Avoid eating foods that are high in salt. Eat more foods with potassium, magnesium and calcium which will help dilate your vessels and decrease your BP.     Please only use over the counter cough and cold medications (decongestants) for 3-5 days at a time to avoid rebound congestion.     Warm tea/warm liquids will help soothe the back of your throat. Warm water salt gurgles can also be helpful. A dry throat will cause pain. Make sure to stay hydrated. Water and pedilyte are the best to drink. Neti pot irrigation, humidifier in your room, avoiding fans, warm compresses to face, eating/drinking hot soups, hot shower before bedtime can help.     The recommended daily fluid intake for women is 2.7 liters (five 16 oz bottles).      Alternate Tylenol and ibuprofen every 4 hours as needed for fever and body aches.  Please take NSAIDs with a full glass of water and food to avoid GI upset.      Getting plenty of rest can aid in a faster recovery of illnesses.     Please follow-up with your primary care provider or return to the clinic if not better/worsening symptoms in 1 week.     Report to the ER if you have chest pain, shortness of breath, palpitations.

## 2025-05-14 ENCOUNTER — TELEPHONE (OUTPATIENT)
Dept: HEMATOLOGY/ONCOLOGY | Facility: CLINIC | Age: 70
End: 2025-05-14
Payer: MEDICARE

## 2025-05-14 NOTE — TELEPHONE ENCOUNTER
Left message for patient   Provided call back number  Contact name   Reason for calling ; schedule 6 month f/u with Dr. Mcduffie       Awaiting patient call back

## 2025-05-16 ENCOUNTER — TELEPHONE (OUTPATIENT)
Dept: HEMATOLOGY/ONCOLOGY | Facility: CLINIC | Age: 70
End: 2025-05-16
Payer: MEDICARE

## 2025-05-16 NOTE — TELEPHONE ENCOUNTER
Left message for patient   Provided call back number  Contact name   Reason for calling scheduled 6 month visit with Dr. Froy MD  Appointment reminder mailed.       Awaiting patient call back

## 2025-05-17 NOTE — TELEPHONE ENCOUNTER
No care due was identified.  [facility] Embedded Care Due Messages. Reference number: [id number].   5/17/2025 11:10:30 AM CDT

## 2025-05-19 RX ORDER — METFORMIN HYDROCHLORIDE 1000 MG/1
TABLET ORAL
Qty: 90 TABLET | Refills: 0 | Status: SHIPPED | OUTPATIENT
Start: 2025-05-19

## 2025-05-19 NOTE — TELEPHONE ENCOUNTER
Refill Routing Note   Medication(s) are not appropriate for processing by [facility] for the following reason(s):        ED/Hospital Visit since last OV with provider    [facility] action(s):  Defer               Appointments  past 12m or future 3m with PCP    Date Provider   Last Visit   2/27/2025 Patty Louis MD   Next Visit   7/3/2025 Patty Louis MD   ED visits in past 90 days: 3        Note composed:9:52 AM 05/19/2025

## 2025-06-24 RX ORDER — ATORVASTATIN CALCIUM 10 MG/1
10 TABLET, FILM COATED ORAL
Qty: 90 TABLET | Refills: 2 | Status: SHIPPED | OUTPATIENT
Start: 2025-06-24

## 2025-06-24 NOTE — TELEPHONE ENCOUNTER
Refill Decision Note   Sunitha Pillai  is requesting a refill authorization.  Brief Assessment and Rationale for Refill:  Approve     Medication Therapy Plan:  ED visit for rheumatoid arthritis. FOVS. Approve 90 with 2      Comments:     Note composed:6:01 PM 06/24/2025             Appointments     Last Visit   2/27/2025 Patty Louis MD   Next Visit   7/3/2025 Patty Louis MD

## 2025-06-24 NOTE — TELEPHONE ENCOUNTER
No care due was identified.  [facility] Embedded Care Due Messages. Reference number: [id number].   6/24/2025 2:50:01 PM CDT

## 2025-07-03 ENCOUNTER — LAB VISIT (OUTPATIENT)
Dept: LAB | Facility: HOSPITAL | Age: 70
End: 2025-07-03
Attending: INTERNAL MEDICINE
Payer: MEDICARE

## 2025-07-03 ENCOUNTER — OFFICE VISIT (OUTPATIENT)
Dept: INTERNAL MEDICINE | Facility: CLINIC | Age: 70
End: 2025-07-03
Payer: MEDICARE

## 2025-07-03 VITALS
TEMPERATURE: 99 F | BODY MASS INDEX: 29.3 KG/M2 | DIASTOLIC BLOOD PRESSURE: 76 MMHG | RESPIRATION RATE: 15 BRPM | HEART RATE: 72 BPM | SYSTOLIC BLOOD PRESSURE: 126 MMHG | HEIGHT: 64 IN | WEIGHT: 171.63 LBS

## 2025-07-03 DIAGNOSIS — E11.65 TYPE 2 DIABETES MELLITUS WITH HYPERGLYCEMIA, WITHOUT LONG-TERM CURRENT USE OF INSULIN: ICD-10-CM

## 2025-07-03 DIAGNOSIS — E11.65 TYPE 2 DIABETES MELLITUS WITH HYPERGLYCEMIA, WITHOUT LONG-TERM CURRENT USE OF INSULIN: Primary | ICD-10-CM

## 2025-07-03 DIAGNOSIS — M05.79 RHEUMATOID ARTHRITIS INVOLVING MULTIPLE SITES WITH POSITIVE RHEUMATOID FACTOR: ICD-10-CM

## 2025-07-03 DIAGNOSIS — N39.0 URINARY TRACT INFECTION WITHOUT HEMATURIA, SITE UNSPECIFIED: ICD-10-CM

## 2025-07-03 LAB
ABSOLUTE EOSINOPHIL (OHS): 0 K/UL
ABSOLUTE MONOCYTE (OHS): 0.45 K/UL (ref 0.3–1)
ABSOLUTE NEUTROPHIL COUNT (OHS): 2.96 K/UL (ref 1.8–7.7)
ALBUMIN SERPL BCP-MCNC: 3.6 G/DL (ref 3.5–5.2)
ALP SERPL-CCNC: 78 UNIT/L (ref 40–150)
ALT SERPL W/O P-5'-P-CCNC: 7 UNIT/L (ref 10–44)
ANION GAP (OHS): 10 MMOL/L (ref 8–16)
AST SERPL-CCNC: 17 UNIT/L (ref 11–45)
BASOPHILS # BLD AUTO: 0.01 K/UL
BASOPHILS NFR BLD AUTO: 0.2 %
BILIRUB SERPL-MCNC: 0.6 MG/DL (ref 0.1–1)
BUN SERPL-MCNC: 12 MG/DL (ref 8–23)
CALCIUM SERPL-MCNC: 9.3 MG/DL (ref 8.7–10.5)
CHLORIDE SERPL-SCNC: 111 MMOL/L (ref 95–110)
CO2 SERPL-SCNC: 21 MMOL/L (ref 23–29)
CREAT SERPL-MCNC: 1.2 MG/DL (ref 0.5–1.4)
CRP SERPL-MCNC: 1.5 MG/L
EAG (OHS): 123 MG/DL (ref 68–131)
ERYTHROCYTE [DISTWIDTH] IN BLOOD BY AUTOMATED COUNT: 12.3 % (ref 11.5–14.5)
ERYTHROCYTE [SEDIMENTATION RATE] IN BLOOD BY PHOTOMETRIC METHOD: 26 MM/HR
GFR SERPLBLD CREATININE-BSD FMLA CKD-EPI: 49 ML/MIN/1.73/M2
GLUCOSE SERPL-MCNC: 107 MG/DL (ref 70–110)
HBA1C MFR BLD: 5.9 % (ref 4–5.6)
HCT VFR BLD AUTO: 32.3 % (ref 37–48.5)
HGB BLD-MCNC: 10.9 GM/DL (ref 12–16)
IMM GRANULOCYTES # BLD AUTO: 0.01 K/UL (ref 0–0.04)
IMM GRANULOCYTES NFR BLD AUTO: 0.2 % (ref 0–0.5)
LYMPHOCYTES # BLD AUTO: 1.97 K/UL (ref 1–4.8)
MCH RBC QN AUTO: 31.6 PG (ref 27–31)
MCHC RBC AUTO-ENTMCNC: 33.7 G/DL (ref 32–36)
MCV RBC AUTO: 94 FL (ref 82–98)
NUCLEATED RBC (/100WBC) (OHS): 0 /100 WBC
PLATELET # BLD AUTO: 173 K/UL (ref 150–450)
PMV BLD AUTO: 9.7 FL (ref 9.2–12.9)
POTASSIUM SERPL-SCNC: 3.7 MMOL/L (ref 3.5–5.1)
PROT SERPL-MCNC: 6.9 GM/DL (ref 6–8.4)
RBC # BLD AUTO: 3.45 M/UL (ref 4–5.4)
RELATIVE EOSINOPHIL (OHS): 0 %
RELATIVE LYMPHOCYTE (OHS): 36.5 % (ref 18–48)
RELATIVE MONOCYTE (OHS): 8.3 % (ref 4–15)
RELATIVE NEUTROPHIL (OHS): 54.8 % (ref 38–73)
SODIUM SERPL-SCNC: 142 MMOL/L (ref 136–145)
WBC # BLD AUTO: 5.4 K/UL (ref 3.9–12.7)

## 2025-07-03 PROCEDURE — 1125F AMNT PAIN NOTED PAIN PRSNT: CPT | Mod: CPTII,HCNC,S$GLB, | Performed by: INTERNAL MEDICINE

## 2025-07-03 PROCEDURE — 86140 C-REACTIVE PROTEIN: CPT | Mod: HCNC

## 2025-07-03 PROCEDURE — 99214 OFFICE O/P EST MOD 30 MIN: CPT | Mod: HCNC,S$GLB,, | Performed by: INTERNAL MEDICINE

## 2025-07-03 PROCEDURE — 99999 PR PBB SHADOW E&M-EST. PATIENT-LVL III: CPT | Mod: PBBFAC,HCNC,, | Performed by: INTERNAL MEDICINE

## 2025-07-03 PROCEDURE — 3288F FALL RISK ASSESSMENT DOCD: CPT | Mod: CPTII,HCNC,S$GLB, | Performed by: INTERNAL MEDICINE

## 2025-07-03 PROCEDURE — 3060F POS MICROALBUMINURIA REV: CPT | Mod: CPTII,HCNC,S$GLB, | Performed by: INTERNAL MEDICINE

## 2025-07-03 PROCEDURE — 3074F SYST BP LT 130 MM HG: CPT | Mod: CPTII,HCNC,S$GLB, | Performed by: INTERNAL MEDICINE

## 2025-07-03 PROCEDURE — 1101F PT FALLS ASSESS-DOCD LE1/YR: CPT | Mod: CPTII,HCNC,S$GLB, | Performed by: INTERNAL MEDICINE

## 2025-07-03 PROCEDURE — 83036 HEMOGLOBIN GLYCOSYLATED A1C: CPT | Mod: HCNC

## 2025-07-03 PROCEDURE — 80053 COMPREHEN METABOLIC PANEL: CPT | Mod: HCNC

## 2025-07-03 PROCEDURE — 4010F ACE/ARB THERAPY RXD/TAKEN: CPT | Mod: CPTII,HCNC,S$GLB, | Performed by: INTERNAL MEDICINE

## 2025-07-03 PROCEDURE — 3078F DIAST BP <80 MM HG: CPT | Mod: CPTII,HCNC,S$GLB, | Performed by: INTERNAL MEDICINE

## 2025-07-03 PROCEDURE — 85025 COMPLETE CBC W/AUTO DIFF WBC: CPT | Mod: HCNC

## 2025-07-03 PROCEDURE — 85652 RBC SED RATE AUTOMATED: CPT | Mod: HCNC

## 2025-07-03 PROCEDURE — 36415 COLL VENOUS BLD VENIPUNCTURE: CPT | Mod: HCNC,PO

## 2025-07-03 PROCEDURE — 3066F NEPHROPATHY DOC TX: CPT | Mod: CPTII,HCNC,S$GLB, | Performed by: INTERNAL MEDICINE

## 2025-07-03 PROCEDURE — 3008F BODY MASS INDEX DOCD: CPT | Mod: CPTII,HCNC,S$GLB, | Performed by: INTERNAL MEDICINE

## 2025-07-03 PROCEDURE — 3044F HG A1C LEVEL LT 7.0%: CPT | Mod: CPTII,HCNC,S$GLB, | Performed by: INTERNAL MEDICINE

## 2025-07-03 RX ORDER — TIRZEPATIDE 2.5 MG/.5ML
2.5 INJECTION, SOLUTION SUBCUTANEOUS
Qty: 2 ML | Refills: 1 | Status: SHIPPED | OUTPATIENT
Start: 2025-07-03

## 2025-08-14 ENCOUNTER — TELEPHONE (OUTPATIENT)
Dept: INTERNAL MEDICINE | Facility: CLINIC | Age: 70
End: 2025-08-14
Payer: MEDICARE

## 2025-08-14 DIAGNOSIS — D64.9 ANEMIA, UNSPECIFIED TYPE: ICD-10-CM

## 2025-08-14 DIAGNOSIS — E11.65 TYPE 2 DIABETES MELLITUS WITH HYPERGLYCEMIA, WITHOUT LONG-TERM CURRENT USE OF INSULIN: Primary | ICD-10-CM

## 2025-08-25 RX ORDER — LOSARTAN POTASSIUM 100 MG/1
100 TABLET ORAL
Qty: 90 TABLET | Refills: 3 | Status: SHIPPED | OUTPATIENT
Start: 2025-08-25

## 2025-08-28 ENCOUNTER — TELEPHONE (OUTPATIENT)
Dept: PSYCHIATRY | Facility: CLINIC | Age: 70
End: 2025-08-28
Payer: MEDICARE

## 2025-08-28 ENCOUNTER — OFFICE VISIT (OUTPATIENT)
Dept: HEMATOLOGY/ONCOLOGY | Facility: CLINIC | Age: 70
End: 2025-08-28
Payer: MEDICARE

## 2025-08-28 VITALS
DIASTOLIC BLOOD PRESSURE: 76 MMHG | RESPIRATION RATE: 18 BRPM | OXYGEN SATURATION: 100 % | HEART RATE: 73 BPM | TEMPERATURE: 98 F | SYSTOLIC BLOOD PRESSURE: 147 MMHG | HEIGHT: 64 IN | WEIGHT: 163.38 LBS | BODY MASS INDEX: 27.89 KG/M2

## 2025-08-28 DIAGNOSIS — Z17.1 MALIGNANT NEOPLASM OF LOWER-INNER QUADRANT OF LEFT BREAST IN FEMALE, ESTROGEN RECEPTOR NEGATIVE: Primary | ICD-10-CM

## 2025-08-28 DIAGNOSIS — G62.0 CHEMOTHERAPY-INDUCED PERIPHERAL NEUROPATHY: ICD-10-CM

## 2025-08-28 DIAGNOSIS — T45.1X5A CHEMOTHERAPY-INDUCED PERIPHERAL NEUROPATHY: ICD-10-CM

## 2025-08-28 DIAGNOSIS — Z12.31 ENCOUNTER FOR SCREENING MAMMOGRAM FOR HIGH-RISK PATIENT: ICD-10-CM

## 2025-08-28 DIAGNOSIS — C50.312 MALIGNANT NEOPLASM OF LOWER-INNER QUADRANT OF LEFT BREAST IN FEMALE, ESTROGEN RECEPTOR NEGATIVE: Primary | ICD-10-CM

## 2025-08-28 PROCEDURE — 99999 PR PBB SHADOW E&M-EST. PATIENT-LVL V: CPT | Mod: PBBFAC,HCNC,, | Performed by: INTERNAL MEDICINE

## 2025-08-29 RX ORDER — TIRZEPATIDE 2.5 MG/.5ML
2.5 INJECTION, SOLUTION SUBCUTANEOUS WEEKLY
Qty: 6 ML | Refills: 1 | Status: SHIPPED | OUTPATIENT
Start: 2025-08-29

## (undated) DEVICE — DRAPE STERI INSTRUMENT 1018

## (undated) DEVICE — TROCAR ENDOPATH XCEL 5MM 7.5CM

## (undated) DEVICE — SHEET DRAPE FAN-FOLDED 3/4

## (undated) DEVICE — IRRIGATOR ENDOSCOPY DISP.

## (undated) DEVICE — CLIP HEMO-LOK ML

## (undated) DEVICE — BRA POST SURGICAL WHT 40-42IN

## (undated) DEVICE — SET MICPUNC ACC STIFF CANNULA

## (undated) DEVICE — SEE MEDLINE ITEM 146417

## (undated) DEVICE — TROCAR SPACEMAKER BLUNT 10MM

## (undated) DEVICE — COVERS PROBE NR-48 STERILE

## (undated) DEVICE — SEE MEDLINE ITEM 152511

## (undated) DEVICE — SOL IRR SOD CHL .9% POUR

## (undated) DEVICE — DRESSING TRANS 4X4 TEGADERM

## (undated) DEVICE — GLOVE BIOGEL SKINSENSE PI 8.0

## (undated) DEVICE — SOL POVIDONE PREP IODINE 4 OZ

## (undated) DEVICE — NDL HYPO REG 25G X 1 1/2

## (undated) DEVICE — DRAPE THYROID WITH ARMBOARD

## (undated) DEVICE — ADHESIVE DERMABOND ADVANCED

## (undated) DEVICE — BAG TISS RETRV MONARCH 10MM

## (undated) DEVICE — SUT MCRYL PLUS 4-0 PS2 27IN

## (undated) DEVICE — SUT 3-0 VICRYL / SH (J416)

## (undated) DEVICE — CHLORAPREP 3ML APPLICATOR TINT

## (undated) DEVICE — Device

## (undated) DEVICE — DRESSING MEPORE ISLAND 31/2X4

## (undated) DEVICE — WARMER DRAPE STERILE LF

## (undated) DEVICE — FORCEP ADSON SATIN WITH TEETH

## (undated) DEVICE — GAUZE SPONGE 4X4 12PLY

## (undated) DEVICE — COVER PROBE NL STRL 3.6X96IN

## (undated) DEVICE — SUT VICRYL 3-0 27 SH

## (undated) DEVICE — KIT ANTIFOG

## (undated) DEVICE — SEAL LENS SCOPE MYOSURE

## (undated) DEVICE — ELECTRODE REM PLYHSV RETURN 9

## (undated) DEVICE — BRA POST SURGICAL WHT 36-38IN

## (undated) DEVICE — GLOVE BIOGEL SKINSENSE PI 7.5

## (undated) DEVICE — SUT 4-0 VICRYL / SH

## (undated) DEVICE — SUT 0 VICRYL / UR6 (J603)

## (undated) DEVICE — DRESSING TRANS 2X2 TEGADERM

## (undated) DEVICE — TRAY MINOR GEN SURG

## (undated) DEVICE — BRA POST SURGICAL WHT 48-50IN

## (undated) DEVICE — SPONGE LAP 18X18 PREWASHED

## (undated) DEVICE — BLADE SURG CARBON STEEL SZ11

## (undated) DEVICE — BRA POST SURGICAL WHT 44-46IN

## (undated) DEVICE — SCALPEL #15 BLADE STRL DISP.

## (undated) DEVICE — SOL 9P NACL IRR PIC IL

## (undated) DEVICE — PACK FLUENT DISPOSABLE

## (undated) DEVICE — SEE MEDLINE ITEM 157128

## (undated) DEVICE — ELECTRODE BLADE INSULATED 1 IN

## (undated) DEVICE — TROCAR ENDOPATH XCEL 5X75MM

## (undated) DEVICE — TRAY SUTURE REMOVAL IRIS SCIS

## (undated) DEVICE — SOL NS 1000CC

## (undated) DEVICE — PACK UNIVERSAL SPLIT II

## (undated) DEVICE — SCISSOR 5MMX35CM DIRECT DRIVE

## (undated) DEVICE — SUT 3-0 12-18IN SILK

## (undated) DEVICE — TUBING HF INSUFFLATION W/ FLTR

## (undated) DEVICE — SEE MEDLINE ITEM 156918

## (undated) DEVICE — GAUZE FLUFF XXLG 36X36 2 PLY

## (undated) DEVICE — SOUND DISPOSABLE UTERINE W/NOT

## (undated) DEVICE — KIT PROBE COVER WITH GEL

## (undated) DEVICE — CLOSURE SKIN STERI STRIP 1/2X4

## (undated) DEVICE — DRAPE ABDOMINAL TIBURON 14X11

## (undated) DEVICE — GLOVE GAMMEX SURG LF PI SZ 7.5

## (undated) DEVICE — SEE MEDLINE ITEM 152622

## (undated) DEVICE — GLOVE SENSICARE PI SURG 6.5

## (undated) DEVICE — SOL NACL IRR 3000ML

## (undated) DEVICE — SET BASIN 48X48IN 6000ML RING

## (undated) DEVICE — SOL POVIDONE SCRUB IODINE 4 OZ